# Patient Record
Sex: FEMALE | Race: WHITE | Employment: OTHER | URBAN - METROPOLITAN AREA
[De-identification: names, ages, dates, MRNs, and addresses within clinical notes are randomized per-mention and may not be internally consistent; named-entity substitution may affect disease eponyms.]

---

## 2017-03-17 PROBLEM — G89.29 CHRONIC PAIN: Chronic | Status: ACTIVE | Noted: 2017-03-17

## 2017-03-17 PROBLEM — M79.606 LEG PAIN: Chronic | Status: ACTIVE | Noted: 2017-03-17

## 2017-03-17 PROBLEM — M54.50 LOW BACK PAIN: Chronic | Status: ACTIVE | Noted: 2017-03-17

## 2017-06-01 PROBLEM — M48.061 NEURAL FORAMINAL STENOSIS OF LUMBAR SPINE: Chronic | Status: ACTIVE | Noted: 2017-06-01

## 2017-06-01 PROBLEM — M51.26 DISC DISPLACEMENT, LUMBAR: Chronic | Status: ACTIVE | Noted: 2017-06-01

## 2017-07-28 PROBLEM — E11.59 TYPE 2 DIABETES MELLITUS WITH CIRCULATORY DISORDER (HCC): Status: ACTIVE | Noted: 2017-07-28

## 2017-10-06 ENCOUNTER — CARE COORDINATION (OUTPATIENT)
Dept: CARE COORDINATION | Age: 47
End: 2017-10-06

## 2017-10-17 PROBLEM — S99.921A INJURY OF RIGHT GREAT TOE: Status: ACTIVE | Noted: 2017-10-17

## 2018-02-04 PROBLEM — Z00.00 HEALTH CARE MAINTENANCE: Status: ACTIVE | Noted: 2018-02-04

## 2018-04-04 ENCOUNTER — ANTI-COAG VISIT (OUTPATIENT)
Dept: FAMILY MEDICINE CLINIC | Age: 48
End: 2018-04-04

## 2018-04-10 DIAGNOSIS — G40.909 SEIZURE DISORDER (HCC): ICD-10-CM

## 2018-04-10 RX ORDER — LEVETIRACETAM 500 MG/1
500 TABLET ORAL 2 TIMES DAILY
Qty: 180 TABLET | Refills: 0 | Status: SHIPPED | OUTPATIENT
Start: 2018-04-10 | End: 2018-04-25 | Stop reason: SDUPTHER

## 2018-04-11 ENCOUNTER — TELEPHONE (OUTPATIENT)
Dept: PHYSICAL MEDICINE AND REHAB | Age: 48
End: 2018-04-11

## 2018-04-11 PROBLEM — Z00.00 HEALTH CARE MAINTENANCE: Status: RESOLVED | Noted: 2018-02-04 | Resolved: 2018-04-11

## 2018-04-11 RX ORDER — CHLORZOXAZONE 500 MG/1
500 TABLET ORAL 4 TIMES DAILY PRN
Qty: 120 TABLET | Refills: 2 | Status: SHIPPED | OUTPATIENT
Start: 2018-04-11 | End: 2018-05-11

## 2018-04-25 ENCOUNTER — OFFICE VISIT (OUTPATIENT)
Dept: NEUROLOGY | Age: 48
End: 2018-04-25
Payer: MEDICARE

## 2018-04-25 VITALS
HEIGHT: 67 IN | OXYGEN SATURATION: 93 % | TEMPERATURE: 98.2 F | RESPIRATION RATE: 20 BRPM | DIASTOLIC BLOOD PRESSURE: 95 MMHG | BODY MASS INDEX: 39.71 KG/M2 | HEART RATE: 107 BPM | SYSTOLIC BLOOD PRESSURE: 130 MMHG | WEIGHT: 253 LBS

## 2018-04-25 DIAGNOSIS — G40.909 SEIZURE DISORDER (HCC): Primary | ICD-10-CM

## 2018-04-25 PROCEDURE — 4004F PT TOBACCO SCREEN RCVD TLK: CPT | Performed by: NURSE PRACTITIONER

## 2018-04-25 PROCEDURE — G8427 DOCREV CUR MEDS BY ELIG CLIN: HCPCS | Performed by: NURSE PRACTITIONER

## 2018-04-25 PROCEDURE — G8417 CALC BMI ABV UP PARAM F/U: HCPCS | Performed by: NURSE PRACTITIONER

## 2018-04-25 PROCEDURE — 99213 OFFICE O/P EST LOW 20 MIN: CPT | Performed by: NURSE PRACTITIONER

## 2018-04-30 ENCOUNTER — HOSPITAL ENCOUNTER (OUTPATIENT)
Age: 48
Discharge: HOME | End: 2018-05-02
Payer: MEDICARE

## 2018-04-30 ENCOUNTER — NURSE ONLY (OUTPATIENT)
Dept: FAMILY MEDICINE CLINIC | Age: 48
End: 2018-04-30
Payer: MEDICARE

## 2018-04-30 DIAGNOSIS — I10 ESSENTIAL HYPERTENSION: ICD-10-CM

## 2018-04-30 DIAGNOSIS — E11.59 TYPE 2 DIABETES MELLITUS WITH OTHER CIRCULATORY COMPLICATION, UNSPECIFIED LONG TERM INSULIN USE STATUS: ICD-10-CM

## 2018-04-30 DIAGNOSIS — E55.9 VITAMIN D INSUFFICIENCY: ICD-10-CM

## 2018-04-30 LAB
ALBUMIN SERPL-MCNC: 3.8 G/DL (ref 3.5–5.2)
ALP BLD-CCNC: 87 U/L (ref 35–104)
ALT SERPL-CCNC: 12 U/L (ref 0–32)
ANION GAP SERPL CALCULATED.3IONS-SCNC: 21 MMOL/L (ref 7–16)
AST SERPL-CCNC: 15 U/L (ref 0–31)
BASOPHILS ABSOLUTE: 0.13 E9/L (ref 0–0.2)
BASOPHILS RELATIVE PERCENT: 0.7 % (ref 0–2)
BILIRUB SERPL-MCNC: 0.3 MG/DL (ref 0–1.2)
BUN BLDV-MCNC: 13 MG/DL (ref 6–20)
CALCIUM SERPL-MCNC: 9.7 MG/DL (ref 8.6–10.2)
CHLORIDE BLD-SCNC: 99 MMOL/L (ref 98–107)
CHOLESTEROL, TOTAL: 168 MG/DL (ref 0–199)
CO2: 24 MMOL/L (ref 22–29)
CREAT SERPL-MCNC: 0.9 MG/DL (ref 0.5–1)
EOSINOPHILS ABSOLUTE: 0.35 E9/L (ref 0.05–0.5)
EOSINOPHILS RELATIVE PERCENT: 2 % (ref 0–6)
GFR AFRICAN AMERICAN: >60
GFR NON-AFRICAN AMERICAN: >60 ML/MIN/1.73
GLUCOSE BLD-MCNC: 94 MG/DL (ref 74–109)
HBA1C MFR BLD: 7.1 % (ref 4.8–5.9)
HCT VFR BLD CALC: 57.9 % (ref 34–48)
HDLC SERPL-MCNC: 31 MG/DL
HEMOGLOBIN: 18.6 G/DL (ref 11.5–15.5)
IMMATURE GRANULOCYTES #: 0.1 E9/L
IMMATURE GRANULOCYTES %: 0.6 % (ref 0–5)
LDL CHOLESTEROL CALCULATED: 110 MG/DL (ref 0–99)
LYMPHOCYTES ABSOLUTE: 5.13 E9/L (ref 1.5–4)
LYMPHOCYTES RELATIVE PERCENT: 29 % (ref 20–42)
MCH RBC QN AUTO: 29.8 PG (ref 26–35)
MCHC RBC AUTO-ENTMCNC: 32.1 % (ref 32–34.5)
MCV RBC AUTO: 92.6 FL (ref 80–99.9)
MONOCYTES ABSOLUTE: 1.25 E9/L (ref 0.1–0.95)
MONOCYTES RELATIVE PERCENT: 7.1 % (ref 2–12)
NEUTROPHILS ABSOLUTE: 10.73 E9/L (ref 1.8–7.3)
NEUTROPHILS RELATIVE PERCENT: 60.6 % (ref 43–80)
PDW BLD-RTO: 17.9 FL (ref 11.5–15)
PLATELET # BLD: 294 E9/L (ref 130–450)
PMV BLD AUTO: 10.5 FL (ref 7–12)
POTASSIUM SERPL-SCNC: 5.1 MMOL/L (ref 3.5–5)
RBC # BLD: 6.25 E12/L (ref 3.5–5.5)
SODIUM BLD-SCNC: 144 MMOL/L (ref 132–146)
TOTAL PROTEIN: 7.8 G/DL (ref 6.4–8.3)
TRIGL SERPL-MCNC: 137 MG/DL (ref 0–149)
TSH SERPL DL<=0.05 MIU/L-ACNC: 2.96 UIU/ML (ref 0.27–4.2)
VITAMIN D 25-HYDROXY: 23 NG/ML (ref 30–100)
VLDLC SERPL CALC-MCNC: 27 MG/DL
WBC # BLD: 17.7 E9/L (ref 4.5–11.5)

## 2018-04-30 PROCEDURE — 85025 COMPLETE CBC W/AUTO DIFF WBC: CPT

## 2018-04-30 PROCEDURE — 83036 HEMOGLOBIN GLYCOSYLATED A1C: CPT

## 2018-04-30 PROCEDURE — 84443 ASSAY THYROID STIM HORMONE: CPT

## 2018-04-30 PROCEDURE — 80061 LIPID PANEL: CPT

## 2018-04-30 PROCEDURE — 82306 VITAMIN D 25 HYDROXY: CPT

## 2018-04-30 PROCEDURE — 80053 COMPREHEN METABOLIC PANEL: CPT

## 2018-04-30 PROCEDURE — 36415 COLL VENOUS BLD VENIPUNCTURE: CPT | Performed by: FAMILY MEDICINE

## 2018-05-07 ENCOUNTER — OFFICE VISIT (OUTPATIENT)
Dept: FAMILY MEDICINE CLINIC | Age: 48
End: 2018-05-07
Payer: MEDICARE

## 2018-05-07 ENCOUNTER — ANTI-COAG VISIT (OUTPATIENT)
Dept: FAMILY MEDICINE CLINIC | Age: 48
End: 2018-05-07

## 2018-05-07 VITALS
BODY MASS INDEX: 39.2 KG/M2 | WEIGHT: 249.75 LBS | TEMPERATURE: 98.8 F | OXYGEN SATURATION: 94 % | HEIGHT: 67 IN | SYSTOLIC BLOOD PRESSURE: 120 MMHG | HEART RATE: 100 BPM | DIASTOLIC BLOOD PRESSURE: 66 MMHG

## 2018-05-07 DIAGNOSIS — E03.8 SUBCLINICAL HYPOTHYROIDISM: ICD-10-CM

## 2018-05-07 DIAGNOSIS — E11.59 TYPE 2 DIABETES MELLITUS WITH OTHER CIRCULATORY COMPLICATION, UNSPECIFIED LONG TERM INSULIN USE STATUS: Primary | ICD-10-CM

## 2018-05-07 DIAGNOSIS — G40.909 SEIZURE DISORDER (HCC): ICD-10-CM

## 2018-05-07 DIAGNOSIS — G62.9 PERIPHERAL POLYNEUROPATHY: Chronic | ICD-10-CM

## 2018-05-07 DIAGNOSIS — Z79.01 ANTICOAGULANT LONG-TERM USE: ICD-10-CM

## 2018-05-07 DIAGNOSIS — N28.0 RENAL INFARCT (HCC): ICD-10-CM

## 2018-05-07 DIAGNOSIS — I10 ESSENTIAL HYPERTENSION: ICD-10-CM

## 2018-05-07 DIAGNOSIS — K21.9 GASTROESOPHAGEAL REFLUX DISEASE, ESOPHAGITIS PRESENCE NOT SPECIFIED: ICD-10-CM

## 2018-05-07 DIAGNOSIS — E78.2 MIXED HYPERLIPIDEMIA: ICD-10-CM

## 2018-05-07 LAB
INTERNATIONAL NORMALIZATION RATIO, POC: 2.1
PROTHROMBIN TIME, POC: NORMAL

## 2018-05-07 PROCEDURE — 99215 OFFICE O/P EST HI 40 MIN: CPT | Performed by: FAMILY MEDICINE

## 2018-05-07 PROCEDURE — G8427 DOCREV CUR MEDS BY ELIG CLIN: HCPCS | Performed by: FAMILY MEDICINE

## 2018-05-07 PROCEDURE — 85610 PROTHROMBIN TIME: CPT | Performed by: FAMILY MEDICINE

## 2018-05-07 PROCEDURE — G8417 CALC BMI ABV UP PARAM F/U: HCPCS | Performed by: FAMILY MEDICINE

## 2018-05-07 PROCEDURE — 3045F PR MOST RECENT HEMOGLOBIN A1C LEVEL 7.0-9.0%: CPT | Performed by: FAMILY MEDICINE

## 2018-05-07 PROCEDURE — 2022F DILAT RTA XM EVC RTNOPTHY: CPT | Performed by: FAMILY MEDICINE

## 2018-05-07 PROCEDURE — 4004F PT TOBACCO SCREEN RCVD TLK: CPT | Performed by: FAMILY MEDICINE

## 2018-05-07 RX ORDER — LEVOTHYROXINE SODIUM 0.07 MG/1
75 TABLET ORAL DAILY
Qty: 30 TABLET | Refills: 5 | Status: SHIPPED | OUTPATIENT
Start: 2018-05-07 | End: 2018-11-12 | Stop reason: SDUPTHER

## 2018-05-07 RX ORDER — DIVALPROEX SODIUM 500 MG/1
TABLET, EXTENDED RELEASE ORAL
Qty: 270 TABLET | Refills: 1
Start: 2018-05-07 | End: 2018-05-21 | Stop reason: SDUPTHER

## 2018-05-07 RX ORDER — OMEPRAZOLE 20 MG/1
20 CAPSULE, DELAYED RELEASE ORAL
Qty: 90 CAPSULE | Refills: 3
Start: 2018-05-07 | End: 2018-10-15 | Stop reason: SDUPTHER

## 2018-05-07 RX ORDER — ATORVASTATIN CALCIUM 80 MG/1
80 TABLET, FILM COATED ORAL DAILY
Qty: 30 TABLET | Refills: 5
Start: 2018-05-07 | End: 2018-06-26 | Stop reason: SDUPTHER

## 2018-05-07 RX ORDER — WARFARIN SODIUM 5 MG/1
5 TABLET ORAL
Qty: 100 TABLET | Refills: 5 | Status: SHIPPED | OUTPATIENT
Start: 2018-05-08 | End: 2019-06-12 | Stop reason: SDUPTHER

## 2018-05-07 RX ORDER — NORTRIPTYLINE HYDROCHLORIDE 25 MG/1
CAPSULE ORAL
Qty: 30 CAPSULE | Refills: 5 | Status: SHIPPED | OUTPATIENT
Start: 2018-05-07 | End: 2018-05-14 | Stop reason: SDUPTHER

## 2018-05-07 RX ORDER — DULOXETIN HYDROCHLORIDE 60 MG/1
CAPSULE, DELAYED RELEASE ORAL
Qty: 90 CAPSULE | Refills: 1
Start: 2018-05-07 | End: 2018-06-27 | Stop reason: SDUPTHER

## 2018-05-07 RX ORDER — LEVETIRACETAM 500 MG/1
TABLET ORAL
Qty: 180 TABLET | Refills: 1
Start: 2018-05-07 | End: 2018-10-25 | Stop reason: SDUPTHER

## 2018-05-07 RX ORDER — WARFARIN SODIUM 2.5 MG/1
2.5 TABLET ORAL
Qty: 100 TABLET | Refills: 5 | Status: SHIPPED | OUTPATIENT
Start: 2018-05-07 | End: 2019-06-12 | Stop reason: SDUPTHER

## 2018-05-07 ASSESSMENT — ENCOUNTER SYMPTOMS
SPUTUM PRODUCTION: 0
DOUBLE VISION: 0
COUGH: 0
SHORTNESS OF BREATH: 0
DIARRHEA: 0
SORE THROAT: 0
ABDOMINAL PAIN: 0
BACK PAIN: 1
VOMITING: 0
WHEEZING: 0
BLOOD IN STOOL: 0
CONSTIPATION: 0
ORTHOPNEA: 0
NAUSEA: 0
BLURRED VISION: 0
HEARTBURN: 0

## 2018-05-14 DIAGNOSIS — E11.59 TYPE 2 DIABETES MELLITUS WITH OTHER CIRCULATORY COMPLICATION, UNSPECIFIED LONG TERM INSULIN USE STATUS: ICD-10-CM

## 2018-05-14 DIAGNOSIS — G40.909 SEIZURE DISORDER (HCC): ICD-10-CM

## 2018-05-15 RX ORDER — NORTRIPTYLINE HYDROCHLORIDE 25 MG/1
CAPSULE ORAL
Qty: 90 CAPSULE | Refills: 5 | Status: SHIPPED | OUTPATIENT
Start: 2018-05-15 | End: 2019-06-12

## 2018-05-21 ENCOUNTER — OFFICE VISIT (OUTPATIENT)
Dept: PHYSICAL MEDICINE AND REHAB | Age: 48
End: 2018-05-21
Payer: MEDICARE

## 2018-05-21 ENCOUNTER — TELEPHONE (OUTPATIENT)
Dept: PHYSICAL MEDICINE AND REHAB | Age: 48
End: 2018-05-21

## 2018-05-21 VITALS
WEIGHT: 249 LBS | HEART RATE: 117 BPM | SYSTOLIC BLOOD PRESSURE: 117 MMHG | HEIGHT: 67 IN | BODY MASS INDEX: 39.08 KG/M2 | DIASTOLIC BLOOD PRESSURE: 80 MMHG

## 2018-05-21 DIAGNOSIS — M48.061 NEURAL FORAMINAL STENOSIS OF LUMBAR SPINE: Chronic | ICD-10-CM

## 2018-05-21 DIAGNOSIS — M54.41 CHRONIC BILATERAL LOW BACK PAIN WITH BILATERAL SCIATICA: ICD-10-CM

## 2018-05-21 DIAGNOSIS — G62.9 PERIPHERAL POLYNEUROPATHY: Chronic | ICD-10-CM

## 2018-05-21 DIAGNOSIS — G40.909 SEIZURE DISORDER (HCC): ICD-10-CM

## 2018-05-21 DIAGNOSIS — M54.42 CHRONIC BILATERAL LOW BACK PAIN WITH BILATERAL SCIATICA: ICD-10-CM

## 2018-05-21 DIAGNOSIS — E66.9 OBESITY (BMI 30-39.9): ICD-10-CM

## 2018-05-21 DIAGNOSIS — M51.26 DISC DISPLACEMENT, LUMBAR: Chronic | ICD-10-CM

## 2018-05-21 DIAGNOSIS — G89.29 CHRONIC BILATERAL LOW BACK PAIN WITH BILATERAL SCIATICA: ICD-10-CM

## 2018-05-21 DIAGNOSIS — M51.26 DISC DISPLACEMENT, LUMBAR: Primary | Chronic | ICD-10-CM

## 2018-05-21 PROCEDURE — 99214 OFFICE O/P EST MOD 30 MIN: CPT | Performed by: PHYSICAL MEDICINE & REHABILITATION

## 2018-05-21 PROCEDURE — G8427 DOCREV CUR MEDS BY ELIG CLIN: HCPCS | Performed by: PHYSICAL MEDICINE & REHABILITATION

## 2018-05-21 PROCEDURE — 4004F PT TOBACCO SCREEN RCVD TLK: CPT | Performed by: PHYSICAL MEDICINE & REHABILITATION

## 2018-05-21 PROCEDURE — G8417 CALC BMI ABV UP PARAM F/U: HCPCS | Performed by: PHYSICAL MEDICINE & REHABILITATION

## 2018-05-21 RX ORDER — TRAMADOL HYDROCHLORIDE 50 MG/1
50 TABLET ORAL DAILY PRN
Qty: 30 TABLET | Refills: 2 | Status: SHIPPED | OUTPATIENT
Start: 2018-05-21 | End: 2018-05-22 | Stop reason: SDUPTHER

## 2018-05-22 RX ORDER — DIVALPROEX SODIUM 500 MG/1
TABLET, EXTENDED RELEASE ORAL
Qty: 270 TABLET | Refills: 1 | Status: SHIPPED | OUTPATIENT
Start: 2018-05-22 | End: 2018-09-26 | Stop reason: SDUPTHER

## 2018-05-23 RX ORDER — TRAMADOL HYDROCHLORIDE 50 MG/1
50 TABLET ORAL DAILY PRN
Qty: 30 TABLET | Refills: 2 | Status: SHIPPED | OUTPATIENT
Start: 2018-05-23 | End: 2018-06-22

## 2018-05-25 ENCOUNTER — TELEPHONE (OUTPATIENT)
Dept: FAMILY MEDICINE CLINIC | Age: 48
End: 2018-05-25

## 2018-06-08 ENCOUNTER — ANTI-COAG VISIT (OUTPATIENT)
Dept: FAMILY MEDICINE CLINIC | Age: 48
End: 2018-06-08

## 2018-06-15 RX ORDER — LANCETS
EACH MISCELLANEOUS
Qty: 100 EACH | Refills: 3 | Status: SHIPPED | OUTPATIENT
Start: 2018-06-15 | End: 2019-03-19 | Stop reason: SDUPTHER

## 2018-06-19 ENCOUNTER — TELEPHONE (OUTPATIENT)
Dept: PHYSICAL MEDICINE AND REHAB | Age: 48
End: 2018-06-19

## 2018-06-27 DIAGNOSIS — G62.9 PERIPHERAL POLYNEUROPATHY: Chronic | ICD-10-CM

## 2018-06-27 RX ORDER — CHLORZOXAZONE 500 MG/1
TABLET ORAL
Qty: 120 TABLET | Refills: 2 | Status: SHIPPED | OUTPATIENT
Start: 2018-06-27 | End: 2018-09-12 | Stop reason: SDUPTHER

## 2018-06-28 RX ORDER — DULOXETIN HYDROCHLORIDE 60 MG/1
CAPSULE, DELAYED RELEASE ORAL
Qty: 90 CAPSULE | Refills: 0 | Status: SHIPPED | OUTPATIENT
Start: 2018-06-28 | End: 2018-08-29 | Stop reason: SDUPTHER

## 2018-07-09 ENCOUNTER — ANTI-COAG VISIT (OUTPATIENT)
Dept: FAMILY MEDICINE CLINIC | Age: 48
End: 2018-07-09

## 2018-07-11 ENCOUNTER — TELEPHONE (OUTPATIENT)
Dept: PHYSICAL MEDICINE AND REHAB | Age: 48
End: 2018-07-11

## 2018-07-11 NOTE — TELEPHONE ENCOUNTER
Spoke with patient, informed her physician statement for BMV will cost $150.00 up front for completed forms. Patient states she is on a fixed income and can not afford the fee. I contacted the [facility] and spoke with Eboni Santiago case # [id number] and informed him Dr. Anant Webster treats this patient for her back pain which does not affect her ability to drive. I also informed him our office charges $150 fee for form completion and patient is on a fixed income, can not afford to pay. Eboni Jack states as long as another physician and/or PCP completes the form that should be sufficient and form from here will not be needed. Spoke with patient, informed her to submit forms from Neurologist and PCP as BMV states that should be sufficient. Patient aware and voiced understanding.

## 2018-07-23 ASSESSMENT — ENCOUNTER SYMPTOMS
COUGH: 0
BACK PAIN: 0
ABDOMINAL PAIN: 0
SORE THROAT: 0
BLURRED VISION: 0
SHORTNESS OF BREATH: 0
BLOOD IN STOOL: 0
NAUSEA: 0
HEARTBURN: 0
ORTHOPNEA: 0
WHEEZING: 0
SPUTUM PRODUCTION: 0
VOMITING: 0
DOUBLE VISION: 0
DIARRHEA: 0
CONSTIPATION: 0

## 2018-07-23 NOTE — PROGRESS NOTES
Physical Exam:    VS:  /82   Pulse 95   Temp 99.3 °F (37.4 °C) (Oral)   Resp 16   Ht 5' 7\" (1.702 m)   Wt 248 lb 6.4 oz (112.7 kg)   SpO2 97%   BMI 38.90 kg/m²   LAST WEIGHT:  Wt Readings from Last 3 Encounters:   07/25/18 248 lb 6.4 oz (112.7 kg)   05/21/18 249 lb (112.9 kg)   05/07/18 249 lb 12 oz (113.3 kg)     Physical Exam   Constitutional: She is oriented to person, place, and time. She appears well-developed and well-nourished. No distress. HENT:   Head: Normocephalic and atraumatic. Right Ear: External ear normal.   Left Ear: External ear normal.   Mouth/Throat: Oropharynx is clear and moist. No oropharyngeal exudate. Eyes: Conjunctivae and EOM are normal. Pupils are equal, round, and reactive to light. Right eye exhibits no discharge. No scleral icterus. Neck: Normal range of motion. Neck supple. No thyromegaly present. Cardiovascular: Normal rate, regular rhythm, normal heart sounds and intact distal pulses. No murmur heard. Pulmonary/Chest: Effort normal. No stridor. No respiratory distress. She has no wheezes. She has no rales. She exhibits no tenderness. Abdominal: Soft. Bowel sounds are normal. She exhibits no distension and no mass. There is no tenderness. There is no guarding. Musculoskeletal: Normal range of motion. She exhibits no edema or tenderness. Lymphadenopathy:     She has no cervical adenopathy. Neurological: She is alert and oriented to person, place, and time. Skin: Skin is warm and dry. No rash noted. She is not diaphoretic. No erythema. No pallor. Psychiatric: She has a normal mood and affect. Her behavior is normal. Thought content normal.   Vitals reviewed.       Labs:  Lab Results   Component Value Date     04/30/2018    K 5.1 04/30/2018    CL 99 04/30/2018    CO2 24 04/30/2018    BUN 13 04/30/2018    CREATININE 0.9 04/30/2018    PROT 7.8 04/30/2018    LABALBU 3.8 04/30/2018    LABALBU 3.6 11/30/2010    CALCIUM 9.7 04/30/2018 approved    Neural foraminal stenosis of lumbar spine        Peripheral polyneuropathy ([facility] [address]): With decreased strength, stamina and balance, patient inquired about shower bench and tub rails  -     DME Order for Bath/Shower Seat at [address]    She is requesting a peer to peer encounter with medical director to get DME approved         Call or go to ED immediately if symptoms worsen or persist.    Follow Up:  Return for Keep scheduled appointment(s). or sooner if necessary. Educational materials and/or home exercises printed for patient's review and were included in patient instructions on his/her After Visit Summary and given to patient at the end of visit. Counseled regarding above diagnosis, including possible risks and complications,  especially if left uncontrolled. Counseled regarding the possible side effects, risks, benefits and alternatives to treatment; patient and/or guardian verbalizes understanding, agrees, feels comfortable with and wishes to proceed with above treatment plan. Advised patient to call with any new medication issues, and read all Rx info from pharmacy to assure aware of all possible risks and side effects of medication before taking. Reviewed age and gender appropriate health screening exams and vaccinations. Advised patient regarding importance of keeping up with recommended health maintenance and to schedule as soon as possible if overdue, as this is important in assessing for undiagnosed pathology, especially cancer, as well as protecting against potentially harmful/life threatening disease. Patient and/or guardian verbalizes understanding and agrees with above counseling, assessment and plan. All questions answered.     Jarrod Arias MD

## 2018-07-25 ENCOUNTER — OFFICE VISIT (OUTPATIENT)
Dept: FAMILY MEDICINE CLINIC | Age: 48
End: 2018-07-25
Payer: MEDICARE

## 2018-07-25 VITALS
TEMPERATURE: 99.3 F | BODY MASS INDEX: 38.99 KG/M2 | DIASTOLIC BLOOD PRESSURE: 82 MMHG | HEART RATE: 95 BPM | OXYGEN SATURATION: 97 % | WEIGHT: 248.4 LBS | SYSTOLIC BLOOD PRESSURE: 122 MMHG | RESPIRATION RATE: 16 BRPM | HEIGHT: 67 IN

## 2018-07-25 DIAGNOSIS — G40.909 SEIZURE DISORDER (HCC): Primary | ICD-10-CM

## 2018-07-25 DIAGNOSIS — G62.9 PERIPHERAL POLYNEUROPATHY: Chronic | ICD-10-CM

## 2018-07-25 DIAGNOSIS — M48.061 NEURAL FORAMINAL STENOSIS OF LUMBAR SPINE: Chronic | ICD-10-CM

## 2018-07-25 DIAGNOSIS — M51.26 DISC DISPLACEMENT, LUMBAR: Chronic | ICD-10-CM

## 2018-07-25 PROCEDURE — 99213 OFFICE O/P EST LOW 20 MIN: CPT | Performed by: FAMILY MEDICINE

## 2018-07-25 PROCEDURE — G8417 CALC BMI ABV UP PARAM F/U: HCPCS | Performed by: FAMILY MEDICINE

## 2018-07-25 PROCEDURE — 4004F PT TOBACCO SCREEN RCVD TLK: CPT | Performed by: FAMILY MEDICINE

## 2018-07-25 PROCEDURE — G8427 DOCREV CUR MEDS BY ELIG CLIN: HCPCS | Performed by: FAMILY MEDICINE

## 2018-08-09 ENCOUNTER — ANTI-COAG VISIT (OUTPATIENT)
Dept: FAMILY MEDICINE CLINIC | Age: 48
End: 2018-08-09

## 2018-08-09 ENCOUNTER — NURSE ONLY (OUTPATIENT)
Dept: FAMILY MEDICINE CLINIC | Age: 48
End: 2018-08-09
Payer: MEDICARE

## 2018-08-09 ENCOUNTER — HOSPITAL ENCOUNTER (OUTPATIENT)
Age: 48
Discharge: HOME | End: 2018-08-11
Payer: MEDICARE

## 2018-08-09 DIAGNOSIS — I10 ESSENTIAL HYPERTENSION: ICD-10-CM

## 2018-08-09 DIAGNOSIS — E03.8 SUBCLINICAL HYPOTHYROIDISM: ICD-10-CM

## 2018-08-09 DIAGNOSIS — E11.59 TYPE 2 DIABETES MELLITUS WITH OTHER CIRCULATORY COMPLICATION, UNSPECIFIED LONG TERM INSULIN USE STATUS: ICD-10-CM

## 2018-08-09 DIAGNOSIS — D68.9 COUMADIN RESISTANCE (HCC): Primary | ICD-10-CM

## 2018-08-09 DIAGNOSIS — Z79.01 COUMADIN RESISTANCE (HCC): Primary | ICD-10-CM

## 2018-08-09 LAB
ALBUMIN SERPL-MCNC: 3.6 G/DL (ref 3.5–5.2)
ALP BLD-CCNC: 95 U/L (ref 35–104)
ALT SERPL-CCNC: 17 U/L (ref 0–32)
ANION GAP SERPL CALCULATED.3IONS-SCNC: 20 MMOL/L (ref 7–16)
AST SERPL-CCNC: 19 U/L (ref 0–31)
BILIRUB SERPL-MCNC: 0.3 MG/DL (ref 0–1.2)
BUN BLDV-MCNC: 11 MG/DL (ref 6–20)
CALCIUM SERPL-MCNC: 9.5 MG/DL (ref 8.6–10.2)
CHLORIDE BLD-SCNC: 97 MMOL/L (ref 98–107)
CHOLESTEROL, TOTAL: 187 MG/DL (ref 0–199)
CO2: 25 MMOL/L (ref 22–29)
CREAT SERPL-MCNC: 0.9 MG/DL (ref 0.5–1)
GFR AFRICAN AMERICAN: >60
GFR NON-AFRICAN AMERICAN: >60 ML/MIN/1.73
GLUCOSE BLD-MCNC: 104 MG/DL (ref 74–109)
HDLC SERPL-MCNC: 31 MG/DL
INTERNATIONAL NORMALIZATION RATIO, POC: 1.8
LDL CHOLESTEROL CALCULATED: 125 MG/DL (ref 0–99)
POTASSIUM SERPL-SCNC: 4.7 MMOL/L (ref 3.5–5)
PROTHROMBIN TIME, POC: 21.9
SODIUM BLD-SCNC: 142 MMOL/L (ref 132–146)
TOTAL PROTEIN: 7.5 G/DL (ref 6.4–8.3)
TRIGL SERPL-MCNC: 154 MG/DL (ref 0–149)
TSH SERPL DL<=0.05 MIU/L-ACNC: 3.54 UIU/ML (ref 0.27–4.2)
VLDLC SERPL CALC-MCNC: 31 MG/DL

## 2018-08-09 PROCEDURE — 36415 COLL VENOUS BLD VENIPUNCTURE: CPT | Performed by: FAMILY MEDICINE

## 2018-08-09 PROCEDURE — 93793 ANTICOAG MGMT PT WARFARIN: CPT | Performed by: FAMILY MEDICINE

## 2018-08-09 PROCEDURE — 83036 HEMOGLOBIN GLYCOSYLATED A1C: CPT

## 2018-08-09 PROCEDURE — 80053 COMPREHEN METABOLIC PANEL: CPT

## 2018-08-09 PROCEDURE — 84443 ASSAY THYROID STIM HORMONE: CPT

## 2018-08-09 PROCEDURE — 85610 PROTHROMBIN TIME: CPT | Performed by: FAMILY MEDICINE

## 2018-08-09 PROCEDURE — 80061 LIPID PANEL: CPT

## 2018-08-11 LAB — HBA1C MFR BLD: 7.1 % (ref 4–5.6)

## 2018-08-21 ENCOUNTER — OFFICE VISIT (OUTPATIENT)
Dept: PHYSICAL MEDICINE AND REHAB | Age: 48
End: 2018-08-21
Payer: MEDICARE

## 2018-08-21 VITALS
DIASTOLIC BLOOD PRESSURE: 77 MMHG | HEIGHT: 67 IN | SYSTOLIC BLOOD PRESSURE: 124 MMHG | HEART RATE: 97 BPM | WEIGHT: 246 LBS | BODY MASS INDEX: 38.61 KG/M2

## 2018-08-21 DIAGNOSIS — M54.42 CHRONIC BILATERAL LOW BACK PAIN WITH BILATERAL SCIATICA: Primary | ICD-10-CM

## 2018-08-21 DIAGNOSIS — G89.29 CHRONIC BILATERAL LOW BACK PAIN WITH BILATERAL SCIATICA: Primary | ICD-10-CM

## 2018-08-21 DIAGNOSIS — E66.9 OBESITY (BMI 30-39.9): ICD-10-CM

## 2018-08-21 DIAGNOSIS — M54.41 CHRONIC BILATERAL LOW BACK PAIN WITH BILATERAL SCIATICA: Primary | ICD-10-CM

## 2018-08-21 DIAGNOSIS — M79.2 NEUROPATHIC PAIN: ICD-10-CM

## 2018-08-21 DIAGNOSIS — M47.816 LUMBAR SPONDYLOSIS: ICD-10-CM

## 2018-08-21 PROCEDURE — G8417 CALC BMI ABV UP PARAM F/U: HCPCS | Performed by: PHYSICAL MEDICINE & REHABILITATION

## 2018-08-21 PROCEDURE — 99214 OFFICE O/P EST MOD 30 MIN: CPT | Performed by: PHYSICAL MEDICINE & REHABILITATION

## 2018-08-21 PROCEDURE — 4004F PT TOBACCO SCREEN RCVD TLK: CPT | Performed by: PHYSICAL MEDICINE & REHABILITATION

## 2018-08-21 PROCEDURE — G8427 DOCREV CUR MEDS BY ELIG CLIN: HCPCS | Performed by: PHYSICAL MEDICINE & REHABILITATION

## 2018-08-21 RX ORDER — TRAMADOL HYDROCHLORIDE 50 MG/1
50 TABLET ORAL DAILY PRN
Qty: 90 TABLET | Refills: 0 | Status: SHIPPED | OUTPATIENT
Start: 2018-08-21 | End: 2018-11-19

## 2018-08-21 NOTE — PATIENT INSTRUCTIONS
We appreciate that you chose [facility] to provide your healthcare needs today. We took great pleasure in caring for you. You may receive a survey to help us learn how to make your next visit to a [facility] facility better than the last.   Your feedback is important to help us continually improve the service we can provide you. Please take the time to complete it thoughtfully if you receive one as we value the feedback in every survey. In this survey we would appreciate that you answer \"always\" or \"yes\" for as many questions as possible (this is the answer we get credit for doing a good job). If you feel there is a question you cannot answer \"always\" or \"yes\", please let us know before you leave today so we can remedy the situation right away. We look forward to continuing to provide you with excellent care. Considering the survey questions related to access to care, please keep in mind the urgency of your problem (i.e. was it an emergent or urgent visit or more routine)  and whether the urgency of that problem was handled appropriately by our office. We always do our best to prioritize the patients who need the care most urgently given our specialty is in short supply and there is a high demand for visits. Thank you for your time and consideration.

## 2018-08-21 NOTE — PROGRESS NOTES
SURGERY  8/7/2015    HYSTERECTOMY  2012    KIDNEY BIOPSY      KNEE ARTHROSCOPY  2003    right and left knee    KNEE SURGERY      left knee    NERVE BLOCK Bilateral 06/01/2017    TFNB  #1    NERVE BLOCK Bilateral 06/15/2017    bilatera lumbar transforaminal nerve block #2 L5-S1    NERVE BLOCK Bilateral 09/20/2017    Bilateral transforamninal nerve block lumbar #3    PULMONARY EMBOLISM SURGERY      RHINOPLASTY      1985    TONSILLECTOMY      TUBAL LIGATION         Social History     Social History    Marital status:      Occupational History     [employer]     Social History Main Topics    Smoking status: Current Every Day Smoker     Packs/day: 1.00     Years: 32.00     Types: Cigarettes    Smokeless tobacco: Never Used      Comment: (8/3/16):  not ready to quit; counseling given    Alcohol use 0.0 oz/week     0 Standard drinks or equivalent per week      Comment: (12/17/15):  <1 drink/week    Drug use: Yes     Special: Marijuana      Comment: last use 8/2016    Sexual activity: Not Currently     Other Topics Concern    None     Social History Narrative       Family History   Problem Relation Age of Onset    Depression Mother     Bipolar Disorder Mother     Hypertension Mother     Anxiety Disorder Sister     Asthma Son    [facility] Schizophrenia Son     Anxiety Disorder Daughter        Current Outpatient Prescriptions   Medication Sig Dispense Refill    traMADol (ULTRAM) 50 MG tablet Take 1 tablet by mouth daily as needed for Pain for up to 90 days. . 90 tablet 0    DULoxetine (CYMBALTA) 60 MG extended release capsule TAKE 1 CAPSULE EVERY DAY 90 capsule 0    chlorzoxazone (PARAFON FORTE) 500 MG tablet TAKE 1 TABLET FOUR TIMES DAILY AS NEEDED FOR MUSCLE SPASMS 120 tablet 2    atorvastatin (LIPITOR) 80 MG tablet TAKE 1 TABLET EVERY DAY 90 tablet 3    ACCU-CHEK MULTICLIX LANCETS MISC Testing twice daily 100 each 3    metFORMIN (GLUCOPHAGE) 1000 MG tablet TAKE 1 TABLET TWICE DAILY

## 2018-08-23 ENCOUNTER — ANTI-COAG VISIT (OUTPATIENT)
Dept: FAMILY MEDICINE CLINIC | Age: 48
End: 2018-08-23

## 2018-08-29 DIAGNOSIS — G62.9 PERIPHERAL POLYNEUROPATHY: Chronic | ICD-10-CM

## 2018-08-30 ENCOUNTER — OFFICE VISIT (OUTPATIENT)
Dept: FAMILY MEDICINE CLINIC | Age: 48
End: 2018-08-30
Payer: MEDICARE

## 2018-08-30 ENCOUNTER — HOSPITAL ENCOUNTER (OUTPATIENT)
Age: 48
Discharge: HOME | End: 2018-09-01
Payer: MEDICARE

## 2018-08-30 VITALS
RESPIRATION RATE: 18 BRPM | DIASTOLIC BLOOD PRESSURE: 60 MMHG | SYSTOLIC BLOOD PRESSURE: 100 MMHG | HEIGHT: 67 IN | HEART RATE: 97 BPM | TEMPERATURE: 98.1 F | WEIGHT: 246 LBS | BODY MASS INDEX: 38.61 KG/M2 | OXYGEN SATURATION: 91 %

## 2018-08-30 DIAGNOSIS — E11.59 TYPE 2 DIABETES MELLITUS WITH OTHER CIRCULATORY COMPLICATIONS (HCC): ICD-10-CM

## 2018-08-30 DIAGNOSIS — I10 ESSENTIAL HYPERTENSION: ICD-10-CM

## 2018-08-30 DIAGNOSIS — E11.59 TYPE 2 DIABETES MELLITUS WITH OTHER CIRCULATORY COMPLICATION, UNSPECIFIED WHETHER LONG TERM INSULIN USE (HCC): Primary | ICD-10-CM

## 2018-08-30 DIAGNOSIS — F17.200 TOBACCO DEPENDENCY: ICD-10-CM

## 2018-08-30 DIAGNOSIS — Z79.01 ANTICOAGULANT LONG-TERM USE: ICD-10-CM

## 2018-08-30 DIAGNOSIS — D73.5 SPLENIC INFARCT: ICD-10-CM

## 2018-08-30 DIAGNOSIS — N28.0 RENAL INFARCT (HCC): ICD-10-CM

## 2018-08-30 DIAGNOSIS — E78.2 MIXED HYPERLIPIDEMIA: ICD-10-CM

## 2018-08-30 PROBLEM — S99.921A INJURY OF RIGHT GREAT TOE: Status: RESOLVED | Noted: 2017-10-17 | Resolved: 2018-08-30

## 2018-08-30 LAB
CREATININE URINE: 54 MG/DL (ref 29–226)
INTERNATIONAL NORMALIZATION RATIO, POC: 2.9
MICROALBUMIN UR-MCNC: <12 MG/L
MICROALBUMIN/CREAT UR-RTO: ABNORMAL (ref 0–30)
PROTHROMBIN TIME, POC: NORMAL

## 2018-08-30 PROCEDURE — 85610 PROTHROMBIN TIME: CPT | Performed by: FAMILY MEDICINE

## 2018-08-30 PROCEDURE — G8417 CALC BMI ABV UP PARAM F/U: HCPCS | Performed by: FAMILY MEDICINE

## 2018-08-30 PROCEDURE — 3045F PR MOST RECENT HEMOGLOBIN A1C LEVEL 7.0-9.0%: CPT | Performed by: FAMILY MEDICINE

## 2018-08-30 PROCEDURE — 82044 UR ALBUMIN SEMIQUANTITATIVE: CPT

## 2018-08-30 PROCEDURE — 4004F PT TOBACCO SCREEN RCVD TLK: CPT | Performed by: FAMILY MEDICINE

## 2018-08-30 PROCEDURE — 99214 OFFICE O/P EST MOD 30 MIN: CPT | Performed by: FAMILY MEDICINE

## 2018-08-30 PROCEDURE — 2022F DILAT RTA XM EVC RTNOPTHY: CPT | Performed by: FAMILY MEDICINE

## 2018-08-30 PROCEDURE — 82570 ASSAY OF URINE CREATININE: CPT

## 2018-08-30 PROCEDURE — G8427 DOCREV CUR MEDS BY ELIG CLIN: HCPCS | Performed by: FAMILY MEDICINE

## 2018-08-30 RX ORDER — DULOXETIN HYDROCHLORIDE 60 MG/1
CAPSULE, DELAYED RELEASE ORAL
Qty: 90 CAPSULE | Refills: 0 | Status: SHIPPED | OUTPATIENT
Start: 2018-08-30 | End: 2018-10-31 | Stop reason: SDUPTHER

## 2018-08-30 ASSESSMENT — ENCOUNTER SYMPTOMS
BACK PAIN: 1
NAUSEA: 0
SORE THROAT: 0
ORTHOPNEA: 0
VOMITING: 0
WHEEZING: 0
CONSTIPATION: 0
BLURRED VISION: 0
DIARRHEA: 0
DOUBLE VISION: 0
BLOOD IN STOOL: 0
SPUTUM PRODUCTION: 0
SHORTNESS OF BREATH: 0
ABDOMINAL PAIN: 0
HEARTBURN: 0
COUGH: 0

## 2018-08-30 NOTE — PATIENT INSTRUCTIONS
washes. Where can you learn more? Go to [email or web address] and sign in to your [employer] account. Enter [phone] in the [facility] box to learn more about \"Saline Nasal Washes: Care Instructions. \"     If you do not have an account, please click on the \"Sign Up Now\" link. Current as of: May 12, 2017  Content Version: 11.7  © 7102-1910 [employer]. Care instructions adapted under license by [facility] ([facility]). If you have questions about a medical condition or this instruction, always ask your healthcare professional. [address] any warranty or liability for your use of this information. Patient Education        Sinusitis: Care Instructions  Your Care Instructions    Sinusitis is an infection of the lining of the sinus cavities in your head. Sinusitis often follows a cold. It causes pain and pressure in your head and face. In most cases, sinusitis gets better on its own in 1 to 2 weeks. But some mild symptoms may last for several weeks. Sometimes antibiotics are needed. Follow-up care is a key part of your treatment and safety. Be sure to make and go to all appointments, and call your doctor if you are having problems. It's also a good idea to know your test results and keep a list of the medicines you take. How can you care for yourself at home? · Take an over-the-counter pain medicine, such as acetaminophen (Tylenol), ibuprofen (Advil, Motrin), or naproxen (Aleve). Read and follow all instructions on the label. · If the doctor prescribed antibiotics, take them as directed. Do not stop taking them just because you feel better. You need to take the full course of antibiotics. · Be careful when taking over-the-counter cold or flu medicines and Tylenol at the same time. Many of these medicines have acetaminophen, which is Tylenol. Read the labels to make sure that you are not taking more than the recommended dose.  [employer] disclaims any warranty or liability for your use of this information.

## 2018-08-30 NOTE — TELEPHONE ENCOUNTER
Called and spoke with the patient to inform her that her script for Cymbalta was sent to her pharmacy. Patient aware and voiced understanding.

## 2018-08-30 NOTE — PROGRESS NOTES
Martha Richard is a 51 y.o. female who presents today for follow up of diabetes, bipolar disorder, tobacco use, COPD. DM2:   Patient is here to evaluate DM. Patient is now controlled. Metformin 1000 mg BID. Compliant with therapy and tolerating it well. Went to Diabetic Education, opted to cut out sugar from diet as main control mechanism. Has some sugary foods in diet and occasional lapses with increased sugar intake. Monitoring blood sugar fasting in AM and QHS. Fasting blood sugars: between 110 - 120 in the morning depending on what she had for dinner. HS blood sugars:135 - 200 in the evening. Average is 125. Labs reviewed; HgbA1C 7.1%    Patient is taking ASA but not Ace Inhibitor/ARB. Patient is taking 80 mg atorvastatin daily. LDL is not at goal (125). BP is controlled 100/60. Attests to feeling lightheaded after raising arms above head to reach cabinets. Happens about 1 episode/week, thinks it is related to her heart. Has a [facility] of heart disease, M GMA had CHF, MI, HTN, uncle had similar problems. Unaware of mother/father medical history. Denies chest pain, SOB, C/N/V/D. Denies dysuria, hematuria. Patient is  following with podiatrist.  Saw an Eye Dr 2/18. Patient is aware that it is necessary to see an Eye Dr yearly. Patient does smoke and is not ready to quit or cut back. She is making healthier choices and is more mindful of her portions. Down 7-10 more pounds since 2/18; down 40# in 1 year.        Lab Results   Component Value Date    LABA1C 7.1 (H) 08/09/2018    LABA1C 7.1 (H) 04/30/2018    LABA1C 6.9 (H) 01/11/2018     Lab Results   Component Value Date    LABMICR 92.8 (H) 10/11/2017    LDLCALC 125 (H) 08/09/2018    CREATININE 0.9 08/09/2018       Lab Results   Component Value Date    LDLCALC 125 (H) 08/09/2018       Lab Results   Component Value Date    CHOL 187 08/09/2018    CHOL 168 04/30/2018    CHOL 180 01/11/2018     Lab Results   Component Value Date    TRIG 154 (H)

## 2018-08-30 NOTE — TELEPHONE ENCOUNTER
Patient last visit 8-21-18 next visit 11-21-18. Pharmacy requesting refill on Cymbalta 60 mg 1 cap qd sent 6-28-18 #90 no refills. Please advise.

## 2018-09-13 RX ORDER — CHLORZOXAZONE 500 MG/1
TABLET ORAL
Qty: 120 TABLET | Refills: 2 | Status: SHIPPED | OUTPATIENT
Start: 2018-09-13 | End: 2018-12-03 | Stop reason: SDUPTHER

## 2018-09-13 NOTE — TELEPHONE ENCOUNTER
Called and spoke with the patient to inform her that her script for Adilene Adams was sent to her mail away pharmacy. Patient aware and voiced understanding.

## 2018-09-13 NOTE — TELEPHONE ENCOUNTER
Patient last visit sent 8-21-18 next visit 11-21-18. Pharmacy requesting refill on Parafon Forte 500 mg 1 tab qid sent 6-27-18 #120 2 refills. Please advise.

## 2018-09-16 ENCOUNTER — APPOINTMENT (OUTPATIENT)
Dept: GENERAL RADIOLOGY | Age: 48
DRG: 872 | End: 2018-09-16
Payer: MEDICARE

## 2018-09-16 ENCOUNTER — HOSPITAL ENCOUNTER (INPATIENT)
Age: 48
LOS: 2 days | Discharge: HOME | DRG: 872 | End: 2018-09-18
Attending: EMERGENCY MEDICINE | Admitting: INTERNAL MEDICINE
Payer: MEDICARE

## 2018-09-16 ENCOUNTER — APPOINTMENT (OUTPATIENT)
Dept: CT IMAGING | Age: 48
DRG: 872 | End: 2018-09-16
Payer: MEDICARE

## 2018-09-16 DIAGNOSIS — K57.32 DIVERTICULITIS OF COLON: Primary | ICD-10-CM

## 2018-09-16 PROBLEM — R10.9 ABDOMINAL PAIN: Status: ACTIVE | Noted: 2018-09-16

## 2018-09-16 LAB
ALBUMIN SERPL-MCNC: 3.6 G/DL (ref 3.5–5.2)
ALP BLD-CCNC: 88 U/L (ref 35–104)
ALT SERPL-CCNC: 12 U/L (ref 0–32)
ANION GAP SERPL CALCULATED.3IONS-SCNC: 14 MMOL/L (ref 7–16)
AST SERPL-CCNC: 14 U/L (ref 0–31)
BASOPHILS ABSOLUTE: 0 E9/L (ref 0–0.2)
BASOPHILS RELATIVE PERCENT: 0.6 % (ref 0–2)
BILIRUB SERPL-MCNC: 0.4 MG/DL (ref 0–1.2)
BILIRUBIN URINE: NEGATIVE
BLOOD, URINE: NEGATIVE
BUN BLDV-MCNC: 11 MG/DL (ref 6–20)
BURR CELLS: ABNORMAL
CALCIUM SERPL-MCNC: 9.2 MG/DL (ref 8.6–10.2)
CHLORIDE BLD-SCNC: 100 MMOL/L (ref 98–107)
CLARITY: CLEAR
CO2: 27 MMOL/L (ref 22–29)
COLOR: YELLOW
CREAT SERPL-MCNC: 0.8 MG/DL (ref 0.5–1)
EOSINOPHILS ABSOLUTE: 0 E9/L (ref 0.05–0.5)
EOSINOPHILS RELATIVE PERCENT: 2 % (ref 0–6)
GFR AFRICAN AMERICAN: >60
GFR NON-AFRICAN AMERICAN: >60 ML/MIN/1.73
GLUCOSE BLD-MCNC: 132 MG/DL (ref 74–109)
GLUCOSE URINE: NEGATIVE MG/DL
HCT VFR BLD CALC: 52.6 % (ref 34–48)
HEMOGLOBIN: 17.2 G/DL (ref 11.5–15.5)
INR BLD: 2.7
KETONES, URINE: NEGATIVE MG/DL
LACTIC ACID: 2.7 MMOL/L (ref 0.5–2.2)
LEUKOCYTE ESTERASE, URINE: NEGATIVE
LYMPHOCYTES ABSOLUTE: 2.96 E9/L (ref 1.5–4)
LYMPHOCYTES RELATIVE PERCENT: 15.7 % (ref 20–42)
MCH RBC QN AUTO: 29.9 PG (ref 26–35)
MCHC RBC AUTO-ENTMCNC: 32.7 % (ref 32–34.5)
MCV RBC AUTO: 91.3 FL (ref 80–99.9)
MONOCYTES ABSOLUTE: 1.66 E9/L (ref 0.1–0.95)
MONOCYTES RELATIVE PERCENT: 8.7 % (ref 2–12)
NEUTROPHILS ABSOLUTE: 14.06 E9/L (ref 1.8–7.3)
NEUTROPHILS RELATIVE PERCENT: 75.7 % (ref 43–80)
NITRITE, URINE: NEGATIVE
PDW BLD-RTO: 15.9 FL (ref 11.5–15)
PH UA: 6 (ref 5–9)
PLATELET # BLD: 283 E9/L (ref 130–450)
PMV BLD AUTO: 10 FL (ref 7–12)
POIKILOCYTES: ABNORMAL
POTASSIUM SERPL-SCNC: 4.2 MMOL/L (ref 3.5–5)
PROTEIN UA: NEGATIVE MG/DL
PROTHROMBIN TIME: 30.3 SEC (ref 9.3–12.4)
RBC # BLD: 5.76 E12/L (ref 3.5–5.5)
SODIUM BLD-SCNC: 141 MMOL/L (ref 132–146)
SPECIFIC GRAVITY UA: 1.01 (ref 1–1.03)
TOTAL PROTEIN: 7 G/DL (ref 6.4–8.3)
UROBILINOGEN, URINE: 0.2 E.U./DL
WBC # BLD: 18.5 E9/L (ref 4.5–11.5)

## 2018-09-16 PROCEDURE — 74022 RADEX COMPL AQT ABD SERIES: CPT

## 2018-09-16 PROCEDURE — 36415 COLL VENOUS BLD VENIPUNCTURE: CPT

## 2018-09-16 PROCEDURE — 2500000003 HC RX 250 WO HCPCS: Performed by: FAMILY MEDICINE

## 2018-09-16 PROCEDURE — 2580000003 HC RX 258: Performed by: PHYSICIAN ASSISTANT

## 2018-09-16 PROCEDURE — 87088 URINE BACTERIA CULTURE: CPT

## 2018-09-16 PROCEDURE — 81003 URINALYSIS AUTO W/O SCOPE: CPT

## 2018-09-16 PROCEDURE — 2580000003 HC RX 258: Performed by: FAMILY MEDICINE

## 2018-09-16 PROCEDURE — 6360000004 HC RX CONTRAST MEDICATION: Performed by: RADIOLOGY

## 2018-09-16 PROCEDURE — 6370000000 HC RX 637 (ALT 250 FOR IP): Performed by: FAMILY MEDICINE

## 2018-09-16 PROCEDURE — 6360000002 HC RX W HCPCS: Performed by: EMERGENCY MEDICINE

## 2018-09-16 PROCEDURE — 80053 COMPREHEN METABOLIC PANEL: CPT

## 2018-09-16 PROCEDURE — 6360000002 HC RX W HCPCS: Performed by: FAMILY MEDICINE

## 2018-09-16 PROCEDURE — 83605 ASSAY OF LACTIC ACID: CPT

## 2018-09-16 PROCEDURE — 85610 PROTHROMBIN TIME: CPT

## 2018-09-16 PROCEDURE — 85025 COMPLETE CBC W/AUTO DIFF WBC: CPT

## 2018-09-16 PROCEDURE — 74177 CT ABD & PELVIS W/CONTRAST: CPT

## 2018-09-16 PROCEDURE — 1200000000 HC SEMI PRIVATE

## 2018-09-16 PROCEDURE — 96365 THER/PROPH/DIAG IV INF INIT: CPT

## 2018-09-16 PROCEDURE — 99285 EMERGENCY DEPT VISIT HI MDM: CPT

## 2018-09-16 RX ORDER — DEXTROSE MONOHYDRATE 25 G/50ML
12.5 INJECTION, SOLUTION INTRAVENOUS PRN
Status: DISCONTINUED | OUTPATIENT
Start: 2018-09-16 | End: 2018-09-18 | Stop reason: HOSPADM

## 2018-09-16 RX ORDER — WARFARIN SODIUM 2.5 MG/1
2.5 TABLET ORAL
Status: DISCONTINUED | OUTPATIENT
Start: 2018-09-17 | End: 2018-09-17

## 2018-09-16 RX ORDER — ATORVASTATIN CALCIUM 40 MG/1
80 TABLET, FILM COATED ORAL DAILY
Status: DISCONTINUED | OUTPATIENT
Start: 2018-09-17 | End: 2018-09-18 | Stop reason: HOSPADM

## 2018-09-16 RX ORDER — MORPHINE SULFATE 2 MG/ML
2 INJECTION, SOLUTION INTRAMUSCULAR; INTRAVENOUS EVERY 4 HOURS PRN
Status: DISCONTINUED | OUTPATIENT
Start: 2018-09-16 | End: 2018-09-18 | Stop reason: HOSPADM

## 2018-09-16 RX ORDER — KETOROLAC TROMETHAMINE 15 MG/ML
15 INJECTION, SOLUTION INTRAMUSCULAR; INTRAVENOUS EVERY 6 HOURS PRN
Status: DISCONTINUED | OUTPATIENT
Start: 2018-09-16 | End: 2018-09-17

## 2018-09-16 RX ORDER — NICOTINE POLACRILEX 4 MG
15 LOZENGE BUCCAL PRN
Status: DISCONTINUED | OUTPATIENT
Start: 2018-09-16 | End: 2018-09-18 | Stop reason: HOSPADM

## 2018-09-16 RX ORDER — 0.9 % SODIUM CHLORIDE 0.9 %
1000 INTRAVENOUS SOLUTION INTRAVENOUS ONCE
Status: COMPLETED | OUTPATIENT
Start: 2018-09-16 | End: 2018-09-16

## 2018-09-16 RX ORDER — SODIUM CHLORIDE, SODIUM LACTATE, POTASSIUM CHLORIDE, CALCIUM CHLORIDE 600; 310; 30; 20 MG/100ML; MG/100ML; MG/100ML; MG/100ML
INJECTION, SOLUTION INTRAVENOUS CONTINUOUS
Status: DISCONTINUED | OUTPATIENT
Start: 2018-09-16 | End: 2018-09-18 | Stop reason: HOSPADM

## 2018-09-16 RX ORDER — NORTRIPTYLINE HYDROCHLORIDE 25 MG/1
25 CAPSULE ORAL 2 TIMES DAILY
Status: DISCONTINUED | OUTPATIENT
Start: 2018-09-16 | End: 2018-09-17

## 2018-09-16 RX ORDER — TRAMADOL HYDROCHLORIDE 50 MG/1
50 TABLET ORAL DAILY PRN
Status: DISCONTINUED | OUTPATIENT
Start: 2018-09-16 | End: 2018-09-18 | Stop reason: HOSPADM

## 2018-09-16 RX ORDER — DULOXETIN HYDROCHLORIDE 60 MG/1
60 CAPSULE, DELAYED RELEASE ORAL DAILY
Status: DISCONTINUED | OUTPATIENT
Start: 2018-09-17 | End: 2018-09-18 | Stop reason: HOSPADM

## 2018-09-16 RX ORDER — LEVETIRACETAM 500 MG/1
500 TABLET ORAL DAILY
Status: DISCONTINUED | OUTPATIENT
Start: 2018-09-17 | End: 2018-09-17

## 2018-09-16 RX ORDER — WARFARIN SODIUM 5 MG/1
5 TABLET ORAL
Status: DISCONTINUED | OUTPATIENT
Start: 2018-09-18 | End: 2018-09-17

## 2018-09-16 RX ORDER — LEVOTHYROXINE SODIUM 0.07 MG/1
75 TABLET ORAL DAILY
Status: DISCONTINUED | OUTPATIENT
Start: 2018-09-17 | End: 2018-09-18 | Stop reason: HOSPADM

## 2018-09-16 RX ORDER — DEXTROSE MONOHYDRATE 50 MG/ML
100 INJECTION, SOLUTION INTRAVENOUS PRN
Status: DISCONTINUED | OUTPATIENT
Start: 2018-09-16 | End: 2018-09-18 | Stop reason: HOSPADM

## 2018-09-16 RX ORDER — DIVALPROEX SODIUM 500 MG/1
500 TABLET, EXTENDED RELEASE ORAL DAILY
Status: DISCONTINUED | OUTPATIENT
Start: 2018-09-17 | End: 2018-09-18 | Stop reason: HOSPADM

## 2018-09-16 RX ADMIN — TAZOBACTAM SODIUM AND PIPERACILLIN SODIUM 3.38 G: 375; 3 INJECTION, SOLUTION INTRAVENOUS at 18:48

## 2018-09-16 RX ADMIN — METOPROLOL TARTRATE 25 MG: 25 TABLET ORAL at 21:55

## 2018-09-16 RX ADMIN — CEFTRIAXONE 2 G: 2 INJECTION, POWDER, FOR SOLUTION INTRAMUSCULAR; INTRAVENOUS at 21:55

## 2018-09-16 RX ADMIN — IOHEXOL 50 ML: 240 INJECTION, SOLUTION INTRATHECAL; INTRAVASCULAR; INTRAVENOUS; ORAL at 17:37

## 2018-09-16 RX ADMIN — IOPAMIDOL 80 ML: 755 INJECTION, SOLUTION INTRAVENOUS at 17:37

## 2018-09-16 RX ADMIN — SODIUM CHLORIDE 1000 ML: 900 INJECTION, SOLUTION INTRAVENOUS at 13:32

## 2018-09-16 RX ADMIN — METRONIDAZOLE 500 MG: 500 INJECTION, SOLUTION INTRAVENOUS at 21:56

## 2018-09-16 RX ADMIN — SODIUM CHLORIDE, POTASSIUM CHLORIDE, SODIUM LACTATE AND CALCIUM CHLORIDE: 600; 310; 30; 20 INJECTION, SOLUTION INTRAVENOUS at 21:55

## 2018-09-16 ASSESSMENT — PAIN SCALES - GENERAL
PAINLEVEL_OUTOF10: 0
PAINLEVEL_OUTOF10: 8
PAINLEVEL_OUTOF10: 0
PAINLEVEL_OUTOF10: 0

## 2018-09-16 ASSESSMENT — PAIN DESCRIPTION - PAIN TYPE: TYPE: ACUTE PAIN

## 2018-09-16 ASSESSMENT — PAIN DESCRIPTION - ORIENTATION: ORIENTATION: LOWER

## 2018-09-16 ASSESSMENT — PAIN DESCRIPTION - FREQUENCY: FREQUENCY: INTERMITTENT

## 2018-09-16 ASSESSMENT — PAIN DESCRIPTION - LOCATION: LOCATION: ABDOMEN

## 2018-09-16 ASSESSMENT — PAIN DESCRIPTION - DESCRIPTORS: DESCRIPTORS: CRAMPING

## 2018-09-16 NOTE — H&P
INTERNAL MEDICINE H&P  2018  Name:  Edith Vicente  :   1970 (51 y.o.)  MRN:   [id number]    Chief Complaint   Patient presents with    Abdominal Pain     pt states she has lower quad abdom. pain that started last night around 2200. denies n/v/d        HPI:  The patient is a 51 y.o. female presents with abd pain onset last night, it comes and goes, nothing makes it better, nothing makes it worse. She has had pain like this before. She was told by her PCP she had diverticulitis but she had never had a cscope. She has never seen GI. She admits to abd pain. She denies n/v, fever, chills, weight change, dysuria, diarrhea, constipation. Her last A1c was 7, she states she has lost about 40 pounds and is trying to do better with her diet and exercise. Hx lap cindi, c section, hysterectomy. She takes coumadin for a hx of blood clots with renal and splenic infarct? ECHO 2015   Left ventricular size is grossly normal.   Mild left ventricular concentric hypertrophy noted.   Ejection fraction is measured at 62%.   No evidence of left ventricular mass or thrombus noted.   No regional wall motion abnormalities seen.   Physiologic and/or trace mitral regurgitation is present.   No mitral valve stenosis present.   Physiologic and/or trace tricuspid regurgitation.  RVSP is 22.74 mmHg.   Regular rhythm.   \"Bubble study\" negative--consider FARIDEH.     Past Medical History:    Past Medical History:   Diagnosis Date    Anticoagulant long-term use     Carpal tunnel syndrome on both sides     both wrists    Cellulitis of right foot 2016    Cervical cancer ([facility] [address]     S/P LUCIUS, BSO    Chronic back pain     Depressed bipolar II disorder (HCC)     Diabetes mellitus ([address]     Diverticulitis     DVT (deep vein thrombosis) in pregnancy ([address]     Epilepsy ([address]     Hyperlipidemia     Hypertension     Marijuana use     Moderate persistent reactive airways dysfunction syndrome without complication ULTRA TEST) strip Pt. Tests BID -TID. 7/28/17  Yes Tona Perez MD   Londell Leash LANCETS FINE MISC Pt. Tests BID-TID. 7/28/17  Yes Tona Perez MD   B-D ULTRAFINE III SHORT PEN 31G X 8 MM MISC U UTD 6/23/17  Yes Historical Provider, MD       REVIEW OF SYSTEMS:  General ROS: negative  Hematological and Lymphatic ROS: negative  Respiratory ROS: negative  Cardiovascular ROS: negative  Gastrointestinal ROS: positive for - abdominal pain  Genito-Urinary ROS: negative  Musculoskeletal ROS: negative  ENT ROS: negative  Endocrine ROS: negative  Dermatological ROS: negative    PHYSICAL EXAM:  Vitals:    09/16/18 1859   BP: 136/71   Pulse: 91   Resp: 16   Temp:    SpO2: 93%       General Appearance:  awake, alert, oriented, in no acute distress  Skin:  Skin color, texture, turgor normal. No rashes or lesions. Head/face:  NCAT  Eyes:  No gross abnormalities.  and PERRL  Lungs:  Non labored, equal chest rise  Heart: RRR, no JVDm no edema  Abdomen:  S, ND, mild tenderness diffuse  Extremities: no edema, no erythema  Neurologic: CNs grossly intact, no slurred speech, A&Ox3  Musculoskeletal: good ROM, no acute deformities      CBC:   Lab Results   Component Value Date    WBC 18.5 09/16/2018    RBC 5.76 09/16/2018    HGB 17.2 09/16/2018    HCT 52.6 09/16/2018    MCV 91.3 09/16/2018    MCH 29.9 09/16/2018    MCHC 32.7 09/16/2018    RDW 15.9 09/16/2018     09/16/2018    MPV 10.0 09/16/2018     BMP:   Lab Results   Component Value Date     09/16/2018    K 4.2 09/16/2018     09/16/2018    CO2 27 09/16/2018    BUN 11 09/16/2018    LABALBU 3.6 09/16/2018    LABALBU 3.6 11/30/2010    CREATININE 0.8 09/16/2018    CALCIUM 9.2 09/16/2018    GFRAA >60 09/16/2018    LABGLOM >60 09/16/2018    GLUCOSE 132 09/16/2018    GLUCOSE 70 04/28/2011     Hepatic Function Panel:    Lab Results   Component Value Date    ALKPHOS 88 09/16/2018    AST 14 09/16/2018    ALT 12 09/16/2018    PROT 7.0 09/16/2018 LABALBU 3.6 09/16/2018    LABALBU 3.6 11/30/2010    BILITOT 0.4 09/16/2018     PT/INR:   Lab Results   Component Value Date    PROTIME 21.9 08/09/2018    INR 2.9 08/30/2018    INR 3.3 02/15/2018     U/A:   Lab Results   Component Value Date    NITRITE neg 10/28/2014    LEUKOCYTESUR Negative 09/16/2018    PHUR 6.0 09/16/2018    WBCUA 0-1 10/05/2017    WBCUA 5-10 04/28/2011    RBCUA NONE 10/05/2017    RBCUA NONE 09/28/2013    BACTERIA RARE 10/05/2017    SPECGRAV 1.010 09/16/2018    BLOODU Negative 09/16/2018    GLUCOSEU Negative 09/16/2018    GLUCOSEU 100 04/28/2011     TSH:    Lab Results   Component Value Date    TSH 3.540 08/09/2018     Xr Acute Abd Series Chest 1 Vw    Result Date: 9/16/2018  LOCATION: 200 EXAM: XR ACUTE ABD SERIES CHEST 1 VW COMPARISON: None HISTORY: Lower abdominal pain TECHNIQUE: AP chest, upright and supine views of the abdomen. FINDINGS: No focally dilated loops or free air is visualized. No air-fluid level seen on the upright view. No abdominal calcifications present. Moderate amount of stool in the colon. Lungs are clear. 1.  No acute abdominal abnormality. Ct Abdomen Pelvis W Iv Contrast Additional Contrast? Oral    Result Date: 9/16/2018  LOCATION:200 EXAM: CT ABDOMEN PELVIS W IV CONTRAST COMPARISON: None HISTORY: Pain with elevated lactic acid TECHNIQUE:Contrast-enhanced helical abdomen and pelvis CT was performed. Coronal and sagittal reconstructions also obtained. Automated dose control was used for this exam. CONTRAST: 80 mL Isovue-370 intravenous contrast was administered. No oral contrast administered. FINDINGS: SUPPORT DEVICES: None LOWER THORAX: Limited evaluation of the lower chest demonstrates clear lung bases bilaterally. SOLID ORGANS: Fatty liver noted. The liver, spleen, pancreas, and adrenal glands are normal. BILIARY SYSTEM: No evidence of biliary ductal dilatation.  GENITOURINARY: The kidneys are normal in appearance bilaterally with no evidence of hydronephrosis. No stones are appreciated. The bladder is unremarkable. GASTROINTESTINAL: There is prominent thickening involving a short segment of the rectosigmoid colon with adjacent fat stranding. No significant diverticuli are seen at this level. There is also some mild thickening of the distal rectosigmoid colon there are small foci of extraluminal gas along the posterior right margin compatible with microperforation. APPENDIX: Within normal limits without adjacent fat stranding. FLUID COLLECTIONS: None. LYMPH NODES: No adenopathy by size criteria. VASCULAR STRUCTURES: Visualized vascular structures appear normal. ABDOMINAL WALL: Normal with no herniations. OSSEOUS AND SOFT TISSUE STRUCTURES: Bone windows demonstrate no suspicious osteolytic or osteoblastic lesions. No fracture identified. 1. Although findings suggest diverticulitis with microperforation of the rectosigmoid colon, additional findings suggest a superimposed colitis. Recommend clinical correlation and continued follow-up if symptomatology atypical for diverticulitis. Assessment and Plan:  51yo female with abd pain with possible perf tic  DM2  HTN  HLD  Depression bipolar 2  Obese  Neuropathy  Chronic pain      Admit to med surg  IVF, NPO  Gen surgery  IV abx Rocephin, Flagyl  See orders  Full code  Home medications reviewed  DVT/GI prophylaxis  Will discuss with Attending Physician. Devyn Rios DO 7:34 PM, 9/16/2018       I  discussed the patient's management with the resident. I reviewed the resident's note and agree with the documented findings and plan of care.     Genoveva Johnson D.O., Sarah Ramírez  8:01 PM  9/16/2018

## 2018-09-16 NOTE — ED PROVIDER NOTES
ED Attending  CC: No          HPI:  9/16/18,   Time: 12:44 PM         Martha Richard is a 51 y.o. female presenting to the ED for lower abdominal pain, beginning last evening. The complaint has been persistent, moderate in severity, and worsened by nothing. Patient presents to the emergency room complaining of lower abdominal pain. She states that it began last evening out of the blue. The patient states the pain is constant and all the way across her lower abdominal region. She initially thought she was just having gas pains but overnight the pain got worse. The patient describes  These as crampy severe pains that vary in intensity. She had 3 small bowel movements today. The patient notes that her stools are chronically loose. She did not see any black or  Or bloody stools. Denies nausea or vomiting. Denies urinary burning or frequency. No fever or chills. Patient states she's never had a colonoscopy. She is status post total abdominal hysterectomy secondary to uterine cancer. She believes she still has her appendix. ROS:     Constitutional: Negative for fever and chills  HENT: Negative for ear pain, sore throat and sinus pressure  Eyes: Negative for pain, discharge and redness  Respiratory:  Negative for shortness of breath, cough and wheezing  Cardiovascular: Negative for CP, edema or palpitations  Gastrointestinal: See HPI  Genitourinary: See HPI  Musculoskeletal: Negative for back pain and arthralgia  Skin: Negative for rash and wound  Neurological: Negative for weakness and headaches  Hematological: Negative for adenopathy    All other systems reviewed and are negative      --------------------------------------------- PAST HISTORY ---------------------------------------------  Past Medical History:  has a past medical history of Anticoagulant long-term use; Carpal tunnel syndrome on both sides; Cellulitis of right foot; Cervical cancer ([facility]  Chronic back pain; Depressed bipolar II disorder ([facility] 9/17/18 0107)   traMADol (ULTRAM) tablet 50 mg (not administered)   lactated ringers infusion ( Intravenous New Bag 9/16/18 2155)   morphine (PF) injection 2 mg (not administered)   ketorolac (TORADOL) injection 15 mg (not administered)   insulin lispro (HUMALOG) injection vial 0-12 Units (not administered)   glucose (GLUTOSE) 40 % oral gel 15 g (not administered)   dextrose 50 % solution 12.5 g (not administered)   glucagon (rDNA) injection 1 mg (not administered)   dextrose 5 % solution (not administered)   cefTRIAXone (ROCEPHIN) 2 g in sterile water 20 mL IV syringe (2 g Intravenous Given 9/16/18 2155)   metronidazole (FLAGYL) 500 mg in NaCl 100 mL IVPB premix (0 mg Intravenous Stopped 9/17/18 0014)   warfarin (COUMADIN) tablet 2.5 mg (not administered)   warfarin (COUMADIN) tablet 5 mg (not administered)   0.9 % sodium chloride bolus (0 mLs Intravenous Stopped 9/16/18 1436)   iopamidol (ISOVUE-370) 76 % injection 80 mL (80 mLs Intravenous Given 9/16/18 1737)   iohexol (OMNIPAQUE 240) injection 50 mL (50 mLs Oral Given 9/16/18 1737)   piperacillin-tazobactam (ZOSYN) 3.375 g in dextrose 50 mL IVPB (premix) (0 g Intravenous Stopped 9/16/18 1947)         Medical Decision Making:    Pt has abnormal labs/pancytosis. Some of this is chronic. Lactic acid is up. CT abd/pelvis ordered and pending at end of my shift. Discussed with Dr. Nuvia Mcfarland. Counseling: The emergency provider has spoken with the patient and discussed todays results, in addition to providing specific details for the plan of care and counseling regarding the diagnosis and prognosis. Questions are answered at this time and they are agreeable with the plan.      --------------------------------- IMPRESSION AND DISPOSITION ---------------------------------    IMPRESSION  1.  Diverticulitis of colon        DISPOSITION  Disposition: Admit to telemetry  Patient condition is stable    ATTENDING PROVIDER ATTESTATION:     Samra Padilla presented to the emergency department for evaluation of Abdominal Pain (pt states she has lower quad abdom. pain that started last night around 2200. denies n/v/d)    I have reviewed and discussed the case, including pertinent history (medical, surgical, family and social) and exam findings with the Midlevel and the Nurse assigned to Vickey Sears. I have personally performed and/or participated in the history, exam, medical decision making, and procedures and agree with all pertinent clinical information. I have reviewed my findings and recommendations with Vickey Sears and members of family present at the time of disposition. My findings/plan: The encounter diagnosis was Diverticulitis of colon.   Current Discharge Medication List        Ciera Castaneda 56, DO  09/17/18 0117

## 2018-09-17 PROBLEM — K57.92 DIVERTICULITIS: Status: ACTIVE | Noted: 2018-09-17

## 2018-09-17 LAB
ALBUMIN SERPL-MCNC: 3 G/DL (ref 3.5–5.2)
ALP BLD-CCNC: 73 U/L (ref 35–104)
ALT SERPL-CCNC: 9 U/L (ref 0–32)
ANION GAP SERPL CALCULATED.3IONS-SCNC: 11 MMOL/L (ref 7–16)
AST SERPL-CCNC: 11 U/L (ref 0–31)
BASOPHILS ABSOLUTE: 0.07 E9/L (ref 0–0.2)
BASOPHILS RELATIVE PERCENT: 0.5 % (ref 0–2)
BILIRUB SERPL-MCNC: 0.3 MG/DL (ref 0–1.2)
BUN BLDV-MCNC: 10 MG/DL (ref 6–20)
CALCIUM SERPL-MCNC: 8.5 MG/DL (ref 8.6–10.2)
CHLORIDE BLD-SCNC: 104 MMOL/L (ref 98–107)
CO2: 27 MMOL/L (ref 22–29)
CREAT SERPL-MCNC: 0.8 MG/DL (ref 0.5–1)
EOSINOPHILS ABSOLUTE: 0.3 E9/L (ref 0.05–0.5)
EOSINOPHILS RELATIVE PERCENT: 2 % (ref 0–6)
GFR AFRICAN AMERICAN: >60
GFR NON-AFRICAN AMERICAN: >60 ML/MIN/1.73
GLUCOSE BLD-MCNC: 117 MG/DL (ref 74–109)
HBA1C MFR BLD: 7.2 % (ref 4–5.6)
HCT VFR BLD CALC: 48.6 % (ref 34–48)
HEMOGLOBIN: 15.9 G/DL (ref 11.5–15.5)
IMMATURE GRANULOCYTES #: 0.07 E9/L
IMMATURE GRANULOCYTES %: 0.5 % (ref 0–5)
INR BLD: 2.5
LACTIC ACID: 1.3 MMOL/L (ref 0.5–2.2)
LYMPHOCYTES ABSOLUTE: 3.7 E9/L (ref 1.5–4)
LYMPHOCYTES RELATIVE PERCENT: 24.9 % (ref 20–42)
MCH RBC QN AUTO: 29.8 PG (ref 26–35)
MCHC RBC AUTO-ENTMCNC: 32.7 % (ref 32–34.5)
MCV RBC AUTO: 91.2 FL (ref 80–99.9)
METER GLUCOSE: 104 MG/DL (ref 70–110)
METER GLUCOSE: 199 MG/DL (ref 70–110)
METER GLUCOSE: 94 MG/DL (ref 70–110)
MONOCYTES ABSOLUTE: 1.36 E9/L (ref 0.1–0.95)
MONOCYTES RELATIVE PERCENT: 9.1 % (ref 2–12)
NEUTROPHILS ABSOLUTE: 9.37 E9/L (ref 1.8–7.3)
NEUTROPHILS RELATIVE PERCENT: 63 % (ref 43–80)
PDW BLD-RTO: 15.4 FL (ref 11.5–15)
PLATELET # BLD: 259 E9/L (ref 130–450)
PMV BLD AUTO: 10.2 FL (ref 7–12)
POTASSIUM SERPL-SCNC: 4.1 MMOL/L (ref 3.5–5)
PROTHROMBIN TIME: 28.4 SEC (ref 9.3–12.4)
RBC # BLD: 5.33 E12/L (ref 3.5–5.5)
SODIUM BLD-SCNC: 142 MMOL/L (ref 132–146)
TOTAL PROTEIN: 6.1 G/DL (ref 6.4–8.3)
WBC # BLD: 14.9 E9/L (ref 4.5–11.5)

## 2018-09-17 PROCEDURE — 83605 ASSAY OF LACTIC ACID: CPT

## 2018-09-17 PROCEDURE — 1200000000 HC SEMI PRIVATE

## 2018-09-17 PROCEDURE — 83036 HEMOGLOBIN GLYCOSYLATED A1C: CPT

## 2018-09-17 PROCEDURE — 6370000000 HC RX 637 (ALT 250 FOR IP): Performed by: FAMILY MEDICINE

## 2018-09-17 PROCEDURE — 85025 COMPLETE CBC W/AUTO DIFF WBC: CPT

## 2018-09-17 PROCEDURE — 85610 PROTHROMBIN TIME: CPT

## 2018-09-17 PROCEDURE — 6370000000 HC RX 637 (ALT 250 FOR IP): Performed by: INTERNAL MEDICINE

## 2018-09-17 PROCEDURE — 2580000003 HC RX 258: Performed by: FAMILY MEDICINE

## 2018-09-17 PROCEDURE — 36415 COLL VENOUS BLD VENIPUNCTURE: CPT

## 2018-09-17 PROCEDURE — 6360000002 HC RX W HCPCS: Performed by: FAMILY MEDICINE

## 2018-09-17 PROCEDURE — 6360000002 HC RX W HCPCS: Performed by: SURGERY

## 2018-09-17 PROCEDURE — 2500000003 HC RX 250 WO HCPCS: Performed by: FAMILY MEDICINE

## 2018-09-17 PROCEDURE — 99222 1ST HOSP IP/OBS MODERATE 55: CPT | Performed by: SURGERY

## 2018-09-17 PROCEDURE — 82962 GLUCOSE BLOOD TEST: CPT

## 2018-09-17 PROCEDURE — 80053 COMPREHEN METABOLIC PANEL: CPT

## 2018-09-17 RX ORDER — WARFARIN SODIUM 5 MG/1
5 TABLET ORAL
Status: DISCONTINUED | OUTPATIENT
Start: 2018-09-18 | End: 2018-09-18 | Stop reason: HOSPADM

## 2018-09-17 RX ORDER — DIVALPROEX SODIUM 500 MG/1
1000 TABLET, DELAYED RELEASE ORAL NIGHTLY
COMMUNITY
End: 2018-10-08

## 2018-09-17 RX ORDER — ONDANSETRON 2 MG/ML
4 INJECTION INTRAMUSCULAR; INTRAVENOUS EVERY 6 HOURS PRN
Status: DISCONTINUED | OUTPATIENT
Start: 2018-09-17 | End: 2018-09-18 | Stop reason: HOSPADM

## 2018-09-17 RX ORDER — NORTRIPTYLINE HYDROCHLORIDE 25 MG/1
25 CAPSULE ORAL NIGHTLY
Status: DISCONTINUED | OUTPATIENT
Start: 2018-09-18 | End: 2018-09-18 | Stop reason: HOSPADM

## 2018-09-17 RX ORDER — DIVALPROEX SODIUM 500 MG/1
1000 TABLET, EXTENDED RELEASE ORAL NIGHTLY
Status: DISCONTINUED | OUTPATIENT
Start: 2018-09-17 | End: 2018-09-18 | Stop reason: HOSPADM

## 2018-09-17 RX ORDER — LEVETIRACETAM 500 MG/1
500 TABLET ORAL 2 TIMES DAILY
Status: DISCONTINUED | OUTPATIENT
Start: 2018-09-17 | End: 2018-09-18 | Stop reason: HOSPADM

## 2018-09-17 RX ORDER — WARFARIN SODIUM 2.5 MG/1
2.5 TABLET ORAL
Status: DISCONTINUED | OUTPATIENT
Start: 2018-09-17 | End: 2018-09-18 | Stop reason: HOSPADM

## 2018-09-17 RX ADMIN — ATORVASTATIN CALCIUM 80 MG: 40 TABLET, FILM COATED ORAL at 12:45

## 2018-09-17 RX ADMIN — SODIUM CHLORIDE, POTASSIUM CHLORIDE, SODIUM LACTATE AND CALCIUM CHLORIDE: 600; 310; 30; 20 INJECTION, SOLUTION INTRAVENOUS at 21:24

## 2018-09-17 RX ADMIN — MORPHINE SULFATE 2 MG: 2 INJECTION, SOLUTION INTRAMUSCULAR; INTRAVENOUS at 09:15

## 2018-09-17 RX ADMIN — LEVOTHYROXINE SODIUM 75 MCG: 75 TABLET ORAL at 11:36

## 2018-09-17 RX ADMIN — METRONIDAZOLE 500 MG: 500 INJECTION, SOLUTION INTRAVENOUS at 21:20

## 2018-09-17 RX ADMIN — MORPHINE SULFATE 2 MG: 2 INJECTION, SOLUTION INTRAMUSCULAR; INTRAVENOUS at 14:38

## 2018-09-17 RX ADMIN — CEFTRIAXONE 2 G: 2 INJECTION, POWDER, FOR SOLUTION INTRAMUSCULAR; INTRAVENOUS at 21:20

## 2018-09-17 RX ADMIN — LEVETIRACETAM 500 MG: 500 TABLET, FILM COATED ORAL at 11:37

## 2018-09-17 RX ADMIN — METOPROLOL TARTRATE 25 MG: 25 TABLET ORAL at 11:30

## 2018-09-17 RX ADMIN — METRONIDAZOLE 500 MG: 500 INJECTION, SOLUTION INTRAVENOUS at 04:42

## 2018-09-17 RX ADMIN — WARFARIN SODIUM 2.5 MG: 2.5 TABLET ORAL at 21:55

## 2018-09-17 RX ADMIN — DIVALPROEX SODIUM 1000 MG: 500 TABLET, EXTENDED RELEASE ORAL at 21:19

## 2018-09-17 RX ADMIN — DULOXETINE HYDROCHLORIDE 60 MG: 60 CAPSULE, DELAYED RELEASE ORAL at 21:20

## 2018-09-17 RX ADMIN — MORPHINE SULFATE 2 MG: 2 INJECTION, SOLUTION INTRAMUSCULAR; INTRAVENOUS at 18:43

## 2018-09-17 RX ADMIN — INSULIN LISPRO 2 UNITS: 100 INJECTION, SOLUTION INTRAVENOUS; SUBCUTANEOUS at 11:53

## 2018-09-17 RX ADMIN — LEVETIRACETAM 500 MG: 500 TABLET, FILM COATED ORAL at 21:20

## 2018-09-17 RX ADMIN — METRONIDAZOLE 500 MG: 500 INJECTION, SOLUTION INTRAVENOUS at 13:31

## 2018-09-17 RX ADMIN — DIVALPROEX SODIUM 500 MG: 500 TABLET, EXTENDED RELEASE ORAL at 11:37

## 2018-09-17 RX ADMIN — ONDANSETRON 4 MG: 2 INJECTION INTRAMUSCULAR; INTRAVENOUS at 10:57

## 2018-09-17 ASSESSMENT — PAIN SCALES - GENERAL
PAINLEVEL_OUTOF10: 8
PAINLEVEL_OUTOF10: 5
PAINLEVEL_OUTOF10: 7
PAINLEVEL_OUTOF10: 3
PAINLEVEL_OUTOF10: 7
PAINLEVEL_OUTOF10: 2

## 2018-09-17 ASSESSMENT — PAIN DESCRIPTION - LOCATION
LOCATION: ABDOMEN
LOCATION: ABDOMEN

## 2018-09-17 ASSESSMENT — PAIN DESCRIPTION - ORIENTATION
ORIENTATION: RIGHT;LEFT;LOWER
ORIENTATION: LOWER

## 2018-09-17 ASSESSMENT — PAIN DESCRIPTION - ONSET
ONSET: ON-GOING
ONSET: ON-GOING

## 2018-09-17 ASSESSMENT — PAIN DESCRIPTION - PAIN TYPE
TYPE: ACUTE PAIN
TYPE: ACUTE PAIN

## 2018-09-17 ASSESSMENT — PAIN DESCRIPTION - DESCRIPTORS
DESCRIPTORS: ACHING;DISCOMFORT;SORE
DESCRIPTORS: ACHING;DISCOMFORT;DULL

## 2018-09-17 ASSESSMENT — PAIN DESCRIPTION - FREQUENCY
FREQUENCY: INTERMITTENT
FREQUENCY: CONTINUOUS

## 2018-09-17 ASSESSMENT — PAIN DESCRIPTION - PROGRESSION: CLINICAL_PROGRESSION: NOT CHANGED

## 2018-09-17 NOTE — PROGRESS NOTES
Dr. Rylie Navas had consulted Dr. Elier Chavez. Dr. Elier Chavez was perfect served and stated the patient was a [facility] patient.

## 2018-09-17 NOTE — CONSULTS
GENERAL SURGERY  CONSULT NOTE  2018    Physician Consulted: Dr. Bianka Munoz  Reason for Consult: Diverticulitis  Referring Physician: Dr. Greer MCQUEEN  Cliff Evnas is a 51 y.o. female who presents with a complaint of two days of left lower quadrant pain. CT scan shows diverticulitis of the recto-sigmoid colon with microperforation. It also suggests superimposed colitis. She denies any associated nausea, vomiting or diarrhea. She admits to prior episodes of abdominal pain, which was managed as an outpatient. She denies being hospitalized for diverticulitis before. She denies having prior colonoscopy. She had a splenic infarct 3 years ago which resolved without treatment and does not show up on this CT scan. Feeling much better today, hungry but with mild nausea.     Past Medical History:   Diagnosis Date    Anticoagulant long-term use     Carpal tunnel syndrome on both sides     both wrists    Cellulitis of right foot 2016    Cervical cancer ([address] 2012    S/P LUCIUS, BSO    Chronic back pain     Depressed bipolar II disorder (HCC)     Diabetes mellitus ([address]     Diverticulitis     DVT (deep vein thrombosis) in pregnancy ([address]     Epilepsy ([address]     Hyperlipidemia     Hypertension     Marijuana use     Moderate persistent reactive airways dysfunction syndrome without complication (HCC)     Neuropathy     Obesity     Rotator cuff tear arthropathy     Type 2 diabetes mellitus without complication ([address]        Past Surgical History:   Procedure Laterality Date     SECTION      CHOLECYSTECTOMY      ECHO COMPLETE  2013         FOOT SURGERY  2015    HYSTERECTOMY  2012    KIDNEY BIOPSY      KNEE ARTHROSCOPY  2003    right and left knee    KNEE SURGERY      left knee    NERVE BLOCK Bilateral 2017    TFNB  #1    NERVE BLOCK Bilateral 06/15/2017    bilatera lumbar transforaminal nerve block #2 L5-S1    NERVE BLOCK Bilateral 2017    Bilateral transforamninal mouth Daily 5/7/18  Yes Ted Purvis MD   hydrOXYzine (ATARAX) 25 MG tablet TAKE 1 TABLET THREE TIMES DAILY AS NEEDED FOR ANXIETY 3/22/18  Yes Chetan Lazcano MD   aspirin 81 MG tablet Take 1 tablet by mouth daily 10/17/17  Yes Vianca Smith MD   glucose blood VI test strips (ONE TOUCH ULTRA TEST) strip Pt. Tests BID -TID. 7/28/17  Yes Ted Purvis MD   Thejimbo [place] LANCETS FINE MISC Pt. Tests BID-TID. 7/28/17  Yes Ted Purvis MD       Allergies   Allergen Reactions    Nsaids Other (See Comments)     Renal Failure       Family History   Problem Relation Age of Onset    Depression Mother     Bipolar Disorder Mother     Hypertension Mother    [facility] Anxiety Disorder Sister     Asthma Son     Schizophrenia Son     Anxiety Disorder Daughter        Social History   Substance Use Topics    Smoking status: Current Every Day Smoker     Packs/day: 1.00     Years: 32.00     Types: Cigarettes    Smokeless tobacco: Never Used      Comment: (9/16/18):  not ready to quit; counseling given    Alcohol use 0.0 oz/week      Comment: (9/16/18):  <1 drink/week       Review of Systems   General ROS: negative  Hematological and Lymphatic ROS: negative  Respiratory ROS: no cough, shortness of breath, or wheezing  Cardiovascular ROS: no chest pain or dyspnea on exertion  Gastrointestinal ROS: per HPI  Genito-Urinary ROS: negative  Musculoskeletal ROS: negative  Psychiatric ROS: negative    PHYSICAL EXAM:    Vitals:    09/17/18 0845   BP: 129/69   Pulse: 88   Resp:    Temp: 98.1 °F (36.7 °C)   SpO2: 93%       General Appearance:  awake, alert, oriented, in no acute distress  Skin:  Skin color, texture, turgor normal. No rashes or lesions. Head/face:  NCAT  Eyes:  No gross abnormalities.   Lungs:  Symmetric chest rise  Heart:  Regular rate  Abdomen:  Soft, tender in LLQ with no guarding or rigidity    LABS:  CBC  Recent Labs      09/17/18   0413   WBC  14.9*   HGB  15.9*   HCT  48.6* thickening of the distal rectosigmoid colon there are small foci of extraluminal gas along the posterior right margin compatible with microperforation. APPENDIX: Within normal limits without adjacent fat stranding. FLUID COLLECTIONS: None. LYMPH NODES: No adenopathy by size criteria. VASCULAR STRUCTURES: Visualized vascular structures appear normal. ABDOMINAL WALL: Normal with no herniations. OSSEOUS AND SOFT TISSUE STRUCTURES: Bone windows demonstrate no suspicious osteolytic or osteoblastic lesions. No fracture identified. 1. Although findings suggest diverticulitis with microperforation of the rectosigmoid colon, additional findings suggest a superimposed colitis. Recommend clinical correlation and continued follow-up if symptomatology atypical for diverticulitis. ASSESSMENT:  51 y.o. female w/ acute diverticulitis w/ microperforation        PLAN:   1. Continue rocephin/flagyl   2. Monitor abdominal exam  3.  Trial of clears     Electronically signed by Lian Leggett M.D, for Dr. Caleb Kevin MD on 9/17/2018 at 10:18 AM

## 2018-09-17 NOTE — PROGRESS NOTES
HPI:  The patient presented through [address] [place] [address] emergency Department yesterday was found to be suffering from intractable abdominal pain. She is feeling dramatically better this morning. We discussed her diagnosis of perforated diverticulitis at length. She voiced understanding and agreement. She denies any fevers or chills. She has no diarrhea or bloody stool. No family members were present during my examination. Patient voiced no new complaints since hospital admission. Questions, answers, and tests reviewed. ROS:  Cardiovascular:   Denies any chest pain, irregular heartbeats, or palpitations. Respiratory:   Denies shortness of breath, coughing, sputum production, hemoptysis, or wheezing. Gastrointestinal:   Admits to recurrent abdominal pain but with significant improvement. No nausea or vomiting. She is not currently having diarrhea or constipation. She has no melanotic stool or bright red rectal bleeding. Extremities:   Denies any lower extremity swelling or edema. Neurology:    Denies any headache or focal neurological deficits. No weakness or paresthesia. Derm:    Denies any rashes, ulcers, or excoriations. Denies bruising. Genitourinary:    Denies any urgency, frequency, hematuria. Voiding without difficulty. Physical Exam:    Vitals:    09/17/18 0845   BP: 129/69   Pulse: 88   Resp:    Temp: 98.1 °F (36.7 °C)   SpO2: 93%       HEENT:  PERRLA. EOMI. Sclera clear. Buccal mucosa moist.    Neck:  Supple. Trachea midline. No thyromegaly. No JVD. No bruits. Heart:  Rhythm regular, rate controlled. No murmurs. Lungs:  Symmetrical. Clear to auscultation bilaterally. No wheezes. No rhonchi. No rales. Abdomen: Soft. Diffusely tender to palpation with some localization to the left lower quadrant. Extremities:  Peripheral pulses present. No peripheral edema. No ulcers. Neurologic:  Alert x 3. No focal deficit. Cranial nerves grossly intact. Skin:  No petechia. No hemorrhage. diverticulitis  2. Non-insulin-dependent diabetes mellitus type II  3. Essential hypertension  4. Hyperlipidemia  5. Depression  6. Bipolar disorder    Plan:   Broad-spectrum IV antibiotic therapy will be employed and she will undergo aggressive IV fluid resuscitation. As her abdominal pain has improved, we will institute a clear liquid diet. The general surgery team will also provide consultation. Lab work and vital signs will be monitored. Chronic comorbidities will be monitored as well. Patient will require colonoscopic evaluation as an outpatient in 4-6 weeks once inflammation has improved. Continue current therapy. See orders for further plan of care.     Jolly Lu D.O.  9:26 AM  9/17/2018

## 2018-09-18 VITALS
OXYGEN SATURATION: 97 % | HEIGHT: 67 IN | TEMPERATURE: 98.2 F | DIASTOLIC BLOOD PRESSURE: 67 MMHG | BODY MASS INDEX: 40.79 KG/M2 | SYSTOLIC BLOOD PRESSURE: 98 MMHG | HEART RATE: 89 BPM | WEIGHT: 259.9 LBS | RESPIRATION RATE: 14 BRPM

## 2018-09-18 LAB
ANION GAP SERPL CALCULATED.3IONS-SCNC: 11 MMOL/L (ref 7–16)
BASOPHILS ABSOLUTE: 0.07 E9/L (ref 0–0.2)
BASOPHILS RELATIVE PERCENT: 0.6 % (ref 0–2)
BUN BLDV-MCNC: 7 MG/DL (ref 6–20)
CALCIUM SERPL-MCNC: 8.4 MG/DL (ref 8.6–10.2)
CHLORIDE BLD-SCNC: 103 MMOL/L (ref 98–107)
CO2: 28 MMOL/L (ref 22–29)
CREAT SERPL-MCNC: 0.8 MG/DL (ref 0.5–1)
EOSINOPHILS ABSOLUTE: 0.3 E9/L (ref 0.05–0.5)
EOSINOPHILS RELATIVE PERCENT: 2.7 % (ref 0–6)
GFR AFRICAN AMERICAN: >60
GFR NON-AFRICAN AMERICAN: >60 ML/MIN/1.73
GLUCOSE BLD-MCNC: 164 MG/DL (ref 74–109)
HCT VFR BLD CALC: 47.5 % (ref 34–48)
HEMOGLOBIN: 15.1 G/DL (ref 11.5–15.5)
IMMATURE GRANULOCYTES #: 0.06 E9/L
IMMATURE GRANULOCYTES %: 0.5 % (ref 0–5)
INR BLD: 1.7
LYMPHOCYTES ABSOLUTE: 2.77 E9/L (ref 1.5–4)
LYMPHOCYTES RELATIVE PERCENT: 24.6 % (ref 20–42)
MCH RBC QN AUTO: 29.5 PG (ref 26–35)
MCHC RBC AUTO-ENTMCNC: 31.8 % (ref 32–34.5)
MCV RBC AUTO: 92.8 FL (ref 80–99.9)
METER GLUCOSE: 108 MG/DL (ref 70–110)
METER GLUCOSE: 114 MG/DL (ref 70–110)
METER GLUCOSE: 92 MG/DL (ref 70–110)
MONOCYTES ABSOLUTE: 1.06 E9/L (ref 0.1–0.95)
MONOCYTES RELATIVE PERCENT: 9.4 % (ref 2–12)
NEUTROPHILS ABSOLUTE: 7.01 E9/L (ref 1.8–7.3)
NEUTROPHILS RELATIVE PERCENT: 62.2 % (ref 43–80)
PDW BLD-RTO: 15.2 FL (ref 11.5–15)
PLATELET # BLD: 235 E9/L (ref 130–450)
PMV BLD AUTO: 10 FL (ref 7–12)
POTASSIUM SERPL-SCNC: 3.9 MMOL/L (ref 3.5–5)
PROTHROMBIN TIME: 19 SEC (ref 9.3–12.4)
RBC # BLD: 5.12 E12/L (ref 3.5–5.5)
SODIUM BLD-SCNC: 142 MMOL/L (ref 132–146)
URINE CULTURE, ROUTINE: NORMAL
WBC # BLD: 11.3 E9/L (ref 4.5–11.5)

## 2018-09-18 PROCEDURE — 6370000000 HC RX 637 (ALT 250 FOR IP): Performed by: INTERNAL MEDICINE

## 2018-09-18 PROCEDURE — 36415 COLL VENOUS BLD VENIPUNCTURE: CPT

## 2018-09-18 PROCEDURE — 99232 SBSQ HOSP IP/OBS MODERATE 35: CPT | Performed by: SURGERY

## 2018-09-18 PROCEDURE — 6370000000 HC RX 637 (ALT 250 FOR IP): Performed by: FAMILY MEDICINE

## 2018-09-18 PROCEDURE — 85610 PROTHROMBIN TIME: CPT

## 2018-09-18 PROCEDURE — 2500000003 HC RX 250 WO HCPCS: Performed by: FAMILY MEDICINE

## 2018-09-18 PROCEDURE — 85025 COMPLETE CBC W/AUTO DIFF WBC: CPT

## 2018-09-18 PROCEDURE — 82962 GLUCOSE BLOOD TEST: CPT

## 2018-09-18 PROCEDURE — 6360000002 HC RX W HCPCS: Performed by: FAMILY MEDICINE

## 2018-09-18 PROCEDURE — 2580000003 HC RX 258: Performed by: INTERNAL MEDICINE

## 2018-09-18 PROCEDURE — 80048 BASIC METABOLIC PNL TOTAL CA: CPT

## 2018-09-18 RX ORDER — CIPROFLOXACIN 500 MG/1
500 TABLET, FILM COATED ORAL 2 TIMES DAILY
Qty: 28 TABLET | Refills: 1 | Status: SHIPPED | OUTPATIENT
Start: 2018-09-18 | End: 2018-09-18

## 2018-09-18 RX ORDER — CIPROFLOXACIN 500 MG/1
500 TABLET, FILM COATED ORAL 2 TIMES DAILY
Qty: 28 TABLET | Refills: 1 | Status: SHIPPED | OUTPATIENT
Start: 2018-09-18 | End: 2018-10-02

## 2018-09-18 RX ORDER — METRONIDAZOLE 500 MG/1
500 TABLET ORAL 3 TIMES DAILY
Qty: 42 TABLET | Refills: 1 | Status: SHIPPED | OUTPATIENT
Start: 2018-09-18 | End: 2018-09-18

## 2018-09-18 RX ORDER — METRONIDAZOLE 500 MG/1
500 TABLET ORAL 3 TIMES DAILY
Qty: 42 TABLET | Refills: 1 | Status: SHIPPED | OUTPATIENT
Start: 2018-09-18 | End: 2018-10-02

## 2018-09-18 RX ADMIN — METRONIDAZOLE 500 MG: 500 INJECTION, SOLUTION INTRAVENOUS at 05:15

## 2018-09-18 RX ADMIN — MORPHINE SULFATE 2 MG: 2 INJECTION, SOLUTION INTRAMUSCULAR; INTRAVENOUS at 00:06

## 2018-09-18 RX ADMIN — SODIUM CHLORIDE, POTASSIUM CHLORIDE, SODIUM LACTATE AND CALCIUM CHLORIDE: 600; 310; 30; 20 INJECTION, SOLUTION INTRAVENOUS at 10:34

## 2018-09-18 RX ADMIN — LEVETIRACETAM 500 MG: 500 TABLET, FILM COATED ORAL at 09:42

## 2018-09-18 RX ADMIN — DIVALPROEX SODIUM 500 MG: 500 TABLET, EXTENDED RELEASE ORAL at 09:42

## 2018-09-18 RX ADMIN — METRONIDAZOLE 500 MG: 500 INJECTION, SOLUTION INTRAVENOUS at 13:57

## 2018-09-18 RX ADMIN — LEVOTHYROXINE SODIUM 75 MCG: 75 TABLET ORAL at 06:11

## 2018-09-18 RX ADMIN — ATORVASTATIN CALCIUM 80 MG: 40 TABLET, FILM COATED ORAL at 09:40

## 2018-09-18 RX ADMIN — MORPHINE SULFATE 2 MG: 2 INJECTION, SOLUTION INTRAMUSCULAR; INTRAVENOUS at 04:51

## 2018-09-18 ASSESSMENT — PAIN SCALES - GENERAL
PAINLEVEL_OUTOF10: 2
PAINLEVEL_OUTOF10: 0
PAINLEVEL_OUTOF10: 0
PAINLEVEL_OUTOF10: 10
PAINLEVEL_OUTOF10: 8
PAINLEVEL_OUTOF10: 0
PAINLEVEL_OUTOF10: 0

## 2018-09-18 ASSESSMENT — PAIN DESCRIPTION - DESCRIPTORS
DESCRIPTORS: ACHING;DISCOMFORT;DULL
DESCRIPTORS: DISCOMFORT

## 2018-09-18 ASSESSMENT — PAIN DESCRIPTION - FREQUENCY: FREQUENCY: INTERMITTENT

## 2018-09-18 ASSESSMENT — PAIN DESCRIPTION - LOCATION
LOCATION: ABDOMEN
LOCATION: ABDOMEN

## 2018-09-18 ASSESSMENT — PAIN DESCRIPTION - ORIENTATION
ORIENTATION: LOWER
ORIENTATION: RIGHT;LEFT

## 2018-09-18 ASSESSMENT — PAIN DESCRIPTION - PAIN TYPE
TYPE: ACUTE PAIN
TYPE: ACUTE PAIN

## 2018-09-18 ASSESSMENT — PAIN DESCRIPTION - ONSET: ONSET: ON-GOING

## 2018-09-18 NOTE — DISCHARGE SUMMARY
without adjacent fat stranding. FLUID COLLECTIONS: None. LYMPH NODES: No adenopathy by size criteria. VASCULAR STRUCTURES: Visualized vascular structures appear normal. ABDOMINAL WALL: Normal with no herniations. OSSEOUS AND SOFT TISSUE STRUCTURES: Bone windows demonstrate no suspicious osteolytic or osteoblastic lesions. No fracture identified. 1. Although findings suggest diverticulitis with microperforation of the rectosigmoid colon, additional findings suggest a superimposed colitis. Recommend clinical correlation and continued follow-up if symptomatology atypical for diverticulitis. HOSPITAL COURSE:   The patient did very well throughout the hospital stay. She was started empirically on IV antibiotics and placed on a clear liquid diet to begin. Diet was advanced accordingly without complication. Antibiotics were transitioned to oral. Abdominal pain entirely resolved. Plans are for outpatient colonoscopy in approximately 4 weeks. She will continue to follow with the surgical team. Lab work and vital signs returned to baseline and she was deemed acceptable for discharge home. BRIEF PHYSICAL EXAMINATION AND LABORATORIES ON DAY OF DISCHARGE:  VITALS:  BP 98/67   Pulse 79   Temp 98.2 °F (36.8 °C) (Oral)   Resp 14   Ht 5' 7\" (1.702 m)   Wt 259 lb 14.4 oz (117.9 kg)   SpO2 92%   BMI 40.71 kg/m²     HEENT:  PERRLA. EOMI. Sclera clear. Buccal mucosa moist.    Neck:  Supple. Trachea midline. No thyromegaly. No JVD. No bruits. Heart:  Rhythm regular, rate controlled. No murmurs. Lungs:  Symmetrical. Clear to auscultation bilaterally. No wheezes. No rhonchi. No rales. Abdomen: Soft. Non-tender. Non-distended. Bowel sounds positive. No organomegaly or masses. No pain on palpation    Extremities:  Peripheral pulses present. No peripheral edema. No ulcers. Neurologic:  Alert x 3. No focal deficit. Cranial nerves grossly intact. Skin:  No petechia. No hemorrhage.  No wounds. DISPOSITION:  The patient's condition is good. At this time the patient is without objective evidence of an acute process requiring continuing hospitalization or inpatient management. They are stable for discharge with outpatient follow-up. I have spoken with the patient and discussed the results of the current hospitalization, in addition to providing specific details for the plan of care and counseling regarding the diagnosis and prognosis. The plan has been discussed in detail and they are aware of the specific conditions for emergent return, as well as the importance of follow-up. Their questions are answered at this time and they are agreeable with the plan for discharge to home    DISCHARGE MEDICATIONS:    Jonas Stacy   Home Medication Instructions [facility]:[id number]    Printed on:09/18/18 1273   Medication Information                      ACCU-CHEK MULTICLIX LANCETS MISC  Testing twice daily             aspirin 81 MG tablet  Take 1 tablet by mouth daily             atorvastatin (LIPITOR) 80 MG tablet  TAKE 1 TABLET EVERY DAY             chlorzoxazone (PARAFON FORTE) 500 MG tablet  TAKE 1 TABLET FOUR TIMES DAILY AS NEEDED FOR MUSCLE SPASMS             ciprofloxacin (CIPRO) 500 MG tablet  Take 1 tablet by mouth 2 times daily for 14 days             divalproex (DEPAKOTE ER) 500 MG extended release tablet  TAKE 1 TABLET BY MOUTH IN THE MORNING AND 2 TABLETS IN THE EVENING              divalproex (DEPAKOTE) 500 MG DR tablet  Take 1,000 mg by mouth nightly             DULoxetine (CYMBALTA) 60 MG extended release capsule  TAKE 1 CAPSULE EVERY DAY             glucose blood VI test strips (ONE TOUCH ULTRA TEST) strip  Pt. Tests BID -TID.              hydrOXYzine (ATARAX) 25 MG tablet  TAKE 1 TABLET THREE TIMES DAILY AS NEEDED FOR ANXIETY             levETIRAcetam (KEPPRA) 500 MG tablet  TAKE 1 TABLET TWICE DAILY             levothyroxine (SYNTHROID) 75 MCG tablet  Take 1 tablet by mouth Daily metFORMIN (GLUCOPHAGE) 1000 MG tablet  TAKE 1 TABLET TWICE DAILY WITH MEALS             metoprolol tartrate (LOPRESSOR) 25 MG tablet  TAKE 1/2 TABLET BY MOUTH TWICE DAILY             metroNIDAZOLE (FLAGYL) 500 MG tablet  Take 1 tablet by mouth 3 times daily for 14 days             nortriptyline (PAMELOR) 25 MG capsule  TAKE ONE CAPSULE BY MOUTH EVERY EVENING             omeprazole (PRILOSEC) 20 MG delayed release capsule  Take 1 capsule by mouth every morning (before breakfast)             ONETOUCH DELICA LANCETS FINE MISC  Pt. Tests BID-TID.             traMADol (ULTRAM) 50 MG tablet  Take 1 tablet by mouth daily as needed for Pain for up to 90 days. Rose Mary Singh warfarin (COUMADIN) 2.5 MG tablet  Take 1 tablet by mouth three times a week 1 tablet MW             warfarin (COUMADIN) 5 MG tablet  Take 1 tablet by mouth four times a week                 FOLLOW UP/INSTRUCTIONS:  · This patient is instructed to follow-up with her primary care physician. · Patient is instructed to follow-up with the consults listed above as directed by them. · she is instructed to resume home medications and take new medications as indicated in the list above. · If the patient has a recurrence of symptoms, she is instructed to go to the ED. Preparing for this patient's discharge, including paperwork, orders, instructions, and meeting with patient did require > 30 minutes.     Phillip Lau DO     9/18/2018  2:23 PM

## 2018-09-18 NOTE — PLAN OF CARE
Problem: Falls - Risk of:  Goal: Will remain free from falls  Will remain free from falls   Outcome: Ongoing      Problem: Pain:  Goal: Pain level will decrease  Pain level will decrease   Outcome: Ongoing    Goal: Control of acute pain  Control of acute pain   Outcome: Ongoing

## 2018-09-18 NOTE — PROGRESS NOTES
HPI:  Tanja Anthony continues to improve on a daily basis. She states her abdominal pain has improved dramatically. She tolerated a clear liquid diet yesterday without complication and is requesting advancement in her diet. Lab work and vital signs are otherwise stable. She has become more ambulatory. She is passing flatus. . Patient voiced no new complaints since hospital admission. Questions, answers, and tests reviewed. ROS:  Cardiovascular:   Denies any chest pain, irregular heartbeats, or palpitations. Respiratory:   Denies shortness of breath, coughing, sputum production, hemoptysis, or wheezing. Gastrointestinal:   Improving abdominal pain. She is passing flatus. Extremities:   Denies any lower extremity swelling or edema. Neurology:    Denies any headache or focal neurological deficits. No weakness or paresthesia. Derm:    Denies any rashes, ulcers, or excoriations. Denies bruising. Genitourinary:    Denies any urgency, frequency, hematuria. Voiding without difficulty. Physical Exam:    Vitals:    09/18/18 0400   BP: (!) 96/53   Pulse: 82   Resp: 16   Temp: 98 °F (36.7 °C)   SpO2: 93%       HEENT:  PERRLA. EOMI. Sclera clear. Buccal mucosa moist.    Neck:  Supple. Trachea midline. No thyromegaly. No JVD. No bruits. Heart:  Rhythm regular, rate controlled. No murmurs. Lungs:  Symmetrical. Clear to auscultation bilaterally. No wheezes. No rhonchi. No rales. Abdomen: Soft. Diffusely tender to palpation with some localization to the left lower quadrant. Extremities:  Peripheral pulses present. No peripheral edema. No ulcers. Neurologic:  Alert x 3. No focal deficit. Cranial nerves grossly intact. Skin:  No petechia. No hemorrhage. No wounds.     CBC with Differential:    Lab Results   Component Value Date    WBC 11.3 09/18/2018    RBC 5.12 09/18/2018    HGB 15.1 09/18/2018    HCT 47.5 09/18/2018     09/18/2018    MCV 92.8 09/18/2018    MCH 29.5 09/18/2018    MCHC 31.8 09/18/2018    RDW 15.2 09/18/2018    SEGSPCT 79 09/28/2013    BANDSPCT 1 07/08/2015    LYMPHOPCT 24.6 09/18/2018    MONOPCT 9.4 09/18/2018    BASOPCT 0.6 09/18/2018    MONOSABS 1.06 09/18/2018    LYMPHSABS 2.77 09/18/2018    EOSABS 0.30 09/18/2018    BASOSABS 0.07 09/18/2018     CMP:    Lab Results   Component Value Date     09/18/2018    K 3.9 09/18/2018     09/18/2018    CO2 28 09/18/2018    BUN 7 09/18/2018    CREATININE 0.8 09/18/2018    GFRAA >60 09/18/2018    LABGLOM >60 09/18/2018    GLUCOSE 164 09/18/2018    GLUCOSE 70 04/28/2011    PROT 6.1 09/17/2018    LABALBU 3.0 09/17/2018    LABALBU 3.6 11/30/2010    CALCIUM 8.4 09/18/2018    BILITOT 0.3 09/17/2018    ALKPHOS 73 09/17/2018    AST 11 09/17/2018    ALT 9 09/17/2018       Other Data:      Intake/Output Summary (Last 24 hours) at 09/18/18 0817  Last data filed at 09/18/18 0645   Gross per 24 hour   Intake             1280 ml   Output              675 ml   Net              605 ml         Scheduled Medications:   nortriptyline  25 mg Oral Nightly    levETIRAcetam  500 mg Oral BID    divalproex  1,000 mg Oral Nightly    metoprolol tartrate  12.5 mg Oral BID    warfarin  2.5 mg Oral Once per day on Mon Thu    warfarin  5 mg Oral Once per day on Sun Tue Wed Fri Sat    atorvastatin  80 mg Oral Daily    divalproex  500 mg Oral Daily    DULoxetine  60 mg Oral Daily    levothyroxine  75 mcg Oral Daily    insulin lispro  0-12 Units Subcutaneous TID     cefTRIAXone (ROCEPHIN) IV  2 g Intravenous Q24H    metroNIDAZOLE  500 mg Intravenous Q8H         Infusion Medications:   lactated ringers 100 mL/hr at 09/17/18 9058    dextrose         Assessment:   1. Sepsis secondary to perforated diverticulitis  2. Non-insulin-dependent diabetes mellitus type II  3. Essential hypertension  4. Hyperlipidemia  5. Depression  6. Bipolar disorder    Plan: We will continue forward with IV antibiotic therapy and IV fluid resuscitation.  Clear liquid diet will be advanced to full liquid diet with possible transition to a soft diet later this afternoon. General surgery team has provided consultation and recommendations have been reviewed. The patient will require colonoscopic evaluation as an outpatient in 4-6 weeks once inflammation has improved. Continue current therapy. See orders for further plan of care.     Delonte Arango D.O.  8:17 AM  9/18/2018

## 2018-09-18 NOTE — PROGRESS NOTES
GENERAL SURGERY  DAILY PROGRESS NOTE  9/18/2018    Subjective:  Laying in bed. In good spirits. States she is passing flatus and her pain is improving. Objective:  BP (!) 96/53   Pulse 82   Temp 98 °F (36.7 °C) (Oral)   Resp 16   Ht 5' 7\" (1.702 m)   Wt 259 lb 14.4 oz (117.9 kg)   SpO2 93%   BMI 40.71 kg/m²     GENERAL:  Laying in bed, awake, alert, cooperative, no apparent distress  LUNGS:  No increased work of breathing  CARDIOVASCULAR:  Regular rate   ABDOMEN:  Soft, mild LLQ tenderness, non-distended      Assessment:  51 y.o. female w/ acute diverticulitis w/ microperforation. Feeling better WBC normal today. Wants to go home. Plan:    Cipro 500 mg bid for 2 weeks and Flagyl 500 mg tid for 2 weeks. Follow up in the office in 2 weeks. \  I will do a colonoscopy once the infection has resolved, then she will need a colon resection in 2-3 months.     Electronically signed by Belem Heath MD on 9/18/2018 at 7:34 AM

## 2018-09-19 ENCOUNTER — CARE COORDINATION (OUTPATIENT)
Dept: CASE MANAGEMENT | Age: 48
End: 2018-09-19

## 2018-09-19 DIAGNOSIS — K57.32 DIVERTICULITIS OF COLON: Primary | ICD-10-CM

## 2018-09-19 NOTE — CARE COORDINATION
Levi [facility] Transitions Initial Follow Up Call    Call within 2 business days of discharge: Yes    Patient: Chandana Sousa Patient : 1970   MRN: [id number]  Reason for Admission: There are no discharge diagnoses documented for the most recent discharge. Discharge Date: 18 RARS: Readmission Risk Score: 14 CMRS: 2.0     Spoke with: Ludy Rivera (patient)    Facility: [facility]    Non-face-to-face services provided:  Scheduled appointment with PCP-CTC spoke with [facility] [facility] office Thais Chambers who scheduled TCmj/HFU visit on 18 at 9:15 am. [facility] updated patient with appt info which she agrees to. Scheduled appointment with Specialist-Confirmed with patient her intention in calling today to schedule f/u visit with Dr. Bianca Heard (Gen sx). Confiremd she has Dr. Bianca Heard contact information. Obtained and reviewed discharge summary and/or continuity of care documents    Care Transitions 24 Hour Call    Schedule Follow Up Appointment with PCP:  Completed  Do you have any ongoing symptoms?:  No  Do you have a copy of your discharge instructions?:  Yes  Do you have all of your prescriptions and are they filled?:  Yes  Have you scheduled your follow up appointment?:  No  Were you discharged with any Home Care or Post Acute Services:  No  Do you feel like you have everything you need to keep you well at home?:  Yes  Care Transitions Interventions       Spoke with patient today  or initial TCM/hospital discharge follow up for diverticulitis. Patient states she is having a good day. Denies any complaints of shortness of breath, chest pain, abdominal pain, nausea, vomiting, chills or fever. States she is tolerating eating and drinking. States she is following a low sugar/low carbohydrate diet as history includes DM plus low fiber diet. States she is aware to refrain from foods such as seeds, nuts, popcorn etc that is contraindicated with diverticulitis.  CT of abdomen/pelvis was obtained on admission noted to show the following below:     1. Although findings suggest diverticulitis with microperforation of   the rectosigmoid colon, additional findings suggest a superimposed   colitis. Recommend clinical correlation and continued follow-up if   symptomatology atypical for diverticulitis. States she is passing gas and moving bowels. States last BM was today. States she obtained Flagyl and Cipro that was ordered on discharge and is taking as directed. 1111F code entered. States she monitors BS twice daily and admits FBS today was 92. Noted last Hgb A1C on 9/17/18 was 7.2. Reviewed DM zones and knowing when to call doctor. Spoke with Angelic Hassan at [facility] (Dr. Miles Speaker office) who scheduledTCM/HFU visit with NP Davin Armstrong) for 9/21/18 at 9:15 am which patient agrees too. Denies any transportation issues as patient verbalizes she is independent in driving. Denies any complaints or concerns at this time. Patient is receptive for CTC to continue following for Care Transition.      Follow Up  Future Appointments  Date Time Provider David Tony   9/21/2018 9:15 AM GILDARDO Boogie - CNP [facility]   11/21/2018 1:00 PM DO Samantha [facility]   11/28/2018 8:00 AM SCHEDULE, DOUG [place] ALEX [facility]   12/5/2018 2:00 PM Nimisha Daigle MD [facility] GILDARDO Bryson

## 2018-09-21 ENCOUNTER — CARE COORDINATION (OUTPATIENT)
Dept: CASE MANAGEMENT | Age: 48
End: 2018-09-21

## 2018-09-21 ENCOUNTER — OFFICE VISIT (OUTPATIENT)
Dept: FAMILY MEDICINE CLINIC | Age: 48
End: 2018-09-21
Payer: MEDICARE

## 2018-09-21 VITALS
HEIGHT: 67 IN | SYSTOLIC BLOOD PRESSURE: 106 MMHG | WEIGHT: 247.4 LBS | HEART RATE: 104 BPM | OXYGEN SATURATION: 94 % | BODY MASS INDEX: 38.83 KG/M2 | DIASTOLIC BLOOD PRESSURE: 70 MMHG

## 2018-09-21 DIAGNOSIS — K57.92 DIVERTICULITIS: Primary | ICD-10-CM

## 2018-09-21 DIAGNOSIS — Z23 NEED FOR INFLUENZA VACCINATION: ICD-10-CM

## 2018-09-21 DIAGNOSIS — N28.0 RENAL INFARCT (HCC): ICD-10-CM

## 2018-09-21 LAB
INTERNATIONAL NORMALIZATION RATIO, POC: 2.2
PROTHROMBIN TIME, POC: 26.3

## 2018-09-21 PROCEDURE — 90686 IIV4 VACC NO PRSV 0.5 ML IM: CPT | Performed by: NURSE PRACTITIONER

## 2018-09-21 PROCEDURE — 1111F DSCHRG MED/CURRENT MED MERGE: CPT | Performed by: NURSE PRACTITIONER

## 2018-09-21 PROCEDURE — 99495 TRANSJ CARE MGMT MOD F2F 14D: CPT | Performed by: NURSE PRACTITIONER

## 2018-09-21 PROCEDURE — 85610 PROTHROMBIN TIME: CPT | Performed by: NURSE PRACTITIONER

## 2018-09-21 PROCEDURE — G0008 ADMIN INFLUENZA VIRUS VAC: HCPCS | Performed by: NURSE PRACTITIONER

## 2018-09-21 ASSESSMENT — ENCOUNTER SYMPTOMS
NAUSEA: 0
ABDOMINAL DISTENTION: 0
VOMITING: 0
CHEST TIGHTNESS: 0
COUGH: 1
BLOOD IN STOOL: 0
ABDOMINAL PAIN: 0
SHORTNESS OF BREATH: 0

## 2018-09-21 NOTE — PROGRESS NOTES
Post-Discharge Transitional Care Management Services or Hospital Follow Up      Alicia Simmons   YOB: 1970    Date of Office Visit:  9/21/2018  Date of Hospital Admission: 9/16/18  Date of Hospital Discharge: 9/18/18  Readmission Risk Score(high >=14%.  Medium >=10%):Readmission Risk Score: 14    Care management risk score Rising risk (score 2-5) and Complex Care (Scores >=6): 2     Non face to face  following discharge, date last encounter closed (first attempt may have been earlier): 9/19/2018 12:12 PM 9/19/2018 12:12 PM    Call initiated 2 business days of discharge: Yes     Patient Active Problem List   Diagnosis    Hyperlipidemia    Leukocytosis    Hypertension    Tobacco dependency    Peripheral neuropathy     Seizure disorder    Bilateral leg edema    Bone spur of right foot    Cervical cancer ([address]    Depressed bipolar II disorder ([address]    GERD (gastroesophageal reflux disease)    Splenic infarct    Renal infarct (HCC)    Carpal tunnel syndrome on both sides    Rotator cuff tear arthropathy    Moderate persistent reactive airways dysfunction syndrome without complication (HCC)    Anticoagulant long-term use    Mixed simple and mucopurulent chronic bronchitis (HCC)    Obstructive sleep apnea    Subclinical hypothyroidism    Low back pain    Chronic pain    Leg pain    Disc displacement, lumbar    Neural foraminal stenosis of lumbar spine    Type 2 diabetes mellitus with circulatory disorder ([address]    Abdominal pain    Diverticulitis with microperforation    Diverticulitis of colon       Allergies   Allergen Reactions    Nsaids Other (See Comments)     Renal Failure       Medications listed as ordered at the time of discharge from hospital   Ludy Rivera   Home Medication Instructions MATTHIEU:    Printed on:09/21/18 1026   Medication Information                      ACCU-CHEK MULTICLIX LANCETS MISC  Testing twice daily             aspirin 81 MG tablet  Take 1 tablet by mouth daily             atorvastatin (LIPITOR) 80 MG tablet  TAKE 1 TABLET EVERY DAY             chlorzoxazone (PARAFON FORTE) 500 MG tablet  TAKE 1 TABLET FOUR TIMES DAILY AS NEEDED FOR MUSCLE SPASMS             ciprofloxacin (CIPRO) 500 MG tablet  Take 1 tablet by mouth 2 times daily for 14 days             divalproex (DEPAKOTE ER) 500 MG extended release tablet  TAKE 1 TABLET BY MOUTH IN THE MORNING AND 2 TABLETS IN THE EVENING              divalproex (DEPAKOTE) 500 MG DR tablet  Take 1,000 mg by mouth nightly             DULoxetine (CYMBALTA) 60 MG extended release capsule  TAKE 1 CAPSULE EVERY DAY             glucose blood VI test strips (ONE TOUCH ULTRA TEST) strip  Pt. Tests BID -TID. hydrOXYzine (ATARAX) 25 MG tablet  TAKE 1 TABLET THREE TIMES DAILY AS NEEDED FOR ANXIETY             levETIRAcetam (KEPPRA) 500 MG tablet  TAKE 1 TABLET TWICE DAILY             levothyroxine (SYNTHROID) 75 MCG tablet  Take 1 tablet by mouth Daily             metFORMIN (GLUCOPHAGE) 1000 MG tablet  TAKE 1 TABLET TWICE DAILY WITH MEALS             metoprolol tartrate (LOPRESSOR) 25 MG tablet  TAKE 1/2 TABLET BY MOUTH TWICE DAILY             metroNIDAZOLE (FLAGYL) 500 MG tablet  Take 1 tablet by mouth 3 times daily for 14 days             nortriptyline (PAMELOR) 25 MG capsule  TAKE ONE CAPSULE BY MOUTH EVERY EVENING             omeprazole (PRILOSEC) 20 MG delayed release capsule  Take 1 capsule by mouth every morning (before breakfast)             ONETOUCH DELICA LANCETS FINE MISC  Pt. Tests BID-TID.             traMADol (ULTRAM) 50 MG tablet  Take 1 tablet by mouth daily as needed for Pain for up to 90 days. Dav Olsen              warfarin (COUMADIN) 2.5 MG tablet  Take 1 tablet by mouth three times a week 1 tablet MWF             warfarin (COUMADIN) 5 MG tablet  Take 1 tablet by mouth four times a week                   Medications marked \"taking\" at this time  Outpatient Prescriptions Marked as Taking for the 9/21/18 encounter (Office Visit) with GILDARDO Rubio - CNP   Medication Sig Dispense Refill    ciprofloxacin (CIPRO) 500 MG tablet Take 1 tablet by mouth 2 times daily for 14 days 28 tablet 1    metroNIDAZOLE (FLAGYL) 500 MG tablet Take 1 tablet by mouth 3 times daily for 14 days 42 tablet 1    chlorzoxazone (PARAFON FORTE) 500 MG tablet TAKE 1 TABLET FOUR TIMES DAILY AS NEEDED FOR MUSCLE SPASMS 120 tablet 2    DULoxetine (CYMBALTA) 60 MG extended release capsule TAKE 1 CAPSULE EVERY DAY 90 capsule 0    traMADol (ULTRAM) 50 MG tablet Take 1 tablet by mouth daily as needed for Pain for up to 90 days. . 90 tablet 0    atorvastatin (LIPITOR) 80 MG tablet TAKE 1 TABLET EVERY DAY 90 tablet 3    ACCU-CHEK MULTICLIX LANCETS MISC Testing twice daily 100 each 3    metFORMIN (GLUCOPHAGE) 1000 MG tablet TAKE 1 TABLET TWICE DAILY WITH MEALS 180 tablet 1    divalproex (DEPAKOTE ER) 500 MG extended release tablet TAKE 1 TABLET BY MOUTH IN THE MORNING AND 2 TABLETS IN THE EVENING  (Patient taking differently: TAKE 1 TABLET BY MOUTH IN THE MORNING) 270 tablet 1    nortriptyline (PAMELOR) 25 MG capsule TAKE ONE CAPSULE BY MOUTH EVERY EVENING 90 capsule 5    warfarin (COUMADIN) 2.5 MG tablet Take 1 tablet by mouth three times a week 1 tablet MWF (Patient taking differently: Take 2.5 mg by mouth Twice a Week 1 tablet mon thurs) 100 tablet 5    warfarin (COUMADIN) 5 MG tablet Take 1 tablet by mouth four times a week (Patient taking differently: Take 5 mg by mouth Five times weekly ) 100 tablet 5    levETIRAcetam (KEPPRA) 500 MG tablet TAKE 1 TABLET TWICE DAILY 180 tablet 1    omeprazole (PRILOSEC) 20 MG delayed release capsule Take 1 capsule by mouth every morning (before breakfast) 90 capsule 3    metoprolol tartrate (LOPRESSOR) 25 MG tablet TAKE 1/2 TABLET BY MOUTH TWICE DAILY 60 tablet 2    levothyroxine (SYNTHROID) 75 MCG tablet Take 1 tablet by mouth Daily 30 tablet 5    hydrOXYzine (ATARAX) 25 MG tablet TAKE 1 TABLET THREE TIMES DAILY AS NEEDED FOR ANXIETY 30 tablet 0    aspirin 81 MG tablet Take 1 tablet by mouth daily 30 tablet 5    glucose blood VI test strips (ONE TOUCH ULTRA TEST) strip Pt. Tests BID -TID. [address] Pt. Tests BID-TID. 200 each 2        Medications patient taking as of now reconciled against medications ordered at time of hospital discharge: Yes    Chief Complaint   Patient presents with    Follow-Up from Hospital     diverticulitis - colon issue    Coagulation Disorder       Patient here for TCM visit s/p hospitalization 9/19/18 for diverticulitis. She reports that she did not have to have surgery. She does have an outpatient colonoscopy consult scheduled for the beginning of October. She reports that she has been doing ok since she has been home. She does report to me that she knows about her dietary restrictions. She does report that she is going to continue to eat grapes, pears, peaches and other fruits that have skins on them. She has been slowly increasing her diet since she has been home. She is still on Cipro and Flagyl. Tolerating them well. She has been compliant with her coumadin - inr will be sent to PCP. Inpatient course: Discharge summary reviewed- see chart. Interval history/Current status: stable     Review of Systems   Constitutional: Positive for appetite change. Negative for activity change, fatigue and fever. HENT: Negative. Respiratory: Positive for cough (chronic). Negative for chest tightness and shortness of breath. Cardiovascular: Negative for chest pain, palpitations and leg swelling. Gastrointestinal: Negative for abdominal distention, abdominal pain, blood in stool, nausea and vomiting. Genitourinary: Negative for difficulty urinating, dysuria and frequency. Neurological: Negative for dizziness, weakness and headaches.        Vitals:    09/21/18 0953   BP: 106/70   Pulse: 104   SpO2: 94%   Weight:

## 2018-09-24 ENCOUNTER — TELEPHONE (OUTPATIENT)
Dept: SURGERY | Age: 48
End: 2018-09-24

## 2018-09-26 DIAGNOSIS — G40.909 SEIZURE DISORDER (HCC): ICD-10-CM

## 2018-09-26 DIAGNOSIS — G62.9 PERIPHERAL POLYNEUROPATHY: Chronic | ICD-10-CM

## 2018-09-26 DIAGNOSIS — I10 ESSENTIAL HYPERTENSION: ICD-10-CM

## 2018-09-27 RX ORDER — DIVALPROEX SODIUM 500 MG/1
TABLET, EXTENDED RELEASE ORAL
Qty: 270 TABLET | Refills: 1 | Status: SHIPPED | OUTPATIENT
Start: 2018-09-27 | End: 2019-03-16 | Stop reason: SDUPTHER

## 2018-09-28 ENCOUNTER — TELEPHONE (OUTPATIENT)
Dept: SURGERY | Age: 48
End: 2018-09-28

## 2018-09-28 ENCOUNTER — PREP FOR PROCEDURE (OUTPATIENT)
Dept: SURGERY | Age: 48
End: 2018-09-28

## 2018-09-28 ENCOUNTER — INITIAL CONSULT (OUTPATIENT)
Dept: SURGERY | Age: 48
End: 2018-09-28
Payer: MEDICARE

## 2018-09-28 VITALS
SYSTOLIC BLOOD PRESSURE: 118 MMHG | OXYGEN SATURATION: 93 % | DIASTOLIC BLOOD PRESSURE: 66 MMHG | HEART RATE: 96 BPM | BODY MASS INDEX: 38.45 KG/M2 | TEMPERATURE: 98.6 F | HEIGHT: 67 IN | WEIGHT: 245 LBS

## 2018-09-28 DIAGNOSIS — K57.92 DIVERTICULITIS: Primary | ICD-10-CM

## 2018-09-28 PROCEDURE — G8417 CALC BMI ABV UP PARAM F/U: HCPCS | Performed by: SURGERY

## 2018-09-28 PROCEDURE — 4004F PT TOBACCO SCREEN RCVD TLK: CPT | Performed by: SURGERY

## 2018-09-28 PROCEDURE — G8427 DOCREV CUR MEDS BY ELIG CLIN: HCPCS | Performed by: SURGERY

## 2018-09-28 PROCEDURE — 1111F DSCHRG MED/CURRENT MED MERGE: CPT | Performed by: SURGERY

## 2018-09-28 PROCEDURE — 99214 OFFICE O/P EST MOD 30 MIN: CPT | Performed by: SURGERY

## 2018-09-28 RX ORDER — SODIUM CHLORIDE 9 MG/ML
INJECTION, SOLUTION INTRAVENOUS CONTINUOUS
Status: CANCELLED | OUTPATIENT
Start: 2018-09-28 | End: 2019-09-28

## 2018-09-28 RX ORDER — POLYETHYLENE GLYCOL 3350 17 G/17G
17 POWDER, FOR SOLUTION ORAL DAILY
Qty: 510 G | Refills: 0 | Status: SHIPPED | OUTPATIENT
Start: 2018-09-28 | End: 2019-01-30 | Stop reason: SDUPTHER

## 2018-09-28 NOTE — TELEPHONE ENCOUNTER
Per Dr Shah patient is to be a high protein, low calorie diet from here until surgery, pt informed expressed understanding. Per Dr Shah patient is to be scheduled for a colonoscopy. Pt states 10/10/18 works best. Surgery Forms sent, confirmation received. Pt states she cannot afford to buy the prep, we explained we can attempt to put Rx in, if not Dr Shah states patient can use [employer] & insurance will cover that. Pt was given a folder with both bowel prep instruction incase she needs to use the 2nd option of bowel prep. Pt was given both written and verbal instructions along with brochures inside folder with a follow up card of 10/26/18. Pt expressed understanding with no further questions. Myself will follow up with insurances and the bowel preps.    Electronically signed by Brennon Hercules MA on 9/28/18 at 10:14 AM

## 2018-09-28 NOTE — PROGRESS NOTES
Bilateral 06/01/2017    TFNB  #1    NERVE BLOCK Bilateral 06/15/2017    bilatera lumbar transforaminal nerve block #2 L5-S1    NERVE BLOCK Bilateral 09/20/2017    Bilateral transforamninal nerve block lumbar #3    RHINOPLASTY      1985    TONSILLECTOMY      TUBAL LIGATION         Home Medications  Prior to Visit Medications    Medication Sig Taking? Authorizing Provider   metoprolol tartrate (LOPRESSOR) 25 MG tablet TAKE 1/2 TABLET TWICE DAILY Yes Vianca Smith MD   divalproex (DEPAKOTE ER) 500 MG extended release tablet TAKE 1 TABLET IN THE MORNING AND 2 TABLETS IN THE EVENING  Yes Zander Cruz MD   ciprofloxacin (CIPRO) 500 MG tablet Take 1 tablet by mouth 2 times daily for 14 days Yes Korin Brooks MD   metroNIDAZOLE (FLAGYL) 500 MG tablet Take 1 tablet by mouth 3 times daily for 14 days Yes Korin Brooks MD   divalproex (DEPAKOTE) 500 MG DR tablet Take 1,000 mg by mouth nightly Yes Historical Provider, MD   chlorzoxazone (PARAFON FORTE) 500 MG tablet TAKE 1 TABLET FOUR TIMES DAILY AS NEEDED FOR MUSCLE SPASMS Yes Alma Delia Sauer DO   DULoxetine (CYMBALTA) 60 MG extended release capsule TAKE 1 CAPSULE EVERY DAY Yes Alma Delia Sauer DO   traMADol (ULTRAM) 50 MG tablet Take 1 tablet by mouth daily as needed for Pain for up to 90 days. Cindy Sauer DO   atorvastatin (LIPITOR) 80 MG tablet TAKE 1 TABLET EVERY DAY Yes Vianca Smith MD   ACCU-CHEK MULTICLIX LANCETS MISC Testing twice daily Yes Zander Cruz MD   metFORMIN (GLUCOPHAGE) 1000 MG tablet TAKE 1 TABLET TWICE DAILY WITH MEALS Yes Zander Cruz MD   nortriptyline (PAMELOR) 25 MG capsule TAKE ONE CAPSULE BY MOUTH EVERY EVENING Yes Zander Cruz MD   warfarin (COUMADIN) 2.5 MG tablet Take 1 tablet by mouth three times a week 1 tablet MW  Patient taking differently: Take 2.5 mg by mouth Twice a Week 1 tablet mon thurs Yes Zander Cruz MD   warfarin (COUMADIN)

## 2018-10-08 RX ORDER — M-VIT,TX,IRON,MINS/CALC/FOLIC 27MG-0.4MG
1 TABLET ORAL DAILY
COMMUNITY
End: 2020-11-20 | Stop reason: SDUPTHER

## 2018-10-08 NOTE — PROGRESS NOTES
removed. 12. Shower the night before surgery with _x__Antibacterial soap ___Hibiclens. 15. Remember to bring Blood Bank bracelet to the hospital on the day of surgery. 14. If you have a Living Will and Durable Power of  for Healthcare, please bring in a copy. 15. If appropriate bring crutches, inspirex, etc... 12. Notify your Surgeon if you develop any illness between now and surgery time, cough, cold, fever, sore throat, nausea, vomiting, etc.  Please notify your surgeon if you experience dizziness, shortness of breath or blurred vision between now & the time of your surgery. 17. If you have ___dentures, they will be removed before going to the OR; we will provide you a container. If you wear ___contact lenses or _x__glasses, they will be removed; please bring a case for them. 18. To provide excellent care visitors will be limited to one in the room at any given time. 19. Please bring picture ID and insurance card. 20. Sleep apnea patients need to bring CPAP to hospital on day of surgery. 21. Visit our web site for additional information: [email or web address]    22. During flu season no children under the age of 15 are permitted in the hospital for the safety of all patients. 23. Other                  Please call pre admission testing if you have any further questions.    [facility]     [address]

## 2018-10-10 ENCOUNTER — ANESTHESIA (OUTPATIENT)
Dept: ENDOSCOPY | Age: 48
End: 2018-10-10
Payer: MEDICARE

## 2018-10-10 ENCOUNTER — HOSPITAL ENCOUNTER (OUTPATIENT)
Age: 48
Setting detail: OUTPATIENT SURGERY
Discharge: HOME | End: 2018-10-10
Attending: SURGERY | Admitting: SURGERY
Payer: MEDICARE

## 2018-10-10 ENCOUNTER — ANESTHESIA EVENT (OUTPATIENT)
Dept: ENDOSCOPY | Age: 48
End: 2018-10-10
Payer: MEDICARE

## 2018-10-10 VITALS
BODY MASS INDEX: 38.16 KG/M2 | SYSTOLIC BLOOD PRESSURE: 116 MMHG | HEART RATE: 91 BPM | HEIGHT: 67 IN | OXYGEN SATURATION: 95 % | TEMPERATURE: 97.3 F | RESPIRATION RATE: 18 BRPM | WEIGHT: 243.1 LBS | DIASTOLIC BLOOD PRESSURE: 80 MMHG

## 2018-10-10 VITALS
SYSTOLIC BLOOD PRESSURE: 114 MMHG | RESPIRATION RATE: 17 BRPM | OXYGEN SATURATION: 95 % | DIASTOLIC BLOOD PRESSURE: 87 MMHG

## 2018-10-10 LAB — METER GLUCOSE: 140 MG/DL (ref 70–110)

## 2018-10-10 PROCEDURE — 88305 TISSUE EXAM BY PATHOLOGIST: CPT

## 2018-10-10 PROCEDURE — 2709999900 HC NON-CHARGEABLE SUPPLY: Performed by: SURGERY

## 2018-10-10 PROCEDURE — 3609010300 HC COLONOSCOPY W/BIOPSY SINGLE/MULTIPLE: Performed by: SURGERY

## 2018-10-10 PROCEDURE — 3700000001 HC ADD 15 MINUTES (ANESTHESIA): Performed by: SURGERY

## 2018-10-10 PROCEDURE — 7100000010 HC PHASE II RECOVERY - FIRST 15 MIN: Performed by: SURGERY

## 2018-10-10 PROCEDURE — 7100000011 HC PHASE II RECOVERY - ADDTL 15 MIN: Performed by: SURGERY

## 2018-10-10 PROCEDURE — 82962 GLUCOSE BLOOD TEST: CPT

## 2018-10-10 PROCEDURE — G0121 COLON CA SCRN NOT HI RSK IND: HCPCS | Performed by: SURGERY

## 2018-10-10 PROCEDURE — 3700000000 HC ANESTHESIA ATTENDED CARE: Performed by: SURGERY

## 2018-10-10 PROCEDURE — 6360000002 HC RX W HCPCS: Performed by: NURSE ANESTHETIST, CERTIFIED REGISTERED

## 2018-10-10 PROCEDURE — 2580000003 HC RX 258: Performed by: SURGERY

## 2018-10-10 RX ORDER — PROPOFOL 10 MG/ML
INJECTION, EMULSION INTRAVENOUS PRN
Status: DISCONTINUED | OUTPATIENT
Start: 2018-10-10 | End: 2018-10-10 | Stop reason: SDUPTHER

## 2018-10-10 RX ORDER — SODIUM CHLORIDE 9 MG/ML
INJECTION, SOLUTION INTRAVENOUS CONTINUOUS
Status: DISCONTINUED | OUTPATIENT
Start: 2018-10-10 | End: 2018-10-10 | Stop reason: HOSPADM

## 2018-10-10 RX ADMIN — SODIUM CHLORIDE: 9 INJECTION, SOLUTION INTRAVENOUS at 12:17

## 2018-10-10 RX ADMIN — PROPOFOL 400 MG: 10 INJECTION, EMULSION INTRAVENOUS at 12:35

## 2018-10-10 ASSESSMENT — PAIN - FUNCTIONAL ASSESSMENT: PAIN_FUNCTIONAL_ASSESSMENT: 0-10

## 2018-10-10 ASSESSMENT — PAIN SCALES - GENERAL
PAINLEVEL_OUTOF10: 0
PAINLEVEL_OUTOF10: 0

## 2018-10-15 DIAGNOSIS — K21.9 GASTROESOPHAGEAL REFLUX DISEASE, ESOPHAGITIS PRESENCE NOT SPECIFIED: ICD-10-CM

## 2018-10-17 RX ORDER — OMEPRAZOLE 20 MG/1
CAPSULE, DELAYED RELEASE ORAL
Qty: 90 CAPSULE | Refills: 3 | Status: SHIPPED | OUTPATIENT
Start: 2018-10-17 | End: 2019-06-12 | Stop reason: SDUPTHER

## 2018-10-23 ENCOUNTER — NURSE ONLY (OUTPATIENT)
Dept: FAMILY MEDICINE CLINIC | Age: 48
End: 2018-10-23
Payer: MEDICARE

## 2018-10-23 ENCOUNTER — TELEPHONE (OUTPATIENT)
Dept: SURGERY | Age: 48
End: 2018-10-23

## 2018-10-23 DIAGNOSIS — N28.0 RENAL INFARCT (HCC): ICD-10-CM

## 2018-10-23 DIAGNOSIS — Z79.01 ANTICOAGULANT LONG-TERM USE: Primary | ICD-10-CM

## 2018-10-23 LAB
INTERNATIONAL NORMALIZATION RATIO, POC: 2
PROTHROMBIN TIME, POC: 24.3

## 2018-10-23 PROCEDURE — 85610 PROTHROMBIN TIME: CPT | Performed by: FAMILY MEDICINE

## 2018-10-25 DIAGNOSIS — G40.909 SEIZURE DISORDER (HCC): ICD-10-CM

## 2018-10-25 RX ORDER — LEVETIRACETAM 500 MG/1
TABLET ORAL
Qty: 180 TABLET | Refills: 1 | Status: SHIPPED | OUTPATIENT
Start: 2018-10-25 | End: 2019-03-18 | Stop reason: SDUPTHER

## 2018-10-26 ENCOUNTER — OFFICE VISIT (OUTPATIENT)
Dept: SURGERY | Age: 48
End: 2018-10-26
Payer: MEDICARE

## 2018-10-26 ENCOUNTER — PREP FOR PROCEDURE (OUTPATIENT)
Dept: SURGERY | Age: 48
End: 2018-10-26

## 2018-10-26 ENCOUNTER — TELEPHONE (OUTPATIENT)
Dept: SURGERY | Age: 48
End: 2018-10-26

## 2018-10-26 VITALS
DIASTOLIC BLOOD PRESSURE: 72 MMHG | HEIGHT: 67 IN | RESPIRATION RATE: 14 BRPM | SYSTOLIC BLOOD PRESSURE: 120 MMHG | HEART RATE: 78 BPM | BODY MASS INDEX: 38.14 KG/M2 | TEMPERATURE: 97.5 F | WEIGHT: 243 LBS

## 2018-10-26 DIAGNOSIS — K57.92 DIVERTICULITIS: ICD-10-CM

## 2018-10-26 DIAGNOSIS — K57.92 DIVERTICULITIS: Primary | ICD-10-CM

## 2018-10-26 DIAGNOSIS — K57.32 DIVERTICULITIS OF COLON: Primary | ICD-10-CM

## 2018-10-26 PROCEDURE — 99214 OFFICE O/P EST MOD 30 MIN: CPT | Performed by: SURGERY

## 2018-10-26 PROCEDURE — G8417 CALC BMI ABV UP PARAM F/U: HCPCS | Performed by: SURGERY

## 2018-10-26 PROCEDURE — 4004F PT TOBACCO SCREEN RCVD TLK: CPT | Performed by: SURGERY

## 2018-10-26 PROCEDURE — G8427 DOCREV CUR MEDS BY ELIG CLIN: HCPCS | Performed by: SURGERY

## 2018-10-26 PROCEDURE — G8482 FLU IMMUNIZE ORDER/ADMIN: HCPCS | Performed by: SURGERY

## 2018-10-26 RX ORDER — SODIUM CHLORIDE, SODIUM LACTATE, POTASSIUM CHLORIDE, CALCIUM CHLORIDE 600; 310; 30; 20 MG/100ML; MG/100ML; MG/100ML; MG/100ML
INJECTION, SOLUTION INTRAVENOUS CONTINUOUS
Status: CANCELLED | OUTPATIENT
Start: 2018-10-26

## 2018-10-26 RX ORDER — CIPROFLOXACIN 2 MG/ML
400 INJECTION, SOLUTION INTRAVENOUS
Status: CANCELLED | OUTPATIENT
Start: 2018-10-26 | End: 2018-10-26

## 2018-10-31 DIAGNOSIS — G62.9 PERIPHERAL POLYNEUROPATHY: Chronic | ICD-10-CM

## 2018-11-01 RX ORDER — DULOXETIN HYDROCHLORIDE 60 MG/1
CAPSULE, DELAYED RELEASE ORAL
Qty: 90 CAPSULE | Refills: 0 | Status: SHIPPED | OUTPATIENT
Start: 2018-11-01 | End: 2019-03-19 | Stop reason: SDUPTHER

## 2018-11-01 NOTE — TELEPHONE ENCOUNTER
Patient last visit 8-21-18 next visit 11-21-18. Pharmacy requesting refill on Cymbalta 60 mg 1 cap qd sent 8-30-18 #90 no refills. Please advise.

## 2018-11-12 DIAGNOSIS — E03.8 SUBCLINICAL HYPOTHYROIDISM: ICD-10-CM

## 2018-11-13 RX ORDER — LEVOTHYROXINE SODIUM 0.07 MG/1
TABLET ORAL
Qty: 90 TABLET | Refills: 5 | Status: SHIPPED | OUTPATIENT
Start: 2018-11-13 | End: 2019-03-19 | Stop reason: SDUPTHER

## 2018-11-21 ENCOUNTER — OFFICE VISIT (OUTPATIENT)
Dept: PHYSICAL MEDICINE AND REHAB | Age: 48
End: 2018-11-21
Payer: MEDICARE

## 2018-11-21 VITALS
SYSTOLIC BLOOD PRESSURE: 122 MMHG | DIASTOLIC BLOOD PRESSURE: 79 MMHG | HEIGHT: 67 IN | BODY MASS INDEX: 38.92 KG/M2 | HEART RATE: 92 BPM | WEIGHT: 248 LBS

## 2018-11-21 DIAGNOSIS — M51.26 DISC DISPLACEMENT, LUMBAR: Primary | Chronic | ICD-10-CM

## 2018-11-21 DIAGNOSIS — M48.061 NEURAL FORAMINAL STENOSIS OF LUMBAR SPINE: Chronic | ICD-10-CM

## 2018-11-21 DIAGNOSIS — G89.29 OTHER CHRONIC PAIN: Chronic | ICD-10-CM

## 2018-11-21 DIAGNOSIS — G56.03 CARPAL TUNNEL SYNDROME ON BOTH SIDES: ICD-10-CM

## 2018-11-21 PROCEDURE — 99214 OFFICE O/P EST MOD 30 MIN: CPT | Performed by: PHYSICAL MEDICINE & REHABILITATION

## 2018-11-21 PROCEDURE — G8417 CALC BMI ABV UP PARAM F/U: HCPCS | Performed by: PHYSICAL MEDICINE & REHABILITATION

## 2018-11-21 PROCEDURE — G8427 DOCREV CUR MEDS BY ELIG CLIN: HCPCS | Performed by: PHYSICAL MEDICINE & REHABILITATION

## 2018-11-21 PROCEDURE — 4004F PT TOBACCO SCREEN RCVD TLK: CPT | Performed by: PHYSICAL MEDICINE & REHABILITATION

## 2018-11-21 PROCEDURE — G8482 FLU IMMUNIZE ORDER/ADMIN: HCPCS | Performed by: PHYSICAL MEDICINE & REHABILITATION

## 2018-11-21 NOTE — PROGRESS NOTES
History:   Procedure Laterality Date     SECTION      CHOLECYSTECTOMY      COLONOSCOPY  10/10/2018    COLONOSCOPY WITH BIOPSY performed by Marly Parrish MD at [address] ECHO COMPLETE  2013         FOOT SURGERY  2015    HYSTERECTOMY  2012    KIDNEY BIOPSY      KNEE ARTHROSCOPY  2003    right and left knee    KNEE SURGERY      left knee    NERVE BLOCK Bilateral 2017    TFNB  #1    NERVE BLOCK Bilateral 06/15/2017    bilatera lumbar transforaminal nerve block #2 L5-S1    NERVE BLOCK Bilateral 2017    Bilateral transforamninal nerve block lumbar #3    RHINOPLASTY      1985    TONSILLECTOMY      TUBAL LIGATION         Social History     Social History    Marital status:      Occupational History     [employer]     Social History Main Topics    Smoking status: Current Every Day Smoker     Packs/day: 1.00     Years: 32.00     Types: Cigarettes    Smokeless tobacco: Never Used      Comment: (8/3/16):  not ready to quit; counseling given    Alcohol use 0.0 oz/week     0 Standard drinks or equivalent per week      Comment: (12/17/15):  <1 drink/week    Drug use: Yes     Special: Marijuana      Comment: last use 2016    Sexual activity: Not Currently     Other Topics Concern    None     Social History Narrative       Family History   Problem Relation Age of Onset    Depression Mother     Bipolar Disorder Mother     Hypertension Mother     Anxiety Disorder Sister     Asthma Son     Schizophrenia Son     Anxiety Disorder Daughter        Current Outpatient Prescriptions   Medication Sig Dispense Refill    levothyroxine (SYNTHROID) 75 MCG tablet TAKE 1 TABLET EVERY DAY 90 tablet 5    DULoxetine (CYMBALTA) 60 MG extended release capsule TAKE 1 CAPSULE EVERY DAY 90 capsule 0    levETIRAcetam (KEPPRA) 500 MG tablet TAKE 1 TABLET TWICE DAILY 180 tablet 1    omeprazole (PRILOSEC) 20 MG delayed release capsule TAKE 1 CAPSULE EVERY MORNING

## 2018-11-28 ENCOUNTER — HOSPITAL ENCOUNTER (OUTPATIENT)
Age: 48
Discharge: HOME | End: 2018-11-30
Payer: MEDICARE

## 2018-11-28 ENCOUNTER — ANTI-COAG VISIT (OUTPATIENT)
Dept: FAMILY MEDICINE CLINIC | Age: 48
End: 2018-11-28
Payer: MEDICARE

## 2018-11-28 ENCOUNTER — NURSE ONLY (OUTPATIENT)
Dept: FAMILY MEDICINE CLINIC | Age: 48
End: 2018-11-28
Payer: MEDICARE

## 2018-11-28 DIAGNOSIS — E78.2 MIXED HYPERLIPIDEMIA: ICD-10-CM

## 2018-11-28 DIAGNOSIS — Z79.01 ANTICOAGULANT LONG-TERM USE: ICD-10-CM

## 2018-11-28 DIAGNOSIS — I10 ESSENTIAL HYPERTENSION: ICD-10-CM

## 2018-11-28 DIAGNOSIS — N28.0 RENAL INFARCT (HCC): ICD-10-CM

## 2018-11-28 DIAGNOSIS — E11.59 TYPE 2 DIABETES MELLITUS WITH OTHER CIRCULATORY COMPLICATION, UNSPECIFIED WHETHER LONG TERM INSULIN USE (HCC): ICD-10-CM

## 2018-11-28 LAB
ALBUMIN SERPL-MCNC: 4.1 G/DL (ref 3.5–5.2)
ALP BLD-CCNC: 92 U/L (ref 35–104)
ALT SERPL-CCNC: 8 U/L (ref 0–32)
ANION GAP SERPL CALCULATED.3IONS-SCNC: 21 MMOL/L (ref 7–16)
AST SERPL-CCNC: 10 U/L (ref 0–31)
BASOPHILS ABSOLUTE: 0.11 E9/L (ref 0–0.2)
BASOPHILS RELATIVE PERCENT: 0.7 % (ref 0–2)
BILIRUB SERPL-MCNC: 0.2 MG/DL (ref 0–1.2)
BUN BLDV-MCNC: 18 MG/DL (ref 6–20)
CALCIUM SERPL-MCNC: 10.1 MG/DL (ref 8.6–10.2)
CHLORIDE BLD-SCNC: 97 MMOL/L (ref 98–107)
CHOLESTEROL, TOTAL: 188 MG/DL (ref 0–199)
CO2: 23 MMOL/L (ref 22–29)
CREAT SERPL-MCNC: 1 MG/DL (ref 0.5–1)
EOSINOPHILS ABSOLUTE: 0.35 E9/L (ref 0.05–0.5)
EOSINOPHILS RELATIVE PERCENT: 2.3 % (ref 0–6)
FOLATE: 11.4 NG/ML (ref 4.8–24.2)
GFR AFRICAN AMERICAN: >60
GFR NON-AFRICAN AMERICAN: 59 ML/MIN/1.73
GLUCOSE BLD-MCNC: 116 MG/DL (ref 74–99)
HBA1C MFR BLD: 6.9 % (ref 4–5.6)
HCT VFR BLD CALC: 61.1 % (ref 34–48)
HDLC SERPL-MCNC: 33 MG/DL
HEMOGLOBIN: 19.5 G/DL (ref 11.5–15.5)
IMMATURE GRANULOCYTES #: 0.08 E9/L
IMMATURE GRANULOCYTES %: 0.5 % (ref 0–5)
INTERNATIONAL NORMALIZATION RATIO, POC: 1.8
LDL CHOLESTEROL CALCULATED: 124 MG/DL (ref 0–99)
LYMPHOCYTES ABSOLUTE: 4.64 E9/L (ref 1.5–4)
LYMPHOCYTES RELATIVE PERCENT: 31 % (ref 20–42)
MAGNESIUM: 2 MG/DL (ref 1.6–2.6)
MCH RBC QN AUTO: 29.8 PG (ref 26–35)
MCHC RBC AUTO-ENTMCNC: 31.9 % (ref 32–34.5)
MCV RBC AUTO: 93.3 FL (ref 80–99.9)
MONOCYTES ABSOLUTE: 0.99 E9/L (ref 0.1–0.95)
MONOCYTES RELATIVE PERCENT: 6.6 % (ref 2–12)
NEUTROPHILS ABSOLUTE: 8.79 E9/L (ref 1.8–7.3)
NEUTROPHILS RELATIVE PERCENT: 58.9 % (ref 43–80)
PDW BLD-RTO: 16.4 FL (ref 11.5–15)
PLATELET # BLD: 312 E9/L (ref 130–450)
PMV BLD AUTO: 11 FL (ref 7–12)
POTASSIUM SERPL-SCNC: 5.1 MMOL/L (ref 3.5–5)
RBC # BLD: 6.55 E12/L (ref 3.5–5.5)
SODIUM BLD-SCNC: 141 MMOL/L (ref 132–146)
TOTAL PROTEIN: 7.7 G/DL (ref 6.4–8.3)
TRIGL SERPL-MCNC: 154 MG/DL (ref 0–149)
TSH SERPL DL<=0.05 MIU/L-ACNC: 2.11 UIU/ML (ref 0.27–4.2)
VITAMIN B-12: 454 PG/ML (ref 211–946)
VLDLC SERPL CALC-MCNC: 31 MG/DL
WBC # BLD: 15 E9/L (ref 4.5–11.5)

## 2018-11-28 PROCEDURE — 80053 COMPREHEN METABOLIC PANEL: CPT

## 2018-11-28 PROCEDURE — 36415 COLL VENOUS BLD VENIPUNCTURE: CPT | Performed by: FAMILY MEDICINE

## 2018-11-28 PROCEDURE — 82746 ASSAY OF FOLIC ACID SERUM: CPT

## 2018-11-28 PROCEDURE — 85025 COMPLETE CBC W/AUTO DIFF WBC: CPT

## 2018-11-28 PROCEDURE — 83036 HEMOGLOBIN GLYCOSYLATED A1C: CPT

## 2018-11-28 PROCEDURE — 85610 PROTHROMBIN TIME: CPT | Performed by: FAMILY MEDICINE

## 2018-11-28 PROCEDURE — 83735 ASSAY OF MAGNESIUM: CPT

## 2018-11-28 PROCEDURE — 82607 VITAMIN B-12: CPT

## 2018-11-28 PROCEDURE — 80061 LIPID PANEL: CPT

## 2018-11-28 PROCEDURE — 93793 ANTICOAG MGMT PT WARFARIN: CPT | Performed by: FAMILY MEDICINE

## 2018-11-28 PROCEDURE — 84443 ASSAY THYROID STIM HORMONE: CPT

## 2018-12-04 RX ORDER — CHLORZOXAZONE 500 MG/1
TABLET ORAL
Qty: 120 TABLET | Refills: 2 | Status: SHIPPED | OUTPATIENT
Start: 2018-12-04 | End: 2019-02-21 | Stop reason: SDUPTHER

## 2018-12-05 ENCOUNTER — OFFICE VISIT (OUTPATIENT)
Dept: FAMILY MEDICINE CLINIC | Age: 48
End: 2018-12-05
Payer: MEDICARE

## 2018-12-05 VITALS
SYSTOLIC BLOOD PRESSURE: 124 MMHG | BODY MASS INDEX: 37.98 KG/M2 | WEIGHT: 242 LBS | TEMPERATURE: 98.2 F | OXYGEN SATURATION: 93 % | RESPIRATION RATE: 16 BRPM | HEART RATE: 100 BPM | DIASTOLIC BLOOD PRESSURE: 68 MMHG | HEIGHT: 67 IN

## 2018-12-05 DIAGNOSIS — M48.061 NEURAL FORAMINAL STENOSIS OF LUMBAR SPINE: Chronic | ICD-10-CM

## 2018-12-05 DIAGNOSIS — F17.200 TOBACCO DEPENDENCY: ICD-10-CM

## 2018-12-05 DIAGNOSIS — G40.909 SEIZURE DISORDER (HCC): ICD-10-CM

## 2018-12-05 DIAGNOSIS — E11.59 TYPE 2 DIABETES MELLITUS WITH OTHER CIRCULATORY COMPLICATION, UNSPECIFIED WHETHER LONG TERM INSULIN USE (HCC): Primary | ICD-10-CM

## 2018-12-05 DIAGNOSIS — I10 ESSENTIAL HYPERTENSION: ICD-10-CM

## 2018-12-05 DIAGNOSIS — E78.2 MIXED HYPERLIPIDEMIA: ICD-10-CM

## 2018-12-05 DIAGNOSIS — J68.3 MODERATE PERSISTENT REACTIVE AIRWAYS DYSFUNCTION SYNDROME WITHOUT COMPLICATION (HCC): ICD-10-CM

## 2018-12-05 PROBLEM — R10.9 ABDOMINAL PAIN: Status: RESOLVED | Noted: 2018-09-16 | Resolved: 2018-12-05

## 2018-12-05 PROCEDURE — 3044F HG A1C LEVEL LT 7.0%: CPT | Performed by: FAMILY MEDICINE

## 2018-12-05 PROCEDURE — 4004F PT TOBACCO SCREEN RCVD TLK: CPT | Performed by: FAMILY MEDICINE

## 2018-12-05 PROCEDURE — G8427 DOCREV CUR MEDS BY ELIG CLIN: HCPCS | Performed by: FAMILY MEDICINE

## 2018-12-05 PROCEDURE — 99214 OFFICE O/P EST MOD 30 MIN: CPT | Performed by: FAMILY MEDICINE

## 2018-12-05 PROCEDURE — G8417 CALC BMI ABV UP PARAM F/U: HCPCS | Performed by: FAMILY MEDICINE

## 2018-12-05 PROCEDURE — 2022F DILAT RTA XM EVC RTNOPTHY: CPT | Performed by: FAMILY MEDICINE

## 2018-12-05 PROCEDURE — G8482 FLU IMMUNIZE ORDER/ADMIN: HCPCS | Performed by: FAMILY MEDICINE

## 2018-12-05 ASSESSMENT — ENCOUNTER SYMPTOMS
ABDOMINAL PAIN: 0
SORE THROAT: 0
COUGH: 0
BACK PAIN: 1
BLOOD IN STOOL: 0
ORTHOPNEA: 0
DIARRHEA: 0
SPUTUM PRODUCTION: 0
BLURRED VISION: 0
WHEEZING: 0
NAUSEA: 0
DOUBLE VISION: 0
CONSTIPATION: 0
VOMITING: 0
SHORTNESS OF BREATH: 0
HEARTBURN: 0

## 2018-12-05 ASSESSMENT — PATIENT HEALTH QUESTIONNAIRE - PHQ9
SUM OF ALL RESPONSES TO PHQ QUESTIONS 1-9: 0
SUM OF ALL RESPONSES TO PHQ9 QUESTIONS 1 & 2: 0
2. FEELING DOWN, DEPRESSED OR HOPELESS: 0
SUM OF ALL RESPONSES TO PHQ QUESTIONS 1-9: 0
1. LITTLE INTEREST OR PLEASURE IN DOING THINGS: 0

## 2018-12-05 NOTE — PROGRESS NOTES
chest pain, palpitations, orthopnea and leg swelling. Gastrointestinal: Negative for abdominal pain, blood in stool, constipation, diarrhea, heartburn, nausea and vomiting. Genitourinary: Negative for dysuria, frequency, hematuria and urgency. Musculoskeletal: Positive for back pain, joint pain, myalgias and neck pain. Skin: Negative for itching and rash. Neurological: Positive for headaches. Negative for dizziness, tingling, focal weakness and weakness. Endo/Heme/Allergies:        New onset diabetes   Psychiatric/Behavioral: Negative for depression, memory loss and substance abuse. The patient is not nervous/anxious and does not have insomnia. Physical Exam:    VS:  /68   Pulse 100   Temp 98.2 °F (36.8 °C) (Oral)   Resp 16   Ht 5' 7\" (1.702 m)   Wt 242 lb (109.8 kg)   SpO2 93%   Breastfeeding? No   BMI 37.90 kg/m²   LAST WEIGHT:  Wt Readings from Last 3 Encounters:   12/05/18 242 lb (109.8 kg)   11/21/18 248 lb (112.5 kg)   10/29/18 240 lb (108.9 kg)     Physical Exam   Constitutional: She is oriented to person, place, and time. Vital signs are normal. She appears well-developed and well-nourished. No distress. HENT:   Head: Normocephalic and atraumatic. Right Ear: External ear normal.   Left Ear: External ear normal.   Mouth/Throat: Oropharynx is clear and moist. No oropharyngeal exudate. Eyes: Pupils are equal, round, and reactive to light. Conjunctivae and EOM are normal. Right eye exhibits no discharge. No scleral icterus. Neck: Normal range of motion. Neck supple. No thyromegaly present. Cardiovascular: Normal rate, regular rhythm, normal heart sounds and intact distal pulses. No murmur heard. Pulmonary/Chest: Effort normal. No stridor. No respiratory distress. She has no wheezes. She has no rales. She exhibits no tenderness. Abdominal: Soft. Bowel sounds are normal. She exhibits no distension and no mass. There is no tenderness. There is no guarding. Musculoskeletal: Normal range of motion. She exhibits no edema or tenderness. Redness, swelling, and moderately severe tenderness of right great toe, particularly on medial aspect. Great toenail is mildly lifted from nailbed   Lymphadenopathy:     She has no cervical adenopathy. Neurological: She is alert and oriented to person, place, and time. Skin: Skin is warm and dry. No rash noted. She is not diaphoretic. No erythema. No pallor. Last diabetic foot exam done 2/5/18     Psychiatric: She has a normal mood and affect. Her behavior is normal. Thought content normal.   Vitals reviewed.       Labs:  Lab Results   Component Value Date     11/28/2018    K 5.1 11/28/2018    CL 97 11/28/2018    CO2 23 11/28/2018    BUN 18 11/28/2018    CREATININE 1.0 11/28/2018    PROT 7.7 11/28/2018    LABALBU 4.1 11/28/2018    LABALBU 3.6 11/30/2010    CALCIUM 10.1 11/28/2018    GFRAA >60 11/28/2018    LABGLOM 59 11/28/2018    GLUCOSE 116 11/28/2018    GLUCOSE 70 04/28/2011    AST 10 11/28/2018    ALT 8 11/28/2018    ALKPHOS 92 11/28/2018    BILITOT 0.2 11/28/2018    TSH 2.110 11/28/2018    CHOL 188 11/28/2018    TRIG 154 11/28/2018    HDL 33 11/28/2018    LDLCALC 124 11/28/2018    LABA1C 6.9 11/28/2018        Lab Results   Component Value Date    CHOL 188 11/28/2018    CHOL 187 08/09/2018    CHOL 168 04/30/2018     Lab Results   Component Value Date    TRIG 154 (H) 11/28/2018    TRIG 154 (H) 08/09/2018    TRIG 137 04/30/2018     Lab Results   Component Value Date    HDL 33 11/28/2018    HDL 31 08/09/2018    HDL 31 04/30/2018     Lab Results   Component Value Date    LDLCALC 124 (H) 11/28/2018    LDLCALC 125 (H) 08/09/2018    LDLCALC 110 (H) 04/30/2018       Lab Results   Component Value Date    LABA1C 6.9 (H) 11/28/2018    LABA1C 7.2 (H) 09/17/2018    LABA1C 7.1 (H) 08/09/2018     Lab Results   Component Value Date    LABMICR <12.0 08/30/2018    LDLCALC 124 (H) 11/28/2018    CREATININE 1.0 11/28/2018

## 2018-12-05 NOTE — TELEPHONE ENCOUNTER
Called and spoke with the patient to inform her that as script for Jose Degroot was sent to her mail away pharmacy. Patient aware and voiced understanding.

## 2018-12-28 ENCOUNTER — OFFICE VISIT (OUTPATIENT)
Dept: SURGERY | Age: 48
End: 2018-12-28
Payer: MEDICARE

## 2018-12-28 VITALS
DIASTOLIC BLOOD PRESSURE: 97 MMHG | TEMPERATURE: 98.4 F | HEART RATE: 88 BPM | WEIGHT: 241 LBS | HEIGHT: 67 IN | SYSTOLIC BLOOD PRESSURE: 145 MMHG | BODY MASS INDEX: 37.83 KG/M2

## 2018-12-28 DIAGNOSIS — K57.92 DIVERTICULITIS: Primary | ICD-10-CM

## 2018-12-28 PROCEDURE — 99213 OFFICE O/P EST LOW 20 MIN: CPT | Performed by: SURGERY

## 2018-12-28 PROCEDURE — 4004F PT TOBACCO SCREEN RCVD TLK: CPT | Performed by: SURGERY

## 2018-12-28 PROCEDURE — G8417 CALC BMI ABV UP PARAM F/U: HCPCS | Performed by: SURGERY

## 2018-12-28 PROCEDURE — G8427 DOCREV CUR MEDS BY ELIG CLIN: HCPCS | Performed by: SURGERY

## 2018-12-28 PROCEDURE — G8482 FLU IMMUNIZE ORDER/ADMIN: HCPCS | Performed by: SURGERY

## 2019-01-21 ENCOUNTER — PREP FOR PROCEDURE (OUTPATIENT)
Dept: SURGERY | Age: 49
End: 2019-01-21

## 2019-01-21 DIAGNOSIS — K57.92 DIVERTICULITIS: Primary | ICD-10-CM

## 2019-01-21 RX ORDER — CIPROFLOXACIN 2 MG/ML
400 INJECTION, SOLUTION INTRAVENOUS
Status: CANCELLED | OUTPATIENT
Start: 2019-01-21 | End: 2019-01-21

## 2019-01-21 RX ORDER — POLYETHYLENE GLYCOL 3350 17 G/17G
238 POWDER, FOR SOLUTION ORAL ONCE
Qty: 527 G | Refills: 1 | Status: SHIPPED | OUTPATIENT
Start: 2019-01-21 | End: 2019-01-21

## 2019-01-21 RX ORDER — SODIUM CHLORIDE, SODIUM LACTATE, POTASSIUM CHLORIDE, CALCIUM CHLORIDE 600; 310; 30; 20 MG/100ML; MG/100ML; MG/100ML; MG/100ML
INJECTION, SOLUTION INTRAVENOUS CONTINUOUS
Status: CANCELLED | OUTPATIENT
Start: 2019-01-21

## 2019-01-21 RX ORDER — ERYTHROMYCIN 500 MG/1
1000 TABLET, COATED ORAL 3 TIMES DAILY
Qty: 6 TABLET | Refills: 0 | Status: SHIPPED | OUTPATIENT
Start: 2019-01-21 | End: 2019-01-22

## 2019-01-21 RX ORDER — NEOMYCIN SULFATE 500 MG/1
1000 TABLET ORAL 3 TIMES DAILY
Qty: 6 TABLET | Refills: 0 | Status: SHIPPED | OUTPATIENT
Start: 2019-01-21 | End: 2019-01-22

## 2019-01-25 ENCOUNTER — TELEPHONE (OUTPATIENT)
Dept: SURGERY | Age: 49
End: 2019-01-25

## 2019-01-30 RX ORDER — POLYETHYLENE GLYCOL 3350 17 G/17G
17 POWDER, FOR SOLUTION ORAL DAILY
Qty: 510 G | Refills: 0 | Status: ON HOLD | OUTPATIENT
Start: 2019-01-30 | End: 2019-02-08 | Stop reason: HOSPADM

## 2019-02-04 ENCOUNTER — HOSPITAL ENCOUNTER (OUTPATIENT)
Dept: PREADMISSION TESTING | Age: 49
Discharge: HOME | DRG: 331 | End: 2019-02-04
Payer: MEDICARE

## 2019-02-04 VITALS
RESPIRATION RATE: 18 BRPM | WEIGHT: 243 LBS | DIASTOLIC BLOOD PRESSURE: 51 MMHG | TEMPERATURE: 98.3 F | BODY MASS INDEX: 38.14 KG/M2 | SYSTOLIC BLOOD PRESSURE: 112 MMHG | HEIGHT: 67 IN | HEART RATE: 108 BPM | OXYGEN SATURATION: 90 %

## 2019-02-04 DIAGNOSIS — M54.41 CHRONIC BILATERAL LOW BACK PAIN WITH BILATERAL SCIATICA: Primary | Chronic | ICD-10-CM

## 2019-02-04 DIAGNOSIS — K57.32 DIVERTICULITIS OF COLON: ICD-10-CM

## 2019-02-04 DIAGNOSIS — M54.42 CHRONIC BILATERAL LOW BACK PAIN WITH BILATERAL SCIATICA: Primary | Chronic | ICD-10-CM

## 2019-02-04 DIAGNOSIS — K57.92 DIVERTICULITIS: Primary | ICD-10-CM

## 2019-02-04 DIAGNOSIS — K57.92 DIVERTICULITIS: ICD-10-CM

## 2019-02-04 DIAGNOSIS — G89.29 CHRONIC BILATERAL LOW BACK PAIN WITH BILATERAL SCIATICA: Primary | Chronic | ICD-10-CM

## 2019-02-04 DIAGNOSIS — Z01.818 PRE-OP TESTING: ICD-10-CM

## 2019-02-04 LAB
ALBUMIN SERPL-MCNC: 3.7 G/DL (ref 3.5–5.2)
ALP BLD-CCNC: 102 U/L (ref 35–104)
ALT SERPL-CCNC: 12 U/L (ref 0–32)
ANION GAP SERPL CALCULATED.3IONS-SCNC: 15 MMOL/L (ref 7–16)
APTT: 41.8 SEC (ref 24.5–35.1)
AST SERPL-CCNC: 12 U/L (ref 0–31)
BASOPHILS ABSOLUTE: 0.09 E9/L (ref 0–0.2)
BASOPHILS RELATIVE PERCENT: 0.6 % (ref 0–2)
BILIRUB SERPL-MCNC: 0.3 MG/DL (ref 0–1.2)
BUN BLDV-MCNC: 13 MG/DL (ref 6–20)
CALCIUM SERPL-MCNC: 9.6 MG/DL (ref 8.6–10.2)
CHLORIDE BLD-SCNC: 99 MMOL/L (ref 98–107)
CO2: 26 MMOL/L (ref 22–29)
CREAT SERPL-MCNC: 0.8 MG/DL (ref 0.5–1)
EOSINOPHILS ABSOLUTE: 0.29 E9/L (ref 0.05–0.5)
EOSINOPHILS RELATIVE PERCENT: 1.9 % (ref 0–6)
GFR AFRICAN AMERICAN: >60
GFR NON-AFRICAN AMERICAN: >60 ML/MIN/1.73
GLUCOSE BLD-MCNC: 132 MG/DL (ref 74–99)
HCT VFR BLD CALC: 55.5 % (ref 34–48)
HEMOGLOBIN: 18.1 G/DL (ref 11.5–15.5)
IMMATURE GRANULOCYTES #: 0.11 E9/L
IMMATURE GRANULOCYTES %: 0.7 % (ref 0–5)
INR BLD: 0.9
LYMPHOCYTES ABSOLUTE: 3.66 E9/L (ref 1.5–4)
LYMPHOCYTES RELATIVE PERCENT: 23.9 % (ref 20–42)
MCH RBC QN AUTO: 29.5 PG (ref 26–35)
MCHC RBC AUTO-ENTMCNC: 32.6 % (ref 32–34.5)
MCV RBC AUTO: 90.4 FL (ref 80–99.9)
MONOCYTES ABSOLUTE: 1.29 E9/L (ref 0.1–0.95)
MONOCYTES RELATIVE PERCENT: 8.4 % (ref 2–12)
NEUTROPHILS ABSOLUTE: 9.87 E9/L (ref 1.8–7.3)
NEUTROPHILS RELATIVE PERCENT: 64.5 % (ref 43–80)
PDW BLD-RTO: 16 FL (ref 11.5–15)
PLATELET # BLD: 294 E9/L (ref 130–450)
PMV BLD AUTO: 10.1 FL (ref 7–12)
POTASSIUM REFLEX MAGNESIUM: 4.4 MMOL/L (ref 3.5–5)
PROTHROMBIN TIME: 10.2 SEC (ref 9.3–12.4)
RBC # BLD: 6.14 E12/L (ref 3.5–5.5)
SODIUM BLD-SCNC: 140 MMOL/L (ref 132–146)
TOTAL PROTEIN: 7.4 G/DL (ref 6.4–8.3)
WBC # BLD: 15.3 E9/L (ref 4.5–11.5)

## 2019-02-04 PROCEDURE — 80053 COMPREHEN METABOLIC PANEL: CPT

## 2019-02-04 PROCEDURE — 87081 CULTURE SCREEN ONLY: CPT

## 2019-02-04 PROCEDURE — 85730 THROMBOPLASTIN TIME PARTIAL: CPT

## 2019-02-04 PROCEDURE — 36415 COLL VENOUS BLD VENIPUNCTURE: CPT

## 2019-02-04 PROCEDURE — 85610 PROTHROMBIN TIME: CPT

## 2019-02-04 PROCEDURE — 85025 COMPLETE CBC W/AUTO DIFF WBC: CPT

## 2019-02-04 RX ORDER — TRAMADOL HYDROCHLORIDE 50 MG/1
50 TABLET ORAL DAILY PRN
COMMUNITY
Start: 2019-01-02 | End: 2019-02-04 | Stop reason: SDUPTHER

## 2019-02-04 RX ORDER — SODIUM CHLORIDE 0.9 % (FLUSH) 0.9 %
10 SYRINGE (ML) INJECTION EVERY 12 HOURS SCHEDULED
Status: CANCELLED | OUTPATIENT
Start: 2019-02-04

## 2019-02-04 ASSESSMENT — PAIN SCALES - GENERAL: PAINLEVEL_OUTOF10: 2

## 2019-02-04 ASSESSMENT — PAIN DESCRIPTION - ORIENTATION: ORIENTATION: LOWER

## 2019-02-04 ASSESSMENT — PAIN DESCRIPTION - ONSET: ONSET: ON-GOING

## 2019-02-04 ASSESSMENT — PAIN DESCRIPTION - LOCATION: LOCATION: BACK

## 2019-02-04 ASSESSMENT — PAIN DESCRIPTION - DESCRIPTORS: DESCRIPTORS: ACHING

## 2019-02-04 ASSESSMENT — PAIN DESCRIPTION - FREQUENCY: FREQUENCY: CONTINUOUS

## 2019-02-04 ASSESSMENT — PAIN DESCRIPTION - PROGRESSION: CLINICAL_PROGRESSION: NOT CHANGED

## 2019-02-04 ASSESSMENT — PAIN DESCRIPTION - PAIN TYPE: TYPE: CHRONIC PAIN

## 2019-02-04 ASSESSMENT — PAIN - FUNCTIONAL ASSESSMENT: PAIN_FUNCTIONAL_ASSESSMENT: PREVENTS OR INTERFERES SOME ACTIVE ACTIVITIES AND ADLS

## 2019-02-04 ASSESSMENT — PAIN DESCRIPTION - DIRECTION: RADIATING_TOWARDS: DOWN BOTH LEGS TO FEET

## 2019-02-05 RX ORDER — TRAMADOL HYDROCHLORIDE 50 MG/1
TABLET ORAL
Qty: 30 TABLET | Refills: 0 | Status: SHIPPED | OUTPATIENT
Start: 2019-02-05 | End: 2019-03-07

## 2019-02-06 ENCOUNTER — ANESTHESIA (OUTPATIENT)
Dept: OPERATING ROOM | Age: 49
DRG: 331 | End: 2019-02-06
Payer: MEDICARE

## 2019-02-06 ENCOUNTER — ANESTHESIA EVENT (OUTPATIENT)
Dept: OPERATING ROOM | Age: 49
DRG: 331 | End: 2019-02-06
Payer: MEDICARE

## 2019-02-06 ENCOUNTER — HOSPITAL ENCOUNTER (INPATIENT)
Age: 49
LOS: 2 days | Discharge: HOME | DRG: 331 | End: 2019-02-08
Attending: SURGERY | Admitting: SURGERY
Payer: MEDICARE

## 2019-02-06 VITALS
RESPIRATION RATE: 1 BRPM | DIASTOLIC BLOOD PRESSURE: 106 MMHG | TEMPERATURE: 97.7 F | OXYGEN SATURATION: 95 % | SYSTOLIC BLOOD PRESSURE: 121 MMHG

## 2019-02-06 DIAGNOSIS — K57.32 DIVERTICULITIS OF COLON: Primary | ICD-10-CM

## 2019-02-06 LAB
APTT: 36.6 SEC (ref 24.5–35.1)
BASOPHILS ABSOLUTE: 0.1 E9/L (ref 0–0.2)
BASOPHILS RELATIVE PERCENT: 0.6 % (ref 0–2)
EOSINOPHILS ABSOLUTE: 0.28 E9/L (ref 0.05–0.5)
EOSINOPHILS RELATIVE PERCENT: 1.7 % (ref 0–6)
HCT VFR BLD CALC: 54 % (ref 34–48)
HEMOGLOBIN: 18.1 G/DL (ref 11.5–15.5)
IMMATURE GRANULOCYTES #: 0.1 E9/L
IMMATURE GRANULOCYTES %: 0.6 % (ref 0–5)
INR BLD: 0.9
LYMPHOCYTES ABSOLUTE: 3.66 E9/L (ref 1.5–4)
LYMPHOCYTES RELATIVE PERCENT: 22.5 % (ref 20–42)
MCH RBC QN AUTO: 30.3 PG (ref 26–35)
MCHC RBC AUTO-ENTMCNC: 33.5 % (ref 32–34.5)
MCV RBC AUTO: 90.3 FL (ref 80–99.9)
METER GLUCOSE: 114 MG/DL (ref 74–99)
METER GLUCOSE: 142 MG/DL (ref 74–99)
MONOCYTES ABSOLUTE: 1.23 E9/L (ref 0.1–0.95)
MONOCYTES RELATIVE PERCENT: 7.5 % (ref 2–12)
MRSA CULTURE ONLY: NORMAL
NEUTROPHILS ABSOLUTE: 10.93 E9/L (ref 1.8–7.3)
NEUTROPHILS RELATIVE PERCENT: 67.1 % (ref 43–80)
PDW BLD-RTO: 16.6 FL (ref 11.5–15)
PLATELET # BLD: 294 E9/L (ref 130–450)
PMV BLD AUTO: 10.1 FL (ref 7–12)
PROTHROMBIN TIME: 10.5 SEC (ref 9.3–12.4)
RBC # BLD: 5.98 E12/L (ref 3.5–5.5)
WBC # BLD: 16.3 E9/L (ref 4.5–11.5)

## 2019-02-06 PROCEDURE — 2580000003 HC RX 258: Performed by: SURGERY

## 2019-02-06 PROCEDURE — 44213 LAP MOBIL SPLENIC FL ADD-ON: CPT | Performed by: SURGERY

## 2019-02-06 PROCEDURE — 85610 PROTHROMBIN TIME: CPT

## 2019-02-06 PROCEDURE — 2500000003 HC RX 250 WO HCPCS: Performed by: NURSE ANESTHETIST, CERTIFIED REGISTERED

## 2019-02-06 PROCEDURE — 6360000002 HC RX W HCPCS: Performed by: NURSE ANESTHETIST, CERTIFIED REGISTERED

## 2019-02-06 PROCEDURE — 0DBP4ZZ EXCISION OF RECTUM, PERCUTANEOUS ENDOSCOPIC APPROACH: ICD-10-PCS | Performed by: SURGERY

## 2019-02-06 PROCEDURE — 36415 COLL VENOUS BLD VENIPUNCTURE: CPT

## 2019-02-06 PROCEDURE — 6360000002 HC RX W HCPCS: Performed by: SURGERY

## 2019-02-06 PROCEDURE — 44207 L COLECTOMY/COLOPROCTOSTOMY: CPT | Performed by: SURGERY

## 2019-02-06 PROCEDURE — 0DBN4ZZ EXCISION OF SIGMOID COLON, PERCUTANEOUS ENDOSCOPIC APPROACH: ICD-10-PCS | Performed by: SURGERY

## 2019-02-06 PROCEDURE — 3700000001 HC ADD 15 MINUTES (ANESTHESIA): Performed by: SURGERY

## 2019-02-06 PROCEDURE — 7100000001 HC PACU RECOVERY - ADDTL 15 MIN: Performed by: SURGERY

## 2019-02-06 PROCEDURE — 3600000014 HC SURGERY LEVEL 4 ADDTL 15MIN: Performed by: SURGERY

## 2019-02-06 PROCEDURE — 6370000000 HC RX 637 (ALT 250 FOR IP): Performed by: SURGERY

## 2019-02-06 PROCEDURE — 7100000000 HC PACU RECOVERY - FIRST 15 MIN: Performed by: SURGERY

## 2019-02-06 PROCEDURE — 2500000003 HC RX 250 WO HCPCS: Performed by: SURGERY

## 2019-02-06 PROCEDURE — 2709999900 HC NON-CHARGEABLE SUPPLY: Performed by: SURGERY

## 2019-02-06 PROCEDURE — 2720000010 HC SURG SUPPLY STERILE: Performed by: SURGERY

## 2019-02-06 PROCEDURE — 1200000000 HC SEMI PRIVATE

## 2019-02-06 PROCEDURE — 85025 COMPLETE CBC W/AUTO DIFF WBC: CPT

## 2019-02-06 PROCEDURE — 88307 TISSUE EXAM BY PATHOLOGIST: CPT

## 2019-02-06 PROCEDURE — 6360000002 HC RX W HCPCS: Performed by: ANESTHESIOLOGY

## 2019-02-06 PROCEDURE — 2700000000 HC OXYGEN THERAPY PER DAY

## 2019-02-06 PROCEDURE — 3600000004 HC SURGERY LEVEL 4 BASE: Performed by: SURGERY

## 2019-02-06 PROCEDURE — 3700000000 HC ANESTHESIA ATTENDED CARE: Performed by: SURGERY

## 2019-02-06 PROCEDURE — 82962 GLUCOSE BLOOD TEST: CPT

## 2019-02-06 PROCEDURE — 85730 THROMBOPLASTIN TIME PARTIAL: CPT

## 2019-02-06 RX ORDER — SODIUM CHLORIDE, SODIUM LACTATE, POTASSIUM CHLORIDE, CALCIUM CHLORIDE 600; 310; 30; 20 MG/100ML; MG/100ML; MG/100ML; MG/100ML
INJECTION, SOLUTION INTRAVENOUS CONTINUOUS
Status: DISCONTINUED | OUTPATIENT
Start: 2019-02-06 | End: 2019-02-06 | Stop reason: SDUPTHER

## 2019-02-06 RX ORDER — MEPERIDINE HYDROCHLORIDE 25 MG/ML
INJECTION INTRAMUSCULAR; INTRAVENOUS; SUBCUTANEOUS
Status: DISPENSED
Start: 2019-02-06 | End: 2019-02-07

## 2019-02-06 RX ORDER — SODIUM CHLORIDE, SODIUM LACTATE, POTASSIUM CHLORIDE, CALCIUM CHLORIDE 600; 310; 30; 20 MG/100ML; MG/100ML; MG/100ML; MG/100ML
INJECTION, SOLUTION INTRAVENOUS CONTINUOUS
Status: DISCONTINUED | OUTPATIENT
Start: 2019-02-06 | End: 2019-02-06 | Stop reason: CLARIF

## 2019-02-06 RX ORDER — MORPHINE SULFATE 10 MG/ML
INJECTION, SOLUTION INTRAMUSCULAR; INTRAVENOUS PRN
Status: DISCONTINUED | OUTPATIENT
Start: 2019-02-06 | End: 2019-02-06 | Stop reason: SDUPTHER

## 2019-02-06 RX ORDER — ONDANSETRON 2 MG/ML
4 INJECTION INTRAMUSCULAR; INTRAVENOUS
Status: DISCONTINUED | OUTPATIENT
Start: 2019-02-06 | End: 2019-02-06 | Stop reason: HOSPADM

## 2019-02-06 RX ORDER — SODIUM CHLORIDE 0.9 % (FLUSH) 0.9 %
10 SYRINGE (ML) INJECTION EVERY 12 HOURS SCHEDULED
Status: DISCONTINUED | OUTPATIENT
Start: 2019-02-06 | End: 2019-02-06 | Stop reason: HOSPADM

## 2019-02-06 RX ORDER — MIDAZOLAM HYDROCHLORIDE 1 MG/ML
INJECTION INTRAMUSCULAR; INTRAVENOUS PRN
Status: DISCONTINUED | OUTPATIENT
Start: 2019-02-06 | End: 2019-02-06 | Stop reason: SDUPTHER

## 2019-02-06 RX ORDER — ROCURONIUM BROMIDE 10 MG/ML
INJECTION, SOLUTION INTRAVENOUS PRN
Status: DISCONTINUED | OUTPATIENT
Start: 2019-02-06 | End: 2019-02-06 | Stop reason: SDUPTHER

## 2019-02-06 RX ORDER — SODIUM CHLORIDE, SODIUM LACTATE, POTASSIUM CHLORIDE, CALCIUM CHLORIDE 600; 310; 30; 20 MG/100ML; MG/100ML; MG/100ML; MG/100ML
INJECTION, SOLUTION INTRAVENOUS CONTINUOUS
Status: DISCONTINUED | OUTPATIENT
Start: 2019-02-06 | End: 2019-02-06

## 2019-02-06 RX ORDER — CIPROFLOXACIN 2 MG/ML
INJECTION, SOLUTION INTRAVENOUS PRN
Status: DISCONTINUED | OUTPATIENT
Start: 2019-02-06 | End: 2019-02-06 | Stop reason: SDUPTHER

## 2019-02-06 RX ORDER — MEPERIDINE HYDROCHLORIDE 25 MG/ML
12.5 INJECTION INTRAMUSCULAR; INTRAVENOUS; SUBCUTANEOUS EVERY 5 MIN PRN
Status: DISCONTINUED | OUTPATIENT
Start: 2019-02-06 | End: 2019-02-06 | Stop reason: HOSPADM

## 2019-02-06 RX ORDER — PROPOFOL 10 MG/ML
INJECTION, EMULSION INTRAVENOUS PRN
Status: DISCONTINUED | OUTPATIENT
Start: 2019-02-06 | End: 2019-02-06 | Stop reason: SDUPTHER

## 2019-02-06 RX ORDER — MORPHINE SULFATE 2 MG/ML
2 INJECTION, SOLUTION INTRAMUSCULAR; INTRAVENOUS
Status: DISCONTINUED | OUTPATIENT
Start: 2019-02-06 | End: 2019-02-07

## 2019-02-06 RX ORDER — MORPHINE SULFATE 4 MG/ML
4 INJECTION, SOLUTION INTRAMUSCULAR; INTRAVENOUS
Status: DISCONTINUED | OUTPATIENT
Start: 2019-02-06 | End: 2019-02-07

## 2019-02-06 RX ORDER — PROMETHAZINE HYDROCHLORIDE 25 MG/ML
6.25 INJECTION, SOLUTION INTRAMUSCULAR; INTRAVENOUS
Status: DISCONTINUED | OUTPATIENT
Start: 2019-02-06 | End: 2019-02-06 | Stop reason: HOSPADM

## 2019-02-06 RX ORDER — KETOROLAC TROMETHAMINE 30 MG/ML
30 INJECTION, SOLUTION INTRAMUSCULAR; INTRAVENOUS EVERY 6 HOURS
Status: DISCONTINUED | OUTPATIENT
Start: 2019-02-06 | End: 2019-02-07

## 2019-02-06 RX ORDER — CIPROFLOXACIN 2 MG/ML
400 INJECTION, SOLUTION INTRAVENOUS
Status: DISCONTINUED | OUTPATIENT
Start: 2019-02-06 | End: 2019-02-06 | Stop reason: SDUPTHER

## 2019-02-06 RX ORDER — FENTANYL CITRATE 50 UG/ML
INJECTION, SOLUTION INTRAMUSCULAR; INTRAVENOUS PRN
Status: DISCONTINUED | OUTPATIENT
Start: 2019-02-06 | End: 2019-02-06 | Stop reason: SDUPTHER

## 2019-02-06 RX ORDER — CIPROFLOXACIN 2 MG/ML
400 INJECTION, SOLUTION INTRAVENOUS
Status: DISCONTINUED | OUTPATIENT
Start: 2019-02-06 | End: 2019-02-06 | Stop reason: HOSPADM

## 2019-02-06 RX ORDER — HYDROMORPHONE HYDROCHLORIDE 1 MG/ML
0.5 INJECTION, SOLUTION INTRAMUSCULAR; INTRAVENOUS; SUBCUTANEOUS EVERY 5 MIN PRN
Status: DISCONTINUED | OUTPATIENT
Start: 2019-02-06 | End: 2019-02-06 | Stop reason: HOSPADM

## 2019-02-06 RX ORDER — ONDANSETRON 2 MG/ML
4 INJECTION INTRAMUSCULAR; INTRAVENOUS EVERY 6 HOURS PRN
Status: DISCONTINUED | OUTPATIENT
Start: 2019-02-06 | End: 2019-02-07

## 2019-02-06 RX ORDER — DEXAMETHASONE SODIUM PHOSPHATE 10 MG/ML
INJECTION, SOLUTION INTRAMUSCULAR; INTRAVENOUS PRN
Status: DISCONTINUED | OUTPATIENT
Start: 2019-02-06 | End: 2019-02-06 | Stop reason: SDUPTHER

## 2019-02-06 RX ORDER — TIZANIDINE 4 MG/1
2 TABLET ORAL 3 TIMES DAILY
Status: DISCONTINUED | OUTPATIENT
Start: 2019-02-06 | End: 2019-02-08 | Stop reason: HOSPADM

## 2019-02-06 RX ORDER — BUPIVACAINE HYDROCHLORIDE AND EPINEPHRINE 2.5; 5 MG/ML; UG/ML
INJECTION, SOLUTION EPIDURAL; INFILTRATION; INTRACAUDAL; PERINEURAL PRN
Status: DISCONTINUED | OUTPATIENT
Start: 2019-02-06 | End: 2019-02-06 | Stop reason: ALTCHOICE

## 2019-02-06 RX ORDER — DIVALPROEX SODIUM 500 MG/1
500 TABLET, EXTENDED RELEASE ORAL DAILY
Status: DISCONTINUED | OUTPATIENT
Start: 2019-02-07 | End: 2019-02-08 | Stop reason: HOSPADM

## 2019-02-06 RX ORDER — GLYCOPYRROLATE 0.2 MG/ML
INJECTION INTRAMUSCULAR; INTRAVENOUS PRN
Status: DISCONTINUED | OUTPATIENT
Start: 2019-02-06 | End: 2019-02-06 | Stop reason: SDUPTHER

## 2019-02-06 RX ORDER — DULOXETIN HYDROCHLORIDE 60 MG/1
60 CAPSULE, DELAYED RELEASE ORAL DAILY
Status: DISCONTINUED | OUTPATIENT
Start: 2019-02-06 | End: 2019-02-08 | Stop reason: HOSPADM

## 2019-02-06 RX ORDER — NEOSTIGMINE METHYLSULFATE 1 MG/ML
INJECTION, SOLUTION INTRAVENOUS PRN
Status: DISCONTINUED | OUTPATIENT
Start: 2019-02-06 | End: 2019-02-06 | Stop reason: SDUPTHER

## 2019-02-06 RX ORDER — LIDOCAINE HYDROCHLORIDE 20 MG/ML
INJECTION, SOLUTION INFILTRATION; PERINEURAL PRN
Status: DISCONTINUED | OUTPATIENT
Start: 2019-02-06 | End: 2019-02-06 | Stop reason: SDUPTHER

## 2019-02-06 RX ORDER — CIPROFLOXACIN 2 MG/ML
400 INJECTION, SOLUTION INTRAVENOUS EVERY 12 HOURS
Status: COMPLETED | OUTPATIENT
Start: 2019-02-06 | End: 2019-02-06

## 2019-02-06 RX ORDER — MORPHINE SULFATE 2 MG/ML
2 INJECTION, SOLUTION INTRAMUSCULAR; INTRAVENOUS EVERY 5 MIN PRN
Status: DISCONTINUED | OUTPATIENT
Start: 2019-02-06 | End: 2019-02-06 | Stop reason: HOSPADM

## 2019-02-06 RX ORDER — ONDANSETRON 2 MG/ML
INJECTION INTRAMUSCULAR; INTRAVENOUS PRN
Status: DISCONTINUED | OUTPATIENT
Start: 2019-02-06 | End: 2019-02-06 | Stop reason: SDUPTHER

## 2019-02-06 RX ORDER — FLUCONAZOLE 2 MG/ML
400 INJECTION, SOLUTION INTRAVENOUS ONCE
Status: COMPLETED | OUTPATIENT
Start: 2019-02-06 | End: 2019-02-06

## 2019-02-06 RX ADMIN — FENTANYL CITRATE 50 MCG: 50 INJECTION, SOLUTION INTRAMUSCULAR; INTRAVENOUS at 10:36

## 2019-02-06 RX ADMIN — MIDAZOLAM 2 MG: 1 INJECTION INTRAMUSCULAR; INTRAVENOUS at 09:34

## 2019-02-06 RX ADMIN — DEXAMETHASONE SODIUM PHOSPHATE 10 MG: 10 INJECTION, SOLUTION INTRAMUSCULAR; INTRAVENOUS at 09:44

## 2019-02-06 RX ADMIN — METRONIDAZOLE 500 MG: 500 INJECTION, SOLUTION INTRAVENOUS at 09:54

## 2019-02-06 RX ADMIN — GLYCOPYRROLATE 0.8 MG: 0.2 INJECTION, SOLUTION INTRAMUSCULAR; INTRAVENOUS at 12:56

## 2019-02-06 RX ADMIN — CIPROFLOXACIN 400 MG: 2 INJECTION, SOLUTION INTRAVENOUS at 09:34

## 2019-02-06 RX ADMIN — MORPHINE SULFATE 4 MG: 4 INJECTION, SOLUTION INTRAMUSCULAR; INTRAVENOUS at 14:56

## 2019-02-06 RX ADMIN — ONDANSETRON HYDROCHLORIDE 4 MG: 2 INJECTION, SOLUTION INTRAMUSCULAR; INTRAVENOUS at 09:44

## 2019-02-06 RX ADMIN — ROCURONIUM BROMIDE 15 MG: 10 INJECTION, SOLUTION INTRAVENOUS at 11:00

## 2019-02-06 RX ADMIN — MEPERIDINE HYDROCHLORIDE 12.5 MG: 25 INJECTION INTRAMUSCULAR; INTRAVENOUS; SUBCUTANEOUS at 13:50

## 2019-02-06 RX ADMIN — LIDOCAINE HYDROCHLORIDE 80 MG: 20 INJECTION, SOLUTION INFILTRATION; PERINEURAL at 09:40

## 2019-02-06 RX ADMIN — SODIUM CHLORIDE, POTASSIUM CHLORIDE, SODIUM LACTATE AND CALCIUM CHLORIDE: 600; 310; 30; 20 INJECTION, SOLUTION INTRAVENOUS at 07:55

## 2019-02-06 RX ADMIN — PHENYLEPHRINE HYDROCHLORIDE 100 MCG: 10 INJECTION INTRAVENOUS at 11:53

## 2019-02-06 RX ADMIN — ROCURONIUM BROMIDE 50 MG: 10 INJECTION, SOLUTION INTRAVENOUS at 09:40

## 2019-02-06 RX ADMIN — FENTANYL CITRATE 150 MCG: 50 INJECTION, SOLUTION INTRAMUSCULAR; INTRAVENOUS at 09:40

## 2019-02-06 RX ADMIN — MORPHINE SULFATE 2 MG: 10 INJECTION INTRAVENOUS at 12:09

## 2019-02-06 RX ADMIN — FLUCONAZOLE 400 MG: 400 INJECTION, SOLUTION INTRAVENOUS at 10:03

## 2019-02-06 RX ADMIN — DULOXETINE HYDROCHLORIDE 60 MG: 60 CAPSULE, DELAYED RELEASE ORAL at 16:51

## 2019-02-06 RX ADMIN — METRONIDAZOLE 500 MG: 500 INJECTION, SOLUTION INTRAVENOUS at 21:02

## 2019-02-06 RX ADMIN — PHENYLEPHRINE HYDROCHLORIDE 100 MCG: 10 INJECTION INTRAVENOUS at 10:30

## 2019-02-06 RX ADMIN — PHENYLEPHRINE HYDROCHLORIDE 100 MCG: 10 INJECTION INTRAVENOUS at 09:40

## 2019-02-06 RX ADMIN — FENTANYL CITRATE 50 MCG: 50 INJECTION, SOLUTION INTRAMUSCULAR; INTRAVENOUS at 11:34

## 2019-02-06 RX ADMIN — MORPHINE SULFATE 4 MG: 4 INJECTION, SOLUTION INTRAMUSCULAR; INTRAVENOUS at 21:01

## 2019-02-06 RX ADMIN — FENTANYL CITRATE 100 MCG: 50 INJECTION, SOLUTION INTRAMUSCULAR; INTRAVENOUS at 09:58

## 2019-02-06 RX ADMIN — TIZANIDINE 2 MG: 4 TABLET ORAL at 16:51

## 2019-02-06 RX ADMIN — PROPOFOL 180 MG: 10 INJECTION, EMULSION INTRAVENOUS at 09:40

## 2019-02-06 RX ADMIN — SODIUM CHLORIDE, POTASSIUM CHLORIDE, SODIUM LACTATE AND CALCIUM CHLORIDE: 600; 310; 30; 20 INJECTION, SOLUTION INTRAVENOUS at 10:15

## 2019-02-06 RX ADMIN — CIPROFLOXACIN 400 MG: 2 INJECTION, SOLUTION INTRAVENOUS at 21:02

## 2019-02-06 RX ADMIN — TIZANIDINE 2 MG: 4 TABLET ORAL at 21:01

## 2019-02-06 RX ADMIN — SODIUM CHLORIDE, POTASSIUM CHLORIDE, SODIUM LACTATE AND CALCIUM CHLORIDE: 600; 310; 30; 20 INJECTION, SOLUTION INTRAVENOUS at 16:50

## 2019-02-06 RX ADMIN — SODIUM CHLORIDE, POTASSIUM CHLORIDE, SODIUM LACTATE AND CALCIUM CHLORIDE: 600; 310; 30; 20 INJECTION, SOLUTION INTRAVENOUS at 09:34

## 2019-02-06 RX ADMIN — MEPERIDINE HYDROCHLORIDE 12.5 MG: 25 INJECTION INTRAMUSCULAR; INTRAVENOUS; SUBCUTANEOUS at 13:55

## 2019-02-06 RX ADMIN — Medication 5 MG: at 12:56

## 2019-02-06 RX ADMIN — MORPHINE SULFATE 4 MG: 4 INJECTION, SOLUTION INTRAMUSCULAR; INTRAVENOUS at 17:50

## 2019-02-06 RX ADMIN — MORPHINE SULFATE 2 MG: 10 INJECTION INTRAVENOUS at 13:08

## 2019-02-06 RX ADMIN — ROCURONIUM BROMIDE 10 MG: 10 INJECTION, SOLUTION INTRAVENOUS at 11:42

## 2019-02-06 ASSESSMENT — PULMONARY FUNCTION TESTS
PIF_VALUE: 29
PIF_VALUE: 36
PIF_VALUE: 21
PIF_VALUE: 30
PIF_VALUE: 30
PIF_VALUE: 36
PIF_VALUE: 38
PIF_VALUE: 38
PIF_VALUE: 30
PIF_VALUE: 38
PIF_VALUE: 30
PIF_VALUE: 32
PIF_VALUE: 33
PIF_VALUE: 32
PIF_VALUE: 37
PIF_VALUE: 31
PIF_VALUE: 38
PIF_VALUE: 31
PIF_VALUE: 33
PIF_VALUE: 1
PIF_VALUE: 38
PIF_VALUE: 35
PIF_VALUE: 38
PIF_VALUE: 37
PIF_VALUE: 27
PIF_VALUE: 38
PIF_VALUE: 30
PIF_VALUE: 33
PIF_VALUE: 38
PIF_VALUE: 1
PIF_VALUE: 34
PIF_VALUE: 37
PIF_VALUE: 37
PIF_VALUE: 38
PIF_VALUE: 35
PIF_VALUE: 30
PIF_VALUE: 38
PIF_VALUE: 4
PIF_VALUE: 31
PIF_VALUE: 34
PIF_VALUE: 27
PIF_VALUE: 24
PIF_VALUE: 32
PIF_VALUE: 38
PIF_VALUE: 31
PIF_VALUE: 36
PIF_VALUE: 28
PIF_VALUE: 33
PIF_VALUE: 27
PIF_VALUE: 33
PIF_VALUE: 1
PIF_VALUE: 36
PIF_VALUE: 37
PIF_VALUE: 38
PIF_VALUE: 1
PIF_VALUE: 32
PIF_VALUE: 40
PIF_VALUE: 33
PIF_VALUE: 37
PIF_VALUE: 34
PIF_VALUE: 37
PIF_VALUE: 38
PIF_VALUE: 33
PIF_VALUE: 38
PIF_VALUE: 36
PIF_VALUE: 38
PIF_VALUE: 28
PIF_VALUE: 38
PIF_VALUE: 38
PIF_VALUE: 35
PIF_VALUE: 24
PIF_VALUE: 38
PIF_VALUE: 36
PIF_VALUE: 25
PIF_VALUE: 38
PIF_VALUE: 2
PIF_VALUE: 33
PIF_VALUE: 23
PIF_VALUE: 38
PIF_VALUE: 1
PIF_VALUE: 30
PIF_VALUE: 34
PIF_VALUE: 37
PIF_VALUE: 33
PIF_VALUE: 35
PIF_VALUE: 34
PIF_VALUE: 4
PIF_VALUE: 25
PIF_VALUE: 37
PIF_VALUE: 23
PIF_VALUE: 35
PIF_VALUE: 28
PIF_VALUE: 33
PIF_VALUE: 30
PIF_VALUE: 37
PIF_VALUE: 31
PIF_VALUE: 35
PIF_VALUE: 36
PIF_VALUE: 38
PIF_VALUE: 33
PIF_VALUE: 36
PIF_VALUE: 30
PIF_VALUE: 34
PIF_VALUE: 38
PIF_VALUE: 30
PIF_VALUE: 30
PIF_VALUE: 37
PIF_VALUE: 27
PIF_VALUE: 27
PIF_VALUE: 33
PIF_VALUE: 38
PIF_VALUE: 33
PIF_VALUE: 32
PIF_VALUE: 38
PIF_VALUE: 35
PIF_VALUE: 30
PIF_VALUE: 38
PIF_VALUE: 33
PIF_VALUE: 0
PIF_VALUE: 27
PIF_VALUE: 27
PIF_VALUE: 32
PIF_VALUE: 37
PIF_VALUE: 35
PIF_VALUE: 36
PIF_VALUE: 38
PIF_VALUE: 31
PIF_VALUE: 30
PIF_VALUE: 38
PIF_VALUE: 30
PIF_VALUE: 37
PIF_VALUE: 37
PIF_VALUE: 33
PIF_VALUE: 38
PIF_VALUE: 32
PIF_VALUE: 38
PIF_VALUE: 34
PIF_VALUE: 33
PIF_VALUE: 37
PIF_VALUE: 28
PIF_VALUE: 38
PIF_VALUE: 33
PIF_VALUE: 24
PIF_VALUE: 38
PIF_VALUE: 24
PIF_VALUE: 28
PIF_VALUE: 30
PIF_VALUE: 31
PIF_VALUE: 33
PIF_VALUE: 38
PIF_VALUE: 37
PIF_VALUE: 27
PIF_VALUE: 33
PIF_VALUE: 38
PIF_VALUE: 35
PIF_VALUE: 33
PIF_VALUE: 32
PIF_VALUE: 28
PIF_VALUE: 37
PIF_VALUE: 32
PIF_VALUE: 30
PIF_VALUE: 33
PIF_VALUE: 38
PIF_VALUE: 32
PIF_VALUE: 24
PIF_VALUE: 35
PIF_VALUE: 33
PIF_VALUE: 36
PIF_VALUE: 30
PIF_VALUE: 1
PIF_VALUE: 27
PIF_VALUE: 30
PIF_VALUE: 37
PIF_VALUE: 30
PIF_VALUE: 30
PIF_VALUE: 35
PIF_VALUE: 37
PIF_VALUE: 38
PIF_VALUE: 35
PIF_VALUE: 37
PIF_VALUE: 38
PIF_VALUE: 30
PIF_VALUE: 29
PIF_VALUE: 37
PIF_VALUE: 31
PIF_VALUE: 38
PIF_VALUE: 38
PIF_VALUE: 32
PIF_VALUE: 36
PIF_VALUE: 36
PIF_VALUE: 27
PIF_VALUE: 38
PIF_VALUE: 30
PIF_VALUE: 34
PIF_VALUE: 30
PIF_VALUE: 30
PIF_VALUE: 35
PIF_VALUE: 33
PIF_VALUE: 36
PIF_VALUE: 35
PIF_VALUE: 27
PIF_VALUE: 37
PIF_VALUE: 37
PIF_VALUE: 28
PIF_VALUE: 19
PIF_VALUE: 36
PIF_VALUE: 33
PIF_VALUE: 34
PIF_VALUE: 30
PIF_VALUE: 0
PIF_VALUE: 32
PIF_VALUE: 25
PIF_VALUE: 37

## 2019-02-06 ASSESSMENT — PAIN DESCRIPTION - PAIN TYPE
TYPE: SURGICAL PAIN
TYPE: ACUTE PAIN;SURGICAL PAIN

## 2019-02-06 ASSESSMENT — PAIN DESCRIPTION - ONSET
ONSET: ON-GOING
ONSET: GRADUAL
ONSET: GRADUAL

## 2019-02-06 ASSESSMENT — PAIN DESCRIPTION - ORIENTATION
ORIENTATION: MID

## 2019-02-06 ASSESSMENT — PAIN SCALES - GENERAL
PAINLEVEL_OUTOF10: 3
PAINLEVEL_OUTOF10: 9
PAINLEVEL_OUTOF10: 5
PAINLEVEL_OUTOF10: 0
PAINLEVEL_OUTOF10: 7
PAINLEVEL_OUTOF10: 0
PAINLEVEL_OUTOF10: 9
PAINLEVEL_OUTOF10: 5

## 2019-02-06 ASSESSMENT — PAIN DESCRIPTION - DESCRIPTORS
DESCRIPTORS: ACHING;SORE;DISCOMFORT
DESCRIPTORS: ACHING;SORE;DISCOMFORT
DESCRIPTORS: ACHING
DESCRIPTORS: DISCOMFORT;DULL
DESCRIPTORS: ACHING;TENDER;CRAMPING
DESCRIPTORS: ACHING;CRAMPING;TENDER

## 2019-02-06 ASSESSMENT — PAIN DESCRIPTION - LOCATION
LOCATION: ABDOMEN

## 2019-02-06 ASSESSMENT — PAIN - FUNCTIONAL ASSESSMENT
PAIN_FUNCTIONAL_ASSESSMENT: PREVENTS OR INTERFERES SOME ACTIVE ACTIVITIES AND ADLS
PAIN_FUNCTIONAL_ASSESSMENT: 0-10

## 2019-02-06 ASSESSMENT — PAIN DESCRIPTION - FREQUENCY
FREQUENCY: CONTINUOUS
FREQUENCY: CONTINUOUS
FREQUENCY: INTERMITTENT

## 2019-02-06 ASSESSMENT — PAIN DESCRIPTION - PROGRESSION
CLINICAL_PROGRESSION: NOT CHANGED
CLINICAL_PROGRESSION: GRADUALLY WORSENING
CLINICAL_PROGRESSION: NOT CHANGED
CLINICAL_PROGRESSION: GRADUALLY IMPROVING

## 2019-02-06 NOTE — H&P
GENERAL SURGERY  H&P  2019    [facility]  Chris Cast is a 51 y.o. female who presents for elective sigmoid colon resection for recurrent diverticulitis last episode with perforation fall . Colonoscopy 10/10/2018, one small hyperplastic polyp removed. History of hypercoagulable state with DVT and splenic infarct, on coumadin prophylaxis. She has been feeling well of late with no complaints or concerns and wishes to have the colon resection before she has another attack.     Past Medical History:   Diagnosis Date    Anticoagulant long-term use     Arthritis     back, both hips and both knees    Carpal tunnel syndrome on both sides     both wrists    Cellulitis of right foot 2016    Cervical cancer ([address] 2012    S/P LUCIUS, BSO    Chronic back pain     Depressed bipolar II disorder (HCC)     Diabetes mellitus ([address]     Diverticulitis     DVT (deep vein thrombosis) in pregnancy ([address]     Epilepsy ([address]     Hx of blood clots     Hyperlipidemia     Hypertension     Marijuana use     Moderate persistent reactive airways dysfunction syndrome without complication (HCC)     Neuropathy     Obesity     PONV (postoperative nausea and vomiting)     Rotator cuff tear arthropathy     Thyroid disease     Type 2 diabetes mellitus without complication ([address]        Past Surgical History:   Procedure Laterality Date     SECTION      CHOLECYSTECTOMY      COLONOSCOPY  10/10/2018    COLONOSCOPY WITH BIOPSY performed by Hayden Ramirez MD at [address] [facility] ECHO COMPLETE  2013         FOOT SURGERY  2015    HYSTERECTOMY  2012    KIDNEY BIOPSY      KNEE ARTHROSCOPY      right and left knee    KNEE SURGERY      left knee    NERVE BLOCK Bilateral 2017    TFNB  #1    NERVE BLOCK Bilateral 06/15/2017    bilatera lumbar transforaminal nerve block #2 L5-S1    NERVE BLOCK Bilateral 2017    Bilateral transforamninal nerve block lumbar #3   [address] TONSILLECTOMY      TUBAL LIGATION         Medications Prior to Admission:    Prior to Admission medications    Medication Sig Start Date End Date Taking?  Authorizing Provider   levETIRAcetam (KEPPRA) 500 MG tablet TAKE 1 TABLET TWICE DAILY 10/25/18  Yes GILDARDO Arango CNP   omeprazole (PRILOSEC) 20 MG delayed release capsule TAKE 1 CAPSULE EVERY MORNING  BEFORE  BREAKFAST 10/17/18  Yes Adamaris Quintanilla MD   metoprolol tartrate (LOPRESSOR) 25 MG tablet TAKE 1/2 TABLET TWICE DAILY 9/27/18  Yes Vianca Smith MD   divalproex (DEPAKOTE ER) 500 MG extended release tablet TAKE 1 TABLET IN THE MORNING AND 2 TABLETS IN THE EVENING  9/27/18  Yes Vianca Smith MD   traMADol (ULTRAM) 50 MG tablet TAKE 1 TABLET EVERY DAY AS NEEDED FOR PAIN 2/5/19 3/7/19  Pool Sauer DO   polyethylene glycol (MIRALAX) powder Take 17 g by mouth daily 1/30/19 3/1/19  Andrei Le MD   magnesium citrate solution Take 296 mLs by mouth once for 1 dose 1/30/19 1/30/19  Andrei Le MD   chlorzoxazone (PARAFON FORTE) 500 MG tablet TAKE 1 TABLET FOUR TIMES DAILY AS NEEDED FOR MUSCLE SPASMS 12/4/18   Alma Delia Sauer DO   levothyroxine (SYNTHROID) 75 MCG tablet TAKE 1 TABLET EVERY DAY 11/13/18   Adamaris Quintanilla MD   DULoxetine (CYMBALTA) 60 MG extended release capsule TAKE 1 CAPSULE EVERY DAY 11/1/18   Alma Delia Harrison DO   metFORMIN (GLUCOPHAGE) 1000 MG tablet TAKE 1 TABLET TWICE DAILY WITH MEALS 10/17/18   Adamaris Quintanilla MD   Multiple Vitamins-Minerals (THERAPEUTIC MULTIVITAMIN-MINERALS) tablet Take 1 tablet by mouth daily    Historical Provider, MD   atorvastatin (LIPITOR) 80 MG tablet TAKE 1 TABLET EVERY DAY 6/26/18   Adamaris Quintanilla MD   ACCU-CHEK MULTICLIX LANCETS MISC Testing twice daily 6/15/18   Adamaris Quintanilla MD   nortriptyline (PAMELOR) 25 MG capsule TAKE ONE CAPSULE BY MOUTH EVERY EVENING 5/15/18   Adamaris Quintanilla MD   warfarin (COUMADIN) 2.5 MG tablet Take 1 tablet by mouth three times a week 1 tablet MWF  Patient taking differently: Take 2.5 mg by mouth Twice a Week 1 tablet mon thurs 5/7/18   Melissa Schaeffer MD   warfarin (COUMADIN) 5 MG tablet Take 1 tablet by mouth four times a week  Patient taking differently: Take 5 mg by mouth Five times weekly  5/8/18   Melissa Schaeffer MD   hydrOXYzine (ATARAX) 25 MG tablet TAKE 1 TABLET THREE TIMES DAILY AS NEEDED FOR ANXIETY 3/22/18   Fernanda Sousa MD   aspirin 81 MG tablet Take 1 tablet by mouth daily 10/17/17   Melissa Schaeffer MD   glucose blood VI test strips (ONE TOUCH ULTRA TEST) strip Pt. Tests BID -TID. 7/28/17   Melissa Schaeffer MD   Yesi Noun LANCETS FINE MISC Pt. Tests BID-TID. 7/28/17   Melissa Schaeffer MD       Allergies   Allergen Reactions    Nsaids Shortness Of Breath and Other (See Comments)     Renal Failure       Family History   Problem Relation Age of Onset    Depression Mother     Bipolar Disorder Mother     Hypertension Mother    Charles Nas Anxiety Disorder Sister     Asthma Son     Schizophrenia Son     Anxiety Disorder Daughter        Social History   Substance Use Topics    Smoking status: Current Every Day Smoker     Packs/day: 1.00     Years: 32.00     Types: Cigarettes    Smokeless tobacco: Never Used      Comment: (9/16/18):  not ready to quit; counseling given    Alcohol use 0.0 oz/week      Comment: rarely         Review of Systems   General ROS: negative  Hematological and Lymphatic ROS: negative  Respiratory ROS: negative  Cardiovascular ROS: negative  Gastrointestinal ROS: negative  Genito-Urinary ROS: negative  Musculoskeletal ROS: negative      PHYSICAL EXAM: Blood pressure 122/81, pulse 99, temperature 98.1 °F (36.7 °C), temperature source Temporal, resp. rate 16, weight 240 lb (108.9 kg), SpO2 93 %, not currently breastfeeding.      General Appearance:  awake, alert, oriented, in no acute distress  Head/face:

## 2019-02-06 NOTE — OP NOTE
descending colon and a colonoscopy was done. Anastomosis looked very good. There was no bubbling indicating the staple lines were all airtight and watertight. The mucosa looked healthy, only mildly dusky. The scope was withdrawn. All of the CO2 was released. The trocars were removed. The hand port site peritoneum was closed with 0 Vicryl in a running fashion and the fascia was closed with 0 Vicryl. The skin was closed with 4-0 Monocryl subcuticular. Skin-Afix glue was placed on the incisions, no dressing. The needle and sponge count were correct. Patient tolerated the procedure well and went to recovery in stable condition.      Signed by: Dr. Aaliyah Garza M.D.   2/6/2019

## 2019-02-07 LAB
ALBUMIN SERPL-MCNC: 3.2 G/DL (ref 3.5–5.2)
ALP BLD-CCNC: 68 U/L (ref 35–104)
ALT SERPL-CCNC: 18 U/L (ref 0–32)
ANION GAP SERPL CALCULATED.3IONS-SCNC: 10 MMOL/L (ref 7–16)
AST SERPL-CCNC: 21 U/L (ref 0–31)
BILIRUB SERPL-MCNC: 0.3 MG/DL (ref 0–1.2)
BUN BLDV-MCNC: 8 MG/DL (ref 6–20)
CALCIUM SERPL-MCNC: 8.4 MG/DL (ref 8.6–10.2)
CHLORIDE BLD-SCNC: 104 MMOL/L (ref 98–107)
CO2: 26 MMOL/L (ref 22–29)
CREAT SERPL-MCNC: 0.7 MG/DL (ref 0.5–1)
GFR AFRICAN AMERICAN: >60
GFR NON-AFRICAN AMERICAN: >60 ML/MIN/1.73
GLUCOSE BLD-MCNC: 149 MG/DL (ref 74–99)
HCT VFR BLD CALC: 48.3 % (ref 34–48)
HEMOGLOBIN: 15.4 G/DL (ref 11.5–15.5)
MCH RBC QN AUTO: 29.4 PG (ref 26–35)
MCHC RBC AUTO-ENTMCNC: 31.9 % (ref 32–34.5)
MCV RBC AUTO: 92.4 FL (ref 80–99.9)
PDW BLD-RTO: 15.9 FL (ref 11.5–15)
PLATELET # BLD: 272 E9/L (ref 130–450)
PMV BLD AUTO: 9.9 FL (ref 7–12)
POTASSIUM SERPL-SCNC: 4.5 MMOL/L (ref 3.5–5)
RBC # BLD: 5.23 E12/L (ref 3.5–5.5)
SODIUM BLD-SCNC: 140 MMOL/L (ref 132–146)
TOTAL PROTEIN: 6.3 G/DL (ref 6.4–8.3)
WBC # BLD: 20.2 E9/L (ref 4.5–11.5)

## 2019-02-07 PROCEDURE — 6370000000 HC RX 637 (ALT 250 FOR IP): Performed by: STUDENT IN AN ORGANIZED HEALTH CARE EDUCATION/TRAINING PROGRAM

## 2019-02-07 PROCEDURE — 85027 COMPLETE CBC AUTOMATED: CPT

## 2019-02-07 PROCEDURE — 80053 COMPREHEN METABOLIC PANEL: CPT

## 2019-02-07 PROCEDURE — 6360000002 HC RX W HCPCS: Performed by: SURGERY

## 2019-02-07 PROCEDURE — 36415 COLL VENOUS BLD VENIPUNCTURE: CPT

## 2019-02-07 PROCEDURE — 1200000000 HC SEMI PRIVATE

## 2019-02-07 PROCEDURE — 2580000003 HC RX 258: Performed by: SURGERY

## 2019-02-07 PROCEDURE — 2700000000 HC OXYGEN THERAPY PER DAY

## 2019-02-07 PROCEDURE — 6370000000 HC RX 637 (ALT 250 FOR IP): Performed by: SURGERY

## 2019-02-07 RX ORDER — HYDROCODONE BITARTRATE AND ACETAMINOPHEN 5; 325 MG/1; MG/1
1 TABLET ORAL EVERY 4 HOURS PRN
Status: DISCONTINUED | OUTPATIENT
Start: 2019-02-07 | End: 2019-02-08 | Stop reason: HOSPADM

## 2019-02-07 RX ORDER — HYDROCODONE BITARTRATE AND ACETAMINOPHEN 5; 325 MG/1; MG/1
2 TABLET ORAL EVERY 4 HOURS PRN
Status: DISCONTINUED | OUTPATIENT
Start: 2019-02-07 | End: 2019-02-08 | Stop reason: HOSPADM

## 2019-02-07 RX ORDER — MORPHINE SULFATE 4 MG/ML
3 INJECTION, SOLUTION INTRAMUSCULAR; INTRAVENOUS
Status: DISCONTINUED | OUTPATIENT
Start: 2019-02-07 | End: 2019-02-08 | Stop reason: HOSPADM

## 2019-02-07 RX ORDER — WARFARIN SODIUM 5 MG/1
5 TABLET ORAL DAILY
Status: DISCONTINUED | OUTPATIENT
Start: 2019-02-07 | End: 2019-02-08 | Stop reason: HOSPADM

## 2019-02-07 RX ORDER — FLUCONAZOLE 100 MG/1
200 TABLET ORAL DAILY
Status: DISCONTINUED | OUTPATIENT
Start: 2019-02-07 | End: 2019-02-08 | Stop reason: HOSPADM

## 2019-02-07 RX ORDER — HYDROCODONE BITARTRATE AND ACETAMINOPHEN 5; 325 MG/1; MG/1
1 TABLET ORAL EVERY 4 HOURS PRN
Status: DISCONTINUED | OUTPATIENT
Start: 2019-02-07 | End: 2019-02-07 | Stop reason: SDUPTHER

## 2019-02-07 RX ADMIN — DIVALPROEX SODIUM 500 MG: 500 TABLET, FILM COATED, EXTENDED RELEASE ORAL at 08:51

## 2019-02-07 RX ADMIN — POTASSIUM CHLORIDE: 2 INJECTION, SOLUTION, CONCENTRATE INTRAVENOUS at 00:15

## 2019-02-07 RX ADMIN — DULOXETINE HYDROCHLORIDE 60 MG: 60 CAPSULE, DELAYED RELEASE ORAL at 08:52

## 2019-02-07 RX ADMIN — HYDROCODONE BITARTRATE AND ACETAMINOPHEN 2 TABLET: 5; 325 TABLET ORAL at 19:56

## 2019-02-07 RX ADMIN — TIZANIDINE 2 MG: 4 TABLET ORAL at 08:51

## 2019-02-07 RX ADMIN — TIZANIDINE 2 MG: 4 TABLET ORAL at 20:30

## 2019-02-07 RX ADMIN — HYDROCODONE BITARTRATE AND ACETAMINOPHEN 2 TABLET: 5; 325 TABLET ORAL at 14:17

## 2019-02-07 RX ADMIN — TIZANIDINE 2 MG: 4 TABLET ORAL at 15:05

## 2019-02-07 RX ADMIN — MORPHINE SULFATE 4 MG: 4 INJECTION, SOLUTION INTRAMUSCULAR; INTRAVENOUS at 02:36

## 2019-02-07 RX ADMIN — WARFARIN SODIUM 5 MG: 5 TABLET ORAL at 18:14

## 2019-02-07 RX ADMIN — HYDROCODONE BITARTRATE AND ACETAMINOPHEN 2 TABLET: 5; 325 TABLET ORAL at 09:04

## 2019-02-07 RX ADMIN — ENOXAPARIN SODIUM 40 MG: 100 INJECTION SUBCUTANEOUS at 08:53

## 2019-02-07 RX ADMIN — FLUCONAZOLE 200 MG: 100 TABLET ORAL at 08:52

## 2019-02-07 RX ADMIN — METOPROLOL TARTRATE 25 MG: 25 TABLET ORAL at 08:52

## 2019-02-07 ASSESSMENT — PAIN DESCRIPTION - PAIN TYPE
TYPE: SURGICAL PAIN
TYPE: SURGICAL PAIN

## 2019-02-07 ASSESSMENT — PAIN DESCRIPTION - ORIENTATION
ORIENTATION: MID
ORIENTATION: LEFT

## 2019-02-07 ASSESSMENT — PAIN DESCRIPTION - PROGRESSION: CLINICAL_PROGRESSION: NOT CHANGED

## 2019-02-07 ASSESSMENT — PAIN DESCRIPTION - DESCRIPTORS
DESCRIPTORS: DISCOMFORT;SORE
DESCRIPTORS: ACHING;DISCOMFORT

## 2019-02-07 ASSESSMENT — PAIN DESCRIPTION - FREQUENCY
FREQUENCY: CONTINUOUS
FREQUENCY: INTERMITTENT

## 2019-02-07 ASSESSMENT — PAIN DESCRIPTION - ONSET: ONSET: ON-GOING

## 2019-02-07 ASSESSMENT — PAIN - FUNCTIONAL ASSESSMENT
PAIN_FUNCTIONAL_ASSESSMENT: ACTIVITIES ARE NOT PREVENTED
PAIN_FUNCTIONAL_ASSESSMENT: PREVENTS OR INTERFERES SOME ACTIVE ACTIVITIES AND ADLS

## 2019-02-07 ASSESSMENT — PAIN SCALES - GENERAL
PAINLEVEL_OUTOF10: 8
PAINLEVEL_OUTOF10: 0
PAINLEVEL_OUTOF10: 8
PAINLEVEL_OUTOF10: 8
PAINLEVEL_OUTOF10: 3
PAINLEVEL_OUTOF10: 3
PAINLEVEL_OUTOF10: 8

## 2019-02-07 ASSESSMENT — PAIN DESCRIPTION - LOCATION
LOCATION: ABDOMEN
LOCATION: ABDOMEN

## 2019-02-07 NOTE — PROGRESS NOTES
[facility] Quality Flow/Interdisciplinary Rounds Progress Note        Quality Flow Rounds held on February 7, 2019    Disciplines Attending:  Bedside Nurse, ,  and Nursing Unit Leadership    Soraya Farooq was admitted on 2/6/2019  7:19 AM    Anticipated Discharge Date:  Expected Discharge Date: 02/07/19    Disposition:    Jeffrey Score:  Jeffrey Scale Score: 20    Readmission Risk              Risk of Unplanned Readmission:        13           Discussed patient goal for the day, patient clinical progression, and barriers to discharge. The following Goal(s) of the Day/Commitment(s) have been identified:   Casas removal; ambulate patient; monitor pain      [facility]  February 7, 2019

## 2019-02-07 NOTE — PROGRESS NOTES
[address] [facility] Day # 1    2/7/2019       Lindsey Neal is a 51 y.o. female post-op from a laparoscopic sigmoid colectomy 2/6/19 for recurrent diverticulitis with perforations. Awake and alert, minimal pain controlled with morphine. She refuses Toradol due to aspirin allergy. Had some hypotension after receiving morphine. Ambulating well. started passing flatus during the night. No rectal bleeding. Good urine output with bennett in place over night. Labs stable, WBC is always elevated so does not help. No tachycardia. Physical exam:   VITALS: Blood pressure (!) 93/45, pulse 67, temperature 98.9 °F (37.2 °C), temperature source Oral, resp. rate 18, weight 240 lb (108.9 kg), SpO2 95 %, not currently breastfeeding. General appearance: alert, appears stated age and cooperative, does ambulate easily  Head: Normocephalic, without obvious abnormality, atraumatic  Eyes: PERRL  Ears/mouth/throat:  Ears clear, mouth normal, throat no redness  Neck: no adenopathy, no JVD, supple, symmetrical, trachea midline and thyroid not enlarged  Chest: Breath sounds were clear and equal with no rales, wheezes, or rhonchi. Respiratory effort was normal with no retractions or use of accessory muscles. Cardiovascular: Heart sounds were normal with a regular rate and rhythm. There were no murmurs or gallops. Abdomen: Bowel sounds were normal.  The abdomen was soft and non distended. Incisions clean and dry, glue in place. Mild incisional tenderness. No bruising. Extremeties: No edema, no calf pain  Skin: no open wounds    Assessment:    Doing well. Passing flatus. Plan:   Remove bennett  Advance diet  Start Coumadin and wean off Lovenox over 2 days  Oral medications  Ambulate in halls frequently  Home tomorrow.       Physician Signature: Electronically signed by Dr. Thanh Yadav   2/7/2019

## 2019-02-07 NOTE — CARE COORDINATION
[address] [facility],[address]     2019    Patient: Syd Cabrera Patient : 1970   MRN: [id number]  Reason for Admission: There are no discharge diagnoses documented for the most recent discharge. RARS: Readmission Risk Score: 15       Spoke with: Syd Cabrera (Patient)      Readmission Risk  Patient Active Problem List   Diagnosis    Hyperlipidemia    Leukocytosis    Hypertension    Tobacco dependency    Seizure disorder    Bilateral leg edema    Bone spur of right foot    Cervical cancer (HCC)    Depressed bipolar II disorder (HCC)    GERD (gastroesophageal reflux disease)    Splenic infarct    Renal infarct (HCC)    Carpal tunnel syndrome on both sides    Rotator cuff tear arthropathy    Moderate persistent reactive airways dysfunction syndrome without complication (HCC)    Anticoagulant long-term use    Mixed simple and mucopurulent chronic bronchitis (HCC)    Obstructive sleep apnea    Subclinical hypothyroidism    Low back pain    Chronic pain    Leg pain    Disc displacement, lumbar    Neural foraminal stenosis of lumbar spine    Type 2 diabetes mellitus with circulatory disorder (HCC)    Diverticulitis with microperforation    Diverticulitis of colon       Inpatient Assessment  Care Transitions Summary    Care Transitions Inpatient Review  Medication Review  Do you have all of your prescriptions and are they filled?:  Yes   Are you able to afford your medications?:  With Assistance  What assistance programs is the patient using?:  Extra Help/QMB  How often do you have difficulty taking your medications?:  I always take them as prescribed.   Housing Review  Who do you live with?:  Alone  Are you an active caregiver in your home?:  No  Social Support  Do you have a ?:  No  Do you have a [address] [place] [address]?:  No  Durable Medical Equipment  Patient DME:  Emerald cane  Functional Review  Ability to seek help/take action for Emergent/Urgent situations i.e. fire, crime, inclement weather or health crisis. :  Independent  Ability handle personal hygiene needs (bathing/dressing/grooming): Independent  Ability to manage medications: Independent  Ability to prepare food:  Independent  Ability to maintain home (clean home, laundry): Independent  Ability to drive and/or has transportation:  Independent  Ability to do shopping:  Independent  Ability to manage finances: Independent  Is patient able to live independently?:  Yes  Hearing and Vision  Visual Impairment:  Visual impairment (Glasses/contacts)  Hearing Impairment:  None  Care Transitions Interventions       Met with patient today 2/7/19 at bedside with no family present. Explained role and reason for visit. Patient known to CTC from previous hospital admission in October 2018 for Diverticulitis. Patient receptive to be followed by [facility] CTC post hospital discharge for Care Transition. Patient is s/p sigmoid colectomy per Dr. Grecia Puentes (gen surg) yesterday on 2/6/19. Confirmed patient is followed by Dr. Lissy Vanessa for primary care, Teresa Song NP at [facility], and Dr. Tracey Marx for pain management. Patient reports she lives alone in mobile home with 5 outside steps to climb. [facility] bathroom is equipped with tub/shower combo. [facility] she uses a straight cane when ambulating as needed. States being independent in ADL's including driving PTA. [facility] she has family support including children and grandchildren. Denies any HHC, PAU or SNF in past. [facility] she uses [facility] mail order [facility]. [facility] plan is to return home upon discharge and denies any needs or concerns at this time. [facility] she monitors blood sugar twice daily and not on insulin therapy. [facility] she tries to follow diabetic diet and eats meals in moderation using portion control. Reviewed DM zone tools and knowing when to seek medical attention. Noted last Hgb A1C on 11/28/18 was 6.9.  Denies any frequent exercise d/t \"bad knees\" and chronic pain. Patient verbalizes she stays active and has been 'redoing her trailer causing me to lose 40 pounds\". States she has attended formal diabetes education classes at hospital in past and is aware of the importance to take medications as prescribed, follow low sugar/low carbohydrate diet and exercise to help manage DM. CTC scheduled TCM/HFU visit with NP at PCP office for 2/14/19 at 1:45 pm which patient agrees with. Denies any transportation issues getting to appointment. Denies any further needs at this time. CTC will remain available for any assistance if needed. Follow Up  Future Appointments  Date Time Provider David Tony   2/14/2019 1:45 PM GILDARDO Lagunas CNP [facility]   2/22/2019 1:30 PM Walter Rojas DO [facility]   2/28/2019 8:00 AM SCHEDULE, [facility]   3/1/2019 10:00 AM Madelyn Hughes MD [facility]   3/4/2019 1:00 PM GILDARDO Peng CNP [facility] NEURO [facility]   3/8/2019 8:00 AM Reji Valdovinos MD [facility]  There are no preventive care reminders to display for this patient.     GILDARDO Zhang

## 2019-02-08 ENCOUNTER — CARE COORDINATION (OUTPATIENT)
Dept: CASE MANAGEMENT | Age: 49
End: 2019-02-08

## 2019-02-08 VITALS
WEIGHT: 240 LBS | RESPIRATION RATE: 18 BRPM | DIASTOLIC BLOOD PRESSURE: 89 MMHG | HEIGHT: 67 IN | BODY MASS INDEX: 37.67 KG/M2 | HEART RATE: 90 BPM | OXYGEN SATURATION: 98 % | TEMPERATURE: 98.1 F | SYSTOLIC BLOOD PRESSURE: 140 MMHG

## 2019-02-08 LAB
HCT VFR BLD CALC: 48 % (ref 34–48)
HEMOGLOBIN: 15.3 G/DL (ref 11.5–15.5)
MCH RBC QN AUTO: 29.2 PG (ref 26–35)
MCHC RBC AUTO-ENTMCNC: 31.9 % (ref 32–34.5)
MCV RBC AUTO: 91.6 FL (ref 80–99.9)
PDW BLD-RTO: 15.9 FL (ref 11.5–15)
PLATELET # BLD: 259 E9/L (ref 130–450)
PMV BLD AUTO: 10.2 FL (ref 7–12)
RBC # BLD: 5.24 E12/L (ref 3.5–5.5)
WBC # BLD: 16.5 E9/L (ref 4.5–11.5)

## 2019-02-08 PROCEDURE — 36415 COLL VENOUS BLD VENIPUNCTURE: CPT

## 2019-02-08 PROCEDURE — 85027 COMPLETE CBC AUTOMATED: CPT

## 2019-02-08 PROCEDURE — 2700000000 HC OXYGEN THERAPY PER DAY

## 2019-02-08 PROCEDURE — 6370000000 HC RX 637 (ALT 250 FOR IP): Performed by: STUDENT IN AN ORGANIZED HEALTH CARE EDUCATION/TRAINING PROGRAM

## 2019-02-08 PROCEDURE — 6370000000 HC RX 637 (ALT 250 FOR IP): Performed by: SURGERY

## 2019-02-08 PROCEDURE — 94150 VITAL CAPACITY TEST: CPT

## 2019-02-08 PROCEDURE — 6360000002 HC RX W HCPCS: Performed by: SURGERY

## 2019-02-08 RX ORDER — HYDROCODONE BITARTRATE AND ACETAMINOPHEN 5; 325 MG/1; MG/1
1-2 TABLET ORAL EVERY 4 HOURS PRN
Qty: 15 TABLET | Refills: 0 | Status: SHIPPED | OUTPATIENT
Start: 2019-02-08 | End: 2019-02-11

## 2019-02-08 RX ADMIN — DULOXETINE HYDROCHLORIDE 60 MG: 60 CAPSULE, DELAYED RELEASE ORAL at 08:12

## 2019-02-08 RX ADMIN — HYDROCODONE BITARTRATE AND ACETAMINOPHEN 2 TABLET: 5; 325 TABLET ORAL at 08:14

## 2019-02-08 RX ADMIN — DIVALPROEX SODIUM 500 MG: 500 TABLET, FILM COATED, EXTENDED RELEASE ORAL at 08:16

## 2019-02-08 RX ADMIN — ENOXAPARIN SODIUM 40 MG: 100 INJECTION SUBCUTANEOUS at 08:16

## 2019-02-08 RX ADMIN — TIZANIDINE 2 MG: 4 TABLET ORAL at 08:14

## 2019-02-08 RX ADMIN — FLUCONAZOLE 200 MG: 100 TABLET ORAL at 08:15

## 2019-02-08 RX ADMIN — METOPROLOL TARTRATE 25 MG: 25 TABLET ORAL at 08:12

## 2019-02-08 ASSESSMENT — PAIN - FUNCTIONAL ASSESSMENT: PAIN_FUNCTIONAL_ASSESSMENT: PREVENTS OR INTERFERES SOME ACTIVE ACTIVITIES AND ADLS

## 2019-02-08 ASSESSMENT — PAIN DESCRIPTION - ONSET: ONSET: ON-GOING

## 2019-02-08 ASSESSMENT — PAIN DESCRIPTION - DESCRIPTORS: DESCRIPTORS: ACHING;DISCOMFORT;SORE

## 2019-02-08 ASSESSMENT — PAIN DESCRIPTION - PAIN TYPE: TYPE: ACUTE PAIN

## 2019-02-08 ASSESSMENT — PAIN DESCRIPTION - LOCATION: LOCATION: ABDOMEN

## 2019-02-08 ASSESSMENT — PAIN DESCRIPTION - FREQUENCY: FREQUENCY: CONTINUOUS

## 2019-02-08 ASSESSMENT — PAIN DESCRIPTION - ORIENTATION: ORIENTATION: LOWER

## 2019-02-08 ASSESSMENT — PAIN DESCRIPTION - PROGRESSION: CLINICAL_PROGRESSION: NOT CHANGED

## 2019-02-08 ASSESSMENT — PAIN SCALES - GENERAL: PAINLEVEL_OUTOF10: 8

## 2019-02-08 NOTE — DISCHARGE SUMMARY
Discharge summary  Daniel Magaña  [id number]    Admit date: 2/6/2019    Discharge date and time: 2/8/2019     Admitting Physician: Andrei Le MD     Admission Diagnoses:   Patient Active Problem List   Diagnosis Code    Hyperlipidemia E78.5    Leukocytosis D72.829    Hypertension I10    Tobacco dependency F17.200    Seizure disorder G40.909    Bilateral leg edema R60.0    Bone spur of right foot M77.51    Cervical cancer ([facility]) C53.9    Depressed bipolar II disorder ([facility]) F31.81    GERD (gastroesophageal reflux disease) K21.9    Splenic infarct D73.5    Renal infarct ([facility]) N28.0    Carpal tunnel syndrome on both sides G56.03    Rotator cuff tear arthropathy M75.100, M12.819    Moderate persistent reactive airways dysfunction syndrome without complication ([facility]) R27.6    Anticoagulant long-term use Z79.01    Mixed simple and mucopurulent chronic bronchitis ([facility]) J41.8    Obstructive sleep apnea G47.33    Subclinical hypothyroidism E03.9    Low back pain M54.5    Chronic pain G89.29    Leg pain M79.606    Disc displacement, lumbar M51.26    Neural foraminal stenosis of lumbar spine M99.83    Type 2 diabetes mellitus with circulatory disorder ([facility]) E11.59    Diverticulitis with microperforation K57.92    Diverticulitis of colon K57.32       Hospital Course: Daniel Magaña is a 51 y.o. female post-op day # 2 from a laparoscopic sigmoid colectomy for history of recurrent diverticulitis with perforation. She has mild incisional pain controlled with Norco, she refused the Toradol due to possible allergy. She is walking well. Labs stable, no tachycardia or hypotension. Casas was removed yesterday and she has voided without difficulty. She is tolerating the full liquid diet with no nausea. Incisions all clean and dry. Passing flatus yesterday and a BM last night. Says she is hungry and wants to go home.        Medication List      START taking these medications    HYDROcodone-acetaminophen 5-325 MG per tablet  Commonly known as:  NORCO  Take 1-2 tablets by mouth every 4 hours as needed for Pain for up to 3 days. Sara January CHANGE how you take these medications    * warfarin 2.5 MG tablet  Commonly known as:  COUMADIN  Take as directed. If you are unsure how to take this medication, talk to your nurse or doctor. Original instructions: Take 1 tablet by mouth three times a week 1 tablet MWF  What changed:  · when to take this  · additional instructions     * warfarin 5 MG tablet  Commonly known as:  COUMADIN  Take as directed. If you are unsure how to take this medication, talk to your nurse or doctor. Original instructions: Take 1 tablet by mouth four times a week  What changed:  when to take this        * This list has 2 medication(s) that are the same as other medications prescribed for you. Read the directions carefully, and ask your doctor or other care provider to review them with you. CONTINUE taking these medications    aspirin 81 MG tablet  Take 1 tablet by mouth daily     atorvastatin 80 MG tablet  Commonly known as:  LIPITOR  TAKE 1 TABLET EVERY DAY     blood glucose test strips strip  Commonly known as:  ONE TOUCH ULTRA TEST  Pt. Tests BID -TID.      chlorzoxazone 500 MG tablet  Commonly known as:  PARAFON FORTE  TAKE 1 TABLET FOUR TIMES DAILY AS NEEDED FOR MUSCLE SPASMS     divalproex 500 MG extended release tablet  Commonly known as:  DEPAKOTE ER  TAKE 1 TABLET IN THE MORNING AND 2 TABLETS IN THE EVENING     DULoxetine 60 MG extended release capsule  Commonly known as:  CYMBALTA  TAKE 1 CAPSULE EVERY DAY     hydrOXYzine 25 MG tablet  Commonly known as:  ATARAX  TAKE 1 TABLET THREE TIMES DAILY AS NEEDED FOR ANXIETY     levETIRAcetam 500 MG tablet  Commonly known as:  KEPPRA  TAKE 1 TABLET TWICE DAILY     levothyroxine 75 MCG tablet  Commonly known as:  SYNTHROID  TAKE 1 TABLET EVERY DAY     metFORMIN 1000 MG tablet  Commonly known as:  GLUCOPHAGE  TAKE 1 TABLET TWICE DAILY WITH MEALS     metoprolol tartrate 25 MG tablet  Commonly known as:  LOPRESSOR  TAKE 1/2 TABLET TWICE DAILY     nortriptyline 25 MG capsule  Commonly known as:  PAMELOR  TAKE ONE CAPSULE BY MOUTH EVERY EVENING     omeprazole 20 MG delayed release capsule  Commonly known as:  PRILOSEC  TAKE 1 CAPSULE EVERY MORNING  BEFORE  BREAKFAST     * ONETOUCH DELICA LANCETS FINE Misc  Pt. Tests BID-TID. * ACCU-CHEK MULTICLIX LANCETS Misc  Testing twice daily     therapeutic multivitamin-minerals tablet     traMADol 50 MG tablet  Commonly known as:  ULTRAM  TAKE 1 TABLET EVERY DAY AS NEEDED FOR PAIN        * This list has 2 medication(s) that are the same as other medications prescribed for you. Read the directions carefully, and ask your doctor or other care provider to review them with you.             STOP taking these medications    magnesium citrate solution     polyethylene glycol powder  Commonly known as:  MIRALAX           Where to Get Your Medications      These medications were sent to [address] [facility] [phone]  [address], [place] [address]    Phone:  [phone]   · HYDROcodone-acetaminophen 5-325 MG per tablet         Lab Results   Component Value Date    WBC 16.5 02/08/2019    HGB 15.3 02/08/2019     02/08/2019     02/07/2019     02/07/2019    K 4.5 02/07/2019    K 4.4 02/04/2019    BUN 8 02/07/2019    CREATININE 0.7 02/07/2019    GLUCOSE 149 02/07/2019    GLUCOSE 70 04/28/2011    LABGLOM >60 02/07/2019    PROTIME 10.5 02/06/2019    PROTIME 24.3 10/23/2018    INR 0.9 02/06/2019    LABALBU 3.2 02/07/2019    LABALBU 3.6 11/30/2010    PROT 6.3 02/07/2019    CALCIUM 8.4 02/07/2019    MG 2.0 11/28/2018    PHOS 3.8 07/02/2015    BILITOT 0.3 02/07/2019    BILIDIR 0.2 10/05/2017     03/19/2015    ALKPHOS 68 02/07/2019    AST 21 02/07/2019    ALT 18 02/07/2019       Discharge Exam:   VITALS: Blood pressure 120/68, pulse 60, temperature 98 °F (36.7 °C), temperature source Oral, resp. rate 18, height 5' 7\" (1.702 m), weight 240 lb (108.9 kg), SpO2 98 %, not currently breastfeeding. General appearance: alert, appears stated age and cooperative  Head: Normocephalic, without obvious abnormality, atraumatic  Neck: no adenopathy, no carotid bruit, no JVD, supple, symmetrical, trachea midline and thyroid not enlarged, symmetric, no tenderness/mass/nodules  Lungs: clear to auscultation bilaterally  Heart: regular rate and rhythm  Abdomen: incisions clean and dry, glue in place, no drainage. Extremities: extremities normal, atraumatic, no cyanosis or edema    Patient Instructions: Activity: ambulate frequently, no strenuous activity  Diet: regular diet  Wound Care: shower and leave the incisions open to air, do not rub off the surgical glue. Make sure the bowels move daily by using fiber, stool softeners or laxatives to prevent constipation pains. Condition at discharge:  Stable  Disposition: home    Follow-up with Dr. Cecile Jarvis in 10 days.     Signed:  Adrian Garza  2/8/2019  7:59 AM

## 2019-02-09 ENCOUNTER — CARE COORDINATION (OUTPATIENT)
Dept: CASE MANAGEMENT | Age: 49
End: 2019-02-09

## 2019-02-09 DIAGNOSIS — K57.92 DIVERTICULITIS: Primary | ICD-10-CM

## 2019-02-09 PROCEDURE — 1111F DSCHRG MED/CURRENT MED MERGE: CPT | Performed by: FAMILY MEDICINE

## 2019-02-13 ENCOUNTER — CARE COORDINATION (OUTPATIENT)
Dept: CASE MANAGEMENT | Age: 49
End: 2019-02-13

## 2019-02-14 ENCOUNTER — OFFICE VISIT (OUTPATIENT)
Dept: FAMILY MEDICINE CLINIC | Age: 49
End: 2019-02-14
Payer: MEDICARE

## 2019-02-14 ENCOUNTER — TELEPHONE (OUTPATIENT)
Dept: FAMILY MEDICINE CLINIC | Age: 49
End: 2019-02-14

## 2019-02-14 ENCOUNTER — ANTI-COAG VISIT (OUTPATIENT)
Dept: FAMILY MEDICINE CLINIC | Age: 49
End: 2019-02-14

## 2019-02-14 VITALS
DIASTOLIC BLOOD PRESSURE: 60 MMHG | HEART RATE: 84 BPM | SYSTOLIC BLOOD PRESSURE: 112 MMHG | HEIGHT: 67 IN | WEIGHT: 238.5 LBS | OXYGEN SATURATION: 98 % | TEMPERATURE: 98.4 F | BODY MASS INDEX: 37.43 KG/M2 | RESPIRATION RATE: 16 BRPM

## 2019-02-14 DIAGNOSIS — K57.92 DIVERTICULITIS: Primary | ICD-10-CM

## 2019-02-14 DIAGNOSIS — Z90.49 HISTORY OF PARTIAL COLECTOMY: ICD-10-CM

## 2019-02-14 DIAGNOSIS — Z79.01 ANTICOAGULANT LONG-TERM USE: ICD-10-CM

## 2019-02-14 LAB
INTERNATIONAL NORMALIZATION RATIO, POC: 2.2
PROTHROMBIN TIME, POC: NORMAL

## 2019-02-14 PROCEDURE — 99495 TRANSJ CARE MGMT MOD F2F 14D: CPT | Performed by: NURSE PRACTITIONER

## 2019-02-14 PROCEDURE — 1111F DSCHRG MED/CURRENT MED MERGE: CPT | Performed by: NURSE PRACTITIONER

## 2019-02-14 PROCEDURE — 85610 PROTHROMBIN TIME: CPT | Performed by: NURSE PRACTITIONER

## 2019-02-14 RX ORDER — HYDROCODONE BITARTRATE AND ACETAMINOPHEN 5; 325 MG/1; MG/1
1-2 TABLET ORAL EVERY 6 HOURS PRN
COMMUNITY
End: 2019-03-04

## 2019-02-14 ASSESSMENT — ENCOUNTER SYMPTOMS
DIARRHEA: 0
ROS SKIN COMMENTS: SEE HPI
WHEEZING: 0
CONSTIPATION: 0
NAUSEA: 0
VOMITING: 0
COUGH: 0
SHORTNESS OF BREATH: 0

## 2019-02-21 RX ORDER — CHLORZOXAZONE 500 MG/1
TABLET ORAL
Qty: 120 TABLET | Refills: 2 | Status: SHIPPED | OUTPATIENT
Start: 2019-02-21 | End: 2019-05-10 | Stop reason: SDUPTHER

## 2019-02-22 ENCOUNTER — CARE COORDINATION (OUTPATIENT)
Dept: CASE MANAGEMENT | Age: 49
End: 2019-02-22

## 2019-02-22 ENCOUNTER — OFFICE VISIT (OUTPATIENT)
Dept: PHYSICAL MEDICINE AND REHAB | Age: 49
End: 2019-02-22
Payer: MEDICARE

## 2019-02-22 VITALS
WEIGHT: 238 LBS | HEART RATE: 95 BPM | BODY MASS INDEX: 37.35 KG/M2 | SYSTOLIC BLOOD PRESSURE: 121 MMHG | DIASTOLIC BLOOD PRESSURE: 79 MMHG | HEIGHT: 67 IN

## 2019-02-22 DIAGNOSIS — M51.26 DISC DISPLACEMENT, LUMBAR: Chronic | ICD-10-CM

## 2019-02-22 DIAGNOSIS — M12.819 ROTATOR CUFF TEAR ARTHROPATHY, UNSPECIFIED LATERALITY: ICD-10-CM

## 2019-02-22 DIAGNOSIS — M54.41 CHRONIC BILATERAL LOW BACK PAIN WITH BILATERAL SCIATICA: Primary | ICD-10-CM

## 2019-02-22 DIAGNOSIS — M48.061 NEURAL FORAMINAL STENOSIS OF LUMBAR SPINE: Chronic | ICD-10-CM

## 2019-02-22 DIAGNOSIS — M75.100 ROTATOR CUFF TEAR ARTHROPATHY, UNSPECIFIED LATERALITY: ICD-10-CM

## 2019-02-22 DIAGNOSIS — M54.42 CHRONIC BILATERAL LOW BACK PAIN WITH BILATERAL SCIATICA: Primary | ICD-10-CM

## 2019-02-22 DIAGNOSIS — G89.29 CHRONIC BILATERAL LOW BACK PAIN WITH BILATERAL SCIATICA: Primary | ICD-10-CM

## 2019-02-22 PROCEDURE — 99214 OFFICE O/P EST MOD 30 MIN: CPT | Performed by: PHYSICAL MEDICINE & REHABILITATION

## 2019-02-22 PROCEDURE — 4004F PT TOBACCO SCREEN RCVD TLK: CPT | Performed by: PHYSICAL MEDICINE & REHABILITATION

## 2019-02-22 PROCEDURE — G8427 DOCREV CUR MEDS BY ELIG CLIN: HCPCS | Performed by: PHYSICAL MEDICINE & REHABILITATION

## 2019-02-22 PROCEDURE — G8482 FLU IMMUNIZE ORDER/ADMIN: HCPCS | Performed by: PHYSICAL MEDICINE & REHABILITATION

## 2019-02-22 PROCEDURE — 1111F DSCHRG MED/CURRENT MED MERGE: CPT | Performed by: PHYSICAL MEDICINE & REHABILITATION

## 2019-02-22 PROCEDURE — G8417 CALC BMI ABV UP PARAM F/U: HCPCS | Performed by: PHYSICAL MEDICINE & REHABILITATION

## 2019-02-28 ENCOUNTER — NURSE ONLY (OUTPATIENT)
Dept: FAMILY MEDICINE CLINIC | Age: 49
End: 2019-02-28
Payer: MEDICARE

## 2019-02-28 ENCOUNTER — HOSPITAL ENCOUNTER (OUTPATIENT)
Age: 49
Discharge: HOME | End: 2019-03-02
Payer: MEDICARE

## 2019-02-28 DIAGNOSIS — E11.59 TYPE 2 DIABETES MELLITUS WITH OTHER CIRCULATORY COMPLICATION, UNSPECIFIED WHETHER LONG TERM INSULIN USE (HCC): ICD-10-CM

## 2019-02-28 DIAGNOSIS — G40.909 SEIZURE DISORDER (HCC): ICD-10-CM

## 2019-02-28 DIAGNOSIS — E78.2 MIXED HYPERLIPIDEMIA: ICD-10-CM

## 2019-02-28 DIAGNOSIS — D72.829 LEUKOCYTOSIS, UNSPECIFIED TYPE: ICD-10-CM

## 2019-02-28 DIAGNOSIS — E03.8 SUBCLINICAL HYPOTHYROIDISM: ICD-10-CM

## 2019-02-28 DIAGNOSIS — F31.81 DEPRESSED BIPOLAR II DISORDER (HCC): ICD-10-CM

## 2019-02-28 DIAGNOSIS — K57.92 DIVERTICULITIS: ICD-10-CM

## 2019-02-28 DIAGNOSIS — I10 ESSENTIAL HYPERTENSION: ICD-10-CM

## 2019-02-28 LAB
ALBUMIN SERPL-MCNC: 4 G/DL (ref 3.5–5.2)
ALP BLD-CCNC: 97 U/L (ref 35–104)
ALT SERPL-CCNC: 12 U/L (ref 0–32)
ANION GAP SERPL CALCULATED.3IONS-SCNC: 13 MMOL/L (ref 7–16)
AST SERPL-CCNC: 15 U/L (ref 0–31)
BASOPHILS ABSOLUTE: 0.13 E9/L (ref 0–0.2)
BASOPHILS RELATIVE PERCENT: 0.9 % (ref 0–2)
BILIRUB SERPL-MCNC: 0.4 MG/DL (ref 0–1.2)
BUN BLDV-MCNC: 14 MG/DL (ref 6–20)
CALCIUM SERPL-MCNC: 9.8 MG/DL (ref 8.6–10.2)
CHLORIDE BLD-SCNC: 98 MMOL/L (ref 98–107)
CHOLESTEROL, TOTAL: 158 MG/DL (ref 0–199)
CO2: 28 MMOL/L (ref 22–29)
CREAT SERPL-MCNC: 0.8 MG/DL (ref 0.5–1)
EOSINOPHILS ABSOLUTE: 1.04 E9/L (ref 0.05–0.5)
EOSINOPHILS RELATIVE PERCENT: 7.2 % (ref 0–6)
GFR AFRICAN AMERICAN: >60
GFR NON-AFRICAN AMERICAN: >60 ML/MIN/1.73
GLUCOSE BLD-MCNC: 110 MG/DL (ref 74–99)
HBA1C MFR BLD: 6.8 % (ref 4–5.6)
HCT VFR BLD CALC: 58.7 % (ref 34–48)
HDLC SERPL-MCNC: 34 MG/DL
HEMOGLOBIN: 18.5 G/DL (ref 11.5–15.5)
IMMATURE GRANULOCYTES #: 0.05 E9/L
IMMATURE GRANULOCYTES %: 0.3 % (ref 0–5)
LDL CHOLESTEROL CALCULATED: 96 MG/DL (ref 0–99)
LYMPHOCYTES ABSOLUTE: 4.82 E9/L (ref 1.5–4)
LYMPHOCYTES RELATIVE PERCENT: 33.5 % (ref 20–42)
MCH RBC QN AUTO: 29.6 PG (ref 26–35)
MCHC RBC AUTO-ENTMCNC: 31.5 % (ref 32–34.5)
MCV RBC AUTO: 93.9 FL (ref 80–99.9)
MONOCYTES ABSOLUTE: 1.02 E9/L (ref 0.1–0.95)
MONOCYTES RELATIVE PERCENT: 7.1 % (ref 2–12)
NEUTROPHILS ABSOLUTE: 7.32 E9/L (ref 1.8–7.3)
NEUTROPHILS RELATIVE PERCENT: 51 % (ref 43–80)
PDW BLD-RTO: 17.4 FL (ref 11.5–15)
PLATELET # BLD: 359 E9/L (ref 130–450)
PMV BLD AUTO: 10.8 FL (ref 7–12)
POTASSIUM SERPL-SCNC: 4.8 MMOL/L (ref 3.5–5)
RBC # BLD: 6.25 E12/L (ref 3.5–5.5)
SODIUM BLD-SCNC: 139 MMOL/L (ref 132–146)
TOTAL PROTEIN: 7.4 G/DL (ref 6.4–8.3)
TRIGL SERPL-MCNC: 142 MG/DL (ref 0–149)
TSH SERPL DL<=0.05 MIU/L-ACNC: 1.44 UIU/ML (ref 0.27–4.2)
VALPROIC ACID LEVEL: 44 MCG/ML (ref 50–100)
VLDLC SERPL CALC-MCNC: 28 MG/DL
WBC # BLD: 14.4 E9/L (ref 4.5–11.5)

## 2019-02-28 PROCEDURE — 84443 ASSAY THYROID STIM HORMONE: CPT

## 2019-02-28 PROCEDURE — 80053 COMPREHEN METABOLIC PANEL: CPT

## 2019-02-28 PROCEDURE — 83036 HEMOGLOBIN GLYCOSYLATED A1C: CPT

## 2019-02-28 PROCEDURE — 36415 COLL VENOUS BLD VENIPUNCTURE: CPT | Performed by: FAMILY MEDICINE

## 2019-02-28 PROCEDURE — 85025 COMPLETE CBC W/AUTO DIFF WBC: CPT

## 2019-02-28 PROCEDURE — 80164 ASSAY DIPROPYLACETIC ACD TOT: CPT

## 2019-02-28 PROCEDURE — 80061 LIPID PANEL: CPT

## 2019-03-01 ENCOUNTER — OFFICE VISIT (OUTPATIENT)
Dept: SURGERY | Age: 49
End: 2019-03-01

## 2019-03-01 VITALS
SYSTOLIC BLOOD PRESSURE: 116 MMHG | DIASTOLIC BLOOD PRESSURE: 68 MMHG | BODY MASS INDEX: 37.35 KG/M2 | WEIGHT: 238 LBS | HEART RATE: 98 BPM | HEIGHT: 67 IN | TEMPERATURE: 98.8 F

## 2019-03-01 DIAGNOSIS — E11.59 TYPE 2 DIABETES MELLITUS WITH OTHER CIRCULATORY COMPLICATION, UNSPECIFIED WHETHER LONG TERM INSULIN USE (HCC): Primary | ICD-10-CM

## 2019-03-01 PROCEDURE — 99024 POSTOP FOLLOW-UP VISIT: CPT | Performed by: SURGERY

## 2019-03-04 ENCOUNTER — OFFICE VISIT (OUTPATIENT)
Dept: NEUROLOGY | Age: 49
End: 2019-03-04
Payer: MEDICARE

## 2019-03-04 VITALS
TEMPERATURE: 98.1 F | HEART RATE: 90 BPM | BODY MASS INDEX: 37.2 KG/M2 | HEIGHT: 67 IN | SYSTOLIC BLOOD PRESSURE: 120 MMHG | OXYGEN SATURATION: 94 % | DIASTOLIC BLOOD PRESSURE: 79 MMHG | WEIGHT: 237 LBS | RESPIRATION RATE: 12 BRPM

## 2019-03-04 DIAGNOSIS — G40.909 SEIZURE DISORDER (HCC): ICD-10-CM

## 2019-03-04 PROBLEM — M54.50 LOW BACK PAIN: Chronic | Status: RESOLVED | Noted: 2017-03-17 | Resolved: 2019-03-04

## 2019-03-04 PROCEDURE — 1111F DSCHRG MED/CURRENT MED MERGE: CPT | Performed by: NURSE PRACTITIONER

## 2019-03-04 PROCEDURE — 4004F PT TOBACCO SCREEN RCVD TLK: CPT | Performed by: NURSE PRACTITIONER

## 2019-03-04 PROCEDURE — G8427 DOCREV CUR MEDS BY ELIG CLIN: HCPCS | Performed by: NURSE PRACTITIONER

## 2019-03-04 PROCEDURE — G8417 CALC BMI ABV UP PARAM F/U: HCPCS | Performed by: NURSE PRACTITIONER

## 2019-03-04 PROCEDURE — 99214 OFFICE O/P EST MOD 30 MIN: CPT | Performed by: NURSE PRACTITIONER

## 2019-03-04 PROCEDURE — G8482 FLU IMMUNIZE ORDER/ADMIN: HCPCS | Performed by: NURSE PRACTITIONER

## 2019-03-08 ENCOUNTER — OFFICE VISIT (OUTPATIENT)
Dept: FAMILY MEDICINE CLINIC | Age: 49
End: 2019-03-08
Payer: MEDICARE

## 2019-03-08 VITALS
RESPIRATION RATE: 16 BRPM | TEMPERATURE: 98.4 F | WEIGHT: 241.12 LBS | SYSTOLIC BLOOD PRESSURE: 104 MMHG | HEART RATE: 91 BPM | BODY MASS INDEX: 37.84 KG/M2 | OXYGEN SATURATION: 94 % | HEIGHT: 67 IN | DIASTOLIC BLOOD PRESSURE: 62 MMHG

## 2019-03-08 DIAGNOSIS — F31.81 DEPRESSED BIPOLAR II DISORDER (HCC): ICD-10-CM

## 2019-03-08 DIAGNOSIS — J44.1 COPD WITH EXACERBATION (HCC): ICD-10-CM

## 2019-03-08 DIAGNOSIS — G40.909 SEIZURE DISORDER (HCC): ICD-10-CM

## 2019-03-08 DIAGNOSIS — F17.200 TOBACCO DEPENDENCY: ICD-10-CM

## 2019-03-08 DIAGNOSIS — K57.92 DIVERTICULITIS: ICD-10-CM

## 2019-03-08 DIAGNOSIS — Z79.01 ANTICOAGULANT LONG-TERM USE: ICD-10-CM

## 2019-03-08 DIAGNOSIS — E11.59 TYPE 2 DIABETES MELLITUS WITH OTHER CIRCULATORY COMPLICATION, UNSPECIFIED WHETHER LONG TERM INSULIN USE (HCC): Primary | ICD-10-CM

## 2019-03-08 PROCEDURE — 3023F SPIROM DOC REV: CPT | Performed by: FAMILY MEDICINE

## 2019-03-08 PROCEDURE — G8417 CALC BMI ABV UP PARAM F/U: HCPCS | Performed by: FAMILY MEDICINE

## 2019-03-08 PROCEDURE — G8482 FLU IMMUNIZE ORDER/ADMIN: HCPCS | Performed by: FAMILY MEDICINE

## 2019-03-08 PROCEDURE — 1111F DSCHRG MED/CURRENT MED MERGE: CPT | Performed by: FAMILY MEDICINE

## 2019-03-08 PROCEDURE — 2022F DILAT RTA XM EVC RTNOPTHY: CPT | Performed by: FAMILY MEDICINE

## 2019-03-08 PROCEDURE — 4004F PT TOBACCO SCREEN RCVD TLK: CPT | Performed by: FAMILY MEDICINE

## 2019-03-08 PROCEDURE — 3044F HG A1C LEVEL LT 7.0%: CPT | Performed by: FAMILY MEDICINE

## 2019-03-08 PROCEDURE — 99215 OFFICE O/P EST HI 40 MIN: CPT | Performed by: FAMILY MEDICINE

## 2019-03-08 PROCEDURE — G8926 SPIRO NO PERF OR DOC: HCPCS | Performed by: FAMILY MEDICINE

## 2019-03-08 PROCEDURE — G8427 DOCREV CUR MEDS BY ELIG CLIN: HCPCS | Performed by: FAMILY MEDICINE

## 2019-03-08 RX ORDER — ALBUTEROL SULFATE 90 UG/1
2 AEROSOL, METERED RESPIRATORY (INHALATION) EVERY 6 HOURS PRN
Qty: 1 INHALER | Refills: 3 | Status: SHIPPED | OUTPATIENT
Start: 2019-03-08 | End: 2019-06-12 | Stop reason: SDUPTHER

## 2019-03-08 RX ORDER — METHYLPREDNISOLONE 4 MG/1
TABLET ORAL
Qty: 21 TABLET | Refills: 0 | Status: SHIPPED | OUTPATIENT
Start: 2019-03-08 | End: 2019-03-14

## 2019-03-08 RX ORDER — CIPROFLOXACIN 500 MG/1
500 TABLET, FILM COATED ORAL 2 TIMES DAILY
Qty: 20 TABLET | Refills: 0
Start: 2019-03-08 | End: 2019-03-18

## 2019-03-08 ASSESSMENT — ENCOUNTER SYMPTOMS
ABDOMINAL PAIN: 0
WHEEZING: 0
BLURRED VISION: 0
BLOOD IN STOOL: 0
DIARRHEA: 0
SHORTNESS OF BREATH: 0
COUGH: 0
ORTHOPNEA: 0
VOMITING: 0
DOUBLE VISION: 0
NAUSEA: 0
HEARTBURN: 0
SPUTUM PRODUCTION: 0
SORE THROAT: 0
BACK PAIN: 1
CONSTIPATION: 0

## 2019-03-16 DIAGNOSIS — E78.2 MIXED HYPERLIPIDEMIA: ICD-10-CM

## 2019-03-16 DIAGNOSIS — G62.9 PERIPHERAL POLYNEUROPATHY: Chronic | ICD-10-CM

## 2019-03-16 DIAGNOSIS — E11.59 TYPE 2 DIABETES MELLITUS WITH OTHER CIRCULATORY COMPLICATION, UNSPECIFIED WHETHER LONG TERM INSULIN USE (HCC): Primary | ICD-10-CM

## 2019-03-16 DIAGNOSIS — G40.909 SEIZURE DISORDER (HCC): ICD-10-CM

## 2019-03-16 DIAGNOSIS — E03.8 SUBCLINICAL HYPOTHYROIDISM: ICD-10-CM

## 2019-03-18 DIAGNOSIS — G40.909 SEIZURE DISORDER (HCC): ICD-10-CM

## 2019-03-19 DIAGNOSIS — G62.9 PERIPHERAL POLYNEUROPATHY: Chronic | ICD-10-CM

## 2019-03-19 RX ORDER — DULOXETIN HYDROCHLORIDE 60 MG/1
60 CAPSULE, DELAYED RELEASE ORAL DAILY
Qty: 90 CAPSULE | Refills: 0 | Status: SHIPPED | OUTPATIENT
Start: 2019-03-19 | End: 2019-03-20 | Stop reason: SDUPTHER

## 2019-03-19 RX ORDER — DIVALPROEX SODIUM 500 MG/1
TABLET, EXTENDED RELEASE ORAL
Qty: 270 TABLET | Refills: 1 | Status: SHIPPED | OUTPATIENT
Start: 2019-03-19 | End: 2019-06-12 | Stop reason: SDUPTHER

## 2019-03-19 RX ORDER — ATORVASTATIN CALCIUM 80 MG/1
TABLET, FILM COATED ORAL
Qty: 90 TABLET | Refills: 3 | Status: SHIPPED | OUTPATIENT
Start: 2019-03-19 | End: 2019-03-20 | Stop reason: SDUPTHER

## 2019-03-19 RX ORDER — LEVOTHYROXINE SODIUM 0.07 MG/1
TABLET ORAL
Qty: 90 TABLET | Refills: 5 | Status: SHIPPED | OUTPATIENT
Start: 2019-03-19 | End: 2019-04-01 | Stop reason: SDUPTHER

## 2019-03-19 RX ORDER — LEVETIRACETAM 500 MG/1
TABLET ORAL
Qty: 180 TABLET | Refills: 1 | Status: SHIPPED | OUTPATIENT
Start: 2019-03-19 | End: 2019-06-12 | Stop reason: SDUPTHER

## 2019-03-19 RX ORDER — LANCETS
EACH MISCELLANEOUS
Qty: 100 EACH | Refills: 3 | Status: SHIPPED | OUTPATIENT
Start: 2019-03-19 | End: 2019-03-20 | Stop reason: SDUPTHER

## 2019-03-20 DIAGNOSIS — G62.9 PERIPHERAL POLYNEUROPATHY: Chronic | ICD-10-CM

## 2019-03-20 DIAGNOSIS — E78.2 MIXED HYPERLIPIDEMIA: ICD-10-CM

## 2019-03-20 DIAGNOSIS — E11.59 TYPE 2 DIABETES MELLITUS WITH OTHER CIRCULATORY COMPLICATION, UNSPECIFIED WHETHER LONG TERM INSULIN USE (HCC): ICD-10-CM

## 2019-03-20 RX ORDER — LANCETS
EACH MISCELLANEOUS
Qty: 100 EACH | Refills: 3 | Status: SHIPPED | OUTPATIENT
Start: 2019-03-20 | End: 2019-03-27 | Stop reason: SDUPTHER

## 2019-03-20 RX ORDER — ATORVASTATIN CALCIUM 80 MG/1
TABLET, FILM COATED ORAL
Qty: 90 TABLET | Refills: 3 | Status: SHIPPED | OUTPATIENT
Start: 2019-03-20 | End: 2019-06-12 | Stop reason: SDUPTHER

## 2019-03-20 RX ORDER — DULOXETIN HYDROCHLORIDE 60 MG/1
60 CAPSULE, DELAYED RELEASE ORAL DAILY
Qty: 90 CAPSULE | Refills: 0 | Status: SHIPPED | OUTPATIENT
Start: 2019-03-20 | End: 2019-05-29 | Stop reason: SDUPTHER

## 2019-03-27 DIAGNOSIS — Z79.4 TYPE 2 DIABETES MELLITUS WITHOUT COMPLICATION, WITH LONG-TERM CURRENT USE OF INSULIN (HCC): ICD-10-CM

## 2019-03-27 DIAGNOSIS — E11.59 TYPE 2 DIABETES MELLITUS WITH OTHER CIRCULATORY COMPLICATION, UNSPECIFIED WHETHER LONG TERM INSULIN USE (HCC): ICD-10-CM

## 2019-03-27 DIAGNOSIS — E11.9 TYPE 2 DIABETES MELLITUS WITHOUT COMPLICATION, WITH LONG-TERM CURRENT USE OF INSULIN (HCC): ICD-10-CM

## 2019-03-27 RX ORDER — LANCETS
EACH MISCELLANEOUS
Qty: 100 EACH | Refills: 3 | Status: SHIPPED | OUTPATIENT
Start: 2019-03-27 | End: 2019-04-15 | Stop reason: SDUPTHER

## 2019-04-05 DIAGNOSIS — M48.061 NEURAL FORAMINAL STENOSIS OF LUMBAR SPINE: Primary | ICD-10-CM

## 2019-04-05 RX ORDER — TRAMADOL HYDROCHLORIDE 50 MG/1
TABLET ORAL
Qty: 30 TABLET | Refills: 0 | Status: SHIPPED | OUTPATIENT
Start: 2019-04-05 | End: 2019-05-05

## 2019-04-15 DIAGNOSIS — E11.59 TYPE 2 DIABETES MELLITUS WITH OTHER CIRCULATORY COMPLICATION, UNSPECIFIED WHETHER LONG TERM INSULIN USE (HCC): ICD-10-CM

## 2019-04-16 RX ORDER — LANCETS
EACH MISCELLANEOUS
Qty: 204 EACH | Refills: 3 | Status: SHIPPED | OUTPATIENT
Start: 2019-04-16 | End: 2019-06-30 | Stop reason: SDUPTHER

## 2019-05-02 ENCOUNTER — TELEPHONE (OUTPATIENT)
Dept: FAMILY MEDICINE CLINIC | Age: 49
End: 2019-05-02

## 2019-05-02 DIAGNOSIS — M51.26 DISC DISPLACEMENT, LUMBAR: Chronic | ICD-10-CM

## 2019-05-02 DIAGNOSIS — J68.3 MODERATE PERSISTENT REACTIVE AIRWAYS DYSFUNCTION SYNDROME WITHOUT COMPLICATION (HCC): ICD-10-CM

## 2019-05-02 DIAGNOSIS — M48.061 NEURAL FORAMINAL STENOSIS OF LUMBAR SPINE: Chronic | ICD-10-CM

## 2019-05-02 DIAGNOSIS — M77.51 BONE SPUR OF RIGHT FOOT: Primary | ICD-10-CM

## 2019-05-13 RX ORDER — CHLORZOXAZONE 500 MG/1
TABLET ORAL
Qty: 120 TABLET | Refills: 2 | Status: SHIPPED | OUTPATIENT
Start: 2019-05-13 | End: 2019-08-23 | Stop reason: SDUPTHER

## 2019-05-13 NOTE — TELEPHONE ENCOUNTER
Pharmacy requesting refill of chlorzoxazone 500 mg tabs 4 times a day prn #120 w/ 2 refills. Last written 2-21-19. Last visit 2-22-19, next visit 5-22-19. Medication pended, Please advise.

## 2019-05-22 ENCOUNTER — OFFICE VISIT (OUTPATIENT)
Dept: PHYSICAL MEDICINE AND REHAB | Age: 49
End: 2019-05-22
Payer: MEDICARE

## 2019-05-22 VITALS
HEART RATE: 104 BPM | SYSTOLIC BLOOD PRESSURE: 130 MMHG | DIASTOLIC BLOOD PRESSURE: 82 MMHG | HEIGHT: 67 IN | WEIGHT: 243 LBS | BODY MASS INDEX: 38.14 KG/M2

## 2019-05-22 DIAGNOSIS — G89.29 OTHER CHRONIC PAIN: ICD-10-CM

## 2019-05-22 DIAGNOSIS — M47.816 LUMBAR SPONDYLOSIS: ICD-10-CM

## 2019-05-22 DIAGNOSIS — M51.26 DISC DISPLACEMENT, LUMBAR: ICD-10-CM

## 2019-05-22 DIAGNOSIS — M48.061 NEURAL FORAMINAL STENOSIS OF LUMBAR SPINE: Primary | ICD-10-CM

## 2019-05-22 PROCEDURE — 96372 THER/PROPH/DIAG INJ SC/IM: CPT | Performed by: PHYSICAL MEDICINE & REHABILITATION

## 2019-05-22 PROCEDURE — G8417 CALC BMI ABV UP PARAM F/U: HCPCS | Performed by: PHYSICAL MEDICINE & REHABILITATION

## 2019-05-22 PROCEDURE — 3017F COLORECTAL CA SCREEN DOC REV: CPT | Performed by: PHYSICAL MEDICINE & REHABILITATION

## 2019-05-22 PROCEDURE — G8427 DOCREV CUR MEDS BY ELIG CLIN: HCPCS | Performed by: PHYSICAL MEDICINE & REHABILITATION

## 2019-05-22 PROCEDURE — 99214 OFFICE O/P EST MOD 30 MIN: CPT | Performed by: PHYSICAL MEDICINE & REHABILITATION

## 2019-05-22 PROCEDURE — 4004F PT TOBACCO SCREEN RCVD TLK: CPT | Performed by: PHYSICAL MEDICINE & REHABILITATION

## 2019-05-22 RX ORDER — TRAMADOL HYDROCHLORIDE 50 MG/1
50 TABLET ORAL DAILY PRN
COMMUNITY
End: 2019-05-30

## 2019-05-22 RX ORDER — DEXAMETHASONE SODIUM PHOSPHATE 4 MG/ML
4 INJECTION, SOLUTION INTRA-ARTICULAR; INTRALESIONAL; INTRAMUSCULAR; INTRAVENOUS; SOFT TISSUE ONCE
Status: COMPLETED | OUTPATIENT
Start: 2019-05-22 | End: 2019-05-22

## 2019-05-22 RX ORDER — TRAMADOL HYDROCHLORIDE 50 MG/1
50 TABLET ORAL EVERY 8 HOURS PRN
Qty: 60 TABLET | Refills: 2 | Status: SHIPPED | OUTPATIENT
Start: 2019-05-22 | End: 2019-06-21

## 2019-05-22 RX ADMIN — DEXAMETHASONE SODIUM PHOSPHATE 4 MG: 4 INJECTION, SOLUTION INTRA-ARTICULAR; INTRALESIONAL; INTRAMUSCULAR; INTRAVENOUS; SOFT TISSUE at 09:44

## 2019-05-22 NOTE — PROGRESS NOTES
Ted Puckett D.O. [facility] Physical Medicine and Rehabilitation  [address] [facility], [address] [place] [address] Piyush  Phone: [phone]  Fax: [phone]      5/22/19    Chief Complaint   Patient presents with    Back Pain     FOllow up, dicsuss increase Tramadol and sending it to [facility]       HPI:  Carmel Pappas is a 47y.o. year old woman seen today in follow up regarding neuropathic pain. Interval history: Since the last visit the patient states the tramadol is working but she wants to know if she can take more than 30 a month because the pain is coming more often. Today, the pain is rated Pain Score:   7 where 0 is no pain and 10 is pain as bad as it can be. The pain is located in the low back,  bilateral feet and legs,  does not radiate, and is described as dull, aching, burning, twisting, cramping. This pain occurs intermittently. The symptoms have been worse since onset. Symptoms are exacerbated by damp and cold weather, walking, standing. Factors which relieve the pain include tramadol. Other associated symptoms include none. Otherwise, the pain assessment has not changed since the last visit. Prior treatment has included gabapentin, Flexeril.      Past Medical History:   Diagnosis Date    Anticoagulant long-term use     Arthritis     back, both hips and both knees    Carpal tunnel syndrome on both sides     both wrists    Cellulitis of right foot 03/2016    Cervical cancer ([address] 2012    S/P LUCIUS, BSO    Chronic back pain     Depressed bipolar II disorder (HCC)     Diabetes mellitus ([address]     Diverticulitis     DVT (deep vein thrombosis) in pregnancy ([address]     Epilepsy ([address]     Hx of blood clots     Hyperlipidemia     Hypertension     Marijuana use     Moderate persistent reactive airways dysfunction syndrome without complication (HCC)     Neuropathy     Obesity     PONV (postoperative nausea and vomiting)     Rotator cuff tear arthropathy     Thyroid disease     Type 2 diabetes mellitus without complication [facility])        Past Surgical History:   Procedure Laterality Date     SECTION      CHOLECYSTECTOMY      COLONOSCOPY  10/10/2018    COLONOSCOPY WITH BIOPSY performed by Wesley Xiao MD at [address] ECHO COMPLETE  2013         FOOT SURGERY  2015    HYSTERECTOMY  2012    KIDNEY BIOPSY      KNEE ARTHROSCOPY  2003    right and left knee    KNEE SURGERY      left knee    NERVE BLOCK Bilateral 2017    TFNB  #1    NERVE BLOCK Bilateral 06/15/2017    bilatera lumbar transforaminal nerve block #2 L5-S1    NERVE BLOCK Bilateral 2017    Bilateral transforamninal nerve block lumbar #3    RHINOPLASTY      1985    SMALL INTESTINE SURGERY N/A 2019    LAPAROSCOPIC SIGMOID COLECTOMY performed by Wesley Xiao MD at [address]         Social History     Social History    Marital status:      Occupational History     [employer]     Social History Main Topics    Smoking status: Current Every Day Smoker     Packs/day: 1.00     Years: 32.00     Types: Cigarettes    Smokeless tobacco: Never Used      Comment: (8/3/16):  not ready to quit; counseling given    Alcohol use 0.0 oz/week     0 Standard drinks or equivalent per week      Comment: (12/17/15):  <1 drink/week    Drug use: Yes     Special: Marijuana      Comment: last use 2016    Sexual activity: Not Currently     Other Topics Concern    None     Social History Narrative       Family History   Problem Relation Age of Onset    Depression Mother     Bipolar Disorder Mother     Hypertension Mother     Anxiety Disorder Sister     Asthma Son    Ayala Schizophrenia Son     Anxiety Disorder Daughter        Current Outpatient Medications   Medication Sig Dispense Refill    traMADol (ULTRAM) 50 MG tablet Take 50 mg by mouth daily as needed for Pain.       traMADol (ULTRAM) 50 MG tablet Take 1 tablet by mouth every 8 hours as needed for Pain for up to 30 days. 60 tablet 2    chlorzoxazone (PARAFON FORTE) 500 MG tablet TAKE 1 TABLET FOUR TIMES DAILY AS NEEDED FOR MUSCLE SPASMS 120 tablet 2    metoprolol tartrate (LOPRESSOR) 25 MG tablet TAKE 1/2 TABLET TWICE DAILY 90 tablet 3    Handicap Placard MISC by Does not apply route Patient cannot walk 200 ft without stopping to rest.    Expiration 2024 1 each 0    ACCU-CHEK FASTCLIX LANCETS MISC CHECK BLOOD SUGAR TWICE DAILY 204 each 3    levothyroxine (SYNTHROID) 75 MCG tablet TAKE 1 TABLET EVERY DAY 90 tablet 1    blood glucose test strips (ONE TOUCH ULTRA TEST) strip Pt. Tests BID -TID.  200 each 2    metFORMIN (GLUCOPHAGE) 1000 MG tablet TAKE 1 TABLET TWICE DAILY WITH MEALS 180 tablet 1    atorvastatin (LIPITOR) 80 MG tablet TAKE 1 TABLET EVERY DAY 90 tablet 3    divalproex (DEPAKOTE ER) 500 MG extended release tablet TAKE 1 TABLET IN THE MORNING AND 2 TABLETS IN THE EVENING  270 tablet 1    levETIRAcetam (KEPPRA) 500 MG tablet TAKE 1 TABLET TWICE DAILY 180 tablet 1    albuterol sulfate  (90 Base) MCG/ACT inhaler Inhale 2 puffs into the lungs every 6 hours as needed for Wheezing 1 Inhaler 3    omeprazole (PRILOSEC) 20 MG delayed release capsule TAKE 1 CAPSULE EVERY MORNING  BEFORE  BREAKFAST 90 capsule 3    Multiple Vitamins-Minerals (THERAPEUTIC MULTIVITAMIN-MINERALS) tablet Take 1 tablet by mouth daily      nortriptyline (PAMELOR) 25 MG capsule TAKE ONE CAPSULE BY MOUTH EVERY EVENING 90 capsule 5    warfarin (COUMADIN) 2.5 MG tablet Take 1 tablet by mouth three times a week 1 tablet MWF (Patient taking differently: Take 2.5 mg by mouth once a week 1 tablet mon thurs) 100 tablet 5    warfarin (COUMADIN) 5 MG tablet Take 1 tablet by mouth four times a week (Patient taking differently: Take 5 mg by mouth daily ) 100 tablet 5    hydrOXYzine (ATARAX) 25 MG tablet TAKE 1 TABLET THREE TIMES DAILY AS NEEDED FOR ANXIETY 30 tablet 0    aspirin 81 MG tablet Take 1 tablet by mouth daily 30 tablet 5    DULoxetine (CYMBALTA) 60 MG extended release capsule Take 1 capsule by mouth daily 90 capsule 0     No current facility-administered medications for this visit. Allergies   Allergen Reactions    Nsaids Shortness Of Breath and Other (See Comments)     Renal Failure       Review of Systems:  No new weakness, paresthesia, incontinence of bowel or bladder, saddle anesthesia, falls or gait dysfunction. Otherwise, per HPI. Physical Exam:   Blood pressure 130/82, pulse 104, height 5' 7\" (1.702 m), weight 243 lb (110.2 kg), not currently breastfeeding. GENERAL: The patient is in no apparent distress. Body habitus is obese. HEENT: No rhinorrhea, sneezing, yawning, or lacrimation. No scleral icterus or conjunctival injection. SKIN: No piloerection. No tract marks. No rash. PSYCH: Mood and affect are appropriate. Hygiene is appropriate. CARDIOVASCULAR  Heart is regular rate and rhythm. There is no edema. Dependent rubor is present bilateral feet. RESPIRATORY: Respirations are regular and unlabored. There is no cyanosis. GASTROINTESTINAL: Soft abdomen, non-tender. MSK: There is no joint effusion, deformity, instability, swelling, erythema or warmth. AROM is full in the spine and extremities. Spinal curvatures are normal.      NEURO: Gait is normal. Sensation is diminished to level of knee in stocking pattern bilateral lower extremities and left ulnar hand, otherwise, no focal sensorimotor deficit. Reflexes 2+ and symmetric in lower extremities except achilles absent bilaterally. Impression:   1. Neural foraminal stenosis of lumbar spine    2. Disc displacement, lumbar    3. Lumbar spondylosis    4. Other chronic pain        Plan:  Orders Placed This Encounter   Medications    traMADol (ULTRAM) 50 MG tablet     Sig: Take 1 tablet by mouth every 8 hours as needed for Pain for up to 30 days.      Dispense:  60 tablet     Refill:  2

## 2019-05-25 ENCOUNTER — HOSPITAL ENCOUNTER (EMERGENCY)
Age: 49
Discharge: HOME | End: 2019-05-25
Payer: MEDICARE

## 2019-05-25 VITALS
TEMPERATURE: 97.9 F | OXYGEN SATURATION: 94 % | HEART RATE: 99 BPM | RESPIRATION RATE: 20 BRPM | HEIGHT: 67 IN | BODY MASS INDEX: 37.13 KG/M2 | DIASTOLIC BLOOD PRESSURE: 71 MMHG | WEIGHT: 236.56 LBS | SYSTOLIC BLOOD PRESSURE: 147 MMHG

## 2019-05-25 DIAGNOSIS — R21 RASH AND OTHER NONSPECIFIC SKIN ERUPTION: Primary | ICD-10-CM

## 2019-05-25 DIAGNOSIS — F41.0 ANXIETY ATTACK: ICD-10-CM

## 2019-05-25 PROCEDURE — 99282 EMERGENCY DEPT VISIT SF MDM: CPT

## 2019-05-25 RX ORDER — HYDROXYZINE PAMOATE 50 MG/1
50 CAPSULE ORAL 3 TIMES DAILY PRN
Qty: 21 CAPSULE | Refills: 0 | Status: SHIPPED | OUTPATIENT
Start: 2019-05-25 | End: 2019-06-12

## 2019-05-25 RX ORDER — BUSPIRONE HYDROCHLORIDE 5 MG/1
5 TABLET ORAL DAILY
COMMUNITY
End: 2019-06-07 | Stop reason: SDUPTHER

## 2019-05-25 NOTE — ED PROVIDER NOTES
[facility]     Department of Emergency Medicine   ED  Provider Note  Admit Date/RoomTime: 5/25/2019  8:16 AM  ED Room: 16/16  Chief Complaint   Rash (rash on the face since late monday and tuesday morning, itchy, redness to face, forehead)    History of Present Illness   Source of history provided by:  patient. History/Exam Limitations: none. Coco Gann is a 47 y.o. old female with has a past medical history of:   Past Medical History:   Diagnosis Date    Anticoagulant long-term use     Arthritis     back, both hips and both knees    Carpal tunnel syndrome on both sides     both wrists    Cellulitis of right foot 03/2016    Cervical cancer ([address] 2012    S/P LUCIUS, BSO    Chronic back pain     Depressed bipolar II disorder ([address]     Diabetes mellitus ([address]     Diverticulitis     DVT (deep vein thrombosis) in pregnancy ([address]     Epilepsy ([address]     Hx of blood clots     Hyperlipidemia     Hypertension     Marijuana use     Moderate persistent reactive airways dysfunction syndrome without complication (HCC)     Neuropathy     Obesity     PONV (postoperative nausea and vomiting)     Rotator cuff tear arthropathy     Thyroid disease     Type 2 diabetes mellitus without complication ([address]     presents to the emergency department by private vehicle, for complaint of sudden onset red, raised and itchy area on the face which began 4 day(s) prior to arrival.  The symptoms were caused by unknown cause. Since onset the symptoms have been persistent. Prior history of similar episodes: No.  She denies any pain with the rash. Nothing seems to make it better or worse. She denies any difficulty w/breathing or swallowing. She states that she had a steroid injection three days ago from her pain management doctor. She notes that she didn't really see any relief with this. She states she has \"pustules\" on the face w/the rash, but reports that they only leak a clear fluid.  There has been no purulent drainage from the face. She denies any fevers or chills. No one else has the rash at home. It is no where else but the face. She denies any new soaps, lotions, detergents or medications. Patient believes that she has shingles. ROS    Pertinent positives and negatives are stated within HPI, all other systems reviewed and are negative. Past Surgical History:   Procedure Laterality Date     SECTION      CHOLECYSTECTOMY      COLONOSCOPY  10/10/2018    COLONOSCOPY WITH BIOPSY performed by Obi Craig MD at [address] [facility] ECHO COMPLETE  2013         FOOT SURGERY  2015    HYSTERECTOMY  2012    KIDNEY BIOPSY      KNEE ARTHROSCOPY  2003    right and left knee    KNEE SURGERY      left knee    NERVE BLOCK Bilateral 2017    TFNB  #1    NERVE BLOCK Bilateral 06/15/2017    bilatera lumbar transforaminal nerve block #2 L5-S1    NERVE BLOCK Bilateral 2017    Bilateral transforamninal nerve block lumbar #3    RHINOPLASTY      1985    SMALL INTESTINE SURGERY N/A 2019    LAPAROSCOPIC SIGMOID COLECTOMY performed by Obi Craig MD at [address]     Social History:  reports that she has been smoking cigarettes. She has a 32.00 pack-year smoking history. She has never used smokeless tobacco. She reports that she drank alcohol. She reports that she has current or past drug history. Family History: family history includes Anxiety Disorder in her daughter and sister; Asthma in her son; Bipolar Disorder in her mother; Depression in her mother; Hypertension in her mother; Schizophrenia in her son. Allergies: Nsaids    Physical Exam           ED Triage Vitals [19 0809]   BP Temp Temp Source Pulse Resp SpO2 Height Weight   (!) 147/71 97.9 °F (36.6 °C) Oral 99 20 94 % 5' 7\" (1.702 m) 236 lb 9 oz (107.3 kg)     Oxygen Saturation Interpretation: Normal.    Constitutional:  Alert, development consistent with age. HEENT:  NC/NT. Airway patent. Posterior pharynx is without erythema or edema. Patient swallowing without difficulty. Eyes:  PERRL, EOMI, no discharge. Extraocular eye movements are intact bilaterally. There is no eyelid swelling or erythema, no evidence for orbital or periorbital cellulitis. Ears:  TMs without perforation, injection, or bulging. External canals clear without exudate. Mouth:  Mucous membranes moist without lesions, tongue and gums normal.  Throat:  Pharynx without injection, exudate, or tonsillar hypertrophy. Airway patient. Neck:  Supple. No lymphadenopathy. Respiratory:  Clear to auscultation and breath sounds equal. No wheezes, rales or rhonchi bilaterally. No respiratory distress. CV:  Regular rate and rhythm. GI:  Abdomen Soft, nontender, +BS. Integument:  Skin turgor: Normal.              Red raised rash to the face, both cheeks. It is not around the eyes. The rash is blanching. No petechiae or purpura. There are no pustules to the face. There are a few small serous fluid filled papules. There is no warmth to the area, no evidence for any cellulitis. No red streaking. No fluctuance or abscess formation. Rash is most consistent with contact dermatitis. I do not see any vesicular lesions or rash consistent with herpes zoster. Neurological:  Orientation age-appropriate unless noted elseware. Motor functions intact. Lab / Imaging Results   (All laboratory and radiology results have been personally reviewed by myself)  Labs:  No results found for this visit on 05/25/19. Imaging: All Radiology results interpreted by Radiologist unless otherwise noted. No orders to display       ED Course / Medical Decision Making   Medications - No data to display     Consults:   None    Procedures:   none    MDM:   Patient's rash is most consistent with a contact dermatitis. I do not see any evidence for any herpes zoster.  It is not warm to the touch and is not painful, I do not think it is a cellulitis at this time. The rash also crosses the midline which is not consistent with herpes zoster. Patient seems agitated, frustrated and upset throughout her visit today. Expresses unhappiness with healthcare in general. I tried to encourage the patient that I think that it is likely dermatitis at this time, encouraged to keep his skin hydrated, select out of the sun. I encouraged her to take the Vistaril. We did discuss some steroid-induced however, she is on Coumadin and is also a diabetic and does not check her glucose regularly. I did advise the patient that she is to come to the ER if she has any worsening of her symptoms. Patient is ultimately agreeable to try the Vistaril. I again did encourage her to return to the ER for any worsening symptoms. Otherwise she is to follow-up with her PCP and I also encouraged her to seek treatment from a dermatologist.    Counseling: The emergency provider has spoken with the patient and discussed todays results, in addition to providing specific details for the plan of care and counseling regarding the diagnosis and prognosis. Questions are answered at this time and they are agreeable with the plan. Assessment      1. Rash    Plan   Discharge to home  Patient condition is good    New Medications     Discharge Medication List as of 5/25/2019  8:51 AM      START taking these medications    Details   hydrOXYzine (VISTARIL) 50 MG capsule Take 1 capsule by mouth 3 times daily as needed for Itching, Disp-21 capsule, R-0Print           Electronically signed by PARAM Newton   DD: 5/25/19  **This report was transcribed using voice recognition software. Every effort was made to ensure accuracy; however, inadvertent computerized transcription errors may be present.   END OF ED PROVIDER NOTE       Be Newton  05/25/19 1042

## 2019-05-25 NOTE — ED NOTES
Pt is alert face is red , discharge instructions given no voiced concerns or complaints.      Dereck Don RN  05/25/19 2132

## 2019-05-28 ENCOUNTER — OFFICE VISIT (OUTPATIENT)
Dept: FAMILY MEDICINE CLINIC | Age: 49
End: 2019-05-28
Payer: MEDICARE

## 2019-05-28 VITALS
OXYGEN SATURATION: 99 % | TEMPERATURE: 98.1 F | RESPIRATION RATE: 16 BRPM | DIASTOLIC BLOOD PRESSURE: 60 MMHG | SYSTOLIC BLOOD PRESSURE: 136 MMHG | BODY MASS INDEX: 37.83 KG/M2 | HEART RATE: 110 BPM | HEIGHT: 67 IN | WEIGHT: 241 LBS

## 2019-05-28 DIAGNOSIS — L25.9 CONTACT DERMATITIS, UNSPECIFIED CONTACT DERMATITIS TYPE, UNSPECIFIED TRIGGER: Primary | ICD-10-CM

## 2019-05-28 PROCEDURE — 99213 OFFICE O/P EST LOW 20 MIN: CPT | Performed by: PHYSICIAN ASSISTANT

## 2019-05-28 PROCEDURE — 3017F COLORECTAL CA SCREEN DOC REV: CPT | Performed by: PHYSICIAN ASSISTANT

## 2019-05-28 PROCEDURE — G8427 DOCREV CUR MEDS BY ELIG CLIN: HCPCS | Performed by: PHYSICIAN ASSISTANT

## 2019-05-28 PROCEDURE — 4004F PT TOBACCO SCREEN RCVD TLK: CPT | Performed by: PHYSICIAN ASSISTANT

## 2019-05-28 PROCEDURE — G8417 CALC BMI ABV UP PARAM F/U: HCPCS | Performed by: PHYSICIAN ASSISTANT

## 2019-05-28 RX ORDER — PREDNISONE 20 MG/1
20 TABLET ORAL 2 TIMES DAILY
Qty: 10 TABLET | Refills: 0 | Status: SHIPPED | OUTPATIENT
Start: 2019-05-28 | End: 2019-06-02

## 2019-05-28 NOTE — PROGRESS NOTES
Chief Complaint:   Rash (on her face, she was seen in ED on friday they gave her vistaril for the itching )    History of Present Illness   Source of history provided by:  patient. Na Persaud is a 47 y.o. old female who has a past medical history of:   Past Medical History:   Diagnosis Date    Anticoagulant long-term use     Arthritis     back, both hips and both knees    Carpal tunnel syndrome on both sides     both wrists    Cellulitis of right foot 03/2016    C[address] [address] 2012    S/P LUCIUS, BSO    Chronic back pain     Depressed bipolar II disorder ([address]     Diabetes mellitus ([address]     Diverticulitis     DVT (deep vein thrombosis) in pregnancy ([address]     Epilepsy ([address]     Hx of blood clots     Hyperlipidemia     Hypertension     Marijuana use     Moderate persistent reactive airways dysfunction syndrome without complication (HCC)     Neuropathy     Obesity     PONV (postoperative nausea and vomiting)     Rotator cuff tear arthropathy     Thyroid disease     Type 2 diabetes mellitus without complication ([facility]    who presents for rash on her face. Shew as seen in ER 4 days ago for this and she states that symptoms have not improved. She was started on Vistaril at that time. The symptoms were caused by unknown cause. Since onset the symptoms have been persistent. Prior history of similar episodes: No.  She denies any pain with the rash. Nothing seems to make it better or worse. She denies any difficulty w/ breathing or swallowing. She states she has \"pustules\" on the face w/the rash, but reports that they only leak a clear fluid. There has been no purulent drainage from the face. She denies any fevers or chills. No one else has the rash at home. It is no where else but the face. She denies any new soaps, lotions, detergents or medications. Pt states the area is itchy, red, and has not noted streaking. She has a history of DM. Fasting glucose this morning was 130's.  Denies the midline which is not consistent with herpes zoster. Patient seems agitated, frustrated and upset throughout her visit today. Expresses unhappiness with healthcare in general.     No improvement with vistaril. Blood sugar is well controlled. Will do small burst of prednisone along with bactroban ointment. Discussed steroidal side effects and she is to call if glucose >300. Pt advised to f/u with PCP in 2-3 days for recheck and further management. Discussed red flag symptoms. ER if changes or worse. Pt advised to take all medications as directed.

## 2019-05-30 ENCOUNTER — TELEPHONE (OUTPATIENT)
Dept: FAMILY MEDICINE CLINIC | Age: 49
End: 2019-05-30

## 2019-05-30 ENCOUNTER — OFFICE VISIT (OUTPATIENT)
Dept: FAMILY MEDICINE CLINIC | Age: 49
End: 2019-05-30
Payer: MEDICARE

## 2019-05-30 VITALS
HEART RATE: 115 BPM | TEMPERATURE: 98.7 F | WEIGHT: 241 LBS | BODY MASS INDEX: 37.83 KG/M2 | HEIGHT: 67 IN | OXYGEN SATURATION: 95 % | SYSTOLIC BLOOD PRESSURE: 122 MMHG | DIASTOLIC BLOOD PRESSURE: 60 MMHG

## 2019-05-30 DIAGNOSIS — M79.606 PAIN OF LOWER EXTREMITY, UNSPECIFIED LATERALITY: Chronic | ICD-10-CM

## 2019-05-30 DIAGNOSIS — L25.9 CONTACT DERMATITIS, UNSPECIFIED CONTACT DERMATITIS TYPE, UNSPECIFIED TRIGGER: Primary | ICD-10-CM

## 2019-05-30 DIAGNOSIS — M48.061 NEURAL FORAMINAL STENOSIS OF LUMBAR SPINE: Chronic | ICD-10-CM

## 2019-05-30 DIAGNOSIS — M77.51 BONE SPUR OF RIGHT FOOT: ICD-10-CM

## 2019-05-30 DIAGNOSIS — M51.26 DISC DISPLACEMENT, LUMBAR: Chronic | ICD-10-CM

## 2019-05-30 DIAGNOSIS — J68.3 MODERATE PERSISTENT REACTIVE AIRWAYS DYSFUNCTION SYNDROME WITHOUT COMPLICATION (HCC): ICD-10-CM

## 2019-05-30 DIAGNOSIS — G89.29 OTHER CHRONIC PAIN: Primary | Chronic | ICD-10-CM

## 2019-05-30 PROCEDURE — 99213 OFFICE O/P EST LOW 20 MIN: CPT | Performed by: NURSE PRACTITIONER

## 2019-05-30 NOTE — PROGRESS NOTES
Chief Complaint:   Follow-up (Walk in care on Tuesday)    History of Present Illness   Source of history provided by: patient. Dimitri Marks is a 47 y.o. old female who has a past medical history of:   Past Medical History:   Diagnosis Date    Anticoagulant long-term use     Arthritis     back, both hips and both knees    Carpal tunnel syndrome on both sides     both wrists    Cellulitis of right foot 03/2016    Cervical cancer ([address] 2012    S/P LUCIUS, BSO    Chronic back pain     Depressed bipolar II disorder ([address]     Diabetes mellitus ([address]     Diverticulitis     DVT (deep vein thrombosis) in pregnancy ([address]     Epilepsy ([address]     Hx of blood clots     Hyperlipidemia     Hypertension     Marijuana use     Moderate persistent reactive airways dysfunction syndrome without complication (HCC)     Neuropathy     Obesity     PONV (postoperative nausea and vomiting)     Rotator cuff tear arthropathy     Thyroid disease     Type 2 diabetes mellitus without complication ([address]    who presents for rash on her face for the past 2 weeks. She has been to the ER and walk-in ready care. She states that symptoms have improved, provided pictures as proof. She states that Vistaril has not helped but the steroid seems to be helping. She is requesting a referral to an allergist because she can not find the source of the cause, her only lead is that it may have been the new mayonaise that she had purchased. Prior history of similar episodes: No.  She denies any pain with the rash, difficulty w/ breathing, swallowing or changes to her vision. She states her \"pustules\" on the face w/the rash have not returned. There has been no purulent drainage from the face since her visit to the ER. She denies any fevers or chills. No one else has the rash at home. It is no where else but the face. She denies any new soaps, lotions, detergents or medications. Pt states the area is itchy, red, and has not noted streaking. She has a history of DM. Fasting glucose this morning was 160's, she is aware that the steroid will raise her BS so she is watching it more closely. . Denies any fever, chills, HA, recent illness, myalgias, vomiting, or lethargy. ROS    Unless otherwise stated in this report or unable to obtain because of the patient's clinical or mental status as evidenced by the medical record, this patients's positive and negative responses for Review of Systems, constitutional, psych, eyes, ENT, cardiovascular, respiratory, gastrointestinal, neurological, genitourinary, musculoskeletal, integument systems and systems related to the presenting problem are either stated in the preceding or were not pertinent or were negative for the symptoms and/or complaints related to the medical problem. Past Surgical History:  has a past surgical history that includes Knee arthroscopy (); knee surgery; Tubal ligation; rhinoplasty; Hysterectomy (); Cholecystectomy; Kidney biopsy; Echo Complete (2013); Foot surgery (2015); Tonsillectomy;  section (); Nerve Block (Bilateral, 2017); Nerve Block (Bilateral, 06/15/2017); Nerve Block (Bilateral, 2017); Colonoscopy (10/10/2018); and Small intestine surgery (N/A, 2019). Social History:  reports that she has been smoking cigarettes. She has a 32.00 pack-year smoking history. She has never used smokeless tobacco. She reports that she drank alcohol. She reports that she has current or past drug history. Family History: family history includes Anxiety Disorder in her daughter and sister; Asthma in her son; Bipolar Disorder in her mother; Depression in her mother; Hypertension in her mother; Schizophrenia in her son.    Allergies: Nsaids    Physical Exam         VS:  /60   Pulse 115   Temp 98.7 °F (37.1 °C) (Oral)   Ht 5' 7\" (1.702 m)   Wt 241 lb (109.3 kg)   SpO2 95%   BMI 37.75 kg/m²    Oxygen Saturation Interpretation: Normal.    Constitutional:  Alert, development consistent with age. HEENT:  NC/NT. Airway patent. Eyes:  PERRL, EOMI, no discharge. Extraocular eye movements are intact bilaterally. There is no eyelid swelling or erythema, no evidence for orbital or periorbital cellulitis. Ears:  TMs without perforation, injection, or bulging. External canals clear without exudate. Mouth:  Mucous membranes moist without lesions, tongue and gums normal.  Throat:  Pharynx without injection, exudate, or tonsillar hypertrophy. Airway patient. Neck:  Supple. No lymphadenopathy. Lungs:  Clear to auscultation and breath sounds equal.  Heart:  Regular rate and rhythm. Skin:  Normal turgor and appropriately dry to touch. Red raised rash to the face, both cheeks,nose and forehead. It is not around the eyes. The rash is blanching. No petechiae or purpura. There are no pustules to the face. There is no warmth to the area, no evidence for any cellulitis. No red streaking. No fluctuance or abscess formation. Rash is most consistent with contact dermatitis. Neurological:  Orientation age-appropriate unless noted elseware. Motor functions intact. Lab / Imaging Results   (All laboratory and radiology results have been personally reviewed by myself)  Labs:    Imaging: All Radiology results interpreted by Radiologist unless otherwise noted. Assessment / Plan     Impression(s):  1. Contact dermatitis, unspecified contact dermatitis type, unspecified trigger      Disposition:  Disposition: Discharge to home    Patient's rash is improving consistent with a contact dermatitis. Improvement with vistaril, bactroban and prednisone. Blood sugar is well controlled. Discussed steroidal side effects and she is to call if glucose >300. Pt advised to f/u with PCP in 2-3 days for recheck if symptoms progress for further management. Discussed red flag symptoms. ER if changes or worse. Pt advised to take all medications as directed. Pt will see Dr Adair Liz in June for regular appointment and will discuss referral to allergist at that appointment

## 2019-05-30 NOTE — TELEPHONE ENCOUNTER
Patient requesting a renewal handicap placard.     Electronically signed by Francheska Yeung on 5/30/2019 at 3:28 PM

## 2019-06-05 ENCOUNTER — HOSPITAL ENCOUNTER (OUTPATIENT)
Age: 49
Discharge: HOME | End: 2019-06-07
Payer: MEDICARE

## 2019-06-05 ENCOUNTER — NURSE ONLY (OUTPATIENT)
Dept: FAMILY MEDICINE CLINIC | Age: 49
End: 2019-06-05
Payer: MEDICARE

## 2019-06-05 DIAGNOSIS — E11.59 TYPE 2 DIABETES MELLITUS WITH OTHER CIRCULATORY COMPLICATION, UNSPECIFIED WHETHER LONG TERM INSULIN USE (HCC): ICD-10-CM

## 2019-06-05 DIAGNOSIS — G89.29 OTHER CHRONIC PAIN: Chronic | ICD-10-CM

## 2019-06-05 LAB
ALBUMIN SERPL-MCNC: 3.7 G/DL (ref 3.5–5.2)
ALP BLD-CCNC: 77 U/L (ref 35–104)
ALT SERPL-CCNC: 14 U/L (ref 0–32)
ANION GAP SERPL CALCULATED.3IONS-SCNC: 19 MMOL/L (ref 7–16)
AST SERPL-CCNC: 16 U/L (ref 0–31)
BASOPHILS ABSOLUTE: 0.1 E9/L (ref 0–0.2)
BASOPHILS RELATIVE PERCENT: 0.5 % (ref 0–2)
BILIRUB SERPL-MCNC: 0.3 MG/DL (ref 0–1.2)
BUN BLDV-MCNC: 16 MG/DL (ref 6–20)
CALCIUM SERPL-MCNC: 9.5 MG/DL (ref 8.6–10.2)
CHLORIDE BLD-SCNC: 99 MMOL/L (ref 98–107)
CHOLESTEROL, TOTAL: 140 MG/DL (ref 0–199)
CO2: 23 MMOL/L (ref 22–29)
CREAT SERPL-MCNC: 0.8 MG/DL (ref 0.5–1)
CREATININE URINE: 59 MG/DL (ref 29–226)
EOSINOPHILS ABSOLUTE: 0.52 E9/L (ref 0.05–0.5)
EOSINOPHILS RELATIVE PERCENT: 2.8 % (ref 0–6)
FOLATE: >20 NG/ML (ref 4.8–24.2)
GFR AFRICAN AMERICAN: >60
GFR NON-AFRICAN AMERICAN: >60 ML/MIN/1.73
GLUCOSE BLD-MCNC: 155 MG/DL (ref 74–99)
HBA1C MFR BLD: 7.2 % (ref 4–5.6)
HCT VFR BLD CALC: 54.8 % (ref 34–48)
HDLC SERPL-MCNC: 33 MG/DL
HEMOGLOBIN: 17.4 G/DL (ref 11.5–15.5)
IMMATURE GRANULOCYTES #: 0.1 E9/L
IMMATURE GRANULOCYTES %: 0.5 % (ref 0–5)
LDL CHOLESTEROL CALCULATED: 78 MG/DL (ref 0–99)
LYMPHOCYTES ABSOLUTE: 5.54 E9/L (ref 1.5–4)
LYMPHOCYTES RELATIVE PERCENT: 29.4 % (ref 20–42)
MCH RBC QN AUTO: 29 PG (ref 26–35)
MCHC RBC AUTO-ENTMCNC: 31.8 % (ref 32–34.5)
MCV RBC AUTO: 91.5 FL (ref 80–99.9)
MICROALBUMIN UR-MCNC: 14.4 MG/L
MICROALBUMIN/CREAT UR-RTO: 24.4 (ref 0–30)
MONOCYTES ABSOLUTE: 1.35 E9/L (ref 0.1–0.95)
MONOCYTES RELATIVE PERCENT: 7.2 % (ref 2–12)
NEUTROPHILS ABSOLUTE: 11.25 E9/L (ref 1.8–7.3)
NEUTROPHILS RELATIVE PERCENT: 59.6 % (ref 43–80)
PDW BLD-RTO: 17.7 FL (ref 11.5–15)
PLATELET # BLD: 279 E9/L (ref 130–450)
PMV BLD AUTO: 10.9 FL (ref 7–12)
POTASSIUM SERPL-SCNC: 4.7 MMOL/L (ref 3.5–5)
RBC # BLD: 5.99 E12/L (ref 3.5–5.5)
SODIUM BLD-SCNC: 141 MMOL/L (ref 132–146)
TOTAL PROTEIN: 6.7 G/DL (ref 6.4–8.3)
TRIGL SERPL-MCNC: 143 MG/DL (ref 0–149)
TSH SERPL DL<=0.05 MIU/L-ACNC: 1.59 UIU/ML (ref 0.27–4.2)
VITAMIN B-12: 457 PG/ML (ref 211–946)
VLDLC SERPL CALC-MCNC: 29 MG/DL
WBC # BLD: 18.9 E9/L (ref 4.5–11.5)

## 2019-06-05 PROCEDURE — 85025 COMPLETE CBC W/AUTO DIFF WBC: CPT

## 2019-06-05 PROCEDURE — 80061 LIPID PANEL: CPT

## 2019-06-05 PROCEDURE — 82746 ASSAY OF FOLIC ACID SERUM: CPT

## 2019-06-05 PROCEDURE — 82044 UR ALBUMIN SEMIQUANTITATIVE: CPT

## 2019-06-05 PROCEDURE — 83036 HEMOGLOBIN GLYCOSYLATED A1C: CPT

## 2019-06-05 PROCEDURE — 82607 VITAMIN B-12: CPT

## 2019-06-05 PROCEDURE — 80053 COMPREHEN METABOLIC PANEL: CPT

## 2019-06-05 PROCEDURE — 82570 ASSAY OF URINE CREATININE: CPT

## 2019-06-05 PROCEDURE — 36415 COLL VENOUS BLD VENIPUNCTURE: CPT | Performed by: FAMILY MEDICINE

## 2019-06-05 PROCEDURE — 84443 ASSAY THYROID STIM HORMONE: CPT

## 2019-06-07 ENCOUNTER — OFFICE VISIT (OUTPATIENT)
Dept: SURGERY | Age: 49
End: 2019-06-07
Payer: MEDICARE

## 2019-06-07 VITALS
BODY MASS INDEX: 37.67 KG/M2 | SYSTOLIC BLOOD PRESSURE: 130 MMHG | HEIGHT: 67 IN | WEIGHT: 240 LBS | OXYGEN SATURATION: 95 % | RESPIRATION RATE: 18 BRPM | HEART RATE: 113 BPM | TEMPERATURE: 98.6 F | DIASTOLIC BLOOD PRESSURE: 74 MMHG

## 2019-06-07 DIAGNOSIS — A09 DIARRHEA OF INFECTIOUS ORIGIN: Primary | ICD-10-CM

## 2019-06-07 PROCEDURE — 4004F PT TOBACCO SCREEN RCVD TLK: CPT | Performed by: SURGERY

## 2019-06-07 PROCEDURE — G8427 DOCREV CUR MEDS BY ELIG CLIN: HCPCS | Performed by: SURGERY

## 2019-06-07 PROCEDURE — G8417 CALC BMI ABV UP PARAM F/U: HCPCS | Performed by: SURGERY

## 2019-06-07 PROCEDURE — 3017F COLORECTAL CA SCREEN DOC REV: CPT | Performed by: SURGERY

## 2019-06-07 PROCEDURE — 99213 OFFICE O/P EST LOW 20 MIN: CPT | Performed by: SURGERY

## 2019-06-07 RX ORDER — BUSPIRONE HYDROCHLORIDE 10 MG/1
10 TABLET ORAL DAILY
COMMUNITY
Start: 2019-06-06 | End: 2020-11-20 | Stop reason: SDUPTHER

## 2019-06-07 RX ORDER — METRONIDAZOLE 500 MG/1
500 TABLET ORAL 3 TIMES DAILY
Qty: 15 TABLET | Refills: 0 | Status: SHIPPED | OUTPATIENT
Start: 2019-06-07 | End: 2019-06-17

## 2019-06-07 NOTE — PROGRESS NOTES
Lyndsey Rivera  2019  [facility] office visit    Cristy Cruz is a 47 y.o. female post lap sigmoid colectomy 19 with splenic flexure takedown for diverticulitis. Went home 2 days later. Incisions healed well, no cellulitis. Having explosive diarrhea then bad gas. Weight: 240 lb (108.9 kg).     Past Medical History:   Diagnosis Date    Anticoagulant long-term use     Arthritis     back, both hips and both knees    Carpal tunnel syndrome on both sides     both wrists    Cellulitis of right foot 2016    Cervical cancer ([address] 2012    S/P LUCIUS, BSO    Chronic back pain     Depressed bipolar II disorder (HCC)     Diabetes mellitus ([address]     Diverticulitis     DVT (deep vein thrombosis) in pregnancy ([address]     Epilepsy ([address]     Hx of blood clots     Hyperlipidemia     Hypertension     Marijuana use     Moderate persistent reactive airways dysfunction syndrome without complication (HCC)     Neuropathy     Obesity     PONV (postoperative nausea and vomiting)     Rotator cuff tear arthropathy     Thyroid disease     Type 2 diabetes mellitus without complication ([address]      Past Surgical History:   Procedure Laterality Date     SECTION      CHOLECYSTECTOMY      COLONOSCOPY  10/10/2018    COLONOSCOPY WITH BIOPSY performed by Philippe [facility]bone MD at [address] [facility] ECHO COMPLETE  2013         FOOT SURGERY  2015    HYSTERECTOMY  2012    KIDNEY BIOPSY      KNEE ARTHROSCOPY      right and left knee    KNEE SURGERY      left knee    NERVE BLOCK Bilateral 2017    TFNB  #1    NERVE BLOCK Bilateral 06/15/2017    bilatera lumbar transforaminal nerve block #2 L5-S1    NERVE BLOCK Bilateral 2017    Bilateral transforamninal nerve block lumbar #3    RHINOPLASTY      1985    SMALL INTESTINE SURGERY N/A 2019    LAPAROSCOPIC SIGMOID COLECTOMY performed by Philippe Carbone MD at [address]  [facility] Electronically signed by Dr. Davion Jones MD

## 2019-06-10 ASSESSMENT — ENCOUNTER SYMPTOMS
BACK PAIN: 1
SPUTUM PRODUCTION: 0
HEARTBURN: 0
DOUBLE VISION: 0
CONSTIPATION: 0
ABDOMINAL PAIN: 0
DIARRHEA: 0
WHEEZING: 0
ORTHOPNEA: 0
SHORTNESS OF BREATH: 0
BLOOD IN STOOL: 0
BLURRED VISION: 0
COUGH: 0
SORE THROAT: 0
NAUSEA: 0
VOMITING: 0

## 2019-06-10 NOTE — PROGRESS NOTES
Marcelo Lawson is a 47 y.o. female who presents today for    Chief Complaint   Patient presents with    Diabetes     3 month f/u. She is treated for diabetes, bipolar disorder, tobacco use, COPD. She was recently hospitalized for exacerbation of diverticulosis/diverticulitis with complications. She has an acute onset of URI symptoms. She is treated for Peripheral Vascular disease. Because of worsening of RLE doppler studies, she is scheduled for catheterization with angioplasty/possible stent placement on 7/5/19 (Dr. Anabel White endovascular)    Dermatits/Angioedema:  Seen in [address] [place] [address] In Care 5/28/19 and F/U 5/30/19. Patient was prescribed steroids for the condition with resolution of symptoms. Leukocytosis:  Elevated WBC, H/H noted on most recent lab work. She reports that she had been on steroids just days prior to labs drawn. Has seen Hematology in the past.  After chart review and further history/discussion, will repeat CBC in 3 weeks (7/3/19)      DM2:   F/U DM. Patient is now controlled, albeit a little less controlled than previous visit. Metformin 1000 mg BID. Compliant with therapy and tolerating it well. Went to [facility], opted to cut out sugar from diet as main control mechanism. Has some sugary foods in diet and occasional lapses with increased sugar intake. Monitoring blood sugar fasting in AM and QHS. Fasting blood sugars: between 110 - 120 in the morning depending on what she had for dinner. HS blood sugars:135 - 200 in the evening. Average is 125. Patient is taking ASA but not Ace Inhibitor/ARB. Patient is taking 80 mg atorvastatin daily. LDL is not at goal (125). BP is controlled 110/78. Has a [facility] of heart disease, M GMA had CHF, MI, HTN, uncle had similar problems. Unaware of mother/father medical history. Denies chest pain, SOB, C/N/V/D. Denies dysuria, hematuria. Patient is following with podiatrist.  Saw an Eye Dr 2/18.  Patient is aware that it is necessary to see an Eye Dr yearly. Patient does smoke and is not ready to quit or cut back. She is making healthier choices and is more mindful of her portions. Participating with insurance food access program       Lab Results   Component Value Date    LABA1C 7.2 (H) 06/05/2019    LABA1C 6.8 (H) 02/28/2019    LABA1C 6.9 (H) 11/28/2018     Lab Results   Component Value Date    LABMICR 14.4 06/05/2019    LDLCALC 78 06/05/2019    CREATININE 0.8 06/05/2019       Lab Results   Component Value Date    LDLCALC 78 06/05/2019       Lab Results   Component Value Date    CHOL 140 06/05/2019    CHOL 158 02/28/2019    CHOL 188 11/28/2018     Lab Results   Component Value Date    TRIG 143 06/05/2019    TRIG 142 02/28/2019    TRIG 154 (H) 11/28/2018     Lab Results   Component Value Date    HDL 33 06/05/2019    HDL 34 02/28/2019    HDL 33 11/28/2018     Lab Results   Component Value Date    LDLCALC 78 06/05/2019    LDLCALC 96 02/28/2019    LDLCALC 124 (H) 11/28/2018       Splenic Infarct/Pulmonary Embolism:  Lab Results   Component Value Date    INR 2.2 02/14/2019    INR 0.9 02/06/2019    INR 0.9 02/04/2019    PROTIME 10.5 02/06/2019    PROTIME 10.2 02/04/2019    PROTIME 24.3 10/23/2018       Tobacco Dependency:  She really is not ready to quit. Smokes 1 pack a day. Bipolar Disorder: At this time she is well controlled. Depakote is prescribed to patient by Deanne Portillo (Neurology) and Cymbalta prescribed by Dr. Germain Martell. Obesity:  BMI: 38.22; weight is 244#. Chronic Bronchitis:  Doing well. Mild \"smoker's cough\" but denies shortness of breath. Mild wheezing reported. Currently on no medication; doesn't feel that symptoms are impairing her quality of life/function. Seizure Disorder:  Taking Depakote and Keppra. No breakthrough symptoms.      Peripheral Neuropathy:  Between numbness, weakness, and pain, coupled with poor balance and inability to stand for long periods of time, patient finally acquired shower bench and will get tub rails on her own. [address]:  Patient's past medical, surgical, social and/or family history reviewed, updated in chart, and are non-contributory (unless otherwise stated). Medications and allergies also reviewed and updated in chart. Review of Systems  Review of Systems   Constitutional: Positive for malaise/fatigue. Negative for weight loss. HENT: Negative for congestion, ear pain and sore throat. Eyes: Negative for blurred vision and double vision. Respiratory: Negative for cough, sputum production, shortness of breath and wheezing. Cardiovascular: Negative for chest pain, palpitations, orthopnea and leg swelling. Gastrointestinal: Negative for abdominal pain, blood in stool, constipation, diarrhea, heartburn, nausea and vomiting. Genitourinary: Negative for dysuria, frequency, hematuria and urgency. Musculoskeletal: Positive for back pain, joint pain, myalgias and neck pain. Skin: Negative for itching and rash. Neurological: Positive for headaches. Negative for dizziness, tingling, focal weakness and weakness. Psychiatric/Behavioral: Negative for depression, memory loss and substance abuse. The patient is not nervous/anxious and does not have insomnia. Physical Exam:    VS:  /78   Pulse 115   Temp 98.8 °F (37.1 °C) (Oral)   Resp 16   Ht 5' 7\" (1.702 m)   Wt 244 lb (110.7 kg)   SpO2 95%   BMI 38.22 kg/m²   LAST WEIGHT:  Wt Readings from Last 3 Encounters:   06/12/19 244 lb (110.7 kg)   06/07/19 240 lb (108.9 kg)   05/30/19 241 lb (109.3 kg)     Physical Exam   Constitutional: She is oriented to person, place, and time. Vital signs are normal. She appears well-developed and well-nourished. No distress. HENT:   Head: Normocephalic and atraumatic. Right Ear: External ear normal.   Left Ear: External ear normal.   Nose: Right sinus exhibits maxillary sinus tenderness and frontal sinus tenderness.  Left sinus exhibits maxillary sinus tenderness and frontal sinus tenderness. Mouth/Throat: Posterior oropharyngeal erythema present. No oropharyngeal exudate. Eyes: Pupils are equal, round, and reactive to light. Conjunctivae and EOM are normal. Right eye exhibits no discharge. No scleral icterus. Neck: Normal range of motion. Neck supple. No thyromegaly present. Cardiovascular: Normal rate, regular rhythm, normal heart sounds and intact distal pulses. No murmur heard. Pulmonary/Chest: Effort normal. No stridor. No respiratory distress. She has wheezes (end expiratory wheezes). She has no rales. She exhibits no tenderness. Abdominal: Soft. Bowel sounds are normal. She exhibits no distension and no mass. There is no tenderness. There is no guarding. Musculoskeletal: Normal range of motion. She exhibits no edema or tenderness. Lymphadenopathy:     She has no cervical adenopathy. Neurological: She is alert and oriented to person, place, and time. Skin: Skin is warm and dry. No rash noted. She is not diaphoretic. No erythema. No pallor. Last diabetic foot exam done 2/5/18     Psychiatric: She has a normal mood and affect. Her behavior is normal. Thought content normal.   Vitals reviewed.       Labs:  Lab Results   Component Value Date     06/05/2019    K 4.7 06/05/2019    K 4.4 02/04/2019    CL 99 06/05/2019    CO2 23 06/05/2019    BUN 16 06/05/2019    CREATININE 0.8 06/05/2019    PROT 6.7 06/05/2019    LABALBU 3.7 06/05/2019    LABALBU 3.6 11/30/2010    CALCIUM 9.5 06/05/2019    GFRAA >60 06/05/2019    LABGLOM >60 06/05/2019    GLUCOSE 155 06/05/2019    GLUCOSE 70 04/28/2011    AST 16 06/05/2019    ALT 14 06/05/2019    ALKPHOS 77 06/05/2019    BILITOT 0.3 06/05/2019    TSH 1.590 06/05/2019    CHOL 140 06/05/2019    TRIG 143 06/05/2019    HDL 33 06/05/2019    LDLCALC 78 06/05/2019    LABA1C 7.2 06/05/2019        Lab Results   Component Value Date    CHOL 140 06/05/2019    CHOL 158 02/28/2019    CHOL 188 11/28/2018     Lab MISC; by Does not apply route Patient cannot walk 200 ft without stopping to rest.    Expiration 2024    Type 2 diabetes mellitus with other circulatory complication, unspecified whether long term insulin use (HCC)  -     metFORMIN (GLUCOPHAGE) 1000 MG tablet; TAKE 1 TABLET TWICE DAILY WITH MEALS    Seizure disorder  -     divalproex (DEPAKOTE ER) 500 MG extended release tablet; TAKE 1 TABLET IN THE MORNING AND 2 TABLETS IN THE EVENING  -     levETIRAcetam (KEPPRA) 500 MG tablet; Take 1 tablet by mouth 2 times daily    Peripheral polyneuropathy  -     divalproex (DEPAKOTE ER) 500 MG extended release tablet; TAKE 1 TABLET IN THE MORNING AND 2 TABLETS IN THE EVENING    Gastroesophageal reflux disease, esophagitis presence not specified  -     omeprazole (PRILOSEC) 20 MG delayed release capsule; TAKE 1 CAPSULE EVERY MORNING  BEFORE  BREAKFAST    Anticoagulant long-term use  -     warfarin (COUMADIN) 2.5 MG tablet; 1 tablet M  -     warfarin (COUMADIN) 5 MG tablet; Indications: Tuesday - Sunday 1 tablet all days except Mondays    Renal infarct (HCC)  -     warfarin (COUMADIN) 2.5 MG tablet; 1 tablet M  -     warfarin (COUMADIN) 5 MG tablet; Indications: Tuesday - Sunday 1 tablet all days except Mondays    Other elevated white blood cell (WBC) count: Will repeat CBC first, as patient was channing-steroid treatment when this lab was drawn  -     CBC Auto Differential; Future in 3 weeks (7/3/19)  -     If indices remain elevated, consider referral back to Hematology        Call or go to ED immediately if symptoms worsen or persist.    Follow Up:  Return 1) schedule lab only on Wednesday 7/3/19 between 8:00-8:45 AM, for 2) F/U 3 mos check up; fasting labs 1 week prior. , or sooner if necessary. Educational materials and/or home exercises printed for patient's review and were included in patient instructions on his/her After Visit Summary and given to patient at the end of visit.       Counseled regarding above diagnosis, including possible risks and complications,  especially if left uncontrolled. Counseled regarding the possible side effects, risks, benefits and alternatives to treatment; patient and/or guardian verbalizes understanding, agrees, feels comfortable with and wishes to proceed with above treatment plan. Advised patient to call with any new medication issues, and read all Rx info from pharmacy to assure aware of all possible risks and side effects of medication before taking. Reviewed age and gender appropriate health screening exams and vaccinations. Advised patient regarding importance of keeping up with recommended health maintenance and to schedule as soon as possible if overdue, as this is important in assessing for undiagnosed pathology, especially cancer, as well as protecting against potentially harmful/life threatening disease. Patient and/or guardian verbalizes understanding and agrees with above counseling, assessment and plan. All questions answered. Ricardo Ortiz MD      On the basis of positive PHQ-9 screening ( ), the following plan was implemented: patient is already under care psychiatry.   Patient will follow-up in as directed with psychiatrist.

## 2019-06-12 ENCOUNTER — OFFICE VISIT (OUTPATIENT)
Dept: FAMILY MEDICINE CLINIC | Age: 49
End: 2019-06-12
Payer: MEDICARE

## 2019-06-12 VITALS
WEIGHT: 244 LBS | HEART RATE: 115 BPM | OXYGEN SATURATION: 95 % | DIASTOLIC BLOOD PRESSURE: 78 MMHG | SYSTOLIC BLOOD PRESSURE: 110 MMHG | HEIGHT: 67 IN | BODY MASS INDEX: 38.3 KG/M2 | RESPIRATION RATE: 16 BRPM | TEMPERATURE: 98.8 F

## 2019-06-12 DIAGNOSIS — G40.909 SEIZURE DISORDER (HCC): ICD-10-CM

## 2019-06-12 DIAGNOSIS — D72.828 OTHER ELEVATED WHITE BLOOD CELL (WBC) COUNT: ICD-10-CM

## 2019-06-12 DIAGNOSIS — I10 ESSENTIAL HYPERTENSION: ICD-10-CM

## 2019-06-12 DIAGNOSIS — K21.9 GASTROESOPHAGEAL REFLUX DISEASE, ESOPHAGITIS PRESENCE NOT SPECIFIED: ICD-10-CM

## 2019-06-12 DIAGNOSIS — F17.200 TOBACCO DEPENDENCY: ICD-10-CM

## 2019-06-12 DIAGNOSIS — M51.26 DISC DISPLACEMENT, LUMBAR: Chronic | ICD-10-CM

## 2019-06-12 DIAGNOSIS — E11.59 TYPE 2 DIABETES MELLITUS WITH OTHER CIRCULATORY COMPLICATION, UNSPECIFIED WHETHER LONG TERM INSULIN USE (HCC): ICD-10-CM

## 2019-06-12 DIAGNOSIS — E78.2 MIXED HYPERLIPIDEMIA: ICD-10-CM

## 2019-06-12 DIAGNOSIS — Z79.01 ANTICOAGULANT LONG-TERM USE: ICD-10-CM

## 2019-06-12 DIAGNOSIS — M77.51 BONE SPUR OF RIGHT FOOT: ICD-10-CM

## 2019-06-12 DIAGNOSIS — M48.061 NEURAL FORAMINAL STENOSIS OF LUMBAR SPINE: Chronic | ICD-10-CM

## 2019-06-12 DIAGNOSIS — J68.3 MODERATE PERSISTENT REACTIVE AIRWAYS DYSFUNCTION SYNDROME WITHOUT COMPLICATION (HCC): ICD-10-CM

## 2019-06-12 DIAGNOSIS — G62.9 PERIPHERAL POLYNEUROPATHY: Chronic | ICD-10-CM

## 2019-06-12 DIAGNOSIS — E11.59 TYPE 2 DIABETES MELLITUS WITH OTHER CIRCULATORY COMPLICATION, WITHOUT LONG-TERM CURRENT USE OF INSULIN (HCC): Primary | ICD-10-CM

## 2019-06-12 DIAGNOSIS — N28.0 RENAL INFARCT (HCC): ICD-10-CM

## 2019-06-12 DIAGNOSIS — E03.8 SUBCLINICAL HYPOTHYROIDISM: ICD-10-CM

## 2019-06-12 PROCEDURE — G8427 DOCREV CUR MEDS BY ELIG CLIN: HCPCS | Performed by: FAMILY MEDICINE

## 2019-06-12 PROCEDURE — 2022F DILAT RTA XM EVC RTNOPTHY: CPT | Performed by: FAMILY MEDICINE

## 2019-06-12 PROCEDURE — G8417 CALC BMI ABV UP PARAM F/U: HCPCS | Performed by: FAMILY MEDICINE

## 2019-06-12 PROCEDURE — 99215 OFFICE O/P EST HI 40 MIN: CPT | Performed by: FAMILY MEDICINE

## 2019-06-12 PROCEDURE — 3045F PR MOST RECENT HEMOGLOBIN A1C LEVEL 7.0-9.0%: CPT | Performed by: FAMILY MEDICINE

## 2019-06-12 PROCEDURE — 3017F COLORECTAL CA SCREEN DOC REV: CPT | Performed by: FAMILY MEDICINE

## 2019-06-12 PROCEDURE — 4004F PT TOBACCO SCREEN RCVD TLK: CPT | Performed by: FAMILY MEDICINE

## 2019-06-12 RX ORDER — WARFARIN SODIUM 2.5 MG/1
TABLET ORAL
Qty: 25 TABLET | Refills: 3 | Status: SHIPPED
Start: 2019-06-12 | End: 2020-04-29 | Stop reason: SDUPTHER

## 2019-06-12 RX ORDER — ATORVASTATIN CALCIUM 80 MG/1
TABLET, FILM COATED ORAL
Qty: 90 TABLET | Refills: 3 | Status: SHIPPED
Start: 2019-06-12 | End: 2020-03-30

## 2019-06-12 RX ORDER — LEVETIRACETAM 500 MG/1
500 TABLET ORAL 2 TIMES DAILY
Qty: 180 TABLET | Refills: 3
Start: 2019-06-12 | End: 2019-08-07 | Stop reason: SDUPTHER

## 2019-06-12 RX ORDER — ALBUTEROL SULFATE 90 UG/1
2 AEROSOL, METERED RESPIRATORY (INHALATION) EVERY 6 HOURS PRN
Qty: 3 INHALER | Refills: 3 | Status: SHIPPED
Start: 2019-06-12 | End: 2020-04-17 | Stop reason: SDUPTHER

## 2019-06-12 RX ORDER — WARFARIN SODIUM 5 MG/1
TABLET ORAL
Qty: 75 TABLET | Refills: 3 | Status: SHIPPED
Start: 2019-06-12 | End: 2020-04-29 | Stop reason: SDUPTHER

## 2019-06-12 RX ORDER — OMEPRAZOLE 20 MG/1
CAPSULE, DELAYED RELEASE ORAL
Qty: 90 CAPSULE | Refills: 3 | Status: SHIPPED
Start: 2019-06-12 | End: 2020-04-29 | Stop reason: SDUPTHER

## 2019-06-12 RX ORDER — LEVOTHYROXINE SODIUM 0.07 MG/1
75 TABLET ORAL DAILY
Qty: 90 TABLET | Refills: 3 | Status: SHIPPED
Start: 2019-06-12 | End: 2020-03-30

## 2019-06-12 RX ORDER — DIVALPROEX SODIUM 500 MG/1
TABLET, EXTENDED RELEASE ORAL
Qty: 270 TABLET | Refills: 3 | Status: SHIPPED
Start: 2019-06-12 | End: 2020-04-29 | Stop reason: SDUPTHER

## 2019-06-27 ENCOUNTER — HOSPITAL ENCOUNTER (OUTPATIENT)
Dept: PULMONOLOGY | Age: 49
Discharge: HOME | End: 2019-06-27
Payer: MEDICARE

## 2019-06-27 DIAGNOSIS — F17.200 TOBACCO DEPENDENCY: ICD-10-CM

## 2019-06-27 DIAGNOSIS — J68.3 MODERATE PERSISTENT REACTIVE AIRWAYS DYSFUNCTION SYNDROME WITHOUT COMPLICATION (HCC): ICD-10-CM

## 2019-06-27 PROCEDURE — 94726 PLETHYSMOGRAPHY LUNG VOLUMES: CPT

## 2019-06-27 PROCEDURE — 94060 EVALUATION OF WHEEZING: CPT

## 2019-06-27 PROCEDURE — 94726 PLETHYSMOGRAPHY LUNG VOLUMES: CPT | Performed by: INTERNAL MEDICINE

## 2019-06-27 PROCEDURE — 94729 DIFFUSING CAPACITY: CPT

## 2019-06-27 PROCEDURE — 94060 EVALUATION OF WHEEZING: CPT | Performed by: INTERNAL MEDICINE

## 2019-06-27 PROCEDURE — 94729 DIFFUSING CAPACITY: CPT | Performed by: INTERNAL MEDICINE

## 2019-06-28 ENCOUNTER — OFFICE VISIT (OUTPATIENT)
Dept: SURGERY | Age: 49
End: 2019-06-28
Payer: MEDICARE

## 2019-06-28 VITALS
WEIGHT: 244 LBS | HEART RATE: 88 BPM | BODY MASS INDEX: 38.3 KG/M2 | TEMPERATURE: 98.4 F | HEIGHT: 67 IN | SYSTOLIC BLOOD PRESSURE: 114 MMHG | DIASTOLIC BLOOD PRESSURE: 70 MMHG

## 2019-06-28 DIAGNOSIS — K59.1 FUNCTIONAL DIARRHEA: Primary | ICD-10-CM

## 2019-06-28 DIAGNOSIS — E11.9 TYPE 2 DIABETES MELLITUS WITHOUT COMPLICATION, WITH LONG-TERM CURRENT USE OF INSULIN (HCC): ICD-10-CM

## 2019-06-28 DIAGNOSIS — Z79.4 TYPE 2 DIABETES MELLITUS WITHOUT COMPLICATION, WITH LONG-TERM CURRENT USE OF INSULIN (HCC): ICD-10-CM

## 2019-06-28 DIAGNOSIS — E11.59 TYPE 2 DIABETES MELLITUS WITH OTHER CIRCULATORY COMPLICATION, UNSPECIFIED WHETHER LONG TERM INSULIN USE (HCC): ICD-10-CM

## 2019-06-28 PROCEDURE — G8417 CALC BMI ABV UP PARAM F/U: HCPCS | Performed by: SURGERY

## 2019-06-28 PROCEDURE — 3017F COLORECTAL CA SCREEN DOC REV: CPT | Performed by: SURGERY

## 2019-06-28 PROCEDURE — 4004F PT TOBACCO SCREEN RCVD TLK: CPT | Performed by: SURGERY

## 2019-06-28 PROCEDURE — 99213 OFFICE O/P EST LOW 20 MIN: CPT | Performed by: SURGERY

## 2019-06-28 PROCEDURE — G8427 DOCREV CUR MEDS BY ELIG CLIN: HCPCS | Performed by: SURGERY

## 2019-06-28 NOTE — PROCEDURES
[address]                  [address] [facility],  [facility]                               PULMONARY FUNCTION    PATIENT NAME: Eliz Wagner                    :        1970  MED REC NO:   [id number]                            ROOM:  ACCOUNT NO:   [de-identified]                           ADMIT DATE: 2019  PROVIDER:     Lian Torre DO    DATE OF PROCEDURE:  2019    INDICATIONS:  A 67-year-old  female, 67 inches, 240 pounds. Reactive airway dysfunction syndrome and cigarette abuse of 38 pack  years. FINDINGS:  Pulmonary function tests revealed forced vital capacity post  bronchodilator of 3.19 liters, 83% of predicted with an FEV1 of 2.55  liters, 87% of predicted with an FEV1/FVC ratio of 80%. Partial  bronchodilator response of 50% is noted. Static lung volumes with slow vital capacity of 2.66 liters, 74% of  predicted. Inspiratory capacity of 3.46 liters, 92% of predicted. Expiratory reserve volume of 0.20 liters, 117% of predicted. Thoracic  gas volume of 4.09 L, 133% of predicted. Residual volume of 3.89 L,  200% of predicted with a total lung capacity of 6.55 liters, 192% of  predicted with an RV/TLC ratio of 169%. The diffusion capacity is  normal at 85% of predicated with reduced specific airway conductance of  58%. IMPRESSION:  There is no definitive airflow obstruction or restrictive  pathology. Obesity related early lung disease is noted as the ERV is  reduced only at 17% of its predicted value. There is no significant  bronchodilator response, but there is evidence of air trapping as noted  by the increased residual volume and RV/TLC ratio. The diffusion  capacity is normal and indicates no loss in gas transfer, but the  reduced specific airway conductance consistent with increased airflow  resistance.   Recommend methacholine challenge test.        Gloria Cortes DO    D: 2019 19:09:26 T: 06/28/2019 0:39:23     [email or web address]  Job#: [id number]     Doc#: [id number]    CC:

## 2019-06-28 NOTE — PROGRESS NOTES
Ej Jiménez  2019  [facility] office visit    Ej Jiménez is a 47 y.o. female post lap sigmoid colectomy 19 with splenic flexure takedown for diverticulitis. Went home 2 days later. Incisions healed well, no cellulitis. Developed explosive diarrhea then bad gas so she was instructed to use Flagyl 500 mg tid for 5 days, Probiotics and fiber to regular the bowels. She finished the Flagyl but did not try the Probiotics or fiber. Still having diarrhea if she over eats. Fell and scraped the left knee, has a 6 cm skin abrasion with 1 cm of surrounding redness, says it is getting better. Weight: 244 lb (110.7 kg).     Past Medical History:   Diagnosis Date    Anticoagulant long-term use     Arthritis     back, both hips and both knees    Carpal tunnel syndrome on both sides     both wrists    Cellulitis of right foot 2016    Cervical cancer ([address] 2012    S/P LUCIUS, BSO    Chronic back pain     Depressed bipolar II disorder (HCC)     Diabetes mellitus ([address]     Diverticulitis     DVT (deep vein thrombosis) in pregnancy ([address]     Epilepsy ([address]     Hx of blood clots     Hyperlipidemia     Hypertension     Marijuana use     Moderate persistent reactive airways dysfunction syndrome without complication (HCC)     Neuropathy     Obesity     PONV (postoperative nausea and vomiting)     Rotator cuff tear arthropathy     Thyroid disease     Type 2 diabetes mellitus without complication ([address]      Past Surgical History:   Procedure Laterality Date     SECTION      CHOLECYSTECTOMY      COLONOSCOPY  10/10/2018    COLONOSCOPY WITH BIOPSY performed by Santy Cho MD at [address] [facility] ECHO COMPLETE  2013         FOOT SURGERY  2015    HYSTERECTOMY      KIDNEY BIOPSY      KNEE ARTHROSCOPY      right and left knee    KNEE SURGERY      left knee    NERVE BLOCK Bilateral 2017    TFNB  #1    NERVE BLOCK Bilateral 06/15/2017    bilatera lumbar transforaminal nerve block #2 L5-S1    NERVE BLOCK Bilateral 09/20/2017    Bilateral transforamninal nerve block lumbar #3    RHINOPLASTY      1985    SMALL INTESTINE SURGERY N/A 2/6/2019    LAPAROSCOPIC SIGMOID COLECTOMY performed by Karina Gibson MD at [address] [place] [address] Medications  Prior to Visit Medications    Medication Sig Taking? Authorizing Provider   DULoxetine (CYMBALTA) 60 MG extended release capsule TAKE 1 CAPSULE EVERY DAY Yes Vianca Smith MD   levothyroxine (SYNTHROID) 75 MCG tablet Take 1 tablet by mouth Daily Yes Vianca Smith MD   metFORMIN (GLUCOPHAGE) 1000 MG tablet TAKE 1 TABLET TWICE DAILY WITH MEALS Yes Vianca Smith MD   atorvastatin (LIPITOR) 80 MG tablet TAKE 1 TABLET EVERY DAY Yes Vianca Smith MD   divalproex (DEPAKOTE ER) 500 MG extended release tablet TAKE 1 TABLET IN THE MORNING AND 2 TABLETS IN THE EVENING Yes Vianca Smith MD   levETIRAcetam (KEPPRA) 500 MG tablet Take 1 tablet by mouth 2 times daily Yes [place] MD Ailin   albuterol sulfate  (90 Base) MCG/ACT inhaler Inhale 2 puffs into the lungs every 6 hours as needed for Wheezing Yes Sugarloafmatti Parisi MD   omeprazole (PRILOSEC) 20 MG delayed release capsule TAKE 1 CAPSULE EVERY MORNING  BEFORE  BREAKFAST Yes Sugarloafmatti Parisi MD   warfarin (COUMADIN) 2.5 MG tablet 1 tablet M Yes Brandon Smith MD   warfarin (COUMADIN) 5 MG tablet Indications: Tuesday - Sunday 1 tablet all days except Mondays Yes Sugarloafmatti Parisi MD   busPIRone (BUSPAR) 10 MG tablet  Yes Historical Provider, MD   Blood Glucose Monitoring Suppl (ACCU-CHEK GUIDE) w/Device KIT Use as directed.   Provide appropriate supplies wit kit for patient Yes Luis Parisi MD   chlorzoxazone (PARAFON FORTE) 500 MG tablet TAKE 1 TABLET FOUR TIMES DAILY AS NEEDED FOR MUSCLE SPASMS Yes Alma Delia Sauer,    metoprolol tartrate (LOPRESSOR) 25 MG tablet TAKE 1/2 TABLET TWICE DAILY Yes Bassam Chamberlain MD   ACCU-CHEK FASTCLIX LANCETS MISC CHECK BLOOD SUGAR TWICE DAILY Yes Grace Smith MD   blood glucose test strips (ONE TOUCH ULTRA TEST) strip Pt. Tests BID -TID. Yes Bassam Chamberlain MD   Multiple Vitamins-Minerals (THERAPEUTIC MULTIVITAMIN-MINERALS) tablet Take 1 tablet by mouth daily Yes Historical Provider, MD   aspirin 81 MG tablet Take 1 tablet by mouth daily Yes MD Tony Swann MISC by Does not apply route Patient cannot walk 200 ft without stopping to rest.    Expiration 2024  MD Tony Swann by Does not apply route Patient cannot walk 200 ft without stopping to rest.    Expiration 2024  Bassam Chamberlain MD       Allergies: Nsaids     Physical Exam:   VITALS: Temperature 98.4 °F (36.9 °C), temperature source Oral, height 5' 7\" (1.702 m), weight 244 lb (110.7 kg), not currently breastfeeding. General appearance: alert, appears stated age and cooperative, does ambulate easily  Head: Normocephalic, without obvious abnormality, atraumatic  Eyes: PERRL  Ears/mouth/throat:  Ears clear, mouth normal, throat no redness  Neck: no adenopathy, no JVD, supple, symmetrical, trachea midline and thyroid not enlarged  Lungs: clear to auscultation bilaterally  Heart: regular rate and rhythm  Abdomen: soft, non-tender; bowel sounds normal; no masses,  no organomegaly  Extremities: extremities normal, atraumatic, no cyanosis or edema  Skin: large abrasion/bruise on the left knee from a fall last week. Assessment:    Irritable bowel, diarrhea post sigmoid colectomy improved but not resolved with Flagyl 500 mg tid for 5 days. Did not try the Probiotics or fiber every day to regular the bowels.     Plan:   Use fiber such as Metamucil, Fibercon pills or Fiber Gummy's daily to help regulate the bowels. Follow up in 9 months to plan a colonoscopy.       Physician Signature: Electronically signed by Dr. Jesica Matias MD

## 2019-06-30 RX ORDER — LANCETS
EACH MISCELLANEOUS
Qty: 204 EACH | Refills: 3 | Status: SHIPPED | OUTPATIENT
Start: 2019-06-30 | End: 2019-10-21 | Stop reason: SDUPTHER

## 2019-07-02 RX ORDER — ISOPROPYL ALCOHOL 0.75 G/1
SWAB TOPICAL
Qty: 200 EACH | Refills: 3 | Status: SHIPPED | OUTPATIENT
Start: 2019-07-02 | End: 2019-10-21 | Stop reason: SDUPTHER

## 2019-07-10 ENCOUNTER — OFFICE VISIT (OUTPATIENT)
Dept: FAMILY MEDICINE CLINIC | Age: 49
End: 2019-07-10
Payer: MEDICARE

## 2019-07-10 ENCOUNTER — HOSPITAL ENCOUNTER (OUTPATIENT)
Age: 49
Discharge: HOME | End: 2019-07-12
Payer: MEDICARE

## 2019-07-10 VITALS
TEMPERATURE: 97.4 F | DIASTOLIC BLOOD PRESSURE: 70 MMHG | SYSTOLIC BLOOD PRESSURE: 110 MMHG | HEART RATE: 97 BPM | HEIGHT: 67 IN | WEIGHT: 248 LBS | BODY MASS INDEX: 38.92 KG/M2 | OXYGEN SATURATION: 97 %

## 2019-07-10 DIAGNOSIS — M79.601 PAIN AND SWELLING OF RIGHT UPPER EXTREMITY: ICD-10-CM

## 2019-07-10 DIAGNOSIS — R78.81 BACTEREMIA ASSOCIATED WITH INTRAVASCULAR LINE, SUBSEQUENT ENCOUNTER: Primary | ICD-10-CM

## 2019-07-10 DIAGNOSIS — M79.89 PAIN AND SWELLING OF RIGHT UPPER EXTREMITY: ICD-10-CM

## 2019-07-10 DIAGNOSIS — T82.7XXD BACTEREMIA ASSOCIATED WITH INTRAVASCULAR LINE, SUBSEQUENT ENCOUNTER: Primary | ICD-10-CM

## 2019-07-10 DIAGNOSIS — R78.81 BACTEREMIA ASSOCIATED WITH INTRAVASCULAR LINE, SUBSEQUENT ENCOUNTER: ICD-10-CM

## 2019-07-10 DIAGNOSIS — Z51.81 ENCOUNTER FOR MONITORING BRIDGING ANTICOAGULATION THERAPY: ICD-10-CM

## 2019-07-10 DIAGNOSIS — T82.7XXD BACTEREMIA ASSOCIATED WITH INTRAVASCULAR LINE, SUBSEQUENT ENCOUNTER: ICD-10-CM

## 2019-07-10 DIAGNOSIS — Z79.01 ENCOUNTER FOR MONITORING BRIDGING ANTICOAGULATION THERAPY: ICD-10-CM

## 2019-07-10 LAB
ALBUMIN SERPL-MCNC: 4.1 G/DL (ref 3.5–5.2)
ALP BLD-CCNC: 122 U/L (ref 35–104)
ALT SERPL-CCNC: 20 U/L (ref 0–32)
ANION GAP SERPL CALCULATED.3IONS-SCNC: 23 MMOL/L (ref 7–16)
AST SERPL-CCNC: 18 U/L (ref 0–31)
BASOPHILS ABSOLUTE: 0.16 E9/L (ref 0–0.2)
BASOPHILS RELATIVE PERCENT: 1 % (ref 0–2)
BILIRUB SERPL-MCNC: 0.3 MG/DL (ref 0–1.2)
BUN BLDV-MCNC: 13 MG/DL (ref 6–20)
CALCIUM SERPL-MCNC: 10.3 MG/DL (ref 8.6–10.2)
CHLORIDE BLD-SCNC: 98 MMOL/L (ref 98–107)
CO2: 21 MMOL/L (ref 22–29)
CREAT SERPL-MCNC: 0.9 MG/DL (ref 0.5–1)
EOSINOPHILS ABSOLUTE: 0.99 E9/L (ref 0.05–0.5)
EOSINOPHILS RELATIVE PERCENT: 6 % (ref 0–6)
GFR AFRICAN AMERICAN: >60
GFR NON-AFRICAN AMERICAN: >60 ML/MIN/1.73
GLUCOSE BLD-MCNC: 195 MG/DL (ref 74–99)
HCT VFR BLD CALC: 53.5 % (ref 34–48)
HEMOGLOBIN: 16.7 G/DL (ref 11.5–15.5)
IMMATURE GRANULOCYTES #: 0.2 E9/L
IMMATURE GRANULOCYTES %: 1.2 % (ref 0–5)
INTERNATIONAL NORMALIZATION RATIO, POC: 1.8
LYMPHOCYTES ABSOLUTE: 4.07 E9/L (ref 1.5–4)
LYMPHOCYTES RELATIVE PERCENT: 24.5 % (ref 20–42)
MCH RBC QN AUTO: 28.5 PG (ref 26–35)
MCHC RBC AUTO-ENTMCNC: 31.2 % (ref 32–34.5)
MCV RBC AUTO: 91.3 FL (ref 80–99.9)
MONOCYTES ABSOLUTE: 1.35 E9/L (ref 0.1–0.95)
MONOCYTES RELATIVE PERCENT: 8.1 % (ref 2–12)
NEUTROPHILS ABSOLUTE: 9.83 E9/L (ref 1.8–7.3)
NEUTROPHILS RELATIVE PERCENT: 59.2 % (ref 43–80)
PDW BLD-RTO: 17.2 FL (ref 11.5–15)
PLATELET # BLD: 283 E9/L (ref 130–450)
PMV BLD AUTO: 10.9 FL (ref 7–12)
POTASSIUM SERPL-SCNC: 4.3 MMOL/L (ref 3.5–5)
PROTHROMBIN TIME, POC: 21.6
RBC # BLD: 5.86 E12/L (ref 3.5–5.5)
SODIUM BLD-SCNC: 142 MMOL/L (ref 132–146)
TOTAL PROTEIN: 7.4 G/DL (ref 6.4–8.3)
WBC # BLD: 16.6 E9/L (ref 4.5–11.5)

## 2019-07-10 PROCEDURE — 1111F DSCHRG MED/CURRENT MED MERGE: CPT | Performed by: FAMILY MEDICINE

## 2019-07-10 PROCEDURE — 4004F PT TOBACCO SCREEN RCVD TLK: CPT | Performed by: FAMILY MEDICINE

## 2019-07-10 PROCEDURE — 85610 PROTHROMBIN TIME: CPT | Performed by: FAMILY MEDICINE

## 2019-07-10 PROCEDURE — 80053 COMPREHEN METABOLIC PANEL: CPT

## 2019-07-10 PROCEDURE — G8417 CALC BMI ABV UP PARAM F/U: HCPCS | Performed by: FAMILY MEDICINE

## 2019-07-10 PROCEDURE — 99214 OFFICE O/P EST MOD 30 MIN: CPT | Performed by: FAMILY MEDICINE

## 2019-07-10 PROCEDURE — 36415 COLL VENOUS BLD VENIPUNCTURE: CPT | Performed by: FAMILY MEDICINE

## 2019-07-10 PROCEDURE — 85025 COMPLETE CBC W/AUTO DIFF WBC: CPT

## 2019-07-10 PROCEDURE — G8427 DOCREV CUR MEDS BY ELIG CLIN: HCPCS | Performed by: FAMILY MEDICINE

## 2019-07-10 PROCEDURE — 3017F COLORECTAL CA SCREEN DOC REV: CPT | Performed by: FAMILY MEDICINE

## 2019-07-10 NOTE — PROGRESS NOTES
Post-Discharge Transitional Care Management Services or Hospital Follow Up      Jemal Tavares   YOB: 1970    Date of Office Visit:  7/10/2019  Date of Hospital Admission: 7/5/2019  Date of Hospital Discharge: 7/8/2019    Care management risk score Rising risk (score 2-5) and Complex Care (Scores >=6): 7     Non face to face  following discharge, date last encounter closed (first attempt may have been earlier): *No documented post hospital discharge outreach found in the last 14 days     Call initiated 2 business days of discharge: *No response recorded in the last 14 days    Patient Active Problem List   Diagnosis    Hyperlipidemia    Hypertension    Tobacco dependency    Seizure disorder    Bone spur of right foot    Cervical cancer ([address]    Depressed bipolar II disorder ([address]    GERD (gastroesophageal reflux disease)    Splenic infarct    Renal infarct ([address]    Carpal tunnel syndrome on both sides    Rotator cuff tear arthropathy    Moderate persistent reactive airways dysfunction syndrome without complication ([address]    Anticoagulant long-term use    Obstructive sleep apnea    Subclinical hypothyroidism    Chronic pain    Leg pain    Disc displacement, lumbar    Neural foraminal stenosis of lumbar spine    Type 2 diabetes mellitus with circulatory disorder ([address]    Diverticulitis with microperforation    Contact dermatitis       Allergies   Allergen Reactions    Nsaids Shortness Of Breath and Other (See Comments)     Renal Failure       Medications listed as ordered at the time of discharge from hospital  Yes      Medications marked \"taking\" at this time  Outpatient Medications Marked as Taking for the 7/10/19 encounter (Office Visit) with Lamonte Roberts MD   Medication Sig Dispense Refill    Alcohol Swabs (B-D SINGLE USE SWABS REGULAR) PADS USE AS DIRECTED TWICE DAILY 200 each 3    blood glucose test strips (ONE TOUCH ULTRA TEST) strip Pt. Tests BID -TID.  [facility] reconciled against medications ordered at time of hospital discharge: Yes    Chief Complaint   Patient presents with    Follow-Up from Hospital     from 7/8/19       Kindra Sandoval is a 46 YO female with a complicated history who was admitted to [facility] ICU from ER on 7/5/19. She had a vascular procedure per Dr. Ja Melo on 7/5/19; when she returned home after procedure, she had fever, chills and post procedure nausea. Temp was >101 degrees. In ER she was noted to be hypotensive (patient reports that she had taken Metoprolol when she had gotten home from procedure, hence low BP) and an elevated WBC count. From ER she was admitted to ICU      Inpatient course: Discharge summary reviewed- see chart. History obtained on 7/10/19 per patient and ER records only; there were no hospital records available for review. She was admitted to ICU for suspected sepsis with hypotension and fever. Interval history/Current status:   Other than traumas related to ICU (which has patient VERY upset), her condition relative to postprocedure is improved. Initial WBC 14.6 with left shift. Per patient report: blood cultures grew no bacteria. Resumed baseline warfarin of 2.5 mg Monday, 5 mg other days. Will check POCT INR today    Vitals:    07/10/19 0723   BP: 110/70   Pulse: 97   Temp: 97.4 °F (36.3 °C)   SpO2: 97%   Weight: 248 lb (112.5 kg)   Height: 5' 7\" (1.702 m)     Body mass index is 38.84 kg/m².    Wt Readings from Last 3 Encounters:   07/10/19 248 lb (112.5 kg)   06/28/19 244 lb (110.7 kg)   06/12/19 244 lb (110.7 kg)     BP Readings from Last 3 Encounters:   07/10/19 110/70   06/28/19 114/70   06/12/19 110/78       Review of Systems    Physical Exam   Musculoskeletal:        Arms:      Results for POC orders placed in visit on 07/10/19   POCT INR   Result Value Ref Range    INR 1.8     Protime 21.6        Patient advised to take an extra warfarin 5 mg today and then resume normal dosing schedule; repeat POCT/INR on

## 2019-07-16 ENCOUNTER — NURSE ONLY (OUTPATIENT)
Dept: FAMILY MEDICINE CLINIC | Age: 49
End: 2019-07-16
Payer: MEDICARE

## 2019-07-16 DIAGNOSIS — N28.0 RENAL INFARCT (HCC): ICD-10-CM

## 2019-07-16 DIAGNOSIS — I50.9 CONGESTIVE HEART FAILURE, UNSPECIFIED HF CHRONICITY, UNSPECIFIED HEART FAILURE TYPE (HCC): Primary | ICD-10-CM

## 2019-07-16 DIAGNOSIS — Z79.01 ANTICOAGULANT LONG-TERM USE: ICD-10-CM

## 2019-07-16 LAB
INTERNATIONAL NORMALIZATION RATIO, POC: 4.8
PROTHROMBIN TIME, POC: 47.6

## 2019-07-16 PROCEDURE — 93793 ANTICOAG MGMT PT WARFARIN: CPT | Performed by: FAMILY MEDICINE

## 2019-07-16 PROCEDURE — 85610 PROTHROMBIN TIME: CPT | Performed by: FAMILY MEDICINE

## 2019-07-31 ENCOUNTER — NURSE ONLY (OUTPATIENT)
Dept: FAMILY MEDICINE CLINIC | Age: 49
End: 2019-07-31
Payer: MEDICARE

## 2019-07-31 DIAGNOSIS — N28.0 RENAL INFARCT (HCC): ICD-10-CM

## 2019-07-31 DIAGNOSIS — Z79.01 ANTICOAGULANT LONG-TERM USE: ICD-10-CM

## 2019-07-31 LAB — INTERNATIONAL NORMALIZATION RATIO, POC: 1.4

## 2019-07-31 PROCEDURE — 85610 PROTHROMBIN TIME: CPT | Performed by: INTERNAL MEDICINE

## 2019-07-31 PROCEDURE — 99211 OFF/OP EST MAY X REQ PHY/QHP: CPT | Performed by: FAMILY MEDICINE

## 2019-08-01 RX ORDER — CHLORZOXAZONE 500 MG/1
TABLET ORAL
Qty: 120 TABLET | Refills: 2 | OUTPATIENT
Start: 2019-08-01

## 2019-08-01 NOTE — TELEPHONE ENCOUNTER
Called and spoke with the patient to see if she had enough medication to last her till her appointment on 8-23-19. Patient stated that she will wait because her insurance company will not fill half a script. She was informed that I would call her in enough to last her till her visit since the physician is requiring that all script be filled at the appointment time. Patient stated that she will wait for her appointment to get her scripts.

## 2019-08-08 RX ORDER — BLOOD-GLUCOSE METER
EACH MISCELLANEOUS
Qty: 1 KIT | Refills: 0 | Status: SHIPPED
Start: 2019-08-08 | End: 2020-10-29

## 2019-08-23 ENCOUNTER — OFFICE VISIT (OUTPATIENT)
Dept: PHYSICAL MEDICINE AND REHAB | Age: 49
End: 2019-08-23
Payer: MEDICARE

## 2019-08-23 VITALS — HEIGHT: 67 IN | BODY MASS INDEX: 38.61 KG/M2 | WEIGHT: 246 LBS

## 2019-08-23 DIAGNOSIS — M47.816 LUMBAR SPONDYLOSIS: ICD-10-CM

## 2019-08-23 DIAGNOSIS — M51.26 DISC DISPLACEMENT, LUMBAR: ICD-10-CM

## 2019-08-23 DIAGNOSIS — M48.061 NEURAL FORAMINAL STENOSIS OF LUMBAR SPINE: Primary | ICD-10-CM

## 2019-08-23 DIAGNOSIS — G62.9 PERIPHERAL POLYNEUROPATHY: Chronic | ICD-10-CM

## 2019-08-23 PROCEDURE — 4004F PT TOBACCO SCREEN RCVD TLK: CPT | Performed by: PHYSICAL MEDICINE & REHABILITATION

## 2019-08-23 PROCEDURE — 99214 OFFICE O/P EST MOD 30 MIN: CPT | Performed by: PHYSICAL MEDICINE & REHABILITATION

## 2019-08-23 PROCEDURE — G8427 DOCREV CUR MEDS BY ELIG CLIN: HCPCS | Performed by: PHYSICAL MEDICINE & REHABILITATION

## 2019-08-23 PROCEDURE — 3017F COLORECTAL CA SCREEN DOC REV: CPT | Performed by: PHYSICAL MEDICINE & REHABILITATION

## 2019-08-23 PROCEDURE — G8417 CALC BMI ABV UP PARAM F/U: HCPCS | Performed by: PHYSICAL MEDICINE & REHABILITATION

## 2019-08-23 RX ORDER — TRAMADOL HYDROCHLORIDE 50 MG/1
50 TABLET ORAL EVERY 8 HOURS PRN
Qty: 75 TABLET | Refills: 2 | Status: SHIPPED | OUTPATIENT
Start: 2019-08-23 | End: 2019-11-20 | Stop reason: SDUPTHER

## 2019-08-23 RX ORDER — TRAMADOL HYDROCHLORIDE 50 MG/1
50 TABLET ORAL PRN
COMMUNITY
Start: 2019-08-09 | End: 2019-08-23 | Stop reason: SDUPTHER

## 2019-08-23 RX ORDER — DULOXETIN HYDROCHLORIDE 60 MG/1
CAPSULE, DELAYED RELEASE ORAL
Qty: 90 CAPSULE | Refills: 3 | Status: SHIPPED
Start: 2019-08-23 | End: 2020-08-14 | Stop reason: SDUPTHER

## 2019-08-23 RX ORDER — CHLORAL HYDRATE 500 MG
1 CAPSULE ORAL DAILY
COMMUNITY
Start: 2019-07-13 | End: 2020-11-20 | Stop reason: SDUPTHER

## 2019-08-23 RX ORDER — CHLORZOXAZONE 500 MG/1
TABLET ORAL
Qty: 360 TABLET | Refills: 3 | Status: SHIPPED
Start: 2019-08-23 | End: 2020-05-14 | Stop reason: ALTCHOICE

## 2019-08-23 NOTE — PROGRESS NOTES
Laura Almaraz D.O. [facility] Physical Medicine and Rehabilitation  [address] [facility] [place] [facility], [address] [place] [address] [place], [place]le [place]  Phone: [phone]  Fax: [phone]      8/23/19    Chief Complaint   Patient presents with    Back Pain     3 month follow up    Leg Pain       HPI:  Hans Sharma is a 47y.o. year old woman seen today in follow up regarding neuropathic pain. Interval history: Since the last visit the patient has no new complaints. Her pain medication has been controlling her symptoms well. She denies any side effects. Today, the pain is rated Pain Score:   4 where 0 is no pain and 10 is pain as bad as it can be. The pain is located in the low back,  bilateral feet and legs,  does not radiate, and is described as dull, aching, burning, twisting, cramping. This pain occurs intermittently. The symptoms have been worse since onset. Symptoms are exacerbated by damp and cold weather, walking, standing. Factors which relieve the pain include tramadol. Other associated symptoms include none. Otherwise, the pain assessment has not changed since the last visit. Prior treatment has included gabapentin, Flexeril.      Past Medical History:   Diagnosis Date    Anticoagulant long-term use     Arthritis     back, both hips and both knees    Carpal tunnel syndrome on both sides     both wrists    Cellulitis of right foot 03/2016    Cervical cancer ([address] 2012    S/P LUCIUS, BSO    Chronic back pain     Depressed bipolar II disorder (HCC)     Diabetes mellitus ([address]     Diverticulitis     DVT (deep vein thrombosis) in pregnancy ([address]     Epilepsy ([address]     Hx of blood clots     Hyperlipidemia     Hypertension     Marijuana use     Moderate persistent reactive airways dysfunction syndrome without complication (HCC)     Neuropathy     Obesity     PONV (postoperative nausea and vomiting)     Rotator cuff tear arthropathy     Thyroid disease     Type 2 diabetes mellitus without for Pain for up to 30 days. 75 tablet 2    chlorzoxazone (PARAFON FORTE) 500 MG tablet TAKE 1 TABLET FOUR TIMES DAILY AS NEEDED FOR MUSCLE SPASMS 360 tablet 3    levETIRAcetam (KEPPRA) 500 MG tablet TAKE 1 TABLET TWICE DAILY 180 tablet 3    Blood Glucose Monitoring Suppl (ACCU-CHEK GUIDE) w/Device KIT USE AS DIRECTED 1 kit 0    Alcohol Swabs (B-D SINGLE USE SWABS REGULAR) PADS USE AS DIRECTED TWICE DAILY 200 each 3    blood glucose test strips (ONE TOUCH ULTRA TEST) strip Pt. Tests BID -TID.  300 each 3    ACCU-CHEK FASTCLIX LANCETS MISC CHECK BLOOD SUGAR TWICE DAILY 204 each 3    Handicap Placard MISC by Does not apply route Patient cannot walk 200 ft without stopping to rest.    Expiration 2024 1 each 0    levothyroxine (SYNTHROID) 75 MCG tablet Take 1 tablet by mouth Daily 90 tablet 3    metFORMIN (GLUCOPHAGE) 1000 MG tablet TAKE 1 TABLET TWICE DAILY WITH MEALS 180 tablet 3    atorvastatin (LIPITOR) 80 MG tablet TAKE 1 TABLET EVERY DAY 90 tablet 3    divalproex (DEPAKOTE ER) 500 MG extended release tablet TAKE 1 TABLET IN THE MORNING AND 2 TABLETS IN THE EVENING 270 tablet 3    albuterol sulfate  (90 Base) MCG/ACT inhaler Inhale 2 puffs into the lungs every 6 hours as needed for Wheezing 3 Inhaler 3    omeprazole (PRILOSEC) 20 MG delayed release capsule TAKE 1 CAPSULE EVERY MORNING  BEFORE  BREAKFAST 90 capsule 3    warfarin (COUMADIN) 2.5 MG tablet 1 tablet M 25 tablet 3    warfarin (COUMADIN) 5 MG tablet Indications: Tuesday - Sunday 1 tablet all days except Mondays 75 tablet 3    busPIRone (BUSPAR) 10 MG tablet       Handicap Placard MISC by Does not apply route Patient cannot walk 200 ft without stopping to rest.    Expiration 2024 1 each 0    metoprolol tartrate (LOPRESSOR) 25 MG tablet TAKE 1/2 TABLET TWICE DAILY 90 tablet 3    Multiple Vitamins-Minerals (THERAPEUTIC MULTIVITAMIN-MINERALS) tablet Take 1 tablet by mouth daily      aspirin 81 MG tablet Take 1 tablet by mouth daily 30 Encounter   Medications    DULoxetine (CYMBALTA) 60 MG extended release capsule     Sig: TAKE 1 CAPSULE EVERY DAY     Dispense:  90 capsule     Refill:  3    traMADol (ULTRAM) 50 MG tablet     Sig: Take 1 tablet by mouth every 8 hours as needed for Pain for up to 30 days. Dispense:  75 tablet     Refill:  2     Reduce doses taken as pain becomes manageable    chlorzoxazone (PARAFON FORTE) 500 MG tablet     Sig: TAKE 1 TABLET FOUR TIMES DAILY AS NEEDED FOR MUSCLE SPASMS     Dispense:  360 tablet     Refill:  3       Continue home exercises  The patient was educated about the diagnosis, prognosis, indications, risks and benefits of treatment. An opportunity to ask questions was given to the patient and questions were answered. The patient agreed to proceed with the recommended treatment as described above. Follow up 3 months    Thank you for allowing me to participate in the care of your patient. Shelbie Mortimer, D.O., P.T.   Board Certified Physical Medicine and Rehabilitation  Board Certified Electrodiagnostic Medicine

## 2019-10-14 ENCOUNTER — HOSPITAL ENCOUNTER (OUTPATIENT)
Age: 49
Discharge: HOME | End: 2019-10-16
Payer: MEDICARE

## 2019-10-14 ENCOUNTER — NURSE ONLY (OUTPATIENT)
Dept: FAMILY MEDICINE CLINIC | Age: 49
End: 2019-10-14
Payer: MEDICARE

## 2019-10-14 DIAGNOSIS — I10 ESSENTIAL HYPERTENSION: ICD-10-CM

## 2019-10-14 DIAGNOSIS — E11.59 TYPE 2 DIABETES MELLITUS WITH OTHER CIRCULATORY COMPLICATION, WITHOUT LONG-TERM CURRENT USE OF INSULIN (HCC): ICD-10-CM

## 2019-10-14 DIAGNOSIS — E78.2 MIXED HYPERLIPIDEMIA: ICD-10-CM

## 2019-10-14 LAB
ALBUMIN SERPL-MCNC: 3.7 G/DL (ref 3.5–5.2)
ALP BLD-CCNC: 92 U/L (ref 35–104)
ALT SERPL-CCNC: 14 U/L (ref 0–32)
ANION GAP SERPL CALCULATED.3IONS-SCNC: 14 MMOL/L (ref 7–16)
AST SERPL-CCNC: 13 U/L (ref 0–31)
BASOPHILS ABSOLUTE: 0.12 E9/L (ref 0–0.2)
BASOPHILS RELATIVE PERCENT: 0.8 % (ref 0–2)
BILIRUB SERPL-MCNC: <0.2 MG/DL (ref 0–1.2)
BUN BLDV-MCNC: 13 MG/DL (ref 6–20)
CALCIUM SERPL-MCNC: 9.2 MG/DL (ref 8.6–10.2)
CHLORIDE BLD-SCNC: 105 MMOL/L (ref 98–107)
CHOLESTEROL, TOTAL: 153 MG/DL (ref 0–199)
CO2: 27 MMOL/L (ref 22–29)
CREAT SERPL-MCNC: 1 MG/DL (ref 0.5–1)
CREATININE URINE: 101 MG/DL (ref 29–226)
EOSINOPHILS ABSOLUTE: 0.48 E9/L (ref 0.05–0.5)
EOSINOPHILS RELATIVE PERCENT: 3.1 % (ref 0–6)
FOLATE: 12.1 NG/ML (ref 4.8–24.2)
GFR AFRICAN AMERICAN: >60
GFR NON-AFRICAN AMERICAN: 59 ML/MIN/1.73
GLUCOSE BLD-MCNC: 139 MG/DL (ref 74–99)
HBA1C MFR BLD: 7.3 % (ref 4–5.6)
HCT VFR BLD CALC: 57.1 % (ref 34–48)
HDLC SERPL-MCNC: 32 MG/DL
HEMOGLOBIN: 17 G/DL (ref 11.5–15.5)
IMMATURE GRANULOCYTES #: 0.06 E9/L
IMMATURE GRANULOCYTES %: 0.4 % (ref 0–5)
LDL CHOLESTEROL CALCULATED: 93 MG/DL (ref 0–99)
LYMPHOCYTES ABSOLUTE: 4.76 E9/L (ref 1.5–4)
LYMPHOCYTES RELATIVE PERCENT: 30.7 % (ref 20–42)
MCH RBC QN AUTO: 27.4 PG (ref 26–35)
MCHC RBC AUTO-ENTMCNC: 29.8 % (ref 32–34.5)
MCV RBC AUTO: 91.9 FL (ref 80–99.9)
MICROALBUMIN UR-MCNC: 22.3 MG/L
MICROALBUMIN/CREAT UR-RTO: 22.1 (ref 0–30)
MONOCYTES ABSOLUTE: 1.07 E9/L (ref 0.1–0.95)
MONOCYTES RELATIVE PERCENT: 6.9 % (ref 2–12)
NEUTROPHILS ABSOLUTE: 9.02 E9/L (ref 1.8–7.3)
NEUTROPHILS RELATIVE PERCENT: 58.1 % (ref 43–80)
PDW BLD-RTO: 15.9 FL (ref 11.5–15)
PLATELET # BLD: 337 E9/L (ref 130–450)
PMV BLD AUTO: 11 FL (ref 7–12)
POTASSIUM SERPL-SCNC: 4.7 MMOL/L (ref 3.5–5)
RBC # BLD: 6.21 E12/L (ref 3.5–5.5)
SODIUM BLD-SCNC: 146 MMOL/L (ref 132–146)
TOTAL PROTEIN: 6.9 G/DL (ref 6.4–8.3)
TRIGL SERPL-MCNC: 138 MG/DL (ref 0–149)
TSH SERPL DL<=0.05 MIU/L-ACNC: 1.8 UIU/ML (ref 0.27–4.2)
VITAMIN B-12: 441 PG/ML (ref 211–946)
VLDLC SERPL CALC-MCNC: 28 MG/DL
WBC # BLD: 15.5 E9/L (ref 4.5–11.5)

## 2019-10-14 PROCEDURE — 80061 LIPID PANEL: CPT

## 2019-10-14 PROCEDURE — 84443 ASSAY THYROID STIM HORMONE: CPT

## 2019-10-14 PROCEDURE — 83036 HEMOGLOBIN GLYCOSYLATED A1C: CPT

## 2019-10-14 PROCEDURE — 80053 COMPREHEN METABOLIC PANEL: CPT

## 2019-10-14 PROCEDURE — 82044 UR ALBUMIN SEMIQUANTITATIVE: CPT

## 2019-10-14 PROCEDURE — 82570 ASSAY OF URINE CREATININE: CPT

## 2019-10-14 PROCEDURE — 36415 COLL VENOUS BLD VENIPUNCTURE: CPT | Performed by: FAMILY MEDICINE

## 2019-10-14 PROCEDURE — 82607 VITAMIN B-12: CPT

## 2019-10-14 PROCEDURE — 85025 COMPLETE CBC W/AUTO DIFF WBC: CPT

## 2019-10-14 PROCEDURE — 82746 ASSAY OF FOLIC ACID SERUM: CPT

## 2019-10-18 ASSESSMENT — ENCOUNTER SYMPTOMS
ORTHOPNEA: 0
BACK PAIN: 1
HEARTBURN: 0
BLURRED VISION: 0
COUGH: 0
SPUTUM PRODUCTION: 0
DOUBLE VISION: 0
DIARRHEA: 0
CONSTIPATION: 0
ABDOMINAL PAIN: 0
SHORTNESS OF BREATH: 0
WHEEZING: 0
SORE THROAT: 0
BLOOD IN STOOL: 0
VOMITING: 0
NAUSEA: 0

## 2019-10-21 ENCOUNTER — OFFICE VISIT (OUTPATIENT)
Dept: FAMILY MEDICINE CLINIC | Age: 49
End: 2019-10-21
Payer: MEDICARE

## 2019-10-21 ENCOUNTER — TELEPHONE (OUTPATIENT)
Dept: FAMILY MEDICINE CLINIC | Age: 49
End: 2019-10-21

## 2019-10-21 VITALS
WEIGHT: 245 LBS | BODY MASS INDEX: 38.45 KG/M2 | OXYGEN SATURATION: 97 % | HEART RATE: 82 BPM | DIASTOLIC BLOOD PRESSURE: 68 MMHG | HEIGHT: 67 IN | SYSTOLIC BLOOD PRESSURE: 130 MMHG | TEMPERATURE: 98.2 F

## 2019-10-21 DIAGNOSIS — G62.9 PERIPHERAL POLYNEUROPATHY: ICD-10-CM

## 2019-10-21 DIAGNOSIS — E11.9 TYPE 2 DIABETES MELLITUS WITHOUT COMPLICATION, WITH LONG-TERM CURRENT USE OF INSULIN (HCC): ICD-10-CM

## 2019-10-21 DIAGNOSIS — N28.0 RENAL INFARCT (HCC): ICD-10-CM

## 2019-10-21 DIAGNOSIS — E78.2 MIXED HYPERLIPIDEMIA: ICD-10-CM

## 2019-10-21 DIAGNOSIS — J68.3 MODERATE PERSISTENT REACTIVE AIRWAYS DYSFUNCTION SYNDROME WITHOUT COMPLICATION (HCC): ICD-10-CM

## 2019-10-21 DIAGNOSIS — E11.59 TYPE 2 DIABETES MELLITUS WITH OTHER CIRCULATORY COMPLICATION, UNSPECIFIED WHETHER LONG TERM INSULIN USE (HCC): ICD-10-CM

## 2019-10-21 DIAGNOSIS — Z79.01 ANTICOAGULANT LONG-TERM USE: ICD-10-CM

## 2019-10-21 DIAGNOSIS — D73.5 SPLENIC INFARCT: ICD-10-CM

## 2019-10-21 DIAGNOSIS — E03.8 SUBCLINICAL HYPOTHYROIDISM: ICD-10-CM

## 2019-10-21 DIAGNOSIS — E11.59 TYPE 2 DIABETES MELLITUS WITH OTHER CIRCULATORY COMPLICATION, WITHOUT LONG-TERM CURRENT USE OF INSULIN (HCC): Primary | ICD-10-CM

## 2019-10-21 DIAGNOSIS — I10 ESSENTIAL HYPERTENSION: ICD-10-CM

## 2019-10-21 DIAGNOSIS — K21.9 GASTROESOPHAGEAL REFLUX DISEASE, ESOPHAGITIS PRESENCE NOT SPECIFIED: ICD-10-CM

## 2019-10-21 DIAGNOSIS — Z79.4 TYPE 2 DIABETES MELLITUS WITHOUT COMPLICATION, WITH LONG-TERM CURRENT USE OF INSULIN (HCC): ICD-10-CM

## 2019-10-21 LAB
INTERNATIONAL NORMALIZATION RATIO, POC: 1.5
PROTHROMBIN TIME, POC: 17.9

## 2019-10-21 PROCEDURE — G8417 CALC BMI ABV UP PARAM F/U: HCPCS | Performed by: FAMILY MEDICINE

## 2019-10-21 PROCEDURE — 85610 PROTHROMBIN TIME: CPT | Performed by: FAMILY MEDICINE

## 2019-10-21 PROCEDURE — 4004F PT TOBACCO SCREEN RCVD TLK: CPT | Performed by: FAMILY MEDICINE

## 2019-10-21 PROCEDURE — 2022F DILAT RTA XM EVC RTNOPTHY: CPT | Performed by: FAMILY MEDICINE

## 2019-10-21 PROCEDURE — 3017F COLORECTAL CA SCREEN DOC REV: CPT | Performed by: FAMILY MEDICINE

## 2019-10-21 PROCEDURE — 99214 OFFICE O/P EST MOD 30 MIN: CPT | Performed by: FAMILY MEDICINE

## 2019-10-21 PROCEDURE — G8427 DOCREV CUR MEDS BY ELIG CLIN: HCPCS | Performed by: FAMILY MEDICINE

## 2019-10-21 PROCEDURE — G8484 FLU IMMUNIZE NO ADMIN: HCPCS | Performed by: FAMILY MEDICINE

## 2019-10-21 RX ORDER — ISOPROPYL ALCOHOL 0.75 G/1
SWAB TOPICAL
Qty: 300 EACH | Refills: 3 | Status: SHIPPED
Start: 2019-10-21 | End: 2020-10-29

## 2019-10-21 RX ORDER — LANCETS
EACH MISCELLANEOUS
Qty: 300 EACH | Refills: 3 | Status: SHIPPED
Start: 2019-10-21 | End: 2020-05-29 | Stop reason: SDUPTHER

## 2019-11-11 ENCOUNTER — TELEPHONE (OUTPATIENT)
Dept: NEUROLOGY | Age: 49
End: 2019-11-11

## 2019-11-12 DIAGNOSIS — G40.909 SEIZURE DISORDER (HCC): ICD-10-CM

## 2019-11-12 RX ORDER — LEVETIRACETAM 500 MG/1
TABLET ORAL
Qty: 180 TABLET | Refills: 3 | Status: SHIPPED
Start: 2019-11-12 | End: 2020-10-06

## 2019-11-20 ENCOUNTER — OFFICE VISIT (OUTPATIENT)
Dept: PHYSICAL MEDICINE AND REHAB | Age: 49
End: 2019-11-20
Payer: MEDICARE

## 2019-11-20 VITALS
SYSTOLIC BLOOD PRESSURE: 136 MMHG | HEIGHT: 67 IN | WEIGHT: 247 LBS | DIASTOLIC BLOOD PRESSURE: 78 MMHG | BODY MASS INDEX: 38.77 KG/M2 | HEART RATE: 86 BPM

## 2019-11-20 DIAGNOSIS — M47.816 LUMBAR SPONDYLOSIS: ICD-10-CM

## 2019-11-20 DIAGNOSIS — G62.9 PERIPHERAL POLYNEUROPATHY: Chronic | ICD-10-CM

## 2019-11-20 DIAGNOSIS — M51.26 DISC DISPLACEMENT, LUMBAR: ICD-10-CM

## 2019-11-20 DIAGNOSIS — M48.061 NEURAL FORAMINAL STENOSIS OF LUMBAR SPINE: Primary | ICD-10-CM

## 2019-11-20 PROCEDURE — 99214 OFFICE O/P EST MOD 30 MIN: CPT | Performed by: PHYSICAL MEDICINE & REHABILITATION

## 2019-11-20 PROCEDURE — G8427 DOCREV CUR MEDS BY ELIG CLIN: HCPCS | Performed by: PHYSICAL MEDICINE & REHABILITATION

## 2019-11-20 PROCEDURE — 3017F COLORECTAL CA SCREEN DOC REV: CPT | Performed by: PHYSICAL MEDICINE & REHABILITATION

## 2019-11-20 PROCEDURE — G8417 CALC BMI ABV UP PARAM F/U: HCPCS | Performed by: PHYSICAL MEDICINE & REHABILITATION

## 2019-11-20 PROCEDURE — G8484 FLU IMMUNIZE NO ADMIN: HCPCS | Performed by: PHYSICAL MEDICINE & REHABILITATION

## 2019-11-20 PROCEDURE — 4004F PT TOBACCO SCREEN RCVD TLK: CPT | Performed by: PHYSICAL MEDICINE & REHABILITATION

## 2019-11-20 RX ORDER — METHOCARBAMOL 500 MG/1
500 TABLET, FILM COATED ORAL 3 TIMES DAILY
Qty: 270 TABLET | Refills: 3 | Status: SHIPPED
Start: 2019-11-20 | End: 2020-02-17 | Stop reason: SDUPTHER

## 2019-11-20 RX ORDER — TRAMADOL HYDROCHLORIDE 50 MG/1
50 TABLET ORAL EVERY 8 HOURS PRN
Qty: 75 TABLET | Refills: 2 | Status: SHIPPED
Start: 2019-11-20 | End: 2020-02-17 | Stop reason: SDUPTHER

## 2019-12-17 ENCOUNTER — TELEPHONE (OUTPATIENT)
Dept: PHYSICAL MEDICINE AND REHAB | Age: 49
End: 2019-12-17

## 2019-12-23 ENCOUNTER — TELEPHONE (OUTPATIENT)
Dept: PHYSICAL MEDICINE AND REHAB | Age: 49
End: 2019-12-23

## 2020-01-21 ENCOUNTER — NURSE ONLY (OUTPATIENT)
Dept: FAMILY MEDICINE CLINIC | Age: 50
End: 2020-01-21
Payer: MEDICARE

## 2020-01-21 ENCOUNTER — HOSPITAL ENCOUNTER (OUTPATIENT)
Age: 50
Discharge: HOME | End: 2020-01-23
Payer: MEDICARE

## 2020-01-21 LAB
ALBUMIN SERPL-MCNC: 4 G/DL (ref 3.5–5.2)
ALP BLD-CCNC: 92 U/L (ref 35–104)
ALT SERPL-CCNC: 11 U/L (ref 0–32)
ANION GAP SERPL CALCULATED.3IONS-SCNC: 16 MMOL/L (ref 7–16)
AST SERPL-CCNC: 16 U/L (ref 0–31)
BILIRUB SERPL-MCNC: 0.3 MG/DL (ref 0–1.2)
BUN BLDV-MCNC: 14 MG/DL (ref 6–20)
CALCIUM SERPL-MCNC: 9.8 MG/DL (ref 8.6–10.2)
CHLORIDE BLD-SCNC: 100 MMOL/L (ref 98–107)
CHOLESTEROL, TOTAL: 148 MG/DL (ref 0–199)
CO2: 23 MMOL/L (ref 22–29)
CREAT SERPL-MCNC: 0.9 MG/DL (ref 0.5–1)
GFR AFRICAN AMERICAN: >60
GFR NON-AFRICAN AMERICAN: >60 ML/MIN/1.73
GLUCOSE BLD-MCNC: 124 MG/DL (ref 74–99)
HBA1C MFR BLD: 7 % (ref 4–5.6)
HDLC SERPL-MCNC: 33 MG/DL
LDL CHOLESTEROL CALCULATED: 92 MG/DL (ref 0–99)
POTASSIUM SERPL-SCNC: 4.5 MMOL/L (ref 3.5–5)
SODIUM BLD-SCNC: 139 MMOL/L (ref 132–146)
T4 TOTAL: 10.9 MCG/DL (ref 4.5–11.7)
TOTAL PROTEIN: 7.7 G/DL (ref 6.4–8.3)
TRIGL SERPL-MCNC: 113 MG/DL (ref 0–149)
TSH SERPL DL<=0.05 MIU/L-ACNC: 1.78 UIU/ML (ref 0.27–4.2)
VLDLC SERPL CALC-MCNC: 23 MG/DL

## 2020-01-21 PROCEDURE — 84436 ASSAY OF TOTAL THYROXINE: CPT

## 2020-01-21 PROCEDURE — 83036 HEMOGLOBIN GLYCOSYLATED A1C: CPT

## 2020-01-21 PROCEDURE — 36415 COLL VENOUS BLD VENIPUNCTURE: CPT | Performed by: FAMILY MEDICINE

## 2020-01-21 PROCEDURE — 80061 LIPID PANEL: CPT

## 2020-01-21 PROCEDURE — 84443 ASSAY THYROID STIM HORMONE: CPT

## 2020-01-21 PROCEDURE — 80053 COMPREHEN METABOLIC PANEL: CPT

## 2020-01-27 PROBLEM — M79.606 LEG PAIN: Chronic | Status: RESOLVED | Noted: 2017-03-17 | Resolved: 2020-01-27

## 2020-01-27 PROBLEM — L25.9 CONTACT DERMATITIS: Status: RESOLVED | Noted: 2019-05-30 | Resolved: 2020-01-27

## 2020-01-27 PROBLEM — K57.92 DIVERTICULITIS: Status: RESOLVED | Noted: 2018-09-17 | Resolved: 2020-01-27

## 2020-01-27 ASSESSMENT — ENCOUNTER SYMPTOMS
WHEEZING: 0
HEARTBURN: 0
SHORTNESS OF BREATH: 0
BLURRED VISION: 0
BLOOD IN STOOL: 0
CONSTIPATION: 0
SORE THROAT: 0
ORTHOPNEA: 0
DOUBLE VISION: 0
ABDOMINAL PAIN: 0
DIARRHEA: 0
VOMITING: 0
NAUSEA: 0
COUGH: 0
BACK PAIN: 1
SPUTUM PRODUCTION: 0

## 2020-01-27 NOTE — PROGRESS NOTES
Gregory Lundy is a 47 y.o. female who presents today for    Chief Complaint   Patient presents with    Diabetes     3  month f/u        She is treated for diabetes, bipolar disorder, tobacco use, COPD, Peripheral Vascular disease. DM2:   F/U DM. Patient is controlled. ..even better than she thought she would be. Metformin 1000 mg BID. Compliant with therapy and tolerating it well. Went to Diabetic [facility], opted to cut out sugar from diet as main control mechanism. Has some sugary foods in diet and occasional lapses with increased sugar intake. Monitoring blood sugar fasting in AM and QHS. Fasting blood sugars: between 110 - 120 in the morning depending on what she had for dinner. HS blood sugars:135 - 200 in the evening. Average is 125. Patient is taking ASA but not Ace Inhibitor/ARB. Patient is taking 80 mg atorvastatin daily. LDL is not at goal (125). BP is controlled 126/74. Has a [facility] of heart disease, M GMA had CHF, MI, HTN, uncle had similar problems. Unaware of mother/father medical history. Denies chest pain, SOB, C/N/V/D. Denies dysuria, hematuria. Patient is following with podiatrist (12/23/2019). Scheduled for upcoming eye exam 3/2020. Patient is aware that it is necessary to see an Eye Dr yearly. Patient does smoke and is not ready to quit or cut back. She is making healthier choices and is more mindful of her portions.  Participating with insurance food access program       Lab Results   Component Value Date    LABA1C 7.0 (H) 01/21/2020    LABA1C 7.3 (H) 10/14/2019    LABA1C 7.2 (H) 06/05/2019     Lab Results   Component Value Date    LABMICR 22.3 (H) 10/14/2019    LDLCALC 92 01/21/2020    CREATININE 0.9 01/21/2020       Lab Results   Component Value Date    LDLCALC 92 01/21/2020       Lab Results   Component Value Date    CHOL 148 01/21/2020    CHOL 153 10/14/2019    CHOL 140 06/05/2019     Lab Results   Component Value Date    TRIG 113 01/21/2020    TRIG 138 10/14/2019    TRIG 143 06/05/2019     Lab Results   Component Value Date    HDL 33 01/21/2020    HDL 32 10/14/2019    HDL 33 06/05/2019     Lab Results   Component Value Date    LDLCALC 92 01/21/2020    LDLCALC 93 10/14/2019    LDLCALC 78 06/05/2019       Splenic Infarct/Pulmonary Embolism/Chronic Anticoagulation:  Lab Results   Component Value Date    INR 1.5 10/21/2019    INR 1.4 07/31/2019    INR 4.8 07/16/2019    PROTIME 17.9 10/21/2019    PROTIME 47.6 07/16/2019    PROTIME 21.6 07/10/2019     Current Coumadin dose (1/28/2020): @.5 mg MWF, 5 mg other days    No results found for this visit on 01/28/20. Tobacco Dependency:  She  is not ready to quit. Smokes 1 pack a day. Bipolar Disorder: At this time she is well controlled. Depakote and Duloxetine. Obesity:    Wt Readings from Last 3 Encounters:   01/28/20 243 lb 12 oz (110.6 kg)   11/20/19 247 lb (112 kg)   10/21/19 245 lb (111.1 kg)     BMI Readings from Last 3 Encounters:   01/28/20 38.18 kg/m²   11/20/19 38.69 kg/m²   10/21/19 38.37 kg/m²       Chronic Bronchitis:  Mild \"smoker's cough\" but denies shortness of breath. Mild wheezing reported. Currently on no medication; doesn't feel that symptoms are impairing her quality of life/function. Seizure Disorder:  Taking Depakote and Keppra. No breakthrough symptoms. Peripheral Neuropathy:  Between numbness, weakness, and pain, coupled with poor balance and inability to stand for long periods of time, patient finally acquired shower bench and will get tub rails on her own. Chronic Pain:  Lumbar stenosis, DDD/DJD, SI joint arthritis, hip arthritis. Under the care of Dr. Anika De La Garza (PM&R)    Leukocytosis:  Persistent and chronic; this has been persistent for some time. Chart reviewed extensively: she was referred to and seen by Dr. aSmi Harley in 2015. W/U to R/O CML and lung cancer completed and was negative at that time.  He felt that there was a reactive component that may be related to smoking. F/U was suggested @ PRN. m  Values are not worse than past values. [address]:  Patient's past medical, surgical, social and/or family history reviewed, updated in chart, and are non-contributory (unless otherwise stated). Medications and allergies also reviewed and updated in chart. Review of Systems  Review of Systems   Constitutional: Positive for malaise/fatigue. Negative for weight loss. HENT: Negative for congestion, ear pain and sore throat. Eyes: Negative for blurred vision and double vision. Respiratory: Negative for cough, sputum production, shortness of breath and wheezing. Cardiovascular: Negative for chest pain, palpitations, orthopnea and leg swelling. Gastrointestinal: Negative for abdominal pain, blood in stool, constipation, diarrhea, heartburn, nausea and vomiting. Genitourinary: Negative for dysuria, frequency, hematuria and urgency. Musculoskeletal: Positive for back pain, joint pain, myalgias and neck pain. Skin: Negative for itching and rash. Neurological: Positive for headaches. Negative for dizziness, tingling, focal weakness and weakness. Psychiatric/Behavioral: Negative for depression, memory loss and substance abuse. The patient is not nervous/anxious and does not have insomnia. Physical Exam:    VS:  /74 (Site: Right Upper Arm, Position: Sitting, Cuff Size: Large Adult)   Pulse 96   Temp 97.4 °F (36.3 °C) (Oral)   Resp 20   Ht 5' 7\" (1.702 m)   Wt 243 lb 12 oz (110.6 kg)   SpO2 97%   BMI 38.18 kg/m²      LAST WEIGHT:  Wt Readings from Last 3 Encounters:   01/28/20 243 lb 12 oz (110.6 kg)   11/20/19 247 lb (112 kg)   10/21/19 245 lb (111.1 kg)     BMI Readings from Last 3 Encounters:   01/28/20 38.18 kg/m²   11/20/19 38.69 kg/m²   10/21/19 38.37 kg/m²       Physical Exam   Constitutional: She is oriented to person, place, and time. Vital signs are normal. She appears well-developed and well-nourished. No distress.    HENT:   Head: before taking. Reviewed age and gender appropriate health screening exams and vaccinations. Advised patient regarding importance of keeping up with recommended health maintenance and to schedule as soon as possible if overdue, as this is important in assessing for undiagnosed pathology, especially cancer, as well as protecting against potentially harmful/life threatening disease. Patient and/or guardian verbalizes understanding and agrees with above counseling, assessment and plan. All questions answered.     Keenan Marin MD

## 2020-01-28 ENCOUNTER — OFFICE VISIT (OUTPATIENT)
Dept: FAMILY MEDICINE CLINIC | Age: 50
End: 2020-01-28
Payer: MEDICARE

## 2020-01-28 VITALS
OXYGEN SATURATION: 97 % | BODY MASS INDEX: 38.26 KG/M2 | DIASTOLIC BLOOD PRESSURE: 74 MMHG | RESPIRATION RATE: 20 BRPM | HEART RATE: 96 BPM | TEMPERATURE: 97.4 F | HEIGHT: 67 IN | WEIGHT: 243.75 LBS | SYSTOLIC BLOOD PRESSURE: 126 MMHG

## 2020-01-28 PROBLEM — E11.51 TYPE 2 DIABETES MELLITUS WITH DIABETIC PERIPHERAL ANGIOPATHY WITHOUT GANGRENE, WITHOUT LONG-TERM CURRENT USE OF INSULIN (HCC): Status: ACTIVE | Noted: 2017-07-28

## 2020-01-28 PROBLEM — D68.9 BLOOD CLOTTING DISORDER (HCC): Status: ACTIVE | Noted: 2020-01-28

## 2020-01-28 LAB
INTERNATIONAL NORMALIZATION RATIO, POC: 1.2
PROTHROMBIN TIME, POC: 14.2

## 2020-01-28 PROCEDURE — 99214 OFFICE O/P EST MOD 30 MIN: CPT | Performed by: FAMILY MEDICINE

## 2020-01-28 PROCEDURE — G8427 DOCREV CUR MEDS BY ELIG CLIN: HCPCS | Performed by: FAMILY MEDICINE

## 2020-01-28 PROCEDURE — 3017F COLORECTAL CA SCREEN DOC REV: CPT | Performed by: FAMILY MEDICINE

## 2020-01-28 PROCEDURE — G8417 CALC BMI ABV UP PARAM F/U: HCPCS | Performed by: FAMILY MEDICINE

## 2020-01-28 PROCEDURE — G8484 FLU IMMUNIZE NO ADMIN: HCPCS | Performed by: FAMILY MEDICINE

## 2020-01-28 PROCEDURE — 2022F DILAT RTA XM EVC RTNOPTHY: CPT | Performed by: FAMILY MEDICINE

## 2020-01-28 PROCEDURE — 85610 PROTHROMBIN TIME: CPT | Performed by: FAMILY MEDICINE

## 2020-01-28 PROCEDURE — 4004F PT TOBACCO SCREEN RCVD TLK: CPT | Performed by: FAMILY MEDICINE

## 2020-01-28 RX ORDER — TRAMADOL HYDROCHLORIDE 50 MG/1
TABLET ORAL
COMMUNITY
Start: 2020-01-08 | End: 2020-05-22 | Stop reason: SDUPTHER

## 2020-02-13 RX ORDER — ALBUTEROL SULFATE 90 UG/1
2 AEROSOL, METERED RESPIRATORY (INHALATION) EVERY 6 HOURS PRN
Qty: 3 INHALER | Refills: 3 | OUTPATIENT
Start: 2020-02-13

## 2020-02-13 NOTE — TELEPHONE ENCOUNTER
Last Appointment:  1/28/2020  Future Appointments   Date Time Provider David Tony   2/17/2020 11:45 AM DO Samantha Merino [facility]   3/30/2020  8:30 AM Onel Howe MD [facility]   [facility]22[facility]2020  [facility]   4/29/2020  2:00 PM Onel Howe  [facility]   5/22/2020 10:00 AM Henna Delaney MD [facility]    Patient called for [place] [facility]

## 2020-02-17 ENCOUNTER — OFFICE VISIT (OUTPATIENT)
Dept: PHYSICAL MEDICINE AND REHAB | Age: 50
End: 2020-02-17
Payer: MEDICARE

## 2020-02-17 VITALS
BODY MASS INDEX: 36.88 KG/M2 | DIASTOLIC BLOOD PRESSURE: 71 MMHG | HEART RATE: 106 BPM | HEIGHT: 67 IN | SYSTOLIC BLOOD PRESSURE: 118 MMHG | WEIGHT: 235 LBS

## 2020-02-17 PROCEDURE — G8484 FLU IMMUNIZE NO ADMIN: HCPCS | Performed by: PHYSICAL MEDICINE & REHABILITATION

## 2020-02-17 PROCEDURE — G8417 CALC BMI ABV UP PARAM F/U: HCPCS | Performed by: PHYSICAL MEDICINE & REHABILITATION

## 2020-02-17 PROCEDURE — 99214 OFFICE O/P EST MOD 30 MIN: CPT | Performed by: PHYSICAL MEDICINE & REHABILITATION

## 2020-02-17 PROCEDURE — 3017F COLORECTAL CA SCREEN DOC REV: CPT | Performed by: PHYSICAL MEDICINE & REHABILITATION

## 2020-02-17 PROCEDURE — 4004F PT TOBACCO SCREEN RCVD TLK: CPT | Performed by: PHYSICAL MEDICINE & REHABILITATION

## 2020-02-17 PROCEDURE — G8427 DOCREV CUR MEDS BY ELIG CLIN: HCPCS | Performed by: PHYSICAL MEDICINE & REHABILITATION

## 2020-02-17 RX ORDER — NALOXONE HYDROCHLORIDE 4 MG/.1ML
1 SPRAY NASAL PRN
Qty: 1 EACH | Refills: 5 | Status: SHIPPED
Start: 2020-02-17 | End: 2021-02-16 | Stop reason: ALTCHOICE

## 2020-02-17 RX ORDER — TRAMADOL HYDROCHLORIDE 50 MG/1
50 TABLET ORAL EVERY 8 HOURS PRN
Qty: 75 TABLET | Refills: 2 | Status: SHIPPED
Start: 2020-02-17 | End: 2020-05-14 | Stop reason: SDUPTHER

## 2020-02-17 RX ORDER — METHOCARBAMOL 500 MG/1
500 TABLET, FILM COATED ORAL 3 TIMES DAILY
Qty: 270 TABLET | Refills: 3 | Status: SHIPPED
Start: 2020-02-17 | End: 2020-11-16 | Stop reason: SDUPTHER

## 2020-02-17 RX ORDER — DULOXETIN HYDROCHLORIDE 60 MG/1
CAPSULE, DELAYED RELEASE ORAL
Qty: 90 CAPSULE | Refills: 3 | Status: SHIPPED
Start: 2020-02-17 | End: 2020-05-22 | Stop reason: SDUPTHER

## 2020-02-17 NOTE — PROGRESS NOTES
Inez Torres D.O. [facility] Physical Medicine and Rehabilitation  [address] [facility], [address] [place] [address] Piyush  Phone: [phone]  Fax: [phone]      20    Chief Complaint   Patient presents with    Hip Pain     3 MONTH FOLLOW UP    Back Pain       HPI:  Jana Yao is a 47y.o. year old woman seen today in follow up regarding neuropathic pain. Interval history: Since the last visit the patient feels like overall her symptoms have worsened. Today, the pain is rated Pain Score:   7 where 0 is no pain and 10 is pain as bad as it can be. The pain is located in the low back,  bilateral feet and legs,  does not radiate, and is described as dull, aching, burning, twisting, cramping. This pain occurs intermittently. The symptoms have been worse since onset. Symptoms are exacerbated by damp and cold weather, walking, standing. Factors which relieve the pain include tramadol. Other associated symptoms include none. Otherwise, the pain assessment has not changed since the last visit. Prior treatment has included gabapentin, Flexeril.      Past Medical History:   Diagnosis Date    Anticoagulant long-term use     Arthritis     back, both hips and both knees    Carpal tunnel syndrome on both sides     both wrists    Cellulitis of right foot 2016    Cervical cancer ([address] 2012    S/P LUCIUS, BSO    Chronic back pain     Depressed bipolar II disorder (HCC)     Diabetes mellitus ([address]     Diverticulitis     DVT (deep vein thrombosis) in pregnancy     Epilepsy ([address]     Hx of blood clots     Hyperlipidemia     Hypertension     Leukocytosis 2013    Marijuana use     Neuropathy     Obesity     PONV (postoperative nausea and vomiting)     Rotator cuff tear arthropathy     Thyroid disease     Type 2 diabetes mellitus without complication ([address]      Past Surgical History:   Procedure Laterality Date     SECTION      CHOLECYSTECTOMY      COLONOSCOPY  10/10/2018    COLONOSCOPY WITH BIOPSY performed by Ibis Bowers MD at [address] ECHO COMPLETE  9/5/2013         FOOT SURGERY  8/7/2015    HYSTERECTOMY  2012    KIDNEY BIOPSY      KNEE ARTHROSCOPY  2003    right and left knee    KNEE SURGERY      left knee    NERVE BLOCK Bilateral 06/01/2017    TFNB  #1    NERVE BLOCK Bilateral 06/15/2017    bilatera lumbar transforaminal nerve block #2 L5-S1    NERVE BLOCK Bilateral 09/20/2017    Bilateral transforamninal nerve block lumbar #3    RHINOPLASTY      1985    SMALL INTESTINE SURGERY N/A 2/6/2019    LAPAROSCOPIC SIGMOID COLECTOMY performed by Ibis Bowers MD at [place] [address] VASCULAR SURGERY  08/05/2019       Social History     Tobacco Use    Smoking status: Current Every Day Smoker     Packs/day: 1.00     Years: 32.00     Pack years: 32.00     Types: Cigarettes    Smokeless tobacco: Never Used    Tobacco comment: (9/16/18):  not ready to quit; counseling given   Substance Use Topics    Alcohol use: Not Currently     Comment: previously drank occasionally, quit 2016    Drug use: Not Currently     Comment: once a month or so; depending on pain, last use 12/2017     Family History   Problem Relation Age of Onset    Depression Mother     Bipolar Disorder Mother     Hypertension Mother    Larrafael Leavens Anxiety Disorder Sister     Asthma Son    Larayne Leavens Schizophrenia Son     Anxiety Disorder Daughter      Current Outpatient Medications   Medication Sig Dispense Refill    traMADol (ULTRAM) 50 MG tablet Take 1 tablet by mouth every 8 hours as needed for Pain for up to 30 days.  75 tablet 2    methocarbamol (ROBAXIN) 500 MG tablet Take 1 tablet by mouth 3 times daily 270 tablet 3    DULoxetine (CYMBALTA) 60 MG extended release capsule TAKE 1 CAPSULE EVERY DAY 90 capsule 3    naloxone 4 MG/0.1ML LIQD nasal spray 1 spray by Nasal route as needed for Opioid Reversal 1 each 5    traMADol (ULTRAM) 50 MG tablet

## 2020-04-17 RX ORDER — ALBUTEROL SULFATE 90 UG/1
2 AEROSOL, METERED RESPIRATORY (INHALATION) EVERY 6 HOURS PRN
Qty: 3 INHALER | Refills: 3 | Status: SHIPPED | OUTPATIENT
Start: 2020-04-17 | End: 2020-11-20 | Stop reason: SDUPTHER

## 2020-04-20 ASSESSMENT — ENCOUNTER SYMPTOMS
CONSTIPATION: 0
SHORTNESS OF BREATH: 0
NAUSEA: 0
WHEEZING: 0
BACK PAIN: 1
SORE THROAT: 0
ABDOMINAL PAIN: 0
BLOOD IN STOOL: 0
COUGH: 0
VOMITING: 0
DIARRHEA: 0

## 2020-04-20 NOTE — PROGRESS NOTES
Monitoring blood sugar fasting in AM and QHS. Fasting blood sugars: between 110 - 120 in the morning depending on what she had for dinner. HS blood sugars:135 - 200 in the evening. Average is 125. Due to COVID-19 pandemic, patient unable to get fasting lab work prior to visit. Labs are from previous visit. Patient is taking ASA but not Ace Inhibitor/ARB. Patient is taking 80 mg atorvastatin daily. LDL is not at goal (125). BP is controlled 153/87. Has a [facility] of heart disease, M GMA had CHF, MI, HTN, uncle had similar problems. Unaware of mother/father medical history. Denies chest pain, SOB, C/N/V/D. Denies dysuria, hematuria. Patient is following with podiatrist (12/23/2019). Scheduled for upcoming eye exam 3/2020. Patient is aware that it is necessary to see an Eye Dr yearly. Patient does smoke and is not ready to quit or cut back. She is making healthier choices and is more mindful of her portions.  Participating with insurance food access program    Lab Results   Component Value Date    LABA1C 7.0 (H) 01/21/2020    LABA1C 7.3 (H) 10/14/2019    LABA1C 7.2 (H) 06/05/2019     Lab Results   Component Value Date    LABMICR 22.3 (H) 10/14/2019    LDLCALC 92 01/21/2020    CREATININE 0.9 01/21/2020       Lab Results   Component Value Date    CHOL 148 01/21/2020    CHOL 153 10/14/2019    CHOL 140 06/05/2019     Lab Results   Component Value Date    TRIG 113 01/21/2020    TRIG 138 10/14/2019    TRIG 143 06/05/2019     Lab Results   Component Value Date    HDL 33 01/21/2020    HDL 32 10/14/2019    HDL 33 06/05/2019     Lab Results   Component Value Date    LDLCALC 92 01/21/2020    LDLCALC 93 10/14/2019    [address] 06/05/2019         Splenic Infarct/Pulmonary Embolism/Chronic Anticoagulation:  Lab Results   Component Value Date    INR 1.2 01/28/2020    INR 1.5 10/21/2019    INR 1.4 07/31/2019    PROTIME 14.2 01/28/2020    PROTIME 17.9 10/21/2019    PROTIME 47.6 07/16/2019     Current Coumadin dose Future  -     TSH without Reflex; Future    Essential hypertension:  Well controlled  -     metoprolol tartrate (LOPRESSOR) 25 MG tablet; TAKE 1/2 TABLET TWICE DAILY  -     Comprehensive Metabolic Panel; Future  -     Lipid Panel; Future  -     TSH without Reflex; Future    Mixed hyperlipidemia  -     Comprehensive Metabolic Panel; Future  -     Lipid Panel; Future  -     TSH without Reflex; Future    Subclinical hypothyroidism  -     T4; Future  -     TSH without Reflex; Future    Moderate persistent reactive airways dysfunction syndrome without complication (HCC)        -     albuterol sulfate  (90 Base) MCG/ACT inhaler; Inhale 2 puffs into the lungs every 6 hours as needed for Wheezing    Peripheral polyneuropathy        -     divalproex (DEPAKOTE ER) 500 MG extended release tablet; TAKE 1 TABLET IN THE MORNING AND 2 TABLETS IN THE EVENING    Gastroesophageal reflux disease, esophagitis presence not specified       -     omeprazole (PRILOSEC) 20 MG delayed release capsule; TAKE 1 CAPSULE EVERY MORNING  BEFORE  BREAKFAST     Type 2 diabetes mellitus with other circulatory complication, unspecified whether long term insulin use (HCC)  -     Alcohol Swabs (B-D SINGLE USE SWABS REGULAR) PADS; USE AS DIRECTED TWICE DAILY  -     ACCU-CHEK FASTCLIX LANCETS MISC; CHECK BLOOD SUGAR TWICE DAILY  -     blood glucose test strips (ONE TOUCH ULTRA TEST) strip; Accu-check Guide strips. Pt. Tests TID. Other elevated white blood cell (WBC) count: Has already been evaluated in the past by Hematology; condition not felt to be malignant and felt to be reactive due to tobacco use    Tobacco dependency    Anticoagulant long-term use  -     POCT INR    Blood clotting disorder ([facility]    Morbidly obese (HCC)    Health care maintenance    Vitamin D insufficiency  -     Vitamin D 25 Hydroxy;  Future        Call or go to ED immediately if symptoms worsen or persist.    Follow Up:  Return 1) Schedule Medicare AWV at early convenience, for 2) F/U 3 mos (4/2020) check up; fasting labs 1 week prior. , or sooner if necessary. [address]:  Patient's past medical, surgical, social and/or family history reviewed, updated in chart, and are non-contributory (unless otherwise stated). Medications and allergies also reviewed and updated in chart. Review of Systems  Review of Systems   Constitutional: Positive for fatigue. HENT: Negative for congestion, ear pain and sore throat. Respiratory: Negative for cough, shortness of breath and wheezing. Cardiovascular: Negative for chest pain, palpitations and leg swelling. Gastrointestinal: Negative for abdominal pain, blood in stool, constipation, diarrhea, nausea and vomiting. Genitourinary: Negative for dysuria, frequency, hematuria and urgency. Musculoskeletal: Positive for arthralgias, back pain, gait problem, joint swelling, myalgias and neck pain. 4/29/2020: Recent move to a new apartment made all of her baseline pain syndromes worse. Slowly recovering to baseline   Skin: Negative for rash. Neurological: Negative for dizziness, weakness and headaches. Psychiatric/Behavioral: The patient is nervous/anxious.         Allergies   Allergen Reactions    Nsaids Shortness Of Breath and Other (See Comments)     Renal Failure   ,   Past Medical History:   Diagnosis Date    Anticoagulant long-term use     Arthritis     back, both hips and both knees    Carpal tunnel syndrome on both sides     both wrists    Cellulitis of right foot 03/2016    Cervical cancer ([address] 2012    S/P LUCIUS, BSO    Chronic back pain     Depressed bipolar II disorder (HCC)     Diabetes mellitus ([address]     Diverticulitis     DVT (deep vein thrombosis) in pregnancy     Epilepsy ([address]     Hx of blood clots     Hyperlipidemia     Hypertension     Leukocytosis 9/6/2013    Marijuana use     Neuropathy     Obesity     PONV (postoperative nausea and vomiting)     Rotator cuff tear arthropathy

## 2020-04-29 ENCOUNTER — TELEMEDICINE (OUTPATIENT)
Dept: FAMILY MEDICINE CLINIC | Age: 50
End: 2020-04-29
Payer: MEDICARE

## 2020-04-29 ENCOUNTER — TELEPHONE (OUTPATIENT)
Dept: FAMILY MEDICINE CLINIC | Age: 50
End: 2020-04-29

## 2020-04-29 VITALS
DIASTOLIC BLOOD PRESSURE: 84 MMHG | BODY MASS INDEX: 37.67 KG/M2 | HEART RATE: 95 BPM | WEIGHT: 240 LBS | HEIGHT: 67 IN | SYSTOLIC BLOOD PRESSURE: 134 MMHG

## 2020-04-29 PROCEDURE — 3051F HG A1C>EQUAL 7.0%<8.0%: CPT | Performed by: FAMILY MEDICINE

## 2020-04-29 PROCEDURE — G8427 DOCREV CUR MEDS BY ELIG CLIN: HCPCS | Performed by: FAMILY MEDICINE

## 2020-04-29 PROCEDURE — 2022F DILAT RTA XM EVC RTNOPTHY: CPT | Performed by: FAMILY MEDICINE

## 2020-04-29 PROCEDURE — 3017F COLORECTAL CA SCREEN DOC REV: CPT | Performed by: FAMILY MEDICINE

## 2020-04-29 PROCEDURE — 99214 OFFICE O/P EST MOD 30 MIN: CPT | Performed by: FAMILY MEDICINE

## 2020-04-29 RX ORDER — WARFARIN SODIUM 2.5 MG/1
TABLET ORAL
Qty: 25 TABLET | Refills: 3 | Status: SHIPPED | OUTPATIENT
Start: 2020-04-29 | End: 2020-11-20 | Stop reason: SDUPTHER

## 2020-04-29 RX ORDER — DIVALPROEX SODIUM 500 MG/1
TABLET, EXTENDED RELEASE ORAL
Qty: 270 TABLET | Refills: 3 | Status: SHIPPED | OUTPATIENT
Start: 2020-04-29 | End: 2020-11-20 | Stop reason: SDUPTHER

## 2020-04-29 RX ORDER — WARFARIN SODIUM 5 MG/1
TABLET ORAL
Qty: 75 TABLET | Refills: 3 | Status: SHIPPED | OUTPATIENT
Start: 2020-04-29 | End: 2020-11-20 | Stop reason: SDUPTHER

## 2020-04-29 RX ORDER — OMEPRAZOLE 20 MG/1
CAPSULE, DELAYED RELEASE ORAL
Qty: 90 CAPSULE | Refills: 3 | Status: SHIPPED | OUTPATIENT
Start: 2020-04-29 | End: 2020-11-20 | Stop reason: SDUPTHER

## 2020-04-29 RX ORDER — ATORVASTATIN CALCIUM 80 MG/1
80 TABLET, FILM COATED ORAL NIGHTLY
Qty: 90 TABLET | Refills: 3 | Status: SHIPPED | OUTPATIENT
Start: 2020-04-29 | End: 2020-11-20 | Stop reason: SDUPTHER

## 2020-04-29 RX ORDER — LEVOTHYROXINE SODIUM 0.07 MG/1
TABLET ORAL
Qty: 90 TABLET | Refills: 3 | Status: SHIPPED | OUTPATIENT
Start: 2020-04-29 | End: 2020-11-20 | Stop reason: SDUPTHER

## 2020-05-14 ENCOUNTER — TELEMEDICINE (OUTPATIENT)
Dept: PHYSICAL MEDICINE AND REHAB | Age: 50
End: 2020-05-14
Payer: MEDICARE

## 2020-05-14 PROCEDURE — 4004F PT TOBACCO SCREEN RCVD TLK: CPT | Performed by: PHYSICAL MEDICINE & REHABILITATION

## 2020-05-14 PROCEDURE — G8427 DOCREV CUR MEDS BY ELIG CLIN: HCPCS | Performed by: PHYSICAL MEDICINE & REHABILITATION

## 2020-05-14 PROCEDURE — G8417 CALC BMI ABV UP PARAM F/U: HCPCS | Performed by: PHYSICAL MEDICINE & REHABILITATION

## 2020-05-14 PROCEDURE — 99214 OFFICE O/P EST MOD 30 MIN: CPT | Performed by: PHYSICAL MEDICINE & REHABILITATION

## 2020-05-14 PROCEDURE — 3017F COLORECTAL CA SCREEN DOC REV: CPT | Performed by: PHYSICAL MEDICINE & REHABILITATION

## 2020-05-14 RX ORDER — TRAMADOL HYDROCHLORIDE 50 MG/1
50 TABLET ORAL EVERY 8 HOURS PRN
Qty: 75 TABLET | Refills: 2 | Status: SHIPPED
Start: 2020-05-14 | End: 2020-08-14 | Stop reason: SDUPTHER

## 2020-05-14 RX ORDER — NALOXONE HYDROCHLORIDE 4 MG/.1ML
1 SPRAY NASAL PRN
Qty: 1 EACH | Refills: 5 | Status: SHIPPED
Start: 2020-05-14 | End: 2020-09-04

## 2020-05-14 NOTE — PROGRESS NOTES
Disorder Mother     Hypertension Mother    Tristan Miners Anxiety Disorder Sister     Asthma Son    Tristan Miners Schizophrenia Son     Anxiety Disorder Daughter        Current Outpatient Medications   Medication Sig Dispense Refill    traMADol (ULTRAM) 50 MG tablet Take 1 tablet by mouth every 8 hours as needed for Pain for up to 30 days.  75 tablet 2    naloxone 4 MG/0.1ML LIQD nasal spray 1 spray by Nasal route as needed for Opioid Reversal 1 each 5    warfarin (COUMADIN) 5 MG tablet Indications: Tuesday - Sunday 1 tablet all days except Mondays (Patient taking differently: Take 5 mg by mouth Indications: Tuesday - Sunday Tues, thur, sat, sun) 75 tablet 3    warfarin (COUMADIN) 2.5 MG tablet 1 tablet M (Patient taking differently: Take 2.5 mg by mouth Monday, Wed, Fri) 25 tablet 3    omeprazole (PRILOSEC) 20 MG delayed release capsule TAKE 1 CAPSULE EVERY MORNING  BEFORE  BREAKFAST 90 capsule 3    divalproex (DEPAKOTE ER) 500 MG extended release tablet TAKE 1 TABLET IN THE MORNING AND 2 TABLETS IN THE EVENING 270 tablet 3    metFORMIN (GLUCOPHAGE) 1000 MG tablet TAKE 1 TABLET TWICE DAILY WITH MEALS 180 tablet 3    metoprolol tartrate (LOPRESSOR) 25 MG tablet TAKE 1/2 TABLET TWICE DAILY 90 tablet 3    levothyroxine (SYNTHROID) 75 MCG tablet TAKE 1 TABLET EVERY DAY 90 tablet 3    atorvastatin (LIPITOR) 80 MG tablet Take 1 tablet by mouth nightly 90 tablet 3    albuterol sulfate  (90 Base) MCG/ACT inhaler Inhale 2 puffs into the lungs every 6 hours as needed for Wheezing 3 Inhaler 3    methocarbamol (ROBAXIN) 500 MG tablet Take 1 tablet by mouth 3 times daily 270 tablet 3    DULoxetine (CYMBALTA) 60 MG extended release capsule TAKE 1 CAPSULE EVERY DAY 90 capsule 3    naloxone 4 MG/0.1ML LIQD nasal spray 1 spray by Nasal route as needed for Opioid Reversal 1 each 5    traMADol (ULTRAM) 50 MG tablet       levETIRAcetam (KEPPRA) 500 MG tablet TAKE 1 TABLET TWICE DAILY 180 tablet 3    Alcohol Swabs (B-D SINGLE USE SWABS REGULAR) PADS USE AS DIRECTED TWICE DAILY 300 each 3    ACCU-CHEK FASTCLIX LANCETS MISC CHECK BLOOD SUGAR TWICE DAILY 300 each 3    blood glucose test strips (ONE TOUCH ULTRA TEST) strip Accu-check Guide strips. Pt. Tests TID. 300 each 3    Omega-3 Fatty Acids (OMEGA-3 FISH OIL) 1000 MG CAPS Take 1 tablet by mouth daily      Wheat Dextrin (EQ FIBER POWDER PO) Take by mouth      DULoxetine (CYMBALTA) 60 MG extended release capsule TAKE 1 CAPSULE EVERY DAY 90 capsule 3    Blood Glucose Monitoring Suppl (ACCU-CHEK GUIDE) w/Device KIT USE AS DIRECTED 1 kit 0    busPIRone (BUSPAR) 10 MG tablet Take 10 mg by mouth daily       Handicap Placard MISC by Does not apply route Patient cannot walk 200 ft without stopping to rest.    Expiration 2024 1 each 0    Multiple Vitamins-Minerals (THERAPEUTIC MULTIVITAMIN-MINERALS) tablet Take 1 tablet by mouth daily      aspirin 81 MG tablet Take 1 tablet by mouth daily 30 tablet 5    Handicap Placard MISC by Does not apply route Patient cannot walk 200 ft without stopping to rest.    Expiration 2024 1 each 0     No current facility-administered medications for this visit. Allergies   Allergen Reactions    Nsaids Shortness Of Breath and Other (See Comments)     Renal Failure       Review of Systems:  No new weakness, paresthesia, incontinence of bowel or bladder, saddle anesthesia, falls or gait dysfunction. Otherwise, per HPI. Physical Exam:   Respiratory rate is 20. GENERAL: The patient is in no apparent distress. Body habitus is obese. HEENT: No rhinorrhea, sneezing, yawning, or lacrimation. No scleral icterus or conjunctival injection. SKIN: No piloerection. No tract marks. No rash. PSYCH: Mood and affect are appropriate. Hygiene appears appropriate. CARDIOVASCULAR There is no edema. RESPIRATORY: Respirations are regular and unlabored. There is no cyanosis.    GASTROINTESTINAL: Soft abdomen, non-tender (patient elicited)  MSK: Spinal curvatures were normal in standing. Pelvis was level in standing. Active range of motion was limited in the lumbar spine. There was no obvious joint effusion, deformity. The patient was able to elicit tenderness at lumbar paraspinals. The patient reports the lumbar spine does not fell warm and there is no visible redness. Neurologic: Awake, alert and oriented in three planes. Speech is fluent. No facial weakness. Tongue is midline. Hearing is intact for conversation. Pupils are equal and round. Extraocular muscles appear intact during visual tracking. Shoulder shrug symmetric. Sensation: Intact for light touch (patient elicited) in all upper and lower extremity dermatomes. Strength: Functional upper extremity strength testing was observed to be at least antigravity and lower extremity strength testing including heel and toe walking as well as duck walking were intact, unable to manual muscle test given environment. There was no observable atrophy or side to size difference in muscle bulk. Muscle Tendon Reflexes: unable to test given environment. Gait is Normal.   Romberg is negative. No observed abnormal muscle tone. The patient was able to rise from a chair and squat without difficulty. There is no tremor. Due to this being a TeleHealth encounter, evaluation of the following organ systems is limited: Vitals/Constitutional/EENT/Resp/CV/GI//MS/Neuro/Skin/Heme-Lymph-Imm. Impression:   1. Neural foraminal stenosis of lumbar spine    2. Disc displacement, lumbar    3. Lumbar spondylosis    4. Chronic bilateral low back pain with bilateral sciatica        Plan:  No orders of the defined types were placed in this encounter. Orders Placed This Encounter   Medications    traMADol (ULTRAM) 50 MG tablet     Sig: Take 1 tablet by mouth every 8 hours as needed for Pain for up to 30 days.      Dispense:  75 tablet     Refill:  2     Reduce doses taken as pain becomes manageable    naloxone 4 MG/0.1ML LIQD nasal spray     Si spray by Nasal route as needed for Opioid Reversal     Dispense:  1 each     Refill:  5       Medications Discontinued During This Encounter   Medication Reason    chlorzoxazone (PARAFON FORTE) 500 MG tablet Alternate therapy    traMADol (ULTRAM) 50 MG tablet REORDER     Controlled Substance Monitoring:    Acute and Chronic Pain Monitoring:   RX Monitoring 2019   Attestation -   Periodic Controlled Substance Monitoring No signs of potential drug abuse or diversion identified. Chronic Pain > 80 MEDD -         The patient was educated about the diagnosis, prognosis, indications, risks and benefits of treatment. An opportunity to ask questions was given to the patient and questions were answered. The patient agreed to proceed with the recommended treatment as described above. Return in about 3 months (around 2020). Thank you for allowing me to participate in the care of your patient. Jacques Watts D.O., P.T.   Board Certified Physical Medicine and Rehabilitation  Board Certified Electrodiagnostic Medicine

## 2020-05-22 ENCOUNTER — TELEMEDICINE (OUTPATIENT)
Dept: SURGERY | Age: 50
End: 2020-05-22
Payer: MEDICARE

## 2020-05-22 PROCEDURE — 99442 PR PHYS/QHP TELEPHONE EVALUATION 11-20 MIN: CPT | Performed by: SURGERY

## 2020-05-22 RX ORDER — SODIUM CHLORIDE 9 MG/ML
INJECTION, SOLUTION INTRAVENOUS CONTINUOUS
Status: CANCELLED | OUTPATIENT
Start: 2020-05-22

## 2020-05-22 RX ORDER — POLYETHYLENE GLYCOL 3350, SODIUM SULFATE ANHYDROUS, SODIUM BICARBONATE, SODIUM CHLORIDE, POTASSIUM CHLORIDE 236; 22.74; 6.74; 5.86; 2.97 G/4L; G/4L; G/4L; G/4L; G/4L
4 POWDER, FOR SOLUTION ORAL ONCE
Qty: 4000 ML | Refills: 0 | Status: SHIPPED | OUTPATIENT
Start: 2020-05-22 | End: 2020-05-22

## 2020-05-22 NOTE — PROGRESS NOTES
Alicia Simmons  2020  [facility] Office Phone visit    Consent:  The patient is aware that she may receive a bill for this service, depending on insurance coverage, and has provided verbal consent to proceed: Yes    Pt at home, I'm in the office, no one helped. I affirm this is a Patient Initiated Episode with an Established Patient who has not had a related appointment within my department in the past 7 days or scheduled within the next 24 hours. Patient identification was verified at the start of the visit: Yes    Total Time: minutes: 16 minutes        Alicia Simmons is a 45 y.o. female post lap sigmoid colectomy 19 with splenic flexure takedown for diverticulitis. Went home 2 days later. Developed explosive diarrhea then bad gas after eating fatty food, used Flagyl 500 mg tid for 5 days, Probiotics and fiber to regular the bowels. Doing much better but still having diarrhea if she eats fatty foods.     Weight:  .    Past Medical History:   Diagnosis Date    Anticoagulant long-term use     Arthritis     back, both hips and both knees    Carpal tunnel syndrome on both sides     both wrists    Cellulitis of right foot 2016    Cervical cancer ([address] 2012    S/P LUCIUS, BSO    Chronic back pain     Depressed bipolar II disorder (HCC)     Diabetes mellitus ([address]     Diverticulitis     DVT (deep vein thrombosis) in pregnancy     Epilepsy ([address]     Hx of blood clots     Hyperlipidemia     Hypertension     Leukocytosis 2013    Marijuana use     Neuropathy     Obesity     PONV (postoperative nausea and vomiting)     Rotator cuff tear arthropathy     Thyroid disease     Type 2 diabetes mellitus without complication [facility])      Past Surgical History:   Procedure Laterality Date     SECTION      CHOLECYSTECTOMY      COLONOSCOPY  10/10/2018    COLONOSCOPY WITH BIOPSY performed by Laurel Woods MD at [address] [place] [address]

## 2020-05-26 ENCOUNTER — TELEPHONE (OUTPATIENT)
Dept: SURGERY | Age: 50
End: 2020-05-26

## 2020-05-29 ENCOUNTER — HOSPITAL ENCOUNTER (OUTPATIENT)
Age: 50
Discharge: HOME | End: 2020-05-31
Payer: MEDICARE

## 2020-05-29 PROCEDURE — U0003 INFECTIOUS AGENT DETECTION BY NUCLEIC ACID (DNA OR RNA); SEVERE ACUTE RESPIRATORY SYNDROME CORONAVIRUS 2 (SARS-COV-2) (CORONAVIRUS DISEASE [COVID-19]), AMPLIFIED PROBE TECHNIQUE, MAKING USE OF HIGH THROUGHPUT TECHNOLOGIES AS DESCRIBED BY CMS-2020-01-R: HCPCS

## 2020-06-01 LAB
SARS-COV-2: NOT DETECTED
SOURCE: NORMAL

## 2020-06-01 RX ORDER — LANCETS
EACH MISCELLANEOUS
Qty: 300 EACH | Refills: 3 | Status: SHIPPED
Start: 2020-06-01 | End: 2020-10-29

## 2020-06-01 RX ORDER — BLOOD SUGAR DIAGNOSTIC
STRIP MISCELLANEOUS
Qty: 300 STRIP | OUTPATIENT
Start: 2020-06-01

## 2020-06-02 ENCOUNTER — ANESTHESIA EVENT (OUTPATIENT)
Dept: ENDOSCOPY | Age: 50
End: 2020-06-02
Payer: MEDICARE

## 2020-06-02 ENCOUNTER — ANESTHESIA (OUTPATIENT)
Dept: ENDOSCOPY | Age: 50
End: 2020-06-02
Payer: MEDICARE

## 2020-06-02 ENCOUNTER — HOSPITAL ENCOUNTER (OUTPATIENT)
Age: 50
Setting detail: OUTPATIENT SURGERY
Discharge: HOME | End: 2020-06-02
Attending: SURGERY | Admitting: SURGERY
Payer: MEDICARE

## 2020-06-02 VITALS
HEIGHT: 67 IN | SYSTOLIC BLOOD PRESSURE: 110 MMHG | DIASTOLIC BLOOD PRESSURE: 60 MMHG | TEMPERATURE: 97.2 F | BODY MASS INDEX: 38.61 KG/M2 | RESPIRATION RATE: 16 BRPM | OXYGEN SATURATION: 97 % | WEIGHT: 246 LBS | HEART RATE: 80 BPM

## 2020-06-02 VITALS
DIASTOLIC BLOOD PRESSURE: 68 MMHG | RESPIRATION RATE: 15 BRPM | OXYGEN SATURATION: 96 % | SYSTOLIC BLOOD PRESSURE: 101 MMHG

## 2020-06-02 LAB
AMPHETAMINE SCREEN, URINE: NOT DETECTED
BARBITURATE SCREEN URINE: NOT DETECTED
BENZODIAZEPINE SCREEN, URINE: NOT DETECTED
CANNABINOID SCREEN URINE: NOT DETECTED
COCAINE METABOLITE SCREEN URINE: NOT DETECTED
FENTANYL SCREEN, URINE: NOT DETECTED
Lab: NORMAL
METER GLUCOSE: 126 MG/DL (ref 74–99)
METHADONE SCREEN, URINE: NOT DETECTED
OPIATE SCREEN URINE: NOT DETECTED
OXYCODONE URINE: NOT DETECTED
PHENCYCLIDINE SCREEN URINE: NOT DETECTED

## 2020-06-02 PROCEDURE — 6360000002 HC RX W HCPCS: Performed by: NURSE ANESTHETIST, CERTIFIED REGISTERED

## 2020-06-02 PROCEDURE — 45380 COLONOSCOPY AND BIOPSY: CPT | Performed by: SURGERY

## 2020-06-02 PROCEDURE — 3700000000 HC ANESTHESIA ATTENDED CARE: Performed by: SURGERY

## 2020-06-02 PROCEDURE — 2580000003 HC RX 258: Performed by: SURGERY

## 2020-06-02 PROCEDURE — 82962 GLUCOSE BLOOD TEST: CPT

## 2020-06-02 PROCEDURE — 2709999900 HC NON-CHARGEABLE SUPPLY: Performed by: SURGERY

## 2020-06-02 PROCEDURE — 7100000011 HC PHASE II RECOVERY - ADDTL 15 MIN: Performed by: SURGERY

## 2020-06-02 PROCEDURE — 88305 TISSUE EXAM BY PATHOLOGIST: CPT

## 2020-06-02 PROCEDURE — 3609010300 HC COLONOSCOPY W/BIOPSY SINGLE/MULTIPLE: Performed by: SURGERY

## 2020-06-02 PROCEDURE — 3700000001 HC ADD 15 MINUTES (ANESTHESIA): Performed by: SURGERY

## 2020-06-02 PROCEDURE — 80307 DRUG TEST PRSMV CHEM ANLYZR: CPT

## 2020-06-02 PROCEDURE — 7100000010 HC PHASE II RECOVERY - FIRST 15 MIN: Performed by: SURGERY

## 2020-06-02 RX ORDER — SODIUM CHLORIDE 9 MG/ML
INJECTION, SOLUTION INTRAVENOUS CONTINUOUS
Status: DISCONTINUED | OUTPATIENT
Start: 2020-06-02 | End: 2020-06-02 | Stop reason: HOSPADM

## 2020-06-02 RX ORDER — PROPOFOL 10 MG/ML
INJECTION, EMULSION INTRAVENOUS CONTINUOUS PRN
Status: DISCONTINUED | OUTPATIENT
Start: 2020-06-02 | End: 2020-06-02 | Stop reason: SDUPTHER

## 2020-06-02 RX ADMIN — SODIUM CHLORIDE: 9 INJECTION, SOLUTION INTRAVENOUS at 10:18

## 2020-06-02 RX ADMIN — PROPOFOL 100 MCG/KG/MIN: 10 INJECTION, EMULSION INTRAVENOUS at 11:33

## 2020-06-02 ASSESSMENT — PAIN SCALES - GENERAL
PAINLEVEL_OUTOF10: 0
PAINLEVEL_OUTOF10: 0
PAINLEVEL_OUTOF10: 5

## 2020-06-02 ASSESSMENT — PAIN DESCRIPTION - ORIENTATION: ORIENTATION: RIGHT;LEFT

## 2020-06-02 ASSESSMENT — PAIN DESCRIPTION - DESCRIPTORS: DESCRIPTORS: CONSTANT;DISCOMFORT

## 2020-06-02 ASSESSMENT — PAIN DESCRIPTION - LOCATION: LOCATION: BACK;HIP;KNEE

## 2020-06-02 ASSESSMENT — PAIN DESCRIPTION - PROGRESSION: CLINICAL_PROGRESSION: NOT CHANGED

## 2020-06-02 ASSESSMENT — PAIN DESCRIPTION - PAIN TYPE: TYPE: CHRONIC PAIN

## 2020-06-02 ASSESSMENT — PAIN DESCRIPTION - FREQUENCY: FREQUENCY: CONTINUOUS

## 2020-06-02 NOTE — ANESTHESIA PRE PROCEDURE
BP Readings from Last 3 Encounters:   04/29/20 134/84   02/17/20 118/71   01/28/20 126/74       NPO Status:                                                                                 BMI:   Wt Readings from Last 3 Encounters:   04/29/20 240 lb (108.9 kg)   02/17/20 235 lb (106.6 kg)   01/28/20 243 lb 12 oz (110.6 kg)     There is no height or weight on file to calculate BMI.    CBC:   Lab Results   Component Value Date    WBC 15.5 10/14/2019    RBC 6.21 10/14/2019    HGB 17.0 10/14/2019    HCT 57.1 10/14/2019    MCV 91.9 10/14/2019    RDW 15.9 10/14/2019     10/14/2019       CMP:   Lab Results   Component Value Date     01/21/2020    K 4.5 01/21/2020    K 4.4 02/04/2019     01/21/2020    CO2 23 01/21/2020    BUN 14 01/21/2020    CREATININE 0.9 01/21/2020    GFRAA >60 01/21/2020    LABGLOM >60 01/21/2020    GLUCOSE 124 01/21/2020    GLUCOSE 70 04/28/2011    PROT 7.7 01/21/2020    CALCIUM 9.8 01/21/2020    BILITOT 0.3 01/21/2020    ALKPHOS 92 01/21/2020    AST 16 01/21/2020    ALT 11 01/21/2020       POC Tests: No results for input(s): POCGLU, POCNA, POCK, POCCL, POCBUN, POCHEMO, POCHCT in the last 72 hours. Coags:   Lab Results   Component Value Date    PROTIME 14.2 01/28/2020    INR 1.2 01/28/2020    INR 0.9 02/06/2019    APTT 36.6 02/06/2019       HCG (If Applicable):   Lab Results   Component Value Date    PREGTESTUR NEGATIVE 10/05/2017        ABGs:   Lab Results   Component Value Date    P4CVCCBU 95.4 11/14/2010        Type & Screen (If Applicable):  No results found for: LABABO, [address]    Anesthesia Evaluation  Patient summary reviewed   history of anesthetic complications: PONV.   Airway: Mallampati: II  TM distance: >3 FB   Neck ROM: full  Mouth opening: > = 3 FB Dental: normal exam         Pulmonary:   (+) sleep apnea:  wheezes,                             Cardiovascular:    (+) hypertension:,       ECG reviewed  Rhythm: regular  Rate: normal  Echocardiogram reviewed         Beta Blocker:  Dose within 24 Hrs      ROS comment: EKG: Sinus Tachycardia 119, left axis deviation, poss left atrial enlargement, NS St & T changes. }. ECHO:  Moderate size, highly mobile echogenic mass noted in the descending aorta   represents thrombus versus atheroma. Neuro/Psych:   (+) seizures:, neuromuscular disease (peripheral neuropathy):, psychiatric history:depression/anxiety             GI/Hepatic/Renal:   (+) GERD:, bowel prep,      (-) no morbid obesity       Endo/Other:    (+) DiabetesType II DM, , hypothyroidism, blood dyscrasia: anticoagulation therapy:., malignancy/cancer (cervical cancer). Abdominal:   (+) obese,         Vascular: negative vascular ROS. Anesthesia Plan      MAC     ASA 3       Induction: intravenous. Anesthetic plan and risks discussed with patient. Plan discussed with CRNA. Attending anesthesiologist reviewed and agrees with Pre Eval content        DOS STAFF ADDENDUM:    Pt seen and examined, chart reviewed (including anesthesia, drug and allergy history). Anesthetic plan, risks, benefits, alternatives, and personnel involved discussed with patient. Patient verbalized an understanding and agrees to proceed. Plan discussed with care team members and agreed upon.     Teodoro Forbes MD  Staff Anesthesiologist  8:30 Jameson De La Vega MD   6/2/2020

## 2020-06-02 NOTE — H&P
Alicia Simmons  2020  Valentino Rein    H&P    Alicia Simmons is a 45 y.o. female post lap sigmoid colectomy 19 with splenic flexure takedown for diverticulitis. Went home 2 days later. Developed explosive diarrhea then bad gas after eating fatty food, used Flagyl 500 mg tid for 5 days, Probiotics and fiber to regular the bowels. Doing much better but still having diarrhea if she eats fatty foods. Weight: 245 lb (111.1 kg).     Past Medical History:   Diagnosis Date    Anticoagulant long-term use     Arthritis     back, both hips and both knees    Carpal tunnel syndrome on both sides     both wrists    Cellulitis of right foot 2016    Cervical cancer ([facility] 2012    S/P LUCIUS, BSO    Chronic back pain     Depressed bipolar II disorder (HCC)     Diabetes mellitus ([facility]     Diverticulitis     DVT (deep vein thrombosis) in pregnancy     Epilepsy ([facility]     Hx of blood clots     Hyperlipidemia     Hypertension     Leukocytosis 2013    Marijuana use     Neuropathy     Obesity     PONV (postoperative nausea and vomiting)     Rotator cuff tear arthropathy     Thyroid disease     Type 2 diabetes mellitus without complication [facility])      Past Surgical History:   Procedure Laterality Date     SECTION      CHOLECYSTECTOMY      COLONOSCOPY  10/10/2018    COLONOSCOPY WITH BIOPSY performed by Enoc Figueroa MD at [address] [facility] ECHO COMPLETE  2013         FOOT SURGERY  2015    HYSTERECTOMY  2012    KIDNEY BIOPSY      KNEE ARTHROSCOPY      right and left knee    KNEE SURGERY      left knee    NERVE BLOCK Bilateral 2017    TFNB  #1    NERVE BLOCK Bilateral 06/15/2017    bilatera lumbar transforaminal nerve block #2 L5-S1    NERVE BLOCK Bilateral 2017    Bilateral transforamninal nerve block lumbar #3    RHINOPLASTY      1985    SMALL INTESTINE SURGERY N/A 2019    LAPAROSCOPIC SIGMOID COLECTOMY performed by Darryl Kumar

## 2020-06-02 NOTE — OP NOTE
pieces were removed with the biopsy forceps. Another small polyp was found and biopsied from 70 cm. The scope was slowly withdrawn, each area was examined again on the way out. No other polyps were seen. The scope was removed. The patient tolerated the procedure well. Complications: none    Plan:  Keep the bowels soft with fiber. Will need a repeat colonoscopy in 5 years depending on pathology or sooner if there is any rectal bleeding or change in bowel habits.       Electronically signed by Dr. Priti Rock M.D.

## 2020-06-12 ENCOUNTER — VIRTUAL VISIT (OUTPATIENT)
Dept: SURGERY | Age: 50
End: 2020-06-12
Payer: MEDICARE

## 2020-06-12 PROBLEM — D12.5 ADENOMATOUS POLYP OF SIGMOID COLON: Status: ACTIVE | Noted: 2020-06-12

## 2020-06-12 PROCEDURE — 99442 PR PHYS/QHP TELEPHONE EVALUATION 11-20 MIN: CPT | Performed by: SURGERY

## 2020-08-14 ENCOUNTER — TELEMEDICINE (OUTPATIENT)
Dept: PHYSICAL MEDICINE AND REHAB | Age: 50
End: 2020-08-14
Payer: MEDICARE

## 2020-08-14 PROCEDURE — G8427 DOCREV CUR MEDS BY ELIG CLIN: HCPCS | Performed by: PHYSICAL MEDICINE & REHABILITATION

## 2020-08-14 PROCEDURE — 99214 OFFICE O/P EST MOD 30 MIN: CPT | Performed by: PHYSICAL MEDICINE & REHABILITATION

## 2020-08-14 PROCEDURE — 3017F COLORECTAL CA SCREEN DOC REV: CPT | Performed by: PHYSICAL MEDICINE & REHABILITATION

## 2020-08-14 RX ORDER — DULOXETIN HYDROCHLORIDE 60 MG/1
CAPSULE, DELAYED RELEASE ORAL
Qty: 90 CAPSULE | Refills: 3 | Status: ON HOLD
Start: 2020-08-14 | End: 2021-09-14 | Stop reason: SDUPTHER

## 2020-08-14 RX ORDER — TRAMADOL HYDROCHLORIDE 50 MG/1
50 TABLET ORAL EVERY 6 HOURS PRN
Qty: 120 TABLET | Refills: 2 | Status: SHIPPED | OUTPATIENT
Start: 2020-08-14 | End: 2020-09-13

## 2020-08-14 NOTE — PROGRESS NOTES
Nir Sauceda D.O. [facility]  [address] [facility] [place]  Phone: [phone]  Fax: [phone]        8/14/20    Chief Complaint   Patient presents with    Back Pain     follow up. Pain level 6/10    Hip Pain     Start time: 11:42  End time: 11:49  Services were provided through a video synchronous discussion virtually to substitute for in-person clinic visit through [address] The patient was advised of the risks, benefits and alternatives to telemedicine and agreed to proceed with this type of visit. Pursuant to the emergency declaration under the [address] [place] [address] waiver authority and the [employer] Act, this Virtual  Visit was conducted, with patient's consent, to reduce the patient's risk of exposure to COVID-19 and provide continuity of care for an established patient. The patient was also advised the privacy standards of a standard visit continue to apply to telemedicine visits. HPI:  Kasandra Mccray is a 45y.o. year old woman seen today in follow up regarding chronic low back pain. Interval history: Since the last visit the patient has increased low back pain. Today, the pain is rate  6/10 where 0 is no pain and 10 is pain as bad as it can be. The pain is located in the neck and low back,  radiates distally to the bilateral legs, and is described as sharp. This pain occurs intermittently. The symptoms have been worse since onset. Symptoms are exacerbated by nothing in particular. Factors which relieve the pain include tramadol. Other associated symptoms include stiffness. Otherwise, the pain assessment has not changed since the last visit.      Past Medical History:   Diagnosis Date    Anticoagulant long-term use     Arthritis     back, both hips and both knees    Carpal tunnel syndrome on both sides     both wrists    Cellulitis of right foot 2016    Cervical cancer ([facility] 2012    S/P LUCIUS, BSO    Chronic back pain     Depressed bipolar II disorder (HCC)     Diabetes mellitus ([facility]     Diverticulitis     DVT (deep vein thrombosis) in pregnancy     [facility]     Hx of blood clots     Hyperlipidemia     Hypertension     Leukocytosis 2013    Marijuana use     Neuropathy     Obesity     PONV (postoperative nausea and vomiting)     Rotator cuff tear arthropathy     Thyroid disease     Type 2 diabetes mellitus without complication [facility])        Past Surgical History:   Procedure Laterality Date     SECTION      CHOLECYSTECTOMY      COLONOSCOPY  10/10/2018    COLONOSCOPY WITH BIOPSY performed by Bruce Butler MD at [address] N/A 2020    COLONOSCOPY WITH BIOPSY performed by Bruce Butler MD at [address] [facility] ECHO COMPLETE  2013         FOOT SURGERY  2015    HYSTERECTOMY  2012    KIDNEY BIOPSY      KNEE ARTHROSCOPY  2003    right and left knee    KNEE SURGERY      left knee    NERVE BLOCK Bilateral 2017    TFNB  #1    NERVE BLOCK Bilateral 06/15/2017    bilatera lumbar transforaminal nerve block #2 L5-S1    NERVE BLOCK Bilateral 2017    Bilateral transforamninal nerve block lumbar #3    RHINOPLASTY      1985    SMALL INTESTINE SURGERY N/A 2019    LAPAROSCOPIC SIGMOID COLECTOMY performed by Bruce Butler MD at [place] [address] VASCULAR SURGERY  2019       Social History     Tobacco Use    Smoking status: Current Every Day Smoker     Packs/day: 1.00     Years: 32.00     Pack years: 32.00     Types: Cigarettes    Smokeless tobacco: Never Used    Tobacco comment: (18):  not ready to quit; counseling given   Substance Use Topics    Alcohol use: Yes     Comment: previously drank occasionally, quit 2016    Drug use: Not Currently     Types: Marijuana     Comment: once a month or so; depending on pain, last use 12/2017       Family History   Problem Relation Age of Onset    Depression Mother     Bipolar Disorder Mother     Hypertension Mother     Anxiety Disorder Sister     Asthma Son    Abbe Francois Schizophrenia Son     Anxiety Disorder Daughter        Current Outpatient Medications   Medication Sig Dispense Refill    traMADol (ULTRAM) 50 MG tablet Take 1 tablet by mouth every 6 hours as needed for Pain for up to 30 days. 120 tablet 2    DULoxetine (CYMBALTA) 60 MG extended release capsule TAKE 1 CAPSULE EVERY DAY 90 capsule 3    Accu-Chek FastClix Lancets MISC CHECK BLOOD SUGAR TWICE DAILY 300 each 3    blood glucose test strips (ONE TOUCH ULTRA TEST) strip Accu-check Guide strips.  Pt. Tests TID. 300 each 3    naloxone 4 MG/0.1ML LIQD nasal spray 1 spray by Nasal route as needed for Opioid Reversal 1 each 5    warfarin (COUMADIN) 5 MG tablet Indications: Tuesday - Sunday 1 tablet all days except Mondays (Patient taking differently: Take 5 mg by mouth Indications: Tuesday - Sunday Tues, thur, sat, sun) 75 tablet 3    warfarin (COUMADIN) 2.5 MG tablet 1 tablet M (Patient taking differently: Take 2.5 mg by mouth Monday, Wed, Fri) 25 tablet 3    omeprazole (PRILOSEC) 20 MG delayed release capsule TAKE 1 CAPSULE EVERY MORNING  BEFORE  BREAKFAST 90 capsule 3    divalproex (DEPAKOTE ER) 500 MG extended release tablet TAKE 1 TABLET IN THE MORNING AND 2 TABLETS IN THE EVENING 270 tablet 3    metFORMIN (GLUCOPHAGE) 1000 MG tablet TAKE 1 TABLET TWICE DAILY WITH MEALS 180 tablet 3    metoprolol tartrate (LOPRESSOR) 25 MG tablet TAKE 1/2 TABLET TWICE DAILY 90 tablet 3    levothyroxine (SYNTHROID) 75 MCG tablet TAKE 1 TABLET EVERY DAY 90 tablet 3    atorvastatin (LIPITOR) 80 MG tablet Take 1 tablet by mouth nightly 90 tablet 3    albuterol sulfate  (90 Base) MCG/ACT inhaler Inhale 2 puffs into the lungs every 6 hours as needed for Wheezing 3 Inhaler 3    methocarbamol (ROBAXIN) 500 MG tablet Take 1 tablet by mouth 3 times daily 270 tablet 3    naloxone 4 MG/0.1ML LIQD nasal spray 1 spray by Nasal route as needed for Opioid Reversal 1 each 5    levETIRAcetam (KEPPRA) 500 MG tablet TAKE 1 TABLET TWICE DAILY 180 tablet 3    Alcohol Swabs (B-D SINGLE USE SWABS REGULAR) PADS USE AS DIRECTED TWICE DAILY 300 each 3    Omega-3 Fatty Acids (OMEGA-3 FISH OIL) 1000 MG CAPS Take 1 tablet by mouth daily      Wheat Dextrin (EQ FIBER POWDER PO) Take by mouth      Blood Glucose Monitoring Suppl (ACCU-CHEK GUIDE) w/Device KIT USE AS DIRECTED 1 kit 0    Handicap Placard MISC by Does not apply route Patient cannot walk 200 ft without stopping to rest.    Expiration 2024 1 each 0    busPIRone (BUSPAR) 10 MG tablet Take 10 mg by mouth daily       Handicap Placard MISC by Does not apply route Patient cannot walk 200 ft without stopping to rest.    Expiration 2024 1 each 0    Multiple Vitamins-Minerals (THERAPEUTIC MULTIVITAMIN-MINERALS) tablet Take 1 tablet by mouth daily      aspirin 81 MG tablet Take 1 tablet by mouth daily 30 tablet 5     No current facility-administered medications for this visit. Allergies   Allergen Reactions    Nsaids Shortness Of Breath and Other (See Comments)     Renal Failure       Review of Systems:  No new weakness, paresthesia, incontinence of bowel or bladder, saddle anesthesia, falls or gait dysfunction. Otherwise, per HPI. Physical Exam:   Respiratory rate is 20. GENERAL: The patient is in no apparent distress. Body habitus is obese. HEENT: No rhinorrhea, sneezing, yawning, or lacrimation. No scleral icterus or conjunctival injection. SKIN: No piloerection. No tract marks. No rash. PSYCH: Mood and affect are appropriate. Hygiene appears appropriate. CARDIOVASCULAR There is no edema. RESPIRATORY: Respirations are regular and unlabored. There is no cyanosis. GASTROINTESTINAL: Soft abdomen, non-tender (patient elicited)  MSK: Spinal curvatures were normal in standing.  Pelvis capsule     Refill:  3       Medications Discontinued During This Encounter   Medication Reason    traMADol (ULTRAM) 50 MG tablet REORDER    DULoxetine (CYMBALTA) 60 MG extended release capsule REORDER       The patient was educated about the diagnosis, prognosis, indications, risks and benefits of treatment. An opportunity to ask questions was given to the patient and questions were answered. The patient agreed to proceed with the recommended treatment as described above. Follow up 3 months  Thank you for allowing me to participate in the care of your patient. Tanvi White D.O., P.T.   Board Certified Physical Medicine and Rehabilitation  Board Certified Electrodiagnostic Medicine

## 2020-08-17 ENCOUNTER — HOSPITAL ENCOUNTER (OUTPATIENT)
Age: 50
Discharge: HOME | End: 2020-08-19
Payer: MEDICARE

## 2020-08-17 ENCOUNTER — NURSE ONLY (OUTPATIENT)
Dept: FAMILY MEDICINE CLINIC | Age: 50
End: 2020-08-17

## 2020-08-17 LAB
ALBUMIN SERPL-MCNC: 4 G/DL (ref 3.5–5.2)
ALP BLD-CCNC: 82 U/L (ref 35–104)
ALT SERPL-CCNC: 10 U/L (ref 0–32)
ANION GAP SERPL CALCULATED.3IONS-SCNC: 19 MMOL/L (ref 7–16)
ANISOCYTOSIS: ABNORMAL
AST SERPL-CCNC: 18 U/L (ref 0–31)
BASOPHILS ABSOLUTE: 0 E9/L (ref 0–0.2)
BASOPHILS RELATIVE PERCENT: 0.6 % (ref 0–2)
BILIRUB SERPL-MCNC: 0.3 MG/DL (ref 0–1.2)
BUN BLDV-MCNC: 14 MG/DL (ref 6–20)
BURR CELLS: ABNORMAL
CALCIUM SERPL-MCNC: 10 MG/DL (ref 8.6–10.2)
CHLORIDE BLD-SCNC: 99 MMOL/L (ref 98–107)
CHOLESTEROL, TOTAL: 173 MG/DL (ref 0–199)
CO2: 23 MMOL/L (ref 22–29)
CREAT SERPL-MCNC: 0.8 MG/DL (ref 0.5–1)
EOSINOPHILS ABSOLUTE: 0.26 E9/L (ref 0.05–0.5)
EOSINOPHILS RELATIVE PERCENT: 1.7 % (ref 0–6)
FOLATE: >20 NG/ML (ref 4.8–24.2)
GFR AFRICAN AMERICAN: >60
GFR NON-AFRICAN AMERICAN: >60 ML/MIN/1.73
GLUCOSE BLD-MCNC: 116 MG/DL (ref 74–99)
HBA1C MFR BLD: 7.9 % (ref 4–5.6)
HCT VFR BLD CALC: 56.6 % (ref 34–48)
HDLC SERPL-MCNC: 35 MG/DL
HEMOGLOBIN: 18 G/DL (ref 11.5–15.5)
LDL CHOLESTEROL CALCULATED: 114 MG/DL (ref 0–99)
LYMPHOCYTES ABSOLUTE: 2.95 E9/L (ref 1.5–4)
LYMPHOCYTES RELATIVE PERCENT: 19.1 % (ref 20–42)
MCH RBC QN AUTO: 27.8 PG (ref 26–35)
MCHC RBC AUTO-ENTMCNC: 31.8 % (ref 32–34.5)
MCV RBC AUTO: 87.3 FL (ref 80–99.9)
MONOCYTES ABSOLUTE: 1.24 E9/L (ref 0.1–0.95)
MONOCYTES RELATIVE PERCENT: 7.8 % (ref 2–12)
NEUTROPHILS ABSOLUTE: 11.01 E9/L (ref 1.8–7.3)
NEUTROPHILS RELATIVE PERCENT: 71.3 % (ref 43–80)
OVALOCYTES: ABNORMAL
PDW BLD-RTO: 17.8 FL (ref 11.5–15)
PLATELET # BLD: 340 E9/L (ref 130–450)
PMV BLD AUTO: 11.2 FL (ref 7–12)
POIKILOCYTES: ABNORMAL
POLYCHROMASIA: ABNORMAL
POTASSIUM SERPL-SCNC: 4.9 MMOL/L (ref 3.5–5)
RBC # BLD: 6.48 E12/L (ref 3.5–5.5)
SODIUM BLD-SCNC: 141 MMOL/L (ref 132–146)
TOTAL PROTEIN: 7.5 G/DL (ref 6.4–8.3)
TRIGL SERPL-MCNC: 119 MG/DL (ref 0–149)
TSH SERPL DL<=0.05 MIU/L-ACNC: 1.18 UIU/ML (ref 0.27–4.2)
VITAMIN B-12: 528 PG/ML (ref 211–946)
VITAMIN D 25-HYDROXY: 29 NG/ML (ref 30–100)
VLDLC SERPL CALC-MCNC: 24 MG/DL
WBC # BLD: 15.5 E9/L (ref 4.5–11.5)

## 2020-08-17 PROCEDURE — 82746 ASSAY OF FOLIC ACID SERUM: CPT

## 2020-08-17 PROCEDURE — 82607 VITAMIN B-12: CPT

## 2020-08-17 PROCEDURE — 80061 LIPID PANEL: CPT

## 2020-08-17 PROCEDURE — 80053 COMPREHEN METABOLIC PANEL: CPT

## 2020-08-17 PROCEDURE — 83036 HEMOGLOBIN GLYCOSYLATED A1C: CPT

## 2020-08-17 PROCEDURE — 85025 COMPLETE CBC W/AUTO DIFF WBC: CPT

## 2020-08-17 PROCEDURE — 84443 ASSAY THYROID STIM HORMONE: CPT

## 2020-08-17 PROCEDURE — 82306 VITAMIN D 25 HYDROXY: CPT

## 2020-08-20 ASSESSMENT — ENCOUNTER SYMPTOMS
BLOOD IN STOOL: 0
SORE THROAT: 0
SHORTNESS OF BREATH: 0
NAUSEA: 0
BACK PAIN: 1
VOMITING: 0
WHEEZING: 0
COUGH: 0
DIARRHEA: 0
ABDOMINAL PAIN: 0
CONSTIPATION: 0

## 2020-08-21 ENCOUNTER — VIRTUAL VISIT (OUTPATIENT)
Dept: FAMILY MEDICINE CLINIC | Age: 50
End: 2020-08-21
Payer: MEDICARE

## 2020-08-21 PROCEDURE — 3051F HG A1C>EQUAL 7.0%<8.0%: CPT | Performed by: FAMILY MEDICINE

## 2020-08-21 PROCEDURE — 3017F COLORECTAL CA SCREEN DOC REV: CPT | Performed by: FAMILY MEDICINE

## 2020-08-21 PROCEDURE — 2022F DILAT RTA XM EVC RTNOPTHY: CPT | Performed by: FAMILY MEDICINE

## 2020-08-21 PROCEDURE — G8427 DOCREV CUR MEDS BY ELIG CLIN: HCPCS | Performed by: FAMILY MEDICINE

## 2020-08-21 PROCEDURE — 99214 OFFICE O/P EST MOD 30 MIN: CPT | Performed by: FAMILY MEDICINE

## 2020-08-21 ASSESSMENT — PATIENT HEALTH QUESTIONNAIRE - PHQ9
1. LITTLE INTEREST OR PLEASURE IN DOING THINGS: 0
SUM OF ALL RESPONSES TO PHQ QUESTIONS 1-9: 0
2. FEELING DOWN, DEPRESSED OR HOPELESS: 0
SUM OF ALL RESPONSES TO PHQ QUESTIONS 1-9: 0
SUM OF ALL RESPONSES TO PHQ9 QUESTIONS 1 & 2: 0

## 2020-08-21 NOTE — PROGRESS NOTES
20    TELEHEALTH EVALUATION -- Audio/Visual (During HEXIJ-47 public health emergency)    Julissa Christianson is a 45 y.o. female being evaluated by a Virtual Visit (video visit) encounter to address concerns as mentioned above. A caregiver was present when appropriate. Due to this being a TeleHealth encounter (During HNPEO-84 public health emergency), evaluation of the following organ systems was limited: Vitals/Constitutional/EENT/Resp/CV/GI//MS/Neuro/Skin/Heme-Lymph-Imm. Pursuant to the emergency declaration under the [address] [place] [address], [address] [place] [address] authority and the [employer] Act, this Virtual Visit was conducted with patient's (and/or legal guardian's) consent, to reduce the patient's risk of exposure to COVID-19 and provide necessary medical care. The patient (and/or legal guardian) has also been advised to contact this office for worsening conditions or problems, and seek emergency medical treatment and/or call 911 if deemed necessary. Services were provided through a video synchronous discussion virtually to substitute for in-person clinic visit. Patient location: [place] in [place]. Provider location: [place]    Patient identity verified prior to visit: Yes      HPI:    Julissa Christianson (:  1970) has requested an audio/video evaluation for the following concern(s):    Chief Complaint   Patient presents with    3 Month Follow-Up     pt reported vitals on my chart     Medication Refill     dont know what she needs         She is treated for diabetes, bipolar disorder, tobacco use, COPD, Peripheral Vascular disease. She reported several episodes of spasms of right side of face during the month of 2020; none during 2020. Duration <1 minute; no compromise of vision,speech or hearing. Denies numbness, weakness of UE or LE. Common denominator seems to be stress induced.   Patient advised to monitor for 10/21/2019    PROTIME 47.6 07/16/2019     Current Coumadin dose (4/29/2020): 2.5 mg MWF, 5 mg other days. Current Warfarin dosing as of 4/29/2020: 2.5 mg MWF, 5 mg other days. Tobacco Dependency:  She  is not ready to quit. Smokes 1 pack a day. Bipolar Disorder: At this time she is well controlled. Depakote and Duloxetine. Obesity:  Wt Readings from Last 3 Encounters:   06/02/20 246 lb (111.6 kg)   04/29/20 240 lb (108.9 kg)   02/17/20 235 lb (106.6 kg)     BMI Readings from Last 3 Encounters:   06/02/20 38.53 kg/m²   04/29/20 37.59 kg/m²   02/17/20 36.81 kg/m²       Chronic Bronchitis:  Mild \"smoker's cough\" but denies shortness of breath. Mild wheezing reported. Currently on no medication; doesn't feel that symptoms are impairing her quality of life/function. Seizure Disorder:  Taking Depakote and Keppra. No breakthrough symptoms. Peripheral Neuropathy:  Between numbness, weakness, and pain, coupled with poor balance and inability to stand for long periods of time, patient finally acquired shower bench and will get tub rails on her own. Chronic Pain:  Lumbar stenosis, DDD/DJD, SI joint arthritis, hip arthritis. Under the care of Dr. Willeen Schilder (PM[facility])    Leukocytosis:  Persistent and chronic; this has been persistent for some time. Chart reviewed extensively: she was referred to and seen by Dr. Olaf Bishop in 2015. W/U to R/O CML and lung cancer completed and was negative at that time. He felt that there was a reactive component that may be related to smoking. F/U was suggested @ [facility]. Values are stable. [address]:  Patient's past medical, surgical, social and/or family history reviewed, updated in chart, and are non-contributory (unless otherwise stated). Medications and allergies also reviewed and updated in chart. Review of Systems  Review of Systems   Constitutional: Positive for fatigue. HENT: Negative for congestion, ear pain and sore throat.     Respiratory: Negative for 06/01/2017    TFNB  #1    NERVE BLOCK Bilateral 06/15/2017    bilatera lumbar transforaminal nerve block #2 L5-S1    NERVE BLOCK Bilateral 09/20/2017    Bilateral transforamninal nerve block lumbar #3    RHINOPLASTY      1985    SMALL INTESTINE SURGERY N/A 2/6/2019    LAPAROSCOPIC SIGMOID COLECTOMY performed by Lian Meehan MD at [place] [address] VASCULAR SURGERY  08/05/2019   ,   Social History     Tobacco Use    Smoking status: Current Every Day Smoker     Packs/day: 1.00     Years: 32.00     Pack years: 32.00     Types: Cigarettes    Smokeless tobacco: Never Used    Tobacco comment: (9/16/18):  not ready to quit; counseling given   Substance Use Topics    Alcohol use: Yes     Comment: previously drank occasionally, quit 2016    Drug use: Not Currently     Types: Marijuana     Comment: once a month or so; depending on pain, last use 12/2017   ,   Family History   Problem Relation Age of Onset    Depression Mother     Bipolar Disorder Mother     Hypertension Mother    Alyssa Bray Anxiety Disorder Sister     Asthma Son    Alyssa Bray Schizophrenia Son     Anxiety Disorder Daughter    ,   Immunization History   Administered Date(s) Administered    Influenza, Intradermal, Preservative free 12/17/2015    Influenza, Intradermal, Quadrivalent, Preservative Free 11/10/2016    Influenza, Quadv, IM, PF (6 mo and older Fluzone, Flulaval, Fluarix, and 3 yrs and older Afluria) 10/17/2017, 09/21/2018    Pneumococcal Conjugate 13-valent ([id number]) 12/17/2015    Pneumococcal Polysaccharide ([id number]) 07/24/2014    Tdap (Boostrix, Adacel) 10/05/2017   ,   Health Maintenance   Topic Date Due    Diabetic retinal exam  02/13/2019    Diabetic foot exam  03/05/2019    Shingles Vaccine (1 of 2) 05/12/2019    Annual Wellness Visit (AWV)  05/29/2019    Breast cancer screen  01/01/2050 (Originally 5/12/2019)    Flu vaccine (1) 09/01/2020    Diabetic microalbuminuria test  10/14/2020    A1C test (Diabetic or Prediabetic)  08/17/2021    Lipid screen  08/17/2021    TSH testing  08/17/2021    DTaP/Tdap/Td vaccine (2 - Td) 10/05/2027    Colon cancer screen colonoscopy  06/02/2030    Pneumococcal 0-64 years Vaccine  Completed    HIV screen  Completed    Hepatitis A vaccine  Aged Out    Hib vaccine  Aged Out    Meningococcal (ACWY) vaccine  Aged Out    Hepatitis B vaccine  Discontinued       PHYSICAL EXAMINATION:    Patient-Reported Vitals 8/20/2020   Patient-Reported Weight 250   Patient-Reported Height 5'7\"   Patient-Reported Systolic 095   Patient-Reported Diastolic 76   Patient-Reported Pulse 78   Patient-Reported Temperature 97.9   Patient-Reported Peak Flow N/A      Physical Exam  Vitals signs reviewed. Constitutional:       General: She is not in acute distress. Appearance: She is well-developed. She is not diaphoretic. HENT:      Head: Normocephalic and atraumatic. Right Ear: External ear normal.      Left Ear: External ear normal.      Mouth/Throat:      Pharynx: No oropharyngeal exudate. Eyes:      General: No scleral icterus. Right eye: No discharge. Conjunctiva/sclera: Conjunctivae normal.      Pupils: Pupils are equal, round, and reactive to light. Neck:      Musculoskeletal: Normal range of motion and neck supple. Thyroid: No thyromegaly. Cardiovascular:      Rate and Rhythm: Normal rate and regular rhythm. Heart sounds: Normal heart sounds. No murmur. Pulmonary:      Effort: Pulmonary effort is normal. No respiratory distress. Breath sounds: No stridor. No wheezing or rales. Chest:      Chest wall: No tenderness. Abdominal:      General: Bowel sounds are normal. There is no distension. Palpations: Abdomen is soft. There is no mass. Tenderness: There is no abdominal tenderness. There is no guarding. Musculoskeletal: Normal range of motion. General: No tenderness.    Lymphadenopathy:      Cervical: No cervical adenopathy. Skin:     General: Skin is warm and dry. Coloration: Skin is not pale. Findings: No erythema or rash. Comments:      Neurological:      Mental Status: She is alert and oriented to person, place, and time. Psychiatric:         Behavior: Behavior normal.         Thought Content: Thought content normal.            Assessment / Plan:      Estrella Chand was seen today for 3 month follow-up. Diagnoses and all orders for this visit:    Type 2 diabetes mellitus with diabetic peripheral angiopathy without gangrene, without long-term current use of insulin ([facility])    Essential hypertension    Mixed hyperlipidemia    Blood clotting disorder ([facility]    Subclinical hypothyroidism    Seizure disorder    Depressed bipolar II disorder ([facility]    Tobacco dependency               Type 2 diabetes mellitus with diabetic peripheral angiopathy without gangrene, without long-term current use of insulin ([facility]):  More elevated HgbA1C  -     metFORMIN (GLUCOPHAGE) 1000 MG tablet; TAKE 1 TABLET TWICE DAILY WITH MEALS    Anticoagulant long-term use:  Needs INR check up within 4 weeks. -     warfarin (COUMADIN) 5 MG tablet; Indications: Tuesday - Sunday 1 tablet all days except Mondays  -     warfarin (COUMADIN) 2.5 MG tablet; 1 tablet M    Renal infarct [facility]):  Needs INR check up within 4 weeks. -     warfarin (COUMADIN) 5 MG tablet; Indications: Tuesday - Sunday 1 tablet all days except Mondays  -     warfarin (COUMADIN) 2.5 MG tablet; 1 tablet M    Gastroesophageal reflux disease, esophagitis presence not specified:  Stable and well controlled at home. -     omeprazole (PRILOSEC) 20 MG delayed release capsule; TAKE 1 CAPSULE EVERY MORNING  BEFORE  BREAKFAST    Seizure disorder:  Stable and well controlled at home.    -     divalproex (DEPAKOTE ER) 500 MG extended release tablet; TAKE 1 TABLET IN THE MORNING AND 2 TABLETS IN THE EVENING    Peripheral polyneuropathy:  Stable and well controlled at home.     - if overdue, as this is important in assessing forundiagnosed pathology, especially cancer, as well as protecting against potentially harmful/life threatening disease. Patient and/or guardian verbalizes understandingand agrees with above counseling, assessment and plan. All questions answered. Anoop Childress MD        --Anoop Childress MD on 8/21/2020    An electronic signature was used to authenticate this note.

## 2020-09-04 ENCOUNTER — TELEPHONE (OUTPATIENT)
Dept: SPIRITUAL SERVICES | Age: 50
End: 2020-09-04

## 2020-09-04 ENCOUNTER — VIRTUAL VISIT (OUTPATIENT)
Dept: FAMILY MEDICINE CLINIC | Age: 50
End: 2020-09-04
Payer: MEDICARE

## 2020-09-04 PROCEDURE — 3017F COLORECTAL CA SCREEN DOC REV: CPT | Performed by: FAMILY MEDICINE

## 2020-09-04 PROCEDURE — G0438 PPPS, INITIAL VISIT: HCPCS | Performed by: FAMILY MEDICINE

## 2020-09-04 ASSESSMENT — PATIENT HEALTH QUESTIONNAIRE - PHQ9
SUM OF ALL RESPONSES TO PHQ QUESTIONS 1-9: 0
SUM OF ALL RESPONSES TO PHQ QUESTIONS 1-9: 0

## 2020-09-04 ASSESSMENT — LIFESTYLE VARIABLES: HOW OFTEN DO YOU HAVE A DRINK CONTAINING ALCOHOL: 0

## 2020-09-04 NOTE — PROGRESS NOTES
Yes      Medicare Annual Wellness Visit  Name: Shaan Hamilton Date: 2020   MRN: [id number] Sex: Female   Age: 45 y.o. Ethnicity: Non-/Non    : 1970 Race: David Swapna Rivera is here for [employer] (Annual Wellness Visit) and Diabetes (glucose this morning 173)    Screenings for behavioral, psychosocial and functional/safety risks, and cognitive dysfunction are all negative except as indicated below. These results, as well as other patient data from the [address] form, are documented in Flowsheets linked to this Encounter. Allergies   Allergen Reactions    Nsaids Shortness Of Breath and Other (See Comments)     Renal Failure       Prior to Visit Medications    Medication Sig Taking? Authorizing Provider   traMADol (ULTRAM) 50 MG tablet Take 1 tablet by mouth every 6 hours as needed for Pain for up to 30 days. Yes Alma Delia Sauer DO   DULoxetine (CYMBALTA) 60 MG extended release capsule TAKE 1 CAPSULE EVERY DAY Yes Alma Delia Sauer DO   Accu-Chek FastClix Lancets MISC CHECK BLOOD SUGAR TWICE DAILY Yes Vianca Smith MD   blood glucose test strips (ONE TOUCH ULTRA TEST) strip Accu-check Guide strips. Pt. Tests TID.  Yes Donita Funes MD   warfarin (COUMADIN) 5 MG tablet Indications: Tuesday -  1 tablet all days except   Patient taking differently: Take 5 mg by mouth Indications: Tuesday -  Tues, thur, sat, sun Yes Donita Funes MD   warfarin (COUMADIN) 2.5 MG tablet 1 tablet M  Patient taking differently: Take 2.5 mg by mouth Monday, Wed, Fri Yes Donita Funes MD   omeprazole (PRILOSEC) 20 MG delayed release capsule TAKE 1 CAPSULE EVERY MORNING  BEFORE  BREAKFAST Yes Vianca Smith MD   divalproex (DEPAKOTE ER) 500 MG extended release tablet TAKE 1 TABLET IN THE MORNING AND 2 TABLETS IN THE EVENING Yes Vianca Smith MD   metFORMIN (GLUCOPHAGE) 1000 MG tablet TAKE 1 TABLET back, both hips and both knees    Carpal tunnel syndrome on both sides     both wrists    Cellulitis of right foot 03/2016    Cervical cancer ([address] 2012    S/P LUCIUS, BSO    Chronic back pain     Depressed bipolar II disorder (HCC)     Diabetes mellitus ([address]     Diverticulitis     DVT (deep vein thrombosis) in pregnancy     Epilepsy ([address]     Hx of blood clots     Hyperlipidemia     Hypertension     Leukocytosis 9/6/2013    Marijuana use     Neuropathy     Obesity     PONV (postoperative nausea and vomiting)     Rotator cuff tear arthropathy     Thyroid disease     Type 2 diabetes mellitus without complication [facility])        Past Surgical History:   Procedure Laterality Date   [address] COLONOSCOPY  10/10/2018    COLONOSCOPY WITH BIOPSY performed by Yosef Hay MD at [facility] N/A 6/2/2020    COLONOSCOPY WITH BIOPSY performed by Yosef Hay MD at [address] [facility] ECHO COMPLETE  9/5/2013         FOOT SURGERY  8/7/2015    HYSTERECTOMY  2012    KIDNEY BIOPSY      KNEE ARTHROSCOPY  2003    right and left knee    KNEE SURGERY      left knee    NERVE BLOCK Bilateral 06/01/2017    TFNB  #1    NERVE BLOCK Bilateral 06/15/2017    bilatera lumbar transforaminal nerve block #2 L5-S1    NERVE BLOCK Bilateral 09/20/2017    Bilateral transforamninal nerve block lumbar #3    RHINOPLASTY      1985    SMALL INTESTINE SURGERY N/A 2/6/2019    LAPAROSCOPIC SIGMOID COLECTOMY performed by Yosef Hay MD at [place] [address] VASCULAR SURGERY  08/05/2019       Family History   Problem Relation Age of Onset    Depression Mother     Bipolar Disorder Mother     Hypertension Mother    Lamin Gondola Anxiety Disorder Sister     Asthma Son    Lamin G[place]ola Schizophrenia Son     Anxiety Disorder Daughter        CareTeam (Including outside providers/suppliers regularly involved in providing care):   Patient Care Team:  Hiwot Salazar Intradermal, Quadrivalent, Preservative Free 11/10/2016    Influenza, Quadv, IM, PF (6 mo and older Fluzone, Flulaval, Fluarix, and 3 yrs and older Afluria) 10/17/2017, 09/21/2018    Pneumococcal Conjugate 13-valent ([id number]) 12/17/2015    Pneumococcal Polysaccharide ([id number]) 07/24/2014    Tdap (Boostrix, Adacel) 10/05/2017        Health Maintenance   Topic Date Due    Diabetic retinal exam  02/13/2019    Diabetic foot exam  03/05/2019    Shingles Vaccine (1 of 2) 05/12/2019    Annual Wellness Visit (AWV)  05/29/2019    Flu vaccine (1) 09/01/2020    Breast cancer screen  01/01/2050 (Originally 5/12/2019)    Diabetic microalbuminuria test  10/14/2020    A1C test (Diabetic or Prediabetic)  08/17/2021    Lipid screen  08/17/2021    TSH testing  08/17/2021    DTaP/Tdap/Td vaccine (2 - Td) 10/05/2027    Colon cancer screen colonoscopy  06/02/2030    Pneumococcal 0-64 years Vaccine  Completed    HIV screen  Completed    Hepatitis A vaccine  Aged Out    Hib vaccine  Aged Out    Meningococcal (ACWY) vaccine  Aged Out    Hepatitis B vaccine  Discontinued     Recommendations for [employer] Due: see orders and patient instructions/AVS.  . Recommended screening schedule for the next 5-10 years is provided to the patient in written form: see Patient Instructions/AVS.      Assessment / Plan:      Maureen Weaver was seen today for medicare awv and diabetes. Diagnoses and all orders for this visit:    Medicare Annual Wellness Exam, Initial  -     zoster recombinant adjuvanted vaccine (SHINGRIX) 50 MCG/0.5ML SUSR injection; Inject 0.5 mLs into the muscle once for 1 dose Get a second dose 2-6 months after first dose. CHECK YOUR INSURANCE TO VERIFY COVERAGE  -     Ambulatory Referral to ACP Clinical Specialist        -     Advanced Care Planning: Discussed the patients choices for care and treatment in case of a health event that adversely affects decision-making abilities.  Also discussed the patients long-term treatment options. Reviewed with the patient the [facility] Declaration forms  Reviewed the process of designating a competent adult as an Agent (or -in-fact) that could take make health care decisions for the patient if incompetent. Patient was asked to complete the declaration forms, either acknowledge the forms by a public notary or an eligible witness and provide a signed copy to the practice office. Time spent (minutes): 5 minutes    ACP (advance care planning)  -     [place] ADVANCED CARE PLAN FACE TO [facility], [address] [facility] [[id number]]        -     Advanced Care Planning: Discussed the patients choices for care and treatment in case of a health event that adversely affects decision-making abilities. Also discussed the patients long-term treatment options. Reviewed with the patient the [address] [place] [address] Declaration forms  Reviewed the process of designating a competent adult as an Agent (or -in-fact) that could take make health care decisions for the patient if incompetent. Patient was asked to complete the declaration forms, either acknowledge the forms by a public notary or an eligible witness and provide a signed copy to the practice office. Time spent (minutes): 5 minutes  -     Ambulatory Referral to ACP Clinical Specialist    Need for prophylactic vaccination and inoculation against varicella  -     zoster recombinant adjuvanted vaccine [facility]) 50 MCG/0.5ML SUSR injection; Inject 0.5 mLs into the muscle once for 1 dose Get a second dose 2-6 months after first dose. CHECK YOUR INSURANCE TO VERIFY COVERAGE           Follow Up:  Return for 1) Keep scheduled appts; 2) Medicare Annual Wellness Visit in 1 year. Arline Perez is a 45 y.o. female being evaluated by a Virtual Visit (video and audio) encounter to address concerns as mentioned above. A caregiver was present when appropriate.  Due to this being a TeleHealth encounter (During ZUOIS-31 public health emergency), evaluation of the following organ systems was limited: Vitals/Constitutional/EENT/Resp/CV/GI//MS/Neuro/Skin/Heme-Lymph-Imm. Pursuant to the emergency declaration under the [address] [place] [address], [address] [place] [address] and the [email or web address] and [employer] Act, this Virtual Visit was conducted with patient's (and/or legal guardian's) consent, to reduce the patient's risk of exposure to COVID-19 and provide necessary medical care. The patient (and/or legal guardian) has also been advised to contact this office for worsening conditions or problems, and seek emergency medical treatment and/or call 911 if deemed necessary. Patient identification was verified at the start of the visit: Yes    Services were provided through a video synchronous discussion virtually to substitute for in-person clinic visit. Patient and provider were located at their individual homes. --Louise Jimenez MD on 9/4/2020 at 9:14 AM    An electronic signature was used to authenticate this note.

## 2020-09-05 ENCOUNTER — TELEPHONE (OUTPATIENT)
Dept: SPIRITUAL SERVICES | Age: 50
End: 2020-09-05

## 2020-09-05 NOTE — TELEPHONE ENCOUNTER
.ACP invitation extended by PCP. An outreach call was made to patient by an ACP Clinical Specialist and patient is aware of ACP referral. A call was scheduled for next week and the AD documents were emailed to the patient.

## 2020-09-09 ENCOUNTER — TELEPHONE (OUTPATIENT)
Dept: SPIRITUAL SERVICES | Age: 50
End: 2020-09-09

## 2020-09-09 NOTE — TELEPHONE ENCOUNTER
An outreach call was made to patient by ACP Clinical Specialist and patient was scheduled for call to discuss AD documents. . The patient requested to reschedule a call and confirmed that AD documents were received in email.   A follow up call will be scheduled for the end of the week with an ACP Specialist.

## 2020-09-12 ENCOUNTER — TELEPHONE (OUTPATIENT)
Dept: PALLATIVE CARE | Age: 50
End: 2020-09-12

## 2020-09-12 NOTE — ACP (ADVANCE CARE PLANNING)
Advance Care Planning     Advance Care Planning Clinical Specialist  Conversation Note      Date of ACP Conversation: 9/12/2020    Conversation Conducted with: Patient with Decision Making Capacity    ACP Clinical Specialist: [address] Decision Maker:     Current Designated Health Care Decision Maker:     \"Who would you like to name as your primary health care decision-maker? \"               Name: Sandeep Reyes         Relationship: Daughter            Phone number: [phone]  Vipul [place] this person be reached easily? \" Yes  \"Who would you like to name as your back-up decision maker? \"   Name: Abelino Umana         Relationship: Son             Phone number: [phone]  Vipul [place] this person be reached easily? \" Yes    Care Preferences    Hospitalization: \"If your health worsens and it becomes clear that your chance of recovery is unlikely, what would your preference be regarding hospitalization? \"    Choice:  [x] The patient wants hospitalization  [] The patient prefers comfort-focused treatment without hospitalization. Ventilation: \"If you were in your present state of health and suddenly became very ill and were unable to breathe on your own, what would your preference be about the use of a ventilator (breathing machine) if it were available to you? \"      Would the patient desire the use of ventilator (breathing machine)?: yes, No long term use of mechanical devices    \"If your health worsens and it becomes clear that your chance of recovery is unlikely, what would your preference be about the use of a ventilator (breathing machine) if it were available to you? \"     Would the patient desire the use of ventilator (breathing machine)?: No      Resuscitation  \"CPR works best to restart the heart when there is a sudden event, like a heart attack, in someone who is otherwise healthy.  Unfortunately, CPR does not typically restart the heart for people who have serious health conditions or who are very sick. \"    \"In the event your heart stopped as a result of an underlying serious health condition, would you want attempts to be made to restart your heart (answer \"yes\" for attempt to resuscitate) or would you prefer a natural death (answer \"no\" for do not attempt to resuscitate)? \" yes, No long term use of mechanical devices     [x] Yes   [] No   Educated Patient / Decision Maker regarding differences between Advance Directives and portable DNR orders. Length of ACP Conversation in minutes:  63mins    Conversation Outcomes:  [x] ACP discussion completed  [] Existing advance directive reviewed with patient; no changes to patient's previously recorded wishes  [x] New Advance Directive completed  [] Portable Do Not Rescitate prepared for Provider review and signature  [] POLST/POST/MOLST/MOST prepared for Provider review and signature      Follow-up plan:    [] Schedule follow-up conversation to continue planning  [] Referred individual to Provider for additional questions/concerns   [] Advised patient/agent/surrogate to review completed ACP document and update if needed with changes in condition, patient preferences or care setting    [] This note routed to one or more involved healthcare providers     Completed the patient's AD via [facility]. A copy was recorded to her Medical Records, original emailed after notarized by this staff through return verification with [employer].   - Patient was instructed to download the document, copy for each agent and keep a copy for herself. Patient explained that she has the document saved on her phone.   - No further follow-up will be made at this time.

## 2020-10-06 RX ORDER — LEVETIRACETAM 500 MG/1
TABLET ORAL
Qty: 180 TABLET | Refills: 3 | Status: SHIPPED
Start: 2020-10-06 | End: 2022-06-03 | Stop reason: SDUPTHER

## 2020-10-14 ENCOUNTER — HOSPITAL ENCOUNTER (OUTPATIENT)
Age: 50
Discharge: HOME | End: 2020-10-14
Payer: MEDICARE

## 2020-10-14 ENCOUNTER — HOSPITAL ENCOUNTER (OUTPATIENT)
Dept: CT IMAGING | Age: 50
Discharge: HOME | End: 2020-10-14
Payer: MEDICARE

## 2020-10-14 LAB
BUN BLDV-MCNC: 17 MG/DL (ref 6–20)
CREAT SERPL-MCNC: 0.9 MG/DL (ref 0.5–1)
GFR AFRICAN AMERICAN: >60
GFR NON-AFRICAN AMERICAN: >60 ML/MIN/1.73

## 2020-10-14 PROCEDURE — 82565 ASSAY OF CREATININE: CPT

## 2020-10-14 PROCEDURE — 6360000004 HC RX CONTRAST MEDICATION: Performed by: RADIOLOGY

## 2020-10-14 PROCEDURE — 84520 ASSAY OF UREA NITROGEN: CPT

## 2020-10-14 PROCEDURE — 36415 COLL VENOUS BLD VENIPUNCTURE: CPT

## 2020-10-14 PROCEDURE — 75635 CT ANGIO ABDOMINAL ARTERIES: CPT

## 2020-10-14 RX ADMIN — IOPAMIDOL 110 ML: 755 INJECTION, SOLUTION INTRAVENOUS at 11:05

## 2020-10-29 ENCOUNTER — HOSPITAL ENCOUNTER (OUTPATIENT)
Age: 50
Discharge: HOME | End: 2020-10-29
Payer: MEDICARE

## 2020-10-29 ENCOUNTER — OFFICE VISIT (OUTPATIENT)
Dept: NEUROLOGY | Age: 50
End: 2020-10-29
Payer: MEDICARE

## 2020-10-29 VITALS
DIASTOLIC BLOOD PRESSURE: 72 MMHG | BODY MASS INDEX: 38.45 KG/M2 | WEIGHT: 245 LBS | TEMPERATURE: 97.7 F | SYSTOLIC BLOOD PRESSURE: 120 MMHG | RESPIRATION RATE: 12 BRPM | HEIGHT: 67 IN | OXYGEN SATURATION: 94 % | HEART RATE: 101 BPM

## 2020-10-29 PROBLEM — E34.8 PINEAL GLAND CYST: Status: ACTIVE | Noted: 2020-10-29

## 2020-10-29 LAB
ANION GAP SERPL CALCULATED.3IONS-SCNC: 13 MMOL/L (ref 7–16)
APTT: 32.7 SEC (ref 24.5–35.1)
BUN BLDV-MCNC: 11 MG/DL (ref 6–20)
CALCIUM SERPL-MCNC: 10 MG/DL (ref 8.6–10.2)
CHLORIDE BLD-SCNC: 103 MMOL/L (ref 98–107)
CO2: 24 MMOL/L (ref 22–29)
CREAT SERPL-MCNC: 0.9 MG/DL (ref 0.5–1)
GFR AFRICAN AMERICAN: >60
GFR NON-AFRICAN AMERICAN: >60 ML/MIN/1.73
GLUCOSE BLD-MCNC: 131 MG/DL (ref 74–99)
HCT VFR BLD CALC: 55.3 % (ref 34–48)
HEMOGLOBIN: 18.4 G/DL (ref 11.5–15.5)
INR BLD: 1.1
MCH RBC QN AUTO: 28.1 PG (ref 26–35)
MCHC RBC AUTO-ENTMCNC: 33.3 % (ref 32–34.5)
MCV RBC AUTO: 84.4 FL (ref 80–99.9)
PDW BLD-RTO: 18.3 FL (ref 11.5–15)
PLATELET # BLD: 337 E9/L (ref 130–450)
PMV BLD AUTO: 10.3 FL (ref 7–12)
POTASSIUM SERPL-SCNC: 4.2 MMOL/L (ref 3.5–5)
PROTHROMBIN TIME: 12.3 SEC (ref 9.3–12.4)
RBC # BLD: 6.55 E12/L (ref 3.5–5.5)
SODIUM BLD-SCNC: 140 MMOL/L (ref 132–146)
WBC # BLD: 17.6 E9/L (ref 4.5–11.5)

## 2020-10-29 PROCEDURE — G8417 CALC BMI ABV UP PARAM F/U: HCPCS | Performed by: NURSE PRACTITIONER

## 2020-10-29 PROCEDURE — 85610 PROTHROMBIN TIME: CPT

## 2020-10-29 PROCEDURE — 36415 COLL VENOUS BLD VENIPUNCTURE: CPT

## 2020-10-29 PROCEDURE — 4004F PT TOBACCO SCREEN RCVD TLK: CPT | Performed by: NURSE PRACTITIONER

## 2020-10-29 PROCEDURE — 80048 BASIC METABOLIC PNL TOTAL CA: CPT

## 2020-10-29 PROCEDURE — 3017F COLORECTAL CA SCREEN DOC REV: CPT | Performed by: NURSE PRACTITIONER

## 2020-10-29 PROCEDURE — 99214 OFFICE O/P EST MOD 30 MIN: CPT | Performed by: NURSE PRACTITIONER

## 2020-10-29 PROCEDURE — 85027 COMPLETE CBC AUTOMATED: CPT

## 2020-10-29 PROCEDURE — G8484 FLU IMMUNIZE NO ADMIN: HCPCS | Performed by: NURSE PRACTITIONER

## 2020-10-29 PROCEDURE — G8427 DOCREV CUR MEDS BY ELIG CLIN: HCPCS | Performed by: NURSE PRACTITIONER

## 2020-10-29 PROCEDURE — 85730 THROMBOPLASTIN TIME PARTIAL: CPT

## 2020-10-29 RX ORDER — TRAMADOL HYDROCHLORIDE 50 MG/1
1 TABLET ORAL 4 TIMES DAILY PRN
COMMUNITY
Start: 2020-10-20 | End: 2020-11-16 | Stop reason: SDUPTHER

## 2020-10-29 NOTE — PROGRESS NOTES
tablet Indications: Tuesday - Sunday 1 tablet all days except Mondays (Patient taking differently: Take 5 mg by mouth Indications: Tuesday - Sunday Tues, thur, sat, sun) 75 tablet 3    warfarin (COUMADIN) 2.5 MG tablet 1 tablet M (Patient taking differently: Take 2.5 mg by mouth Monday, Wed, Fri) 25 tablet 3    omeprazole (PRILOSEC) 20 MG delayed release capsule TAKE 1 CAPSULE EVERY MORNING  BEFORE  BREAKFAST 90 capsule 3    divalproex (DEPAKOTE ER) 500 MG extended release tablet TAKE 1 TABLET IN THE MORNING AND 2 TABLETS IN THE EVENING 270 tablet 3    metFORMIN (GLUCOPHAGE) 1000 MG tablet TAKE 1 TABLET TWICE DAILY WITH MEALS 180 tablet 3    metoprolol tartrate (LOPRESSOR) 25 MG tablet TAKE 1/2 TABLET TWICE DAILY 90 tablet 3    levothyroxine (SYNTHROID) 75 MCG tablet TAKE 1 TABLET EVERY DAY 90 tablet 3    atorvastatin (LIPITOR) 80 MG tablet Take 1 tablet by mouth nightly 90 tablet 3    albuterol sulfate  (90 Base) MCG/ACT inhaler Inhale 2 puffs into the lungs every 6 hours as needed for Wheezing 3 Inhaler 3    methocarbamol (ROBAXIN) 500 MG tablet Take 1 tablet by mouth 3 times daily 270 tablet 3    naloxone 4 MG/0.1ML LIQD nasal spray 1 spray by Nasal route as needed for Opioid Reversal 1 each 5    Omega-3 Fatty Acids (OMEGA-3 FISH OIL) 1000 MG CAPS Take 1 tablet by mouth daily      Wheat Dextrin (EQ FIBER POWDER PO) Take by mouth      busPIRone (BUSPAR) 10 MG tablet Take 10 mg by mouth daily       Multiple Vitamins-Minerals (THERAPEUTIC MULTIVITAMIN-MINERALS) tablet Take 1 tablet by mouth daily      aspirin 81 MG tablet Take 1 tablet by mouth daily 30 tablet 5    Handicap Placard MISC by Does not apply route Patient cannot walk 200 ft without stopping to rest.    Expiration 2024 1 each 0     No current facility-administered medications for this visit.       Objective:     /72 (Site: Left Upper Arm, Position: Sitting, Cuff Size: Large Adult)   Pulse 101   Temp 97.7 °F (36.5 °C) (Infrared)   Resp 12   Ht 5' 7\" (1.702 m)   Wt 245 lb (111.1 kg)   SpO2 94%   BMI 38.37 kg/m²     General Appearance: alert, cooperative, no distress, appears stated age   Head: normocephalic/atraumatic    Eyes: conjunctivae/corneas clear    Neck:  no carotid bruit or JVD; full ROM   Lungs:  Inspiratory and expiratory wheezes throughout; respirations nonlabored   Heart: regular rate and rhythm--no murmur   Extremities: atraumatic, no cyanosis or edema   Pulses: 1+ and symmetric all extremities   Skin: color, texture and turgor normal, no rashes or lesions     Mental Status: alert; oriented x4---very pleasant     Appropriate attention/concentration  Intact memories and fundus of knowledge    Speech: no dysarthria  Language: no aphasias--verbose    Cranial Nerves:  I: smell NA   II: visual acuity  NA   II: visual fields Full to confrontation   II: pupils KAJAL   III,VII: ptosis None   III,IV,VI: extraocular muscles  EOMI without nystagmus    V: mastication Normal   V: facial light touch sensation  Normal   V,VII: corneal reflex     VII: facial muscle function - upper  Normal   VII: facial muscle function - lower Normal   VIII: hearing Normal   IX: soft palate elevation  Normal   IX,X: gag reflex    XI: trapezius strength  5/5   XI: sternocleidomastoid strength 5/5   XI: neck extension strength  5/5   XII: tongue strength  Normal     Motor:  5/5 throughout  Normal tone  Overweight bulk  No abnormal movements    Sensory:  LT decreased both thighs R>L    Coordination:   FN, FFM, MICHAEL intact    Gait:  Cautious but normal    DTR:   Right Brachioradialis reflex 1+  Left Brachioradialis reflex 1+  Right Biceps reflex 1+  Left Biceps reflex 1+  Right Triceps reflex 0  Left Triceps reflex 0  Right Quadriceps reflex 0  Left Quadriceps reflex 0  Right Achilles reflex 0  Left Achilles reflex 0     No Alatorre's    Laboratory/Radiology:     Lab Results   Component Value Date     08/17/2020    K 4.9 08/17/2020    CL 99 08/17/2020    CO2 23 08/17/2020    BUN 17 10/14/2020    CREATININE 0.9 10/14/2020    GLUCOSE 116 (H) 08/17/2020    CALCIUM 10.0 08/17/2020    PROT 7.5 08/17/2020    LABALBU 4.0 08/17/2020    BILITOT 0.3 08/17/2020    ALKPHOS 82 08/17/2020    AST 18 08/17/2020    ALT 10 08/17/2020    LABGLOM >60 10/14/2020    GFRAA >60 10/14/2020       Lab Results   Component Value Date    WBC 15.5 (H) 08/17/2020    HGB 18.0 (H) 08/17/2020    HCT 56.6 (H) 08/17/2020    MCV 87.3 08/17/2020     08/17/2020    LYMPHOPCT 19.1 (L) 08/17/2020    RBC 6.48 (H) 08/17/2020    MCH 27.8 08/17/2020    MCHC 31.8 (L) 08/17/2020    RDW 17.8 (H) 08/17/2020      Ref. Range 8/17/2020 08:15   Cholesterol, Total Latest Ref Range: 0 - 199 mg/dL 173   HDL Cholesterol Latest Ref Range: >40 mg/dL 35   LDL Calculated Latest Ref Range: 0 - 99 mg/dL 114 (H)   Triglycerides Latest Ref Range: 0 - 149 mg/dL 119   VLDL Cholesterol Calculated Latest Units: mg/dL 24   Albumin Latest Ref Range: 3.5 - 5.2 g/dL 4.0   Alk Phos Latest Ref Range: 35 - 104 U/L 82   ALT Latest Ref Range: 0 - 32 U/L 10   AST Latest Ref Range: 0 - 31 U/L 18   Bilirubin Latest Ref Range: 0.0 - 1.2 mg/dL 0.3   Total Protein Latest Ref Range: 6.4 - 8.3 g/dL 7.5   Glucose Latest Ref Range: 74 - 99 mg/dL 116 (H)   Hemoglobin A1C Latest Ref Range: 4.0 - 5.6 % 7.9 (H)   TSH Latest Ref Range: 0.270 - 4.200 uIU/mL 1.180     Labs personally reviewed at the time of this office visit    Assessment:     Focal epilepsy with secondary generalization--with excellent control on dual AED therapy. Her neuro exam is normal. She requires intermittent surveillance of her incidental pineal cyst since it was measured at 1.3 cm 5 years ago. 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   Unfortunately she continues to smoke     Plan:

## 2020-11-03 ENCOUNTER — TELEPHONE (OUTPATIENT)
Dept: NEUROLOGY | Age: 50
End: 2020-11-03

## 2020-11-03 NOTE — TELEPHONE ENCOUNTER
MRI brain approved through [facility] from 11/3-12/3/20. MRI order and auth faxed to [facility] [employer] [facility]. Pt informed and aware she is to obtain MRI disk from facility.   Electronically signed by Karla Singh on 11/3/20 at 4:14 PM EST

## 2020-11-16 ENCOUNTER — TELEMEDICINE (OUTPATIENT)
Dept: PHYSICAL MEDICINE AND REHAB | Age: 50
End: 2020-11-16
Payer: MEDICARE

## 2020-11-16 PROCEDURE — G8484 FLU IMMUNIZE NO ADMIN: HCPCS | Performed by: PHYSICAL MEDICINE & REHABILITATION

## 2020-11-16 PROCEDURE — 99214 OFFICE O/P EST MOD 30 MIN: CPT | Performed by: PHYSICAL MEDICINE & REHABILITATION

## 2020-11-16 PROCEDURE — 3017F COLORECTAL CA SCREEN DOC REV: CPT | Performed by: PHYSICAL MEDICINE & REHABILITATION

## 2020-11-16 PROCEDURE — G8427 DOCREV CUR MEDS BY ELIG CLIN: HCPCS | Performed by: PHYSICAL MEDICINE & REHABILITATION

## 2020-11-16 PROCEDURE — 4004F PT TOBACCO SCREEN RCVD TLK: CPT | Performed by: PHYSICAL MEDICINE & REHABILITATION

## 2020-11-16 PROCEDURE — G8417 CALC BMI ABV UP PARAM F/U: HCPCS | Performed by: PHYSICAL MEDICINE & REHABILITATION

## 2020-11-16 RX ORDER — DULOXETIN HYDROCHLORIDE 60 MG/1
CAPSULE, DELAYED RELEASE ORAL
Qty: 90 CAPSULE | Refills: 3 | Status: SHIPPED
Start: 2020-11-16 | End: 2020-11-20 | Stop reason: SDUPTHER

## 2020-11-16 RX ORDER — METHOCARBAMOL 500 MG/1
500 TABLET, FILM COATED ORAL 3 TIMES DAILY
Qty: 270 TABLET | Refills: 3 | Status: SHIPPED
Start: 2020-11-16 | End: 2021-05-17 | Stop reason: SDUPTHER

## 2020-11-16 RX ORDER — TRAMADOL HYDROCHLORIDE 50 MG/1
50 TABLET ORAL EVERY 6 HOURS PRN
Qty: 120 TABLET | Refills: 2 | Status: SHIPPED
Start: 2020-11-16 | End: 2021-02-16 | Stop reason: SDUPTHER

## 2020-11-16 NOTE — PROGRESS NOTES
Yefri Garcia D.O. [facility]  [address] [facility] [address] [place]  Phone: [phone]  Fax: [phone]        11/16/20    Chief Complaint   Patient presents with    Back Pain     3 month follow up pain level 8     Start time: 11:14  End time: 11:23  Services were provided through a video synchronous discussion virtually to substitute for in-person clinic visit through [address] The patient was advised of the risks, benefits and alternatives to telemedicine and agreed to proceed with this type of visit. Pursuant to the emergency declaration under the [facility], [facility] waiver authority and the [employer] Act, this Virtual  Visit was conducted, with patient's consent, to reduce the patient's risk of exposure to COVID-19 and provide continuity of care for an established patient. The patient was also advised the privacy standards of a standard visit continue to apply to telemedicine visits. HPI:  Qi Tafoya is a 45y.o. year old woman seen today in follow up regarding chronic low back pain. Interval history: Since the last visit the patient had a vascular stent placed in her right leg due to a clot in her artery. She had a clot that dislodged and travelled to her foot and she was admitted to the hospital for anticoagulation. She stopped the tramadol for a few days because the vascular surgeon prescribed her the Percocet for 3 days. Today, the pain is rate  8/10 where 0 is no pain and 10 is pain as bad as it can be. The pain is located in the neck and low back,  radiates distally to the bilateral legs, and is described as sharp. This pain occurs intermittently. The symptoms have been worse since onset. Symptoms are exacerbated by nothing in particular. Factors which relieve the pain include tramadol. Other associated symptoms include stiffness.   Otherwise, the pain assessment has not changed since the last visit.      Past Medical History:   Diagnosis Date    Anticoagulant long-term use     Arthritis     back, both hips and both knees    Carpal tunnel syndrome on both sides     both wrists    Cellulitis of right foot 2016    Cervical cancer ([facility] 2012    S/P LUCIUS, BSO    Chronic back pain     Depressed bipolar II disorder (HCC)     Diabetes mellitus ([facility]     Diverticulitis     DVT (deep vein thrombosis) in pregnancy     Epilepsy ([facility]     Hx of blood clots     Hyperlipidemia     Hypertension     Leukocytosis 2013    Marijuana use     Neuropathy     Obesity     PONV (postoperative nausea and vomiting)     Rotator cuff tear arthropathy     Thyroid disease     Type 2 diabetes mellitus without complication [facility])      Past Surgical History:   Procedure Laterality Date     SECTION      CHOLECYSTECTOMY      COLONOSCOPY  10/10/2018    COLONOSCOPY WITH BIOPSY performed by Bridgette Kemp MD at [address] [facility] N/A 2020    COLONOSCOPY WITH BIOPSY performed by Bridgette Kemp MD at [address] [facility] ECHO COMPLETE  2013         FOOT SURGERY  2015    HYSTERECTOMY  2012    KIDNEY BIOPSY      KNEE ARTHROSCOPY  2003    right and left knee    KNEE SURGERY      left knee    NERVE BLOCK Bilateral 2017    TFNB  #1    NERVE BLOCK Bilateral 06/15/2017    bilatera lumbar transforaminal nerve block #2 L5-S1    NERVE BLOCK Bilateral 2017    Bilateral transforamninal nerve block lumbar #3    RHINOPLASTY      1985    SMALL INTESTINE SURGERY N/A 2019    LAPAROSCOPIC SIGMOID COLECTOMY performed by Bridgette Kemp MD at [place] [address] VASCULAR SURGERY  2019     Social History     Tobacco Use    Smoking status: Current Every Day Smoker     Packs/day: 1.00     Years: 32.00     Pack years: 32.00     Types: Cigarettes    Smokeless tobacco: Never Used    Tobacco comment: (9/4/2020):  not ready to quit; counseling given   Substance Use Topics    Alcohol use: Not Currently    Drug use: Not Currently     Types: Marijuana     Comment: once a month or so; depending on pain, last use 12/2017     Family History   Problem Relation Age of Onset    Depression Mother     Bipolar Disorder Mother     Hypertension Mother    Ardyth Moritz Anxiety Disorder Sister     Asthma Son    Ardyth Moritz Schizophrenia Son     Anxiety Disorder Daughter        Current Outpatient Medications   Medication Sig Dispense Refill    traMADol (ULTRAM) 50 MG tablet Take 1 tablet by mouth every 6 hours as needed for Pain for up to 30 days.  120 tablet 2    DULoxetine (CYMBALTA) 60 MG extended release capsule TAKE 1 CAPSULE EVERY DAY 90 capsule 3    methocarbamol (ROBAXIN) 500 MG tablet Take 1 tablet by mouth 3 times daily 270 tablet 3    levETIRAcetam (KEPPRA) 500 MG tablet TAKE 1 TABLET TWICE DAILY 180 tablet 3    DULoxetine (CYMBALTA) 60 MG extended release capsule TAKE 1 CAPSULE EVERY DAY 90 capsule 3    warfarin (COUMADIN) 5 MG tablet Indications: Tuesday - Sunday 1 tablet all days except Mondays (Patient taking differently: Take 5 mg by mouth Indications: Tuesday - Sunday Tues, thur, sat, sun) 75 tablet 3    warfarin (COUMADIN) 2.5 MG tablet 1 tablet M (Patient taking differently: Take 2.5 mg by mouth Monday, Wed, Fri) 25 tablet 3    omeprazole (PRILOSEC) 20 MG delayed release capsule TAKE 1 CAPSULE EVERY MORNING  BEFORE  BREAKFAST 90 capsule 3    divalproex (DEPAKOTE ER) 500 MG extended release tablet TAKE 1 TABLET IN THE MORNING AND 2 TABLETS IN THE EVENING 270 tablet 3    metFORMIN (GLUCOPHAGE) 1000 MG tablet TAKE 1 TABLET TWICE DAILY WITH MEALS 180 tablet 3    metoprolol tartrate (LOPRESSOR) 25 MG tablet TAKE 1/2 TABLET TWICE DAILY 90 tablet 3    levothyroxine (SYNTHROID) 75 MCG tablet TAKE 1 TABLET EVERY DAY 90 tablet 3    atorvastatin (LIPITOR) 80 MG tablet Take 1 tablet by mouth nightly 90 tablet 3    albuterol sulfate  (90 Base) MCG/ACT inhaler Inhale 2 puffs into the lungs every 6 hours as needed for Wheezing 3 Inhaler 3    naloxone 4 MG/0.1ML LIQD nasal spray 1 spray by Nasal route as needed for Opioid Reversal 1 each 5    Omega-3 Fatty Acids (OMEGA-3 FISH OIL) 1000 MG CAPS Take 1 tablet by mouth daily      Wheat Dextrin (EQ FIBER POWDER PO) Take by mouth      Handicap Placard MISC by Does not apply route Patient cannot walk 200 ft without stopping to rest.    Expiration 2024 1 each 0    busPIRone (BUSPAR) 10 MG tablet Take 10 mg by mouth daily       Multiple Vitamins-Minerals (THERAPEUTIC MULTIVITAMIN-MINERALS) tablet Take 1 tablet by mouth daily      aspirin 81 MG tablet Take 1 tablet by mouth daily 30 tablet 5     No current facility-administered medications for this visit. Allergies   Allergen Reactions    Nsaids Shortness Of Breath and Other (See Comments)     Renal Failure       Review of Systems:  No new weakness, paresthesia, incontinence of bowel or bladder, saddle anesthesia, falls or gait dysfunction. Otherwise, per HPI. Physical Exam:   Respiratory rate is 20. GENERAL: The patient is in no apparent distress. Body habitus is obese. HEENT: No rhinorrhea, sneezing, yawning, or lacrimation. No scleral icterus or conjunctival injection. SKIN: No piloerection. No tract marks. No rash. PSYCH: Mood and affect are appropriate. Hygiene appears appropriate. CARDIOVASCULAR There is no edema. RESPIRATORY: Respirations are regular and unlabored. There is no cyanosis. GASTROINTESTINAL: Soft abdomen, non-tender (patient elicited)  MSK: Spinal curvatures were normal in standing. Pelvis was level in standing. Active range of motion was full. There was no obvious joint effusion, deformity. The patient was able to elicit tenderness at lumbar and cervical paraspinals. The patient reports the spine does not fell warm and there is no visible redness.     Neurologic: Awake, alert and oriented in three planes. Speech is fluent. No facial weakness. Tongue is midline. Hearing is intact for conversation. Pupils are equal and round. Extraocular muscles appear intact during visual tracking. Shoulder shrug symmetric. Sensation: Intact for light touch (patient elicited) in all upper and lower extremity dermatomes. Strength: Functional upper extremity strength testing was observed to be at least antigravity and lower extremity strength testing including heel and toe walking as well as duck walking were intact, unable to manual muscle test given environment. There was no observable atrophy or side to size difference in muscle bulk. Muscle Tendon Reflexes: unable to test given environment. Gait is Normal.   Romberg is negative. No observed abnormal muscle tone. The patient was able to rise from a chair and squat without difficulty. There is no tremor. Due to this being a TeleHealth encounter, evaluation of the following organ systems is limited: Vitals/Constitutional/EENT/Resp/CV/GI//MS/Neuro/Skin/Heme-Lymph-Imm. Impression:   1. Neural foraminal stenosis of lumbar spine    2. Lumbar spondylosis    3. Disc displacement, lumbar    4. Chronic bilateral low back pain with bilateral sciatica        Plan:    Orders Placed This Encounter   Medications    traMADol (ULTRAM) 50 MG tablet     Sig: Take 1 tablet by mouth every 6 hours as needed for Pain for up to 30 days.      Dispense:  120 tablet     Refill:  2     Reduce doses taken as pain becomes manageable    DULoxetine (CYMBALTA) 60 MG extended release capsule     Sig: TAKE 1 CAPSULE EVERY DAY     Dispense:  90 capsule     Refill:  3    methocarbamol (ROBAXIN) 500 MG tablet     Sig: Take 1 tablet by mouth 3 times daily     Dispense:  270 tablet     Refill:  3     Controlled Substance Monitoring:    Acute and Chronic Pain Monitoring:   RX Monitoring 11/16/2020   Attestation -   Periodic Controlled Substance Monitoring No signs of

## 2020-11-18 NOTE — TELEPHONE ENCOUNTER
Facility changed to [facility] per pt request. MRI approved from 11/3-12/3/20, Nadja Certain #[id number]. Pt scheduled for 11/20.   Electronically signed by Angela Calderon on 11/18/20 at 1:23 PM EST

## 2020-11-19 ASSESSMENT — ENCOUNTER SYMPTOMS
NAUSEA: 0
WHEEZING: 0
SORE THROAT: 0
BACK PAIN: 1
COUGH: 0
DIARRHEA: 0
SHORTNESS OF BREATH: 0
ABDOMINAL PAIN: 0
VOMITING: 0
BLOOD IN STOOL: 0
CONSTIPATION: 0

## 2020-11-19 NOTE — PROGRESS NOTES
20    TELEHEALTH EVALUATION -- Audio/Visual (During YCSJD-65 public health emergency)    Lily Watkins is a 45 y.o. female being evaluated by a Virtual Visit (video visit) encounter to address concerns as mentioned above. A caregiver was present when appropriate. Due to this being a TeleHealth encounter (During ZHSIO-30 public health emergency), evaluation of the following organ systems was limited: Vitals/Constitutional/EENT/Resp/CV/GI//MS/Neuro/Skin/Heme-Lymph-Imm. Pursuant to the emergency declaration under the [address] [place] [address], [address] [place] [address] authority and the [employer] Act, this Virtual Visit was conducted with patient's (and/or legal guardian's) consent, to reduce the patient's risk of exposure to COVID-19 and provide necessary medical care. The patient (and/or legal guardian) has also been advised to contact this office for worsening conditions or problems, and seek emergency medical treatment and/or call 911 if deemed necessary. Services were provided through a video synchronous discussion virtually to substitute for in-person clinic visit. Patient location[place] in [place]. Provider location: [place]    Patient identity verified prior to visit: Yes      HPI:    Lily Watkins (:  1970) has requested an audio/video evaluation for the following concern(s):    Chief Complaint   Patient presents with    3 Month Follow-Up        She is treated for diabetes, bipolar disorder, tobacco use, COPD, seizure disorder, Peripheral Vascular disease. DM2:   F/U DM. Patient is due for fasting lab work. Metformin 1000 mg BID. Compliant with therapy and tolerating it well. Has some sugary foods in diet and occasional lapses with increased sugar intake. Monitoring blood sugar fasting in AM and QHS. Fasting blood sugars: between 110 - 120 in the morning depending on what she had for dinner.  HS blood sugars:135 - 200 in the evening. Average is 125. Patient is taking ASA but not Ace Inhibitor/ARB. Patient is taking 80 mg atorvastatin daily. Due for fasting lab work. Has a [facility] of heart disease, M GMA had CHF, MI, HTN, uncle had similar problems. Unaware of mother/father medical history. Denies chest pain, SOB, C/N/V/D. Denies dysuria, hematuria. Patient is following with podiatrist (12/23/2019). Eye exam 3/2020. Patient is aware that it is necessary to see an Eye Dr yearly. Patient does smoke and is not ready to quit or cut back. Lab Results   Component Value Date    LABA1C 7.9 (H) 08/17/2020    LABA1C 7.0 (H) 01/21/2020    LABA1C 7.3 (H) 10/14/2019     Lab Results   Component Value Date    LABMICR 22.3 (H) 10/14/2019    LDLCALC 114 (H) 08/17/2020    CREATININE 0.9 10/29/2020       Lab Results   Component Value Date    CHOL 173 08/17/2020    CHOL 148 01/21/2020    CHOL 153 10/14/2019     Lab Results   Component Value Date    TRIG 119 08/17/2020    TRIG 113 01/21/2020    TRIG 138 10/14/2019     Lab Results   Component Value Date    HDL 35 08/17/2020    HDL 33 01/21/2020    HDL 32 10/14/2019     Lab Results   Component Value Date    LDLCALC 114 (H) 08/17/2020    [address] 01/21/2020    [address] 10/14/2019     Down 14# since 9/2020 visit. FBS today: 108. Will get fasting lab work today      Peripheral Vascular Disease:    Complicated by thrombus earlier this month; to have definitve procedure 11/30/2020 for thrombectomy and stent placement. Splenic Infarct/Pulmonary Embolism/Chronic Anticoagulation:  Lab Results   Component Value Date    INR 1.1 10/29/2020    INR 1.2 01/28/2020    INR 1.5 10/21/2019    PROTIME 12.3 10/29/2020    PROTIME 14.2 01/28/2020    PROTIME 17.9 10/21/2019     Current Coumadin dose (11/20/2020): 2.5 mg MWF, 5 mg other days. Current Warfarin dosing as of 11/20/2020: 2.5 mg MWF, 5 mg other days. Tobacco Dependency:  She  is not ready to quit. Smokes 1 pack a day.        Bipolar Disorder: At this time she is well controlled. Depakote and Duloxetine. Obesity:  Wt Readings from Last 3 Encounters:   10/29/20 245 lb (111.1 kg)   06/02/20 246 lb (111.6 kg)   04/29/20 240 lb (108.9 kg)     BMI Readings from Last 3 Encounters:   10/29/20 38.37 kg/m²   06/02/20 38.53 kg/m²   04/29/20 37.59 kg/m²       Chronic Bronchitis:  Mild \"smoker's cough\" but denies shortness of breath. Mild wheezing reported. Currently on no medication; doesn't feel that symptoms are impairing her quality of life/function. Seizure Disorder:  Taking Depakote and Keppra. No breakthrough symptoms. Peripheral Neuropathy:  Between numbness, weakness, and pain, coupled with poor balance and inability to stand for long periods of time, patient finally acquired shower bench and will get tub rails on her own. Chronic Pain:  Lumbar stenosis, DDD/DJD, SI joint arthritis, hip arthritis. Under the care of Dr. Edgardo Strauss ([facility])    Leukocytosis:  Persistent and chronic; this has been persistent for some time. Chart reviewed extensively: she was referred to and seen by Dr. Sophie Mitchell in 2015. W/U to R/O CML and lung cancer completed and was negative at that time. He felt that there was a reactive component that may be related to smoking. F/U was suggested @ [facility]. Values are stable. [address]:  Patient's past medical, surgical, social and/or family history reviewed, updated in chart, and are non-contributory (unless otherwise stated). Medications and allergies also reviewed and updated in chart. Review of Systems  Review of Systems   Constitutional: Positive for fatigue. HENT: Negative for congestion, ear pain and sore throat. Respiratory: Negative for cough, shortness of breath and wheezing. Cardiovascular: Negative for chest pain, palpitations and leg swelling. Gastrointestinal: Negative for abdominal pain, blood in stool, constipation, diarrhea, nausea and vomiting.    Genitourinary: Negative for dysuria, frequency, hematuria and urgency. Musculoskeletal: Positive for arthralgias, back pain, gait problem, joint swelling, myalgias and neck pain. Skin: Negative for rash. Neurological: Negative for dizziness, weakness and headaches. Psychiatric/Behavioral: The patient is nervous/anxious.         Allergies   Allergen Reactions    Nsaids Shortness Of Breath and Other (See Comments)     Renal Failure   ,   Past Medical History:   Diagnosis Date    Anticoagulant long-term use     Arthritis     back, both hips and both knees    Carpal tunnel syndrome on both sides     both wrists    Cellulitis of right foot 03/2016    Cervical cancer ([address] 2012    S/P LUCIUS, BSO    Chronic back pain     Depressed bipolar II disorder (HCC)     Diabetes mellitus ([address]     Diverticulitis     DVT (deep vein thrombosis) in pregnancy     Epilepsy ([address]     Hx of blood clots     Hyperlipidemia     Hypertension     Leukocytosis 9/6/2013    Marijuana use     Neuropathy     Obesity     PONV (postoperative nausea and vomiting)     Rotator cuff tear arthropathy     Thyroid disease     Type 2 diabetes mellitus without complication ([address]    ,   Past Surgical History:   Procedure Laterality Date   [address] COLONOSCOPY  10/10/2018    COLONOSCOPY WITH BIOPSY performed by Campbell Melgar MD at [email or web address] N/A 6/2/2020    COLONOSCOPY WITH BIOPSY performed by Campbell Melgar MD at [address] [facility] ECHO COMPLETE  9/5/2013         FOOT SURGERY  8/7/2015    HYSTERECTOMY  2012    KIDNEY BIOPSY      KNEE ARTHROSCOPY  2003    right and left knee    KNEE SURGERY      left knee    NERVE BLOCK Bilateral 06/01/2017    TFNB  #1    NERVE BLOCK Bilateral 06/15/2017    bilatera lumbar transforaminal nerve block #2 L5-S1    NERVE BLOCK Bilateral 09/20/2017    Bilateral transforamninal nerve block lumbar #3    RHINOPLASTY      1985    SMALL INTESTINE SURGERY N/A 2/6/2019 LAPAROSCOPIC SIGMOID COLECTOMY performed by Kandy Francois MD at [place] [address] VASCULAR SURGERY  08/05/2019   ,   Social History     Tobacco Use    Smoking status: Current Every Day Smoker     Packs/day: 1.00     Years: 32.00     Pack years: 32.00     Types: Cigarettes    Smokeless tobacco: Never Used    Tobacco comment: (9/4/2020):  not ready to quit; counseling given   Substance Use Topics    Alcohol use: Not Currently    Drug use: Not Currently     Types: Marijuana     Comment: once a month or so; depending on pain, last use 12/2017   ,   Family History   Problem Relation Age of Onset    Depression Mother     Bipolar Disorder Mother     Hypertension Mother    Blase Liming Anxiety Disorder Sister     Asthma Son    Blase Liming Schizophrenia Son     Anxiety Disorder Daughter    ,   Immunization History   Administered Date(s) Administered    Influenza, Intradermal, Preservative free 12/17/2015    Influenza, Intradermal, Quadrivalent, Preservative Free 11/10/2016    Influenza, Quadv, IM, PF (6 mo and older Fluzone, Flulaval, Fluarix, and 3 yrs and older Afluria) 10/17/2017, 09/21/2018    Pneumococcal Conjugate 13-valent ([id number]) 12/17/2015    Pneumococcal Polysaccharide ([id number]) 07/24/2014    Tdap (Boostrix, Adacel) 10/05/2017   ,   Health Maintenance   Topic Date Due    Diabetic retinal exam  02/13/2019    Diabetic foot exam  03/05/2019    Shingles Vaccine (1 of 2) 05/12/2019    Flu vaccine (1) 09/01/2020    Diabetic microalbuminuria test  10/14/2020    Breast cancer screen  01/01/2050 (Originally 5/12/2019)    A1C test (Diabetic or Prediabetic)  08/17/2021    Lipid screen  08/17/2021    TSH testing  08/17/2021    Annual Wellness Visit (AWV)  09/05/2021    DTaP/Tdap/Td vaccine (2 - Td) 10/05/2027    Colon cancer screen colonoscopy  06/02/2030    Pneumococcal 0-64 years Vaccine  Completed    HIV screen  Completed    Hepatitis A vaccine  Aged Out    Hib vaccine  Aged Out    Meningococcal (ACWY) vaccine  Aged Out    Hepatitis B vaccine  Discontinued       PHYSICAL EXAMINATION:    Patient-Reported Vitals 11/20/2020   Patient-Reported Weight 235lb   Patient-Reported Height 5foot 7inches   Patient-Reported Systolic -   Patient-Reported Diastolic -   Patient-Reported Pulse -   Patient-Reported Temperature -   Patient-Reported Peak Flow -      Physical Exam  Vitals signs reviewed. Constitutional:       General: She is not in acute distress. Appearance: She is well-developed. She is not diaphoretic. HENT:      Head: Normocephalic and atraumatic. Right Ear: External ear normal.      Left Ear: External ear normal.      Mouth/Throat:      Pharynx: No oropharyngeal exudate. Eyes:      General: No scleral icterus. Right eye: No discharge. Conjunctiva/sclera: Conjunctivae normal.      Pupils: Pupils are equal, round, and reactive to light. Neck:      Musculoskeletal: Normal range of motion and neck supple. Thyroid: No thyromegaly. Cardiovascular:      Rate and Rhythm: Normal rate and regular rhythm. Heart sounds: Normal heart sounds. No murmur. Pulmonary:      Effort: Pulmonary effort is normal. No respiratory distress. Breath sounds: No stridor. No wheezing or rales. Chest:      Chest wall: No tenderness. Abdominal:      General: Bowel sounds are normal. There is no distension. Palpations: Abdomen is soft. There is no mass. Tenderness: There is no abdominal tenderness. There is no guarding. Musculoskeletal: Normal range of motion. General: No tenderness. Lymphadenopathy:      Cervical: No cervical adenopathy. Skin:     General: Skin is warm and dry. Coloration: Skin is not pale. Findings: No erythema or rash. Comments:      Neurological:      Mental Status: She is alert and oriented to person, place, and time.    Psychiatric:         Behavior: Behavior normal.         Thought Content: Thought content normal.            Assessment / Plan:      Lady Potter was seen today for 3 month follow-up. Diagnoses and all orders for this visit:    Type 2 diabetes mellitus with diabetic peripheral angiopathy without gangrene, without long-term current use of insulin (HCC)  -     CBC Auto Differential; Future  -     Comprehensive Metabolic Panel; Future  -     Hemoglobin A1C; Future  -     Lipid Panel; Future  -     Microalbumin / Creatinine Urine Ratio; Future  -     TSH without Reflex; Future  -     metFORMIN (GLUCOPHAGE) 1000 MG tablet; TAKE 1 TABLET TWICE DAILY WITH MEALS  -     Omega-3 Fatty Acids (OMEGA-3 FISH OIL) 1000 MG CAPS; Take 1 capsule by mouth daily  -     Multiple Vitamins-Minerals (THERAPEUTIC MULTIVITAMIN-MINERALS) tablet; Take 1 tablet by mouth daily  -     aspirin EC 81 MG EC tablet; Take 1 tablet by mouth daily    Type 2 diabetes mellitus with other circulatory complication, unspecified whether long term insulin use (HCC)  -     metFORMIN (GLUCOPHAGE) 1000 MG tablet; TAKE 1 TABLET TWICE DAILY WITH MEALS  -     Omega-3 Fatty Acids (OMEGA-3 FISH OIL) 1000 MG CAPS; Take 1 capsule by mouth daily  -     Multiple Vitamins-Minerals (THERAPEUTIC MULTIVITAMIN-MINERALS) tablet; Take 1 tablet by mouth daily  -     aspirin EC 81 MG EC tablet; Take 1 tablet by mouth daily    Essential hypertension  -     CBC Auto Differential; Future  -     Comprehensive Metabolic Panel; Future  -     Lipid Panel; Future  -     Microalbumin / Creatinine Urine Ratio; Future  -     TSH without Reflex; Future  -     metoprolol tartrate (LOPRESSOR) 25 MG tablet; TAKE 1/2 TABLET TWICE DAILY    Renal infarct (HCC)  -     warfarin (COUMADIN) 5 MG tablet; Indications: Tuesday - Sunday 1 tablet TuThSat Lopez  -     warfarin (COUMADIN) 2.5 MG tablet; 2.5 mg tablet QMonday, Wed, Fri    Subclinical hypothyroidism  -     TSH without Reflex;  Future  -     levothyroxine (SYNTHROID) 75 MCG tablet; TAKE 1 TABLET EVERY DAY    Mixed hyperlipidemia  -     Comprehensive Metabolic Panel; Future  -     Lipid Panel; Future  -     TSH without Reflex; Future  -     atorvastatin (LIPITOR) 80 MG tablet; Take 1 tablet by mouth nightly  -     Omega-3 Fatty Acids (OMEGA-3 FISH OIL) 1000 MG CAPS; Take 1 capsule by mouth daily    Vitamin D insufficiency  -     Vitamin D 25 Hydroxy; Future    Moderate persistent reactive airways dysfunction syndrome without complication (HCC)  -     albuterol sulfate  (90 Base) MCG/ACT inhaler; Inhale 2 puffs into the lungs every 6 hours as needed for Wheezing    Depressed bipolar II disorder (HCC)  -     busPIRone (BUSPAR) 10 MG tablet; Take 1 tablet by mouth daily    Peripheral polyneuropathy  -     divalproex (DEPAKOTE ER) 500 MG extended release tablet; TAKE 1 TABLET IN THE MORNING AND 2 TABLETS IN THE EVENING    Gastroesophageal reflux disease  -     omeprazole (PRILOSEC) 20 MG delayed release capsule; TAKE 1 CAPSULE EVERY MORNING  BEFORE  BREAKFAST    Seizure disorder  -     divalproex (DEPAKOTE ER) 500 MG extended release tablet; TAKE 1 TABLET IN THE MORNING AND 2 TABLETS IN THE EVENING    Anticoagulant long-term use  -     warfarin (COUMADIN) 5 MG tablet; Indications: Tuesday - Sunday 1 tablet TuThSat Lopez  -     warfarin (COUMADIN) 2.5 MG tablet; 2.5 mg tablet QMonday, Wed, Fri    Encounter for medication refill  -     warfarin (COUMADIN) 5 MG tablet;  Indications: Tuesday - Sunday 1 tablet TuThSat Lopez  -     warfarin (COUMADIN) 2.5 MG tablet; 2.5 mg tablet QMonday, Wed, Fri  -     omeprazole (PRILOSEC) 20 MG delayed release capsule; TAKE 1 CAPSULE EVERY MORNING  BEFORE  BREAKFAST  -     divalproex (DEPAKOTE ER) 500 MG extended release tablet; TAKE 1 TABLET IN THE MORNING AND 2 TABLETS IN THE EVENING  -     metFORMIN (GLUCOPHAGE) 1000 MG tablet; TAKE 1 TABLET TWICE DAILY WITH MEALS  -     metoprolol tartrate (LOPRESSOR) 25 MG tablet; TAKE 1/2 TABLET TWICE DAILY  -     levothyroxine (SYNTHROID) 75 MCG tablet; TAKE 1 TABLET EVERY DAY  -     atorvastatin (LIPITOR) 80 MG tablet; Take 1 tablet by mouth nightly  -     albuterol sulfate  (90 Base) MCG/ACT inhaler; Inhale 2 puffs into the lungs every 6 hours as needed for Wheezing  -     Omega-3 Fatty Acids (OMEGA-3 FISH OIL) 1000 MG CAPS; Take 1 capsule by mouth daily  -     Multiple Vitamins-Minerals (THERAPEUTIC MULTIVITAMIN-MINERALS) tablet; Take 1 tablet by mouth daily  -     busPIRone (BUSPAR) 10 MG tablet; Take 1 tablet by mouth daily  -     aspirin EC 81 MG EC tablet; Take 1 tablet by mouth daily      To get fasting lab work completed today. Follow Up:  Return in 3 months (on 2/16/2021), or @9:30 AM (Vv, 30 minutes), for Check up, Fasting lab work 1 week prior. or sooner if necessary. Call or go to ED immediately if symptoms worsen or persist.    Educational materials and/or home exercises printed for patient's review and were included in patient instructions on his/her AfterVisit Summary and given to patient at the end of visit. Counseled regarding above diagnosis,including possible risks and complications,  especially if left uncontrolled. Counseled regarding the possible side effects, risks, benefits and alternatives to treatment; patient and/or guardian verbalizes understanding, agrees, feels comfortable with and wishes to proceed with above treatment plan. Advised patient tocall with any new medication issues, and read all Rx info from pharmacy to assureaware of all possible risks and side effects of medication before taking. Reviewed age and gender appropriate health screening exams and vaccinations. Advisedpatient regarding importance of keeping up with recommended health maintenance andto schedule as soon as possible if overdue, as this is important in assessing forundiagnosed pathology, especially cancer, as well as protecting against potentially harmful/life threatening disease.       Patient and/or guardian verbalizes understandingand agrees with above counseling, assessment and plan. All questions answered. Chacorta Rod MD        --Chacorta Rod MD on 11/20/2020    An electronic signature was used to authenticate this note.

## 2020-11-20 ENCOUNTER — VIRTUAL VISIT (OUTPATIENT)
Dept: FAMILY MEDICINE CLINIC | Age: 50
End: 2020-11-20
Payer: MEDICARE

## 2020-11-20 DIAGNOSIS — E03.8 SUBCLINICAL HYPOTHYROIDISM: ICD-10-CM

## 2020-11-20 DIAGNOSIS — I10 ESSENTIAL HYPERTENSION: ICD-10-CM

## 2020-11-20 DIAGNOSIS — E78.2 MIXED HYPERLIPIDEMIA: ICD-10-CM

## 2020-11-20 DIAGNOSIS — E11.51 TYPE 2 DIABETES MELLITUS WITH DIABETIC PERIPHERAL ANGIOPATHY WITHOUT GANGRENE, WITHOUT LONG-TERM CURRENT USE OF INSULIN (HCC): ICD-10-CM

## 2020-11-20 DIAGNOSIS — E55.9 VITAMIN D INSUFFICIENCY: ICD-10-CM

## 2020-11-20 LAB
ALBUMIN SERPL-MCNC: 3.8 G/DL (ref 3.5–5.2)
ALP BLD-CCNC: 74 U/L (ref 35–104)
ALT SERPL-CCNC: 10 U/L (ref 0–32)
ANION GAP SERPL CALCULATED.3IONS-SCNC: 17 MMOL/L (ref 7–16)
ANISOCYTOSIS: ABNORMAL
AST SERPL-CCNC: 16 U/L (ref 0–31)
ATYPICAL LYMPHOCYTE RELATIVE PERCENT: 5.2 % (ref 0–4)
BASOPHILS ABSOLUTE: 0.14 E9/L (ref 0–0.2)
BASOPHILS RELATIVE PERCENT: 0.9 % (ref 0–2)
BILIRUB SERPL-MCNC: 0.3 MG/DL (ref 0–1.2)
BUN BLDV-MCNC: 11 MG/DL (ref 6–20)
BURR CELLS: ABNORMAL
CALCIUM SERPL-MCNC: 9.4 MG/DL (ref 8.6–10.2)
CHLORIDE BLD-SCNC: 100 MMOL/L (ref 98–107)
CHOLESTEROL, TOTAL: 134 MG/DL (ref 0–199)
CO2: 23 MMOL/L (ref 22–29)
CREAT SERPL-MCNC: 0.9 MG/DL (ref 0.5–1)
CREATININE URINE: 158 MG/DL (ref 29–226)
EOSINOPHILS ABSOLUTE: 0.27 E9/L (ref 0.05–0.5)
EOSINOPHILS RELATIVE PERCENT: 1.7 % (ref 0–6)
GFR AFRICAN AMERICAN: >60
GFR NON-AFRICAN AMERICAN: >60 ML/MIN/1.73
GLUCOSE BLD-MCNC: 99 MG/DL (ref 74–99)
HBA1C MFR BLD: 7.1 % (ref 4–5.6)
HCT VFR BLD CALC: 52.5 % (ref 34–48)
HDLC SERPL-MCNC: 29 MG/DL
HEMOGLOBIN: 16.8 G/DL (ref 11.5–15.5)
LDL CHOLESTEROL CALCULATED: 81 MG/DL (ref 0–99)
LYMPHOCYTES ABSOLUTE: 3.77 E9/L (ref 1.5–4)
LYMPHOCYTES RELATIVE PERCENT: 18.3 % (ref 20–42)
MCH RBC QN AUTO: 27.8 PG (ref 26–35)
MCHC RBC AUTO-ENTMCNC: 32 % (ref 32–34.5)
MCV RBC AUTO: 86.9 FL (ref 80–99.9)
MICROALBUMIN UR-MCNC: 50.7 MG/L
MICROALBUMIN/CREAT UR-RTO: 32.1 (ref 0–30)
MONOCYTES ABSOLUTE: 1.73 E9/L (ref 0.1–0.95)
MONOCYTES RELATIVE PERCENT: 11.3 % (ref 2–12)
NEUTROPHILS ABSOLUTE: 9.89 E9/L (ref 1.8–7.3)
NEUTROPHILS RELATIVE PERCENT: 62.6 % (ref 43–80)
PDW BLD-RTO: 18.3 FL (ref 11.5–15)
PLATELET # BLD: 474 E9/L (ref 130–450)
PMV BLD AUTO: 10.5 FL (ref 7–12)
POIKILOCYTES: ABNORMAL
POTASSIUM SERPL-SCNC: 4.6 MMOL/L (ref 3.5–5)
RBC # BLD: 6.04 E12/L (ref 3.5–5.5)
SODIUM BLD-SCNC: 140 MMOL/L (ref 132–146)
TOTAL PROTEIN: 7.5 G/DL (ref 6.4–8.3)
TRIGL SERPL-MCNC: 121 MG/DL (ref 0–149)
TSH SERPL DL<=0.05 MIU/L-ACNC: 1.16 UIU/ML (ref 0.27–4.2)
VITAMIN D 25-HYDROXY: 32 NG/ML (ref 30–100)
VLDLC SERPL CALC-MCNC: 24 MG/DL
WBC # BLD: 15.7 E9/L (ref 4.5–11.5)

## 2020-11-20 PROCEDURE — 99214 OFFICE O/P EST MOD 30 MIN: CPT | Performed by: FAMILY MEDICINE

## 2020-11-20 PROCEDURE — 2022F DILAT RTA XM EVC RTNOPTHY: CPT | Performed by: FAMILY MEDICINE

## 2020-11-20 PROCEDURE — 3051F HG A1C>EQUAL 7.0%<8.0%: CPT | Performed by: FAMILY MEDICINE

## 2020-11-20 PROCEDURE — G8427 DOCREV CUR MEDS BY ELIG CLIN: HCPCS | Performed by: FAMILY MEDICINE

## 2020-11-20 PROCEDURE — 3017F COLORECTAL CA SCREEN DOC REV: CPT | Performed by: FAMILY MEDICINE

## 2020-11-20 RX ORDER — M-VIT,TX,IRON,MINS/CALC/FOLIC 27MG-0.4MG
1 TABLET ORAL DAILY
Qty: 90 TABLET | Refills: 3 | COMMUNITY
Start: 2020-11-20 | End: 2021-09-12

## 2020-11-20 RX ORDER — BUSPIRONE HYDROCHLORIDE 10 MG/1
10 TABLET ORAL DAILY
Qty: 30 TABLET | Refills: 11
Start: 2020-11-20 | End: 2021-11-20

## 2020-11-20 RX ORDER — BLOOD SUGAR DIAGNOSTIC
STRIP MISCELLANEOUS
COMMUNITY
Start: 2020-11-18 | End: 2021-02-16 | Stop reason: ALTCHOICE

## 2020-11-20 RX ORDER — DIVALPROEX SODIUM 500 MG/1
TABLET, EXTENDED RELEASE ORAL
Qty: 270 TABLET | Refills: 3 | Status: SHIPPED
Start: 2020-11-20 | End: 2021-11-22 | Stop reason: SDUPTHER

## 2020-11-20 RX ORDER — WARFARIN SODIUM 2.5 MG/1
TABLET ORAL
Qty: 40 TABLET | Refills: 3 | Status: SHIPPED
Start: 2020-11-20 | End: 2021-02-26 | Stop reason: ALTCHOICE

## 2020-11-20 RX ORDER — LEVOTHYROXINE SODIUM 0.07 MG/1
TABLET ORAL
Qty: 90 TABLET | Refills: 3 | Status: SHIPPED
Start: 2020-11-20 | End: 2021-11-22 | Stop reason: SDUPTHER

## 2020-11-20 RX ORDER — ALBUTEROL SULFATE 90 UG/1
2 AEROSOL, METERED RESPIRATORY (INHALATION) EVERY 6 HOURS PRN
Qty: 3 INHALER | Refills: 3 | Status: SHIPPED
Start: 2020-11-20 | End: 2021-11-22 | Stop reason: SDUPTHER

## 2020-11-20 RX ORDER — OMEPRAZOLE 20 MG/1
CAPSULE, DELAYED RELEASE ORAL
Qty: 90 CAPSULE | Refills: 3 | Status: SHIPPED
Start: 2020-11-20 | End: 2021-11-22 | Stop reason: SDUPTHER

## 2020-11-20 RX ORDER — CHLORAL HYDRATE 500 MG
1 CAPSULE ORAL DAILY
Qty: 90 CAPSULE | Refills: 3 | COMMUNITY
Start: 2020-11-20 | End: 2021-09-12

## 2020-11-20 RX ORDER — ASPIRIN 81 MG/1
81 TABLET ORAL DAILY
Qty: 90 TABLET | Refills: 1 | COMMUNITY
Start: 2020-11-20 | End: 2021-02-26 | Stop reason: ALTCHOICE

## 2020-11-20 RX ORDER — ATORVASTATIN CALCIUM 80 MG/1
80 TABLET, FILM COATED ORAL NIGHTLY
Qty: 90 TABLET | Refills: 3 | Status: SHIPPED
Start: 2020-11-20 | End: 2021-11-22 | Stop reason: SDUPTHER

## 2020-11-20 RX ORDER — WARFARIN SODIUM 5 MG/1
TABLET ORAL
Qty: 50 TABLET | Refills: 3 | Status: SHIPPED
Start: 2020-11-20 | End: 2021-02-26 | Stop reason: ALTCHOICE

## 2020-11-24 ENCOUNTER — TELEPHONE (OUTPATIENT)
Dept: NEUROLOGY | Age: 50
End: 2020-11-24

## 2020-11-24 NOTE — TELEPHONE ENCOUNTER
----- Message from GILDARDO Scales CNP sent at 11/23/2020  4:59 PM EST -----  Regarding: MRI  Pineal cyst is stable.  Will follow every 2 years or sooner if symptoms occur  ----- Message -----  From: Kenny Heredia Incoming Radiant Results From [facility]  Sent: 11/20/2020   4:38 PM EST  To: GILDARDO Scales CNP

## 2020-11-24 NOTE — TELEPHONE ENCOUNTER
MA informed pt of MRI brain results and recommendations per Tarik Oliveira and pt understood.   Electronically signed by Fabian Goldberg on 11/24/20 at 8:33 AM EST

## 2020-12-22 ENCOUNTER — TELEPHONE (OUTPATIENT)
Dept: FAMILY MEDICINE CLINIC | Age: 50
End: 2020-12-22

## 2021-01-08 ENCOUNTER — TELEPHONE (OUTPATIENT)
Dept: FAMILY MEDICINE CLINIC | Age: 51
End: 2021-01-08

## 2021-01-08 NOTE — TELEPHONE ENCOUNTER
LMOM for patient to contact office regarding the eliquis prior authorization. If Dr Jessica Villafuerte is the one who ordered it that needed to be prior authorized by that office or is Dr Lucio Otoole the one whom is to be taking over the medication. Need clarification.

## 2021-01-08 NOTE — TELEPHONE ENCOUNTER
Patient called to inform that her surgeon at [facility], Dr. Zandra Coe, took her off of Coumadin and put her on Plavix and Eliquis. Patient stated Eliquis needs a Prior Auth.     Last seen 11/20/2020  Next appt 2/26/2021

## 2021-02-16 ENCOUNTER — TELEMEDICINE (OUTPATIENT)
Dept: PHYSICAL MEDICINE AND REHAB | Age: 51
End: 2021-02-16
Payer: MEDICARE

## 2021-02-16 DIAGNOSIS — M48.061 NEURAL FORAMINAL STENOSIS OF LUMBAR SPINE: ICD-10-CM

## 2021-02-16 DIAGNOSIS — M47.816 LUMBAR SPONDYLOSIS: ICD-10-CM

## 2021-02-16 DIAGNOSIS — G89.29 CHRONIC BILATERAL LOW BACK PAIN WITH BILATERAL SCIATICA: ICD-10-CM

## 2021-02-16 DIAGNOSIS — M54.41 CHRONIC BILATERAL LOW BACK PAIN WITH BILATERAL SCIATICA: ICD-10-CM

## 2021-02-16 DIAGNOSIS — M51.26 DISC DISPLACEMENT, LUMBAR: ICD-10-CM

## 2021-02-16 DIAGNOSIS — M54.42 CHRONIC BILATERAL LOW BACK PAIN WITH BILATERAL SCIATICA: ICD-10-CM

## 2021-02-16 PROCEDURE — 3017F COLORECTAL CA SCREEN DOC REV: CPT | Performed by: PHYSICAL MEDICINE & REHABILITATION

## 2021-02-16 PROCEDURE — 99214 OFFICE O/P EST MOD 30 MIN: CPT | Performed by: PHYSICAL MEDICINE & REHABILITATION

## 2021-02-16 PROCEDURE — G8427 DOCREV CUR MEDS BY ELIG CLIN: HCPCS | Performed by: PHYSICAL MEDICINE & REHABILITATION

## 2021-02-16 RX ORDER — CLOPIDOGREL BISULFATE 75 MG/1
75 TABLET ORAL DAILY
COMMUNITY
Start: 2020-12-30 | End: 2021-02-26 | Stop reason: SDUPTHER

## 2021-02-16 RX ORDER — TRAMADOL HYDROCHLORIDE 50 MG/1
50 TABLET ORAL EVERY 6 HOURS PRN
Qty: 120 TABLET | Refills: 2 | Status: SHIPPED
Start: 2021-02-16 | End: 2021-05-17 | Stop reason: SDUPTHER

## 2021-02-16 NOTE — PROGRESS NOTES
Young Mendieta D.O. [facility]  [address] [facility] [place]  Phone: [phone]  Fax: [phone]        2/16/21    Chief Complaint   Patient presents with    Back Pain     3 MONTH FOLLOW UP PAIN LEVEL 5     Start time: 11:07  End time: 11:16  Services were provided through a video synchronous discussion virtually to substitute for in-person clinic visit through [address] The patient was advised of the risks, benefits and alternatives to telemedicine and agreed to proceed with this type of visit. Pursuant to the emergency declaration under the [facility], [facility] waiver authority and the [employer] Act, this Virtual  Visit was conducted, with patient's consent, to reduce the patient's risk of exposure to COVID-19 and provide continuity of care for an established patient. The patient was also advised the privacy standards of a standard visit continue to apply to telemedicine visits. HPI:  Gabi Rivera is a 45y.o. year old woman seen today in follow up regarding chronic low back pain. Interval history: Since the last visit the patient has been doing about the same. Today, the pain is rate 5/10 where 0 is no pain and 10 is pain as bad as it can be. The pain is located in the neck and low back,  radiates distally to the bilateral legs, and is described as sharp. This pain occurs intermittently. The symptoms have been worse since onset. Symptoms are exacerbated by nothing in particular. Factors which relieve the pain include tramadol. Other associated symptoms include stiffness. Otherwise, the pain assessment has not changed since the last visit.      Past Medical History:   Diagnosis Date    Anticoagulant long-term use     Arthritis     back, both hips and both knees    Carpal tunnel syndrome on both sides     both wrists    Cellulitis of right foot 03/2016  Cervical cancer ([facility] 2012    S/P LUCIUS, BSO    Chronic back pain     Depressed bipolar II disorder (HCC)     Diabetes mellitus ([facility]     Diverticulitis     DVT (deep vein thrombosis) in pregnancy     Epilepsy ([facility]     Hx of blood clots     Hyperlipidemia     Hypertension     Leukocytosis 2013    Marijuana use     Neuropathy     Obesity     PONV (postoperative nausea and vomiting)     Rotator cuff tear arthropathy     Thyroid disease     Type 2 diabetes mellitus without complication [facility])      Past Surgical History:   Procedure Laterality Date     SECTION      CHOLECYSTECTOMY      COLONOSCOPY  10/10/2018    COLONOSCOPY WITH BIOPSY performed by Radha Chou MD at [address] [facility] N/A 2020    COLONOSCOPY WITH BIOPSY performed by Radha Chou MD at [address] [facility] ECHO COMPLETE  2013         FOOT SURGERY  2015    HYSTERECTOMY  2012    KIDNEY BIOPSY      KNEE ARTHROSCOPY  2003    right and left knee    KNEE SURGERY      left knee    LEG SURGERY Right     NERVE BLOCK Bilateral 2017    TFNB  #1    NERVE BLOCK Bilateral 06/15/2017    bilatera lumbar transforaminal nerve block #2 L5-S1    NERVE BLOCK Bilateral 2017    Bilateral transforamninal nerve block lumbar #3    RHINOPLASTY      1985    SMALL INTESTINE SURGERY N/A 2019    LAPAROSCOPIC SIGMOID COLECTOMY performed by Radha Chou MD at [place] [address] VASCULAR SURGERY  2019     Social History     Tobacco Use    Smoking status: Current Every Day Smoker     Packs/day: 1.00     Years: 32.00     Pack years: 32.00     Types: Cigarettes    Smokeless tobacco: Never Used    Tobacco comment: (2020):  not ready to quit; counseling given   Substance Use Topics    Alcohol use: Not Currently    Drug use: Not Currently     Types: Marijuana     Comment: once a month or so; depending on pain, last use 2017     Family History Problem Relation Age of Onset    Depression Mother     Bipolar Disorder Mother     Hypertension Mother    [facility] Anxiety Disorder Sister     Asthma Son    [facility] Schizophrenia Son     Anxiety Disorder Daughter        Current Outpatient Medications   Medication Sig Dispense Refill    apixaban (ELIQUIS) 5 MG TABS tablet Take 5 mg by mouth 2 times daily      clopidogrel (PLAVIX) 75 MG tablet Take 75 mg by mouth daily      traMADol (ULTRAM) 50 MG tablet Take 1 tablet by mouth every 6 hours as needed for Pain for up to 30 days.  120 tablet 2    Alcohol Swabs (B-D SINGLE USE SWABS REGULAR) PADS USE AS DIRECTED TWICE DAILY 200 each 3    ACCU-CHEK GUIDE strip       omeprazole (PRILOSEC) 20 MG delayed release capsule TAKE 1 CAPSULE EVERY MORNING  BEFORE  BREAKFAST 90 capsule 3    divalproex (DEPAKOTE ER) 500 MG extended release tablet TAKE 1 TABLET IN THE MORNING AND 2 TABLETS IN THE EVENING 270 tablet 3    metFORMIN (GLUCOPHAGE) 1000 MG tablet TAKE 1 TABLET TWICE DAILY WITH MEALS 180 tablet 3    metoprolol tartrate (LOPRESSOR) 25 MG tablet TAKE 1/2 TABLET TWICE DAILY 90 tablet 3    levothyroxine (SYNTHROID) 75 MCG tablet TAKE 1 TABLET EVERY DAY 90 tablet 3    atorvastatin (LIPITOR) 80 MG tablet Take 1 tablet by mouth nightly 90 tablet 3    albuterol sulfate  (90 Base) MCG/ACT inhaler Inhale 2 puffs into the lungs every 6 hours as needed for Wheezing 3 Inhaler 3    Omega-3 Fatty Acids (OMEGA-3 FISH OIL) 1000 MG CAPS Take 1 capsule by mouth daily 90 capsule 3    Multiple Vitamins-Minerals (THERAPEUTIC MULTIVITAMIN-MINERALS) tablet Take 1 tablet by mouth daily 90 tablet 3    busPIRone (BUSPAR) 10 MG tablet Take 1 tablet by mouth daily 30 tablet 11    methocarbamol (ROBAXIN) 500 MG tablet Take 1 tablet by mouth 3 times daily 270 tablet 3    levETIRAcetam (KEPPRA) 500 MG tablet TAKE 1 TABLET TWICE DAILY 180 tablet 3    DULoxetine (CYMBALTA) 60 MG extended release capsule TAKE 1 CAPSULE EVERY DAY 90 capsule 3  naloxone 4 MG/0.1ML LIQD nasal spray 1 spray by Nasal route as needed for Opioid Reversal 1 each 5    Wheat Dextrin (EQ FIBER POWDER PO) Take by mouth      Handicap Placard MISC by Does not apply route Patient cannot walk 200 ft without stopping to rest.    Expiration 2024 1 each 0    warfarin (COUMADIN) 5 MG tablet Indications: Tuesday - Sunday 1 tablet TuThSat Lopez 50 tablet 3    warfarin (COUMADIN) 2.5 MG tablet 2.5 mg tablet QMonday, Wed, Fri 40 tablet 3    aspirin EC 81 MG EC tablet Take 1 tablet by mouth daily 90 tablet 1    aspirin 81 MG tablet Take 1 tablet by mouth daily 30 tablet 5     No current facility-administered medications for this visit. Allergies   Allergen Reactions    Nsaids Shortness Of Breath and Other (See Comments)     Renal Failure       Review of Systems:  No new weakness, paresthesia, incontinence of bowel or bladder, saddle anesthesia, falls or gait dysfunction. Otherwise, per HPI. Physical Exam:   Respiratory rate is 20. GENERAL: The patient is in no apparent distress. Body habitus is obese. HEENT: No rhinorrhea, sneezing, yawning, or lacrimation. No scleral icterus or conjunctival injection. SKIN: No piloerection. No tract marks. No rash. PSYCH: Mood and affect are appropriate. Hygiene appears appropriate. CARDIOVASCULAR There is no edema. RESPIRATORY: Respirations are regular and unlabored. There is no cyanosis. GASTROINTESTINAL: Soft abdomen, non-tender (patient elicited)  MSK: Spinal curvatures were normal in standing. Pelvis was level in standing. Active range of motion was full. There was no obvious joint effusion, deformity. The patient was able to elicit tenderness at lumbar and cervical paraspinals. The patient reports the spine does not fell warm and there is no visible redness. Neurologic: Awake, alert and oriented in three planes. Speech is fluent. No facial weakness. Tongue is midline. Hearing is intact for conversation.   Pupils are equal and round. Extraocular muscles appear intact during visual tracking. Shoulder shrug symmetric. Sensation: Intact for light touch (patient elicited) in all upper and lower extremity dermatomes. Strength: Functional upper extremity strength testing was observed to be at least antigravity and lower extremity strength testing including heel and toe walking as well as duck walking were intact, unable to manual muscle test given environment. There was no observable atrophy or side to size difference in muscle bulk. Muscle Tendon Reflexes: unable to test given environment. Gait is Normal.   Romberg is negative. No observed abnormal muscle tone. The patient was able to rise from a chair and squat without difficulty. There is no tremor. Due to this being a TeleHealth encounter, evaluation of the following organ systems is limited: Vitals/Constitutional/EENT/Resp/CV/GI//MS/Neuro/Skin/Heme-Lymph-Imm. Impression:   1. Neural foraminal stenosis of lumbar spine    2. Lumbar spondylosis    3. Disc displacement, lumbar    4. Chronic bilateral low back pain with bilateral sciatica        Plan:    Orders Placed This Encounter   Medications    traMADol (ULTRAM) 50 MG tablet     Sig: Take 1 tablet by mouth every 6 hours as needed for Pain for up to 30 days. Dispense:  120 tablet     Refill:  2     Reduce doses taken as pain becomes manageable     Controlled Substance Monitoring:    Acute and Chronic Pain Monitoring:   RX Monitoring 2/16/2021   Attestation -   Periodic Controlled Substance Monitoring No signs of potential drug abuse or diversion identified. Chronic Pain > 80 MEDD -       Medications Discontinued During This Encounter   Medication Reason    traMADol (ULTRAM) 50 MG tablet REORDER       The patient was educated about the diagnosis, prognosis, indications, risks and benefits of treatment. An opportunity to ask questions was given to the patient and questions were answered.   The patient agreed to proceed with the recommended treatment as described above. Follow up 3 months  Thank you for allowing me to participate in the care of your patient. Estefany Fritz D.O., P.T.   Board Certified Physical Medicine and Rehabilitation  Board Certified Electrodiagnostic Medicine

## 2021-02-17 PROBLEM — J68.3 MODERATE PERSISTENT REACTIVE AIRWAYS DYSFUNCTION SYNDROME WITHOUT COMPLICATION (HCC): Status: ACTIVE | Noted: 2021-02-17

## 2021-02-17 ASSESSMENT — ENCOUNTER SYMPTOMS
VOMITING: 0
SHORTNESS OF BREATH: 0
BACK PAIN: 1
SORE THROAT: 0
CONSTIPATION: 0
ABDOMINAL PAIN: 0
COUGH: 0
WHEEZING: 0
BLOOD IN STOOL: 0
NAUSEA: 0
DIARRHEA: 0

## 2021-02-17 NOTE — PROGRESS NOTES
helpful for neuropathic pain. Methocarbamol and Tramadol help decrease pain. DM2:   F/U DM. Patient is due for fasting lab work. Metformin 1000 mg BID. Compliant with therapy and tolerating it well. Has some sugary foods in diet and occasional lapses with increased sugar intake. Monitoring blood sugar fasting in AM and QHS. Fasting blood sugars: between 110 - 120 in the morning depending on what she had for dinner. HS blood sugars:135 - 200 in the evening. Average is 125. Patient is taking ASA but not Ace Inhibitor/ARB. Patient is taking 80 mg atorvastatin daily. Due for fasting lab work. Has a [facility] of heart disease, M GMA had CHF, MI, HTN, uncle had similar problems. Unaware of mother/father medical history. Denies chest pain, SOB, C/N/V/D. Denies dysuria, hematuria. Patient is following with podiatrist (12/23/2019). Eye exam 3/2020. Patient is aware that it is necessary to see an Eye Dr yearly. Patient does smoke and is not ready to quit or cut back. Lab Results   Component Value Date    LABA1C 7.1 (H) 11/20/2020    LABA1C 7.9 (H) 08/17/2020    LABA1C 7.0 (H) 01/21/2020     Lab Results   Component Value Date    LABMICR 50.7 (H) 11/20/2020    LDLCALC 81 11/20/2020    CREATININE 0.9 11/20/2020       Lab Results   Component Value Date    CHOL 134 11/20/2020    CHOL 173 08/17/2020    CHOL 148 01/21/2020     Lab Results   Component Value Date    TRIG 121 11/20/2020    TRIG 119 08/17/2020    TRIG 113 01/21/2020     Lab Results   Component Value Date    HDL 29 11/20/2020    HDL 35 08/17/2020    HDL 33 01/21/2020     Lab Results   Component Value Date    LDLCALC 81 11/20/2020    LDLCALC 114 (H) 08/17/2020    [address] 01/21/2020     Down 14# since 9/2020 visit. FBS today: 108. Will get fasting lab work today      Peripheral Vascular Disease:    Complicated by thrombus earlier this month; to have definitve procedure 11/30/2020 for thrombectomy and stent placement.     Splenic Infarct/Pulmonary Embolism/Chronic Anticoagulation:  Lab Results   Component Value Date    INR 1.1 10/29/2020    INR 1.2 2020    INR 1.5 10/21/2019    PROTIME 12.3 10/29/2020    PROTIME 14.2 2020    PROTIME 17.9 10/21/2019     Current Coumadin dose (2020): 2.5 mg MWF, 5 mg other days. Current Warfarin dosing as of 2020: 2.5 mg MWF, 5 mg other days. Tobacco Dependency:  She  is not ready to quit. Smokes 1 pack a day. Bipolar Disorder: At this time she is well controlled. Depakote and Duloxetine. Obesity:  Wt Readings from Last 3 Encounters:   10/29/20 245 lb (111.1 kg)   20 246 lb (111.6 kg)   20 240 lb (108.9 kg)     BMI Readings from Last 3 Encounters:   10/29/20 38.37 kg/m²   20 38.53 kg/m²   20 37.59 kg/m²       Chronic Bronchitis/Moderate persistent airway dysfunction:  Mild \"smoker's cough\" but denies shortness of breath. Mild wheezing reported. Currently on no medication; doesn't feel that symptoms are impairing her quality of life/function. Otherwise reports no complications. Seizure Disorder:  Taking Depakote and Keppra. No breakthrough symptoms. Peripheral Neuropathy:  Between numbness, weakness, and pain, coupled with poor balance and inability to stand for long periods of time, patient finally acquired shower bench and will get tub rails on her own. Chronic Pain:  Lumbar stenosis, DDD/DJD, SI joint arthritis, hip arthritis. Under the care of Dr. Sneha Pleitez (PM&R)    Leukocytosis:  Persistent and chronic; this has been persistent for some time. Chart reviewed extensively: she was referred to and seen by Dr. Stone Heard in . W/U to R/O CML and lung cancer completed and was negative at that time. He felt that there was a reactive component that may be related to smoking. F/U was suggested @ [facility]. Values are stable.       [address]:  Patient's past medical, surgical, social and/or family history reviewed, updated in chart, and are non-contributory Nuno Gordon MD at [address] [place] [address] ECHO COMPLETE  9/5/2013         FOOT SURGERY  8/7/2015    HYSTERECTOMY  2012    KIDNEY BIOPSY      KNEE ARTHROSCOPY  2003    right and left knee    KNEE SURGERY      left knee    LEG SURGERY Right     NERVE BLOCK Bilateral 06/01/2017    TFNB  #1    NERVE BLOCK Bilateral 06/15/2017    bilatera lumbar transforaminal nerve block #2 L5-S1    NERVE BLOCK Bilateral 09/20/2017    Bilateral transforamninal nerve block lumbar #3    RHINOPLASTY      1985    SMALL INTESTINE SURGERY N/A 2/6/2019    LAPAROSCOPIC SIGMOID COLECTOMY performed by Nuno Gordon MD at [place] [address] VASCULAR SURGERY  08/05/2019   ,   Social History     Tobacco Use    Smoking status: Current Every Day Smoker     Packs/day: 1.00     Years: 32.00     Pack years: 32.00     Types: Cigarettes    Smokeless tobacco: Never Used    Tobacco comment: (9/4/2020):  not ready to quit; counseling given   Substance Use Topics    Alcohol use: Not Currently    Drug use: Not Currently     Types: Marijuana     Comment: once a month or so; depending on pain, last use 12/2017   ,   Family History   Problem Relation Age of Onset    Depression Mother     Bipolar Disorder Mother     Hypertension Mother    Ay[place] Anxiety Disorder Sister     Asthma Son    [place] Schizophrenia Son     Anxiety Disorder Daughter    ,   Immunization History   Administered Date(s) Administered    Influenza, Intradermal, Preservative free 12/17/2015    Influenza, Intradermal, Quadrivalent, Preservative Free 11/10/2016    Influenza, Quadv, IM, (6 mo and older Fluzone, Flulaval, Fluarix and 3 yrs and older Afluria) 11/06/2020    Influenza, Quadv, IM, PF (6 mo and older Fluzone, Flulaval, Fluarix, and 3 yrs and older Afluria) 10/17/2017, 09/21/2018    Pneumococcal Conjugate 13-valent ([id number]) 12/17/2015    Pneumococcal Polysaccharide ([id number]) 07/24/2014    Tdap (Boostrix, Adacel) 10/05/2017   , Health Maintenance   Topic Date Due    COVID-19 Vaccine (1 of 2) 05/12/1985    Diabetic retinal exam  02/13/2019    Diabetic foot exam  03/05/2019    Shingles Vaccine (1 of 2) 05/12/2019    Breast cancer screen  01/01/2050 (Originally 5/12/2019)    Annual Wellness Visit (AWV)  09/05/2021    A1C test (Diabetic or Prediabetic)  11/20/2021    Diabetic microalbuminuria test  11/20/2021    Lipid screen  11/20/2021    TSH testing  11/20/2021    DTaP/Tdap/Td vaccine (2 - Td) 10/05/2027    Colon cancer screen colonoscopy  06/02/2030    Flu vaccine  Completed    Pneumococcal 0-64 years Vaccine  Completed    Hepatitis C screen  Completed    HIV screen  Completed    Hepatitis A vaccine  Aged Out    Hib vaccine  Aged Out    Meningococcal (ACWY) vaccine  Aged Out    Hepatitis B vaccine  Discontinued       PHYSICAL EXAMINATION:    Patient-Reported Vitals 2/26/2021   Patient-Reported Weight 225lb   Patient-Reported Height 5foot 7inches   Patient-Reported Systolic -   Patient-Reported Diastolic -   Patient-Reported Pulse 85   Patient-Reported Temperature -   Patient-Reported SpO2 97   Patient-Reported Peak Flow -      Physical Exam  Vitals signs reviewed. Constitutional:       General: She is not in acute distress. Appearance: She is well-developed. She is not diaphoretic. HENT:      Head: Normocephalic and atraumatic. Right Ear: External ear normal.      Left Ear: External ear normal.      Mouth/Throat:      Pharynx: No oropharyngeal exudate. Eyes:      General: No scleral icterus. Right eye: No discharge. Conjunctiva/sclera: Conjunctivae normal.      Pupils: Pupils are equal, round, and reactive to light. Neck:      Musculoskeletal: Normal range of motion and neck supple. Thyroid: No thyromegaly. Cardiovascular:      Rate and Rhythm: Normal rate and regular rhythm. Heart sounds: Normal heart sounds. No murmur.    Pulmonary:      Effort: Pulmonary effort is normal. No Omega-3 Fatty Acids (OMEGA-3 FISH OIL) 1000 MG CAPS; Take 1 capsule by mouth daily    Vitamin D insufficiency  -     Vitamin D 25 Hydroxy; Future    Moderate persistent reactive airways dysfunction syndrome without complication (HCC)  -     albuterol sulfate  (90 Base) MCG/ACT inhaler; Inhale 2 puffs into the lungs every 6 hours as needed for Wheezing    Peripheral polyneuropathy  -     divalproex (DEPAKOTE ER) 500 MG extended release tablet; TAKE 1 TABLET IN THE MORNING AND 2 TABLETS IN THE EVENING    Gastroesophageal reflux disease  -     omeprazole (PRILOSEC) 20 MG delayed release capsule; TAKE 1 CAPSULE EVERY MORNING  BEFORE  BREAKFAST    Seizure disorder  -     divalproex (DEPAKOTE ER) 500 MG extended release tablet; TAKE 1 TABLET IN THE MORNING AND 2 TABLETS IN THE EVENING    Blood clotting disorder (HCC)  -     apixaban (ELIQUIS) 5 MG TABS tablet; Take 1 tablet by mouth 2 times daily      Follow Up:  Return 1) Schedule Medicare AWV in 9/21, for 2) , F/U 3 months for check up. or sooner if necessary. Call or go to ED immediately if symptoms worsen or persist.    Educational materials and/or home exercises printed for patient's review and were included in patient instructions on his/her AfterVisit Summary and given to patient at the end of visit. Counseled regarding above diagnosis,including possible risks and complications,  especially if left uncontrolled. Counseled regarding the possible side effects, risks, benefits and alternatives to treatment; patient and/or guardian verbalizes understanding, agrees, feels comfortable with and wishes to proceed with above treatment plan. Advised patient tocall with any new medication issues, and read all Rx info from pharmacy to assureaware of all possible risks and side effects of medication before taking. Reviewed age and gender appropriate health screening exams and vaccinations.   Advisedpatient regarding importance of keeping up with recommended health maintenance andto schedule as soon as possible if overdue, as this is important in assessing forundiagnosed pathology, especially cancer, as well as protecting against potentially harmful/life threatening disease. Patient and/or guardian verbalizes understandingand agrees with above counseling, assessment and plan. All questions answered. Meg Kumar MD        --Meg Kumar MD on 11/20/2020    An electronic signature was used to authenticate this note.

## 2021-02-26 ENCOUNTER — TELEMEDICINE (OUTPATIENT)
Dept: FAMILY MEDICINE CLINIC | Age: 51
End: 2021-02-26
Payer: MEDICARE

## 2021-02-26 DIAGNOSIS — J68.3 MODERATE PERSISTENT REACTIVE AIRWAYS DYSFUNCTION SYNDROME WITHOUT COMPLICATION (HCC): ICD-10-CM

## 2021-02-26 DIAGNOSIS — I73.9 PVD (PERIPHERAL VASCULAR DISEASE) (HCC): Primary | ICD-10-CM

## 2021-02-26 DIAGNOSIS — E66.01 MORBIDLY OBESE (HCC): ICD-10-CM

## 2021-02-26 DIAGNOSIS — D68.9 BLOOD CLOTTING DISORDER (HCC): ICD-10-CM

## 2021-02-26 DIAGNOSIS — E11.51 TYPE 2 DIABETES MELLITUS WITH DIABETIC PERIPHERAL ANGIOPATHY WITHOUT GANGRENE, WITHOUT LONG-TERM CURRENT USE OF INSULIN (HCC): ICD-10-CM

## 2021-02-26 DIAGNOSIS — G40.909 SEIZURE DISORDER (HCC): ICD-10-CM

## 2021-02-26 DIAGNOSIS — F31.81 DEPRESSED BIPOLAR II DISORDER (HCC): ICD-10-CM

## 2021-02-26 PROCEDURE — 99214 OFFICE O/P EST MOD 30 MIN: CPT | Performed by: FAMILY MEDICINE

## 2021-02-26 PROCEDURE — 3017F COLORECTAL CA SCREEN DOC REV: CPT | Performed by: FAMILY MEDICINE

## 2021-02-26 PROCEDURE — 2022F DILAT RTA XM EVC RTNOPTHY: CPT | Performed by: FAMILY MEDICINE

## 2021-02-26 PROCEDURE — G8427 DOCREV CUR MEDS BY ELIG CLIN: HCPCS | Performed by: FAMILY MEDICINE

## 2021-02-26 PROCEDURE — 3046F HEMOGLOBIN A1C LEVEL >9.0%: CPT | Performed by: FAMILY MEDICINE

## 2021-02-26 RX ORDER — CLOPIDOGREL BISULFATE 75 MG/1
75 TABLET ORAL DAILY
Qty: 90 TABLET | Refills: 2 | Status: SHIPPED
Start: 2021-02-26 | End: 2021-09-12

## 2021-03-29 RX ORDER — BLOOD SUGAR DIAGNOSTIC
STRIP MISCELLANEOUS
Qty: 300 STRIP | Refills: 3 | Status: ON HOLD
Start: 2021-03-29 | End: 2021-09-14

## 2021-04-06 DIAGNOSIS — E11.51 TYPE 2 DIABETES MELLITUS WITH DIABETIC PERIPHERAL ANGIOPATHY WITHOUT GANGRENE, WITHOUT LONG-TERM CURRENT USE OF INSULIN (HCC): Primary | ICD-10-CM

## 2021-04-07 RX ORDER — LANCETS
EACH MISCELLANEOUS
Qty: 306 EACH | Refills: 3 | Status: ON HOLD
Start: 2021-04-07 | End: 2021-09-14

## 2021-04-13 ENCOUNTER — TELEPHONE (OUTPATIENT)
Dept: FAMILY MEDICINE CLINIC | Age: 51
End: 2021-04-13

## 2021-04-13 NOTE — TELEPHONE ENCOUNTER
Monitor for signs/symptoms of bloods clots--swelling and pain in legs that is new or worsening (DVT), or sudden onset of moderately severe/severe shortness of breath (PE).     If she develops ant of these symptoms she needs to go to ER

## 2021-04-13 NOTE — TELEPHONE ENCOUNTER
Patient called stating she got the [employer] COVID Vaccine and just found out that it could cause issues with clotting. Please advise.      Last seen 2/26/2021  Next appt 5/25/2021

## 2021-05-17 ENCOUNTER — TELEMEDICINE (OUTPATIENT)
Dept: PHYSICAL MEDICINE AND REHAB | Age: 51
End: 2021-05-17
Payer: MEDICARE

## 2021-05-17 DIAGNOSIS — M51.26 DISC DISPLACEMENT, LUMBAR: ICD-10-CM

## 2021-05-17 DIAGNOSIS — M54.42 CHRONIC BILATERAL LOW BACK PAIN WITH BILATERAL SCIATICA: ICD-10-CM

## 2021-05-17 DIAGNOSIS — G89.29 CHRONIC BILATERAL LOW BACK PAIN WITH BILATERAL SCIATICA: ICD-10-CM

## 2021-05-17 DIAGNOSIS — M47.816 LUMBAR SPONDYLOSIS: ICD-10-CM

## 2021-05-17 DIAGNOSIS — M54.41 CHRONIC BILATERAL LOW BACK PAIN WITH BILATERAL SCIATICA: ICD-10-CM

## 2021-05-17 DIAGNOSIS — M48.061 NEURAL FORAMINAL STENOSIS OF LUMBAR SPINE: ICD-10-CM

## 2021-05-17 PROCEDURE — 4004F PT TOBACCO SCREEN RCVD TLK: CPT | Performed by: PHYSICAL MEDICINE & REHABILITATION

## 2021-05-17 PROCEDURE — 3017F COLORECTAL CA SCREEN DOC REV: CPT | Performed by: PHYSICAL MEDICINE & REHABILITATION

## 2021-05-17 PROCEDURE — G8427 DOCREV CUR MEDS BY ELIG CLIN: HCPCS | Performed by: PHYSICAL MEDICINE & REHABILITATION

## 2021-05-17 PROCEDURE — G8417 CALC BMI ABV UP PARAM F/U: HCPCS | Performed by: PHYSICAL MEDICINE & REHABILITATION

## 2021-05-17 PROCEDURE — 99214 OFFICE O/P EST MOD 30 MIN: CPT | Performed by: PHYSICAL MEDICINE & REHABILITATION

## 2021-05-17 RX ORDER — DULOXETIN HYDROCHLORIDE 60 MG/1
CAPSULE, DELAYED RELEASE ORAL
Qty: 90 CAPSULE | Refills: 3 | Status: SHIPPED
Start: 2021-05-17 | End: 2021-11-22 | Stop reason: SDUPTHER

## 2021-05-17 RX ORDER — METHOCARBAMOL 500 MG/1
500 TABLET, FILM COATED ORAL 3 TIMES DAILY
Qty: 270 TABLET | Refills: 3 | Status: SHIPPED
Start: 2021-05-17 | End: 2021-11-22 | Stop reason: SDUPTHER

## 2021-05-17 RX ORDER — TRAMADOL HYDROCHLORIDE 50 MG/1
50 TABLET ORAL EVERY 6 HOURS PRN
Qty: 120 TABLET | Refills: 2 | Status: SHIPPED | OUTPATIENT
Start: 2021-05-17 | End: 2021-06-16

## 2021-05-17 NOTE — PROGRESS NOTES
Elayne Cool D.O. [facility]  [address] [facility] [place]  Phone: [phone]  Fax: [phone]        5/17/21    Chief Complaint   Patient presents with    Neck Pain     3 MONTH FOLLOW UP PAIN LEVEL 8    Back Pain     Start time: 11:15  End time: 11:23  Services were provided through a video synchronous discussion virtually to substitute for in-person clinic visit through [address] The patient was advised of the risks, benefits and alternatives to telemedicine and agreed to proceed with this type of visit. Pursuant to the emergency declaration under the [facility] waiver authority and the [employer] Act, this Virtual  Visit was conducted, with patient's consent, to reduce the patient's risk of exposure to COVID-19 and provide continuity of care for an established patient. The patient was also advised the privacy standards of a standard visit continue to apply to telemedicine visits. HPI:  Dave Wolff is a 45y.o. year old woman seen today in follow up regarding chronic low back pain. Interval history: Since the last visit the patient has had vascular surgery on her iright leg including angioplasty and stents and states her pain has increased since that time. Today, the pain is rate 8/10 where 0 is no pain and 10 is pain as bad as it can be. The pain is located in the neck and low back,  radiates distally to the bilateral legs, and is described as sharp. This pain occurs intermittently. The symptoms have been worse since onset. Symptoms are exacerbated by nothing in particular. Factors which relieve the pain include tramadol. Other associated symptoms include stiffness. Otherwise, the pain assessment has not changed since the last visit.      Past Medical History:   Diagnosis Date    Anticoagulant long-term use     Arthritis     back, both hips and both knees    Carpal tunnel syndrome on both sides     both wrists    Cellulitis of right foot 03/2016    Cervical cancer ([facility] [address] 2012    S/P LUCIUS, BSO    Chronic back pain     Depressed bipolar II disorder (HCC)     Diabetes mellitus ([facility] [address]     Diverticulitis     DVT (deep vein thrombosis) in pregnancy     Epilepsy ([facility] [address]     Hx of blood clots     Hyperlipidemia     Hypertension     Leukocytosis 9/6/2013    Marijuana use     Neuropathy     Obesity     PONV (postoperative nausea and vomiting)     Rotator cuff tear arthropathy     Thyroid disease     Type 2 diabetes mellitus without complication [facility])      Past Surgical History:   Procedure Laterality Date   [address] COLONOSCOPY  10/10/2018    COLONOSCOPY WITH BIOPSY performed by Jeferson Conway MD at [address] N/A 6/2/2020    COLONOSCOPY WITH BIOPSY performed by Jeferson Conway MD at [address] [facility] ECHO COMPLETE  9/5/2013         FOOT SURGERY  8/7/2015    HYSTERECTOMY  2012    KIDNEY BIOPSY      KNEE ARTHROSCOPY  2003    right and left knee    KNEE SURGERY      left knee    LEG SURGERY Right     NERVE BLOCK Bilateral 06/01/2017    TFNB  #1    NERVE BLOCK Bilateral 06/15/2017    bilatera lumbar transforaminal nerve block #2 L5-S1    NERVE BLOCK Bilateral 09/20/2017    Bilateral transforamninal nerve block lumbar #3    RHINOPLASTY      1985    SMALL INTESTINE SURGERY N/A 2/6/2019    LAPAROSCOPIC SIGMOID COLECTOMY performed by Jeferson Conway MD at [place] [address] VASCULAR SURGERY  08/05/2019     Social History     Tobacco Use    Smoking status: Current Every Day Smoker     Packs/day: 1.00     Years: 32.00     Pack years: 32.00     Types: Cigarettes    Smokeless tobacco: Never Used    Tobacco comment: (9/4/2020):  not ready to quit; counseling given   Vaping Use    Vaping Use: Never used   Substance Use Topics    Alcohol use: Not Currently    Drug use: Not Currently     Types: Marijuana     Comment: once a month or so; depending on pain, last use 12/2017     Family History   Problem Relation Age of Onset    Depression Mother     Bipolar Disorder Mother     Hypertension Mother    Estelita Botello Anxiety Disorder Sister     Asthma Son    Estelita Botello Schizophrenia Son     Anxiety Disorder Daughter        Current Outpatient Medications   Medication Sig Dispense Refill    traMADol (ULTRAM) 50 MG tablet Take 1 tablet by mouth every 6 hours as needed for Pain for up to 30 days.  120 tablet 2    methocarbamol (ROBAXIN) 500 MG tablet Take 1 tablet by mouth 3 times daily 270 tablet 3    DULoxetine (CYMBALTA) 60 MG extended release capsule TAKE 1 CAPSULE EVERY DAY 90 capsule 3    Accu-Chek FastClix Lancets MISC TEST BLOOD SUGAR THREE TIMES DAILY 306 each 3    ACCU-CHEK GUIDE strip TEST BLOOD SUGAR THREE TIMES DAILY 300 strip 3    clopidogrel (PLAVIX) 75 MG tablet Take 1 tablet by mouth daily 90 tablet 2    apixaban (ELIQUIS) 5 MG TABS tablet Take 1 tablet by mouth 2 times daily 60 tablet 9    Alcohol Swabs (B-D SINGLE USE SWABS REGULAR) PADS USE AS DIRECTED TWICE DAILY 200 each 3    omeprazole (PRILOSEC) 20 MG delayed release capsule TAKE 1 CAPSULE EVERY MORNING  BEFORE  BREAKFAST 90 capsule 3    divalproex (DEPAKOTE ER) 500 MG extended release tablet TAKE 1 TABLET IN THE MORNING AND 2 TABLETS IN THE EVENING 270 tablet 3    metFORMIN (GLUCOPHAGE) 1000 MG tablet TAKE 1 TABLET TWICE DAILY WITH MEALS 180 tablet 3    metoprolol tartrate (LOPRESSOR) 25 MG tablet TAKE 1/2 TABLET TWICE DAILY 90 tablet 3    levothyroxine (SYNTHROID) 75 MCG tablet TAKE 1 TABLET EVERY DAY 90 tablet 3    atorvastatin (LIPITOR) 80 MG tablet Take 1 tablet by mouth nightly 90 tablet 3    albuterol sulfate  (90 Base) MCG/ACT inhaler Inhale 2 puffs into the lungs every 6 hours as needed for Wheezing 3 Inhaler 3    Omega-3 Fatty Acids (OMEGA-3 FISH OIL) 1000 MG CAPS Take 1 capsule by mouth daily 90 capsule 3    Multiple Vitamins-Minerals (THERAPEUTIC MULTIVITAMIN-MINERALS) tablet Take 1 tablet by mouth daily 90 tablet 3    busPIRone (BUSPAR) 10 MG tablet Take 1 tablet by mouth daily 30 tablet 11    levETIRAcetam (KEPPRA) 500 MG tablet TAKE 1 TABLET TWICE DAILY 180 tablet 3    DULoxetine (CYMBALTA) 60 MG extended release capsule TAKE 1 CAPSULE EVERY DAY 90 capsule 3    Handicap Placard MISC by Does not apply route Patient cannot walk 200 ft without stopping to rest.    Expiration 2024 1 each 0     No current facility-administered medications for this visit. Allergies   Allergen Reactions    Nsaids Shortness Of Breath and Other (See Comments)     Renal Failure       Review of Systems:  No new weakness, paresthesia, incontinence of bowel or bladder, saddle anesthesia, falls or gait dysfunction. Otherwise, per HPI. Physical Exam:   Respiratory rate is 20. GENERAL: The patient is in no apparent distress. Body habitus is obese. HEENT: No rhinorrhea, sneezing, yawning, or lacrimation. No scleral icterus or conjunctival injection. SKIN: No piloerection. No tract marks. No rash. PSYCH: Mood and affect are appropriate. Hygiene appears appropriate. CARDIOVASCULAR There is no edema. RESPIRATORY: Respirations are regular and unlabored. There is no cyanosis. GASTROINTESTINAL: Soft abdomen, non-tender (patient elicited)  MSK: Spinal curvatures were normal in standing. Pelvis was level in standing. Active range of motion was full. There was no obvious joint effusion, deformity. The patient was able to elicit tenderness at lumbar and cervical paraspinals. The patient reports the spine does not fell warm and there is no visible redness. Neurologic: Awake, alert and oriented in three planes. Speech is fluent. No facial weakness. Tongue is midline. Hearing is intact for conversation. Pupils are equal and round.   Extraocular muscles appear intact during visual tracking. Shoulder shrug symmetric. Sensation: Intact for light touch (patient elicited) in all upper and lower extremity dermatomes. Strength: Functional upper extremity strength testing was observed to be at least antigravity and lower extremity strength testing including heel and toe walking as well as duck walking were intact, unable to manual muscle test given environment. There was no observable atrophy or side to size difference in muscle bulk. Muscle Tendon Reflexes: unable to test given environment. Gait is Normal.   Romberg is negative. No observed abnormal muscle tone. The patient was able to rise from a chair and squat without difficulty. There is no tremor. Due to this being a TeleHealth encounter, evaluation of the following organ systems is limited: Vitals/Constitutional/EENT/Resp/CV/GI//MS/Neuro/Skin/Heme-Lymph-Imm. Impression:   1. Neural foraminal stenosis of lumbar spine    2. Lumbar spondylosis    3. Disc displacement, lumbar    4. Chronic bilateral low back pain with bilateral sciatica        Plan:    Orders Placed This Encounter   Medications    traMADol (ULTRAM) 50 MG tablet     Sig: Take 1 tablet by mouth every 6 hours as needed for Pain for up to 30 days. Dispense:  120 tablet     Refill:  2     Reduce doses taken as pain becomes manageable    methocarbamol (ROBAXIN) 500 MG tablet     Sig: Take 1 tablet by mouth 3 times daily     Dispense:  270 tablet     Refill:  3    DULoxetine (CYMBALTA) 60 MG extended release capsule     Sig: TAKE 1 CAPSULE EVERY DAY     Dispense:  90 capsule     Refill:  3     Controlled Substance Monitoring:    Acute and Chronic Pain Monitoring:   RX Monitoring 5/17/2021   Attestation -   Periodic Controlled Substance Monitoring No signs of potential drug abuse or diversion identified.    Chronic Pain > 80 MEDD -       Medications Discontinued During This Encounter   Medication Reason    methocarbamol (ROBAXIN) 500 MG tablet REORDER    traMADol

## 2021-05-24 ASSESSMENT — ENCOUNTER SYMPTOMS
WHEEZING: 0
SORE THROAT: 0
SHORTNESS OF BREATH: 0
COUGH: 0
NAUSEA: 0
BACK PAIN: 1
DIARRHEA: 0
BLOOD IN STOOL: 0
CONSTIPATION: 0
VOMITING: 0
ABDOMINAL PAIN: 0

## 2021-05-24 NOTE — PROGRESS NOTES
21      HPI:    Gio Newton (:  1970) presents today for the following concern(s):    Chief Complaint   Patient presents with   [employer] Check-Up     patient in lot of pain in legs from up to back, pain increasing overing time.  Breathing Problem        She is treated for diabetes, bipolar disorder, tobacco use, COPD, seizure disorder, Peripheral Vascular disease. Transportation is now an issue. .. Since last visit, she has had multiple vascular procedures on right leg/foot. She is experiencing persistent pain, swelling and neuropathic pain since then. Chart reviewed: medications for neuropathic pain include duloxetine. She reports no benefit from gabapentin, pregablin. Multiple other meds including Keppra, Depakote keep mood stable but are not helpful for neuropathic pain. Methocarbamol and Tramadol help decrease pain. DM2:   F/U DM. Patient is due for fasting lab work. Metformin 1000 mg BID. Compliant with therapy and tolerating it well. Has some sugary foods in diet and occasional lapses with increased sugar intake. Monitoring blood sugar fasting in AM and QHS. Fasting blood sugars: between 110 - 120 in the morning depending on what she had for dinner. HS blood sugars:135 - 200 in the evening. Average is 125. Patient is taking ASA but not Ace Inhibitor/ARB. Patient is taking 80 mg atorvastatin daily. Due for fasting lab work. Has a [address] of heart disease, M GMA had CHF, MI, HTN, uncle had similar problems. Unaware of mother/father medical history. Denies chest pain, SOB, C/N/V/D. Denies dysuria, hematuria. Patient is following with podiatrist (next ). Eye exam due to be scheduled. Patient is aware that it is necessary to see an Eye Dr yearly. Patient does smoke and is not ready to quit or cut back.      Results for POC orders placed in visit on 21   POCT glycosylated hemoglobin (Hb A1C)   Result Value Ref Range    Hemoglobin A1C 7.1 %       Lab Results Component Value Date    LABA1C 7.1 05/25/2021    LABA1C 7.1 (H) 11/20/2020    LABA1C 7.9 (H) 08/17/2020     Lab Results   Component Value Date    LABMICR 50.7 (H) 11/20/2020    LDLCALC 81 11/20/2020    CREATININE 0.9 11/20/2020       Lab Results   Component Value Date    CHOL 134 11/20/2020    CHOL 173 08/17/2020    CHOL 148 01/21/2020     Lab Results   Component Value Date    TRIG 121 11/20/2020    TRIG 119 08/17/2020    TRIG 113 01/21/2020     Lab Results   Component Value Date    HDL 29 11/20/2020    HDL 35 08/17/2020    HDL 33 01/21/2020     Lab Results   Component Value Date    LDLCALC 81 11/20/2020    LDLCALC 114 (H) 08/17/2020    LDLCALC 92 01/21/2020       Peripheral Vascular Disease:    S/P thrombectomy and stent placement 11/2020    Splenic Infarct/Pulmonary Embolism/Chronic Anticoagulation:  Lab Results   Component Value Date    INR 1.1 10/29/2020    INR 1.2 01/28/2020    INR 1.5 10/21/2019    PROTIME 12.3 10/29/2020    PROTIME 14.2 01/28/2020    PROTIME 17.9 10/21/2019     Now on Eliquis as her anticoagulation treatment     Tobacco Dependency:  She  is not ready to quit. Smokes 1 pack a day. She does report coughing and shortness of breath. Currently only treatment is daily rescue inhaler    Patient educated that she needs a daily maintenance inhaler and not frequent use of rescue inhaler. She is reluctantly agreeable to trial Spiriva Respimat once daily    Bipolar Disorder: At this time she is well controlled. Depakote and Duloxetine. Obesity:  Wt Readings from Last 3 Encounters:   05/25/21 224 lb (101.6 kg)   10/29/20 245 lb (111.1 kg)   06/02/20 246 lb (111.6 kg)     BMI Readings from Last 3 Encounters:   05/25/21 35.08 kg/m²   10/29/20 38.37 kg/m²   06/02/20 38.53 kg/m²       Chronic Bronchitis/Moderate persistent airway dysfunction:  Admits to mild to moderate cough and shortness of breath. Mild wheezing reported.   Currently only treatment is daily rescue inhaler      Patient educated that she needs a daily maintenance inhaler and not frequent use of rescue inhaler. She is reluctantly agreeable to trial Spiriva Respimat once daily    Seizure Disorder:  Taking Depakote and Keppra. No breakthrough symptoms. Peripheral Neuropathy:  Between numbness, weakness, and pain, coupled with poor balance and inability to stand for long periods of time, patient finally acquired shower bench and will get tub rails on her own. Chronic Pain:  Lumbar stenosis, DDD/DJD, SI joint arthritis, hip arthritis. Under the care of Dr. Nadeen Espinoza (PM&R)    Leukocytosis:  Persistent and chronic; this has been persistent for some time. Chart reviewed extensively: she was referred to and seen by Dr. Mita Grissom in 2015. W/U to R/O CML and lung cancer completed and was negative at that time. He felt that there was a reactive component that may be related to smoking. F/U was suggested @ [facility]. Values are stable. Health Maintenance:  [facility]; she is scheduling appointments. [address]:  Patient's past medical, surgical, social and/or family history reviewed, updated in chart, and are non-contributory (unless otherwise stated). Medications and allergies also reviewed and updated in chart. Review of Systems  Review of Systems   Constitutional: Positive for fatigue. HENT: Negative for congestion, ear pain and sore throat. Eyes:        Intermittent spontaneous ptosis that she initially fells is stress. She took herself off Buspirone and is monitoring   Respiratory: Negative for cough, shortness of breath and wheezing. Cardiovascular: Negative for chest pain, palpitations and leg swelling. Gastrointestinal: Negative for abdominal pain, blood in stool, constipation, diarrhea, nausea and vomiting. Genitourinary: Negative for dysuria, frequency, hematuria and urgency.    Musculoskeletal: Positive for arthralgias, back pain, gait problem, joint swelling, myalgias and neck pain.   Skin: Negative for rash. Neurological: Negative for dizziness, weakness and headaches. Psychiatric/Behavioral: The patient is nervous/anxious.         Allergies   Allergen Reactions    Nsaids Shortness Of Breath and Other (See Comments)     Renal Failure   ,   Past Medical History:   Diagnosis Date    Anticoagulant long-term use     Arthritis     back, both hips and both knees    Carpal tunnel syndrome on both sides     both wrists    Cellulitis of right foot 03/2016    Cervical cancer ([facility] 2012    S/P LUCIUS, BSO    Chronic back pain     Depressed bipolar II disorder (HCC)     Diabetes mellitus ([address]     Diverticulitis     DVT (deep vein thrombosis) in pregnancy     Epilepsy ([address]     Hx of blood clots     Hyperlipidemia     Hypertension     Leukocytosis 9/6/2013    Marijuana use     Neuropathy     Obesity     PONV (postoperative nausea and vomiting)     Rotator cuff tear arthropathy     Thyroid disease     Type 2 diabetes mellitus without complication ([address]    ,   Past Surgical History:   Procedure Laterality Date   [address] COLONOSCOPY  10/10/2018    COLONOSCOPY WITH BIOPSY performed by Lucas Hensley MD at [place] [address] N/A 6/2/2020    COLONOSCOPY WITH BIOPSY performed by Lucas Hensley MD at [address] [facility] ECHO COMPLETE  9/5/2013         FOOT SURGERY  8/7/2015    HYSTERECTOMY  2012    KIDNEY BIOPSY      KNEE ARTHROSCOPY  2003    right and left knee    KNEE SURGERY      left knee    LEG SURGERY Right     NERVE BLOCK Bilateral 06/01/2017    TFNB  #1    NERVE BLOCK Bilateral 06/15/2017    bilatera lumbar transforaminal nerve block #2 L5-S1    NERVE BLOCK Bilateral 09/20/2017    Bilateral transforamninal nerve block lumbar #3    RHINOPLASTY      1985    SMALL INTESTINE SURGERY N/A 2/6/2019    LAPAROSCOPIC SIGMOID COLECTOMY performed by Lucas Hensley MD at [address] [facility]  VASCULAR SURGERY  08/05/2019   ,   Social History     Tobacco Use    Smoking status: Current Every Day Smoker     Packs/day: 1.00     Years: 32.00     Pack years: 32.00     Types: Cigarettes    Smokeless tobacco: Never Used    Tobacco comment: (9/4/2020):  not ready to quit; counseling given   Vaping Use    Vaping Use: Never used   Substance Use Topics    Alcohol use: Not Currently    Drug use: Not Currently     Types: Marijuana     Comment: once a month or so; depending on pain, last use 12/2017   ,   Family History   Problem Relation Age of Onset    Depression Mother     Bipolar Disorder Mother     Hypertension Mother    [de-identified] Anxiety Disorder Sister     Asthma Son    Larance [place] Schizophrenia Son     Anxiety Disorder Daughter    ,   Immunization History   Administered Date(s) Administered    Influenza, Intradermal, Preservative free 12/17/2015    Influenza, Intradermal, Quadrivalent, Preservative Free 11/10/2016    Influenza, Quadv, IM, (6 mo and older Fluzone, Flulaval, Fluarix and 3 yrs and older Afluria) 11/06/2020    Influenza, Quadv, IM, PF (6 mo and older Fluzone, Flulaval, Fluarix, and 3 yrs and older Afluria) 10/17/2017, 09/21/2018    Pneumococcal Conjugate 13-valent ([id number]) 12/17/2015    Pneumococcal Polysaccharide ([id number]) 07/24/2014    Tdap (Boostrix, Adacel) 10/05/2017   ,   Health Maintenance   Topic Date Due    Diabetic retinal exam  02/13/2019    Diabetic foot exam  03/05/2019    Shingles Vaccine (1 of 2) Never done    Breast cancer screen  01/01/2050 (Originally 5/12/2019)    Annual Wellness Visit (AWV)  09/05/2021    A1C test (Diabetic or Prediabetic)  11/20/2021    Diabetic microalbuminuria test  11/20/2021    Lipid screen  11/20/2021    TSH testing  11/20/2021    DTaP/Tdap/Td vaccine (2 - Td) 10/05/2027    Colon cancer screen colonoscopy  06/02/2030    Pneumococcal 0-64 years Vaccine (2 of 2) 05/12/2034    Flu vaccine  Completed    COVID-19 Vaccine  Completed    Hepatitis C screen  Completed    HIV screen  Completed    Hepatitis A vaccine  Aged Out    Hib vaccine  Aged Out    Meningococcal (ACWY) vaccine  Aged Out    Hepatitis B vaccine  Discontinued       PHYSICAL EXAMINATION:    Vitals:    05/25/21 0949   BP: 80/62   Pulse: 124   Resp: 18   Temp: 96.5 °F (35.8 °C)   TempSrc: Infrared   SpO2: 94%   Weight: 224 lb (101.6 kg)   Height: 5' 7\" (1.702 m)     Wt Readings from Last 3 Encounters:   05/25/21 224 lb (101.6 kg)   10/29/20 245 lb (111.1 kg)   06/02/20 246 lb (111.6 kg)       BMI Readings from Last 3 Encounters:   05/25/21 35.08 kg/m²   10/29/20 38.37 kg/m²   06/02/20 38.53 kg/m²       Physical Exam  Vitals reviewed. Constitutional:       General: She is not in acute distress. Appearance: She is well-developed. She is not diaphoretic. HENT:      Head: Normocephalic and atraumatic. Right Ear: External ear normal.      Left Ear: External ear normal.      Mouth/Throat:      Pharynx: No oropharyngeal exudate. Eyes:      General: No scleral icterus. Right eye: No discharge. Conjunctiva/sclera: Conjunctivae normal.      Pupils: Pupils are equal, round, and reactive to light. Neck:      Thyroid: No thyromegaly. Cardiovascular:      Rate and Rhythm: Normal rate and regular rhythm. Heart sounds: Normal heart sounds. No murmur heard. Pulmonary:      Effort: Pulmonary effort is normal. No respiratory distress. Breath sounds: No stridor. No wheezing or rales. Chest:      Chest wall: No tenderness. Abdominal:      General: Bowel sounds are normal. There is no distension. Palpations: Abdomen is soft. There is no mass. Tenderness: There is no abdominal tenderness. There is no guarding. Musculoskeletal:         General: No tenderness. Normal range of motion. Cervical back: Normal range of motion and neck supple. Lymphadenopathy:      Cervical: No cervical adenopathy. Skin:     General: Skin is warm and dry. Coloration: Skin is not pale. Findings: No erythema or rash. Comments:      Neurological:      Mental Status: She is alert and oriented to person, place, and time. Psychiatric:         Behavior: Behavior normal.         Thought Content: Thought content normal.            Assessment / Plan:      Lacy Denise was seen today for check-up and breathing problem. Diagnoses and all orders for this visit:    PVD (peripheral vascular disease) ([facility]  -     clopidogrel (PLAVIX) 75 MG tablet; Take 1 tablet by mouth daily  -     apixaban (ELIQUIS) 5 MG TABS tablet; Take 1 tablet by mouth 2 times daily    Depressed bipolar II disorder ([facility]): Stable; under care of Psychiatry        -     Meds include Depakote,Cymbalta, Buspar    Type 2 diabetes mellitus with diabetic peripheral angiopathy without gangrene, without long-term current use of insulin ([facility])  -     metFORMIN (GLUCOPHAGE) 1000 MG tablet; TAKE 1 TABLET TWICE DAILY WITH MEALS  -     Omega-3 Fatty Acids (OMEGA-3 FISH OIL) 1000 MG CAPS; Take 1 capsule by mouth daily  -     Multiple Vitamins-Minerals (THERAPEUTIC MULTIVITAMIN-MINERALS) tablet; Take 1 tablet by mouth daily    Morbidly obese (HCC)    Essential hypertension  -     metoprolol tartrate (LOPRESSOR) 25 MG tablet; TAKE 1/2 TABLET TWICE DAILY    Subclinical hypothyroidism  -     levothyroxine (SYNTHROID) 75 MCG tablet; TAKE 1 TABLET EVERY DAY    Mixed hyperlipidemia  -     atorvastatin (LIPITOR) 80 MG tablet; Take 1 tablet by mouth nightly  -     Omega-3 Fatty Acids (OMEGA-3 FISH OIL) 1000 MG CAPS; Take 1 capsule by mouth daily    Vitamin D insufficiency  -     Vitamin D 25 Hydroxy;  Future    Peripheral polyneuropathy  -     divalproex (DEPAKOTE ER) 500 MG extended release tablet; TAKE 1 TABLET IN THE MORNING AND 2 TABLETS IN THE EVENING    Gastroesophageal reflux disease  -     omeprazole (PRILOSEC) 20 MG delayed release capsule; TAKE 1 CAPSULE EVERY MORNING  BEFORE  BREAKFAST    Seizure disorder  - divalproex (DEPAKOTE ER) 500 MG extended release tablet; TAKE 1 TABLET IN THE MORNING AND 2 TABLETS IN THE EVENING    Blood clotting disorder (HCC)  -     apixaban (ELIQUIS) 5 MG TABS tablet; Take 1 tablet by mouth 2 times daily    Chronic obstructive pulmonary disease, unspecified COPD type [facility]): Patient agreeable to trial LAMA for persistent symptoms  -     tiotropium (SPIRIVA RESPIMAT) 2.5 MCG/ACT AERS inhaler; Inhale 2 puffs into the lungs daily    Tobacco dependency: Patient agreeable to trial LAMA for persistent symptoms. No intention to quit smoking  -     tiotropium (SPIRIVA RESPIMAT) 2.5 MCG/ACT AERS inhaler; Inhale 2 puffs into the lungs daily        Follow Up:  No follow-ups on file. or sooner if necessary. Call or go to ED immediately if symptoms worsen or persist.    Educational materials and/or home exercises printed for patient's review and were included in patient instructions on his/her AfterVisit Summary and given to patient at the end of visit. Counseled regarding above diagnosis,including possible risks and complications,  especially if left uncontrolled. Counseled regarding the possible side effects, risks, benefits and alternatives to treatment; patient and/or guardian verbalizes understanding, agrees, feels comfortable with and wishes to proceed with above treatment plan. Advised patient tocall with any new medication issues, and read all Rx info from pharmacy to assureaware of all possible risks and side effects of medication before taking. Reviewed age and gender appropriate health screening exams and vaccinations. Advisedpatient regarding importance of keeping up with recommended health maintenance andto schedule as soon as possible if overdue, as this is important in assessing forundiagnosed pathology, especially cancer, as well as protecting against potentially harmful/life threatening disease.       Patient and/or guardian verbalizes understandingand agrees with above counseling, assessment and plan. All questions answered. Darryl Laurent MD        --Darryl Laurent MD on 11/20/2020    An electronic signature was used to authenticate this note.

## 2021-05-25 ENCOUNTER — OFFICE VISIT (OUTPATIENT)
Dept: FAMILY MEDICINE CLINIC | Age: 51
End: 2021-05-25
Payer: MEDICARE

## 2021-05-25 ENCOUNTER — HOSPITAL ENCOUNTER (OUTPATIENT)
Dept: MRI IMAGING | Age: 51
Discharge: HOME | End: 2021-05-27
Payer: MEDICARE

## 2021-05-25 ENCOUNTER — HOSPITAL ENCOUNTER (OUTPATIENT)
Age: 51
Discharge: HOME | End: 2021-05-25
Payer: MEDICARE

## 2021-05-25 VITALS
SYSTOLIC BLOOD PRESSURE: 80 MMHG | BODY MASS INDEX: 35.16 KG/M2 | OXYGEN SATURATION: 94 % | HEART RATE: 124 BPM | HEIGHT: 67 IN | TEMPERATURE: 96.5 F | WEIGHT: 224 LBS | RESPIRATION RATE: 18 BRPM | DIASTOLIC BLOOD PRESSURE: 62 MMHG

## 2021-05-25 DIAGNOSIS — E11.51 TYPE 2 DIABETES MELLITUS WITH DIABETIC PERIPHERAL ANGIOPATHY WITHOUT GANGRENE, WITHOUT LONG-TERM CURRENT USE OF INSULIN (HCC): Primary | ICD-10-CM

## 2021-05-25 DIAGNOSIS — M47.816 LUMBAR SPONDYLOSIS: ICD-10-CM

## 2021-05-25 DIAGNOSIS — M48.061 NEURAL FORAMINAL STENOSIS OF LUMBAR SPINE: ICD-10-CM

## 2021-05-25 DIAGNOSIS — I73.9 PVD (PERIPHERAL VASCULAR DISEASE) (HCC): ICD-10-CM

## 2021-05-25 DIAGNOSIS — I10 ESSENTIAL HYPERTENSION: ICD-10-CM

## 2021-05-25 DIAGNOSIS — M51.26 DISC DISPLACEMENT, LUMBAR: ICD-10-CM

## 2021-05-25 DIAGNOSIS — E78.2 MIXED HYPERLIPIDEMIA: ICD-10-CM

## 2021-05-25 DIAGNOSIS — E03.8 SUBCLINICAL HYPOTHYROIDISM: ICD-10-CM

## 2021-05-25 DIAGNOSIS — M54.42 CHRONIC BILATERAL LOW BACK PAIN WITH BILATERAL SCIATICA: ICD-10-CM

## 2021-05-25 DIAGNOSIS — G89.29 CHRONIC BILATERAL LOW BACK PAIN WITH BILATERAL SCIATICA: ICD-10-CM

## 2021-05-25 DIAGNOSIS — F17.200 TOBACCO DEPENDENCY: ICD-10-CM

## 2021-05-25 DIAGNOSIS — E11.51 TYPE 2 DIABETES MELLITUS WITH DIABETIC PERIPHERAL ANGIOPATHY WITHOUT GANGRENE, WITHOUT LONG-TERM CURRENT USE OF INSULIN (HCC): ICD-10-CM

## 2021-05-25 DIAGNOSIS — J44.9 CHRONIC OBSTRUCTIVE PULMONARY DISEASE, UNSPECIFIED COPD TYPE (HCC): ICD-10-CM

## 2021-05-25 DIAGNOSIS — E55.9 VITAMIN D INSUFFICIENCY: ICD-10-CM

## 2021-05-25 DIAGNOSIS — M54.41 CHRONIC BILATERAL LOW BACK PAIN WITH BILATERAL SCIATICA: ICD-10-CM

## 2021-05-25 LAB
ALBUMIN SERPL-MCNC: 4 G/DL (ref 3.5–5.2)
ALP BLD-CCNC: 87 U/L (ref 35–104)
ALT SERPL-CCNC: 14 U/L (ref 0–32)
ANION GAP SERPL CALCULATED.3IONS-SCNC: 12 MMOL/L (ref 7–16)
AST SERPL-CCNC: 16 U/L (ref 0–31)
ATYPICAL LYMPHOCYTE RELATIVE PERCENT: 7 % (ref 0–4)
BASOPHILS ABSOLUTE: 0 E9/L (ref 0–0.2)
BASOPHILS RELATIVE PERCENT: 0 % (ref 0–2)
BILIRUB SERPL-MCNC: 0.3 MG/DL (ref 0–1.2)
BUN BLDV-MCNC: 17 MG/DL (ref 6–20)
BURR CELLS: ABNORMAL
CALCIUM SERPL-MCNC: 10.2 MG/DL (ref 8.6–10.2)
CHLORIDE BLD-SCNC: 101 MMOL/L (ref 98–107)
CHOLESTEROL, TOTAL: 170 MG/DL (ref 0–199)
CO2: 25 MMOL/L (ref 22–29)
CREAT SERPL-MCNC: 0.9 MG/DL (ref 0.5–1)
CREATININE URINE: 120 MG/DL (ref 29–226)
EOSINOPHILS ABSOLUTE: 0.52 E9/L (ref 0.05–0.5)
EOSINOPHILS RELATIVE PERCENT: 3 % (ref 0–6)
GFR AFRICAN AMERICAN: >60
GFR NON-AFRICAN AMERICAN: >60 ML/MIN/1.73
GLUCOSE BLD-MCNC: 102 MG/DL (ref 74–99)
HBA1C MFR BLD: 7.1 %
HCT VFR BLD CALC: 55.4 % (ref 34–48)
HDLC SERPL-MCNC: 35 MG/DL
HEMOGLOBIN: 18 G/DL (ref 11.5–15.5)
LDL CHOLESTEROL CALCULATED: 106 MG/DL (ref 0–99)
LYMPHOCYTES ABSOLUTE: 6.61 E9/L (ref 1.5–4)
LYMPHOCYTES RELATIVE PERCENT: 31 % (ref 20–42)
MCH RBC QN AUTO: 27.2 PG (ref 26–35)
MCHC RBC AUTO-ENTMCNC: 32.5 % (ref 32–34.5)
MCV RBC AUTO: 83.7 FL (ref 80–99.9)
MICROALBUMIN UR-MCNC: 46.6 MG/L
MICROALBUMIN/CREAT UR-RTO: 38.8 (ref 0–30)
MONOCYTES ABSOLUTE: 0.52 E9/L (ref 0.1–0.95)
MONOCYTES RELATIVE PERCENT: 3 % (ref 2–12)
NEUTROPHILS ABSOLUTE: 9.74 E9/L (ref 1.8–7.3)
NEUTROPHILS RELATIVE PERCENT: 56 % (ref 43–80)
OVALOCYTES: ABNORMAL
PDW BLD-RTO: 18.3 FL (ref 11.5–15)
PLATELET # BLD: 355 E9/L (ref 130–450)
PMV BLD AUTO: 10.2 FL (ref 7–12)
POIKILOCYTES: ABNORMAL
POTASSIUM SERPL-SCNC: 4.6 MMOL/L (ref 3.5–5)
RBC # BLD: 6.62 E12/L (ref 3.5–5.5)
SODIUM BLD-SCNC: 138 MMOL/L (ref 132–146)
T4 TOTAL: 10.7 MCG/DL (ref 4.5–11.7)
TOTAL PROTEIN: 7.4 G/DL (ref 6.4–8.3)
TRIGL SERPL-MCNC: 143 MG/DL (ref 0–149)
TSH SERPL DL<=0.05 MIU/L-ACNC: 2.14 UIU/ML (ref 0.27–4.2)
VITAMIN D 25-HYDROXY: 39 NG/ML (ref 30–100)
VLDLC SERPL CALC-MCNC: 29 MG/DL
WBC # BLD: 17.4 E9/L (ref 4.5–11.5)

## 2021-05-25 PROCEDURE — 72148 MRI LUMBAR SPINE W/O DYE: CPT

## 2021-05-25 PROCEDURE — 85025 COMPLETE CBC W/AUTO DIFF WBC: CPT

## 2021-05-25 PROCEDURE — 84443 ASSAY THYROID STIM HORMONE: CPT

## 2021-05-25 PROCEDURE — G8417 CALC BMI ABV UP PARAM F/U: HCPCS | Performed by: FAMILY MEDICINE

## 2021-05-25 PROCEDURE — 3051F HG A1C>EQUAL 7.0%<8.0%: CPT | Performed by: FAMILY MEDICINE

## 2021-05-25 PROCEDURE — 82306 VITAMIN D 25 HYDROXY: CPT

## 2021-05-25 PROCEDURE — 99214 OFFICE O/P EST MOD 30 MIN: CPT | Performed by: FAMILY MEDICINE

## 2021-05-25 PROCEDURE — 80053 COMPREHEN METABOLIC PANEL: CPT

## 2021-05-25 PROCEDURE — 80061 LIPID PANEL: CPT

## 2021-05-25 PROCEDURE — 83036 HEMOGLOBIN GLYCOSYLATED A1C: CPT | Performed by: FAMILY MEDICINE

## 2021-05-25 PROCEDURE — 82570 ASSAY OF URINE CREATININE: CPT

## 2021-05-25 PROCEDURE — 36415 COLL VENOUS BLD VENIPUNCTURE: CPT

## 2021-05-25 PROCEDURE — 84436 ASSAY OF TOTAL THYROXINE: CPT

## 2021-05-25 PROCEDURE — G8926 SPIRO NO PERF OR DOC: HCPCS | Performed by: FAMILY MEDICINE

## 2021-05-25 PROCEDURE — 3017F COLORECTAL CA SCREEN DOC REV: CPT | Performed by: FAMILY MEDICINE

## 2021-05-25 PROCEDURE — 2022F DILAT RTA XM EVC RTNOPTHY: CPT | Performed by: FAMILY MEDICINE

## 2021-05-25 PROCEDURE — 3023F SPIROM DOC REV: CPT | Performed by: FAMILY MEDICINE

## 2021-05-25 PROCEDURE — G8427 DOCREV CUR MEDS BY ELIG CLIN: HCPCS | Performed by: FAMILY MEDICINE

## 2021-05-25 PROCEDURE — 4004F PT TOBACCO SCREEN RCVD TLK: CPT | Performed by: FAMILY MEDICINE

## 2021-05-25 PROCEDURE — 82044 UR ALBUMIN SEMIQUANTITATIVE: CPT

## 2021-05-25 SDOH — ECONOMIC STABILITY: FOOD INSECURITY: WITHIN THE PAST 12 MONTHS, THE FOOD YOU BOUGHT JUST DIDN'T LAST AND YOU DIDN'T HAVE MONEY TO GET MORE.: NEVER TRUE

## 2021-05-25 ASSESSMENT — SOCIAL DETERMINANTS OF HEALTH (SDOH): HOW HARD IS IT FOR YOU TO PAY FOR THE VERY BASICS LIKE FOOD, HOUSING, MEDICAL CARE, AND HEATING?: NOT HARD AT ALL

## 2021-05-26 ENCOUNTER — TELEPHONE (OUTPATIENT)
Dept: PHYSICAL MEDICINE AND REHAB | Age: 51
End: 2021-05-26

## 2021-06-03 ENCOUNTER — OFFICE VISIT (OUTPATIENT)
Dept: PHYSICAL MEDICINE AND REHAB | Age: 51
End: 2021-06-03
Payer: MEDICARE

## 2021-06-03 VITALS
BODY MASS INDEX: 35.16 KG/M2 | SYSTOLIC BLOOD PRESSURE: 118 MMHG | HEIGHT: 67 IN | HEART RATE: 123 BPM | DIASTOLIC BLOOD PRESSURE: 83 MMHG | WEIGHT: 224 LBS

## 2021-06-03 DIAGNOSIS — M54.50 CHRONIC BILATERAL LOW BACK PAIN WITHOUT SCIATICA: Primary | ICD-10-CM

## 2021-06-03 DIAGNOSIS — M48.061 NEURAL FORAMINAL STENOSIS OF LUMBAR SPINE: ICD-10-CM

## 2021-06-03 DIAGNOSIS — M47.816 LUMBAR SPONDYLOSIS: ICD-10-CM

## 2021-06-03 DIAGNOSIS — M51.26 DISC DISPLACEMENT, LUMBAR: ICD-10-CM

## 2021-06-03 DIAGNOSIS — G89.29 CHRONIC BILATERAL LOW BACK PAIN WITHOUT SCIATICA: Primary | ICD-10-CM

## 2021-06-03 PROCEDURE — G8417 CALC BMI ABV UP PARAM F/U: HCPCS | Performed by: PHYSICAL MEDICINE & REHABILITATION

## 2021-06-03 PROCEDURE — G8427 DOCREV CUR MEDS BY ELIG CLIN: HCPCS | Performed by: PHYSICAL MEDICINE & REHABILITATION

## 2021-06-03 PROCEDURE — 4004F PT TOBACCO SCREEN RCVD TLK: CPT | Performed by: PHYSICAL MEDICINE & REHABILITATION

## 2021-06-03 PROCEDURE — 3017F COLORECTAL CA SCREEN DOC REV: CPT | Performed by: PHYSICAL MEDICINE & REHABILITATION

## 2021-06-03 PROCEDURE — 99214 OFFICE O/P EST MOD 30 MIN: CPT | Performed by: PHYSICAL MEDICINE & REHABILITATION

## 2021-06-03 NOTE — PATIENT INSTRUCTIONS
Patient Education        Learning About a Facet Joint Injection  What is a facet joint injection? A facet joint injection is a shot of medicine to help with pain from arthritis or other causes. It goes into your neck or back, wherever your pain is. Facet joints connect your vertebrae to each other. You get a shot of numbing medicine, then a steroid medicine to reduce pain and swelling. How is a facet joint injection done? You may get medicine to help you relax. The doctor will use a tiny needle to numb the skin in the area where you are getting the facet joint injection. After the skin is numb, your doctor will use a larger needle for the actual facet joint injection. The doctor will use X-rays to help guide the needle into the facet joint. You may feel some pressure. But you should not feel pain. What can you expect after a facet joint injection? You will probably go home about an hour after your injection. You may have numbness for a few hours. The numbness could be in your neck or back, or your arm or leg, depending on where you got the shot. Your pain may be gone right away. But it may return after a few hours or days. This is because the steroid medicine has not started to work yet. Steroids don't always work. And when they do, it takes a few days. But when they work, the pain relief can last for several days to a few months or longer. You may want to do less than normal for a few days. But you may also be able to return to your daily routine. It's usually best to increase your activities slowly over time. Follow your doctor's instructions carefully. How long does a facet joint injection take? The procedure takes 10 to 30 minutes. Follow-up care is a key part of your treatment and safety. Be sure to make and go to all appointments, and call your doctor if you are having problems. It's also a good idea to know your test results and keep a list of the medicines you take. Where can you learn more? Go to https://[email or web address] org and sign in to your Yoono account. Enter B481 in the [facility] box to learn more about \"Learning About a Facet Joint Injection. \"     If you do not have an account, please click on the \"Sign Up Now\" link. Current as of: November 16, 2020               Content Version: 12.8  © 2006-2021 TERMINALFOUR. Care instructions adapted under license by [facility] ([facility]). If you have questions about a medical condition or this instruction, always ask your healthcare professional. [place] [address] any warranty or liability for your use of this information. Patient Education        Facet Joint Injection: Before Your Procedure  What is a facet joint injection? A facet joint injection is a shot of medicine to help with pain from arthritis. The injection goes into your neck or back. Where you get your shot depends on where your pain is. Facet joints connect your vertebrae to each other along the back of your spine. Problems in these joints can cause long-term (chronic) pain in the neck or back. Numbing medicine is injected into the facet joint to see if that is the cause of your pain. If it does help your pain, your doctor may add a steroid medicine to the injection. Steroids reduce swelling and pain, but they don't always work. How do you prepare for the procedure? Procedures can be stressful. This information will help you understand what you can expect. And it will help you safely prepare for your procedure. Preparing for the procedure    · Be sure you have someone to take you home. Anesthesia and pain medicine will make it unsafe for you to drive or get home on your own.     · Understand exactly what procedure is planned, along with the risks, benefits, and other options.     · If you take aspirin or some other blood thinner, ask your doctor if you should stop taking it before your procedure.  Make sure that you understand exactly what your doctor wants you to do. These medicines increase the risk of bleeding.     · Tell your doctor ALL the medicines, vitamins, supplements, and herbal remedies you take. Some may increase the risk of problems during your procedure. Your doctor will tell you if you should stop taking any of them before the procedure and how soon to do it.     · Make sure your doctor and the hospital have a copy of your advance directive. If you don't have one, you may want to prepare one. It lets others know your health care wishes. It's a good thing to have before any type of surgery or procedure. What happens on the day of the procedure? · Follow the instructions exactly about when to stop eating and drinking. If you don't, your procedure may be canceled. If your doctor told you to take your medicines on the day of the procedure, take them with only a sip of water.     · Take a bath or shower before you come in for your procedure. Do not apply lotions, perfumes, deodorants, or nail polish.     · Take off all jewelry and piercings. And take out contact lenses, if you wear them. At the hospital or surgery center   · Bring a picture ID.     · You may get medicine that relaxes you or puts you in a light sleep. The area being worked on will be numb.     · The procedure will take 10 to 30 minutes. When should you call your doctor? · You have questions or concerns.     · You don't understand how to prepare for your procedure.     · You become ill before the procedure (such as fever, flu, or a cold).     · You need to reschedule or have changed your mind about having the procedure. Where can you learn more? Go to https://Boommy [email or web address] Solution.FriendFit. org and sign in to your Fleecs account. Enter [phone] in the [facility] box to learn more about \"Facet Joint Injection: Before Your Procedure. \"     If you do not have an account, please click on the \"Sign Up Now\" link.   Current as of: November 16, 2020               Content Version: 12.8  © 2006-2021 D-Share. Care instructions adapted under license by [facility] ([facility]). If you have questions about a medical condition or this instruction, always ask your healthcare professional. [address] any warranty or liability for your use of this information. Patient Education        Facet Joint Injection: What to Expect at Home  Your Recovery  A facet joint injection is a shot of medicine to help with pain from arthritis. The injection goes into your neck or back. Where you get your shot depends on where your pain is. Facet joints connect your vertebrae to each other along the back of your spine. Problems in these joints can cause long-term (chronic) pain in the neck or back. You may have numbness for a few hours. The numbness could be in your neck, back, arm or leg. It depends on which facet joint was treated. You may feel pain relief right away. Sometimes the pain returns after the numbing medicine wears off. Your shot may also have included a steroid. Steroids take a few days to work. And they don't work for everyone. This care sheet gives you a general idea about how long it will take for you to recover. But each person recovers at a different pace. Follow the steps below to feel better as quickly as possible. How can you care for yourself at home? Activity    · You may want to do less than normal for a few days. But you may also be able to return to your daily routine.     · You may shower if your doctor okays it. Do not take a bath for the first 24 hours, or until your doctor tells you it is okay. Diet    · You can eat your normal diet. Medicines    · Be safe with medicines. Read and follow all instructions on the label. ? If the doctor gave you a prescription medicine for pain, take it as prescribed.   ? If you are not taking a prescription pain medicine, ask your doctor if you can take an over-the-counter medicine. Ice    · If the site of your shot feels sore or tender, put ice or a cold pack on it for 10 to 20 minutes at a time. Put a thin cloth between the ice and your skin. Follow-up care is a key part of your treatment and safety. Be sure to make and go to all appointments, and call your doctor if you are having problems. It's also a good idea to know your test results and keep a list of the medicines you take. When should you call for help? Call 911 anytime you think you may need emergency care. For example, call if:    · You are unable to move an arm or a leg at all.     · You have trouble breathing.     · You passed out (lost consciousness). Call your doctor now or seek immediate medical care if:    · You have new or worse symptoms in your arms, legs, chest, belly, or buttocks. Symptoms may include:  ? Numbness or tingling. ? Weakness. ? Pain.     · You lose bladder or bowel control.     · You have signs of infection, such as:  ? Increased pain, swelling, warmth, or redness. ? A fever. Watch closely for changes in your health, and be sure to contact your doctor if:    · You are not getting better as expected. Where can you learn more? Go to https://Wildfire [place].Bityota. org and sign in to your Fraktalia Studios account. Enter K823 in the [facility] box to learn more about \"Facet Joint Injection: What to Expect at Home. \"     If you do not have an account, please click on the \"Sign Up Now\" link. Current as of: November 16, 2020               Content Version: 12.8  © 1758-3020 Healthwise, Incorporated. Care instructions adapted under license by [facility] ([facility]). If you have questions about a medical condition or this instruction, always ask your healthcare professional. [place] [address] any warranty or liability for your use of this information.          Patient Education        Learning About Medial Branch Block and Neurotomy  What are medial branch block and neurotomy? Facet joints connect your vertebrae to each other. Problems in these joints can cause chronic (long-term) pain in the neck or back. Medial branch nerves are the nerves that carry many of the pain messages from your facet joints. Radiofrequency medial branch neurotomy is a type of medial branch neurotomy that is used to relieve arthritis pain. It uses radio waves to damage nerves in your neck or back so that they can no longer send pain messages to your brain. Before your doctor knows if a neurotomy will help you, you will get a medial branch block to find out if certain nerves are the ones that are a source of your pain. You will need two separate visits to the outpatient center or hospital to have both procedures. You will need someone to drive you home. How is a medial branch block done? The doctor will use a tiny needle to numb the skin where you will get the block. Then the doctor puts the block needle into the numbed area. You may feel some pressure, but you should not feel pain. Using fluoroscopy (live X-ray) to guide the needle, the doctor injects medicine onto one or more nerves to make them numb. If you get relief from your pain in the next 4 to 6 hours, it's a sign that those nerves may be contributing to your pain. The relief will last only a short time. You may then have a medial branch neurotomy at a later visit to try to get longer relief. How is a medial branch neurotomy done? The doctor will use a tiny needle to numb the skin where you will get the neurotomy. Then the doctor puts the neurotomy needle into the numbed area. You may feel some pressure. Using fluoroscopy (live X-ray) to guide the needle, the doctor sends radio waves through the needle to the nerve for 60 to 90 seconds. The radio waves heat the nerve, which damages it. The doctor may do this several times. And more than one nerve may be treated. How long do medial branch block and neurotomy take?   It takes 20 to 30 minutes to get the block. You can go home after the doctor watches you for about an hour. It takes 45 to 90 minutes to get a neurotomy, depending on how many nerves are heated. You will probably go home 30 to 60 minutes after the procedure. What can you expect after a neurotomy? You will get instructions on how to report how much pain you have when you are at home. You may feel a little sore or tender at the injection site at first. But after a successful neurotomy, most people have pain relief right away. It often lasts for several months, but your pain may come back. If your pain does come back, it may mean that the damaged nerve has healed and can send pain messages again. Or it can mean that a different nerve is causing pain. Your doctor will discuss your options with you. Follow-up care is a key part of your treatment and safety. Be sure to make and go to all appointments, and call your doctor if you are having problems. It's also a good idea to know your test results and keep a list of the medicines you take. Where can you learn more? Go to https://Funium.ThermoAura. org and sign in to your HOLLR account. Enter L083 in the [employer] box to learn more about \"Learning About Medial Branch Block and Neurotomy. \"     If you do not have an account, please click on the \"Sign Up Now\" link. Current as of: August 4, 2020               Content Version: 12.8  © 5170-4203 Genia Photonics. Care instructions adapted under license by [facility] ([facility]). If you have questions about a medical condition or this instruction, always ask your healthcare professional. [address] any warranty or liability for your use of this information. Patient Education        Medial Branch Neurotomy: Before Your Procedure  What is medial branch neurotomy? Medial branch neurotomy is a procedure to help relieve long-term pain.  It uses radio waves to damage nerves in your neck or back so they no longer send pain messages. Medial branch nerves carry many of the pain messages to your brain from the facet joints in your neck and back. These are the joints that connect your vertebrae to each other. Problems in these joints can cause long-term pain. The doctor will first use a tiny needle to numb the skin. Then he or she will put the neurotomy needle into the numbed area. You may feel some pressure, but you should not feel pain. Follow-up care is a key part of your treatment and safety. Be sure to make and go to all appointments, and call your doctor if you are having problems. It's also a good idea to know your test results and keep a list of the medicines you take. How do you prepare for the procedure? Procedures can be stressful. This information will help you understand what you can expect. And it will help you safely prepare for your procedure. Preparing for the procedure    · Be sure you have someone to take you home. Anesthesia and pain medicine will make it unsafe for you to drive or get home on your own.     · Understand exactly what procedure is planned, along with the risks, benefits, and other options.     · Tell your doctor ALL the medicines, vitamins, supplements, and herbal remedies you take. Some may increase the risk of problems during your procedure. Your doctor will tell you if you should stop taking any of them before the procedure and how soon to do it.     · If you take aspirin or some other blood thinner, ask your doctor if you should stop taking it before your procedure. Make sure that you understand exactly what your doctor wants you to do. These medicines increase the risk of bleeding.     · Make sure your doctor and the hospital have a copy of your advance directive. If you don't have one, you may want to prepare one. It lets others know your health care wishes. It's a good thing to have before any type of surgery or procedure.    What happens on the day of the procedure? · Follow the instructions exactly about when to stop eating and drinking. If you don't, your procedure may be canceled. If your doctor told you to take your medicines on the day of the procedure, take them with only a sip of water.     · Take a bath or shower before you come in for your procedure. Do not apply lotions, perfumes, deodorants, or nail polish.     · Take off all jewelry and piercings. And take out contact lenses, if you wear them. At the hospital or surgery center   · Bring a picture ID.     · You may get medicine that relaxes you or puts you in a light sleep. The area being worked on will be numb.     · The procedure will take 45 to 90 minutes. When should you call your doctor? · You have questions or concerns.     · You don't understand how to prepare for your procedure.     · You become ill before the procedure (such as fever, flu, or a cold).     · You need to reschedule or have changed your mind about having the procedure. Where can you learn more? Go to https://Rehab Management Services.APROOFED. org and sign in to your collegefeed account. Enter C158 in the [facility] box to learn more about \"Medial Branch Neurotomy: Before Your Procedure. \"     If you do not have an account, please click on the \"Sign Up Now\" link. Current as of: August 4, 2020               Content Version: 12.8  © 2006-2021 sfilatino. Care instructions adapted under license by [facility] ([facility]). If you have questions about a medical condition or this instruction, always ask your healthcare professional. [place] [address] any warranty or liability for your use of this information. Patient Education        Medial Branch Neurotomy: What to Expect at Home  Your Recovery     Facet joints connect your vertebrae to each other. Problems in these joints can cause chronic (long-term) pain in the neck or back.   The medial branch nerves are the nerves that carry many of the pain messages from your facet joints. During your medial branch neurotomy, your doctor used radio waves to damage a nerve in your neck or back to help your pain. You may feel a little sore or tender at the injection site at first. But after a successful neurotomy, most people have pain relief right away. And it usually lasts for several months, but your pain may return. How can you care for yourself at home? Activity    · You may want to do less than normal for a few days. But you may also be able to return to your daily routine.     · You may shower if your doctor okays it. Do not take a bath for the first 24 hours, or until your doctor tells you it is okay. Diet    · You can eat your normal diet. Medicines    · Your doctor will tell you if and when you can restart your medicines. He or she will also give you instructions about taking any new medicines.     · If you take aspirin or some other blood thinner, ask your doctor if and when to start taking it again. Make sure that you understand exactly what your doctor wants you to do.     · Be safe with medicines. Read and follow all instructions on the label. ? If the doctor gave you a prescription medicine for pain, take it as prescribed. ? If you are not taking a prescription pain medicine, as your doctor if you can take an over-the-counter medicine.     · You should be able to take your regular medicines again soon after the procedure. Your doctor may give you instructions on when to restart certain medicines, such as aspirin or a blood thinner like Coumadin. Ice    · If the area feels sore or tender, put ice or a cold pack on it for 10 to 20 minutes at a time. Put a thin cloth between the ice and your skin. Follow-up care is a key part of your treatment and safety. Be sure to make and go to all appointments, and call your doctor if you are having problems. It's also a good idea to know your test results and keep a list of the medicines you take. When should you call for help? Call 911 anytime you think you may need emergency care. For example, call if:    · You passed out (lost consciousness).     · You have severe trouble breathing.     · You are unable to move an arm or a leg at all. Call your doctor now or seek immediate medical care if:    · You have new or worse symptoms in your arms, legs, chest, belly, or buttocks. Symptoms may include:  ? Numbness or tingling. ? Weakness. ? Pain.     · You have signs of infection, such as:  ? Increased pain, swelling, warmth, or redness. ? A fever.     · You lose bladder or bowel control. Watch closely for changes in your health, and be sure to contact your doctor if:    · You are not getting better as expected. Where can you learn more? Go to https://ROSTRpeSADAR 3Deb.PrePay. org and sign in to your [employer] account. Enter U871 in the [employer] box to learn more about \"Medial Branch Neurotomy: What to Expect at Home. \"     If you do not have an account, please click on the \"Sign Up Now\" link. Current as of: August 4, 2020               Content Version: 12.8  © 1210-2755 Healthwise, Incorporated. Care instructions adapted under license by [facility] ([facility]). If you have questions about a medical condition or this instruction, always ask your healthcare professional. [address] any warranty or liability for your use of this information.

## 2021-06-03 NOTE — PROGRESS NOTES
Tana Loyd D.O. [facility] Physical Medicine and Rehabilitation  [address] [facility], [address] [place] [address], Chele Pickett  Phone: [phone]  Fax: [phone]      6/3/21    Chief Complaint   Patient presents with    Back Pain     follow up and results mri    Leg Pain       HPI:  Susan Whitt is a 45y.o. year old woman seen today in follow up regarding neuropathic pain. Interval history: Since the last visit the patient had lumbar MRI which was reviewed with patient and predominantly showed facet arthropathy with effusions as well as a dark disc at L5-S1. Today, the pain is rated Pain Score:   8 where 0 is no pain and 10 is pain as bad as it can be. The pain is located in the low back,  bilateral feet and legs,  does not radiate, and is described as dull, aching, burning, twisting, cramping. This pain occurs intermittently. The symptoms have been worse since onset. Symptoms are exacerbated by damp and cold weather, walking, standing. Factors which relieve the pain include tramadol. Other associated symptoms include none. Otherwise, the pain assessment has not changed since the last visit. Prior treatment has included gabapentin, Flexeril.      Past Medical History:   Diagnosis Date    Anticoagulant long-term use     Arthritis     back, both hips and both knees    Carpal tunnel syndrome on both sides     both wrists    Cellulitis of right foot 03/2016    Cervical cancer ([address] 2012    S/P LUCIUS, BSO    Chronic back pain     Depressed bipolar II disorder (HCC)     Diabetes mellitus ([address]     Diverticulitis     DVT (deep vein thrombosis) in pregnancy     Epilepsy ([address]     Hx of blood clots     Hyperlipidemia     Hypertension     Leukocytosis 9/6/2013    Marijuana use     Neuropathy     Obesity     PONV (postoperative nausea and vomiting)     Rotator cuff tear arthropathy     Thyroid disease     Type 2 diabetes mellitus without complication [facility])      Past Surgical History:   Procedure Laterality Date     SECTION      CHOLECYSTECTOMY      COLONOSCOPY  10/10/2018    COLONOSCOPY WITH BIOPSY performed by Margaux Stafford MD at [address] N/A 2020    COLONOSCOPY WITH BIOPSY performed by Margaux Stafford MD at [address] [facility] ECHO COMPLETE  2013         FOOT SURGERY  2015    HYSTERECTOMY  2012    KIDNEY BIOPSY      KNEE ARTHROSCOPY  2003    right and left knee    KNEE SURGERY      left knee    LEG SURGERY Right     NERVE BLOCK Bilateral 2017    TFNB  #1    NERVE BLOCK Bilateral 06/15/2017    bilatera lumbar transforaminal nerve block #2 L5-S1    NERVE BLOCK Bilateral 2017    Bilateral transforamninal nerve block lumbar #3    RHINOPLASTY      1985    SMALL INTESTINE SURGERY N/A 2019    LAPAROSCOPIC SIGMOID COLECTOMY performed by Margaux Stafford MD at [place] [address] VASCULAR SURGERY  2019       Social History     Tobacco Use    Smoking status: Current Every Day Smoker     Packs/day: 1.00     Years: 32.00     Pack years: 32.00     Types: Cigarettes    Smokeless tobacco: Never Used    Tobacco comment: (2020):  not ready to quit; counseling given   Vaping Use    Vaping Use: Never used   Substance Use Topics    Alcohol use: Not Currently    Drug use: Not Currently     Types: Marijuana     Comment: once a month or so; depending on pain, last use 2017     Family History   Problem Relation Age of Onset    Depression Mother     Bipolar Disorder Mother     Hypertension Mother    Gia Latin Anxiety Disorder Sister     Asthma Son    Gia Latin Schizophrenia Son     Anxiety Disorder Daughter      Current Outpatient Medications   Medication Sig Dispense Refill    tiotropium (SPIRIVA RESPIMAT) 2.5 MCG/ACT AERS inhaler Inhale 2 puffs into the lungs daily 3 Inhaler 3    traMADol (ULTRAM) 50 MG tablet Take 1 tablet by mouth every 6 hours as needed for Pain for up to 30 days.  80 current facility-administered medications for this visit. Allergies   Allergen Reactions    Nsaids Shortness Of Breath and Other (See Comments)     Renal Failure       Review of Systems:  No new weakness, paresthesia, incontinence of bowel or bladder, saddle anesthesia, falls or gait dysfunction. Otherwise, per HPI. Physical Exam:   Blood pressure 118/83, pulse 123, height 5' 7\" (1.702 m), weight 224 lb (101.6 kg), not currently breastfeeding. GENERAL: The patient is in no apparent distress. Body habitus is obese. HEENT: No rhinorrhea, sneezing, yawning, or lacrimation. No scleral icterus or conjunctival injection. SKIN: No piloerection. No tract marks. No rash. PSYCH: Mood and affect are appropriate. Hygiene is appropriate. CARDIOVASCULAR  Heart is regular rate and rhythm. There is no edema. Dependent rubor is present bilateral feet. RESPIRATORY: Respirations are regular and unlabored. There is no cyanosis. GASTROINTESTINAL: Soft abdomen, non-tender. MSK: There is no joint effusion, deformity, instability, swelling, erythema or warmth. AROM Lumbar flexion is 40*, extension 20*, lateral flexion and rotation are 20* bilaterally all with pain worst in extension. Spinal curvatures are normal.    Lumbar paraspinal tenderness. SLR is negative. NEURO: Gait is normal. Sensation is diminished to level of knee in stocking pattern bilateral lower extremities and left ulnar hand, otherwise, no focal sensorimotor deficit. Reflexes 2+ and symmetric in lower extremities except achilles absent bilaterally. Impression:   1. Chronic bilateral low back pain without sciatica    2. Lumbar spondylosis    3. Neural foraminal stenosis of lumbar spine    4.  Disc displacement, lumbar        Plan:  Orders Placed This Encounter   Procedures    FL INJ DX/THER AGNT PARAVERT FACET JOINT, LUMBAR/SAC, 1ST LEVEL     Bilateral medial branch block with fluoroscopic guidance to treat facet joints L3-4, L4-5, L5-S1 by Dr. Jimbo Gonzalez current medications  Continue home exercises  The patient was educated about the diagnosis, prognosis, indications, risks and benefits of treatment. An opportunity to ask questions was given to the patient and questions were answered. The patient agreed to proceed with the recommended treatment as described above. Follow up at regularly scheduled appointment. Thank you for allowing me to participate in the care of your patient. Francisco Blank D.O., P.T.   Board Certified Physical Medicine and Rehabilitation  Board Certified Electrodiagnostic Medicine

## 2021-06-30 ENCOUNTER — TELEPHONE (OUTPATIENT)
Dept: NEUROLOGY | Age: 51
End: 2021-06-30

## 2021-06-30 NOTE — TELEPHONE ENCOUNTER
Patient is due for 1 year follow up in November. I called her to schedule and she was on her way to the hospital, she said she would call me back.     Electronically signed by Aron Steven MA on 6/30/2021 at 3:05 PM

## 2021-09-10 ENCOUNTER — APPOINTMENT (OUTPATIENT)
Dept: CT IMAGING | Age: 51
DRG: 565 | End: 2021-09-10
Payer: MEDICARE

## 2021-09-10 ENCOUNTER — HOSPITAL ENCOUNTER (INPATIENT)
Age: 51
LOS: 6 days | Discharge: ANOTHER HOSPITAL | DRG: 565 | End: 2021-09-16
Attending: EMERGENCY MEDICINE | Admitting: INTERNAL MEDICINE
Payer: MEDICARE

## 2021-09-10 ENCOUNTER — APPOINTMENT (OUTPATIENT)
Dept: GENERAL RADIOLOGY | Age: 51
DRG: 565 | End: 2021-09-10
Payer: MEDICARE

## 2021-09-10 DIAGNOSIS — T81.42XA INFECTION OF DEEP INCISIONAL SURGICAL SITE AFTER PROCEDURE, INITIAL ENCOUNTER: Primary | ICD-10-CM

## 2021-09-10 PROBLEM — T81.9XXA POSTOPERATIVE OR SURGICAL COMPLICATION, INITIAL ENCOUNTER: Status: ACTIVE | Noted: 2021-09-10

## 2021-09-10 LAB
ALBUMIN SERPL-MCNC: 2.5 G/DL (ref 3.5–5.2)
ALP BLD-CCNC: 81 U/L (ref 35–104)
ALT SERPL-CCNC: <5 U/L (ref 0–32)
ANION GAP SERPL CALCULATED.3IONS-SCNC: 9 MMOL/L (ref 7–16)
AST SERPL-CCNC: 9 U/L (ref 0–31)
BACTERIA: ABNORMAL /HPF
BASOPHILS ABSOLUTE: 0.04 E9/L (ref 0–0.2)
BASOPHILS RELATIVE PERCENT: 0.4 % (ref 0–2)
BILIRUB SERPL-MCNC: <0.2 MG/DL (ref 0–1.2)
BILIRUBIN DIRECT: <0.2 MG/DL (ref 0–0.3)
BILIRUBIN URINE: NEGATIVE
BILIRUBIN, INDIRECT: ABNORMAL MG/DL (ref 0–1)
BLOOD, URINE: ABNORMAL
BUN BLDV-MCNC: 5 MG/DL (ref 6–20)
C-REACTIVE PROTEIN: 4.7 MG/DL (ref 0–0.4)
CALCIUM SERPL-MCNC: 8.5 MG/DL (ref 8.6–10.2)
CHLORIDE BLD-SCNC: 98 MMOL/L (ref 98–107)
CLARITY: CLEAR
CO2: 28 MMOL/L (ref 22–29)
COLOR: YELLOW
CREAT SERPL-MCNC: 0.5 MG/DL (ref 0.5–1)
EOSINOPHILS ABSOLUTE: 0.09 E9/L (ref 0.05–0.5)
EOSINOPHILS RELATIVE PERCENT: 0.8 % (ref 0–6)
EPITHELIAL CELLS, UA: ABNORMAL /HPF
GFR AFRICAN AMERICAN: >60
GFR NON-AFRICAN AMERICAN: >60 ML/MIN/1.73
GLUCOSE BLD-MCNC: 108 MG/DL (ref 74–99)
GLUCOSE URINE: NEGATIVE MG/DL
HCT VFR BLD CALC: 40.5 % (ref 34–48)
HEMOGLOBIN: 12.6 G/DL (ref 11.5–15.5)
IMMATURE GRANULOCYTES #: 0.1 E9/L
IMMATURE GRANULOCYTES %: 0.9 % (ref 0–5)
KETONES, URINE: NEGATIVE MG/DL
LACTIC ACID, SEPSIS: 1.9 MMOL/L (ref 0.5–1.9)
LEUKOCYTE ESTERASE, URINE: ABNORMAL
LIPASE: 11 U/L (ref 13–60)
LYMPHOCYTES ABSOLUTE: 2.46 E9/L (ref 1.5–4)
LYMPHOCYTES RELATIVE PERCENT: 22.1 % (ref 20–42)
MCH RBC QN AUTO: 28.5 PG (ref 26–35)
MCHC RBC AUTO-ENTMCNC: 31.1 % (ref 32–34.5)
MCV RBC AUTO: 91.6 FL (ref 80–99.9)
MONOCYTES ABSOLUTE: 1.14 E9/L (ref 0.1–0.95)
MONOCYTES RELATIVE PERCENT: 10.3 % (ref 2–12)
NEUTROPHILS ABSOLUTE: 7.28 E9/L (ref 1.8–7.3)
NEUTROPHILS RELATIVE PERCENT: 65.5 % (ref 43–80)
NITRITE, URINE: NEGATIVE
PDW BLD-RTO: 16.2 FL (ref 11.5–15)
PH UA: 6 (ref 5–9)
PLATELET # BLD: 362 E9/L (ref 130–450)
PMV BLD AUTO: 8.6 FL (ref 7–12)
POTASSIUM REFLEX MAGNESIUM: 3.9 MMOL/L (ref 3.5–5)
PROTEIN UA: NEGATIVE MG/DL
RBC # BLD: 4.42 E12/L (ref 3.5–5.5)
RBC UA: ABNORMAL /HPF (ref 0–2)
SARS-COV-2, NAAT: NOT DETECTED
SEDIMENTATION RATE, ERYTHROCYTE: 45 MM/HR (ref 0–20)
SODIUM BLD-SCNC: 135 MMOL/L (ref 132–146)
SPECIFIC GRAVITY UA: 1.01 (ref 1–1.03)
TOTAL PROTEIN: 6.7 G/DL (ref 6.4–8.3)
UROBILINOGEN, URINE: 1 E.U./DL
WBC # BLD: 11.1 E9/L (ref 4.5–11.5)
WBC UA: ABNORMAL /HPF (ref 0–5)

## 2021-09-10 PROCEDURE — 96366 THER/PROPH/DIAG IV INF ADDON: CPT

## 2021-09-10 PROCEDURE — 87077 CULTURE AEROBIC IDENTIFY: CPT

## 2021-09-10 PROCEDURE — 81001 URINALYSIS AUTO W/SCOPE: CPT

## 2021-09-10 PROCEDURE — 99285 EMERGENCY DEPT VISIT HI MDM: CPT

## 2021-09-10 PROCEDURE — 36415 COLL VENOUS BLD VENIPUNCTURE: CPT

## 2021-09-10 PROCEDURE — 83605 ASSAY OF LACTIC ACID: CPT

## 2021-09-10 PROCEDURE — 80076 HEPATIC FUNCTION PANEL: CPT

## 2021-09-10 PROCEDURE — 99221 1ST HOSP IP/OBS SF/LOW 40: CPT | Performed by: SURGERY

## 2021-09-10 PROCEDURE — 6360000004 HC RX CONTRAST MEDICATION: Performed by: RADIOLOGY

## 2021-09-10 PROCEDURE — 85025 COMPLETE CBC W/AUTO DIFF WBC: CPT

## 2021-09-10 PROCEDURE — 87088 URINE BACTERIA CULTURE: CPT

## 2021-09-10 PROCEDURE — 87070 CULTURE OTHR SPECIMN AEROBIC: CPT

## 2021-09-10 PROCEDURE — 85651 RBC SED RATE NONAUTOMATED: CPT

## 2021-09-10 PROCEDURE — 87205 SMEAR GRAM STAIN: CPT

## 2021-09-10 PROCEDURE — 96374 THER/PROPH/DIAG INJ IV PUSH: CPT

## 2021-09-10 PROCEDURE — 80048 BASIC METABOLIC PNL TOTAL CA: CPT

## 2021-09-10 PROCEDURE — 71045 X-RAY EXAM CHEST 1 VIEW: CPT

## 2021-09-10 PROCEDURE — 83690 ASSAY OF LIPASE: CPT

## 2021-09-10 PROCEDURE — 87186 SC STD MICRODIL/AGAR DIL: CPT

## 2021-09-10 PROCEDURE — 93005 ELECTROCARDIOGRAM TRACING: CPT | Performed by: EMERGENCY MEDICINE

## 2021-09-10 PROCEDURE — 87040 BLOOD CULTURE FOR BACTERIA: CPT

## 2021-09-10 PROCEDURE — 74177 CT ABD & PELVIS W/CONTRAST: CPT

## 2021-09-10 PROCEDURE — 2580000003 HC RX 258: Performed by: EMERGENCY MEDICINE

## 2021-09-10 PROCEDURE — 6360000002 HC RX W HCPCS: Performed by: EMERGENCY MEDICINE

## 2021-09-10 PROCEDURE — 86140 C-REACTIVE PROTEIN: CPT

## 2021-09-10 PROCEDURE — 87075 CULTR BACTERIA EXCEPT BLOOD: CPT

## 2021-09-10 PROCEDURE — 87635 SARS-COV-2 COVID-19 AMP PRB: CPT

## 2021-09-10 PROCEDURE — 2060000000 HC ICU INTERMEDIATE R&B

## 2021-09-10 PROCEDURE — 96365 THER/PROPH/DIAG IV INF INIT: CPT

## 2021-09-10 PROCEDURE — 96376 TX/PRO/DX INJ SAME DRUG ADON: CPT

## 2021-09-10 RX ORDER — ONDANSETRON 2 MG/ML
4 INJECTION INTRAMUSCULAR; INTRAVENOUS ONCE
Status: COMPLETED | OUTPATIENT
Start: 2021-09-10 | End: 2021-09-10

## 2021-09-10 RX ORDER — 0.9 % SODIUM CHLORIDE 0.9 %
30 INTRAVENOUS SOLUTION INTRAVENOUS ONCE
Status: COMPLETED | OUTPATIENT
Start: 2021-09-10 | End: 2021-09-10

## 2021-09-10 RX ADMIN — SODIUM CHLORIDE 2721 ML: 9 INJECTION, SOLUTION INTRAVENOUS at 12:20

## 2021-09-10 RX ADMIN — HYDROMORPHONE HYDROCHLORIDE 1 MG: 1 INJECTION, SOLUTION INTRAMUSCULAR; INTRAVENOUS; SUBCUTANEOUS at 13:38

## 2021-09-10 RX ADMIN — IOPAMIDOL 90 ML: 755 INJECTION, SOLUTION INTRAVENOUS at 14:45

## 2021-09-10 RX ADMIN — HYDROMORPHONE HYDROCHLORIDE 1 MG: 1 INJECTION, SOLUTION INTRAMUSCULAR; INTRAVENOUS; SUBCUTANEOUS at 15:40

## 2021-09-10 RX ADMIN — ONDANSETRON 4 MG: 2 INJECTION INTRAMUSCULAR; INTRAVENOUS at 11:56

## 2021-09-10 RX ADMIN — VANCOMYCIN HYDROCHLORIDE 1750 MG: 10 INJECTION, POWDER, LYOPHILIZED, FOR SOLUTION INTRAVENOUS at 19:10

## 2021-09-10 RX ADMIN — CEFEPIME 2000 MG: 2 INJECTION, POWDER, FOR SOLUTION INTRAVENOUS at 21:23

## 2021-09-10 ASSESSMENT — PAIN DESCRIPTION - PAIN TYPE: TYPE: ACUTE PAIN

## 2021-09-10 ASSESSMENT — PAIN DESCRIPTION - LOCATION
LOCATION: LEG
LOCATION: LEG

## 2021-09-10 ASSESSMENT — PAIN SCALES - GENERAL
PAINLEVEL_OUTOF10: 8
PAINLEVEL_OUTOF10: 2
PAINLEVEL_OUTOF10: 8
PAINLEVEL_OUTOF10: 7
PAINLEVEL_OUTOF10: 8
PAINLEVEL_OUTOF10: 8

## 2021-09-10 ASSESSMENT — PAIN DESCRIPTION - ORIENTATION
ORIENTATION: RIGHT
ORIENTATION: RIGHT;UPPER

## 2021-09-10 ASSESSMENT — PAIN DESCRIPTION - DESCRIPTORS: DESCRIPTORS: CONSTANT;PRESSURE;DISCOMFORT

## 2021-09-10 NOTE — Clinical Note
Patient Class: Inpatient [101]  REQUIRED: Diagnosis: Postoperative or surgical complication, initial encounter [[id number]]  Estimated Length of Stay: Estimated stay of more than 2 midnights  Admitting Provider: Kim Motta [de-identified]  Telemetry/Cardiac M onitoring Required?: Yes

## 2021-09-10 NOTE — ED NOTES
Bed: 06  Expected date: 9/10/21  Expected time:   Means of arrival: [facility]  Comments:     Mike Kevin RN  09/10/21 4249

## 2021-09-10 NOTE — ED PROVIDER NOTES
Department of Emergency Medicine   ED  Provider Note  Admit Date/RoomTime: 9/10/2021 11:27 AM  ED Room: 06/06          History of Present Illness:  9/10/21, Time: 11:29 AM EDT  Chief Complaint   Patient presents with    Wound Check     recent aka, wound vac to R leg, sent in for eval                Centuria Gabriel is a 45 y.o. female presenting to the ED for wound check of the stump of right lower extremity, beginning the past several days. 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 Patient has a wound VAC that has been changed regularly at her rehab facility. Over the past several days she states she has been feeling somewhat fatigued and nauseous, has been having episodes of vomiting. Today staff noted that there was some purulent drainage from the wound VAC and brought patient to ED for evaluation. She was hypotensive at the nursing home. She denies having any abdominal pain, no dysuria. She denies any discomfort to the wound VAC or stump. Review of Systems:   Pertinent positives and negatives are stated within HPI, all other systems reviewed and are negative.        --------------------------------------------- PAST HISTORY ---------------------------------------------  Past Medical History:  has a past medical history of Anticoagulant long-term use, Arthritis, Carpal tunnel syndrome on both sides, Cellulitis of right foot, Cervical cancer ([address] Chronic back pain, Depressed bipolar II disorder ([address] Diabetes mellitus ([address] Diverticulitis, DVT (deep vein thrombosis) in pregnancy, Epilepsy ([address] Hx of blood clots, Hyperlipidemia, Hypertension, Leukocytosis, Marijuana use, Neuropathy, Obesity, PONV (postoperative nausea and vomiting), Rotator cuff tear arthropathy, Thyroid disease, and Type 2 diabetes mellitus without complication ([address]     Past Surgical History:  has a past surgical history that includes Knee arthroscopy (2003); knee surgery; Tubal ligation; rhinoplasty; Hysterectomy (); Cholecystectomy; Kidney biopsy; Echo Complete (2013); Foot surgery (2015); Tonsillectomy;  section (); Nerve Block (Bilateral, 2017); Nerve Block (Bilateral, 06/15/2017); Nerve Block (Bilateral, 2017); Colonoscopy (10/10/2018); Small intestine surgery (N/A, 2019); vascular surgery (2019); Colonoscopy (N/A, 2020); and Leg Surgery (Right). Social History:  reports that she has been smoking cigarettes. She has a 32.00 pack-year smoking history. She has never used smokeless tobacco. She reports previous alcohol use. She reports previous drug use. Drug: Marijuana. Family History: family history includes Anxiety Disorder in her daughter and sister; Asthma in her son; Bipolar Disorder in her mother; Depression in her mother; Hypertension in her mother; Schizophrenia in her son. . Unless otherwise noted, family history is non contributory    The patients home medications have been reviewed. Allergies: Nsaids        ---------------------------------------------------PHYSICAL EXAM--------------------------------------    Constitutional/General: Alert and oriented x3  Head: Normocephalic and atraumatic  Eyes: PERRL, EOMI, sclera non icteric  Mouth: Oropharynx clear, handling secretions, no trismus, no asymmetry of the posterior oropharynx or uvular edema  Neck: Supple, full ROM, no stridor, no meningeal signs  Respiratory: Lungs clear to auscultation bilaterally, no wheezes, rales, or rhonchi. Not in respiratory distress  Cardiovascular:  Regular rate. Regular rhythm. No murmurs, no aortic murmurs, no gallops, or rubs. 2+ distal pulses. Equal extremity pulses. Chest: No chest wall tenderness  GI:  Abdomen Soft, Non tender, Non distended. No rebound, guarding, or rigidity. No pulsatile masses. Musculoskeletal: Moves all extremities x 4. Warm and well perfused, no clubbing, cyanosis, or edema.  Capillary refill <3 seconds  Integument: skin warm and dry. No rashes. Wound VAC applied to the right lower extremity stump, purulent drainage noted throughout the granulation tissue of the nonhealing wound. Neurologic: GCS 15, no focal deficits, symmetric strength 5/5 in the upper and lower extremities bilaterally  Psychiatric: Normal Affect          -------------------------------------------------- RESULTS -------------------------------------------------  I have personally reviewed all laboratory and imaging results for this patient. Results are listed below.      LABS: (Lab results interpreted by me)  Results for orders placed or performed during the hospital encounter of 09/10/21   COVID-19, Rapid    Specimen: Nasopharyngeal Swab   Result Value Ref Range    SARS-CoV-2, NAAT Not Detected Not Detected   CBC Auto Differential   Result Value Ref Range    WBC 11.1 4.5 - 11.5 E9/L    RBC 4.42 3.50 - 5.50 E12/L    Hemoglobin 12.6 11.5 - 15.5 g/dL    Hematocrit 40.5 34.0 - 48.0 %    MCV 91.6 80.0 - 99.9 fL    MCH 28.5 26.0 - 35.0 pg    MCHC 31.1 (L) 32.0 - 34.5 %    RDW 16.2 (H) 11.5 - 15.0 fL    Platelets 634 413 - 607 E9/L    MPV 8.6 7.0 - 12.0 fL    Neutrophils % 65.5 43.0 - 80.0 %    Immature Granulocytes % 0.9 0.0 - 5.0 %    Lymphocytes % 22.1 20.0 - 42.0 %    Monocytes % 10.3 2.0 - 12.0 %    Eosinophils % 0.8 0.0 - 6.0 %    Basophils % 0.4 0.0 - 2.0 %    Neutrophils Absolute 7.28 1.80 - 7.30 E9/L    Immature Granulocytes # 0.10 E9/L    Lymphocytes Absolute 2.46 1.50 - 4.00 E9/L    Monocytes Absolute 1.14 (H) 0.10 - 0.95 E9/L    Eosinophils Absolute 0.09 0.05 - 0.50 E9/L    Basophils Absolute 0.04 0.00 - 0.20 Y2/D   Basic Metabolic Panel w/ Reflex to MG   Result Value Ref Range    Sodium 135 132 - 146 mmol/L    Potassium reflex Magnesium 3.9 3.5 - 5.0 mmol/L    Chloride 98 98 - 107 mmol/L    CO2 28 22 - 29 mmol/L    Anion Gap 9 7 - 16 mmol/L    Glucose 108 (H) 74 - 99 mg/dL    BUN 5 (L) 6 - 20 mg/dL    CREATININE 0.5 0.5 - 1.0 mg/dL    GFR Non-African American >60 >=60 mL/min/1.73    GFR African American >60     Calcium 8.5 (L) 8.6 - 10.2 mg/dL   Hepatic Function Panel   Result Value Ref Range    Total Protein 6.7 6.4 - 8.3 g/dL    Albumin 2.5 (L) 3.5 - 5.2 g/dL    Alkaline Phosphatase 81 35 - 104 U/L    ALT <5 0 - 32 U/L    AST 9 0 - 31 U/L    Total Bilirubin <0.2 0.0 - 1.2 mg/dL    Bilirubin, Direct <0.2 0.0 - 0.3 mg/dL    Bilirubin, Indirect see below 0.0 - 1.0 mg/dL   Lipase   Result Value Ref Range    Lipase 11 (L) 13 - 60 U/L   Sedimentation Rate   Result Value Ref Range    Sed Rate 45 (H) 0 - 20 mm/Hr   C-Reactive Protein   Result Value Ref Range    CRP 4.7 (H) 0.0 - 0.4 mg/dL   Urinalysis, reflex to microscopic   Result Value Ref Range    Color, UA Yellow Straw/Yellow    Clarity, UA Clear Clear    Glucose, Ur Negative Negative mg/dL    Bilirubin Urine Negative Negative    Ketones, Urine Negative Negative mg/dL    Specific Gravity, UA 1.010 1.005 - 1.030    Blood, Urine TRACE (A) Negative    pH, UA 6.0 5.0 - 9.0    Protein, UA Negative Negative mg/dL    Urobilinogen, Urine 1.0 <2.0 E.U./dL    Nitrite, Urine Negative Negative    Leukocyte Esterase, Urine SMALL (A) Negative   Microscopic Urinalysis   Result Value Ref Range    WBC, UA 5-10 (A) 0 - 5 /HPF    RBC, UA 1-3 0 - 2 /HPF    Epithelial Cells, UA FEW /HPF    Bacteria, UA MANY (A) None Seen /HPF   ,       RADIOLOGY:  Interpreted by Radiologist unless otherwise specified  CT ABDOMEN PELVIS W IV CONTRAST Additional Contrast? None   Final Result   1. Peripherally enhancing gas and fluid collection in the right acetabulum   and lateral soft tissues concerning for abscess. 2. Moderate diffuse cellulitis of the right thigh stump. 3. Incidental adrenal adenomas, tiny renal cysts, and diverticulosis. XR CHEST PORTABLE   Final Result   No evidence of pneumonia or pleural effusion.                EKG Interpretation  Interpreted by emergency department physician,  Sandee Adam    Date of EK/10/21  Time: 1210    Rhythm: normal sinus   Rate: 75  Axis: normal  Conduction: normal  ST Segments: nonspecific changes  T Waves: non specific changes    Clinical Impression: No findings suggestive of acute ischemia or injury  Comparison to prior EKG: no previous EKG      ------------------------- NURSING NOTES AND VITALS REVIEWED ---------------------------   The nursing notes within the ED encounter and vital signs as below have been reviewed by myself  /71   Pulse 73   Temp 98.3 °F (36.8 °C) (Oral)   Resp 18   Wt 200 lb (90.7 kg)   SpO2 95%   BMI 31.32 kg/m²     Oxygen Saturation Interpretation: Normal    The patients available past medical records and past encounters were reviewed. ------------------------------ ED COURSE/MEDICAL DECISION MAKING----------------------  Medications   cefepime (MAXIPIME) 2000 mg IVPB minibag (has no administration in time range)   vancomycin (VANCOCIN) 1,750 mg in dextrose 5 % 500 mL IVPB (has no administration in time range)   ondansetron (ZOFRAN) injection 4 mg (4 mg IntraVENous Given 9/10/21 1156)   0.9 % sodium chloride IV bolus 2,721 mL (0 mLs IntraVENous Stopped 9/10/21 1324)   HYDROmorphone (DILAUDID) injection 1 mg (1 mg IntraVENous Given 9/10/21 1338)   iopamidol (ISOVUE-370) 76 % injection 90 mL (90 mLs IntraVENous Given 9/10/21 1445)   HYDROmorphone (DILAUDID) injection 1 mg (1 mg IntraVENous Given 9/10/21 1540)           The cardiac monitor revealed sinus rhythm with a heart rate in the 70s as interpreted by me. The cardiac monitor was ordered secondary to the patient's chest pain and to monitor for patient for dysrhythmia. CPT [id number]           Medical Decision Making:     I, Dr. Zena Kaplan, am the primary provider of record    62-year-old female presenting with purulence from the wound VAC of a revised right lower extremity amputation.   She was hypotensive at nursing home but arrives hemodynamically stable, afebrile, no increased work of breathing or hypoxia. There is purulence behind the foam padding of the wound VAC. Patient is refusing to have us remove the wound VAC. She has no leukocytosis but CRP and sed rate are elevated. Metabolic panel is within acceptable limits. CT abdomen/pelvis shows concern for abscess of the stump near the acetabulum of the pelvis. She was covered with cefepime and vancomycin. She remained hemodynamically stable. Attempting to contact patient's surgeon at [facility] for recommendations and possible transfer. I was then able to talk to patient's plastic surgeon Dr. Nabor Carrasco at [facility]. He felt that there was no need for his services, no need for transfer as this appears to be a stump infection that can be managed with general surgery and possible drainage. Patient is comfortable with admission here for further management. Anaerobic and aerobic wound cultures were obtained. Patient is resting comfortably remaining hemodynamically stable. General surgery was consulted, will possibly defer as needed to either plastics, interventional radiology, or orthopedics. Re-Evaluations: This patient's ED course included: a personal history and physicial examination, re-evaluation prior to disposition, IV medications, cardiac monitoring and continuous pulse oximetry    This patient has remained hemodynamically stable and been closely monitored during their ED course. Consultations:  General Surgery - will evaluate at bedside  Dr. Devon Siu at [facility] - does not need transfer for his services            Counseling: The emergency provider has spoken with the patient and discussed todays results, in addition to providing specific details for the plan of care and counseling regarding the diagnosis and prognosis.   Questions are answered at this time and they are agreeable with the plan.       --------------------------------- IMPRESSION AND DISPOSITION ---------------------------------    IMPRESSION  1. Infection of deep incisional surgical site after procedure, initial encounter        DISPOSITION  Disposition: Admit to telemetry  Patient condition is stable        NOTE: This report was transcribed using voice recognition software.  Every effort was made to ensure accuracy; however, inadvertent computerized transcription errors may be present        Rob Hwang DO  09/10/21 9281

## 2021-09-10 NOTE — ED NOTES
Raymond Section from access center called still awaiting call from [facility]  09/10/21 1918

## 2021-09-10 NOTE — LETTER
[place] Department Medicaid  CERTIFICATION OF NECESSITY  FOR NON-EMERGENCY TRANSPORTATION   BY GROUND AMBULANCE      Individual Information   1. Name: Ernesto Lugo 2. [place] Medicaid Billing Number:   3. Address: [facility] [address]      Transportation Provider Information   4. Provider Name:    5. [place] Medicaid Provider Number:  National Provider Identifier (NPI):      Certification  7. Criteria:  During transport, this individual requires:  [x] Medical treatment or continuous     supervision by an EMT. [] The administration or regulation of oxygen by another person. [] Supervised protective restraint. 8. Period Beginning Date: 9/13/2021   9. Length  [x] Not more than 1 day(s)  [x] One Year     Additional Information Relevant to Certification   10. Comments or Explanations, If Necessary or Appropriate        Certifying Practitioner Information   11. Name of Practitioner: Shantelle Toure MD   12. [place] Medicaid Provider Number, If Applicable:  [address] Provider Identifier (NPI):      Signature Information   14. Date of Signature: 9/13/2021 15. Name of Person Signing: Doreen Medina RN  BSN,    16. Signature and Professional Designation: Doreen Medina RN  BSN, [facility] [id number]  Rev. 7/2015    [facility] Encounter Date/Time: 9/10/2021 [facility] Account: [de-identified]    MRN: [id number]    Patient: Ernesto Lugo    Contact Serial #: [id number]      ENCOUNTER          Patient Class: I Private Enc? No Unit RM BD: [facility] [id number]   Hospital Service: Intermediate   Encounter DX: Postoperative or surgica*   ADM Provider: Odalys Goode MD   Procedure:     ATT Provider: Odalys Goode MD   REF Provider:        Admission DX: Postoperative or surgical complication, initial encounter, Infection of deep incisional surgical site after procedure, initial encounter and DX codes: T81. 9XXA, [id number]      PATIENT                 Name: Isela Alicia : 1970 (51 yrs)   Address:  [facility]* Sex: Female   [facility] [address]         Marital Status:    Employer: DISABLED         Latter-day: Mu-ism   Primary Care Provider: Bartolo Kelly*         Primary Phone: [phone] [address]   Contact Name Legal Guardian? Relationship to Patient Home Phone Work Phone   1. Gi Mosquedazuleyka  2. Damien Gerber No    Child  Child [phone]              GUARANTOR            Guarantor: Shaan Randall Francineleigh     : 1970   Address: [address] [place] [address] [address] Sex: Female     [place] [address]     Relation to Patient: Self       Home Phone: [phone]   Guarantor ID: [id number]       Work Phone:     Guarantor Employer: DISABLED         Status: DISABLED      COVERAGE        PRIMARY INSURANCE   Payor: [employer] MEDICARE Plan: [address] PLUS HMO   Payor Address: [facility] [id number]       Group Number: [id number] Insurance Type: [address] Name: Yovana Goldberg : 1970   Subscriber ID: [id number] Pat. Rel. to Sub: Self   SECONDARY INSURANCE   Payor: [place]ICAID [place] Plan: [facility] DEPT OF*   Payor Address:  [facility] [id number], [address]          Group Number:   Insurance Type: INDEMNITY   Subscriber Name: Yovana Goldberg : 1970   Subscriber ID: [id number] Pat.  Rel. to Sub: SELF

## 2021-09-11 LAB
ANION GAP SERPL CALCULATED.3IONS-SCNC: 4 MMOL/L (ref 7–16)
BASOPHILS ABSOLUTE: 0.03 E9/L (ref 0–0.2)
BASOPHILS RELATIVE PERCENT: 0.3 % (ref 0–2)
BUN BLDV-MCNC: 5 MG/DL (ref 6–20)
CALCIUM SERPL-MCNC: 8.4 MG/DL (ref 8.6–10.2)
CHLORIDE BLD-SCNC: 100 MMOL/L (ref 98–107)
CO2: 31 MMOL/L (ref 22–29)
CREAT SERPL-MCNC: 0.5 MG/DL (ref 0.5–1)
EKG ATRIAL RATE: 75 BPM
EKG P AXIS: 13 DEGREES
EKG P-R INTERVAL: 120 MS
EKG Q-T INTERVAL: 386 MS
EKG QRS DURATION: 80 MS
EKG QTC CALCULATION (BAZETT): 431 MS
EKG R AXIS: 38 DEGREES
EKG T AXIS: 64 DEGREES
EKG VENTRICULAR RATE: 75 BPM
EOSINOPHILS ABSOLUTE: 0.16 E9/L (ref 0.05–0.5)
EOSINOPHILS RELATIVE PERCENT: 1.8 % (ref 0–6)
GFR AFRICAN AMERICAN: >60
GFR NON-AFRICAN AMERICAN: >60 ML/MIN/1.73
GLUCOSE BLD-MCNC: 91 MG/DL (ref 74–99)
HCT VFR BLD CALC: 38.7 % (ref 34–48)
HEMOGLOBIN: 12.3 G/DL (ref 11.5–15.5)
IMMATURE GRANULOCYTES #: 0.06 E9/L
IMMATURE GRANULOCYTES %: 0.7 % (ref 0–5)
LYMPHOCYTES ABSOLUTE: 2.16 E9/L (ref 1.5–4)
LYMPHOCYTES RELATIVE PERCENT: 24.1 % (ref 20–42)
MCH RBC QN AUTO: 28.9 PG (ref 26–35)
MCHC RBC AUTO-ENTMCNC: 31.8 % (ref 32–34.5)
MCV RBC AUTO: 90.8 FL (ref 80–99.9)
MONOCYTES ABSOLUTE: 1.37 E9/L (ref 0.1–0.95)
MONOCYTES RELATIVE PERCENT: 15.3 % (ref 2–12)
NEUTROPHILS ABSOLUTE: 5.17 E9/L (ref 1.8–7.3)
NEUTROPHILS RELATIVE PERCENT: 57.8 % (ref 43–80)
PDW BLD-RTO: 16.3 FL (ref 11.5–15)
PLATELET # BLD: 316 E9/L (ref 130–450)
PMV BLD AUTO: 8.1 FL (ref 7–12)
POTASSIUM REFLEX MAGNESIUM: 3.7 MMOL/L (ref 3.5–5)
RBC # BLD: 4.26 E12/L (ref 3.5–5.5)
SODIUM BLD-SCNC: 135 MMOL/L (ref 132–146)
WBC # BLD: 9 E9/L (ref 4.5–11.5)

## 2021-09-11 PROCEDURE — 93010 ELECTROCARDIOGRAM REPORT: CPT | Performed by: INTERNAL MEDICINE

## 2021-09-11 PROCEDURE — 2060000000 HC ICU INTERMEDIATE R&B

## 2021-09-11 PROCEDURE — 2580000003 HC RX 258: Performed by: SPECIALIST

## 2021-09-11 PROCEDURE — 80048 BASIC METABOLIC PNL TOTAL CA: CPT

## 2021-09-11 PROCEDURE — 6360000002 HC RX W HCPCS: Performed by: SPECIALIST

## 2021-09-11 PROCEDURE — 6370000000 HC RX 637 (ALT 250 FOR IP): Performed by: INTERNAL MEDICINE

## 2021-09-11 PROCEDURE — 6370000000 HC RX 637 (ALT 250 FOR IP): Performed by: PHYSICIAN ASSISTANT

## 2021-09-11 PROCEDURE — 6360000002 HC RX W HCPCS: Performed by: PHYSICIAN ASSISTANT

## 2021-09-11 PROCEDURE — 85025 COMPLETE CBC W/AUTO DIFF WBC: CPT

## 2021-09-11 RX ORDER — POTASSIUM CHLORIDE 7.45 MG/ML
10 INJECTION INTRAVENOUS PRN
Status: DISCONTINUED | OUTPATIENT
Start: 2021-09-11 | End: 2021-09-16 | Stop reason: HOSPADM

## 2021-09-11 RX ORDER — DIVALPROEX SODIUM 500 MG/1
1000 TABLET, EXTENDED RELEASE ORAL NIGHTLY
Status: DISCONTINUED | OUTPATIENT
Start: 2021-09-11 | End: 2021-09-16 | Stop reason: HOSPADM

## 2021-09-11 RX ORDER — ATORVASTATIN CALCIUM 40 MG/1
80 TABLET, FILM COATED ORAL NIGHTLY
Status: DISCONTINUED | OUTPATIENT
Start: 2021-09-11 | End: 2021-09-16 | Stop reason: HOSPADM

## 2021-09-11 RX ORDER — ACETAMINOPHEN 650 MG/1
650 SUPPOSITORY RECTAL EVERY 6 HOURS PRN
Status: DISCONTINUED | OUTPATIENT
Start: 2021-09-11 | End: 2021-09-16 | Stop reason: HOSPADM

## 2021-09-11 RX ORDER — ACETAMINOPHEN 325 MG/1
650 TABLET ORAL EVERY 6 HOURS PRN
Status: DISCONTINUED | OUTPATIENT
Start: 2021-09-11 | End: 2021-09-16 | Stop reason: HOSPADM

## 2021-09-11 RX ORDER — PANTOPRAZOLE SODIUM 40 MG/1
40 TABLET, DELAYED RELEASE ORAL
Status: DISCONTINUED | OUTPATIENT
Start: 2021-09-11 | End: 2021-09-16 | Stop reason: HOSPADM

## 2021-09-11 RX ORDER — METHOCARBAMOL 500 MG/1
500 TABLET, FILM COATED ORAL 3 TIMES DAILY
Status: DISCONTINUED | OUTPATIENT
Start: 2021-09-11 | End: 2021-09-16 | Stop reason: HOSPADM

## 2021-09-11 RX ORDER — LEVETIRACETAM 500 MG/1
500 TABLET ORAL 2 TIMES DAILY
Status: DISCONTINUED | OUTPATIENT
Start: 2021-09-11 | End: 2021-09-16 | Stop reason: HOSPADM

## 2021-09-11 RX ORDER — DEXTROSE MONOHYDRATE 50 MG/ML
100 INJECTION, SOLUTION INTRAVENOUS PRN
Status: DISCONTINUED | OUTPATIENT
Start: 2021-09-11 | End: 2021-09-16 | Stop reason: HOSPADM

## 2021-09-11 RX ORDER — NICOTINE POLACRILEX 4 MG
15 LOZENGE BUCCAL PRN
Status: DISCONTINUED | OUTPATIENT
Start: 2021-09-11 | End: 2021-09-16 | Stop reason: HOSPADM

## 2021-09-11 RX ORDER — DULOXETIN HYDROCHLORIDE 60 MG/1
60 CAPSULE, DELAYED RELEASE ORAL DAILY
Status: DISCONTINUED | OUTPATIENT
Start: 2021-09-11 | End: 2021-09-16 | Stop reason: HOSPADM

## 2021-09-11 RX ORDER — SODIUM CHLORIDE 0.9 % (FLUSH) 0.9 %
10 SYRINGE (ML) INJECTION EVERY 12 HOURS SCHEDULED
Status: DISCONTINUED | OUTPATIENT
Start: 2021-09-11 | End: 2021-09-16 | Stop reason: HOSPADM

## 2021-09-11 RX ORDER — BUSPIRONE HYDROCHLORIDE 10 MG/1
10 TABLET ORAL DAILY
Status: DISCONTINUED | OUTPATIENT
Start: 2021-09-11 | End: 2021-09-16 | Stop reason: HOSPADM

## 2021-09-11 RX ORDER — LEVOTHYROXINE SODIUM 0.07 MG/1
75 TABLET ORAL DAILY
Status: DISCONTINUED | OUTPATIENT
Start: 2021-09-11 | End: 2021-09-16 | Stop reason: HOSPADM

## 2021-09-11 RX ORDER — SODIUM CHLORIDE 9 MG/ML
25 INJECTION, SOLUTION INTRAVENOUS PRN
Status: DISCONTINUED | OUTPATIENT
Start: 2021-09-11 | End: 2021-09-16 | Stop reason: HOSPADM

## 2021-09-11 RX ORDER — OMEPRAZOLE 20 MG/1
20 CAPSULE, DELAYED RELEASE ORAL EVERY MORNING
Status: DISCONTINUED | OUTPATIENT
Start: 2021-09-11 | End: 2021-09-11 | Stop reason: CLARIF

## 2021-09-11 RX ORDER — CLOPIDOGREL BISULFATE 75 MG/1
75 TABLET ORAL DAILY
Status: DISCONTINUED | OUTPATIENT
Start: 2021-09-11 | End: 2021-09-16 | Stop reason: HOSPADM

## 2021-09-11 RX ORDER — DEXTROSE MONOHYDRATE 25 G/50ML
12.5 INJECTION, SOLUTION INTRAVENOUS PRN
Status: DISCONTINUED | OUTPATIENT
Start: 2021-09-11 | End: 2021-09-16 | Stop reason: HOSPADM

## 2021-09-11 RX ORDER — DIVALPROEX SODIUM 500 MG/1
500 TABLET, EXTENDED RELEASE ORAL DAILY
Status: DISCONTINUED | OUTPATIENT
Start: 2021-09-11 | End: 2021-09-16 | Stop reason: HOSPADM

## 2021-09-11 RX ORDER — POTASSIUM CHLORIDE 20 MEQ/1
40 TABLET, EXTENDED RELEASE ORAL PRN
Status: DISCONTINUED | OUTPATIENT
Start: 2021-09-11 | End: 2021-09-16 | Stop reason: HOSPADM

## 2021-09-11 RX ORDER — SODIUM CHLORIDE 0.9 % (FLUSH) 0.9 %
10 SYRINGE (ML) INJECTION PRN
Status: DISCONTINUED | OUTPATIENT
Start: 2021-09-11 | End: 2021-09-16 | Stop reason: HOSPADM

## 2021-09-11 RX ORDER — ALBUTEROL SULFATE 2.5 MG/3ML
2.5 SOLUTION RESPIRATORY (INHALATION) EVERY 6 HOURS PRN
Status: DISCONTINUED | OUTPATIENT
Start: 2021-09-11 | End: 2021-09-16 | Stop reason: HOSPADM

## 2021-09-11 RX ORDER — ALBUTEROL SULFATE 90 UG/1
2 AEROSOL, METERED RESPIRATORY (INHALATION) EVERY 6 HOURS PRN
Status: DISCONTINUED | OUTPATIENT
Start: 2021-09-11 | End: 2021-09-11 | Stop reason: CLARIF

## 2021-09-11 RX ADMIN — LEVETIRACETAM 500 MG: 500 TABLET, FILM COATED ORAL at 22:45

## 2021-09-11 RX ADMIN — METHOCARBAMOL TABLETS 500 MG: 500 TABLET, COATED ORAL at 22:46

## 2021-09-11 RX ADMIN — ATORVASTATIN CALCIUM 80 MG: 40 TABLET, FILM COATED ORAL at 22:45

## 2021-09-11 RX ADMIN — HYDROMORPHONE HYDROCHLORIDE 0.5 MG: 1 INJECTION, SOLUTION INTRAMUSCULAR; INTRAVENOUS; SUBCUTANEOUS at 08:09

## 2021-09-11 RX ADMIN — HYDROMORPHONE HYDROCHLORIDE 0.5 MG: 1 INJECTION, SOLUTION INTRAMUSCULAR; INTRAVENOUS; SUBCUTANEOUS at 11:44

## 2021-09-11 RX ADMIN — METOPROLOL TARTRATE 12.5 MG: 25 TABLET, FILM COATED ORAL at 22:45

## 2021-09-11 RX ADMIN — PANTOPRAZOLE SODIUM 40 MG: 40 TABLET, DELAYED RELEASE ORAL at 22:45

## 2021-09-11 RX ADMIN — HYDROMORPHONE HYDROCHLORIDE 0.5 MG: 1 INJECTION, SOLUTION INTRAMUSCULAR; INTRAVENOUS; SUBCUTANEOUS at 17:51

## 2021-09-11 RX ADMIN — VANCOMYCIN HYDROCHLORIDE 1250 MG: 10 INJECTION, POWDER, LYOPHILIZED, FOR SOLUTION INTRAVENOUS at 22:49

## 2021-09-11 RX ADMIN — MEROPENEM 1000 MG: 1 INJECTION, POWDER, FOR SOLUTION INTRAVENOUS at 20:19

## 2021-09-11 RX ADMIN — HYDROMORPHONE HYDROCHLORIDE 0.5 MG: 1 INJECTION, SOLUTION INTRAMUSCULAR; INTRAVENOUS; SUBCUTANEOUS at 00:36

## 2021-09-11 RX ADMIN — HYDROMORPHONE HYDROCHLORIDE 0.5 MG: 1 INJECTION, SOLUTION INTRAMUSCULAR; INTRAVENOUS; SUBCUTANEOUS at 22:32

## 2021-09-11 ASSESSMENT — PAIN SCALES - GENERAL
PAINLEVEL_OUTOF10: 10
PAINLEVEL_OUTOF10: 9

## 2021-09-11 NOTE — ED NOTES
Received call from access center. Pt accepted by medicine team at [facility], by Dr Jimena Chowdary.   Awaiting bed assignment, will call back when bed available     Andrea Goldberg RN  09/10/21 7796

## 2021-09-11 NOTE — PROGRESS NOTES
Pharmacy Consultation Note  (Antibiotic Dosing and Monitoring)    Initial consult date: 9/11/21  Consulting physician: Dr. Mahesh Donohue  Drug: Vancomycin  Indication: surgical wound infection / septic arthritis    Age/  Gender Height Weight IBW Dosing weight  Allergy Information   51 y.o./female 5' 7\" 90.7  kg   Ideal body weight: 61.6 kg (135 lb 12.9 oz)  Adjusted ideal body weight: 73.2 kg (161 lb 7.7 oz)  73.2 kg  Nsaids         Date  WBC BUN SCr CrCl  (mL/min) Drug/Dose Time   Given Level(s)   (Time) Comments   9/10 11.1 5 0.5  >100   Vancomycin 1750 mg IV once 1910     9/11 9.0 5 0.5 >100 Vancomycin 1250 mg IV q12h <1900>                               No intake or output data in the 24 hours ending 09/11/21 1834    Historical Cultures:  Organism   Date Value Ref Range Status   09/10/2021 Gram negative fran (A)  Preliminary     No results for input(s): BC in the last 72 hours. Cultures:  available culture and sensitivity results were reviewed in EPIC    Assessment:  45 y.o. female has been initiated Vancomycin. Estimated CrCl = >100 mL/min  Goal trough level = 15-20 mcg/mL; AUC:BENJAMÍN = 400-600    Plan:   Will initiate vancomycin at a dose of 1250 mg IV q12h  Monitor renal function   Clinical pharmacy to follow      Heriberto Parrish [facility] 9/11/2021 6:34 PM

## 2021-09-11 NOTE — ED NOTES
Call from [facility] are not accepting pt ( Dr Hari Barger)     Joseph Spain  09/10/21 2028

## 2021-09-11 NOTE — ED NOTES
New call placed to access center to get pt transferred to [address] [place] [address] Sang .      Elena Nieves  09/10/21 0270

## 2021-09-11 NOTE — CONSULTS
COLONOSCOPY WITH BIOPSY performed by Kandy Francois MD at [address] ECHO COMPLETE  9/5/2013         FOOT SURGERY  8/7/2015    HYSTERECTOMY  2012    KIDNEY BIOPSY      KNEE ARTHROSCOPY  2003    right and left knee    KNEE SURGERY      left knee    LEG SURGERY Right     NERVE BLOCK Bilateral 06/01/2017    TFNB  #1    NERVE BLOCK Bilateral 06/15/2017    bilatera lumbar transforaminal nerve block #2 L5-S1    NERVE BLOCK Bilateral 09/20/2017    Bilateral transforamninal nerve block lumbar #3    RHINOPLASTY      1985    SMALL INTESTINE SURGERY N/A 2/6/2019    LAPAROSCOPIC SIGMOID COLECTOMY performed by Kandy Francois MD at [facility]  08/05/2019       Medications Prior to Admission:    Prior to Admission medications    Medication Sig Start Date End Date Taking?  Authorizing Provider   tiotropium (SPIRIVA RESPIMAT) 2.5 MCG/ACT AERS inhaler Inhale 2 puffs into the lungs daily 5/25/21 5/25/22  Chacorta Rod MD   methocarbamol (ROBAXIN) 500 MG tablet Take 1 tablet by mouth 3 times daily 5/17/21 5/17/22  Camilla Sauer DO   DULoxetine (CYMBALTA) 60 MG extended release capsule TAKE 1 CAPSULE EVERY DAY 5/17/21   Alma Delia Sauer DO   Accu-Chek FastClix Lancets MISC TEST BLOOD SUGAR THREE TIMES DAILY 4/7/21 4/7/22  Chacorta Rod MD   ACCU-CHEK GUIDE strip TEST BLOOD SUGAR THREE TIMES DAILY 3/29/21 3/29/22  Chacorta Rod MD   clopidogrel (PLAVIX) 75 MG tablet Take 1 tablet by mouth daily 2/26/21 11/15/21  Chacorta Rod MD   apixaban (ELIQUIS) 5 MG TABS tablet Take 1 tablet by mouth 2 times daily 3/1/21 11/15/21  Chacorta Rod MD   Alcohol Swabs (B-D SINGLE USE SWABS REGULAR) PADS USE AS DIRECTED TWICE DAILY 12/10/20 12/10/21  Chacorta Rod MD   omeprazole (PRILOSEC) 20 MG delayed release capsule TAKE 1 CAPSULE EVERY MORNING  BEFORE  BREAKFAST 11/20/20 11/20/21  Vianca MD Sarah   divalproex (DEPAKOTE ER) 500 MG extended release tablet TAKE 1 TABLET IN THE MORNING AND 2 TABLETS IN THE EVENING 11/20/20 11/20/21  Vianca Smith MD   metFORMIN (GLUCOPHAGE) 1000 MG tablet TAKE 1 TABLET TWICE DAILY WITH MEALS 11/20/20 11/20/21  Vianca Smith MD   metoprolol tartrate (LOPRESSOR) 25 MG tablet TAKE 1/2 TABLET TWICE DAILY 11/20/20 11/20/21  Mayra Longoria MD   levothyroxine (SYNTHROID) 75 MCG tablet TAKE 1 TABLET EVERY DAY 11/20/20 11/20/21  Mayra Longoria MD   atorvastatin (LIPITOR) 80 MG tablet Take 1 tablet by mouth nightly 11/20/20 11/20/21  Mayra Longoria MD   albuterol sulfate  (90 Base) MCG/ACT inhaler Inhale 2 puffs into the lungs every 6 hours as needed for Wheezing 11/20/20 11/20/21  Mayra Longoria MD   Omega-3 Fatty Acids (OMEGA-3 FISH OIL) 1000 MG CAPS Take 1 capsule by mouth daily 11/20/20 11/20/21  Mayra Longoria MD   Multiple Vitamins-Minerals (THERAPEUTIC MULTIVITAMIN-MINERALS) tablet Take 1 tablet by mouth daily 11/20/20 11/20/21  Mayra Longoria MD   busPIRone (BUSPAR) 10 MG tablet Take 1 tablet by mouth daily 11/20/20 11/20/21  Mayra Longoria MD   levETIRAcetam (KEPPRA) 500 MG tablet TAKE 1 TABLET TWICE DAILY 10/6/20   GILDARDO Chua - CNP   DULoxetine (CYMBALTA) 60 MG extended release capsule TAKE 1 CAPSULE EVERY DAY 8/14/20   DO Tony Martínez MISC by Does not apply route Patient cannot walk 200 ft without stopping to rest.    Expiration 2024 6/12/19   Mayra Longoria MD       Allergies   Allergen Reactions    Nsaids Shortness Of Breath and Other (See Comments)     Renal Failure       Family History   Problem Relation Age of Onset    Depression Mother     Bipolar Disorder Mother     Hypertension Mother    Diaz Salgraham Anxiety Disorder Sister     Asthma Son    Diaz Salgraham Schizophrenia Son     Anxiety Disorder Daughter        Social History Tobacco Use    Smoking status: Current Every Day Smoker     Packs/day: 1.00     Years: 32.00     Pack years: 32.00     Types: Cigarettes    Smokeless tobacco: Never Used    Tobacco comment: (9/4/2020):  not ready to quit; counseling given   Vaping Use    Vaping Use: Never used   Substance Use Topics    Alcohol use: Not Currently    Drug use: Not Currently     Types: Marijuana     Comment: once a month or so; depending on pain, last use 12/2017         Review of Systems   General ROS: Fatigue  Hematological and Lymphatic ROS: negative  Respiratory ROS: negative  Cardiovascular ROS: negative  Gastrointestinal ROS: no abdominal pain, change in bowel habits, or black or bloody stools  Genito-Urinary ROS: negative  Musculoskeletal ROS: AKA/AKA revision wound VAC      PHYSICAL EXAM:    Vitals:    09/10/21 2000   BP: 100/65   Pulse: 75   Resp: 13   Temp:    SpO2: 97%       General Appearance:  well developed, well nourished, very anxious and distraught on exam  Skin:  Skin color, texture, obese  Head/face:  NCAT  Eyes:  PERRL  Lungs:  No chest wall tenderness. Heart:  Heart regular rate and rhythm  Abdomen:  Soft, non-tender, normal bowel sounds. No bruits, organomegaly or masses. Extremities: Right AKA with revision, small stump with wound VAC padding in place without suction obtained. Significant purulent material around wound. Signs of cellulitis around wound      LABS:    CBC  Recent Labs     09/10/21  1144   WBC 11.1   HGB 12.6   HCT 40.5        BMP  Recent Labs     09/10/21  1144      K 3.9   CL 98   CO2 28   BUN 5*   CREATININE 0.5   CALCIUM 8.5*     Liver Function  Recent Labs     09/10/21  1144   LIPASE 11*   BILITOT <0.2   BILIDIR <0.2   AST 9   ALT <5   ALKPHOS 81   PROT 6.7   LABALBU 2.5*     No results for input(s): LACTATE in the last 72 hours. No results for input(s): INR, PTT in the last 72 hours.     Invalid input(s): PT    RADIOLOGY    CT ABDOMEN PELVIS W IV CONTRAST Additional Contrast? None    Result Date: 9/10/2021  EXAMINATION: CT OF THE ABDOMEN AND PELVIS WITH CONTRAST 9/10/2021 1:52 pm TECHNIQUE: CT of the abdomen and pelvis was performed with the administration of intravenous contrast. Multiplanar reformatted images are provided for review. Dose modulation, iterative reconstruction, and/or weight based adjustment of the mA/kV was utilized to reduce the radiation dose to as low as reasonably achievable. COMPARISON: 10/14/2020 HISTORY: ORDERING SYSTEM PROVIDED HISTORY: possible infection TECHNOLOGIST PROVIDED HISTORY: Please scan through right LE stump Additional Contrast?->None Reason for exam:->possible infection Decision Support Exception - unselect if not a suspected or confirmed emergency medical condition->Emergency Medical Condition (MA) What reading provider will be dictating this exam?->CRC FINDINGS: Lower Chest: Mild dependent atelectasis lies posteriorly. Organs: Small, indeterminate bilateral adrenal nodules appear unchanged. Splenic morphology and attenuation/echotexture is normal. The pancreas has normal morphology and parenchymal attenuation / enhancement. The kidneys have normal morphology, enhancement and are free of calculi and hydronephrosis. The kidneys have bilateral cortical scarring. There are multiple sub 1 cm cortical cyst. The liver has normal morphology and attenuation / echogenicity and is free of focal lesions. The gallbladder is fluid filled and free of intraluminal abnormalities. There has been a previous cholecystectomy. GI/Bowel: The GI tract has normal course, caliber, and morphology. A staple line lies in rectosigmoid junction A normal appendix is identified. There are scattered colonic diverticula, without evidence for diverticulitis. Pelvis: The bladder has normal morphology and wall thickness. The patient is post hysterectomy. Peritoneum/Retroperitoneum: There are no signs of free intraperitoneal air / gas.   No free intraperitoneal fluid is Loyda Pack    Electronically signed by Heriberto Siddiqui DO on 9/10/21 at 9:57 PM EDT

## 2021-09-11 NOTE — CONSULTS
[address] [place] [address] [facility]  [facility]    Consultation Note     Admit Date: 9/10/2021 11:27 AM    Reason for Consult:   Surgical wound infection right above-the-knee amputation    Attending Physician:  No att. providers found     Chief Complaint: Fatigue, nausea and episodes of vomiting    HISTORY OF PRESENT ILLNESS:   The patient is a 45 y.o.  female known to the Infectious Diseases service. The patient is in the emergency room [facility] and is waiting for bed at [facility].  The patient therefore was evaluated for postsurgical infection. Patient had original AKA performed at [facility].    She developed a local wound infection ultimately had to have a significant revision and plastics closure done at [facility] in [facility] [employer]. Patient has local wound care at [facility]. Patient presented with wound VAC in place however suction is not obtained due to significant purulent material around wound VAC area. Patient also complains of fatigue. Patient is severely anxious. CT imaging reviewed that shows fluid collection/abscess in the acetabulum. Emergency room department contacted the plastics department at [facility] who performed the original closure. Cultures at this time are growing gram-positive organisms. She was started on cefepime and vancomycin infectious disease was asked to evaluate. Her white count is 11 BUN and creatinine 5 and 0.5. CT scan was reviewed which shows gas and fluid collections in the right acetabulum and in the soft tissues as well.   Walking in the room there is a putrid anaerobic odor.         Past Medical History:        Diagnosis Date    Anticoagulant long-term use     Arthritis     back, both hips and both knees    Carpal tunnel syndrome on both sides     both wrists    Cellulitis of right foot 03/2016    Cervical cancer ([facility] [address] 2012    S/P LUCIUS, BSO    Chronic back pain     Depressed bipolar II insulin lispro  0-6 Units SubCUTAneous Nightly    pantoprazole  40 mg Oral QAM AC    ipratropium  0.5 mg Nebulization 4x daily     Continuous Infusions:   sodium chloride      dextrose       PRN Meds:sodium chloride flush, sodium chloride, potassium chloride **OR** potassium alternative oral replacement **OR** potassium chloride, magnesium hydroxide, acetaminophen **OR** acetaminophen, glucose, dextrose, glucagon (rDNA), dextrose, trimethobenzamide, HYDROmorphone **OR** HYDROmorphone, albuterol    Allergies:  Nsaids    Social History:   Social History     Socioeconomic History    Marital status:      Spouse name: None    Number of children: None    Years of education: None    Highest education level: None   Occupational History    Occupation:      Employer: [employer]   Tobacco Use    Smoking status: Current Every Day Smoker     Packs/day: 1.00     Years: 32.00     Pack years: 32.00     Types: Cigarettes    Smokeless tobacco: Never Used    Tobacco comment: (9/4/2020):  not ready to quit; counseling given   Vaping Use    Vaping Use: Never used   Substance and Sexual Activity    Alcohol use: Not Currently    Drug use: Not Currently     Types: Marijuana     Comment: once a month or so; depending on pain, last use 12/2017    Sexual activity: Never   Other Topics Concern    None   Social History Narrative    None     Social Determinants of Health     Financial Resource Strain: Low Risk     Difficulty of Paying Living Expenses: Not hard at all   Food Insecurity: No Food Insecurity    Worried About Running Out of Food in the Last Year: Never true    Mary of Food in the Last Year: Never true   Transportation Needs:     Lack of Transportation (Medical):      Lack of Transportation (Non-Medical):    Physical Activity:     Days of Exercise per Week:     Minutes of Exercise per Session:    Stress:     Feeling of Stress :    Social Connections:     Frequency of Communication with Friends and Family:     Frequency of Social Gatherings with Friends and Family:     Attends Worship Services:     Active Member of Clubs or Organizations:     Attends Club or Organization Meetings:     Marital Status:    Intimate Partner Violence:     Fear of Current or Ex-Partner:     Emotionally Abused:     Physically Abused:     Sexually Abused:    Family History:       Problem Relation Age of Onset    Depression Mother     Bipolar Disorder Mother     Hypertension Mother     Anxiety Disorder Sister     Asthma Son     Schizophrenia Son     Anxiety Disorder Daughter    . Otherwise non-pertinent to the chief complaint. REVIEW OF SYSTEMS:    CONSTITUTIONAL: Positive chills, fevers or night sweats. No loss of weight. EYES:  No double vision or drainage from eyes, ears or throat. HEENT:  No neck stiffness. No dysphagia. No drainage from eyes, ears or throat  RESPIRATORY:  No cough, productive sputum or hemoptysis. CARDIOVASCULAR:  No chest pain, palpitations, orthopnea or dyspnea on exertion. GASTROINTESTINAL:  No nausea, vomiting, diarrhea or constipation or hematochezia   GENITOURINARY:  No frequency burning dysuria or hematuria. INTEGUMENT/BREAST:  No rash or breast masses. HEMATOLOGIC/LYMPHATIC:  No lymphadenopathy or blood dyscrasics. ALLERGIC/IMMUNOLOGIC:  No anaphylaxis. ENDOCRINE:  No polyuria or polydipsia or temperature intolerance. MUSCULOSKELETAL: See history of present illness  NEUROLOGICAL:  No focal motor sensory deficit. BEHAVIOR/PSYCH:  No psychosis. PHYSICAL EXAM:    Vitals:    /66   Pulse 98   Temp 97.8 °F (36.6 °C)   Resp 16   Ht 5' 7\" (1.702 m)   Wt 200 lb (90.7 kg)   SpO2 97%   BMI 31.32 kg/m²   Constitutional: The patient is awake, alert, and oriented. Skin: Warm and dry. No rashes were noted. No jaundice. HEENT: Eyes show round, and reactive pupils. Moist mucous membranes, no ulcerations, no thrush. Neck: Supple to movements.  No lymphadenopathy. Chest: No use of accessory muscles to breathe. Symmetrical expansion. Auscultation reveals no wheezing, crackles, or rhonchi. Cardiovascular: S1 and S2 are rhythmic and regular. No murmurs appreciated. Abdomen: Positive bowel sounds to auscultation. Benign to palpation. No masses felt. No hepatosplenomegaly. Genitourinary: Female  Extremities: No clubbing, no cyanosis, no edema. Musculoskeletal: Right AKA with drainage from the stump; there is pain at the right hip on palpation  Neurological: No focal  Lines: peripheral      CBC+dif:  Recent Labs     09/10/21  1144 09/10/21  1144 09/11/21  0802   WBC 11.1  --  9.0   HGB 12.6   < > 12.3   HCT 40.5   < > 38.7   MCV 91.6   < > 90.8      < > 316   NEUTROABS 7.28   < > 5.17    < > = values in this interval not displayed.      Lab Results   Component Value Date    CRP 4.7 (H) 09/10/2021    CRP 1.9 (H) 03/19/2015     No results found for: CRPHS  Lab Results   Component Value Date    SEDRATE 45 (H) 09/10/2021    SEDRATE 7 07/02/2015    SEDRATE 3 03/19/2015     Lab Results   Component Value Date    ALT <5 09/10/2021    AST 9 09/10/2021    ALKPHOS 81 09/10/2021    BILITOT <0.2 09/10/2021     Lab Results   Component Value Date     09/11/2021    K 3.7 09/11/2021     09/11/2021    CO2 31 09/11/2021    BUN 5 09/11/2021    CREATININE 0.5 09/11/2021    GFRAA >60 09/11/2021    LABGLOM >60 09/11/2021    GLUCOSE 91 09/11/2021    GLUCOSE 70 04/28/2011    PROT 6.7 09/10/2021    LABALBU 2.5 09/10/2021    LABALBU 3.6 11/30/2010    CALCIUM 8.4 09/11/2021    BILITOT <0.2 09/10/2021    ALKPHOS 81 09/10/2021    AST 9 09/10/2021    ALT <5 09/10/2021       Lab Results   Component Value Date    PROTIME 12.3 10/29/2020    PROTIME 14.2 01/28/2020    INR 1.1 10/29/2020       Lab Results   Component Value Date    TSH 2.140 05/25/2021       Lab Results   Component Value Date    NITRITE neg 10/28/2014    COLORU Yellow 09/10/2021    PHUR 6.0 09/10/2021

## 2021-09-11 NOTE — PROGRESS NOTES
Per Dr Brenda Saenz, he spoke with the patient's surgeon at [facility] who advised that the patient's current condition does not require transfer to [facility]. Please refer to his ER note for further detail, but patient was admitted to [facility] since she was not accepted to [facility] to follow with her surgeons.

## 2021-09-11 NOTE — CONSULTS
use     Neuropathy     Obesity     PONV (postoperative nausea and vomiting)     Rotator cuff tear arthropathy     Thyroid disease     Type 2 diabetes mellitus without complication [facility])      Past Surgical History:        Procedure Laterality Date     SECTION      CHOLECYSTECTOMY      COLONOSCOPY  10/10/2018    COLONOSCOPY WITH BIOPSY performed by Carolina Cabezas MD at [address] N/A 2020    COLONOSCOPY WITH BIOPSY performed by Carolina Cabezas MD at [address] [facility] ECHO COMPLETE  2013         FOOT SURGERY  2015    HYSTERECTOMY  2012    KIDNEY BIOPSY      KNEE ARTHROSCOPY  2003    right and left knee    KNEE SURGERY      left knee    LEG SURGERY Right     NERVE BLOCK Bilateral 2017    TFNB  #1    NERVE BLOCK Bilateral 06/15/2017    bilatera lumbar transforaminal nerve block #2 L5-S1    NERVE BLOCK Bilateral 2017    Bilateral transforamninal nerve block lumbar #3    RHINOPLASTY      1985    SMALL INTESTINE SURGERY N/A 2019    LAPAROSCOPIC SIGMOID COLECTOMY performed by Carolina Cabezas MD at [facility]  2019     Current Medications:   No current facility-administered medications for this encounter. Allergies:  Nsaids    Social History:   TOBACCO:   reports that she has been smoking cigarettes. She has a 32.00 pack-year smoking history. She has never used smokeless tobacco.  ETOH:   reports previous alcohol use. DRUGS:   reports previous drug use. Drug: Marijuana.   ACTIVITIES OF DAILY LIVING:    OCCUPATION:    Family History:       Problem Relation Age of Onset    Depression Mother     Bipolar Disorder Mother     Hypertension Mother    Anoop Oden Anxiety Disorder Sister     Asthma Son    Anoop Oden Schizophrenia Son     Anxiety Disorder Daughter        REVIEW OF SYSTEMS:  CONSTITUTIONAL:  negative for  fevers, chills  EYES:  negative for acute vision changes  HEENT:  negative for acute hearing changes  RESPIRATORY:  negative for acute shortness of breath  CARDIOVASCULAR:  negative for acute chest pains  GASTROINTESTINAL:  negative for acute nausea  HEMATOLOGIC/LYMPHATIC: Positive for wound issues  MUSCULOSKELETAL:  positive for right stump pain, drainage, wound  NEUROLOGICAL:  negative for headaches, dizziness  BEHAVIOR/PSYCH:  negative for increased agitation and anxiety    PHYSICAL EXAM:    VITALS:  /65   Pulse 75   Temp 98.3 °F (36.8 °C) (Oral)   Resp 13   Wt 200 lb (90.7 kg)   SpO2 97%   BMI 31.32 kg/m²   CONSTITUTIONAL: Awake, alert, emotional about current situation  MUSCULOSKELETAL:  Right lower Extremity:  Wound VAC in place, not hooked up to vacuum. There is signs of pustular drainage throughout the wound VAC as well as skin changes consistent with infection in her skin folds. She will not allow me to remove wound VAC due to pain. She states she would need medicated heavily in order to do so. There is drainage on the dressing currently. Hard to discern whether wound probes directly to pelvis. Compartments soft and compressible  Stump appears perfused    Secondary Exam:   bilateralUE: No obvious signs of trauma. -TTP to fingers, hand, wrist, forearm, elbow, humerus, shoulder or clavicle. leftLE: No obvious signs of trauma.    -TTP to foot, ankle, leg, knee, thigh, hip    DATA:    CBC:   Lab Results   Component Value Date    WBC 11.1 09/10/2021    RBC 4.42 09/10/2021    HGB 12.6 09/10/2021    HCT 40.5 09/10/2021    MCV 91.6 09/10/2021    MCH 28.5 09/10/2021    MCHC 31.1 09/10/2021    RDW 16.2 09/10/2021     09/10/2021    MPV 8.6 09/10/2021     PT/INR:    Lab Results   Component Value Date    PROTIME 12.3 10/29/2020    PROTIME 14.2 01/28/2020    INR 1.1 10/29/2020     CRP/ESR:   Lab Results   Component Value Date    CRP 4.7 09/10/2021    SEDRATE 45 09/10/2021     Lactic Acid :   Lab Results   Component Value Date    LACTA 1.3 09/17/2018       Radiology Review:  09/10/21 -CT pelvis  Right-sided hip disarticulation is apparent with some bony changes consistent with possible inflammatory response. There is also a large fluid collection tracking from the acetabulum to the distal stump. There is small air bubbles within this fluid collection. No acute fractures dislocations are apparent. Some heterotopic ossification is apparent throughout the stump. IMPRESSION:   · Right stump infection and complication. PLAN:  NWB -right LE  Recommend transfer to [facility] at most recent treating facility for further evaluation of possible need for further plastic surgical intervention as well as orthopedic interventions.   Unfortunately this patient's problem is rather severe at this time and will need care from the appropriate specialist.  Wound care consultation  Pain Control per ED  IV antibiosis  · Discussed with Dr. Taty Richard

## 2021-09-11 NOTE — ED NOTES
This RN went in to check pts BGL, and pt refused BGL check, stating \" I know when my sugar is high and I know when it is low. I'm fine. I want regular ginger ale and crackers. \"       Swapnil President, RN  09/11/21 1812       Swapnil President, RN  09/11/21 1900

## 2021-09-11 NOTE — ED NOTES
Admission order cancelled per Dr Debi Pierre, pt being transferred to [address] [place] [address]  09/11/21 5551

## 2021-09-12 PROBLEM — T81.42XA INFECTION OF DEEP INCISIONAL SURGICAL SITE AFTER PROCEDURE: Status: ACTIVE | Noted: 2021-09-12

## 2021-09-12 LAB
INR BLD: 1.4
METER GLUCOSE: 101 MG/DL (ref 74–99)
METER GLUCOSE: 97 MG/DL (ref 74–99)
METER GLUCOSE: 99 MG/DL (ref 74–99)
ORGANISM: ABNORMAL
PROTHROMBIN TIME: 14.9 SEC (ref 9.3–12.4)
URINE CULTURE, ROUTINE: ABNORMAL

## 2021-09-12 PROCEDURE — 2580000003 HC RX 258: Performed by: SPECIALIST

## 2021-09-12 PROCEDURE — 6370000000 HC RX 637 (ALT 250 FOR IP): Performed by: PHYSICIAN ASSISTANT

## 2021-09-12 PROCEDURE — 6360000002 HC RX W HCPCS: Performed by: PHYSICIAN ASSISTANT

## 2021-09-12 PROCEDURE — 2060000000 HC ICU INTERMEDIATE R&B

## 2021-09-12 PROCEDURE — 6360000002 HC RX W HCPCS: Performed by: SPECIALIST

## 2021-09-12 PROCEDURE — 6360000002 HC RX W HCPCS: Performed by: INTERNAL MEDICINE

## 2021-09-12 PROCEDURE — 6360000002 HC RX W HCPCS: Performed by: EMERGENCY MEDICINE

## 2021-09-12 PROCEDURE — 82962 GLUCOSE BLOOD TEST: CPT

## 2021-09-12 PROCEDURE — 2580000003 HC RX 258: Performed by: PHYSICIAN ASSISTANT

## 2021-09-12 PROCEDURE — 94664 DEMO&/EVAL PT USE INHALER: CPT

## 2021-09-12 PROCEDURE — 85610 PROTHROMBIN TIME: CPT

## 2021-09-12 RX ORDER — CLONIDINE 0.1 MG/24H
1 PATCH, EXTENDED RELEASE TRANSDERMAL WEEKLY
COMMUNITY
End: 2021-11-22

## 2021-09-12 RX ORDER — WARFARIN SODIUM 1 MG/1
1 TABLET ORAL SEE ADMIN INSTRUCTIONS
COMMUNITY
End: 2021-11-22 | Stop reason: DRUGHIGH

## 2021-09-12 RX ORDER — WARFARIN SODIUM 2 MG/1
2 TABLET ORAL DAILY
COMMUNITY
End: 2021-11-22 | Stop reason: SDUPTHER

## 2021-09-12 RX ORDER — ASPIRIN 81 MG/1
81 TABLET ORAL DAILY
COMMUNITY
End: 2021-11-22 | Stop reason: SDUPTHER

## 2021-09-12 RX ORDER — GABAPENTIN 300 MG/1
300 CAPSULE ORAL 3 TIMES DAILY
COMMUNITY
End: 2021-11-22 | Stop reason: SDUPTHER

## 2021-09-12 RX ORDER — LORAZEPAM 1 MG/1
1 TABLET ORAL DAILY PRN
COMMUNITY

## 2021-09-12 RX ORDER — LIDOCAINE 50 MG/G
1 PATCH TOPICAL DAILY
COMMUNITY
End: 2021-11-22 | Stop reason: SDUPTHER

## 2021-09-12 RX ADMIN — Medication 10 ML: at 11:46

## 2021-09-12 RX ADMIN — DIVALPROEX SODIUM 1000 MG: 500 TABLET, EXTENDED RELEASE ORAL at 23:16

## 2021-09-12 RX ADMIN — HYDROMORPHONE HYDROCHLORIDE 0.5 MG: 1 INJECTION, SOLUTION INTRAMUSCULAR; INTRAVENOUS; SUBCUTANEOUS at 15:47

## 2021-09-12 RX ADMIN — VANCOMYCIN HYDROCHLORIDE 1250 MG: 10 INJECTION, POWDER, LYOPHILIZED, FOR SOLUTION INTRAVENOUS at 22:45

## 2021-09-12 RX ADMIN — LEVOTHYROXINE SODIUM 75 MCG: 0.07 TABLET ORAL at 12:36

## 2021-09-12 RX ADMIN — METHOCARBAMOL TABLETS 500 MG: 500 TABLET, COATED ORAL at 15:47

## 2021-09-12 RX ADMIN — DULOXETINE HYDROCHLORIDE 60 MG: 60 CAPSULE, DELAYED RELEASE ORAL at 22:34

## 2021-09-12 RX ADMIN — BUSPIRONE HYDROCHLORIDE 10 MG: 10 TABLET ORAL at 12:36

## 2021-09-12 RX ADMIN — MEROPENEM 1000 MG: 1 INJECTION, POWDER, FOR SOLUTION INTRAVENOUS at 22:34

## 2021-09-12 RX ADMIN — IPRATROPIUM BROMIDE 0.5 MG: 0.5 SOLUTION RESPIRATORY (INHALATION) at 10:16

## 2021-09-12 RX ADMIN — DULOXETINE HYDROCHLORIDE 60 MG: 60 CAPSULE, DELAYED RELEASE ORAL at 12:36

## 2021-09-12 RX ADMIN — METHOCARBAMOL TABLETS 500 MG: 500 TABLET, COATED ORAL at 22:35

## 2021-09-12 RX ADMIN — HYDROMORPHONE HYDROCHLORIDE 0.5 MG: 1 INJECTION, SOLUTION INTRAMUSCULAR; INTRAVENOUS; SUBCUTANEOUS at 06:06

## 2021-09-12 RX ADMIN — DIVALPROEX SODIUM 500 MG: 500 TABLET, EXTENDED RELEASE ORAL at 12:36

## 2021-09-12 RX ADMIN — ENOXAPARIN SODIUM 90 MG: 100 INJECTION SUBCUTANEOUS at 11:43

## 2021-09-12 RX ADMIN — HYDROMORPHONE HYDROCHLORIDE 0.5 MG: 1 INJECTION, SOLUTION INTRAMUSCULAR; INTRAVENOUS; SUBCUTANEOUS at 22:35

## 2021-09-12 RX ADMIN — LEVETIRACETAM 500 MG: 500 TABLET, FILM COATED ORAL at 22:34

## 2021-09-12 RX ADMIN — HYDROMORPHONE HYDROCHLORIDE 0.5 MG: 1 INJECTION, SOLUTION INTRAMUSCULAR; INTRAVENOUS; SUBCUTANEOUS at 19:29

## 2021-09-12 RX ADMIN — METHOCARBAMOL TABLETS 500 MG: 500 TABLET, COATED ORAL at 11:43

## 2021-09-12 RX ADMIN — MEROPENEM 1000 MG: 1 INJECTION, POWDER, FOR SOLUTION INTRAVENOUS at 06:06

## 2021-09-12 RX ADMIN — METOPROLOL TARTRATE 12.5 MG: 25 TABLET, FILM COATED ORAL at 22:51

## 2021-09-12 RX ADMIN — HYDROMORPHONE HYDROCHLORIDE 0.5 MG: 1 INJECTION, SOLUTION INTRAMUSCULAR; INTRAVENOUS; SUBCUTANEOUS at 12:26

## 2021-09-12 RX ADMIN — VANCOMYCIN HYDROCHLORIDE 1250 MG: 10 INJECTION, POWDER, LYOPHILIZED, FOR SOLUTION INTRAVENOUS at 12:27

## 2021-09-12 RX ADMIN — HYDROMORPHONE HYDROCHLORIDE 0.5 MG: 1 INJECTION, SOLUTION INTRAMUSCULAR; INTRAVENOUS; SUBCUTANEOUS at 09:14

## 2021-09-12 RX ADMIN — LEVETIRACETAM 500 MG: 500 TABLET, FILM COATED ORAL at 11:43

## 2021-09-12 RX ADMIN — MEROPENEM 1000 MG: 1 INJECTION, POWDER, FOR SOLUTION INTRAVENOUS at 11:44

## 2021-09-12 RX ADMIN — ATORVASTATIN CALCIUM 80 MG: 40 TABLET, FILM COATED ORAL at 22:34

## 2021-09-12 ASSESSMENT — PAIN DESCRIPTION - LOCATION: LOCATION: OTHER (COMMENT)

## 2021-09-12 ASSESSMENT — PAIN DESCRIPTION - FREQUENCY: FREQUENCY: INTERMITTENT

## 2021-09-12 ASSESSMENT — PAIN SCALES - GENERAL
PAINLEVEL_OUTOF10: 5
PAINLEVEL_OUTOF10: 8
PAINLEVEL_OUTOF10: 9
PAINLEVEL_OUTOF10: 10
PAINLEVEL_OUTOF10: 10
PAINLEVEL_OUTOF10: 3
PAINLEVEL_OUTOF10: 10

## 2021-09-12 ASSESSMENT — PAIN DESCRIPTION - PAIN TYPE: TYPE: ACUTE PAIN

## 2021-09-12 NOTE — ED NOTES
Unable to give pt insulin coverage, due to pt refusing BGL check.       Clovis Nielsen RN  09/11/21 9874

## 2021-09-12 NOTE — PROGRESS NOTES
[address] [place] [address] [facility]  [facility]  Progress Note    SUBJECTIVE:  Chief Complaint   Patient presents with    Wound Check     recent aka, wound vac to R leg, sent in for eval     Patient is tolerating medications. No reported adverse drug reactions. No nausea, vomiting, diarrhea. Afebrile  Odor of anaerobic polymicrobial infection associated with the right AKA      Review of systems:  As stated above in the chief complaint, otherwise negative. Medications:  Scheduled Meds:   atorvastatin  80 mg Oral Nightly    busPIRone  10 mg Oral Daily    clopidogrel  75 mg Oral Daily    divalproex  500 mg Oral Daily    divalproex  1,000 mg Oral Nightly    DULoxetine  60 mg Oral Daily    levETIRAcetam  500 mg Oral BID    levothyroxine  75 mcg Oral Daily    methocarbamol  500 mg Oral TID    metoprolol tartrate  12.5 mg Oral BID    sodium chloride flush  10 mL IntraVENous 2 times per day    insulin lispro  0-12 Units SubCUTAneous TID WC    insulin lispro  0-6 Units SubCUTAneous Nightly    pantoprazole  40 mg Oral QAM AC    ipratropium  0.5 mg Nebulization 4x daily    meropenem  1,000 mg IntraVENous Q8H    vancomycin  1,250 mg IntraVENous Q12H     Continuous Infusions:   sodium chloride      dextrose       PRN Meds:sodium chloride flush, sodium chloride, potassium chloride **OR** potassium alternative oral replacement **OR** potassium chloride, magnesium hydroxide, acetaminophen **OR** acetaminophen, glucose, dextrose, glucagon (rDNA), dextrose, trimethobenzamide, HYDROmorphone **OR** HYDROmorphone, albuterol    OBJECTIVE:  /77   Pulse 68   Temp 97.8 °F (36.6 °C)   Resp 18   Ht 5' 7\" (1.702 m)   Wt 200 lb (90.7 kg)   SpO2 98%   BMI 31.32 kg/m²   Temp  Av.8 °F (36.6 °C)  Min: 97.8 °F (36.6 °C)  Max: 97.8 °F (36.6 °C)  Constitutional: The patient is awake, alert, and oriented. Skin: Warm and dry. No rashes were noted. HEENT: Round and reactive pupils.   Moist mucous membranes. No ulcerations or thrush. Neck: Supple to movements. Chest: No use of accessory muscles to breathe. Symmetrical expansion. No wheezing, crackles or rhonchi. Cardiovascular: S1 and S2 are rhythmic and regular. No murmurs appreciated. Abdomen: Positive bowel sounds to auscultation. Benign to palpation. No masses felt. No hepatosplenomegaly.   Extremities: Right AKA surgical wound infection foul-smelling drainage  Lines: peripheral    Laboratory and Tests Review:  Lab Results   Component Value Date    WBC 9.0 09/11/2021    WBC 11.1 09/10/2021    WBC 17.4 (H) 05/25/2021    HGB 12.3 09/11/2021    HCT 38.7 09/11/2021    MCV 90.8 09/11/2021     09/11/2021     Lab Results   Component Value Date    NEUTROABS 5.17 09/11/2021    NEUTROABS 7.28 09/10/2021    NEUTROABS 9.74 (H) 05/25/2021     No results found for: CRP  Lab Results   Component Value Date    ALT <5 09/10/2021    AST 9 09/10/2021    ALKPHOS 81 09/10/2021    BILITOT <0.2 09/10/2021     Lab Results   Component Value Date     09/11/2021    K 3.7 09/11/2021     09/11/2021    CO2 31 09/11/2021    BUN 5 09/11/2021    CREATININE 0.5 09/11/2021    CREATININE 0.5 09/10/2021    CREATININE 0.9 05/25/2021    GFRAA >60 09/11/2021    LABGLOM >60 09/11/2021    GLUCOSE 91 09/11/2021    GLUCOSE 70 04/28/2011    PROT 6.7 09/10/2021    LABALBU 2.5 09/10/2021    LABALBU 3.6 11/30/2010    CALCIUM 8.4 09/11/2021    BILITOT <0.2 09/10/2021    ALKPHOS 81 09/10/2021    AST 9 09/10/2021    ALT <5 09/10/2021     Lab Results   Component Value Date    CRP 4.7 (H) 09/10/2021    CRP 1.9 (H) 03/19/2015     Lab Results   Component Value Date    SEDRATE 45 (H) 09/10/2021    SEDRATE 7 07/02/2015    SEDRATE 3 03/19/2015     Radiology:  Reviewed    Microbiology:   Lab Results   Component Value Date    BC 24 Hours no growth 09/10/2021    BC 5 Days- no growth 10/05/2017    BC 5 Days- no growth 07/01/2015    ORG Corynebacterium species 09/10/2021    ORG Escherichia

## 2021-09-12 NOTE — ED PROVIDER NOTES
09/12/21  12:26 PM    Spoke with Norma VIRGEN for Dr. Gracia Funez (Medicine). Discussed case. They will admit this patient pending bed availability at [facility]. .    Still boarded in the department pending bed availability [facility]. Transfer line does not have ETA on that availability.   Spoke with medicine, they will take the patient upstairs pending bed availability at [facility],   09/12/21 1227

## 2021-09-13 PROBLEM — Z79.899 POLYPHARMACY: Status: ACTIVE | Noted: 2021-09-13

## 2021-09-13 LAB
ALBUMIN SERPL-MCNC: 2.3 G/DL (ref 3.5–5.2)
ALP BLD-CCNC: 78 U/L (ref 35–104)
ALT SERPL-CCNC: 7 U/L (ref 0–32)
ANAEROBIC CULTURE: ABNORMAL
ANAEROBIC CULTURE: ABNORMAL
ANION GAP SERPL CALCULATED.3IONS-SCNC: 7 MMOL/L (ref 7–16)
AST SERPL-CCNC: 18 U/L (ref 0–31)
BASOPHILS ABSOLUTE: 0.03 E9/L (ref 0–0.2)
BASOPHILS RELATIVE PERCENT: 0.4 % (ref 0–2)
BILIRUB SERPL-MCNC: 0.2 MG/DL (ref 0–1.2)
BUN BLDV-MCNC: 5 MG/DL (ref 6–20)
CALCIUM SERPL-MCNC: 8.2 MG/DL (ref 8.6–10.2)
CHLORIDE BLD-SCNC: 98 MMOL/L (ref 98–107)
CO2: 30 MMOL/L (ref 22–29)
CREAT SERPL-MCNC: 0.4 MG/DL (ref 0.5–1)
EOSINOPHILS ABSOLUTE: 0.23 E9/L (ref 0.05–0.5)
EOSINOPHILS RELATIVE PERCENT: 3 % (ref 0–6)
GFR AFRICAN AMERICAN: >60
GFR NON-AFRICAN AMERICAN: >60 ML/MIN/1.73
GLUCOSE BLD-MCNC: 111 MG/DL (ref 74–99)
GRAM STAIN RESULT: ABNORMAL
HBA1C MFR BLD: 4.6 % (ref 4–5.6)
HCT VFR BLD CALC: 37.9 % (ref 34–48)
HEMOGLOBIN: 12.2 G/DL (ref 11.5–15.5)
IMMATURE GRANULOCYTES #: 0.03 E9/L
IMMATURE GRANULOCYTES %: 0.4 % (ref 0–5)
INR BLD: 1.2
LACTIC ACID: 1.9 MMOL/L (ref 0.5–2.2)
LYMPHOCYTES ABSOLUTE: 2.27 E9/L (ref 1.5–4)
LYMPHOCYTES RELATIVE PERCENT: 29.9 % (ref 20–42)
MCH RBC QN AUTO: 28.7 PG (ref 26–35)
MCHC RBC AUTO-ENTMCNC: 32.2 % (ref 32–34.5)
MCV RBC AUTO: 89.2 FL (ref 80–99.9)
METER GLUCOSE: 103 MG/DL (ref 74–99)
METER GLUCOSE: 112 MG/DL (ref 74–99)
METER GLUCOSE: 115 MG/DL (ref 74–99)
METER GLUCOSE: 98 MG/DL (ref 74–99)
MONOCYTES ABSOLUTE: 1.04 E9/L (ref 0.1–0.95)
MONOCYTES RELATIVE PERCENT: 13.7 % (ref 2–12)
NEUTROPHILS ABSOLUTE: 3.98 E9/L (ref 1.8–7.3)
NEUTROPHILS RELATIVE PERCENT: 52.6 % (ref 43–80)
ORGANISM: ABNORMAL
ORGANISM: ABNORMAL
PDW BLD-RTO: 16 FL (ref 11.5–15)
PLATELET # BLD: 361 E9/L (ref 130–450)
PMV BLD AUTO: 8.7 FL (ref 7–12)
POTASSIUM SERPL-SCNC: 3.3 MMOL/L (ref 3.5–5)
PROCALCITONIN: 0.06 NG/ML (ref 0–0.08)
PROTHROMBIN TIME: 12.6 SEC (ref 9.3–12.4)
RBC # BLD: 4.25 E12/L (ref 3.5–5.5)
SODIUM BLD-SCNC: 135 MMOL/L (ref 132–146)
TOTAL PROTEIN: 5.8 G/DL (ref 6.4–8.3)
VANCOMYCIN RANDOM: 20.2 MCG/ML (ref 5–40)
WBC # BLD: 7.6 E9/L (ref 4.5–11.5)
WOUND/ABSCESS: ABNORMAL

## 2021-09-13 PROCEDURE — 6370000000 HC RX 637 (ALT 250 FOR IP): Performed by: INTERNAL MEDICINE

## 2021-09-13 PROCEDURE — 36415 COLL VENOUS BLD VENIPUNCTURE: CPT

## 2021-09-13 PROCEDURE — 94640 AIRWAY INHALATION TREATMENT: CPT

## 2021-09-13 PROCEDURE — 82962 GLUCOSE BLOOD TEST: CPT

## 2021-09-13 PROCEDURE — 2580000003 HC RX 258: Performed by: SPECIALIST

## 2021-09-13 PROCEDURE — 2060000000 HC ICU INTERMEDIATE R&B

## 2021-09-13 PROCEDURE — 6360000002 HC RX W HCPCS: Performed by: INTERNAL MEDICINE

## 2021-09-13 PROCEDURE — 2500000003 HC RX 250 WO HCPCS: Performed by: SPECIALIST

## 2021-09-13 PROCEDURE — 84145 PROCALCITONIN (PCT): CPT

## 2021-09-13 PROCEDURE — 80202 ASSAY OF VANCOMYCIN: CPT

## 2021-09-13 PROCEDURE — 6360000002 HC RX W HCPCS: Performed by: PHYSICIAN ASSISTANT

## 2021-09-13 PROCEDURE — 83036 HEMOGLOBIN GLYCOSYLATED A1C: CPT

## 2021-09-13 PROCEDURE — 6370000000 HC RX 637 (ALT 250 FOR IP): Performed by: PHYSICIAN ASSISTANT

## 2021-09-13 PROCEDURE — 80053 COMPREHEN METABOLIC PANEL: CPT

## 2021-09-13 PROCEDURE — 83605 ASSAY OF LACTIC ACID: CPT

## 2021-09-13 PROCEDURE — 85025 COMPLETE CBC W/AUTO DIFF WBC: CPT

## 2021-09-13 PROCEDURE — 2580000003 HC RX 258: Performed by: PHYSICIAN ASSISTANT

## 2021-09-13 PROCEDURE — 85610 PROTHROMBIN TIME: CPT

## 2021-09-13 PROCEDURE — 6360000002 HC RX W HCPCS: Performed by: SPECIALIST

## 2021-09-13 RX ORDER — WARFARIN SODIUM 2 MG/1
2 TABLET ORAL
Status: DISCONTINUED | OUTPATIENT
Start: 2021-09-14 | End: 2021-09-15

## 2021-09-13 RX ORDER — WARFARIN SODIUM 1 MG/1
1 TABLET ORAL
Status: DISCONTINUED | OUTPATIENT
Start: 2021-09-13 | End: 2021-09-15

## 2021-09-13 RX ORDER — LIDOCAINE 4 G/G
1 PATCH TOPICAL DAILY
Status: DISCONTINUED | OUTPATIENT
Start: 2021-09-13 | End: 2021-09-16 | Stop reason: HOSPADM

## 2021-09-13 RX ORDER — WARFARIN SODIUM 1 MG/1
1 TABLET ORAL
Status: DISCONTINUED | OUTPATIENT
Start: 2021-09-13 | End: 2021-09-13

## 2021-09-13 RX ORDER — ASPIRIN 81 MG/1
81 TABLET ORAL DAILY
Status: DISCONTINUED | OUTPATIENT
Start: 2021-09-13 | End: 2021-09-16 | Stop reason: HOSPADM

## 2021-09-13 RX ORDER — GABAPENTIN 300 MG/1
300 CAPSULE ORAL 3 TIMES DAILY
Status: DISCONTINUED | OUTPATIENT
Start: 2021-09-13 | End: 2021-09-16 | Stop reason: HOSPADM

## 2021-09-13 RX ORDER — OXYCODONE HYDROCHLORIDE 5 MG/1
5 TABLET ORAL EVERY 4 HOURS PRN
Status: DISCONTINUED | OUTPATIENT
Start: 2021-09-13 | End: 2021-09-16 | Stop reason: HOSPADM

## 2021-09-13 RX ORDER — WARFARIN SODIUM 2 MG/1
2 TABLET ORAL
Status: DISCONTINUED | OUTPATIENT
Start: 2021-09-14 | End: 2021-09-13

## 2021-09-13 RX ORDER — CLONIDINE 0.1 MG/24H
1 PATCH, EXTENDED RELEASE TRANSDERMAL WEEKLY
Status: DISCONTINUED | OUTPATIENT
Start: 2021-09-13 | End: 2021-09-16 | Stop reason: HOSPADM

## 2021-09-13 RX ORDER — LORAZEPAM 1 MG/1
1 TABLET ORAL EVERY 6 HOURS PRN
Status: DISCONTINUED | OUTPATIENT
Start: 2021-09-13 | End: 2021-09-15

## 2021-09-13 RX ADMIN — Medication 10 ML: at 08:20

## 2021-09-13 RX ADMIN — GABAPENTIN 300 MG: 300 CAPSULE ORAL at 08:34

## 2021-09-13 RX ADMIN — LORAZEPAM 1 MG: 1 TABLET ORAL at 11:15

## 2021-09-13 RX ADMIN — IPRATROPIUM BROMIDE 0.5 MG: 0.5 SOLUTION RESPIRATORY (INHALATION) at 20:09

## 2021-09-13 RX ADMIN — MEROPENEM 1000 MG: 1 INJECTION, POWDER, FOR SOLUTION INTRAVENOUS at 03:05

## 2021-09-13 RX ADMIN — HYDROMORPHONE HYDROCHLORIDE 0.5 MG: 1 INJECTION, SOLUTION INTRAMUSCULAR; INTRAVENOUS; SUBCUTANEOUS at 08:20

## 2021-09-13 RX ADMIN — LEVETIRACETAM 500 MG: 500 TABLET, FILM COATED ORAL at 21:04

## 2021-09-13 RX ADMIN — OXYCODONE 5 MG: 5 TABLET ORAL at 18:18

## 2021-09-13 RX ADMIN — LEVETIRACETAM 500 MG: 500 TABLET, FILM COATED ORAL at 08:19

## 2021-09-13 RX ADMIN — GABAPENTIN 300 MG: 300 CAPSULE ORAL at 21:04

## 2021-09-13 RX ADMIN — WARFARIN SODIUM 1 MG: 1 TABLET ORAL at 23:08

## 2021-09-13 RX ADMIN — ATORVASTATIN CALCIUM 80 MG: 40 TABLET, FILM COATED ORAL at 21:04

## 2021-09-13 RX ADMIN — METHOCARBAMOL TABLETS 500 MG: 500 TABLET, COATED ORAL at 14:46

## 2021-09-13 RX ADMIN — HYDROMORPHONE HYDROCHLORIDE 0.5 MG: 1 INJECTION, SOLUTION INTRAMUSCULAR; INTRAVENOUS; SUBCUTANEOUS at 11:37

## 2021-09-13 RX ADMIN — ASPIRIN 81 MG: 81 TABLET, COATED ORAL at 08:47

## 2021-09-13 RX ADMIN — BUSPIRONE HYDROCHLORIDE 10 MG: 10 TABLET ORAL at 11:09

## 2021-09-13 RX ADMIN — POTASSIUM CHLORIDE 40 MEQ: 1500 TABLET, EXTENDED RELEASE ORAL at 08:50

## 2021-09-13 RX ADMIN — METOPROLOL TARTRATE 12.5 MG: 25 TABLET, FILM COATED ORAL at 21:04

## 2021-09-13 RX ADMIN — BUSPIRONE HYDROCHLORIDE 10 MG: 10 TABLET ORAL at 08:18

## 2021-09-13 RX ADMIN — METHOCARBAMOL TABLETS 500 MG: 500 TABLET, COATED ORAL at 21:04

## 2021-09-13 RX ADMIN — PANTOPRAZOLE SODIUM 40 MG: 40 TABLET, DELAYED RELEASE ORAL at 06:03

## 2021-09-13 RX ADMIN — METOPROLOL TARTRATE 12.5 MG: 25 TABLET, FILM COATED ORAL at 11:11

## 2021-09-13 RX ADMIN — METHOCARBAMOL TABLETS 500 MG: 500 TABLET, COATED ORAL at 08:18

## 2021-09-13 RX ADMIN — VANCOMYCIN HYDROCHLORIDE 1250 MG: 10 INJECTION, POWDER, LYOPHILIZED, FOR SOLUTION INTRAVENOUS at 11:16

## 2021-09-13 RX ADMIN — CLOPIDOGREL BISULFATE 75 MG: 75 TABLET ORAL at 08:19

## 2021-09-13 RX ADMIN — DULOXETINE HYDROCHLORIDE 60 MG: 60 CAPSULE, DELAYED RELEASE ORAL at 08:18

## 2021-09-13 RX ADMIN — IPRATROPIUM BROMIDE 0.5 MG: 0.5 SOLUTION RESPIRATORY (INHALATION) at 16:28

## 2021-09-13 RX ADMIN — MEROPENEM 1000 MG: 1 INJECTION, POWDER, FOR SOLUTION INTRAVENOUS at 18:14

## 2021-09-13 RX ADMIN — METHOCARBAMOL TABLETS 500 MG: 500 TABLET, COATED ORAL at 11:10

## 2021-09-13 RX ADMIN — LEVOTHYROXINE SODIUM 75 MCG: 0.07 TABLET ORAL at 06:24

## 2021-09-13 RX ADMIN — LEVETIRACETAM 500 MG: 500 TABLET, FILM COATED ORAL at 11:10

## 2021-09-13 RX ADMIN — HYDROMORPHONE HYDROCHLORIDE 0.5 MG: 1 INJECTION, SOLUTION INTRAMUSCULAR; INTRAVENOUS; SUBCUTANEOUS at 04:42

## 2021-09-13 RX ADMIN — MICONAZOLE NITRATE: 20 POWDER TOPICAL at 23:13

## 2021-09-13 RX ADMIN — HYDROMORPHONE HYDROCHLORIDE 0.5 MG: 1 INJECTION, SOLUTION INTRAMUSCULAR; INTRAVENOUS; SUBCUTANEOUS at 14:46

## 2021-09-13 RX ADMIN — Medication 10 ML: at 11:11

## 2021-09-13 RX ADMIN — HYDROMORPHONE HYDROCHLORIDE 0.5 MG: 1 INJECTION, SOLUTION INTRAMUSCULAR; INTRAVENOUS; SUBCUTANEOUS at 01:35

## 2021-09-13 RX ADMIN — Medication 10 ML: at 21:08

## 2021-09-13 RX ADMIN — DIVALPROEX SODIUM 500 MG: 500 TABLET, EXTENDED RELEASE ORAL at 08:18

## 2021-09-13 RX ADMIN — IPRATROPIUM BROMIDE 0.5 MG: 0.5 SOLUTION RESPIRATORY (INHALATION) at 08:36

## 2021-09-13 RX ADMIN — MEROPENEM 1000 MG: 1 INJECTION, POWDER, FOR SOLUTION INTRAVENOUS at 11:15

## 2021-09-13 RX ADMIN — DIVALPROEX SODIUM 500 MG: 500 TABLET, EXTENDED RELEASE ORAL at 11:09

## 2021-09-13 RX ADMIN — METOPROLOL TARTRATE 12.5 MG: 25 TABLET, FILM COATED ORAL at 08:19

## 2021-09-13 RX ADMIN — HYDROMORPHONE HYDROCHLORIDE 0.5 MG: 1 INJECTION, SOLUTION INTRAMUSCULAR; INTRAVENOUS; SUBCUTANEOUS at 23:08

## 2021-09-13 RX ADMIN — GABAPENTIN 300 MG: 300 CAPSULE ORAL at 14:46

## 2021-09-13 ASSESSMENT — PAIN DESCRIPTION - DESCRIPTORS: DESCRIPTORS: BURNING;SHARP;SHOOTING

## 2021-09-13 ASSESSMENT — PAIN SCALES - GENERAL
PAINLEVEL_OUTOF10: 7
PAINLEVEL_OUTOF10: 1
PAINLEVEL_OUTOF10: 0
PAINLEVEL_OUTOF10: 7
PAINLEVEL_OUTOF10: 7
PAINLEVEL_OUTOF10: 8
PAINLEVEL_OUTOF10: 9
PAINLEVEL_OUTOF10: 4
PAINLEVEL_OUTOF10: 7
PAINLEVEL_OUTOF10: 7
PAINLEVEL_OUTOF10: 6
PAINLEVEL_OUTOF10: 7

## 2021-09-13 ASSESSMENT — PAIN DESCRIPTION - PAIN TYPE
TYPE: ACUTE PAIN
TYPE: ACUTE PAIN

## 2021-09-13 ASSESSMENT — PAIN DESCRIPTION - LOCATION
LOCATION: LEG
LOCATION: LEG

## 2021-09-13 NOTE — PROGRESS NOTES
Pharmacy Consultation Note  (Antibiotic Dosing and Monitoring)    Initial consult date: 9/11/21  Consulting physician: Dr. Kenneth Ledesma  Drug: Vancomycin  Indication: surgical wound infection / septic arthritis    Age/  Gender Height Weight IBW  Allergy Information   51 y.o./female 5' 7\" (170.2 cm) 200 lb (90.7 kg)     Ideal body weight: 61.6 kg (135 lb 12.9 oz)  Adjusted ideal body weight: 73.2 kg (161 lb 7.7 oz)   Nsaids      Renal Function:  Recent Labs     09/10/21  1144 09/11/21  0802 09/13/21  0610   BUN 5* 5* 5*   CREATININE 0.5 0.5 0.4*       Intake/Output Summary (Last 24 hours) at 9/13/2021 0847  Last data filed at 9/13/2021 0616  Gross per 24 hour   Intake --   Output 800 ml   Net -800 ml       Vancomycin Monitoring:  Trough:  No results for input(s): VANCOTROUGH in the last 72 hours. Random:    Recent Labs     09/13/21  0610   VANCORANDOM 20.2       Vancomycin Administration Times:  Recent vancomycin administrations                   vancomycin (VANCOCIN) 1,250 mg in dextrose 5 % 250 mL IVPB (mg) 1,250 mg New Bag 09/12/21 2245     1,250 mg New Bag  1227     1,250 mg New Bag 09/11/21 2249    vancomycin (VANCOCIN) 1,750 mg in dextrose 5 % 500 mL IVPB (mg) 1,750 mg New Bag 09/10/21 1910                Assessment:  · Patient is a 45 y.o. female who has been initiated on vancomycin  · Estimated Creatinine Clearance: 190 mL/min (A) (based on SCr of 0.4 mg/dL (L)). · To dose vancomycin, pharmacy will be utilizing [id number] calculation software for goal AUC/BENJAMÍN 400-600 mg/L-hr   · Random vanco level 9/13 = 20.2 mcg/ml. Per [employer], this would correspond to AUC/BENJAMÍN 538 and trough 16.6.                  Plan:  · Will continue vancomycin 1250 mg IV every 12 hours  · Will check vancomycin levels when appropriate  · Will continue to monitor renal function   · Clinical pharmacy to follow      Jose Carlos Willis [facility] 9/13/2021 8:47 AM

## 2021-09-13 NOTE — PROGRESS NOTES
Chief Complaint:  Chief Complaint   Patient presents with    Wound Check     recent aka, wound vac to R leg, sent in for eval     Postoperative or surgical complication, initial encounter     Subjective:    C/o pain RLE, no other complaints. Pain briefly improves with dilaudid but doesn't last long enough, maybe an hour or two. No fevers    Objective:    BP (!) 120/58   Pulse 77   Temp 96.9 °F (36.1 °C) (Temporal)   Resp 18   Ht 5' 7\" (1.702 m)   Wt 200 lb (90.7 kg)   SpO2 93%   BMI 31.32 kg/m²     Current medications that patient is taking have been reviewed.     General appearance: NAD, conversant chronically ill appearing  HEENT: AT/NC, MMM  Neck: FROM, supple  Lungs: Clear to auscultation, WOB normal  CV: RRR, no MRGs  Abdomen: Soft, non-tender; no masses or HSM, +BS  Extremities: RLE AKA with wound vac, no visible cellulitis or purulence  Skin: no rash, lesions or ulcers  Psych: Calm and cooperative  Neuro: Alert and interactive, face symmetric, moving all extremities, speech fluent    Labs:  CBC with Differential:    Lab Results   Component Value Date    WBC 7.6 09/13/2021    RBC 4.25 09/13/2021    HGB 12.2 09/13/2021    HCT 37.9 09/13/2021     09/13/2021    MCV 89.2 09/13/2021    MCH 28.7 09/13/2021    MCHC 32.2 09/13/2021    RDW 16.0 09/13/2021    SEGSPCT 79 09/28/2013    BANDSPCT 1 07/08/2015    LYMPHOPCT 29.9 09/13/2021    MONOPCT 13.7 09/13/2021    BASOPCT 0.4 09/13/2021    MONOSABS 1.04 09/13/2021    LYMPHSABS 2.27 09/13/2021    EOSABS 0.23 09/13/2021    BASOSABS 0.03 09/13/2021     CMP:    Lab Results   Component Value Date     09/13/2021    K 3.3 09/13/2021    K 3.7 09/11/2021    CL 98 09/13/2021    CO2 30 09/13/2021    BUN 5 09/13/2021    CREATININE 0.4 09/13/2021    GFRAA >60 09/13/2021    LABGLOM >60 09/13/2021    GLUCOSE 111 09/13/2021    GLUCOSE 70 04/28/2011    PROT 5.8 09/13/2021    LABALBU 2.3 09/13/2021    LABALBU 3.6 11/30/2010    CALCIUM 8.2 09/13/2021    BILITOT 0.2 09/13/2021    ALKPHOS 78 09/13/2021    AST 18 09/13/2021    ALT 7 09/13/2021          Assessment/Plan:  Principal Problem:    Postoperative or surgical complication, initial encounter  Active Problems:    Depressed bipolar II disorder (HCC)    Anticoagulant long-term use    Type 2 diabetes mellitus with diabetic peripheral angiopathy without gangrene, without long-term current use of insulin (HCC)    Chronic obstructive pulmonary disease (HCC)    PVD (peripheral vascular disease) ([facility] [address]    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 *       Continue IV abx    Add low dose oxycodone with caution. She is on kind of ridiculous med list due to her numerous psychiatric comorbidities. Will monitor closely for oversedation. She should be on continuous pulse oximetry. Glucose well controlled    After reviewing quite a lot of her records, I am not entirely certain of the reason for Coumadin. Seems to be for PAD? Continue for now and I will keep taking.     Awaiting bed at [facility], MD    4:25 PM  9/13/2021

## 2021-09-13 NOTE — PROGRESS NOTES
[address] [place] [address] [facility]  [facility]  Progress Note    Face to face encounter   SUBJECTIVE:  Chief Complaint   Patient presents with    Wound Check     recent aka, wound vac to R leg, sent in for eval     Patient is tolerating medications. No reported adverse drug reactions. No nausea, vomiting, diarrhea. Afebrile  ++ Odor of anaerobic polymicrobial infection associated with the right AKA- wound vac- not attach- has been in place since Friday- + odor, + purulent drainage. Review of systems:  As stated above in the chief complaint, otherwise negative.     Medications:  Scheduled Meds:   aspirin  81 mg Oral Daily    cloNIDine  1 patch TransDERmal Weekly    gabapentin  300 mg Oral TID    lidocaine  1 patch TransDERmal Daily    warfarin  1 mg Oral Once per day on Mon Wed Fri    [START ON 9/14/2021] warfarin  2 mg Oral Once per day on Sun Tue Thu Sat    atorvastatin  80 mg Oral Nightly    busPIRone  10 mg Oral Daily    clopidogrel  75 mg Oral Daily    divalproex  500 mg Oral Daily    divalproex  1,000 mg Oral Nightly    DULoxetine  60 mg Oral Daily    levETIRAcetam  500 mg Oral BID    levothyroxine  75 mcg Oral Daily    methocarbamol  500 mg Oral TID    metoprolol tartrate  12.5 mg Oral BID    sodium chloride flush  10 mL IntraVENous 2 times per day    insulin lispro  0-12 Units SubCUTAneous TID WC    insulin lispro  0-6 Units SubCUTAneous Nightly    pantoprazole  40 mg Oral QAM AC    ipratropium  0.5 mg Nebulization 4x daily    meropenem  1,000 mg IntraVENous Q8H    vancomycin  1,250 mg IntraVENous Q12H     Continuous Infusions:   sodium chloride      dextrose       PRN Meds:LORazepam, sodium chloride flush, sodium chloride, potassium chloride **OR** potassium alternative oral replacement **OR** potassium chloride, magnesium hydroxide, acetaminophen **OR** acetaminophen, glucose, dextrose, glucagon (rDNA), dextrose, trimethobenzamide, HYDROmorphone **OR** HYDROmorphone, albuterol    OBJECTIVE:  BP (!) 120/58   Pulse 77   Temp 96.9 °F (36.1 °C) (Temporal)   Resp 18   Ht 5' 7\" (1.702 m)   Wt 200 lb (90.7 kg)   SpO2 93%   BMI 31.32 kg/m²   Temp  Av.4 °F (36.3 °C)  Min: 96.8 °F (36 °C)  Max: 98.2 °F (36.8 °C)  Constitutional: The patient is awake, alert, and oriented. Skin: Warm and dry. No rashes were noted. HEENT: Round and reactive pupils. Moist mucous membranes. No ulcerations or thrush. Neck: Supple to movements. Chest: No use of accessory muscles to breathe. Symmetrical expansion. No wheezing, crackles or rhonchi. Cardiovascular: S1 and S2 are rhythmic and regular. No murmurs appreciated. Abdomen: Positive bowel sounds to auscultation. Benign to palpation. No masses felt. No hepatosplenomegaly.   Extremities: Right AKA surgical wound infection foul-smelling drainage  Lines: peripheral    Laboratory and Tests Review:  Lab Results   Component Value Date    WBC 7.6 2021    WBC 9.0 2021    WBC 11.1 09/10/2021    HGB 12.2 2021    HCT 37.9 2021    MCV 89.2 2021     2021     Lab Results   Component Value Date    NEUTROABS 3.98 2021    NEUTROABS 5.17 2021    NEUTROABS 7.28 09/10/2021     No results found for: [facility]  Lab Results   Component Value Date    ALT 7 2021    AST 18 2021    ALKPHOS 78 2021    BILITOT 0.2 2021     Lab Results   Component Value Date     2021    K 3.3 2021    K 3.7 2021    CL 98 2021    CO2 30 2021    BUN 5 2021    CREATININE 0.4 2021    CREATININE 0.5 2021    CREATININE 0.5 09/10/2021    GFRAA >60 2021    LABGLOM >60 2021    GLUCOSE 111 2021    GLUCOSE 70 2011    PROT 5.8 2021    LABALBU 2.3 2021    LABALBU 3.6 2010    CALCIUM 8.2 2021    BILITOT 0.2 2021    ALKPHOS 78 2021    AST 18 2021    ALT 7 2021     Lab Results   Component Value Date    CRP 4.7 (H) 09/10/2021 CRP 1.9 (H) 03/19/2015     Lab Results   Component Value Date    SEDRATE 45 (H) 09/10/2021    SEDRATE 7 07/02/2015    SEDRATE 3 03/19/2015     Radiology:  Reviewed    Microbiology:   Lab Results   Component Value Date    BC 24 Hours no growth 09/10/2021    BC 5 Days- no growth 10/05/2017    BC 5 Days- no growth 07/01/2015    ORG Corynebacterium species 09/10/2021    ORG Escherichia coli 09/10/2021     Lab Results   Component Value Date    BLOODCULT2 24 Hours no growth 09/10/2021    BLOODCULT2 5 Days- no growth 10/05/2017    BLOODCULT2 5 Days- no growth 07/01/2015    ORG Corynebacterium species 09/10/2021    ORG Escherichia coli 09/10/2021     WOUND/ABSCESS   Date Value Ref Range Status   09/10/2021 Heavy growth  Final     No results found for: RESPSMEAR  No results found for: MPNEUMO, CLAMYDCU, LABLEGI, AFBCX, FUNGSM, LABFUNG  No results found for: CULTRESP  No results found for: CXCATHTIP  No results found for: BFCS  No results found for: CXSURG  Urine Culture, Routine   Date Value Ref Range Status   09/10/2021 <25,000 CFU/ml  Final   09/16/2018   Final    ,000 CFU/mL  Mixed casey isolated. Further workup and sensitivity testing  is not routinely indicated and will not be performed. Mixed casey isolated includes:  Mixed gram positive organisms     07/21/2015   Final    ,000 CFU/mL  Mixed casey isolated. Further workup and sensitivity testing  is not routinely indicated and will not be performed.   Mixed casey isolated includes:  Mixed gram positive organisms       MRSA Culture Only   Date Value Ref Range Status   02/04/2019 Methicillin resistant Staph aureus not isolated  Final     Assessment:  Surgical wound infection  Septic arthritis of the right hip  Polymicrobial infection     Plan:    Cont Vanco and [facility] dosing Vancomycin   Ortho following- recommends transfer to [address] [place] [address] wound care- VAC NEEDS TO BE REMOVED-   Check cultures  Labs reviewed   Patient is awaiting transfer to [address] [facility], APRN - CNS  11:39 AM  9/13/2021   Pt seen and examined. Above discussed agree with advanced practice nurse. Labs, cultures, and radiographs reviewed. Face to Face encounter occurred. Changes made as necessary.      Christian Suárez MD

## 2021-09-13 NOTE — PROGRESS NOTES
Physical Therapy    Facility/Department: [facility] [address]  Initial Assessment    NAME: Tania Rivera  : 1970  MRN: [id number]    Date of Service: 2021    Physical therapy orders received/treatment attempted and chart review completed. Patient reported transferring to [facility] and declining PT assessment at this time. Will discontinue PT orders. Thank you for the opportunity to assist in the care of this patient.     Dylan Mendoza, PT, DPT  License [id number]

## 2021-09-13 NOTE — H&P
Magi Zuluaga M.D. History and Physical      CHIEF COMPLAINT: right AKA infection     Reason for Admission: as above     History Obtained From: pt/EMR     HISTORY OF PRESENT ILLNESS:      The patient is a 45 y.o. female of [address] [place] [address] MD Sarah with significant past medical history of longstanding peripheral vascular disease who follows with vascular surgeon in [facility]-recent AKA at [facility] secondary to failed revascularization procedures of her right lower extremity. For this reason patient ultimately had to undergo right above-the-knee amputation. This was complicated by infection which ultimately required evaluation at [facility] in [place] and patient eventually had to undergo hip disarticulation requiring involvement of advanced plastic surgery and orthopedics at that facility. patient did have a wound VAC placed after these procedures. She was convalescing and following up with [facility] for management of the wounds. Over this time she noted that wound VAC was recently not sealing properly secondary to purulent drainage. Patient denies any rigors, high fevers. Secondary to foul-smelling wound and ongoing drainage from surgical site of her right stump, she proceeded to the ER for further evaluation. Attempt was made to transfer patient to [facility] directly from the emergency department however initially she was not excepted. Then we were notified that she was indeed of accepted however there is no bed available. Therefore patient is now being admitted to our service until bed available at [facility]. On my evaluation, she remains in the ER resting comfortably in no apparent acute distress. She is frustrated and angry at her current disposition. She denies any pain or discomfort and comments on the foul smell coming from her right stump.   No chest pain, shortness of breath 08/05/2019         Medications Prior to Admission:    Medications Prior to Admission: aspirin 81 MG EC tablet, Take 81 mg by mouth daily  LORazepam (ATIVAN) 1 MG tablet, Take 1 mg by mouth every 6 hours as needed for Anxiety. cloNIDine (CATAPRES) 0.1 MG/24HR PTWK, Place 1 patch onto the skin once a week  gabapentin (NEURONTIN) 300 MG capsule, Take 300 mg by mouth 3 times daily.   lidocaine (LIDODERM) 5 %, Place 1 patch onto the skin daily 12 hours on, 12 hours off.  warfarin (COUMADIN) 1 MG tablet, Take 1 mg by mouth every evening Mon, Wed, Fri  warfarin (COUMADIN) 2 MG tablet, Take 2 mg by mouth every evening Tue, Thu, Sat, Sun  tiotropium (SPIRIVA RESPIMAT) 2.5 MCG/ACT AERS inhaler, Inhale 2 puffs into the lungs daily  methocarbamol (ROBAXIN) 500 MG tablet, Take 1 tablet by mouth 3 times daily  ACCU-CHEK GUIDE strip, TEST BLOOD SUGAR THREE TIMES DAILY  omeprazole (PRILOSEC) 20 MG delayed release capsule, TAKE 1 CAPSULE EVERY MORNING  BEFORE  BREAKFAST  divalproex (DEPAKOTE ER) 500 MG extended release tablet, TAKE 1 TABLET IN THE MORNING AND 2 TABLETS IN THE EVENING  metFORMIN (GLUCOPHAGE) 1000 MG tablet, TAKE 1 TABLET TWICE DAILY WITH MEALS  metoprolol tartrate (LOPRESSOR) 25 MG tablet, TAKE 1/2 TABLET TWICE DAILY  levothyroxine (SYNTHROID) 75 MCG tablet, TAKE 1 TABLET EVERY DAY  atorvastatin (LIPITOR) 80 MG tablet, Take 1 tablet by mouth nightly  albuterol sulfate  (90 Base) MCG/ACT inhaler, Inhale 2 puffs into the lungs every 6 hours as needed for Wheezing  busPIRone (BUSPAR) 10 MG tablet, Take 1 tablet by mouth daily  levETIRAcetam (KEPPRA) 500 MG tablet, TAKE 1 TABLET TWICE DAILY  DULoxetine (CYMBALTA) 60 MG extended release capsule, TAKE 1 CAPSULE EVERY DAY  DULoxetine (CYMBALTA) 60 MG extended release capsule, TAKE 1 CAPSULE EVERY DAY  Accu-Chek FastClix Lancets MISC, TEST BLOOD SUGAR THREE TIMES DAILY  Alcohol Swabs (B-D SINGLE USE SWABS REGULAR) PADS, USE AS DIRECTED TWICE DAILY  Handicap Placard MISC, by Does not apply route Patient cannot walk 200 ft without stopping to rest.   Expiration 2024    Allergies:  Nsaids    Social History:   TOBACCO:   reports that she has been smoking cigarettes. She has a 32.00 pack-year smoking history. She has never used smokeless tobacco.  ETOH:   reports previous alcohol use. MARITAL STATUS:    OCCUPATION:      Family History:       Problem Relation Age of Onset    Depression Mother     Bipolar Disorder Mother     Hypertension Mother    [facility] Anxiety Disorder Sister     Asthma Son    [facility] Schizophrenia Son     Anxiety Disorder Daughter        REVIEW OF SYSTEMS:    General ROS: Right AKA stump with foul smell and purulent drainage  Hematological and Lymphatic ROS: negative  Endocrine ROS: negative  Respiratory ROS: no cough, shortness of breath, or wheezing  Cardiovascular ROS: no chest pain or dyspnea on exertion  Gastrointestinal ROS: no abdominal pain, change in bowel habits, or black or bloody stools  Genito-Urinary ROS: no dysuria, trouble voiding, or hematuria  Neurological ROS: no TIA or stroke symptoms  negative    Vitals:  /63   Pulse 74   Temp 96.8 °F (36 °C) (Temporal)   Resp 18   Ht 5' 7\" (1.702 m)   Wt 200 lb (90.7 kg)   SpO2 97%   BMI 31.32 kg/m²     PHYSICAL EXAM:  General:  Awake, alert, oriented X 3. Well developed, well nourished, well groomed. No apparent distress. HEENT:  Normocephalic, atraumatic. Pupils equal, round, reactive to light. No scleral icterus. No conjunctival injection. Normal lips, teeth, and gums. No nasal discharge. Neck:  Supple, FROM  Heart:  RRR, no murmurs, gallops, rubs, carotid upstroke normal, no carotid bruits  Lungs:  CTA bilaterally, bilat symmetrical expansion, no wheeze, rales, or rhonchi  Abdomen:   Bowel sounds present, soft, nontender, no masses, no organomegaly, no peritoneal signs  Extremities: Right stump with wound VAC in place, purulence drainage  Skin:  Warm and dry, no open lesions or rash  Neuro:  Cranial nerves 2-12 intact, no focal deficits  Vascular: Radial and pedal Pulses 2+  Breast: deferred  Rectal: deferred  Genitalia:  deferred      DATA:     Recent Labs     09/10/21  1144 09/11/21  0802   WBC 11.1 9.0   HGB 12.6 12.3    316     Recent Labs     09/10/21  1144 09/11/21  0802    135   K 3.9 3.7   BUN 5* 5*   CREATININE 0.5 0.5     Recent Labs     09/10/21  1144 09/12/21  0935   PROT 6.7  --    INR  --  1.4     Recent Labs     09/10/21  1144   AST 9   ALT <5   ALKPHOS 81   BILIDIR <0.2   BILITOT <0.2     No results for input(s): BNP in the last 72 hours. No results for input(s): CKTOTAL, CKMB, CKMBINDEX, TROPONINI in the last 72 hours. ASSESSMENT:      Principal Problem:    Postoperative or surgical complication, initial encounter  Active Problems:    Infection of deep incisional surgical site after procedure  Resolved Problems:    * No resolved hospital problems.  *        PLAN:    54-year-old vasculopath status post right AKA complicated by septic arthritis/anaerobic infection/persistent purulent drainage from right stump    pan cultures pending -   Cefepime and vanc per infectious disease  Ortho following   Plastics not available locally   Labs daily  IV fluids  Sliding scale insulin   Antiemetic, pain control if needed-currently denies pain  Watch for devastating sepsis        Await transfer to [facility] pending bed availability       DVT prophylaxis  PT OT  Discharge plan    Manuel Carrizales MD  9/12/2021  9:08 PM

## 2021-09-13 NOTE — CARE COORDINATION
Pt to transfer to [address] [place] [address]; no bed as of last note (p) [phone] per Johnathan Mijares.

## 2021-09-13 NOTE — PROGRESS NOTES
Department of Orthopedic Surgery  Resident Progress Note    Patient seen and examined. In the RLE. Still awaiting bed at [facility]. No new complaints. VITALS:  BP (!) 120/58   Pulse 77   Temp 96.9 °F (36.1 °C) (Temporal)   Resp 18   Ht 5' 7\" (1.702 m)   Wt 200 lb (90.7 kg)   SpO2 93%   BMI 31.32 kg/m²     General: alert and oriented to person, place and time, well-developed and well-nourished, in no acute distress    MUSCULOSKELETAL:   right lower extremity:  · Wound VAC in place, not hooked up to vacuum. There is signs of pustular drainage throughout the wound VAC as well as skin changes consistent with infection in her skin folds. She will not allow me to remove wound VAC due to pain. She states she would need medicated heavily in order to do so. There is drainage on the dressing currently. Hard to discern whether wound probes directly to pelvis. · Compartments soft and compressible  · Stump appears perfused    CBC:   Lab Results   Component Value Date    WBC 7.6 09/13/2021    HGB 12.2 09/13/2021    HCT 37.9 09/13/2021     09/13/2021     PT/INR:    Lab Results   Component Value Date    PROTIME 14.9 09/12/2021    PROTIME 14.2 01/28/2020    INR 1.4 09/12/2021       ASSESSMENT  · Right stump infection and complication. PLAN      · NWB -right LE  · Recommend transfer to [facility] at most recent treating facility for further evaluation of possible need for further plastic surgical intervention as well as orthopedic interventions.   Unfortunately this patient's problem is rather severe at this time and will need care from the appropriate specialist.  · Wound care for vac changes/wound dressings recommended  · Pain Control per primary team  · IV antibiosis  · Discussed with Dr. Asa West again this AM.

## 2021-09-14 LAB
ALBUMIN SERPL-MCNC: 2.6 G/DL (ref 3.5–5.2)
ALP BLD-CCNC: 96 U/L (ref 35–104)
ALT SERPL-CCNC: 8 U/L (ref 0–32)
ANION GAP SERPL CALCULATED.3IONS-SCNC: 7 MMOL/L (ref 7–16)
ANISOCYTOSIS: ABNORMAL
AST SERPL-CCNC: 16 U/L (ref 0–31)
BASOPHILS ABSOLUTE: 0 E9/L (ref 0–0.2)
BASOPHILS RELATIVE PERCENT: 0.3 % (ref 0–2)
BILIRUB SERPL-MCNC: 0.2 MG/DL (ref 0–1.2)
BUN BLDV-MCNC: 6 MG/DL (ref 6–20)
BURR CELLS: ABNORMAL
CALCIUM SERPL-MCNC: 8.8 MG/DL (ref 8.6–10.2)
CHLORIDE BLD-SCNC: 99 MMOL/L (ref 98–107)
CO2: 31 MMOL/L (ref 22–29)
CREAT SERPL-MCNC: 0.5 MG/DL (ref 0.5–1)
EOSINOPHILS ABSOLUTE: 0 E9/L (ref 0.05–0.5)
EOSINOPHILS RELATIVE PERCENT: 2 % (ref 0–6)
GFR AFRICAN AMERICAN: >60
GFR NON-AFRICAN AMERICAN: >60 ML/MIN/1.73
GLUCOSE BLD-MCNC: 111 MG/DL (ref 74–99)
HCT VFR BLD CALC: 42.4 % (ref 34–48)
HEMOGLOBIN: 13.2 G/DL (ref 11.5–15.5)
INR BLD: 1.2
LYMPHOCYTES ABSOLUTE: 2.22 E9/L (ref 1.5–4)
LYMPHOCYTES RELATIVE PERCENT: 19.1 % (ref 20–42)
MCH RBC QN AUTO: 28 PG (ref 26–35)
MCHC RBC AUTO-ENTMCNC: 31.1 % (ref 32–34.5)
MCV RBC AUTO: 89.8 FL (ref 80–99.9)
METER GLUCOSE: 108 MG/DL (ref 74–99)
METER GLUCOSE: 117 MG/DL (ref 74–99)
METER GLUCOSE: 127 MG/DL (ref 74–99)
METER GLUCOSE: 146 MG/DL (ref 74–99)
MONOCYTES ABSOLUTE: 1.17 E9/L (ref 0.1–0.95)
MONOCYTES RELATIVE PERCENT: 9.6 % (ref 2–12)
NEUTROPHILS ABSOLUTE: 8.31 E9/L (ref 1.8–7.3)
NEUTROPHILS RELATIVE PERCENT: 71.3 % (ref 43–80)
OVALOCYTES: ABNORMAL
PDW BLD-RTO: 16 FL (ref 11.5–15)
PLATELET # BLD: 402 E9/L (ref 130–450)
PMV BLD AUTO: 8.4 FL (ref 7–12)
POIKILOCYTES: ABNORMAL
POTASSIUM SERPL-SCNC: 4.2 MMOL/L (ref 3.5–5)
PROTHROMBIN TIME: 12.5 SEC (ref 9.3–12.4)
RBC # BLD: 4.72 E12/L (ref 3.5–5.5)
SMUDGE CELLS: ABNORMAL
SODIUM BLD-SCNC: 137 MMOL/L (ref 132–146)
TOTAL PROTEIN: 6.4 G/DL (ref 6.4–8.3)
WBC # BLD: 11.7 E9/L (ref 4.5–11.5)

## 2021-09-14 PROCEDURE — 6370000000 HC RX 637 (ALT 250 FOR IP): Performed by: PHYSICIAN ASSISTANT

## 2021-09-14 PROCEDURE — 2580000003 HC RX 258: Performed by: PHYSICIAN ASSISTANT

## 2021-09-14 PROCEDURE — 6370000000 HC RX 637 (ALT 250 FOR IP): Performed by: INTERNAL MEDICINE

## 2021-09-14 PROCEDURE — 1200000000 HC SEMI PRIVATE

## 2021-09-14 PROCEDURE — 6360000002 HC RX W HCPCS: Performed by: PHYSICIAN ASSISTANT

## 2021-09-14 PROCEDURE — 6360000002 HC RX W HCPCS: Performed by: SPECIALIST

## 2021-09-14 PROCEDURE — 85610 PROTHROMBIN TIME: CPT

## 2021-09-14 PROCEDURE — 80053 COMPREHEN METABOLIC PANEL: CPT

## 2021-09-14 PROCEDURE — 82962 GLUCOSE BLOOD TEST: CPT

## 2021-09-14 PROCEDURE — 6360000002 HC RX W HCPCS: Performed by: INTERNAL MEDICINE

## 2021-09-14 PROCEDURE — 2580000003 HC RX 258: Performed by: SPECIALIST

## 2021-09-14 PROCEDURE — 94640 AIRWAY INHALATION TREATMENT: CPT

## 2021-09-14 PROCEDURE — 85025 COMPLETE CBC W/AUTO DIFF WBC: CPT

## 2021-09-14 PROCEDURE — 36415 COLL VENOUS BLD VENIPUNCTURE: CPT

## 2021-09-14 RX ORDER — ACETAMINOPHEN 325 MG/1
650 TABLET ORAL EVERY 8 HOURS PRN
COMMUNITY

## 2021-09-14 RX ORDER — OXYCODONE HYDROCHLORIDE 5 MG/1
7.5 TABLET ORAL
COMMUNITY
End: 2021-11-22 | Stop reason: ALTCHOICE

## 2021-09-14 RX ORDER — OXYCODONE HYDROCHLORIDE 5 MG/1
12.5 TABLET ORAL
COMMUNITY
End: 2021-11-22 | Stop reason: ALTCHOICE

## 2021-09-14 RX ADMIN — VANCOMYCIN HYDROCHLORIDE 1250 MG: 10 INJECTION, POWDER, LYOPHILIZED, FOR SOLUTION INTRAVENOUS at 11:05

## 2021-09-14 RX ADMIN — GABAPENTIN 300 MG: 300 CAPSULE ORAL at 21:08

## 2021-09-14 RX ADMIN — HYDROMORPHONE HYDROCHLORIDE 0.5 MG: 1 INJECTION, SOLUTION INTRAMUSCULAR; INTRAVENOUS; SUBCUTANEOUS at 13:07

## 2021-09-14 RX ADMIN — LORAZEPAM 1 MG: 1 TABLET ORAL at 16:12

## 2021-09-14 RX ADMIN — Medication 10 ML: at 09:54

## 2021-09-14 RX ADMIN — HYDROMORPHONE HYDROCHLORIDE 0.5 MG: 1 INJECTION, SOLUTION INTRAMUSCULAR; INTRAVENOUS; SUBCUTANEOUS at 09:54

## 2021-09-14 RX ADMIN — ASPIRIN 81 MG: 81 TABLET, COATED ORAL at 09:54

## 2021-09-14 RX ADMIN — ATORVASTATIN CALCIUM 80 MG: 40 TABLET, FILM COATED ORAL at 21:08

## 2021-09-14 RX ADMIN — WARFARIN SODIUM 2 MG: 2 TABLET ORAL at 17:50

## 2021-09-14 RX ADMIN — LEVOTHYROXINE SODIUM 75 MCG: 0.07 TABLET ORAL at 07:36

## 2021-09-14 RX ADMIN — LEVETIRACETAM 500 MG: 500 TABLET, FILM COATED ORAL at 21:08

## 2021-09-14 RX ADMIN — LEVETIRACETAM 500 MG: 500 TABLET, FILM COATED ORAL at 09:55

## 2021-09-14 RX ADMIN — VANCOMYCIN HYDROCHLORIDE 1250 MG: 10 INJECTION, POWDER, LYOPHILIZED, FOR SOLUTION INTRAVENOUS at 01:14

## 2021-09-14 RX ADMIN — DIVALPROEX SODIUM 1000 MG: 500 TABLET, EXTENDED RELEASE ORAL at 21:07

## 2021-09-14 RX ADMIN — MEROPENEM 1000 MG: 1 INJECTION, POWDER, FOR SOLUTION INTRAVENOUS at 19:00

## 2021-09-14 RX ADMIN — METHOCARBAMOL TABLETS 500 MG: 500 TABLET, COATED ORAL at 21:08

## 2021-09-14 RX ADMIN — BUSPIRONE HYDROCHLORIDE 10 MG: 10 TABLET ORAL at 09:55

## 2021-09-14 RX ADMIN — IPRATROPIUM BROMIDE 0.5 MG: 0.5 SOLUTION RESPIRATORY (INHALATION) at 19:30

## 2021-09-14 RX ADMIN — METHOCARBAMOL TABLETS 500 MG: 500 TABLET, COATED ORAL at 09:55

## 2021-09-14 RX ADMIN — DIVALPROEX SODIUM 500 MG: 500 TABLET, EXTENDED RELEASE ORAL at 09:55

## 2021-09-14 RX ADMIN — Medication 10 ML: at 21:11

## 2021-09-14 RX ADMIN — METHOCARBAMOL TABLETS 500 MG: 500 TABLET, COATED ORAL at 13:08

## 2021-09-14 RX ADMIN — GABAPENTIN 300 MG: 300 CAPSULE ORAL at 13:08

## 2021-09-14 RX ADMIN — IPRATROPIUM BROMIDE 0.5 MG: 0.5 SOLUTION RESPIRATORY (INHALATION) at 08:22

## 2021-09-14 RX ADMIN — IPRATROPIUM BROMIDE 0.5 MG: 0.5 SOLUTION RESPIRATORY (INHALATION) at 11:37

## 2021-09-14 RX ADMIN — HYDROMORPHONE HYDROCHLORIDE 0.5 MG: 1 INJECTION, SOLUTION INTRAMUSCULAR; INTRAVENOUS; SUBCUTANEOUS at 16:12

## 2021-09-14 RX ADMIN — HYDROMORPHONE HYDROCHLORIDE 0.5 MG: 1 INJECTION, SOLUTION INTRAMUSCULAR; INTRAVENOUS; SUBCUTANEOUS at 05:42

## 2021-09-14 RX ADMIN — METOPROLOL TARTRATE 12.5 MG: 25 TABLET, FILM COATED ORAL at 21:07

## 2021-09-14 RX ADMIN — HYDROMORPHONE HYDROCHLORIDE 0.5 MG: 1 INJECTION, SOLUTION INTRAMUSCULAR; INTRAVENOUS; SUBCUTANEOUS at 21:20

## 2021-09-14 RX ADMIN — MEROPENEM 1000 MG: 1 INJECTION, POWDER, FOR SOLUTION INTRAVENOUS at 11:05

## 2021-09-14 RX ADMIN — VANCOMYCIN HYDROCHLORIDE 1250 MG: 10 INJECTION, POWDER, LYOPHILIZED, FOR SOLUTION INTRAVENOUS at 22:24

## 2021-09-14 RX ADMIN — INSULIN LISPRO 2 UNITS: 100 INJECTION, SOLUTION INTRAVENOUS; SUBCUTANEOUS at 11:05

## 2021-09-14 RX ADMIN — OXYCODONE 5 MG: 5 TABLET ORAL at 01:26

## 2021-09-14 RX ADMIN — MICONAZOLE NITRATE: 20 POWDER TOPICAL at 21:11

## 2021-09-14 RX ADMIN — PANTOPRAZOLE SODIUM 40 MG: 40 TABLET, DELAYED RELEASE ORAL at 07:36

## 2021-09-14 RX ADMIN — DIVALPROEX SODIUM 1000 MG: 500 TABLET, EXTENDED RELEASE ORAL at 01:14

## 2021-09-14 RX ADMIN — MEROPENEM 1000 MG: 1 INJECTION, POWDER, FOR SOLUTION INTRAVENOUS at 03:26

## 2021-09-14 RX ADMIN — GABAPENTIN 300 MG: 300 CAPSULE ORAL at 09:54

## 2021-09-14 RX ADMIN — DULOXETINE HYDROCHLORIDE 60 MG: 60 CAPSULE, DELAYED RELEASE ORAL at 09:54

## 2021-09-14 RX ADMIN — METOPROLOL TARTRATE 12.5 MG: 25 TABLET, FILM COATED ORAL at 09:54

## 2021-09-14 ASSESSMENT — PAIN DESCRIPTION - LOCATION
LOCATION: LEG

## 2021-09-14 ASSESSMENT — PAIN DESCRIPTION - PROGRESSION
CLINICAL_PROGRESSION: NOT CHANGED
CLINICAL_PROGRESSION: NOT CHANGED

## 2021-09-14 ASSESSMENT — PAIN DESCRIPTION - PAIN TYPE
TYPE: ACUTE PAIN;PHANTOM PAIN
TYPE: ACUTE PAIN
TYPE: ACUTE PAIN
TYPE: ACUTE PAIN;PHANTOM PAIN

## 2021-09-14 ASSESSMENT — PAIN SCALES - GENERAL
PAINLEVEL_OUTOF10: 8
PAINLEVEL_OUTOF10: 9
PAINLEVEL_OUTOF10: 3
PAINLEVEL_OUTOF10: 9
PAINLEVEL_OUTOF10: 0

## 2021-09-14 ASSESSMENT — PAIN - FUNCTIONAL ASSESSMENT
PAIN_FUNCTIONAL_ASSESSMENT: ACTIVITIES ARE NOT PREVENTED
PAIN_FUNCTIONAL_ASSESSMENT: ACTIVITIES ARE NOT PREVENTED

## 2021-09-14 ASSESSMENT — PAIN DESCRIPTION - FREQUENCY
FREQUENCY: INTERMITTENT
FREQUENCY: INTERMITTENT

## 2021-09-14 ASSESSMENT — PAIN DESCRIPTION - DESCRIPTORS
DESCRIPTORS: CRAMPING;DISCOMFORT;PRESSURE
DESCRIPTORS: DISCOMFORT;SORE;PRESSURE

## 2021-09-14 ASSESSMENT — PAIN DESCRIPTION - ONSET
ONSET: ON-GOING
ONSET: ON-GOING

## 2021-09-14 ASSESSMENT — PAIN DESCRIPTION - ORIENTATION
ORIENTATION: RIGHT
ORIENTATION: RIGHT

## 2021-09-14 NOTE — PROGRESS NOTES
Called to assess left chest triple lumen catheter. . the line was placed according to the pt in July of this year. . the dressing was noted to have been changed last on 8/24/21. The dressing was removed and cleaned with chlorprep scrub. The site appears asymptomatic. . pt denies discomfort. A new antimicrobial dressing was applied. Pt. Tolerated well. RN to check with Physician if line is to continue or be removed and picc placed for IV antibiotics.

## 2021-09-14 NOTE — PROGRESS NOTES
3053 right stump wound care done per order moderate amout of serosang purluent  drainage noted . Patient tolerated well after medication was given prior to dressing changes .

## 2021-09-14 NOTE — PROGRESS NOTES
[address] [place] [address] [facility]  [facility]  Progress Note    Face to face encounter   SUBJECTIVE:  Chief Complaint   Patient presents with    Wound Check     recent aka, wound vac to R leg, sent in for eval     Patient is tolerating medications. No reported adverse drug reactions. No nausea, vomiting, diarrhea. Afebrile  Feeling better. Less odor with new dressing  Review of systems:  As stated above in the chief complaint, otherwise negative.     Medications:  Scheduled Meds:   aspirin  81 mg Oral Daily    cloNIDine  1 patch TransDERmal Weekly    gabapentin  300 mg Oral TID    lidocaine  1 patch TransDERmal Daily    miconazole   Topical BID    warfarin  1 mg Oral Once per day on Mon Wed Fri    warfarin  2 mg Oral Once per day on Sun Tue Thu Sat    atorvastatin  80 mg Oral Nightly    busPIRone  10 mg Oral Daily    clopidogrel  75 mg Oral Daily    divalproex  500 mg Oral Daily    divalproex  1,000 mg Oral Nightly    DULoxetine  60 mg Oral Daily    levETIRAcetam  500 mg Oral BID    levothyroxine  75 mcg Oral Daily    methocarbamol  500 mg Oral TID    metoprolol tartrate  12.5 mg Oral BID    sodium chloride flush  10 mL IntraVENous 2 times per day    insulin lispro  0-12 Units SubCUTAneous TID WC    insulin lispro  0-6 Units SubCUTAneous Nightly    pantoprazole  40 mg Oral QAM AC    ipratropium  0.5 mg Nebulization 4x daily    meropenem  1,000 mg IntraVENous Q8H    vancomycin  1,250 mg IntraVENous Q12H     Continuous Infusions:   sodium chloride      dextrose       PRN Meds:LORazepam, oxyCODONE, sodium chloride flush, sodium chloride, potassium chloride **OR** potassium alternative oral replacement **OR** potassium chloride, magnesium hydroxide, acetaminophen **OR** acetaminophen, glucose, dextrose, glucagon (rDNA), dextrose, trimethobenzamide, HYDROmorphone **OR** HYDROmorphone, albuterol    OBJECTIVE:  /66   Pulse 83   Temp 96.7 °F (35.9 °C) (Temporal)   Resp 20   Ht 5' 7\" (1.702 m)   Wt 200 lb (90.7 kg)   SpO2 94%   BMI 31.32 kg/m²   Temp  Av.8 °F (36 °C)  Min: 96.7 °F (35.9 °C)  Max: 97.1 °F (36.2 °C)  Constitutional: The patient is awake, alert, and oriented. Skin: Warm and dry. No rashes were noted. HEENT: Round and reactive pupils. Moist mucous membranes. No ulcerations or thrush. Neck: Supple to movements. Chest: No use of accessory muscles to breathe. Symmetrical expansion. No wheezing, crackles or rhonchi. Cardiovascular: S1 and S2 are rhythmic and regular. No murmurs appreciated. Abdomen: Positive bowel sounds to auscultation. Benign to palpation. No masses felt. No hepatosplenomegaly.   Extremities: Right AKA surgical wound dressed, infection foul-smelling drainage improved  Lines: peripheral    Laboratory and Tests Review:  Lab Results   Component Value Date    WBC 11.7 (H) 2021    WBC 7.6 2021    WBC 9.0 2021    HGB 13.2 2021    HCT 42.4 2021    MCV 89.8 2021     2021     Lab Results   Component Value Date    NEUTROABS 8.31 (H) 2021    NEUTROABS 3.98 2021    NEUTROABS 5.17 2021     No results found for: [facility]  Lab Results   Component Value Date    ALT 8 2021    AST 16 2021    ALKPHOS 96 2021    BILITOT 0.2 2021     Lab Results   Component Value Date     2021    K 4.2 2021    K 3.7 2021    CL 99 2021    CO2 31 2021    BUN 6 2021    CREATININE 0.5 2021    CREATININE 0.4 2021    CREATININE 0.5 2021    GFRAA >60 2021    LABGLOM >60 2021    GLUCOSE 111 2021    GLUCOSE 70 2011    PROT 6.4 2021    LABALBU 2.6 2021    LABALBU 3.6 2010    CALCIUM 8.8 2021    BILITOT 0.2 2021    ALKPHOS 96 2021    AST 16 2021    ALT 8 2021     Lab Results   Component Value Date    CRP 4.7 (H) 09/10/2021    CRP 1.9 (H) 2015     Lab Results   Component Value Date    SEDRATE 45 (H) 09/10/2021    SEDRATE 7 07/02/2015    SEDRATE 3 03/19/2015     Radiology:  Reviewed    Microbiology:   Lab Results   Component Value Date    BC 24 Hours no growth 09/10/2021    BC 5 Days- no growth 10/05/2017    BC 5 Days- no growth 07/01/2015    ORG Anaerobic gram negative fran 09/10/2021    ORG Corynebacterium species 09/10/2021    ORG Escherichia coli 09/10/2021     Lab Results   Component Value Date    BLOODCULT2 24 Hours no growth 09/10/2021    BLOODCULT2 5 Days- no growth 10/05/2017    BLOODCULT2 5 Days- no growth 07/01/2015    ORG Anaerobic gram negative fran 09/10/2021    ORG Corynebacterium species 09/10/2021    ORG Escherichia coli 09/10/2021     WOUND/ABSCESS   Date Value Ref Range Status   09/10/2021 Heavy growth  Final     No results found for: RESPSMEAR  No results found for: MPNEUMO, CLAMYDCU, LABLEGI, AFBCX, FUNGSM, LABFUNG  No results found for: CULTRESP  No results found for: CXCATHTIP  No results found for: BFCS  No results found for: CXSURG  Urine Culture, Routine   Date Value Ref Range Status   09/10/2021 <25,000 CFU/ml  Final   09/16/2018   Final    ,000 CFU/mL  Mixed casey isolated. Further workup and sensitivity testing  is not routinely indicated and will not be performed. Mixed casey isolated includes:  Mixed gram positive organisms     07/21/2015   Final    ,000 CFU/mL  Mixed casey isolated. Further workup and sensitivity testing  is not routinely indicated and will not be performed.   Mixed casey isolated includes:  Mixed gram positive organisms       MRSA Culture Only   Date Value Ref Range Status   02/04/2019 Methicillin resistant Staph aureus not isolated  Final     Assessment:  Surgical wound infection  Septic arthritis of the right hip  Polymicrobial infection     Plan:    Cont Vanco and [facility] dosing Vancomycin   Ortho following- recommends transfer to [address] [place] [address] wound care- VAC removed  Check cultures  Labs reviewed   Patient is awaiting transfer to Regis Love, APRN - CNP  9:39 AM  9/14/2021   Pt seen and examined. Above discussed agree with advanced practice nurse. Labs, cultures, and radiographs reviewed. Face to Face encounter occurred. Changes made as necessary.      Yessenia Saez MD

## 2021-09-14 NOTE — PROGRESS NOTES
Please come and check TLC dressing says on the dressing 8-25-21 and she stated she has had this TLC in since July .  Messaged IV team

## 2021-09-14 NOTE — PLAN OF CARE
Problem: Falls - Risk of:  Goal: Will remain free from falls  Description: Will remain free from falls  9/14/2021 1347 by Chip Negron RN  Outcome: Met This Shift     Problem: Skin Integrity:  Goal: Will show no infection signs and symptoms  Description: Will show no infection signs and symptoms  9/14/2021 1347 by Chip Negron RN  Outcome: Met This Shift     Problem: Skin Integrity:  Goal: Absence of new skin breakdown  Description: Absence of new skin breakdown  9/14/2021 1347 by Chip Negron RN  Outcome: Met This Shift

## 2021-09-14 NOTE — PROGRESS NOTES
Pharmacy Consultation Note  (Warfarin Dosing and Monitoring)    Initial consult date: 9/13/21  Consulting physician: Dr. Ravi Miller    Allergies:  Nsaids    45 y.o. female    Ht Readings from Last 1 Encounters:   09/11/21 5' 7\" (1.702 m)     Wt Readings from Last 1 Encounters:   09/10/21 200 lb (90.7 kg)         Warfarin Indication Target   INR Range Home Dose  (if applicable) Diet/Feeding Tube   (Enteral feeds, nutritional drinks, increased Vitamin K in diet can decrease INR)   PAD? 2-3 1 mg MWF; 2 mg Tu, Thu, Sat Sun Regular       x Home Med? Meds Increasing INR x Home Med?  Meds Decreasing INR     Allopurinol    Azathioprine     Amiodarone/Propafenone/Dronedarone   Carbamazepine     Androgens   Cholestyramine     Chemotherapy (BBW: Capecitabine)   Estrogen     Ciprofloxacin/Levofloxacin   Nafcillin/Dicloxacillin     Clarithromycin/Erythromycin/Azithromycin   Barbiturates      Fluconazole/Itraconazole/Voriconazole/Ketoconazole   Phenytoin (Variable)     Metronidazole   Rifampin     Phenytoin (Variable)   Steroids (Variable/Dose Dependent)      Statins/Fenofibrate/Gemfibrozil   Sucralfate     Steroids (Variable/Dose Dependent)   Other:     Sulfamethoxazole/Trimethoprim        Tramadol         Other:        Comments regarding medication interactions:      x Diseases Affecting INR x Increased Bleeding Risk    CHF Exacerbation (Increases)  History GI Bleed/PUD    Liver Disease (Increases)  Chronic NSAID Use   x Thyroid: Hyper (Increases)  Hypo (Decreases) x Chronic ASA/Antiplatelet Use (Clopidogrel/ Dipyridamole/Prasugrel/Ticagrelor)      Malignancy (Increases)  Abnormal Renal Function (dialysis, renal transplant, SCr ? 2.3 mg/dL)     History of EtOH Abuse: Acute (Increases)   Chronic (Decreases)  Liver Function (cirrhosis, bilirubin >2x ULN with AST/ALT/AP >3x ULN)    Fever (Increases)  Age > 65 years    Acute infection (Increases) x Hypertension/Uncontrolled BP    Diarrhea/Dehydration (Increases)  History of stroke

## 2021-09-14 NOTE — PROGRESS NOTES
Chief Complaint:  Chief Complaint   Patient presents with    Wound Check     recent aka, wound vac to R leg, sent in for eval     Postoperative or surgical complication, initial encounter     Subjective:    Pain well controlled, actually a bit drowsy today as she had just recived a dose of Dilaudid prior to my visiting her    Objective:    /62   Pulse 104   Temp 98 °F (36.7 °C) (Temporal)   Resp 18   Ht 5' 7\" (1.702 m)   Wt 200 lb (90.7 kg)   SpO2 96%   BMI 31.32 kg/m²     Current medications that patient is taking have been reviewed.     General appearance: NAD, conversant chronically ill appearing  HEENT: AT/NC, MMM  Neck: FROM, supple  Lungs: Clear to auscultation, WOB normal  CV: RRR, no MRGs  Abdomen: Soft, non-tender; no masses or HSM, +BS  Extremities: RLE AKA with wound vac, no visible cellulitis or purulence  Skin: no rash, lesions or ulcers  Psych: Calm and cooperative  Neuro: Alert and interactive, face symmetric, moving all extremities, speech fluent    Labs:  CBC with Differential:    Lab Results   Component Value Date    WBC 11.7 09/14/2021    RBC 4.72 09/14/2021    HGB 13.2 09/14/2021    HCT 42.4 09/14/2021     09/14/2021    MCV 89.8 09/14/2021    MCH 28.0 09/14/2021    MCHC 31.1 09/14/2021    RDW 16.0 09/14/2021    SEGSPCT 79 09/28/2013    BANDSPCT 1 07/08/2015    LYMPHOPCT 19.1 09/14/2021    MONOPCT 9.6 09/14/2021    BASOPCT 0.3 09/14/2021    MONOSABS 1.17 09/14/2021    LYMPHSABS 2.22 09/14/2021    EOSABS 0.00 09/14/2021    BASOSABS 0.00 09/14/2021     CMP:    Lab Results   Component Value Date     09/14/2021    K 4.2 09/14/2021    K 3.7 09/11/2021    CL 99 09/14/2021    CO2 31 09/14/2021    BUN 6 09/14/2021    CREATININE 0.5 09/14/2021    GFRAA >60 09/14/2021    LABGLOM >60 09/14/2021    GLUCOSE 111 09/14/2021    GLUCOSE 70 04/28/2011    PROT 6.4 09/14/2021    LABALBU 2.6 09/14/2021    LABALBU 3.6 11/30/2010    CALCIUM 8.8 09/14/2021    BILITOT 0.2 09/14/2021    ALKPHOS 96

## 2021-09-14 NOTE — PROGRESS NOTES
Pharmacy Consultation Note  (Antibiotic Dosing and Monitoring)    Initial consult date: 9/11/21  Consulting physician: Dr. Castañeda Later  Drug: Vancomycin  Indication: surgical wound infection / septic arthritis    Age/  Gender Height Weight IBW  Allergy Information   51 y.o./female 5' 7\" (170.2 cm) 200 lb (90.7 kg)     Ideal body weight: 61.6 kg (135 lb 12.9 oz)  Adjusted ideal body weight: 73.2 kg (161 lb 7.7 oz)   Nsaids      Renal Function:  Recent Labs     09/13/21  0610 09/14/21  0535   BUN 5* 6   CREATININE 0.4* 0.5     No intake or output data in the 24 hours ending 09/14/21 1254    Vancomycin Monitoring:  Trough:  No results for input(s): VANCOTROUGH in the last 72 hours. Random:    Recent Labs     09/13/21  0610   VANCORANDOM 20.2       Vancomycin Administration Times:  Recent vancomycin administrations                   vancomycin (VANCOCIN) 1,250 mg in dextrose 5 % 250 mL IVPB (mg) 1,250 mg New Bag 09/14/21 1105     1,250 mg New Bag  0114     1,250 mg New Bag 09/13/21 1116     1,250 mg New Bag 09/12/21 2245     1,250 mg New Bag  1227     1,250 mg New Bag 09/11/21 2249                Assessment:  · Patient is a 45 y.o. female who has been initiated on vancomycin  Estimated Creatinine Clearance: 152 mL/min (based on SCr of 0.5 mg/dL). · To dose vancomycin, pharmacy will be utilizing [employer] calculation software for goal AUC/BENJAMÍN 400-600 mg/L-hr   · Random vanco level 9/13 = 20.2 mcg/ml. Per [employer], this would correspond to AUC/BENJAMÍN 538 and trough 16.6.                  Plan:  · Will continue vancomycin 1250 mg IV every 12 hours  · Will check vancomycin levels when appropriate  · Will continue to monitor renal function   · Clinical pharmacy to follow      Alana [facility] 9/14/2021 12:54 PM

## 2021-09-14 NOTE — PROGRESS NOTES
Pharmacy Consultation Note  (Warfarin Dosing and Monitoring)    Initial consult date: 9/13/21  Consulting physician: Dr. Ravi Miller    Allergies:  Nsaids    45 y.o. female    Ht Readings from Last 1 Encounters:   09/11/21 5' 7\" (1.702 m)     Wt Readings from Last 1 Encounters:   09/10/21 200 lb (90.7 kg)         Warfarin Indication Target   INR Range Home Dose  (if applicable) Diet/Feeding Tube   (Enteral feeds, nutritional drinks, increased Vitamin K in diet can decrease INR)   PAD? Per record from [facility]: ICD I82.501: Chronic embolism and thrombosis of unspecified deep veins of right lower extremity 2-3 1 mg MWF;   2 mg Tu, Thu, Sat Sun Regular       x Home Med? Meds Increasing INR x Home Med?  Meds Decreasing INR     Allopurinol    Azathioprine     Amiodarone/Propafenone/Dronedarone   Carbamazepine     Androgens   Cholestyramine     Chemotherapy (BBW: Capecitabine)   Estrogen     Ciprofloxacin/Levofloxacin   Nafcillin/Dicloxacillin     Clarithromycin/Erythromycin/Azithromycin   Barbiturates      Fluconazole/Itraconazole/Voriconazole/Ketoconazole   Phenytoin (Variable)     Metronidazole   Rifampin     Phenytoin (Variable)   Steroids (Variable/Dose Dependent)      Statins/Fenofibrate/Gemfibrozil   Sucralfate     Steroids (Variable/Dose Dependent)   Other:     Sulfamethoxazole/Trimethoprim        Tramadol         Other:        Comments regarding medication interactions:      x Diseases Affecting INR x Increased Bleeding Risk    CHF Exacerbation (Increases)  History GI Bleed/PUD    Liver Disease (Increases)  Chronic NSAID Use   x Thyroid: Hyper (Increases)  Hypo (Decreases) x Chronic ASA/Antiplatelet Use (Clopidogrel/ Dipyridamole/Prasugrel/Ticagrelor)      Malignancy (Increases)  Abnormal Renal Function (dialysis, renal transplant, SCr ? 2.3 mg/dL)     History of EtOH Abuse: Acute (Increases)   Chronic (Decreases)  Liver Function (cirrhosis, bilirubin >2x ULN with AST/ALT/AP >3x ULN)    Fever (Increases)  Age > 65 years    Acute infection (Increases) x Hypertension/Uncontrolled BP    Diarrhea/Dehydration (Increases)  History of stroke    Other: __________________  Other:___________________       Vitamin K or Blood product  Administration Date                    TSH:    Lab Results   Component Value Date    TSH 2.140 05/25/2021        Hepatic Function Panel:                            Lab Results   Component Value Date    ALKPHOS 96 09/14/2021    ALT 8 09/14/2021    AST 16 09/14/2021    PROT 6.4 09/14/2021    BILITOT 0.2 09/14/2021    BILIDIR <0.2 09/10/2021    IBILI see below 09/10/2021    LABALBU 2.6 09/14/2021    LABALBU 3.6 11/30/2010       Date Warfarin Dose INR Heparin or LMWH HGB/HCT PLT Comment   9/9 (per outside facility) 2.8   (per outside record)       9/10 No dose --       9/11 No dose --       9/12 No dose 1.4       9/13 1 mg 1.2 -- 12.2/37.9 361 Pharmacy Consulted   9/14 <2 mg> 1.2  13.2/42.4 402                                 Assessment:  · Patient is a 44 yo female   · Patient is on warfarin for PAD?    · Goal INR 2-3  · INR 1.2 today    Plan:   · Warfarin 2mg tonight  · Daily PT/INR until the INR is stable within the therapeutic range  · Pharmacist will follow and monitor/adjust dosing as necessary    Thank you for this consult,    Azalea Kendall [facility] PharmD 9/14/2021 12:41 PM

## 2021-09-15 LAB
BLOOD CULTURE, ROUTINE: NORMAL
CULTURE, BLOOD 2: NORMAL
INR BLD: 1.3
METER GLUCOSE: 106 MG/DL (ref 74–99)
METER GLUCOSE: 120 MG/DL (ref 74–99)
METER GLUCOSE: 127 MG/DL (ref 74–99)
METER GLUCOSE: 131 MG/DL (ref 74–99)
PROTHROMBIN TIME: 13.8 SEC (ref 9.3–12.4)

## 2021-09-15 PROCEDURE — 2580000003 HC RX 258: Performed by: SPECIALIST

## 2021-09-15 PROCEDURE — 82962 GLUCOSE BLOOD TEST: CPT

## 2021-09-15 PROCEDURE — 6370000000 HC RX 637 (ALT 250 FOR IP): Performed by: PHYSICIAN ASSISTANT

## 2021-09-15 PROCEDURE — 1200000000 HC SEMI PRIVATE

## 2021-09-15 PROCEDURE — 6370000000 HC RX 637 (ALT 250 FOR IP): Performed by: INTERNAL MEDICINE

## 2021-09-15 PROCEDURE — 85610 PROTHROMBIN TIME: CPT

## 2021-09-15 PROCEDURE — 94640 AIRWAY INHALATION TREATMENT: CPT

## 2021-09-15 PROCEDURE — 36415 COLL VENOUS BLD VENIPUNCTURE: CPT

## 2021-09-15 PROCEDURE — 6360000002 HC RX W HCPCS: Performed by: SPECIALIST

## 2021-09-15 PROCEDURE — 6360000002 HC RX W HCPCS: Performed by: INTERNAL MEDICINE

## 2021-09-15 PROCEDURE — 2580000003 HC RX 258: Performed by: PHYSICIAN ASSISTANT

## 2021-09-15 PROCEDURE — 6360000002 HC RX W HCPCS: Performed by: PHYSICIAN ASSISTANT

## 2021-09-15 PROCEDURE — 6370000000 HC RX 637 (ALT 250 FOR IP): Performed by: SPECIALIST

## 2021-09-15 RX ORDER — TETRAHYDROZOLINE HCL 0.05 %
1 DROPS OPHTHALMIC (EYE) 3 TIMES DAILY
Status: DISCONTINUED | OUTPATIENT
Start: 2021-09-15 | End: 2021-09-16 | Stop reason: HOSPADM

## 2021-09-15 RX ORDER — WARFARIN SODIUM 2 MG/1
2 TABLET ORAL
Status: COMPLETED | OUTPATIENT
Start: 2021-09-15 | End: 2021-09-15

## 2021-09-15 RX ORDER — LORAZEPAM 0.5 MG/1
0.5 TABLET ORAL EVERY 6 HOURS PRN
Status: DISCONTINUED | OUTPATIENT
Start: 2021-09-15 | End: 2021-09-16 | Stop reason: HOSPADM

## 2021-09-15 RX ADMIN — HYDROMORPHONE HYDROCHLORIDE 0.5 MG: 1 INJECTION, SOLUTION INTRAMUSCULAR; INTRAVENOUS; SUBCUTANEOUS at 11:54

## 2021-09-15 RX ADMIN — IPRATROPIUM BROMIDE 0.5 MG: 0.5 SOLUTION RESPIRATORY (INHALATION) at 18:28

## 2021-09-15 RX ADMIN — VANCOMYCIN HYDROCHLORIDE 1250 MG: 10 INJECTION, POWDER, LYOPHILIZED, FOR SOLUTION INTRAVENOUS at 13:10

## 2021-09-15 RX ADMIN — HYDROMORPHONE HYDROCHLORIDE 0.25 MG: 1 INJECTION, SOLUTION INTRAMUSCULAR; INTRAVENOUS; SUBCUTANEOUS at 16:02

## 2021-09-15 RX ADMIN — DIVALPROEX SODIUM 500 MG: 500 TABLET, EXTENDED RELEASE ORAL at 08:24

## 2021-09-15 RX ADMIN — OXYCODONE 5 MG: 5 TABLET ORAL at 09:22

## 2021-09-15 RX ADMIN — LORAZEPAM 1 MG: 1 TABLET ORAL at 04:08

## 2021-09-15 RX ADMIN — DULOXETINE HYDROCHLORIDE 60 MG: 60 CAPSULE, DELAYED RELEASE ORAL at 08:24

## 2021-09-15 RX ADMIN — CLOPIDOGREL BISULFATE 75 MG: 75 TABLET ORAL at 08:24

## 2021-09-15 RX ADMIN — METHOCARBAMOL TABLETS 500 MG: 500 TABLET, COATED ORAL at 08:24

## 2021-09-15 RX ADMIN — GABAPENTIN 300 MG: 300 CAPSULE ORAL at 08:24

## 2021-09-15 RX ADMIN — LEVOTHYROXINE SODIUM 75 MCG: 0.07 TABLET ORAL at 05:59

## 2021-09-15 RX ADMIN — MICONAZOLE NITRATE: 20 POWDER TOPICAL at 08:27

## 2021-09-15 RX ADMIN — IPRATROPIUM BROMIDE 0.5 MG: 0.5 SOLUTION RESPIRATORY (INHALATION) at 15:30

## 2021-09-15 RX ADMIN — MEROPENEM 1000 MG: 1 INJECTION, POWDER, FOR SOLUTION INTRAVENOUS at 03:30

## 2021-09-15 RX ADMIN — HYDROMORPHONE HYDROCHLORIDE 0.5 MG: 1 INJECTION, SOLUTION INTRAMUSCULAR; INTRAVENOUS; SUBCUTANEOUS at 05:59

## 2021-09-15 RX ADMIN — MEROPENEM 1000 MG: 1 INJECTION, POWDER, FOR SOLUTION INTRAVENOUS at 12:19

## 2021-09-15 RX ADMIN — WARFARIN SODIUM 2 MG: 2 TABLET ORAL at 18:14

## 2021-09-15 RX ADMIN — BUSPIRONE HYDROCHLORIDE 10 MG: 10 TABLET ORAL at 10:45

## 2021-09-15 RX ADMIN — OXYCODONE 5 MG: 5 TABLET ORAL at 14:42

## 2021-09-15 RX ADMIN — METHOCARBAMOL TABLETS 500 MG: 500 TABLET, COATED ORAL at 14:01

## 2021-09-15 RX ADMIN — Medication 10 ML: at 08:27

## 2021-09-15 RX ADMIN — ASPIRIN 81 MG: 81 TABLET, COATED ORAL at 08:24

## 2021-09-15 RX ADMIN — HYDROMORPHONE HYDROCHLORIDE 0.5 MG: 1 INJECTION, SOLUTION INTRAMUSCULAR; INTRAVENOUS; SUBCUTANEOUS at 01:00

## 2021-09-15 RX ADMIN — LEVETIRACETAM 500 MG: 500 TABLET, FILM COATED ORAL at 08:24

## 2021-09-15 RX ADMIN — PANTOPRAZOLE SODIUM 40 MG: 40 TABLET, DELAYED RELEASE ORAL at 05:59

## 2021-09-15 RX ADMIN — IPRATROPIUM BROMIDE 0.5 MG: 0.5 SOLUTION RESPIRATORY (INHALATION) at 11:23

## 2021-09-15 RX ADMIN — GABAPENTIN 300 MG: 300 CAPSULE ORAL at 14:01

## 2021-09-15 RX ADMIN — Medication 1 DROP: at 16:47

## 2021-09-15 ASSESSMENT — PAIN DESCRIPTION - FREQUENCY
FREQUENCY: CONTINUOUS
FREQUENCY: CONTINUOUS
FREQUENCY: INTERMITTENT

## 2021-09-15 ASSESSMENT — PAIN DESCRIPTION - ORIENTATION
ORIENTATION: RIGHT

## 2021-09-15 ASSESSMENT — PAIN DESCRIPTION - LOCATION
LOCATION: LEG

## 2021-09-15 ASSESSMENT — PAIN DESCRIPTION - ONSET
ONSET: ON-GOING

## 2021-09-15 ASSESSMENT — PAIN SCALES - GENERAL
PAINLEVEL_OUTOF10: 8
PAINLEVEL_OUTOF10: 8
PAINLEVEL_OUTOF10: 7
PAINLEVEL_OUTOF10: 8
PAINLEVEL_OUTOF10: 0
PAINLEVEL_OUTOF10: 8
PAINLEVEL_OUTOF10: 8

## 2021-09-15 ASSESSMENT — PAIN DESCRIPTION - PAIN TYPE
TYPE: ACUTE PAIN
TYPE: ACUTE PAIN;SURGICAL PAIN
TYPE: ACUTE PAIN
TYPE: ACUTE PAIN
TYPE: ACUTE PAIN;SURGICAL PAIN

## 2021-09-15 ASSESSMENT — PAIN DESCRIPTION - DESCRIPTORS
DESCRIPTORS: ACHING;DISCOMFORT;BURNING
DESCRIPTORS: ACHING;BURNING;PENETRATING
DESCRIPTORS: ACHING;CONSTANT;DISCOMFORT
DESCRIPTORS: ACHING;DISCOMFORT;SORE

## 2021-09-15 ASSESSMENT — PAIN - FUNCTIONAL ASSESSMENT: PAIN_FUNCTIONAL_ASSESSMENT: PREVENTS OR INTERFERES WITH ALL ACTIVE AND SOME PASSIVE ACTIVITIES

## 2021-09-15 NOTE — PLAN OF CARE
Problem: Falls - Risk of:  Goal: Will remain free from falls  Description: Will remain free from falls  9/14/2021 1347 by Andi Agustin RN  Outcome: Met This Shift     Problem: Skin Integrity:  Goal: Will show no infection signs and symptoms  Description: Will show no infection signs and symptoms  9/15/2021 0247 by Krissy Bolaños RN  Outcome: Met This Shift  9/14/2021 1347 by Andi Agustin RN  Outcome: Met This Shift  Goal: Absence of new skin breakdown  Description: Absence of new skin breakdown  9/14/2021 1347 by Andi Agustin RN  Outcome: Met This Shift     Problem: Infection - Surgical Site:  Goal: Will show no infection signs and symptoms  Description: Will show no infection signs and symptoms  9/15/2021 0247 by Krissy Bolaños RN  Outcome: Met This Shift  9/14/2021 1347 by Andi Agustin RN  Outcome: Met This Shift     Problem: Serum Glucose Level - Abnormal:  Goal: Ability to maintain appropriate glucose levels will improve  Description: Ability to maintain appropriate glucose levels will improve  Outcome: Met This Shift     Problem: Sensory Perception - Impaired:  Goal: Ability to maintain a stable neurologic state will improve  Description: Ability to maintain a stable neurologic state will improve  Outcome: Met This Shift

## 2021-09-15 NOTE — PROGRESS NOTES
Pharmacy Consultation Note  (Antibiotic Dosing and Monitoring)    Initial consult date: 9/11/21  Consulting physician: Dr. Shital Price  Drug: Vancomycin  Indication: surgical wound infection / septic arthritis    Age/  Gender Height Weight IBW  Allergy Information   51 y.o./female 5' 7\" (170.2 cm) 200 lb (90.7 kg)     Ideal body weight: 61.6 kg (135 lb 12.9 oz)  Adjusted ideal body weight: 73.2 kg (161 lb 7.7 oz)   Nsaids      Renal Function:  Recent Labs     09/13/21  0610 09/14/21  0535   BUN 5* 6   CREATININE 0.4* 0.5       Intake/Output Summary (Last 24 hours) at 9/15/2021 1251  Last data filed at 9/15/2021 0900  Gross per 24 hour   Intake 300 ml   Output --   Net 300 ml       Vancomycin Monitoring:  Trough:  No results for input(s): VANCOTROUGH in the last 72 hours. Random:    Recent Labs     09/13/21  0610   VANCORANDOM 20.2       Vancomycin Administration Times:    Recent vancomycin administrations                   vancomycin (VANCOCIN) 1,250 mg in dextrose 5 % 250 mL IVPB (mg) 1,250 mg New Bag 09/14/21 2224     1,250 mg New Bag  1105     1,250 mg New Bag  0114     1,250 mg New Bag 09/13/21 1116     1,250 mg New Bag 09/12/21 2245                Assessment:  · Patient is a 45 y.o. female who has been initiated on vancomycin  Estimated Creatinine Clearance: 152 mL/min (based on SCr of 0.5 mg/dL). · To dose vancomycin, pharmacy will be utilizing [employer] calculation software for goal AUC/BENJAMÍN 400-600 mg/L-hr   · Random vanco level 9/13 = 20.2 mcg/ml. Per [employer], this would correspond to AUC/BENJAMÍN 538 and trough 16.6.    · 9/15: last Scr 0.5              Plan:  · Will continue vancomycin 1250 mg IV every 12 hours  · Random vanco level with am labs   · Will continue to monitor renal function   · Clinical pharmacy to follow      Dina England, PharmD, BCPS 9/15/2021 12:52 PM  Phone: [phone]

## 2021-09-15 NOTE — CARE COORDINATION
Care Coordination patient is here with  surgical wound infection and is + for mrsa had a wound vac on but now its off I called [facility] to see if they have and bed and they done have one now but will try for one later phone # is p) [phone] I will follow

## 2021-09-15 NOTE — PROGRESS NOTES
When Dr. Srinivas Clinton was making his rounds .  He stated to me that patient told him she needed sedated to put a picc line in so Dr. Srinivas Clinton stated to me that she can keep the TLC in as long as she does not get a fever or becomes symptomatic  and they can put a picc line in when she is transferred to [facility] .

## 2021-09-15 NOTE — PROGRESS NOTES
[address] [place] [address] [facility]  EDYIDA  Progress Note    Face to face encounter   SUBJECTIVE:  Chief Complaint   Patient presents with    Wound Check     recent aka, wound vac to R leg, sent in for eval     Patient is tolerating medications. No reported adverse drug reactions. No nausea, vomiting, diarrhea. Afebrile  Dressing changed last evening, no strikethrought. Wound is fibrogranular  New bilateral eye irritation and redness. Heavy lacrimation  Review of systems:  As stated above in the chief complaint, otherwise negative.     Medications:  Scheduled Meds:   aspirin  81 mg Oral Daily    cloNIDine  1 patch TransDERmal Weekly    gabapentin  300 mg Oral TID    lidocaine  1 patch TransDERmal Daily    miconazole   Topical BID    warfarin  1 mg Oral Once per day on Mon Wed Fri    warfarin  2 mg Oral Once per day on Sun Tue Thu Sat    atorvastatin  80 mg Oral Nightly    busPIRone  10 mg Oral Daily    clopidogrel  75 mg Oral Daily    divalproex  500 mg Oral Daily    divalproex  1,000 mg Oral Nightly    DULoxetine  60 mg Oral Daily    levETIRAcetam  500 mg Oral BID    levothyroxine  75 mcg Oral Daily    methocarbamol  500 mg Oral TID    metoprolol tartrate  12.5 mg Oral BID    sodium chloride flush  10 mL IntraVENous 2 times per day    insulin lispro  0-12 Units SubCUTAneous TID WC    insulin lispro  0-6 Units SubCUTAneous Nightly    pantoprazole  40 mg Oral QAM AC    ipratropium  0.5 mg Nebulization 4x daily    meropenem  1,000 mg IntraVENous Q8H    vancomycin  1,250 mg IntraVENous Q12H     Continuous Infusions:   sodium chloride      dextrose       PRN Meds:LORazepam, oxyCODONE, sodium chloride flush, sodium chloride, potassium chloride **OR** potassium alternative oral replacement **OR** potassium chloride, magnesium hydroxide, acetaminophen **OR** acetaminophen, glucose, dextrose, glucagon (rDNA), dextrose, trimethobenzamide, HYDROmorphone **OR** HYDROmorphone, albuterol    OBJECTIVE:  /64   Pulse 102   Temp 97.1 °F (36.2 °C) (Temporal)   Resp 18   Ht 5' 7\" (1.702 m)   Wt 200 lb (90.7 kg)   SpO2 94%   BMI 31.32 kg/m²   Temp  Av.8 °F (36.6 °C)  Min: 97.1 °F (36.2 °C)  Max: 98.7 °F (37.1 °C)  Constitutional: The patient is awake, alert, and oriented. Skin: Warm and dry. No rashes were noted. HEENT: + conjunctivitis, + heavy lacrimation, negative blepharitis  Neck: Supple to movements. Chest: No use of accessory muscles to breathe. Symmetrical expansion. No wheezing, crackles or rhonchi. Cardiovascular: S1 and S2 are rhythmic and regular. No murmurs appreciated. Abdomen: Positive bowel sounds to auscultation. Benign to palpation. No masses felt. No hepatosplenomegaly.   Extremities: Right AKA surgical wound dressed, infection foul-smelling drainage improved  Lines: peripheral    Laboratory and Tests Review:  Lab Results   Component Value Date    WBC 11.7 (H) 2021    WBC 7.6 2021    WBC 9.0 2021    HGB 13.2 2021    HCT 42.4 2021    MCV 89.8 2021     2021     Lab Results   Component Value Date    NEUTROABS 8.31 (H) 2021    NEUTROABS 3.98 2021    NEUTROABS 5.17 2021     No results found for: [facility]  Lab Results   Component Value Date    ALT 8 2021    AST 16 2021    ALKPHOS 96 2021    BILITOT 0.2 2021     Lab Results   Component Value Date     2021    K 4.2 2021    K 3.7 2021    CL 99 2021    CO2 31 2021    BUN 6 2021    CREATININE 0.5 2021    CREATININE 0.4 2021    CREATININE 0.5 2021    GFRAA >60 2021    LABGLOM >60 2021    GLUCOSE 111 2021    GLUCOSE 70 2011    PROT 6.4 2021    LABALBU 2.6 2021    LABALBU 3.6 2010    CALCIUM 8.8 2021    BILITOT 0.2 2021    ALKPHOS 96 2021    AST 16 2021    ALT 8 2021     Lab Results   Component TID  · Pharmacy dosing Vancomycin    · Picc line to be placed at [facility]  · Ortho following- recommends transfer to [facility]  · Wound care following- Wet to dry in place  · Check cultures  · Labs reviewed   · Patient is awaiting transfer to [facility] MD  10:28 AM  9/15/2021

## 2021-09-15 NOTE — PROGRESS NOTES
Chief Complaint:  Chief Complaint   Patient presents with    Wound Check     recent aka, wound vac to R leg, sent in for eval     Postoperative or surgical complication, initial encounter     Subjective:    Drowsy today. Seems to be getting the dilaudid 0.5 mg IV around the clock. Objective:    /64   Pulse 102   Temp 97.1 °F (36.2 °C) (Temporal)   Resp 18   Ht 5' 7\" (1.702 m)   Wt 200 lb (90.7 kg)   SpO2 94%   BMI 31.32 kg/m²     Current medications that patient is taking have been reviewed.     General appearance: NAD, conversant chronically ill appearing  HEENT: AT/NC, MMM  Neck: FROM, supple  Lungs: Clear to auscultation, WOB normal  CV: RRR, no MRGs  Abdomen: Soft, non-tender; no masses or HSM, +BS  Extremities: RLE AKA with wound vac, no visible cellulitis or purulence  Skin: no rash, lesions or ulcers  Psych: Calm and cooperative  Neuro: Slightly lethargic, protecting airway, wakes up to voice    Labs:  CBC with Differential:    Lab Results   Component Value Date    WBC 11.7 09/14/2021    RBC 4.72 09/14/2021    HGB 13.2 09/14/2021    HCT 42.4 09/14/2021     09/14/2021    MCV 89.8 09/14/2021    MCH 28.0 09/14/2021    MCHC 31.1 09/14/2021    RDW 16.0 09/14/2021    SEGSPCT 79 09/28/2013    BANDSPCT 1 07/08/2015    LYMPHOPCT 19.1 09/14/2021    MONOPCT 9.6 09/14/2021    BASOPCT 0.3 09/14/2021    MONOSABS 1.17 09/14/2021    LYMPHSABS 2.22 09/14/2021    EOSABS 0.00 09/14/2021    BASOSABS 0.00 09/14/2021     CMP:    Lab Results   Component Value Date     09/14/2021    K 4.2 09/14/2021    K 3.7 09/11/2021    CL 99 09/14/2021    CO2 31 09/14/2021    BUN 6 09/14/2021    CREATININE 0.5 09/14/2021    GFRAA >60 09/14/2021    LABGLOM >60 09/14/2021    GLUCOSE 111 09/14/2021    GLUCOSE 70 04/28/2011    PROT 6.4 09/14/2021    LABALBU 2.6 09/14/2021    LABALBU 3.6 11/30/2010    CALCIUM 8.8 09/14/2021    BILITOT 0.2 09/14/2021    ALKPHOS 96 09/14/2021    AST 16 09/14/2021    ALT 8 09/14/2021 Assessment/Plan:  Principal Problem:    Postoperative or surgical complication, initial encounter  Active Problems:    Depressed bipolar II disorder (HCC)    Anticoagulant long-term use    Type 2 diabetes mellitus with diabetic peripheral angiopathy without gangrene, without long-term current use of insulin (HCC)    Chronic obstructive pulmonary disease (HCC)    PVD (peripheral vascular disease) ([facility] [address]    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  *       Continue IV abx    Keep on continuous pulse ox    Reduce dilaudid and lorazepam dose due to lethargic in setting of preexisting psychopolypharmacy    Glucose well controlled    Continue Coumadin    Awaiting bed at [address] [place] [address] [address], MD    1:27 PM  9/15/2021

## 2021-09-15 NOTE — PLAN OF CARE
Problem: Falls - Risk of:  Goal: Will remain free from falls  Description: Will remain free from falls  Outcome: Met This Shift  Goal: Absence of physical injury  Description: Absence of physical injury  Outcome: Met This Shift     Problem: Pain:  Goal: Pain level will decrease  Description: Pain level will decrease  Outcome: Met This Shift  Goal: Control of acute pain  Description: Control of acute pain  Outcome: Met This Shift  Goal: Control of chronic pain  Description: Control of chronic pain  Outcome: Met This Shift     Problem: Skin Integrity:  Goal: Will show no infection signs and symptoms  Description: Will show no infection signs and symptoms  9/15/2021 0247 by Norina Lesch, RN  Outcome: Met This Shift  Goal: Absence of new skin breakdown  Description: Absence of new skin breakdown  Outcome: Met This Shift     Problem: Infection - Surgical Site:  Goal: Will show no infection signs and symptoms  Description: Will show no infection signs and symptoms  9/15/2021 0247 by Norina Lesch, RN  Outcome: Met This Shift     Problem: Serum Glucose Level - Abnormal:  Goal: Ability to maintain appropriate glucose levels will improve  Description: Ability to maintain appropriate glucose levels will improve  9/15/2021 0247 by Norina Lesch, RN  Outcome: Met This Shift     Problem: Sensory Perception - Impaired:  Goal: Ability to maintain a stable neurologic state will improve  Description: Ability to maintain a stable neurologic state will improve  9/15/2021 0247 by Norina Lesch, RN  Outcome: Met This Shift

## 2021-09-15 NOTE — PROGRESS NOTES
OT SESSION ATTEMPT     Date:9/15/2021  Patient Name: Lynn Ernandez  MRN: [id number]  : 1970  Room: [address] [place] [address]-A     Occupational therapy orders received/chart review completed and OT session attempted this date. Pt declined Occupational Therapy  this date d/t awaiting transfer to [facility]. OT will discharge evaluation orders at this time.     Debra Arambula, [address] OTR/L #[id number]

## 2021-09-15 NOTE — PROGRESS NOTES
Pharmacy Consultation Note  (Warfarin Dosing and Monitoring)    Initial consult date: 9/13/21  Consulting physician: Dr. Jesus Sanchez    Allergies:  Nsaids    45 y.o. female    Ht Readings from Last 1 Encounters:   09/11/21 5' 7\" (1.702 m)     Wt Readings from Last 1 Encounters:   09/10/21 200 lb (90.7 kg)         Warfarin Indication Target   INR Range Home Dose  (if applicable) Diet/Feeding Tube   (Enteral feeds, nutritional drinks, increased Vitamin K in diet can decrease INR)   PAD? Per record from [facility]: ICD I82.501: Chronic embolism and thrombosis of unspecified deep veins of right lower extremity 2-3 1 mg MWF;   2 mg Tu, Thu, Sat Sun Regular       x Home Med? Meds Increasing INR x Home Med?  Meds Decreasing INR     Allopurinol    Azathioprine     Amiodarone/Propafenone/Dronedarone   Carbamazepine     Androgens   Cholestyramine     Chemotherapy (BBW: Capecitabine)   Estrogen     Ciprofloxacin/Levofloxacin   Nafcillin/Dicloxacillin     Clarithromycin/Erythromycin/Azithromycin   Barbiturates      Fluconazole/Itraconazole/Voriconazole/Ketoconazole   Phenytoin (Variable)     Metronidazole   Rifampin     Phenytoin (Variable)   Steroids (Variable/Dose Dependent)      Statins/Fenofibrate/Gemfibrozil   Sucralfate     Steroids (Variable/Dose Dependent)   Other:     Sulfamethoxazole/Trimethoprim        Tramadol         Other:        Comments regarding medication interactions:      x Diseases Affecting INR x Increased Bleeding Risk    CHF Exacerbation (Increases)  History GI Bleed/PUD    Liver Disease (Increases)  Chronic NSAID Use   x Thyroid: Hyper (Increases)  Hypo (Decreases) x Chronic ASA/Antiplatelet Use (Clopidogrel/ Dipyridamole/Prasugrel/Ticagrelor)      Malignancy (Increases)  Abnormal Renal Function (dialysis, renal transplant, SCr ? 2.3 mg/dL)     History of EtOH Abuse: Acute (Increases)   Chronic (Decreases)  Liver Function (cirrhosis, bilirubin >2x ULN with AST/ALT/AP >3x ULN)    Fever

## 2021-09-16 VITALS
HEART RATE: 104 BPM | RESPIRATION RATE: 16 BRPM | HEIGHT: 67 IN | TEMPERATURE: 97.9 F | WEIGHT: 176.9 LBS | BODY MASS INDEX: 27.76 KG/M2 | OXYGEN SATURATION: 93 % | DIASTOLIC BLOOD PRESSURE: 56 MMHG | SYSTOLIC BLOOD PRESSURE: 100 MMHG

## 2021-09-16 LAB
ANION GAP SERPL CALCULATED.3IONS-SCNC: 6 MMOL/L (ref 7–16)
ANISOCYTOSIS: ABNORMAL
BASOPHILS ABSOLUTE: 0.03 E9/L (ref 0–0.2)
BASOPHILS RELATIVE PERCENT: 0.2 % (ref 0–2)
BUN BLDV-MCNC: 8 MG/DL (ref 6–20)
BURR CELLS: ABNORMAL
CALCIUM SERPL-MCNC: 8.5 MG/DL (ref 8.6–10.2)
CHLORIDE BLD-SCNC: 100 MMOL/L (ref 98–107)
CO2: 30 MMOL/L (ref 22–29)
CREAT SERPL-MCNC: 0.5 MG/DL (ref 0.5–1)
EOSINOPHILS ABSOLUTE: 0.08 E9/L (ref 0.05–0.5)
EOSINOPHILS RELATIVE PERCENT: 0.6 % (ref 0–6)
GFR AFRICAN AMERICAN: >60
GFR NON-AFRICAN AMERICAN: >60 ML/MIN/1.73
GLUCOSE BLD-MCNC: 105 MG/DL (ref 74–99)
HCT VFR BLD CALC: 38.4 % (ref 34–48)
HEMOGLOBIN: 12.2 G/DL (ref 11.5–15.5)
IMMATURE GRANULOCYTES #: 0.09 E9/L
IMMATURE GRANULOCYTES %: 0.6 % (ref 0–5)
INR BLD: 1.4
LYMPHOCYTES ABSOLUTE: 3.12 E9/L (ref 1.5–4)
LYMPHOCYTES RELATIVE PERCENT: 21.5 % (ref 20–42)
MCH RBC QN AUTO: 28.4 PG (ref 26–35)
MCHC RBC AUTO-ENTMCNC: 31.8 % (ref 32–34.5)
MCV RBC AUTO: 89.5 FL (ref 80–99.9)
METER GLUCOSE: 106 MG/DL (ref 74–99)
METER GLUCOSE: 139 MG/DL (ref 74–99)
METER GLUCOSE: 179 MG/DL (ref 74–99)
MONOCYTES ABSOLUTE: 2.06 E9/L (ref 0.1–0.95)
MONOCYTES RELATIVE PERCENT: 14.2 % (ref 2–12)
NEUTROPHILS ABSOLUTE: 9.16 E9/L (ref 1.8–7.3)
NEUTROPHILS RELATIVE PERCENT: 62.9 % (ref 43–80)
PDW BLD-RTO: 16.4 FL (ref 11.5–15)
PLATELET # BLD: 351 E9/L (ref 130–450)
PMV BLD AUTO: 8.4 FL (ref 7–12)
POIKILOCYTES: ABNORMAL
POLYCHROMASIA: ABNORMAL
POTASSIUM SERPL-SCNC: 3.9 MMOL/L (ref 3.5–5)
PROTHROMBIN TIME: 15.6 SEC (ref 9.3–12.4)
RBC # BLD: 4.29 E12/L (ref 3.5–5.5)
SCHISTOCYTES: ABNORMAL
SODIUM BLD-SCNC: 136 MMOL/L (ref 132–146)
TEAR DROP CELLS: ABNORMAL
VANCOMYCIN RANDOM: 25.1 MCG/ML (ref 5–40)
WBC # BLD: 14.5 E9/L (ref 4.5–11.5)

## 2021-09-16 PROCEDURE — 80202 ASSAY OF VANCOMYCIN: CPT

## 2021-09-16 PROCEDURE — 6360000002 HC RX W HCPCS: Performed by: SPECIALIST

## 2021-09-16 PROCEDURE — 85025 COMPLETE CBC W/AUTO DIFF WBC: CPT

## 2021-09-16 PROCEDURE — 6360000002 HC RX W HCPCS: Performed by: INTERNAL MEDICINE

## 2021-09-16 PROCEDURE — 85610 PROTHROMBIN TIME: CPT

## 2021-09-16 PROCEDURE — 6370000000 HC RX 637 (ALT 250 FOR IP): Performed by: PHYSICIAN ASSISTANT

## 2021-09-16 PROCEDURE — 94640 AIRWAY INHALATION TREATMENT: CPT

## 2021-09-16 PROCEDURE — 2580000003 HC RX 258: Performed by: PHYSICIAN ASSISTANT

## 2021-09-16 PROCEDURE — 2580000003 HC RX 258: Performed by: SPECIALIST

## 2021-09-16 PROCEDURE — 36415 COLL VENOUS BLD VENIPUNCTURE: CPT

## 2021-09-16 PROCEDURE — 6370000000 HC RX 637 (ALT 250 FOR IP): Performed by: INTERNAL MEDICINE

## 2021-09-16 PROCEDURE — 82962 GLUCOSE BLOOD TEST: CPT

## 2021-09-16 PROCEDURE — 80048 BASIC METABOLIC PNL TOTAL CA: CPT

## 2021-09-16 RX ORDER — WARFARIN SODIUM 2 MG/1
2 TABLET ORAL
Status: COMPLETED | OUTPATIENT
Start: 2021-09-16 | End: 2021-09-16

## 2021-09-16 RX ADMIN — HYDROMORPHONE HYDROCHLORIDE 0.25 MG: 1 INJECTION, SOLUTION INTRAMUSCULAR; INTRAVENOUS; SUBCUTANEOUS at 14:04

## 2021-09-16 RX ADMIN — GABAPENTIN 300 MG: 300 CAPSULE ORAL at 00:16

## 2021-09-16 RX ADMIN — MEROPENEM 1000 MG: 1 INJECTION, POWDER, FOR SOLUTION INTRAVENOUS at 00:15

## 2021-09-16 RX ADMIN — OXYCODONE 5 MG: 5 TABLET ORAL at 18:50

## 2021-09-16 RX ADMIN — GABAPENTIN 300 MG: 300 CAPSULE ORAL at 13:53

## 2021-09-16 RX ADMIN — INSULIN LISPRO 2 UNITS: 100 INJECTION, SOLUTION INTRAVENOUS; SUBCUTANEOUS at 12:10

## 2021-09-16 RX ADMIN — ATORVASTATIN CALCIUM 80 MG: 40 TABLET, FILM COATED ORAL at 00:17

## 2021-09-16 RX ADMIN — METHOCARBAMOL TABLETS 500 MG: 500 TABLET, COATED ORAL at 13:53

## 2021-09-16 RX ADMIN — DIVALPROEX SODIUM 500 MG: 500 TABLET, EXTENDED RELEASE ORAL at 09:15

## 2021-09-16 RX ADMIN — VANCOMYCIN HYDROCHLORIDE 1250 MG: 10 INJECTION, POWDER, LYOPHILIZED, FOR SOLUTION INTRAVENOUS at 00:37

## 2021-09-16 RX ADMIN — DULOXETINE HYDROCHLORIDE 60 MG: 60 CAPSULE, DELAYED RELEASE ORAL at 09:16

## 2021-09-16 RX ADMIN — MICONAZOLE NITRATE: 20 POWDER TOPICAL at 00:21

## 2021-09-16 RX ADMIN — LORAZEPAM 0.5 MG: 0.5 TABLET ORAL at 00:17

## 2021-09-16 RX ADMIN — HYDROMORPHONE HYDROCHLORIDE 0.25 MG: 1 INJECTION, SOLUTION INTRAMUSCULAR; INTRAVENOUS; SUBCUTANEOUS at 03:06

## 2021-09-16 RX ADMIN — METOPROLOL TARTRATE 12.5 MG: 25 TABLET, FILM COATED ORAL at 00:28

## 2021-09-16 RX ADMIN — MEROPENEM 1000 MG: 1 INJECTION, POWDER, FOR SOLUTION INTRAVENOUS at 16:21

## 2021-09-16 RX ADMIN — OXYCODONE 5 MG: 5 TABLET ORAL at 00:18

## 2021-09-16 RX ADMIN — MICONAZOLE NITRATE: 20 POWDER TOPICAL at 09:15

## 2021-09-16 RX ADMIN — ASPIRIN 81 MG: 81 TABLET, COATED ORAL at 09:16

## 2021-09-16 RX ADMIN — VANCOMYCIN HYDROCHLORIDE 1250 MG: 10 INJECTION, POWDER, LYOPHILIZED, FOR SOLUTION INTRAVENOUS at 10:35

## 2021-09-16 RX ADMIN — Medication 1 DROP: at 09:15

## 2021-09-16 RX ADMIN — IPRATROPIUM BROMIDE 0.5 MG: 0.5 SOLUTION RESPIRATORY (INHALATION) at 17:31

## 2021-09-16 RX ADMIN — DIVALPROEX SODIUM 1000 MG: 500 TABLET, EXTENDED RELEASE ORAL at 00:16

## 2021-09-16 RX ADMIN — LEVETIRACETAM 500 MG: 500 TABLET, FILM COATED ORAL at 09:14

## 2021-09-16 RX ADMIN — PANTOPRAZOLE SODIUM 40 MG: 40 TABLET, DELAYED RELEASE ORAL at 07:38

## 2021-09-16 RX ADMIN — WARFARIN SODIUM 2 MG: 2 TABLET ORAL at 18:47

## 2021-09-16 RX ADMIN — HYDROMORPHONE HYDROCHLORIDE 0.25 MG: 1 INJECTION, SOLUTION INTRAMUSCULAR; INTRAVENOUS; SUBCUTANEOUS at 09:14

## 2021-09-16 RX ADMIN — LORAZEPAM 0.5 MG: 0.5 TABLET ORAL at 14:04

## 2021-09-16 RX ADMIN — Medication 1 DROP: at 13:53

## 2021-09-16 RX ADMIN — Medication 10 ML: at 09:14

## 2021-09-16 RX ADMIN — MEROPENEM 1000 MG: 1 INJECTION, POWDER, FOR SOLUTION INTRAVENOUS at 10:00

## 2021-09-16 RX ADMIN — LEVOTHYROXINE SODIUM 75 MCG: 0.07 TABLET ORAL at 07:38

## 2021-09-16 RX ADMIN — GABAPENTIN 300 MG: 300 CAPSULE ORAL at 09:14

## 2021-09-16 RX ADMIN — OXYCODONE 5 MG: 5 TABLET ORAL at 12:10

## 2021-09-16 RX ADMIN — IPRATROPIUM BROMIDE 0.5 MG: 0.5 SOLUTION RESPIRATORY (INHALATION) at 08:17

## 2021-09-16 RX ADMIN — Medication 10 ML: at 19:43

## 2021-09-16 RX ADMIN — LEVETIRACETAM 500 MG: 500 TABLET, FILM COATED ORAL at 00:36

## 2021-09-16 RX ADMIN — Medication 1 DROP: at 00:16

## 2021-09-16 RX ADMIN — Medication 10 ML: at 00:16

## 2021-09-16 RX ADMIN — HYDROMORPHONE HYDROCHLORIDE 0.25 MG: 1 INJECTION, SOLUTION INTRAMUSCULAR; INTRAVENOUS; SUBCUTANEOUS at 19:43

## 2021-09-16 RX ADMIN — BUSPIRONE HYDROCHLORIDE 10 MG: 10 TABLET ORAL at 09:15

## 2021-09-16 RX ADMIN — CLOPIDOGREL BISULFATE 75 MG: 75 TABLET ORAL at 09:14

## 2021-09-16 RX ADMIN — METHOCARBAMOL TABLETS 500 MG: 500 TABLET, COATED ORAL at 00:45

## 2021-09-16 RX ADMIN — METHOCARBAMOL TABLETS 500 MG: 500 TABLET, COATED ORAL at 09:16

## 2021-09-16 ASSESSMENT — PAIN SCALES - GENERAL
PAINLEVEL_OUTOF10: 9
PAINLEVEL_OUTOF10: 8
PAINLEVEL_OUTOF10: 10
PAINLEVEL_OUTOF10: 8
PAINLEVEL_OUTOF10: 10
PAINLEVEL_OUTOF10: 10

## 2021-09-16 ASSESSMENT — PAIN DESCRIPTION - ONSET: ONSET: ON-GOING

## 2021-09-16 ASSESSMENT — PAIN DESCRIPTION - PROGRESSION: CLINICAL_PROGRESSION: NOT CHANGED

## 2021-09-16 ASSESSMENT — PAIN DESCRIPTION - PAIN TYPE: TYPE: ACUTE PAIN

## 2021-09-16 ASSESSMENT — PAIN DESCRIPTION - DESCRIPTORS: DESCRIPTORS: ACHING;CONSTANT;DISCOMFORT

## 2021-09-16 ASSESSMENT — PAIN - FUNCTIONAL ASSESSMENT: PAIN_FUNCTIONAL_ASSESSMENT: PREVENTS OR INTERFERES WITH MANY ACTIVE NOT PASSIVE ACTIVITIES

## 2021-09-16 ASSESSMENT — PAIN DESCRIPTION - FREQUENCY: FREQUENCY: CONTINUOUS

## 2021-09-16 ASSESSMENT — PAIN DESCRIPTION - ORIENTATION: ORIENTATION: RIGHT

## 2021-09-16 NOTE — PROGRESS NOTES
[address] [place] [address] [facility]  EDYIDA  Progress Note    Face to face encounter   SUBJECTIVE:  Chief Complaint   Patient presents with    Wound Check     recent aka, wound vac to R leg, sent in for eval     Patient is tolerating medications. No reported adverse drug reactions. No nausea, vomiting, diarrhea. Afebrile  Patient refused dressing changes yesterday. Is frustrated this morning due to reduce frequency of pain medication. Improving eye redness with drops    Review of systems:  As stated above in the chief complaint, otherwise negative.     Medications:  Scheduled Meds:   warfarin (COUMADIN) daily dosing (placeholder)   Other RX Placeholder    tetrahydrozoline  1 drop Both Eyes TID    aspirin  81 mg Oral Daily    cloNIDine  1 patch TransDERmal Weekly    gabapentin  300 mg Oral TID    lidocaine  1 patch TransDERmal Daily    miconazole   Topical BID    atorvastatin  80 mg Oral Nightly    busPIRone  10 mg Oral Daily    clopidogrel  75 mg Oral Daily    divalproex  500 mg Oral Daily    divalproex  1,000 mg Oral Nightly    DULoxetine  60 mg Oral Daily    levETIRAcetam  500 mg Oral BID    levothyroxine  75 mcg Oral Daily    methocarbamol  500 mg Oral TID    metoprolol tartrate  12.5 mg Oral BID    sodium chloride flush  10 mL IntraVENous 2 times per day    insulin lispro  0-12 Units SubCUTAneous TID WC    insulin lispro  0-6 Units SubCUTAneous Nightly    pantoprazole  40 mg Oral QAM AC    ipratropium  0.5 mg Nebulization 4x daily    meropenem  1,000 mg IntraVENous Q8H    vancomycin  1,250 mg IntraVENous Q12H     Continuous Infusions:   sodium chloride      dextrose       PRN Meds:HYDROmorphone, LORazepam, oxyCODONE, sodium chloride flush, sodium chloride, potassium chloride **OR** potassium alternative oral replacement **OR** potassium chloride, magnesium hydroxide, acetaminophen **OR** acetaminophen, glucose, dextrose, glucagon (rDNA), dextrose, trimethobenzamide, albuterol    OBJECTIVE:  /62   Pulse 91   Temp 97.5 °F (36.4 °C) (Temporal)   Resp 16   Ht 5' 7\" (1.702 m)   Wt 176 lb 14.4 oz (80.2 kg)   SpO2 95%   BMI 27.71 kg/m²   Temp  Av.7 °F (36.5 °C)  Min: 97.5 °F (36.4 °C)  Max: 98.1 °F (36.7 °C)  Constitutional: The patient is awake, alert, and oriented. Skin: Warm and dry. No rashes were noted. HEENT: Improving conjunctivitis, negative blepharitis. Round reactive pupils, moist mucous membrances. No ulcerations no thrush  Neck: Supple to movements. Chest: No use of accessory muscles to breathe. Symmetrical expansion. No wheezing, crackles or rhonchi. Cardiovascular: S1 and S2 are rhythmic and regular. No murmurs appreciated. Abdomen: Positive bowel sounds to auscultation. Benign to palpation. No masses felt. No hepatosplenomegaly.   Extremities: Right AKA surgical wound dressed, infection foul-smelling drainage  Lines: peripheral    Laboratory and Tests Review:  Lab Results   Component Value Date    WBC 14.5 (H) 2021    WBC 11.7 (H) 2021    WBC 7.6 2021    HGB 12.2 2021    HCT 38.4 2021    MCV 89.5 2021     2021     Lab Results   Component Value Date    NEUTROABS 9.16 (H) 2021    NEUTROABS 8.31 (H) 2021    NEUTROABS 3.98 2021     No results found for: [facility]  Lab Results   Component Value Date    ALT 8 2021    AST 16 2021    ALKPHOS 96 2021    BILITOT 0.2 2021     Lab Results   Component Value Date     2021    K 3.9 2021    K 3.7 2021     2021    CO2 30 2021    BUN 8 2021    CREATININE 0.5 2021    CREATININE 0.5 2021    CREATININE 0.4 2021    GFRAA >60 2021    LABGLOM >60 2021    GLUCOSE 105 2021    GLUCOSE 70 2011    PROT 6.4 2021    LABALBU 2.6 2021    LABALBU 3.6 2010    CALCIUM 8.5 2021    BILITOT 0.2 2021    ALKPHOS 96 2021 AST 16 09/14/2021    ALT 8 09/14/2021     Lab Results   Component Value Date    CRP 4.7 (H) 09/10/2021    CRP 1.9 (H) 03/19/2015     Lab Results   Component Value Date    SEDRATE 45 (H) 09/10/2021    SEDRATE 7 07/02/2015    SEDRATE 3 03/19/2015     Radiology:  Reviewed    Microbiology:   Lab Results   Component Value Date    BC 5 Days no growth 09/10/2021    BC 5 Days- no growth 10/05/2017    BC 5 Days- no growth 07/01/2015    ORG Anaerobic gram negative fran 09/10/2021    ORG Corynebacterium species 09/10/2021    ORG Escherichia coli 09/10/2021     Lab Results   Component Value Date    BLOODCULT2 5 Days no growth 09/10/2021    BLOODCULT2 5 Days- no growth 10/05/2017    BLOODCULT2 5 Days- no growth 07/01/2015    ORG Anaerobic gram negative fran 09/10/2021    ORG Corynebacterium species 09/10/2021    ORG Escherichia coli 09/10/2021     WOUND/ABSCESS   Date Value Ref Range Status   09/10/2021 Heavy growth  Final     No results found for: RESPSMEAR  No results found for: MPNEUMO, CLAMYDCU, LABLEGI, AFBCX, FUNGSM, LABFUNG  No results found for: CULTRESP  No results found for: CXCATHTIP  No results found for: BFCS  No results found for: CXSURG  Urine Culture, Routine   Date Value Ref Range Status   09/10/2021 <25,000 CFU/ml  Final   09/16/2018   Final    ,000 CFU/mL  Mixed casey isolated. Further workup and sensitivity testing  is not routinely indicated and will not be performed. Mixed casey isolated includes:  Mixed gram positive organisms     07/21/2015   Final    ,000 CFU/mL  Mixed casey isolated. Further workup and sensitivity testing  is not routinely indicated and will not be performed.   Mixed casey isolated includes:  Mixed gram positive organisms       MRSA Culture Only   Date Value Ref Range Status   02/04/2019 Methicillin resistant Staph aureus not isolated  Final     Assessment:  · Surgical wound infection  · Rule out Septic arthritis of the right hip  · Polymicrobial infection     Plan:    · Cont Vanco and Merrem  · ContTetrahydrozoline eye drop TID  · Pharmacy dosing Vancomycin    · Picc line to be placed at [facility]  · Ortho following- recommends transfer to [facility]  · Wound care following- Wet to dry in place.  Dressing to be changed today  · Check cultures  · Labs reviewed   · Patient is awaiting transfer to [facility] MD  12:52 PM  9/16/2021

## 2021-09-16 NOTE — PROGRESS NOTES
Nurse to nurse called to [facility] in [place] and spoke to [address] . I did notify Hope that per patient she has had this TLC since July and refusing a picc line here because she needs to be given anesthesia .

## 2021-09-16 NOTE — PROGRESS NOTES
Pharmacy Consultation Note  (Antibiotic Dosing and Monitoring)    Initial consult date: 9/11/21  Consulting physician: Dr. Kenneth Ledesma  Drug: Vancomycin  Indication: surgical wound infection / septic arthritis    Age/  Gender Height Weight IBW  Allergy Information   51 y.o./female 5' 7\" (170.2 cm) 200 lb (90.7 kg)     Ideal body weight: 61.6 kg (135 lb 12.9 oz)  Adjusted ideal body weight: 69.1 kg (152 lb 3.9 oz)   Nsaids      Renal Function:  Recent Labs     09/14/21  0535 09/16/21  0745   BUN 6 8   CREATININE 0.5 0.5       Intake/Output Summary (Last 24 hours) at 9/16/2021 1416  Last data filed at 9/16/2021 1347  Gross per 24 hour   Intake 600 ml   Output 3000 ml   Net -2400 ml       Vancomycin Monitoring:  Trough:  No results for input(s): VANCOTROUGH in the last 72 hours. Random:    Recent Labs     09/16/21  0745   VANCORANDOM 25.1       Vancomycin Administration Times:    Recent vancomycin administrations                   vancomycin (VANCOCIN) 1,250 mg in dextrose 5 % 250 mL IVPB (mg) 1,250 mg New Bag 09/16/21 1035     1,250 mg New Bag  0037     1,250 mg New Bag 09/15/21 1310     1,250 mg New Bag 09/14/21 2224     1,250 mg New Bag  1105     1,250 mg New Bag  0114                Assessment:  · Patient is a 45 y.o. female who has been initiated on vancomycin  Estimated Creatinine Clearance: 143 mL/min (based on SCr of 0.5 mg/dL). · To dose vancomycin, pharmacy will be utilizing [employer] calculation software for goal AUC/BENJAMÍN 400-600 mg/L-hr   · Random vanco level 9/13 = 20.2 mcg/ml. Per [employer], this would correspond to AUC/BENJAMÍN 538 and trough 16.6.    · 9/16: Scr 0.5, random level is 25.1 mcg/mL (~7 hours post dose)      Plan:  · Decrease dose to vancomycin 1000 mg IV q 12 hr (est AUC/BENJAMÍN 477, trough 15.3 mmcg/mL)  · Random vanco level with am labs   · Will continue to monitor renal function   · Clinical pharmacy to follow      Adan Lopez PharmD, BCPS 9/16/2021 2:16 PM  Phone: [phone]

## 2021-09-16 NOTE — PROGRESS NOTES
Pharmacy Consultation Note  (Warfarin Dosing and Monitoring)    Initial consult date: 9/13/21  Consulting physician: Dr. David Larsen    Allergies:  Nsaids    45 y.o. female    Ht Readings from Last 1 Encounters:   09/11/21 5' 7\" (1.702 m)     Wt Readings from Last 1 Encounters:   09/16/21 176 lb 14.4 oz (80.2 kg)         Warfarin Indication Target   INR Range Home Dose  (if applicable) Diet/Feeding Tube   (Enteral feeds, nutritional drinks, increased Vitamin K in diet can decrease INR)   PAD? 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 Meds Increasing INR x Home Med?  Meds Decreasing INR     Allopurinol    Azathioprine     Amiodarone/Propafenone/Dronedarone   Carbamazepine     Androgens   Cholestyramine     Chemotherapy (BBW: Capecitabine)   Estrogen     Ciprofloxacin/Levofloxacin   Nafcillin/Dicloxacillin     Clarithromycin/Erythromycin/Azithromycin   Barbiturates      Fluconazole/Itraconazole/Voriconazole/Ketoconazole   Phenytoin (Variable)     Metronidazole   Rifampin     Phenytoin (Variable)   Steroids (Variable/Dose Dependent)      Statins/Fenofibrate/Gemfibrozil   Sucralfate     Steroids (Variable/Dose Dependent)   Other:     Sulfamethoxazole/Trimethoprim        Tramadol         Other:        Comments regarding medication interactions:      x Diseases Affecting INR x Increased Bleeding Risk    CHF Exacerbation (Increases)  History GI Bleed/PUD    Liver Disease (Increases)  Chronic NSAID Use   x Thyroid: Hyper (Increases)  Hypo (Decreases) x Chronic ASA/Antiplatelet Use (Clopidogrel/ Dipyridamole/Prasugrel/Ticagrelor)      Malignancy (Increases)  Abnormal Renal Function (dialysis, renal transplant, SCr ? 2.3 mg/dL)     History of EtOH Abuse: Acute (Increases)   Chronic (Decreases)  Liver Function (cirrhosis, bilirubin >2x ULN with AST/ALT/AP >3x ULN)

## 2021-09-16 NOTE — CARE COORDINATION
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 She is here due to a surgical wound infection and she is on iv vanco and iv Merrem . She has a wet to dry dressing in place. I called [facility] this am and they still have no beds today. I will follow

## 2021-09-17 NOTE — DISCHARGE SUMMARY
Physician Discharge Summary     Patient ID:  Minnie Gonzalez  [id number]  45 y.o.  1970    Admit date: 9/10/2021    Discharge date and time:  9/16/2021     Admission Diagnoses:   Chief Complaint   Patient presents with    Wound Check     recent aka, wound vac to R leg, sent in for eval      Postoperative or surgical complication, initial encounter     Discharge Diagnoses:   Principal Problem:    Postoperative or surgical complication, initial encounter  Active Problems:    Depressed bipolar II disorder ([facility]    Anticoagulant long-term use    Type 2 diabetes mellitus with diabetic peripheral angiopathy without gangrene, without long-term current use of insulin (HCC)    Chronic obstructive pulmonary disease (HCC)    PVD (peripheral vascular disease) ([facility] [address]    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 *       Consults: ID and orthopedic surgery    Procedures: None    Hospital Course: The patient is a 45 y.o. female of [address] [place] [address] MD Sarah with significant past medical history of longstanding peripheral vascular disease who follows with vascular surgeon in [facility]-recent AKA at [facility] secondary to failed revascularization procedures of her right lower extremity. For this reason patient ultimately had to undergo right above-the-knee amputation. This was complicated by infection which ultimately required evaluation at [facility] in [place] and patient eventually had to undergo hip disarticulation requiring involvement of advanced plastic surgery and orthopedics at that facility. patient did have a wound VAC placed after these procedures. She was convalescing and following up with [facility] wound care for management of the wounds. Over this time she noted that wound VAC was recently not sealing properly secondary to purulent drainage. Patient denies any rigors, high fevers. Secondary to foul-smelling wound and ongoing drainage from surgical site of her right stump, she proceeded to the ER for further evaluation.      Attempt was made to transfer patient to [facility] directly from the emergency department, however a bed was not immediately available. She was kept on antibiotics in the hospital with ID. Otherwise her stay was uneventful. She was transferred to [facility] for further management after bed eventually opened up. Discharge Exam:  Vitals:    09/15/21 1828 09/16/21 0003 09/16/21 0803 09/16/21 1517   BP:  104/68 108/62 (!) 100/56   Pulse:  108 91 104   Resp:  14 16 16   Temp:  97.6 °F (36.4 °C) 97.5 °F (36.4 °C) 97.9 °F (36.6 °C)   TempSrc:  Temporal Temporal Temporal   SpO2: 97%  95% 93%   Weight:  176 lb 14.4 oz (80.2 kg)     Height:            General appearance: NAD, conversant chronically ill appearing  HEENT: AT/NC, MMM  Neck: FROM, supple  Lungs: Clear to auscultation, WOB normal  CV: RRR, no MRGs  Abdomen: Soft, non-tender; no masses or HSM, +BS  Extremities: RLE AKA with wound vac, no visible cellulitis or purulence.   FROM without synovitis of B/L UE's  Skin: no rash, lesions or ulcers  Psych: Calm and cooperative  Neuro: Awake and interactive    Condition:  Stable to best ability of our hospital    Disposition:     Patient Instructions:     Current Facility-Administered Medications:     vancomycin 1000 mg IVPB in 250 mL D5W addavial, 1,000 mg, IntraVENous, Q12H, Yonatan Perkins MD    warfarin (COUMADIN) daily dosing (placeholder), , Other, RX Placeholder, Cole Feliz MD    HYDROmorphone (DILAUDID) injection 0.25 mg, 0.25 mg, IntraVENous, Q4H PRN, Cole Feliz MD, 0.25 mg at 09/16/21 1943    LORazepam (ATIVAN) tablet 0.5 mg, 0.5 mg, Oral, Q6H PRN, Cole Feliz MD, 0.5 mg at 09/16/21 1404    tetrahydrozoline 0.05 % ophthalmic solution 1 drop, 1 drop, Both Eyes, TID, Yonatan Perkins MD, 1 drop at 09/16/21 1353    aspirin EC tablet 81 mg, 81 mg, Oral, Daily, PARAM Cohen, 81 mg at 09/16/21 0916    cloNIDine (CATAPRES) 0.1 MG/24HR 1 patch, 1 patch, TransDERmal, Weekly, PARAM Cohen    gabapentin (NEURONTIN) capsule 300 mg, 300 mg, Oral, TID, PARAM Chapman, 300 mg at 09/16/21 1353    lidocaine 4 % external patch 1 patch, 1 patch, TransDERmal, Daily, PARAM Cohen, 1 patch at 09/16/21 0915    oxyCODONE (ROXICODONE) immediate release tablet 5 mg, 5 mg, Oral, Q4H PRN, Odalys Goode MD, 5 mg at 09/16/21 1850    miconazole (MICOTIN) 2 % powder, , Topical, BID, Emanuel Calles MD, Given at 09/16/21 0915    atorvastatin (LIPITOR) tablet 80 mg, 80 mg, Oral, Nightly, PARAM Stone, 80 mg at 09/16/21 0017    busPIRone (BUSPAR) tablet 10 mg, 10 mg, Oral, Daily, PARAM Chapman, 10 mg at 09/16/21 0915    clopidogrel (PLAVIX) tablet 75 mg, 75 mg, Oral, Daily, PARAM Cohen, 75 mg at 09/16/21 0914    divalproex (DEPAKOTE ER) extended release tablet 500 mg, 500 mg, Oral, Daily, PARAM Chapman, 500 mg at 09/16/21 0915    divalproex (DEPAKOTE ER) extended release tablet 1,000 mg, 1,000 mg, Oral, Nightly, PARAM Chapman, 1,000 mg at 09/16/21 0016    DULoxetine (CYMBALTA) extended release capsule 60 mg, 60 mg, Oral, Daily, PARAM Chapman, 60 mg at 09/16/21 0916    levETIRAcetam (KEPPRA) tablet 500 mg, 500 mg, Oral, BID, PARAM Chapman, 500 mg at 09/16/21 0914    levothyroxine (SYNTHROID) tablet 75 mcg, 75 mcg, Oral, Daily, PARAM Cohen, 75 mcg at 09/16/21 0738    methocarbamol (ROBAXIN) tablet 500 mg, 500 mg, Oral, TID, PARAM Chapman, 500 mg at 09/16/21 1353    metoprolol tartrate (LOPRESSOR) tablet 12.5 mg, 12.5 mg, Oral, BID, PARAM Chapman, 12.5 mg at 09/16/21 0028    sodium chloride flush 0.9 % injection 10 mL, 10 mL, IntraVENous, 2 times per day, PARAM Cohen, 10 mL at 09/16/21 1943    sodium chloride flush 0.9 % injection 10 mL, 10 mL, IntraVENous, PRN, Halie Herndon PARAM Patterson    0.9 % sodium chloride infusion, 25 mL, IntraVENous, PRN, PARAM Cohen    potassium chloride (KLOR-CON M) extended release tablet 40 mEq, 40 mEq, Oral, PRN, 40 mEq at 09/13/21 0850 **OR** potassium bicarb-citric acid (EFFER-K) effervescent tablet 40 mEq, 40 mEq, Oral, PRN **OR** potassium chloride 10 mEq/100 mL IVPB (Peripheral Line), 10 mEq, IntraVENous, PRN, PARAM Cohen    magnesium hydroxide (MILK OF MAGNESIA) 400 MG/5ML suspension 30 mL, 30 mL, Oral, Daily PRN, PARAM Cohen    acetaminophen (TYLENOL) tablet 650 mg, 650 mg, Oral, Q6H PRN **OR** acetaminophen (TYLENOL) suppository 650 mg, 650 mg, Rectal, Q6H PRN, PARAM Cohen    insulin lispro (HUMALOG) injection vial 0-12 Units, 0-12 Units, SubCUTAneous, TID WC, PARAM Cohen, 2 Units at 09/16/21 1210    insulin lispro (HUMALOG) injection vial 0-6 Units, 0-6 Units, SubCUTAneous, Nightly, PARAM Chapman    glucose (GLUTOSE) 40 % oral gel 15 g, 15 g, Oral, PRN, PARAM Chapman    dextrose 50 % IV solution, 12.5 g, IntraVENous, PRN, PARAM Chapman    glucagon (rDNA) injection 1 mg, 1 mg, IntraMUSCular, PRN, PARAM Chapman    dextrose 5 % solution, 100 mL/hr, IntraVENous, PRN, PARAM Cohen    trimethobenzamide (TIGAN) injection 200 mg, 200 mg, IntraMUSCular, Q6H PRN, PARAM Chapman    pantoprazole (PROTONIX) tablet 40 mg, 40 mg, Oral, QAM AC, Royal Antelmo MD, 40 mg at 09/16/21 0738    albuterol (PROVENTIL) nebulizer solution 2.5 mg, 2.5 mg, Nebulization, Q6H PRN, Royal Antelmo MD    ipratropium (ATROVENT) 0.02 % nebulizer solution 0.5 mg, 0.5 mg, Nebulization, 4x daily, Royal Antelmo MD, 0.5 mg at 09/16/21 1731    meropenem (MERREM) 1,000 mg in sodium chloride 0.9 % 100 mL IVPB (mini-bag), 1,000 mg, IntraVENous, Q8H, Emanuel Calles MD, Stopped at 09/16/21 1700    Current Outpatient Medications:     acetaminophen (TYLENOL) 325 MG tablet, Take 650 mg by mouth every 8 hours as needed for Pain or Fever, Disp: , Rfl:     oxyCODONE (ROXICODONE) 5 MG immediate release tablet, Take 7.5 mg by mouth every 3 hours as needed for Pain (MODERATE). , Disp: , Rfl:     oxyCODONE (ROXICODONE) 5 MG immediate release tablet, Take 12.5 mg by mouth every 3 hours as needed for Pain (SEVERE). , Disp: , Rfl:     aspirin 81 MG EC tablet, Take 81 mg by mouth daily, Disp: , Rfl:     LORazepam (ATIVAN) 1 MG tablet, Take 1 mg by mouth daily as needed for Anxiety (wound vac change). , Disp: , Rfl:     cloNIDine (CATAPRES) 0.1 MG/24HR PTWK, Place 1 patch onto the skin once a week Mondays, Disp: , Rfl:     gabapentin (NEURONTIN) 300 MG capsule, Take 300 mg by mouth 3 times daily. , Disp: , Rfl:     lidocaine (LIDODERM) 5 %, Place 1 patch onto the skin daily 12 hours on, 12 hours off., Disp: , Rfl:     methocarbamol (ROBAXIN) 500 MG tablet, Take 1 tablet by mouth 3 times daily, Disp: 270 tablet, Rfl: 3    DULoxetine (CYMBALTA) 60 MG extended release capsule, TAKE 1 CAPSULE EVERY DAY, Disp: 90 capsule, Rfl: 3    divalproex (DEPAKOTE ER) 500 MG extended release tablet, TAKE 1 TABLET IN THE MORNING AND 2 TABLETS IN THE EVENING, Disp: 270 tablet, Rfl: 3    metFORMIN (GLUCOPHAGE) 1000 MG tablet, TAKE 1 TABLET TWICE DAILY WITH MEALS, Disp: 180 tablet, Rfl: 3    metoprolol tartrate (LOPRESSOR) 25 MG tablet, TAKE 1/2 TABLET TWICE DAILY, Disp: 90 tablet, Rfl: 3    levothyroxine (SYNTHROID) 75 MCG tablet, TAKE 1 TABLET EVERY DAY, Disp: 90 tablet, Rfl: 3    atorvastatin (LIPITOR) 80 MG tablet, Take 1 tablet by mouth nightly, Disp: 90 tablet, Rfl: 3    albuterol sulfate  (90 Base) MCG/ACT inhaler, Inhale 2 puffs into the lungs every 6 hours as needed for Wheezing, Disp: 3 Inhaler, Rfl: 3    busPIRone (BUSPAR) 10 MG tablet, Take 1 tablet by mouth daily, Disp: 30 tablet, Rfl: 11    levETIRAcetam (KEPPRA) 500 MG tablet, TAKE 1 TABLET TWICE DAILY, Disp: 180 tablet, Rfl: 3    warfarin (COUMADIN) 1 MG tablet, Take 1 mg by mouth See Admin Instructions Every evening: Mon, Wed, Fri, Disp: , Rfl:     warfarin (COUMADIN) 2 MG tablet, Take 2 mg by mouth See Admin Instructions Every evening: Tue, Thu, Sat, Sun, Disp: , Rfl:     omeprazole (PRILOSEC) 20 MG delayed release capsule, TAKE 1 CAPSULE EVERY MORNING  BEFORE  BREAKFAST, Disp: 90 capsule, Rfl: 3     Activity: TBD  Diet: regular diet    Follow-up with PCP in 1 week.     Note that over 30 minutes was spent in preparing discharge papers, discussing discharge with patient, medication review, etc.    Signed:  Jaimie Green MD    9/16/2021  8:48 PM

## 2021-11-22 ENCOUNTER — VIRTUAL VISIT (OUTPATIENT)
Dept: FAMILY MEDICINE CLINIC | Age: 51
End: 2021-11-22
Payer: MEDICARE

## 2021-11-22 DIAGNOSIS — Z76.0 ENCOUNTER FOR MEDICATION REFILL: Primary | ICD-10-CM

## 2021-11-22 DIAGNOSIS — S78.111S: ICD-10-CM

## 2021-11-22 DIAGNOSIS — G40.909 SEIZURE DISORDER (HCC): ICD-10-CM

## 2021-11-22 DIAGNOSIS — J68.3 MODERATE PERSISTENT REACTIVE AIRWAYS DYSFUNCTION SYNDROME WITHOUT COMPLICATION (HCC): ICD-10-CM

## 2021-11-22 DIAGNOSIS — G62.9 PERIPHERAL POLYNEUROPATHY: Chronic | ICD-10-CM

## 2021-11-22 DIAGNOSIS — I10 ESSENTIAL HYPERTENSION: ICD-10-CM

## 2021-11-22 DIAGNOSIS — E11.59 TYPE 2 DIABETES MELLITUS WITH OTHER CIRCULATORY COMPLICATION, UNSPECIFIED WHETHER LONG TERM INSULIN USE (HCC): ICD-10-CM

## 2021-11-22 DIAGNOSIS — E03.8 SUBCLINICAL HYPOTHYROIDISM: ICD-10-CM

## 2021-11-22 DIAGNOSIS — K21.9 GASTROESOPHAGEAL REFLUX DISEASE WITHOUT ESOPHAGITIS: ICD-10-CM

## 2021-11-22 DIAGNOSIS — E78.2 MIXED HYPERLIPIDEMIA: ICD-10-CM

## 2021-11-22 DIAGNOSIS — E11.51 TYPE 2 DIABETES MELLITUS WITH DIABETIC PERIPHERAL ANGIOPATHY WITHOUT GANGRENE, WITHOUT LONG-TERM CURRENT USE OF INSULIN (HCC): ICD-10-CM

## 2021-11-22 PROBLEM — S78.111A: Status: ACTIVE | Noted: 2021-11-22

## 2021-11-22 PROCEDURE — G8427 DOCREV CUR MEDS BY ELIG CLIN: HCPCS | Performed by: FAMILY MEDICINE

## 2021-11-22 PROCEDURE — 2022F DILAT RTA XM EVC RTNOPTHY: CPT | Performed by: FAMILY MEDICINE

## 2021-11-22 PROCEDURE — 99215 OFFICE O/P EST HI 40 MIN: CPT | Performed by: FAMILY MEDICINE

## 2021-11-22 PROCEDURE — 3017F COLORECTAL CA SCREEN DOC REV: CPT | Performed by: FAMILY MEDICINE

## 2021-11-22 PROCEDURE — 3044F HG A1C LEVEL LT 7.0%: CPT | Performed by: FAMILY MEDICINE

## 2021-11-22 RX ORDER — TRAMADOL HYDROCHLORIDE 50 MG/1
50 TABLET ORAL EVERY 6 HOURS PRN
Qty: 120 TABLET | Refills: 5
Start: 2021-11-22 | End: 2021-12-14 | Stop reason: SDUPTHER

## 2021-11-22 RX ORDER — BUSPIRONE HYDROCHLORIDE 10 MG/1
TABLET ORAL
COMMUNITY
End: 2021-11-22 | Stop reason: SDUPTHER

## 2021-11-22 RX ORDER — BUSPIRONE HYDROCHLORIDE 10 MG/1
10 TABLET ORAL 3 TIMES DAILY
Qty: 270 TABLET | Refills: 3 | Status: SHIPPED
Start: 2021-11-22 | End: 2022-05-10 | Stop reason: SDUPTHER

## 2021-11-22 RX ORDER — WARFARIN SODIUM 2 MG/1
2 TABLET ORAL DAILY
Qty: 90 TABLET | Refills: 3 | Status: SHIPPED
Start: 2021-11-22 | End: 2022-05-10 | Stop reason: SDUPTHER

## 2021-11-22 RX ORDER — DULOXETIN HYDROCHLORIDE 60 MG/1
CAPSULE, DELAYED RELEASE ORAL
Qty: 90 CAPSULE | Refills: 3
Start: 2021-11-22 | End: 2022-05-10 | Stop reason: SDUPTHER

## 2021-11-22 RX ORDER — DIVALPROEX SODIUM 500 MG/1
TABLET, EXTENDED RELEASE ORAL
Qty: 270 TABLET | Refills: 3 | Status: SHIPPED
Start: 2021-11-22 | End: 2022-05-10 | Stop reason: SDUPTHER

## 2021-11-22 RX ORDER — GABAPENTIN 300 MG/1
CAPSULE ORAL
COMMUNITY
Start: 2021-08-06 | End: 2021-11-22 | Stop reason: SDUPTHER

## 2021-11-22 RX ORDER — ASPIRIN 81 MG/1
81 TABLET ORAL DAILY
Qty: 90 TABLET | Refills: 3 | COMMUNITY
Start: 2021-11-22 | End: 2022-11-22

## 2021-11-22 RX ORDER — ALBUTEROL SULFATE 90 UG/1
2 AEROSOL, METERED RESPIRATORY (INHALATION) EVERY 6 HOURS PRN
Qty: 3 EACH | Refills: 3 | Status: SHIPPED
Start: 2021-11-22 | End: 2022-05-10 | Stop reason: SDUPTHER

## 2021-11-22 RX ORDER — OMEPRAZOLE 20 MG/1
TABLET, DELAYED RELEASE ORAL
COMMUNITY

## 2021-11-22 RX ORDER — METHOCARBAMOL 500 MG/1
500 TABLET, FILM COATED ORAL 3 TIMES DAILY
Qty: 270 TABLET | Refills: 3
Start: 2021-11-22 | End: 2022-05-10 | Stop reason: SDUPTHER

## 2021-11-22 RX ORDER — ATORVASTATIN CALCIUM 80 MG/1
80 TABLET, FILM COATED ORAL NIGHTLY
Qty: 90 TABLET | Refills: 3 | Status: SHIPPED
Start: 2021-11-22 | End: 2022-05-10 | Stop reason: SDUPTHER

## 2021-11-22 RX ORDER — LIDOCAINE 50 MG/G
1 PATCH TOPICAL DAILY
Qty: 90 PATCH | Refills: 3 | Status: SHIPPED
Start: 2021-11-22 | End: 2022-05-10 | Stop reason: SDUPTHER

## 2021-11-22 RX ORDER — TRAMADOL HYDROCHLORIDE 50 MG/1
TABLET ORAL
COMMUNITY
End: 2021-11-22 | Stop reason: SDUPTHER

## 2021-11-22 RX ORDER — LEVOTHYROXINE SODIUM 0.07 MG/1
TABLET ORAL
Qty: 90 TABLET | Refills: 3 | Status: SHIPPED
Start: 2021-11-22 | End: 2022-05-10 | Stop reason: SDUPTHER

## 2021-11-22 RX ORDER — OMEPRAZOLE 20 MG/1
CAPSULE, DELAYED RELEASE ORAL
Qty: 90 CAPSULE | Refills: 3 | Status: SHIPPED
Start: 2021-11-22 | End: 2022-05-10 | Stop reason: SDUPTHER

## 2021-11-22 RX ORDER — GABAPENTIN 300 MG/1
300 CAPSULE ORAL 3 TIMES DAILY
Qty: 270 CAPSULE | Refills: 1 | Status: SHIPPED
Start: 2021-11-22 | End: 2022-05-10 | Stop reason: SDUPTHER

## 2021-11-22 ASSESSMENT — ENCOUNTER SYMPTOMS
COUGH: 0
CONSTIPATION: 0
WHEEZING: 0
SORE THROAT: 0
NAUSEA: 0
BACK PAIN: 0
SHORTNESS OF BREATH: 0
DIARRHEA: 0
BLOOD IN STOOL: 0
VOMITING: 0
ABDOMINAL PAIN: 0

## 2021-11-22 NOTE — PROGRESS NOTES
21    TELEHEALTH EVALUATION -- Audio/Visual (During AUZWW-17 public health emergency)    Hector Goode is a 45 y.o. female being evaluated by a Virtual Visit (video visit) encounter to address concerns as mentioned above. A caregiver was present when appropriate. Due to this being a TeleHealth encounter (During RLTZF-38 public health emergency), evaluation of the following organ systems was limited: Vitals/Constitutional/EENT/Resp/CV/GI//MS/Neuro/Skin/Heme-Lymph-Imm. Pursuant to the emergency declaration under the [address] [place] [address], [address] [place] [address] authority and the [employer] Act, this Virtual Visit was conducted with patient's (and/or legal guardian's) consent, to reduce the patient's risk of exposure to COVID-19 and provide necessary medical care. The patient (and/or legal guardian) has also been advised to contact this office for worsening conditions or problems, and seek emergency medical treatment and/or call 911 if deemed necessary. Services were provided through a video synchronous discussion virtually to substitute for in-person clinic visit. Patient's location: home address in [facility]. Physician location: other address in [place]. Patient identification was verified at the start of the visit: Yes    HPI:    Hector Goode (:  1970) has requested an audio/video evaluation for the following concern(s):    Chief Complaint   Patient presents with    Medication Refill     patient home from rehab       She is treated for diabetes, bipolar disorder, tobacco use, COPD, seizure disorder, Peripheral Vascular disease. Since last seen, she is S/P AKA of right LE due to severe PVD complicated by osteomyelitis. She is working with a home PT to accomplish goal of ambulating with crutches and LLE to navigate stairs. She is due for medication refills for all of her medications.     She will need routine labs drawn (including INR monitoring) via [address]      DM2:   F/U DM. Patient is due for fasting lab work. Metformin 1000 mg BID. Compliant with therapy and tolerating it well. Has some sugary foods in diet and occasional lapses with increased sugar intake. Monitoring blood sugar fasting in AM and QHS. Fasting blood sugars: between 110 - 120 in the morning depending on what she had for dinner. HS blood sugars:135 - 200 in the evening. Average is 125. Patient is taking ASA but not Ace Inhibitor/ARB. Patient is taking 80 mg atorvastatin daily. Due for fasting lab work. Has a [facility] of heart disease, M GMA had CHF, MI, HTN, uncle had similar problems. Unaware of mother/father medical history. Denies chest pain, SOB, C/N/V/D. Denies dysuria, hematuria. Patient is following with podiatrist (12/23/2019). Eye exam 3/2020. Patient is aware that it is necessary to see an Eye Dr yearly. Patient does smoke and is not ready to quit or cut back. Lab Results   Component Value Date    LABA1C 7.9 (H) 08/17/2020    LABA1C 7.0 (H) 01/21/2020    LABA1C 7.3 (H) 10/14/2019     Lab Results   Component Value Date    LABMICR 22.3 (H) 10/14/2019    LDLCALC 114 (H) 08/17/2020    CREATININE 0.9 10/29/2020       Lab Results   Component Value Date    CHOL 173 08/17/2020    CHOL 148 01/21/2020    CHOL 153 10/14/2019     Lab Results   Component Value Date    TRIG 119 08/17/2020    TRIG 113 01/21/2020    TRIG 138 10/14/2019     Lab Results   Component Value Date    HDL 35 08/17/2020    HDL 33 01/21/2020    HDL 32 10/14/2019     Lab Results   Component Value Date    LDLCALC 114 (H) 08/17/2020    [address] 01/21/2020    [address] 10/14/2019     Down 14# since 9/2020 visit. FBS today: 108. Will get fasting lab work today      Peripheral Vascular Disease:    Complicated by thrombus earlier this month; to have definitve procedure 11/30/2020 for thrombectomy and stent placement.     Splenic Infarct/Pulmonary Embolism/Chronic Anticoagulation:  Lab Results   Component Value Date    INR 1.1 10/29/2020    INR 1.2 01/28/2020    INR 1.5 10/21/2019    PROTIME 12.3 10/29/2020    PROTIME 14.2 01/28/2020    PROTIME 17.9 10/21/2019     Current Coumadin dose (11/20/2020): 2.5 mg MWF, 5 mg other days. Current Warfarin dosing as of 11/20/2020: 2.5 mg MWF, 5 mg other days. Tobacco Dependency:  She  is not ready to quit. Smokes 1 pack a day. Bipolar Disorder:   Depakote and Duloxetine. Psychiatry was prescribing buspirone; she wants to transition care to PCP. Missed a number of appointments with therapist and psychiatry as she has been in the hospital.    She reports that pregabalin had been discontinued due to suicide risk. Gabapentin started for phantom pain does seem to be effective. Optimal dosing as of now 300 mg TID. Obesity:  Wt Readings from Last 3 Encounters:   09/16/21 176 lb 14.4 oz (80.2 kg)   06/03/21 224 lb (101.6 kg)   05/25/21 224 lb (101.6 kg)     BMI Readings from Last 3 Encounters:   09/16/21 27.71 kg/m²   06/03/21 35.08 kg/m²   05/25/21 35.08 kg/m²       Chronic Bronchitis:  Mild \"smoker's cough\" but denies shortness of breath. Mild wheezing reported. Currently on no medication; doesn't feel that symptoms are impairing her quality of life/function. Seizure Disorder:  Taking Depakote and Keppra. No breakthrough symptoms. Peripheral Neuropathy:  Between numbness, weakness, and pain, coupled with poor balance and inability to stand for long periods of time, patient finally acquired shower bench and will get tub rails on her own. Chronic Pain:  Lumbar stenosis, DDD/DJD, SI joint arthritis, hip arthritis. Had been under the care of Dr. Geneva Warner ([facility])    Leukocytosis:  Persistent and chronic; this has been persistent for some time. Chart reviewed extensively: she was referred to and seen by Dr. Thais Huynh in 2015. W/U to R/O CML and lung cancer completed and was negative at that time.  He felt that there was a reactive component that may be related to smoking. F/U was suggested @ [facility]. Values are stable. [address]:  Patient's past medical, surgical, social and/or family history reviewed, updated in chart, and are non-contributory (unless otherwise stated). Medications and allergies also reviewed and updated in chart. Review of Systems  Review of Systems   Constitutional: Positive for fatigue. HENT: Negative for congestion, ear pain and sore throat. Respiratory: Negative for cough, shortness of breath and wheezing. Cardiovascular: Negative for chest pain, palpitations and leg swelling. Gastrointestinal: Negative for abdominal pain, blood in stool, constipation, diarrhea, nausea and vomiting. Genitourinary: Negative for dysuria, frequency, hematuria and urgency. Musculoskeletal: Positive for arthralgias, back pain, gait problem, joint swelling, myalgias and neck pain. Skin: Negative for rash. Neurological: Negative for dizziness, weakness and headaches. Psychiatric/Behavioral: The patient is nervous/anxious.         Allergies   Allergen Reactions    Nsaids Shortness Of Breath and Other (See Comments)     Renal Failure   ,   Past Medical History:   Diagnosis Date    Anticoagulant long-term use     Arthritis     back, both hips and both knees    Carpal tunnel syndrome on both sides     both wrists    Cellulitis of right foot 03/2016    Cervical cancer ([address] 2012    S/P LUCIUS, BSO    Chronic back pain     Depressed bipolar II disorder ([address]     Diabetes mellitus ([address]     Diverticulitis     DVT (deep vein thrombosis) in pregnancy     Epilepsy ([address]     Hx of blood clots     Hyperlipidemia     Hypertension     Leukocytosis 9/6/2013    Marijuana use     Neuropathy     Obesity     PONV (postoperative nausea and vomiting)     Rotator cuff tear arthropathy     Thyroid disease     Type 2 diabetes mellitus without complication ([address]    ,   Past Surgical History:   Procedure Laterality Date     SECTION      CHOLECYSTECTOMY      COLONOSCOPY  10/10/2018    COLONOSCOPY WITH BIOPSY performed by Wendi Easton MD at [address] N/A 2020    COLONOSCOPY WITH BIOPSY performed by Wendi Easton MD at [address] [facility] ECHO COMPLETE  2013         FOOT SURGERY  2015    HYSTERECTOMY  2012    KIDNEY BIOPSY      KNEE ARTHROSCOPY  2003    right and left knee    KNEE SURGERY      left knee    NERVE BLOCK Bilateral 2017    TFNB  #1    NERVE BLOCK Bilateral 06/15/2017    bilatera lumbar transforaminal nerve block #2 L5-S1    NERVE BLOCK Bilateral 2017    Bilateral transforamninal nerve block lumbar #3    RHINOPLASTY      1985    SMALL INTESTINE SURGERY N/A 2019    LAPAROSCOPIC SIGMOID COLECTOMY performed by Wendi Easton MD at [place] [address] VASCULAR SURGERY  2019   ,   Social History     Tobacco Use    Smoking status: Current Every Day Smoker     Packs/day: 1.00     Years: 32.00     Pack years: 32.00     Types: Cigarettes    Smokeless tobacco: Never Used    Tobacco comment: (2020):  not ready to quit; counseling given   Substance Use Topics    Alcohol use: Not Currently    Drug use: Not Currently     Types: Marijuana     Comment: once a month or so; depending on pain, last use 2017   ,   Family History   Problem Relation Age of Onset    Depression Mother     Bipolar Disorder Mother     Hypertension Mother    [facility] Anxiety Disorder Sister     Asthma Son    [facility] Schizophrenia Son     Anxiety Disorder Daughter    ,   Immunization History   Administered Date(s) Administered    Influenza, Intradermal, Preservative free 2015    Influenza, Intradermal, Quadrivalent, Preservative Free 11/10/2016    Influenza, Quadv, IM, PF (6 mo and older Fluzone, Flulaval, Fluarix, and 3 yrs and older Afluria) 10/17/2017, 2018    Pneumococcal Conjugate 13-valent (Edyth [place]) 2015    Pneumococcal Polysaccharide ([id number]) 07/24/2014    Tdap (Boostrix, Adacel) 10/05/2017   ,   Health Maintenance   Topic Date Due    Diabetic retinal exam  02/13/2019    Diabetic foot exam  03/05/2019    Shingles Vaccine (1 of 2) 05/12/2019    Flu vaccine (1) 09/01/2020    Diabetic microalbuminuria test  10/14/2020    Breast cancer screen  01/01/2050 (Originally 5/12/2019)    A1C test (Diabetic or Prediabetic)  08/17/2021    Lipid screen  08/17/2021    TSH testing  08/17/2021    Annual Wellness Visit (AWV)  09/05/2021    DTaP/Tdap/Td vaccine (2 - Td) 10/05/2027    Colon cancer screen colonoscopy  06/02/2030    Pneumococcal 0-64 years Vaccine  Completed    HIV screen  Completed    Hepatitis A vaccine  Aged Out    Hib vaccine  Aged Out    Meningococcal (ACWY) vaccine  Aged Out    Hepatitis B vaccine  Discontinued       PHYSICAL EXAMINATION:    Patient-Reported Vitals 11/20/2020   Patient-Reported Weight 235lb   Patient-Reported Height 5foot 7inches   Patient-Reported Systolic -   Patient-Reported Diastolic -   Patient-Reported Pulse -   Patient-Reported Temperature -   Patient-Reported Peak Flow -      Physical Exam  Vitals signs reviewed. Constitutional:       General: She is not in acute distress. Appearance: She is well-developed. She is not diaphoretic. HENT:      Head: Normocephalic and atraumatic. Right Ear: External ear normal.      Left Ear: External ear normal.      Mouth/Throat:      Pharynx: No oropharyngeal exudate. Eyes:      General: No scleral icterus. Right eye: No discharge. Conjunctiva/sclera: Conjunctivae normal.      Pupils: Pupils are equal, round, and reactive to light. Neck:      Musculoskeletal: Normal range of motion and neck supple. Thyroid: No thyromegaly. Cardiovascular:      Rate and Rhythm: Normal rate and regular rhythm. Heart sounds: Normal heart sounds. No murmur.    Pulmonary:      Effort: Pulmonary effort is normal. No respiratory distress. Breath sounds: No stridor. No wheezing or rales. Chest:      Chest wall: No tenderness. Abdominal:      General: Bowel sounds are normal. There is no distension. Palpations: Abdomen is soft. There is no mass. Tenderness: There is no abdominal tenderness. There is no guarding. Musculoskeletal: Normal range of motion. General: No tenderness. Lymphadenopathy:      Cervical: No cervical adenopathy. Skin:     General: Skin is warm and dry. Coloration: Skin is not pale. Findings: No erythema or rash. Comments:      Neurological:      Mental Status: She is alert and oriented to person, place, and time. Psychiatric:         Behavior: Behavior normal.         Thought Content: Thought content normal.          Assessment / Plan:      Alida Weems was seen today for medication refill. Diagnoses and all orders for this visit:    Encounter for medication refill    Gastroesophageal reflux disease without esophagitis    Seizure disorder    Peripheral polyneuropathy    Type 2 diabetes mellitus with other circulatory complication, unspecified whether long term insulin use ([facility]    Type 2 diabetes mellitus with diabetic peripheral angiopathy without gangrene, without long-term current use of insulin (HCC)    Essential hypertension    Subclinical hypothyroidism    Mixed hyperlipidemia    Moderate persistent reactive airways dysfunction syndrome without complication (HCC)    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 Medications and allergies also reviewed and updated in chart. Review of Systems  Review of Systems   HENT: Negative for congestion, ear pain and sore throat. Respiratory: Negative for cough, shortness of breath and wheezing.     Cardiovascular: Negative for chest pain, palpitations and leg swelling. Gastrointestinal: Negative for abdominal pain, blood in stool, constipation, diarrhea, nausea and vomiting. Genitourinary: Negative for dysuria, frequency, hematuria and urgency. Urinary incontinence that is associated with phantom pain of right LE   Musculoskeletal: Negative for back pain, myalgias and neck pain. Skin: Negative for rash. Neurological: Negative for dizziness, weakness and headaches. Phantom pain of right LE   Psychiatric/Behavioral: The patient is not nervous/anxious. Allergies   Allergen Reactions    Nsaids Shortness Of Breath and Other (See Comments)     Renal Failure    Other Other (See Comments)     Artifical sweeteners, patient goes into seizures.     ,   Past Medical History:   Diagnosis Date    Anticoagulant long-term use     Arthritis     back, both hips and both knees    Carpal tunnel syndrome on both sides     both wrists    Cellulitis of right foot 03/2016    Cervical cancer ([address] 2012    S/P LUCIUS, BSO    Chronic back pain     Depressed bipolar II disorder (HCC)     Diabetes mellitus ([address]     Diverticulitis     DVT (deep vein thrombosis) in pregnancy     Epilepsy ([address]     Hx of blood clots     Hyperlipidemia     Hypertension     Leukocytosis 9/6/2013    Marijuana use     Neuropathy     Obesity     PONV (postoperative nausea and vomiting)     Rotator cuff tear arthropathy     Thyroid disease     Type 2 diabetes mellitus without complication ([address]    ,   Past Surgical History:   Procedure Laterality Date    [facility] COLONOSCOPY  10/10/2018    COLONOSCOPY WITH BIOPSY performed by Sher Warner MD at [facility] N/A 6/2/2020    COLONOSCOPY WITH BIOPSY performed by Sher Warner MD at [address] [facility] ECHO COMPLETE  9/5/2013         FOOT SURGERY  8/7/2015    HYSTERECTOMY  2012    KIDNEY BIOPSY      KNEE ARTHROSCOPY  2003 Diabetic foot exam  03/05/2019    Shingles Vaccine (1 of 2) Never done    Low dose CT lung screening  05/12/2019    COVID-19 Vaccine (2 - Booster for Saberr series) 06/04/2021    Annual Wellness Visit (AWV)  09/05/2021    Breast cancer screen  01/01/2050 (Originally 5/12/2019)    Diabetic microalbuminuria test  05/25/2022    Lipid screen  05/25/2022    TSH testing  05/25/2022    A1C test (Diabetic or Prediabetic)  09/13/2022    DTaP/Tdap/Td vaccine (2 - Td or Tdap) 10/05/2027    Colon cancer screen colonoscopy  06/02/2030    Pneumococcal 0-64 years Vaccine (2 of 2 - PPSV23) 05/12/2034    Flu vaccine  Completed    Hepatitis C screen  Completed    HIV screen  Completed    Hepatitis A vaccine  Aged Out    Hib vaccine  Aged Out    Meningococcal (ACWY) vaccine  Aged Out    Hepatitis B vaccine  Discontinued       PHYSICAL EXAMINATION:    Patient-Reported Vitals 11/22/2021   Patient-Reported Weight 178lb   Patient-Reported Height 5foot 7inches   Patient-Reported Systolic -   Patient-Reported Diastolic -   Patient-Reported Pulse -   Patient-Reported Temperature -   Patient-Reported SpO2 -   Patient-Reported Peak Flow -        Physical Exam  Constitutional:       General: She is not in acute distress. Appearance: Normal appearance. She is well-developed and normal weight. HENT:      Head: Normocephalic and atraumatic. Right Ear: External ear normal.      Left Ear: External ear normal.      Nose: Nose normal.      Mouth/Throat:      Mouth: Mucous membranes are moist.   Eyes:      Extraocular Movements: Extraocular movements intact. Conjunctiva/sclera: Conjunctivae normal.   Pulmonary:      Effort: Pulmonary effort is normal. No respiratory distress. Musculoskeletal:      Cervical back: Normal range of motion. Neurological:      Mental Status: She is alert and oriented to person, place, and time.    Psychiatric:         Attention and Perception: Attention and perception normal. Mood and Affect: Mood and affect normal.         Speech: Speech normal.         Behavior: Behavior normal. Behavior is cooperative. Thought Content: Thought content normal.         Cognition and Memory: Cognition normal.            Assessment / Plan:      Dre Serrato was seen today for medication refill. Diagnoses and all orders for this visit:    Encounter for medication refill  -     busPIRone (BUSPAR) 10 MG tablet; Take 1 tablet by mouth 3 times daily  -     gabapentin (NEURONTIN) 300 MG capsule; Take 1 capsule by mouth 3 times daily for 181 days. -     aspirin 81 MG EC tablet; Take 1 tablet by mouth daily  -     lidocaine (LIDODERM) 5 %; Place 1 patch onto the skin daily 12 hours on, 12 hours off.  -     warfarin (COUMADIN) 2 MG tablet; Take 1 tablet by mouth daily Every evening: Tue, Thu, Sat, Sun  -     DULoxetine (CYMBALTA) 60 MG extended release capsule; TAKE 1 CAPSULE EVERY DAY  -     traMADol (ULTRAM) 50 MG tablet; Take 1 tablet by mouth every 6 hours as needed for Pain for up to 181 days. -     methocarbamol (ROBAXIN) 500 MG tablet; Take 1 tablet by mouth 3 times daily  -     omeprazole (PRILOSEC) 20 MG delayed release capsule; TAKE 1 CAPSULE EVERY MORNING  BEFORE  BREAKFAST  -     divalproex (DEPAKOTE ER) 500 MG extended release tablet; TAKE 1 TABLET IN THE MORNING AND 2 TABLETS IN THE EVENING  -     metFORMIN (GLUCOPHAGE) 1000 MG tablet; TAKE 1 TABLET TWICE DAILY WITH MEALS  -     metoprolol tartrate (LOPRESSOR) 25 MG tablet; TAKE 1/2 TABLET TWICE DAILY  -     levothyroxine (SYNTHROID) 75 MCG tablet; TAKE 1 TABLET EVERY DAY  -     atorvastatin (LIPITOR) 80 MG tablet; Take 1 tablet by mouth nightly  -     albuterol sulfate  (90 Base) MCG/ACT inhaler;  Inhale 2 puffs into the lungs every 6 hours as needed for Wheezing    Gastroesophageal reflux disease without esophagitis  -     omeprazole (PRILOSEC) 20 MG delayed release capsule; TAKE 1 CAPSULE EVERY MORNING  BEFORE  BREAKFAST    Seizure disorder  -     divalproex (DEPAKOTE ER) 500 MG extended release tablet; TAKE 1 TABLET IN THE MORNING AND 2 TABLETS IN THE EVENING  [facility]    Peripheral polyneuropathy  -     divalproex (DEPAKOTE ER) 500 MG extended release tablet; TAKE 1 TABLET IN THE MORNING AND 2 TABLETS IN THE EVENING  [facility]    Type 2 diabetes mellitus with other circulatory complication, unspecified whether long term insulin use (HCC)  -     metFORMIN (GLUCOPHAGE) 1000 MG tablet; TAKE 1 TABLET TWICE DAILY WITH MEALS  -     Cancel: [facility]    Type 2 diabetes mellitus with diabetic peripheral angiopathy without gangrene, without long-term current use of insulin (HCC)  -     metFORMIN (GLUCOPHAGE) 1000 MG tablet; TAKE 1 TABLET TWICE DAILY WITH MEALS  -     Cancel: [facility]    Essential hypertension  -     metoprolol tartrate (LOPRESSOR) 25 MG tablet; TAKE 1/2 TABLET TWICE DAILY  -     [address] [place] [address]    Subclinical hypothyroidism  -     levothyroxine (SYNTHROID) 75 MCG tablet; TAKE 1 TABLET EVERY DAY  -     [address] [place] [address]    Mixed hyperlipidemia  -     atorvastatin (LIPITOR) 80 MG tablet; Take 1 tablet by mouth nightly  -     [facility] [place] [address]    Moderate persistent reactive airways dysfunction syndrome without complication (HCC)  -     albuterol sulfate  (90 Base) MCG/ACT inhaler; Inhale 2 puffs into the lungs every 6 hours as needed for Wheezing  -     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   Order that was placed on 11/22/2021 was amended    Follow Up:  Return in about 3 months (around 2/22/2022), or (Vv), for Check up. or sooner if necessary. Call or go to ED immediately if symptoms worsen or persist.    Educational materials and/or home exercises printed for patient's review and were included in patient instructions on his/her AfterVisit Summary and given to patient at the end of visit. Counseled regarding above diagnosis,including possible risks and complications,  especially if left uncontrolled. Counseled regarding the possible side effects, risks, benefits and alternatives to treatment; patient and/or guardian verbalizes understanding, agrees, feels comfortable with and wishes to proceed with above treatment plan. Advised patient tocall with any new medication issues, and read all Rx info from pharmacy to assureaware of all possible risks and side effects of medication before taking. Reviewed age and gender appropriate health screening exams and vaccinations. Advised patient regarding importance of keeping up with recommended health maintenance andto schedule as soon as possible if overdue, as this is important in assessing forundiagnosed pathology, especially cancer, as well as protecting against potentially harmful/life threatening disease. Patient and/or guardian verbalizes understandingand agrees with above counseling, assessment and plan. All questions answered. Total time spent for this encounter: 48 minutes      --Bryan Roth MD on 11/22/2021 at 3:25 PM    An electronic signature was used to authenticate this note.

## 2021-12-02 ENCOUNTER — TELEPHONE (OUTPATIENT)
Dept: FAMILY MEDICINE CLINIC | Age: 51
End: 2021-12-02

## 2021-12-02 NOTE — TELEPHONE ENCOUNTER
[facility] called regarding referral for Home Care. Arias Sue stated original order had PT and OT which patient was getting from another company. Arias Sue stated she spoke w/patient who cancelled the other company and is wanting everything to go through [address]. Arias Sue requested new/addended order to include PT and OT.    Amy Ruiz [phone]    Last seen 11/22/2021  Next appt Visit date not found

## 2021-12-03 NOTE — TELEPHONE ENCOUNTER
I amended the order and I amended the progress note to reflect that PT and OT would also go through [facility] I already did that.

## 2021-12-08 NOTE — TELEPHONE ENCOUNTER
Please notify Orquidae Morrison that we will keep her Coumadin dosing the same this time and continue with every other week rechecks.

## 2021-12-10 ENCOUNTER — TELEPHONE (OUTPATIENT)
Dept: PHYSICAL MEDICINE AND REHAB | Age: 51
End: 2021-12-10

## 2021-12-10 ENCOUNTER — TELEPHONE (OUTPATIENT)
Dept: ADMINISTRATIVE | Age: 51
End: 2021-12-10

## 2021-12-10 DIAGNOSIS — M47.816 LUMBAR SPONDYLOSIS: Primary | ICD-10-CM

## 2021-12-10 LAB
ALBUMIN SERPL-MCNC: 3.6 G/DL (ref 3.5–5.2)
ALP BLD-CCNC: 80 U/L (ref 35–104)
ALT SERPL-CCNC: 5 U/L (ref 0–32)
ANION GAP SERPL CALCULATED.3IONS-SCNC: 14 MMOL/L (ref 7–16)
AST SERPL-CCNC: 10 U/L (ref 0–31)
BILIRUB SERPL-MCNC: <0.2 MG/DL (ref 0–1.2)
BUN BLDV-MCNC: 12 MG/DL (ref 6–20)
CALCIUM SERPL-MCNC: 9.4 MG/DL (ref 8.6–10.2)
CHLORIDE BLD-SCNC: 105 MMOL/L (ref 98–107)
CO2: 21 MMOL/L (ref 22–29)
CREAT SERPL-MCNC: 0.6 MG/DL (ref 0.5–1)
GFR AFRICAN AMERICAN: >60
GFR NON-AFRICAN AMERICAN: >60 ML/MIN/1.73
GLUCOSE BLD-MCNC: 109 MG/DL (ref 74–99)
HBA1C MFR BLD: 6 % (ref 4–5.6)
POTASSIUM SERPL-SCNC: 4.4 MMOL/L (ref 3.5–5)
SODIUM BLD-SCNC: 140 MMOL/L (ref 132–146)
TOTAL PROTEIN: 6.7 G/DL (ref 6.4–8.3)

## 2021-12-10 NOTE — TELEPHONE ENCOUNTER
Called and left message for patient to call office and make my chart office visit per patient request.

## 2021-12-10 NOTE — TELEPHONE ENCOUNTER
Patient called in requesting follow up appointment via my chart which I did give her appointment donna 3 Monday. Patient was last seen 06/03/2021. Patient is stating that tramadol is not helping and wants to know if something else can be prescribed. States she hasn't been seen in the office since had surgery leg amputee and has been in the hospital and then rehab. She states PCP is not prescribing pain meds since  was referred to handle the pain meds. Patient states has nothing then for pain.  Please advise thanks

## 2021-12-13 ENCOUNTER — NURSE TRIAGE (OUTPATIENT)
Dept: OTHER | Facility: CLINIC | Age: 51
End: 2021-12-13

## 2021-12-13 ENCOUNTER — TELEPHONE (OUTPATIENT)
Dept: ADMINISTRATIVE | Age: 51
End: 2021-12-13

## 2021-12-13 NOTE — TELEPHONE ENCOUNTER
Called and left message on patient voicemail to call the office back.   Will wait for return call from patient

## 2021-12-13 NOTE — TELEPHONE ENCOUNTER
Daughter Jb Randolph called and said that her mother is in terrible pain. She has an appt scheduled with Dr Reji Bhakta on 1/3, but she said that she needs seen ASAP. She said that she is out of pain medication and her pain level is really high.

## 2021-12-14 ENCOUNTER — TELEMEDICINE (OUTPATIENT)
Dept: PHYSICAL MEDICINE AND REHAB | Age: 51
End: 2021-12-14
Payer: MEDICARE

## 2021-12-14 DIAGNOSIS — M51.26 DISC DISPLACEMENT, LUMBAR: ICD-10-CM

## 2021-12-14 DIAGNOSIS — M47.816 LUMBAR SPONDYLOSIS: Primary | ICD-10-CM

## 2021-12-14 DIAGNOSIS — Z76.0 ENCOUNTER FOR MEDICATION REFILL: ICD-10-CM

## 2021-12-14 DIAGNOSIS — M48.061 NEURAL FORAMINAL STENOSIS OF LUMBAR SPINE: ICD-10-CM

## 2021-12-14 PROCEDURE — 99214 OFFICE O/P EST MOD 30 MIN: CPT | Performed by: PHYSICAL MEDICINE & REHABILITATION

## 2021-12-14 PROCEDURE — 3017F COLORECTAL CA SCREEN DOC REV: CPT | Performed by: PHYSICAL MEDICINE & REHABILITATION

## 2021-12-14 PROCEDURE — G8427 DOCREV CUR MEDS BY ELIG CLIN: HCPCS | Performed by: PHYSICAL MEDICINE & REHABILITATION

## 2021-12-14 RX ORDER — TRAMADOL HYDROCHLORIDE 50 MG/1
100 TABLET ORAL EVERY 6 HOURS PRN
Qty: 240 TABLET | Refills: 2 | Status: SHIPPED | OUTPATIENT
Start: 2021-12-14 | End: 2022-01-13

## 2021-12-14 RX ORDER — TRAMADOL HYDROCHLORIDE 50 MG/1
50 TABLET ORAL EVERY 6 HOURS PRN
Qty: 120 TABLET | Refills: 5 | Status: CANCELLED | OUTPATIENT
Start: 2021-12-14 | End: 2022-06-13

## 2021-12-14 NOTE — PROGRESS NOTES
Nehemias Shelby D.O. [facility]  [address]  Phone: [phone]  Fax: [phone]        12/14/21    Chief Complaint   Patient presents with    Back Pain     follow up pain level 10     Start time: 11:22  End time: 11:45  Services were provided through a video synchronous discussion virtually to substitute for in-person clinic visit through [address] The patient was advised of the risks, benefits and alternatives to telemedicine and agreed to proceed with this type of visit. Pursuant to the emergency declaration under the [facility], [facility] waiver authority and the [employer] Act, this Virtual  Visit was conducted, with patient's consent, to reduce the patient's risk of exposure to COVID-19 and provide continuity of care for an established patient. The patient was also advised the privacy standards of a standard visit continue to apply to telemedicine visits. HPI:  Spring Campos is a 45y.o. year old woman seen today in follow up regarding low back pain. Interval history: Since the last visit the patient had amputation of the entire right leg due to peripheral vascular disease and infection. She states she is not a candidate for a prosthetic. She went to [facility] for rehabilitation. She is in PT right now. She is using crutches and wheelchair. She lives in a second floor apartment with no elevator. She was prescribed Dilaudid and oxycodone in the hospital and for a short course after discharge. Today, the pain is rated 10 where 0 is no pain and 10 is pain as bad as it can be. The pain is located in the low back,  bilateral feet and legs,  does not radiate, and is described as dull, aching, burning, twisting, cramping. This pain occurs intermittently. The symptoms have been worse since onset.   Symptoms are exacerbated by damp and cold weather, walking, standing. Factors which relieve the pain include tramadol. Other associated symptoms include none. Otherwise, the pain assessment has not changed since the last visit. Prior treatment has included gabapentin, Flexeril. Today, the pain is rated   where 0 is no pain and 10 is pain as bad as it can be. The pain is located in the low back,  radiates distally to the right hip, and is described as sharp and shooting. This pain occurs all day. The symptoms have been worse since onset. Symptoms are exacerbated by walking. Factors which relieve the pain include rest. Other associated symptoms include none. Otherwise, the pain assessment has not changed since the last visit.      Past Medical History:   Diagnosis Date    Anticoagulant long-term use     Arthritis     back, both hips and both knees    Carpal tunnel syndrome on both sides     both wrists    Cellulitis of right foot 2016    Cervical cancer ([facility] [address] 2012    S/P LUCIUS, BSO    Chronic back pain     Depressed bipolar II disorder (HCC)     Diabetes mellitus ([facility] [address] [facility]     Diverticulitis     DVT (deep vein thrombosis) in pregnancy     Epilepsy ([facility] [address]     Hx of blood clots     Hyperlipidemia     Hypertension     Leukocytosis 2013    Marijuana use     Neuropathy     Obesity     PONV (postoperative nausea and vomiting)     Rotator cuff tear arthropathy     Thyroid disease     Type 2 diabetes mellitus without complication [facility])        Past Surgical History:   Procedure Laterality Date     SECTION      CHOLECYSTECTOMY      COLONOSCOPY  10/10/2018    COLONOSCOPY WITH BIOPSY performed by Nick Cesar MD at [address] [facility] N/A 2020    COLONOSCOPY WITH BIOPSY performed by Nick Cesar MD at [address] [facility] ECHO COMPLETE  2013         FOOT SURGERY  2015    HYSTERECTOMY  2012    KIDNEY BIOPSY      KNEE ARTHROSCOPY      right and left knee    KNEE SURGERY      left knee    LEG AMPUTATION THROUGH LOWER TIBIA AND FIBULA Right     LEG SURGERY Right     NERVE BLOCK Bilateral 06/01/2017    TFNB  #1    NERVE BLOCK Bilateral 06/15/2017    bilatera lumbar transforaminal nerve block #2 L5-S1    NERVE BLOCK Bilateral 09/20/2017    Bilateral transforamninal nerve block lumbar #3    RHINOPLASTY      1985    SMALL INTESTINE SURGERY N/A 2/6/2019    LAPAROSCOPIC SIGMOID COLECTOMY performed by Nick Cesar MD at [place] [address] VASCULAR SURGERY  08/05/2019       Social History     Tobacco Use    Smoking status: Current Every Day Smoker     Packs/day: 1.00     Years: 32.00     Pack years: 32.00     Types: Cigarettes    Smokeless tobacco: Never Used    Tobacco comment: (9/4/2020):  not ready to quit; counseling given   Vaping Use    Vaping Use: Never used   Substance Use Topics    Alcohol use: Not Currently    Drug use: Not Currently     Types: Marijuana Niru Amble)     Comment: once a month or so; depending on pain, last use 12/2017       Family History   Problem Relation Age of Onset    Depression Mother     Bipolar Disorder Mother     Hypertension Mother    Ruby Nascimento Anxiety Disorder Sister     Asthma Son    Ruby Nascimento Schizophrenia Son     Anxiety Disorder Daughter        Current Outpatient Medications   Medication Sig Dispense Refill    traMADol (ULTRAM) 50 MG tablet Take 2 tablets by mouth every 6 hours as needed for Pain for up to 30 days. 240 tablet 2    omeprazole (PRILOSEC OTC) 20 MG tablet Take by mouth      busPIRone (BUSPAR) 10 MG tablet Take 1 tablet by mouth 3 times daily 270 tablet 3    gabapentin (NEURONTIN) 300 MG capsule Take 1 capsule by mouth 3 times daily for 181 days. 270 capsule 1    aspirin 81 MG EC tablet Take 1 tablet by mouth daily 90 tablet 3    lidocaine (LIDODERM) 5 % Place 1 patch onto the skin daily 12 hours on, 12 hours off.  90 patch 3    warfarin (COUMADIN) 2 MG tablet Take 1 tablet by mouth daily Every evening: Tue, Thu, Sat, Sun 90 tablet 3    DULoxetine (CYMBALTA) 60 MG extended release capsule TAKE 1 CAPSULE EVERY DAY 90 capsule 3    methocarbamol (ROBAXIN) 500 MG tablet Take 1 tablet by mouth 3 times daily 270 tablet 3    omeprazole (PRILOSEC) 20 MG delayed release capsule TAKE 1 CAPSULE EVERY MORNING  BEFORE  BREAKFAST 90 capsule 3    divalproex (DEPAKOTE ER) 500 MG extended release tablet TAKE 1 TABLET IN THE MORNING AND 2 TABLETS IN THE EVENING 270 tablet 3    metFORMIN (GLUCOPHAGE) 1000 MG tablet TAKE 1 TABLET TWICE DAILY WITH MEALS 180 tablet 3    metoprolol tartrate (LOPRESSOR) 25 MG tablet TAKE 1/2 TABLET TWICE DAILY 90 tablet 3    levothyroxine (SYNTHROID) 75 MCG tablet TAKE 1 TABLET EVERY DAY 90 tablet 3    atorvastatin (LIPITOR) 80 MG tablet Take 1 tablet by mouth nightly 90 tablet 3    albuterol sulfate  (90 Base) MCG/ACT inhaler Inhale 2 puffs into the lungs every 6 hours as needed for Wheezing 3 each 3    acetaminophen (TYLENOL) 325 MG tablet Take 650 mg by mouth every 8 hours as needed for Pain or Fever      LORazepam (ATIVAN) 1 MG tablet Take 1 mg by mouth daily as needed for Anxiety (wound vac change).  levETIRAcetam (KEPPRA) 500 MG tablet TAKE 1 TABLET TWICE DAILY 180 tablet 3     No current facility-administered medications for this visit. Allergies   Allergen Reactions    Nsaids Shortness Of Breath and Other (See Comments)     Renal Failure    Other Other (See Comments)     Artifical sweeteners, patient goes into seizures. Review of Systems:  No new weakness, paresthesia, incontinence of bowel or bladder, saddle anesthesia, falls or gait dysfunction. Otherwise, per HPI. Physical Exam:   Respiratory rate is 20. GENERAL: The patient is in no apparent distress. Body habitus is obese. HEENT: No rhinorrhea, sneezing, yawning, or lacrimation. No scleral icterus or conjunctival injection. SKIN: No piloerection. No tract marks. No rash. PSYCH: Mood and affect are appropriate.  Hygiene appears appropriate. CARDIOVASCULAR There is no edema. RESPIRATORY: Respirations are regular and unlabored. There is no cyanosis. GASTROINTESTINAL: Soft abdomen, non-tender (patient elicited)  MSK: Spinal curvatures were normal in standing. Pelvis was level in standing. Active range of motion was full. There was no obvious joint effusion, deformity. The patient was able to elicit tenderness at lumbar paraspinals. The patient reports the lumbar spine does not fell warm and there is no visible redness. Neurologic: Awake, alert and oriented in three planes. Speech is fluent. No facial weakness. Tongue is midline. Hearing is intact for conversation. Pupils are equal and round. Extraocular muscles appear intact during visual tracking. Shoulder shrug symmetric. Sensation: Intact for light touch (patient elicited) in all upper and lower extremity dermatomes. Strength: Functional upper extremity strength testing was observed to be at least antigravity and lower extremity strength testing including heel and toe walking as well as duck walking were intact, unable to manual muscle test given environment. There was no observable atrophy or side to size difference in muscle bulk. Muscle Tendon Reflexes: unable to test given environment. Gait is not tested. No observed abnormal muscle tone. The patient was able to rise from a chair and squat without difficulty. There is no tremor. Due to this being a TeleHealth encounter, evaluation of the following organ systems is limited: Vitals/Constitutional/EENT/Resp/CV/GI//MS/Neuro/Skin/Heme-Lymph-Imm. Impression:   1. Lumbar spondylosis    2. Encounter for medication refill    3. Neural foraminal stenosis of lumbar spine    4.  Disc displacement, lumbar        Plan:    Controlled Substance Monitoring:    Acute and Chronic Pain Monitoring:   RX Monitoring 12/14/2021   Attestation -   Periodic Controlled Substance Monitoring No signs of potential drug abuse or diversion identified. Chronic Pain > 80 MEDD -       Orders Placed This Encounter   Medications    traMADol (ULTRAM) 50 MG tablet     Sig: Take 2 tablets by mouth every 6 hours as needed for Pain for up to 30 days. Dispense:  240 tablet     Refill:  2     Reduce doses taken as pain becomes manageable       Medications Discontinued During This Encounter   Medication Reason    traMADol (ULTRAM) 50 MG tablet REORDER       The patient was educated about the diagnosis, prognosis, indications, risks and benefits of treatment. An opportunity to ask questions was given to the patient and questions were answered. The patient agreed to proceed with the recommended treatment as described above. Return in about 3 months (around 3/14/2022). Thank you for allowing me to participate in the care of your patient. Cady Flynn D.O., P.T.   Board Certified Physical Medicine and Rehabilitation  Board Certified Electrodiagnostic Medicine

## 2021-12-15 ENCOUNTER — TELEPHONE (OUTPATIENT)
Dept: PHYSICAL MEDICINE AND REHAB | Age: 51
End: 2021-12-15

## 2021-12-15 NOTE — TELEPHONE ENCOUNTER
Sent back clearance request stating patient would need to be seen by PCP for medical clearance for injection to hold Coumadin. Patient is a recent amputee and is currently homebound. Patient will need to hold off on procedure until clearance can be obtained.

## 2021-12-22 ENCOUNTER — TELEPHONE (OUTPATIENT)
Dept: FAMILY MEDICINE CLINIC | Age: 51
End: 2021-12-22

## 2021-12-22 NOTE — TELEPHONE ENCOUNTER
Get from [facility] called he did Pt INR on pt this a.m     PT 19.6  INR 1.6    Pt has been taking 2 mg coumadin since Saturday 12.18.21 she was ordered 5 mg when she was D/C from [facility] on 12.18.21 the pharmacy was closed and she did not get the 5 mg , she is to get that Rx today.     Please advise  And when to recheck    Get [phone]

## 2022-01-28 ENCOUNTER — TELEPHONE (OUTPATIENT)
Dept: FAMILY MEDICINE CLINIC | Age: 52
End: 2022-01-28

## 2022-01-28 NOTE — TELEPHONE ENCOUNTER
For informational purposes  Patient's insurance will be changing to [place] on Feb 1, 2022 per Daquan Dubon at [employer].     Last seen 11/22/2021  Next appt Visit date not found

## 2022-02-08 ENCOUNTER — CARE COORDINATION (OUTPATIENT)
Dept: CARE COORDINATION | Age: 52
End: 2022-02-08

## 2022-02-08 NOTE — CARE COORDINATION
ACM received referral from Payer. ACM called pt, to find she is currently living at [facility] for Rehab, s/p toe amputations 1.5 weeks ago. Pt states there is no plan of discharge soon. Pt requested me to call her daughter, Ioana Centeno. ACM called Susana and explained the Care Coordination program.  States she will discuss with her mother when she gets released from the Rehab facility.

## 2022-03-14 ENCOUNTER — TELEMEDICINE (OUTPATIENT)
Dept: PHYSICAL MEDICINE AND REHAB | Age: 52
End: 2022-03-14
Payer: COMMERCIAL

## 2022-03-14 DIAGNOSIS — G89.29 CHRONIC BILATERAL LOW BACK PAIN WITHOUT SCIATICA: ICD-10-CM

## 2022-03-14 DIAGNOSIS — M54.50 CHRONIC BILATERAL LOW BACK PAIN WITHOUT SCIATICA: ICD-10-CM

## 2022-03-14 DIAGNOSIS — M47.816 LUMBAR SPONDYLOSIS: Primary | ICD-10-CM

## 2022-03-14 PROCEDURE — G8427 DOCREV CUR MEDS BY ELIG CLIN: HCPCS | Performed by: PHYSICAL MEDICINE & REHABILITATION

## 2022-03-14 PROCEDURE — 3017F COLORECTAL CA SCREEN DOC REV: CPT | Performed by: PHYSICAL MEDICINE & REHABILITATION

## 2022-03-14 PROCEDURE — 4004F PT TOBACCO SCREEN RCVD TLK: CPT | Performed by: PHYSICAL MEDICINE & REHABILITATION

## 2022-03-14 PROCEDURE — G8417 CALC BMI ABV UP PARAM F/U: HCPCS | Performed by: PHYSICAL MEDICINE & REHABILITATION

## 2022-03-14 PROCEDURE — 99214 OFFICE O/P EST MOD 30 MIN: CPT | Performed by: PHYSICAL MEDICINE & REHABILITATION

## 2022-03-14 PROCEDURE — G8484 FLU IMMUNIZE NO ADMIN: HCPCS | Performed by: PHYSICAL MEDICINE & REHABILITATION

## 2022-03-14 RX ORDER — OXYCODONE HYDROCHLORIDE 5 MG/1
5 TABLET ORAL EVERY 6 HOURS PRN
COMMUNITY
Start: 2022-03-01

## 2022-03-14 NOTE — PROGRESS NOTES
Mirza Dunne D.O. [facility]  [address] [place]  Phone: [phone]  Fax: [phone]        3/14/22    Chief Complaint   Patient presents with    Back Pain     3 month follow up pain level 5     Start time: 3:04  End time: 3:12  Services were provided through a video synchronous discussion virtually to substitute for in-person clinic visit through [address] The patient was advised of the risks, benefits and alternatives to telemedicine and agreed to proceed with this type of visit. Pursuant to the emergency declaration under the [facility], [facility] waiver authority and the [employer] Act, this Virtual  Visit was conducted, with patient's consent, to reduce the patient's risk of exposure to COVID-19 and provide continuity of care for an established patient. The patient was also advised the privacy standards of a standard visit continue to apply to telemedicine visits. HPI:  Bruna Mark is a 45y.o. year old woman seen today in follow up regarding low back pain. Interval history: Since the last visit the patient had amputation of her left fifth and first toes. She is currently in a rehabilitation facility. Her pain medication was changed at the facility to oxycodone. Today, the pain is rated 5/10 where 0 is no pain and 10 is pain as bad as it can be. The pain is located in the low back,  bilateral feet and legs,  does not radiate, and is described as dull, aching, burning, twisting, cramping. This pain occurs intermittently. The symptoms have been worse since onset. Symptoms are exacerbated by damp and cold weather, walking, standing. Factors which relieve the pain include tramadol, oxycodone. Other associated symptoms include none. Otherwise, the pain assessment has not changed since the last visit.  Prior treatment has included gabapentin, Flexeril. Today, the pain is rated 5/10 where 0 is no pain and 10 is pain as bad as it can be. The pain is located in the low back,  radiates distally to the right hip, and is described as sharp and shooting. This pain occurs all day. The symptoms have been worse since onset. Symptoms are exacerbated by walking. Factors which relieve the pain include rest. Other associated symptoms include none. Otherwise, the pain assessment has not changed since the last visit.      Past Medical History:   Diagnosis Date    Anticoagulant long-term use     Arthritis     back, both hips and both knees    Carpal tunnel syndrome on both sides     both wrists    Cellulitis of right foot 2016    Cervical cancer ([address] 2012    S/P LUCIUS, BSO    Chronic back pain     Depressed bipolar II disorder (HCC)     Diabetes mellitus ([address]     Diverticulitis     DVT (deep vein thrombosis) in pregnancy     Epilepsy ([address]     Hx of blood clots     Hyperlipidemia     Hypertension     Leukocytosis 2013    Marijuana use     Neuropathy     Obesity     PONV (postoperative nausea and vomiting)     Rotator cuff tear arthropathy     Thyroid disease     Type 2 diabetes mellitus without complication ([address]        Past Surgical History:   Procedure Laterality Date     SECTION      CHOLECYSTECTOMY      COLONOSCOPY  10/10/2018    COLONOSCOPY WITH BIOPSY performed by Robe Roque MD at [address] N/A 2020    COLONOSCOPY WITH BIOPSY performed by Robe Roque MD at [address] [facility] ECHO COMPLETE  2013         FOOT SURGERY  2015    HYSTERECTOMY  2012    KIDNEY BIOPSY      KNEE ARTHROSCOPY      right and left knee    KNEE SURGERY      left knee    LEG AMPUTATION THROUGH LOWER TIBIA AND FIBULA Right     LEG SURGERY Right     NERVE BLOCK Bilateral 2017    TFNB  #1    NERVE BLOCK Bilateral 06/15/2017    bilatera lumbar transforaminal nerve block #2 L5-S1    NERVE BLOCK Bilateral 09/20/2017    Bilateral transforamninal nerve block lumbar #3    RHINOPLASTY      1985    SMALL INTESTINE SURGERY N/A 2/6/2019    LAPAROSCOPIC SIGMOID COLECTOMY performed by Radha Oliveira MD at [place] [address] VASCULAR SURGERY  08/05/2019       Social History     Tobacco Use    Smoking status: Current Every Day Smoker     Packs/day: 1.00     Years: 32.00     Pack years: 32.00     Types: Cigarettes    Smokeless tobacco: Never Used    Tobacco comment: (9/4/2020):  not ready to quit; counseling given   Vaping Use    Vaping Use: Never used   Substance Use Topics    Alcohol use: Not Currently    Drug use: Not Currently     Types: Marijuana Avila Franki)     Comment: once a month or so; depending on pain, last use 12/2017       Family History   Problem Relation Age of Onset    Depression Mother     Bipolar Disorder Mother     Hypertension Mother    Cheryle Fickle Anxiety Disorder Sister     Asthma Son    Cheryle Fickle Schizophrenia Son     Anxiety Disorder Daughter        Current Outpatient Medications   Medication Sig Dispense Refill    oxyCODONE (ROXICODONE) 5 MG immediate release tablet Take 5 mg by mouth every 6 hours as needed.  omeprazole (PRILOSEC OTC) 20 MG tablet Take by mouth      busPIRone (BUSPAR) 10 MG tablet Take 1 tablet by mouth 3 times daily 270 tablet 3    gabapentin (NEURONTIN) 300 MG capsule Take 1 capsule by mouth 3 times daily for 181 days. 270 capsule 1    aspirin 81 MG EC tablet Take 1 tablet by mouth daily 90 tablet 3    lidocaine (LIDODERM) 5 % Place 1 patch onto the skin daily 12 hours on, 12 hours off.  90 patch 3    warfarin (COUMADIN) 2 MG tablet Take 1 tablet by mouth daily Every evening: Tue, Thu, Sat, Sun 90 tablet 3    DULoxetine (CYMBALTA) 60 MG extended release capsule TAKE 1 CAPSULE EVERY DAY 90 capsule 3    methocarbamol (ROBAXIN) 500 MG tablet Take 1 tablet by mouth 3 times daily 270 tablet 3    omeprazole (PRILOSEC) 20 MG delayed release capsule TAKE 1 CAPSULE EVERY MORNING  BEFORE  BREAKFAST 90 capsule 3    divalproex (DEPAKOTE ER) 500 MG extended release tablet TAKE 1 TABLET IN THE MORNING AND 2 TABLETS IN THE EVENING 270 tablet 3    metFORMIN (GLUCOPHAGE) 1000 MG tablet TAKE 1 TABLET TWICE DAILY WITH MEALS 180 tablet 3    metoprolol tartrate (LOPRESSOR) 25 MG tablet TAKE 1/2 TABLET TWICE DAILY 90 tablet 3    levothyroxine (SYNTHROID) 75 MCG tablet TAKE 1 TABLET EVERY DAY 90 tablet 3    atorvastatin (LIPITOR) 80 MG tablet Take 1 tablet by mouth nightly 90 tablet 3    albuterol sulfate  (90 Base) MCG/ACT inhaler Inhale 2 puffs into the lungs every 6 hours as needed for Wheezing 3 each 3    acetaminophen (TYLENOL) 325 MG tablet Take 650 mg by mouth every 8 hours as needed for Pain or Fever      LORazepam (ATIVAN) 1 MG tablet Take 1 mg by mouth daily as needed for Anxiety (wound vac change).  levETIRAcetam (KEPPRA) 500 MG tablet TAKE 1 TABLET TWICE DAILY 180 tablet 3     No current facility-administered medications for this visit. Allergies   Allergen Reactions    Nsaids Shortness Of Breath and Other (See Comments)     Renal Failure    Other Other (See Comments)     Artifical sweeteners, patient goes into seizures. Review of Systems:  No new weakness, paresthesia, incontinence of bowel or bladder, saddle anesthesia, falls or gait dysfunction. Otherwise, per HPI. Physical Exam:   Respiratory rate is 20. GENERAL: The patient is in no apparent distress. Body habitus is obese. HEENT: No rhinorrhea, sneezing, yawning, or lacrimation. No scleral icterus or conjunctival injection. SKIN: No piloerection. No tract marks. No rash. PSYCH: Mood and affect are appropriate. Hygiene appears appropriate. CARDIOVASCULAR There is no edema. RESPIRATORY: Respirations are regular and unlabored. There is no cyanosis.    GASTROINTESTINAL: Soft abdomen, non-tender (patient elicited)  MSK: Spinal curvatures were normal in standing. Pelvis was level in standing. Active range of motion was full. There was no obvious joint effusion, deformity. The patient was able to elicit tenderness at lumbar paraspinals. The patient reports the lumbar spine does not fell warm and there is no visible redness. Neurologic: Awake, alert and oriented in three planes. Speech is fluent. No facial weakness. Tongue is midline. Hearing is intact for conversation. Pupils are equal and round. Extraocular muscles appear intact during visual tracking. Shoulder shrug symmetric. Sensation: Intact for light touch (patient elicited) in all upper and lower extremity dermatomes. Strength: Functional upper extremity strength testing was observed to be at least antigravity and lower extremity strength testing including heel and toe walking as well as duck walking were intact, unable to manual muscle test given environment. There was no observable atrophy or side to size difference in muscle bulk. Muscle Tendon Reflexes: unable to test given environment. Gait is not tested. No observed abnormal muscle tone. The patient was able to rise from a chair and squat without difficulty. There is no tremor. Due to this being a TeleHealth encounter, evaluation of the following organ systems is limited: Vitals/Constitutional/EENT/Resp/CV/GI//MS/Neuro/Skin/Heme-Lymph-Imm. Impression:   1. Lumbar spondylosis    2. Chronic bilateral low back pain without sciatica        Plan:  Continue Robaxin, Neurontin, Cymbalta. Currently receiving Roxicodone in facility due to acute surgical pain. Plan to transition back to tramadol once discharged home. Continue rehabilitation. Controlled Substance Monitoring:    Acute and Chronic Pain Monitoring:   RX Monitoring 3/14/2022   Attestation -   Periodic Controlled Substance Monitoring No signs of potential drug abuse or diversion identified.    Chronic Pain > 80 MEDD -       The patient was educated about the diagnosis, prognosis, indications, risks and benefits of treatment. An opportunity to ask questions was given to the patient and questions were answered. The patient agreed to proceed with the recommended treatment as described above. Return patient to call when dicharged from facility to make appointment. .       Thank you for allowing me to participate in the care of your patient. Teressa Elmore D.O., P.T.   Board Certified Physical Medicine and Rehabilitation  Board Certified Electrodiagnostic Medicine

## 2022-03-17 ENCOUNTER — CARE COORDINATION (OUTPATIENT)
Dept: CARE COORDINATION | Age: 52
End: 2022-03-17

## 2022-03-17 NOTE — CARE COORDINATION
ACM spoke with pt. Pt states she is doing well. States she is still in [facility] for Rehab  Pt states the plan is to start doing steps soon in therapy. Pt states she will call PCP office as soon as she is discharged, and will also call ACM. Pt states at this time, there is no definite plan of a discharge, as it depends on how physical therapy progresses. This ACM to update Jacqueline Blackwell.

## 2022-05-05 ENCOUNTER — TELEPHONE (OUTPATIENT)
Dept: FAMILY MEDICINE CLINIC | Age: 52
End: 2022-05-05

## 2022-05-05 NOTE — TELEPHONE ENCOUNTER
Will you follow patient in home care?  Nursing and PT  Last seen 11/22/2021  Next appt Visit date not found  THE [facility]

## 2022-05-10 ENCOUNTER — TELEPHONE (OUTPATIENT)
Dept: FAMILY MEDICINE CLINIC | Age: 52
End: 2022-05-10

## 2022-05-10 ENCOUNTER — TELEMEDICINE (OUTPATIENT)
Dept: FAMILY MEDICINE CLINIC | Age: 52
End: 2022-05-10
Payer: COMMERCIAL

## 2022-05-10 DIAGNOSIS — G40.909 SEIZURE DISORDER (HCC): ICD-10-CM

## 2022-05-10 DIAGNOSIS — J68.3 MODERATE PERSISTENT REACTIVE AIRWAYS DYSFUNCTION SYNDROME WITHOUT COMPLICATION (HCC): ICD-10-CM

## 2022-05-10 DIAGNOSIS — E03.8 SUBCLINICAL HYPOTHYROIDISM: ICD-10-CM

## 2022-05-10 DIAGNOSIS — E11.59 TYPE 2 DIABETES MELLITUS WITH OTHER CIRCULATORY COMPLICATION, UNSPECIFIED WHETHER LONG TERM INSULIN USE (HCC): ICD-10-CM

## 2022-05-10 DIAGNOSIS — E11.51 TYPE 2 DIABETES MELLITUS WITH DIABETIC PERIPHERAL ANGIOPATHY WITHOUT GANGRENE, WITHOUT LONG-TERM CURRENT USE OF INSULIN (HCC): ICD-10-CM

## 2022-05-10 DIAGNOSIS — I10 ESSENTIAL HYPERTENSION: ICD-10-CM

## 2022-05-10 DIAGNOSIS — Z76.0 ENCOUNTER FOR MEDICATION REFILL: ICD-10-CM

## 2022-05-10 DIAGNOSIS — K21.9 GASTROESOPHAGEAL REFLUX DISEASE WITHOUT ESOPHAGITIS: ICD-10-CM

## 2022-05-10 DIAGNOSIS — E78.2 MIXED HYPERLIPIDEMIA: ICD-10-CM

## 2022-05-10 DIAGNOSIS — G62.9 PERIPHERAL POLYNEUROPATHY: Chronic | ICD-10-CM

## 2022-05-10 DIAGNOSIS — I73.9 PVD (PERIPHERAL VASCULAR DISEASE) (HCC): Primary | ICD-10-CM

## 2022-05-10 PROCEDURE — 3017F COLORECTAL CA SCREEN DOC REV: CPT | Performed by: FAMILY MEDICINE

## 2022-05-10 PROCEDURE — G8417 CALC BMI ABV UP PARAM F/U: HCPCS | Performed by: FAMILY MEDICINE

## 2022-05-10 PROCEDURE — 3046F HEMOGLOBIN A1C LEVEL >9.0%: CPT | Performed by: FAMILY MEDICINE

## 2022-05-10 PROCEDURE — 2022F DILAT RTA XM EVC RTNOPTHY: CPT | Performed by: FAMILY MEDICINE

## 2022-05-10 PROCEDURE — G8427 DOCREV CUR MEDS BY ELIG CLIN: HCPCS | Performed by: FAMILY MEDICINE

## 2022-05-10 PROCEDURE — 4004F PT TOBACCO SCREEN RCVD TLK: CPT | Performed by: FAMILY MEDICINE

## 2022-05-10 PROCEDURE — 99215 OFFICE O/P EST HI 40 MIN: CPT | Performed by: FAMILY MEDICINE

## 2022-05-10 RX ORDER — GABAPENTIN 400 MG/1
CAPSULE ORAL
COMMUNITY
End: 2022-05-10 | Stop reason: SDUPTHER

## 2022-05-10 RX ORDER — LOPERAMIDE HYDROCHLORIDE 2 MG/1
2 CAPSULE ORAL 4 TIMES DAILY PRN
Qty: 60 CAPSULE | Refills: 0 | COMMUNITY
Start: 2022-05-10 | End: 2023-05-10

## 2022-05-10 RX ORDER — GABAPENTIN 400 MG/1
400 CAPSULE ORAL 3 TIMES DAILY
Qty: 270 CAPSULE | Refills: 1 | Status: SHIPPED | OUTPATIENT
Start: 2022-05-10 | End: 2023-05-10

## 2022-05-10 RX ORDER — LIDOCAINE 50 MG/G
1 PATCH TOPICAL DAILY
Qty: 90 PATCH | Refills: 3 | Status: SHIPPED | OUTPATIENT
Start: 2022-05-10 | End: 2023-05-10

## 2022-05-10 RX ORDER — OMEPRAZOLE 20 MG/1
CAPSULE, DELAYED RELEASE ORAL
Qty: 90 CAPSULE | Refills: 3 | Status: SHIPPED | OUTPATIENT
Start: 2022-05-10 | End: 2023-05-10

## 2022-05-10 RX ORDER — METHOCARBAMOL 500 MG/1
500 TABLET, FILM COATED ORAL 3 TIMES DAILY
Qty: 270 TABLET | Refills: 3
Start: 2022-05-10 | End: 2023-05-10

## 2022-05-10 RX ORDER — DULOXETIN HYDROCHLORIDE 60 MG/1
CAPSULE, DELAYED RELEASE ORAL
Qty: 90 CAPSULE | Refills: 3
Start: 2022-05-10 | End: 2023-05-10

## 2022-05-10 RX ORDER — BUSPIRONE HYDROCHLORIDE 10 MG/1
10 TABLET ORAL 3 TIMES DAILY
Qty: 270 TABLET | Refills: 3 | Status: SHIPPED | OUTPATIENT
Start: 2022-05-10 | End: 2023-05-10

## 2022-05-10 RX ORDER — WARFARIN SODIUM 3 MG/1
TABLET ORAL
COMMUNITY
End: 2022-05-10 | Stop reason: SDUPTHER

## 2022-05-10 RX ORDER — LOPERAMIDE HYDROCHLORIDE 2 MG/1
CAPSULE ORAL
COMMUNITY
Start: 2022-04-19 | End: 2022-05-10 | Stop reason: SDUPTHER

## 2022-05-10 RX ORDER — LEVOTHYROXINE SODIUM 0.07 MG/1
TABLET ORAL
Qty: 90 TABLET | Refills: 3 | Status: SHIPPED | OUTPATIENT
Start: 2022-05-10 | End: 2023-05-10

## 2022-05-10 RX ORDER — ALBUTEROL SULFATE 90 UG/1
2 AEROSOL, METERED RESPIRATORY (INHALATION) EVERY 6 HOURS PRN
Qty: 3 EACH | Refills: 3 | Status: SHIPPED | OUTPATIENT
Start: 2022-05-10 | End: 2023-05-10

## 2022-05-10 RX ORDER — WARFARIN SODIUM 2 MG/1
2 TABLET ORAL DAILY
Qty: 90 TABLET | Refills: 3 | Status: SHIPPED | OUTPATIENT
Start: 2022-05-10 | End: 2023-05-10

## 2022-05-10 RX ORDER — ATORVASTATIN CALCIUM 80 MG/1
80 TABLET, FILM COATED ORAL NIGHTLY
Qty: 90 TABLET | Refills: 3 | Status: SHIPPED | OUTPATIENT
Start: 2022-05-10 | End: 2023-05-10

## 2022-05-10 RX ORDER — DIVALPROEX SODIUM 500 MG/1
TABLET, EXTENDED RELEASE ORAL
Qty: 270 TABLET | Refills: 3 | Status: SHIPPED
Start: 2022-05-10 | End: 2022-06-03 | Stop reason: SDUPTHER

## 2022-05-10 RX ORDER — WARFARIN SODIUM 3 MG/1
TABLET ORAL
Qty: 90 TABLET | Refills: 3 | Status: SHIPPED | OUTPATIENT
Start: 2022-05-10 | End: 2023-05-10

## 2022-05-10 ASSESSMENT — PATIENT HEALTH QUESTIONNAIRE - PHQ9
SUM OF ALL RESPONSES TO PHQ QUESTIONS 1-9: 3
4. FEELING TIRED OR HAVING LITTLE ENERGY: 0
6. FEELING BAD ABOUT YOURSELF - OR THAT YOU ARE A FAILURE OR HAVE LET YOURSELF OR YOUR FAMILY DOWN: 0
9. THOUGHTS THAT YOU WOULD BE BETTER OFF DEAD, OR OF HURTING YOURSELF: 0
8. MOVING OR SPEAKING SO SLOWLY THAT OTHER PEOPLE COULD HAVE NOTICED. OR THE OPPOSITE, BEING SO FIGETY OR RESTLESS THAT YOU HAVE BEEN MOVING AROUND A LOT MORE THAN USUAL: 0
SUM OF ALL RESPONSES TO PHQ QUESTIONS 1-9: 3
7. TROUBLE CONCENTRATING ON THINGS, SUCH AS READING THE NEWSPAPER OR WATCHING TELEVISION: 0
SUM OF ALL RESPONSES TO PHQ QUESTIONS 1-9: 3
5. POOR APPETITE OR OVEREATING: 0
2. FEELING DOWN, DEPRESSED OR HOPELESS: 0
SUM OF ALL RESPONSES TO PHQ9 QUESTIONS 1 & 2: 0
1. LITTLE INTEREST OR PLEASURE IN DOING THINGS: 0
10. IF YOU CHECKED OFF ANY PROBLEMS, HOW DIFFICULT HAVE THESE PROBLEMS MADE IT FOR YOU TO DO YOUR WORK, TAKE CARE OF THINGS AT HOME, OR GET ALONG WITH OTHER PEOPLE: 2
SUM OF ALL RESPONSES TO PHQ QUESTIONS 1-9: 3
3. TROUBLE FALLING OR STAYING ASLEEP: 3

## 2022-05-10 ASSESSMENT — ENCOUNTER SYMPTOMS
BACK PAIN: 0
VOMITING: 0
SORE THROAT: 0
DIARRHEA: 0
COUGH: 0
ABDOMINAL PAIN: 0
WHEEZING: 0
SHORTNESS OF BREATH: 0
CONSTIPATION: 0
NAUSEA: 0
BLOOD IN STOOL: 0

## 2022-05-10 NOTE — PROGRESS NOTES
05/10/22    TELEHEALTH EVALUATION -- Audio/Visual (During DVXWG-35 public health emergency)    Garrett Ross is a 45 y.o. female being evaluated by a Virtual Visit (video visit) encounter to address concerns as mentioned above. A caregiver was present when appropriate. Due to this being a TeleHealth encounter (During SRLPM-56 public health emergency), evaluation of the following organ systems was limited: Vitals/Constitutional/EENT/Resp/CV/GI//MS/Neuro/Skin/Heme-Lymph-Imm. Pursuant to the emergency declaration under the [address] [place] [address], [address] [place] [address] authority and the [employer] Act, this Virtual Visit was conducted with patient's (and/or legal guardian's) consent, to reduce the patient's risk of exposure to COVID-19 and provide necessary medical care. The patient (and/or legal guardian) has also been advised to contact this office for worsening conditions or problems, and seek emergency medical treatment and/or call 911 if deemed necessary. Services were provided through a video synchronous discussion virtually to substitute for in-person clinic visit. Patient's location: home address in [facility]. Physician location: other address in [place]. Patient identification was verified at the start of the visit: Yes    HPI:    Garrett Ross (:  1970) has requested an audio/video evaluation for the following concern(s):    Chief Complaint   Patient presents with    Follow-up     released from rehab,        She was hospitalized @ [facility]) dating back to  for ambulation of toes from existing foot. She was transferred to [facility] from  to . She went home 22. HHC to follow. She has a HHA 4x/week (paid through her insurance); needs HHA for MWF as well. Meds reviewed: using Warfarin 2 mg & 3 mg. She gets INR monitored periodically; Renato Cisse monitoring TBD.   All other medications are the same. Despite extensive severe PAD, she continues to smoke [place] few\" cigarettes/day. She is unable to cross over from precontemplative to contemplative phase of cessation. Her biggest fear is that she will gain so much weight if she quits smoking that she will become very disabled and wheelchair-bound. Weight management discussed. Now that she is home, she thinks she will have a lot more control over diet consumption and that should help to facilitate weight loss/weight maintenance    She is treated for diabetes, bipolar disorder, tobacco use, COPD, seizure disorder, Peripheral Vascular disease. Because of a pharmacy change, she is due for all new medication refills    She will need routine labs drawn (including INR monitoring) via [address]      DM2:   F/U DM. Patient is due for fasting lab work. Metformin 1000 mg BID. Compliant with therapy and tolerating it well. Has some sugary foods in diet and occasional lapses with increased sugar intake. Monitoring blood sugar fasting in AM and QHS. Fasting blood sugars: between 110 - 120 in the morning depending on what she had for dinner. HS blood sugars:135 - 200 in the evening. Average is 125. Patient is taking ASA but not Ace Inhibitor/ARB. Patient is taking 80 mg atorvastatin daily. Due for fasting lab work. Has a [address] of heart disease, M GMA had CHF, MI, HTN, uncle had similar problems. Unaware of mother/father medical history. Denies chest pain, SOB, C/N/V/D. Denies dysuria, hematuria. Patient is following with podiatrist (12/23/2019). Eye exam 3/2020. Patient is aware that it is necessary to see an Eye Dr yearly. Patient does smoke and is not ready to quit or cut back.        Lab Results   Component Value Date    LABA1C 6.0 (H) 12/10/2021    LABA1C 4.6 09/13/2021    LABA1C 7.1 05/25/2021     Lab Results   Component Value Date    LABMICR 46.6 (H) 05/25/2021    LDLCALC 106 (H) 05/25/2021    CREATININE 0.6 12/10/2021     Lab Results Component Value Date    CHOL 170 05/25/2021    CHOL 134 11/20/2020    CHOL 173 08/17/2020     Lab Results   Component Value Date    TRIG 143 05/25/2021    TRIG 121 11/20/2020    TRIG 119 08/17/2020     Lab Results   Component Value Date    HDL 35 05/25/2021    HDL 29 11/20/2020    HDL 35 08/17/2020     Lab Results   Component Value Date    LDLCALC 106 (H) 05/25/2021    LDLCALC 81 11/20/2020    LDLCALC 114 (H) 08/17/2020     The 10-year ASCVD risk score (Laura Calvert et al., 2013) is: 8.4%    Values used to calculate the score:      Age: 45 years      Sex: Female      Is Non- : No      Diabetic: Yes      Tobacco smoker: Yes      Systolic Blood Pressure: 211 mmHg      Is BP treated: Yes      HDL Cholesterol: 35 mg/dL      Total Cholesterol: 170 mg/dL    Peripheral Vascular Disease:  Severe and extensive. 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 Most recently she has had 3 toes amputated from her right foot. She understands that she has severe disease and that it is only likely to progress. However, she still smoking approximately half a pack a day. She is afraid to quit smoking because of concerns of weight gain. Tobacco Dependency:  She is not ready to quit. Smokes 1 pack a day. Bipolar Disorder:   Depakote and Duloxetine. Psychiatry was prescribing buspirone; she wants to transition care to PCP. She reports that pregabalin had been discontinued due to suicide risk. Gabapentin started for phantom pain does seem to be effective. Optimal dosing as of now 400 mg TID. Obesity:  Wt Readings from Last 3 Encounters:   09/16/21 176 lb 14.4 oz (80.2 kg)   06/03/21 224 lb (101.6 kg)   05/25/21 224 lb (101.6 kg)     BMI Readings from Last 3 Encounters:   09/16/21 27.71 kg/m²   06/03/21 35.08 kg/m²   05/25/21 35.08 kg/m²       Chronic Bronchitis:  Mild \"smoker's cough\" but denies shortness of breath. Mild wheezing reported. Currently on no medication; doesn't feel that symptoms are impairing her quality of life/function. Seizure Disorder:  Taking Depakote and Keppra. No breakthrough symptoms. Peripheral Neuropathy:  Issues with extremities and peripheral vascular disease as above. Symptoms of radiculopathy/neuropathy controlled adequately with gabapentin 400 mg 3 times daily. Chronic Pain:  Lumbar stenosis, DDD/DJD, SI joint arthritis, hip arthritis. Had been under the care of Dr. Dwayne Cordova ([facility])      [facility]:  Patient's past medical, surgical, social and/or family history reviewed, updated in chart, and are non-contributory (unless otherwise stated). Medications and allergies also reviewed and updated in chart. Review of Systems  Review of Systems   HENT: Negative for congestion, ear pain and sore throat. Respiratory: Negative for cough, shortness of breath and wheezing. Cardiovascular: Negative for chest pain, palpitations and leg swelling. Gastrointestinal: Negative for abdominal pain, blood in stool, constipation, diarrhea, nausea and vomiting. Genitourinary: Negative for dysuria, frequency, hematuria and urgency. Urinary incontinence that is associated with phantom pain of right LE   Musculoskeletal: Negative for back pain, myalgias and neck pain. Skin: Negative for rash. Neurological: Negative for dizziness, weakness and headaches. Phantom pain of right LE   Psychiatric/Behavioral: The patient is not nervous/anxious. Allergies   Allergen Reactions    Nsaids Shortness Of Breath and Other (See Comments)     Renal Failure    Other Other (See Comments)     Artifical sweeteners, patient goes into seizures.     ,   Past Medical History:   Diagnosis Date    Anticoagulant long-term use     Arthritis     back, both hips and both knees    Carpal tunnel syndrome on both sides     both wrists    Cellulitis of right foot 03/2016    [facility]ervical cancer ([facility] [address] 2012    S/P LUCIUS, WILO    Chronic back pain     Depressed bipolar II disorder (HCC)     Diabetes mellitus ([facility] [address]     Diverticulitis     DVT (deep vein thrombosis) in pregnancy     Epilepsy ([facility] [address]     Hx of blood clots     Hyperlipidemia     Hypertension     Leukocytosis 2013    Marijuana use     Neuropathy     Obesity     PONV (postoperative nausea and vomiting)     Rotator cuff tear arthropathy     Thyroid disease     Type 2 diabetes mellitus without complication ([facility] [address]    ,   Past Surgical History:   Procedure Laterality Date     SECTION      CHOLECYSTECTOMY      COLONOSCOPY  10/10/2018    COLONOSCOPY WITH BIOPSY performed by Carolina Cabezas MD at [address] N/A 2020    COLONOSCOPY WITH BIOPSY performed by Carolina Cabezas MD at [address] [facility] ECHO COMPLETE  2013         FOOT SURGERY  2015    HYSTERECTOMY  2012    KIDNEY BIOPSY      KNEE ARTHROSCOPY      right and left knee    KNEE SURGERY      left knee    LEG AMPUTATION THROUGH LOWER TIBIA AND FIBULA Right     LEG SURGERY Right     NERVE BLOCK Bilateral 2017    TFNB  #1    NERVE BLOCK Bilateral 06/15/2017    bilatera lumbar transforaminal nerve block #2 L5-S1    NERVE BLOCK Bilateral 2017    Bilateral transforamninal nerve block lumbar #3    RHINOPLASTY      1985    SMALL INTESTINE SURGERY N/A 2019    LAPAROSCOPIC SIGMOID COLECTOMY performed by Carolina Cabezas MD at [place] [address] VASCULAR SURGERY  2019   ,   Social History     Tobacco Use    Smoking status: Current Every Day Smoker     Packs/day: 1.00     Years: 32.00     Pack years: 32.00     Types: Cigarettes    Smokeless tobacco: Never Used    Tobacco comment: (2020):  not ready to quit; counseling given   Vaping Use    Vaping Use: Never used   Substance Use Topics    Alcohol use: Not Currently    Drug use: Not Currently     Types: Marijuana Lum Shashank)     Comment: once a month or so; depending on pain, last use 12/2017   ,   Family History   Problem Relation Age of Onset    Depression Mother     Bipolar Disorder Mother     Hypertension Mother    Irina Castaneda Anxiety Disorder Sister     Asthma Son    Irina Castaneda Schizophrenia Son     Anxiety Disorder Daughter    ,   Immunization History   Administered Date(s) Administered    COVID-19, J&J, PF, 0.5 mL 04/09/2021    Influenza, Intradermal, Preservative free 12/17/2015    Influenza, Intradermal, Quadrivalent, Preservative Free 11/10/2016    Influenza, Quadv, IM, (6 mo and older Fluzone, Flulaval, Fluarix and 3 yrs and older Afluria) 11/06/2020    Influenza, Quadv, IM, PF (6 mo and older Fluzone, Flulaval, Fluarix, and 3 yrs and older Afluria) 10/17/2017, 09/21/2018    Pneumococcal Conjugate 13-valent ([id number]) 12/17/2015    Pneumococcal Polysaccharide ([id number]) 07/24/2014    Tdap (Boostrix, Adacel) 10/05/2017   ,   Health Maintenance   Topic Date Due    Diabetic retinal exam  02/13/2019    Diabetic foot exam  03/05/2019    Shingles vaccine (1 of 2) Never done    Low dose CT lung screening  Never done    COVID-19 Vaccine (2 - Booster for [employer] series) 06/04/2021    Depression Monitoring  09/04/2021    Annual Wellness Visit (AWV)  09/05/2021    Diabetic microalbuminuria test  05/25/2022    Lipids  05/25/2022    TSH  05/25/2022    Breast cancer screen  01/01/2050 (Originally 5/12/2019)    A1C test (Diabetic or Prediabetic)  12/10/2022    Potassium  12/10/2022    Creatinine  12/10/2022    DTaP/Tdap/Td vaccine (2 - Td or Tdap) 10/05/2027    Colorectal Cancer Screen  06/02/2030    Pneumococcal 0-64 years Vaccine (3 - PPSV23 or PCV20) 05/12/2034    Flu vaccine  Completed    Hepatitis C screen  Completed    HIV screen  Completed    Hepatitis A vaccine  Aged Out    Hib vaccine  Aged Out    Meningococcal (ACWY) vaccine  Aged Out    Hepatitis B vaccine  Discontinued       PHYSICAL EXAMINATION:    Patient-Reported Vitals 5/10/2022 Patient-Reported Weight 200lb   Patient-Reported Height 5foot 7inches   Patient-Reported Systolic -   Patient-Reported Diastolic -   Patient-Reported Pulse -   Patient-Reported Temperature -   Patient-Reported SpO2 99   Patient-Reported Peak Flow -        Physical Exam  Constitutional:       General: She is not in acute distress. Appearance: Normal appearance. She is well-developed and normal weight. HENT:      Head: Normocephalic and atraumatic. Right Ear: External ear normal.      Left Ear: External ear normal.      Nose: Nose normal.      Mouth/Throat:      Mouth: Mucous membranes are moist.   Eyes:      Extraocular Movements: Extraocular movements intact. Conjunctiva/sclera: Conjunctivae normal.   Pulmonary:      Effort: Pulmonary effort is normal. No respiratory distress. Musculoskeletal:      Cervical back: Normal range of motion. Neurological:      Mental Status: She is alert and oriented to person, place, and time. Psychiatric:         Attention and Perception: Attention and perception normal.         Mood and Affect: Mood and affect normal.         Speech: Speech normal.         Behavior: Behavior normal. Behavior is cooperative. Thought Content: Thought content normal.         Cognition and Memory: Cognition normal.          Assessment / Plan:      Keisha Romano was seen today for follow-up. Diagnoses and all orders for this visit:    PVD (peripheral vascular disease) ([facility] [address]  -     warfarin (COUMADIN) 2 MG tablet; Take 1 tablet by mouth daily Every evening: Tue, Thu, Sat, Sun  -     warfarin (COUMADIN) 3 MG tablet; Take 1 tablet daily as directed by physician and INR  -     gabapentin (NEURONTIN) 400 MG capsule; Take 1 capsule by mouth 3 times daily. Encounter for medication refill  -     albuterol sulfate  (90 Base) MCG/ACT inhaler; Inhale 2 puffs into the lungs every 6 hours as needed for Wheezing  -     atorvastatin (LIPITOR) 80 MG tablet;  Take 1 tablet by mouth nightly  -     levothyroxine (SYNTHROID) 75 MCG tablet; TAKE 1 TABLET EVERY DAY  -     metoprolol tartrate (LOPRESSOR) 25 MG tablet; TAKE 1/2 TABLET TWICE DAILY  -     metFORMIN (GLUCOPHAGE) 1000 MG tablet; TAKE 1 TABLET TWICE DAILY WITH MEALS  -     divalproex (DEPAKOTE ER) 500 MG extended release tablet; TAKE 1 TABLET IN THE MORNING AND 2 TABLETS IN THE EVENING  -     omeprazole (PRILOSEC) 20 MG delayed release capsule; TAKE 1 CAPSULE EVERY MORNING  BEFORE  BREAKFAST  -     methocarbamol (ROBAXIN) 500 MG tablet; Take 1 tablet by mouth 3 times daily  -     DULoxetine (CYMBALTA) 60 MG extended release capsule; TAKE 1 CAPSULE EVERY DAY  -     warfarin (COUMADIN) 2 MG tablet; Take 1 tablet by mouth daily Every evening: Tue, Thu, Sat, Sun  -     warfarin (COUMADIN) 3 MG tablet; Take 1 tablet daily as directed by physician and INR  -     gabapentin (NEURONTIN) 400 MG capsule; Take 1 capsule by mouth 3 times daily. -     busPIRone (BUSPAR) 10 MG tablet; Take 1 tablet by mouth 3 times daily  -     loperamide (IMODIUM) 2 MG capsule; Take 1 capsule by mouth 4 times daily as needed for Diarrhea  -     lidocaine (LIDODERM) 5 %; Place 1 patch onto the skin daily 12 hours on, 12 hours off. Moderate persistent reactive airways dysfunction syndrome without complication (HCC)  -     albuterol sulfate  (90 Base) MCG/ACT inhaler; Inhale 2 puffs into the lungs every 6 hours as needed for Wheezing    Mixed hyperlipidemia  -     atorvastatin (LIPITOR) 80 MG tablet;  Take 1 tablet by mouth nightly    Subclinical hypothyroidism  -     levothyroxine (SYNTHROID) 75 MCG tablet; TAKE 1 TABLET EVERY DAY    Essential hypertension  -     metoprolol tartrate (LOPRESSOR) 25 MG tablet; TAKE 1/2 TABLET TWICE DAILY    Type 2 diabetes mellitus with other circulatory complication, unspecified whether long term insulin use (HCC)  -     metFORMIN (GLUCOPHAGE) 1000 MG tablet; TAKE 1 TABLET TWICE DAILY WITH MEALS    Type 2 diabetes mellitus with diabetic peripheral angiopathy without gangrene, without long-term current use of insulin (HCC)  -     metFORMIN (GLUCOPHAGE) 1000 MG tablet; TAKE 1 TABLET TWICE DAILY WITH MEALS    Seizure disorder  -     divalproex (DEPAKOTE ER) 500 MG extended release tablet; TAKE 1 TABLET IN THE MORNING AND 2 TABLETS IN THE EVENING    Peripheral polyneuropathy  -     divalproex (DEPAKOTE ER) 500 MG extended release tablet; TAKE 1 TABLET IN THE MORNING AND 2 TABLETS IN THE EVENING    Gastroesophageal reflux disease without esophagitis  -     omeprazole (PRILOSEC) 20 MG delayed release capsule; TAKE 1 CAPSULE EVERY MORNING  BEFORE  BREAKFAST     Time spent in pre-visit planning, interview, exam, /education and coordination of care: 60 minutes    Home health care ([facility]) to start providing care this week. Included in the care will be regular monitoring of INR        Follow Up:  Return 1) Schedule Medicare AWV (Vv) at earliest convenience, for 2) Routine check up (Vv) 3-4 months. or sooner if necessary. Call or go to ED immediately if symptoms worsen or persist.    Educational materials and/or home exercises printed for patient's review and were included in patient instructions on his/her AfterVisit Summary and given to patient at the end of visit. Counseled regarding above diagnosis,including possible risks and complications,  especially if left uncontrolled. Counseled regarding the possible side effects, risks, benefits and alternatives to treatment; patient and/or guardian verbalizes understanding, agrees, feels comfortable with and wishes to proceed with above treatment plan. Advised patient tocall with any new medication issues, and read all Rx info from pharmacy to assureaware of all possible risks and side effects of medication before taking. Reviewed age and gender appropriate health screening exams and vaccinations.   Advised patient regarding importance of keeping up with recommended health maintenance andto schedule as soon as possible if overdue, as this is important in assessing forundiagnosed pathology, especially cancer, as well as protecting against potentially harmful/life threatening disease. Patient and/or guardian verbalizes understandingand agrees with above counseling, assessment and plan. All questions answered. Total time spent for this encounter: 48 minutes      --Bradford Mistry MD on 5/10/2022 at 11:59 AM    An electronic signature was used to authenticate this note.

## 2022-05-10 NOTE — TELEPHONE ENCOUNTER
----- Message from Luis Enrique Hardy sent at 5/10/2022  2:42 PM EDT -----  Subject: Refill Request    QUESTIONS  Name of Medication? oxyCODONE (ROXICODONE) 5 MG immediate release tablet  Patient-reported dosage and instructions? as needed  How many days do you have left? 8  Preferred Pharmacy? [id number]  Pharmacy phone number (if available)? [phone]  Additional Information for Provider? Jeremías Ward, the home health nurse, called   to get a prescription of pain medication so that the patient has enough to   last her until she gets to her appointment to pain mgmt on 5/16. She is   recovering from surgery. Can the doctor call this in right away?  ---------------------------------------------------------------------------  --------------  [address]  What is the best way for the office to contact you? OK to leave message on   voicemail  Preferred Call Back Phone Number? [phone]  ---------------------------------------------------------------------------  --------------  SCRIPT ANSWERS  Relationship to Patient? Third Party  Third Party Type? Home Health Care? Representative Name?  Jeremías Ward

## 2022-05-11 NOTE — TELEPHONE ENCOUNTER
I do not typically provide this prescription for the patient; I am really not in a position to provided at this time.

## 2022-05-16 ENCOUNTER — TELEPHONE (OUTPATIENT)
Dept: PHYSICAL MEDICINE AND REHAB | Age: 52
End: 2022-05-16

## 2022-05-16 DIAGNOSIS — M48.061 NEURAL FORAMINAL STENOSIS OF LUMBAR SPINE: ICD-10-CM

## 2022-05-16 DIAGNOSIS — S78.111S: Primary | ICD-10-CM

## 2022-05-16 DIAGNOSIS — M51.26 DISC DISPLACEMENT, LUMBAR: ICD-10-CM

## 2022-05-16 DIAGNOSIS — G89.4 CHRONIC PAIN SYNDROME: ICD-10-CM

## 2022-05-16 NOTE — TELEPHONE ENCOUNTER
Called and left message on patient voicemail that the physician will not be able to see her tomorrow and to call office to be explained why.

## 2022-05-20 SDOH — HEALTH STABILITY: PHYSICAL HEALTH: ON AVERAGE, HOW MANY MINUTES DO YOU ENGAGE IN EXERCISE AT THIS LEVEL?: 0 MIN

## 2022-05-20 SDOH — HEALTH STABILITY: PHYSICAL HEALTH
ON AVERAGE, HOW MANY DAYS PER WEEK DO YOU ENGAGE IN MODERATE TO STRENUOUS EXERCISE (LIKE A BRISK WALK)?: PATIENT DECLINED

## 2022-05-20 ASSESSMENT — PATIENT HEALTH QUESTIONNAIRE - PHQ9
SUM OF ALL RESPONSES TO PHQ QUESTIONS 1-9: 0
SUM OF ALL RESPONSES TO PHQ QUESTIONS 1-9: 0
1. LITTLE INTEREST OR PLEASURE IN DOING THINGS: 0
SUM OF ALL RESPONSES TO PHQ QUESTIONS 1-9: 0
SUM OF ALL RESPONSES TO PHQ9 QUESTIONS 1 & 2: 0
2. FEELING DOWN, DEPRESSED OR HOPELESS: 0
SUM OF ALL RESPONSES TO PHQ QUESTIONS 1-9: 0

## 2022-05-20 ASSESSMENT — LIFESTYLE VARIABLES
HOW OFTEN DO YOU HAVE A DRINK CONTAINING ALCOHOL: MONTHLY OR LESS
HOW OFTEN DO YOU HAVE A DRINK CONTAINING ALCOHOL: 2
HOW MANY STANDARD DRINKS CONTAINING ALCOHOL DO YOU HAVE ON A TYPICAL DAY: PATIENT DECLINED
HOW OFTEN DO YOU HAVE SIX OR MORE DRINKS ON ONE OCCASION: 1
HOW MANY STANDARD DRINKS CONTAINING ALCOHOL DO YOU HAVE ON A TYPICAL DAY: 98

## 2022-05-24 ENCOUNTER — TELEPHONE (OUTPATIENT)
Dept: FAMILY MEDICINE CLINIC | Age: 52
End: 2022-05-24

## 2022-05-24 NOTE — TELEPHONE ENCOUNTER
----- Message from Fatuma Jha sent at 5/24/2022 11:59 AM EDT -----  Subject: Message to Provider    QUESTIONS  Information for Provider? Pt needs a lift chair and new Pain management   doctor would like to discuss. Needs referrals for both.   ---------------------------------------------------------------------------  --------------  CALL BACK INFO  What is the best way for the office to contact you? OK to leave message on   voicemail  Preferred Call Back Phone Number? [phone]  ---------------------------------------------------------------------------  --------------  SCRIPT ANSWERS  Relationship to Patient?  Self

## 2022-05-27 ENCOUNTER — TELEMEDICINE (OUTPATIENT)
Dept: FAMILY MEDICINE CLINIC | Age: 52
End: 2022-05-27
Payer: COMMERCIAL

## 2022-05-27 DIAGNOSIS — Z00.00 MEDICARE ANNUAL WELLNESS VISIT, SUBSEQUENT: Primary | ICD-10-CM

## 2022-05-27 DIAGNOSIS — I73.9 PERIPHERAL VASCULAR DISEASE (HCC): ICD-10-CM

## 2022-05-27 DIAGNOSIS — S78.111S: ICD-10-CM

## 2022-05-27 DIAGNOSIS — I10 ESSENTIAL HYPERTENSION: ICD-10-CM

## 2022-05-27 DIAGNOSIS — M48.061 NEURAL FORAMINAL STENOSIS OF LUMBAR SPINE: ICD-10-CM

## 2022-05-27 DIAGNOSIS — D68.9 BLOOD CLOTTING DISORDER (HCC): ICD-10-CM

## 2022-05-27 DIAGNOSIS — M51.26 DISC DISPLACEMENT, LUMBAR: ICD-10-CM

## 2022-05-27 PROCEDURE — 3017F COLORECTAL CA SCREEN DOC REV: CPT | Performed by: FAMILY MEDICINE

## 2022-05-27 PROCEDURE — G0439 PPPS, SUBSEQ VISIT: HCPCS | Performed by: FAMILY MEDICINE

## 2022-05-27 SDOH — ECONOMIC STABILITY: FOOD INSECURITY: WITHIN THE PAST 12 MONTHS, YOU WORRIED THAT YOUR FOOD WOULD RUN OUT BEFORE YOU GOT MONEY TO BUY MORE.: NEVER TRUE

## 2022-05-27 SDOH — ECONOMIC STABILITY: FOOD INSECURITY: WITHIN THE PAST 12 MONTHS, THE FOOD YOU BOUGHT JUST DIDN'T LAST AND YOU DIDN'T HAVE MONEY TO GET MORE.: NEVER TRUE

## 2022-05-27 ASSESSMENT — PATIENT HEALTH QUESTIONNAIRE - PHQ9
SUM OF ALL RESPONSES TO PHQ QUESTIONS 1-9: 4
5. POOR APPETITE OR OVEREATING: 0
9. THOUGHTS THAT YOU WOULD BE BETTER OFF DEAD, OR OF HURTING YOURSELF: 0
8. MOVING OR SPEAKING SO SLOWLY THAT OTHER PEOPLE COULD HAVE NOTICED. OR THE OPPOSITE, BEING SO FIGETY OR RESTLESS THAT YOU HAVE BEEN MOVING AROUND A LOT MORE THAN USUAL: 0
10. IF YOU CHECKED OFF ANY PROBLEMS, HOW DIFFICULT HAVE THESE PROBLEMS MADE IT FOR YOU TO DO YOUR WORK, TAKE CARE OF THINGS AT HOME, OR GET ALONG WITH OTHER PEOPLE: 1
SUM OF ALL RESPONSES TO PHQ9 QUESTIONS 1 & 2: 1
7. TROUBLE CONCENTRATING ON THINGS, SUCH AS READING THE NEWSPAPER OR WATCHING TELEVISION: 0
3. TROUBLE FALLING OR STAYING ASLEEP: 2
1. LITTLE INTEREST OR PLEASURE IN DOING THINGS: 0
2. FEELING DOWN, DEPRESSED OR HOPELESS: 1
SUM OF ALL RESPONSES TO PHQ QUESTIONS 1-9: 4
SUM OF ALL RESPONSES TO PHQ QUESTIONS 1-9: 4
6. FEELING BAD ABOUT YOURSELF - OR THAT YOU ARE A FAILURE OR HAVE LET YOURSELF OR YOUR FAMILY DOWN: 1
4. FEELING TIRED OR HAVING LITTLE ENERGY: 0
SUM OF ALL RESPONSES TO PHQ QUESTIONS 1-9: 4

## 2022-05-27 ASSESSMENT — SOCIAL DETERMINANTS OF HEALTH (SDOH): HOW HARD IS IT FOR YOU TO PAY FOR THE VERY BASICS LIKE FOOD, HOUSING, MEDICAL CARE, AND HEATING?: NOT HARD AT ALL

## 2022-05-27 NOTE — PROGRESS NOTES
Medicare Annual Wellness Visit    Per Cain is here for Medicare AWV (wants to talk about the chair lift order )    Assessment & Plan   Medicare annual wellness visit, subsequent  Neural foraminal stenosis of lumbar spine  -     External Referral To Pain Clinic  -     External Referral To Home Health  Disc displacement, lumbar  -     External Referral To Pain Clinic  -     External Referral To Home Health  Peripheral vascular disease [facility])  -     External Referral To Pain Clinic  -     External Referral To Home Health  Above-knee amputation of right lower extremity with complication, sequela ([address]  -     External Referral To [address]  Blood clotting disorder [facility])  -     External Referral To Home Health  Essential hypertension  -     External Referral To Home Health      Recommendations for Preventive Services Due: see orders and patient instructions/AVS.  Recommended screening schedule for the next 5-10 years is provided to the patient in written form: see Patient Instructions/AVS.          Subjective   The following acute and/or chronic problems were also addressed today:    In addition to Medicare AWV, she is in need of referral to new Pain Management. Dr. Lien Thortnon ([facility]) dismissed patient from practice on 5/16/22, citing use of both oxycodone and tramadol    Referral to Dr. Callie Tsang ([facility]) placed for continued opioid management. Lift Chair was ordered. CMN completed; [employer] requesting that PCP change documentation to reflect that patient is able to transfer and ambulate from standing. ..which she is not. Additional Letter CMN processed    Patient's complete Health Risk Assessment and screening values have been reviewed and are found in Flowsheets. The following problems were reviewed today and where indicated follow up appointments were made and/or referrals ordered.     Positive Risk Factor Screenings with Interventions:       Tobacco Use:     Tobacco Use: High Risk    Smoking Tobacco Use: Current Every Day Smoker    Smokeless Tobacco Use: Never Used     E-Cigarettes/Vaping Use     Questions Responses    E-Cigarette/Vaping Use Never User    Start Date     Passive Exposure     Quit Date     Counseling Given     Comments         Substance Use - Tobacco Interventions:  patient is not ready to work toward tobacco cessation at this time          [address] and ACP:  General  In general, how would you say your health is?: Good  In the past 7 days, have you experienced any of the following: New or Increased Pain, New or Increased Fatigue, Loneliness, Social Isolation, Stress or Anger?: (!) Yes  Select all that apply: (!) New or Increased Pain,Stress  Do you get the social and emotional support that you need?: Yes  Do you have a Living Will?: Yes    Advance Directives     Power of  Living Will ACP-Advance Directive ACP-Power of     Not on File Filed on 09/15/20 Filed Not on File      General Health Risk Interventions:  · Pain issues: Pain Management referral ordered for evaluation/treatment of chronic pain requiring chronic opioid therapy  · Stress: patient's comments regarding reasons for stress and/or anger: worsening of health, amputations, counseling/psychotherapy referral ordered for further evaluation/treatment    Health Habits/Nutrition:     Physical Activity: Unknown    Days of Exercise per Week: Patient refused    Minutes of Exercise per Session: 0 min     Have you lost any weight without trying in the past 3 months?: No     Have you seen the dentist within the past year?: (!) No    Health Habits/Nutrition Interventions:  · Dental exam overdue:  patient declines dental evaluation    Hearing/Vision:  Do you or your family notice any trouble with your hearing that hasn't been managed with hearing aids?: No  Do you have difficulty driving, watching TV, or doing any of your daily activities because of your eyesight?: (!) Yes  Have you had an eye exam within the past year?: Yes  No exam data present    Hearing/Vision Interventions:  · Vision concerns:  patient declines any further evaluation/treatment for this issue    Safety:  Do you have working smoke detectors?: Yes  Do you have any tripping hazards - loose or unsecured carpets or rugs?: No  Do you have any tripping hazards - clutter in doorways, halls, or stairs?: No  Do you have either shower bars, grab bars, non-slip mats or non-slip surfaces in your shower or bathtub?: Yes  Do all of your stairways have a railing or banister?: Yes  Do you always fasten your seatbelt when you are in a car?: (!) No    Safety Interventions:  · Home safety tips provided    ADLs:  In the past 7 days, did you need help from others to perform any of the following everyday activities: Eating, dressing, grooming, bathing, toileting, or walking/balance?: (!) Yes  Select all that apply: (!) Dressing  In the past 7 days, did you need help from others to take care of any of the following: Laundry, housekeeping, banking/finances, shopping, telephone use, food preparation, transportation, or taking medications?: (!) Yes  Select all that apply: (!) Laundry,Housekeeping,Food Preparation,Transportation    ADL Interventions:  · Referred for Home Health Services          Objective      Patient-Reported Vitals  Patient-Reported Systolic (Top): 664 mmHg  Patient-Reported Diastolic (Bottom): 86 mmHg  Patient-Reported Pulse: 88  Patient-Reported Temperature: 97.7  Patient-Reported Weight: 200lb  Patient-Reported Height: 5foot 7inches  Patient-Reported Pulse Oximetry: 97             Allergies   Allergen Reactions    Nsaids Shortness Of Breath and Other (See Comments)     Renal Failure    Other Other (See Comments)     Artifical sweeteners, patient goes into seizures. Prior to Visit Medications    Medication Sig Taking?  Authorizing Provider   Lift Chair MISC by Does not apply route Dx: S78.111s, G89.4, M51.26, M48.061 Yes Neeru Engle MD   albuterol sulfate  (90 Base) MCG/ACT inhaler Inhale 2 puffs into the lungs every 6 hours as needed for Wheezing Yes Flor Dior MD   atorvastatin (LIPITOR) 80 MG tablet Take 1 tablet by mouth nightly Yes Flor Dior MD   levothyroxine (SYNTHROID) 75 MCG tablet TAKE 1 TABLET EVERY DAY Yes Vianca Smith MD   metoprolol tartrate (LOPRESSOR) 25 MG tablet TAKE 1/2 TABLET TWICE DAILY Yes Vianca Smith MD   metFORMIN (GLUCOPHAGE) 1000 MG tablet TAKE 1 TABLET TWICE DAILY WITH MEALS Yes Vianca Smith MD   divalproex (DEPAKOTE ER) 500 MG extended release tablet TAKE 1 TABLET IN THE MORNING AND 2 TABLETS IN THE EVENING Yes Vianca Smith MD   omeprazole (PRILOSEC) 20 MG delayed release capsule TAKE 1 CAPSULE EVERY MORNING  BEFORE  BREAKFAST Yes Vianca Smith MD   methocarbamol (ROBAXIN) 500 MG tablet Take 1 tablet by mouth 3 times daily Yes Flor Dior MD   DULoxetine (CYMBALTA) 60 MG extended release capsule TAKE 1 CAPSULE EVERY DAY Yes Vianca Smith MD   warfarin (COUMADIN) 2 MG tablet Take 1 tablet by mouth daily Every evening: Tue, Thu, Sat, Sun Yes Flor Dior MD   warfarin (COUMADIN) 3 MG tablet Take 1 tablet daily as directed by physician and INR Yes Flor Dior MD   gabapentin (NEURONTIN) 400 MG capsule Take 1 capsule by mouth 3 times daily. Yes Flor Dior MD   busPIRone (BUSPAR) 10 MG tablet Take 1 tablet by mouth 3 times daily Yes Flor Dior MD   loperamide (IMODIUM) 2 MG capsule Take 1 capsule by mouth 4 times daily as needed for Diarrhea Yes Flor Dior MD   lidocaine (LIDODERM) 5 % Place 1 patch onto the skin daily 12 hours on, 12 hours off.  Yes Flor Dior MD   omeprazole (PRILOSEC OTC) 20 MG tablet Take by mouth Yes Murray Soto MD   aspirin 81 MG EC tablet Take 1 tablet by mouth daily Yes Flor Dior MD acetaminophen (TYLENOL) 325 MG tablet Take 650 mg by mouth every 8 hours as needed for Pain or Fever Yes Historical Provider, MD   levETIRAcetam (KEPPRA) 500 MG tablet TAKE 1 TABLET TWICE DAILY Yes GILDARDO Gee - MAHIN   oxyCODONE (ROXICODONE) 5 MG immediate release tablet Take 5 mg by mouth every 6 hours as needed. Patient not taking: Reported on 5/27/2022  Historical Provider, MD   LORazepam (ATIVAN) 1 MG tablet Take 1 mg by mouth daily as needed for Anxiety (wound vac change). Patient not taking: Reported on 5/27/2022  Historical Provider, MD [place] (Including outside providers/suppliers regularly involved in providing care):   Patient Care Team:  Dakota Kaminski MD as PCP - General (Family Medicine)  Dakota Kaminski MD as PCP - REHABILITATION [facility] Empaneled Provider     Reviewed and updated this visit:  Allergies  Meds           Assessment / Plan:      Fili Song was seen today for medicare awv.     Diagnoses and all orders for this visit:    Medicare annual wellness visit, subsequent    Neural foraminal stenosis of lumbar spine: Needs referral to new pain management and home health care for monitoring lab work  -     External Referral To Pain Clinic  -     External Referral To Home Health    Disc displacement, lumbar: Needs referral to new pain management and home health care for monitoring lab work    -     External Referral To Pain Clinic  -     External Referral To Home Health    Peripheral vascular disease [facility]): Needs referral to new pain management and home health care for monitoring lab work  -     External Referral To Pain Clinic  -     External Referral To Home Health    Above-knee amputation of right lower extremity with complication, sequela [facility]): Needs referral to new pain management and home health care for monitoring lab work  -     External Referral To Home Health    Blood clotting disorder [facility]): Needs referral to home health care for monitoring lab work  -     External Referral To Home Health    Essential hypertension: Needs referral to home health care for monitoring lab work  -     External Referral To Home Health           Follow Up:  1) Keep scheduled appointments; 2) Medicare Annual Wellness Visit in 1 year         Isela Vargas, was evaluated through a synchronous (real-time) audio-video encounter. The patient (or guardian if applicable) is aware that this is a billable service, which includes applicable co-pays. This Virtual Visit was conducted with patient's (and/or legal guardian's) consent. The visit was conducted pursuant to the emergency declaration under the [facility], [address] [place] [address] authority and the [employer] and [employer] Act. Patient identification was verified, and a caregiver was present when appropriate. The patient was located at Home: [address] [place] [address]. Provider was located at [facility] (Appt Dept): [address] [facility].          Bartolo Kelly MD

## 2022-05-27 NOTE — PATIENT INSTRUCTIONS
Personalized Preventive Plan for Qi Tafoya - 5/27/2022  Medicare offers a range of preventive health benefits. Some of the tests and screenings are paid in full while other may be subject to a deductible, co-insurance, and/or copay. Some of these benefits include a comprehensive review of your medical history including lifestyle, illnesses that may run in your family, and various assessments and screenings as appropriate. After reviewing your medical record and screening and assessments performed today your provider may have ordered immunizations, labs, imaging, and/or referrals for you. A list of these orders (if applicable) as well as your Preventive Care list are included within your After Visit Summary for your review. Other Preventive Recommendations:    · A preventive eye exam performed by an eye specialist is recommended every 1-2 years to screen for glaucoma; cataracts, macular degeneration, and other eye disorders. · A preventive dental visit is recommended every 6 months. · Try to get at least 150 minutes of exercise per week or 10,000 steps per day on a pedometer . · Order or download the FREE \"Exercise & Physical Activity: Your Everyday Guide\" from [employer]. Call 5-654.105.8092 or search [employer] online. · You need 3518-4096 mg of calcium and 6924-6942 IU of vitamin D per day. It is possible to meet your calcium requirement with diet alone, but a vitamin D supplement is usually necessary to meet this goal.  · When exposed to the sun, use a sunscreen that protects against both UVA and UVB radiation with an SPF of 30 or greater. Reapply every 2 to 3 hours or after sweating, drying off with a towel, or swimming. · Always wear a seat belt when traveling in a car. Always wear a helmet when riding a bicycle or motorcycle.   · Additional documentation for Medical Necessity for Lift Chair composed and processed  · Additional Home Health care Orders for lab work orders placed  · Referral to New Pain Management    Keep Your Memory Maycol Allania       Many factors can affect your ability to remembera hectic lifestyle, aging, stress, chronic disease, and certain medicines. But, there are steps you can take to sharpen your mind and help preserve your memory. Challenge Your Brain   Regularly challenging your mind may help keeps it in top shape. Good mental exercises include:   Crossword puzzlesUse a dictionary if you need it; you will learn more that way. Brainteasers Try some! Crafts, such as wood working and sewing   Hobbies, such as gardening and building model airplanes   SocializingVisit old friends or join groups to meet new ones. Reading   Learning a new language   Taking a class, whether it be art history or adriana chi   TravelingExperience the food, history, and culture of your destination   Learning to use a computer   Going to museums, the theater, or thought-provoking movies   Changing things in your daily life, such as reversing your pattern in the grocery store or brushing your teeth using your nondominant hand   Use Memory Aids   There is no need to remember every detail on your own. These memory aids can help:   Calendars and day planners   Electronic organizers to store all sorts of helpful informationThese devices can \"beep\" to remind you of appointments. A book of days to record birthdays, anniversaries, and other occasions that occur on the same date every year   Detailed \"to-do\" lists and strategically placed sticky notes   Quick \"study\" sessionsBefore a gathering, review who will be there so their names will be fresh in your mind. Establish routinesFor example, keep your keys, wallet, and umbrella in the same place all the time or take medicine with your 8:00 AM glass of juice   Live a Healthy Life   Many actions that will keep your body strong will do the same for your mind.  For example:   Talk to Your Doctor About Herbs and Supplements    Malnutrition and vitamin deficiencies can impair your mental function. For example, vitamin B12 deficiency can cause a range of symptoms, including confusion. But, what if your nutritional needs are being met? Can herbs and supplements still offer a benefit? Researchers have investigated a range of natural remedies, such as ginkgo , ginseng , and the supplement phosphatidylserine (PS). So far, though, the evidence is inconsistent as to whether these products can improve memory or thinking. If you are interested in taking herbs and supplements, talk to your doctor first because they may interact with other medicines that you are taking. Exercise Regularly    Among the many benefits of regular exercise are increased blood flow to the brain and decreased risk of certain diseases that can interfere with memory function. One study found that even moderate exercise has a beneficial effect. Examples of \"moderate\" exercise include:   Playing 18 holes of golf once a week, without a cart   Playing tennis twice a week   Walking one mile per day   Manage Stress    It can be tough to remember what is important when your mind is cluttered. Make time for relaxation. Choose activities that calm you down, and make it routine. Manage Chronic Conditions    Side effects of high blood pressure , diabetes, and heart disease can interfere with mental function. Many of the lifestyle steps discussed here can help manage these conditions. Strive to eat a healthy diet, exercise regularly, get stress under control, and follow your doctor's advice for your condition. Minimize Medications    Talk to your doctor about the medicines that you take. Some may be unnecessary. Also, healthy lifestyle habits may lower the need for certain drugs. Last Reviewed: April 2010 Celso Nelson MD   Updated: 4/13/2010   ·   Smoking Cessation  This document explains the best ways for you to quit smoking and new treatments to help.  It lists new medicines that can double or triple your chances of quitting and quitting for good. It also considers ways to avoid relapses and concerns you may have about quitting, including weight gain. NICOTINE: A POWERFUL ADDICTION  If you have tried to quit smoking, you know how hard it can be. It is hard because nicotine is a very addictive drug. For some people, it can be as addictive as heroin or cocaine. Usually, people make 2 or 3 tries, or more, before finally being able to quit. Each time you try to quit, you can learn about what helps and what hurts. Quitting takes hard work and a lot of effort, but you can quit smoking. QUITTING SMOKING IS ONE OF THE MOST IMPORTANT THINGS YOU WILL EVER DO. You will live longer, feel better, and live better. The impact on your body of quitting smoking is felt almost immediately:  Within 20 minutes, blood pressure decreases. Pulse returns to its normal level. After 8 hours, carbon monoxide levels in the blood return to normal. Oxygen level increases. After 24 hours, chance of heart attack starts to decrease. Breath, hair, and body stop smelling like smoke. After 48 hours, damaged nerve endings begin to recover. Sense of taste and smell improve. After 72 hours, the body is virtually free of nicotine. Bronchial tubes relax and breathing becomes easier. After 2 to 12 weeks, lungs can hold more air. Exercise becomes easier and circulation improves. Quitting will reduce your risk of having a heart attack, stroke, cancer, or lung disease:  After 1 year, the risk of coronary heart disease is cut in half. After 5 years, the risk of stroke falls to the same as a nonsmoker. After 10 years, the risk of lung cancer is cut in half and the risk of other cancers decreases significantly. After 15 years, the risk of coronary heart disease drops, usually to the level of a nonsmoker. If you are pregnant, quitting smoking will improve your chances of having a healthy baby.   The people you live with, especially your children, will be healthier. You will have extra money to spend on things other than cigarettes. FIVE KEYS TO QUITTING  Studies have shown that these 5 steps will help you quit smoking and quit for good. You have the best chances of quitting if you use them together:  Get ready. Get support and encouragement. Learn new skills and behaviors. Get medicine to reduce your nicotine addiction and use it correctly. Be prepared for relapse or difficult situations. Be determined to continue trying to quit, even if you do not succeed at first.  1. GET READY  Set a quit date. Change your environment. Get rid of ALL cigarettes, ashtrays, matches, and lighters in your home, car, and place of work. Do not let people smoke in your home. Review your past attempts to quit. Think about what worked and what did not. Once you quit, do not smoke. NOT EVEN A PUFF! 2. GET SUPPORT AND ENCOURAGEMENT  Studies have shown that you have a better chance of being successful if you have help. You can get support in many ways. Tell your family, friends, and coworkers that you are going to quit and need their support. Ask them not to smoke around you. Talk to your caregivers (doctor, dentist, nurse, pharmacist, psychologist, and/or smoking counselor). Get individual, group, or telephone counseling and support. The more counseling you have, the better your chances are of quitting. Programs are available at [facility]. Call your local health department for information about programs in your area. Spiritual beliefs and practices may help some smokers quit. Quit meters are small computer programs online or downloadable that keep track of quit statistics, such as amount of \"quit-time,\" cigarettes not smoked, and money saved.   Many smokers find one or more of the many self-help books available useful in helping them quit and stay off tobacco.  3. LEARN NEW SKILLS AND BEHAVIORS  Try to distract yourself from urges to smoke. Talk to someone, go for a walk, or occupy your time with a task. When you first try to quit, change your routine. Take a different route to work. Drink tea instead of coffee. Eat breakfast in a different place. Do something to reduce your stress. Take a hot bath, exercise, or read a book. Plan something enjoyable to do every day. Reward yourself for not smoking. Explore interactive web-based programs that specialize in helping you quit. 4. GET MEDICINE AND USE IT CORRECTLY  Medicines can help you stop smoking and decrease the urge to smoke. Combining medicine with the above behavioral methods and support can quadruple your chances of successfully quitting smoking. The [place] Food and Drug Administration (FDA) has approved 7 medicines to help you quit smoking. These medicines fall into 3 categories. Nicotine replacement therapy (delivers nicotine to your body without the negative effects and risks of smoking):  Nicotine gum: Available over-the-counter. Nicotine lozenges: Available over-the-counter. Nicotine inhaler: Available by prescription. Nicotine nasal spray: Available by prescription. Nicotine skin patches (transdermal): Available by prescription and over-the-counter. Antidepressant medicine (helps people abstain from smoking, but how this works is unknown): Bupropion sustained-release (SR) tablets: Available by prescription. Nicotinic receptor partial agonist (simulates the effect of nicotine in your brain):  Varenicline tartrate tablets: Available by prescription. Ask your caregiver for advice about which medicines to use and how to use them. Carefully read the information on the package. Everyone who is trying to quit may benefit from using a medicine.  If you are pregnant or trying to become pregnant, nursing an infant, you are under age 25, or you smoke fewer than 10 cigarettes per day, talk to your caregiver before taking any nicotine replacement medicines. You should stop using a nicotine replacement product and call your caregiver if you experience nausea, dizziness, weakness, vomiting, fast or irregular heartbeat, mouth problems with the lozenge or gum, or redness or swelling of the skin around the patch that does not go away. Do not use any other product containing nicotine while using a nicotine replacement product. Talk to your caregiver before using these products if you have diabetes, heart disease, asthma, stomach ulcers, you had a recent heart attack, you have high blood pressure that is not controlled with medicine, a history of irregular heartbeat, or you have been prescribed medicine to help you quit smoking. 5. BE PREPARED FOR RELAPSE OR DIFFICULT SITUATIONS  Most relapses occur within the first 3 months after quitting. Do not be discouraged if you start smoking again. Remember, most people try several times before they finally quit. You may have symptoms of withdrawal because your body is used to nicotine. You may crave cigarettes, be irritable, feel very hungry, cough often, get headaches, or have difficulty concentrating. The withdrawal symptoms are only temporary. They are strongest when you first quit, but they will go away within 10 to 14 days. Here are some difficult situations to watch for:  Alcohol. Avoid drinking alcohol. Drinking lowers your chances of successfully quitting. Caffeine. Try to reduce the amount of caffeine you consume. It also lowers your chances of successfully quitting. Other smokers. Being around smoking can make you want to smoke. Avoid smokers. Weight gain. Many smokers will gain weight when they quit, usually less than 10 pounds. Eat a healthy diet and stay active. Do not let weight gain distract you from your main goal, quitting smoking. Some medicines that help you quit smoking may also help delay weight gain. You can always lose the weight gained after you quit. Bad mood or depression. There are a lot of ways to improve your mood other than smoking. If you are having problems with any of these situations, talk to your caregiver. SPECIAL SITUATIONS AND CONDITIONS  Studies suggest that everyone can quit smoking. Your situation or condition can give you a special reason to quit. Pregnant women/new mothers: By quitting, you protect your baby's health and your own. Hospitalized patients: By quitting, you reduce health problems and help healing. Heart attack patients: By quitting, you reduce your risk of a second heart attack. Lung, head, and neck cancer patients: By quitting, you reduce your chance of a second cancer. Parents of children and adolescents: By quitting, you protect your children from illnesses caused by secondhand smoke. QUESTIONS TO THINK ABOUT  Think about the following questions before you try to stop smoking. You may want to talk about your answers with your caregiver. Why do you want to quit? If you tried to quit in the past, what helped and what did not? What will be the most difficult situations for you after you quit? How will you plan to handle them? Who can help you through the tough times? Your family? Friends? Caregiver? What pleasures do you get from smoking? What ways can you still get pleasure if you quit? Here are some questions to ask your caregiver: How can you help me to be successful at quitting? What medicine do you think would be best for me and how should I take it? What should I do if I need more help? What is smoking withdrawal like? How can I get information on withdrawal?  Quitting takes hard work and a lot of effort, but you can quit smoking. FOR MORE INFORMATION   Smokefree. gov ([employer]) provides free, accurate, evidence-based information and professional assistance to help support the immediate and long-term needs of people trying to quit smoking.   Document Released: 12/12/2002 Document Revised: 12/06/2012 Document Reviewed: 10/04/2010  [employer]® Patient Information ©2012 Jill. ·     Keeping Home a [facility]       As we get older, changes in balance, gait, strength, vision, hearing, and cognition make even the most youthful senior more prone to accidents. Falls are one of the leading health risks for older people. This increased risk of falling is related to:   Aging process (eg, decreased muscle strength, slowed reflexes)   Higher incidence of chronic health problems (eg, arthritis, diabetes) that may limit mobility, agility or sensory awareness   Side effects of medicine (eg, dizziness, blurred vision)especially medicines like prescription pain medicines and drugs used to treat mental health conditions   Depending on the brittleness of your bones, the consequences of a fall can be serious and long lasting. Home Life   Research by the Association of Aging [facility]) shows that some home accidents among older adults can be prevented by making simple lifestyle changes and basic modifications and repairs to the home environment. Here are some lifestyle changes that experts recommend:   Have your hearing and vision checked regularly. Be sure to wear prescription glasses that are right for you. Speak to your doctor or pharmacist about the possible side effects of your medicines. A number of medicines can cause dizziness. If you have problems with sleep, talk to your doctor. Limit your intake of alcohol. If necessary, use a cane or walker to help maintain your balance. Wear supportive, rubber-soled shoes, even at home. If you live in a region that gets wintry weather, you may want to put special cleats on your shoes to prevent you from slipping on the snow and ice. Exercise regularly to help maintain muscle tone, agility, and balance. Always hold the banister when going up or down stairs. Also, use  bars when getting in or out of the bath or shower, or using the toilet.    To avoid dizziness, get up stove clean and free of grease build-up. Kitchen ventilation systems and range exhausts should be working properly. Keep flammable objects such as towels and pot holders away from the cooking area except when in use. Make sure kitchen curtains are tied back. Move cords and appliances away from the sink and hot surfaces. If extension cords are needed, install wiring guides so they do not hang over the sink, range, or working areas. Look for coffee pots, kettles and toaster ovens with automatic shut-offs. Keep a mop handy in the kitchen so you can wipe up spills instantly. You should also have a small fire extinguisher. Arrange your kitchen with frequently used items on lower shelves to avoid the need to stand on a stepstool to reach them. Make sure countertops are well-lit to avoid injuries while cutting and preparing food. In the Bathroom    Use a non-slip mat or decals in the tub and shower, since wet, soapy tile or porcelain surfaces are extremely slippery. Make sure bathroom rugs are non-skid or tape them firmly to the floor. Bathtubs should have at least one, preferably two, grab bars, firmly attached to structural supports in the wall. (Do not use built-in soap holders or glass shower doors as grab bars.)    Tub seats fitted with non-slip material on the legs allow you to wash sitting down. For people with limited mobility, bathtub transfer benches allow you to slide safely into the tub. Raised toilet seats and toilet safety rails are helpful for those with knee or hip problems. In the [facility]    Make sure you use a nightlight and that the area around your bed is clear of potential obstacles. Be careful with electric blankets and never go to sleep with a heating pad, which can cause serious burns even if on a low setting. Use fire-resistant mattress covers and pillows, and NEVER smoke in bed.     Keep a phone next to the bed that is programmed to dial 911 at the push of a button. If you have a chronic condition, you may want to sign on with an automatic call-in service. Typically the system includes a small pendant that connects directly to an emergency medical voice-response system. You should also make arrangements to stay in contact with someonefriend, neighbor, family memberon a regular schedule. Fire Prevention   According to the [employer]. (Smoke Alarms for Every) [address] [place] [address], senior citizens are one of the two highest risk groups for death and serious injuries due to residential fires. When cooking, wear short-sleeved items, never a bulky long-sleeved robe. The [facility]'s Safety Checklist for Older Consumers emphasizes the importance of checking basements, garages, workshops and storage areas for fire hazards, such as volatile liquids, piles of old rags or clothing and overloaded circuits. Never smoke in bed or when lying down on a couch or recliner chair. Small portable electric or kerosene heaters are responsible for many home fires and should be used cautiously if at all. If you do use one, be sure to keep them away from flammable materials. In case of fire, make sure you have a pre-established emergency exit plan. Have a professional check your fireplace and other fuel-burning appliances yearly. [employer]   Baby boomers entering the conteh years will continue to see the development of new products to help older adults live safely and independently in spite of age-related changes. Making Life More Livable  , by Lily Mcdan[employer], lists over 1,000 products for \"living well in the mature years,\" such as bathing and mobility aids, household security devices, ergonomically designed knives and peelers, and faucet valves and knobs for temperature control. Medical supply stores and organizations are good sources of information about products that improve your quality of life and insure your safety.      Last Reviewed: November 2009 Tanner Mendoza MD   Updated: 3/7/2011     ·

## 2022-05-27 NOTE — LETTER
[address] [place] [address] Piyush  Phone: [phone]  Fax: [phone]    Britney Blas MD                 Att: Rocio Sharma         Fax: [phone]         Email: [email or web address]        May 27, 2022    Patient: Selena Bolaños   MR Number: [id number]   YOB: 1970   Date of Visit: 5/27/22       To Whom it May Concern:    Selena Bolaños has been under my medical care. Complicated medical history to include severe lumbar stenosis with Degenerative Joint and degenerative disc disease, and severe/advanced peripheral vascular disease with complications. She is S/P amputation great and fifth toe of left foot and amputation of entire RLE, including hip joint. She is unable to ambulate at all. With respect to transfers from bed/chair, she requires assistance from at least one other person in order to make safe successful transfers. All appropriate therapeutic modalities to enable Ms. Rivera to transfer from a chair into wheelchair/commode/shower/bed have been tried and exhausted, especially since she is absolutely unable to bear weight on her remaining lower extremity. This is all well documented within medical record.                   Sincerely,                           Britney Blas MD          Enclosures

## 2022-06-02 NOTE — PROGRESS NOTES
[address] [place] [address]. Rosalba Welsh M.D., F.A.C.P. Lyubov Cain, NATHANIEL, APRN, [facility]  Jing Herrmann. Maria Gonzales, MSN, APRN-FNP-C  Lynnette Nicolas MSN, APRN, FNP-C  Genevieve WILEY PA-C  [address] MSN, APRN, FNP-C  [address], [address] [place], [place] [address]ce Rd  Phone: [phone]  Fax: [phone]           TeleMedicine Patient Consent    This visit was performed as a virtual video visit using a synchronous, two-way, audio-video telehealth technology platform. Patient identification was verified at the start of the visit, including the patient's telephone number and physical location. I discussed with the patient the nature of our telehealth visits, that:     1. Due to the nature of an audio- video modality, the only components of a physical exam that could be done are the elements supported by direct observation. 2. I would evaluate the patient and recommend diagnostics and treatments based on my assessment. 3. If it was felt that the patient should be evaluated in clinic or an emergency room setting, then they would be directed there. 4. Our sessions are not being recorded and that personal health information is protected. 5. Our team would provide follow up care in person if/when the patient needs it. Patient does agree to proceed with telemedicine consultation. Patient's location: other address in [facility]  Physician  location home address in [facility] other people involved in call no one    Time spent: Greater than Not billed by time    This visit was completed virtually using My Chart/Haiku/Melissa    HPI     Garrett Ross is a 47 y.o. woman    We are speaking with her for her yearly epilepsy follow up    She remains a good historian    Sz description: \"twitches\" and \"hiccups\". She is completely conscious for these with no staring, focal limb movements, or automatisms----but they occasional progress to GTC sz.     Unfortunately, she is doing her video visit from [facility] where she is admitted with a bone infection. 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 Amazingly, despite all of this stress she has had no breakthrough seizures on her current doses of Keppra and Depakote and has no neurologic complaints today. Surveillance MRI of her benign pituitary cyst in 2020 was stable. She denies any headaches or vision changes    No chest pain or palpitations  No SOB  No vertigo, lightheadedness or loss of consciousness  No falls, tripping or stumbling  No incontinence of bowels or bladder  No itching or bruising   No numbness, tingling or focal arm/leg weakness    ROS is otherwise negative     Medications:     Prior to Admission medications    Medication Sig Start Date End Date Taking?  Authorizing Provider   Lift Chair MISC by Does not apply route Dx: S78.111s, G89.4, M51.26, M48.061 5/16/22  Yes Josh Lazo MD   albuterol sulfate  (90 Base) MCG/ACT inhaler Inhale 2 puffs into the lungs every 6 hours as needed for Wheezing 5/10/22 5/10/23 Yes Vianca Smith MD   atorvastatin (LIPITOR) 80 MG tablet Take 1 tablet by mouth nightly 5/10/22 5/10/23 Yes Vianca Smith MD   levothyroxine (SYNTHROID) 75 MCG tablet TAKE 1 TABLET EVERY DAY 5/10/22 5/10/23 Yes Vianca Smith MD   metoprolol tartrate (LOPRESSOR) 25 MG tablet TAKE 1/2 TABLET TWICE DAILY 5/10/22 5/10/23 Yes Vianca Smith MD   metFORMIN (GLUCOPHAGE) 1000 MG tablet TAKE 1 TABLET TWICE DAILY WITH MEALS 5/10/22 5/10/23 Yes Vianca Smith MD   divalproex (DEPAKOTE ER) 500 MG extended release tablet TAKE 1 TABLET IN THE MORNING AND 2 TABLETS IN THE EVENING 5/10/22 5/10/23 Yes Vianca Smith MD   omeprazole (PRILOSEC) 20 MG delayed release capsule TAKE 1 CAPSULE EVERY MORNING  BEFORE  BREAKFAST 5/10/22 5/10/23 Yes Josh Lazo MD   methocarbamol (ROBAXIN) 500 MG tablet Take 1 tablet by mouth 3 times daily 5/10/22 5/10/23 Yes Vianca Smith MD   DULoxetine (CYMBALTA) 60 MG extended release capsule TAKE 1 CAPSULE EVERY DAY 5/10/22 5/10/23 Yes Vianca Smith MD   warfarin (COUMADIN) 2 MG tablet Take 1 tablet by mouth daily Every evening: Tue, Thu, Sat, Sun 5/10/22 5/10/23 Yes Vianca Smith MD   warfarin (COUMADIN) 3 MG tablet Take 1 tablet daily as directed by physician and INR 5/10/22 5/10/23 Yes Vianca Smith MD   gabapentin (NEURONTIN) 400 MG capsule Take 1 capsule by mouth 3 times daily. 5/10/22 5/10/23 Yes Vianca Smith MD   busPIRone (BUSPAR) 10 MG tablet Take 1 tablet by mouth 3 times daily 5/10/22 5/10/23 Yes Vianca Smith MD   loperamide (IMODIUM) 2 MG capsule Take 1 capsule by mouth 4 times daily as needed for Diarrhea 5/10/22 5/10/23 Yes iVanca Smith MD   lidocaine (LIDODERM) 5 % Place 1 patch onto the skin daily 12 hours on, 12 hours off. 5/10/22 5/10/23 Yes Vianca Smith MD   oxyCODONE (ROXICODONE) 5 MG immediate release tablet Take 5 mg by mouth every 6 hours as needed. 3/1/22  Yes Historical Provider, MD   omeprazole (PRILOSEC OTC) 20 MG tablet Take by mouth   Yes Historical Provider, MD   aspirin 81 MG EC tablet Take 1 tablet by mouth daily 11/22/21 11/22/22 Yes Marry Rosenberg MD   acetaminophen (TYLENOL) 325 MG tablet Take 650 mg by mouth every 8 hours as needed for Pain or Fever   Yes Historical Provider, MD   LORazepam (ATIVAN) 1 MG tablet Take 1 mg by mouth daily as needed for Anxiety (wound vac change).     Yes Historical Provider, MD   levETIRAcetam (KEPPRA) 500 MG tablet TAKE 1 TABLET TWICE DAILY 10/6/20  Yes GILDARDO Mccoy - CNP     Objective:     General appearance: alert, appears stated age, cooperative and no distress--in hospital bed  Head: appears normal  Eyes: appears normal  Neck: full active ROM  Breathing effort appears normal and nonlabored  Extremities: two left toes missing; L foot appears discolored, R stump appears well healed  Skin: no obvious lesions on face or UEs    Mental Status: alert, oriented, thought content appropriate    Appropriate attention/concentration  Intact fundus of knowledge  Memories intact    Speech: no dysarthria  Language: no aphasias    Cranial Nerves:  I: smell    II: visual acuity     II: visual fields    II: pupils Appear symmetric   III,VII: ptosis None   III,IV,VI: extraocular muscles  EOMI without nystagmus    V: mastication    V: facial light touch sensation     V,VII: corneal reflex     VII: facial muscle function - upper  Normal   VII: facial muscle function - lower Normal   VIII: hearing Normal   IX: soft palate elevation     IX,X: gag reflex    XI: trapezius strength     XI: sternocleidomastoid strength    XI: neck extension strength     XII: tongue strength  Normal     Motor:  Moves both arms symmetrically  Able to lift L leg and wiggle remaining toes; wiggles R stump  No abnormal movements    Coordination:   FN, FFM  normal    Laboratory/Radiology:     CBC with Differential:    Lab Results   Component Value Date    WBC 14.5 09/16/2021    RBC 4.29 09/16/2021    HGB 12.2 09/16/2021    HCT 38.4 09/16/2021     09/16/2021    MCV 89.5 09/16/2021    MCH 28.4 09/16/2021    MCHC 31.8 09/16/2021    RDW 16.4 09/16/2021    SEGSPCT 79 09/28/2013    BANDSPCT 1 07/08/2015    LYMPHOPCT 21.5 09/16/2021    MONOPCT 14.2 09/16/2021    BASOPCT 0.2 09/16/2021    MONOSABS 2.06 09/16/2021    LYMPHSABS 3.12 09/16/2021    EOSABS 0.08 09/16/2021    BASOSABS 0.03 09/16/2021     CMP:    Lab Results   Component Value Date     12/10/2021    K 4.4 12/10/2021    K 3.7 09/11/2021     12/10/2021    CO2 21 12/10/2021    BUN 12 12/10/2021    CREATININE 0.6 12/10/2021    GFRAA >60 12/10/2021    LABGLOM >60 12/10/2021    GLUCOSE 109 12/10/2021    GLUCOSE 70 04/28/2011    PROT 6.7 12/10/2021    LABALBU 3.6 12/10/2021 LABALBU 3.6 11/30/2010    CALCIUM 9.4 12/10/2021    BILITOT <0.2 12/10/2021    ALKPHOS 80 12/10/2021    AST 10 12/10/2021    ALT 5 12/10/2021     MRI brain [facility] COGNITIVE DISORDERS Nov 2020  Likely benign 1.3 cm cyst within the pineal gland. If prior imaging is   available, an addendum report can be issued for comparison purposes. Partially empty sella. 1.5 cm benign appearing cyst at the anteromedial aspect of the left maxillary   sinus within subcutaneous tissues. I independently reviewed the labs and imaging studies today    Assessment:     Focal epilepsy with secondary generalization: excellent control on dual antz medication       Pineal cyst: benign and stable per 2020 MRI. Plan:     Continue Keppra 500 mg BID and Depakote 500 mg AM/1000 mg PM    Surveillance MRI pineal cyst 2023 or sooner if needed    Risks of SUDEP were reviewed as well as seizure precautions today.      RTO 1 year or sooner if problems    Charleen Whipple, GILDARDO - CNP  10:13 AM  6/3/2022

## 2022-06-03 ENCOUNTER — TELEMEDICINE (OUTPATIENT)
Dept: NEUROLOGY | Age: 52
End: 2022-06-03
Payer: COMMERCIAL

## 2022-06-03 DIAGNOSIS — G40.909 SEIZURE DISORDER (HCC): Primary | ICD-10-CM

## 2022-06-03 DIAGNOSIS — E34.8 PINEAL GLAND CYST: ICD-10-CM

## 2022-06-03 PROBLEM — Z76.0 ENCOUNTER FOR MEDICATION REFILL: Status: RESOLVED | Noted: 2022-05-10 | Resolved: 2022-06-03

## 2022-06-03 PROBLEM — T81.9XXA POSTOPERATIVE OR SURGICAL COMPLICATION, INITIAL ENCOUNTER: Status: RESOLVED | Noted: 2021-09-10 | Resolved: 2022-06-03

## 2022-06-03 PROBLEM — T81.42XA INFECTION OF DEEP INCISIONAL SURGICAL SITE AFTER PROCEDURE: Status: RESOLVED | Noted: 2021-09-12 | Resolved: 2022-06-03

## 2022-06-03 PROBLEM — E11.59 TYPE 2 DIABETES MELLITUS WITH CIRCULATORY DISORDER (HCC): Status: RESOLVED | Noted: 2021-11-22 | Resolved: 2022-06-03

## 2022-06-03 LAB — SARS-COV-2: POSITIVE

## 2022-06-03 PROCEDURE — 99214 OFFICE O/P EST MOD 30 MIN: CPT | Performed by: NURSE PRACTITIONER

## 2022-06-03 PROCEDURE — G8427 DOCREV CUR MEDS BY ELIG CLIN: HCPCS | Performed by: NURSE PRACTITIONER

## 2022-06-03 PROCEDURE — 3017F COLORECTAL CA SCREEN DOC REV: CPT | Performed by: NURSE PRACTITIONER

## 2022-06-03 RX ORDER — LEVETIRACETAM 500 MG/1
TABLET ORAL
Qty: 180 TABLET | Refills: 3 | Status: SHIPPED | OUTPATIENT
Start: 2022-06-03

## 2022-06-03 RX ORDER — DIVALPROEX SODIUM 500 MG/1
TABLET, EXTENDED RELEASE ORAL
Qty: 270 TABLET | Refills: 3 | Status: SHIPPED | OUTPATIENT
Start: 2022-06-03 | End: 2023-06-03

## 2022-06-03 NOTE — PATIENT INSTRUCTIONS
Patient Education        Epilepsy: Care Instructions  Overview     Epilepsy is a common condition that causes repeated seizures. The seizures are caused by bursts of electrical activity in the brain that aren't normal. Seizures may cause problems with muscle control, movement, speech, vision, orawareness. They can be scary. Epilepsy affects each person differently. Some people have only a few seizures. Others get them more often. If you know what triggers a seizure, you may beable to avoid having one. You can take medicines to control and reduce seizures. You and your doctor will need to find the right combination, schedule, and dose of medicine. This maytake time and careful changes. Follow-up care is a key part of your treatment and safety. Be sure to make and go to all appointments, and call your doctor if you are having problems. It's also a good idea to know your test results and keep alist of the medicines you take. How can you care for yourself at home? To help control your seizures, follow your treatment plan. If you take medicineto control seizures, take it exactly as prescribed. The medicine works best if you take the right amount on the schedule your doctor sets up. Following this schedule keeps the right level of medicine inyour body. Even missing just a few doses can allow seizures to happen. You might be on a special ketogenic diet. If so, follow the diet carefully. As you follow your treatment plan, also try to figure out and avoid things thatmay make you more likely to have a seizure. These may include:   Not getting enough sleep.  Using drugs or alcohol.  Being stressed.  Skipping meals. If you keep having seizures despite treatment, keep a record of them. Note the date, time of day, and any details about the seizure that you can remember. Your doctor can use this information to plan or adjust your medicine or other treatment.  The record can also help your doctor find out what kinds of seizuresyou are having. If you have epilepsy:   Be sure that any doctor who treats you knows that you have epilepsy. And let the doctor know what medicines you take, if any.  Wear a medical ID bracelet. If you have a seizure or accident that leaves you unconscious or unable to speak for yourself, the bracelet will let those who are treating you know that you have epilepsy. It will also list any medicines you take to control your seizures. That way, you won't be given any medicines that will react badly with those already in your body.  Ask your doctor if it's safe for you to do things like drive or swim.  Create a seizure first-aid plan with your friends and family. The plan will help them know how to help you. The kind of plan you need can depend on the kind of seizures you have. Your doctor can tell you more about this. When should you call for help? Call 911 anytime you think you may need emergency care. For example, call if:     A seizure does not stop as it normally does.      You have new symptoms such as:  ? Numbness, tingling, or weakness on one side of your body or face. ? Vision changes. ? Trouble speaking or thinking clearly. Call your doctor now or seek immediate medical care if:     You have a fever.      You have a severe headache. Watch closely for changes in your health, and be sure to contact your doctor if:     The normal pattern or features of your seizures change. Where can you learn more? Go to https://[email or web address] Likes.[email or web address].Livrada. org and sign in to your Sc[employer]-X account. Enter C582 in the [facility] box to learn more about \"Epilepsy: Care Instructions. \"     If you do not have an account, please click on the \"Sign Up Now\" link. Current as of: December 13, 2021               Content Version: 13.2  © 2006-2022 Healthwise, Incorporated. Care instructions adapted under license by [facility] [employer] [facility] ([facility]).  If you have questions about a medical condition or this instruction, always ask your healthcare professional. [place] [address] any warranty or liability for your use of this information.

## 2022-06-03 NOTE — PROGRESS NOTES
Lily Watkins was read the following message We want to confirm that, for purposes of billing, this is a virtual visit with your provider for which we will submit a claim for reimbursement with your insurance company. You will be responsible for any copays, coinsurance amounts or other amounts not covered by your insurance company. If you do not accept this, unfortunately we will not be able to schedule or proceed with a virtual visit with the provider. Do you accept? Hodan Sylvester responded Yes .

## 2022-06-09 ENCOUNTER — TELEPHONE (OUTPATIENT)
Dept: FAMILY MEDICINE CLINIC | Age: 52
End: 2022-06-09

## 2022-06-09 NOTE — TELEPHONE ENCOUNTER
Contacted patient regarding the positive covid 19 test to see how patient is doing. Patient states that she is at [facility] as had an infection in foot and then needed to go to [place] to see vascular doctor regarding clotting. States that might has to have a few more stents put in but is doing okay. Is asymptomatic regarding the positive covid test.  Is not sure how she was exposed to it because has not been anywhere other that doctor offices or hospital and then home, but does has aides that come to help out.

## 2022-06-23 ENCOUNTER — TELEPHONE (OUTPATIENT)
Dept: FAMILY MEDICINE CLINIC | Age: 52
End: 2022-06-23

## 2022-06-23 NOTE — TELEPHONE ENCOUNTER
Sven Guzman from [facility] returned call. Is going to fax over the order for a lift chair for patient to try to get insurance to approve it. Patient states that does take a couple of steps to use restroom and could the nonambulatory be changed to ambulatory?

## 2022-06-23 NOTE — TELEPHONE ENCOUNTER
Returned call from Zachary Sargent [employer] Left message to contact office at [phone], will be out of the office on 6/24/2022 and will be back on Monday 6/27/2022 can leave message and will return call on return on Monday.

## 2022-06-28 ENCOUNTER — TELEPHONE (OUTPATIENT)
Dept: FAMILY MEDICINE CLINIC | Age: 52
End: 2022-06-28

## 2022-06-28 NOTE — TELEPHONE ENCOUNTER
LMOM for patient that the home health nursing would have to be reordered once patient returns home from [address] [place] [address].

## 2022-06-28 NOTE — TELEPHONE ENCOUNTER
Patient left message stating that is having surgery done on Wednesday, 6/29/2022 and was told that will be released probably on Friday, 7/1/2022 and was told that would not have any nursing home care when gets home. Patient would like to know if there is anything that you can do to make sure that has home nursing care as you want patient to have labs drawn at home. Please advise.   Thanks

## 2022-07-07 ENCOUNTER — TELEPHONE (OUTPATIENT)
Dept: FAMILY MEDICINE CLINIC | Age: 52
End: 2022-07-07

## 2022-07-07 NOTE — TELEPHONE ENCOUNTER
Patient is in [facility] and Dr. Caroline Riddle would like to touch base with Dr. Abhilash Noe. He stated no hurry. please advise.     Dr. Atilio Cheek [phone]

## 2022-07-08 NOTE — TELEPHONE ENCOUNTER
LMOM for Dr Teagan Lyn, stating he can call the office on Monday 7.11.22  As Dr. Michelle Arellano tried to call him,  And only got a busy single X3

## 2022-07-08 NOTE — TELEPHONE ENCOUNTER
Message reviewed. I attempted to call Dr. Wilson Links at the provided number x3; got busy signal each time. If someone could try to call him before the close of business today to let him know that I tried and was not able to get through, that would be greatly appreciated.     If you try and do not get through, please document

## 2022-07-20 ENCOUNTER — TELEPHONE (OUTPATIENT)
Dept: FAMILY MEDICINE CLINIC | Age: 52
End: 2022-07-20

## 2022-07-20 NOTE — TELEPHONE ENCOUNTER
Returned patient call, patient now states that she is now going to go to the [facility] now instead of going home, states that was not told that cannot do that as the insurance will not cover the medication if does it from home. And also said that they were wanting her to sign ama paperwork if was going home. Patient caved and stated that is going to the rehab and will contact the office when returns home to schedule a video visit with you. Patient was very upset about this and was crying.

## 2022-08-16 NOTE — TELEPHONE ENCOUNTER
[facility] not accepting pt insurance. Pt requesting [facility] in [place] so [place] will change approved location w/insurance.   Electronically signed by Krista Taveras on 11/4/20 at 2:41 PM EST The patient is a 16y Female complaining of see chief complaint quote.

## 2023-02-16 ENCOUNTER — TELEPHONE (OUTPATIENT)
Dept: FAMILY MEDICINE CLINIC | Age: 53
End: 2023-02-16

## 2023-02-24 ENCOUNTER — TELEPHONE (OUTPATIENT)
Dept: FAMILY MEDICINE CLINIC | Age: 53
End: 2023-02-24

## 2023-02-24 NOTE — TELEPHONE ENCOUNTER
Per Dr. Batsheva Morillo  Once patient is seen by PCP, either face-to-face or virtual), home health care can be determined.

## 2023-02-24 NOTE — TELEPHONE ENCOUNTER
Patient d/c from nursing home.  Hakeem asking if you will follow for nursing and PT  Last seen 5/27/2022  Next appt 2/28/2023

## 2023-03-08 DIAGNOSIS — I73.9 PVD (PERIPHERAL VASCULAR DISEASE) (HCC): ICD-10-CM

## 2023-03-08 DIAGNOSIS — Z76.0 ENCOUNTER FOR MEDICATION REFILL: ICD-10-CM

## 2023-03-08 RX ORDER — GABAPENTIN 400 MG/1
CAPSULE ORAL
Qty: 270 CAPSULE | Refills: 3 | OUTPATIENT
Start: 2023-03-08

## 2023-03-10 ENCOUNTER — TELEMEDICINE (OUTPATIENT)
Dept: FAMILY MEDICINE CLINIC | Age: 53
End: 2023-03-10

## 2023-03-10 DIAGNOSIS — E11.51 TYPE 2 DIABETES MELLITUS WITH DIABETIC PERIPHERAL ANGIOPATHY WITHOUT GANGRENE, WITHOUT LONG-TERM CURRENT USE OF INSULIN (HCC): ICD-10-CM

## 2023-03-10 DIAGNOSIS — I10 ESSENTIAL HYPERTENSION: ICD-10-CM

## 2023-03-10 DIAGNOSIS — D68.9 BLOOD CLOTTING DISORDER (HCC): ICD-10-CM

## 2023-03-10 DIAGNOSIS — G40.909 SEIZURE DISORDER (HCC): ICD-10-CM

## 2023-03-10 DIAGNOSIS — E03.8 SUBCLINICAL HYPOTHYROIDISM: ICD-10-CM

## 2023-03-10 DIAGNOSIS — I73.9 PVD (PERIPHERAL VASCULAR DISEASE) (HCC): ICD-10-CM

## 2023-03-10 DIAGNOSIS — S91.302S OPEN WOUND OF LEFT FOOT, SEQUELA: ICD-10-CM

## 2023-03-10 DIAGNOSIS — J68.3 MODERATE PERSISTENT REACTIVE AIRWAYS DYSFUNCTION SYNDROME WITHOUT COMPLICATION (HCC): ICD-10-CM

## 2023-03-10 DIAGNOSIS — Z76.0 ENCOUNTER FOR MEDICATION REFILL: ICD-10-CM

## 2023-03-10 DIAGNOSIS — Z09 HOSPITAL DISCHARGE FOLLOW-UP: Primary | ICD-10-CM

## 2023-03-10 DIAGNOSIS — K21.9 GASTROESOPHAGEAL REFLUX DISEASE WITHOUT ESOPHAGITIS: ICD-10-CM

## 2023-03-10 DIAGNOSIS — E78.2 MIXED HYPERLIPIDEMIA: ICD-10-CM

## 2023-03-10 DIAGNOSIS — F39 MOOD DISORDER (HCC): ICD-10-CM

## 2023-03-10 RX ORDER — WARFARIN SODIUM 3 MG/1
TABLET ORAL
Qty: 90 TABLET | Refills: 3 | Status: SHIPPED | OUTPATIENT
Start: 2023-03-10 | End: 2024-03-10

## 2023-03-10 RX ORDER — LEVOTHYROXINE SODIUM 0.07 MG/1
TABLET ORAL
Qty: 90 TABLET | Refills: 3 | Status: SHIPPED | OUTPATIENT
Start: 2023-03-10 | End: 2024-03-10

## 2023-03-10 RX ORDER — ATORVASTATIN CALCIUM 80 MG/1
80 TABLET, FILM COATED ORAL NIGHTLY
Qty: 90 TABLET | Refills: 3 | Status: SHIPPED | OUTPATIENT
Start: 2023-03-10 | End: 2024-03-10

## 2023-03-10 RX ORDER — BUSPIRONE HYDROCHLORIDE 10 MG/1
TABLET ORAL DAILY
COMMUNITY
End: 2023-03-10 | Stop reason: SDUPTHER

## 2023-03-10 RX ORDER — GABAPENTIN 400 MG/1
400 CAPSULE ORAL 3 TIMES DAILY
Qty: 270 CAPSULE | Refills: 3 | Status: SHIPPED | OUTPATIENT
Start: 2023-03-10 | End: 2024-03-10

## 2023-03-10 RX ORDER — ASPIRIN 81 MG/1
81 TABLET ORAL DAILY
Qty: 90 TABLET | Refills: 3 | COMMUNITY
Start: 2023-03-10 | End: 2024-03-10

## 2023-03-10 RX ORDER — DULOXETIN HYDROCHLORIDE 60 MG/1
CAPSULE, DELAYED RELEASE ORAL
Qty: 90 CAPSULE | Refills: 3
Start: 2023-03-10 | End: 2024-03-10

## 2023-03-10 RX ORDER — CLOPIDOGREL BISULFATE 75 MG/1
TABLET ORAL
COMMUNITY
Start: 2023-02-11 | End: 2023-03-10 | Stop reason: SDUPTHER

## 2023-03-10 RX ORDER — OXYCODONE HCL 10 MG/1
10 TABLET, FILM COATED, EXTENDED RELEASE ORAL EVERY 6 HOURS
COMMUNITY

## 2023-03-10 RX ORDER — ONDANSETRON 4 MG/1
TABLET, FILM COATED ORAL
COMMUNITY
Start: 2023-02-16

## 2023-03-10 RX ORDER — OMEPRAZOLE 20 MG/1
20 TABLET, DELAYED RELEASE ORAL DAILY
Qty: 90 TABLET | Refills: 3 | Status: SHIPPED | OUTPATIENT
Start: 2023-03-10 | End: 2024-03-10

## 2023-03-10 RX ORDER — OMEPRAZOLE 20 MG/1
CAPSULE, DELAYED RELEASE ORAL
Qty: 90 CAPSULE | Refills: 3 | Status: SHIPPED | OUTPATIENT
Start: 2023-03-10 | End: 2024-03-10

## 2023-03-10 RX ORDER — WARFARIN SODIUM 4 MG/1
TABLET ORAL
Qty: 90 TABLET | Refills: 3 | Status: SHIPPED | OUTPATIENT
Start: 2023-03-10 | End: 2024-03-10

## 2023-03-10 RX ORDER — TIZANIDINE 4 MG/1
TABLET ORAL
COMMUNITY
Start: 2023-02-03 | End: 2023-03-10 | Stop reason: ALTCHOICE

## 2023-03-10 RX ORDER — CLOPIDOGREL BISULFATE 75 MG/1
75 TABLET ORAL DAILY
Qty: 90 TABLET | Refills: 3 | Status: SHIPPED | OUTPATIENT
Start: 2023-03-10 | End: 2024-03-10

## 2023-03-10 RX ORDER — TIZANIDINE 4 MG/1
TABLET ORAL
Status: CANCELLED | OUTPATIENT
Start: 2023-03-10

## 2023-03-10 RX ORDER — LOPERAMIDE HYDROCHLORIDE 2 MG/1
2 CAPSULE ORAL 4 TIMES DAILY PRN
Qty: 60 CAPSULE | Refills: 0 | COMMUNITY
Start: 2023-03-10 | End: 2024-03-10

## 2023-03-10 RX ORDER — DIVALPROEX SODIUM 500 MG/1
TABLET, EXTENDED RELEASE ORAL
Qty: 270 TABLET | Refills: 3 | Status: SHIPPED | OUTPATIENT
Start: 2023-03-10 | End: 2024-03-10

## 2023-03-10 RX ORDER — WARFARIN SODIUM 4 MG/1
4 TABLET ORAL
COMMUNITY
End: 2023-03-10 | Stop reason: SDUPTHER

## 2023-03-10 RX ORDER — BUSPIRONE HYDROCHLORIDE 10 MG/1
10 TABLET ORAL DAILY
Qty: 90 TABLET | Refills: 3 | Status: SHIPPED | OUTPATIENT
Start: 2023-03-10 | End: 2024-03-10

## 2023-03-10 RX ORDER — ALBUTEROL SULFATE 90 UG/1
2 AEROSOL, METERED RESPIRATORY (INHALATION) EVERY 6 HOURS PRN
Qty: 3 EACH | Refills: 3 | Status: SHIPPED | OUTPATIENT
Start: 2023-03-10 | End: 2024-03-10

## 2023-03-10 RX ORDER — BUSPIRONE HYDROCHLORIDE 10 MG/1
10 TABLET ORAL 3 TIMES DAILY
Qty: 270 TABLET | Refills: 3 | Status: SHIPPED | OUTPATIENT
Start: 2023-03-10 | End: 2024-03-10

## 2023-03-10 RX ORDER — LIDOCAINE 50 MG/G
1 PATCH TOPICAL DAILY
Qty: 90 PATCH | Refills: 3 | Status: SHIPPED | OUTPATIENT
Start: 2023-03-10 | End: 2024-03-10

## 2023-03-10 RX ORDER — DOXYCYCLINE HYCLATE 100 MG/1
1 TABLET, DELAYED RELEASE ORAL
COMMUNITY
Start: 2022-08-29

## 2023-03-10 RX ORDER — METHOCARBAMOL 500 MG/1
500 TABLET, FILM COATED ORAL 3 TIMES DAILY
Qty: 270 TABLET | Refills: 1 | Status: SHIPPED | OUTPATIENT
Start: 2023-03-10 | End: 2024-09-10

## 2023-03-10 SDOH — ECONOMIC STABILITY: FOOD INSECURITY: WITHIN THE PAST 12 MONTHS, THE FOOD YOU BOUGHT JUST DIDN'T LAST AND YOU DIDN'T HAVE MONEY TO GET MORE.: NEVER TRUE

## 2023-03-10 SDOH — ECONOMIC STABILITY: FOOD INSECURITY: WITHIN THE PAST 12 MONTHS, YOU WORRIED THAT YOUR FOOD WOULD RUN OUT BEFORE YOU GOT MONEY TO BUY MORE.: NEVER TRUE

## 2023-03-10 SDOH — ECONOMIC STABILITY: INCOME INSECURITY: HOW HARD IS IT FOR YOU TO PAY FOR THE VERY BASICS LIKE FOOD, HOUSING, MEDICAL CARE, AND HEATING?: NOT HARD AT ALL

## 2023-03-10 SDOH — ECONOMIC STABILITY: HOUSING INSECURITY
IN THE LAST 12 MONTHS, WAS THERE A TIME WHEN YOU DID NOT HAVE A STEADY PLACE TO SLEEP OR SLEPT IN A SHELTER (INCLUDING NOW)?: NO

## 2023-03-10 ASSESSMENT — PATIENT HEALTH QUESTIONNAIRE - PHQ9
3. TROUBLE FALLING OR STAYING ASLEEP: 0
6. FEELING BAD ABOUT YOURSELF - OR THAT YOU ARE A FAILURE OR HAVE LET YOURSELF OR YOUR FAMILY DOWN: 0
1. LITTLE INTEREST OR PLEASURE IN DOING THINGS: 0
SUM OF ALL RESPONSES TO PHQ QUESTIONS 1-9: 0
2. FEELING DOWN, DEPRESSED OR HOPELESS: 0
SUM OF ALL RESPONSES TO PHQ QUESTIONS 1-9: 0
7. TROUBLE CONCENTRATING ON THINGS, SUCH AS READING THE NEWSPAPER OR WATCHING TELEVISION: 0
SUM OF ALL RESPONSES TO PHQ QUESTIONS 1-9: 0
5. POOR APPETITE OR OVEREATING: 0
SUM OF ALL RESPONSES TO PHQ QUESTIONS 1-9: 0
9. THOUGHTS THAT YOU WOULD BE BETTER OFF DEAD, OR OF HURTING YOURSELF: 0
8. MOVING OR SPEAKING SO SLOWLY THAT OTHER PEOPLE COULD HAVE NOTICED. OR THE OPPOSITE, BEING SO FIGETY OR RESTLESS THAT YOU HAVE BEEN MOVING AROUND A LOT MORE THAN USUAL: 0
4. FEELING TIRED OR HAVING LITTLE ENERGY: 0
SUM OF ALL RESPONSES TO PHQ9 QUESTIONS 1 & 2: 0

## 2023-03-10 ASSESSMENT — ENCOUNTER SYMPTOMS
CONSTIPATION: 0
NAUSEA: 0
ABDOMINAL PAIN: 0
VOMITING: 0
BLOOD IN STOOL: 0
DIARRHEA: 0
WHEEZING: 0
COUGH: 0
BACK PAIN: 0
SHORTNESS OF BREATH: 0
SORE THROAT: 0

## 2023-03-10 NOTE — PROGRESS NOTES
Post-Discharge Transitional Care Follow Up      Heraclio Rivera   YOB: 1970    Date of Office Visit:  3/10/2023  Date of Hospital Admission: 9/10/21  Date of Hospital Discharge: 9/16/21  Readmission Risk Score(high >=14%. Medium >=10%):No data recorded    Care management risk score Rising risk (score 2-5) and Complex Care (Scores >=6): No Risk Score On File     Non face to face  following discharge, date last encounter closed (first attempt may have been earlier): *No documented post hospital discharge outreach found in the last 14 days     Call initiated 2 business days of discharge: *No response recorded in the last 14 days     Below is the assessment and plan developed based on review of pertinent history, physical exam, labs, studies, and medications. Hospital discharge follow-up  -     albuterol sulfate HFA (PROVENTIL;VENTOLIN;PROAIR) 108 (90 Base) MCG/ACT inhaler; Inhale 2 puffs into the lungs every 6 hours as needed for Wheezing, Disp-3 each, R-33/10/23: DISREGARD PREVIOUS PRESCRIPTIONS AND REFILLS; HONOR THIS PRESCRIPTION AND REFILLSNormal  -     aspirin 81 MG EC tablet; Take 1 tablet by mouth daily, Disp-90 tablet, R-33/1023: OTCOTC  -     atorvastatin (LIPITOR) 80 MG tablet; Take 1 tablet by mouth nightly, Disp-90 tablet, R-33/10/23: DISREGARD PREVIOUS PRESCRIPTIONS AND REFILLS; HONOR THIS PRESCRIPTION AND REFILLSNormal  -     busPIRone (BUSPAR) 10 MG tablet;  Take 1 tablet by mouth 3 times daily, Disp-270 tablet, R-33/10/23: DISREGARD PREVIOUS PRESCRIPTIONS AND REFILLS; HONOR THIS PRESCRIPTION AND REFILLSNormal  -     divalproex (DEPAKOTE ER) 500 MG extended release tablet; TAKE 1 TABLET IN THE MORNING AND 2 TABLETS IN THE EVENING, Disp-270 tablet, R-33/10/23: DISREGARD PREVIOUS PRESCRIPTIONS AND REFILLS; HONOR THIS PRESCRIPTION AND REFILLSNormal  -     DULoxetine (CYMBALTA) 60 MG extended release capsule; TAKE 1 CAPSULE EVERY DAY, Disp-90 capsule, R-33/10/23: Prescribed by  Zack STRANGE  -     gabapentin (NEURONTIN) 400 MG capsule; Take 1 capsule by mouth 3 times daily. , Disp-270 capsule, R-3Normal  -     levothyroxine (SYNTHROID) 75 MCG tablet; TAKE 1 TABLET EVERY DAY, Disp-90 tablet, R-33/10/23: DISREGARD PREVIOUS PRESCRIPTIONS AND REFILLS; HONOR THIS PRESCRIPTION AND REFILLSNormal  -     lidocaine (LIDODERM) 5 %; Place 1 patch onto the skin daily 12 hours on, 12 hours off., Disp-90 patch, R-33/10/23: DISREGARD PREVIOUS PRESCRIPTIONS AND REFILLS; HONOR THIS PRESCRIPTION AND REFILLSNormal  -     loperamide (IMODIUM) 2 MG capsule; Take 1 capsule by mouth 4 times daily as needed for Diarrhea, Disp-60 capsule, R-03/10/23: OTCOTC  -     metFORMIN (GLUCOPHAGE) 1000 MG tablet; TAKE 1 TABLET TWICE DAILY WITH MEALS, Disp-180 tablet, R-33/10/23: DISREGARD PREVIOUS PRESCRIPTIONS AND REFILLS; HONOR THIS PRESCRIPTION AND REFILLSNormal  -     methocarbamol (ROBAXIN) 500 MG tablet; Take 1 tablet by mouth 3 times daily, Disp-270 tablet, R-13/10/23: DISREGARD PREVIOUS PRESCRIPTIONS AND REFILLS; HONOR THIS PRESCRIPTION AND REFILLSNormal  -     metoprolol tartrate (LOPRESSOR) 25 MG tablet; TAKE 1/2 TABLET TWICE DAILY, Disp-90 tablet, R-35/10/22: DISREGARD PREVIOUS PRESCRIPTIONS AND REFILLS; HONOR THIS PRESCRIPTION AND REFILLSNormal  -     omeprazole (PRILOSEC) 20 MG delayed release capsule; TAKE 1 CAPSULE EVERY MORNING  BEFORE  BREAKFAST, Disp-90 capsule, R-35/10/22: DISREGARD PREVIOUS PRESCRIPTIONS AND REFILLS; HONOR THIS PRESCRIPTION AND REFILLSNormal  -     warfarin (COUMADIN) 3 MG tablet; 3 mg q. MWF, 4 mg other days, Disp-90 tablet, R-33/10/23: DISREGARD PREVIOUS PRESCRIPTIONS AND REFILLS; HONOR THIS PRESCRIPTION AND REFILLSNormal  -     clopidogrel (PLAVIX) 75 MG tablet; Take 1 tablet by mouth daily, Disp-90 tablet, R-33/10/23: DISREGARD PREVIOUS PRESCRIPTIONS AND REFILLS; HONOR THIS PRESCRIPTION AND REFILLSNormal  -     busPIRone (BUSPAR) 10 MG tablet;  Take 1 tablet by mouth daily, Disp-90 tablet, R-33/10/23: DISREGARD PREVIOUS PRESCRIPTIONS AND REFILLS; HONOR THIS PRESCRIPTION AND REFILLSNormal  -     omeprazole (PRILOSEC OTC) 20 MG tablet; Take 1 tablet by mouth daily, Disp-90 tablet, R-33/10/23: DISREGARD PREVIOUS PRESCRIPTIONS AND REFILLS; HONOR THIS PRESCRIPTION AND REFILLSNormal  -     warfarin (COUMADIN) 4 MG tablet; Take 4 mg q. Tuesday Thursday Saturday Sunday, 3 mg other days, Disp-90 tablet, R-33/10/23: DISREGARD PREVIOUS PRESCRIPTIONS AND REFILLS; HONOR THIS PRESCRIPTION AND REFILLSNormal  -     External Referral To [address] [place] [address] RECONCILED W/ CURRENT OUTPATIENT MED LIST  Open wound of left foot, sequela  -     External Referral To Home Health  -     [place] DISCHARGE MEDS RECONCILED W/ CURRENT OUTPATIENT MED LIST  [facility]  -     warfarin (COUMADIN) 3 MG tablet; 3 mg q. MWF, 4 mg other days, Disp-90 tablet, R-33/10/23: DISREGARD PREVIOUS PRESCRIPTIONS AND REFILLS; HONOR THIS PRESCRIPTION AND REFILLSNormal  -     clopidogrel (PLAVIX) 75 MG tablet; Take 1 tablet by mouth daily, Disp-90 tablet, R-33/10/23: DISREGARD PREVIOUS PRESCRIPTIONS AND REFILLS; HONOR THIS PRESCRIPTION AND REFILLSNormal  -     busPIRone (BUSPAR) 10 MG tablet; Take 1 tablet by mouth daily, Disp-90 tablet, R-33/10/23: DISREGARD PREVIOUS PRESCRIPTIONS AND REFILLS; HONOR THIS PRESCRIPTION AND REFILLSNormal  -     External Referral To Home Health  -     [place] DISCHARGE MEDS RECONCILED W/ CURRENT OUTPATIENT MED LIST  PVD (peripheral vascular disease) ([facility])  -     gabapentin (NEURONTIN) 400 MG capsule; Take 1 capsule by mouth 3 times daily. , Disp-270 capsule, R-3Normal  -     warfarin (COUMADIN) 3 MG tablet; 3 mg q. MWF, 4 mg other days, Disp-90 tablet, R-33/10/23: DISREGARD PREVIOUS PRESCRIPTIONS AND REFILLS; HONOR THIS PRESCRIPTION AND REFILLSNormal  -     clopidogrel (PLAVIX) 75 MG tablet;  Take 1 tablet by mouth daily, Disp-90 tablet, R-33/10/23: DISREGARD PREVIOUS PRESCRIPTIONS AND REFILLS; HONOR THIS PRESCRIPTION AND REFILLSNormal  -     warfarin (COUMADIN) 4 MG tablet; Take 4 mg q. Tuesday Thursday Saturday Sunday, 3 mg other days, Disp-90 tablet, R-33/10/23: DISREGARD PREVIOUS PRESCRIPTIONS AND REFILLS; HONOR THIS PRESCRIPTION AND REFILLSNormal  -     External Referral To [facility] CURRENT OUTPATIENT MED LIST  Encounter for medication refill  -     albuterol sulfate HFA (PROVENTIL;VENTOLIN;PROAIR) 108 (90 Base) MCG/ACT inhaler; Inhale 2 puffs into the lungs every 6 hours as needed for Wheezing, Disp-3 each, R-33/10/23: DISREGARD PREVIOUS PRESCRIPTIONS AND REFILLS; HONOR THIS PRESCRIPTION AND REFILLSNormal  -     aspirin 81 MG EC tablet; Take 1 tablet by mouth daily, Disp-90 tablet, R-33/1023: OTCOTC  -     atorvastatin (LIPITOR) 80 MG tablet; Take 1 tablet by mouth nightly, Disp-90 tablet, R-33/10/23: DISREGARD PREVIOUS PRESCRIPTIONS AND REFILLS; HONOR THIS PRESCRIPTION AND REFILLSNormal  -     busPIRone (BUSPAR) 10 MG tablet; Take 1 tablet by mouth 3 times daily, Disp-270 tablet, R-33/10/23: DISREGARD PREVIOUS PRESCRIPTIONS AND REFILLS; HONOR THIS PRESCRIPTION AND REFILLSNormal  -     divalproex (DEPAKOTE ER) 500 MG extended release tablet; TAKE 1 TABLET IN THE MORNING AND 2 TABLETS IN THE EVENING, Disp-270 tablet, R-33/10/23: DISREGARD PREVIOUS PRESCRIPTIONS AND REFILLS; HONOR THIS PRESCRIPTION AND REFILLSNormal  -     DULoxetine (CYMBALTA) 60 MG extended release capsule; TAKE 1 CAPSULE EVERY DAY, Disp-90 capsule, R-33/10/23: Prescribed by Dr. Samuel Dutta  -     gabapentin (NEURONTIN) 400 MG capsule; Take 1 capsule by mouth 3 times daily. , Disp-270 capsule, R-3Normal  -     levothyroxine (SYNTHROID) 75 MCG tablet; TAKE 1 TABLET EVERY DAY, Disp-90 tablet, R-33/10/23: DISREGARD PREVIOUS PRESCRIPTIONS AND REFILLS; HONOR THIS PRESCRIPTION AND REFILLSNormal  -     lidocaine (LIDODERM) 5 %; Place 1 patch onto the skin daily 12 hours on, 12 hours off., Disp-90 patch, R-33/10/23: DISREGARD PREVIOUS PRESCRIPTIONS AND REFILLS; HONOR THIS PRESCRIPTION AND REFILLSNormal  -     loperamide (IMODIUM) 2 MG capsule; Take 1 capsule by mouth 4 times daily as needed for Diarrhea, Disp-60 capsule, R-03/10/23: OTCOTC  -     metFORMIN (GLUCOPHAGE) 1000 MG tablet; TAKE 1 TABLET TWICE DAILY WITH MEALS, Disp-180 tablet, R-33/10/23: DISREGARD PREVIOUS PRESCRIPTIONS AND REFILLS; HONOR THIS PRESCRIPTION AND REFILLSNormal  -     methocarbamol (ROBAXIN) 500 MG tablet; Take 1 tablet by mouth 3 times daily, Disp-270 tablet, R-13/10/23: DISREGARD PREVIOUS PRESCRIPTIONS AND REFILLS; HONOR THIS PRESCRIPTION AND REFILLSNormal  -     metoprolol tartrate (LOPRESSOR) 25 MG tablet; TAKE 1/2 TABLET TWICE DAILY, Disp-90 tablet, R-35/10/22: DISREGARD PREVIOUS PRESCRIPTIONS AND REFILLS; HONOR THIS PRESCRIPTION AND REFILLSNormal  -     omeprazole (PRILOSEC) 20 MG delayed release capsule; TAKE 1 CAPSULE EVERY MORNING  BEFORE  BREAKFAST, Disp-90 capsule, R-35/10/22: DISREGARD PREVIOUS PRESCRIPTIONS AND REFILLS; HONOR THIS PRESCRIPTION AND REFILLSNormal  -     warfarin (COUMADIN) 3 MG tablet; 3 mg q. MWF, 4 mg other days, Disp-90 tablet, R-33/10/23: DISREGARD PREVIOUS PRESCRIPTIONS AND REFILLS; HONOR THIS PRESCRIPTION AND REFILLSNormal  -     clopidogrel (PLAVIX) 75 MG tablet; Take 1 tablet by mouth daily, Disp-90 tablet, R-33/10/23: DISREGARD PREVIOUS PRESCRIPTIONS AND REFILLS; HONOR THIS PRESCRIPTION AND REFILLSNormal  -     busPIRone (BUSPAR) 10 MG tablet; Take 1 tablet by mouth daily, Disp-90 tablet, R-33/10/23: DISREGARD PREVIOUS PRESCRIPTIONS AND REFILLS; HONOR THIS PRESCRIPTION AND REFILLSNormal  -     [place] DISCHARGE MEDS RECONCILED W/ CURRENT OUTPATIENT MED LIST  Moderate persistent reactive airways dysfunction syndrome without complication (HCC)  -     albuterol sulfate HFA (PROVENTIL;VENTOLIN;PROAIR) 108 (90 Base) MCG/ACT inhaler;  Inhale 2 puffs into the lungs every 6 hours as needed for Wheezing, Disp-3 each, R-33/10/23: DISREGARD PREVIOUS PRESCRIPTIONS AND REFILLS; HONOR THIS PRESCRIPTION AND REFILLSNormal  -     [place] DISCHARGE MEDS RECONCILED W/ CURRENT OUTPATIENT MED LIST  Mixed hyperlipidemia  -     atorvastatin (LIPITOR) 80 MG tablet;  Take 1 tablet by mouth nightly, Disp-90 tablet, R-33/10/23: DISREGARD PREVIOUS PRESCRIPTIONS AND REFILLS; HONOR THIS PRESCRIPTION AND REFILLSNormal  -     [place] DISCHARGE MEDS RECONCILED W/ CURRENT OUTPATIENT MED LIST  Seizure disorder  -     divalproex (DEPAKOTE ER) 500 MG extended release tablet; TAKE 1 TABLET IN THE MORNING AND 2 TABLETS IN THE EVENING, Disp-270 tablet, R-33/10/23: DISREGARD PREVIOUS PRESCRIPTIONS AND REFILLS; HONOR THIS PRESCRIPTION AND REFILLSNormal  -     [place] DISCHARGE MEDS RECONCILED W/ CURRENT OUTPATIENT MED LIST  Subclinical hypothyroidism  -     levothyroxine (SYNTHROID) 75 MCG tablet; TAKE 1 TABLET EVERY DAY, Disp-90 tablet, R-33/10/23: DISREGARD PREVIOUS PRESCRIPTIONS AND REFILLS; HONOR THIS PRESCRIPTION AND REFILLSNormal  -     [place] DISCHARGE MEDS RECONCILED W/ CURRENT OUTPATIENT MED LIST  Type 2 diabetes mellitus with diabetic peripheral angiopathy without gangrene, without long-term current use of insulin (HCC)  -     metFORMIN (GLUCOPHAGE) 1000 MG tablet; TAKE 1 TABLET TWICE DAILY WITH MEALS, Disp-180 tablet, R-33/10/23: DISREGARD PREVIOUS PRESCRIPTIONS AND REFILLS; HONOR THIS PRESCRIPTION AND REFILLSNormal  -     [place] DISCHARGE MEDS RECONCILED W/ CURRENT OUTPATIENT MED LIST  Essential hypertension  -     metoprolol tartrate (LOPRESSOR) 25 MG tablet; TAKE 1/2 TABLET TWICE DAILY, Disp-90 tablet, R-35/10/22: DISREGARD PREVIOUS PRESCRIPTIONS AND REFILLS; HONOR THIS PRESCRIPTION AND REFILLSNormal  -     [place] DISCHARGE MEDS RECONCILED W/ CURRENT OUTPATIENT MED LIST  Gastroesophageal reflux disease without esophagitis  -     omeprazole (PRILOSEC) 20 MG delayed release capsule; TAKE 1 CAPSULE EVERY MORNING  BEFORE  BREAKFAST, Disp-90 capsule, R-35/10/22: DISREGARD PREVIOUS PRESCRIPTIONS AND REFILLS; HONOR THIS PRESCRIPTION AND REFILLSNormal  -     omeprazole (PRILOSEC OTC) 20 MG tablet; Take 1 tablet by mouth daily, Disp-90 tablet, R-33/10/23: DISREGARD PREVIOUS PRESCRIPTIONS AND REFILLS; HONOR THIS PRESCRIPTION AND REFILLSNormal  -     [place] DISCHARGE MEDS RECONCILED W/ CURRENT OUTPATIENT MED LIST  Mood disorder (HCC)  -     busPIRone (BUSPAR) 10 MG tablet; Take 1 tablet by mouth 3 times daily, Disp-270 tablet, R-33/10/23: DISREGARD PREVIOUS PRESCRIPTIONS AND REFILLS; HONOR THIS PRESCRIPTION AND REFILLSNormal  -     divalproex (DEPAKOTE ER) 500 MG extended release tablet; TAKE 1 TABLET IN THE MORNING AND 2 TABLETS IN THE EVENING, Disp-270 tablet, R-33/10/23: DISREGARD PREVIOUS PRESCRIPTIONS AND REFILLS; HONOR THIS PRESCRIPTION AND REFILLSNormal  -     DULoxetine (CYMBALTA) 60 MG extended release capsule; TAKE 1 CAPSULE EVERY DAY, Disp-90 capsule, R-33/10/23: Prescribed by Dr. Aureliano Marques Making: high complexity  Return for Schedule Medicare AWV after 6/1/23. On this date 3/10/2023 I have spent 52 minutes reviewing previous notes, test results and face to face with the patient discussing the diagnosis and importance of compliance with the treatment plan as well as documenting on the day of the visit. Subjective:   Patient was recently d/c from skilled nursing and she is feeling ok. She is going to need home nursing to monitor wound on her foot. She is also going need her coumadin checked. Her last INR was 2.3 @ the nursing home. She is asking for a home inr machine because she cant get out to have her labs done      Inpatient course: Discharge summary reviewed- see chart. Interval history/Current status:   She is fair. She is status post right AKA amputation in 2022, with the amputation stopping just below her right hip joint.   Her most recent procedures were amputations of her great toe and her fifth toe on her left foot. She is still requiring wound care for those 2 lesions. She is in need of home health care; orders placed. She is in need of refills of all of her medications; her medications were reviewed and refilled. She is requesting a home INR meter, as she needs to check her Coumadin levels anywhere from 1 to 4 weeks, and she \"is running out of good veins\". Unable to order through [employer]; when home health care starts, request for  to obtain home INR monitor, or at least provide the resources to get 1. She is requesting a lift chair. She reports that she is actually able to ambulate (hop on 1 foot was the assistance of a walker for up to 10 to 12 feet). She was advised that when new paperwork came around, PCP would evaluate it.     Patient Active Problem List   Diagnosis    Mixed hyperlipidemia    Essential hypertension    Tobacco dependency    Peripheral polyneuropathy    Seizure disorder    Vitamin D insufficiency    Depressed bipolar II disorder (HCC)    GERD (gastroesophageal reflux disease)    Carpal tunnel syndrome on both sides    Anticoagulant long-term use    Obstructive sleep apnea    Morbidly obese (HCC)    Subclinical hypothyroidism    Chronic pain    Disc displacement, lumbar    Neural foraminal stenosis of lumbar spine    Type 2 diabetes mellitus with diabetic peripheral angiopathy without gangrene, without long-term current use of insulin (HCC)    Blood clotting disorder (HCC)    Adenomatous polyp of sigmoid colon    Pineal gland cyst    Moderate persistent reactive airways dysfunction syndrome without complication (HCC)    Chronic obstructive pulmonary disease (HCC)    Peripheral vascular disease (HCC)    Polypharmacy    Amputation of right lower extremity above knee with complication ([facility]       Medications listed as started at the time of discharge from hospital  Cannot display discharge medications since this is not an admission.          Medications marked \"taking\" at this time  Outpatient Medications Marked as Taking for the 3/10/23 encounter (Telemedicine) with Vianca Smith MD   Medication Sig Dispense Refill    oxyCODONE (OXYCONTIN) 10 MG extended release tablet Take 10 mg by mouth every 6 hours.      Doxycycline Hyclate 100 MG TBEC 1 capsule      albuterol sulfate HFA (PROVENTIL;VENTOLIN;PROAIR) 108 (90 Base) MCG/ACT inhaler Inhale 2 puffs into the lungs every 6 hours as needed for Wheezing 3 each 3    aspirin 81 MG EC tablet Take 1 tablet by mouth daily 90 tablet 3    atorvastatin (LIPITOR) 80 MG tablet Take 1 tablet by mouth nightly 90 tablet 3    busPIRone (BUSPAR) 10 MG tablet Take 1 tablet by mouth 3 times daily 270 tablet 3    divalproex (DEPAKOTE ER) 500 MG extended release tablet TAKE 1 TABLET IN THE MORNING AND 2 TABLETS IN THE EVENING 270 tablet 3    DULoxetine (CYMBALTA) 60 MG extended release capsule TAKE 1 CAPSULE EVERY DAY 90 capsule 3    gabapentin (NEURONTIN) 400 MG capsule Take 1 capsule by mouth 3 times daily. 270 capsule 3    levothyroxine (SYNTHROID) 75 MCG tablet TAKE 1 TABLET EVERY DAY 90 tablet 3    lidocaine (LIDODERM) 5 % Place 1 patch onto the skin daily 12 hours on, 12 hours off. 90 patch 3    loperamide (IMODIUM) 2 MG capsule Take 1 capsule by mouth 4 times daily as needed for Diarrhea 60 capsule 0    metFORMIN (GLUCOPHAGE) 1000 MG tablet TAKE 1 TABLET TWICE DAILY WITH MEALS 180 tablet 3    methocarbamol (ROBAXIN) 500 MG tablet Take 1 tablet by mouth 3 times daily 270 tablet 1    metoprolol tartrate (LOPRESSOR) 25 MG tablet TAKE 1/2 TABLET TWICE DAILY 90 tablet 3    omeprazole (PRILOSEC) 20 MG delayed release capsule TAKE 1 CAPSULE EVERY MORNING  BEFORE  BREAKFAST 90 capsule 3    warfarin (COUMADIN) 3 MG tablet 3 mg q. MWF, 4 mg other days 90 tablet 3    clopidogrel (PLAVIX) 75 MG tablet Take 1 tablet by mouth daily 90 tablet 3    busPIRone (BUSPAR) 10 MG tablet Take 1 tablet by  mouth daily 90 tablet 3    omeprazole (PRILOSEC OTC) 20 MG tablet Take 1 tablet by mouth daily 90 tablet 3    warfarin (COUMADIN) 4 MG tablet Take 4 mg q. Tuesday Thursday Saturday Sunday, 3 mg other days 90 tablet 3    levETIRAcetam (KEPPRA) 500 MG tablet  tablet 3    Lift Chair MISC by Does not apply route Dx: S78.111s, G89.4, M51.26, M48.061 1 each 0    acetaminophen (TYLENOL) 325 MG tablet Take 650 mg by mouth every 8 hours as needed for Pain or Fever          Medications patient taking as of now reconciled against medications ordered at time of hospital discharge: Yes    Review of Systems   HENT:  Negative for congestion, ear pain and sore throat. Respiratory:  Negative for cough, shortness of breath and wheezing. Cardiovascular:  Negative for chest pain, palpitations and leg swelling. Gastrointestinal:  Negative for abdominal pain, blood in stool, constipation, diarrhea, nausea and vomiting. Genitourinary:  Negative for dysuria, frequency, hematuria and urgency. Urinary incontinence that is associated with phantom pain of right LE   Musculoskeletal:  Negative for back pain, myalgias and neck pain. Skin:  Negative for rash. Neurological:  Negative for dizziness, weakness and headaches. Phantom pain of right LE   Psychiatric/Behavioral:  The patient is not nervous/anxious. Objective:    Patient-Reported Vitals  No data recorded    Physical Exam  Constitutional:       General: She is not in acute distress. Appearance: Normal appearance. She is well-developed and normal weight. HENT:      Head: Normocephalic and atraumatic. Right Ear: External ear normal.      Left Ear: External ear normal.      Nose: Nose normal.      Mouth/Throat:      Mouth: Mucous membranes are moist.   Eyes:      Extraocular Movements: Extraocular movements intact. Conjunctiva/sclera: Conjunctivae normal.   Pulmonary:      Effort: Pulmonary effort is normal. No respiratory distress. Musculoskeletal:      Cervical back: Normal range of motion. Neurological:      Mental Status: She is alert and oriented to person, place, and time. Psychiatric:         Attention and Perception: Attention and perception normal.         Mood and Affect: Mood and affect normal.         Speech: Speech normal.         Behavior: Behavior normal. Behavior is cooperative. Thought Content: Thought content normal.         Cognition and Memory: Cognition normal.       Assessment / Plan:      Javy Hillman was seen today for follow-up from hospital.    Diagnoses and all orders for this visit:    Hospital discharge follow-up  -     albuterol sulfate HFA (PROVENTIL;VENTOLIN;PROAIR) 108 (90 Base) MCG/ACT inhaler; Inhale 2 puffs into the lungs every 6 hours as needed for Wheezing  -     aspirin 81 MG EC tablet; Take 1 tablet by mouth daily  -     atorvastatin (LIPITOR) 80 MG tablet; Take 1 tablet by mouth nightly  -     busPIRone (BUSPAR) 10 MG tablet; Take 1 tablet by mouth 3 times daily  -     divalproex (DEPAKOTE ER) 500 MG extended release tablet; TAKE 1 TABLET IN THE MORNING AND 2 TABLETS IN THE EVENING  -     DULoxetine (CYMBALTA) 60 MG extended release capsule; TAKE 1 CAPSULE EVERY DAY  -     gabapentin (NEURONTIN) 400 MG capsule; Take 1 capsule by mouth 3 times daily. -     levothyroxine (SYNTHROID) 75 MCG tablet; TAKE 1 TABLET EVERY DAY  -     lidocaine (LIDODERM) 5 %; Place 1 patch onto the skin daily 12 hours on, 12 hours off.  -     loperamide (IMODIUM) 2 MG capsule; Take 1 capsule by mouth 4 times daily as needed for Diarrhea  -     metFORMIN (GLUCOPHAGE) 1000 MG tablet; TAKE 1 TABLET TWICE DAILY WITH MEALS  -     methocarbamol (ROBAXIN) 500 MG tablet;  Take 1 tablet by mouth 3 times daily  -     metoprolol tartrate (LOPRESSOR) 25 MG tablet; TAKE 1/2 TABLET TWICE DAILY  -     omeprazole (PRILOSEC) 20 MG delayed release capsule; TAKE 1 CAPSULE EVERY MORNING  BEFORE  BREAKFAST  -     warfarin (COUMADIN) 3 MG tablet; 3 mg q. MWF, 4 mg other days  -     clopidogrel (PLAVIX) 75 MG tablet; Take 1 tablet by mouth daily  -     busPIRone (BUSPAR) 10 MG tablet; Take 1 tablet by mouth daily  -     omeprazole (PRILOSEC OTC) 20 MG tablet; Take 1 tablet by mouth daily  -     warfarin (COUMADIN) 4 MG tablet; Take 4 mg q. Tuesday Thursday Saturday Sunday, 3 mg other days  -     External Referral To [address] [place] [address] MED LIST    Open wound of left foot, sequela  -     External Referral To [facility]  -     [place] DISCHARGE MEDS RECONCILED W/ CURRENT OUTPATIENT MED LIST    Blood clotting disorder (HCC)  -     warfarin (COUMADIN) 3 MG tablet; 3 mg q. MWF, 4 mg other days  -     clopidogrel (PLAVIX) 75 MG tablet; Take 1 tablet by mouth daily  -     busPIRone (BUSPAR) 10 MG tablet; Take 1 tablet by mouth daily  -     External Referral To [facility]  -     [place] DISCHARGE MEDS RECONCILED W/ CURRENT OUTPATIENT MED LIST    PVD (peripheral vascular disease) ([facility])  -     gabapentin (NEURONTIN) 400 MG capsule; Take 1 capsule by mouth 3 times daily. -     warfarin (COUMADIN) 3 MG tablet; 3 mg q. MWF, 4 mg other days  -     clopidogrel (PLAVIX) 75 MG tablet; Take 1 tablet by mouth daily  -     warfarin (COUMADIN) 4 MG tablet; Take 4 mg q. Tuesday Thursday Saturday Sunday, 3 mg other days  -     External Referral To [address] [place] [address] MED LIST    Encounter for medication refill  -     albuterol sulfate HFA (PROVENTIL;VENTOLIN;PROAIR) 108 (90 Base) MCG/ACT inhaler; Inhale 2 puffs into the lungs every 6 hours as needed for Wheezing  -     aspirin 81 MG EC tablet; Take 1 tablet by mouth daily  -     atorvastatin (LIPITOR) 80 MG tablet; Take 1 tablet by mouth nightly  -     busPIRone (BUSPAR) 10 MG tablet;  Take 1 tablet by mouth 3 times daily  -     divalproex (DEPAKOTE ER) 500 MG extended release tablet; TAKE 1 TABLET IN THE MORNING AND 2 TABLETS IN THE EVENING  -     DULoxetine (CYMBALTA) 60 MG extended release capsule; TAKE 1 CAPSULE EVERY DAY  -     gabapentin (NEURONTIN) 400 MG capsule; Take 1 capsule by mouth 3 times daily. -     levothyroxine (SYNTHROID) 75 MCG tablet; TAKE 1 TABLET EVERY DAY  -     lidocaine (LIDODERM) 5 %; Place 1 patch onto the skin daily 12 hours on, 12 hours off.  -     loperamide (IMODIUM) 2 MG capsule; Take 1 capsule by mouth 4 times daily as needed for Diarrhea  -     metFORMIN (GLUCOPHAGE) 1000 MG tablet; TAKE 1 TABLET TWICE DAILY WITH MEALS  -     methocarbamol (ROBAXIN) 500 MG tablet; Take 1 tablet by mouth 3 times daily  -     metoprolol tartrate (LOPRESSOR) 25 MG tablet; TAKE 1/2 TABLET TWICE DAILY  -     omeprazole (PRILOSEC) 20 MG delayed release capsule; TAKE 1 CAPSULE EVERY MORNING  BEFORE  BREAKFAST  -     warfarin (COUMADIN) 3 MG tablet; 3 mg q. MWF, 4 mg other days  -     clopidogrel (PLAVIX) 75 MG tablet; Take 1 tablet by mouth daily  -     busPIRone (BUSPAR) 10 MG tablet; Take 1 tablet by mouth daily  -     PA DISCHARGE MEDS RECONCILED W/ CURRENT OUTPATIENT MED LIST    Moderate persistent reactive airways dysfunction syndrome without complication (HCC)  -     albuterol sulfate HFA (PROVENTIL;VENTOLIN;PROAIR) 108 (90 Base) MCG/ACT inhaler; Inhale 2 puffs into the lungs every 6 hours as needed for Wheezing  -     PA DISCHARGE MEDS RECONCILED W/ CURRENT OUTPATIENT MED LIST    Mixed hyperlipidemia  -     atorvastatin (LIPITOR) 80 MG tablet;  Take 1 tablet by mouth nightly  -     PA DISCHARGE MEDS RECONCILED W/ CURRENT OUTPATIENT MED LIST    Seizure disorder  -     divalproex (DEPAKOTE ER) 500 MG extended release tablet; TAKE 1 TABLET IN THE MORNING AND 2 TABLETS IN THE EVENING  -     PA DISCHARGE MEDS RECONCILED W/ CURRENT OUTPATIENT MED LIST    Subclinical hypothyroidism  -     levothyroxine (SYNTHROID) 75 MCG tablet; TAKE 1 TABLET EVERY DAY  -     PA DISCHARGE MEDS RECONCILED W/ CURRENT OUTPATIENT MED LIST    Type 2 diabetes mellitus with diabetic peripheral angiopathy without gangrene, without long-term current use of insulin (HCC)  -     metFORMIN (GLUCOPHAGE) 1000 MG tablet; TAKE 1 TABLET TWICE DAILY WITH MEALS  -     OR DISCHARGE MEDS RECONCILED W/ CURRENT OUTPATIENT MED LIST    Essential hypertension  -     metoprolol tartrate (LOPRESSOR) 25 MG tablet; TAKE 1/2 TABLET TWICE DAILY  -     OR DISCHARGE MEDS RECONCILED W/ CURRENT OUTPATIENT MED LIST    Gastroesophageal reflux disease without esophagitis  -     omeprazole (PRILOSEC) 20 MG delayed release capsule; TAKE 1 CAPSULE EVERY MORNING  BEFORE  BREAKFAST  -     omeprazole (PRILOSEC OTC) 20 MG tablet; Take 1 tablet by mouth daily  -     OR DISCHARGE MEDS RECONCILED W/ CURRENT OUTPATIENT MED LIST    Mood disorder ([address]  -     busPIRone (BUSPAR) 10 MG tablet; Take 1 tablet by mouth 3 times daily  -     divalproex (DEPAKOTE ER) 500 MG extended release tablet; TAKE 1 TABLET IN THE MORNING AND 2 TABLETS IN THE EVENING  -     DULoxetine (CYMBALTA) 60 MG extended release capsule; TAKE 1 CAPSULE EVERY DAY  -     OR DISCHARGE MEDS RECONCILED W/ CURRENT OUTPATIENT MED LIST              Nikole Epperson, was evaluated through a synchronous (real-time) audio-video encounter. The patient (or guardian if applicable) is aware that this is a billable service, which includes applicable co-pays. This Virtual Visit was conducted with patient's (and/or legal guardian's) consent. The visit was conducted pursuant to the emergency declaration under the [facility], [address] [place] [address] and the [employer] Act. Patient identification was verified, and a caregiver was present when appropriate.    The patient was located at Home: [address] [place] [address]  Provider was located at [facility] (Appt Dept): [facility]       An electronic signature was used to authenticate this note.   --Robert Grant MD

## 2023-03-10 NOTE — PROGRESS NOTES
Ludy Rivera is a 52 y.o. female who presents today for     Chief Complaint   Patient presents with    Other     Patient was recently d/c from skilled nursing and she is feeling ok. She is going to need home nursing to monitor wound on her foot. She is also going need her coumadin checked. Her last INR was 2.3 @ the nursing home. She is asking for a home inr machine because she cant get out to have her labs done       ***    PMFSH:  Patient's past medical, surgical, social and/or family history reviewed, updated in chart, and are non-contributory (unless otherwise stated).  Medications and allergies also reviewed and updated in chart.    Review of Systems  Review of Systems    Physical Exam:    VS:  There were no vitals taken for this visit.    LAST WEIGHT:  Wt Readings from Last 3 Encounters:   09/16/21 176 lb 14.4 oz (80.2 kg)   06/03/21 224 lb (101.6 kg)   05/25/21 224 lb (101.6 kg)       BMI Readings from Last 3 Encounters:   09/16/21 27.71 kg/m²   06/03/21 35.08 kg/m²   05/25/21 35.08 kg/m²       Physical Exam    Labs:  Lab Results   Component Value Date/Time     12/10/2021 10:30 AM    K 4.4 12/10/2021 10:30 AM    K 3.7 09/11/2021 08:02 AM     12/10/2021 10:30 AM    CO2 21 12/10/2021 10:30 AM    BUN 12 12/10/2021 10:30 AM    CREATININE 0.6 12/10/2021 10:30 AM    PROT 6.7 12/10/2021 10:30 AM    LABALBU 3.6 12/10/2021 10:30 AM    LABALBU 3.6 11/30/2010 03:25 PM    CALCIUM 9.4 12/10/2021 10:30 AM    GFRAA >60 12/10/2021 10:30 AM    LABGLOM >60 12/10/2021 10:30 AM    GLUCOSE 109 12/10/2021 10:30 AM    GLUCOSE 70 04/28/2011 02:25 PM    AST 10 12/10/2021 10:30 AM    ALT 5 12/10/2021 10:30 AM    ALKPHOS 80 12/10/2021 10:30 AM    BILITOT <0.2 12/10/2021 10:30 AM    TSH 2.140 05/25/2021 02:46 PM    CHOL 170 05/25/2021 02:46 PM    TRIG 143 05/25/2021 02:46 PM    HDL 35 05/25/2021 02:46 PM    LDLCALC 106 05/25/2021 02:46 PM    LABA1C 6.0 12/10/2021 10:30 AM        Lab Results   Component Value Date  CHOL 170 05/25/2021    CHOL 134 11/20/2020    CHOL 173 08/17/2020     Lab Results   Component Value Date    TRIG 143 05/25/2021    TRIG 121 11/20/2020    TRIG 119 08/17/2020     Lab Results   Component Value Date    HDL 35 05/25/2021    HDL 29 11/20/2020    HDL 35 08/17/2020     Lab Results   Component Value Date    LDLCALC 106 (H) 05/25/2021    LDLCALC 81 11/20/2020    LDLCALC 114 (H) 08/17/2020       Lab Results   Component Value Date    LABA1C 6.0 (H) 12/10/2021    LABA1C 4.6 09/13/2021    LABA1C 7.1 05/25/2021     Lab Results   Component Value Date    LABMICR 46.6 (H) 05/25/2021    LDLCALC 106 (H) 05/25/2021    CREATININE 0.6 12/10/2021           Assessment / Plan:      Cole Garber was seen today for other. Diagnoses and all orders for this visit:    Blood clotting disorder (HCC)  -     warfarin (COUMADIN) 3 MG tablet; 3 mg q. MWF, 4 mg other days  -     clopidogrel (PLAVIX) 75 MG tablet; Take 1 tablet by mouth daily  -     busPIRone (BUSPAR) 10 MG tablet; Take 1 tablet by mouth daily  -     External Referral To Home Health    Encounter for medication refill  -     albuterol sulfate HFA (PROVENTIL;VENTOLIN;PROAIR) 108 (90 Base) MCG/ACT inhaler; Inhale 2 puffs into the lungs every 6 hours as needed for Wheezing  -     aspirin 81 MG EC tablet; Take 1 tablet by mouth daily  -     atorvastatin (LIPITOR) 80 MG tablet; Take 1 tablet by mouth nightly  -     busPIRone (BUSPAR) 10 MG tablet; Take 1 tablet by mouth 3 times daily  -     divalproex (DEPAKOTE ER) 500 MG extended release tablet; TAKE 1 TABLET IN THE MORNING AND 2 TABLETS IN THE EVENING  -     DULoxetine (CYMBALTA) 60 MG extended release capsule; TAKE 1 CAPSULE EVERY DAY  -     gabapentin (NEURONTIN) 400 MG capsule; Take 1 capsule by mouth 3 times daily.   -     levothyroxine (SYNTHROID) 75 MCG tablet; TAKE 1 TABLET EVERY DAY  -     lidocaine (LIDODERM) 5 %; Place 1 patch onto the skin daily 12 hours on, 12 hours off.  -     loperamide (IMODIUM) 2 MG capsule; Take 1 capsule by mouth 4 times daily as needed for Diarrhea  -     metFORMIN (GLUCOPHAGE) 1000 MG tablet; TAKE 1 TABLET TWICE DAILY WITH MEALS  -     methocarbamol (ROBAXIN) 500 MG tablet; Take 1 tablet by mouth 3 times daily  -     metoprolol tartrate (LOPRESSOR) 25 MG tablet; TAKE 1/2 TABLET TWICE DAILY  -     omeprazole (PRILOSEC) 20 MG delayed release capsule; TAKE 1 CAPSULE EVERY MORNING  BEFORE  BREAKFAST  -     warfarin (COUMADIN) 3 MG tablet; 3 mg q. MWF, 4 mg other days  -     clopidogrel (PLAVIX) 75 MG tablet; Take 1 tablet by mouth daily  -     busPIRone (BUSPAR) 10 MG tablet; Take 1 tablet by mouth daily    Moderate persistent reactive airways dysfunction syndrome without complication ([facility])  -     albuterol sulfate HFA (PROVENTIL;VENTOLIN;PROAIR) 108 (90 Base) MCG/ACT inhaler; Inhale 2 puffs into the lungs every 6 hours as needed for Wheezing    Mixed hyperlipidemia  -     atorvastatin (LIPITOR) 80 MG tablet; Take 1 tablet by mouth nightly    Seizure disorder  -     divalproex (DEPAKOTE ER) 500 MG extended release tablet; TAKE 1 TABLET IN THE MORNING AND 2 TABLETS IN THE EVENING    PVD (peripheral vascular disease) ([facility])  -     gabapentin (NEURONTIN) 400 MG capsule; Take 1 capsule by mouth 3 times daily. -     warfarin (COUMADIN) 3 MG tablet; 3 mg q. MWF, 4 mg other days  -     clopidogrel (PLAVIX) 75 MG tablet; Take 1 tablet by mouth daily  -     warfarin (COUMADIN) 4 MG tablet; Take 4 mg q.  Tuesday Thursday Saturday Sunday, 3 mg other days  -     External Referral To Home Health    Subclinical hypothyroidism  -     levothyroxine (SYNTHROID) 75 MCG tablet; TAKE 1 TABLET EVERY DAY    Type 2 diabetes mellitus with other circulatory complication, unspecified whether long term insulin use ([facility])  -     metFORMIN (GLUCOPHAGE) 1000 MG tablet; TAKE 1 TABLET TWICE DAILY WITH MEALS    Type 2 diabetes mellitus with diabetic peripheral angiopathy without gangrene, without long-term current use of insulin ([facility])  - metFORMIN (GLUCOPHAGE) 1000 MG tablet; TAKE 1 TABLET TWICE DAILY WITH MEALS    Essential hypertension  -     metoprolol tartrate (LOPRESSOR) 25 MG tablet; TAKE 1/2 TABLET TWICE DAILY    Gastroesophageal reflux disease without esophagitis  -     omeprazole (PRILOSEC) 20 MG delayed release capsule; TAKE 1 CAPSULE EVERY MORNING  BEFORE  BREAKFAST  -     omeprazole (PRILOSEC OTC) 20 MG tablet; Take 1 tablet by mouth daily    Mood disorder (HCC)  -     busPIRone (BUSPAR) 10 MG tablet; Take 1 tablet by mouth 3 times daily  -     divalproex (DEPAKOTE ER) 500 MG extended release tablet; TAKE 1 TABLET IN THE MORNING AND 2 TABLETS IN THE EVENING  -     DULoxetine (CYMBALTA) 60 MG extended release capsule; TAKE 1 CAPSULE EVERY DAY    Open wound of left foot, sequela  -     External Referral To Home Health         Time spent in pre-visit planning, interview, exam, /education and coordination of care: *** minutes    Follow Up:  No follow-ups on file. or sooner if necessary. Call or go to ED immediately if symptoms worsen or persist.    Educational materials and/or home exercises printed for patient's review and were included in patient instructions on his/her AfterVisit Summary and given to patient at the end of visit. Counseled regarding above diagnosis,including possible risks and complications,  especially if left uncontrolled. Counseled regarding the possible side effects, risks, benefits and alternatives to treatment; patient and/or guardian verbalizes understanding, agrees, feels comfortable with and wishes to proceed with above treatment plan. Advised patient tocall with any new medication issues, and read all Rx info from pharmacy to assureaware of all possible risks and side effects of medication before taking. Reviewed age and gender appropriate health screening exams and vaccinations.   Advisedpatient regarding importance of keeping up with recommended health maintenance andto schedule as soon as possible if overdue, as this is important in assessing forundiagnosed pathology, especially cancer, as well as protecting against potentially harmful/life threatening disease.      Patient and/or guardian verbalizes understandingand agrees with above counseling, assessment and plan.    All questions answered.    Vianca Smith MD on 3/10/23

## 2023-03-17 ENCOUNTER — TELEPHONE (OUTPATIENT)
Dept: FAMILY MEDICINE CLINIC | Age: 53
End: 2023-03-17

## 2023-03-17 DIAGNOSIS — E03.8 SUBCLINICAL HYPOTHYROIDISM: ICD-10-CM

## 2023-03-17 DIAGNOSIS — E78.2 MIXED HYPERLIPIDEMIA: ICD-10-CM

## 2023-03-17 DIAGNOSIS — G40.909 SEIZURE DISORDER (HCC): ICD-10-CM

## 2023-03-17 DIAGNOSIS — J68.3 MODERATE PERSISTENT REACTIVE AIRWAYS DYSFUNCTION SYNDROME WITHOUT COMPLICATION (HCC): ICD-10-CM

## 2023-03-17 DIAGNOSIS — I10 ESSENTIAL HYPERTENSION: ICD-10-CM

## 2023-03-17 DIAGNOSIS — F39 MOOD DISORDER (HCC): ICD-10-CM

## 2023-03-17 DIAGNOSIS — I73.9 PVD (PERIPHERAL VASCULAR DISEASE) (HCC): ICD-10-CM

## 2023-03-17 DIAGNOSIS — K21.9 GASTROESOPHAGEAL REFLUX DISEASE WITHOUT ESOPHAGITIS: ICD-10-CM

## 2023-03-17 DIAGNOSIS — E11.51 TYPE 2 DIABETES MELLITUS WITH DIABETIC PERIPHERAL ANGIOPATHY WITHOUT GANGRENE, WITHOUT LONG-TERM CURRENT USE OF INSULIN (HCC): ICD-10-CM

## 2023-03-17 DIAGNOSIS — D68.9 BLOOD CLOTTING DISORDER (HCC): ICD-10-CM

## 2023-03-17 DIAGNOSIS — Z09 HOSPITAL DISCHARGE FOLLOW-UP: ICD-10-CM

## 2023-03-17 DIAGNOSIS — Z76.0 ENCOUNTER FOR MEDICATION REFILL: ICD-10-CM

## 2023-03-17 RX ORDER — LIDOCAINE 50 MG/G
1 PATCH TOPICAL DAILY
Qty: 90 PATCH | Refills: 3 | Status: SHIPPED | OUTPATIENT
Start: 2023-03-17 | End: 2024-03-17

## 2023-03-17 RX ORDER — WARFARIN SODIUM 3 MG/1
TABLET ORAL
Qty: 90 TABLET | Refills: 3 | Status: SHIPPED | OUTPATIENT
Start: 2023-03-17 | End: 2024-03-17

## 2023-03-17 RX ORDER — BUSPIRONE HYDROCHLORIDE 10 MG/1
10 TABLET ORAL DAILY
Qty: 90 TABLET | Refills: 3 | Status: SHIPPED | OUTPATIENT
Start: 2023-03-17 | End: 2024-03-17

## 2023-03-17 RX ORDER — OMEPRAZOLE 20 MG/1
CAPSULE, DELAYED RELEASE ORAL
Qty: 90 CAPSULE | Refills: 3 | Status: SHIPPED | OUTPATIENT
Start: 2023-03-17 | End: 2024-03-17

## 2023-03-17 RX ORDER — OMEPRAZOLE 20 MG/1
20 TABLET, DELAYED RELEASE ORAL DAILY
Qty: 90 TABLET | Refills: 3 | Status: SHIPPED | OUTPATIENT
Start: 2023-03-17 | End: 2024-03-17

## 2023-03-17 RX ORDER — LEVOTHYROXINE SODIUM 0.07 MG/1
TABLET ORAL
Qty: 90 TABLET | Refills: 3 | Status: SHIPPED | OUTPATIENT
Start: 2023-03-17 | End: 2024-03-17

## 2023-03-17 RX ORDER — CLOPIDOGREL BISULFATE 75 MG/1
75 TABLET ORAL DAILY
Qty: 90 TABLET | Refills: 3 | Status: SHIPPED | OUTPATIENT
Start: 2023-03-17 | End: 2024-03-17

## 2023-03-17 RX ORDER — DULOXETIN HYDROCHLORIDE 60 MG/1
CAPSULE, DELAYED RELEASE ORAL
Qty: 90 CAPSULE | Refills: 3 | Status: SHIPPED | OUTPATIENT
Start: 2023-03-17 | End: 2024-03-17

## 2023-03-17 RX ORDER — ALBUTEROL SULFATE 90 UG/1
2 AEROSOL, METERED RESPIRATORY (INHALATION) EVERY 6 HOURS PRN
Qty: 3 EACH | Refills: 3 | Status: SHIPPED | OUTPATIENT
Start: 2023-03-17 | End: 2024-03-17

## 2023-03-17 RX ORDER — WARFARIN SODIUM 4 MG/1
TABLET ORAL
Qty: 90 TABLET | Refills: 3 | Status: SHIPPED | OUTPATIENT
Start: 2023-03-17 | End: 2024-03-17

## 2023-03-17 RX ORDER — ATORVASTATIN CALCIUM 80 MG/1
80 TABLET, FILM COATED ORAL NIGHTLY
Qty: 90 TABLET | Refills: 3 | Status: SHIPPED | OUTPATIENT
Start: 2023-03-17 | End: 2024-03-17

## 2023-03-17 RX ORDER — DIVALPROEX SODIUM 500 MG/1
TABLET, EXTENDED RELEASE ORAL
Qty: 270 TABLET | Refills: 3 | Status: SHIPPED | OUTPATIENT
Start: 2023-03-17 | End: 2024-03-17

## 2023-03-17 RX ORDER — GABAPENTIN 400 MG/1
400 CAPSULE ORAL 3 TIMES DAILY
Qty: 270 CAPSULE | Refills: 3 | Status: SHIPPED | OUTPATIENT
Start: 2023-03-17 | End: 2024-03-17

## 2023-03-17 NOTE — TELEPHONE ENCOUNTER
3/17/23: [facility] for clarification of buspirone ordered. This was clarified. Further history, [facility] refused to send in all of her other prescriptions until this was modified. All prescriptions are now appropriately filled to [facility]. Patient was contacted and informed of the above process. She verbalizes understanding.

## 2023-04-13 ENCOUNTER — TELEPHONE (OUTPATIENT)
Dept: FAMILY MEDICINE CLINIC | Age: 53
End: 2023-04-13

## 2023-04-21 PROBLEM — N39.46 URINARY INCONTINENCE, MIXED: Status: ACTIVE | Noted: 2023-04-21

## 2023-06-20 ENCOUNTER — TELEMEDICINE (OUTPATIENT)
Dept: FAMILY MEDICINE CLINIC | Age: 53
End: 2023-06-20

## 2023-06-20 DIAGNOSIS — Z87.891 PERSONAL HISTORY OF TOBACCO USE: ICD-10-CM

## 2023-06-20 DIAGNOSIS — D68.9 BLOOD CLOTTING DISORDER (HCC): ICD-10-CM

## 2023-06-20 DIAGNOSIS — Z76.0 ENCOUNTER FOR MEDICATION REFILL: ICD-10-CM

## 2023-06-20 DIAGNOSIS — Z87.891 PERSONAL HISTORY OF TOBACCO USE, PRESENTING HAZARDS TO HEALTH: ICD-10-CM

## 2023-06-20 DIAGNOSIS — R26.89 UNABLE TO MOBILIZE IN HOME: ICD-10-CM

## 2023-06-20 DIAGNOSIS — Z00.00 MEDICARE ANNUAL WELLNESS VISIT, SUBSEQUENT: Primary | ICD-10-CM

## 2023-06-20 DIAGNOSIS — I10 ESSENTIAL HYPERTENSION: ICD-10-CM

## 2023-06-20 DIAGNOSIS — J68.3 MODERATE PERSISTENT REACTIVE AIRWAYS DYSFUNCTION SYNDROME WITHOUT COMPLICATION (HCC): ICD-10-CM

## 2023-06-20 DIAGNOSIS — F39 MOOD DISORDER (HCC): ICD-10-CM

## 2023-06-20 DIAGNOSIS — I73.9 PVD (PERIPHERAL VASCULAR DISEASE) (HCC): ICD-10-CM

## 2023-06-20 DIAGNOSIS — G40.909 SEIZURE DISORDER (HCC): ICD-10-CM

## 2023-06-20 DIAGNOSIS — R79.9 ABNORMAL FINDING OF BLOOD CHEMISTRY, UNSPECIFIED: ICD-10-CM

## 2023-06-20 DIAGNOSIS — E03.8 SUBCLINICAL HYPOTHYROIDISM: ICD-10-CM

## 2023-06-20 DIAGNOSIS — K21.9 GASTROESOPHAGEAL REFLUX DISEASE WITHOUT ESOPHAGITIS: ICD-10-CM

## 2023-06-20 DIAGNOSIS — E78.2 MIXED HYPERLIPIDEMIA: ICD-10-CM

## 2023-06-20 DIAGNOSIS — E11.51 TYPE 2 DIABETES MELLITUS WITH DIABETIC PERIPHERAL ANGIOPATHY WITHOUT GANGRENE, WITHOUT LONG-TERM CURRENT USE OF INSULIN (HCC): ICD-10-CM

## 2023-06-20 RX ORDER — ATORVASTATIN CALCIUM 80 MG/1
80 TABLET, FILM COATED ORAL NIGHTLY
Qty: 90 TABLET | Refills: 3 | Status: SHIPPED | OUTPATIENT
Start: 2023-06-20 | End: 2024-06-20

## 2023-06-20 RX ORDER — CLOPIDOGREL BISULFATE 75 MG/1
75 TABLET ORAL DAILY
Qty: 90 TABLET | Refills: 3 | Status: SHIPPED | OUTPATIENT
Start: 2023-06-20 | End: 2024-06-20

## 2023-06-20 RX ORDER — GABAPENTIN 400 MG/1
400 CAPSULE ORAL 3 TIMES DAILY
Qty: 270 CAPSULE | Refills: 3 | Status: SHIPPED | OUTPATIENT
Start: 2023-06-20 | End: 2024-06-20

## 2023-06-20 RX ORDER — OMEPRAZOLE 20 MG/1
CAPSULE, DELAYED RELEASE ORAL
Qty: 90 CAPSULE | Refills: 3 | Status: SHIPPED | OUTPATIENT
Start: 2023-06-20 | End: 2024-06-20

## 2023-06-20 RX ORDER — DULOXETIN HYDROCHLORIDE 60 MG/1
CAPSULE, DELAYED RELEASE ORAL
Qty: 90 CAPSULE | Refills: 3 | Status: SHIPPED | OUTPATIENT
Start: 2023-06-20 | End: 2024-06-20

## 2023-06-20 RX ORDER — WARFARIN SODIUM 3 MG/1
TABLET ORAL
Qty: 90 TABLET | Refills: 3 | Status: SHIPPED | OUTPATIENT
Start: 2023-06-20 | End: 2024-06-20

## 2023-06-20 RX ORDER — METHOCARBAMOL 500 MG/1
500 TABLET, FILM COATED ORAL 3 TIMES DAILY
Qty: 270 TABLET | Refills: 1 | Status: SHIPPED | OUTPATIENT
Start: 2023-06-20 | End: 2024-06-20

## 2023-06-20 RX ORDER — LIDOCAINE 50 MG/G
1 PATCH TOPICAL DAILY
Qty: 90 PATCH | Refills: 3 | Status: SHIPPED | OUTPATIENT
Start: 2023-06-20 | End: 2024-06-20

## 2023-06-20 RX ORDER — LEVETIRACETAM 500 MG/1
TABLET ORAL
Qty: 180 TABLET | Refills: 3 | Status: SHIPPED | OUTPATIENT
Start: 2023-06-20 | End: 2024-06-20

## 2023-06-20 RX ORDER — WARFARIN SODIUM 4 MG/1
TABLET ORAL
Qty: 90 TABLET | Refills: 3 | Status: SHIPPED | OUTPATIENT
Start: 2023-06-20 | End: 2024-06-20

## 2023-06-20 RX ORDER — DIVALPROEX SODIUM 500 MG/1
TABLET, EXTENDED RELEASE ORAL
Qty: 270 TABLET | Refills: 3 | Status: SHIPPED | OUTPATIENT
Start: 2023-06-20 | End: 2024-06-20

## 2023-06-20 RX ORDER — ALBUTEROL SULFATE 90 UG/1
2 AEROSOL, METERED RESPIRATORY (INHALATION) EVERY 6 HOURS PRN
Qty: 3 EACH | Refills: 3 | Status: SHIPPED | OUTPATIENT
Start: 2023-06-20 | End: 2024-06-20

## 2023-06-20 RX ORDER — LEVOTHYROXINE SODIUM 0.07 MG/1
TABLET ORAL
Qty: 90 TABLET | Refills: 3 | Status: SHIPPED | OUTPATIENT
Start: 2023-06-20 | End: 2024-06-20

## 2023-06-20 RX ORDER — BUSPIRONE HYDROCHLORIDE 10 MG/1
10 TABLET ORAL DAILY
Qty: 90 TABLET | Refills: 3 | Status: SHIPPED | OUTPATIENT
Start: 2023-06-20 | End: 2024-06-20

## 2023-06-20 SDOH — HEALTH STABILITY: PHYSICAL HEALTH: ON AVERAGE, HOW MANY MINUTES DO YOU ENGAGE IN EXERCISE AT THIS LEVEL?: 0 MIN

## 2023-06-20 ASSESSMENT — PATIENT HEALTH QUESTIONNAIRE - PHQ9
1. LITTLE INTEREST OR PLEASURE IN DOING THINGS: 0
SUM OF ALL RESPONSES TO PHQ QUESTIONS 1-9: 0
SUM OF ALL RESPONSES TO PHQ QUESTIONS 1-9: 0
SUM OF ALL RESPONSES TO PHQ9 QUESTIONS 1 & 2: 0
SUM OF ALL RESPONSES TO PHQ QUESTIONS 1-9: 0
2. FEELING DOWN, DEPRESSED OR HOPELESS: 0
SUM OF ALL RESPONSES TO PHQ QUESTIONS 1-9: 0

## 2023-06-20 ASSESSMENT — LIFESTYLE VARIABLES
HOW OFTEN DURING THE LAST YEAR HAVE YOU HAD A FEELING OF GUILT OR REMORSE AFTER DRINKING: NEVER
HAVE YOU OR SOMEONE ELSE BEEN INJURED AS A RESULT OF YOUR DRINKING: NO
HOW OFTEN DURING THE LAST YEAR HAVE YOU NEEDED AN ALCOHOLIC DRINK FIRST THING IN THE MORNING TO GET YOURSELF GOING AFTER A NIGHT OF HEAVY DRINKING: NEVER
HAVE YOU OR SOMEONE ELSE BEEN INJURED AS A RESULT OF YOUR DRINKING: 0
HOW OFTEN DURING THE LAST YEAR HAVE YOU FAILED TO DO WHAT WAS NORMALLY EXPECTED FROM YOU BECAUSE OF DRINKING: 0
HOW OFTEN DURING THE LAST YEAR HAVE YOU FOUND THAT YOU WERE NOT ABLE TO STOP DRINKING ONCE YOU HAD STARTED: 0
HOW OFTEN DURING THE LAST YEAR HAVE YOU BEEN UNABLE TO REMEMBER WHAT HAPPENED THE NIGHT BEFORE BECAUSE YOU HAD BEEN DRINKING: 0
HOW OFTEN DO YOU HAVE A DRINK CONTAINING ALCOHOL: MONTHLY OR LESS
HOW MANY STANDARD DRINKS CONTAINING ALCOHOL DO YOU HAVE ON A TYPICAL DAY: PATIENT DOES NOT DRINK
HAS A RELATIVE, FRIEND, DOCTOR, OR ANOTHER HEALTH PROFESSIONAL EXPRESSED CONCERN ABOUT YOUR DRINKING OR SUGGESTED YOU CUT DOWN: 0
HOW MANY STANDARD DRINKS CONTAINING ALCOHOL DO YOU HAVE ON A TYPICAL DAY: 0
HOW OFTEN DO YOU HAVE A DRINK CONTAINING ALCOHOL: 2
HOW OFTEN DURING THE LAST YEAR HAVE YOU FAILED TO DO WHAT WAS NORMALLY EXPECTED FROM YOU BECAUSE OF DRINKING: NEVER
HOW OFTEN DURING THE LAST YEAR HAVE YOU FOUND THAT YOU WERE NOT ABLE TO STOP DRINKING ONCE YOU HAD STARTED: NEVER
HOW OFTEN DO YOU HAVE A DRINK CONTAINING ALCOHOL: MONTHLY OR LESS
HOW OFTEN DURING THE LAST YEAR HAVE YOU HAD A FEELING OF GUILT OR REMORSE AFTER DRINKING: 0
HOW MANY STANDARD DRINKS CONTAINING ALCOHOL DO YOU HAVE ON A TYPICAL DAY: PATIENT DOES NOT DRINK
HOW OFTEN DO YOU HAVE SIX OR MORE DRINKS ON ONE OCCASION: 2
HOW OFTEN DURING THE LAST YEAR HAVE YOU NEEDED AN ALCOHOLIC DRINK FIRST THING IN THE MORNING TO GET YOURSELF GOING AFTER A NIGHT OF HEAVY DRINKING: 0
HAS A RELATIVE, FRIEND, DOCTOR, OR ANOTHER HEALTH PROFESSIONAL EXPRESSED CONCERN ABOUT YOUR DRINKING OR SUGGESTED YOU CUT DOWN: NO
HOW OFTEN DURING THE LAST YEAR HAVE YOU BEEN UNABLE TO REMEMBER WHAT HAPPENED THE NIGHT BEFORE BECAUSE YOU HAD BEEN DRINKING: NEVER

## 2023-06-20 NOTE — PROGRESS NOTES
Medicare Annual Wellness Visit    Briana Felipe is here for    Chief Complaint   Patient presents with    Medicare AWV    Pre-op Exam     Scheduled for partial toe amputation 6/21/23--Dr. Lamont Abdul    Medication Refill     All prescriptions go to [facility]        Assessment & Plan   Medicare annual wellness visit, subsequent topics included referral to home health for periodic blood draws, LDCT for lung cancer screening, consultation for tobacco cessation  -     AL Smoking and Tobacco Use Cessation (Intermediate): 3-10 MINUTES [22643]  -     CT Lung Screening; Future    Personal history of tobacco use, presenting hazards to health  -     AL Smoking and Tobacco Use Cessation (Intermediate): 3-10 MINUTES [00301]    Personal history of tobacco use  -     CT Lung Screening; Future    Unable to mobilize in home: Due to patient's condition, it is very difficult for her to get out, even to get lab work accomplished. Patient inquired about getting home health to come in every 3 months for routine lab work  -     External Referral To Home Health    Encounter for medication refill: All medications required represcription to new pharmacy ([facility])  -     busPIRone (BUSPAR) 10 MG tablet; Take 1 tablet by mouth daily, Disp-90 tablet, R-36/20/23: [facility]  -     albuterol sulfate HFA (PROVENTIL;VENTOLIN;PROAIR) 108 (90 Base) MCG/ACT inhaler; Inhale 2 puffs into the lungs every 6 hours as needed for Wheezing, Disp-3 each, R-36/20/23: [facility]  -     atorvastatin (LIPITOR) 80 MG tablet;  Take 1 tablet by mouth nightly, Disp-90 tablet, R-36/20/23: [facility]  -     divalproex (DEPAKOTE ER) 500 MG extended release tablet; TAKE 1 TABLET IN THE MORNING AND 2 TABLETS IN THE EVENING, Disp-270 tablet, R-36/20/23: [facility]  -     DULoxetine (CYMBALTA) 60 MG extended release capsule; TAKE 1 CAPSULE EVERY DAY, Disp-90 capsule, R-36/20/23:

## 2023-06-20 NOTE — PATIENT INSTRUCTIONS
If you have other serious medical conditions (other cancers, congestive heart failure) that limit your life expectancy to less than 10 years, you should not undergo lung cancer screening with LDCT. The chance is 20%-60% that the LDCT result will show abnormalities. This would require additional testing which could include repeat imaging or even invasive procedures. Most (about 95%) of \"abnormal\" LDCT results are false in the sense that no lung cancer is ultimately found. Additionally, some (about 10%) of the cancers found would not affect your life expectancy, even if undetected and untreated. If you are still smoking, the single most important thing that you can do to reduce your risk of dying of lung cancer is to quit. For this screening to be covered by Medicare and most other insurers, strict criteria must be met. If you do not meet these criteria, but still wish to undergo LDCT testing, you will be required to sign a waiver indicating your willingness to pay for the scan. A Healthy Heart: Care Instructions  Your Care Instructions     Coronary artery disease, also called heart disease, occurs when a substance called plaque builds up in the vessels that supply oxygen-rich blood to your heart muscle. This can narrow the blood vessels and reduce blood flow. A heart attack happens when blood flow is completely blocked. A high-fat diet, smoking, and other factors increase the risk of heart disease. Your doctor has found that you have a chance of having heart disease. You can do lots of things to keep your heart healthy. It may not be easy, but you can change your diet, exercise more, and quit smoking. These steps really work to lower your chance of heart disease. Follow-up care is a key part of your treatment and safety. Be sure to make and go to all appointments, and call your doctor if you are having problems.  It's also a good idea to know your test results and keep a list of the medicines you

## 2023-06-21 ENCOUNTER — TELEPHONE (OUTPATIENT)
Dept: FAMILY MEDICINE CLINIC | Age: 53
End: 2023-06-21

## 2023-07-12 ENCOUNTER — TELEPHONE (OUTPATIENT)
Dept: FAMILY MEDICINE CLINIC | Age: 53
End: 2023-07-12

## 2023-07-12 NOTE — TELEPHONE ENCOUNTER
Rachell Torres from [facility] called and said she never received the [facility] for patient. States she also has not received the email that was sent with o. See media tab scan 07/05/23. I have re-faxed, will let you know if it goes through.    Last seen 6/20/2023  Next appt 9/26/2023

## 2023-09-06 ENCOUNTER — TELEPHONE (OUTPATIENT)
Dept: FAMILY MEDICINE CLINIC | Age: 53
End: 2023-09-06

## 2023-09-06 NOTE — TELEPHONE ENCOUNTER
Called pt back and pt asked if she can come do her labwork in our lab on the 12th. Advised pt orders are in system and she doesn't need to schedule.

## 2023-09-06 NOTE — TELEPHONE ENCOUNTER
----- Message from Armin Goltz sent at 9/6/2023 11:42 AM EDT -----  Subject: Message to Provider    QUESTIONS  Information for Provider? Patient called in asking for her lab orders. Please contact her to let her know she can stop and get labs completed  ---------------------------------------------------------------------------  --------------  Clive Getting INFO  [phone]; OK to leave message on voicemail  ---------------------------------------------------------------------------  --------------  SCRIPT ANSWERS  Relationship to Patient?  Self

## 2023-09-12 DIAGNOSIS — R79.9 ABNORMAL FINDING OF BLOOD CHEMISTRY, UNSPECIFIED: ICD-10-CM

## 2023-09-12 DIAGNOSIS — E11.51 TYPE 2 DIABETES MELLITUS WITH DIABETIC PERIPHERAL ANGIOPATHY WITHOUT GANGRENE, WITHOUT LONG-TERM CURRENT USE OF INSULIN (HCC): ICD-10-CM

## 2023-09-12 LAB
BASOPHILS ABSOLUTE: 0 K/UL (ref 0–0.2)
BASOPHILS RELATIVE PERCENT: 0 % (ref 0–2)
EOSINOPHILS ABSOLUTE: 0 K/UL (ref 0.05–0.5)
EOSINOPHILS RELATIVE PERCENT: 0 % (ref 0–6)
HBA1C MFR BLD: 8.5 % (ref 4–5.6)
HCT VFR BLD CALC: 49 % (ref 34–48)
HEMOGLOBIN: 14 G/DL (ref 11.5–15.5)
LYMPHOCYTES ABSOLUTE: 2.16 K/UL (ref 1.5–4)
LYMPHOCYTES RELATIVE PERCENT: 15 % (ref 20–42)
MCH RBC QN AUTO: 22.6 PG (ref 26–35)
MCHC RBC AUTO-ENTMCNC: 28.6 G/DL (ref 32–34.5)
MCV RBC AUTO: 79 FL (ref 80–99.9)
MONOCYTES ABSOLUTE: 1.01 K/UL (ref 0.1–0.95)
MONOCYTES RELATIVE PERCENT: 7 % (ref 2–12)
NEUTROPHILS ABSOLUTE: 11.23 K/UL (ref 1.8–7.3)
NEUTROPHILS RELATIVE PERCENT: 78 % (ref 43–80)
PDW BLD-RTO: 21.3 % (ref 11.5–15)
PLATELET # BLD: 366 K/UL (ref 130–450)
PMV BLD AUTO: 10.9 FL (ref 7–12)
RBC # BLD: 6.2 M/UL (ref 3.5–5.5)
RBC # BLD: ABNORMAL 10*6/UL
WBC # BLD: 14.4 K/UL (ref 4.5–11.5)

## 2023-09-13 LAB
ALBUMIN SERPL-MCNC: 3.6 G/DL (ref 3.5–5.2)
ALP BLD-CCNC: 132 U/L (ref 35–104)
ALT SERPL-CCNC: 6 U/L (ref 0–32)
ANION GAP SERPL CALCULATED.3IONS-SCNC: 17 MMOL/L (ref 7–16)
AST SERPL-CCNC: 13 U/L (ref 0–31)
BILIRUB SERPL-MCNC: 0.3 MG/DL (ref 0–1.2)
BUN BLDV-MCNC: 8 MG/DL (ref 6–20)
CALCIUM SERPL-MCNC: 8.9 MG/DL (ref 8.6–10.2)
CHLORIDE BLD-SCNC: 101 MMOL/L (ref 98–107)
CHOLESTEROL: 175 MG/DL
CO2: 22 MMOL/L (ref 22–29)
CREAT SERPL-MCNC: 0.8 MG/DL (ref 0.5–1)
GFR SERPL CREATININE-BSD FRML MDRD: >60 ML/MIN/1.73M2
GLUCOSE BLD-MCNC: 167 MG/DL (ref 74–99)
HDLC SERPL-MCNC: 32 MG/DL
LDL CHOLESTEROL: 116 MG/DL
POTASSIUM SERPL-SCNC: 4.3 MMOL/L (ref 3.5–5)
SODIUM BLD-SCNC: 140 MMOL/L (ref 132–146)
TOTAL PROTEIN: 7.1 G/DL (ref 6.4–8.3)
TRIGL SERPL-MCNC: 133 MG/DL
VLDLC SERPL CALC-MCNC: 27 MG/DL

## 2023-09-14 NOTE — RESULT ENCOUNTER NOTE
Patient informed and will be sure to do the [employer] appointment with either Dr Oxana Blake or Dr Jayla Vargas.

## 2023-09-26 ENCOUNTER — TELEMEDICINE (OUTPATIENT)
Dept: FAMILY MEDICINE CLINIC | Age: 53
End: 2023-09-26
Payer: MEDICARE

## 2023-09-26 DIAGNOSIS — E78.2 MIXED HYPERLIPIDEMIA: ICD-10-CM

## 2023-09-26 DIAGNOSIS — D68.9 BLOOD CLOTTING DISORDER (HCC): ICD-10-CM

## 2023-09-26 DIAGNOSIS — K52.9 CHRONIC DIARRHEA: ICD-10-CM

## 2023-09-26 DIAGNOSIS — E11.51 TYPE 2 DIABETES MELLITUS WITH DIABETIC PERIPHERAL ANGIOPATHY WITHOUT GANGRENE, WITHOUT LONG-TERM CURRENT USE OF INSULIN (HCC): Primary | ICD-10-CM

## 2023-09-26 DIAGNOSIS — S78.111S: ICD-10-CM

## 2023-09-26 PROCEDURE — 99214 OFFICE O/P EST MOD 30 MIN: CPT | Performed by: FAMILY MEDICINE

## 2023-09-26 PROCEDURE — 3052F HG A1C>EQUAL 8.0%<EQUAL 9.0%: CPT | Performed by: FAMILY MEDICINE

## 2023-09-26 RX ORDER — LOPERAMIDE HYDROCHLORIDE 2 MG/1
2 CAPSULE ORAL 4 TIMES DAILY PRN
Qty: 60 CAPSULE | Refills: 2 | Status: SHIPPED | OUTPATIENT
Start: 2023-09-26 | End: 2024-09-26

## 2023-09-26 RX ORDER — EZETIMIBE 10 MG/1
10 TABLET ORAL DAILY
Qty: 90 TABLET | Refills: 1 | Status: SHIPPED | OUTPATIENT
Start: 2023-09-26 | End: 2023-09-26

## 2023-09-26 NOTE — PROGRESS NOTES
Reji Franklin, was evaluated through a synchronous (real-time) audio-video encounter. The patient (or guardian if applicable) is aware that this is a billable service, which includes applicable co-pays. This Virtual Visit was conducted with patient's (and/or legal guardian's) consent. Patient identification was verified, and a caregiver was present when appropriate. The patient was located at Home: [address] [place] [address]  Provider was located at Facility (Appt Dept): [address] [place] [address]      Reji Franklin (:  1970) is a Established patient, presenting virtually for evaluation of the following:    Assessment & Plan   Below is the assessment and plan developed based on review of pertinent history, physical exam, labs, studies, and medications. 1. Type 2 diabetes mellitus with diabetic peripheral angiopathy without gangrene, without long-term current use of insulin (HCC)  Chronic and not well controlled  Hemoglobin A1C   Date Value Ref Range Status   2023 8.5 (H) 4.0 - 5.6 % Final   12/10/2021 6.0 (H) 4.0 - 5.6 % Final   2021 4.6 4.0 - 5.6 % Final   Adamantly declines metformin, has not been taking  Given GI upset recently, will avoid GLP 1 or DPP 4 inhibitors  Trial Jardiance low-dose  Advised endocrinology follow-up, patient adamantly declines  The patient is asked to make an attempt to improve diet and exercise patterns to aid in medical management of this problem. -     empagliflozin (JARDIANCE) 10 MG tablet; Take 1 tablet by mouth daily, Disp-90 tablet, R-1Normal    2. Blood clotting disorder (HCC)  Lost to follow-up, unclear if stable  Continue Plavix  Continue Coumadin current dose, 4 mg  and 3 mg all other days  Patient adamantly refuses Coumadin clinic monitoring  -     Protime-INR; Future    3.  Chronic diarrhea  Last colonoscopy in , diagnosed with functional diarrhea with tubular adenomas  -     loperamide

## 2023-11-21 ENCOUNTER — TELEPHONE (OUTPATIENT)
Dept: FAMILY MEDICINE CLINIC | Age: 53
End: 2023-11-21

## 2023-12-06 ENCOUNTER — ANESTHESIA EVENT (OUTPATIENT)
Dept: OPERATING ROOM | Facility: HOSPITAL | Age: 53
DRG: 271 | End: 2023-12-06
Payer: COMMERCIAL

## 2023-12-06 ENCOUNTER — ANESTHESIA (OUTPATIENT)
Dept: OPERATING ROOM | Facility: HOSPITAL | Age: 53
DRG: 271 | End: 2023-12-06
Payer: COMMERCIAL

## 2023-12-06 ENCOUNTER — HOSPITAL ENCOUNTER (INPATIENT)
Facility: HOSPITAL | Age: 53
LOS: 9 days | Discharge: NURSING, REHAB OR LONG-TERM CARE | DRG: 271 | End: 2023-12-15
Attending: INTERNAL MEDICINE | Admitting: STUDENT IN AN ORGANIZED HEALTH CARE EDUCATION/TRAINING PROGRAM
Payer: COMMERCIAL

## 2023-12-06 DIAGNOSIS — Z13.6 ENCOUNTER FOR SCREENING FOR CARDIOVASCULAR DISORDERS: ICD-10-CM

## 2023-12-06 DIAGNOSIS — R60.0 LOWER EXTREMITY EDEMA: ICD-10-CM

## 2023-12-06 DIAGNOSIS — Z01.810 ENCOUNTER FOR PREPROCEDURAL CARDIOVASCULAR EXAMINATION: ICD-10-CM

## 2023-12-06 DIAGNOSIS — I74.3: ICD-10-CM

## 2023-12-06 DIAGNOSIS — I73.9 PAD (PERIPHERAL ARTERY DISEASE) (CMS-HCC): ICD-10-CM

## 2023-12-06 DIAGNOSIS — I99.8 LIMB ISCHEMIA: Primary | ICD-10-CM

## 2023-12-06 PROBLEM — I10 HTN (HYPERTENSION): Status: ACTIVE | Noted: 2023-12-06

## 2023-12-06 PROBLEM — G62.9 PERIPHERAL NEUROPATHY: Status: ACTIVE | Noted: 2023-12-06

## 2023-12-06 PROBLEM — D64.9 ANEMIA: Status: ACTIVE | Noted: 2023-12-06

## 2023-12-06 PROBLEM — E11.9 DIABETES MELLITUS, TYPE 2 (MULTI): Status: ACTIVE | Noted: 2023-12-06

## 2023-12-06 PROBLEM — E03.9 HYPOTHYROIDISM: Status: ACTIVE | Noted: 2023-12-06

## 2023-12-06 PROBLEM — G47.33 OSA (OBSTRUCTIVE SLEEP APNEA): Status: ACTIVE | Noted: 2023-12-06

## 2023-12-06 LAB
ABO GROUP (TYPE) IN BLOOD: NORMAL
ALBUMIN SERPL BCP-MCNC: 3.5 G/DL (ref 3.4–5)
ALP SERPL-CCNC: 98 U/L (ref 33–110)
ALT SERPL W P-5'-P-CCNC: 8 U/L (ref 7–45)
ANION GAP BLDA CALCULATED.4IONS-SCNC: 7 MMO/L (ref 10–25)
ANION GAP BLDA CALCULATED.4IONS-SCNC: 8 MMO/L (ref 10–25)
ANION GAP BLDA CALCULATED.4IONS-SCNC: 8 MMO/L (ref 10–25)
ANION GAP SERPL CALC-SCNC: 14 MMOL/L (ref 10–20)
ANTIBODY SCREEN: NORMAL
APTT PPP: 109 SECONDS (ref 27–38)
APTT PPP: 62 SECONDS (ref 27–38)
AST SERPL W P-5'-P-CCNC: 7 U/L (ref 9–39)
BASE EXCESS BLDA CALC-SCNC: -0.1 MMOL/L (ref -2–3)
BASE EXCESS BLDA CALC-SCNC: -1.8 MMOL/L (ref -2–3)
BASE EXCESS BLDA CALC-SCNC: -2.3 MMOL/L (ref -2–3)
BASOPHILS # BLD AUTO: 0.06 X10*3/UL (ref 0–0.1)
BASOPHILS NFR BLD AUTO: 0.4 %
BILIRUB SERPL-MCNC: 0.3 MG/DL (ref 0–1.2)
BLOOD EXPIRATION DATE: NORMAL
BODY TEMPERATURE: 37 DEGREES CELSIUS
BUN SERPL-MCNC: 11 MG/DL (ref 6–23)
CA-I BLDA-SCNC: 1.1 MMOL/L (ref 1.1–1.33)
CA-I BLDA-SCNC: 1.12 MMOL/L (ref 1.1–1.33)
CA-I BLDA-SCNC: 1.16 MMOL/L (ref 1.1–1.33)
CALCIUM SERPL-MCNC: 8.5 MG/DL (ref 8.6–10.6)
CHLORIDE BLDA-SCNC: 104 MMOL/L (ref 98–107)
CHLORIDE BLDA-SCNC: 104 MMOL/L (ref 98–107)
CHLORIDE BLDA-SCNC: 105 MMOL/L (ref 98–107)
CHLORIDE SERPL-SCNC: 103 MMOL/L (ref 98–107)
CHOLEST SERPL-MCNC: 197 MG/DL (ref 0–199)
CHOLESTEROL/HDL RATIO: 7
CO2 SERPL-SCNC: 23 MMOL/L (ref 21–32)
CREAT SERPL-MCNC: 0.57 MG/DL (ref 0.5–1.05)
CRP SERPL-MCNC: 17.25 MG/DL
DISPENSE STATUS: NORMAL
EOSINOPHIL # BLD AUTO: 0.12 X10*3/UL (ref 0–0.7)
EOSINOPHIL NFR BLD AUTO: 0.8 %
ERYTHROCYTE [DISTWIDTH] IN BLOOD BY AUTOMATED COUNT: 21.6 % (ref 11.5–14.5)
GFR SERPL CREATININE-BSD FRML MDRD: >90 ML/MIN/1.73M*2
GLUCOSE BLDA-MCNC: 126 MG/DL (ref 74–99)
GLUCOSE BLDA-MCNC: 221 MG/DL (ref 74–99)
GLUCOSE BLDA-MCNC: 234 MG/DL (ref 74–99)
GLUCOSE SERPL-MCNC: 141 MG/DL (ref 74–99)
HCO3 BLDA-SCNC: 24.7 MMOL/L (ref 22–26)
HCO3 BLDA-SCNC: 25.4 MMOL/L (ref 22–26)
HCO3 BLDA-SCNC: 26.6 MMOL/L (ref 22–26)
HCT VFR BLD AUTO: 40.9 % (ref 36–46)
HCT VFR BLD EST: 34 % (ref 36–46)
HCT VFR BLD EST: 37 % (ref 36–46)
HCT VFR BLD EST: 38 % (ref 36–46)
HDLC SERPL-MCNC: 28.1 MG/DL
HGB BLD-MCNC: 12.6 G/DL (ref 12–16)
HGB BLDA-MCNC: 11.4 G/DL (ref 12–16)
HGB BLDA-MCNC: 12.4 G/DL (ref 12–16)
HGB BLDA-MCNC: 12.7 G/DL (ref 12–16)
IMM GRANULOCYTES # BLD AUTO: 0.12 X10*3/UL (ref 0–0.7)
IMM GRANULOCYTES NFR BLD AUTO: 0.8 % (ref 0–0.9)
INHALED O2 CONCENTRATION: 100 %
INHALED O2 CONCENTRATION: 100 %
INHALED O2 CONCENTRATION: 50 %
INR PPP: 3.7 (ref 0.9–1.1)
INR PPP: ABNORMAL
LACTATE BLDA-SCNC: 0.9 MMOL/L (ref 0.4–2)
LACTATE BLDA-SCNC: 1.5 MMOL/L (ref 0.4–2)
LACTATE BLDA-SCNC: 2 MMOL/L (ref 0.4–2)
LDLC SERPL CALC-MCNC: 122 MG/DL
LYMPHOCYTES # BLD AUTO: 3.08 X10*3/UL (ref 1.2–4.8)
LYMPHOCYTES NFR BLD AUTO: 20.8 %
MAGNESIUM SERPL-MCNC: 2.03 MG/DL (ref 1.6–2.4)
MCH RBC QN AUTO: 23.7 PG (ref 26–34)
MCHC RBC AUTO-ENTMCNC: 30.8 G/DL (ref 32–36)
MCV RBC AUTO: 77 FL (ref 80–100)
MONOCYTES # BLD AUTO: 1.38 X10*3/UL (ref 0.1–1)
MONOCYTES NFR BLD AUTO: 9.3 %
NEUTROPHILS # BLD AUTO: 10.03 X10*3/UL (ref 1.2–7.7)
NEUTROPHILS NFR BLD AUTO: 67.9 %
NON HDL CHOLESTEROL: 169 MG/DL (ref 0–149)
NRBC BLD-RTO: 0 /100 WBCS (ref 0–0)
OXYHGB MFR BLDA: 95.1 % (ref 94–98)
OXYHGB MFR BLDA: 95.1 % (ref 94–98)
OXYHGB MFR BLDA: 96.7 % (ref 94–98)
PCO2 BLDA: 44 MM HG (ref 38–42)
PCO2 BLDA: 48 MM HG (ref 38–42)
PCO2 BLDA: 65 MM HG (ref 38–42)
PH BLDA: 7.22 PH (ref 7.38–7.42)
PH BLDA: 7.32 PH (ref 7.38–7.42)
PH BLDA: 7.37 PH (ref 7.38–7.42)
PLATELET # BLD AUTO: 495 X10*3/UL (ref 150–450)
PO2 BLDA: 105 MM HG (ref 85–95)
PO2 BLDA: 88 MM HG (ref 85–95)
PO2 BLDA: 93 MM HG (ref 85–95)
POTASSIUM BLDA-SCNC: 3.5 MMOL/L (ref 3.5–5.3)
POTASSIUM BLDA-SCNC: 3.6 MMOL/L (ref 3.5–5.3)
POTASSIUM BLDA-SCNC: 3.8 MMOL/L (ref 3.5–5.3)
POTASSIUM SERPL-SCNC: 4 MMOL/L (ref 3.5–5.3)
PRODUCT BLOOD TYPE: 6200
PRODUCT BLOOD TYPE: 8400
PRODUCT CODE: NORMAL
PROT SERPL-MCNC: 6.9 G/DL (ref 6.4–8.2)
PROTHROMBIN TIME: 42 SECONDS (ref 9.8–12.8)
PROTHROMBIN TIME: >100 SECONDS (ref 9.8–12.8)
RBC # BLD AUTO: 5.32 X10*6/UL (ref 4–5.2)
RBC MORPH BLD: NORMAL
RH FACTOR (ANTIGEN D): NORMAL
SAO2 % BLDA: 97 % (ref 94–100)
SAO2 % BLDA: 98 % (ref 94–100)
SAO2 % BLDA: 99 % (ref 94–100)
SODIUM BLDA-SCNC: 134 MMOL/L (ref 136–145)
SODIUM SERPL-SCNC: 136 MMOL/L (ref 136–145)
TRIGL SERPL-MCNC: 233 MG/DL (ref 0–149)
UNIT ABO: NORMAL
UNIT NUMBER: NORMAL
UNIT RH: NORMAL
UNIT VOLUME: 195
UNIT VOLUME: 301
UNIT VOLUME: 319
UNIT VOLUME: 334
UNIT VOLUME: 350
UNIT VOLUME: 350
VLDL: 47 MG/DL (ref 0–40)
WBC # BLD AUTO: 14.8 X10*3/UL (ref 4.4–11.3)
XM INTEP: NORMAL
XM INTEP: NORMAL

## 2023-12-06 PROCEDURE — 86850 RBC ANTIBODY SCREEN: CPT | Performed by: STUDENT IN AN ORGANIZED HEALTH CARE EDUCATION/TRAINING PROGRAM

## 2023-12-06 PROCEDURE — 86920 COMPATIBILITY TEST SPIN: CPT

## 2023-12-06 PROCEDURE — 85025 COMPLETE CBC W/AUTO DIFF WBC: CPT | Performed by: STUDENT IN AN ORGANIZED HEALTH CARE EDUCATION/TRAINING PROGRAM

## 2023-12-06 PROCEDURE — 2500000004 HC RX 250 GENERAL PHARMACY W/ HCPCS (ALT 636 FOR OP/ED): Performed by: SURGERY

## 2023-12-06 PROCEDURE — 85610 PROTHROMBIN TIME: CPT | Performed by: STUDENT IN AN ORGANIZED HEALTH CARE EDUCATION/TRAINING PROGRAM

## 2023-12-06 PROCEDURE — 83735 ASSAY OF MAGNESIUM: CPT | Performed by: STUDENT IN AN ORGANIZED HEALTH CARE EDUCATION/TRAINING PROGRAM

## 2023-12-06 PROCEDURE — 3700000002 HC GENERAL ANESTHESIA TIME - EACH INCREMENTAL 1 MINUTE: Performed by: SURGERY

## 2023-12-06 PROCEDURE — 2500000004 HC RX 250 GENERAL PHARMACY W/ HCPCS (ALT 636 FOR OP/ED)

## 2023-12-06 PROCEDURE — C1725 CATH, TRANSLUMIN NON-LASER: HCPCS | Performed by: SURGERY

## 2023-12-06 PROCEDURE — 36620 INSERTION CATHETER ARTERY: CPT | Performed by: STUDENT IN AN ORGANIZED HEALTH CARE EDUCATION/TRAINING PROGRAM

## 2023-12-06 PROCEDURE — 99140 ANES COMP EMERGENCY COND: CPT | Performed by: PAIN MEDICINE

## 2023-12-06 PROCEDURE — 2720000007 HC OR 272 NO HCPCS: Performed by: SURGERY

## 2023-12-06 PROCEDURE — 99291 CRITICAL CARE FIRST HOUR: CPT

## 2023-12-06 PROCEDURE — 04CL0ZZ EXTIRPATION OF MATTER FROM LEFT FEMORAL ARTERY, OPEN APPROACH: ICD-10-PCS | Performed by: SURGERY

## 2023-12-06 PROCEDURE — 82435 ASSAY OF BLOOD CHLORIDE: CPT | Performed by: STUDENT IN AN ORGANIZED HEALTH CARE EDUCATION/TRAINING PROGRAM

## 2023-12-06 PROCEDURE — C1769 GUIDE WIRE: HCPCS | Performed by: SURGERY

## 2023-12-06 PROCEDURE — 99222 1ST HOSP IP/OBS MODERATE 55: CPT | Performed by: STUDENT IN AN ORGANIZED HEALTH CARE EDUCATION/TRAINING PROGRAM

## 2023-12-06 PROCEDURE — 36415 COLL VENOUS BLD VENIPUNCTURE: CPT | Performed by: STUDENT IN AN ORGANIZED HEALTH CARE EDUCATION/TRAINING PROGRAM

## 2023-12-06 PROCEDURE — 1200000002 HC GENERAL ROOM WITH TELEMETRY DAILY

## 2023-12-06 PROCEDURE — 2500000001 HC RX 250 WO HCPCS SELF ADMINISTERED DRUGS (ALT 637 FOR MEDICARE OP): Performed by: STUDENT IN AN ORGANIZED HEALTH CARE EDUCATION/TRAINING PROGRAM

## 2023-12-06 PROCEDURE — 04UJ0JZ SUPPLEMENT LEFT EXTERNAL ILIAC ARTERY WITH SYNTHETIC SUBSTITUTE, OPEN APPROACH: ICD-10-PCS | Performed by: SURGERY

## 2023-12-06 PROCEDURE — 88304 TISSUE EXAM BY PATHOLOGIST: CPT | Performed by: PATHOLOGY

## 2023-12-06 PROCEDURE — 87075 CULTR BACTERIA EXCEPT BLOOD: CPT | Performed by: STUDENT IN AN ORGANIZED HEALTH CARE EDUCATION/TRAINING PROGRAM

## 2023-12-06 PROCEDURE — 83605 ASSAY OF LACTIC ACID: CPT | Performed by: STUDENT IN AN ORGANIZED HEALTH CARE EDUCATION/TRAINING PROGRAM

## 2023-12-06 PROCEDURE — P9017 PLASMA 1 DONOR FRZ W/IN 8 HR: HCPCS

## 2023-12-06 PROCEDURE — C1757 CATH, THROMBECTOMY/EMBOLECT: HCPCS | Performed by: SURGERY

## 2023-12-06 PROCEDURE — 3600000003 HC OR TIME - INITIAL BASE CHARGE - PROCEDURE LEVEL THREE: Performed by: SURGERY

## 2023-12-06 PROCEDURE — 36430 TRANSFUSION BLD/BLD COMPNT: CPT | Mod: GC | Performed by: STUDENT IN AN ORGANIZED HEALTH CARE EDUCATION/TRAINING PROGRAM

## 2023-12-06 PROCEDURE — 3700000001 HC GENERAL ANESTHESIA TIME - INITIAL BASE CHARGE: Performed by: SURGERY

## 2023-12-06 PROCEDURE — 88304 TISSUE EXAM BY PATHOLOGIST: CPT | Mod: TC,SUR | Performed by: SURGERY

## 2023-12-06 PROCEDURE — 86140 C-REACTIVE PROTEIN: CPT | Performed by: STUDENT IN AN ORGANIZED HEALTH CARE EDUCATION/TRAINING PROGRAM

## 2023-12-06 PROCEDURE — 2500000005 HC RX 250 GENERAL PHARMACY W/O HCPCS: Performed by: STUDENT IN AN ORGANIZED HEALTH CARE EDUCATION/TRAINING PROGRAM

## 2023-12-06 PROCEDURE — P9016 RBC LEUKOCYTES REDUCED: HCPCS

## 2023-12-06 PROCEDURE — 2780000003 HC OR 278 NO HCPCS: Performed by: SURGERY

## 2023-12-06 PROCEDURE — A4217 STERILE WATER/SALINE, 500 ML: HCPCS | Performed by: SURGERY

## 2023-12-06 PROCEDURE — 34201 REMOVAL OF ARTERY CLOT: CPT | Performed by: SURGERY

## 2023-12-06 PROCEDURE — 85730 THROMBOPLASTIN TIME PARTIAL: CPT | Performed by: STUDENT IN AN ORGANIZED HEALTH CARE EDUCATION/TRAINING PROGRAM

## 2023-12-06 PROCEDURE — A34201 PR REMV ART CLOT ILIAC-POP,LEG INCIS: Performed by: PAIN MEDICINE

## 2023-12-06 PROCEDURE — 80061 LIPID PANEL: CPT | Performed by: STUDENT IN AN ORGANIZED HEALTH CARE EDUCATION/TRAINING PROGRAM

## 2023-12-06 PROCEDURE — 80053 COMPREHEN METABOLIC PANEL: CPT | Performed by: STUDENT IN AN ORGANIZED HEALTH CARE EDUCATION/TRAINING PROGRAM

## 2023-12-06 PROCEDURE — 3600000008 HC OR TIME - EACH INCREMENTAL 1 MINUTE - PROCEDURE LEVEL THREE: Performed by: SURGERY

## 2023-12-06 PROCEDURE — 85610 PROTHROMBIN TIME: CPT

## 2023-12-06 PROCEDURE — 2500000004 HC RX 250 GENERAL PHARMACY W/ HCPCS (ALT 636 FOR OP/ED): Performed by: STUDENT IN AN ORGANIZED HEALTH CARE EDUCATION/TRAINING PROGRAM

## 2023-12-06 PROCEDURE — 83036 HEMOGLOBIN GLYCOSYLATED A1C: CPT | Performed by: STUDENT IN AN ORGANIZED HEALTH CARE EDUCATION/TRAINING PROGRAM

## 2023-12-06 PROCEDURE — C1762 CONN TISS, HUMAN(INC FASCIA): HCPCS | Performed by: SURGERY

## 2023-12-06 PROCEDURE — 04CJ0ZZ EXTIRPATION OF MATTER FROM LEFT EXTERNAL ILIAC ARTERY, OPEN APPROACH: ICD-10-PCS | Performed by: SURGERY

## 2023-12-06 DEVICE — XENOSURE BIOLOGIC PATCH, 0.8CM X 8CM, EIFU
Type: IMPLANTABLE DEVICE | Site: LEG | Status: FUNCTIONAL
Brand: XENOSURE BIOLOGIC PATCH

## 2023-12-06 RX ORDER — ASPIRIN 81 MG/1
81 TABLET ORAL DAILY
Status: DISCONTINUED | OUTPATIENT
Start: 2023-12-06 | End: 2023-12-15 | Stop reason: HOSPADM

## 2023-12-06 RX ORDER — METOPROLOL TARTRATE 25 MG/1
0.5 TABLET, FILM COATED ORAL 2 TIMES DAILY
COMMUNITY
Start: 2023-06-20 | End: 2024-06-20

## 2023-12-06 RX ORDER — ACETAMINOPHEN 325 MG/1
650 TABLET ORAL EVERY 8 HOURS PRN
Status: ON HOLD | COMMUNITY
Start: 2022-07-08 | End: 2023-12-06 | Stop reason: WASHOUT

## 2023-12-06 RX ORDER — GABAPENTIN 400 MG/1
1 CAPSULE ORAL 3 TIMES DAILY
COMMUNITY
Start: 2010-08-20 | End: 2024-06-20

## 2023-12-06 RX ORDER — LEVETIRACETAM 500 MG/1
500 TABLET ORAL 2 TIMES DAILY
Status: DISCONTINUED | OUTPATIENT
Start: 2023-12-06 | End: 2023-12-07

## 2023-12-06 RX ORDER — ACETAMINOPHEN 325 MG/1
650 TABLET ORAL EVERY 4 HOURS PRN
Status: DISCONTINUED | OUTPATIENT
Start: 2023-12-06 | End: 2023-12-07

## 2023-12-06 RX ORDER — DEXTROSE 50 % IN WATER (D50W) INTRAVENOUS SYRINGE
25
Status: DISCONTINUED | OUTPATIENT
Start: 2023-12-06 | End: 2023-12-15 | Stop reason: HOSPADM

## 2023-12-06 RX ORDER — OMEPRAZOLE 20 MG/1
20 TABLET, DELAYED RELEASE ORAL
COMMUNITY

## 2023-12-06 RX ORDER — HEPARIN SODIUM 10000 [USP'U]/100ML
0-4500 INJECTION, SOLUTION INTRAVENOUS CONTINUOUS
Status: DISCONTINUED | OUTPATIENT
Start: 2023-12-06 | End: 2023-12-06

## 2023-12-06 RX ORDER — WARFARIN 4 MG/1
4 TABLET ORAL
Status: ON HOLD | COMMUNITY
Start: 2023-06-15 | End: 2023-12-06

## 2023-12-06 RX ORDER — METFORMIN HYDROCHLORIDE 1000 MG/1
1 TABLET ORAL
Status: ON HOLD | COMMUNITY
End: 2023-12-06 | Stop reason: WASHOUT

## 2023-12-06 RX ORDER — HEPARIN SODIUM 10000 [USP'U]/100ML
0-4500 INJECTION, SOLUTION INTRAVENOUS CONTINUOUS
Status: DISCONTINUED | OUTPATIENT
Start: 2023-12-06 | End: 2023-12-07

## 2023-12-06 RX ORDER — ACETAMINOPHEN, DIPHENHYDRAMINE HCL, PHENYLEPHRINE HCL 325; 25; 5 MG/1; MG/1; MG/1
1 TABLET ORAL DAILY
COMMUNITY
Start: 2022-07-08

## 2023-12-06 RX ORDER — SODIUM CHLORIDE 0.9 G/100ML
IRRIGANT IRRIGATION AS NEEDED
Status: DISCONTINUED | OUTPATIENT
Start: 2023-12-06 | End: 2023-12-07 | Stop reason: HOSPADM

## 2023-12-06 RX ORDER — CEFAZOLIN 1 G/1
INJECTION, POWDER, FOR SOLUTION INTRAVENOUS AS NEEDED
Status: DISCONTINUED | OUTPATIENT
Start: 2023-12-06 | End: 2023-12-07

## 2023-12-06 RX ORDER — ATORVASTATIN CALCIUM 80 MG/1
1 TABLET, FILM COATED ORAL NIGHTLY
COMMUNITY
Start: 2019-07-01 | End: 2024-06-20

## 2023-12-06 RX ORDER — SODIUM CHLORIDE, SODIUM LACTATE, POTASSIUM CHLORIDE, CALCIUM CHLORIDE 600; 310; 30; 20 MG/100ML; MG/100ML; MG/100ML; MG/100ML
INJECTION, SOLUTION INTRAVENOUS CONTINUOUS PRN
Status: DISCONTINUED | OUTPATIENT
Start: 2023-12-06 | End: 2023-12-07

## 2023-12-06 RX ORDER — ACETAMINOPHEN 500 MG
10 TABLET ORAL DAILY
Status: DISCONTINUED | OUTPATIENT
Start: 2023-12-06 | End: 2023-12-07

## 2023-12-06 RX ORDER — DULOXETIN HYDROCHLORIDE 60 MG/1
60 CAPSULE, DELAYED RELEASE ORAL DAILY
Status: DISCONTINUED | OUTPATIENT
Start: 2023-12-06 | End: 2023-12-15 | Stop reason: HOSPADM

## 2023-12-06 RX ORDER — PROPOFOL 10 MG/ML
INJECTION, EMULSION INTRAVENOUS AS NEEDED
Status: DISCONTINUED | OUTPATIENT
Start: 2023-12-06 | End: 2023-12-07

## 2023-12-06 RX ORDER — FENTANYL CITRATE 50 UG/ML
INJECTION, SOLUTION INTRAMUSCULAR; INTRAVENOUS AS NEEDED
Status: DISCONTINUED | OUTPATIENT
Start: 2023-12-06 | End: 2023-12-07

## 2023-12-06 RX ORDER — ROCURONIUM BROMIDE 10 MG/ML
INJECTION, SOLUTION INTRAVENOUS AS NEEDED
Status: DISCONTINUED | OUTPATIENT
Start: 2023-12-06 | End: 2023-12-07

## 2023-12-06 RX ORDER — LEVETIRACETAM 500 MG/1
500 TABLET ORAL 2 TIMES DAILY
COMMUNITY
Start: 2019-07-01 | End: 2024-06-20

## 2023-12-06 RX ORDER — DIVALPROEX SODIUM 500 MG/1
1000 TABLET, DELAYED RELEASE ORAL EVERY EVENING
Status: DISCONTINUED | OUTPATIENT
Start: 2023-12-06 | End: 2023-12-15 | Stop reason: HOSPADM

## 2023-12-06 RX ORDER — LOPERAMIDE HYDROCHLORIDE 2 MG/1
4 CAPSULE ORAL
COMMUNITY
Start: 2023-09-26 | End: 2024-09-26

## 2023-12-06 RX ORDER — SENNOSIDES 8.6 MG/1
2 TABLET ORAL 2 TIMES DAILY
Status: DISCONTINUED | OUTPATIENT
Start: 2023-12-06 | End: 2023-12-07

## 2023-12-06 RX ORDER — WARFARIN 3 MG/1
3 TABLET ORAL
Status: ON HOLD | COMMUNITY
Start: 2023-06-15 | End: 2023-12-06

## 2023-12-06 RX ORDER — PHENYLEPHRINE 10 MG/250 ML(40 MCG/ML)IN 0.9 % SOD.CHLORIDE INTRAVENOUS
CONTINUOUS PRN
Status: DISCONTINUED | OUTPATIENT
Start: 2023-12-06 | End: 2023-12-07

## 2023-12-06 RX ORDER — DIVALPROEX SODIUM 500 MG/1
500 TABLET, FILM COATED, EXTENDED RELEASE ORAL
Status: ON HOLD | COMMUNITY
Start: 2023-06-20 | End: 2023-12-06 | Stop reason: WASHOUT

## 2023-12-06 RX ORDER — PANTOPRAZOLE SODIUM 20 MG/1
20 TABLET, DELAYED RELEASE ORAL
Status: DISCONTINUED | OUTPATIENT
Start: 2023-12-07 | End: 2023-12-07

## 2023-12-06 RX ORDER — DEXTROSE MONOHYDRATE 100 MG/ML
0.3 INJECTION, SOLUTION INTRAVENOUS ONCE AS NEEDED
Status: DISCONTINUED | OUTPATIENT
Start: 2023-12-06 | End: 2023-12-15 | Stop reason: HOSPADM

## 2023-12-06 RX ORDER — DIVALPROEX SODIUM 500 MG/1
1 TABLET, DELAYED RELEASE ORAL
COMMUNITY
Start: 2010-08-20

## 2023-12-06 RX ORDER — LIDOCAINE 50 MG/G
1 PATCH TOPICAL DAILY
Status: DISCONTINUED | OUTPATIENT
Start: 2023-12-06 | End: 2023-12-06 | Stop reason: CLARIF

## 2023-12-06 RX ORDER — ALBUTEROL SULFATE 90 UG/1
2 AEROSOL, METERED RESPIRATORY (INHALATION) EVERY 6 HOURS PRN
COMMUNITY
Start: 2022-05-10 | End: 2024-06-20

## 2023-12-06 RX ORDER — VASOPRESSIN 20 U/ML
INJECTION PARENTERAL AS NEEDED
Status: DISCONTINUED | OUTPATIENT
Start: 2023-12-06 | End: 2023-12-07

## 2023-12-06 RX ORDER — CLOPIDOGREL BISULFATE 75 MG/1
75 TABLET ORAL DAILY
COMMUNITY
Start: 2023-06-20 | End: 2024-06-20

## 2023-12-06 RX ORDER — ATORVASTATIN CALCIUM 80 MG/1
80 TABLET, FILM COATED ORAL NIGHTLY
Status: DISCONTINUED | OUTPATIENT
Start: 2023-12-06 | End: 2023-12-15 | Stop reason: HOSPADM

## 2023-12-06 RX ORDER — METOPROLOL TARTRATE 25 MG/1
12.5 TABLET, FILM COATED ORAL 2 TIMES DAILY
Status: DISCONTINUED | OUTPATIENT
Start: 2023-12-06 | End: 2023-12-07

## 2023-12-06 RX ORDER — ALBUTEROL SULFATE 90 UG/1
2 AEROSOL, METERED RESPIRATORY (INHALATION) EVERY 6 HOURS PRN
Status: DISCONTINUED | OUTPATIENT
Start: 2023-12-06 | End: 2023-12-15 | Stop reason: HOSPADM

## 2023-12-06 RX ORDER — LEVOTHYROXINE SODIUM 75 UG/1
75 CAPSULE ORAL
COMMUNITY
Start: 2022-03-07

## 2023-12-06 RX ORDER — ACETAMINOPHEN 650 MG/1
650 SUPPOSITORY RECTAL EVERY 4 HOURS PRN
Status: DISCONTINUED | OUTPATIENT
Start: 2023-12-06 | End: 2023-12-07

## 2023-12-06 RX ORDER — DIVALPROEX SODIUM 500 MG/1
500 TABLET, DELAYED RELEASE ORAL EVERY MORNING
Status: DISCONTINUED | OUTPATIENT
Start: 2023-12-07 | End: 2023-12-15 | Stop reason: HOSPADM

## 2023-12-06 RX ORDER — LEVOTHYROXINE SODIUM 75 UG/1
75 TABLET ORAL DAILY
Status: DISCONTINUED | OUTPATIENT
Start: 2023-12-06 | End: 2023-12-15 | Stop reason: HOSPADM

## 2023-12-06 RX ORDER — DULOXETIN HYDROCHLORIDE 60 MG/1
1 CAPSULE, DELAYED RELEASE ORAL DAILY
COMMUNITY
Start: 2022-03-07 | End: 2024-06-20

## 2023-12-06 RX ORDER — VASOPRESSIN 20 U/ML
INJECTION PARENTERAL CONTINUOUS PRN
Status: DISCONTINUED | OUTPATIENT
Start: 2023-12-06 | End: 2023-12-06

## 2023-12-06 RX ORDER — BUSPIRONE HYDROCHLORIDE 10 MG/1
10 TABLET ORAL DAILY
Status: DISCONTINUED | OUTPATIENT
Start: 2023-12-06 | End: 2023-12-15 | Stop reason: HOSPADM

## 2023-12-06 RX ORDER — LIDOCAINE 560 MG/1
1 PATCH PERCUTANEOUS; TOPICAL; TRANSDERMAL DAILY
Status: DISCONTINUED | OUTPATIENT
Start: 2023-12-07 | End: 2023-12-07

## 2023-12-06 RX ORDER — LIDOCAINE HCL/PF 100 MG/5ML
SYRINGE (ML) INTRAVENOUS AS NEEDED
Status: DISCONTINUED | OUTPATIENT
Start: 2023-12-06 | End: 2023-12-07

## 2023-12-06 RX ORDER — HEPARIN SODIUM 5000 [USP'U]/ML
3000-6000 INJECTION, SOLUTION INTRAVENOUS; SUBCUTANEOUS EVERY 4 HOURS PRN
Status: DISCONTINUED | OUTPATIENT
Start: 2023-12-06 | End: 2023-12-07

## 2023-12-06 RX ORDER — ACETAMINOPHEN 160 MG/5ML
650 SOLUTION ORAL EVERY 4 HOURS PRN
Status: DISCONTINUED | OUTPATIENT
Start: 2023-12-06 | End: 2023-12-07

## 2023-12-06 RX ORDER — BUSPIRONE HYDROCHLORIDE 10 MG/1
10 TABLET ORAL
COMMUNITY

## 2023-12-06 RX ORDER — ASPIRIN 81 MG/1
1 TABLET ORAL DAILY
COMMUNITY
Start: 2022-03-07 | End: 2024-03-10

## 2023-12-06 RX ORDER — CLOPIDOGREL BISULFATE 75 MG/1
75 TABLET ORAL DAILY
Status: DISCONTINUED | OUTPATIENT
Start: 2023-12-06 | End: 2023-12-15 | Stop reason: HOSPADM

## 2023-12-06 RX ORDER — LIDOCAINE 50 MG/G
1 PATCH TOPICAL DAILY
COMMUNITY
Start: 2022-05-10 | End: 2024-06-20

## 2023-12-06 RX ORDER — OXYCODONE HYDROCHLORIDE 5 MG/1
10 TABLET ORAL ONCE
Status: COMPLETED | OUTPATIENT
Start: 2023-12-06 | End: 2023-12-06

## 2023-12-06 RX ORDER — OXYCODONE HYDROCHLORIDE 5 MG/1
5 TABLET ORAL EVERY 6 HOURS PRN
Status: DISCONTINUED | OUTPATIENT
Start: 2023-12-06 | End: 2023-12-07

## 2023-12-06 RX ORDER — INSULIN LISPRO 100 [IU]/ML
0-5 INJECTION, SOLUTION INTRAVENOUS; SUBCUTANEOUS
Status: DISCONTINUED | OUTPATIENT
Start: 2023-12-06 | End: 2023-12-07

## 2023-12-06 RX ORDER — PROPOFOL 10 MG/ML
INJECTION, EMULSION INTRAVENOUS CONTINUOUS PRN
Status: DISCONTINUED | OUTPATIENT
Start: 2023-12-06 | End: 2023-12-07

## 2023-12-06 RX ORDER — METHOCARBAMOL 500 MG/1
1 TABLET, FILM COATED ORAL 3 TIMES DAILY
COMMUNITY
Start: 2022-03-07 | End: 2024-06-20

## 2023-12-06 RX ADMIN — VASOPRESSIN 1 UNITS: 20 INJECTION INTRAVENOUS at 22:16

## 2023-12-06 RX ADMIN — SODIUM CHLORIDE, POTASSIUM CHLORIDE, SODIUM LACTATE AND CALCIUM CHLORIDE: 600; 310; 30; 20 INJECTION, SOLUTION INTRAVENOUS at 20:34

## 2023-12-06 RX ADMIN — VASOPRESSIN 1 UNITS: 20 INJECTION INTRAVENOUS at 22:45

## 2023-12-06 RX ADMIN — CEFAZOLIN 3 G: 1 INJECTION, POWDER, FOR SOLUTION INTRAMUSCULAR; INTRAVENOUS at 21:00

## 2023-12-06 RX ADMIN — PROPOFOL 160 MG: 10 INJECTION, EMULSION INTRAVENOUS at 20:18

## 2023-12-06 RX ADMIN — OXYCODONE HYDROCHLORIDE 10 MG: 5 TABLET ORAL at 17:03

## 2023-12-06 RX ADMIN — PROPOFOL 30 MCG/KG/MIN: 10 INJECTION, EMULSION INTRAVENOUS at 23:42

## 2023-12-06 RX ADMIN — LIDOCAINE HYDROCHLORIDE 80 MG: 20 INJECTION INTRAVENOUS at 20:18

## 2023-12-06 RX ADMIN — FENTANYL CITRATE 50 MCG: 50 INJECTION, SOLUTION INTRAMUSCULAR; INTRAVENOUS at 20:18

## 2023-12-06 RX ADMIN — Medication 1 MCG/KG/MIN: at 21:50

## 2023-12-06 RX ADMIN — FENTANYL CITRATE 50 MCG: 50 INJECTION, SOLUTION INTRAMUSCULAR; INTRAVENOUS at 21:39

## 2023-12-06 RX ADMIN — PHYTONADIONE 15 MG: 10 INJECTION, EMULSION INTRAMUSCULAR; INTRAVENOUS; SUBCUTANEOUS at 21:22

## 2023-12-06 RX ADMIN — ROCURONIUM BROMIDE 50 MG: 10 INJECTION INTRAVENOUS at 20:18

## 2023-12-06 RX ADMIN — ROCURONIUM BROMIDE 30 MG: 10 INJECTION INTRAVENOUS at 21:15

## 2023-12-06 RX ADMIN — VASOPRESSIN 1 UNITS: 20 INJECTION INTRAVENOUS at 23:27

## 2023-12-06 RX ADMIN — ROCURONIUM BROMIDE 20 MG: 10 INJECTION INTRAVENOUS at 22:51

## 2023-12-06 RX ADMIN — ROCURONIUM BROMIDE 20 MG: 10 INJECTION INTRAVENOUS at 21:30

## 2023-12-06 RX ADMIN — PROPOFOL 40 MG: 10 INJECTION, EMULSION INTRAVENOUS at 23:33

## 2023-12-06 SDOH — SOCIAL STABILITY: SOCIAL INSECURITY: DOES ANYONE TRY TO KEEP YOU FROM HAVING/CONTACTING OTHER FRIENDS OR DOING THINGS OUTSIDE YOUR HOME?: NO

## 2023-12-06 SDOH — SOCIAL STABILITY: SOCIAL INSECURITY: ABUSE: ADULT

## 2023-12-06 SDOH — SOCIAL STABILITY: SOCIAL INSECURITY: WERE YOU ABLE TO COMPLETE ALL THE BEHAVIORAL HEALTH SCREENINGS?: YES

## 2023-12-06 SDOH — SOCIAL STABILITY: SOCIAL INSECURITY: DO YOU FEEL ANYONE HAS EXPLOITED OR TAKEN ADVANTAGE OF YOU FINANCIALLY OR OF YOUR PERSONAL PROPERTY?: NO

## 2023-12-06 SDOH — SOCIAL STABILITY: SOCIAL INSECURITY: HAS ANYONE EVER THREATENED TO HURT YOUR FAMILY OR YOUR PETS?: NO

## 2023-12-06 SDOH — SOCIAL STABILITY: SOCIAL INSECURITY: HAVE YOU HAD THOUGHTS OF HARMING ANYONE ELSE?: NO

## 2023-12-06 SDOH — SOCIAL STABILITY: SOCIAL INSECURITY: ARE YOU OR HAVE YOU BEEN THREATENED OR ABUSED PHYSICALLY, EMOTIONALLY, OR SEXUALLY BY ANYONE?: YES

## 2023-12-06 SDOH — SOCIAL STABILITY: SOCIAL INSECURITY: DO YOU FEEL UNSAFE GOING BACK TO THE PLACE WHERE YOU ARE LIVING?: NO

## 2023-12-06 SDOH — SOCIAL STABILITY: SOCIAL INSECURITY: ARE THERE ANY APPARENT SIGNS OF INJURIES/BEHAVIORS THAT COULD BE RELATED TO ABUSE/NEGLECT?: NO

## 2023-12-06 ASSESSMENT — COGNITIVE AND FUNCTIONAL STATUS - GENERAL
STANDING UP FROM CHAIR USING ARMS: A LITTLE
WALKING IN HOSPITAL ROOM: TOTAL
DAILY ACTIVITIY SCORE: 24
MOBILITY SCORE: 16
CLIMB 3 TO 5 STEPS WITH RAILING: TOTAL
PATIENT BASELINE BEDBOUND: NO
MOVING TO AND FROM BED TO CHAIR: A LITTLE

## 2023-12-06 ASSESSMENT — LIFESTYLE VARIABLES
AUDIT-C TOTAL SCORE: 2
PRESCIPTION_ABUSE_PAST_12_MONTHS: NO
HOW OFTEN DO YOU HAVE A DRINK CONTAINING ALCOHOL: 2-4 TIMES A MONTH
AUDIT-C TOTAL SCORE: 2
HOW OFTEN DO YOU HAVE 6 OR MORE DRINKS ON ONE OCCASION: NEVER
HOW MANY STANDARD DRINKS CONTAINING ALCOHOL DO YOU HAVE ON A TYPICAL DAY: 1 OR 2
SUBSTANCE_ABUSE_PAST_12_MONTHS: NO
SKIP TO QUESTIONS 9-10: 1

## 2023-12-06 ASSESSMENT — ACTIVITIES OF DAILY LIVING (ADL)
TOILETING: INDEPENDENT
LACK_OF_TRANSPORTATION: NO
WALKS IN HOME: NEEDS ASSISTANCE
JUDGMENT_ADEQUATE_SAFELY_COMPLETE_DAILY_ACTIVITIES: YES
BATHING: INDEPENDENT
ADEQUATE_TO_COMPLETE_ADL: YES
DRESSING YOURSELF: INDEPENDENT
PATIENT'S MEMORY ADEQUATE TO SAFELY COMPLETE DAILY ACTIVITIES?: YES
FEEDING YOURSELF: INDEPENDENT
GROOMING: INDEPENDENT
HEARING - RIGHT EAR: FUNCTIONAL
HEARING - LEFT EAR: FUNCTIONAL
ASSISTIVE_DEVICE: WHEELCHAIR

## 2023-12-06 ASSESSMENT — COLUMBIA-SUICIDE SEVERITY RATING SCALE - C-SSRS
1. IN THE PAST MONTH, HAVE YOU WISHED YOU WERE DEAD OR WISHED YOU COULD GO TO SLEEP AND NOT WAKE UP?: NO
6. HAVE YOU EVER DONE ANYTHING, STARTED TO DO ANYTHING, OR PREPARED TO DO ANYTHING TO END YOUR LIFE?: NO
2. HAVE YOU ACTUALLY HAD ANY THOUGHTS OF KILLING YOURSELF?: NO

## 2023-12-06 ASSESSMENT — PATIENT HEALTH QUESTIONNAIRE - PHQ9
SUM OF ALL RESPONSES TO PHQ9 QUESTIONS 1 & 2: 0
2. FEELING DOWN, DEPRESSED OR HOPELESS: NOT AT ALL
1. LITTLE INTEREST OR PLEASURE IN DOING THINGS: NOT AT ALL

## 2023-12-06 NOTE — H&P
History Of Present Illness  Shirin Slater is a 53 y.o. female w/ PMHX of T2DM (A1c 8.5% Sep/2023) c/b diabetic neuropathy, HTN, HLD, PVD w/ R AKA, L 1/2/5 digit amputation, renal and splenic thrombosis (on Coumadin, aspirin and clopidogrel), epilepsy (in remission > 15 years), diverticulitis s/p colectomy and PTSD who is a transfer from OSH d/t critical limb ischemia for vascular surgery evaluation. Admitted under medicine with vascular surgery following.    Patient endorses that she started to have L leg pain around a week ago, that progressively got worse over the course of the week. Initially shoe noticed that she would have pain mostly when her leg was not elevated for a prolonged period of time and that it improved when she elevated the leg, but over the course of a few days the pain became more persistent despite leg elevation. She noticed that in addition to the pain, her leg would get pale and cyanotic in addition to cold to touch. Similar to the pain, those symptoms would improve initially with leg elevation, but it got to a point in which the elevation would not bring relief, which prompted her to present to [facility]. She was started on a heparin gtt and ultimately underwent a CTA w/ runoffs, which showed 50% stenosis of abdominal aorta, complete occlusion of R iliac stent, complete occlusion of L common femoral, superficial femoral and popliteal arteries w/ stents on the L iliac and popliteal arteries noted.     Given significant elevated INR, heparin was stopped and ultimately patient was transferred to [facility] on 12/6 with vascular and medicine on board. On arrival patient endorses significant pain on her LLE, minimally improved since admission at OSH. Endorses diminished sensation for a few days, although some still present. Denies fever, chills, SOB, N/V, constipation or diarrhea, discharge. Denies anesthesia but do endorses paresthesia on the L feet.    On further questioning, patient  admits that she was not very compliant with her medications recently, going several days without taking them due to bad memory and overall feeling of depression. She hinted that oftentimes she would “catch-up” and take a few of the medications that were late. During the end of our interview she cried endorsing that she knows what's coming and she did that to herlself.    REVIEW OF SYSTEMS:    Negative otherwise mentioned above    Upon arrival to the floor:   - Vitals:   T 36.5, HR 89, /84, RR 16, SpO2 98% on RA  - Labs (prior to transfer to ):   CBC: WBC 1.4, Hgb 13.7, plt 570   BMP: Na 135, K 4.4, Cl 101, HCO3 21, BUN 10, Cr 0.7, glu 272   LFT: Ca 9.1, tprot 7.4, alb 3.5, alkphos 111, AST 8, ALT 14, tbili 0.2   Heme: PT > 90, INR > 8, aPTT 98.9`     - Imaging:      CTA w/ runoffs (uploaded into PACS)     Past Medical History  Past Medical History:   Diagnosis Date    Anxiety     Arthritis     Cancer (CMS/HCC)     Cellulitis     Diabetes mellitus (CMS/HCC)     Disease of thyroid gland     Diverticulitis     Epilepsy, unspecified, not intractable, without status epilepticus (CMS/HCC)     Epilepsy    HLD (hyperlipidemia)     Hx of blood clots     Hypertension     PAD (peripheral artery disease) (CMS/HCC)     Peripheral vascular disease, unspecified (CMS/HCC) 2022    PAD (peripheral artery disease)    PTSD (post-traumatic stress disorder)     Uterine cancer (CMS/HCC)        Surgical History  Past Surgical History:   Procedure Laterality Date    AMPUTATION FOOT / TOE Left     AMPUTATION FOOT / TOE Left     ANTERIOR CRUCIATE LIGAMENT REPAIR Left      SECTION, CLASSIC      COLON SURGERY  2019    Ressection    CT AORTA AND BILATERAL ILIOFEMORAL RUNOFF ANGIOGRAM W AND/OR WO IV CONTRAST  2021    CT AORTA AND BILATERAL ILIOFEMORAL RUNOFF ANGIOGRAM W AND/OR WO IV CONTRAST 2021 [facility] INPATIENT LEGACY    CT AORTA AND BILATERAL ILIOFEMORAL RUNOFF ANGIOGRAM W AND/OR WO IV CONTRAST   06/30/2022    CT AORTA AND BILATERAL ILIOFEMORAL RUNOFF ANGIOGRAM W AND/OR WO IV CONTRAST 6/30/2022 [facility] CLINICAL LEGACY    HYSTERECTOMY  2012    IR ANGIOGRAM AORTA ABDOMEN  11/16/2021    IR ANGIOGRAM AORTA ABDOMEN 11/16/2021 [facility] INPATIENT LEGACY    LEG AMPUTATION Right 2021    OTHER SURGICAL HISTORY  03/17/2021    Arterial angioplasty of lower extremity    TONSILECTOMY, ADENOIDECTOMY, BILATERAL MYRINGOTOMY AND TUBES  1971    VASCULAR SURGERY Right 2019    Feet w/ ICU stay    VASCULAR SURGERY Right 2020    VASCULAR SURGERY Right 2020    Knee        Social History  She reports that she has been smoking cigarettes. She started smoking about 42 years ago. She has a 40.00 pack-year smoking history. She has never used smokeless tobacco. She reports current alcohol use. She reports current drug use. Drug: Marijuana.    Family History  Family History   Problem Relation Name Age of Onset    Diverticulitis Daughter      Heart failure Other          Several family members without direct blood connection        Allergies  Aspartame and Nsaids (non-steroidal anti-inflammatory drug)    Review of Systems   All other systems reviewed and are negative.       Physical Exam  Constitutional:       Appearance: Normal appearance.   HENT:      Head: Normocephalic and atraumatic.   Eyes:      Extraocular Movements: Extraocular movements intact.      Pupils: Pupils are equal, round, and reactive to light.   Cardiovascular:      Rate and Rhythm: Normal rate and regular rhythm.      Pulses: Normal pulses.      Heart sounds: Normal heart sounds.   Pulmonary:      Effort: Pulmonary effort is normal.      Breath sounds: Normal breath sounds.   Abdominal:      General: Abdomen is flat.      Palpations: Abdomen is soft.   Skin:     Capillary Refill: Capillary refill takes more than 3 seconds.      Coloration: Skin is pale.      Comments: Cold LLE w/o palpable pulses. Capillary refill ~ 4-5 secs. Sensation intact on examination. No discharges  "noted. Small black spot on L hill   Neurological:      Mental Status: She is alert.          Last Recorded Vitals  Blood pressure 123/84, pulse 89, temperature 36.5 °C (97.7 °F), resp. rate 16, height 1.7 m (5' 6.93\"), weight 99.8 kg (220 lb), SpO2 98 %.    Relevant Results  Scheduled medications  aspirin, 81 mg, oral, Daily  atorvastatin, 80 mg, oral, Nightly  busPIRone, 10 mg, oral, Daily  clopidogrel, 75 mg, oral, Daily  divalproex, 1,000 mg, oral, q PM  [START ON 12/7/2023] divalproex, 500 mg, oral, q AM  DULoxetine, 60 mg, oral, Daily  gabapentin, 400 mg, oral, TID  insulin lispro, 0-5 Units, subcutaneous, TID with meals  levETIRAcetam, 500 mg, oral, BID  levothyroxine, 75 mcg, oral, Daily  lidocaine, 1 patch, transdermal, Daily  melatonin, 10 mg, oral, Daily  metoprolol tartrate, 12.5 mg, oral, BID  [START ON 12/7/2023] pantoprazole, 20 mg, oral, Daily before breakfast  sennosides, 2 tablet, oral, BID      Continuous medications  heparin, 0-4,500 Units/hr      PRN medications  PRN medications: acetaminophen **OR** acetaminophen **OR** acetaminophen, albuterol, dextrose 10 % in water (D10W), dextrose, glucagon, heparin, oxyCODONE    LABS and IMAGES:    - Reviewed from OSH. Re-ordered from      ASSESSMENT AND PLAN:    This is a 54 y/o F w/ PMHX of T2DM (A1c 8.5% Sep/2023) c/b diabetic neuropathy, HTN, HLD, PVD w/ R AKA, L 1/2/5 digit amputation, renal and splenic thrombosis (on Coumadin, aspirin and clopidogrel), epilepsy (in remission > 15 years), diverticulitis s/p colectomy and PTSD who is a transfer from OSH d/t critical limb ischemia for vascular surgery evaluation. Admitted under medicine with vascular surgery following    #Critical limb ischemia  #Significant PAD Hx w/ prior endarterectomies and stents  :: c/b significant infection leading to R AKA and further hip disarticulation  :: started on heparin gtt at OSH, but discontinued given elevated INR  - c/w asa and clopidogrel  - will plan to start " heparin gtt once INR < 2.5 (ordered on the system with that condition entered)  - vascular surgery consulted. Appreciate the care   - will have patient NPO pending vascular surgery evaluation    #Significant leukocytosis  :: WBC noted at around 18K at OSH, without any other signs of infections  - can be explained by critical illness and thrombosis, since patient didn't endorse any other infectious symptoms during exam  - will order Bcx2 and repeat labs, but hold on Abx for now  - low threshold for Abx initiation if any other findings/symptoms arise    #Hx of splenic thrombosis  - AC per above    #Acute on chronic pain  :: follows w/ Dr. Elkins from [facility] care  - will give oxy 5mg q6 PRN and acetaminophen  - will plan for a courtesy call in the AM    #PTSD  #Anxiety  #Depression  - c/w home buspirone and duloxetine    #Epilepsy  :: prolonged remission > 15 years  :: endorses that aspartame can predispose her to seizures  :: on home divalproex 500mg AM and 1000mg PM and keppra  - c/w home meds  - added aspartame to allergy list     #HTN  #HLD  :: on home atorvastatin 80mg, metoprolol 12.5mg BID  - c/w home meds    #T2DM (A1c 8.5% Sep/2023)  :: c/b peripheral neuropathy  :: on home empagliflozin 10mg  - hold SGLT-2i given risk of euglycemic DKA during surgery  - SSI while inpatient. Will adjust as necessary  - c/w home gabapentin for neuropathy    F: PRN  E: PRN  N: Regular (can't tolerate diabetic diet d/t aspartame. NPO @ [facility]  A: PIV  DVT: Heparin gtt once INR < 2  GI: Home PPI  Surrogate Decision Maker: Latasha (aunt) [phone]  Code Status: DNAR (confirmed on admission). Ok with reverting code status for procedures if offered    Bryan Bob MD

## 2023-12-06 NOTE — ANESTHESIA PREPROCEDURE EVALUATION
Patient: Shirin Slater    Procedure Information       Date/Time: 12/06/23 1840    Procedure: Embolectomy Lower Extremity (Left) - Need fluoroscopy, will attempt percutaneously first, and open if unsuccessful    Location: [facility] OR 27 / [facility]    Surgeons: Ara Jesus MD        Shirin Slater is a 53 y.o. female w/ PMHX of T2DM (A1c 8.5% Sep/2023) c/b diabetic neuropathy, HTN, HLD, PVD w/ R AKA, L 1/2/5 digit amputation, renal and splenic thrombosis (on Coumadin, aspirin and clopidogrel), epilepsy (in remission > 15 years), diverticulitis s/p colectomy and PTSD who is a transfer from [facility] d/t critical limb ischemia for vascular surgery evaluation. Admitted under medicine with vascular surgery following.       Relevant Problems   Cardiovascular  Echo 2022 with normal EF (60-65)  PAD. Multiple surgeries.    (+) HTN (hypertension)   (+) Limb ischemia      Endocrine   (+) Diabetes mellitus, type 2 (CMS/HCC)   (+) Hypothyroidism      GI (within normal limits)      /Renal  Cr 0.57 12/6/2023      Neuro/Psych  Seizures in remission for 15 years per H and P   (+) Peripheral neuropathy      Pulmonary  40 pack year smoker   (+) ELMIRA (obstructive sleep apnea)      GI/Hepatic (within normal limits)       Clinical information reviewed:   Tobacco  Allergies  Meds   Med Hx  Surg Hx   Fam Hx  Soc Hx            Past Medical History:   Diagnosis Date    Anxiety     Arthritis     Cancer (CMS/HCC)     Cellulitis     Diabetes mellitus (CMS/HCC)     Disease of thyroid gland     Diverticulitis     Epilepsy, unspecified, not intractable, without status epilepticus (CMS/HCC)     Epilepsy    HLD (hyperlipidemia)     Hx of blood clots     Hypertension     PAD (peripheral artery disease) (CMS/HCC)     Peripheral vascular disease, unspecified (CMS/HCC) 09/14/2022    PAD (peripheral artery disease)    PTSD (post-traumatic stress disorder)     Uterine cancer (CMS/HCC)       Past Surgical History:   Procedure  Laterality Date    AMPUTATION FOOT / TOE Left     AMPUTATION FOOT / TOE Left     ANTERIOR CRUCIATE LIGAMENT REPAIR Left 2009     SECTION, CLASSIC      COLON SURGERY  2019    Ressection    CT AORTA AND BILATERAL ILIOFEMORAL RUNOFF ANGIOGRAM W AND/OR WO IV CONTRAST  2021    CT AORTA AND BILATERAL ILIOFEMORAL RUNOFF ANGIOGRAM W AND/OR WO IV CONTRAST 2021 [facility]PATIENT LEGACY    CT AORTA AND BILATERAL ILIOFEMORAL RUNOFF ANGIOGRAM W AND/OR WO IV CONTRAST  2022    CT AORTA AND BILATERAL ILIOFEMORAL RUNOFF ANGIOGRAM W AND/OR WO IV CONTRAST 2022 [facility] CLINICAL LEGACY    HYSTERECTOMY      IR ANGIOGRAM AORTA ABDOMEN  2021    IR ANGIOGRAM AORTA ABDOMEN 2021 [facility] INPATIENT LEGACY    LEG AMPUTATION Right     OTHER SURGICAL HISTORY  2021    Arterial angioplasty of lower extremity    TONSILECTOMY, ADENOIDECTOMY, BILATERAL MYRINGOTOMY AND TUBES  1971    VASCULAR SURGERY Right 2019    Feet w/ ICU stay    VASCULAR SURGERY Right 2020    VASCULAR SURGERY Right 2020    Knee     Social History     Tobacco Use    Smoking status: Every Day     Packs/day: 1.00     Years: 40.00     Additional pack years: 0.00     Total pack years: 40.00     Types: Cigarettes     Start date:     Smokeless tobacco: Never    Tobacco comments:     Currest smoker at around 0.5PPD. Peak at 1.5-2PPD    Substance Use Topics    Alcohol use: Yes     Comment: Rare    Drug use: Yes     Types: Marijuana      Current Outpatient Medications   Medication Instructions    albuterol 90 mcg/actuation inhaler 2 puffs, inhalation, Every 6 hours PRN    aspirin 81 mg EC tablet 1 tablet, oral, Daily    atorvastatin (Lipitor) 80 mg tablet 1 tablet, oral, Nightly    busPIRone (BUSPAR) 10 mg, oral, In the morning    clopidogrel (PLAVIX) 75 mg, oral, Daily    divalproex (Depakote) 500 mg EC tablet 1 tablet, oral, In the morning, and 2 tablets in the evening    DULoxetine (Cymbalta) 60 mg DR capsule 1 capsule, oral, Daily     empagliflozin (JARDIANCE) 10 mg, oral, Daily    gabapentin (Neurontin) 400 mg capsule 1 capsule, oral, 3 times daily    levETIRAcetam (KEPPRA) 500 mg, oral, 2 times daily    levothyroxine (TIROSINT) 75 mcg, oral, Daily before breakfast, On empty stomach    lidocaine (Lidoderm) 5 % patch 1 patch, transdermal, Daily, 12 hours on, 12 hours off.    loperamide (IMODIUM) 4 mg, oral, As needed, Don't exceed 4 tablets per day    melatonin 10 mg tablet 1 tablet, oral, Daily    methocarbamol (Robaxin) 500 mg tablet 1 tablet, oral, 3 times daily    metoprolol tartrate (Lopressor) 25 mg tablet 0.5 tablets, oral, 2 times daily    omeprazole OTC (PRILOSEC OTC) 20 mg, oral, Daily before breakfast      Allergies   Allergen Reactions    Aspartame Seizure    Nsaids (Non-Steroidal Anti-Inflammatory Drug) Other     Significant kidney injury (per patient report)        Chemistry    Lab Results   Component Value Date/Time     07/07/2022 0640    K 4.3 07/07/2022 0640     07/07/2022 0640    CO2 31 07/07/2022 0640    BUN 9 07/07/2022 0640    CREATININE 0.59 07/07/2022 0640    Lab Results   Component Value Date/Time    CALCIUM 9.3 07/07/2022 0640    ALKPHOS 76 06/25/2022 0829    AST 13 06/25/2022 0829    ALT 10 06/25/2022 0829    BILITOT 0.3 06/25/2022 0829          Lab Results   Component Value Date/Time    WBC 10.9 07/07/2022 0640    HGB 8.8 (L) 07/07/2022 0640    HCT 29.5 (L) 07/07/2022 0640     07/07/2022 0640     Lab Results   Component Value Date/Time    PROTIME 25.2 (H) 07/06/2022 0748    INR 2.2 (H) 07/06/2022 0748         NPO Detail:  NPO all day 12/6/2023     Physical Exam    Airway  Mallampati: III  TM distance: >3 FB  Neck ROM: full     Cardiovascular   Rhythm: regular  Rate: normal     Dental   Comments: Poor dentition   Pulmonary   Breath sounds clear to auscultation     Abdominal   (+) obese             Anesthesia Plan    ASA 3 - emergent     general     intravenous induction   Postoperative  administration of opioids is intended.  Trial extubation is planned.  Anesthetic plan and risks discussed with patient.  Use of blood products discussed with who consented to blood products.    Plan discussed with attending and resident.

## 2023-12-06 NOTE — PROGRESS NOTES
Pharmacy Medication History Review    Shirin Slater is a 53 y.o. female admitted for No Principal Problem: There is no principal problem currently on the Problem List. Please update the Problem List and refresh.. Pharmacy reviewed the patient's [id number] medications and allergies for accuracy.    The list below reflects the updated PTA list. Comments regarding how patient may be taking medications differently can be found in the Admit Orders Activity  Prior to Admission Medications   Prescriptions Last Dose Informant Patient Reported?   DULoxetine (Cymbalta) 60 mg DR capsule  Self Yes   Sig: Take 1 capsule (60 mg) by mouth once daily.   albuterol 90 mcg/actuation inhaler  Self Yes   Sig: Inhale 2 puffs every 6 hours if needed.   aspirin 81 mg EC tablet  Self Yes   Sig: Take 1 tablet (81 mg) by mouth once daily.   atorvastatin (Lipitor) 80 mg tablet  Self Yes   Sig: Take 1 tablet (80 mg) by mouth once daily at bedtime.   busPIRone (Buspar) 10 mg tablet  Self Yes   Sig: Take 1 tablet (10 mg) by mouth. In the morning   clopidogrel (Plavix) 75 mg tablet  Self Yes   Sig: Take 1 tablet (75 mg) by mouth once daily.   divalproex (Depakote) 500 mg EC tablet  Self Yes   Sig: Take 1 tablet (500 mg) by mouth. In the morning, and 2 tablets in the evening   empagliflozin (Jardiance) 10 mg  Self Yes   Sig: Take 1 tablet (10 mg) by mouth once daily.   gabapentin (Neurontin) 400 mg capsule  Self Yes   Sig: Take 1 capsule (400 mg) by mouth 3 times a day.   levETIRAcetam (Keppra) 500 mg tablet  Self Yes   Sig: Take 1 tablet (500 mg) by mouth 2 times a day.   levothyroxine (Tirosint) 75 mcg capsule  Self Yes   Sig: Take 1 capsule (75 mcg) by mouth once daily in the morning. Take before meals. On empty stomach   lidocaine (Lidoderm) 5 % patch  Self Yes   Sig: Place 1 patch on the skin once daily. 12 hours on, 12 hours off.   loperamide (Imodium) 2 mg capsule  Self Yes   Sig: Take 2 capsules (4 mg) by mouth. As needed, Don't  exceed 4 tablets per day   melatonin 10 mg tablet  Self Yes   Sig: Take 1 tablet (10 mg) by mouth once daily.   methocarbamol (Robaxin) 500 mg tablet  Self Yes   Sig: Take 1 tablet (500 mg) by mouth 3 times a day.   metoprolol tartrate (Lopressor) 25 mg tablet  Self Yes   Sig: Take 0.5 tablets (12.5 mg) by mouth 2 times a day.   omeprazole OTC (PriLOSEC OTC) 20 mg EC tablet  Self Yes   Sig: Take 1 tablet (20 mg) by mouth once daily in the morning. Take before meals.   warfarin (Coumadin) 3 mg tablet  Self Yes   Sig: Take 1 tablet (3 mg) by mouth. 3 mg on MWF, 4 mg other days   warfarin (Coumadin) 4 mg tablet  Self Yes   Sig: Take 1 tablet (4 mg) by mouth. On Tuesday Thursday Saturday Sunday, 3 mg other days.      Facility-Administered Medications: None        The list below reflects the updated allergy list. Please review each documented allergy for additional clarification and justification.  Allergies  Never Reviewed   Not on File         Patient accepts M2B at discharge. Pharmacy has been updated to [facility].    Sources used to complete the med history include out patient fill history, OARRS, and patient interview. Patient was a good historian, she was able to confirm her medication list along with directions.      Below are additional concerns with the patient's PTA list.  -ALEXA Cronin, PharmD  Transitions of Care Pharmacist  [facility]  Please reach out via Secure Chat for questions, or if no response call [employer] or [facility]

## 2023-12-07 ENCOUNTER — APPOINTMENT (OUTPATIENT)
Dept: RADIOLOGY | Facility: HOSPITAL | Age: 53
DRG: 271 | End: 2023-12-07
Payer: COMMERCIAL

## 2023-12-07 LAB
ALBUMIN SERPL BCP-MCNC: 3.2 G/DL (ref 3.4–5)
ALBUMIN SERPL BCP-MCNC: 3.3 G/DL (ref 3.4–5)
ALBUMIN SERPL BCP-MCNC: 3.4 G/DL (ref 3.4–5)
ALBUMIN SERPL BCP-MCNC: 3.5 G/DL (ref 3.4–5)
ALP SERPL-CCNC: 64 U/L (ref 33–110)
ALT SERPL W P-5'-P-CCNC: 9 U/L (ref 7–45)
ANION GAP BLDA CALCULATED.4IONS-SCNC: 10 MMO/L (ref 10–25)
ANION GAP BLDA CALCULATED.4IONS-SCNC: 11 MMO/L (ref 10–25)
ANION GAP BLDA CALCULATED.4IONS-SCNC: 12 MMO/L (ref 10–25)
ANION GAP BLDA CALCULATED.4IONS-SCNC: 8 MMO/L (ref 10–25)
ANION GAP BLDA CALCULATED.4IONS-SCNC: 9 MMO/L (ref 10–25)
ANION GAP SERPL CALC-SCNC: 11 MMOL/L (ref 10–20)
ANION GAP SERPL CALC-SCNC: 14 MMOL/L (ref 10–20)
ANION GAP SERPL CALC-SCNC: 14 MMOL/L (ref 10–20)
APTT PPP: 33 SECONDS (ref 27–38)
APTT PPP: 34 SECONDS (ref 27–38)
APTT PPP: 37 SECONDS (ref 27–38)
APTT PPP: 40 SECONDS (ref 27–38)
APTT PPP: 41 SECONDS (ref 27–38)
APTT PPP: 63 SECONDS (ref 27–38)
AST SERPL W P-5'-P-CCNC: 21 U/L (ref 9–39)
BASE EXCESS BLDA CALC-SCNC: -0.7 MMOL/L (ref -2–3)
BASE EXCESS BLDA CALC-SCNC: -1 MMOL/L (ref -2–3)
BASE EXCESS BLDA CALC-SCNC: -1.3 MMOL/L (ref -2–3)
BASE EXCESS BLDA CALC-SCNC: -1.5 MMOL/L (ref -2–3)
BASE EXCESS BLDA CALC-SCNC: -2.9 MMOL/L (ref -2–3)
BASOPHILS # BLD AUTO: 0.03 X10*3/UL (ref 0–0.1)
BASOPHILS NFR BLD AUTO: 0.2 %
BILIRUB DIRECT SERPL-MCNC: 0.1 MG/DL (ref 0–0.3)
BILIRUB SERPL-MCNC: 1.1 MG/DL (ref 0–1.2)
BODY TEMPERATURE: 37 DEGREES CELSIUS
BUN SERPL-MCNC: 10 MG/DL (ref 6–23)
BUN SERPL-MCNC: 8 MG/DL (ref 6–23)
BUN SERPL-MCNC: 9 MG/DL (ref 6–23)
CA-I BLD-SCNC: 1.01 MMOL/L (ref 1.1–1.33)
CA-I BLD-SCNC: 1.07 MMOL/L (ref 1.1–1.33)
CA-I BLDA-SCNC: 1.06 MMOL/L (ref 1.1–1.33)
CA-I BLDA-SCNC: 1.08 MMOL/L (ref 1.1–1.33)
CA-I BLDA-SCNC: 1.1 MMOL/L (ref 1.1–1.33)
CA-I BLDA-SCNC: 1.14 MMOL/L (ref 1.1–1.33)
CA-I BLDA-SCNC: 1.19 MMOL/L (ref 1.1–1.33)
CALCIUM SERPL-MCNC: 7.9 MG/DL (ref 8.6–10.6)
CALCIUM SERPL-MCNC: 8.1 MG/DL (ref 8.6–10.6)
CALCIUM SERPL-MCNC: 8.3 MG/DL (ref 8.6–10.6)
CHLORIDE BLDA-SCNC: 106 MMOL/L (ref 98–107)
CHLORIDE BLDA-SCNC: 107 MMOL/L (ref 98–107)
CHLORIDE SERPL-SCNC: 106 MMOL/L (ref 98–107)
CK SERPL-CCNC: 148 U/L (ref 0–215)
CK SERPL-CCNC: 161 U/L (ref 0–215)
CK SERPL-CCNC: 36 U/L (ref 0–215)
CO2 SERPL-SCNC: 23 MMOL/L (ref 21–32)
CO2 SERPL-SCNC: 23 MMOL/L (ref 21–32)
CO2 SERPL-SCNC: 25 MMOL/L (ref 21–32)
CREAT SERPL-MCNC: 0.5 MG/DL (ref 0.5–1.05)
CREAT SERPL-MCNC: 0.56 MG/DL (ref 0.5–1.05)
CREAT SERPL-MCNC: 0.57 MG/DL (ref 0.5–1.05)
EOSINOPHIL # BLD AUTO: 0.02 X10*3/UL (ref 0–0.7)
EOSINOPHIL NFR BLD AUTO: 0.1 %
ERYTHROCYTE [DISTWIDTH] IN BLOOD BY AUTOMATED COUNT: 21 % (ref 11.5–14.5)
ERYTHROCYTE [DISTWIDTH] IN BLOOD BY AUTOMATED COUNT: 22.2 % (ref 11.5–14.5)
GFR SERPL CREATININE-BSD FRML MDRD: >90 ML/MIN/1.73M*2
GLUCOSE BLD MANUAL STRIP-MCNC: 136 MG/DL (ref 74–99)
GLUCOSE BLD MANUAL STRIP-MCNC: 148 MG/DL (ref 74–99)
GLUCOSE BLD MANUAL STRIP-MCNC: 176 MG/DL (ref 74–99)
GLUCOSE BLD MANUAL STRIP-MCNC: 176 MG/DL (ref 74–99)
GLUCOSE BLDA-MCNC: 177 MG/DL (ref 74–99)
GLUCOSE BLDA-MCNC: 184 MG/DL (ref 74–99)
GLUCOSE BLDA-MCNC: 192 MG/DL (ref 74–99)
GLUCOSE BLDA-MCNC: 212 MG/DL (ref 74–99)
GLUCOSE BLDA-MCNC: 259 MG/DL (ref 74–99)
GLUCOSE SERPL-MCNC: 157 MG/DL (ref 74–99)
GLUCOSE SERPL-MCNC: 163 MG/DL (ref 74–99)
GLUCOSE SERPL-MCNC: 242 MG/DL (ref 74–99)
HCO3 BLDA-SCNC: 22.6 MMOL/L (ref 22–26)
HCO3 BLDA-SCNC: 23.7 MMOL/L (ref 22–26)
HCO3 BLDA-SCNC: 23.7 MMOL/L (ref 22–26)
HCO3 BLDA-SCNC: 24.2 MMOL/L (ref 22–26)
HCO3 BLDA-SCNC: 24.3 MMOL/L (ref 22–26)
HCT VFR BLD AUTO: 32.8 % (ref 36–46)
HCT VFR BLD AUTO: 38.1 % (ref 36–46)
HCT VFR BLD EST: 28 % (ref 36–46)
HCT VFR BLD EST: 32 % (ref 36–46)
HCT VFR BLD EST: 36 % (ref 36–46)
HCT VFR BLD EST: 38 % (ref 36–46)
HCT VFR BLD EST: 38 % (ref 36–46)
HGB BLD-MCNC: 10.4 G/DL (ref 12–16)
HGB BLD-MCNC: 12 G/DL (ref 12–16)
HGB BLDA-MCNC: 10.5 G/DL (ref 12–16)
HGB BLDA-MCNC: 12.1 G/DL (ref 12–16)
HGB BLDA-MCNC: 12.5 G/DL (ref 12–16)
HGB BLDA-MCNC: 12.6 G/DL (ref 12–16)
HGB BLDA-MCNC: 9.3 G/DL (ref 12–16)
IMM GRANULOCYTES # BLD AUTO: 0.13 X10*3/UL (ref 0–0.7)
IMM GRANULOCYTES NFR BLD AUTO: 0.8 % (ref 0–0.9)
INHALED O2 CONCENTRATION: 4 %
INHALED O2 CONCENTRATION: 40 %
INR PPP: 1.5 (ref 0.9–1.1)
INR PPP: 1.5 (ref 0.9–1.1)
INR PPP: 2 (ref 0.9–1.1)
INR PPP: 2.1 (ref 0.9–1.1)
INR PPP: 2.2 (ref 0.9–1.1)
INR PPP: 2.6 (ref 0.9–1.1)
LACTATE BLDA-SCNC: 1.1 MMOL/L (ref 0.4–2)
LACTATE BLDA-SCNC: 1.2 MMOL/L (ref 0.4–2)
LACTATE BLDA-SCNC: 1.3 MMOL/L (ref 0.4–2)
LACTATE BLDA-SCNC: 1.5 MMOL/L (ref 0.4–2)
LACTATE BLDA-SCNC: 1.8 MMOL/L (ref 0.4–2)
LYMPHOCYTES # BLD AUTO: 1.4 X10*3/UL (ref 1.2–4.8)
LYMPHOCYTES NFR BLD AUTO: 8.9 %
MAGNESIUM SERPL-MCNC: 1.62 MG/DL (ref 1.6–2.4)
MAGNESIUM SERPL-MCNC: 1.78 MG/DL (ref 1.6–2.4)
MCH RBC QN AUTO: 24.5 PG (ref 26–34)
MCH RBC QN AUTO: 25.1 PG (ref 26–34)
MCHC RBC AUTO-ENTMCNC: 31.5 G/DL (ref 32–36)
MCHC RBC AUTO-ENTMCNC: 31.7 G/DL (ref 32–36)
MCV RBC AUTO: 78 FL (ref 80–100)
MCV RBC AUTO: 79 FL (ref 80–100)
MONOCYTES # BLD AUTO: 1.27 X10*3/UL (ref 0.1–1)
MONOCYTES NFR BLD AUTO: 8.1 %
NEUTROPHILS # BLD AUTO: 12.88 X10*3/UL (ref 1.2–7.7)
NEUTROPHILS NFR BLD AUTO: 81.9 %
NRBC BLD-RTO: 0 /100 WBCS (ref 0–0)
NRBC BLD-RTO: 0 /100 WBCS (ref 0–0)
OXYHGB MFR BLDA: 94.7 % (ref 94–98)
OXYHGB MFR BLDA: 95.5 % (ref 94–98)
OXYHGB MFR BLDA: 96.1 % (ref 94–98)
OXYHGB MFR BLDA: 96.2 % (ref 94–98)
OXYHGB MFR BLDA: 96.4 % (ref 94–98)
PCO2 BLDA: 40 MM HG (ref 38–42)
PCO2 BLDA: 40 MM HG (ref 38–42)
PCO2 BLDA: 41 MM HG (ref 38–42)
PCO2 BLDA: 41 MM HG (ref 38–42)
PCO2 BLDA: 42 MM HG (ref 38–42)
PH BLDA: 7.35 PH (ref 7.38–7.42)
PH BLDA: 7.37 PH (ref 7.38–7.42)
PH BLDA: 7.37 PH (ref 7.38–7.42)
PH BLDA: 7.38 PH (ref 7.38–7.42)
PH BLDA: 7.39 PH (ref 7.38–7.42)
PHOSPHATE SERPL-MCNC: 2.1 MG/DL (ref 2.5–4.9)
PHOSPHATE SERPL-MCNC: 2.6 MG/DL (ref 2.5–4.9)
PHOSPHATE SERPL-MCNC: 3.1 MG/DL (ref 2.5–4.9)
PLATELET # BLD AUTO: 482 X10*3/UL (ref 150–450)
PLATELET # BLD AUTO: 565 X10*3/UL (ref 150–450)
PO2 BLDA: 123 MM HG (ref 85–95)
PO2 BLDA: 86 MM HG (ref 85–95)
PO2 BLDA: 88 MM HG (ref 85–95)
PO2 BLDA: 91 MM HG (ref 85–95)
PO2 BLDA: 93 MM HG (ref 85–95)
POTASSIUM BLDA-SCNC: 3.7 MMOL/L (ref 3.5–5.3)
POTASSIUM BLDA-SCNC: 3.8 MMOL/L (ref 3.5–5.3)
POTASSIUM BLDA-SCNC: 5.2 MMOL/L (ref 3.5–5.3)
POTASSIUM BLDA-SCNC: 5.4 MMOL/L (ref 3.5–5.3)
POTASSIUM BLDA-SCNC: 6.3 MMOL/L (ref 3.5–5.3)
POTASSIUM SERPL-SCNC: 3.7 MMOL/L (ref 3.5–5.3)
POTASSIUM SERPL-SCNC: 4.5 MMOL/L (ref 3.5–5.3)
POTASSIUM SERPL-SCNC: 4.7 MMOL/L (ref 3.5–5.3)
PROT SERPL-MCNC: 6.2 G/DL (ref 6.4–8.2)
PROTHROMBIN TIME: 16.4 SECONDS (ref 9.8–12.8)
PROTHROMBIN TIME: 16.8 SECONDS (ref 9.8–12.8)
PROTHROMBIN TIME: 22.8 SECONDS (ref 9.8–12.8)
PROTHROMBIN TIME: 24.1 SECONDS (ref 9.8–12.8)
PROTHROMBIN TIME: 25.4 SECONDS (ref 9.8–12.8)
PROTHROMBIN TIME: 29.4 SECONDS (ref 9.8–12.8)
RBC # BLD AUTO: 4.15 X10*6/UL (ref 4–5.2)
RBC # BLD AUTO: 4.89 X10*6/UL (ref 4–5.2)
SAO2 % BLDA: 98 % (ref 94–100)
SAO2 % BLDA: 98 % (ref 94–100)
SAO2 % BLDA: 99 % (ref 94–100)
SODIUM BLDA-SCNC: 134 MMOL/L (ref 136–145)
SODIUM BLDA-SCNC: 135 MMOL/L (ref 136–145)
SODIUM SERPL-SCNC: 138 MMOL/L (ref 136–145)
TSH SERPL-ACNC: 0.84 MIU/L (ref 0.44–3.98)
UFH PPP CHRO-ACNC: 0.1 IU/ML
UFH PPP CHRO-ACNC: 0.2 IU/ML
UFH PPP CHRO-ACNC: <0.1 IU/ML
WBC # BLD AUTO: 14.3 X10*3/UL (ref 4.4–11.3)
WBC # BLD AUTO: 15.7 X10*3/UL (ref 4.4–11.3)

## 2023-12-07 PROCEDURE — 94002 VENT MGMT INPAT INIT DAY: CPT

## 2023-12-07 PROCEDURE — 82435 ASSAY OF BLOOD CHLORIDE: CPT | Performed by: STUDENT IN AN ORGANIZED HEALTH CARE EDUCATION/TRAINING PROGRAM

## 2023-12-07 PROCEDURE — 82040 ASSAY OF SERUM ALBUMIN: CPT | Performed by: NURSE PRACTITIONER

## 2023-12-07 PROCEDURE — 82330 ASSAY OF CALCIUM: CPT | Performed by: NURSE PRACTITIONER

## 2023-12-07 PROCEDURE — P9047 ALBUMIN (HUMAN), 25%, 50ML: HCPCS | Mod: JZ | Performed by: NURSE PRACTITIONER

## 2023-12-07 PROCEDURE — 2500000004 HC RX 250 GENERAL PHARMACY W/ HCPCS (ALT 636 FOR OP/ED)

## 2023-12-07 PROCEDURE — 97161 PT EVAL LOW COMPLEX 20 MIN: CPT | Mod: GP

## 2023-12-07 PROCEDURE — 82550 ASSAY OF CK (CPK): CPT | Performed by: NURSE PRACTITIONER

## 2023-12-07 PROCEDURE — 2500000002 HC RX 250 W HCPCS SELF ADMINISTERED DRUGS (ALT 637 FOR MEDICARE OP, ALT 636 FOR OP/ED): Performed by: STUDENT IN AN ORGANIZED HEALTH CARE EDUCATION/TRAINING PROGRAM

## 2023-12-07 PROCEDURE — 85520 HEPARIN ASSAY: CPT | Performed by: NURSE PRACTITIONER

## 2023-12-07 PROCEDURE — 85027 COMPLETE CBC AUTOMATED: CPT | Performed by: STUDENT IN AN ORGANIZED HEALTH CARE EDUCATION/TRAINING PROGRAM

## 2023-12-07 PROCEDURE — 37799 UNLISTED PX VASCULAR SURGERY: CPT | Performed by: STUDENT IN AN ORGANIZED HEALTH CARE EDUCATION/TRAINING PROGRAM

## 2023-12-07 PROCEDURE — 85018 HEMOGLOBIN: CPT | Performed by: NURSE PRACTITIONER

## 2023-12-07 PROCEDURE — 85730 THROMBOPLASTIN TIME PARTIAL: CPT | Performed by: NURSE PRACTITIONER

## 2023-12-07 PROCEDURE — 2500000001 HC RX 250 WO HCPCS SELF ADMINISTERED DRUGS (ALT 637 FOR MEDICARE OP)

## 2023-12-07 PROCEDURE — 94640 AIRWAY INHALATION TREATMENT: CPT

## 2023-12-07 PROCEDURE — 83605 ASSAY OF LACTIC ACID: CPT

## 2023-12-07 PROCEDURE — 71045 X-RAY EXAM CHEST 1 VIEW: CPT

## 2023-12-07 PROCEDURE — 85018 HEMOGLOBIN: CPT | Performed by: STUDENT IN AN ORGANIZED HEALTH CARE EDUCATION/TRAINING PROGRAM

## 2023-12-07 PROCEDURE — 2500000005 HC RX 250 GENERAL PHARMACY W/O HCPCS: Performed by: STUDENT IN AN ORGANIZED HEALTH CARE EDUCATION/TRAINING PROGRAM

## 2023-12-07 PROCEDURE — 84443 ASSAY THYROID STIM HORMONE: CPT | Performed by: NURSE PRACTITIONER

## 2023-12-07 PROCEDURE — 85730 THROMBOPLASTIN TIME PARTIAL: CPT | Performed by: STUDENT IN AN ORGANIZED HEALTH CARE EDUCATION/TRAINING PROGRAM

## 2023-12-07 PROCEDURE — 82040 ASSAY OF SERUM ALBUMIN: CPT | Performed by: STUDENT IN AN ORGANIZED HEALTH CARE EDUCATION/TRAINING PROGRAM

## 2023-12-07 PROCEDURE — P9045 ALBUMIN (HUMAN), 5%, 250 ML: HCPCS | Mod: JZ

## 2023-12-07 PROCEDURE — 2500000001 HC RX 250 WO HCPCS SELF ADMINISTERED DRUGS (ALT 637 FOR MEDICARE OP): Performed by: NURSE PRACTITIONER

## 2023-12-07 PROCEDURE — 2500000004 HC RX 250 GENERAL PHARMACY W/ HCPCS (ALT 636 FOR OP/ED): Performed by: STUDENT IN AN ORGANIZED HEALTH CARE EDUCATION/TRAINING PROGRAM

## 2023-12-07 PROCEDURE — 71045 X-RAY EXAM CHEST 1 VIEW: CPT | Performed by: RADIOLOGY

## 2023-12-07 PROCEDURE — 85610 PROTHROMBIN TIME: CPT | Performed by: NURSE PRACTITIONER

## 2023-12-07 PROCEDURE — 82330 ASSAY OF CALCIUM: CPT | Performed by: STUDENT IN AN ORGANIZED HEALTH CARE EDUCATION/TRAINING PROGRAM

## 2023-12-07 PROCEDURE — 2500000004 HC RX 250 GENERAL PHARMACY W/ HCPCS (ALT 636 FOR OP/ED): Performed by: NURSE PRACTITIONER

## 2023-12-07 PROCEDURE — 82550 ASSAY OF CK (CPK): CPT | Performed by: STUDENT IN AN ORGANIZED HEALTH CARE EDUCATION/TRAINING PROGRAM

## 2023-12-07 PROCEDURE — 83735 ASSAY OF MAGNESIUM: CPT | Performed by: NURSE PRACTITIONER

## 2023-12-07 PROCEDURE — 82947 ASSAY GLUCOSE BLOOD QUANT: CPT

## 2023-12-07 PROCEDURE — 2020000001 HC ICU ROOM DAILY

## 2023-12-07 PROCEDURE — 99223 1ST HOSP IP/OBS HIGH 75: CPT | Performed by: INTERNAL MEDICINE

## 2023-12-07 PROCEDURE — 2500000002 HC RX 250 W HCPCS SELF ADMINISTERED DRUGS (ALT 637 FOR MEDICARE OP, ALT 636 FOR OP/ED)

## 2023-12-07 PROCEDURE — 84132 ASSAY OF SERUM POTASSIUM: CPT | Performed by: STUDENT IN AN ORGANIZED HEALTH CARE EDUCATION/TRAINING PROGRAM

## 2023-12-07 PROCEDURE — 85610 PROTHROMBIN TIME: CPT | Performed by: STUDENT IN AN ORGANIZED HEALTH CARE EDUCATION/TRAINING PROGRAM

## 2023-12-07 PROCEDURE — 94660 CPAP INITIATION&MGMT: CPT

## 2023-12-07 PROCEDURE — 85610 PROTHROMBIN TIME: CPT

## 2023-12-07 PROCEDURE — 85025 COMPLETE CBC W/AUTO DIFF WBC: CPT | Performed by: STUDENT IN AN ORGANIZED HEALTH CARE EDUCATION/TRAINING PROGRAM

## 2023-12-07 PROCEDURE — 2500000004 HC RX 250 GENERAL PHARMACY W/ HCPCS (ALT 636 FOR OP/ED): Mod: JZ

## 2023-12-07 PROCEDURE — 99291 CRITICAL CARE FIRST HOUR: CPT | Performed by: NURSE PRACTITIONER

## 2023-12-07 PROCEDURE — 84132 ASSAY OF SERUM POTASSIUM: CPT

## 2023-12-07 PROCEDURE — 83735 ASSAY OF MAGNESIUM: CPT | Performed by: STUDENT IN AN ORGANIZED HEALTH CARE EDUCATION/TRAINING PROGRAM

## 2023-12-07 RX ORDER — SENNOSIDES 8.6 MG/1
2 TABLET ORAL 2 TIMES DAILY
Status: DISCONTINUED | OUTPATIENT
Start: 2023-12-07 | End: 2023-12-15 | Stop reason: HOSPADM

## 2023-12-07 RX ORDER — HEPARIN SODIUM 10000 [USP'U]/100ML
0-4000 INJECTION, SOLUTION INTRAVENOUS CONTINUOUS
Status: DISCONTINUED | OUTPATIENT
Start: 2023-12-07 | End: 2023-12-14

## 2023-12-07 RX ORDER — MAGNESIUM SULFATE HEPTAHYDRATE 40 MG/ML
4 INJECTION, SOLUTION INTRAVENOUS EVERY 6 HOURS PRN
Status: DISCONTINUED | OUTPATIENT
Start: 2023-12-07 | End: 2023-12-08

## 2023-12-07 RX ORDER — LEVETIRACETAM 5 MG/ML
500 INJECTION INTRAVASCULAR EVERY 12 HOURS
Status: DISCONTINUED | OUTPATIENT
Start: 2023-12-07 | End: 2023-12-08

## 2023-12-07 RX ORDER — HYDROMORPHONE HYDROCHLORIDE 1 MG/ML
0.4 INJECTION, SOLUTION INTRAMUSCULAR; INTRAVENOUS; SUBCUTANEOUS
Status: DISCONTINUED | OUTPATIENT
Start: 2023-12-07 | End: 2023-12-07

## 2023-12-07 RX ORDER — ALBUMIN HUMAN 250 G/1000ML
25 SOLUTION INTRAVENOUS EVERY 6 HOURS
Status: COMPLETED | OUTPATIENT
Start: 2023-12-07 | End: 2023-12-08

## 2023-12-07 RX ORDER — INSULIN LISPRO 100 [IU]/ML
0-5 INJECTION, SOLUTION INTRAVENOUS; SUBCUTANEOUS EVERY 4 HOURS
Status: DISCONTINUED | OUTPATIENT
Start: 2023-12-07 | End: 2023-12-07

## 2023-12-07 RX ORDER — GABAPENTIN 300 MG/1
600 CAPSULE ORAL EVERY 8 HOURS SCHEDULED
Status: DISCONTINUED | OUTPATIENT
Start: 2023-12-07 | End: 2023-12-12

## 2023-12-07 RX ORDER — GABAPENTIN 300 MG/1
300 CAPSULE ORAL EVERY 8 HOURS SCHEDULED
Status: CANCELLED | OUTPATIENT
Start: 2023-12-07

## 2023-12-07 RX ORDER — OXYCODONE HYDROCHLORIDE 5 MG/1
5 TABLET ORAL EVERY 4 HOURS PRN
Status: DISCONTINUED | OUTPATIENT
Start: 2023-12-07 | End: 2023-12-12

## 2023-12-07 RX ORDER — MAGNESIUM SULFATE HEPTAHYDRATE 40 MG/ML
2 INJECTION, SOLUTION INTRAVENOUS EVERY 6 HOURS PRN
Status: DISCONTINUED | OUTPATIENT
Start: 2023-12-07 | End: 2023-12-08

## 2023-12-07 RX ORDER — PROPOFOL 10 MG/ML
35 INJECTION, EMULSION INTRAVENOUS CONTINUOUS
Status: DISCONTINUED | OUTPATIENT
Start: 2023-12-07 | End: 2023-12-07

## 2023-12-07 RX ORDER — PHENYLEPHRINE 10 MG/250 ML(40 MCG/ML)IN 0.9 % SOD.CHLORIDE INTRAVENOUS
.1-2 CONTINUOUS
Status: DISCONTINUED | OUTPATIENT
Start: 2023-12-07 | End: 2023-12-08

## 2023-12-07 RX ORDER — POTASSIUM CHLORIDE 1.5 G/1.58G
20 POWDER, FOR SOLUTION ORAL EVERY 6 HOURS PRN
Status: DISCONTINUED | OUTPATIENT
Start: 2023-12-07 | End: 2023-12-07

## 2023-12-07 RX ORDER — ACETAMINOPHEN 325 MG/1
650 TABLET ORAL EVERY 6 HOURS
Status: DISCONTINUED | OUTPATIENT
Start: 2023-12-07 | End: 2023-12-12

## 2023-12-07 RX ORDER — CALCIUM GLUCONATE 20 MG/ML
2 INJECTION, SOLUTION INTRAVENOUS EVERY 6 HOURS PRN
Status: DISCONTINUED | OUTPATIENT
Start: 2023-12-07 | End: 2023-12-08

## 2023-12-07 RX ORDER — INSULIN LISPRO 100 [IU]/ML
0-15 INJECTION, SOLUTION INTRAVENOUS; SUBCUTANEOUS EVERY 4 HOURS
Status: DISCONTINUED | OUTPATIENT
Start: 2023-12-07 | End: 2023-12-08

## 2023-12-07 RX ORDER — PROPOFOL 10 MG/ML
INJECTION, EMULSION INTRAVENOUS
Status: COMPLETED
Start: 2023-12-07 | End: 2023-12-07

## 2023-12-07 RX ORDER — POTASSIUM CHLORIDE 20 MEQ/1
40 TABLET, EXTENDED RELEASE ORAL EVERY 6 HOURS PRN
Status: DISCONTINUED | OUTPATIENT
Start: 2023-12-07 | End: 2023-12-08

## 2023-12-07 RX ORDER — ALBUMIN HUMAN 50 G/1000ML
SOLUTION INTRAVENOUS
Status: COMPLETED
Start: 2023-12-07 | End: 2023-12-07

## 2023-12-07 RX ORDER — IPRATROPIUM BROMIDE AND ALBUTEROL SULFATE 2.5; .5 MG/3ML; MG/3ML
3 SOLUTION RESPIRATORY (INHALATION) EVERY 6 HOURS PRN
Status: DISCONTINUED | OUTPATIENT
Start: 2023-12-07 | End: 2023-12-15 | Stop reason: HOSPADM

## 2023-12-07 RX ORDER — OXYCODONE HYDROCHLORIDE 5 MG/1
10 TABLET ORAL EVERY 4 HOURS PRN
Status: DISCONTINUED | OUTPATIENT
Start: 2023-12-07 | End: 2023-12-15 | Stop reason: HOSPADM

## 2023-12-07 RX ORDER — ALBUMIN HUMAN 50 G/1000ML
25 SOLUTION INTRAVENOUS ONCE
Status: COMPLETED | OUTPATIENT
Start: 2023-12-07 | End: 2023-12-07

## 2023-12-07 RX ORDER — POTASSIUM CHLORIDE 1.5 G/1.58G
40 POWDER, FOR SOLUTION ORAL EVERY 6 HOURS PRN
Status: DISCONTINUED | OUTPATIENT
Start: 2023-12-07 | End: 2023-12-07

## 2023-12-07 RX ORDER — HYDROMORPHONE HYDROCHLORIDE 1 MG/ML
0.4 INJECTION, SOLUTION INTRAMUSCULAR; INTRAVENOUS; SUBCUTANEOUS
Status: DISCONTINUED | OUTPATIENT
Start: 2023-12-07 | End: 2023-12-13

## 2023-12-07 RX ORDER — CEFAZOLIN SODIUM 2 G/100ML
2 INJECTION, SOLUTION INTRAVENOUS EVERY 8 HOURS
Status: COMPLETED | OUTPATIENT
Start: 2023-12-07 | End: 2023-12-07

## 2023-12-07 RX ORDER — DEXTROSE 50 % IN WATER (D50W) INTRAVENOUS SYRINGE
25
Status: DISCONTINUED | OUTPATIENT
Start: 2023-12-07 | End: 2023-12-08

## 2023-12-07 RX ORDER — POTASSIUM CHLORIDE 20 MEQ/1
20 TABLET, EXTENDED RELEASE ORAL EVERY 6 HOURS PRN
Status: DISCONTINUED | OUTPATIENT
Start: 2023-12-07 | End: 2023-12-08

## 2023-12-07 RX ORDER — HEPARIN SODIUM 5000 [USP'U]/ML
2000-4000 INJECTION, SOLUTION INTRAVENOUS; SUBCUTANEOUS EVERY 4 HOURS PRN
Status: DISCONTINUED | OUTPATIENT
Start: 2023-12-07 | End: 2023-12-13

## 2023-12-07 RX ORDER — CALCIUM GLUCONATE 20 MG/ML
1 INJECTION, SOLUTION INTRAVENOUS EVERY 6 HOURS PRN
Status: DISCONTINUED | OUTPATIENT
Start: 2023-12-07 | End: 2023-12-08

## 2023-12-07 RX ORDER — NEOSTIGMINE METHYLSULFATE 1 MG/ML
INJECTION, SOLUTION INTRAVENOUS
Status: DISPENSED
Start: 2023-12-07 | End: 2023-12-07

## 2023-12-07 RX ORDER — SODIUM CHLORIDE, SODIUM LACTATE, POTASSIUM CHLORIDE, CALCIUM CHLORIDE 600; 310; 30; 20 MG/100ML; MG/100ML; MG/100ML; MG/100ML
100 INJECTION, SOLUTION INTRAVENOUS CONTINUOUS
Status: DISCONTINUED | OUTPATIENT
Start: 2023-12-07 | End: 2023-12-07

## 2023-12-07 RX ORDER — CALCIUM GLUCONATE 20 MG/ML
INJECTION, SOLUTION INTRAVENOUS
Status: COMPLETED
Start: 2023-12-07 | End: 2023-12-07

## 2023-12-07 RX ORDER — GLYCOPYRROLATE 0.2 MG/ML
INJECTION INTRAMUSCULAR; INTRAVENOUS
Status: DISPENSED
Start: 2023-12-07 | End: 2023-12-07

## 2023-12-07 RX ADMIN — ATORVASTATIN CALCIUM 80 MG: 80 TABLET, FILM COATED ORAL at 21:25

## 2023-12-07 RX ADMIN — LEVETIRACETAM 500 MG: 5 INJECTION INTRAVENOUS at 13:56

## 2023-12-07 RX ADMIN — HYDROMORPHONE HYDROCHLORIDE 0.4 MG: 1 INJECTION, SOLUTION INTRAMUSCULAR; INTRAVENOUS; SUBCUTANEOUS at 03:58

## 2023-12-07 RX ADMIN — CEFAZOLIN SODIUM 2 G: 2 INJECTION, SOLUTION INTRAVENOUS at 11:20

## 2023-12-07 RX ADMIN — GABAPENTIN 600 MG: 300 CAPSULE ORAL at 13:56

## 2023-12-07 RX ADMIN — ALBUMIN HUMAN 25 G: 0.25 SOLUTION INTRAVENOUS at 16:33

## 2023-12-07 RX ADMIN — Medication 2 L/MIN: at 03:07

## 2023-12-07 RX ADMIN — DIVALPROEX SODIUM 1000 MG: 500 TABLET, DELAYED RELEASE ORAL at 21:25

## 2023-12-07 RX ADMIN — INSULIN LISPRO 5 UNITS: 100 INJECTION, SOLUTION INTRAVENOUS; SUBCUTANEOUS at 23:34

## 2023-12-07 RX ADMIN — HEPARIN SODIUM 1500 UNITS/HR: 10000 INJECTION, SOLUTION INTRAVENOUS at 21:30

## 2023-12-07 RX ADMIN — CEFAZOLIN SODIUM 2 G: 2 INJECTION, SOLUTION INTRAVENOUS at 20:40

## 2023-12-07 RX ADMIN — ALBUMIN HUMAN 25 G: 0.25 SOLUTION INTRAVENOUS at 10:48

## 2023-12-07 RX ADMIN — INSULIN LISPRO 10 UNITS: 100 INJECTION, SOLUTION INTRAVENOUS; SUBCUTANEOUS at 16:34

## 2023-12-07 RX ADMIN — IPRATROPIUM BROMIDE AND ALBUTEROL SULFATE 3 ML: .5; 3 SOLUTION RESPIRATORY (INHALATION) at 04:37

## 2023-12-07 RX ADMIN — PROPOFOL 35 MCG/KG/MIN: 10 INJECTION, EMULSION INTRAVENOUS at 01:52

## 2023-12-07 RX ADMIN — SODIUM CHLORIDE, POTASSIUM CHLORIDE, SODIUM LACTATE AND CALCIUM CHLORIDE 1000 ML: 600; 310; 30; 20 INJECTION, SOLUTION INTRAVENOUS at 05:13

## 2023-12-07 RX ADMIN — HYDROMORPHONE HYDROCHLORIDE 0.4 MG: 1 INJECTION, SOLUTION INTRAMUSCULAR; INTRAVENOUS; SUBCUTANEOUS at 13:56

## 2023-12-07 RX ADMIN — ACETAMINOPHEN 650 MG: 325 TABLET ORAL at 21:25

## 2023-12-07 RX ADMIN — INSULIN LISPRO 5 UNITS: 100 INJECTION, SOLUTION INTRAVENOUS; SUBCUTANEOUS at 20:50

## 2023-12-07 RX ADMIN — INSULIN LISPRO 15 UNITS: 100 INJECTION, SOLUTION INTRAVENOUS; SUBCUTANEOUS at 12:08

## 2023-12-07 RX ADMIN — CALCIUM GLUCONATE 1 G: 20 INJECTION, SOLUTION INTRAVENOUS at 04:26

## 2023-12-07 RX ADMIN — ALBUMIN HUMAN 25 G: 0.05 INJECTION, SOLUTION INTRAVENOUS at 03:44

## 2023-12-07 RX ADMIN — OXYCODONE HYDROCHLORIDE 10 MG: 5 TABLET ORAL at 20:40

## 2023-12-07 RX ADMIN — ACETAMINOPHEN 650 MG: 325 TABLET ORAL at 10:48

## 2023-12-07 RX ADMIN — INSULIN LISPRO 5 UNITS: 100 INJECTION, SOLUTION INTRAVENOUS; SUBCUTANEOUS at 04:27

## 2023-12-07 RX ADMIN — OXYCODONE HYDROCHLORIDE 10 MG: 5 TABLET ORAL at 11:19

## 2023-12-07 RX ADMIN — PHENYLEPHRINE 10 MG/250 ML(40 MCG/ML)IN 0.9 % SOD.CHLORIDE INTRAVENOUS 1 MCG/KG/MIN: at 04:50

## 2023-12-07 RX ADMIN — HYDROMORPHONE HYDROCHLORIDE 0.4 MG: 1 INJECTION, SOLUTION INTRAMUSCULAR; INTRAVENOUS; SUBCUTANEOUS at 08:34

## 2023-12-07 RX ADMIN — HYDROMORPHONE HYDROCHLORIDE 0.4 MG: 1 INJECTION, SOLUTION INTRAMUSCULAR; INTRAVENOUS; SUBCUTANEOUS at 08:25

## 2023-12-07 RX ADMIN — ACETAMINOPHEN 650 MG: 325 TABLET ORAL at 16:33

## 2023-12-07 RX ADMIN — GABAPENTIN 600 MG: 300 CAPSULE ORAL at 21:25

## 2023-12-07 RX ADMIN — CEFAZOLIN SODIUM 2 G: 2 INJECTION, SOLUTION INTRAVENOUS at 06:43

## 2023-12-07 RX ADMIN — ALBUMIN HUMAN 25 G: 50 SOLUTION INTRAVENOUS at 03:44

## 2023-12-07 RX ADMIN — DIVALPROEX SODIUM 500 MG: 500 TABLET, DELAYED RELEASE ORAL at 08:54

## 2023-12-07 RX ADMIN — OXYCODONE HYDROCHLORIDE 10 MG: 5 TABLET ORAL at 16:33

## 2023-12-07 RX ADMIN — ALBUMIN HUMAN 25 G: 0.25 SOLUTION INTRAVENOUS at 21:25

## 2023-12-07 RX ADMIN — PHENYLEPHRINE 10 MG/250 ML(40 MCG/ML)IN 0.9 % SOD.CHLORIDE INTRAVENOUS 1 MCG/KG/MIN: at 06:43

## 2023-12-07 RX ADMIN — BUSPIRONE HYDROCHLORIDE 10 MG: 10 TABLET ORAL at 08:54

## 2023-12-07 RX ADMIN — DULOXETINE HYDROCHLORIDE 60 MG: 60 CAPSULE, DELAYED RELEASE ORAL at 08:53

## 2023-12-07 RX ADMIN — SENNOSIDES 17.2 MG: 8.6 TABLET, FILM COATED ORAL at 21:24

## 2023-12-07 RX ADMIN — LEVETIRACETAM 500 MG: 5 INJECTION INTRAVENOUS at 05:07

## 2023-12-07 RX ADMIN — HEPARIN SODIUM 1000 UNITS/HR: 10000 INJECTION, SOLUTION INTRAVENOUS at 08:54

## 2023-12-07 SDOH — SOCIAL STABILITY: SOCIAL INSECURITY

## 2023-12-07 SDOH — ECONOMIC STABILITY: FOOD INSECURITY

## 2023-12-07 SDOH — SOCIAL STABILITY: SOCIAL NETWORK

## 2023-12-07 SDOH — ECONOMIC STABILITY: INCOME INSECURITY

## 2023-12-07 SDOH — HEALTH STABILITY: MENTAL HEALTH

## 2023-12-07 SDOH — HEALTH STABILITY: PHYSICAL HEALTH

## 2023-12-07 ASSESSMENT — PAIN - FUNCTIONAL ASSESSMENT
PAIN_FUNCTIONAL_ASSESSMENT: 0-10
PAIN_FUNCTIONAL_ASSESSMENT: CPOT (CRITICAL CARE PAIN OBSERVATION TOOL)
PAIN_FUNCTIONAL_ASSESSMENT: 0-10

## 2023-12-07 ASSESSMENT — COGNITIVE AND FUNCTIONAL STATUS - GENERAL
MOVING FROM LYING ON BACK TO SITTING ON SIDE OF FLAT BED WITH BEDRAILS: A LITTLE
TURNING FROM BACK TO SIDE WHILE IN FLAT BAD: A LITTLE
MOBILITY SCORE: 12
WALKING IN HOSPITAL ROOM: TOTAL
MOVING TO AND FROM BED TO CHAIR: A LOT
STANDING UP FROM CHAIR USING ARMS: A LOT
CLIMB 3 TO 5 STEPS WITH RAILING: TOTAL

## 2023-12-07 ASSESSMENT — PAIN SCALES - GENERAL
PAINLEVEL_OUTOF10: 10 - WORST POSSIBLE PAIN
PAINLEVEL_OUTOF10: 8
PAINLEVEL_OUTOF10: 8
PAINLEVEL_OUTOF10: 0 - NO PAIN
PAINLEVEL_OUTOF10: 10 - WORST POSSIBLE PAIN
PAINLEVEL_OUTOF10: 10 - WORST POSSIBLE PAIN
PAINLEVEL_OUTOF10: 3
PAINLEVEL_OUTOF10: 10 - WORST POSSIBLE PAIN
PAINLEVEL_OUTOF10: 0 - NO PAIN
PAINLEVEL_OUTOF10: 10 - WORST POSSIBLE PAIN
PAINLEVEL_OUTOF10: 0 - NO PAIN

## 2023-12-07 ASSESSMENT — PAIN DESCRIPTION - DESCRIPTORS
DESCRIPTORS: DISCOMFORT
DESCRIPTORS: BURNING

## 2023-12-07 ASSESSMENT — PAIN DESCRIPTION - LOCATION: LOCATION: LEG

## 2023-12-07 ASSESSMENT — ACTIVITIES OF DAILY LIVING (ADL): ADLS_ADDRESSED: NO

## 2023-12-07 ASSESSMENT — PAIN DESCRIPTION - ORIENTATION: ORIENTATION: LEFT

## 2023-12-07 NOTE — SIGNIFICANT EVENT
12/07/23 0224   Daily Screen   Total RSBI 18   Weaning Parameters   Weaning Start Time 0224   Weaning Vital Capacity 2 mL   Negative Inspiratory Force (NIF) 59   Spontaneous Minute Volume (MV) 7.6   Weaning Tidal Volume 634 mL     Placed on cpap

## 2023-12-07 NOTE — ANESTHESIA PROCEDURE NOTES
Airway  Date/Time: 12/6/2023 8:19 PM  Urgency: elective    Airway not difficult    Staffing  Performed: resident   Authorized by: Guilherme Mata MD    Performed by: Dewayne Dai MD  Patient location during procedure: OR    Indications and Patient Condition  Indications for airway management: anesthesia  Sedation level: deep  Preoxygenated: yes  Patient position: sniffing  Mask difficulty assessment: 1 - vent by mask  Planned trial extubation    Final Airway Details  Final airway type: endotracheal airway      Successful airway: ETT  Cuffed: yes   Successful intubation technique: direct laryngoscopy  Facilitating devices/methods: intubating stylet  Endotracheal tube insertion site: oral  Blade: Ryder  Blade size: #3  ETT size (mm): 7.0  Cormack-Lehane Classification: grade I - full view of glottis  Placement verified by: capnometry   Number of attempts at approach: 1

## 2023-12-07 NOTE — SIGNIFICANT EVENT
Vascular surgery post op plan of care:    53 F s/p Left femoral cutdown, open sharif thrombectomy of external iliac artery, common femoral artery, superficial femoral artery, and profunda femoris. Flow restored down into profunda, and into SFA. Patch angioplasty CFA. She does have distal occlusion in the SFA/ popliteal artery and so still remains without doppler signals in DP/ PT.   Groin closed with prevena in place. 600cc EBL. 2L UOP during case. Hypotensive during case, with 2u PRBC and 4u FFP given. INR out of reference range high before case. 10mg vitamin K given at start, INR down to 3.7 during case.     - Maintain -160 mmHg  - Continue IVF resuscitation  - Check CBC, RFP, coag panel  - q4h coags, when INR drops to 2 or lower, start heparin drip low intensity with boluses  - Continue heparin drip  - Monitor for changes in LLE exam  - Continue vasques  - Wean to extubate as tolerated  - Will consult vascular medicine in AM to help determine why patient extensively thrombosed so much of her arterial system despite DAPT and supratherapeutic anticoagulation; prior beta-2-glycoprotein and anticardiolipin Abs were normal    Sanju Rain MD  Vascular Surgery Fellow  Service Pager: [phone]

## 2023-12-07 NOTE — H&P
SICU History & Physical    Subjective   HPI:  Shirin is a 53 y.o. female with past medical history of T2DM (A1c 8.5) c/b diabetic neuropathy, renal and splenic thrombosis (on Coumadin, aspirin and clopidogrel), HTN, HLD, seizure disorder, PVD c/b R hip disarticulation and LLL disease s/p L 1/2/5 digit amputations and previous iliac artery stenting who presented to OSH with one week on LLE numbness and multiple days of burning and swelling of L foot with subjective diminished motor and sensory function. CTA at OSH showed complete occlusion of L common femoral, superficial femoral and popliteal arteries w previous stents of L iliac and popliteal arteries. Patient was started on heparin gtt, then stopped due to patient's  INR >8. Patient was transferred to [facility] for urgent revascularization of LLE.     Patient is s/p L common femoral and external iliac embolectomy, patch angioplasty common femoral artery on 12/6 with vascular surgery. Became hypotensive intro-op requiring pressure support. Vitamin K, 4u FFP administered intra-op to correct INR.     OR Course:   EBL: 600 cc  UOP: 800 cc  Crystalloid: 2 L  Colloid: 0  Cellsaver: 0  Products: 4 FFP, 2 PRBC  Antibiotic time: cefazolin 3g @2100  Intubation: easy intubation  Lines/Access: L ulnar arterial line placed by vascular surgery, 20G PIV L AC x2, 20G PIV L hand, 20G PIV R wrist    Past Medical History:   Diagnosis Date    Anxiety     Arthritis     Cancer (CMS/HCC)     Cellulitis     Diabetes mellitus (CMS/HCC)     Disease of thyroid gland     Diverticulitis     Epilepsy, unspecified, not intractable, without status epilepticus (CMS/HCC)     Epilepsy    HLD (hyperlipidemia)     Hx of blood clots     Hypertension     PAD (peripheral artery disease) (CMS/HCC)     Peripheral vascular disease, unspecified (CMS/HCC) 09/14/2022    PAD (peripheral artery disease)    PTSD (post-traumatic stress disorder)     Uterine cancer (CMS/HCC)      Past Surgical History:   Procedure  Laterality Date    AMPUTATION FOOT / TOE Left     AMPUTATION FOOT / TOE Left     ANTERIOR CRUCIATE LIGAMENT REPAIR Left 2009     SECTION, CLASSIC      COLON SURGERY  2019    Ressection    CT AORTA AND BILATERAL ILIOFEMORAL RUNOFF ANGIOGRAM W AND/OR WO IV CONTRAST  2021    CT AORTA AND BILATERAL ILIOFEMORAL RUNOFF ANGIOGRAM W AND/OR WO IV CONTRAST 2021 [facility] [place] INPATIENT LEGACY    CT AORTA AND BILATERAL ILIOFEMORAL RUNOFF ANGIOGRAM W AND/OR WO IV CONTRAST  2022    CT AORTA AND BILATERAL ILIOFEMORAL RUNOFF ANGIOGRAM W AND/OR WO IV CONTRAST 2022 [facility] CLINICAL LEGACY    HYSTERECTOMY  2012    IR ANGIOGRAM AORTA ABDOMEN  2021    IR ANGIOGRAM AORTA ABDOMEN 2021 [facility] [place] INPATIENT LEGACY    LEG AMPUTATION Right     OTHER SURGICAL HISTORY  2021    Arterial angioplasty of lower extremity    TONSILECTOMY, ADENOIDECTOMY, BILATERAL MYRINGOTOMY AND TUBES  1971    VASCULAR SURGERY Right     Feet w/ ICU stay    VASCULAR SURGERY Right     VASCULAR SURGERY Right     Knee     Medications Prior to Admission   Medication Sig Dispense Refill Last Dose    albuterol 90 mcg/actuation inhaler Inhale 2 puffs every 6 hours if needed.       aspirin 81 mg EC tablet Take 1 tablet (81 mg) by mouth once daily.       atorvastatin (Lipitor) 80 mg tablet Take 1 tablet (80 mg) by mouth once daily at bedtime.       clopidogrel (Plavix) 75 mg tablet Take 1 tablet (75 mg) by mouth once daily.       divalproex (Depakote) 500 mg EC tablet Take 1 tablet (500 mg) by mouth. In the morning, and 2 tablets in the evening       DULoxetine (Cymbalta) 60 mg DR capsule Take 1 capsule (60 mg) by mouth once daily.       empagliflozin (Jardiance) 10 mg Take 1 tablet (10 mg) by mouth once daily.       gabapentin (Neurontin) 400 mg capsule Take 1 capsule (400 mg) by mouth 3 times a day.       levETIRAcetam (Keppra) 500 mg tablet Take 1 tablet (500 mg) by mouth 2 times a day.       levothyroxine (Tirosint) 75  mcg capsule Take 1 capsule (75 mcg) by mouth once daily in the morning. Take before meals. On empty stomach       lidocaine (Lidoderm) 5 % patch Place 1 patch on the skin once daily. 12 hours on, 12 hours off.       loperamide (Imodium) 2 mg capsule Take 2 capsules (4 mg) by mouth. As needed, Don't exceed 4 tablets per day       melatonin 10 mg tablet Take 1 tablet (10 mg) by mouth once daily.       methocarbamol (Robaxin) 500 mg tablet Take 1 tablet (500 mg) by mouth 3 times a day.       metoprolol tartrate (Lopressor) 25 mg tablet Take 0.5 tablets (12.5 mg) by mouth 2 times a day.       busPIRone (Buspar) 10 mg tablet Take 1 tablet (10 mg) by mouth. In the morning       omeprazole OTC (PriLOSEC OTC) 20 mg EC tablet Take 1 tablet (20 mg) by mouth once daily in the morning. Take before meals.        Aspartame and Nsaids (non-steroidal anti-inflammatory drug)  Social History     Tobacco Use    Smoking status: Every Day     Packs/day: 1.00     Years: 40.00     Additional pack years: 0.00     Total pack years: 40.00     Types: Cigarettes     Start date: 1981    Smokeless tobacco: Never    Tobacco comments:     Currest smoker at around 0.5PPD. Peak at 1.5-2PPD    Substance Use Topics    Alcohol use: Yes     Comment: Rare    Drug use: Yes     Types: Marijuana     Family History   Problem Relation Name Age of Onset    Diverticulitis Daughter      Heart failure Other          Several family members without direct blood connection       Review of Systems:  Review of Systems    Scheduled Medications:   [Held by provider] aspirin, 81 mg, oral, Daily  [Held by provider] atorvastatin, 80 mg, oral, Nightly  [Held by provider] busPIRone, 10 mg, oral, Daily  [Held by provider] clopidogrel, 75 mg, oral, Daily  [Held by provider] divalproex, 1,000 mg, oral, q PM  [Held by provider] divalproex, 500 mg, oral, q AM  [Held by provider] DULoxetine, 60 mg, oral, Daily  gabapentin, 400 mg, oral, TID  insulin lispro, 0-5 Units,  subcutaneous, TID with meals  levETIRAcetam, 500 mg, oral, BID  levothyroxine, 75 mcg, oral, Daily  [START ON 12/7/2023] lidocaine, 1 patch, transdermal, Daily  [Held by provider] melatonin, 10 mg, oral, Daily  metoprolol tartrate, 12.5 mg, oral, BID  [START ON 12/7/2023] pantoprazole, 20 mg, oral, Daily before breakfast  [Held by provider] sennosides, 2 tablet, oral, BID         Continuous Medications:   heparin, 0-4,500 Units/hr         PRN Medications:   PRN medications: acetaminophen **OR** acetaminophen **OR** acetaminophen, albuterol, dextrose 10 % in water (D10W), dextrose, glucagon, heparin (porcine) 5,000 Units in sodium chloride 0.9% 500 mL irrigation, heparin, oxyCODONE, sodium chloride    Objective   Vitals:  Most Recent:  Vitals:    12/06/23 1529   BP: 123/84   Pulse: 89   Resp: 16   Temp: 36.5 °C (97.7 °F)   SpO2: 98%       24hr Min/Max:  Temp  Min: 36.5 °C (97.7 °F)  Max: 36.5 °C (97.7 °F)  Pulse  Min: 89  Max: 89  BP  Min: 123/84  Max: 123/84  Resp  Min: 16  Max: 16  SpO2  Min: 98 %  Max: 98 %    I/O:  I/O last 2 completed shifts:  In: - (0 mL/kg)   Out: 50 (0.5 mL/kg) [Blood:50]  Weight: 99.8 kg     Hemodynamic parameters for last 24 hours:         Vent settings:       LDA:  Arterial Line 12/06/23 Left (Active)   Placement Date/Time: 12/06/23 (c) 2030   Size: 20 G  Orientation: Left  Securement Method: Transparent dressing  Patient Tolerance: Tolerated well   Number of days: 0       ETT  7 mm (Active)   Placement Date/Time: 12/06/23 (c) 2019   Mask Ventilation: Vent by mask  Technique: Direct laryngoscopy  ETT Type: ETT - single  Single Lumen Tube Size: 7 mm  Cuffed: Yes  Laryngoscope: Ryder  Blade Size: 3  Location: Oral  Grade View: Full view o...   Number of days: 0       Urethral Catheter Non-latex 16 Fr. (Active)   Placement Date/Time: 12/06/23 2100   Placed by: RADHA  Hand Hygiene Completed: Yes  Catheter Type: Non-latex  Tube Size (Fr.): 16 Fr.  Catheter Balloon Size: 10 mL  Urine  Returned: Yes   Number of days: 0         Physical Exam:   Physical Exam  Constitutional:       General: She is not in acute distress.     Comments: Intubated, sedated   HENT:      Mouth/Throat:      Mouth: Mucous membranes are moist.   Cardiovascular:      Rate and Rhythm: Normal rate and regular rhythm.      Comments: LLE: PT, DP, popliteal pulses not palpable or detected by doppler at baseline  RLE: hip disarticulation  Pulmonary:      Comments: Intubated, on ventilator  Abdominal:      General: Abdomen is flat.      Palpations: Abdomen is soft.   Musculoskeletal:      Comments: RLE amputation at hip  LLE digits 1/2/5 amputations   Skin:     General: Skin is warm and dry.         Lab/Radiology/Diagnostic Review:  Results for orders placed or performed during the hospital encounter of 12/06/23 (from the past 24 hour(s))   Comprehensive metabolic panel   Result Value Ref Range    Glucose 141 (H) 74 - 99 mg/dL    Sodium 136 136 - 145 mmol/L    Potassium 4.0 3.5 - 5.3 mmol/L    Chloride 103 98 - 107 mmol/L    Bicarbonate 23 21 - 32 mmol/L    Anion Gap 14 10 - 20 mmol/L    Urea Nitrogen 11 6 - 23 mg/dL    Creatinine 0.57 0.50 - 1.05 mg/dL    eGFR >90 >60 mL/min/1.73m*2    Calcium 8.5 (L) 8.6 - 10.6 mg/dL    Albumin 3.5 3.4 - 5.0 g/dL    Alkaline Phosphatase 98 33 - 110 U/L    Total Protein 6.9 6.4 - 8.2 g/dL    AST 7 (L) 9 - 39 U/L    Bilirubin, Total 0.3 0.0 - 1.2 mg/dL    ALT 8 7 - 45 U/L   CBC and Auto Differential   Result Value Ref Range    WBC 14.8 (H) 4.4 - 11.3 x10*3/uL    nRBC 0.0 0.0 - 0.0 /100 WBCs    RBC 5.32 (H) 4.00 - 5.20 x10*6/uL    Hemoglobin 12.6 12.0 - 16.0 g/dL    Hematocrit 40.9 36.0 - 46.0 %    MCV 77 (L) 80 - 100 fL    MCH 23.7 (L) 26.0 - 34.0 pg    MCHC 30.8 (L) 32.0 - 36.0 g/dL    RDW 21.6 (H) 11.5 - 14.5 %    Platelets 495 (H) 150 - 450 x10*3/uL    Neutrophils % 67.9 40.0 - 80.0 %    Immature Granulocytes %, Automated 0.8 0.0 - 0.9 %    Lymphocytes % 20.8 13.0 - 44.0 %    Monocytes % 9.3 2.0  - 10.0 %    Eosinophils % 0.8 0.0 - 6.0 %    Basophils % 0.4 0.0 - 2.0 %    Neutrophils Absolute 10.03 (H) 1.20 - 7.70 x10*3/uL    Immature Granulocytes Absolute, Automated 0.12 0.00 - 0.70 x10*3/uL    Lymphocytes Absolute 3.08 1.20 - 4.80 x10*3/uL    Monocytes Absolute 1.38 (H) 0.10 - 1.00 x10*3/uL    Eosinophils Absolute 0.12 0.00 - 0.70 x10*3/uL    Basophils Absolute 0.06 0.00 - 0.10 x10*3/uL   Magnesium   Result Value Ref Range    Magnesium 2.03 1.60 - 2.40 mg/dL   C-Reactive Protein   Result Value Ref Range    C-Reactive Protein 17.25 (H) <1.00 mg/dL   Type And Screen   Result Value Ref Range    ABO TYPE A     Rh TYPE POS     ANTIBODY SCREEN NEG    Blood Culture    Specimen: Peripheral Venipuncture; Blood culture   Result Value Ref Range    Blood Culture Loaded on Instrument - Culture in progress    Blood Culture    Specimen: Peripheral Venipuncture; Blood culture   Result Value Ref Range    Blood Culture Loaded on Instrument - Culture in progress    Lipid panel   Result Value Ref Range    Cholesterol 197 0 - 199 mg/dL    HDL-Cholesterol 28.1 mg/dL    Cholesterol/HDL Ratio 7.0     LDL Calculated 122 (H) <=99 mg/dL    VLDL 47 (H) 0 - 40 mg/dL    Triglycerides 233 (H) 0 - 149 mg/dL    Non HDL Cholesterol 169 (H) 0 - 149 mg/dL   Coagulation Screen   Result Value Ref Range    Protime >100.0 (HH) 9.8 - 12.8 seconds    INR      aPTT 109 (HH) 27 - 38 seconds   Morphology   Result Value Ref Range    RBC Morphology No significant RBC morphology present    Prepare Plasma: 4 Units   Result Value Ref Range    PRODUCT CODE F5789D10     Unit Number [id number]     Unit ABO A     Unit RH POS     Dispense Status TR     Blood Expiration Date December 09, 2023 22:22 EST     PRODUCT BLOOD TYPE 6200     UNIT VOLUME 319     PRODUCT CODE R9313P32     Unit Number [id number]     Unit ABO A     Unit RH POS     Dispense Status TR     Blood Expiration Date December 10, 2023 18:00 EST     PRODUCT BLOOD TYPE 6200     UNIT VOLUME  301     PRODUCT CODE U9902T67     Unit Number [id number]     Unit ABO A     Unit RH POS     Dispense Status TR     Blood Expiration Date December 10, 2023 18:00 EST     PRODUCT BLOOD TYPE 6200     UNIT VOLUME 334     PRODUCT CODE L1119W43     Unit Number [id number]     Unit ABO AB     Unit RH POS     Dispense Status TR     Blood Expiration Date December 07, 2023 07:57 EST     PRODUCT BLOOD TYPE 8400     UNIT VOLUME 195    Prepare RBC: 2 Units   Result Value Ref Range    PRODUCT CODE I6614P07     Unit Number [id number]     Unit ABO A     Unit RH POS     XM INTEP COMP     Dispense Status TR     Blood Expiration Date December 30, 2023 23:59 EST     PRODUCT BLOOD TYPE 6200     UNIT VOLUME 350     PRODUCT CODE W1137P84     Unit Number [id number]     Unit ABO A     Unit RH POS     XM INTEP COMP     Dispense Status TR     Blood Expiration Date January 01, 2024 23:59 EST     PRODUCT BLOOD TYPE 6200     UNIT VOLUME 350    BLOOD GAS ARTERIAL FULL PANEL   Result Value Ref Range    POCT pH, Arterial 7.37 (L) 7.38 - 7.42 pH    POCT pCO2, Arterial 44 (H) 38 - 42 mm Hg    POCT pO2, Arterial 105 (H) 85 - 95 mm Hg    POCT SO2, Arterial 99 94 - 100 %    POCT Oxy Hemoglobin, Arterial 96.7 94.0 - 98.0 %    POCT Hematocrit Calculated, Arterial 34.0 (L) 36.0 - 46.0 %    POCT Sodium, Arterial 134 (L) 136 - 145 mmol/L    POCT Potassium, Arterial 3.5 3.5 - 5.3 mmol/L    POCT Chloride, Arterial 104 98 - 107 mmol/L    POCT Ionized Calcium, Arterial 1.16 1.10 - 1.33 mmol/L    POCT Glucose, Arterial 126 (H) 74 - 99 mg/dL    POCT Lactate, Arterial 0.9 0.4 - 2.0 mmol/L    POCT Base Excess, Arterial -0.1 -2.0 - 3.0 mmol/L    POCT HCO3 Calculated, Arterial 25.4 22.0 - 26.0 mmol/L    POCT Hemoglobin, Arterial 11.4 (L) 12.0 - 16.0 g/dL    POCT Anion Gap, Arterial 8 (L) 10 - 25 mmo/L    Patient Temperature 37.0 degrees Celsius    FiO2 50 %   Coagulation Screen   Result Value Ref Range    Protime 42.0 (H) 9.8 - 12.8 seconds    INR  3.7 (H) 0.9 - 1.1    aPTT 62 (H) 27 - 38 seconds   Prepare RBC: 4 Units   Result Value Ref Range    PRODUCT CODE X4434Y51     Unit Number [id number]     Unit ABO A     Unit RH POS     XM INTEP COMP     Dispense Status XM     Blood Expiration Date December 16, 2023 23:59 EST     PRODUCT BLOOD TYPE 6200     UNIT VOLUME 350     PRODUCT CODE Z3401T99     Unit Number [id number]     Unit ABO A     Unit RH POS     XM INTEP COMP     Dispense Status XM     Blood Expiration Date December 16, 2023 23:59 EST     PRODUCT BLOOD TYPE 6200     UNIT VOLUME 350     PRODUCT CODE F2866V12     Unit Number [id number]     Unit ABO A     Unit RH POS     XM INTEP COMP     Dispense Status XM     Blood Expiration Date December 13, 2023 23:59 EST     PRODUCT BLOOD TYPE 6200     UNIT VOLUME 350     PRODUCT CODE C2249E57     Unit Number [id number]     Unit ABO A     Unit RH POS     XM INTEP COMP     Dispense Status XM     Blood Expiration Date December 10, 2023 23:59 EST     PRODUCT BLOOD TYPE 6200     UNIT VOLUME 350    BLOOD GAS ARTERIAL FULL PANEL   Result Value Ref Range    POCT pH, Arterial 7.22 (LL) 7.38 - 7.42 pH    POCT pCO2, Arterial 65 (H) 38 - 42 mm Hg    POCT pO2, Arterial 93 85 - 95 mm Hg    POCT SO2, Arterial 97 94 - 100 %    POCT Oxy Hemoglobin, Arterial 95.1 94.0 - 98.0 %    POCT Hematocrit Calculated, Arterial 38.0 36.0 - 46.0 %    POCT Sodium, Arterial 134 (L) 136 - 145 mmol/L    POCT Potassium, Arterial 3.8 3.5 - 5.3 mmol/L    POCT Chloride, Arterial 104 98 - 107 mmol/L    POCT Ionized Calcium, Arterial 1.12 1.10 - 1.33 mmol/L    POCT Glucose, Arterial 234 (H) 74 - 99 mg/dL    POCT Lactate, Arterial 2.0 0.4 - 2.0 mmol/L    POCT Base Excess, Arterial -2.3 (L) -2.0 - 3.0 mmol/L    POCT HCO3 Calculated, Arterial 26.6 (H) 22.0 - 26.0 mmol/L    POCT Hemoglobin, Arterial 12.7 12.0 - 16.0 g/dL    POCT Anion Gap, Arterial 7 (L) 10 - 25 mmo/L    Patient Temperature 37.0 degrees Celsius    FiO2 100 %   BLOOD GAS  ARTERIAL FULL PANEL   Result Value Ref Range    POCT pH, Arterial 7.32 (L) 7.38 - 7.42 pH    POCT pCO2, Arterial 48 (H) 38 - 42 mm Hg    POCT pO2, Arterial 88 85 - 95 mm Hg    POCT SO2, Arterial 98 94 - 100 %    POCT Oxy Hemoglobin, Arterial 95.1 94.0 - 98.0 %    POCT Hematocrit Calculated, Arterial 37.0 36.0 - 46.0 %    POCT Sodium, Arterial 134 (L) 136 - 145 mmol/L    POCT Potassium, Arterial 3.6 3.5 - 5.3 mmol/L    POCT Chloride, Arterial 105 98 - 107 mmol/L    POCT Ionized Calcium, Arterial 1.10 1.10 - 1.33 mmol/L    POCT Glucose, Arterial 221 (H) 74 - 99 mg/dL    POCT Lactate, Arterial 1.5 0.4 - 2.0 mmol/L    POCT Base Excess, Arterial -1.8 -2.0 - 3.0 mmol/L    POCT HCO3 Calculated, Arterial 24.7 22.0 - 26.0 mmol/L    POCT Hemoglobin, Arterial 12.4 12.0 - 16.0 g/dL    POCT Anion Gap, Arterial 8 (L) 10 - 25 mmo/L    Patient Temperature 37.0 degrees Celsius    FiO2 100 %     No results found.    Additional Labs:      This patient is intubated   Reason for patient to remain intubated today? they are planned for extubation tomorrow             Assessment/Plan    Shirin Slater is a 53 y.o.  yo with PMH T2DM (A1c 8.5) c/b diabetic neuropathy, renal and splenic thrombosis (on Coumadin, aspirin and clopidogrel), HTN, HLD, PVD c/b R hip disarticulation and LLL disease s/p L 1/2/5 digit amputations and previous iliac artery stenting who presents to SICU s/p L common femoral and external iliac embolectomy, patch angioplasty common femoral artery on 12/6.     Plan:  NEURO: Neuro intact, MAEx4 to commands, no focal deficits/Intubated, sedated with propofol.   - continue propofol infusion overnight. Wean in am for cpap trial  - hydromorphone as needed pain  - ongoing neuro/neurovasc/pain assessments  - PT/OT consulted  - continue home depakote, duloxetine, gabapentin, busparone, keppra    CV: History of HTN, HLD, PVD. Baseline cardiac function EF 60-65% (2022). Hemodynamically intact without vasoactive support  - goal sbp  120-160 range per vascular surgery  - continuous ekg/abp monitoring  - volume as indicated  - trend lactate  - hold home metoprolol 12.5 BID  - hold home plavix, aspirin, atorvastatin    PULM: Arrived to SICU Intubated/on 10L SFM  - Wean vent as tolerated.   - goal SpO2 >94%  - Q1h IS after extubated  - additional bronchial hygiene as indicated  - post op and daily cxr    GI: okay for enteral meds per vascular surgery  - PPI for GI prophylaxis.    : Baseline cr 0.57  - Check renal function panel post op. Further frequency to be determined  - Maintenance IVF 100cc/hr   - Volume resuscitation as needed.    - Goal U/O >1-2ml/kg/hr.    - Replete electrolytes to goal K>4, Mg>2, Phos>2.5, ionized Ca>1.10     HEME: Acute surgical blood loss anemia. OR , received 2u PRBC  - Check CBC, coags, fibrinogen post op, q2h. Further frequency to be determined  - scds for dvt prophylaxis.  - no subcutaneous heparin per vascular surgery at this time. Low intensity heparin with boluses when INR <2    ENDO: History of NIDDM, hypothyroidism. Last A1c 8.1  - Q4h BG and SSI Lispro per ICU protocol.  - continue home levothyroxine  - hold home empagliflozin    ID: afebrile, no leukocytosis  - Cefazolin for 24 hrs  - monitor for s/s infection    Lines: L ulnar arterial line placed by vascular surgery, 20G PIV L AC x2, 20G PIV L hand, 20G PIV R wrist, vasques, ETT    Dispo: continue ICU care. Reassess dispo in am. Discussed with Dr. Platt.    Dayna Terrazas MD

## 2023-12-07 NOTE — CONSULTS
Inpatient consult to Vascular Medicine  Consult performed by: Shonda Leslie MD  Consult ordered by: DIAN Sanford-CNP        History Of Present Illness:    Shirin Slater is a 53 y.o. 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    As per chart review, the patient experienced L leg pain around 1-2 weeks ago, that progressively got worse over the course of the week. She noticed that in addition to the pain, her leg would get pale and cyanotic in addition to cold to touch which prompted her to present to [facility]. She was started on a heparin gtt and ultimately underwent a CTA w/ runoffs, which showed 50% stenosis of abdominal aorta, complete occlusion of R iliac stent, complete occlusion of L common femoral, superficial femoral and popliteal arteries w/ stents on the L iliac and popliteal arteries noted. However, given significant elevated INR, heparin was stopped and ultimately patient was transferred to [facility] on 12/6. Upon admission, the patient reported that she was not very compliant with her medications recently, going several days without taking them due to bad memory and overall feeling of depression.    During the hospital stay, the patient underwent left femoral cutdown, open sharif thrombectomy of external iliac artery, common femoral artery, superficial femoral artery, and profunda femoris. Flow restored down into profunda, and into SFA. Patch angioplasty CFA. During the procedure, the patient had 600 cc of estimated blood loss and was reportedly hypotensive during the procedure, the patient received 2 units of packed RBCs along with 4 units of  FFP.  INR was out of reference range before the case so 10 mg of vitamin K was given at the start and INR went down to 3.7 during the case.    As for her prior events, the patient could not remember the details but as per chart review, there was reported events of splenic and renal artery thrombosis in 2015 and she was on warfarin at the time, the patient was admitted again in 12/2020 where she had CLI of right lower extremity after presenting with numbness and resting pain, on 12/28/2020 underwent successful PTA/DCB/stenting of right SFA, popliteal, peroneal, and TPT via right groin antegrade access (6F) with Dr. Weaver and was discharged on eliquis 5 bid and DAPT (plavix/asa). The patient was admitted again on 8/2021 with RLE critical limb ischemia that failed revascularization attempts  and ultimately underwent R hip disarticulation (there was c/f eliquis non compliance) and the patient was transitioned to warfarin.    Last Recorded Vitals:  Vitals:    12/07/23 1130 12/07/23 1145 12/07/23 1200 12/07/23 1215   BP:       Patient Position:       Pulse: 109 (!) 114 102 104   Resp: 15 15 16 13   Temp:       TempSrc:       SpO2: 94% 94% 94% 93%   Weight:       Height:           Last Labs:  CBC - 12/7/2023: 11:24 AM  14.3 10.4 482    32.8      CMP - 12/7/2023: 11:24 AM  8.1 6.2 21 --- 1.1   2.6 3.2 9 64      PTT - 12/7/2023: 11:24 AM  2.0   22.8 40     Troponin I   Date/Time Value Ref Range Status   06/30/2022 01:06 AM 3 0 - 34 ng/L Final     Comment:     .  Less than 99th percentile of normal range cutoff-  Female and children under 18 years old <35 ng/L; Male <54 ng/L: Negative  Repeat testing should be performed if clinically indicated.   .  Female and children under 18 years old  ng/L; Male  ng/L:  Consistent with possible cardiac damage and possible increased clinical   risk. Serial measurements may help to assess extent of myocardial damage.   .  >120 ng/L: Consistent with cardiac damage, increased  clinical risk and  myocardial infarction. Serial measurements may help assess extent of   myocardial damage.   .   NOTE: Children less than 1 year old may have higher baseline troponin   levels and results should be interpreted in conjunction with the overall   clinical context.  .  NOTE: Troponin I testing is performed using a different   testing methodology at [facility] than at other   [facility]. Direct result comparisons should only   be made within the same method.     06/29/2022 11:07 PM 3 0 - 34 ng/L Final     Comment:     .  Less than 99th percentile of normal range cutoff-  Female and children under 18 years old <35 ng/L; Male <54 ng/L: Negative  Repeat testing should be performed if clinically indicated.   .  Female and children under 18 years old  ng/L; Male  ng/L:  Consistent with possible cardiac damage and possible increased clinical   risk. Serial measurements may help to assess extent of myocardial damage.   .  >120 ng/L: Consistent with cardiac damage, increased clinical risk and  myocardial infarction. Serial measurements may help assess extent of   myocardial damage.   .   NOTE: Children less than 1 year old may have higher baseline troponin   levels and results should be interpreted in conjunction with the overall   clinical context.  .  NOTE: Troponin I testing is performed using a different   testing methodology at [facility] than at other   [facility]. Direct result comparisons should only   be made within the same method.     06/29/2022 06:06 AM <3 0 - 34 ng/L Final     Comment:     .  Less than 99th percentile of normal range cutoff-  Female and children under 18 years old <35 ng/L; Male <54 ng/L: Negative  Repeat testing should be performed if clinically indicated.   .  Female and children under 18 years old  ng/L; Male  ng/L:  Consistent with possible cardiac damage and possible increased clinical   risk. Serial measurements  "may help to assess extent of myocardial damage.   .  >120 ng/L: Consistent with cardiac damage, increased clinical risk and  myocardial infarction. Serial measurements may help assess extent of   myocardial damage.   .   NOTE: Children less than 1 year old may have higher baseline troponin   levels and results should be interpreted in conjunction with the overall   clinical context.  .  NOTE: Troponin I testing is performed using a different   testing methodology at [facility] than at other   [facility] hospitals. Direct result comparisons should only   be made within the same method.       Hemoglobin A1C   Date/Time Value Ref Range Status   09/12/2023 10:57 AM 8.5 (H) 4.0 - 5.6 % Final   12/10/2021 10:30 AM 6.0 (H) 4.0 - 5.6 % Final     LDL Calculated   Date/Time Value Ref Range Status   12/06/2023 06:02  (H) <=99 mg/dL Final     Comment:                                 Near   Borderline      AGE      Desirable  Optimal    High     High     Very High     0-19 Y     0 - 109     ---    110-129   >/= 130     ----    20-24 Y     0 - 119     ---    120-159   >/= 160     ----      >24 Y     0 -  99   100-129  130-159   160-189     >/=190       VLDL   Date/Time Value Ref Range Status   12/06/2023 06:02 PM 47 (H) 0 - 40 mg/dL Final   08/01/2021 05:05 AM 21 0 - 40 mg/dL Final      Last I/O:  I/O last 3 completed shifts:  In: 7257.8 (72.6 mL/kg) [I.V.:2339.6 (23.4 mL/kg); Blood:2400; IV Piggyback:2518.2]  Out: 1625 (16.3 mL/kg) [Urine:1125 (0.3 mL/kg/hr); Blood:500]  Weight: 100 kg     Past Cardiology Tests (Last 3 Years):  EKG:  No results found for this or any previous visit from the past 1095 days.    Echo:  No results found for this or any previous visit from the past 1095 days.    Ejection Fractions:  No results found for: \"EF\"  Cath:  No results found for this or any previous visit from the past 1095 days.    Stress Test:  Nuclear Stress Test 01/20/2022    Cardiac Imaging:  No results found for this or " any previous visit from the past 1095 days.      Past Medical History:  She has a past medical history of Anxiety, Arthritis, Cancer (CMS/[facility]), Cellulitis, Diabetes mellitus (CMS/[facility]), Disease of thyroid gland, Diverticulitis, Epilepsy, unspecified, not intractable, without status epilepticus (CMS/[facility]), HLD (hyperlipidemia), blood clots, Hypertension, PAD (peripheral artery disease) (CMS/[facility]), Peripheral vascular disease, unspecified (CMS/[facility]) (2022), PTSD (post-traumatic stress disorder), and Uterine cancer (CMS/[facility]).    Past Surgical History:  She has a past surgical history that includes Other surgical history (2021); CT angio aorta and bilateral iliofemoral runoff w and or wo IV contrast (2021); CT angio aorta and bilateral iliofemoral runoff w and or wo IV contrast (2022); IR angiogram aorta abdomen (2021); Tonsilectomy, adenoidectomy, bilateral myringotomy and tubes ();  section, classic (); Anterior cruciate ligament repair (Left, ); Hysterectomy (); Colon surgery (); Vascular surgery (Right, ); Vascular surgery (Right, ); Vascular surgery (Right, ); Leg amputation (Right, ); Amputation foot / toe (Left, ); and Amputation foot / toe (Left).      Social History:  She reports that she has been smoking cigarettes. She started smoking about 42 years ago. She has a 40.00 pack-year smoking history. She has never used smokeless tobacco. She reports current alcohol use. She reports current drug use. Drug: Marijuana.    Family History:  Family History   Problem Relation Name Age of Onset    Diverticulitis Daughter      Heart failure Other          Several family members without direct blood connection        Allergies:  Aspartame and Nsaids (non-steroidal anti-inflammatory drug)    Inpatient Medications:  Scheduled medications   Medication Dose Route Frequency    acetaminophen  650 mg oral q6h    albumin human  25 g intravenous q6h    [Held  by provider] aspirin  81 mg oral Daily    [Held by provider] atorvastatin  80 mg oral Nightly    busPIRone  10 mg oral Daily    ceFAZolin  2 g intravenous q8h    [Held by provider] clopidogrel  75 mg oral Daily    divalproex  1,000 mg oral q PM    divalproex  500 mg oral q AM    DULoxetine  60 mg oral Daily    gabapentin  600 mg oral q8h NINO    glycopyrrolate        insulin lispro  0-15 Units subcutaneous q4h    levETIRAcetam  500 mg intravenous q12h    levothyroxine  75 mcg oral Daily    neostigmine        sennosides  2 tablet oral BID     PRN medications   Medication    albuterol    calcium gluconate    calcium gluconate    dextrose 10 % in water (D10W)    dextrose    dextrose    glucagon    glycopyrrolate    heparin    HYDROmorphone    ipratropium-albuteroL    magnesium sulfate    magnesium sulfate    neostigmine    oxyCODONE    oxyCODONE    oxygen    [Held by provider] potassium chloride CR    [Held by provider] potassium chloride CR     Continuous Medications   Medication Dose Last Rate    heparin  0-4,000 Units/hr 1,000 Units/hr (12/07/23 0854)    lactated Ringer's  100 mL/hr      phenylephrine  0.1-2 mcg/kg/min (Dosing Weight) 0.5 mcg/kg/min (12/07/23 0833)     Outpatient Medications:  Current Outpatient Medications   Medication Instructions    albuterol 90 mcg/actuation inhaler 2 puffs, inhalation, Every 6 hours PRN    aspirin 81 mg EC tablet 1 tablet, oral, Daily    atorvastatin (Lipitor) 80 mg tablet 1 tablet, oral, Nightly    busPIRone (BUSPAR) 10 mg, oral, In the morning    clopidogrel (PLAVIX) 75 mg, oral, Daily    divalproex (Depakote) 500 mg EC tablet 1 tablet, oral, In the morning, and 2 tablets in the evening    DULoxetine (Cymbalta) 60 mg DR capsule 1 capsule, oral, Daily    empagliflozin (JARDIANCE) 10 mg, oral, Daily    gabapentin (Neurontin) 400 mg capsule 1 capsule, oral, 3 times daily    levETIRAcetam (KEPPRA) 500 mg, oral, 2 times daily    levothyroxine (TIROSINT) 75 mcg, oral, Daily before  breakfast, On empty stomach    lidocaine (Lidoderm) 5 % patch 1 patch, transdermal, Daily, 12 hours on, 12 hours off.    loperamide (IMODIUM) 4 mg, oral, As needed, Don't exceed 4 tablets per day    melatonin 10 mg tablet 1 tablet, oral, Daily    methocarbamol (Robaxin) 500 mg tablet 1 tablet, oral, 3 times daily    metoprolol tartrate (Lopressor) 25 mg tablet 0.5 tablets, oral, 2 times daily    omeprazole OTC (PRILOSEC OTC) 20 mg, oral, Daily before breakfast       Physical Exam:  General Appearance:    Alert,   Lungs:   Respirations unlabored  Heart:    RRR  Abdomen:  Soft  Extremities:  s/p R hip disarticulation , LLE with livedoreticularis           Assessment/Plan   Shirin Slater is a 53 y.o. 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 The patient underwent left femoral cutdown, open sharif thrombectomy of external iliac artery, common femoral artery, superficial femoral artery, and profunda femoris. Flow restored down into profunda, and into SFA. Patch angioplasty CFA. The patient reports med non-compliance over the past few months and it's unclear if her INR were therapeutic or not over the past few weeks. On presentation her INR was high (unclear reasons).    Recommendations:  -For now, continue heparin gtt.   -It is less likely that this is considered warfarin failure given reported compliance issues. Given her significant arterial and venous thrombi, warfarin remains the drug of choice with her AC.  -We will continue to follow.    Peripheral IV 12/07/23 Left Antecubital (Active)   Site Assessment Clean;Dry;Intact 12/07/23 0800   Dressing Type Transparent  12/07/23 0800   Line Status Flushed 12/07/23 0800   Dressing Status Clean;Dry;Occlusive 12/07/23 0800   Number of days: 0       Peripheral IV 12/07/23 Distal;Left;Anterior Forearm (Active)   Site Assessment Clean;Dry;Intact 12/07/23 0800   Dressing Type Transparent 12/07/23 0800   Line Status Flushed 12/07/23 0800   Dressing Status Clean;Dry;Occlusive 12/07/23 0800   Number of days: 0       Peripheral IV 12/07/23 Left;Anterior Forearm (Active)   Site Assessment Clean;Dry;Intact 12/07/23 0800   Dressing Type Transparent 12/07/23 0800   Line Status Flushed 12/07/23 0800   Dressing Status Clean;Dry;Occlusive 12/07/23 0800   Number of days: 0       Arterial Line 12/06/23 Left (Active)   Site Assessment Clean;Dry;Intact 12/07/23 0800   Line Status Pulsatile blood flow 12/07/23 0800   Art Line Waveform Appropriate 12/07/23 0800   Art Line Interventions Zeroed and calibrated;Leveled;Flushed per protocol 12/07/23 0800   Color/Movement/Sensation Capillary refill less than 3 sec;Distal pulses palpable 12/07/23 0800   Dressing Type Antimicrobial patch;Transparent 12/07/23 0800   Dressing Status Clean;Dry;Occlusive 12/07/23 0800   Number of days: 1       Urethral Catheter Non-latex 16 Fr. (Active)   Output (mL) 400 mL 12/07/23 1200   Number of days: 1       Code Status:  DNR    This case was discussed with Dr. Velasquez and > 30 minutes was spent in the professional and overall care of this patient.    Shonda Leslie MD  Cardiology Fellow PGY4

## 2023-12-07 NOTE — CONSULTS
Inpatient Diabetes Education Consult    Reason for Visit:  Shirin Slater is a 53 y.o. female who presents for limb ischemia; hx T2DM, HTN and s/p left femoral thrombectomy    Consulting Service/Provider: Dr. Elkins    Visit Type: Initial visit    Visit Modality: In-person    Discharge Equipment/Supply Needs: may require refills at discharge       Patient has supplies at home: Blood glucose meter:  , Testing strips:  , and Lancets    Patient History and Assessment:  New diagnosis:  n/a  Previous diagnosis: Type 2  Patient known to Diabetes Education department: No  Treatment prior to hospital admission: Oral medications Jardiance  Blood glucose testing: Twice Daily  Complications: peripheral neuropathy, peripheral vascular disease, and obesity  PTA Medications:    Current Outpatient Medications   Medication Instructions    albuterol 90 mcg/actuation inhaler 2 puffs, inhalation, Every 6 hours PRN    aspirin 81 mg EC tablet 1 tablet, oral, Daily    atorvastatin (Lipitor) 80 mg tablet 1 tablet, oral, Nightly    busPIRone (BUSPAR) 10 mg, oral, In the morning    clopidogrel (PLAVIX) 75 mg, oral, Daily    divalproex (Depakote) 500 mg EC tablet 1 tablet, oral, In the morning, and 2 tablets in the evening    DULoxetine (Cymbalta) 60 mg DR capsule 1 capsule, oral, Daily    empagliflozin (JARDIANCE) 10 mg, oral, Daily    gabapentin (Neurontin) 400 mg capsule 1 capsule, oral, 3 times daily    levETIRAcetam (KEPPRA) 500 mg, oral, 2 times daily    levothyroxine (TIROSINT) 75 mcg, oral, Daily before breakfast, On empty stomach    lidocaine (Lidoderm) 5 % patch 1 patch, transdermal, Daily, 12 hours on, 12 hours off.    loperamide (IMODIUM) 4 mg, oral, As needed, Don't exceed 4 tablets per day    melatonin 10 mg tablet 1 tablet, oral, Daily    methocarbamol (Robaxin) 500 mg tablet 1 tablet, oral, 3 times daily    metoprolol tartrate (Lopressor) 25 mg tablet 0.5 tablets, oral, 2 times daily    omeprazole OTC (PRILOSEC OTC) 20  "mg, oral, Daily before breakfast       Glucose   Date/Time Value Ref Range Status   12/07/2023 11:24  (H) 74 - 99 mg/dL Final   12/07/2023 09:10  (H) 74 - 99 mg/dL Final   12/07/2023 04:17  (H) 74 - 99 mg/dL Final   12/06/2023 06:02  (H) 74 - 99 mg/dL Final   07/07/2022 06:40  (H) 74 - 99 mg/dL Final   07/06/2022 07:48  (H) 74 - 99 mg/dL Final   07/05/2022 05:35  (H) 74 - 99 mg/dL Final   07/04/2022 12:09  (H) 74 - 99 mg/dL Final   07/02/2022 12:41  (H) 74 - 99 mg/dL Final   06/30/2022 11:37  (H) 74 - 99 mg/dL Final   06/30/2022 01:06  (H) 74 - 99 mg/dL Final   06/29/2022 07:05  (H) 74 - 99 mg/dL Final   06/29/2022 06:06  (H) 74 - 99 mg/dL Final   06/25/2022 08:29  (H) 74 - 99 mg/dL Final     No results found for: \"CPEPTIDE\"  Hemoglobin A1C   Date Value Ref Range Status   09/12/2023 8.5 (H) 4.0 - 5.6 % Final   12/10/2021 6.0 (H) 4.0 - 5.6 % Final   09/13/2021 4.6 4.0 - 5.6 % Final   11/20/2020 7.1 (H) 4.0 - 5.6 % Final   08/17/2020 7.9 (H) 4.0 - 5.6 % Final       Patient Learning/Readiness Assessment:  Ms. Slater states this visit \"don't mean anything to me... no offense but I don't need you to see me\"      Interventions/Topics Covered:  See After Visit Summary for handouts/information sheets provided to patient.  Education Documentation  No documentation found.        Additional topics covered: She is able and willing to share some of her past history:  DM > 10 years; FSBG testing twice a day and states results are 90's - 110's.  Re nutrition she states she is aware of the foods she should limit.    Additional materials provided: n/a    CGM:  n/a    Education Outcome/Recommendations:        Recommendations for bedside nursing:  keep her informed of BG results and insulin doses.    Recommendations for Providers: Follow-up w/ PCP and/or Endocrinology    Additional Comments: Ms. Slater states she is planning to return home after this " hospitalization.  She has HHA 7 days a week and needs are met.  She is comfortable with her current DM regimen of monitoring and oral med.  She states she does not need intervention from this educator at this time.  Will sign off.  Time spent:  15 mins.

## 2023-12-07 NOTE — ANESTHESIA POSTPROCEDURE EVALUATION
Patient: Shirin Slater    Procedure Summary       Date: 12/06/23 Room / Location: [facility] OR 27 / [facility] OR    Anesthesia Start: 2000 Anesthesia Stop: 12/07/23 0048    Procedure: Thrombectomy Lower Extremity, End Patch Angioplasty of Common Femoral Artery (Left) Diagnosis:       Limb ischemia      (Limb ischemia [I99.8])    Surgeons: Ara Jesus MD Responsible Provider: Guilherme Mata MD    Anesthesia Type: general ASA Status: 3 - Emergent            Anesthesia Type: general    Vitals Value Taken Time   /62 12/07/23 0102   Temp 36 12/07/23 0102   Pulse 76 12/07/23 0101   Resp 19 12/07/23 0101   SpO2 97 % 12/07/23 0101   Vitals shown include unvalidated device data.    Anesthesia Post Evaluation    Patient location during evaluation: ICU  Patient participation: complete - patient cannot participate  Level of consciousness: sedated  Pain management: adequate  Airway patency: patent  Cardiovascular status: acceptable  Respiratory status: ETT and acceptable  Hydration status: acceptable  Postoperative Nausea and Vomiting: none    Patient stable, sedated on ventilator in ICU during my visit. JONATHON Mata MD 2:06 12/7/2023    No notable events documented.

## 2023-12-07 NOTE — ANESTHESIA PROCEDURE NOTES
Arterial Line:    Date/Time: 12/6/2023 8:30 PM    Staffing  Performed: fellow   Authorized by: Ara Jesus MD    Performed by: Sanju Rain MD    An arterial line was placed. Procedure performed using ultrasound guidance.in the OR for the following indication(s): continuous blood pressure monitoring and blood sampling needed.    A 20 gauge (size), 1 and 3/4 inch (length), Angiocath (type) catheter was placed into the Left ulnar artery, secured by Tegaderm,   Seldinger technique used.  Events:  patient tolerated procedure well with no complications.

## 2023-12-07 NOTE — PROGRESS NOTES
1/1 CTICU     VASCULAR SURGERY  12/06  L common femoral and external iliac embolectomy, patch angioplasty common femoral artery      DC PLAN  PLAN TBD - Care Transitions is following to develop a safe & supportive discharge plan in collaboration with multidisciplinary team (&) patient/family/significant others.      COMPLETED  (X) Daily ongoing review of patient via chart and (M-F) IDT rounds  (X) 12/06 - Met with patient and partially completed Care Transitions (DC/SDOH) info before patient asked for author to come back another time d/t pain.     Deyanira Jeong (LSW, MSW)

## 2023-12-07 NOTE — PROGRESS NOTES
"Shirin Slater is a 53 y.o. female on day 1 of admission presenting with Limb ischemia.    Subjective   Remains on phenylephrine infusion       Objective     Physical Exam  Vitals and nursing note reviewed.   Constitutional:       Appearance: Normal appearance.   HENT:      Head: Normocephalic.      Nose: Nose normal.      Mouth/Throat:      Mouth: Mucous membranes are moist.   Eyes:      Pupils: Pupils are equal, round, and reactive to light.   Cardiovascular:      Rate and Rhythm: Normal rate and regular rhythm.      Pulses:           Radial pulses are 1+ on the right side and 1+ on the left side.         Right popliteal pulse not accessible.        Dorsalis pedis pulses are detected w/ Doppler on the left side. Right dorsalis pedis pulse not accessible.        Posterior tibial pulses are detected w/ Doppler on the left side. Right posterior tibial pulse not accessible.      Heart sounds: Normal heart sounds, S1 normal and S2 normal.      Comments: L ulnar art line present. History of R hip disarticulation  Pulmonary:      Effort: Pulmonary effort is normal.   Abdominal:      General: Bowel sounds are normal.      Palpations: Abdomen is soft.   Musculoskeletal:      Cervical back: Normal range of motion.      Left lower le+ Edema present.   Skin:     General: Skin is warm and dry.      Capillary Refill: Capillary refill takes less than 2 seconds.      Comments: LLE shiny, red and edematous. Previous toe amputation wounds semi-healed   Neurological:      General: No focal deficit present.      Mental Status: She is alert and oriented to person, place, and time.   Psychiatric:         Mood and Affect: Mood normal.         Last Recorded Vitals  Blood pressure 123/84, pulse 105, temperature 36.3 °C (97.3 °F), temperature source Temporal, resp. rate 15, height 1.7 m (5' 6.93\"), weight 100 kg (220 lb 7.4 oz), SpO2 93 %.    Heart Rate:  []   Temp:  [36 °C (96.8 °F)-36.5 °C (97.7 °F)]   Resp:  [10-20]   BP: " "(123)/(84)   Height:  [170 cm (5' 6.93\")]   Weight:  [97.8 kg (215 lb 9.8 oz)-100 kg (220 lb 7.4 oz)]   SpO2:  [92 %-100 %]      Intake/Output last 3 Shifts:  I/O last 3 completed shifts:  In: 7257.8 (72.6 mL/kg) [I.V.:2339.6 (23.4 mL/kg); Blood:2400; IV Piggyback:2518.2]  Out: 1625 (16.3 mL/kg) [Urine:1125 (0.3 mL/kg/hr); Blood:500]  Weight: 100 kg       Intake/Output Summary (Last 24 hours) at 12/7/2023 0836  Last data filed at 12/7/2023 0700  Gross per 24 hour   Intake 7540.82 ml   Output 1625 ml   Net 5915.82 ml      Relevant Results    Scheduled medications  [Held by provider] aspirin, 81 mg, oral, Daily  [Held by provider] atorvastatin, 80 mg, oral, Nightly  busPIRone, 10 mg, oral, Daily  ceFAZolin, 2 g, intravenous, q8h  [Held by provider] clopidogrel, 75 mg, oral, Daily  divalproex, 1,000 mg, oral, q PM  divalproex, 500 mg, oral, q AM  DULoxetine, 60 mg, oral, Daily  glycopyrrolate, , ,   insulin lispro, 0-15 Units, subcutaneous, q4h  levETIRAcetam, 500 mg, intravenous, q12h  levothyroxine, 75 mcg, oral, Daily  neostigmine, , ,   sennosides, 2 tablet, oral, BID      Continuous medications  heparin, 0-4,000 Units/hr  lactated Ringer's, 100 mL/hr  phenylephrine, 0.1-2 mcg/kg/min (Dosing Weight), Last Rate: 0.5 mcg/kg/min (12/07/23 0833)      PRN medications  PRN medications: albuterol, calcium gluconate, calcium gluconate, dextrose 10 % in water (D10W), dextrose, dextrose **OR** [DISCONTINUED] glucagon, glucagon, glycopyrrolate, heparin, HYDROmorphone, ipratropium-albuteroL, magnesium sulfate, magnesium sulfate, neostigmine, oxyCODONE, oxyCODONE, oxygen, [Held by provider] potassium chloride CR **OR** [DISCONTINUED] potassium chloride, [Held by provider] potassium chloride CR **OR** [DISCONTINUED] potassium chloride         Assessment/Plan     Shirin Slater is a 53 y.o.  yo with PMH T2DM (A1c 8.5) c/b diabetic neuropathy, renal and splenic thrombosis (on Coumadin, aspirin and clopidogrel), HTN, HLD, PVD c/b " "R hip disarticulation and LLL disease s/p L 1/2/5 digit amputations and previous iliac artery stenting who presents to SICU s/p L common femoral and external iliac embolectomy, patch angioplasty common femoral artery on 12/6.      Plan:  NEURO: History of epilepsy, on AEDs. Chronic pain, diabetic neuropathy. Currently she only takes tylenol and gabapentin, has weaned herself off oxycodone. At baseline, lives alone. Can \"hop\" around short distances holding onto to stable objects (couch, counter) and uses hand propelled wheelchair for longer distances. Has home health aid. Neuro intact, MAEx3 to commands. Complaints of neuropathic pain to L foot. Groin area sore where prevena is located.  - order scheduled acetaminophen and gabapentin. PRN oxycodone with breakthrough iv hydromorphone  - ongoing neuro/neurovasc/pain assessments  - PT/OT consulted  - continue home depakote, duloxetine, busparone, keppra  - SW contacted for ongoing home resource management, discharge planning, financial concerns     CV: History of HTN, HLD, PVD s/p L and R iliac and L popliteal stents, R hip disarticulation, L 1/2/5 digit amputations, renal and splenic thrombosis (on Coumadin, aspirin and clopidogrel). Questionable compliance with meds. Baseline cardiac function EF 60-65% (2022). Acute LLE ischemia s/p L common femoral and external iliac embolectomy, patch angioplasty common femoral artery on 12/6. Still requiring phenylephrine infusion to maintain map >65, sbp 120-160. CK WNL this am.  - goal sbp 120-160 range per vascular surgery  - titrate phenylephrine accordingly  - continuous ekg/abp monitoring  - volume as indicated  - trend lactate and CK  - hold home metoprolol 12.5 BID  - hold home plavix, aspirin, atorvastatin  - KVO IVF with diet advancement  - start low intensity heparin infusion      PULM: 40 pack year smoker. Arrived to SICU Intubated. Extubated, currently on NC  - goal SpO2 >94%  - Q1h IS while awake  - additional " bronchial hygiene as indicated  - cxr prn     GI: On PPI at home  - continue PPI  - bowel regimen     : Baseline cr 0.57  - Check renal function panel bid and prn  - Maintenance IVF 100cc/hr -> KVO  - Volume resuscitation as needed.    - Goal U/O >1-2ml/kg/hr.    - Replete electrolytes to goal K>4, Mg>2, Phos>2.5, ionized Ca>1.10      HEME: Acute surgical blood loss anemia, supratherapeutic INR. OR , received 2u PRBC, 4ffp and 10mg iv vitamin K  - Check CBC, coags, fibrinogen post op, q2h. Further frequency to be determined  - scds for dvt prophylaxis.  - no subcutaneous heparin per vascular surgery at this time. Low intensity heparin with boluses when INR <2     ENDO: History of NIDDM, hypothyroidism. Last A1c 8.5% 9/2023.   - Q4h BG and SSI Lispro per ICU protocol.  - continue home levothyroxine  - hold home empagliflozin  - add on TFTs, patient endorsed recent heat intolerance     ID: afebrile, reactive leukocytosis  - Cefazolin for 24 hrs  - monitor for s/s infection     Lines: L ulnar arterial line placed by vascular surgery, 20G PIV L AC x2, 20G PIV L hand, 20G PIV R wrist, vasques, ETT     Dispo: continue ICU care. Reassess dispo in am. Discussed with Dr. Lilian TENORIO spent 45 minutes f critical care time on this patient.      Lauren Durand, APRN-CNP

## 2023-12-07 NOTE — PROGRESS NOTES
Physical Therapy    Physical Therapy Evaluation    Patient Name: Shirin Slater  MRN: [id number]  Today's Date: 12/7/2023   Time Calculation  Start Time: 0943  Stop Time: 1001  Time Calculation (min): 18 min    Assessment/Plan   PT Assessment  PT Assessment Results: Decreased strength, Decreased endurance, Impaired balance, Decreased mobility, Pain  Rehab Prognosis: Good  Medical Staff Made Aware: Yes  End of Session Communication: Bedside nurse  Assessment Comment: Pt demonstrated ability to complete bed mobility and EOB sitting.  Limited time sitting EOB d/t increased pain in L foot.  Elevated L foot at end of session to assist with pain and swelling.  End of Session Patient Position: Bed, 3 rail up, Alarm off, not on at start of session  IP OR SWING BED PT PLAN  Inpatient or Swing Bed: Inpatient  PT Plan  Treatment/Interventions: Bed mobility, Transfer training, Balance training, Strengthening, Endurance training, Therapeutic exercise, Therapeutic activity  PT Plan: Skilled PT  PT Frequency: 3 times per week  PT Discharge Recommendations: Moderate intensity level of continued care  PT Recommended Transfer Status:  (Assist x1-2 for OOB activity)  PT - OK to Discharge: Yes      Subjective   General Visit Information:  Reason for Referral: L common femoral and external iliac embolectomy, patch angioplasty common femoral artery  Past Medical History Relevant to Rehab: T2DM c/b diabetic neuropathy, renal and splenic thrombosis (on Coumadin, aspirin and clopidogrel), HTN, HLD, seizure disorder, PVD c/b R hip disarticulation and LLL disease s/p L 1/2/5 digit amputations and previous iliac artery stenting  Prior to Session Communication: Bedside nurse  Patient Position Received: Bed, 3 rail up, Alarm off, not on at start of session  Family/Caregiver Present: No  General Comment: Awake, alert and willing to participate in PT session   Home Living:  Home Living  Type of Home: Apartment  Lives With: Alone (Aid comes over  7x/wk (5.5 hrs mon-sat; 7 hrs sun))  Home Adaptive Equipment:  (WC, bedside commode, shower bench, chair lift, hospital bed)  Home Layout:  (0 Stairs, bed/bath - 1st floor, tub/shower)  Prior Level of Function:  Prior Function Per Pt/Caregiver Report  Receives Help From: Home health  ADL Assistance:  (Pt states that her aid sets her clothes out but she is able to don/doff;  Pt reports being able to bath independently and transfer to toilet without assistance)  Homemaking Assistance:  (Assistance from [facility])  Ambulatory Assistance:  (Uses a WC at baseline (past 3 yrs in home and community);  Pt states she is able to stand-pivot transfer to different surfaces within her home.  Pt states she is able to independently get in/out of bed (hospital bed).)  Vocational:  (On waver program)  Prior Function Comments: [facility] provided pt with transportation  Precautions:  Precautions  Hearing/Visual Limitations: Appears WFL  Medical Precautions: Fall precautions  Precautions Comment: -160, SpO2 >94%  Vital Signs:  Vital Signs  Heart Rate: (!) 113 (End of session: 108)  Heart Rate Source: Monitor  Resp: 16 (End of session: 19)  SpO2: 95 % (End of session: 95)  BP: 110/65 (End of session: 98/57)  MAP (mmHg): 83 (End of session: 72)  BP Method: Arterial line  Patient Position: Lying    Objective   Lines/Tubes/Drains:  Arterial Line 12/06/23 Left (Active)   Number of days: 0       Urethral Catheter Non-latex 16 Fr. (Active)   Number of days: 0     Continuous Medications/Drips:  heparin, 0-4,000 Units/hr, Last Rate: 1,000 Units/hr (12/07/23 0854)  lactated Ringer's, 100 mL/hr  phenylephrine, 0.1-2 mcg/kg/min (Dosing Weight), Last Rate: 0.5 mcg/kg/min (12/07/23 0833)      Oxygen: 4L    Pain:  Pain Assessment  Pain Assessment: 0-10  Pain Score: 8 (Start/end of session - RN notified and reported pt got pain meds prior to session and not due for any until noon.)  Pain Location:  (L foot)  Cognition:  Cognition  Overall Cognitive Status:  "Within Functional Limits  Orientation Level: Oriented X4 (When asking orientation questions, pt became frustrated stating \"Why does everyone keep asking me that\")    Extremity/Trunk Assessments:  Strength:  Strength Comments: Not formally assessed 2/2 pain, however at least 3/5 shown through ROM against gravity  RUE   RUE : Within Functional Limits  LUE   LUE: Within Functional Limits     LLE   LLE : Within Functional Limits    General Assessments:  Strength  Strength Comments: Not formally assessed 2/2 pain, however at least 3/5 shown through ROM against gravity  General Observation  General Observation: Teley, 4L, karley, IV, prevena      Sensation  Light Touch: No apparent deficits     Static Sitting Balance  Static Sitting-Balance Support: Bilateral upper extremity supported  Static Sitting-Level of Assistance: Distant supervision  Static Sitting-Comment/Number of Minutes: Limited time sitting EOB d/t increased pain with L foot     Functional Assessments:  Bed Mobility  Bed Mobility: Yes  Bed Mobility 1  Bed Mobility 1: Supine to sitting, Sitting to supine  Level of Assistance 1: Close supervision  Bed Mobility Comments 1: HOB elevated, pt utilized railings to bring trunk upright and pivot hips towards EOB  Transfers  Transfer:  (Pt deferred this date d/t high levels of pain with sitting on the EOB)     Outcome Measures:  [facility] Basic Mobility  Turning from your back to your side while in a flat bed without using bedrails: A little  Moving from lying on your back to sitting on the side of a flat bed without using bedrails: A little  Moving to and from bed to chair (including a wheelchair): A lot  Standing up from a chair using your arms (e.g. wheelchair or bedside chair): A lot  To walk in hospital room: Total  Climbing 3-5 steps with railing: Total  Basic Mobility - Total Score: 12     FSS-ICU  Ambulation: Unable to attempt due to weakness  Rolling: Supervision or set-up only  Sitting: Supervision or set-up " only  Transfer Sit-to-Stand: Total assistance (performs 25% or requires another person)  Transfer Supine-to-Sit: Supervision or set-up only  Total Score: 16     Encounter Problems       Encounter Problems (Active)       Balance       Pt will demonstrate ability to complete sitting static/dynamic balance activities with unilateral UE support and no LOB for increase in safety up D/C.  (Progressing)       Start:  12/07/23    Expected End:  12/21/23               Mobility       Pt will demonstrate ability to complete ther ex activities to improve functional strength for increase in independence upon DC.  (Progressing)       Start:  12/07/23    Expected End:  12/21/23               Transfers       Pt will demonstrated ability to complete sit<>stand transfers with modAx1-2 assistance and use of assistive device to safely DC. (Progressing)       Start:  12/07/23    Expected End:  12/21/23                   Education Documentation  Body Mechanics, taught by Lucero Starks PT at 12/7/2023 12:48 PM.  Learner: Patient  Readiness: Acceptance  Method: Explanation  Response: Verbalizes Understanding, Demonstrated Understanding    Mobility Training, taught by Lucero Starks PT at 12/7/2023 12:48 PM.  Learner: Patient  Readiness: Acceptance  Method: Explanation  Response: Verbalizes Understanding, Demonstrated Understanding    Education Comments  No comments found.        12/07/23 at 12:48 PM   Lucero Starks PT   Rehab Office: [phone]

## 2023-12-07 NOTE — OP NOTE
Embolectomy Lower Extremity (L) Operative Note     Date: 2023  OR Location: [facility] OR    Name: Shirin Slater, : 1970, Age: 53 y.o., MRN: [id number], Sex: female    Diagnosis  Pre-op Diagnosis     * Limb ischemia [I99.8] Post-op Diagnosis     * Limb ischemia [I99.8]     Procedures  Embolectomy Lower Extremity  33658 - TN EMBLC/THRMBC FEMORAL POPLITEAL AORTO-ILIAC ART  Patch angioplasty common femoral artery    Surgeons      * Ara Jesus - Primary    Resident/Fellow/Other Assistant:  Surgeon(s) and Role:     * Sanju Rain MD - Resident - Assisting    Procedure Summary  Anesthesia: General  ASA: III  Anesthesia Staff: Anesthesiologist: Guilherme Mata MD  Anesthesia Resident: Dewayne Dai MD  Estimated Blood Loss: 600mL  Intra-op Medications:   Medication Name Total Dose   atorvastatin (Lipitor) tablet 80 mg Cannot be calculated   divalproex (Depakote) EC tablet 1,000 mg Cannot be calculated   sennosides (Senokot) tablet 17.2 mg Cannot be calculated   phytonadione (Vitamin K) 10 mg in dextrose 5 % in water (D5W) 50 mL IV 15 mg              Anesthesia Record               Intraprocedure I/O Totals          Intake    PLASMA 900.00 mL    PRBC 350.00 mL    Transfuse Plasma 300.00 mL    phytonadione (Vitamin K) 10 mg in dextrose 5 % in water (D5W) 50 mL IV 76.50 mL    Total Intake 1626.5 mL       Output    Urine 600 mL    Est. Blood Loss 50 mL    Total Output 650 mL       Net    Net Volume 976.5 mL          Specimen:   ID Type Source Tests Collected by Time   1 : LEFT FEMORAL CLOT Tissue CLOT/THROMBUS SURGICAL PATHOLOGY EXAM Ara Jesus MD [id number]        Staff:   Circulator: Li Servin RN; Nate Rosenthal RN  Scrub Person: Araceli May; Zandra Farmer         Drains and/or Catheters:   Urethral Catheter Non-latex 16 Fr. (Active)       Tourniquet Times:         Implants:  Implants       Type Name Action Serial No.      Implant GRAFT, XENOSURE, BIOLOGIC PATCH, 0.8CM X 8CM -  [id number] Implanted               Findings: thrombosis of common femoral artery and external iliac artery    Indications: Shirin Slater is an 53 y.o. female who is having surgery for Limb ischemia [I99.8].     The patient was seen in the preoperative area. The risks, benefits, complications, treatment options, non-operative alternatives, expected recovery and outcomes were discussed with the patient. The possibilities of reaction to medication, pulmonary aspiration, injury to surrounding structures, bleeding, recurrent infection, the need for additional procedures, failure to diagnose a condition, and creating a complication requiring transfusion or operation were discussed with the patient. The patient concurred with the proposed plan, giving informed consent.  The site of surgery was properly noted/marked if necessary per policy. The patient has been actively warmed in preoperative area. Preoperative antibiotics have been ordered and given within 1 hours of incision. Venous thrombosis prophylaxis are not indicated.    Procedure Details:   Supine position, abdomen groins and left lower extremity prepped and draped.  Oblique incision created from above inguinal crease to distal to inguinal crease generally along the lines of the sartorius muscle.  The sartorius muscle was mobilized and retracted laterally revealing the superficial femoral artery in the proximal thigh.  This was traced back to heavy scar tissue.  The scar tissue was then incised and the common femoral artery was carved out of previous scar tissue up to the inguinal ligament.  Care was to avoid injury to the femoral vein which was adherent to the structure.  The profundofemoral artery was then found it in archaeological fashion by uncovering layer by layer of scar tissue posteriorly.    2 profunda branches were controlled 1 going lateral 1 going posterior into the thigh.  While these were not visualized on the CT angiogram, they were likely not  showing any flow of contrast as they were patent.  Proximal control was planned for using a balloon.  The femoral artery was occluded here and an 11 blade was used to create a longitudinal arteriotomy which was extended distally onto the superficial femoral artery across the narrowed spot and the origin.  Approximately he was taking up to the stent which was the landmark I chose to stop the arteriotomy.  A 7 x 20 mm angioplasty balloon was then sent into the stent and I ballooned it to minimize some of the compression that occurred.  This appears to be a nitinol stent.  A wire was then passed up into the aorta from this position.  The balloon was secured at the distal endpoint of the stent.  A #4 Schuyler balloon was then passed proximally and pulling downwards released clot and a pulsatile inflow.  During these maneuvers the patient's blood pressure dropped and it seemed that the patient was dehydrated as she responded to volume.    The angioplasty balloon was inflated for proximal control.  This also kept the stent fully deployed.  The 2 profundofemoral arteries were patent flow distally as approved by using dilators.  The superficial femoral artery on the CT angiogram was occluded in the distal thigh but was branched and afforded some distal perfusion as well.  I chose not to pursue any infrageniculate bypass or revascularization because of the patient's unstable hemodynamics and her problem was rest pain.    A #12 [place] shunt was placed into the superficial femoral artery and clamped.  This allowed me to still in the patch without narrowing the origin of the superficial femoral artery.  The patch was a bovine pericardial patch and it was sewn with 5-0 Prolene suture incorporating all layers to avoid the disastrous X arterectomy type closure.  Prior to completion of the suture line the shunt was removed once we cleared the origin of the superficial femoral artery and then the occlusive angioplasty balloon was  left in until the final stitches were placed and then removed resulting in pulsatile inflow into the common femoral artery.  The flow into the profunda and superficial femoral arteries were multiphasic on Doppler.  Well the infrageniculate signals were not readily apparent there was a faint monophasic signal detected in the distal leg and the posterior tibial artery and the foot had significantly improved capillary refill.  As the patient was on pressors I felt that this was sufficient.  We had discussed performing fasciotomies for this patient but due to her chronic ischemia I felt that fasciotomies would be unnecessary and would be provided expectantly depending on the situation.  The patient's hemodynamic situation compel me to push this operation to a endpoint.  She was also coagulopathic and fasciotomies with resultant excessive bleeding.    The wound was irrigated and hemostasis was obtained at the patch.  It was closed in multiple layers using Vicryl and PDS to the deeper layers and running Monocryl to the skin.  A Prevena dressing was then applied.  Complications:  None; patient tolerated the procedure well.    Disposition: PACU - hemodynamically stable.  Condition: stable         Additional Details:       Attending Attestation: I was present and scrubbed for the key portions of the procedure.    Ara Jesus  Phone Number: [phone]

## 2023-12-07 NOTE — PROGRESS NOTES
Subjective  Pain tolerable    Objective  Vitals:    12/07/23 0730   BP:    Pulse: 105   Resp: 15   Temp:    SpO2: 93%        Physical Exam     Constitutional- mild distress d/t pain  Cards- regular rate, phenylephrine still on with MAP in 90s  Resp- nonlabored breathing on room air   Abdomen- soft, not tender, not distended   Extremities- s/p R hip disarticulation; L calf tenderness but soft compartments; L groin provena holding suction; no hematoma appreciated; L foot with improved color but toes still mildly cyanotic; sensation is improved vs preop; motor stable since preop   Neuro- alert and oriented x3   Psych- appropriate mood   Tubes/lines- vasques          Assessment/Plan   53 F s/p Left femoral cutdown, open sharif thrombectomy of external iliac artery, common femoral artery, superficial femoral artery, and profunda femoris. Flow restored down into profunda, and into SFA. Patch angioplasty CFA. She does have distal occlusion in the SFA/ popliteal artery and so still remains without doppler signals in DP/ PT.   Groin closed with prevena in place. 600cc EBL. 2L UOP during case. Hypotensive during case, with 2u PRBC and 4u FFP given. INR out of reference range high before case. 10mg vitamin K given at start, INR down to 3.7 during case.    - start hep gtt when INR is 2; low intensity without initial bolus, okay for later boluses  - vascular medicine consult for to help determine why patient extensively thrombosed so much of her arterial system despite plavix and warfarin and supratherapeutic anticoagulation; prior beta-2-glycoprotein and anticardiolipin Abs were normal   - systolic BP goal 120-160   - repeat CK   - actively warm foot  - restart plavix   - continue IVF resuscitation    Discussed with Attending, Dr. Ann Marie Velasco MD  Pager [phone]

## 2023-12-07 NOTE — SIGNIFICANT EVENT
Post-Op Check    S:    52 yo F pod 0 Left femoral cutdown, open sharif thrombectomy of external iliac artery, common femoral artery, superficial femoral artery, and profunda femoris. Flow restored down into profunda, and into SFA. Patch angioplasty CFA. She does have distal occlusion in the SFA/ popliteal artery and so still remains without doppler signals in DP/ PT.   Groin closed with prevena in place. 600cc EBL. 2L UOP during case. Hypotensive during case, with 2u PRBC and 4u FFP given. INR out of reference range high before case. 10mg vitamin K given at start, INR down to 3.7 during case.  -Patient endorses pain being well controlled, except for L hip pain from positioning  -Patient not having flatus or bm at this time  -RN at bedside drawing coags, about to give 500L fluids, (per report had received 500 of albumin post-op), reports extubation within a couple hours from arriving from OR    O:     Afebrile, mild tachycardia, normotensive, extubated, on Levo 1    Temp:  [36 °C (96.8 °F)-36.5 °C (97.7 °F)] 36.3 °C (97.3 °F)  Heart Rate:  [] 101  Resp:  [10-20] 14  BP: (123)/(84) 123/84  Arterial Line BP 1: (104-131)/(63-78) 122/69 Vent Mode: Pressure support  S RR:  [14] 14  S VT:  [5 mL-480 mL] 5 mL  PEEP/CPAP (cm H2O):  [8 cm H20] 8 cm H20  AL SUP:  [5 cm H20] 5 cm H20  MAP (cm H2O):  [12] 12   I/O last 2 completed shifts:  In: - (0 mL/kg)   Out: 50 (0.5 mL/kg) [Blood:50]  Weight: 99.8 kg             12.0    136  103  11   15.7>-----<565 ----------------<141             38.1    4.0  23  0.57          Ca 8.5 Phos No results found for requested labs within last 365 days. Mg 2.03 ALT 8 AST 7 AlkPhos 98 tBili 0.3           Physical Exam:  Constitutional: no acute distress, mildly agitated with not being repositioned from L hip discomfort  Skin: warm and dry overall   Neuro: A/O x4, no gross deficits   HEENT: Atraumatic, no scleral icterus  Cardiac: tachycardic  Pulmonary: Unlabored respirations on 4L O2  NC  Abdomen: Non distended, non tender  : vasques in place  Extremities:  -RLE: Previous R AKA  -LLE: no DP/PT palpable, edematous, not dusky, warm, motor limited by edema, but present, sensory intact    A/P:  Overall, patient is doing well postoperatively with no acute concerns.  Will continue to optimize pain control as needed.  Will continue to monitor clinical exam, vitals, I&O's, and labs when available.  Will follow up on the patient in the a.m. or sooner as needed.  - Maintain -160 mmHg  - Continue IVF resuscitation  - Check CBC, RFP, coag panel  - q4h coags, when INR drops to 2 or lower, start heparin drip low intensity with boluses  - Monitor for changes in LLE exam  - Continue vasques  - Will consult vascular medicine in AM to help determine why patient extensively thrombosed so much of her arterial system despite DAPT and supratherapeutic anticoagulation; prior beta-2-glycoprotein and anticardiolipin Abs were normal    Rios Wilson MD  PGY1  General Surgery  Vascular Surgery [phone]

## 2023-12-08 ENCOUNTER — APPOINTMENT (OUTPATIENT)
Dept: VASCULAR MEDICINE | Facility: HOSPITAL | Age: 53
DRG: 271 | End: 2023-12-08
Payer: COMMERCIAL

## 2023-12-08 ENCOUNTER — APPOINTMENT (OUTPATIENT)
Dept: RADIOLOGY | Facility: HOSPITAL | Age: 53
DRG: 271 | End: 2023-12-08
Payer: COMMERCIAL

## 2023-12-08 ENCOUNTER — APPOINTMENT (OUTPATIENT)
Dept: CARDIOLOGY | Facility: HOSPITAL | Age: 53
DRG: 271 | End: 2023-12-08
Payer: COMMERCIAL

## 2023-12-08 LAB
ALBUMIN SERPL BCP-MCNC: 3.5 G/DL (ref 3.4–5)
ALBUMIN SERPL BCP-MCNC: 3.7 G/DL (ref 3.4–5)
ALBUMIN SERPL BCP-MCNC: 3.8 G/DL (ref 3.4–5)
ALP SERPL-CCNC: 56 U/L (ref 33–110)
ALT SERPL W P-5'-P-CCNC: 4 U/L (ref 7–45)
ANION GAP SERPL CALC-SCNC: 10 MMOL/L (ref 10–20)
ANION GAP SERPL CALC-SCNC: 12 MMOL/L (ref 10–20)
APTT PPP: 48 SECONDS (ref 27–38)
APTT PPP: 74 SECONDS (ref 27–38)
APTT PPP: 74 SECONDS (ref 27–38)
AST SERPL W P-5'-P-CCNC: 7 U/L (ref 9–39)
BILIRUB DIRECT SERPL-MCNC: 0.1 MG/DL (ref 0–0.3)
BILIRUB SERPL-MCNC: 0.3 MG/DL (ref 0–1.2)
BUN SERPL-MCNC: 11 MG/DL (ref 6–23)
BUN SERPL-MCNC: 13 MG/DL (ref 6–23)
CA-I BLD-SCNC: 1.17 MMOL/L (ref 1.1–1.33)
CA-I BLD-SCNC: 1.2 MMOL/L (ref 1.1–1.33)
CALCIUM SERPL-MCNC: 8.6 MG/DL (ref 8.6–10.6)
CALCIUM SERPL-MCNC: 8.7 MG/DL (ref 8.6–10.6)
CHLORIDE SERPL-SCNC: 102 MMOL/L (ref 98–107)
CHLORIDE SERPL-SCNC: 102 MMOL/L (ref 98–107)
CK MB CFR SERPL CALC: NORMAL %
CK MB SERPL-CCNC: <1 NG/ML
CK SERPL-CCNC: 124 U/L (ref 0–215)
CO2 SERPL-SCNC: 26 MMOL/L (ref 21–32)
CO2 SERPL-SCNC: 27 MMOL/L (ref 21–32)
CREAT SERPL-MCNC: 0.59 MG/DL (ref 0.5–1.05)
CREAT SERPL-MCNC: 0.66 MG/DL (ref 0.5–1.05)
EJECTION FRACTION APICAL 4 CHAMBER: 73.2
EJECTION FRACTION: 67
ERYTHROCYTE [DISTWIDTH] IN BLOOD BY AUTOMATED COUNT: 20.9 % (ref 11.5–14.5)
ERYTHROCYTE [DISTWIDTH] IN BLOOD BY AUTOMATED COUNT: 21 % (ref 11.5–14.5)
GFR SERPL CREATININE-BSD FRML MDRD: >90 ML/MIN/1.73M*2
GFR SERPL CREATININE-BSD FRML MDRD: >90 ML/MIN/1.73M*2
GLUCOSE BLD MANUAL STRIP-MCNC: 135 MG/DL (ref 74–99)
GLUCOSE BLD MANUAL STRIP-MCNC: 157 MG/DL (ref 74–99)
GLUCOSE BLD MANUAL STRIP-MCNC: 193 MG/DL (ref 74–99)
GLUCOSE BLD MANUAL STRIP-MCNC: 194 MG/DL (ref 74–99)
GLUCOSE BLD MANUAL STRIP-MCNC: 199 MG/DL (ref 74–99)
GLUCOSE SERPL-MCNC: 158 MG/DL (ref 74–99)
GLUCOSE SERPL-MCNC: 174 MG/DL (ref 74–99)
HCT VFR BLD AUTO: 27.9 % (ref 36–46)
HCT VFR BLD AUTO: 28.6 % (ref 36–46)
HGB BLD-MCNC: 8.5 G/DL (ref 12–16)
HGB BLD-MCNC: 8.9 G/DL (ref 12–16)
INR PPP: 1.6 (ref 0.9–1.1)
LEFT ATRIUM VOLUME AREA LENGTH INDEX BSA: 17.2
LEFT VENTRICLE INTERNAL DIMENSION DIASTOLE: 4.5 (ref 3.5–6)
MAGNESIUM SERPL-MCNC: 1.83 MG/DL (ref 1.6–2.4)
MCH RBC QN AUTO: 24.4 PG (ref 26–34)
MCH RBC QN AUTO: 25.2 PG (ref 26–34)
MCHC RBC AUTO-ENTMCNC: 30.5 G/DL (ref 32–36)
MCHC RBC AUTO-ENTMCNC: 31.1 G/DL (ref 32–36)
MCV RBC AUTO: 80 FL (ref 80–100)
MCV RBC AUTO: 81 FL (ref 80–100)
NRBC BLD-RTO: 0 /100 WBCS (ref 0–0)
NRBC BLD-RTO: 0 /100 WBCS (ref 0–0)
PHOSPHATE SERPL-MCNC: 1.9 MG/DL (ref 2.5–4.9)
PHOSPHATE SERPL-MCNC: 2.1 MG/DL (ref 2.5–4.9)
PLATELET # BLD AUTO: 297 X10*3/UL (ref 150–450)
PLATELET # BLD AUTO: 304 X10*3/UL (ref 150–450)
POTASSIUM SERPL-SCNC: 3.4 MMOL/L (ref 3.5–5.3)
POTASSIUM SERPL-SCNC: 4 MMOL/L (ref 3.5–5.3)
PROT SERPL-MCNC: 6.3 G/DL (ref 6.4–8.2)
PROTHROMBIN TIME: 17.6 SECONDS (ref 9.8–12.8)
PROTHROMBIN TIME: 18.3 SECONDS (ref 9.8–12.8)
PROTHROMBIN TIME: 18.6 SECONDS (ref 9.8–12.8)
RBC # BLD AUTO: 3.49 X10*6/UL (ref 4–5.2)
RBC # BLD AUTO: 3.53 X10*6/UL (ref 4–5.2)
SODIUM SERPL-SCNC: 136 MMOL/L (ref 136–145)
SODIUM SERPL-SCNC: 136 MMOL/L (ref 136–145)
TRICUSPID ANNULAR PLANE SYSTOLIC EXCURSION: 1.9
UFH PPP CHRO-ACNC: 0.1 IU/ML
UFH PPP CHRO-ACNC: 0.2 IU/ML
UFH PPP CHRO-ACNC: 0.3 IU/ML
UFH PPP CHRO-ACNC: 0.7 IU/ML
WBC # BLD AUTO: 11.7 X10*3/UL (ref 4.4–11.3)
WBC # BLD AUTO: 9.3 X10*3/UL (ref 4.4–11.3)

## 2023-12-08 PROCEDURE — 83735 ASSAY OF MAGNESIUM: CPT | Performed by: NURSE PRACTITIONER

## 2023-12-08 PROCEDURE — 82553 CREATINE MB FRACTION: CPT | Performed by: STUDENT IN AN ORGANIZED HEALTH CARE EDUCATION/TRAINING PROGRAM

## 2023-12-08 PROCEDURE — 93922 UPR/L XTREMITY ART 2 LEVELS: CPT | Performed by: INTERNAL MEDICINE

## 2023-12-08 PROCEDURE — 85610 PROTHROMBIN TIME: CPT | Performed by: NURSE PRACTITIONER

## 2023-12-08 PROCEDURE — 71045 X-RAY EXAM CHEST 1 VIEW: CPT | Performed by: RADIOLOGY

## 2023-12-08 PROCEDURE — 82550 ASSAY OF CK (CPK): CPT | Performed by: STUDENT IN AN ORGANIZED HEALTH CARE EDUCATION/TRAINING PROGRAM

## 2023-12-08 PROCEDURE — 2500000001 HC RX 250 WO HCPCS SELF ADMINISTERED DRUGS (ALT 637 FOR MEDICARE OP): Performed by: STUDENT IN AN ORGANIZED HEALTH CARE EDUCATION/TRAINING PROGRAM

## 2023-12-08 PROCEDURE — 97166 OT EVAL MOD COMPLEX 45 MIN: CPT | Mod: GO

## 2023-12-08 PROCEDURE — 37799 UNLISTED PX VASCULAR SURGERY: CPT | Performed by: STUDENT IN AN ORGANIZED HEALTH CARE EDUCATION/TRAINING PROGRAM

## 2023-12-08 PROCEDURE — 99291 CRITICAL CARE FIRST HOUR: CPT | Performed by: NURSE PRACTITIONER

## 2023-12-08 PROCEDURE — 93308 TTE F-UP OR LMTD: CPT | Performed by: INTERNAL MEDICINE

## 2023-12-08 PROCEDURE — 93308 TTE F-UP OR LMTD: CPT

## 2023-12-08 PROCEDURE — 2500000002 HC RX 250 W HCPCS SELF ADMINISTERED DRUGS (ALT 637 FOR MEDICARE OP, ALT 636 FOR OP/ED): Performed by: STUDENT IN AN ORGANIZED HEALTH CARE EDUCATION/TRAINING PROGRAM

## 2023-12-08 PROCEDURE — 2500000001 HC RX 250 WO HCPCS SELF ADMINISTERED DRUGS (ALT 637 FOR MEDICARE OP): Performed by: NURSE PRACTITIONER

## 2023-12-08 PROCEDURE — 85027 COMPLETE CBC AUTOMATED: CPT | Performed by: STUDENT IN AN ORGANIZED HEALTH CARE EDUCATION/TRAINING PROGRAM

## 2023-12-08 PROCEDURE — 82330 ASSAY OF CALCIUM: CPT | Performed by: STUDENT IN AN ORGANIZED HEALTH CARE EDUCATION/TRAINING PROGRAM

## 2023-12-08 PROCEDURE — 2500000004 HC RX 250 GENERAL PHARMACY W/ HCPCS (ALT 636 FOR OP/ED): Performed by: NURSE PRACTITIONER

## 2023-12-08 PROCEDURE — 84075 ASSAY ALKALINE PHOSPHATASE: CPT | Performed by: NURSE PRACTITIONER

## 2023-12-08 PROCEDURE — 1200000002 HC GENERAL ROOM WITH TELEMETRY DAILY

## 2023-12-08 PROCEDURE — 97530 THERAPEUTIC ACTIVITIES: CPT | Mod: GO

## 2023-12-08 PROCEDURE — 93922 UPR/L XTREMITY ART 2 LEVELS: CPT

## 2023-12-08 PROCEDURE — 84100 ASSAY OF PHOSPHORUS: CPT | Performed by: STUDENT IN AN ORGANIZED HEALTH CARE EDUCATION/TRAINING PROGRAM

## 2023-12-08 PROCEDURE — 37799 UNLISTED PX VASCULAR SURGERY: CPT | Performed by: NURSE PRACTITIONER

## 2023-12-08 PROCEDURE — 82947 ASSAY GLUCOSE BLOOD QUANT: CPT

## 2023-12-08 PROCEDURE — 85520 HEPARIN ASSAY: CPT | Performed by: NURSE PRACTITIONER

## 2023-12-08 PROCEDURE — 2500000002 HC RX 250 W HCPCS SELF ADMINISTERED DRUGS (ALT 637 FOR MEDICARE OP, ALT 636 FOR OP/ED)

## 2023-12-08 PROCEDURE — 82040 ASSAY OF SERUM ALBUMIN: CPT | Performed by: STUDENT IN AN ORGANIZED HEALTH CARE EDUCATION/TRAINING PROGRAM

## 2023-12-08 PROCEDURE — 85027 COMPLETE CBC AUTOMATED: CPT | Performed by: NURSE PRACTITIONER

## 2023-12-08 PROCEDURE — 71045 X-RAY EXAM CHEST 1 VIEW: CPT | Mod: FY

## 2023-12-08 PROCEDURE — 2500000004 HC RX 250 GENERAL PHARMACY W/ HCPCS (ALT 636 FOR OP/ED): Performed by: STUDENT IN AN ORGANIZED HEALTH CARE EDUCATION/TRAINING PROGRAM

## 2023-12-08 PROCEDURE — 2500000001 HC RX 250 WO HCPCS SELF ADMINISTERED DRUGS (ALT 637 FOR MEDICARE OP)

## 2023-12-08 PROCEDURE — 85730 THROMBOPLASTIN TIME PARTIAL: CPT | Performed by: NURSE PRACTITIONER

## 2023-12-08 PROCEDURE — P9047 ALBUMIN (HUMAN), 25%, 50ML: HCPCS | Mod: JZ | Performed by: NURSE PRACTITIONER

## 2023-12-08 PROCEDURE — 99291 CRITICAL CARE FIRST HOUR: CPT | Performed by: ANESTHESIOLOGY

## 2023-12-08 PROCEDURE — 2500000004 HC RX 250 GENERAL PHARMACY W/ HCPCS (ALT 636 FOR OP/ED)

## 2023-12-08 RX ORDER — LEVETIRACETAM 500 MG/1
500 TABLET ORAL 2 TIMES DAILY
Status: DISCONTINUED | OUTPATIENT
Start: 2023-12-08 | End: 2023-12-15 | Stop reason: HOSPADM

## 2023-12-08 RX ORDER — INSULIN LISPRO 100 [IU]/ML
0-15 INJECTION, SOLUTION INTRAVENOUS; SUBCUTANEOUS
Status: DISCONTINUED | OUTPATIENT
Start: 2023-12-08 | End: 2023-12-08

## 2023-12-08 RX ORDER — INSULIN LISPRO 100 [IU]/ML
0-5 INJECTION, SOLUTION INTRAVENOUS; SUBCUTANEOUS
Status: DISCONTINUED | OUTPATIENT
Start: 2023-12-08 | End: 2023-12-15 | Stop reason: HOSPADM

## 2023-12-08 RX ADMIN — LEVOTHYROXINE SODIUM 75 MCG: 75 TABLET ORAL at 05:49

## 2023-12-08 RX ADMIN — POTASSIUM CHLORIDE 40 MEQ: 1500 TABLET, EXTENDED RELEASE ORAL at 04:34

## 2023-12-08 RX ADMIN — ACETAMINOPHEN 650 MG: 325 TABLET ORAL at 21:39

## 2023-12-08 RX ADMIN — INSULIN LISPRO 1 UNITS: 100 INJECTION, SOLUTION INTRAVENOUS; SUBCUTANEOUS at 17:41

## 2023-12-08 RX ADMIN — ALBUMIN HUMAN 25 G: 0.25 SOLUTION INTRAVENOUS at 03:57

## 2023-12-08 RX ADMIN — HYDROMORPHONE HYDROCHLORIDE 0.4 MG: 1 INJECTION, SOLUTION INTRAMUSCULAR; INTRAVENOUS; SUBCUTANEOUS at 10:36

## 2023-12-08 RX ADMIN — BUSPIRONE HYDROCHLORIDE 10 MG: 10 TABLET ORAL at 08:05

## 2023-12-08 RX ADMIN — GABAPENTIN 600 MG: 300 CAPSULE ORAL at 21:39

## 2023-12-08 RX ADMIN — ATORVASTATIN CALCIUM 80 MG: 80 TABLET, FILM COATED ORAL at 20:34

## 2023-12-08 RX ADMIN — SODIUM CHLORIDE, POTASSIUM CHLORIDE, SODIUM LACTATE AND CALCIUM CHLORIDE 500 ML: 600; 310; 30; 20 INJECTION, SOLUTION INTRAVENOUS at 11:05

## 2023-12-08 RX ADMIN — INSULIN LISPRO 5 UNITS: 100 INJECTION, SOLUTION INTRAVENOUS; SUBCUTANEOUS at 03:58

## 2023-12-08 RX ADMIN — LEVETIRACETAM 500 MG: 5 INJECTION INTRAVENOUS at 01:12

## 2023-12-08 RX ADMIN — GABAPENTIN 600 MG: 300 CAPSULE ORAL at 05:50

## 2023-12-08 RX ADMIN — POTASSIUM PHOSPHATE, MONOBASIC 500 MG: 500 TABLET, SOLUBLE ORAL at 12:26

## 2023-12-08 RX ADMIN — SENNOSIDES 17.2 MG: 8.6 TABLET, FILM COATED ORAL at 08:05

## 2023-12-08 RX ADMIN — POTASSIUM PHOSPHATE, MONOBASIC 500 MG: 500 TABLET, SOLUBLE ORAL at 09:21

## 2023-12-08 RX ADMIN — OXYCODONE HYDROCHLORIDE 10 MG: 5 TABLET ORAL at 02:29

## 2023-12-08 RX ADMIN — LEVETIRACETAM 500 MG: 5 INJECTION INTRAVENOUS at 12:36

## 2023-12-08 RX ADMIN — OXYCODONE HYDROCHLORIDE 10 MG: 5 TABLET ORAL at 06:55

## 2023-12-08 RX ADMIN — ACETAMINOPHEN 650 MG: 325 TABLET ORAL at 09:02

## 2023-12-08 RX ADMIN — GABAPENTIN 600 MG: 300 CAPSULE ORAL at 14:40

## 2023-12-08 RX ADMIN — CLOPIDOGREL BISULFATE 75 MG: 75 TABLET, FILM COATED ORAL at 08:05

## 2023-12-08 RX ADMIN — ACETAMINOPHEN 650 MG: 325 TABLET ORAL at 03:57

## 2023-12-08 RX ADMIN — PERFLUTREN 1 ML OF DILUTION: 6.52 INJECTION, SUSPENSION INTRAVENOUS at 15:04

## 2023-12-08 RX ADMIN — OXYCODONE HYDROCHLORIDE 10 MG: 5 TABLET ORAL at 16:33

## 2023-12-08 RX ADMIN — HEPARIN SODIUM 1900 UNITS/HR: 10000 INJECTION, SOLUTION INTRAVENOUS at 12:26

## 2023-12-08 RX ADMIN — LEVETIRACETAM 500 MG: 500 TABLET, FILM COATED ORAL at 20:34

## 2023-12-08 RX ADMIN — DULOXETINE HYDROCHLORIDE 60 MG: 60 CAPSULE, DELAYED RELEASE ORAL at 08:05

## 2023-12-08 RX ADMIN — OXYCODONE HYDROCHLORIDE 10 MG: 5 TABLET ORAL at 20:34

## 2023-12-08 RX ADMIN — DIVALPROEX SODIUM 1000 MG: 500 TABLET, DELAYED RELEASE ORAL at 20:33

## 2023-12-08 RX ADMIN — DIVALPROEX SODIUM 500 MG: 500 TABLET, DELAYED RELEASE ORAL at 08:05

## 2023-12-08 RX ADMIN — ASPIRIN 81 MG: 81 TABLET, COATED ORAL at 08:05

## 2023-12-08 ASSESSMENT — PAIN SCALES - GENERAL
PAINLEVEL_OUTOF10: 7
PAINLEVEL_OUTOF10: 1
PAINLEVEL_OUTOF10: 6
PAINLEVEL_OUTOF10: 10 - WORST POSSIBLE PAIN
PAINLEVEL_OUTOF10: 8
PAINLEVEL_OUTOF10: 3
PAINLEVEL_OUTOF10: 0 - NO PAIN
PAINLEVEL_OUTOF10: 8
PAINLEVEL_OUTOF10: 4

## 2023-12-08 ASSESSMENT — PAIN - FUNCTIONAL ASSESSMENT
PAIN_FUNCTIONAL_ASSESSMENT: 0-10

## 2023-12-08 ASSESSMENT — COGNITIVE AND FUNCTIONAL STATUS - GENERAL
DAILY ACTIVITIY SCORE: 15
DAILY ACTIVITIY SCORE: 14
HELP NEEDED FOR BATHING: A LOT
WALKING IN HOSPITAL ROOM: TOTAL
TURNING FROM BACK TO SIDE WHILE IN FLAT BAD: A LITTLE
DRESSING REGULAR LOWER BODY CLOTHING: TOTAL
HELP NEEDED FOR BATHING: A LOT
MOVING FROM LYING ON BACK TO SITTING ON SIDE OF FLAT BED WITH BEDRAILS: A LITTLE
TOILETING: A LOT
CLIMB 3 TO 5 STEPS WITH RAILING: TOTAL
DRESSING REGULAR UPPER BODY CLOTHING: A LOT
EATING MEALS: A LITTLE
STANDING UP FROM CHAIR USING ARMS: A LOT
MOBILITY SCORE: 12
MOVING TO AND FROM BED TO CHAIR: A LOT
TOILETING: TOTAL
DRESSING REGULAR LOWER BODY CLOTHING: A LOT
DRESSING REGULAR UPPER BODY CLOTHING: A LITTLE
PERSONAL GROOMING: A LITTLE

## 2023-12-08 ASSESSMENT — ACTIVITIES OF DAILY LIVING (ADL): BATHING_ASSISTANCE: MODERATE

## 2023-12-08 ASSESSMENT — PAIN DESCRIPTION - ORIENTATION
ORIENTATION: LEFT
ORIENTATION: LEFT

## 2023-12-08 ASSESSMENT — PAIN DESCRIPTION - LOCATION
LOCATION: LEG
LOCATION: LEG

## 2023-12-08 NOTE — PROGRESS NOTES
"Shirin Slater is a 53 y.o. female on day 2 of admission presenting with Limb ischemia.    Subjective      No acute issues overnight    Objective     Physical Exam  Vitals and nursing note reviewed.   Constitutional:       Appearance: Normal appearance.   HENT:      Head: Normocephalic.      Nose: Nose normal.      Mouth/Throat:      Mouth: Mucous membranes are moist.   Eyes:      Pupils: Pupils are equal, round, and reactive to light.   Cardiovascular:      Rate and Rhythm: Normal rate and regular rhythm.      Pulses:           Radial pulses are 1+ on the right side and 1+ on the left side.         Right popliteal pulse not accessible.        Dorsalis pedis pulses are detected w/ Doppler on the left side. Right dorsalis pedis pulse not accessible.        Posterior tibial pulses are detected w/ Doppler on the left side. Right posterior tibial pulse not accessible.      Heart sounds: Normal heart sounds, S1 normal and S2 normal.      Comments: L ulnar art line present. History of R hip disarticulation  Pulmonary:      Effort: Pulmonary effort is normal.   Abdominal:      General: Bowel sounds are normal.      Palpations: Abdomen is soft.   Musculoskeletal:      Cervical back: Normal range of motion.      Left lower le+ Edema present.   Skin:     General: Skin is warm and dry.      Capillary Refill: Capillary refill takes less than 2 seconds.      Comments: LLE shiny, red and edematous. Previous toe amputation wounds semi-healed   Neurological:      General: No focal deficit present.      Mental Status: She is alert and oriented to person, place, and time.   Psychiatric:         Mood and Affect: Mood normal.         Last Recorded Vitals  Blood pressure 110/65, pulse 101, temperature 36.8 °C (98.2 °F), temperature source Temporal, resp. rate (!) 6, height 1.7 m (5' 6.93\"), weight 102 kg (224 lb 13.9 oz), SpO2 95 %.    Heart Rate:  []   Temp:  [36 °C (96.8 °F)-36.8 °C (98.2 °F)]   Resp:  [6-28]   BP: " (110)/(65)   Weight:  [102 kg (224 lb 13.9 oz)]   SpO2:  [91 %-98 %]      Intake/Output last 3 Shifts:  I/O last 3 completed shifts:  In: 8881.2 (87.1 mL/kg) [I.V.:3421.7 (33.5 mL/kg); Blood:2400; IV Piggyback:3059.5]  Out: 4270 (41.9 mL/kg) [Urine:3820 (1 mL/kg/hr); Blood:450]  Weight: 102 kg       Intake/Output Summary (Last 24 hours) at 12/8/2023 0852  Last data filed at 12/8/2023 0800  Gross per 24 hour   Intake 1110.77 ml   Output 2545 ml   Net -1434.23 ml        Relevant Results    Scheduled medications  acetaminophen, 650 mg, oral, q6h  aspirin, 81 mg, oral, Daily  atorvastatin, 80 mg, oral, Nightly  busPIRone, 10 mg, oral, Daily  clopidogrel, 75 mg, oral, Daily  divalproex, 1,000 mg, oral, q PM  divalproex, 500 mg, oral, q AM  DULoxetine, 60 mg, oral, Daily  gabapentin, 600 mg, oral, q8h NINO  insulin lispro, 0-15 Units, subcutaneous, q4h  levETIRAcetam, 500 mg, intravenous, q12h  levothyroxine, 75 mcg, oral, Daily  potassium phosphate (monobasic), 500 mg, oral, 4x daily  sennosides, 2 tablet, oral, BID      Continuous medications  heparin, 0-4,000 Units/hr, Last Rate: 1,700 Units/hr (12/08/23 0656)      PRN medications  PRN medications: albuterol, calcium gluconate, calcium gluconate, dextrose 10 % in water (D10W), dextrose, dextrose **OR** [DISCONTINUED] glucagon, glucagon, heparin, HYDROmorphone, ipratropium-albuteroL, magnesium sulfate, magnesium sulfate, oxyCODONE, oxyCODONE, oxygen, potassium chloride CR **OR** [DISCONTINUED] potassium chloride, potassium chloride CR **OR** [DISCONTINUED] potassium chloride         Assessment/Plan     Shirin DOT Belcherteresa is a 53 y.o.  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      Plan:  NEURO: History of epilepsy, on AEDs. Chronic pain, diabetic neuropathy. Currently she only takes tylenol and gabapentin, has weaned herself off oxycodone. At baseline, lives alone. Can \"hop\" around short distances holding onto to stable objects (couch, counter) and uses hand propelled wheelchair for longer distances. Has home health aid. Neuro intact, MAEx3 to commands. Complaints of neuropathic pain to L foot improved on current pain regimen. Groin area sore where prevena is located.  - continue scheduled acetaminophen and gabapentin. PRN oxycodone with breakthrough iv hydromorphone  - ongoing neuro/neurovasc/pain assessments  - PT/OT consulted  - continue home depakote, duloxetine, busparone, keppra  - SW consulted for ongoing home resource management, discharge planning, financial concerns     CV: History of HTN, HLD, PVD s/p L and R iliac and L popliteal stents, R hip disarticulation, L 1/2/5 digit amputations, renal and splenic thrombosis (on Coumadin, aspirin and clopidogrel). Questionable compliance with meds. Baseline cardiac function EF 60-65% (2022). Acute LLE ischemia s/p L common femoral and external iliac embolectomy, patch angioplasty common femoral artery on 12/6. Weaned off phenylephrine 12/7, CK and lactate WNL  - goal sbp 120-160 range per vascular surgery  - continuous ekg/hourly and prn NIBP monitoring  - remove art line  - volume as indicated -> decreased uop, will give 500ml LR  - no need to continue to trend lactate and CK  - hold home metoprolol 12.5 BID  - continue home plavix, aspirin, atorvastatin  - continue low intensity heparin infusion      PULM: 40 pack year smoker. Arrived to SICU Intubated. Extubated, currently on 2L NC  - goal SpO2 >94%  - Q1h IS while awake  - additional bronchial hygiene as indicated  - cxr prn     GI: On PPI at home  - continue PPI  - bowel regimen  - regular diet     : Baseline cr 0.57  - Check renal function panel daily " and prn  - Volume resuscitation as needed as per above   - Goal U/O >1-2ml/kg/hr.    - Replete electrolytes to goal K>4, Mg>2, Phos>2.5, ionized Ca>1.10      HEME: Acute surgical blood loss anemia, supratherapeutic INR. OR , received 2u PRBC, 4ffp and 10mg iv vitamin K. INR 1.6 this am  - Check CBC, coags daily and prn  - heparin infusion, titrate per assays  - continue asa and plavix  - vasc med consulted, appreciate recs -> ordered TTE with bubble study, LLE DVT US and added on LFTs     ENDO: History of NIDDM, hypothyroidism. Last A1c 8.5% 9/2023. TSH WNL 12/7  - AC/HS BG checks  - continue home levothyroxine  - hold home empagliflozin     ID: afebrile, reactive leukocytosis resolved. Cefazolin for 24 hrs complete  - monitor for s/s infection     Lines:   L ulnar arterial line placed by vascular surgery -> remove  PIV x3  Hale -> keep for now per patient request     Dispo: Transfer to [facility] with telemetry. Discussed with Dr. Lilian TENORIO spent 45 minutes iof critical care time on this patient.      Lauren Durand, APRN-CNP

## 2023-12-08 NOTE — PROGRESS NOTES
"Shirin Slater is a 53 y.o. female on day 2 of admission presenting with Limb ischemia.    Subjective      No acute issues overnight    Objective     Physical Exam  Vitals and nursing note reviewed.   Constitutional:       Appearance: Normal appearance.   HENT:      Head: Normocephalic.      Nose: Nose normal.      Mouth/Throat:      Mouth: Mucous membranes are moist.   Eyes:      Pupils: Pupils are equal, round, and reactive to light.   Cardiovascular:      Rate and Rhythm: Normal rate and regular rhythm.      Pulses:           Radial pulses are 1+ on the right side and 1+ on the left side.         Right popliteal pulse not accessible.        Dorsalis pedis pulses are detected w/ Doppler on the left side. Right dorsalis pedis pulse not accessible.        Posterior tibial pulses are detected w/ Doppler on the left side. Right posterior tibial pulse not accessible.      Heart sounds: Normal heart sounds, S1 normal and S2 normal.      Comments: L ulnar art line present. History of R hip disarticulation  Pulmonary:      Effort: Pulmonary effort is normal.   Abdominal:      General: Bowel sounds are normal.      Palpations: Abdomen is soft.   Musculoskeletal:      Cervical back: Normal range of motion.      Left lower le+ Edema present.   Skin:     General: Skin is warm and dry.      Capillary Refill: Capillary refill takes less than 2 seconds.      Comments: LLE shiny, red and edematous. Previous toe amputation wounds semi-healed   Neurological:      General: No focal deficit present.      Mental Status: She is alert and oriented to person, place, and time.   Psychiatric:         Mood and Affect: Mood normal.         Last Recorded Vitals  Blood pressure 106/67, pulse 100, temperature 36.8 °C (98.2 °F), temperature source Temporal, resp. rate 10, height 1.7 m (5' 6.93\"), weight 102 kg (224 lb 13.9 oz), SpO2 94 %.    Heart Rate:  []   Temp:  [36 °C (96.8 °F)-36.8 °C (98.2 °F)]   Resp:  [9-28]   BP: (106)/(67) "   Weight:  [102 kg (224 lb 13.9 oz)]   SpO2:  [91 %-99 %]      Intake/Output last 3 Shifts:  I/O last 3 completed shifts:  In: 8881.2 (87.1 mL/kg) [I.V.:3421.7 (33.5 mL/kg); Blood:2400; IV Piggyback:3059.5]  Out: 4270 (41.9 mL/kg) [Urine:3820 (1 mL/kg/hr); Blood:450]  Weight: 102 kg       Intake/Output Summary (Last 24 hours) at 12/8/2023 1402  Last data filed at 12/8/2023 1300  Gross per 24 hour   Intake 979.96 ml   Output 1280 ml   Net -300.04 ml        Relevant Results    Scheduled medications  acetaminophen, 650 mg, oral, q6h  aspirin, 81 mg, oral, Daily  atorvastatin, 80 mg, oral, Nightly  busPIRone, 10 mg, oral, Daily  clopidogrel, 75 mg, oral, Daily  divalproex, 1,000 mg, oral, q PM  divalproex, 500 mg, oral, q AM  DULoxetine, 60 mg, oral, Daily  gabapentin, 600 mg, oral, q8h NINO  insulin lispro, 0-15 Units, subcutaneous, Before meals & nightly  levETIRAcetam, 500 mg, oral, BID  levothyroxine, 75 mcg, oral, Daily  potassium phosphate (monobasic), 500 mg, oral, 4x daily  sennosides, 2 tablet, oral, BID      Continuous medications  heparin, 0-4,000 Units/hr, Last Rate: 2,100 Units/hr (12/08/23 1342)      PRN medications  PRN medications: albuterol, calcium gluconate, calcium gluconate, dextrose 10 % in water (D10W), dextrose, dextrose **OR** [DISCONTINUED] glucagon, glucagon, heparin, HYDROmorphone, ipratropium-albuteroL, magnesium sulfate, magnesium sulfate, oxyCODONE, oxyCODONE, oxygen, potassium chloride CR **OR** [DISCONTINUED] potassium chloride, potassium chloride CR **OR** [DISCONTINUED] potassium chloride         Assessment/Plan     Shirin Slater is a 53 y.o.  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      Plan:  NEURO: History of epilepsy, on AEDs. Chronic pain, diabetic neuropathy. Currently she only takes tylenol and gabapentin, has weaned herself off oxycodone. At baseline, lives alone. Can \"hop\" around short distances holding onto to stable objects (couch, counter) and uses hand propelled wheelchair for longer distances. Has home health aid. Neuro intact, MAEx3 to commands. Complaints of neuropathic pain to L foot improved on current pain regimen. Groin area sore where prevena is located.  - continue scheduled acetaminophen and gabapentin. PRN oxycodone with breakthrough iv hydromorphone  - ongoing neuro/neurovasc/pain assessments  - PT/OT consulted  - continue home depakote, duloxetine, busparone, keppra  - SW consulted for ongoing home resource management, discharge planning, financial concerns     CV: History of HTN, HLD, PVD s/p L and R iliac and L popliteal stents, R hip disarticulation, L 1/2/5 digit amputations, renal and splenic thrombosis (on Coumadin, aspirin and clopidogrel). Questionable compliance with meds. Baseline cardiac function EF 60-65% (2022). Acute LLE ischemia s/p L common femoral and external iliac embolectomy, patch angioplasty common femoral artery on 12/6. Weaned off phenylephrine 12/7, CK and lactate WNL  - goal sbp 120-160 range per vascular surgery  - continuous ekg/hourly and prn NIBP monitoring  - remove art line  - volume as indicated -> decreased uop, will give 500ml LR  - no need to continue to trend lactate and CK  - hold home metoprolol 12.5 BID  - continue home plavix, aspirin, atorvastatin  - continue low intensity heparin infusion      PULM: 40 pack year smoker. Arrived to SICU Intubated. Extubated, currently on 2L NC  - goal SpO2 >94%  - Q1h IS while awake  - additional bronchial hygiene as indicated  - cxr prn     GI: On PPI at home  - continue PPI  - bowel regimen  - regular diet     : Baseline cr 0.57  - Check renal function panel daily " and prn  - Volume resuscitation as needed as per above   - Goal U/O >1-2ml/kg/hr.    - Replete electrolytes to goal K>4, Mg>2, Phos>2.5, ionized Ca>1.10      HEME: Acute surgical blood loss anemia, supratherapeutic INR. OR , received 2u PRBC, 4ffp and 10mg iv vitamin K. INR 1.6 this am  - Check CBC, coags daily and prn  - heparin infusion, titrate per assays  - continue asa and plavix  - vasc med consulted, appreciate recs -> ordered TTE with bubble study, LLE DVT US and added on LFTs     ENDO: History of NIDDM, hypothyroidism. Last A1c 8.5% 9/2023. TSH WNL 12/7  - AC/HS BG checks  - continue home levothyroxine  - hold home empagliflozin     ID: afebrile, reactive leukocytosis resolved. Cefazolin for 24 hrs complete  - monitor for s/s infection     Lines:   L ulnar arterial line placed by vascular surgery -> remove  PIV x3  Hale -> keep for now per patient request     Dispo: Transfer to [facility] with telemetry. Discussed with Dr. Quintana      I have discussed the case with the resident/advanced practice provider. I have personally performed a history, physical exam, and my own medical decision making. I have reviewed the note and agree with the findings and plan with the following exceptions as identified below. I have personally provided 34 minutes of care time exclusive of time spent on separately billable procedures. Time includes review of laboratory data, radiology results, discussion with consultants, family and monitoring for potential decompensation. Interventions were performed as documented above.      Family/Surrogate updated with plan of care.  Code status addressed/up to date.       Roseanne Quintana MD

## 2023-12-08 NOTE — PROGRESS NOTES
Occupational Therapy    Evaluation and Treatment    Patient Name: Shirin Slater  MRN: [id number]  Today's Date: 12/8/2023  Time Calculation  Start Time: 1104  Stop Time: 1138  Time Calculation (min): 34 min    Assessment  IP OT Assessment  OT Assessment: Pt is a 53 year old female who demonstrates decreased strength, balance, and activity tolerance, which impedes occupational performance.  Prognosis: Good  Evaluation/Treatment Tolerance: Patient tolerated treatment well  Medical Staff Made Aware: Yes  End of Session Communication: Bedside nurse  End of Session Patient Position: Bed, 3 rail up, Alarm off, not on at start of session  Plan:  Treatment Interventions: ADL retraining, Functional transfer training, UE strengthening/ROM, Endurance training, Neuromuscular reeducation, Compensatory technique education, Patient/family training  OT Frequency: 3 times per week  OT Discharge Recommendations: Moderate intensity level of continued care  OT - OK to Discharge: Yes    Subjective   Current Problem:  1. Limb ischemia  Case Request Operating Room: Embolectomy Lower Extremity    Case Request Operating Room: Embolectomy Lower Extremity    Surgical Pathology Exam    Surgical Pathology Exam    Vascular US Ankle Brachial Index (HEMA) Without Exercise    Vascular US Ankle Brachial Index (HEMA) Without Exercise    Transthoracic Echo (TTE) Limited    Lower extremity venous duplex left    Transthoracic Echo (TTE) Limited    Lower extremity venous duplex left      2. PAD (peripheral artery disease) (CMS/HCC)  Vascular US Ankle Brachial Index (HEMA) Without Exercise    Vascular US Ankle Brachial Index (HEMA) Without Exercise      3. Thrombosis of artery in lower extremity (CMS/HCC)  Transthoracic Echo (TTE) Limited    Lower extremity venous duplex left    Transthoracic Echo (TTE) Limited    Lower extremity venous duplex left      4. 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 On 3 L NC and heparin.     Precautions:  Medical Precautions: Fall precautions  Precautions Comment: -160, SpO2 >94%    Vital Signs:  Heart Rate: 110 (Post: 105)  Resp: 14 (Post: 13)  SpO2: 99 % (Post: 97)  BP: 106/67 (Lowest SBP 90s; Post: 96/63)    Pain:  Pain Assessment  Pain Assessment: 0-10  Pain Score: 0 - No pain (Mild heel pain to left foot at EOB reported)  Lines/Tubes/Drains:  Arterial Line 12/06/23 Left (Active)   Number of days: 1       Urethral Catheter Non-latex 16 Fr. (Active)   Number of days: 1     Prevena wound vac       Objective   Cognition:  Overall Cognitive Status: Within Functional Limits  Orientation Level: Oriented X4  Following Commands: Follows all commands and directions without difficulty     Confusion Assessment Method (CAM)  Acute Onset and Fluctuating Course (1A): No     Home Living:  Type of Home: Apartment  Lives With: Alone (Aid comes over 7x/wk (5.5 hrs mon-sat; 7 hrs sun))  Home Adaptive Equipment: Wheelchair-manual, Hospital bed (lift chair)  Home Layout: One level  Home Access: No concerns  Bathroom Shower/Tub: Tub/shower unit  Bathroom Equipment: Grab bars in shower, Tub transfer bench, Bedside commode     Prior Function:  Level of Gem: Independent with ADLs and functional  transfers  Receives Help From: Home health  ADL Assistance:  (Set up for dressing and bathing only)  Homemaking Assistance: Needs assistance  Ambulatory Assistance:  (Non ambulatory, able to self propel wheelchair. Able to get in and out of bed and perform stand pivot transfers independently at baseline)  Prior Function Comments: [facility] provided pt with transportation, pt with strong desire to learn how to drive again     ADL:  Eating Assistance: Independent  Grooming Assistance: Independent  Grooming Deficit:  (IND to wash face, brush teeth, and comb hair this date while long sitting in bed)  Bathing Assistance: Moderate  Bathing Deficit:  (anticipated)  UE Dressing Assistance: Minimal  UE Dressing Deficit:  (To don gown long sitting in bed)  LE Dressing Assistance: Total  LE Dressing Deficit: Don/doff L sock  Toileting Assistance with Device: Total  Toileting Deficit:  (Anticipated)    Activity Tolerance:  Endurance: Tolerates 30 min exercise with multiple rests  Early Mobility/Exercise Safety Screen: Proceed with mobilization - No exclusion criteria met     Bed Mobility/Transfers: Bed Mobility  Bed Mobility: Yes  Bed Mobility 1  Bed Mobility 1: Supine to sitting, Sitting to supine  Level of Assistance 1: Close supervision  Bed Mobility Comments 1: HOB elevated, use of bedrails and increased time  Bed Mobility 2  Bed Mobility  2: Long sit  Level of Assistance 2: Distant supervision  Bed Mobility Comments 2: Bedrails, HOB elevated   and Transfers  Transfer: No     Vision: Vision - Basic Assessment  Current Vision: Wears glasses all the time   and    Sensation:  Light Touch: Not tested  Strength:  Strength Comments: BUE strength WFL     Coordination:  Movements are Fluid and Coordinated: Yes     Therapy/Activity: Therapeutic Exercise  Therapeutic Exercise Performed: Yes  Therapeutic Exercise Activity 1: Pt provided with red theraband for UE exercises. Pt educated on shoulder flexion, elbow flexion, and elbow extension  exercises with theraband.   Therapeutic Activity  Therapeutic Activity Performed: Yes  Therapeutic Activity 1: Performed additional activities exceeding evaluation in order to optimize therapeutic gains. Pt performed 3 sets of ~5 minutes of long sitting in bed while performing UB dressing, oral care, hair care, and face washing. 1-2 minutes of rest required between each set 2/2 fatigue. Additionally, pt sat EOB ~5 minutes with CS for safety and tolerated LLE AROM exercises at EOB.    Outcome Measures: [facility] Daily Activity  Putting on and taking off regular lower body clothing: Total  Bathing (including washing, rinsing, drying): A lot  Putting on and taking off regular upper body clothing: A little  Toileting, which includes using toilet, bedpan or urinal: Total  Taking care of personal grooming such as brushing teeth: None  Eating Meals: None  Daily Activity - Total Score: 15        ICU Mobility Screen  Early Mobility/Exercise Safety Screen: Proceed with mobilization - No exclusion criteria met,          Education Documentation  Body Mechanics, taught by Soraya Mauricio OT at 12/8/2023  1:21 PM.  Learner: Patient  Readiness: Acceptance  Method: Explanation, Demonstration  Response: Verbalizes Understanding, Demonstrated Understanding    Precautions, taught by Soraya Mauricio OT at 12/8/2023  1:21 PM.  Learner: Patient  Readiness: Acceptance  Method: Explanation, Demonstration  Response: Verbalizes Understanding, Demonstrated Understanding    Home Exercise Program, taught by Soraya Mauricio OT at 12/8/2023  1:21 PM.  Learner: Patient  Readiness: Acceptance  Method: Explanation, Demonstration  Response: Verbalizes Understanding, Demonstrated Understanding    ADL Training, taught by Soraya Mauricio OT at 12/8/2023  1:21 PM.  Learner: Patient  Readiness: Acceptance  Method: Explanation, Demonstration  Response: Verbalizes Understanding, Demonstrated Understanding    Education  Comments  No comments found.        Goals:   Encounter Problems       Encounter Problems (Active)       ADLs       Patient with complete upper body dressing with modified independent level of assistance donning and doffing all UE clothes       Start:  12/08/23    Expected End:  12/22/23            Patient with complete lower body dressing with minimal assist  level of assistance donning and doffing all LE clothes  with PRN adaptive equipment       Start:  12/08/23    Expected End:  12/22/23            Pt will complete UB/LB bathing with set up assist and PRN adaptive equipment in supported sitting        Start:  12/08/23    Expected End:  12/22/23               COGNITION/SAFETY       Patient will participate in cognitive activities to demonstrate WFL score on further cognitive assessments, including Medi-Cog, MoCA and remain A&O x3, CAM (-).        Start:  12/08/23    Expected End:  12/22/23               TRANSFERS       Patient will perform bed mobility modified independent level of assistance and bedrails in order to improve safety and independence with mobility       Start:  12/08/23    Expected End:  12/22/23            Patient will complete functional transfer to chair with least restrictive device with moderate assist level of assistance.       Start:  12/08/23    Expected End:  12/22/23 12/08/23 at 1:23 PM   Soraya Mauricio, OT   Rehab Office: [phone]

## 2023-12-09 LAB
ALBUMIN SERPL BCP-MCNC: 3.3 G/DL (ref 3.4–5)
ANION GAP SERPL CALC-SCNC: 13 MMOL/L (ref 10–20)
BASOPHILS # BLD AUTO: 0.03 X10*3/UL (ref 0–0.1)
BASOPHILS NFR BLD AUTO: 0.3 %
BLOOD EXPIRATION DATE: NORMAL
BUN SERPL-MCNC: 12 MG/DL (ref 6–23)
CALCIUM SERPL-MCNC: 8.9 MG/DL (ref 8.6–10.6)
CHLORIDE SERPL-SCNC: 105 MMOL/L (ref 98–107)
CO2 SERPL-SCNC: 26 MMOL/L (ref 21–32)
CREAT SERPL-MCNC: 0.52 MG/DL (ref 0.5–1.05)
DISPENSE STATUS: NORMAL
EOSINOPHIL # BLD AUTO: 0.25 X10*3/UL (ref 0–0.7)
EOSINOPHIL NFR BLD AUTO: 2.2 %
ERYTHROCYTE [DISTWIDTH] IN BLOOD BY AUTOMATED COUNT: 21.5 % (ref 11.5–14.5)
EST. AVERAGE GLUCOSE BLD GHB EST-MCNC: 217 MG/DL
GFR SERPL CREATININE-BSD FRML MDRD: >90 ML/MIN/1.73M*2
GLUCOSE BLD MANUAL STRIP-MCNC: 161 MG/DL (ref 74–99)
GLUCOSE BLD MANUAL STRIP-MCNC: 198 MG/DL (ref 74–99)
GLUCOSE BLD MANUAL STRIP-MCNC: 224 MG/DL (ref 74–99)
GLUCOSE BLD MANUAL STRIP-MCNC: 298 MG/DL (ref 74–99)
GLUCOSE SERPL-MCNC: 176 MG/DL (ref 74–99)
HBA1C MFR BLD: 9.2 %
HCT VFR BLD AUTO: 29.2 % (ref 36–46)
HGB BLD-MCNC: 8.8 G/DL (ref 12–16)
HYPOCHROMIA BLD QL SMEAR: NORMAL
IMM GRANULOCYTES # BLD AUTO: 0.14 X10*3/UL (ref 0–0.7)
IMM GRANULOCYTES NFR BLD AUTO: 1.2 % (ref 0–0.9)
INR PPP: 2 (ref 0.9–1.1)
LYMPHOCYTES # BLD AUTO: 1.29 X10*3/UL (ref 1.2–4.8)
LYMPHOCYTES NFR BLD AUTO: 11.4 %
MAGNESIUM SERPL-MCNC: 1.99 MG/DL (ref 1.6–2.4)
MCH RBC QN AUTO: 24.7 PG (ref 26–34)
MCHC RBC AUTO-ENTMCNC: 30.1 G/DL (ref 32–36)
MCV RBC AUTO: 82 FL (ref 80–100)
MONOCYTES # BLD AUTO: 0.95 X10*3/UL (ref 0.1–1)
MONOCYTES NFR BLD AUTO: 8.4 %
NEUTROPHILS # BLD AUTO: 8.62 X10*3/UL (ref 1.2–7.7)
NEUTROPHILS NFR BLD AUTO: 76.5 %
NRBC BLD-RTO: 0 /100 WBCS (ref 0–0)
PHOSPHATE SERPL-MCNC: 2 MG/DL (ref 2.5–4.9)
PLATELET # BLD AUTO: 320 X10*3/UL (ref 150–450)
POTASSIUM SERPL-SCNC: 3.9 MMOL/L (ref 3.5–5.3)
PRODUCT BLOOD TYPE: 6200
PRODUCT CODE: NORMAL
PROTHROMBIN TIME: 22.9 SECONDS (ref 9.8–12.8)
RBC # BLD AUTO: 3.56 X10*6/UL (ref 4–5.2)
RBC MORPH BLD: NORMAL
SODIUM SERPL-SCNC: 140 MMOL/L (ref 136–145)
UFH PPP CHRO-ACNC: 0.2 IU/ML
UFH PPP CHRO-ACNC: 0.2 IU/ML
UFH PPP CHRO-ACNC: 0.4 IU/ML
UFH PPP CHRO-ACNC: 0.4 IU/ML
UNIT ABO: NORMAL
UNIT NUMBER: NORMAL
UNIT RH: NORMAL
UNIT VOLUME: 350
WBC # BLD AUTO: 11.3 X10*3/UL (ref 4.4–11.3)
XM INTEP: NORMAL

## 2023-12-09 PROCEDURE — 80069 RENAL FUNCTION PANEL: CPT | Performed by: STUDENT IN AN ORGANIZED HEALTH CARE EDUCATION/TRAINING PROGRAM

## 2023-12-09 PROCEDURE — 99232 SBSQ HOSP IP/OBS MODERATE 35: CPT | Performed by: STUDENT IN AN ORGANIZED HEALTH CARE EDUCATION/TRAINING PROGRAM

## 2023-12-09 PROCEDURE — 2500000002 HC RX 250 W HCPCS SELF ADMINISTERED DRUGS (ALT 637 FOR MEDICARE OP, ALT 636 FOR OP/ED): Performed by: STUDENT IN AN ORGANIZED HEALTH CARE EDUCATION/TRAINING PROGRAM

## 2023-12-09 PROCEDURE — 85610 PROTHROMBIN TIME: CPT | Performed by: STUDENT IN AN ORGANIZED HEALTH CARE EDUCATION/TRAINING PROGRAM

## 2023-12-09 PROCEDURE — 85025 COMPLETE CBC W/AUTO DIFF WBC: CPT | Performed by: STUDENT IN AN ORGANIZED HEALTH CARE EDUCATION/TRAINING PROGRAM

## 2023-12-09 PROCEDURE — 2500000001 HC RX 250 WO HCPCS SELF ADMINISTERED DRUGS (ALT 637 FOR MEDICARE OP): Performed by: STUDENT IN AN ORGANIZED HEALTH CARE EDUCATION/TRAINING PROGRAM

## 2023-12-09 PROCEDURE — 85520 HEPARIN ASSAY: CPT | Performed by: NURSE PRACTITIONER

## 2023-12-09 PROCEDURE — 1200000002 HC GENERAL ROOM WITH TELEMETRY DAILY

## 2023-12-09 PROCEDURE — 37799 UNLISTED PX VASCULAR SURGERY: CPT | Performed by: NURSE PRACTITIONER

## 2023-12-09 PROCEDURE — 82947 ASSAY GLUCOSE BLOOD QUANT: CPT

## 2023-12-09 PROCEDURE — 2500000001 HC RX 250 WO HCPCS SELF ADMINISTERED DRUGS (ALT 637 FOR MEDICARE OP)

## 2023-12-09 PROCEDURE — 2500000004 HC RX 250 GENERAL PHARMACY W/ HCPCS (ALT 636 FOR OP/ED): Performed by: STUDENT IN AN ORGANIZED HEALTH CARE EDUCATION/TRAINING PROGRAM

## 2023-12-09 PROCEDURE — 83735 ASSAY OF MAGNESIUM: CPT | Performed by: STUDENT IN AN ORGANIZED HEALTH CARE EDUCATION/TRAINING PROGRAM

## 2023-12-09 RX ORDER — METOPROLOL TARTRATE 25 MG/1
12.5 TABLET, FILM COATED ORAL 2 TIMES DAILY
Status: DISCONTINUED | OUTPATIENT
Start: 2023-12-09 | End: 2023-12-15 | Stop reason: HOSPADM

## 2023-12-09 RX ADMIN — OXYCODONE HYDROCHLORIDE 10 MG: 5 TABLET ORAL at 00:34

## 2023-12-09 RX ADMIN — OXYCODONE HYDROCHLORIDE 10 MG: 5 TABLET ORAL at 05:21

## 2023-12-09 RX ADMIN — ACETAMINOPHEN 650 MG: 325 TABLET ORAL at 05:20

## 2023-12-09 RX ADMIN — INSULIN LISPRO 3 UNITS: 100 INJECTION, SOLUTION INTRAVENOUS; SUBCUTANEOUS at 09:18

## 2023-12-09 RX ADMIN — ACETAMINOPHEN 650 MG: 325 TABLET ORAL at 16:09

## 2023-12-09 RX ADMIN — LEVETIRACETAM 500 MG: 500 TABLET, FILM COATED ORAL at 09:17

## 2023-12-09 RX ADMIN — DULOXETINE HYDROCHLORIDE 60 MG: 60 CAPSULE, DELAYED RELEASE ORAL at 21:25

## 2023-12-09 RX ADMIN — ASPIRIN 81 MG: 81 TABLET, COATED ORAL at 09:18

## 2023-12-09 RX ADMIN — ACETAMINOPHEN 650 MG: 325 TABLET ORAL at 09:17

## 2023-12-09 RX ADMIN — CLOPIDOGREL BISULFATE 75 MG: 75 TABLET, FILM COATED ORAL at 09:18

## 2023-12-09 RX ADMIN — INSULIN LISPRO 2 UNITS: 100 INJECTION, SOLUTION INTRAVENOUS; SUBCUTANEOUS at 17:05

## 2023-12-09 RX ADMIN — DIVALPROEX SODIUM 1000 MG: 500 TABLET, DELAYED RELEASE ORAL at 21:24

## 2023-12-09 RX ADMIN — LEVOTHYROXINE SODIUM 75 MCG: 75 TABLET ORAL at 05:21

## 2023-12-09 RX ADMIN — METOPROLOL TARTRATE 12.5 MG: 25 TABLET, FILM COATED ORAL at 13:21

## 2023-12-09 RX ADMIN — OXYCODONE HYDROCHLORIDE 10 MG: 5 TABLET ORAL at 16:09

## 2023-12-09 RX ADMIN — LEVETIRACETAM 500 MG: 500 TABLET, FILM COATED ORAL at 21:25

## 2023-12-09 RX ADMIN — GABAPENTIN 600 MG: 300 CAPSULE ORAL at 13:21

## 2023-12-09 RX ADMIN — DIVALPROEX SODIUM 500 MG: 500 TABLET, DELAYED RELEASE ORAL at 09:18

## 2023-12-09 RX ADMIN — INSULIN LISPRO 1 UNITS: 100 INJECTION, SOLUTION INTRAVENOUS; SUBCUTANEOUS at 13:21

## 2023-12-09 RX ADMIN — GABAPENTIN 600 MG: 300 CAPSULE ORAL at 05:20

## 2023-12-09 RX ADMIN — HEPARIN SODIUM 23 UNITS/HR: 10000 INJECTION, SOLUTION INTRAVENOUS at 13:24

## 2023-12-09 RX ADMIN — BUSPIRONE HYDROCHLORIDE 10 MG: 10 TABLET ORAL at 09:17

## 2023-12-09 RX ADMIN — OXYCODONE HYDROCHLORIDE 10 MG: 5 TABLET ORAL at 21:24

## 2023-12-09 RX ADMIN — HEPARIN SODIUM 1900 UNITS/HR: 10000 INJECTION, SOLUTION INTRAVENOUS at 00:36

## 2023-12-09 RX ADMIN — GABAPENTIN 600 MG: 300 CAPSULE ORAL at 21:25

## 2023-12-09 RX ADMIN — METOPROLOL TARTRATE 12.5 MG: 25 TABLET, FILM COATED ORAL at 21:25

## 2023-12-09 RX ADMIN — ATORVASTATIN CALCIUM 80 MG: 80 TABLET, FILM COATED ORAL at 21:25

## 2023-12-09 ASSESSMENT — PAIN SCALES - GENERAL
PAINLEVEL_OUTOF10: 6
PAINLEVEL_OUTOF10: 9
PAINLEVEL_OUTOF10: 8
PAINLEVEL_OUTOF10: 9
PAINLEVEL_OUTOF10: 2
PAINLEVEL_OUTOF10: 9

## 2023-12-09 ASSESSMENT — PAIN - FUNCTIONAL ASSESSMENT
PAIN_FUNCTIONAL_ASSESSMENT: 0-10

## 2023-12-09 ASSESSMENT — COGNITIVE AND FUNCTIONAL STATUS - GENERAL
TOILETING: A LOT
PERSONAL GROOMING: A LITTLE
DAILY ACTIVITIY SCORE: 14
STANDING UP FROM CHAIR USING ARMS: A LOT
TURNING FROM BACK TO SIDE WHILE IN FLAT BAD: A LITTLE
EATING MEALS: A LITTLE
MOVING FROM LYING ON BACK TO SITTING ON SIDE OF FLAT BED WITH BEDRAILS: A LITTLE
WALKING IN HOSPITAL ROOM: TOTAL
HELP NEEDED FOR BATHING: A LOT
DRESSING REGULAR LOWER BODY CLOTHING: A LOT
CLIMB 3 TO 5 STEPS WITH RAILING: TOTAL
MOBILITY SCORE: 12
DRESSING REGULAR UPPER BODY CLOTHING: A LOT
MOVING TO AND FROM BED TO CHAIR: A LOT

## 2023-12-09 ASSESSMENT — PAIN DESCRIPTION - ORIENTATION: ORIENTATION: LEFT

## 2023-12-09 ASSESSMENT — PAIN DESCRIPTION - LOCATION: LOCATION: FOOT

## 2023-12-09 NOTE — PROGRESS NOTES
VASCULAR SURGERY PROGRESS NOTE  Subjective   No acute overnight events. Left foot and leg pain markedly improved. No numbness. Improved motor function.     Objective   Vitals:  Heart Rate:  []   Temp:  [36 °C (96.8 °F)-37.1 °C (98.8 °F)]   Resp:  [9-28]   BP: ()/(59-85)   Weight:  [102 kg (224 lb 13.9 oz)]   SpO2:  [91 %-99 %]     Exam:  Constitutional: No acute distress, resting comfortably  Neuro:  AOx3, grossly intact  ENMT: moist mucous membranes  CV: no tachycardia  Pulm: non-labored on nasal canula  GI: soft, non-tender, non-distended  Skin: Left groin P[place]vena in place and functional  Musculoskeletal: right hip disarticulation  Extremities: left PT and DP doppler monophasic signals    Labs:  Results from last 7 days   Lab Units 12/08/23  1033 12/08/23  0312 12/07/23  1124   WBC AUTO x10*3/uL 11.7* 9.3 14.3*   HEMOGLOBIN g/dL 8.5* 8.9* 10.4*   PLATELETS AUTO x10*3/uL 297 304 482*      Results from last 7 days   Lab Units 12/08/23  1221 12/08/23  0312 12/07/23  1124   SODIUM mmol/L 136 136 138   POTASSIUM mmol/L 4.0 3.4* 4.5   CHLORIDE mmol/L 102 102 106   CO2 mmol/L 26 27 23   BUN mg/dL 13 11 8   CREATININE mg/dL 0.66 0.59 0.56   GLUCOSE mg/dL 174* 158* 242*   MAGNESIUM mg/dL  --  1.83  --    PHOSPHORUS mg/dL 2.1* 1.9* 2.6      Results from last 7 days   Lab Units 12/08/23  0815 12/08/23  0312 12/07/23  2319   INR  1.6* 1.6*  1.6* 1.5*   PROTIME seconds 18.6* 17.6*  18.3* 16.4*   APTT seconds 48* 74*  74* 63*     Assessment/Plan   Shirin Slater is 53 y.o. female with history of DM2 c/b neuropathy, renal and splenic thrombosis (on coumadin, aspirin and plavix), HTN, HLD, PAD s/p right hip disarticulation and multiple LLE internvetions who presented with LLE 2B ALI s/p open thrombectomy of left iliac, femoral and profunda.    Some reperfusion pain. Otherwise LLE symptoms markedly improved.    Plan:  -pain control with Tylenol and PRN oxycodone  -continue home Depakote, duloxetine, busparone,  keppra  -continue aspirin, plavix and atorvastatin  -continue heparin gtt  -appreciate vascular medicine recs (TTE, LLE DVT duplex, LFTs)  -restart home metoprolol as tolerated  -IS and bronchopulmonary hygiene  -diet as tolerated  -bowel regimen  -PPI  -continue home levothyroxine  -PT/OT  -transfer to regular nursing floor with telemetry    D/w attending, Dr. Ann Marie Webb MD  Vascular Surgery PGY-4  Team pager: [phone]

## 2023-12-09 NOTE — PROGRESS NOTES
INTERNAL MEDICINE PROGRESS NOTE     BRIEF NARRATIVE      Shirin Slater is a 53 y.o. female on day 3 of admission presenting with Limb ischemia.  Patient has PMH T2DM (A1c 8.5) c/b diabetic neuropathy, renal and splenic thrombosis (on Coumadin, aspirin and clopidogrel), HTN, HLD, PVD c/b R hip disarticulation and LLL disease s/p L 1/2/5 digit amputations and previous iliac artery stenting who presents to SICU s/p L common femoral and external iliac embolectomy, patch angioplasty common femoral artery on 12/6.      SUBJECTIVE     Patient seen and examined today, she was laying in bed in nonapparent distress.  On physical exam there is significant left lower extremity edema.  There is also decreased PT and DP pulse, could be secondary to significant edema    OBJECTIVE      Visit Vitals  BP (!) 96/47   Pulse 103   Temp 36.6 °C (97.9 °F)   Resp 18        Intake/Output Summary (Last 24 hours) at 12/9/2023 1105  Last data filed at 12/9/2023 0200  Gross per 24 hour   Intake 768.6 ml   Output 1900 ml   Net -1131.4 ml       Physical Exam   Constitutional: No acute distress, resting comfortably  Neuro:  AOx3, grossly intact  ENMT: moist mucous membranes  CV: no tachycardia  Pulm: non-labored on nasal canula  GI: soft, non-tender, non-distended  Skin: Left groin Preevena in place and functional  Musculoskeletal: right hip disarticulation  Extremities: left PT and DP doppler monophasic signals    Current Meds   acetaminophen, 650 mg, oral, q6h  aspirin, 81 mg, oral, Daily  atorvastatin, 80 mg, oral, Nightly  busPIRone, 10 mg, oral, Daily  clopidogrel, 75 mg, oral, Daily  divalproex, 1,000 mg, oral, q PM  divalproex, 500 mg, oral, q AM  DULoxetine,  60 mg, oral, Daily  gabapentin, 600 mg, oral, q8h NINO  insulin lispro, 0-5 Units, subcutaneous, TID with meals  levETIRAcetam, 500 mg, oral, BID  levothyroxine, 75 mcg, oral, Daily  sennosides, 2 tablet, oral, BID       PRN medications: albuterol, dextrose 10 % in water (D10W), dextrose, glucagon, heparin, HYDROmorphone, ipratropium-albuteroL, oxyCODONE, oxyCODONE, oxygen     LABS and IMAGING     WBC   Date Value Ref Range Status   12/09/2023 11.3 4.4 - 11.3 x10*3/uL Preliminary   12/08/2023 11.7 (H) 4.4 - 11.3 x10*3/uL Final   12/08/2023 9.3 4.4 - 11.3 x10*3/uL Final     Hemoglobin   Date Value Ref Range Status   12/09/2023 8.8 (L) 12.0 - 16.0 g/dL Preliminary   12/08/2023 8.5 (L) 12.0 - 16.0 g/dL Final   12/08/2023 8.9 (L) 12.0 - 16.0 g/dL Final     Hematocrit   Date Value Ref Range Status   12/09/2023 29.2 (L) 36.0 - 46.0 % Preliminary   12/08/2023 27.9 (L) 36.0 - 46.0 % Final   12/08/2023 28.6 (L) 36.0 - 46.0 % Final     Bicarbonate   Date Value Ref Range Status   12/08/2023 26 21 - 32 mmol/L Final   12/08/2023 27 21 - 32 mmol/L Final   12/07/2023 23 21 - 32 mmol/L Final     Creatinine   Date Value Ref Range Status   12/08/2023 0.66 0.50 - 1.05 mg/dL Final   12/08/2023 0.59 0.50 - 1.05 mg/dL Final   12/07/2023 0.56 0.50 - 1.05 mg/dL Final     Calcium   Date Value Ref Range Status   12/08/2023 8.7 8.6 - 10.6 mg/dL Final   12/08/2023 8.6 8.6 - 10.6 mg/dL Final   12/07/2023 8.1 (L) 8.6 - 10.6 mg/dL Final     INR   Date Value Ref Range Status   12/09/2023 2.0 (H) 0.9 - 1.1 Final   12/08/2023 1.6 (H) 0.9 - 1.1 Final   12/08/2023 1.6 (H) 0.9 - 1.1 Final   12/08/2023 1.6 (H) 0.9 - 1.1 Final       Transthoracic Echo (TTE) [facility], [address] [place] [address]                 Tel [phone] and Fax [phone]    TRANSTHORACIC ECHOCARDIOGRAM REPORT       Patient Name:      NALDO Carias Physician:    [id number] Jarrod                                                                 Gama RAMESH  Study Date:        12/8/2023            Ordering Provider:    [id number] SNEHA AVENDAÑO  MRN/PID:           [id number]             Fellow:               Levi Oseguera MD  Accession#:        [id number]         Nurse:  Date of Birth/Age: 1970 / 53 years Sonographer:          Jen Hanley RDCS  Gender:            F                    Additional Staff:  Height:            170.18 cm            Admit Date:           12/7/2023  Weight:            101.61 kg            Admission Status:     Inpatient -                                                                Routine  BSA:               2.12 m2              Encounter#:           [id number]                                          Department Location:  [facility]  Blood Pressure: 104 /63 mmHg    Study Type:    TRANSTHORACIC ECHO (TTE) LIMITED  Diagnosis/ICD: Encounter for screening for cardiovascular disorders-Z13.6  Indication:    Limb ischemia  CPT Code:      Echo Limited-33744    Patient History:  Diabetes:          Yes  Pertinent History: Renal and splenic thrombosis, HTN, HLD.    Study Detail: The following Echo studies were performed: 2D and M-Mode.                Technically challenging study due to patient lying in supine                position, prominent lung artifact and body habitus. Definity used                as a contrast agent for endocardial border definition and agitated                saline used as a contrast agent for intraseptal flow evaluation.                Total contrast used for this procedure was 2 mL via IV push.                Unable to obtain suprasternal notch view.       PHYSICIAN INTERPRETATION:  Left Ventricle: The left ventricular systolic function is normal, with an estimated ejection fraction of 65-70%. There are no regional wall motion abnormalities. The left ventricular cavity size is normal. Left ventricular diastolic filling was  not assessed. There is no definite left ventricular thrombus visualized.  Left Atrium: The left atrium is normal in size. There is no evidence of a patent foramen ovale. A bubble study using agitated saline was performed. Bubble study is negative.  Right Ventricle: The right ventricle is normal in size. There is normal right ventricular global systolic function.  Right Atrium: The right atrium is normal in size.  Aortic Valve: The aortic valve appears structurally normal. Aortic valve regurgitation was not assessed.  Mitral Valve: The mitral valve is normal in structure. There is mild mitral annular calcification. Mitral valve regurgitation was not assessed.  Tricuspid Valve: The tricuspid valve is structurally normal. Tricuspid regurgitation was not assessed. Right ventricular systolic pressure could not be accurately assessed in this patient. The right ventricular systolic pressure is unable to be estimated.  Pulmonic Valve: The pulmonic valve is structurally normal. Pulmonic valve regurgitation was not assessed.  Pericardium: There is a small pericardial effusion.  Aorta: The aortic root is normal.  Systemic Veins: The inferior vena cava appears dilated. There is IVC inspiratory collapse greater than 50%.  In comparison to the previous echocardiogram(s): Compared with study from 6/30/2022, no significant change.       CONCLUSIONS:   1. Poorly visualized anatomical structures due to suboptimal image quality.   2. Left ventricular systolic function is normal with a 65-70% estimated ejection fraction.   3. No left ventricular thrombus visualized.   4. There is no evidence of a patent foramen ovale.    QUANTITATIVE DATA SUMMARY:  2D MEASUREMENTS:                           Normal Ranges:  LAs:           3.40 cm   (2.7-4.0cm)  IVSd:          0.80 cm   (0.6-1.1cm)  LVPWd:         0.80 cm   (0.6-1.1cm)  LVIDd:         4.50 cm   (3.9-5.9cm)  LVIDs:         3.10 cm  LV Mass Index: 53.6 g/m2  LV % FS        31.1 %    LA  VOLUME:                                Normal Ranges:  LA Vol A4C:        34.4 ml    (22+/-6mL/m2)  LA Vol A2C:        35.5 ml  LA Vol BP:         36.5 ml  LA Vol Index A4C:  16.2ml/m2  LA Vol Index A2C:  16.7 ml/m2  LA Vol Index BP:   17.2 ml/m2  LA Area A4C:       14.9 cm2  LA Area A2C:       14.5 cm2  LA Major Axis A4C: 5.5 cm  LA Major Axis A2C: 5.0 cm    LV SYSTOLIC FUNCTION BY 2D PLANIMETRY (MOD):                      Normal Ranges:  EF-A4C View: 73.2 % (>=55%)  EF-A2C View: 61.0 %  EF-Biplane:  67.0 %       RIGHT VENTRICLE:  TAPSE: 18.9 mm    TRICUSPID VALVE/RVSP:                    Normal Ranges:  IVC Diam: 2.62 cm       [id number] Jarrod Malloy MD  Electronically signed on 12/8/2023 at 4:45:12 PM       ** Final **  Vascular US Ankle Brachial Index (HEMA) Without Exercise  Preliminary Cardiology Report              [place] [address]    Tel [phone] and Fax [phone]                 Preliminary Vascular Lab Report     [facility] US ANKLE BRACHIAL INDEX (HEMA) WITHOUT EXERCISE       Patient Name:      NALDO Carias Physician: [id number] Ana Silver MD  Study Date:        12/8/2023       [facility]           [id number]                                     Physician:         JASSI  MRN/PID:           [id number]        Technologist:      Brooks BORRERO  Accession#:        [id number]    Technologist 2:    Rama Jimenez RVT  Date of Birth/Age: 1970       Encounter#:        [id number]  Gender:            F  Admission Status:  Inpatient       Location           [facility]                                     Performed:       Diagnosis/ICD: Encounter for preprocedural cardiovascular examination-Z01.810  Procedure/CPT: 40161 Peripheral artery HEMA Only       Patient History: Right leg amputation from proximal thigh.                   Left first digit amputated.                   S/P left leg thrombectomy.       PRELIMINARY  CONCLUSIONS:  Left Lower PVR: Evidence of moderate arterial occlusive disease in the left lower extremity at rest. Monophasic flow is noted in the left common femoral artery, left posterior tibial artery and left dorsalis pedis artery. Unable to obtain left toe pressure due to amputation.     Imaging & Doppler Findings:     LEFT Lower PVR                Pressures Ratios  Left Posterior Tibial (Ankle) 51 mmHg   0.48  Left Dorsalis Pedis (Ankle)   55 mmHg   0.52                              Left  Brachial Pressure 106 mmHg            VASCULAR PRELIMINARY REPORT  completed by Brooks BORRERO on 12/8/2023 at 4:18:46 PM       ** Final **  XR chest 1 view  Narrative: Interpreted By:  Kris Griffith,   STUDY:  XR CHEST 1 VIEW;  12/8/2023 3:50 am      INDICATION:  Signs/Symptoms:hypoxia.      COMPARISON:  Chest radiograph dated 12/7/2023      ACCESSION NUMBER(S):  [id number]      ORDERING CLINICIAN:  YOANDY STEVE      FINDINGS:  AP radiograph of the chest      Limitations: Decreased lung volumes. Apical lordotic projection of  the chest and patient rotation to the left      Right lower lobe opacity and consolidation is similar to previous  study 12/07/2023. There is slightly improved pulmonary aeration  within the right lower lobe.      Left upper lobe ground-glass opacity is more prominent than on prior  study. Linear atelectasis within the retrocardiac left lower lobe is  probable.      The heart is maximum normal in size magnified by the apical lordotic  projection and degree of inspiration.      Impression: 1. Right lower lobe opacity and consolidation persists. Right lower  lobe pneumonia not excluded.  2. Subtle ground-glass opacity left upper lobe more conspicuous than  on prior study.              Signed by: Kris Griffith 12/8/2023 1:19 PM  Dictation workstation:   [id number]       ASSESSMENT / PLANS        # Acute LLE ischemia s/p L common femoral and external iliac embolectomy, patch angioplasty  common femoral artery on 12/6.  -Vascular surgery following,  -Weaned off phenylephrine 12/7, CK and lactate WNL  -Left lower PVR performed on 12/8 reviewed as above  -Continue heparin drip for now  -Continue aspirin Plavix and atorvastatin  -BP more stable, will restart home metoprolol as tolerated  -Continue diet as tolerated  -Bowel regimen, PPI  -PT/OT  -Continue telemetry    # HTN, HLD, PVD s/p L and R iliac and L popliteal stents, R hip disarticulation, L 1/2/5 digit amputations, renal and splenic thrombosis (on Coumadin, aspirin and clopidogrel).   #Questionable compliance with meds. Baseline cardiac function EF 60-65% (2022). - goal sbp 120-160 range per vascular surgery  - continuous ekg/hourly and prn NIBP monitoring  - remove art line  - volume as indicated -> decreased uop, will give 500ml LR  - no need to continue to trend lactate and CK  - Home metoprolol 12.5 BID  - continue home plavix, aspirin, atorvastatin  - continue low intensity heparin infusion     #Acute surgical blood loss anemia,   #supratherapeutic INR.   OR , received 2u PRBC, 4ffp and 10mg iv vitamin K. INR 1.6 this am  - Check CBC, coags daily and prn  - heparin infusion, titrate per assays  - continue asa and plavix  - vasc med consulted, appreciate recs -> Obtained TTE with bubble study, LLE DVT US and added on LFTs    #History of epilepsy, on AEDs. Chronic pain, diabetic neuropathy. - continue scheduled acetaminophen and gabapentin. PRN oxycodone with breakthrough iv hydromorphone  - ongoing neuro/neurovasc/pain assessments  - PT/OT consulted  - continue home depakote, duloxetine, busparone, keppra  - SW consulted for ongoing home resource management, discharge planning, financial concerns      40 pack year smoker. Arrived to SICU Intubated. Extubated, currently on 2L NC  - goal SpO2 >94%  - Q1h IS while awake  - additional bronchial hygiene as indicated  - cxr prn    #History of NIDDM, hypothyroidism. Last A1c 8.5% 9/2023. TSH  WNL 12/7  - AC/HS BG checks  - continue home levothyroxine  - hold home empagliflozin     Lines:   L ulnar arterial line placed by vascular surgery -> remove  PIV x3  Hale -> keep for now per patient request    Saida Elkins MD

## 2023-12-10 LAB
ALBUMIN SERPL BCP-MCNC: 3.4 G/DL (ref 3.4–5)
ALP SERPL-CCNC: 79 U/L (ref 33–110)
ALT SERPL W P-5'-P-CCNC: 4 U/L (ref 7–45)
ANION GAP SERPL CALC-SCNC: 14 MMOL/L (ref 10–20)
AST SERPL W P-5'-P-CCNC: 7 U/L (ref 9–39)
BACTERIA BLD CULT: NORMAL
BACTERIA BLD CULT: NORMAL
BILIRUB SERPL-MCNC: 0.6 MG/DL (ref 0–1.2)
BUN SERPL-MCNC: 13 MG/DL (ref 6–23)
CALCIUM SERPL-MCNC: 8.9 MG/DL (ref 8.6–10.6)
CHLORIDE SERPL-SCNC: 103 MMOL/L (ref 98–107)
CO2 SERPL-SCNC: 29 MMOL/L (ref 21–32)
CREAT SERPL-MCNC: 0.55 MG/DL (ref 0.5–1.05)
ERYTHROCYTE [DISTWIDTH] IN BLOOD BY AUTOMATED COUNT: 21.5 % (ref 11.5–14.5)
GFR SERPL CREATININE-BSD FRML MDRD: >90 ML/MIN/1.73M*2
GLUCOSE BLD MANUAL STRIP-MCNC: 137 MG/DL (ref 74–99)
GLUCOSE BLD MANUAL STRIP-MCNC: 147 MG/DL (ref 74–99)
GLUCOSE BLD MANUAL STRIP-MCNC: 155 MG/DL (ref 74–99)
GLUCOSE BLD MANUAL STRIP-MCNC: 165 MG/DL (ref 74–99)
GLUCOSE SERPL-MCNC: 142 MG/DL (ref 74–99)
HCT VFR BLD AUTO: 30.9 % (ref 36–46)
HGB BLD-MCNC: 9.6 G/DL (ref 12–16)
MCH RBC QN AUTO: 24.5 PG (ref 26–34)
MCHC RBC AUTO-ENTMCNC: 31.1 G/DL (ref 32–36)
MCV RBC AUTO: 79 FL (ref 80–100)
NRBC BLD-RTO: 0 /100 WBCS (ref 0–0)
PLATELET # BLD AUTO: 412 X10*3/UL (ref 150–450)
POTASSIUM SERPL-SCNC: 4 MMOL/L (ref 3.5–5.3)
PROT SERPL-MCNC: 6.2 G/DL (ref 6.4–8.2)
RBC # BLD AUTO: 3.92 X10*6/UL (ref 4–5.2)
SODIUM SERPL-SCNC: 142 MMOL/L (ref 136–145)
WBC # BLD AUTO: 12.5 X10*3/UL (ref 4.4–11.3)

## 2023-12-10 PROCEDURE — 2500000004 HC RX 250 GENERAL PHARMACY W/ HCPCS (ALT 636 FOR OP/ED): Performed by: STUDENT IN AN ORGANIZED HEALTH CARE EDUCATION/TRAINING PROGRAM

## 2023-12-10 PROCEDURE — 99232 SBSQ HOSP IP/OBS MODERATE 35: CPT | Performed by: STUDENT IN AN ORGANIZED HEALTH CARE EDUCATION/TRAINING PROGRAM

## 2023-12-10 PROCEDURE — 1200000002 HC GENERAL ROOM WITH TELEMETRY DAILY

## 2023-12-10 PROCEDURE — 2500000001 HC RX 250 WO HCPCS SELF ADMINISTERED DRUGS (ALT 637 FOR MEDICARE OP): Performed by: STUDENT IN AN ORGANIZED HEALTH CARE EDUCATION/TRAINING PROGRAM

## 2023-12-10 PROCEDURE — 2500000001 HC RX 250 WO HCPCS SELF ADMINISTERED DRUGS (ALT 637 FOR MEDICARE OP)

## 2023-12-10 PROCEDURE — 82947 ASSAY GLUCOSE BLOOD QUANT: CPT

## 2023-12-10 PROCEDURE — 37799 UNLISTED PX VASCULAR SURGERY: CPT | Performed by: STUDENT IN AN ORGANIZED HEALTH CARE EDUCATION/TRAINING PROGRAM

## 2023-12-10 PROCEDURE — 85027 COMPLETE CBC AUTOMATED: CPT | Performed by: STUDENT IN AN ORGANIZED HEALTH CARE EDUCATION/TRAINING PROGRAM

## 2023-12-10 PROCEDURE — 2500000002 HC RX 250 W HCPCS SELF ADMINISTERED DRUGS (ALT 637 FOR MEDICARE OP, ALT 636 FOR OP/ED): Performed by: STUDENT IN AN ORGANIZED HEALTH CARE EDUCATION/TRAINING PROGRAM

## 2023-12-10 PROCEDURE — 80053 COMPREHEN METABOLIC PANEL: CPT | Performed by: STUDENT IN AN ORGANIZED HEALTH CARE EDUCATION/TRAINING PROGRAM

## 2023-12-10 RX ADMIN — SENNOSIDES 17.2 MG: 8.6 TABLET, FILM COATED ORAL at 09:12

## 2023-12-10 RX ADMIN — HEPARIN SODIUM 2300 UNITS/HR: 10000 INJECTION, SOLUTION INTRAVENOUS at 01:27

## 2023-12-10 RX ADMIN — CLOPIDOGREL BISULFATE 75 MG: 75 TABLET, FILM COATED ORAL at 09:14

## 2023-12-10 RX ADMIN — ACETAMINOPHEN 650 MG: 325 TABLET ORAL at 21:36

## 2023-12-10 RX ADMIN — ACETAMINOPHEN 650 MG: 325 TABLET ORAL at 05:35

## 2023-12-10 RX ADMIN — LEVETIRACETAM 500 MG: 500 TABLET, FILM COATED ORAL at 21:36

## 2023-12-10 RX ADMIN — ASPIRIN 81 MG: 81 TABLET, COATED ORAL at 09:14

## 2023-12-10 RX ADMIN — GABAPENTIN 600 MG: 300 CAPSULE ORAL at 21:35

## 2023-12-10 RX ADMIN — LEVOTHYROXINE SODIUM 75 MCG: 75 TABLET ORAL at 05:35

## 2023-12-10 RX ADMIN — ATORVASTATIN CALCIUM 80 MG: 80 TABLET, FILM COATED ORAL at 21:36

## 2023-12-10 RX ADMIN — LEVETIRACETAM 500 MG: 500 TABLET, FILM COATED ORAL at 09:13

## 2023-12-10 RX ADMIN — OXYCODONE HYDROCHLORIDE 5 MG: 5 TABLET ORAL at 21:35

## 2023-12-10 RX ADMIN — METOPROLOL TARTRATE 12.5 MG: 25 TABLET, FILM COATED ORAL at 09:12

## 2023-12-10 RX ADMIN — METOPROLOL TARTRATE 12.5 MG: 25 TABLET, FILM COATED ORAL at 21:36

## 2023-12-10 RX ADMIN — DIVALPROEX SODIUM 500 MG: 500 TABLET, DELAYED RELEASE ORAL at 09:13

## 2023-12-10 RX ADMIN — DULOXETINE HYDROCHLORIDE 60 MG: 60 CAPSULE, DELAYED RELEASE ORAL at 09:12

## 2023-12-10 RX ADMIN — DIVALPROEX SODIUM 1000 MG: 500 TABLET, DELAYED RELEASE ORAL at 21:36

## 2023-12-10 RX ADMIN — GABAPENTIN 600 MG: 300 CAPSULE ORAL at 05:35

## 2023-12-10 RX ADMIN — GABAPENTIN 600 MG: 300 CAPSULE ORAL at 14:47

## 2023-12-10 RX ADMIN — OXYCODONE HYDROCHLORIDE 10 MG: 5 TABLET ORAL at 09:17

## 2023-12-10 ASSESSMENT — COGNITIVE AND FUNCTIONAL STATUS - GENERAL
MOBILITY SCORE: 12
STANDING UP FROM CHAIR USING ARMS: A LOT
TOILETING: A LOT
MOBILITY SCORE: 12
CLIMB 3 TO 5 STEPS WITH RAILING: TOTAL
PERSONAL GROOMING: A LITTLE
TURNING FROM BACK TO SIDE WHILE IN FLAT BAD: A LITTLE
DAILY ACTIVITIY SCORE: 14
DRESSING REGULAR LOWER BODY CLOTHING: A LOT
MOVING TO AND FROM BED TO CHAIR: A LOT
TOILETING: A LOT
WALKING IN HOSPITAL ROOM: TOTAL
EATING MEALS: A LITTLE
MOVING FROM LYING ON BACK TO SITTING ON SIDE OF FLAT BED WITH BEDRAILS: A LITTLE
HELP NEEDED FOR BATHING: A LOT
DRESSING REGULAR UPPER BODY CLOTHING: A LOT
WALKING IN HOSPITAL ROOM: TOTAL
TURNING FROM BACK TO SIDE WHILE IN FLAT BAD: A LITTLE
DRESSING REGULAR UPPER BODY CLOTHING: A LOT
HELP NEEDED FOR BATHING: A LOT
CLIMB 3 TO 5 STEPS WITH RAILING: TOTAL
MOVING FROM LYING ON BACK TO SITTING ON SIDE OF FLAT BED WITH BEDRAILS: A LITTLE
DAILY ACTIVITIY SCORE: 13
PERSONAL GROOMING: A LOT
MOVING TO AND FROM BED TO CHAIR: A LOT
DRESSING REGULAR LOWER BODY CLOTHING: A LOT
EATING MEALS: A LITTLE
STANDING UP FROM CHAIR USING ARMS: A LOT

## 2023-12-10 ASSESSMENT — PAIN - FUNCTIONAL ASSESSMENT
PAIN_FUNCTIONAL_ASSESSMENT: 0-10

## 2023-12-10 ASSESSMENT — PAIN SCALES - GENERAL
PAINLEVEL_OUTOF10: 6
PAINLEVEL_OUTOF10: 10 - WORST POSSIBLE PAIN
PAINLEVEL_OUTOF10: 0 - NO PAIN
PAINLEVEL_OUTOF10: 1

## 2023-12-10 ASSESSMENT — PAIN DESCRIPTION - ORIENTATION: ORIENTATION: LEFT

## 2023-12-10 ASSESSMENT — PAIN DESCRIPTION - LOCATION: LOCATION: LEG

## 2023-12-10 NOTE — PROGRESS NOTES
.                                                                                                                                                                                                                                                                                             INTERNAL MEDICINE PROGRESS NOTE     BRIEF NARRATIVE      Shirin Slater is a 53 y.o. female on day 4 of admission presenting with Limb ischemia.  Patient has PMH T2DM (A1c 8.5) c/b diabetic neuropathy, renal and splenic thrombosis (on Coumadin, aspirin and clopidogrel), HTN, HLD, PVD c/b R hip disarticulation and LLL disease s/p L 1/2/5 digit amputations and previous iliac artery stenting who presents to SICU s/p L common femoral and external iliac embolectomy, patch angioplasty common femoral artery on 12/6.      SUBJECTIVE     Patient seen and examined today, she was laying in bed in nonapparent distress.  On physical exam there is significant left lower extremity edema.    OBJECTIVE      Visit Vitals  BP (!) 100/49   Pulse 83   Temp 36.5 °C (97.7 °F)   Resp 20        Intake/Output Summary (Last 24 hours) at 12/10/2023 1213  Last data filed at 12/10/2023 1059  Gross per 24 hour   Intake --   Output 4200 ml   Net -4200 ml         Physical Exam   Constitutional: No acute distress, resting comfortably  Neuro:  AOx3, grossly intact  ENMT: moist mucous membranes  CV: no tachycardia  Pulm: non-labored on nasal canula  GI: soft, non-tender, non-distended  Skin: Left groin Preevena in place and functional  Extremities: - s/p R hip disarticulation; L groin provena holding suction; no hematoma appreciated; L foot with improved color; sensation is intact and motor improved; biphasic L DP/PT  Neuro- alert and oriented x3   Psych- appropriate mood   Tubes/lines- vasques     Current Meds   acetaminophen, 650 mg, oral, q6h  aspirin, 81 mg, oral, Daily  atorvastatin, 80 mg, oral, Nightly  busPIRone, 10 mg, oral, Daily  clopidogrel, 75 mg, oral,  Daily  divalproex, 1,000 mg, oral, q PM  divalproex, 500 mg, oral, q AM  DULoxetine, 60 mg, oral, Daily  gabapentin, 600 mg, oral, q8h NINO  insulin lispro, 0-5 Units, subcutaneous, TID with meals  levETIRAcetam, 500 mg, oral, BID  levothyroxine, 75 mcg, oral, Daily  metoprolol tartrate, 12.5 mg, oral, BID  sennosides, 2 tablet, oral, BID       PRN medications: albuterol, dextrose 10 % in water (D10W), dextrose, glucagon, heparin, HYDROmorphone, ipratropium-albuteroL, oxyCODONE, oxyCODONE, oxygen     LABS and IMAGING     WBC   Date Value Ref Range Status   12/09/2023 11.3 4.4 - 11.3 x10*3/uL Final   12/08/2023 11.7 (H) 4.4 - 11.3 x10*3/uL Final   12/08/2023 9.3 4.4 - 11.3 x10*3/uL Final     Hemoglobin   Date Value Ref Range Status   12/09/2023 8.8 (L) 12.0 - 16.0 g/dL Final   12/08/2023 8.5 (L) 12.0 - 16.0 g/dL Final   12/08/2023 8.9 (L) 12.0 - 16.0 g/dL Final     Hematocrit   Date Value Ref Range Status   12/09/2023 29.2 (L) 36.0 - 46.0 % Final   12/08/2023 27.9 (L) 36.0 - 46.0 % Final   12/08/2023 28.6 (L) 36.0 - 46.0 % Final     Bicarbonate   Date Value Ref Range Status   12/09/2023 26 21 - 32 mmol/L Final   12/08/2023 26 21 - 32 mmol/L Final   12/08/2023 27 21 - 32 mmol/L Final     Creatinine   Date Value Ref Range Status   12/09/2023 0.52 0.50 - 1.05 mg/dL Final   12/08/2023 0.66 0.50 - 1.05 mg/dL Final   12/08/2023 0.59 0.50 - 1.05 mg/dL Final     Calcium   Date Value Ref Range Status   12/09/2023 8.9 8.6 - 10.6 mg/dL Final   12/08/2023 8.7 8.6 - 10.6 mg/dL Final   12/08/2023 8.6 8.6 - 10.6 mg/dL Final     INR   Date Value Ref Range Status   12/09/2023 2.0 (H) 0.9 - 1.1 Final   12/08/2023 1.6 (H) 0.9 - 1.1 Final   12/08/2023 1.6 (H) 0.9 - 1.1 Final   12/08/2023 1.6 (H) 0.9 - 1.1 Final       Vascular US Ankle Brachial Index (HEMA) Without Exercise              [place] [address]    Tel [phone] and Fax [phone]       Vascular Lab Report  [facility] US ANKLE  BRACHIAL INDEX (HEMA) WITHOUT EXERCISE       Patient Name:      NALDO CHRIS JUDITH      Reading Physician:  [id number] Ana Silver MD  Study Date:        12/8/2023            Ordering Physician: [id number] LUZ KEENE  MRN/PID:           [id number]             Technologist:       Brooks Portillo RVS  Accession#:        [id number]         Technologist 2:     Rama Jimenez RVT  Date of Birth/Age: 1970 / 53 years Encounter#:         [id number]  Gender:            F  Admission Status:  Inpatient            Location Performed: [facility]       Diagnosis/ICD: Encounter for preprocedural cardiovascular examination-Z01.810  CPT Codes:     47167 Peripheral artery HEMA Only       Patient History Right leg amputation from proximal thigh.                  Left first digit amputated.                  S/P left leg thrombectomy.       CONCLUSIONS:  Right Lower PVR: Above knee amputation.  Left Lower PVR: Evidence of moderate arterial occlusive disease in the left lower extremity at rest. Monophasic flow is noted in the left common femoral artery, left posterior tibial artery and left dorsalis pedis artery. Unable to obtain left toe pressure due to amputation.     Imaging & Doppler Findings:     LEFT Lower PVR                Pressures Ratios  Left Posterior Tibial (Ankle) 51 mmHg   0.48  Left Dorsalis Pedis (Ankle)   55 mmHg   0.52                           Left  Brachial Pressure 106 mmHg          [id number] Ana Silver MD  Electronically signed by [id number] Ana Silver MD on 12/9/2023 at 3:29:52 PM       ** Final **       ASSESSMENT / PLANS        # Acute LLE ischemia s/p L common femoral and external iliac embolectomy, patch angioplasty common femoral artery on 12/6.  #Right hip disarticulation  -Vascular surgery following,  -Weaned off phenylephrine 12/7, CK and lactate WNL  -Left lower PVR performed  on 12/8 reviewed as above  -Continue heparin drip for now  -Continue aspirin Plavix and atorvastatin  -BP more stable, cont home metoprolol with parameters   -Continue diet as tolerated  -Bowel regimen, PPI  -PT/OT  -Continue telemetry    # HTN, HLD, PVD s/p L and R iliac and L popliteal stents, R hip disarticulation, L 1/2/5 digit amputations, renal and splenic thrombosis (on Coumadin, aspirin and clopidogrel).   #Questionable compliance with meds.   Baseline cardiac function EF 60-65% (2022). - goal sbp 120-160 range per vascular surgery  - continuous ekg/hourly and prn NIBP monitoring  - remove art line  - volume as indicated -> decreased uop, will give 500ml LR  - no need to continue to trend lactate and CK  - Home metoprolol 12.5 BID  - continue home plavix, aspirin, atorvastatin  - continue low intensity heparin infusion     #Acute surgical blood loss anemia,   #supratherapeutic INR.   OR , received 2u PRBC, 4ffp and 10mg iv vitamin K. INR 1.6 this am  - Check CBC, coags daily and prn  - heparin infusion, titrate per assays  - continue asa and plavix  - vasc med consulted, appreciate recs -> Obtained TTE with bubble study, LLE DVT US and added on LFTs    #History of epilepsy, on AEDs. Chronic pain, diabetic neuropathy. - continue scheduled acetaminophen and gabapentin. PRN oxycodone with breakthrough iv hydromorphone  - ongoing neuro/neurovasc/pain assessments  - PT/OT consulted  - continue home depakote, duloxetine, busparone, keppra  -  consulted for ongoing home resource management, discharge planning, financial concerns      40 pack year smoker. Arrived to SICU Intubated. Extubated, currently on 2L NC  - goal SpO2 >94%  - Q1h IS while awake  - additional bronchial hygiene as indicated  - cxr prn    #History of NIDDM, hypothyroidism. Last A1c 8.5% 9/2023. TSH WNL 12/7  - AC/HS BG checks  - continue home levothyroxine  - hold home empagliflozin     Lines:   L ulnar arterial line placed by vascular  surgery -> remove  PIV x3  Hale -> keep for now per patient request    Saida Elkins MD

## 2023-12-10 NOTE — PROGRESS NOTES
Subjective  Persistent pain L leg, signals unchanged.    Objective  Vitals:    12/10/23 1058   BP: (!) 100/49   Pulse: 83   Resp: 20   Temp: 36.5 °C (97.7 °F)   SpO2: 94%        Physical Exam     Constitutional- no acute distress  Cards- regular rate  Resp- nonlabored breathing on room air   Abdomen- soft, not tender, not distended   Extremities- s/p R hip disarticulation; L groin provena holding suction; no hematoma appreciated; L foot with improved color; sensation is intact and motor improved; biphasic L DP/PT  Neuro- alert and oriented x3   Psych- appropriate mood   Tubes/lines- vasques        Assessment/Plan   Shirin Slater is 53 y.o. female with history of DM2 c/b neuropathy, renal and splenic thrombosis (on coumadin, aspirin and plavix), HTN, HLD, PAD s/p right hip disarticulation and multiple LLE internvetions who presented with LLE 2B ALI s/p open thrombectomy of left iliac, femoral and profunda.     Reperfusion pain persisting.     Plan:  -pain control with Tylenol and PRN oxycodone  -continue home Depakote, duloxetine, busparone, keppra  -continue aspirin, plavix and atorvastatin  -continue heparin gtt  -appreciate vascular medicine recs (TTE, LLE DVT duplex, LFTs)  -home metoprolol with hold parameters   -IS and bronchopulmonary hygiene  -diet as tolerated  -bowel regimen  -PPI  -continue home levothyroxine  -PT/OT     Benigno Luna MD  General Surgery, pgy3  Vascular [id number]

## 2023-12-10 NOTE — PROGRESS NOTES
Subjective  Pain significantly improved  Denies fever, chills, chest pain, SOB, n/v    Objective  Vitals:    12/09/23 1605   BP: 102/57   Pulse: 89   Resp: 19   Temp: 36.6 °C (97.9 °F)   SpO2: 95%        Physical Exam     Constitutional- no acute distress  Cards- regular rate  Resp- nonlabored breathing on room air   Abdomen- soft, not tender, not distended   Extremities- s/p R hip disarticulation; L groin provena holding suction; no hematoma appreciated; L foot with improved color; sensation is intact and motor improved; biphasic L DP/PT  Neuro- alert and oriented x3   Psych- appropriate mood   Tubes/lines- vasques        Assessment/Plan   Shirin Slater is 53 y.o. female with history of DM2 c/b neuropathy, renal and splenic thrombosis (on coumadin, aspirin and plavix), HTN, HLD, PAD s/p right hip disarticulation and multiple LLE internvetions who presented with LLE 2B ALI s/p open thrombectomy of left iliac, femoral and profunda.     Reperfusion pain largely subsided. LLE symptoms markedly improved.     Plan:  -pain control with Tylenol and PRN oxycodone  -continue home Depakote, duloxetine, busparone, keppra  -continue aspirin, plavix and atorvastatin  -continue heparin gtt  -appreciate vascular medicine recs (TTE, LLE DVT duplex, LFTs)  -restart home metoprolol with hold parameters   -IS and bronchopulmonary hygiene  -diet as tolerated  -bowel regimen  -PPI  -continue home levothyroxine  -PT/OT  - discontinue vasques     Discussed with Attending, Dr. Cesar Velasco MD  Pager [phone]

## 2023-12-11 LAB
ALBUMIN SERPL BCP-MCNC: 3.1 G/DL (ref 3.4–5)
ALP SERPL-CCNC: 75 U/L (ref 33–110)
ALT SERPL W P-5'-P-CCNC: 6 U/L (ref 7–45)
ANION GAP SERPL CALC-SCNC: 14 MMOL/L (ref 10–20)
AST SERPL W P-5'-P-CCNC: 8 U/L (ref 9–39)
BILIRUB SERPL-MCNC: 0.7 MG/DL (ref 0–1.2)
BUN SERPL-MCNC: 15 MG/DL (ref 6–23)
CALCIUM SERPL-MCNC: 9 MG/DL (ref 8.6–10.6)
CHLORIDE SERPL-SCNC: 102 MMOL/L (ref 98–107)
CO2 SERPL-SCNC: 30 MMOL/L (ref 21–32)
CREAT SERPL-MCNC: 0.58 MG/DL (ref 0.5–1.05)
ERYTHROCYTE [DISTWIDTH] IN BLOOD BY AUTOMATED COUNT: 21.5 % (ref 11.5–14.5)
GFR SERPL CREATININE-BSD FRML MDRD: >90 ML/MIN/1.73M*2
GLUCOSE BLD MANUAL STRIP-MCNC: 135 MG/DL (ref 74–99)
GLUCOSE BLD MANUAL STRIP-MCNC: 142 MG/DL (ref 74–99)
GLUCOSE BLD MANUAL STRIP-MCNC: 174 MG/DL (ref 74–99)
GLUCOSE BLD MANUAL STRIP-MCNC: 190 MG/DL (ref 74–99)
GLUCOSE SERPL-MCNC: 144 MG/DL (ref 74–99)
HCT VFR BLD AUTO: 31.1 % (ref 36–46)
HGB BLD-MCNC: 9.8 G/DL (ref 12–16)
MAGNESIUM SERPL-MCNC: 1.89 MG/DL (ref 1.6–2.4)
MCH RBC QN AUTO: 24.6 PG (ref 26–34)
MCHC RBC AUTO-ENTMCNC: 31.5 G/DL (ref 32–36)
MCV RBC AUTO: 78 FL (ref 80–100)
NRBC BLD-RTO: 0 /100 WBCS (ref 0–0)
PHOSPHATE SERPL-MCNC: 3.3 MG/DL (ref 2.5–4.9)
PLATELET # BLD AUTO: 428 X10*3/UL (ref 150–450)
POTASSIUM SERPL-SCNC: 4 MMOL/L (ref 3.5–5.3)
PROT SERPL-MCNC: 6 G/DL (ref 6.4–8.2)
RBC # BLD AUTO: 3.99 X10*6/UL (ref 4–5.2)
SODIUM SERPL-SCNC: 142 MMOL/L (ref 136–145)
UFH PPP CHRO-ACNC: 0.3 IU/ML
WBC # BLD AUTO: 11.2 X10*3/UL (ref 4.4–11.3)

## 2023-12-11 PROCEDURE — 2500000004 HC RX 250 GENERAL PHARMACY W/ HCPCS (ALT 636 FOR OP/ED): Performed by: STUDENT IN AN ORGANIZED HEALTH CARE EDUCATION/TRAINING PROGRAM

## 2023-12-11 PROCEDURE — 82947 ASSAY GLUCOSE BLOOD QUANT: CPT

## 2023-12-11 PROCEDURE — 85027 COMPLETE CBC AUTOMATED: CPT | Performed by: STUDENT IN AN ORGANIZED HEALTH CARE EDUCATION/TRAINING PROGRAM

## 2023-12-11 PROCEDURE — 85520 HEPARIN ASSAY: CPT | Performed by: NURSE PRACTITIONER

## 2023-12-11 PROCEDURE — 80053 COMPREHEN METABOLIC PANEL: CPT | Performed by: STUDENT IN AN ORGANIZED HEALTH CARE EDUCATION/TRAINING PROGRAM

## 2023-12-11 PROCEDURE — 36415 COLL VENOUS BLD VENIPUNCTURE: CPT | Performed by: STUDENT IN AN ORGANIZED HEALTH CARE EDUCATION/TRAINING PROGRAM

## 2023-12-11 PROCEDURE — 99232 SBSQ HOSP IP/OBS MODERATE 35: CPT | Performed by: NURSE PRACTITIONER

## 2023-12-11 PROCEDURE — 97530 THERAPEUTIC ACTIVITIES: CPT | Mod: GP

## 2023-12-11 PROCEDURE — 2500000002 HC RX 250 W HCPCS SELF ADMINISTERED DRUGS (ALT 637 FOR MEDICARE OP, ALT 636 FOR OP/ED): Performed by: STUDENT IN AN ORGANIZED HEALTH CARE EDUCATION/TRAINING PROGRAM

## 2023-12-11 PROCEDURE — 1200000002 HC GENERAL ROOM WITH TELEMETRY DAILY

## 2023-12-11 PROCEDURE — 2500000004 HC RX 250 GENERAL PHARMACY W/ HCPCS (ALT 636 FOR OP/ED): Performed by: PODIATRIST

## 2023-12-11 PROCEDURE — 2500000001 HC RX 250 WO HCPCS SELF ADMINISTERED DRUGS (ALT 637 FOR MEDICARE OP)

## 2023-12-11 PROCEDURE — 2500000001 HC RX 250 WO HCPCS SELF ADMINISTERED DRUGS (ALT 637 FOR MEDICARE OP): Performed by: STUDENT IN AN ORGANIZED HEALTH CARE EDUCATION/TRAINING PROGRAM

## 2023-12-11 PROCEDURE — 83735 ASSAY OF MAGNESIUM: CPT | Performed by: STUDENT IN AN ORGANIZED HEALTH CARE EDUCATION/TRAINING PROGRAM

## 2023-12-11 PROCEDURE — 99233 SBSQ HOSP IP/OBS HIGH 50: CPT | Performed by: PODIATRIST

## 2023-12-11 PROCEDURE — 84100 ASSAY OF PHOSPHORUS: CPT | Performed by: STUDENT IN AN ORGANIZED HEALTH CARE EDUCATION/TRAINING PROGRAM

## 2023-12-11 RX ORDER — WARFARIN SODIUM 5 MG/1
5 TABLET ORAL DAILY
Status: DISCONTINUED | OUTPATIENT
Start: 2023-12-11 | End: 2023-12-13

## 2023-12-11 RX ADMIN — OXYCODONE HYDROCHLORIDE 10 MG: 5 TABLET ORAL at 19:36

## 2023-12-11 RX ADMIN — ATORVASTATIN CALCIUM 80 MG: 80 TABLET, FILM COATED ORAL at 21:01

## 2023-12-11 RX ADMIN — METOPROLOL TARTRATE 12.5 MG: 25 TABLET, FILM COATED ORAL at 09:42

## 2023-12-11 RX ADMIN — ASPIRIN 81 MG: 81 TABLET, COATED ORAL at 09:42

## 2023-12-11 RX ADMIN — GABAPENTIN 600 MG: 300 CAPSULE ORAL at 06:00

## 2023-12-11 RX ADMIN — ACETAMINOPHEN 650 MG: 325 TABLET ORAL at 06:00

## 2023-12-11 RX ADMIN — GABAPENTIN 600 MG: 300 CAPSULE ORAL at 21:05

## 2023-12-11 RX ADMIN — LEVETIRACETAM 500 MG: 500 TABLET, FILM COATED ORAL at 21:01

## 2023-12-11 RX ADMIN — DIVALPROEX SODIUM 500 MG: 500 TABLET, DELAYED RELEASE ORAL at 09:43

## 2023-12-11 RX ADMIN — HEPARIN SODIUM 2300 UNITS/HR: 10000 INJECTION, SOLUTION INTRAVENOUS at 23:18

## 2023-12-11 RX ADMIN — LEVOTHYROXINE SODIUM 75 MCG: 75 TABLET ORAL at 06:01

## 2023-12-11 RX ADMIN — WARFARIN SODIUM 5 MG: 5 TABLET ORAL at 18:00

## 2023-12-11 RX ADMIN — DULOXETINE HYDROCHLORIDE 60 MG: 60 CAPSULE, DELAYED RELEASE ORAL at 21:01

## 2023-12-11 RX ADMIN — ACETAMINOPHEN 650 MG: 325 TABLET ORAL at 09:42

## 2023-12-11 RX ADMIN — BUSPIRONE HYDROCHLORIDE 10 MG: 10 TABLET ORAL at 09:42

## 2023-12-11 RX ADMIN — HYDROMORPHONE HYDROCHLORIDE 0.4 MG: 1 INJECTION, SOLUTION INTRAMUSCULAR; INTRAVENOUS; SUBCUTANEOUS at 23:13

## 2023-12-11 RX ADMIN — OXYCODONE HYDROCHLORIDE 10 MG: 5 TABLET ORAL at 10:18

## 2023-12-11 RX ADMIN — GABAPENTIN 600 MG: 300 CAPSULE ORAL at 15:17

## 2023-12-11 RX ADMIN — HEPARIN SODIUM 2300 UNITS/HR: 10000 INJECTION, SOLUTION INTRAVENOUS at 00:29

## 2023-12-11 RX ADMIN — HEPARIN SODIUM 2300 UNITS/HR: 10000 INJECTION, SOLUTION INTRAVENOUS at 13:28

## 2023-12-11 RX ADMIN — INSULIN LISPRO 1 UNITS: 100 INJECTION, SOLUTION INTRAVENOUS; SUBCUTANEOUS at 17:24

## 2023-12-11 RX ADMIN — DIVALPROEX SODIUM 1000 MG: 500 TABLET, DELAYED RELEASE ORAL at 21:01

## 2023-12-11 RX ADMIN — LEVETIRACETAM 500 MG: 500 TABLET, FILM COATED ORAL at 09:42

## 2023-12-11 RX ADMIN — CLOPIDOGREL BISULFATE 75 MG: 75 TABLET, FILM COATED ORAL at 09:42

## 2023-12-11 RX ADMIN — METOPROLOL TARTRATE 12.5 MG: 25 TABLET, FILM COATED ORAL at 21:01

## 2023-12-11 RX ADMIN — ACETAMINOPHEN 650 MG: 325 TABLET ORAL at 17:24

## 2023-12-11 ASSESSMENT — COGNITIVE AND FUNCTIONAL STATUS - GENERAL
MOVING TO AND FROM BED TO CHAIR: TOTAL
DRESSING REGULAR UPPER BODY CLOTHING: A LOT
STANDING UP FROM CHAIR USING ARMS: TOTAL
STANDING UP FROM CHAIR USING ARMS: A LOT
TURNING FROM BACK TO SIDE WHILE IN FLAT BAD: A LOT
WALKING IN HOSPITAL ROOM: TOTAL
PERSONAL GROOMING: A LOT
TOILETING: A LOT
HELP NEEDED FOR BATHING: A LOT
DRESSING REGULAR LOWER BODY CLOTHING: A LOT
MOVING TO AND FROM BED TO CHAIR: A LOT
CLIMB 3 TO 5 STEPS WITH RAILING: TOTAL
MOVING FROM LYING ON BACK TO SITTING ON SIDE OF FLAT BED WITH BEDRAILS: A LITTLE
MOVING FROM LYING ON BACK TO SITTING ON SIDE OF FLAT BED WITH BEDRAILS: A LITTLE
EATING MEALS: A LITTLE
CLIMB 3 TO 5 STEPS WITH RAILING: TOTAL
MOVING TO AND FROM BED TO CHAIR: TOTAL
DAILY ACTIVITIY SCORE: 13
TURNING FROM BACK TO SIDE WHILE IN FLAT BAD: A LITTLE
WALKING IN HOSPITAL ROOM: TOTAL
HELP NEEDED FOR BATHING: A LOT
STANDING UP FROM CHAIR USING ARMS: TOTAL
WALKING IN HOSPITAL ROOM: TOTAL
DAILY ACTIVITIY SCORE: 13
DRESSING REGULAR LOWER BODY CLOTHING: A LOT
TURNING FROM BACK TO SIDE WHILE IN FLAT BAD: A LOT
PERSONAL GROOMING: A LOT
MOBILITY SCORE: 9
TOILETING: A LOT
EATING MEALS: A LITTLE
MOBILITY SCORE: 12
MOBILITY SCORE: 9
DRESSING REGULAR UPPER BODY CLOTHING: A LOT
CLIMB 3 TO 5 STEPS WITH RAILING: TOTAL
MOVING FROM LYING ON BACK TO SITTING ON SIDE OF FLAT BED WITH BEDRAILS: A LITTLE

## 2023-12-11 ASSESSMENT — PAIN - FUNCTIONAL ASSESSMENT
PAIN_FUNCTIONAL_ASSESSMENT: 0-10

## 2023-12-11 ASSESSMENT — PAIN DESCRIPTION - LOCATION
LOCATION: FOOT
LOCATION: FOOT

## 2023-12-11 ASSESSMENT — PAIN DESCRIPTION - ORIENTATION: ORIENTATION: RIGHT

## 2023-12-11 ASSESSMENT — PAIN SCALES - GENERAL
PAINLEVEL_OUTOF10: 5 - MODERATE PAIN
PAINLEVEL_OUTOF10: 9
PAINLEVEL_OUTOF10: 7

## 2023-12-11 NOTE — PROGRESS NOTES
"Shirin Slater is a 53 y.o. female on day 5 of admission presenting with Limb ischemia.    Subjective   Patient is denies any concerns. She states her left foot pain is 8/10, but well controlled. She is eating ok. Denies fever, chills, nausea, vomiting, diarrhea and constipation.        Objective     Physical Exam  Constitutional:       Appearance: Normal appearance.   Cardiovascular:      Rate and Rhythm: Normal rate and regular rhythm.      Heart sounds: Normal heart sounds.   Pulmonary:      Effort: Pulmonary effort is normal. No respiratory distress.      Breath sounds: Normal breath sounds. No wheezing.   Abdominal:      General: Bowel sounds are normal. There is no distension.      Palpations: Abdomen is soft.      Tenderness: There is no abdominal tenderness.   Musculoskeletal:         General: Swelling present.      Comments: R hip disarticulation. Edema noted to Left lower extremity.    Neurological:      General: No focal deficit present.      Mental Status: She is alert.   Psychiatric:         Mood and Affect: Mood normal.         Behavior: Behavior normal.         Last Recorded Vitals  Blood pressure 104/61, pulse 83, temperature 36.3 °C (97.3 °F), resp. rate 17, height 1.7 m (5' 6.93\"), weight 97.3 kg (214 lb 9.6 oz), SpO2 94 %.  Intake/Output last 3 Shifts:  I/O last 3 completed shifts:  In: - (0 mL/kg)   Out: 3100 (31.1 mL/kg) [Urine:3100 (0.9 mL/kg/hr)]  Dosing Weight: 99.8 kg     Relevant Results    Scheduled medications  acetaminophen, 650 mg, oral, q6h  aspirin, 81 mg, oral, Daily  atorvastatin, 80 mg, oral, Nightly  busPIRone, 10 mg, oral, Daily  clopidogrel, 75 mg, oral, Daily  divalproex, 1,000 mg, oral, q PM  divalproex, 500 mg, oral, q AM  DULoxetine, 60 mg, oral, Daily  gabapentin, 600 mg, oral, q8h NINO  insulin lispro, 0-5 Units, subcutaneous, TID with meals  levETIRAcetam, 500 mg, oral, BID  levothyroxine, 75 mcg, oral, Daily  metoprolol tartrate, 12.5 mg, oral, BID  sennosides, 2 tablet, " oral, BID      Continuous medications  heparin, 0-4,000 Units/hr, Last Rate: 2,300 Units/hr (12/11/23 0029)      PRN medications  PRN medications: albuterol, dextrose 10 % in water (D10W), dextrose, glucagon, heparin, HYDROmorphone, ipratropium-albuteroL, oxyCODONE, oxyCODONE, oxygen     Results for orders placed or performed during the hospital encounter of 12/06/23 (from the past 24 hour(s))   POCT GLUCOSE   Result Value Ref Range    POCT Glucose 147 (H) 74 - 99 mg/dL   POCT GLUCOSE   Result Value Ref Range    POCT Glucose 165 (H) 74 - 99 mg/dL   POCT GLUCOSE   Result Value Ref Range    POCT Glucose 135 (H) 74 - 99 mg/dL   CBC   Result Value Ref Range    WBC 11.2 4.4 - 11.3 x10*3/uL    nRBC 0.0 0.0 - 0.0 /100 WBCs    RBC 3.99 (L) 4.00 - 5.20 x10*6/uL    Hemoglobin 9.8 (L) 12.0 - 16.0 g/dL    Hematocrit 31.1 (L) 36.0 - 46.0 %    MCV 78 (L) 80 - 100 fL    MCH 24.6 (L) 26.0 - 34.0 pg    MCHC 31.5 (L) 32.0 - 36.0 g/dL    RDW 21.5 (H) 11.5 - 14.5 %    Platelets 428 150 - 450 x10*3/uL   Comprehensive metabolic panel   Result Value Ref Range    Glucose 144 (H) 74 - 99 mg/dL    Sodium 142 136 - 145 mmol/L    Potassium 4.0 3.5 - 5.3 mmol/L    Chloride 102 98 - 107 mmol/L    Bicarbonate 30 21 - 32 mmol/L    Anion Gap 14 10 - 20 mmol/L    Urea Nitrogen 15 6 - 23 mg/dL    Creatinine 0.58 0.50 - 1.05 mg/dL    eGFR >90 >60 mL/min/1.73m*2    Calcium 9.0 8.6 - 10.6 mg/dL    Albumin 3.1 (L) 3.4 - 5.0 g/dL    Alkaline Phosphatase 75 33 - 110 U/L    Total Protein 6.0 (L) 6.4 - 8.2 g/dL    AST 8 (L) 9 - 39 U/L    Bilirubin, Total 0.7 0.0 - 1.2 mg/dL    ALT 6 (L) 7 - 45 U/L   Magnesium   Result Value Ref Range    Magnesium 1.89 1.60 - 2.40 mg/dL   Phosphorus   Result Value Ref Range    Phosphorus 3.3 2.5 - 4.9 mg/dL   Heparin Assay, UFH   Result Value Ref Range    Heparin Unfractionated 0.3 See Comment Below for Therapeutic Ranges IU/mL   POCT GLUCOSE   Result Value Ref Range    POCT Glucose 142 (H) 74 - 99 mg/dL                      Assessment/Plan   Principal Problem:    Limb ischemia  Active Problems:    Diabetes mellitus, type 2 (CMS/HCC)    HTN (hypertension)    Hypothyroidism    Peripheral neuropathy    ELMIRA (obstructive sleep apnea)    Shirin Slater is a 53 y.o. female on day 4 of admission presenting with Limb ischemia.  Patient has a PMH T2DM (A1c 8.5) c/b diabetic neuropathy, renal and splenic thrombosis (on Coumadin, aspirin and clopidogrel), HTN, HLD, PVD c/b R hip disarticulation and LLL disease s/p L 1/2/5 digit amputations and previous iliac artery stenting who presents to SICU s/p L common femoral and external iliac embolectomy, patch angioplasty common femoral artery on 12/6.       Acute LLE ischemia s/p L common femoral and external iliac embolectomy, patch angioplasty common femoral artery on 12/6.  Right hip disarticulation  -Vascular surgery following  -Weaned off phenylephrine 12/7, CK and lactate WNL  -Vasc med on board, appreciate recs    :Continue heparin infusion for now; follow nomogram to achieve therapeutic Heparin assay 0.3-0.7     :Plan to start Warfarin 5mg 12/11 pm to initiate bridge to Warfarin    :Monitor for bleeding    :Daily labs: CBC, Heparin assay, INR if bridging to Warfarin    :Will need a minimum of 5 days of overlap therapy with Heparin and Warfarin; goal INR is 2-3; when INR is >2 for 2 days in a row, Heparin can be stopped      :LLE DVT US   -Continue aspirin Plavix and atorvastatin  -BP more stable, cont home metoprolol with parameters   -Continue diet as tolerated  -Bowel regimen, PPI  -PT/OT rec Mod intensity level   -Continue telemetry     HTN, HLD, PVD s/p L and R iliac and L popliteal stents, R hip disarticulation, L 1/2/5 digit amputations, renal and splenic thrombosis (on Coumadin, aspirin and clopidogrel).   Questionable compliance with meds.   Baseline cardiac function EF 60-65% (2022). - goal sbp 120-160 range per vascular surgery  - Continuous ekg/hourly and prn NIBP monitoring  -  no need to continue to trend lactate and CK  - Home metoprolol 12.5 BID  - Continue home plavix, aspirin, atorvastatin     Acute surgical blood loss anemia,   supratherapeutic INR.  - Check CBC, coags daily and prn  - Heparin infusion, titrate per assays  - Continue asa and plavix     History of epilepsy, on AEDs. Chronic pain, diabetic neuropathy. - continue scheduled acetaminophen and gabapentin. PRN oxycodone with breakthrough iv hydromorphone  - Ongoing neuro/neurovasc/pain assessments  - Continue home depakote, duloxetine, busparone, keppra  - SW consulted for ongoing home resource management, discharge planning, financial concerns     40 pack year smoker. Arrived to SICU Intubated. Extubated, currently on 2L NC  - Goal SpO2 >94%  - Q1h IS while awake  - Additional bronchial hygiene as indicated     History of NIDDM, hypothyroidism. Last A1c 8.5% 9/2023. TSH WNL 12/7  - AC/HS BG checks  - Continue home levothyroxine  - Hold home empagliflozin    Dispo:  -PT/OT rec SNF ([facility] chose [facility])    Patient discussed with Dr. Wheatley.     Octavia Fam MD   PGY1, Family Medicine   [facility]  Available by PF Management Services

## 2023-12-11 NOTE — CARE PLAN
Problem: Fall/Injury  Goal: Not fall by end of shift  Outcome: Progressing  Goal: Be free from injury by end of the shift  Outcome: Progressing  Goal: Verbalize understanding of personal risk factors for fall in the hospital  Outcome: Progressing  Goal: Verbalize understanding of risk factor reduction measures to prevent injury from fall in the home  Outcome: Progressing  Goal: Use assistive devices by end of the shift  Outcome: Progressing  Goal: Pace activities to prevent fatigue by end of the shift  Outcome: Progressing     Problem: Pain  Goal: Takes deep breaths with improved pain control throughout the shift  Outcome: Progressing  Goal: Turns in bed with improved pain control throughout the shift  Outcome: Progressing  Goal: Walks with improved pain control throughout the shift  Outcome: Progressing  Goal: Performs ADL's with improved pain control throughout shift  Outcome: Progressing  Goal: Participates in PT with improved pain control throughout the shift  Outcome: Progressing  Goal: Free from opioid side effects throughout the shift  Outcome: Progressing  Goal: Free from acute confusion related to pain meds throughout the shift  Outcome: Progressing   The patient's goals for the shift include      The clinical goals for the shift include pain management    Over the shift, the patient did make progress toward the following goals.

## 2023-12-11 NOTE — PROGRESS NOTES
PT/OT recommending SNF for patient upon discharge. [facility] printed choice list and provided to patient who states if she must go to rehab, she would like to go to [facility], but states that she really does not want to go to rehab if she doesn't have to. She states that she has caregivers 7 days a week for 10 hours a day through her insurance. We will continue to follow and assist with discharge planning needs. Referral sent to [facility] at this time.    DIANE LeonW

## 2023-12-11 NOTE — PROGRESS NOTES
"Shirin Slater is a 53 y.o. female on day 5 of admission presenting as transfer from [facility] on 12/6/23 with left leg ischemia. She presented to OSH with progressively worsening left leg pain over 2 weeks time, with accompanied paleness and cyanosis of the left leg.  Underwent CTA with runoffs that showed 50% stenosis of the abdominal aorta, complete occlusion of the right iliac stent, complete occlusion of L common femoral, superficial femoral and popliteal arteries w/ stents on the L iliac and popliteal arteries.   Patient now s/p left femoral cutdown, open sharif thrombectomy of external iliac artery, common femoral artery, superficial femoral artery, and profunda femoris. Flow restored down into profunda, and into SFA. Patch angioplasty CFA on 12/6/23.  Patient required Vitamin K 10mg prior to surgery due to elevated INR that decreased to 3.7 during the case.        Subjective   Patient reports feeling better today.  Reports she is able to ambulate to the restroom without difficulty.  Denies CP, SOB or bleeding.      Objective   Vascular Medicine Service following for anticoagulation recommendations   Continues with Heparin with therapeutic assay today.   No overt evidence of bleeding.     Physical Exam  Resting in bed in NAD  Respirations shallow but regular with breaths sounds CTA all anterior lobes  Normal heart sounds with regular rate/rhythm; telemetry monitor shows SB without witnessed ectopy; vital signs are stable  Left leg warm to touch with palpable DP pulse; right leg s/p hip disarticulation; no evidence of swelling LLE or BUE; palpable bilateral radial pulses; PIV  Patient is drowsy and falls asleep several times during my visit, but wakens easily with name called; has some difficulty recalling name of PCP and where her INR was monitored as outpatient.     Last Recorded Vitals  Blood pressure 108/56, pulse 68, temperature 36.5 °C (97.7 °F), resp. rate 20, height 1.7 m (5' 6.93\"), weight " 97.3 kg (214 lb 9.6 oz), SpO2 99 %.  Intake/Output last 3 Shifts:  I/O last 3 completed shifts:  In: - (0 mL/kg)   Out: 3100 (31.1 mL/kg) [Urine:3100 (0.9 mL/kg/hr)]  Dosing Weight: 99.8 kg     Relevant Results  Scheduled medications  acetaminophen, 650 mg, oral, q6h  aspirin, 81 mg, oral, Daily  atorvastatin, 80 mg, oral, Nightly  busPIRone, 10 mg, oral, Daily  clopidogrel, 75 mg, oral, Daily  divalproex, 1,000 mg, oral, q PM  divalproex, 500 mg, oral, q AM  DULoxetine, 60 mg, oral, Daily  gabapentin, 600 mg, oral, q8h NINO  insulin lispro, 0-5 Units, subcutaneous, TID with meals  levETIRAcetam, 500 mg, oral, BID  levothyroxine, 75 mcg, oral, Daily  metoprolol tartrate, 12.5 mg, oral, BID  sennosides, 2 tablet, oral, BID    Continuous medications  heparin, 0-4,000 Units/hr, Last Rate: 2,300 Units/hr (12/11/23 0029)      Results for orders placed or performed during the hospital encounter of 12/06/23 (from the past 24 hour(s))   POCT GLUCOSE   Result Value Ref Range    POCT Glucose 147 (H) 74 - 99 mg/dL   POCT GLUCOSE   Result Value Ref Range    POCT Glucose 165 (H) 74 - 99 mg/dL   POCT GLUCOSE   Result Value Ref Range    POCT Glucose 135 (H) 74 - 99 mg/dL   CBC   Result Value Ref Range    WBC 11.2 4.4 - 11.3 x10*3/uL    nRBC 0.0 0.0 - 0.0 /100 WBCs    RBC 3.99 (L) 4.00 - 5.20 x10*6/uL    Hemoglobin 9.8 (L) 12.0 - 16.0 g/dL    Hematocrit 31.1 (L) 36.0 - 46.0 %    MCV 78 (L) 80 - 100 fL    MCH 24.6 (L) 26.0 - 34.0 pg    MCHC 31.5 (L) 32.0 - 36.0 g/dL    RDW 21.5 (H) 11.5 - 14.5 %    Platelets 428 150 - 450 x10*3/uL   Comprehensive metabolic panel   Result Value Ref Range    Glucose 144 (H) 74 - 99 mg/dL    Sodium 142 136 - 145 mmol/L    Potassium 4.0 3.5 - 5.3 mmol/L    Chloride 102 98 - 107 mmol/L    Bicarbonate 30 21 - 32 mmol/L    Anion Gap 14 10 - 20 mmol/L    Urea Nitrogen 15 6 - 23 mg/dL    Creatinine 0.58 0.50 - 1.05 mg/dL    eGFR >90 >60 mL/min/1.73m*2    Calcium 9.0 8.6 - 10.6 mg/dL    Albumin 3.1 (L) 3.4 -  5.0 g/dL    Alkaline Phosphatase 75 33 - 110 U/L    Total Protein 6.0 (L) 6.4 - 8.2 g/dL    AST 8 (L) 9 - 39 U/L    Bilirubin, Total 0.7 0.0 - 1.2 mg/dL    ALT 6 (L) 7 - 45 U/L   Magnesium   Result Value Ref Range    Magnesium 1.89 1.60 - 2.40 mg/dL   Phosphorus   Result Value Ref Range    Phosphorus 3.3 2.5 - 4.9 mg/dL   Heparin Assay, UFH   Result Value Ref Range    Heparin Unfractionated 0.3 See Comment Below for Therapeutic Ranges IU/mL   POCT GLUCOSE   Result Value Ref Range    POCT Glucose 142 (H) 74 - 99 mg/dL        Assessment/Plan   Principal Problem:    Limb ischemia  Active Problems:    Diabetes mellitus, type 2 (CMS/HCC)    HTN (hypertension)    Hypothyroidism    Peripheral neuropathy    ELMIRA (obstructive sleep apnea)    53 year old female with extensive vascular history and s/p multiple surgical interventions due to PAD.  Vascular Medicine history as follows:     ~2015 Splenic/renal infarcts (reported), Rx Coumadin + DAPT      ~12/2020 PAD s/p  PTA + stenting of right SFA, pop, peroneal and TPT,  Rx Eliquis + DAPT      ~7/2021-8/2021, Proximal RLE DVT and CLI s/p RLE AKA and LLE 1/2/5 digits amputations, Rx Coumadin + DAPT      ~12/2023, LLE CLI s/p open sharif thrombectomy of EIA, CFA, SFA, and PF. Patch angioplasty CFA. She has residual distal occlusion in the SFA/ popliteal artery      ~_APLA prior, Ng (LAC not checkered)      ~ Non adherence to medications   Underwent echocardiogram with bubble study that was negative for PFO.   Vascular Medicine Service following for anticoagulation recommendations.   Continues with Heparin infusion with therapeutic assay, and no evidence of bleeding.   Discussed anticoagulation options with Ms. Slater and she prefers to use Warfarin at this time.  Reports that her PCP Dr. Juany [facility] (PCP - General Family Medicine) phone [phone], previously managed INR and Warfarin dosing as outpatient.    Review of labs for today shows stable hemoglobin 9.8  grams, stable platelets 428K, and stable serum creatinine 0.58.    Date    INR     Warfarin Dose     Comments   12/6     3.7                                  Vitamin K 10mg IV for elevated INR prior to surgery  12/7     2.6, 2.1, 2.2, 2.0, 1.5  12/8    1.6  12/9     2.0  12/11    ?           5mg      Recommendations:  ~CONTINUE Heparin infusion; follow nomogram to achieve therapeutic Heparin assay 0.3-0.7  ~START Warfarin 5mg when Primary Team is ready to initiate bridge to Warfarin  ~Monitor for bleeding  ~Daily labs: CBC, Heparin assay, INR if bridging to Warfarin  ~Will need a minimum of 5 days of overlap therapy with Heparin and Warfarin; goal INR is 2-3; when INR is >2 for 2 days in a row, Heparin can be stopped   ~Outpatient Coumadin monitoring will need to be arranged prior to hospital discharge with initial appointment scheduled for 1-2 days after discharge; Please contact Dr. Juany Sanchez (PCP - General Family Medicine) phone [phone], who previously managed INR and Warfarin dosing as outpatient.    ~I will coordinate outpatient follow up with the Vascular Medicine Service     Plan of care discussed with Dr. Ana Silver  Plan of care discussed with Dr. Wheatley from the Medical Hospitalist Service    Hallie BIGGS-CNP  Vascular Medicine Service   Pager [phone]

## 2023-12-11 NOTE — HOSPITAL COURSE
Shirin Slater is a 53 y.o. female w/ PMHX of T2DM (A1c 8.5% Sep/2023) c/b diabetic neuropathy, HTN, HLD, PVD w/ R AKA, L 1/2/5 digit amputation, renal and splenic thrombosis (on Coumadin, aspirin and clopidogrel), epilepsy (in remission > 15 years), diverticulitis s/p colectomy and PTSD who is a transfer from OSH d/t critical limb ischemia for vascular surgery evaluation. Admitted under medicine with vascular surgery following.  WBC noted at around 18K at OSH, without any other signs of infections, can be explained by critical illness and thrombosis. Patient went to the OR for emergent revascularization on 12/6 for L common femoral and external iliac embolectomy, patch angioplasty common femoral artery. Started bridging heparin to warfarin 5mg on 12/11. INR next day was 1.3. The following day the INR was 1.4 and warfarin was increased to 7.5 mg daily. Heparin was discontinued on 12/13 and lovenox 100mg BID was started on 12/13 (day 1). Per [facility] medicine, continue Lovenox as bridge to Warfarin until INR as at goal for 2 days in a row (INR 2-3) then may stop Lovenox therapy; check INR daily and dose adjust Warfarin as necessary; once INR is at goal range, please check INR twice a week to ensure patient remains at therapeutic INR level of 2-3. Patient's INR was 1.9 on 12/14 and then the Warfarin dose was decreased to 5mg daily. On 12/15 INR came back as 3.4 and patient was placed on 2.5 mg. On 12/16, patient to resume 5mg daily. Patient's pain to LLE was well controlled with oxycodone 10 mg q4h PRN and Tylenol 975 mg scheduled q6h. PT/OT saw patient and recommended moderate intensity therapy. Patient accepted to [place] for rehab on 12/15.       Instructions for SNF:   [ ] Continue weight based Lovenox 100mg q12 hours as bridge to Warfarin. Continue Lovenox as bridge to Warfarin until INR as at goal for 2 days in a row (INR 2-3) then may stop Lovenox therapy. INR 12/15 was 3.4    [ ] DECREASE Warfarin dose to  2.5mg today ONLY 12/15, due to large interval increase in INR (1.9 to 3.4 - likely response to Warfarin 7.5mg dose 2 days ago.   [ ] Tomorrow 12/16/23 INCREASE Warfarin dose to 5mg and continue at this dose as long as INR is between 2-3.   ~Monitor for bleeding  ~Daily INR until INR is therapeutic  [ ] Outpatient Warfarin monitoring will need to be arranged prior to SNF discharge with initial appointment scheduled for 1-2 days after discharge; Please contact Dr. Juany Sanchez (PCP - General Family Medicine) phone [phone], who previously managed INR and Warfarin dosing as outpatient.     [ ] Patient does not need outpatient follow up with the Vascular Medicine Service  - she should follow with her PCP as patient has had long term treatment with Warfarin.     Time spent caring for patient and coordinating discharge total 50 minutes.

## 2023-12-11 NOTE — PROGRESS NOTES
"Shirin Slater is a 53 y.o. female on day 5 of admission presenting with Limb ischemia.    Subjective   No acute events overnight. Denies SOB, CP, nausea. Continues to have discomfort in left leg/foot.        Objective     Constitutional: nontoxic, resting in bed, able to converse, mild discomfort  Neuro: A/O x4, no gross deficits   Psych: normal affect  HEENT: No deformities, no scleral icterus   Cardiac: regular rate  Pulmonary: unlabored respirations, no SOB  Abdomen: soft, non distended, non tender  Skin: warm and dry overall    Extremities: no swelling noted, s/p R hip disarticulation, mono DP/bi PT exam on Left. Groin provena intact holding suction. No hematoma noted.       Last Recorded Vitals  Blood pressure 104/61, pulse 83, temperature 36.3 °C (97.3 °F), resp. rate 17, height 1.7 m (5' 6.93\"), weight 97.3 kg (214 lb 9.6 oz), SpO2 94 %.  Intake/Output last 3 Shifts:  I/O last 3 completed shifts:  In: - (0 mL/kg)   Out: 3100 (31.1 mL/kg) [Urine:3100 (0.9 mL/kg/hr)]  Dosing Weight: 99.8 kg     Relevant Results  Scheduled medications  acetaminophen, 650 mg, oral, q6h  aspirin, 81 mg, oral, Daily  atorvastatin, 80 mg, oral, Nightly  busPIRone, 10 mg, oral, Daily  clopidogrel, 75 mg, oral, Daily  divalproex, 1,000 mg, oral, q PM  divalproex, 500 mg, oral, q AM  DULoxetine, 60 mg, oral, Daily  gabapentin, 600 mg, oral, q8h NINO  insulin lispro, 0-5 Units, subcutaneous, TID with meals  levETIRAcetam, 500 mg, oral, BID  levothyroxine, 75 mcg, oral, Daily  metoprolol tartrate, 12.5 mg, oral, BID  sennosides, 2 tablet, oral, BID      Continuous medications  heparin, 0-4,000 Units/hr, Last Rate: 2,300 Units/hr (12/11/23 0029)      PRN medications  PRN medications: albuterol, dextrose 10 % in water (D10W), dextrose, glucagon, heparin, HYDROmorphone, ipratropium-albuteroL, oxyCODONE, oxyCODONE, oxygen  No intake/output data recorded.         Assessment/Plan   Shirin Slater is 53 y.o. female with history of DM2 c/b " neuropathy, renal and splenic thrombosis (on coumadin, aspirin and plavix), HTN, HLD, PAD s/p right hip disarticulation and multiple LLE internvetions who presented with LLE 2B ALI s/p open thrombectomy of left iliac, femoral and profunda. Continues to endorse pain in left leg. Stable vascular exam.      Recommendations:  -pain control with Tylenol and PRN oxycodone  -continue home Depakote, duloxetine, busparone, keppra  -continue aspirin, plavix and atorvastatin  -continue heparin gtt  -appreciate vascular medicine recs (TTE, LLE DVT duplex, LFTs), pending final recs for homegoing anticoagulation  -home metoprolol with hold parameters   -IS and bronchopulmonary hygiene  -diet as tolerated  -bowel regimen  -PPI  -continue home levothyroxine  -PT/OT    D/w vascular fellow, Dr. Rain, and Dr. Jesus.    Yaz Disla MD  PGY-1 vascular surgery resident  [phone]

## 2023-12-11 NOTE — PROGRESS NOTES
Physical Therapy    Physical Therapy Treatment    Patient Name: Shirin Slater  MRN: [id number]  Today's Date: 12/11/2023  Time Calculation  Start Time: 1119  Stop Time: 1151  Time Calculation (min): 32 min       Assessment/Plan   PT Assessment  Medical Staff Made Aware: Yes  End of Session Communication: Bedside nurse  Assessment Comment: Pt tolerated increased time sitting EOB this session.  Attempted sit to stand transfer 3x without success.  Pt limited by decreased L LE strength and endurance.  Pt continues to benefit from skilled PT and remains appropriate for moderate intensity PT upon discharge.  End of Session Patient Position: Bed, 3 rail up, Alarm off, not on at start of session  PT Plan  Inpatient/Swing Bed or Outpatient: Inpatient  PT Plan  Treatment/Interventions: Bed mobility, Transfer training, Balance training, Strengthening, Endurance training, Therapeutic exercise, Therapeutic activity  PT Plan: Skilled PT  PT Frequency: 3 times per week  PT Discharge Recommendations: Moderate intensity level of continued care  PT Recommended Transfer Status: Total assist  PT - OK to Discharge: Yes    General Visit Information:   PT  Visit  PT Received On: 12/11/23  General  Prior to Session Communication: Bedside nurse  Patient Position Received: Alarm off, not on at start of session, Bed, 3 rail up  General Comment: Pt cleared for PT by RN.  Pt alert and agreeable to PT.    Subjective   Precautions:  Precautions  Medical Precautions: Fall precautions, Cardiac precautions  Vital Signs:  Vital Signs  Heart Rate:  (pre:74, post:82)  Heart Rate Source: Monitor    Objective   Pain:  Pain Assessment  Pain Assessment: 0-10  Pain Score: 9  Pain Location: Foot  Pain Orientation: Left  Cognition:  Cognition  Overall Cognitive Status: Within Functional Limits  Activity Tolerance:  Activity Tolerance  Endurance: Tolerates less than 10 min exercise, no significant change in vital signs  Treatments:  Therapeutic  Exercise  Therapeutic Exercise Performed: Yes  Therapeutic Exercise Activity 1: ankle pumps, LAQ 2x10 each exercise.  [facility] provided for heel slides but pt refusing to perform this session.    Balance/Neuromuscular Re-Education  Balance/Neuromuscular Re-Education Activity Performed: Yes  Balance/Neuromuscular Re-Education Activity 1: sitting balance sitting EOB with 1-2 UE support x15 minutes due to dizziness upon sitting then due to pt refusing transfer requiring extensive gentle encouragement due to pt fear.    Bed Mobility  Bed Mobility: Yes  Bed Mobility 1  Bed Mobility 1: Rolling right, Rolling left  Level of Assistance 1: Modified independent (use of bed rail)  Bed Mobility 2  Bed Mobility  2: Supine to sitting, Sitting to supine  Level of Assistance 2: Modified independent  Bed Mobility Comments 2: HOB elevated, use of bed rail    Transfers  Transfer: Yes  Transfer 1  Transfer From 1: Bed to  Transfer to 1: Stand  Technique 1: Sit to stand, Stand to sit  Transfer Device 1: Walker  Transfer Level of Assistance 1: Moderate assistance  Trials/Comments 1: Attempted sit to stand transfer from elevated bed height x3 trials without success.  Unable to clear hips from bed x3 trials.  Pt with increasing amounts of frustration and pain in L foot so returned to bed.    Outcome Measures:  [facility] Basic Mobility  Turning from your back to your side while in a flat bed without using bedrails: A little  Moving from lying on your back to sitting on the side of a flat bed without using bedrails: A lot  Moving to and from bed to chair (including a wheelchair): Total  Standing up from a chair using your arms (e.g. wheelchair or bedside chair): Total  To walk in hospital room: Total  Climbing 3-5 steps with railing: Total  Basic Mobility - Total Score: 9    Education Documentation  Mobility Training, taught by Praveena Wilson, PT at 12/11/2023 12:02 PM.  Learner: Patient  Readiness: Acceptance  Method: Explanation  Response:  Verbalizes Understanding    Education Comments  No comments found.          Encounter Problems       Encounter Problems (Active)       Balance       Pt will demonstrate ability to complete sitting static/dynamic balance activities with unilateral UE support and no LOB for increase in safety up D/C.  (Progressing)       Start:  12/07/23    Expected End:  12/21/23               Mobility       Pt will demonstrate ability to complete ther ex activities to improve functional strength for increase in independence upon DC.  (Progressing)       Start:  12/07/23    Expected End:  12/21/23               Transfers       Pt will demonstrated ability to complete sit<>stand transfers with modAx1-2 assistance and use of assistive device to safely DC. (Progressing)       Start:  12/07/23    Expected End:  12/21/23

## 2023-12-12 LAB
ALBUMIN SERPL BCP-MCNC: 3 G/DL (ref 3.4–5)
ALP SERPL-CCNC: 79 U/L (ref 33–110)
ALT SERPL W P-5'-P-CCNC: 3 U/L (ref 7–45)
ANION GAP SERPL CALC-SCNC: 14 MMOL/L (ref 10–20)
AST SERPL W P-5'-P-CCNC: 9 U/L (ref 9–39)
BILIRUB SERPL-MCNC: 0.5 MG/DL (ref 0–1.2)
BUN SERPL-MCNC: 14 MG/DL (ref 6–23)
CALCIUM SERPL-MCNC: 8.7 MG/DL (ref 8.6–10.6)
CHLORIDE SERPL-SCNC: 100 MMOL/L (ref 98–107)
CO2 SERPL-SCNC: 32 MMOL/L (ref 21–32)
CREAT SERPL-MCNC: 0.57 MG/DL (ref 0.5–1.05)
ERYTHROCYTE [DISTWIDTH] IN BLOOD BY AUTOMATED COUNT: 21.8 % (ref 11.5–14.5)
GFR SERPL CREATININE-BSD FRML MDRD: >90 ML/MIN/1.73M*2
GLUCOSE BLD MANUAL STRIP-MCNC: 125 MG/DL (ref 74–99)
GLUCOSE BLD MANUAL STRIP-MCNC: 141 MG/DL (ref 74–99)
GLUCOSE SERPL-MCNC: 123 MG/DL (ref 74–99)
HCT VFR BLD AUTO: 32.4 % (ref 36–46)
HGB BLD-MCNC: 10 G/DL (ref 12–16)
INR PPP: 1.3 (ref 0.9–1.1)
MCH RBC QN AUTO: 24.9 PG (ref 26–34)
MCHC RBC AUTO-ENTMCNC: 30.9 G/DL (ref 32–36)
MCV RBC AUTO: 81 FL (ref 80–100)
NRBC BLD-RTO: 0 /100 WBCS (ref 0–0)
PLATELET # BLD AUTO: 430 X10*3/UL (ref 150–450)
POTASSIUM SERPL-SCNC: 4.1 MMOL/L (ref 3.5–5.3)
PROT SERPL-MCNC: 6 G/DL (ref 6.4–8.2)
PROTHROMBIN TIME: 14.4 SECONDS (ref 9.8–12.8)
RBC # BLD AUTO: 4.02 X10*6/UL (ref 4–5.2)
SODIUM SERPL-SCNC: 142 MMOL/L (ref 136–145)
UFH PPP CHRO-ACNC: 0.4 IU/ML
WBC # BLD AUTO: 11.4 X10*3/UL (ref 4.4–11.3)

## 2023-12-12 PROCEDURE — 36415 COLL VENOUS BLD VENIPUNCTURE: CPT | Performed by: PODIATRIST

## 2023-12-12 PROCEDURE — 2500000001 HC RX 250 WO HCPCS SELF ADMINISTERED DRUGS (ALT 637 FOR MEDICARE OP): Performed by: STUDENT IN AN ORGANIZED HEALTH CARE EDUCATION/TRAINING PROGRAM

## 2023-12-12 PROCEDURE — 85610 PROTHROMBIN TIME: CPT | Performed by: PODIATRIST

## 2023-12-12 PROCEDURE — 2500000001 HC RX 250 WO HCPCS SELF ADMINISTERED DRUGS (ALT 637 FOR MEDICARE OP)

## 2023-12-12 PROCEDURE — 85520 HEPARIN ASSAY: CPT | Performed by: NURSE PRACTITIONER

## 2023-12-12 PROCEDURE — 99233 SBSQ HOSP IP/OBS HIGH 50: CPT | Performed by: PODIATRIST

## 2023-12-12 PROCEDURE — 85027 COMPLETE CBC AUTOMATED: CPT | Performed by: STUDENT IN AN ORGANIZED HEALTH CARE EDUCATION/TRAINING PROGRAM

## 2023-12-12 PROCEDURE — 2500000004 HC RX 250 GENERAL PHARMACY W/ HCPCS (ALT 636 FOR OP/ED): Performed by: PODIATRIST

## 2023-12-12 PROCEDURE — 2500000004 HC RX 250 GENERAL PHARMACY W/ HCPCS (ALT 636 FOR OP/ED): Performed by: STUDENT IN AN ORGANIZED HEALTH CARE EDUCATION/TRAINING PROGRAM

## 2023-12-12 PROCEDURE — 36415 COLL VENOUS BLD VENIPUNCTURE: CPT | Performed by: NURSE PRACTITIONER

## 2023-12-12 PROCEDURE — 82947 ASSAY GLUCOSE BLOOD QUANT: CPT

## 2023-12-12 PROCEDURE — 99231 SBSQ HOSP IP/OBS SF/LOW 25: CPT | Performed by: NURSE PRACTITIONER

## 2023-12-12 PROCEDURE — 2500000002 HC RX 250 W HCPCS SELF ADMINISTERED DRUGS (ALT 637 FOR MEDICARE OP, ALT 636 FOR OP/ED): Performed by: STUDENT IN AN ORGANIZED HEALTH CARE EDUCATION/TRAINING PROGRAM

## 2023-12-12 PROCEDURE — 80053 COMPREHEN METABOLIC PANEL: CPT | Performed by: STUDENT IN AN ORGANIZED HEALTH CARE EDUCATION/TRAINING PROGRAM

## 2023-12-12 PROCEDURE — 1200000002 HC GENERAL ROOM WITH TELEMETRY DAILY

## 2023-12-12 PROCEDURE — 2500000001 HC RX 250 WO HCPCS SELF ADMINISTERED DRUGS (ALT 637 FOR MEDICARE OP): Performed by: PODIATRIST

## 2023-12-12 RX ORDER — ACETAMINOPHEN 325 MG/1
975 TABLET ORAL EVERY 6 HOURS
Status: DISCONTINUED | OUTPATIENT
Start: 2023-12-12 | End: 2023-12-15 | Stop reason: HOSPADM

## 2023-12-12 RX ORDER — GABAPENTIN 400 MG/1
800 CAPSULE ORAL EVERY 8 HOURS SCHEDULED
Status: DISCONTINUED | OUTPATIENT
Start: 2023-12-12 | End: 2023-12-15 | Stop reason: HOSPADM

## 2023-12-12 RX ADMIN — WARFARIN SODIUM 5 MG: 5 TABLET ORAL at 17:19

## 2023-12-12 RX ADMIN — DIVALPROEX SODIUM 1000 MG: 500 TABLET, DELAYED RELEASE ORAL at 21:07

## 2023-12-12 RX ADMIN — LEVOTHYROXINE SODIUM 75 MCG: 75 TABLET ORAL at 05:45

## 2023-12-12 RX ADMIN — BUSPIRONE HYDROCHLORIDE 10 MG: 10 TABLET ORAL at 09:15

## 2023-12-12 RX ADMIN — OXYCODONE HYDROCHLORIDE 10 MG: 5 TABLET ORAL at 03:23

## 2023-12-12 RX ADMIN — METOPROLOL TARTRATE 12.5 MG: 25 TABLET, FILM COATED ORAL at 09:14

## 2023-12-12 RX ADMIN — GABAPENTIN 800 MG: 400 CAPSULE ORAL at 14:43

## 2023-12-12 RX ADMIN — HEPARIN SODIUM 2300 UNITS/HR: 10000 INJECTION, SOLUTION INTRAVENOUS at 11:53

## 2023-12-12 RX ADMIN — ACETAMINOPHEN 650 MG: 325 TABLET ORAL at 05:45

## 2023-12-12 RX ADMIN — OXYCODONE HYDROCHLORIDE 10 MG: 5 TABLET ORAL at 21:07

## 2023-12-12 RX ADMIN — OXYCODONE HYDROCHLORIDE 10 MG: 5 TABLET ORAL at 09:14

## 2023-12-12 RX ADMIN — GABAPENTIN 600 MG: 300 CAPSULE ORAL at 05:45

## 2023-12-12 RX ADMIN — OXYCODONE HYDROCHLORIDE 10 MG: 5 TABLET ORAL at 14:43

## 2023-12-12 RX ADMIN — ACETAMINOPHEN 975 MG: 325 TABLET ORAL at 23:52

## 2023-12-12 RX ADMIN — ACETAMINOPHEN 975 MG: 325 TABLET ORAL at 17:19

## 2023-12-12 RX ADMIN — DIVALPROEX SODIUM 500 MG: 500 TABLET, DELAYED RELEASE ORAL at 09:15

## 2023-12-12 RX ADMIN — ASPIRIN 81 MG: 81 TABLET, COATED ORAL at 09:14

## 2023-12-12 RX ADMIN — LEVETIRACETAM 500 MG: 500 TABLET, FILM COATED ORAL at 21:07

## 2023-12-12 RX ADMIN — GABAPENTIN 800 MG: 400 CAPSULE ORAL at 21:07

## 2023-12-12 RX ADMIN — CLOPIDOGREL BISULFATE 75 MG: 75 TABLET, FILM COATED ORAL at 09:14

## 2023-12-12 RX ADMIN — LEVETIRACETAM 500 MG: 500 TABLET, FILM COATED ORAL at 09:14

## 2023-12-12 RX ADMIN — ACETAMINOPHEN 650 MG: 325 TABLET ORAL at 11:21

## 2023-12-12 RX ADMIN — ATORVASTATIN CALCIUM 80 MG: 80 TABLET, FILM COATED ORAL at 21:06

## 2023-12-12 RX ADMIN — HYDROMORPHONE HYDROCHLORIDE 0.4 MG: 1 INJECTION, SOLUTION INTRAMUSCULAR; INTRAVENOUS; SUBCUTANEOUS at 11:21

## 2023-12-12 RX ADMIN — DULOXETINE HYDROCHLORIDE 60 MG: 60 CAPSULE, DELAYED RELEASE ORAL at 09:14

## 2023-12-12 RX ADMIN — HEPARIN SODIUM 2300 UNITS/HR: 10000 INJECTION, SOLUTION INTRAVENOUS at 22:39

## 2023-12-12 ASSESSMENT — PAIN SCALES - GENERAL
PAINLEVEL_OUTOF10: 9
PAINLEVEL_OUTOF10: 9
PAINLEVEL_OUTOF10: 10 - WORST POSSIBLE PAIN

## 2023-12-12 ASSESSMENT — COGNITIVE AND FUNCTIONAL STATUS - GENERAL
MOVING TO AND FROM BED TO CHAIR: TOTAL
TURNING FROM BACK TO SIDE WHILE IN FLAT BAD: A LOT
MOBILITY SCORE: 9
DRESSING REGULAR LOWER BODY CLOTHING: A LOT
WALKING IN HOSPITAL ROOM: TOTAL
TOILETING: A LOT
PERSONAL GROOMING: A LOT
HELP NEEDED FOR BATHING: A LOT
STANDING UP FROM CHAIR USING ARMS: TOTAL
DRESSING REGULAR UPPER BODY CLOTHING: A LOT
MOVING FROM LYING ON BACK TO SITTING ON SIDE OF FLAT BED WITH BEDRAILS: A LITTLE
CLIMB 3 TO 5 STEPS WITH RAILING: TOTAL
DAILY ACTIVITIY SCORE: 14

## 2023-12-12 NOTE — PROGRESS NOTES
"Shirin Slater is a 53 y.o. female on day 6 of admission presenting with Limb ischemia.    Subjective   No events overnight. Continues to have 10/10 pain in left leg per patient. Denies CP, SOB, abdominal pain.        Objective   Constitutional: nontoxic, resting in bed, able to converse, obese, uncomfortable  Neuro: A/O x4, no gross deficits   Psych: normal affect  HEENT: No deformities, no scleral icterus   Cardiac: regular rate  Pulmonary: unlabored respirations, no SOB  Abdomen: soft, non distended, non tender  Skin: warm and dry overall    Extremities: left leg edema 1+. s/p R hip disarticulation with well healed incision. L bi DP/mono PT.       Last Recorded Vitals  Blood pressure 109/67, pulse 74, temperature 36.3 °C (97.3 °F), temperature source Temporal, resp. rate 20, height 1.7 m (5' 6.93\"), weight 95.7 kg (210 lb 14.4 oz), SpO2 97 %.  Intake/Output last 3 Shifts:  I/O last 3 completed shifts:  In: 576 (5.8 mL/kg) [P.O.:300; I.V.:276 (2.8 mL/kg)]  Out: 1000 (10 mL/kg) [Urine:1000 (0.3 mL/kg/hr)]  Dosing Weight: 99.8 kg     Relevant Results           9.8  11.2>-----<428              31.1   142  102  15                  ----------------<144     4.0  30  0.58    Mg 1.89 Ca 9.0          Assessment/Plan   Shirin Slater is 53 y.o. female with history of DM2 c/b neuropathy, renal and splenic thrombosis (on coumadin, aspirin and plavix), HTN, HLD, PAD s/p right hip disarticulation and multiple LLE internvetions who presented with LLE 2B ALI s/p open thrombectomy of left iliac, femoral and profunda. Continues to endorse pain in left leg. Stable vascular exam.      Recommendations:  -pain control with Tylenol and PRN oxycodone, gabapentin 600mg  -continue home Depakote, duloxetine, busparone, keppra  -continue aspirin, plavix and atorvastatin  -continue heparin gtt  -Vascular medicine rec bridge to warfarin, started yesterday  -home metoprolol with hold parameters   -IS and bronchopulmonary hygiene  -bowel " regimen  -PPI  -continue home levothyroxine  -PT/OT - SNF rec     D/w vascular fellow, Dr. Rian, and Dr. Jesus.     Yaz Disla MD  PGY-1 vascular surgery resident  [id number]

## 2023-12-12 NOTE — PROGRESS NOTES
"Shirin Slater is a 53 y.o. female on day 6 of admission presenting as transfer from [facility] on 12/6/23 with left leg ischemia. She presented to OSH with progressively worsening left leg pain over 2 weeks time, with accompanied paleness and cyanosis of the left leg.  Underwent CTA with runoffs that showed 50% stenosis of the abdominal aorta, complete occlusion of the right iliac stent, complete occlusion of L common femoral, superficial femoral and popliteal arteries w/ stents on the L iliac and popliteal arteries.   Patient now s/p left femoral cutdown, open sharif thrombectomy of external iliac artery, common femoral artery, superficial femoral artery, and profunda femoris. Flow restored down into profunda, and into SFA. Patch angioplasty CFA on 12/6/23.  Patient required Vitamin K 10mg prior to surgery due to elevated INR that decreased to 3.7 during the case.        Subjective   Patient reports feeling OK today.  Denies CP, SOB or bleeding.     Objective   Vascular Medicine Service following for anticoagulation recommendations   Continues with Heparin as bridge to warfarin.    No overt evidence of bleeding.     Physical Exam  Resting in bed in NAD  Respirations are full and easy, with breaths sounds CTA all anterior lobes; on RA  Normal heart sounds with regular rate/rhythm; telemetry monitor shows SB without witnessed ectopy; vital signs are stable  Left leg warm to touch with palpable DP pulse; right leg s/p hip disarticulation; no evidence of swelling LLE or BUE; palpable bilateral radial pulses; PIV; Prevena wound vac in place left groin.   Patient is awake and alert, conversant and oriented x3; participates in conversationr    Last Recorded Vitals  Blood pressure 109/56, pulse 85, temperature 36.3 °C (97.3 °F), resp. rate 20, height 1.7 m (5' 6.93\"), weight 95.7 kg (210 lb 14.4 oz), SpO2 95 %.  Intake/Output last 3 Shifts:  I/O last 3 completed shifts:  In: 576 (5.8 mL/kg) [P.O.:300; I.V.:276 " (2.8 mL/kg)]  Out: 1000 (10 mL/kg) [Urine:1000 (0.3 mL/kg/hr)]  Dosing Weight: 99.8 kg     Relevant Results  Scheduled medications  acetaminophen, 650 mg, oral, q6h  aspirin, 81 mg, oral, Daily  atorvastatin, 80 mg, oral, Nightly  busPIRone, 10 mg, oral, Daily  clopidogrel, 75 mg, oral, Daily  divalproex, 1,000 mg, oral, q PM  divalproex, 500 mg, oral, q AM  DULoxetine, 60 mg, oral, Daily  gabapentin, 600 mg, oral, q8h NINO  insulin lispro, 0-5 Units, subcutaneous, TID with meals  levETIRAcetam, 500 mg, oral, BID  levothyroxine, 75 mcg, oral, Daily  metoprolol tartrate, 12.5 mg, oral, BID  sennosides, 2 tablet, oral, BID  warfarin, 5 mg, oral, Daily    Continuous medications  heparin, 0-4,000 Units/hr, Last Rate: 2,300 Units/hr (12/12/23 1153)      Results for orders placed or performed during the hospital encounter of 12/06/23 (from the past 24 hour(s))   POCT GLUCOSE   Result Value Ref Range    POCT Glucose 174 (H) 74 - 99 mg/dL   POCT GLUCOSE   Result Value Ref Range    POCT Glucose 190 (H) 74 - 99 mg/dL   CBC   Result Value Ref Range    WBC 11.4 (H) 4.4 - 11.3 x10*3/uL    nRBC 0.0 0.0 - 0.0 /100 WBCs    RBC 4.02 4.00 - 5.20 x10*6/uL    Hemoglobin 10.0 (L) 12.0 - 16.0 g/dL    Hematocrit 32.4 (L) 36.0 - 46.0 %    MCV 81 80 - 100 fL    MCH 24.9 (L) 26.0 - 34.0 pg    MCHC 30.9 (L) 32.0 - 36.0 g/dL    RDW 21.8 (H) 11.5 - 14.5 %    Platelets 430 150 - 450 x10*3/uL   Comprehensive metabolic panel   Result Value Ref Range    Glucose 123 (H) 74 - 99 mg/dL    Sodium 142 136 - 145 mmol/L    Potassium 4.1 3.5 - 5.3 mmol/L    Chloride 100 98 - 107 mmol/L    Bicarbonate 32 21 - 32 mmol/L    Anion Gap 14 10 - 20 mmol/L    Urea Nitrogen 14 6 - 23 mg/dL    Creatinine 0.57 0.50 - 1.05 mg/dL    eGFR >90 >60 mL/min/1.73m*2    Calcium 8.7 8.6 - 10.6 mg/dL    Albumin 3.0 (L) 3.4 - 5.0 g/dL    Alkaline Phosphatase 79 33 - 110 U/L    Total Protein 6.0 (L) 6.4 - 8.2 g/dL    AST 9 9 - 39 U/L    Bilirubin, Total 0.5 0.0 - 1.2 mg/dL    ALT  3 (L) 7 - 45 U/L   Protime-INR   Result Value Ref Range    Protime 14.4 (H) 9.8 - 12.8 seconds    INR 1.3 (H) 0.9 - 1.1   Heparin Assay, UFH   Result Value Ref Range    Heparin Unfractionated 0.4 See Comment Below for Therapeutic Ranges IU/mL   POCT GLUCOSE   Result Value Ref Range    POCT Glucose 125 (H) 74 - 99 mg/dL   POCT GLUCOSE   Result Value Ref Range    POCT Glucose 141 (H) 74 - 99 mg/dL      Assessment/Plan    Principal Problem:    Limb ischemia  Active Problems:    Diabetes mellitus, type 2 (CMS/HCC)    HTN (hypertension)    Hypothyroidism    Peripheral neuropathy    ELMIRA (obstructive sleep apnea)     53 year old female with extensive vascular history and s/p multiple surgical interventions due to PAD.  Vascular Medicine Service following for anticoagulation recommendations.   Continues with Heparin infusion as bridge to Warfarin therapy.    Review of labs for today shows stable hemoglobin 10.0 grams, stable platelets 430K, and stable serum creatinine 0.57.     Date    INR     Warfarin Dose     Comments   12/6     3.7                                  Vitamin K 10mg IV for elevated INR prior to surgery  12/7     2.6, 2.1, 2.2, 2.0, 1.5  12/8    1.6  12/9     2.0  12/11    --           5mg  12/12    1.3         5mg        Recommendations:  ~CONTINUE Heparin infusion; follow nomogram to achieve therapeutic Heparin assay 0.3-0.7  ~CONTINUE Warfarin 5mg   ~Monitor for bleeding  ~Daily labs: CBC, Heparin assay, INR  ~Will need a minimum of 5 days of overlap therapy with Heparin and Warfarin; goal INR is 2-3; when INR is >2 for 2 days in a row, Heparin can be stopped   ~Outpatient Coumadin monitoring will need to be arranged prior to hospital discharge with initial appointment scheduled for 1-2 days after discharge; Please contact Dr. Juany Sanchez (PCP - General Family Medicine) phone [phone], who previously managed INR and Warfarin dosing as outpatient.    ~I will coordinate outpatient follow up  with the Vascular Medicine Service      Plan of care discussed with Dr. Ana Silver  Plan of care discussed with Dr. Wheatley from the Medical Hospitalist Service     [facility]  Vascular Medicine Service   Pager [phone]

## 2023-12-12 NOTE — PROGRESS NOTES
Patient accepted at [facility]. Patient states she prefers to return home, but admits she probably needs a few weeks of therapy before she is back to her baseline functioning. Ultimately she is in agreement with [facility] as her discharge plan. Patient medically ready after warfarin bridge completed. Precert submitted by facility today.     Pratibha Crabtree, DIANEW

## 2023-12-12 NOTE — CARE PLAN
Problem: Pain  Goal: Takes deep breaths with improved pain control throughout the shift  Outcome: Progressing     Problem: Pain  Goal: Turns in bed with improved pain control throughout the shift  Outcome: Progressing     Problem: Pain  Goal: Free from acute confusion related to pain meds throughout the shift  Outcome: Progressing   The patient's goals for the shift include      The clinical goals for the shift include pain management

## 2023-12-12 NOTE — PROGRESS NOTES
"Shirin Slater is a 53 y.o. female on day 6 of admission presenting with Limb ischemia.    Subjective     Patient states she is in 9/10 pain and has been having to take Dilaudid. Describes the pain as burning. Denies fever, chills, nausea and vomiting. Patient does not recall last time had a BM, but is refusing any bowel regimen at this time. She said she will let us know if she needs one.     Objective     Physical Exam  Constitutional:       Appearance: Normal appearance.   HENT:      Head: Normocephalic.   Cardiovascular:      Rate and Rhythm: Normal rate and regular rhythm.   Pulmonary:      Effort: Pulmonary effort is normal.      Breath sounds: Normal breath sounds.   Abdominal:      General: Bowel sounds are normal. There is no distension.      Palpations: Abdomen is soft.      Tenderness: There is no abdominal tenderness.   Musculoskeletal:         General: Swelling present.      Comments: Edema to LLE similar as to yesterday's exam.    Skin:     General: Skin is warm.      Comments: No cyanosis, and no eschar noted to L LE.    Neurological:      General: No focal deficit present.      Mental Status: She is alert.         Last Recorded Vitals  Blood pressure 109/67, pulse 74, temperature 36.3 °C (97.3 °F), temperature source Temporal, resp. rate 20, height 1.7 m (5' 6.93\"), weight 95.7 kg (210 lb 14.4 oz), SpO2 97 %.  Intake/Output last 3 Shifts:  I/O last 3 completed shifts:  In: - (0 mL/kg)   Out: 1000 (10 mL/kg) [Urine:1000 (0.3 mL/kg/hr)]  Dosing Weight: 99.8 kg     Relevant Results  Scheduled medications  acetaminophen, 975 mg, oral, q6h  aspirin, 81 mg, oral, Daily  atorvastatin, 80 mg, oral, Nightly  busPIRone, 10 mg, oral, Daily  clopidogrel, 75 mg, oral, Daily  divalproex, 1,000 mg, oral, q PM  divalproex, 500 mg, oral, q AM  DULoxetine, 60 mg, oral, Daily  gabapentin, 800 mg, oral, q8h NINO  insulin lispro, 0-5 Units, subcutaneous, TID with meals  levETIRAcetam, 500 mg, oral, BID  levothyroxine, 75 " mcg, oral, Daily  metoprolol tartrate, 12.5 mg, oral, BID  sennosides, 2 tablet, oral, BID  warfarin, 5 mg, oral, Daily      Continuous medications  heparin, 0-4,000 Units/hr, Last Rate: 2,300 Units/hr (12/12/23 1153)      PRN medications  PRN medications: albuterol, dextrose 10 % in water (D10W), dextrose, glucagon, heparin, HYDROmorphone, ipratropium-albuteroL, oxyCODONE, oxygen     Results for orders placed or performed during the hospital encounter of 12/06/23 (from the past 24 hour(s))   POCT GLUCOSE   Result Value Ref Range    POCT Glucose 174 (H) 74 - 99 mg/dL   POCT GLUCOSE   Result Value Ref Range    POCT Glucose 190 (H) 74 - 99 mg/dL   CBC   Result Value Ref Range    WBC 11.4 (H) 4.4 - 11.3 x10*3/uL    nRBC 0.0 0.0 - 0.0 /100 WBCs    RBC 4.02 4.00 - 5.20 x10*6/uL    Hemoglobin 10.0 (L) 12.0 - 16.0 g/dL    Hematocrit 32.4 (L) 36.0 - 46.0 %    MCV 81 80 - 100 fL    MCH 24.9 (L) 26.0 - 34.0 pg    MCHC 30.9 (L) 32.0 - 36.0 g/dL    RDW 21.8 (H) 11.5 - 14.5 %    Platelets 430 150 - 450 x10*3/uL   Comprehensive metabolic panel   Result Value Ref Range    Glucose 123 (H) 74 - 99 mg/dL    Sodium 142 136 - 145 mmol/L    Potassium 4.1 3.5 - 5.3 mmol/L    Chloride 100 98 - 107 mmol/L    Bicarbonate 32 21 - 32 mmol/L    Anion Gap 14 10 - 20 mmol/L    Urea Nitrogen 14 6 - 23 mg/dL    Creatinine 0.57 0.50 - 1.05 mg/dL    eGFR >90 >60 mL/min/1.73m*2    Calcium 8.7 8.6 - 10.6 mg/dL    Albumin 3.0 (L) 3.4 - 5.0 g/dL    Alkaline Phosphatase 79 33 - 110 U/L    Total Protein 6.0 (L) 6.4 - 8.2 g/dL    AST 9 9 - 39 U/L    Bilirubin, Total 0.5 0.0 - 1.2 mg/dL    ALT 3 (L) 7 - 45 U/L   Protime-INR   Result Value Ref Range    Protime 14.4 (H) 9.8 - 12.8 seconds    INR 1.3 (H) 0.9 - 1.1   Heparin Assay, UFH   Result Value Ref Range    Heparin Unfractionated 0.4 See Comment Below for Therapeutic Ranges IU/mL   POCT GLUCOSE   Result Value Ref Range    POCT Glucose 125 (H) 74 - 99 mg/dL   POCT GLUCOSE   Result Value Ref Range    POCT  Glucose 141 (H) 74 - 99 mg/dL             Assessment/Plan   Principal Problem:    Limb ischemia  Active Problems:    Diabetes mellitus, type 2 (CMS/[facility])    HTN (hypertension)    Hypothyroidism    Peripheral neuropathy    ELMIRA (obstructive sleep apnea)    Shirin Slater is a 53 y.o. female on day 4 of admission presenting with Limb ischemia.  Patient has a PMH T2DM (A1c 8.5) c/b diabetic neuropathy, renal and splenic thrombosis (on Coumadin, aspirin and clopidogrel), HTN, HLD, PVD c/b R hip disarticulation and LLL disease s/p L 1/2/5 digit amputations and previous iliac artery stenting who presents to SICU s/p L common femoral and external iliac embolectomy, patch angioplasty common femoral artery on 12/6.       Acute LLE ischemia s/p L common femoral and external iliac embolectomy, patch angioplasty common femoral artery on 12/6.  Right hip disarticulation  Pain regimen in setting of acute on chronic LLE ischemia   -Vascular surgery following  -Vasc med on board, appreciate recs    :Continue heparin infusion for now; follow nomogram to achieve therapeutic Heparin assay 0.3-0.7     :Started bridge to Warfarin 5mg on 12/11; INR 1.3 today     :Monitor for bleeding    :Daily labs: CBC, Heparin assay, INR     :Will need a minimum of 5 days of overlap therapy with Heparin and Warfarin; goal INR is 2-3; when INR is >2 for 2 days in a row, Heparin can be stopped      :LLE DVT US   -Continue aspirin Plavix and atorvastatin  -BP stable, cont home metoprolol with parameters   -Continue diet as tolerated  -Bowel regimen (Senokot 2 tablet BID), PPI  -PT/OT rec Mod intensity level   -Continue telemetry  -Pain regimen: Switched Tylenol 650 mg to 975 mg q6 hr (scheduled); discontinued oxycodone 5 mg PRN for moderate pain; continue dilaudid 0.4 mg q3h for break-through pain and continue oxycodone 10 mg q4h  for severe pain     HTN, HLD, PVD s/p L and R iliac and L popliteal stents, R hip disarticulation, L 1/2/5 digit amputations,  renal and splenic thrombosis (on Coumadin, aspirin and clopidogrel).   Questionable compliance with meds.   Baseline cardiac function EF 60-65% (2022). - goal sbp 120-160 range per vascular surgery  - Continue home metoprolol 12.5 BID  - Continue home plavix, aspirin, atorvastatin     Acute surgical blood loss anemia,   supratherapeutic INR.  - Check CBC, coags daily and prn  - Heparin infusion, titrate per assays  - Continue asa and plavix     History of epilepsy, on AEDs. Chronic pain, diabetic neuropathy.   - Continue scheduled acetaminophen and gabapentin. PRN oxycodone with breakthrough iv hydromorphone  - Continue home depakote, duloxetine, busparone, keppra  - SW consulted for ongoing home resource management, discharge planning, financial concerns     40 pack year smoker.  - Goal SpO2 >94%  - Additional bronchial hygiene as indicated     History of NIDDM, hypothyroidism. Last A1c 8.5% 9/2023. TSH WNL 12/7  - AC/HS BG checks  - Continue home levothyroxine  - Hold home empagliflozin     Dispo:  -PT/OT rec SNF (patient chose [facility]). Pending pre-cert      Patient seen and discussed with Dr. Wheatley.      Octavia Fam MD   PGY1, Family Medicine   [facility]  Available by disco volante

## 2023-12-13 LAB
ALBUMIN SERPL BCP-MCNC: 3.1 G/DL (ref 3.4–5)
ALP SERPL-CCNC: 73 U/L (ref 33–110)
ALT SERPL W P-5'-P-CCNC: <3 U/L (ref 7–45)
ANION GAP SERPL CALC-SCNC: 14 MMOL/L (ref 10–20)
AST SERPL W P-5'-P-CCNC: 14 U/L (ref 9–39)
BILIRUB SERPL-MCNC: 0.5 MG/DL (ref 0–1.2)
BUN SERPL-MCNC: 13 MG/DL (ref 6–23)
CALCIUM SERPL-MCNC: 9 MG/DL (ref 8.6–10.6)
CHLORIDE SERPL-SCNC: 101 MMOL/L (ref 98–107)
CO2 SERPL-SCNC: 29 MMOL/L (ref 21–32)
CREAT SERPL-MCNC: 0.6 MG/DL (ref 0.5–1.05)
ERYTHROCYTE [DISTWIDTH] IN BLOOD BY AUTOMATED COUNT: 21.9 % (ref 11.5–14.5)
GFR SERPL CREATININE-BSD FRML MDRD: >90 ML/MIN/1.73M*2
GLUCOSE BLD MANUAL STRIP-MCNC: 129 MG/DL (ref 74–99)
GLUCOSE BLD MANUAL STRIP-MCNC: 153 MG/DL (ref 74–99)
GLUCOSE BLD MANUAL STRIP-MCNC: 154 MG/DL (ref 74–99)
GLUCOSE BLD MANUAL STRIP-MCNC: 156 MG/DL (ref 74–99)
GLUCOSE BLD MANUAL STRIP-MCNC: 169 MG/DL (ref 74–99)
GLUCOSE BLD MANUAL STRIP-MCNC: 190 MG/DL (ref 74–99)
GLUCOSE SERPL-MCNC: 126 MG/DL (ref 74–99)
HCT VFR BLD AUTO: 35 % (ref 36–46)
HGB BLD-MCNC: 10.9 G/DL (ref 12–16)
HOLD SPECIMEN: NORMAL
INR PPP: 1.4 (ref 0.9–1.1)
MCH RBC QN AUTO: 25.3 PG (ref 26–34)
MCHC RBC AUTO-ENTMCNC: 31.1 G/DL (ref 32–36)
MCV RBC AUTO: 81 FL (ref 80–100)
NRBC BLD-RTO: 0.2 /100 WBCS (ref 0–0)
PLATELET # BLD AUTO: 463 X10*3/UL (ref 150–450)
POTASSIUM SERPL-SCNC: 4 MMOL/L (ref 3.5–5.3)
PROT SERPL-MCNC: 6.9 G/DL (ref 6.4–8.2)
PROTHROMBIN TIME: 16.2 SECONDS (ref 9.8–12.8)
RBC # BLD AUTO: 4.31 X10*6/UL (ref 4–5.2)
SODIUM SERPL-SCNC: 140 MMOL/L (ref 136–145)
UFH PPP CHRO-ACNC: 0.6 IU/ML
WBC # BLD AUTO: 10.7 X10*3/UL (ref 4.4–11.3)

## 2023-12-13 PROCEDURE — 97110 THERAPEUTIC EXERCISES: CPT | Mod: GP

## 2023-12-13 PROCEDURE — 36415 COLL VENOUS BLD VENIPUNCTURE: CPT | Performed by: STUDENT IN AN ORGANIZED HEALTH CARE EDUCATION/TRAINING PROGRAM

## 2023-12-13 PROCEDURE — 2500000001 HC RX 250 WO HCPCS SELF ADMINISTERED DRUGS (ALT 637 FOR MEDICARE OP): Performed by: STUDENT IN AN ORGANIZED HEALTH CARE EDUCATION/TRAINING PROGRAM

## 2023-12-13 PROCEDURE — 80053 COMPREHEN METABOLIC PANEL: CPT | Performed by: STUDENT IN AN ORGANIZED HEALTH CARE EDUCATION/TRAINING PROGRAM

## 2023-12-13 PROCEDURE — 2500000004 HC RX 250 GENERAL PHARMACY W/ HCPCS (ALT 636 FOR OP/ED): Performed by: PODIATRIST

## 2023-12-13 PROCEDURE — 85520 HEPARIN ASSAY: CPT | Performed by: NURSE PRACTITIONER

## 2023-12-13 PROCEDURE — 85027 COMPLETE CBC AUTOMATED: CPT | Performed by: STUDENT IN AN ORGANIZED HEALTH CARE EDUCATION/TRAINING PROGRAM

## 2023-12-13 PROCEDURE — 82947 ASSAY GLUCOSE BLOOD QUANT: CPT

## 2023-12-13 PROCEDURE — 99232 SBSQ HOSP IP/OBS MODERATE 35: CPT | Performed by: NURSE PRACTITIONER

## 2023-12-13 PROCEDURE — 2500000004 HC RX 250 GENERAL PHARMACY W/ HCPCS (ALT 636 FOR OP/ED): Performed by: STUDENT IN AN ORGANIZED HEALTH CARE EDUCATION/TRAINING PROGRAM

## 2023-12-13 PROCEDURE — 2500000002 HC RX 250 W HCPCS SELF ADMINISTERED DRUGS (ALT 637 FOR MEDICARE OP, ALT 636 FOR OP/ED): Performed by: STUDENT IN AN ORGANIZED HEALTH CARE EDUCATION/TRAINING PROGRAM

## 2023-12-13 PROCEDURE — 96372 THER/PROPH/DIAG INJ SC/IM: CPT | Performed by: PODIATRIST

## 2023-12-13 PROCEDURE — 36415 COLL VENOUS BLD VENIPUNCTURE: CPT | Performed by: NURSE PRACTITIONER

## 2023-12-13 PROCEDURE — 99232 SBSQ HOSP IP/OBS MODERATE 35: CPT | Performed by: PODIATRIST

## 2023-12-13 PROCEDURE — 85610 PROTHROMBIN TIME: CPT | Performed by: PODIATRIST

## 2023-12-13 PROCEDURE — 97530 THERAPEUTIC ACTIVITIES: CPT | Mod: GP

## 2023-12-13 PROCEDURE — 1200000002 HC GENERAL ROOM WITH TELEMETRY DAILY

## 2023-12-13 PROCEDURE — 2500000001 HC RX 250 WO HCPCS SELF ADMINISTERED DRUGS (ALT 637 FOR MEDICARE OP)

## 2023-12-13 PROCEDURE — 2500000001 HC RX 250 WO HCPCS SELF ADMINISTERED DRUGS (ALT 637 FOR MEDICARE OP): Performed by: PODIATRIST

## 2023-12-13 RX ORDER — WARFARIN SODIUM 5 MG/1
7.5 TABLET ORAL DAILY
Status: COMPLETED | OUTPATIENT
Start: 2023-12-13 | End: 2023-12-13

## 2023-12-13 RX ORDER — ENOXAPARIN SODIUM 100 MG/ML
100 INJECTION SUBCUTANEOUS 2 TIMES DAILY
Status: DISCONTINUED | OUTPATIENT
Start: 2023-12-13 | End: 2023-12-15 | Stop reason: HOSPADM

## 2023-12-13 RX ADMIN — HEPARIN SODIUM 2300 UNITS/HR: 10000 INJECTION, SOLUTION INTRAVENOUS at 09:53

## 2023-12-13 RX ADMIN — ASPIRIN 81 MG: 81 TABLET, COATED ORAL at 09:48

## 2023-12-13 RX ADMIN — METOPROLOL TARTRATE 12.5 MG: 25 TABLET, FILM COATED ORAL at 21:03

## 2023-12-13 RX ADMIN — ENOXAPARIN SODIUM 100 MG: 100 INJECTION SUBCUTANEOUS at 21:05

## 2023-12-13 RX ADMIN — LEVETIRACETAM 500 MG: 500 TABLET, FILM COATED ORAL at 09:49

## 2023-12-13 RX ADMIN — CLOPIDOGREL BISULFATE 75 MG: 75 TABLET, FILM COATED ORAL at 09:48

## 2023-12-13 RX ADMIN — DULOXETINE HYDROCHLORIDE 60 MG: 60 CAPSULE, DELAYED RELEASE ORAL at 21:04

## 2023-12-13 RX ADMIN — DIVALPROEX SODIUM 1000 MG: 500 TABLET, DELAYED RELEASE ORAL at 21:04

## 2023-12-13 RX ADMIN — DIVALPROEX SODIUM 500 MG: 500 TABLET, DELAYED RELEASE ORAL at 09:49

## 2023-12-13 RX ADMIN — ATORVASTATIN CALCIUM 80 MG: 80 TABLET, FILM COATED ORAL at 21:04

## 2023-12-13 RX ADMIN — LEVETIRACETAM 500 MG: 500 TABLET, FILM COATED ORAL at 21:03

## 2023-12-13 RX ADMIN — ENOXAPARIN SODIUM 100 MG: 100 INJECTION SUBCUTANEOUS at 14:27

## 2023-12-13 RX ADMIN — GABAPENTIN 800 MG: 400 CAPSULE ORAL at 05:47

## 2023-12-13 RX ADMIN — OXYCODONE HYDROCHLORIDE 10 MG: 5 TABLET ORAL at 16:07

## 2023-12-13 RX ADMIN — ACETAMINOPHEN 975 MG: 325 TABLET ORAL at 11:37

## 2023-12-13 RX ADMIN — BUSPIRONE HYDROCHLORIDE 10 MG: 10 TABLET ORAL at 09:48

## 2023-12-13 RX ADMIN — ACETAMINOPHEN 975 MG: 325 TABLET ORAL at 05:47

## 2023-12-13 RX ADMIN — METOPROLOL TARTRATE 12.5 MG: 25 TABLET, FILM COATED ORAL at 09:48

## 2023-12-13 RX ADMIN — GABAPENTIN 800 MG: 400 CAPSULE ORAL at 14:27

## 2023-12-13 RX ADMIN — GABAPENTIN 800 MG: 400 CAPSULE ORAL at 22:40

## 2023-12-13 RX ADMIN — LEVOTHYROXINE SODIUM 75 MCG: 75 TABLET ORAL at 05:47

## 2023-12-13 RX ADMIN — OXYCODONE HYDROCHLORIDE 10 MG: 5 TABLET ORAL at 09:53

## 2023-12-13 RX ADMIN — WARFARIN SODIUM 7.5 MG: 5 TABLET ORAL at 18:24

## 2023-12-13 RX ADMIN — INSULIN LISPRO 1 UNITS: 100 INJECTION, SOLUTION INTRAVENOUS; SUBCUTANEOUS at 18:24

## 2023-12-13 RX ADMIN — ACETAMINOPHEN 975 MG: 325 TABLET ORAL at 18:24

## 2023-12-13 ASSESSMENT — PAIN SCALES - PAIN ASSESSMENT IN ADVANCED DEMENTIA (PAINAD)
TOTALSCORE: 0
FACIALEXPRESSION: SMILING OR INEXPRESSIVE
BREATHING: NORMAL
BODYLANGUAGE: RELAXED
CONSOLABILITY: NO NEED TO CONSOLE

## 2023-12-13 ASSESSMENT — COGNITIVE AND FUNCTIONAL STATUS - GENERAL
DAILY ACTIVITIY SCORE: 14
PERSONAL GROOMING: A LOT
MOVING FROM LYING ON BACK TO SITTING ON SIDE OF FLAT BED WITH BEDRAILS: A LITTLE
MOVING TO AND FROM BED TO CHAIR: A LOT
STANDING UP FROM CHAIR USING ARMS: A LOT
WALKING IN HOSPITAL ROOM: TOTAL
CLIMB 3 TO 5 STEPS WITH RAILING: TOTAL
MOBILITY SCORE: 12
DRESSING REGULAR UPPER BODY CLOTHING: A LOT
DAILY ACTIVITIY SCORE: 19
CLIMB 3 TO 5 STEPS WITH RAILING: TOTAL
MOBILITY SCORE: 10
MOBILITY SCORE: 11
WALKING IN HOSPITAL ROOM: TOTAL
TURNING FROM BACK TO SIDE WHILE IN FLAT BAD: A LITTLE
TURNING FROM BACK TO SIDE WHILE IN FLAT BAD: A LITTLE
DRESSING REGULAR LOWER BODY CLOTHING: A LITTLE
HELP NEEDED FOR BATHING: A LITTLE
DRESSING REGULAR UPPER BODY CLOTHING: A LITTLE
MOVING TO AND FROM BED TO CHAIR: TOTAL
TOILETING: A LOT
MOVING TO AND FROM BED TO CHAIR: TOTAL
STANDING UP FROM CHAIR USING ARMS: TOTAL
CLIMB 3 TO 5 STEPS WITH RAILING: TOTAL
TURNING FROM BACK TO SIDE WHILE IN FLAT BAD: A LITTLE
PERSONAL GROOMING: A LITTLE
MOVING FROM LYING ON BACK TO SITTING ON SIDE OF FLAT BED WITH BEDRAILS: A LITTLE
TOILETING: A LITTLE
STANDING UP FROM CHAIR USING ARMS: TOTAL
HELP NEEDED FOR BATHING: A LOT
WALKING IN HOSPITAL ROOM: TOTAL
DRESSING REGULAR LOWER BODY CLOTHING: A LOT

## 2023-12-13 ASSESSMENT — PAIN SCALES - GENERAL
PAINLEVEL_OUTOF10: 8
PAINLEVEL_OUTOF10: 8
PAINLEVEL_OUTOF10: 6

## 2023-12-13 ASSESSMENT — PAIN - FUNCTIONAL ASSESSMENT: PAIN_FUNCTIONAL_ASSESSMENT: 0-10

## 2023-12-13 ASSESSMENT — PAIN SCALES - WONG BAKER: WONGBAKER_NUMERICALRESPONSE: HURTS LITTLE MORE

## 2023-12-13 NOTE — PROGRESS NOTES
"Shirin Slater is a 53 y.o. female on day 7 of admission presenting with Limb ischemia.    Subjective   Patient is doing well. Says her pain to LLE is 8/10 and better than the day before. Did not receive any Dilaudid since yesterday am. Patient states she is agreeable to removing the Dilaudid from her pain regimen.        Objective     Physical Exam  Constitutional:       General: She is not in acute distress.     Appearance: Normal appearance.   Cardiovascular:      Rate and Rhythm: Normal rate and regular rhythm.      Heart sounds: Normal heart sounds.   Pulmonary:      Effort: Pulmonary effort is normal.      Breath sounds: Normal breath sounds.   Abdominal:      General: Bowel sounds are normal. There is no distension.      Palpations: Abdomen is soft.      Tenderness: There is no abdominal tenderness.   Musculoskeletal:      Left lower leg: Edema present.      Comments: LLE edema similar to yesterday's exam; Non-pitting edema.    Skin:     General: Skin is warm.      Comments: No open wounds noted to LLE. No cyanosis.    Neurological:      General: No focal deficit present.      Mental Status: She is alert.   Psychiatric:         Mood and Affect: Mood normal.         Behavior: Behavior normal.         Last Recorded Vitals  Blood pressure 94/61, pulse 68, temperature 36.1 °C (97 °F), resp. rate 18, height 1.7 m (5' 6.93\"), weight 96.2 kg (212 lb 1.3 oz), SpO2 95 %.  Intake/Output last 3 Shifts:  I/O last 3 completed shifts:  In: 576 (5.8 mL/kg) [P.O.:300; I.V.:276 (2.8 mL/kg)]  Out: 1150 (11.5 mL/kg) [Urine:1150 (0.3 mL/kg/hr)]  Dosing Weight: 99.8 kg     Relevant Results  Scheduled medications  acetaminophen, 975 mg, oral, q6h  aspirin, 81 mg, oral, Daily  atorvastatin, 80 mg, oral, Nightly  busPIRone, 10 mg, oral, Daily  clopidogrel, 75 mg, oral, Daily  divalproex, 1,000 mg, oral, q PM  divalproex, 500 mg, oral, q AM  DULoxetine, 60 mg, oral, Daily  gabapentin, 800 mg, oral, q8h NINO  insulin lispro, 0-5 " Units, subcutaneous, TID with meals  levETIRAcetam, 500 mg, oral, BID  levothyroxine, 75 mcg, oral, Daily  metoprolol tartrate, 12.5 mg, oral, BID  sennosides, 2 tablet, oral, BID  warfarin, 5 mg, oral, Daily      Continuous medications  heparin, 0-4,000 Units/hr, Last Rate: 2,300 Units/hr (12/13/23 0953)      PRN medications  PRN medications: albuterol, dextrose 10 % in water (D10W), dextrose, glucagon, heparin, ipratropium-albuteroL, oxyCODONE, oxygen       Assessment/Plan   Principal Problem:    Limb ischemia  Active Problems:    Diabetes mellitus, type 2 (CMS/HCC)    HTN (hypertension)    Hypothyroidism    Peripheral neuropathy    ELMIRA (obstructive sleep apnea)    Shirin Slater is a 53 y.o. female on day 4 of admission presenting with Limb ischemia.  Patient has a PMH T2DM (A1c 8.5) c/b diabetic neuropathy, renal and splenic thrombosis (on Coumadin, aspirin and clopidogrel), HTN, HLD, PVD c/b R hip disarticulation and LLL disease s/p L 1/2/5 digit amputations and previous iliac artery stenting who presents to SICU s/p L common femoral and external iliac embolectomy, patch angioplasty common femoral artery on 12/6.       Acute LLE ischemia s/p L common femoral and external iliac embolectomy, patch angioplasty common femoral artery on 12/6.  Right hip disarticulation  Pain regimen in setting of acute on chronic LLE ischemia   -Vascular surgery following  -Vasc med on board, appreciate recs    :Started bridge to Warfarin. Increased Warfarin dose to 7.5 mg on 12/13; INR 1.4 today. Started Warfarin on 12/11    :Daily labs: CBC, Heparin assay, INR     :Will need a minimum of 5 days of overlap therapy with Heparin and Warfarin; goal INR is 2-3; when INR is >2 for 2 days in a row, Heparin can be stopped      :Discontinued Heparin and started Lovenox 100mg q12 hours (based on weight of 96kg) today 12/13  and continue bridge from Lovenox to warfarin for a minimum of 5 days AND with INR 2-3 for 2 days in a row, then can  stop Lovenox.   -Continue aspirin Plavix and atorvastatin  -BP stable, cont home metoprolol with parameters   -Continue diet as tolerated  -Bowel regimen (Senokot 2 tablet BID), PPI  -PT/OT rec Mod intensity level   -Continue telemetry  -Pain regimen: Tylenol 975 mg q6 hr (scheduled); discontinued dilaudid 0.4 mg q3h for break-through pain. Continue oxycodone 10 mg for severe pain      HTN, HLD, PVD s/p L and R iliac and L popliteal stents, R hip disarticulation, L 1/2/5 digit amputations, renal and splenic thrombosis (on Coumadin, aspirin and clopidogrel).   Questionable compliance with meds.   Baseline cardiac function EF 60-65% (2022). - goal sbp 120-160 range per vascular surgery  - Continue home metoprolol 12.5 BID  - Continue home plavix, aspirin, atorvastatin     Acute surgical blood loss anemia,   supratherapeutic INR.  - Check CBC, coags daily  - Heparin infusion, titrate per assays  - Continue asa and plavix     History of epilepsy, on AEDs. Chronic pain, diabetic neuropathy.   - Continue scheduled acetaminophen and gabapentin. PRN oxycodone with breakthrough iv hydromorphone  - Continue home depakote, duloxetine, busparone, keppra  -  consulted for ongoing home resource management, discharge planning, financial concerns     40 pack year smoker.  - Goal SpO2 >94%  - Additional bronchial hygiene as indicated     History of NIDDM, hypothyroidism. Last A1c 8.5% 9/2023. TSH WNL 12/7  - AC/HS BG checks  - Continue home levothyroxine  - Hold home empagliflozin     Dispo:  -PT/OT rec SNF (patient chose [facility]). Pending pre-cert      Patient discussed with Dr. Wheatley.      Octavia Fam MD   PGY1, Family Medicine   [facility]  Available by Zoomph

## 2023-12-13 NOTE — PROGRESS NOTES
Physical Therapy    Physical Therapy Treatment    Patient Name: Shirin Slater  MRN: [id number]  Today's Date: 12/13/2023  Time Calculation  Start Time: 1608  Stop Time: 1634  Time Calculation (min): 26 min       Assessment/Plan   PT Assessment  Medical Staff Made Aware: Yes  End of Session Communication: Bedside nurse  Assessment Comment: Pt demonstrated improved strength this session.  Completed sit to stand transfer 2x with Max Ax1.  Unable to maintain standing >10 seconds due to L foot pain.  Pt continues to benefit from skilled PT at this time and remains appropriate for moderate intensity PT upon discharge.  End of Session Patient Position: Bed, 3 rail up, Alarm off, not on at start of session  PT Plan  Inpatient/Swing Bed or Outpatient: Inpatient  PT Plan  Treatment/Interventions: Bed mobility, Transfer training, Balance training, Strengthening, Endurance training, Therapeutic exercise, Therapeutic activity  PT Plan: Skilled PT  PT Frequency: 3 times per week  PT Discharge Recommendations: Moderate intensity level of continued care  PT Recommended Transfer Status: Total assist  PT - OK to Discharge: Yes      General Visit Information:   PT  Visit  PT Received On: 12/13/23  General  Prior to Session Communication: Bedside nurse  Patient Position Received: Alarm off, not on at start of session, Bed, 3 rail up  General Comment: Pt cleared for PT by RN.  Pt alert and agreeable to PT.    Subjective   Precautions:  Precautions  Medical Precautions: Fall precautions, Cardiac precautions  Vital Signs:  Vital Signs  Heart Rate:  (pre:68, post:79)  Heart Rate Source: Monitor    Objective   Pain:  Pain Assessment  Pain Assessment: 0-10  Pain Score:  (not rated)  Cognition:  Cognition  Overall Cognitive Status: Within Functional Limits  Activity Tolerance:  Activity Tolerance  Endurance: Tolerates 10 - 20 min exercise with multiple rests  Treatments:  Therapeutic Exercise  Therapeutic Exercise Performed: Yes  Therapeutic  Exercise Activity 1: ankle pumps, heel slides, hip abd/add, LAQ 2x10 each    Bed Mobility  Bed Mobility: Yes  Bed Mobility 1  Bed Mobility 1: Rolling right, Rolling left  Level of Assistance 1: Modified independent  Bed Mobility Comments 1: use of bed rail  Bed Mobility 2  Bed Mobility  2: Scooting (to HOB in supine)  Level of Assistance 2: Modified independent  Bed Mobility Comments 2: unse of bed rail    Transfers  Transfer: Yes  Transfer 1  Transfer From 1: Bed to  Transfer to 1: Stand  Technique 1: Sit to stand, Stand to sit  Transfer Device 1: Walker  Transfer Level of Assistance 1: Maximum assistance  Trials/Comments 1: unsuccessful 1st trial, able to achieve full upright stand x2 trials, unable to maintain standing >10 seconds due to L foot pain.    Outcome Measures:  [facility] Basic Mobility  Turning from your back to your side while in a flat bed without using bedrails: A little  Moving from lying on your back to sitting on the side of a flat bed without using bedrails: A little  Moving to and from bed to chair (including a wheelchair): A lot  Standing up from a chair using your arms (e.g. wheelchair or bedside chair): A lot  To walk in hospital room: Total  Climbing 3-5 steps with railing: Total  Basic Mobility - Total Score: 12    Education Documentation  Body Mechanics, taught by Praveena Wilson PT at 12/13/2023  5:47 PM.  Learner: Patient  Readiness: Acceptance  Method: Explanation  Response: Verbalizes Understanding    Mobility Training, taught by Praveena Wilson PT at 12/13/2023  5:47 PM.  Learner: Patient  Readiness: Acceptance  Method: Explanation  Response: Verbalizes Understanding    Education Comments  No comments found.        Encounter Problems       Encounter Problems (Active)       Balance       Pt will demonstrate ability to complete sitting static/dynamic balance activities with unilateral UE support and no LOB for increase in safety up D/C.  (Progressing)       Start:  12/07/23    Expected  End:  12/21/23               Mobility       Pt will demonstrate ability to complete ther ex activities to improve functional strength for increase in independence upon DC.  (Progressing)       Start:  12/07/23    Expected End:  12/21/23               Transfers       Pt will demonstrated ability to complete sit<>stand transfers with modAx1-2 assistance and use of assistive device to safely DC. (Progressing)       Start:  12/07/23    Expected End:  12/21/23

## 2023-12-13 NOTE — PROGRESS NOTES
"Shirin Slater is a 53 y.o. female on day 7 of admission presenting as transfer from [facility] on 12/6/23 with left leg ischemia. She presented to OSH with progressively worsening left leg pain over 2 weeks time, with accompanied paleness and cyanosis of the left leg.  Underwent CTA with runoffs that showed 50% stenosis of the abdominal aorta, complete occlusion of the right iliac stent, complete occlusion of L common femoral, superficial femoral and popliteal arteries w/ stents on the L iliac and popliteal arteries.   Patient now s/p left femoral cutdown, open sharif thrombectomy of external iliac artery, common femoral artery, superficial femoral artery, and profunda femoris. Flow restored down into profunda, and into SFA. Patch angioplasty CFA on 12/6/23.  Patient required Vitamin K 10mg prior to surgery due to elevated INR that decreased to 3.7 during the case.        Subjective   Patient reports that she was treated \"rudely\" by a doctor this morning when being asked about pain medications.  Tells me that she feels she has an \"infection brewing, and wants blood cultures and ID consult prior to discharge to SNF\".  Reports feeling the same as yesterday and is concerned about continued left thigh pain that does not seem to be improving.   Denies CP, SOB or bleeding.      Objective   Vascular Medicine Service following for anticoagulation recommendations   Continues with Heparin as bridge to warfarin.    No overt evidence of bleeding.     Physical Exam  Resting in bed in moderate emotional distress when recounting experience with a doctor earlier today; mild distress due to report of left thigh pain.   Respirations are full and easy, with breaths sounds CTA all anterior lobes; on RA  Normal heart sounds with regular rate/rhythm; telemetry monitor shows SB without witnessed ectopy; vital signs are stable  Left leg warm to touch with palpable DP pulse; right leg s/p hip disarticulation; no evidence of " "swelling LLE or BUE; palpable bilateral radial pulses; PIV; Prevena wound vac in place left groin.   Patient is awake and alert, conversant and oriented x3; loudly participates in conversation regarding feeling that she is \"being treated like a dog\".     Last Recorded Vitals  Blood pressure 98/58, pulse 72, temperature 36.6 °C (97.9 °F), resp. rate 19, height 1.7 m (5' 6.93\"), weight 96.2 kg (212 lb 1.3 oz), SpO2 95 %.  Intake/Output last 3 Shifts:  I/O last 3 completed shifts:  In: 852 (8.5 mL/kg) [P.O.:300; I.V.:552 (5.5 mL/kg)]  Out: 1450 (14.5 mL/kg) [Urine:1450 (0.4 mL/kg/hr)]  Dosing Weight: 99.8 kg     Relevant Results  Scheduled medications  acetaminophen, 975 mg, oral, q6h  aspirin, 81 mg, oral, Daily  atorvastatin, 80 mg, oral, Nightly  busPIRone, 10 mg, oral, Daily  clopidogrel, 75 mg, oral, Daily  divalproex, 1,000 mg, oral, q PM  divalproex, 500 mg, oral, q AM  DULoxetine, 60 mg, oral, Daily  gabapentin, 800 mg, oral, q8h NINO  insulin lispro, 0-5 Units, subcutaneous, TID with meals  levETIRAcetam, 500 mg, oral, BID  levothyroxine, 75 mcg, oral, Daily  metoprolol tartrate, 12.5 mg, oral, BID  sennosides, 2 tablet, oral, BID  warfarin, 5 mg, oral, Daily    Continuous medications  heparin, 0-4,000 Units/hr, Last Rate: 2,300 Units/hr (12/13/23 0953)      Results for orders placed or performed during the hospital encounter of 12/06/23 (from the past 24 hour(s))   SST TOP   Result Value Ref Range    Extra Tube Hold for add-ons.    CBC   Result Value Ref Range    WBC 10.7 4.4 - 11.3 x10*3/uL    nRBC 0.2 (H) 0.0 - 0.0 /100 WBCs    RBC 4.31 4.00 - 5.20 x10*6/uL    Hemoglobin 10.9 (L) 12.0 - 16.0 g/dL    Hematocrit 35.0 (L) 36.0 - 46.0 %    MCV 81 80 - 100 fL    MCH 25.3 (L) 26.0 - 34.0 pg    MCHC 31.1 (L) 32.0 - 36.0 g/dL    RDW 21.9 (H) 11.5 - 14.5 %    Platelets 463 (H) 150 - 450 x10*3/uL   Comprehensive metabolic panel   Result Value Ref Range    Glucose 126 (H) 74 - 99 mg/dL    Sodium 140 136 - 145 mmol/L    " "Potassium 4.0 3.5 - 5.3 mmol/L    Chloride 101 98 - 107 mmol/L    Bicarbonate 29 21 - 32 mmol/L    Anion Gap 14 10 - 20 mmol/L    Urea Nitrogen 13 6 - 23 mg/dL    Creatinine 0.60 0.50 - 1.05 mg/dL    eGFR >90 >60 mL/min/1.73m*2    Calcium 9.0 8.6 - 10.6 mg/dL    Albumin 3.1 (L) 3.4 - 5.0 g/dL    Alkaline Phosphatase 73 33 - 110 U/L    Total Protein 6.9 6.4 - 8.2 g/dL    AST 14 9 - 39 U/L    Bilirubin, Total 0.5 0.0 - 1.2 mg/dL    ALT <3 (L) 7 - 45 U/L   Protime-INR   Result Value Ref Range    Protime 16.2 (H) 9.8 - 12.8 seconds    INR 1.4 (H) 0.9 - 1.1   Heparin Assay, UFH   Result Value Ref Range    Heparin Unfractionated 0.6 See Comment Below for Therapeutic Ranges IU/mL        Assessment/Plan   Principal Problem:    Limb ischemia  Active Problems:    Diabetes mellitus, type 2 (CMS/HCC)    HTN (hypertension)    Hypothyroidism    Peripheral neuropathy    ELMIRA (obstructive sleep apnea)    53 year old female with extensive vascular history and s/p multiple surgical interventions due to PAD.  Vascular Medicine Service following for anticoagulation recommendations.   Continues with Heparin infusion as bridge to Warfarin therapy.    Review of labs for today shows stable hemoglobin 10.7 grams, stable platelets 463K, and stable serum creatinine 0.60.  Potential discharge to SNF/rehab today.   Patient tells me she wants ID consult and blood cultures as she feels she is \"brewing an infection\".       Date    INR     Warfarin Dose     Comments   12/6     3.7          none                Vitamin K 10mg IV for elevated INR prior to surgery  12/7     2.6, 2.1, 2.2, 2.0, 1.5  12/8    1.6           none  12/9     2.0          none  12/11    --           5mg  12/12    1.3         5mg  12/13    1.4         7.5mg        Recommendations:  ~CONTINUE Heparin infusion for now; follow nomogram to achieve therapeutic Heparin assay 0.3-0.7  ~Transition to weight based Lovenox 100mg q 12 hours based on current weight of 96kg; start as soon as " medication is available and stop Heparin infusion 1 hour after initial dose of Lovenox.   ~INCREASE Warfarin dose to 7.5mg   ~Monitor for bleeding  ~Daily labs: INR at SNF with Warfarin dose adjustment done by medical team at SNF until INR is therapeutic, then twice weekly INR checks to ensure INR therapeutic range 2-3.   ~Please instruct the SNF that patient will need a minimum of 5 days of overlap therapy with Lovenox and Warfarin; goal INR is 2-3; when INR is >2 for 2 days in a row, Lovenox can be stopped   ~Please instruct SNF that outpatient Warfarin monitoring will need to be arranged prior to SNF discharge with initial appointment scheduled for 1-2 days after discharge; Please contact Dr. Juany Sanchez (PCP - General Family Medicine) phone [phone], who previously managed INR and Warfarin dosing as outpatient.    ~Patient does not need outpatient follow up with the Vascular Medicine Service  - she should follow with her PCP as patient has had long term treatment with Warfarin.        Plan of care discussed with Dr. Ana Silver  Plan of care discussed with Dr. Fam from the Medical Hospitalist Service     Hallie BIGGS-CNP  Vascular Medicine Service   Pager [phone]

## 2023-12-13 NOTE — CARE PLAN
The patient's goals for the shift include  remaining henodynamically stable throughout shift    The clinical goals for the shift include pt will have improved pain control by end of shift

## 2023-12-14 ENCOUNTER — APPOINTMENT (OUTPATIENT)
Dept: VASCULAR MEDICINE | Facility: HOSPITAL | Age: 53
DRG: 271 | End: 2023-12-14
Payer: COMMERCIAL

## 2023-12-14 LAB
ALBUMIN SERPL BCP-MCNC: 3 G/DL (ref 3.4–5)
ALP SERPL-CCNC: 77 U/L (ref 33–110)
ALT SERPL W P-5'-P-CCNC: 6 U/L (ref 7–45)
ANION GAP SERPL CALC-SCNC: 12 MMOL/L (ref 10–20)
AST SERPL W P-5'-P-CCNC: 13 U/L (ref 9–39)
BILIRUB SERPL-MCNC: 0.4 MG/DL (ref 0–1.2)
BUN SERPL-MCNC: 15 MG/DL (ref 6–23)
CALCIUM SERPL-MCNC: 8.8 MG/DL (ref 8.6–10.6)
CHLORIDE SERPL-SCNC: 102 MMOL/L (ref 98–107)
CO2 SERPL-SCNC: 31 MMOL/L (ref 21–32)
CREAT SERPL-MCNC: 0.63 MG/DL (ref 0.5–1.05)
ERYTHROCYTE [DISTWIDTH] IN BLOOD BY AUTOMATED COUNT: 21.9 % (ref 11.5–14.5)
GFR SERPL CREATININE-BSD FRML MDRD: >90 ML/MIN/1.73M*2
GLUCOSE BLD MANUAL STRIP-MCNC: 169 MG/DL (ref 74–99)
GLUCOSE SERPL-MCNC: 141 MG/DL (ref 74–99)
HCT VFR BLD AUTO: 32.7 % (ref 36–46)
HGB BLD-MCNC: 9.9 G/DL (ref 12–16)
INR PPP: 1.9 (ref 0.9–1.1)
MCH RBC QN AUTO: 24.6 PG (ref 26–34)
MCHC RBC AUTO-ENTMCNC: 30.3 G/DL (ref 32–36)
MCV RBC AUTO: 81 FL (ref 80–100)
NRBC BLD-RTO: 0.2 /100 WBCS (ref 0–0)
PLATELET # BLD AUTO: 431 X10*3/UL (ref 150–450)
POTASSIUM SERPL-SCNC: 4.6 MMOL/L (ref 3.5–5.3)
PROT SERPL-MCNC: 6.4 G/DL (ref 6.4–8.2)
PROTHROMBIN TIME: 21.4 SECONDS (ref 9.8–12.8)
RBC # BLD AUTO: 4.02 X10*6/UL (ref 4–5.2)
SODIUM SERPL-SCNC: 140 MMOL/L (ref 136–145)
WBC # BLD AUTO: 12.8 X10*3/UL (ref 4.4–11.3)

## 2023-12-14 PROCEDURE — 2500000002 HC RX 250 W HCPCS SELF ADMINISTERED DRUGS (ALT 637 FOR MEDICARE OP, ALT 636 FOR OP/ED): Performed by: STUDENT IN AN ORGANIZED HEALTH CARE EDUCATION/TRAINING PROGRAM

## 2023-12-14 PROCEDURE — 2500000001 HC RX 250 WO HCPCS SELF ADMINISTERED DRUGS (ALT 637 FOR MEDICARE OP): Performed by: STUDENT IN AN ORGANIZED HEALTH CARE EDUCATION/TRAINING PROGRAM

## 2023-12-14 PROCEDURE — 85610 PROTHROMBIN TIME: CPT | Performed by: PODIATRIST

## 2023-12-14 PROCEDURE — 85027 COMPLETE CBC AUTOMATED: CPT | Performed by: STUDENT IN AN ORGANIZED HEALTH CARE EDUCATION/TRAINING PROGRAM

## 2023-12-14 PROCEDURE — 93971 EXTREMITY STUDY: CPT | Performed by: SURGERY

## 2023-12-14 PROCEDURE — 96372 THER/PROPH/DIAG INJ SC/IM: CPT | Performed by: PODIATRIST

## 2023-12-14 PROCEDURE — 97535 SELF CARE MNGMENT TRAINING: CPT | Mod: GO

## 2023-12-14 PROCEDURE — 2500000001 HC RX 250 WO HCPCS SELF ADMINISTERED DRUGS (ALT 637 FOR MEDICARE OP): Performed by: PODIATRIST

## 2023-12-14 PROCEDURE — 99232 SBSQ HOSP IP/OBS MODERATE 35: CPT | Performed by: PODIATRIST

## 2023-12-14 PROCEDURE — 93971 EXTREMITY STUDY: CPT

## 2023-12-14 PROCEDURE — 1200000002 HC GENERAL ROOM WITH TELEMETRY DAILY

## 2023-12-14 PROCEDURE — 80053 COMPREHEN METABOLIC PANEL: CPT | Performed by: STUDENT IN AN ORGANIZED HEALTH CARE EDUCATION/TRAINING PROGRAM

## 2023-12-14 PROCEDURE — 97530 THERAPEUTIC ACTIVITIES: CPT | Mod: GP | Performed by: PHYSICAL THERAPIST

## 2023-12-14 PROCEDURE — 36415 COLL VENOUS BLD VENIPUNCTURE: CPT | Performed by: PODIATRIST

## 2023-12-14 PROCEDURE — 36415 COLL VENOUS BLD VENIPUNCTURE: CPT | Performed by: STUDENT IN AN ORGANIZED HEALTH CARE EDUCATION/TRAINING PROGRAM

## 2023-12-14 PROCEDURE — 2500000004 HC RX 250 GENERAL PHARMACY W/ HCPCS (ALT 636 FOR OP/ED): Performed by: PODIATRIST

## 2023-12-14 PROCEDURE — 2500000001 HC RX 250 WO HCPCS SELF ADMINISTERED DRUGS (ALT 637 FOR MEDICARE OP)

## 2023-12-14 PROCEDURE — 82947 ASSAY GLUCOSE BLOOD QUANT: CPT

## 2023-12-14 PROCEDURE — 97530 THERAPEUTIC ACTIVITIES: CPT | Mod: GO

## 2023-12-14 RX ORDER — OXYCODONE HYDROCHLORIDE 10 MG/1
10 TABLET ORAL EVERY 4 HOURS PRN
Qty: 15 TABLET | Refills: 0 | Status: SHIPPED | OUTPATIENT
Start: 2023-12-14 | End: 2023-12-17

## 2023-12-14 RX ORDER — SENNOSIDES 8.6 MG/1
2 TABLET ORAL 2 TIMES DAILY
Start: 2023-12-14

## 2023-12-14 RX ORDER — WARFARIN SODIUM 5 MG/1
5 TABLET ORAL DAILY
Status: DISCONTINUED | OUTPATIENT
Start: 2023-12-14 | End: 2023-12-15 | Stop reason: HOSPADM

## 2023-12-14 RX ORDER — WARFARIN SODIUM 5 MG/1
TABLET ORAL
Start: 2023-12-14 | End: 2023-12-15 | Stop reason: SDUPTHER

## 2023-12-14 RX ORDER — ACETAMINOPHEN 325 MG/1
975 TABLET ORAL EVERY 6 HOURS
Start: 2023-12-14

## 2023-12-14 RX ORDER — ENOXAPARIN SODIUM 100 MG/ML
100 INJECTION SUBCUTANEOUS 2 TIMES DAILY
Start: 2023-12-14 | End: 2023-12-15 | Stop reason: SDUPTHER

## 2023-12-14 RX ADMIN — ACETAMINOPHEN 975 MG: 325 TABLET ORAL at 11:53

## 2023-12-14 RX ADMIN — ATORVASTATIN CALCIUM 80 MG: 80 TABLET, FILM COATED ORAL at 20:31

## 2023-12-14 RX ADMIN — SENNOSIDES 17.2 MG: 8.6 TABLET, FILM COATED ORAL at 20:32

## 2023-12-14 RX ADMIN — ENOXAPARIN SODIUM 100 MG: 100 INJECTION SUBCUTANEOUS at 08:39

## 2023-12-14 RX ADMIN — CLOPIDOGREL BISULFATE 75 MG: 75 TABLET, FILM COATED ORAL at 08:37

## 2023-12-14 RX ADMIN — LEVETIRACETAM 500 MG: 500 TABLET, FILM COATED ORAL at 20:34

## 2023-12-14 RX ADMIN — ACETAMINOPHEN 975 MG: 325 TABLET ORAL at 17:39

## 2023-12-14 RX ADMIN — WARFARIN SODIUM 5 MG: 5 TABLET ORAL at 17:39

## 2023-12-14 RX ADMIN — GABAPENTIN 800 MG: 400 CAPSULE ORAL at 06:29

## 2023-12-14 RX ADMIN — BUSPIRONE HYDROCHLORIDE 10 MG: 10 TABLET ORAL at 08:37

## 2023-12-14 RX ADMIN — ENOXAPARIN SODIUM 100 MG: 100 INJECTION SUBCUTANEOUS at 20:34

## 2023-12-14 RX ADMIN — GABAPENTIN 800 MG: 400 CAPSULE ORAL at 13:42

## 2023-12-14 RX ADMIN — ASPIRIN 81 MG: 81 TABLET, COATED ORAL at 08:37

## 2023-12-14 RX ADMIN — LEVOTHYROXINE SODIUM 75 MCG: 75 TABLET ORAL at 06:28

## 2023-12-14 RX ADMIN — LEVETIRACETAM 500 MG: 500 TABLET, FILM COATED ORAL at 08:37

## 2023-12-14 RX ADMIN — DULOXETINE HYDROCHLORIDE 60 MG: 60 CAPSULE, DELAYED RELEASE ORAL at 20:35

## 2023-12-14 RX ADMIN — DIVALPROEX SODIUM 1000 MG: 500 TABLET, DELAYED RELEASE ORAL at 20:33

## 2023-12-14 RX ADMIN — GABAPENTIN 800 MG: 400 CAPSULE ORAL at 22:28

## 2023-12-14 RX ADMIN — METOPROLOL TARTRATE 12.5 MG: 25 TABLET, FILM COATED ORAL at 20:32

## 2023-12-14 RX ADMIN — METOPROLOL TARTRATE 12.5 MG: 25 TABLET, FILM COATED ORAL at 08:37

## 2023-12-14 RX ADMIN — ACETAMINOPHEN 975 MG: 325 TABLET ORAL at 03:40

## 2023-12-14 RX ADMIN — DIVALPROEX SODIUM 500 MG: 500 TABLET, DELAYED RELEASE ORAL at 08:37

## 2023-12-14 ASSESSMENT — PAIN SCALES - PAIN ASSESSMENT IN ADVANCED DEMENTIA (PAINAD)
TOTALSCORE: 0
CONSOLABILITY: NO NEED TO CONSOLE
FACIALEXPRESSION: SMILING OR INEXPRESSIVE
BREATHING: NORMAL
BODYLANGUAGE: RELAXED

## 2023-12-14 ASSESSMENT — COGNITIVE AND FUNCTIONAL STATUS - GENERAL
WALKING IN HOSPITAL ROOM: TOTAL
DRESSING REGULAR LOWER BODY CLOTHING: A LITTLE
CLIMB 3 TO 5 STEPS WITH RAILING: TOTAL
MOVING FROM LYING ON BACK TO SITTING ON SIDE OF FLAT BED WITH BEDRAILS: A LITTLE
TURNING FROM BACK TO SIDE WHILE IN FLAT BAD: A LITTLE
MOVING TO AND FROM BED TO CHAIR: TOTAL
TURNING FROM BACK TO SIDE WHILE IN FLAT BAD: A LITTLE
TOILETING: A LITTLE
MOVING FROM LYING ON BACK TO SITTING ON SIDE OF FLAT BED WITH BEDRAILS: A LITTLE
MOBILITY SCORE: 10
TOILETING: A LITTLE
HELP NEEDED FOR BATHING: A LITTLE
DAILY ACTIVITIY SCORE: 19
WALKING IN HOSPITAL ROOM: TOTAL
PERSONAL GROOMING: A LITTLE
MOVING TO AND FROM BED TO CHAIR: TOTAL
PERSONAL GROOMING: A LITTLE
DAILY ACTIVITIY SCORE: 22
TURNING FROM BACK TO SIDE WHILE IN FLAT BAD: A LITTLE
DRESSING REGULAR UPPER BODY CLOTHING: A LITTLE
STANDING UP FROM CHAIR USING ARMS: TOTAL
HELP NEEDED FOR BATHING: A LITTLE
DRESSING REGULAR LOWER BODY CLOTHING: TOTAL
MOVING FROM LYING ON BACK TO SITTING ON SIDE OF FLAT BED WITH BEDRAILS: A LITTLE
CLIMB 3 TO 5 STEPS WITH RAILING: TOTAL
STANDING UP FROM CHAIR USING ARMS: TOTAL
DAILY ACTIVITIY SCORE: 14
MOBILITY SCORE: 10
CLIMB 3 TO 5 STEPS WITH RAILING: TOTAL
HELP NEEDED FOR BATHING: A LOT
DRESSING REGULAR UPPER BODY CLOTHING: A LITTLE
STANDING UP FROM CHAIR USING ARMS: TOTAL
MOBILITY SCORE: 10
WALKING IN HOSPITAL ROOM: TOTAL
TOILETING: TOTAL
MOVING TO AND FROM BED TO CHAIR: TOTAL

## 2023-12-14 ASSESSMENT — PAIN - FUNCTIONAL ASSESSMENT
PAIN_FUNCTIONAL_ASSESSMENT: 0-10
PAIN_FUNCTIONAL_ASSESSMENT: 0-10

## 2023-12-14 ASSESSMENT — PAIN SCALES - GENERAL
PAINLEVEL_OUTOF10: 5 - MODERATE PAIN
PAINLEVEL_OUTOF10: 5 - MODERATE PAIN
PAINLEVEL_OUTOF10: 8
PAINLEVEL_OUTOF10: 8

## 2023-12-14 ASSESSMENT — PAIN SCALES - WONG BAKER: WONGBAKER_NUMERICALRESPONSE: HURTS LITTLE BIT

## 2023-12-14 ASSESSMENT — ACTIVITIES OF DAILY LIVING (ADL): HOME_MANAGEMENT_TIME_ENTRY: 10

## 2023-12-14 NOTE — CARE PLAN
The patient's goals for the shift include  remaining hemodynamically stable.    The clinical goals for the shift include pt will have improved pain control throughout shift

## 2023-12-14 NOTE — PROGRESS NOTES
"Shirin Slater is a 53 y.o. female on day 8 of admission presenting with Limb ischemia.    Subjective   Patient is doing ok. Her pain is similar to previous days. She denies any concerns She denies fever, chills, nausea and vomiting.        Objective     Physical Exam  Constitutional:       Appearance: Normal appearance.   HENT:      Head: Normocephalic.   Cardiovascular:      Heart sounds: Normal heart sounds. No murmur heard.  Pulmonary:      Effort: Pulmonary effort is normal. No respiratory distress.      Breath sounds: Normal breath sounds. No wheezing.   Abdominal:      General: Bowel sounds are normal. There is no distension.      Palpations: Abdomen is soft.      Tenderness: There is no abdominal tenderness.   Skin:     General: Skin is warm.      Comments: Left leg/foot are warm with intact skin. No cyanosis nor necrosis. Left foot and leg are non-tender to palpation.    Neurological:      General: No focal deficit present.      Mental Status: She is alert.   Psychiatric:         Mood and Affect: Mood normal.         Behavior: Behavior normal.         Last Recorded Vitals  Blood pressure 110/75, pulse 74, temperature 36.5 °C (97.7 °F), resp. rate 20, height 1.7 m (5' 6.93\"), weight 95.7 kg (210 lb 15.7 oz), SpO2 93 %.  Intake/Output last 3 Shifts:  I/O last 3 completed shifts:  In: 276 (2.8 mL/kg) [I.V.:276 (2.8 mL/kg)]  Out: 1300 (13 mL/kg) [Urine:1300 (0.4 mL/kg/hr)]  Dosing Weight: 99.8 kg     Relevant Results  Scheduled medications  acetaminophen, 975 mg, oral, q6h  aspirin, 81 mg, oral, Daily  atorvastatin, 80 mg, oral, Nightly  busPIRone, 10 mg, oral, Daily  clopidogrel, 75 mg, oral, Daily  divalproex, 1,000 mg, oral, q PM  divalproex, 500 mg, oral, q AM  DULoxetine, 60 mg, oral, Daily  enoxaparin, 100 mg, subcutaneous, BID  gabapentin, 800 mg, oral, q8h NINO  insulin lispro, 0-5 Units, subcutaneous, TID with meals  levETIRAcetam, 500 mg, oral, BID  levothyroxine, 75 mcg, oral, Daily  metoprolol " tartrate, 12.5 mg, oral, BID  sennosides, 2 tablet, oral, BID  warfarin, 5 mg, oral, Daily      Continuous medications     PRN medications  PRN medications: albuterol, dextrose 10 % in water (D10W), dextrose, glucagon, ipratropium-albuteroL, oxyCODONE, oxygen   Results for orders placed or performed during the hospital encounter of 12/06/23 (from the past 24 hour(s))   POCT GLUCOSE   Result Value Ref Range    POCT Glucose 169 (H) 74 - 99 mg/dL   POCT GLUCOSE   Result Value Ref Range    POCT Glucose 156 (H) 74 - 99 mg/dL   POCT GLUCOSE   Result Value Ref Range    POCT Glucose 169 (H) 74 - 99 mg/dL   CBC   Result Value Ref Range    WBC 12.8 (H) 4.4 - 11.3 x10*3/uL    nRBC 0.2 (H) 0.0 - 0.0 /100 WBCs    RBC 4.02 4.00 - 5.20 x10*6/uL    Hemoglobin 9.9 (L) 12.0 - 16.0 g/dL    Hematocrit 32.7 (L) 36.0 - 46.0 %    MCV 81 80 - 100 fL    MCH 24.6 (L) 26.0 - 34.0 pg    MCHC 30.3 (L) 32.0 - 36.0 g/dL    RDW 21.9 (H) 11.5 - 14.5 %    Platelets 431 150 - 450 x10*3/uL   Comprehensive metabolic panel   Result Value Ref Range    Glucose 141 (H) 74 - 99 mg/dL    Sodium 140 136 - 145 mmol/L    Potassium 4.6 3.5 - 5.3 mmol/L    Chloride 102 98 - 107 mmol/L    Bicarbonate 31 21 - 32 mmol/L    Anion Gap 12 10 - 20 mmol/L    Urea Nitrogen 15 6 - 23 mg/dL    Creatinine 0.63 0.50 - 1.05 mg/dL    eGFR >90 >60 mL/min/1.73m*2    Calcium 8.8 8.6 - 10.6 mg/dL    Albumin 3.0 (L) 3.4 - 5.0 g/dL    Alkaline Phosphatase 77 33 - 110 U/L    Total Protein 6.4 6.4 - 8.2 g/dL    AST 13 9 - 39 U/L    Bilirubin, Total 0.4 0.0 - 1.2 mg/dL    ALT 6 (L) 7 - 45 U/L   Protime-INR   Result Value Ref Range    Protime 21.4 (H) 9.8 - 12.8 seconds    INR 1.9 (H) 0.9 - 1.1            Assessment/Plan   Principal Problem:    Limb ischemia  Active Problems:    Diabetes mellitus, type 2 (CMS/HCC)    HTN (hypertension)    Hypothyroidism    Peripheral neuropathy    ELMIRA (obstructive sleep apnea)    Shirin Slater is a 53 y.o. female on day 4 of admission presenting with  Limb ischemia.  Patient has a PMH T2DM (A1c 8.5) c/b diabetic neuropathy, renal and splenic thrombosis (on Coumadin, aspirin and clopidogrel), HTN, HLD, PVD c/b R hip disarticulation and LLL disease s/p L 1/2/5 digit amputations and previous iliac artery stenting who presents to SICU s/p L common femoral and external iliac embolectomy, patch angioplasty common femoral artery on 12/6.  Awaiting bed availability at [place].      Acute LLE ischemia s/p L common femoral and external iliac embolectomy, patch angioplasty common femoral artery on 12/6.  Right hip disarticulation  Pain regimen in setting of acute on chronic LLE ischemia   -[facility] med on board, appreciate recs    :Continue bridge to Warfarin. Decreased Warfarin dose 7.5 to 5 mg on 12/14 in setting of INR 1.9 today (was 1.4 yesterday). Started Warfarin on 12/11. Continue with Lovenox as bridge to Warfarin until INR as at goal for 2 days in a row (INR 2-3) then may stop Lovenox therapy; check INR daily and dose adjust Warfarin as necessary; once INR is at goal range, please check INR twice a week to ensure patient remains at therapeutic INR level of 2-3.     :Daily labs: CBC, INR   -BP stable, cont home metoprolol with parameters   -Continue diet as tolerated  -Bowel regimen (Senokot 2 tablet BID), PPI  -PT/OT rec Mod intensity level   -Continue telemetry  -Pain regimen: Tylenol 975 mg q6 hr (scheduled); Continue oxycodone 10 mg for severe pain      HTN, HLD, PVD s/p L and R iliac and L popliteal stents, R hip disarticulation, L 1/2/5 digit amputations, renal and splenic thrombosis (on Coumadin, aspirin and clopidogrel).   Questionable compliance with meds.   Baseline cardiac function EF 60-65% (2022). - goal sbp 120-160 range per vascular surgery  - Continue home metoprolol 12.5 BID  - Continue home plavix, aspirin, atorvastatin     Acute surgical blood loss anemia,   supratherapeutic INR.  - Check CBC, coags daily  - Continue asa and plavix     History of  epilepsy, on AEDs. Chronic pain, diabetic neuropathy.   - Continue scheduled acetaminophen and gabapentin. PRN oxycodone  - Continue home depakote, duloxetine, busparone, keppra    40 pack year smoker.  - Goal SpO2 >94%  - Additional bronchial hygiene as indicated     History of NIDDM, hypothyroidism. Last A1c 8.5% 9/2023. TSH WNL 12/7  - AC/HS BG checks  - Continue home levothyroxine  - Hold home empagliflozin     Dispo:  -PT/OT rec SNF (patient chose [facility]). Pre-cert done. Awaiting bed availability.      Patient discussed with Dr. Wheatley.      Octavia Fam MD   PGY1, Family Medicine   [facility]  Available by [employer]

## 2023-12-14 NOTE — PROGRESS NOTES
Occupational Therapy    Occupational Therapy Treatment    Name: Shirin Slater  MRN: [id number]  : 1970  Date: 23  Time Calculation  Start Time: 1112  Stop Time: 1153  Time Calculation (min): 41 min    Assessment:  Evaluation/Treatment Tolerance: Patient limited by pain, Patient limited by fatigue  Medical Staff Made Aware: Yes  End of Session Communication: Bedside nurse  End of Session Patient Position: Bed, 3 rail up, Alarm off, not on at start of session  Plan:  Treatment Interventions: ADL retraining, Functional transfer training, UE strengthening/ROM, Endurance training, Neuromuscular reeducation, Compensatory technique education, Patient/family training  OT Frequency: 3 times per week  OT Discharge Recommendations: Moderate intensity level of continued care  OT - OK to Discharge: Yes    Subjective   Previous Visit Info:  OT Last Visit  OT Received On: 23  General:  General  Family/Caregiver Present: No  Co-Treatment: PT  Co-Treatment Reason: maximize pt's safety and benefit from provided therapy. Pt required 2 skilled therapists for sit to stands at EOB  Prior to Session Communication: Bedside nurse  Patient Position Received: Bed, 3 rail up, Alarm off, not on at start of session  Vitals:  Vital Signs  Heart Rate: 67  Heart Rate Source: Monitor  Patient Position: Lying  Pain Assessment:  Pain Assessment  Pain Assessment: 0-10  Pain Score: 8  Pain Type: Surgical pain  Pain Location: Foot  Pain Orientation: Left     Objective   Activities of Daily Living:      Grooming  Grooming Level of Assistance: Contact guard  Grooming Where Assessed: Edge of bed  Grooming Comments: Face washing while sitting EOB with CGA for safety    UE Dressing  UE Dressing Level of Assistance: Minimum assistance  UE Dressing Where Assessed: Edge of bed  UE Dressing Comments: Sitting EOB to ff and don [email or web address], Min A for management around back side    LE Dressing  LE Dressing: Yes  Sock Level of Assistance: Dependent  LE  Dressing Where Assessed: Edge of bed  LE Dressing Comments: Total assist required for donning sock to L foot.    Toileting  Toileting Level of Assistance: Dependent  Where Assessed: Bed level  Toileting Comments: Pt with incontinent BM during therapy session, she engaged in rolling in bed with Mod A while second person provided total assistance for clothing management and hygiene. Extended time required and education provided for improved engagement in task. Pt becomes emotional due to smell of BM and required cueing to cope.    Bed Mobility/Transfers: Bed Mobility  Bed Mobility: Yes  Bed Mobility 1  Bed Mobility 1: Supine to sitting  Level of Assistance 1: Minimum assistance, Minimal verbal cues  Bed Mobility Comments 1: Pt required extended time and motivation/cueing to complete bed mobility. She denies assistance despite several offers from therapist. Pt required Min A to trunk for upright sitting control.  Bed Mobility 2  Bed Mobility  2: Sitting to supine  Level of Assistance 2: Contact guard, Minimal verbal cues  Bed Mobility Comments 2: Cueing for improved positioning, CGA for safety, extended time to complete.  Bed Mobility 3  Bed Mobility 3: Rolling left, Rolling right  Level of Assistance 3: Minimum assistance, Minimal verbal cues  Bed Mobility Comments 3: Cueing to reach across body to reach for bed rails, Min A at trunk to move to full side lying. Pt completed several times throughout session.    Transfers  Transfer: Yes  Transfer 1  Transfer From 1: Sit to  Transfer to 1: Stand  Technique 1: Sit to stand  Transfer Level of Assistance 1: Maximum assistance (x2)  Trials/Comments 1: Unsuccessful upon first and second trial, increased bed height, able to complete on second trial  Transfers 2  Transfer From 2: Stand to  Transfer to 2: Sit  Technique 2: Stand to sit  Transfer Level of Assistance 2: Maximum assistance (x2)  Trials/Comments 2: Cueing to reach buttocks back into bed    Sitting Balance:  Static  Sitting Balance  Static Sitting-Balance Support: Right upper extremity supported  Static Sitting-Level of Assistance: Close supervision  Static Sitting-Comment/Number of Minutes: SBA for safety  Standing Balance:  Static Standing Balance  Static Standing-Balance Support: Bilateral upper extremity supported  Static Standing-Level of Assistance: Maximum assistance (x2)  Static Standing-Comment/Number of Minutes: Arm in Arm, 2-person    Therapy/Activity: Therapeutic Exercise  Therapeutic Exercise Performed: Yes  Therapeutic Exercise Activity 1: AROM shoulder and elbow flexion while supine in bed. 10 reps of each movement - encouragement to continue using UE strength to prevent atrophy.    Therapeutic Activity  Therapeutic Activity Performed: Yes  Therapeutic Activity 1: Pt performed sit to stand at EOB to increase functional strength, standing balance, and activity tolerance. She requries 3 trials to achieve upright standing. Pt unsuccessful with initial two trials using a FWW. She is successful without walker and use of gait belt to support around trunk. She is able to stand for approx 5-10 seconds before moving to sitting.    Outcome Measures:  [facility] Daily Activity  Putting on and taking off regular lower body clothing: Total  Bathing (including washing, rinsing, drying): A lot  Putting on and taking off regular upper body clothing: A little  Toileting, which includes using toilet, bedpan or urinal: Total  Taking care of personal grooming such as brushing teeth: A little  Eating Meals: None  Daily Activity - Total Score: 14    Education Documentation  Body Mechanics, taught by Yon Vanegas OT at 12/14/2023 12:32 PM.  Learner: Patient  Readiness: Acceptance  Method: Explanation, Demonstration  Response: Verbalizes Understanding, Needs Reinforcement    Precautions, taught by Yon Vanegas OT at 12/14/2023 12:32 PM.  Learner: Patient  Readiness: Acceptance  Method: Explanation, Demonstration  Response: Verbalizes  Understanding, Needs Reinforcement    ADL Training, taught by Yon Vanegas OT at 12/14/2023 12:32 PM.  Learner: Patient  Readiness: Acceptance  Method: Explanation, Demonstration  Response: Verbalizes Understanding, Needs Reinforcement    Education Comments  No comments found.      Goals:  Encounter Problems       Encounter Problems (Active)       ADLs       Patient with complete upper body dressing with modified independent level of assistance donning and doffing all UE clothes (Progressing)       Start:  12/08/23    Expected End:  12/22/23            Patient with complete lower body dressing with minimal assist  level of assistance donning and doffing all LE clothes  with PRN adaptive equipment (Progressing)       Start:  12/08/23    Expected End:  12/22/23            Pt will complete UB/LB bathing with set up assist and PRN adaptive equipment in supported sitting  (Progressing)       Start:  12/08/23    Expected End:  12/22/23               COGNITION/SAFETY       Patient will participate in cognitive activities to demonstrate WFL score on further cognitive assessments, including Medi-Cog, MoCA and remain A&O x3, CAM (-).  (Progressing)       Start:  12/08/23    Expected End:  12/22/23               TRANSFERS       Patient will perform bed mobility modified independent level of assistance and bedrails in order to improve safety and independence with mobility (Progressing)       Start:  12/08/23    Expected End:  12/22/23            Patient will complete functional transfer to chair with least restrictive device with moderate assist level of assistance. (Progressing)       Start:  12/08/23    Expected End:  12/22/23                   Yon FAN/MARVA

## 2023-12-14 NOTE — INDIVIDUALIZED OVERALL PLAN OF CARE NOTE
Vascular Medicine Service following Ms. Slater for anticoagulation recommendations.  Patient working with the PT service when I came to visit, and I was unable to physically examine her.  Patient denies CP, SOB or bleeding.  Continues with weight based Lovenox as bridge to Warfarin.  Review of labs shows decreased hemoglobin from 10.9 grams to 9.9 grams. Stable platelets 431K and serum creatinine 0.63.     Date    INR     Warfarin Dose     Comments   12/6     3.7          none                Vitamin K 10mg IV for elevated INR prior to surgery  12/7     2.6, 2.1, 2.2, 2.0, 1.5  12/8    1.6           none  12/9     2.0          none  12/11    --           5mg  12/12    1.3         5mg  12/13    1.4         7.5mg  12/14    1.9         5mg    Recommendations:  ~Continue weight based Lovenox 100mg q12 hours as bridge to Warfarin  ~DECREASE Warfarin dose to 5mg today due to large interval increase in INR (1.4 to 1.9)  ~Monitor for bleeding  ~Daily INR until INR is therapeutic  ~When transferred to SNF: continue Lovenox as bridge to Warfarin until INR as at goal for 2 days in a row (INR 2-3) then may stop Lovenox therapy; check INR daily and dose adjust Warfarin as necessary; once INR is at goal range, please check INR twice a week to ensure patient remains at therapeutic INR level of 2-3.  ~Patient does NOT NEED outpatient follow up with the Vascular Medicine Service as she has been on long-term Warfarin therapy; patient should follow up with her PCP Dr. Juany Sanchez (PCP - General Family Medicine) phone [phone].     Plan of care discussed with Dr. Ana Silver  Plan of care discussed with Dr. Fam from the Medical Hospitalist Service     Hallie BIGGS-CNP  Vascular Medicine Service   Pager [phone]

## 2023-12-14 NOTE — PROGRESS NOTES
Physical Therapy    Physical Therapy Treatment    Patient Name: Shirin Slater  MRN: [id number]  Today's Date: 12/14/2023  Time Calculation  Start Time: 1112  Stop Time: 1153  Time Calculation (min): 41 min       Assessment/Plan   PT Assessment  End of Session Communication: Bedside nurse  End of Session Patient Position: Bed, 3 rail up, Alarm off, not on at start of session  PT Plan  Inpatient/Swing Bed or Outpatient: Inpatient  PT Plan  Treatment/Interventions: Bed mobility, Transfer training, Balance training, Strengthening, Therapeutic exercise, Therapeutic activity  PT Plan: Skilled PT  PT Frequency: 3 times per week  PT Discharge Recommendations: Moderate intensity level of continued care  PT Recommended Transfer Status: Assist x2  PT - OK to Discharge: Yes      General Visit Information:   PT  Visit  PT Received On: 12/14/23  General  Co-Treatment: OT  Co-Treatment Reason: Cotx with OT for safety with mobility and pt requring 2 skiiled therapists assist  Prior to Session Communication: Bedside nurse  Patient Position Received: Bed, 3 rail up, Alarm off, not on at start of session  Preferred Learning Style: verbal  General Comment: Pt supine in bed upon arrival and agreeable to participate in Therapy session. Limited by L foor pain with mobility.    Subjective   Precautions:     Vital Signs:  Vital Signs  Heart Rate: 67  Heart Rate Source: Monitor    Objective   Pain:  Pain Assessment  Pain Assessment: 0-10  Pain Score: 8  Pain Location: Foot  Pain Orientation: Left  Cognition:  Cognition  Orientation Level: Disoriented to time    Treatments:  Bed Mobility  Bed Mobility: Yes  Bed Mobility 1  Bed Mobility 1: Sitting to supine  Level of Assistance 1: Minimum assistance, Minimal verbal cues  Bed Mobility Comments 1: HOB elevated, bedrail, Pt required increased time to complete with rest breaks in between 2/2 pain. Cued for breathing exs  Bed Mobility 2  Bed Mobility  2: Sitting to supine  Level of Assistance 2:  Contact guard  Bed Mobility Comments 2: Increased time to complete  Bed Mobility 3  Bed Mobility 3: Rolling right, Rolling left  Level of Assistance 3: Minimum assistance  Bed Mobility Comments 3: bedrail, cues for sequencing, multiple trials during session for hygeine    Transfers  Transfer: Yes  Transfer 1  Transfer From 1: Sit to  Transfer to 1: Stand  Technique 1: Sit to stand  Transfer Level of Assistance 1: Minimal verbal cues (MaxAx2)  Trials/Comments 1: from elevated EOB, x2 trials with walker, unsuccessful, 3rd trial without walker with use of gat belt, tolerated static standing for 5-10 secs  Transfers 2  Transfer From 2: Stand to  Transfer to 2: Sit  Technique 2: Stand to sit  Transfer Level of Assistance 2: Minimal verbal cues (MaxAx2)    Outcome Measures:  [facility] Basic Mobility  Turning from your back to your side while in a flat bed without using bedrails: A little  Moving from lying on your back to sitting on the side of a flat bed without using bedrails: A little  Moving to and from bed to chair (including a wheelchair): Total  Standing up from a chair using your arms (e.g. wheelchair or bedside chair): Total  To walk in hospital room: Total  Climbing 3-5 steps with railing: Total  Basic Mobility - Total Score: 10    Education Documentation  Precautions, taught by Mery Begum PT at 12/14/2023  1:03 PM.  Learner: Patient  Readiness: Acceptance  Method: Explanation  Response: Verbalizes Understanding, Needs Reinforcement    Body Mechanics, taught by Mery Begum PT at 12/14/2023  1:03 PM.  Learner: Patient  Readiness: Acceptance  Method: Explanation  Response: Verbalizes Understanding, Needs Reinforcement    Mobility Training, taught by Mery Begum PT at 12/14/2023  1:03 PM.  Learner: Patient  Readiness: Acceptance  Method: Explanation  Response: Verbalizes Understanding, Needs Reinforcement    Education Comments  No comments found.        OP EDUCATION:       Encounter Problems       Encounter Problems  (Active)       Balance       Pt will demonstrate ability to complete sitting static/dynamic balance activities with unilateral UE support and no LOB for increase in safety up D/C.  (Progressing)       Start:  12/07/23    Expected End:  12/21/23               Mobility       Pt will demonstrate ability to complete ther ex activities to improve functional strength for increase in independence upon DC.  (Progressing)       Start:  12/07/23    Expected End:  12/21/23               Safety       LTG - Patient will adhere to hip precautions during ADL's and transfers       Start:  12/11/23            LTG - Patient will demonstrate safety requirements appropriate to situation/environment       Start:  12/11/23            LTG - Patient will utilize safety techniques       Start:  12/11/23            STG - Patient locks brakes on wheelchair       Start:  12/11/23            STG - Patient uses call light consistently to request assistance with transfers       Start:  12/11/23            STG - Patient uses gait belt during all transfers       Start:  12/11/23            Goal 1       Start:  12/11/23            Goal 2       Start:  12/11/23            Goal 3       Start:  12/11/23               Transfers       Pt will demonstrated ability to complete sit<>stand transfers with modAx1-2 assistance and use of assistive device to safely DC. (Progressing)       Start:  12/07/23    Expected End:  12/21/23

## 2023-12-15 VITALS
BODY MASS INDEX: 32.6 KG/M2 | RESPIRATION RATE: 20 BRPM | HEIGHT: 67 IN | WEIGHT: 207.67 LBS | HEART RATE: 78 BPM | SYSTOLIC BLOOD PRESSURE: 126 MMHG | OXYGEN SATURATION: 96 % | TEMPERATURE: 97.9 F | DIASTOLIC BLOOD PRESSURE: 69 MMHG

## 2023-12-15 PROBLEM — I99.8 LIMB ISCHEMIA: Status: RESOLVED | Noted: 2023-12-06 | Resolved: 2023-12-15

## 2023-12-15 LAB
ALBUMIN SERPL BCP-MCNC: 3.2 G/DL (ref 3.4–5)
ALP SERPL-CCNC: 75 U/L (ref 33–110)
ALT SERPL W P-5'-P-CCNC: 5 U/L (ref 7–45)
ANION GAP SERPL CALC-SCNC: 13 MMOL/L (ref 10–20)
AST SERPL W P-5'-P-CCNC: 11 U/L (ref 9–39)
BILIRUB SERPL-MCNC: 0.5 MG/DL (ref 0–1.2)
BUN SERPL-MCNC: 15 MG/DL (ref 6–23)
CALCIUM SERPL-MCNC: 8.9 MG/DL (ref 8.6–10.6)
CHLORIDE SERPL-SCNC: 104 MMOL/L (ref 98–107)
CO2 SERPL-SCNC: 28 MMOL/L (ref 21–32)
CREAT SERPL-MCNC: 0.53 MG/DL (ref 0.5–1.05)
ERYTHROCYTE [DISTWIDTH] IN BLOOD BY AUTOMATED COUNT: 22.6 % (ref 11.5–14.5)
GFR SERPL CREATININE-BSD FRML MDRD: >90 ML/MIN/1.73M*2
GLUCOSE SERPL-MCNC: 116 MG/DL (ref 74–99)
HCT VFR BLD AUTO: 34.7 % (ref 36–46)
HGB BLD-MCNC: 10.6 G/DL (ref 12–16)
INR PPP: 3.4 (ref 0.9–1.1)
MCH RBC QN AUTO: 24.9 PG (ref 26–34)
MCHC RBC AUTO-ENTMCNC: 30.5 G/DL (ref 32–36)
MCV RBC AUTO: 82 FL (ref 80–100)
NRBC BLD-RTO: 0 /100 WBCS (ref 0–0)
PLATELET # BLD AUTO: 442 X10*3/UL (ref 150–450)
POTASSIUM SERPL-SCNC: 3.8 MMOL/L (ref 3.5–5.3)
PROT SERPL-MCNC: 7 G/DL (ref 6.4–8.2)
PROTHROMBIN TIME: 38.6 SECONDS (ref 9.8–12.8)
RBC # BLD AUTO: 4.26 X10*6/UL (ref 4–5.2)
SODIUM SERPL-SCNC: 141 MMOL/L (ref 136–145)
WBC # BLD AUTO: 14 X10*3/UL (ref 4.4–11.3)

## 2023-12-15 PROCEDURE — 2500000001 HC RX 250 WO HCPCS SELF ADMINISTERED DRUGS (ALT 637 FOR MEDICARE OP): Performed by: STUDENT IN AN ORGANIZED HEALTH CARE EDUCATION/TRAINING PROGRAM

## 2023-12-15 PROCEDURE — 96372 THER/PROPH/DIAG INJ SC/IM: CPT | Performed by: PODIATRIST

## 2023-12-15 PROCEDURE — 2500000004 HC RX 250 GENERAL PHARMACY W/ HCPCS (ALT 636 FOR OP/ED): Performed by: PODIATRIST

## 2023-12-15 PROCEDURE — 85610 PROTHROMBIN TIME: CPT | Performed by: PODIATRIST

## 2023-12-15 PROCEDURE — 99239 HOSP IP/OBS DSCHRG MGMT >30: CPT | Performed by: PODIATRIST

## 2023-12-15 PROCEDURE — 82947 ASSAY GLUCOSE BLOOD QUANT: CPT

## 2023-12-15 PROCEDURE — 99232 SBSQ HOSP IP/OBS MODERATE 35: CPT | Performed by: NURSE PRACTITIONER

## 2023-12-15 PROCEDURE — 84075 ASSAY ALKALINE PHOSPHATASE: CPT | Performed by: STUDENT IN AN ORGANIZED HEALTH CARE EDUCATION/TRAINING PROGRAM

## 2023-12-15 PROCEDURE — 36415 COLL VENOUS BLD VENIPUNCTURE: CPT | Performed by: STUDENT IN AN ORGANIZED HEALTH CARE EDUCATION/TRAINING PROGRAM

## 2023-12-15 PROCEDURE — 2500000002 HC RX 250 W HCPCS SELF ADMINISTERED DRUGS (ALT 637 FOR MEDICARE OP, ALT 636 FOR OP/ED): Performed by: STUDENT IN AN ORGANIZED HEALTH CARE EDUCATION/TRAINING PROGRAM

## 2023-12-15 PROCEDURE — 2500000001 HC RX 250 WO HCPCS SELF ADMINISTERED DRUGS (ALT 637 FOR MEDICARE OP): Performed by: PODIATRIST

## 2023-12-15 PROCEDURE — 2500000001 HC RX 250 WO HCPCS SELF ADMINISTERED DRUGS (ALT 637 FOR MEDICARE OP)

## 2023-12-15 PROCEDURE — 36415 COLL VENOUS BLD VENIPUNCTURE: CPT | Performed by: PODIATRIST

## 2023-12-15 PROCEDURE — 85027 COMPLETE CBC AUTOMATED: CPT | Performed by: STUDENT IN AN ORGANIZED HEALTH CARE EDUCATION/TRAINING PROGRAM

## 2023-12-15 RX ORDER — ENOXAPARIN SODIUM 100 MG/ML
100 INJECTION SUBCUTANEOUS 2 TIMES DAILY
Start: 2023-12-15

## 2023-12-15 RX ORDER — WARFARIN SODIUM 5 MG/1
TABLET ORAL
Start: 2023-12-15 | End: 2024-12-14

## 2023-12-15 RX ORDER — WARFARIN 2.5 MG/1
2.5 TABLET ORAL ONCE
Start: 2023-12-15 | End: 2023-12-15

## 2023-12-15 RX ADMIN — METOPROLOL TARTRATE 12.5 MG: 25 TABLET, FILM COATED ORAL at 10:23

## 2023-12-15 RX ADMIN — OXYCODONE HYDROCHLORIDE 10 MG: 5 TABLET ORAL at 10:34

## 2023-12-15 RX ADMIN — LEVETIRACETAM 500 MG: 500 TABLET, FILM COATED ORAL at 10:23

## 2023-12-15 RX ADMIN — DIVALPROEX SODIUM 500 MG: 500 TABLET, DELAYED RELEASE ORAL at 10:24

## 2023-12-15 RX ADMIN — GABAPENTIN 800 MG: 400 CAPSULE ORAL at 13:36

## 2023-12-15 RX ADMIN — ACETAMINOPHEN 975 MG: 325 TABLET ORAL at 13:36

## 2023-12-15 RX ADMIN — BUSPIRONE HYDROCHLORIDE 10 MG: 10 TABLET ORAL at 10:23

## 2023-12-15 RX ADMIN — ASPIRIN 81 MG: 81 TABLET, COATED ORAL at 10:23

## 2023-12-15 RX ADMIN — CLOPIDOGREL BISULFATE 75 MG: 75 TABLET, FILM COATED ORAL at 10:23

## 2023-12-15 RX ADMIN — ACETAMINOPHEN 975 MG: 325 TABLET ORAL at 00:28

## 2023-12-15 RX ADMIN — ACETAMINOPHEN 975 MG: 325 TABLET ORAL at 06:24

## 2023-12-15 RX ADMIN — GABAPENTIN 800 MG: 400 CAPSULE ORAL at 06:24

## 2023-12-15 RX ADMIN — ENOXAPARIN SODIUM 100 MG: 100 INJECTION SUBCUTANEOUS at 10:24

## 2023-12-15 RX ADMIN — LEVOTHYROXINE SODIUM 75 MCG: 75 TABLET ORAL at 06:24

## 2023-12-15 ASSESSMENT — COGNITIVE AND FUNCTIONAL STATUS - GENERAL
DRESSING REGULAR UPPER BODY CLOTHING: A LITTLE
MOVING TO AND FROM BED TO CHAIR: A LOT
MOBILITY SCORE: 13
CLIMB 3 TO 5 STEPS WITH RAILING: TOTAL
DRESSING REGULAR LOWER BODY CLOTHING: A LITTLE
MOVING FROM LYING ON BACK TO SITTING ON SIDE OF FLAT BED WITH BEDRAILS: A LITTLE
TOILETING: A LITTLE
WALKING IN HOSPITAL ROOM: A LOT
TURNING FROM BACK TO SIDE WHILE IN FLAT BAD: A LITTLE
STANDING UP FROM CHAIR USING ARMS: A LOT
HELP NEEDED FOR BATHING: A LITTLE
PERSONAL GROOMING: A LITTLE
DAILY ACTIVITIY SCORE: 19

## 2023-12-15 ASSESSMENT — PAIN - FUNCTIONAL ASSESSMENT: PAIN_FUNCTIONAL_ASSESSMENT: 0-10

## 2023-12-15 ASSESSMENT — PAIN SCALES - GENERAL: PAINLEVEL_OUTOF10: 8

## 2023-12-15 NOTE — DISCHARGE SUMMARY
Discharge Diagnosis  Limb ischemia    Issues Requiring Follow-Up:  [ ] Vascular Surgery -post op visit (pt is s/p L common femoral and external iliac embolectomy, patch angioplasty common femoral artery on 12/6/23)  [ ] Primary Care- Dr. Juany Sanchez (PCP - General Family Medicine) phone [phone], who previously managed INR and Warfarin dosing as outpatient.       Test Results Pending At Discharge  Pending Labs       Order Current Status    Coagulation Screen Collected (12/06/23 1805)    Coagulation Screen Collected (12/07/23 0112)    Surgical Pathology Exam In process            Hospital Course  Shirin Slater is a 53 y.o. female w/ PMHX of T2DM (A1c 8.5% Sep/2023) c/b diabetic neuropathy, HTN, HLD, PVD w/ R AKA, L 1/2/5 digit amputation, renal and splenic thrombosis (on Coumadin, aspirin and clopidogrel), epilepsy (in remission > 15 years), diverticulitis s/p colectomy and PTSD who is a transfer from OSH d/t critical limb ischemia for vascular surgery evaluation. Admitted under medicine with vascular surgery following.  WBC noted at around 18K at OSH, without any other signs of infections, can be explained by critical illness and thrombosis. Patient went to the OR for emergent revascularization on 12/6 for L common femoral and external iliac embolectomy, patch angioplasty common femoral artery. Started bridging heparin to warfarin 5mg on 12/11. INR next day was 1.3. The following day the INR was 1.4 and warfarin was increased to 7.5 mg daily. Heparin was discontinued on 12/13 and lovenox 100mg BID was started on 12/13 (day 1). Per [facility] medicine, continue Lovenox as bridge to Warfarin until INR as at goal for 2 days in a row (INR 2-3) then may stop Lovenox therapy; check INR daily and dose adjust Warfarin as necessary; once INR is at goal range, please check INR twice a week to ensure patient remains at therapeutic INR level of 2-3. Patient's INR was 1.9 on 12/14 and then the Warfarin dose was  decreased to 5mg daily. On 12/15 INR came back as 3.4 and patient was placed on 2.5 mg. On 12/16, patient to resume 5mg daily. Patient's pain to LLE was well controlled with oxycodone 10 mg q4h PRN and Tylenol 975 mg scheduled q6h. PT/OT saw patient and recommended moderate intensity therapy. Patient accepted to [facility] for rehab on 12/15.       Instructions for SNF:   [ ] Continue weight based Lovenox 100mg q12 hours as bridge to Warfarin. Continue Lovenox as bridge to Warfarin until INR as at goal for 2 days in a row (INR 2-3) then may stop Lovenox therapy. INR 12/15 was 3.4    [ ] DECREASE Warfarin dose to 2.5mg today ONLY 12/15, due to large interval increase in INR (1.9 to 3.4 - likely response to Warfarin 7.5mg dose 2 days ago.   [ ] Tomorrow 12/16/23 INCREASE Warfarin dose to 5mg and continue at this dose as long as INR is between 2-3.   ~Monitor for bleeding  ~Daily INR until INR is therapeutic  [ ] Outpatient Warfarin monitoring will need to be arranged prior to SNF discharge with initial appointment scheduled for 1-2 days after discharge; Please contact Dr. Juany Sanchez (PCP - General Family Medicine) phone [phone], who previously managed INR and Warfarin dosing as outpatient.     [ ] Patient does not need outpatient follow up with the Vascular Medicine Service  - she should follow with her PCP as patient has had long term treatment with Warfarin.     Time spent caring for patient and coordinating discharge total 50 minutes.     INR checking:   Check INR daily and dose adjust Warfarin as necessary; once INR is at goal range, please check INR twice a week to ensure patient remains at therapeutic INR level of 2-3.       Pertinent Physical Exam At Time of Discharge  Physical Exam  Constitutional:       Appearance: Normal appearance. She is not ill-appearing.   HENT:      Head: Normocephalic.   Cardiovascular:      Rate and Rhythm: Normal rate and regular rhythm.      Heart sounds: Normal  heart sounds.   Pulmonary:      Effort: Pulmonary effort is normal. No respiratory distress.      Breath sounds: Normal breath sounds.   Abdominal:      General: Bowel sounds are normal. There is no distension.      Palpations: Abdomen is soft.      Tenderness: There is no abdominal tenderness. There is no guarding.   Musculoskeletal:         General: Swelling present.      Comments: Non-pitting edema noted to LLE similar to yesterday's exam.  Has a Right hip disarticulation.    Skin:     General: Skin is warm and dry.      Comments: L LE is warm. No cyanosis, no open wounds.    Neurological:      General: No focal deficit present.      Mental Status: She is alert. Mental status is at baseline.   Psychiatric:         Mood and Affect: Mood normal.         Behavior: Behavior normal.         Home Medications     Medication List      START taking these medications     acetaminophen 325 mg tablet; Commonly known as: Tylenol; Take 3 tablets   (975 mg) by mouth every 6 hours.   enoxaparin 100 mg/mL syringe; Commonly known as: Lovenox; Inject 1 mL   (100 mg) under the skin 2 times a day. Continue Lovenox as bridge to   Warfarin until INR as at goal for 2 days in a row (INR 2-3) then may stop   Lovenox therapy; INR 3.4 on 12/15   oxyCODONE 10 mg immediate release tablet; Commonly known as: Roxicodone;   Take 1 tablet (10 mg) by mouth every 4 hours if needed for severe pain (7   - 10) for up to 3 days.   sennosides 8.6 mg tablet; Commonly known as: Senokot; Take 2 tablets   (17.2 mg) by mouth 2 times a day.   * warfarin 5 mg tablet; Commonly known as: Coumadin; Take as directed   per After Visit Summary.   * warfarin 2.5 mg tablet; Commonly known as: Coumadin; Take 1 tablet   (2.5 mg) by mouth 1 time for 1 dose. Take as directed per After Visit   Summary.  * This list has 2 medication(s) that are the same as other medications   prescribed for you. Read the directions carefully, and ask your doctor or   other care provider to  review them with you.     CONTINUE taking these medications     albuterol 90 mcg/actuation inhaler   aspirin 81 mg EC tablet   atorvastatin 80 mg tablet; Commonly known as: Lipitor   busPIRone 10 mg tablet; Commonly known as: Buspar   clopidogrel 75 mg tablet; Commonly known as: Plavix   divalproex 500 mg EC tablet; Commonly known as: Depakote   DULoxetine 60 mg DR capsule; Commonly known as: Cymbalta   empagliflozin 10 mg; Commonly known as: Jardiance   gabapentin 400 mg capsule; Commonly known as: Neurontin   levETIRAcetam 500 mg tablet; Commonly known as: Keppra   levothyroxine 75 mcg capsule; Commonly known as: Tirosint   lidocaine 5 % patch; Commonly known as: Lidoderm   loperamide 2 mg capsule; Commonly known as: Imodium   melatonin 10 mg tablet   methocarbamol 500 mg tablet; Commonly known as: Robaxin   metoprolol tartrate 25 mg tablet; Commonly known as: Lopressor   omeprazole OTC 20 mg EC tablet; Commonly known as: PriLOSEC OTC       Outpatient Follow-Up  No future appointments.    Octavia Fam MD

## 2023-12-15 NOTE — PROGRESS NOTES
"Shirin Slater is a 53 y.o. female on day 9 of admission presenting as transfer from [facility] on 12/6/23 with left leg ischemia. She presented to OSH with progressively worsening left leg pain over 2 weeks time, with accompanied paleness and cyanosis of the left leg.  Underwent CTA with runoffs that showed 50% stenosis of the abdominal aorta, complete occlusion of the right iliac stent, complete occlusion of L common femoral, superficial femoral and popliteal arteries w/ stents on the L iliac and popliteal arteries.   Patient now s/p left femoral cutdown, open sharif thrombectomy of external iliac artery, common femoral artery, superficial femoral artery, and profunda femoris. Flow restored down into profunda, and into SFA. Patch angioplasty CFA on 12/6/23.  Patient required Vitamin K 10mg prior to surgery due to elevated INR that decreased to 3.7 during the case.        Subjective   Patient reports plan to return to SNF today.  Tells me that the left groin wound vac was partially removed overnight and is worried that this may delay her discharge.  Denies CP, SOB or bleeding.     Objective   Vascular Medicine Service following for anticoagulation recommendations   Continues with Lovenox 100mg q12 hours as bridge to Warfarin.  No overt evidence of bleeding.      Physical Exam  Resting in bed in NAD.   Respirations are full and easy, with breaths sounds CTA all anterior lobes; on RA  Normal heart sounds with regular rate/rhythm; vital signs are stable  Left leg warm to touch with palpable DP pulse; Prevena wound vac almost completely removed from the left groin wound; right leg s/p hip disarticulation; no evidence of swelling LLE or BUE; palpable bilateral radial pulses; PIV   Patient is awake and alert, conversant and oriented x3    Last Recorded Vitals  Blood pressure 126/69, pulse 78, temperature 36.6 °C (97.9 °F), resp. rate 20, height 1.7 m (5' 6.93\"), weight 94.2 kg (207 lb 10.8 oz), SpO2 96 " %.  Intake/Output last 3 Shifts:  I/O last 3 completed shifts:  In: - (0 mL/kg)   Out: 2650 (26.6 mL/kg) [Urine:2650 (0.7 mL/kg/hr)]  Dosing Weight: 99.8 kg     Relevant Results  Scheduled medications  acetaminophen, 975 mg, oral, q6h  aspirin, 81 mg, oral, Daily  atorvastatin, 80 mg, oral, Nightly  busPIRone, 10 mg, oral, Daily  clopidogrel, 75 mg, oral, Daily  divalproex, 1,000 mg, oral, q PM  divalproex, 500 mg, oral, q AM  DULoxetine, 60 mg, oral, Daily  enoxaparin, 100 mg, subcutaneous, BID  gabapentin, 800 mg, oral, q8h NINO  insulin lispro, 0-5 Units, subcutaneous, TID with meals  levETIRAcetam, 500 mg, oral, BID  levothyroxine, 75 mcg, oral, Daily  metoprolol tartrate, 12.5 mg, oral, BID  sennosides, 2 tablet, oral, BID  [Held by provider] warfarin, 5 mg, oral, Daily      Results for orders placed or performed during the hospital encounter of 12/06/23 (from the past 24 hour(s))   CBC   Result Value Ref Range    WBC 14.0 (H) 4.4 - 11.3 x10*3/uL    nRBC 0.0 0.0 - 0.0 /100 WBCs    RBC 4.26 4.00 - 5.20 x10*6/uL    Hemoglobin 10.6 (L) 12.0 - 16.0 g/dL    Hematocrit 34.7 (L) 36.0 - 46.0 %    MCV 82 80 - 100 fL    MCH 24.9 (L) 26.0 - 34.0 pg    MCHC 30.5 (L) 32.0 - 36.0 g/dL    RDW 22.6 (H) 11.5 - 14.5 %    Platelets 442 150 - 450 x10*3/uL   Comprehensive metabolic panel   Result Value Ref Range    Glucose 116 (H) 74 - 99 mg/dL    Sodium 141 136 - 145 mmol/L    Potassium 3.8 3.5 - 5.3 mmol/L    Chloride 104 98 - 107 mmol/L    Bicarbonate 28 21 - 32 mmol/L    Anion Gap 13 10 - 20 mmol/L    Urea Nitrogen 15 6 - 23 mg/dL    Creatinine 0.53 0.50 - 1.05 mg/dL    eGFR >90 >60 mL/min/1.73m*2    Calcium 8.9 8.6 - 10.6 mg/dL    Albumin 3.2 (L) 3.4 - 5.0 g/dL    Alkaline Phosphatase 75 33 - 110 U/L    Total Protein 7.0 6.4 - 8.2 g/dL    AST 11 9 - 39 U/L    Bilirubin, Total 0.5 0.0 - 1.2 mg/dL    ALT 5 (L) 7 - 45 U/L   Protime-INR   Result Value Ref Range    Protime 38.6 (H) 9.8 - 12.8 seconds    INR 3.4 (H) 0.9 - 1.1           Assessment/Plan   Principal Problem:    Limb ischemia  Active Problems:    Diabetes mellitus, type 2 (CMS/HCC)    HTN (hypertension)    Hypothyroidism    Peripheral neuropathy    ELMIRA (obstructive sleep apnea)      53 year old female with extensive vascular history and s/p multiple surgical interventions due to PAD.  Vascular Medicine Service following for anticoagulation recommendations.   Continues with weight based Lovenox as bridge to Warfarin therapy.    Potential discharge to SNF/rehab today.     Date    INR     Warfarin Dose     Comments   12/6     3.7          none                Vitamin K 10mg IV for elevated INR prior to surgery  12/7     2.6, 2.1, 2.2, 2.0, 1.5  12/8    1.6           none  12/9     2.0          none  12/11    --           5mg  12/12    1.3         5mg  12/13    1.4         7.5mg  12/14    1.9         5mg  12/15    3.4         2.5mg            First day with therapeutic INR     Recommendations:  ~Continue weight based Lovenox 100mg q12 hours as bridge to Warfarin  ~DECREASE Warfarin dose to 2.5mg today ONLY, due to large interval increase in INR (19 to 3.4 - likely response to Warfarin 7.5mg dose 2 days ago.   ~Tomorrow 12/16/23 INCREASE Warfarin dose to 5mg and continue at this dose as long as INR is between 2-3.   ~Monitor for bleeding  ~Daily INR until INR is therapeutic  ~When transferred to SNF: continue Lovenox as bridge to Warfarin until INR as at goal for 2 days in a row (INR 2-3) then may stop Lovenox therapy; check INR daily and dose adjust Warfarin as necessary; once INR is at goal range, please check INR twice a week to ensure patient remains at therapeutic INR level of 2-3.  ~Patient does NOT NEED outpatient follow up with the Vascular Medicine Service as she has been on long-term Warfarin therapy; patient should follow up with her PCP Dr. Juany Sanchez (PCP - General Family Medicine) phone [phone].      Plan of care discussed with Dr. Ana Silver  Plan  of care discussed via EPIC chat with Dr. Fam from the Medical Hospitalist Service     Hallie BIGGS-CNP  Vascular Medicine Service   Pager [phone]

## 2023-12-15 NOTE — PROGRESS NOTES
12/15/2023 Care coordination  [facility] has a bed for today.  Transport arranged for 2p with [facility] ambulance.  [facility]   Partial Purse String (Intermediate) Text: Given the location of the defect and the characteristics of the surrounding skin an intermediate purse string closure was deemed most appropriate.  Undermining was performed circumferentially around the surgical defect.  A purse string suture was then placed and tightened. Wound tension of the circular defect prevented complete closure of the wound.

## 2023-12-15 NOTE — DISCHARGE INSTRUCTIONS
Instructions for SNF:   [ ] Continue weight based Lovenox 100mg q12 hours as bridge to Warfarin. Continue Lovenox as bridge to Warfarin until INR as at goal for 2 days in a row (INR 2-3) then may stop Lovenox therapy. INR 12/15 was 3.4    [ ] DECREASE Warfarin dose to 2.5mg today ONLY 12/15, due to large interval increase in INR (1.9 to 3.4 - likely response to Warfarin 7.5mg dose 2 days ago.   [ ] Tomorrow 12/16/23 INCREASE Warfarin dose to 5mg and continue at this dose as long as INR is between 2-3.   ~Monitor for bleeding  [ ] Check INR daily and dose adjust Warfarin as necessary; once INR is at goal range, please check INR twice a week to ensure patient remains at therapeutic INR level of 2-3.  [ ] Outpatient Warfarin monitoring will need to be arranged prior to SNF discharge with initial appointment scheduled for 1-2 days after discharge; Please contact Dr. Juany Sanchez (PCP - General Family Medicine) phone [phone], who previously managed INR and Warfarin dosing as outpatient.     [ ] Patient does not need outpatient follow up with the Vascular Medicine Service  - she should follow with her PCP as patient has had long term treatment with Warfarin.

## 2023-12-15 NOTE — NURSING NOTE
Nursing Discharge Note:  Patient discharged to [facility] with all personal belongings.  Peripheral IV removed, telemetry returned.  No additional needs at this time.    Radha Gamez RN

## 2023-12-19 LAB
GLUCOSE BLD MANUAL STRIP-MCNC: 104 MG/DL (ref 74–99)
GLUCOSE BLD MANUAL STRIP-MCNC: 129 MG/DL (ref 74–99)
GLUCOSE BLD MANUAL STRIP-MCNC: 132 MG/DL (ref 74–99)
GLUCOSE BLD MANUAL STRIP-MCNC: 142 MG/DL (ref 74–99)
GLUCOSE BLD MANUAL STRIP-MCNC: 143 MG/DL (ref 74–99)
LABORATORY COMMENT REPORT: NORMAL
PATH REPORT.FINAL DX SPEC: NORMAL
PATH REPORT.GROSS SPEC: NORMAL
PATH REPORT.RELEVANT HX SPEC: NORMAL
PATH REPORT.TOTAL CANCER: NORMAL

## 2024-01-03 ENCOUNTER — OFFICE VISIT (OUTPATIENT)
Dept: FAMILY MEDICINE CLINIC | Age: 54
End: 2024-01-03

## 2024-01-03 VITALS
WEIGHT: 202.1 LBS | TEMPERATURE: 97.2 F | BODY MASS INDEX: 31.72 KG/M2 | DIASTOLIC BLOOD PRESSURE: 62 MMHG | OXYGEN SATURATION: 98 % | HEIGHT: 67 IN | RESPIRATION RATE: 16 BRPM | HEART RATE: 102 BPM | SYSTOLIC BLOOD PRESSURE: 102 MMHG

## 2024-01-03 DIAGNOSIS — R23.9 ALTERATION IN SKIN INTEGRITY RELATED TO SURGICAL INCISION: ICD-10-CM

## 2024-01-03 DIAGNOSIS — E78.2 MIXED HYPERLIPIDEMIA: ICD-10-CM

## 2024-01-03 DIAGNOSIS — S31.109A WOUND OF LEFT GROIN, INITIAL ENCOUNTER: ICD-10-CM

## 2024-01-03 DIAGNOSIS — D68.9 BLOOD CLOTTING DISORDER (HCC): ICD-10-CM

## 2024-01-03 DIAGNOSIS — K21.9 GASTROESOPHAGEAL REFLUX DISEASE WITHOUT ESOPHAGITIS: ICD-10-CM

## 2024-01-03 DIAGNOSIS — Z09 HOSPITAL DISCHARGE FOLLOW-UP: Primary | ICD-10-CM

## 2024-01-03 DIAGNOSIS — I10 ESSENTIAL HYPERTENSION: ICD-10-CM

## 2024-01-03 DIAGNOSIS — E11.51 TYPE 2 DIABETES MELLITUS WITH DIABETIC PERIPHERAL ANGIOPATHY WITHOUT GANGRENE, WITHOUT LONG-TERM CURRENT USE OF INSULIN (HCC): ICD-10-CM

## 2024-01-03 DIAGNOSIS — Z76.0 ENCOUNTER FOR MEDICATION REFILL: ICD-10-CM

## 2024-01-03 DIAGNOSIS — F39 MOOD DISORDER (HCC): ICD-10-CM

## 2024-01-03 DIAGNOSIS — I74.3: ICD-10-CM

## 2024-01-03 DIAGNOSIS — J68.3 MODERATE PERSISTENT REACTIVE AIRWAYS DYSFUNCTION SYNDROME WITHOUT COMPLICATION (HCC): ICD-10-CM

## 2024-01-03 DIAGNOSIS — G40.909 SEIZURE DISORDER (HCC): ICD-10-CM

## 2024-01-03 DIAGNOSIS — I73.9 PVD (PERIPHERAL VASCULAR DISEASE) (HCC): ICD-10-CM

## 2024-01-03 DIAGNOSIS — E03.8 SUBCLINICAL HYPOTHYROIDISM: ICD-10-CM

## 2024-01-03 RX ORDER — CLOPIDOGREL BISULFATE 75 MG/1
75 TABLET ORAL DAILY
Qty: 90 TABLET | Refills: 3 | Status: CANCELLED | OUTPATIENT
Start: 2024-01-03 | End: 2025-01-03

## 2024-01-03 RX ORDER — ATORVASTATIN CALCIUM 80 MG/1
80 TABLET, FILM COATED ORAL NIGHTLY
Qty: 90 TABLET | Refills: 3 | Status: SHIPPED | OUTPATIENT
Start: 2024-01-03 | End: 2025-01-03

## 2024-01-03 RX ORDER — GABAPENTIN 400 MG/1
400 CAPSULE ORAL 3 TIMES DAILY
Qty: 270 CAPSULE | Refills: 3 | Status: SHIPPED | OUTPATIENT
Start: 2024-01-03 | End: 2025-01-03

## 2024-01-03 RX ORDER — LIDOCAINE 50 MG/G
1 PATCH TOPICAL DAILY
Qty: 90 PATCH | Refills: 3 | Status: SHIPPED | OUTPATIENT
Start: 2024-01-03 | End: 2025-01-03

## 2024-01-03 RX ORDER — WARFARIN SODIUM 5 MG/1
5 TABLET ORAL DAILY
COMMUNITY
End: 2024-01-03 | Stop reason: SDUPTHER

## 2024-01-03 RX ORDER — METHOCARBAMOL 500 MG/1
500 TABLET, FILM COATED ORAL 3 TIMES DAILY
Qty: 270 TABLET | Refills: 1 | Status: SHIPPED | OUTPATIENT
Start: 2024-01-03 | End: 2025-01-03

## 2024-01-03 RX ORDER — ASPIRIN 81 MG/1
81 TABLET ORAL DAILY
Qty: 90 TABLET | Refills: 3 | COMMUNITY
Start: 2024-01-03 | End: 2025-01-03

## 2024-01-03 RX ORDER — SENNOSIDES 8.6 MG
CAPSULE ORAL
Qty: 540 TABLET | Refills: 3 | Status: SHIPPED | OUTPATIENT
Start: 2024-01-03 | End: 2025-01-03

## 2024-01-03 RX ORDER — DIVALPROEX SODIUM 500 MG/1
TABLET, EXTENDED RELEASE ORAL
Qty: 270 TABLET | Refills: 3 | Status: SHIPPED | OUTPATIENT
Start: 2024-01-03 | End: 2025-01-03

## 2024-01-03 RX ORDER — BUSPIRONE HYDROCHLORIDE 10 MG/1
10 TABLET ORAL DAILY
Qty: 90 TABLET | Refills: 3 | Status: SHIPPED | OUTPATIENT
Start: 2024-01-03 | End: 2025-01-03

## 2024-01-03 RX ORDER — WARFARIN SODIUM 5 MG/1
TABLET ORAL
Qty: 90 TABLET | Refills: 3 | Status: SHIPPED | OUTPATIENT
Start: 2024-01-03 | End: 2025-01-03

## 2024-01-03 RX ORDER — WARFARIN SODIUM 5 MG/1
5 TABLET ORAL DAILY
Qty: 30 TABLET | Status: CANCELLED | OUTPATIENT
Start: 2024-01-03

## 2024-01-03 RX ORDER — ALBUTEROL SULFATE 90 UG/1
2 AEROSOL, METERED RESPIRATORY (INHALATION) EVERY 6 HOURS PRN
Qty: 3 EACH | Refills: 3 | Status: SHIPPED | OUTPATIENT
Start: 2024-01-03 | End: 2025-01-03

## 2024-01-03 RX ORDER — LEVOTHYROXINE SODIUM 0.07 MG/1
TABLET ORAL
Qty: 90 TABLET | Refills: 3 | Status: SHIPPED | OUTPATIENT
Start: 2024-01-03 | End: 2025-01-03

## 2024-01-03 RX ORDER — LEVETIRACETAM 500 MG/1
TABLET ORAL
Qty: 180 TABLET | Refills: 3 | Status: SHIPPED | OUTPATIENT
Start: 2024-01-03 | End: 2025-01-03

## 2024-01-03 RX ORDER — CLOPIDOGREL BISULFATE 75 MG/1
75 TABLET ORAL DAILY
Qty: 90 TABLET | Refills: 3 | Status: SHIPPED | OUTPATIENT
Start: 2024-01-03 | End: 2025-01-03

## 2024-01-03 RX ORDER — DULOXETIN HYDROCHLORIDE 60 MG/1
CAPSULE, DELAYED RELEASE ORAL
Qty: 90 CAPSULE | Refills: 3 | Status: SHIPPED | OUTPATIENT
Start: 2024-01-03 | End: 2025-01-03

## 2024-01-03 RX ORDER — OMEPRAZOLE 20 MG/1
CAPSULE, DELAYED RELEASE ORAL
Qty: 90 CAPSULE | Refills: 3 | Status: SHIPPED | OUTPATIENT
Start: 2024-01-03 | End: 2025-01-03

## 2024-01-03 ASSESSMENT — PATIENT HEALTH QUESTIONNAIRE - PHQ9
SUM OF ALL RESPONSES TO PHQ QUESTIONS 1-9: 0
SUM OF ALL RESPONSES TO PHQ QUESTIONS 1-9: 0
2. FEELING DOWN, DEPRESSED OR HOPELESS: 0
SUM OF ALL RESPONSES TO PHQ QUESTIONS 1-9: 0
SUM OF ALL RESPONSES TO PHQ QUESTIONS 1-9: 0
9. THOUGHTS THAT YOU WOULD BE BETTER OFF DEAD, OR OF HURTING YOURSELF: 0
SUM OF ALL RESPONSES TO PHQ9 QUESTIONS 1 & 2: 0
7. TROUBLE CONCENTRATING ON THINGS, SUCH AS READING THE NEWSPAPER OR WATCHING TELEVISION: 0
4. FEELING TIRED OR HAVING LITTLE ENERGY: 0
8. MOVING OR SPEAKING SO SLOWLY THAT OTHER PEOPLE COULD HAVE NOTICED. OR THE OPPOSITE, BEING SO FIGETY OR RESTLESS THAT YOU HAVE BEEN MOVING AROUND A LOT MORE THAN USUAL: 0
3. TROUBLE FALLING OR STAYING ASLEEP: 0
10. IF YOU CHECKED OFF ANY PROBLEMS, HOW DIFFICULT HAVE THESE PROBLEMS MADE IT FOR YOU TO DO YOUR WORK, TAKE CARE OF THINGS AT HOME, OR GET ALONG WITH OTHER PEOPLE: 0
5. POOR APPETITE OR OVEREATING: 0
6. FEELING BAD ABOUT YOURSELF - OR THAT YOU ARE A FAILURE OR HAVE LET YOURSELF OR YOUR FAMILY DOWN: 0
1. LITTLE INTEREST OR PLEASURE IN DOING THINGS: 0

## 2024-01-03 NOTE — PROGRESS NOTES
ER  TAKE 1 TABLET IN THE MORNING AND 2 TABLETS IN THE EVENING     DULoxetine 60 MG extended release capsule  Commonly known as: CYMBALTA  TAKE 1 CAPSULE EVERY DAY     empagliflozin 10 MG tablet  Commonly known as: Jardiance  Take 1 tablet by mouth daily     gabapentin 400 MG capsule  Commonly known as: NEURONTIN  Take 1 capsule by mouth 3 times daily.     levETIRAcetam 500 MG tablet  Commonly known as: KEPPRA  BID     levothyroxine 75 MCG tablet  Commonly known as: SYNTHROID  TAKE 1 TABLET EVERY DAY     lidocaine 5 %  Commonly known as: LIDODERM  Place 1 patch onto the skin daily 12 hours on, 12 hours off.     Lift Chair Misc  by Does not apply route Dx: S78.111s, G89.4, M51.26, M48.061     loperamide 2 MG capsule  Commonly known as: IMODIUM  Take 1 capsule by mouth 4 times daily as needed for Diarrhea     methocarbamol 500 MG tablet  Commonly known as: Robaxin  Take 1 tablet by mouth 3 times daily     metoprolol tartrate 25 MG tablet  Commonly known as: LOPRESSOR  TAKE 1/2 TABLET TWICE DAILY     omeprazole 20 MG delayed release capsule  Commonly known as: PRILOSEC  TAKE 1 CAPSULE EVERY MORNING  BEFORE  BREAKFAST     ondansetron 4 MG tablet  Commonly known as: ZOFRAN     oxyCODONE 10 MG extended release tablet  Commonly known as: OXYCONTIN     * warfarin 5 MG tablet  Commonly known as: COUMADIN     * warfarin 3 MG tablet  Commonly known as: Coumadin  3 mg q. MWF, 4 mg other days     * warfarin 4 MG tablet  Commonly known as: COUMADIN  Take 4 mg q. Tuesday Thursday Saturday Sunday, 3 mg other days           * This list has 3 medication(s) that are the same as other medications prescribed for you. Read the directions carefully, and ask your doctor or other care provider to review them with you.                   Medications marked \"taking\" at this time  Outpatient Medications Marked as Taking for the 1/3/24 encounter (Office Visit) with Vianca Smith MD   Medication Sig Dispense Refill    warfarin

## 2024-03-20 ENCOUNTER — TELEPHONE (OUTPATIENT)
Dept: FAMILY MEDICINE CLINIC | Age: 54
End: 2024-03-20

## 2024-03-20 NOTE — TELEPHONE ENCOUNTER
New medication request sent by pharmacy, methocarbamol needs to be changed to one of the covered alternatives, cyclobenzaprine hcl tabs , tizanidine hcl cap or tabs

## 2024-03-21 DIAGNOSIS — M51.26 DISC DISPLACEMENT, LUMBAR: ICD-10-CM

## 2024-03-21 DIAGNOSIS — M48.061 NEURAL FORAMINAL STENOSIS OF LUMBAR SPINE: ICD-10-CM

## 2024-03-21 DIAGNOSIS — Z76.0 ENCOUNTER FOR MEDICATION REFILL: Primary | ICD-10-CM

## 2024-03-21 DIAGNOSIS — I73.9 PERIPHERAL VASCULAR DISEASE (HCC): ICD-10-CM

## 2024-03-21 RX ORDER — TIZANIDINE 4 MG/1
4 TABLET ORAL 3 TIMES DAILY PRN
Qty: 90 TABLET | Refills: 5 | Status: SHIPPED
Start: 2024-03-21 | End: 2024-03-22 | Stop reason: SDUPTHER

## 2024-03-22 DIAGNOSIS — I73.9 PERIPHERAL VASCULAR DISEASE (HCC): ICD-10-CM

## 2024-03-22 DIAGNOSIS — Z76.0 ENCOUNTER FOR MEDICATION REFILL: ICD-10-CM

## 2024-03-22 DIAGNOSIS — M48.061 NEURAL FORAMINAL STENOSIS OF LUMBAR SPINE: ICD-10-CM

## 2024-03-22 DIAGNOSIS — M51.26 DISC DISPLACEMENT, LUMBAR: ICD-10-CM

## 2024-03-22 RX ORDER — TIZANIDINE 4 MG/1
4 TABLET ORAL 3 TIMES DAILY PRN
Qty: 90 TABLET | Refills: 5 | Status: SHIPPED | OUTPATIENT
Start: 2024-03-22 | End: 2024-09-22

## 2024-03-22 NOTE — TELEPHONE ENCOUNTER
Patient states that the new medication, Tizanidine needs to go [facility] and not [facility]. Can you please res[facility] it to [facility]?  Thank you.

## 2024-03-22 NOTE — TELEPHONE ENCOUNTER
Last Appointment   1/3/2024  Next Appointment  6/27/2024    [facility] to [facility]. Thank you

## 2024-04-11 ENCOUNTER — TELEPHONE (OUTPATIENT)
Dept: FAMILY MEDICINE CLINIC | Age: 54
End: 2024-04-11

## 2024-04-11 DIAGNOSIS — I73.9 PVD (PERIPHERAL VASCULAR DISEASE) (HCC): Primary | ICD-10-CM

## 2024-04-11 NOTE — TELEPHONE ENCOUNTER
Patient called the office stating that she has been seeing Dr. Barrientos (podiatry) for her Diabetic foot exams and for her amputation.  Patient states Dr. Barrientos is out of network with her new insurance and they are requesting a referral for patient to continue seeing him.    Please call patient with status.

## 2024-04-14 NOTE — TELEPHONE ENCOUNTER
Please inform patient that she has to see a podiatrist on her insurance plan. Out of network referrals will not work with [facility].

## 2024-04-15 NOTE — TELEPHONE ENCOUNTER
Patient returned call.  All patient states that is needed is a referral for Dr Iliana Heard, podiatrist, to be ordered. Sent to the podiatrist office and that office is to take care of the rest.

## 2024-04-15 NOTE — TELEPHONE ENCOUNTER
Returned patient call. LMOM for patient to return call at earliest convenience regarding the podiatry referral.

## 2024-05-06 DIAGNOSIS — Z76.0 ENCOUNTER FOR MEDICATION REFILL: ICD-10-CM

## 2024-05-06 DIAGNOSIS — E11.51 TYPE 2 DIABETES MELLITUS WITH DIABETIC PERIPHERAL ANGIOPATHY WITHOUT GANGRENE, WITHOUT LONG-TERM CURRENT USE OF INSULIN (HCC): ICD-10-CM

## 2024-05-06 DIAGNOSIS — K52.9 CHRONIC DIARRHEA: ICD-10-CM

## 2024-05-07 RX ORDER — LOPERAMIDE HYDROCHLORIDE 2 MG/1
2 CAPSULE ORAL 4 TIMES DAILY PRN
Qty: 360 CAPSULE | Refills: 1 | Status: SHIPPED | OUTPATIENT
Start: 2024-05-07 | End: 2025-05-07

## 2024-08-14 ENCOUNTER — TELEMEDICINE (OUTPATIENT)
Dept: FAMILY MEDICINE CLINIC | Age: 54
End: 2024-08-14
Payer: MEDICARE

## 2024-08-14 DIAGNOSIS — E55.9 VITAMIN D INSUFFICIENCY: ICD-10-CM

## 2024-08-14 DIAGNOSIS — Z00.00 MEDICARE ANNUAL WELLNESS VISIT, SUBSEQUENT: Primary | ICD-10-CM

## 2024-08-14 DIAGNOSIS — E78.2 MIXED HYPERLIPIDEMIA: ICD-10-CM

## 2024-08-14 DIAGNOSIS — E11.51 TYPE 2 DIABETES MELLITUS WITH DIABETIC PERIPHERAL ANGIOPATHY WITHOUT GANGRENE, WITHOUT LONG-TERM CURRENT USE OF INSULIN (HCC): Primary | ICD-10-CM

## 2024-08-14 DIAGNOSIS — Z87.891 PERSONAL HISTORY OF TOBACCO USE: ICD-10-CM

## 2024-08-14 DIAGNOSIS — E03.8 SUBCLINICAL HYPOTHYROIDISM: ICD-10-CM

## 2024-08-14 DIAGNOSIS — I10 ESSENTIAL HYPERTENSION: ICD-10-CM

## 2024-08-14 PROCEDURE — G0296 VISIT TO DETERM LDCT ELIG: HCPCS | Performed by: FAMILY MEDICINE

## 2024-08-14 PROCEDURE — 3017F COLORECTAL CA SCREEN DOC REV: CPT | Performed by: FAMILY MEDICINE

## 2024-08-14 PROCEDURE — G0439 PPPS, SUBSEQ VISIT: HCPCS | Performed by: FAMILY MEDICINE

## 2024-08-14 SDOH — ECONOMIC STABILITY: FOOD INSECURITY: WITHIN THE PAST 12 MONTHS, YOU WORRIED THAT YOUR FOOD WOULD RUN OUT BEFORE YOU GOT MONEY TO BUY MORE.: NEVER TRUE

## 2024-08-14 SDOH — ECONOMIC STABILITY: FOOD INSECURITY: WITHIN THE PAST 12 MONTHS, THE FOOD YOU BOUGHT JUST DIDN'T LAST AND YOU DIDN'T HAVE MONEY TO GET MORE.: NEVER TRUE

## 2024-08-14 SDOH — ECONOMIC STABILITY: TRANSPORTATION INSECURITY
IN THE PAST 12 MONTHS, HAS LACK OF TRANSPORTATION KEPT YOU FROM MEETINGS, WORK, OR FROM GETTING THINGS NEEDED FOR DAILY LIVING?: PATIENT DECLINED

## 2024-08-14 SDOH — ECONOMIC STABILITY: INCOME INSECURITY: HOW HARD IS IT FOR YOU TO PAY FOR THE VERY BASICS LIKE FOOD, HOUSING, MEDICAL CARE, AND HEATING?: PATIENT DECLINED

## 2024-08-14 ASSESSMENT — PATIENT HEALTH QUESTIONNAIRE - PHQ9
SUM OF ALL RESPONSES TO PHQ QUESTIONS 1-9: 0
SUM OF ALL RESPONSES TO PHQ9 QUESTIONS 1 & 2: 0
1. LITTLE INTEREST OR PLEASURE IN DOING THINGS: NOT AT ALL
SUM OF ALL RESPONSES TO PHQ QUESTIONS 1-9: 0
2. FEELING DOWN, DEPRESSED OR HOPELESS: NOT AT ALL

## 2024-08-14 ASSESSMENT — LIFESTYLE VARIABLES
HOW MANY STANDARD DRINKS CONTAINING ALCOHOL DO YOU HAVE ON A TYPICAL DAY: 0
HOW OFTEN DO YOU HAVE A DRINK CONTAINING ALCOHOL: MONTHLY OR LESS
HOW OFTEN DO YOU HAVE A DRINK CONTAINING ALCOHOL: 2
HOW MANY STANDARD DRINKS CONTAINING ALCOHOL DO YOU HAVE ON A TYPICAL DAY: PATIENT DOES NOT DRINK
HOW OFTEN DO YOU HAVE SIX OR MORE DRINKS ON ONE OCCASION: 1

## 2024-08-14 NOTE — PATIENT INSTRUCTIONS
a vitamin D supplement is usually necessary to meet this goal.  When exposed to the sun, use a sunscreen that protects against both UVA and UVB radiation with an SPF of 30 or greater. Reapply every 2 to 3 hours or after sweating, drying off with a towel, or swimming.  Always wear a seat belt when traveling in a car. Always wear a helmet when riding a bicycle or motorcycle.

## 2024-08-14 NOTE — PROGRESS NOTES
(Family Medicine)  Vianca Smith MD as PCP - Empaneled Provider      Reviewed and updated this visit:          Ludy Rivera, was evaluated through a synchronous (real-time) audio-video encounter. The patient (or guardian if applicable) is aware that this is a billable service, which includes applicable co-pays. This Virtual Visit was conducted with patient's (and/or legal guardian's) consent. Patient identification was verified, and a caregiver was present when appropriate.   The patient was located at Home: [address] [place] [address]  [id number]  [facility] [address]  Provider was located at Home (Appt Dept State): [place]  Confirm you are appropriately licensed, registered, or certified to deliver care in the state where the patient is located as indicated above. If you are not or unsure, please re-schedule the visit: Yes, I confirm.       Vianca Smith MD on 08/14/24

## 2024-08-15 DIAGNOSIS — M51.26 DISC DISPLACEMENT, LUMBAR: ICD-10-CM

## 2024-08-15 DIAGNOSIS — I10 ESSENTIAL HYPERTENSION: ICD-10-CM

## 2024-08-15 DIAGNOSIS — M48.061 NEURAL FORAMINAL STENOSIS OF LUMBAR SPINE: ICD-10-CM

## 2024-08-15 DIAGNOSIS — D68.9 BLOOD CLOTTING DISORDER (HCC): ICD-10-CM

## 2024-08-15 DIAGNOSIS — G40.909 SEIZURE DISORDER (HCC): ICD-10-CM

## 2024-08-15 DIAGNOSIS — I73.9 PVD (PERIPHERAL VASCULAR DISEASE) (HCC): ICD-10-CM

## 2024-08-15 DIAGNOSIS — J68.3 MODERATE PERSISTENT REACTIVE AIRWAYS DYSFUNCTION SYNDROME WITHOUT COMPLICATION (HCC): ICD-10-CM

## 2024-08-15 DIAGNOSIS — K21.9 GASTROESOPHAGEAL REFLUX DISEASE WITHOUT ESOPHAGITIS: ICD-10-CM

## 2024-08-15 DIAGNOSIS — E11.51 TYPE 2 DIABETES MELLITUS WITH DIABETIC PERIPHERAL ANGIOPATHY WITHOUT GANGRENE, WITHOUT LONG-TERM CURRENT USE OF INSULIN (HCC): ICD-10-CM

## 2024-08-15 DIAGNOSIS — Z76.0 ENCOUNTER FOR MEDICATION REFILL: ICD-10-CM

## 2024-08-15 DIAGNOSIS — E03.8 SUBCLINICAL HYPOTHYROIDISM: ICD-10-CM

## 2024-08-15 DIAGNOSIS — E78.2 MIXED HYPERLIPIDEMIA: ICD-10-CM

## 2024-08-15 DIAGNOSIS — F39 MOOD DISORDER (HCC): ICD-10-CM

## 2024-08-15 DIAGNOSIS — I73.9 PERIPHERAL VASCULAR DISEASE (HCC): ICD-10-CM

## 2024-08-15 RX ORDER — OMEPRAZOLE 20 MG/1
CAPSULE, DELAYED RELEASE ORAL
Qty: 90 CAPSULE | Refills: 3 | OUTPATIENT
Start: 2024-08-15 | End: 2025-08-16

## 2024-08-15 RX ORDER — CLOPIDOGREL BISULFATE 75 MG/1
75 TABLET ORAL DAILY
Qty: 90 TABLET | Refills: 3 | OUTPATIENT
Start: 2024-08-15 | End: 2025-08-16

## 2024-08-15 RX ORDER — ALBUTEROL SULFATE 90 UG/1
2 AEROSOL, METERED RESPIRATORY (INHALATION) EVERY 6 HOURS PRN
Qty: 3 EACH | Refills: 3 | OUTPATIENT
Start: 2024-08-15 | End: 2025-08-16

## 2024-08-15 RX ORDER — TIZANIDINE 4 MG/1
4 TABLET ORAL 3 TIMES DAILY PRN
Qty: 90 TABLET | Refills: 5 | OUTPATIENT
Start: 2024-08-15 | End: 2025-02-15

## 2024-08-15 RX ORDER — DIVALPROEX SODIUM 500 MG/1
TABLET, EXTENDED RELEASE ORAL
Qty: 270 TABLET | Refills: 3 | OUTPATIENT
Start: 2024-08-15 | End: 2025-08-16

## 2024-08-15 RX ORDER — BUSPIRONE HYDROCHLORIDE 10 MG/1
10 TABLET ORAL DAILY
Qty: 90 TABLET | Refills: 3 | OUTPATIENT
Start: 2024-08-15 | End: 2025-08-16

## 2024-08-15 RX ORDER — ATORVASTATIN CALCIUM 80 MG/1
80 TABLET, FILM COATED ORAL NIGHTLY
Qty: 90 TABLET | Refills: 3 | OUTPATIENT
Start: 2024-08-15 | End: 2025-08-16

## 2024-08-15 RX ORDER — LEVOTHYROXINE SODIUM 0.07 MG/1
TABLET ORAL
Qty: 90 TABLET | Refills: 3 | OUTPATIENT
Start: 2024-08-15 | End: 2025-08-16

## 2024-08-15 RX ORDER — LEVETIRACETAM 500 MG/1
TABLET ORAL
Qty: 180 TABLET | Refills: 3 | OUTPATIENT
Start: 2024-08-15 | End: 2025-08-16

## 2024-08-15 RX ORDER — DULOXETIN HYDROCHLORIDE 60 MG/1
CAPSULE, DELAYED RELEASE ORAL
Qty: 90 CAPSULE | Refills: 3 | OUTPATIENT
Start: 2024-08-15 | End: 2025-08-16

## 2024-08-15 RX ORDER — WARFARIN SODIUM 5 MG/1
TABLET ORAL
Qty: 90 TABLET | Refills: 3 | OUTPATIENT
Start: 2024-08-15 | End: 2025-08-16

## 2024-08-15 RX ORDER — GABAPENTIN 400 MG/1
400 CAPSULE ORAL 3 TIMES DAILY
Qty: 270 CAPSULE | Refills: 3 | OUTPATIENT
Start: 2024-08-15 | End: 2025-08-16

## 2024-08-15 NOTE — TELEPHONE ENCOUNTER
Pt stated she spoke PCP yesterday about these refills. She stated the pharmacy does not have any of theses refills.       Last seen 8/14/2024  Next appt 11/20/2024    Requested Prescriptions     Pending Prescriptions Disp Refills    albuterol sulfate HFA (PROVENTIL;VENTOLIN;PROAIR) 108 (90 Base) MCG/ACT inhaler 3 each 3     Sig: Inhale 2 puffs into the lungs every 6 hours as needed for Wheezing    atorvastatin (LIPITOR) 80 MG tablet 90 tablet 3     Sig: Take 1 tablet by mouth nightly    divalproex (DEPAKOTE ER) 500 MG extended release tablet 270 tablet 3     Sig: TAKE 1 TABLET IN THE MORNING AND 2 TABLETS IN THE EVENING    levothyroxine (SYNTHROID) 75 MCG tablet 90 tablet 3     Sig: TAKE 1 TABLET EVERY DAY    empagliflozin (JARDIANCE) 10 MG tablet 90 tablet 3     Sig: Take 1 tablet by mouth daily    metoprolol tartrate (LOPRESSOR) 25 MG tablet 90 tablet 3     Sig: TAKE 1/2 TABLET TWICE DAILY    omeprazole (PRILOSEC) 20 MG delayed release capsule 90 capsule 3     Sig: TAKE 1 CAPSULE EVERY MORNING  BEFORE  BREAKFAST    warfarin (COUMADIN) 5 MG tablet 90 tablet 3     Sig: Take 1 tablet every evening    busPIRone (BUSPAR) 10 MG tablet 90 tablet 3     Sig: Take 1 tablet by mouth daily    tiZANidine (ZANAFLEX) 4 MG tablet 90 tablet 5     Sig: Take 1 tablet by mouth 3 times daily as needed (muscle spasms)    DULoxetine (CYMBALTA) 60 MG extended release capsule 90 capsule 3     Sig: TAKE 1 CAPSULE BY MOUTH NIGHTLY    gabapentin (NEURONTIN) 400 MG capsule 270 capsule 3     Sig: Take 1 capsule by mouth 3 times daily.    clopidogrel (PLAVIX) 75 MG tablet 90 tablet 3     Sig: Take 1 tablet by mouth daily    levETIRAcetam (KEPPRA) 500 MG tablet 180 tablet 3     Sig: BID      [facility]

## 2024-08-22 DIAGNOSIS — E03.8 SUBCLINICAL HYPOTHYROIDISM: ICD-10-CM

## 2024-08-22 DIAGNOSIS — E78.2 MIXED HYPERLIPIDEMIA: ICD-10-CM

## 2024-08-22 DIAGNOSIS — I73.9 PVD (PERIPHERAL VASCULAR DISEASE) (HCC): ICD-10-CM

## 2024-08-22 DIAGNOSIS — I10 ESSENTIAL HYPERTENSION: ICD-10-CM

## 2024-08-22 DIAGNOSIS — F39 MOOD DISORDER (HCC): ICD-10-CM

## 2024-08-22 DIAGNOSIS — Z76.0 ENCOUNTER FOR MEDICATION REFILL: ICD-10-CM

## 2024-08-22 DIAGNOSIS — D68.9 BLOOD CLOTTING DISORDER (HCC): ICD-10-CM

## 2024-08-22 DIAGNOSIS — G40.909 SEIZURE DISORDER (HCC): ICD-10-CM

## 2024-08-22 DIAGNOSIS — K21.9 GASTROESOPHAGEAL REFLUX DISEASE WITHOUT ESOPHAGITIS: ICD-10-CM

## 2024-08-22 DIAGNOSIS — J68.3 MODERATE PERSISTENT REACTIVE AIRWAYS DYSFUNCTION SYNDROME WITHOUT COMPLICATION (HCC): ICD-10-CM

## 2024-08-23 RX ORDER — TIZANIDINE HYDROCHLORIDE 4 MG/1
CAPSULE, GELATIN COATED ORAL
Refills: 0 | OUTPATIENT
Start: 2024-08-23

## 2024-08-23 RX ORDER — LEVOTHYROXINE SODIUM 75 UG/1
TABLET ORAL
Refills: 0 | OUTPATIENT
Start: 2024-08-23

## 2024-08-23 RX ORDER — DULOXETIN HYDROCHLORIDE 60 MG/1
CAPSULE, DELAYED RELEASE ORAL
Refills: 0 | OUTPATIENT
Start: 2024-08-23

## 2024-08-23 RX ORDER — ALBUTEROL SULFATE 90 UG/1
INHALANT RESPIRATORY (INHALATION)
Refills: 0 | OUTPATIENT
Start: 2024-08-23

## 2024-08-23 RX ORDER — DIVALPROEX SODIUM 500 MG/1
TABLET, FILM COATED, EXTENDED RELEASE ORAL
Refills: 0 | OUTPATIENT
Start: 2024-08-23

## 2024-08-23 RX ORDER — METOPROLOL TARTRATE 25 MG/1
TABLET, FILM COATED ORAL
Refills: 0 | OUTPATIENT
Start: 2024-08-23

## 2024-08-23 RX ORDER — GABAPENTIN 400 MG/1
CAPSULE ORAL
Refills: 0 | OUTPATIENT
Start: 2024-08-23

## 2024-08-23 RX ORDER — CLOPIDOGREL BISULFATE 75 MG/1
TABLET ORAL
Refills: 0 | OUTPATIENT
Start: 2024-08-23

## 2024-08-23 RX ORDER — ATORVASTATIN CALCIUM 80 MG/1
TABLET, FILM COATED ORAL
Refills: 0 | OUTPATIENT
Start: 2024-08-23

## 2024-09-18 ENCOUNTER — HOSPITAL ENCOUNTER (INPATIENT)
Facility: HOSPITAL | Age: 54
End: 2024-09-18
Attending: STUDENT IN AN ORGANIZED HEALTH CARE EDUCATION/TRAINING PROGRAM | Admitting: STUDENT IN AN ORGANIZED HEALTH CARE EDUCATION/TRAINING PROGRAM
Payer: COMMERCIAL

## 2024-09-18 ENCOUNTER — APPOINTMENT (OUTPATIENT)
Dept: VASCULAR MEDICINE | Facility: HOSPITAL | Age: 54
End: 2024-09-18
Payer: COMMERCIAL

## 2024-09-18 ENCOUNTER — APPOINTMENT (OUTPATIENT)
Dept: RADIOLOGY | Facility: HOSPITAL | Age: 54
End: 2024-09-18
Payer: COMMERCIAL

## 2024-09-18 DIAGNOSIS — I70.245 ATHEROSCLEROSIS OF NATIVE ARTERIES OF LEFT LEG WITH ULCERATION OF OTHER PART OF FOOT (MULTI): Primary | ICD-10-CM

## 2024-09-18 DIAGNOSIS — I74.3: ICD-10-CM

## 2024-09-18 DIAGNOSIS — K58.0 IRRITABLE BOWEL SYNDROME WITH DIARRHEA: ICD-10-CM

## 2024-09-18 DIAGNOSIS — Z01.810 PREOPERATIVE CARDIOVASCULAR EXAMINATION: ICD-10-CM

## 2024-09-18 DIAGNOSIS — Z79.01 ANTICOAGULANT LONG-TERM USE: ICD-10-CM

## 2024-09-18 DIAGNOSIS — I70.229 CRITICAL LIMB ISCHEMIA WITH HISTORY OF REVASCULARIZATION OF SAME EXTREMITY (MULTI): ICD-10-CM

## 2024-09-18 DIAGNOSIS — Z98.890 CRITICAL LIMB ISCHEMIA WITH HISTORY OF REVASCULARIZATION OF SAME EXTREMITY (MULTI): ICD-10-CM

## 2024-09-18 DIAGNOSIS — I70.222 CRITICAL LIMB ISCHEMIA OF LEFT LOWER EXTREMITY (MULTI): ICD-10-CM

## 2024-09-18 DIAGNOSIS — I96 GANGRENE OF TOE OF LEFT FOOT (MULTI): ICD-10-CM

## 2024-09-18 DIAGNOSIS — Z01.818 ENCOUNTER FOR OTHER PREPROCEDURAL EXAMINATION: ICD-10-CM

## 2024-09-18 DIAGNOSIS — I70.413 ATHEROSCLEROSIS OF AUTOLOGOUS VEIN BYPASS GRAFT(S) OF THE EXTREMITIES WITH INTERMITTENT CLAUDICATION, BILATERAL LEGS: ICD-10-CM

## 2024-09-18 DIAGNOSIS — I73.89 OTHER SPECIFIED PERIPHERAL VASCULAR DISEASES: ICD-10-CM

## 2024-09-18 DIAGNOSIS — I70.663: ICD-10-CM

## 2024-09-18 DIAGNOSIS — M86.272 SUBACUTE OSTEOMYELITIS OF LEFT FOOT (MULTI): ICD-10-CM

## 2024-09-18 DIAGNOSIS — I70.213 ATHEROSCLEROSIS OF NATIVE ARTERIES OF EXTREMITIES WITH INTERMITTENT CLAUDICATION, BILATERAL LEGS (CMS-HCC): ICD-10-CM

## 2024-09-18 DIAGNOSIS — E11.49 TYPE 2 DIABETES MELLITUS WITH OTHER NEUROLOGIC COMPLICATION, UNSPECIFIED WHETHER LONG TERM INSULIN USE: ICD-10-CM

## 2024-09-18 DIAGNOSIS — I73.9 PAD (PERIPHERAL ARTERY DISEASE) (CMS-HCC): ICD-10-CM

## 2024-09-18 LAB
ABO GROUP (TYPE) IN BLOOD: NORMAL
ALBUMIN SERPL BCP-MCNC: 3.3 G/DL (ref 3.4–5)
ALP SERPL-CCNC: 88 U/L (ref 33–110)
ALT SERPL W P-5'-P-CCNC: 9 U/L (ref 7–45)
ANION GAP SERPL CALC-SCNC: 15 MMOL/L (ref 10–20)
ANTIBODY SCREEN: NORMAL
APTT PPP: 72 SECONDS (ref 27–38)
AST SERPL W P-5'-P-CCNC: 12 U/L (ref 9–39)
BILIRUB SERPL-MCNC: 0.4 MG/DL (ref 0–1.2)
BUN SERPL-MCNC: 9 MG/DL (ref 6–23)
CALCIUM SERPL-MCNC: 8.9 MG/DL (ref 8.6–10.6)
CHLORIDE SERPL-SCNC: 103 MMOL/L (ref 98–107)
CO2 SERPL-SCNC: 23 MMOL/L (ref 21–32)
CREAT SERPL-MCNC: 0.52 MG/DL (ref 0.5–1.05)
EGFRCR SERPLBLD CKD-EPI 2021: >90 ML/MIN/1.73M*2
ERYTHROCYTE [DISTWIDTH] IN BLOOD BY AUTOMATED COUNT: 22.6 % (ref 11.5–14.5)
EST. AVERAGE GLUCOSE BLD GHB EST-MCNC: 280 MG/DL
GLUCOSE BLD MANUAL STRIP-MCNC: 163 MG/DL (ref 74–99)
GLUCOSE BLD MANUAL STRIP-MCNC: 191 MG/DL (ref 74–99)
GLUCOSE SERPL-MCNC: 196 MG/DL (ref 74–99)
HBA1C MFR BLD: 11.4 %
HCT VFR BLD AUTO: 44 % (ref 36–46)
HGB BLD-MCNC: 12.7 G/DL (ref 12–16)
INR PPP: 5 (ref 0.9–1.1)
MAGNESIUM SERPL-MCNC: 1.71 MG/DL (ref 1.6–2.4)
MCH RBC QN AUTO: 20.9 PG (ref 26–34)
MCHC RBC AUTO-ENTMCNC: 28.9 G/DL (ref 32–36)
MCV RBC AUTO: 73 FL (ref 80–100)
NRBC BLD-RTO: 0 /100 WBCS (ref 0–0)
PHOSPHATE SERPL-MCNC: 2.7 MG/DL (ref 2.5–4.9)
PLATELET # BLD AUTO: 387 X10*3/UL (ref 150–450)
POTASSIUM SERPL-SCNC: 3.8 MMOL/L (ref 3.5–5.3)
PROT SERPL-MCNC: 6.6 G/DL (ref 6.4–8.2)
PROTHROMBIN TIME: 57.2 SECONDS (ref 9.8–12.8)
RBC # BLD AUTO: 6.07 X10*6/UL (ref 4–5.2)
RH FACTOR (ANTIGEN D): NORMAL
SODIUM SERPL-SCNC: 137 MMOL/L (ref 136–145)
WBC # BLD AUTO: 13.3 X10*3/UL (ref 4.4–11.3)

## 2024-09-18 PROCEDURE — 93926 LOWER EXTREMITY STUDY: CPT | Performed by: SURGERY

## 2024-09-18 PROCEDURE — 93922 UPR/L XTREMITY ART 2 LEVELS: CPT

## 2024-09-18 PROCEDURE — 83036 HEMOGLOBIN GLYCOSYLATED A1C: CPT

## 2024-09-18 PROCEDURE — 93926 LOWER EXTREMITY STUDY: CPT

## 2024-09-18 PROCEDURE — 80053 COMPREHEN METABOLIC PANEL: CPT

## 2024-09-18 PROCEDURE — 36415 COLL VENOUS BLD VENIPUNCTURE: CPT

## 2024-09-18 PROCEDURE — 93971 EXTREMITY STUDY: CPT

## 2024-09-18 PROCEDURE — 2500000004 HC RX 250 GENERAL PHARMACY W/ HCPCS (ALT 636 FOR OP/ED): Performed by: STUDENT IN AN ORGANIZED HEALTH CARE EDUCATION/TRAINING PROGRAM

## 2024-09-18 PROCEDURE — 85027 COMPLETE CBC AUTOMATED: CPT

## 2024-09-18 PROCEDURE — 73630 X-RAY EXAM OF FOOT: CPT | Mod: LEFT SIDE | Performed by: RADIOLOGY

## 2024-09-18 PROCEDURE — 85610 PROTHROMBIN TIME: CPT

## 2024-09-18 PROCEDURE — 75635 CT ANGIO ABDOMINAL ARTERIES: CPT

## 2024-09-18 PROCEDURE — 2500000001 HC RX 250 WO HCPCS SELF ADMINISTERED DRUGS (ALT 637 FOR MEDICARE OP)

## 2024-09-18 PROCEDURE — 86900 BLOOD TYPING SEROLOGIC ABO: CPT

## 2024-09-18 PROCEDURE — 93971 EXTREMITY STUDY: CPT | Performed by: INTERNAL MEDICINE

## 2024-09-18 PROCEDURE — 87075 CULTR BACTERIA EXCEPT BLOOD: CPT

## 2024-09-18 PROCEDURE — 73630 X-RAY EXAM OF FOOT: CPT | Mod: LT

## 2024-09-18 PROCEDURE — 2500000002 HC RX 250 W HCPCS SELF ADMINISTERED DRUGS (ALT 637 FOR MEDICARE OP, ALT 636 FOR OP/ED)

## 2024-09-18 PROCEDURE — 2500000004 HC RX 250 GENERAL PHARMACY W/ HCPCS (ALT 636 FOR OP/ED)

## 2024-09-18 PROCEDURE — 1200000002 HC GENERAL ROOM WITH TELEMETRY DAILY

## 2024-09-18 PROCEDURE — 87075 CULTR BACTERIA EXCEPT BLOOD: CPT | Performed by: PODIATRIST

## 2024-09-18 PROCEDURE — 2500000002 HC RX 250 W HCPCS SELF ADMINISTERED DRUGS (ALT 637 FOR MEDICARE OP, ALT 636 FOR OP/ED): Performed by: NURSE PRACTITIONER

## 2024-09-18 PROCEDURE — 2500000001 HC RX 250 WO HCPCS SELF ADMINISTERED DRUGS (ALT 637 FOR MEDICARE OP): Performed by: NURSE PRACTITIONER

## 2024-09-18 PROCEDURE — 84100 ASSAY OF PHOSPHORUS: CPT

## 2024-09-18 PROCEDURE — 2500000004 HC RX 250 GENERAL PHARMACY W/ HCPCS (ALT 636 FOR OP/ED): Performed by: NURSE PRACTITIONER

## 2024-09-18 PROCEDURE — 82947 ASSAY GLUCOSE BLOOD QUANT: CPT

## 2024-09-18 PROCEDURE — 2550000001 HC RX 255 CONTRASTS: Performed by: STUDENT IN AN ORGANIZED HEALTH CARE EDUCATION/TRAINING PROGRAM

## 2024-09-18 PROCEDURE — 93922 UPR/L XTREMITY ART 2 LEVELS: CPT | Performed by: SURGERY

## 2024-09-18 PROCEDURE — 87040 BLOOD CULTURE FOR BACTERIA: CPT

## 2024-09-18 PROCEDURE — 83735 ASSAY OF MAGNESIUM: CPT

## 2024-09-18 PROCEDURE — 99222 1ST HOSP IP/OBS MODERATE 55: CPT | Performed by: INTERNAL MEDICINE

## 2024-09-18 PROCEDURE — 99221 1ST HOSP IP/OBS SF/LOW 40: CPT | Performed by: STUDENT IN AN ORGANIZED HEALTH CARE EDUCATION/TRAINING PROGRAM

## 2024-09-18 RX ORDER — ATORVASTATIN CALCIUM 80 MG/1
80 TABLET, FILM COATED ORAL NIGHTLY
Status: DISCONTINUED | OUTPATIENT
Start: 2024-09-18 | End: 2024-10-04 | Stop reason: HOSPADM

## 2024-09-18 RX ORDER — LOPERAMIDE HYDROCHLORIDE 2 MG/1
4 CAPSULE ORAL 2 TIMES DAILY PRN
Status: DISCONTINUED | OUTPATIENT
Start: 2024-09-18 | End: 2024-09-23

## 2024-09-18 RX ORDER — VANCOMYCIN HYDROCHLORIDE 1 G/200ML
1000 INJECTION, SOLUTION INTRAVENOUS ONCE
Status: COMPLETED | OUTPATIENT
Start: 2024-09-18 | End: 2024-09-18

## 2024-09-18 RX ORDER — BISMUTH SUBSALICYLATE 262 MG
1 TABLET,CHEWABLE ORAL DAILY
COMMUNITY

## 2024-09-18 RX ORDER — DOXYCYCLINE 100 MG/1
100 TABLET ORAL 2 TIMES DAILY
COMMUNITY
Start: 2024-09-05 | End: 2024-10-04 | Stop reason: HOSPADM

## 2024-09-18 RX ORDER — LEVETIRACETAM 500 MG/1
500 TABLET ORAL 2 TIMES DAILY
Status: DISCONTINUED | OUTPATIENT
Start: 2024-09-18 | End: 2024-10-04 | Stop reason: HOSPADM

## 2024-09-18 RX ORDER — OXYCODONE HYDROCHLORIDE 5 MG/1
5 TABLET ORAL EVERY 4 HOURS PRN
Status: DISCONTINUED | OUTPATIENT
Start: 2024-09-18 | End: 2024-10-04 | Stop reason: HOSPADM

## 2024-09-18 RX ORDER — VANCOMYCIN HYDROCHLORIDE 1 G/20ML
INJECTION, POWDER, LYOPHILIZED, FOR SOLUTION INTRAVENOUS DAILY PRN
Status: DISCONTINUED | OUTPATIENT
Start: 2024-09-18 | End: 2024-09-20

## 2024-09-18 RX ORDER — LEVOTHYROXINE SODIUM 75 UG/1
75 TABLET ORAL EVERY MORNING
Status: DISCONTINUED | OUTPATIENT
Start: 2024-09-18 | End: 2024-10-04 | Stop reason: HOSPADM

## 2024-09-18 RX ORDER — CLOPIDOGREL BISULFATE 75 MG/1
75 TABLET ORAL DAILY
COMMUNITY
End: 2024-10-04 | Stop reason: HOSPADM

## 2024-09-18 RX ORDER — ACETAMINOPHEN 500 MG
10 TABLET ORAL NIGHTLY PRN
Status: DISCONTINUED | OUTPATIENT
Start: 2024-09-18 | End: 2024-10-04 | Stop reason: HOSPADM

## 2024-09-18 RX ORDER — MAGNESIUM SULFATE HEPTAHYDRATE 40 MG/ML
2 INJECTION, SOLUTION INTRAVENOUS ONCE
Status: COMPLETED | OUTPATIENT
Start: 2024-09-18 | End: 2024-09-18

## 2024-09-18 RX ORDER — ASPIRIN 81 MG/1
81 TABLET ORAL DAILY
Status: DISCONTINUED | OUTPATIENT
Start: 2024-09-19 | End: 2024-10-04 | Stop reason: HOSPADM

## 2024-09-18 RX ORDER — BUSPIRONE HYDROCHLORIDE 10 MG/1
10 TABLET ORAL EVERY MORNING
Status: DISCONTINUED | OUTPATIENT
Start: 2024-09-18 | End: 2024-10-04 | Stop reason: HOSPADM

## 2024-09-18 RX ORDER — TIZANIDINE 4 MG/1
4 TABLET ORAL EVERY 6 HOURS PRN
Status: ON HOLD | COMMUNITY
End: 2024-09-18 | Stop reason: WASHOUT

## 2024-09-18 RX ORDER — VANCOMYCIN HYDROCHLORIDE 1 G/200ML
1000 INJECTION, SOLUTION INTRAVENOUS ONCE
Status: DISCONTINUED | OUTPATIENT
Start: 2024-09-18 | End: 2024-09-19

## 2024-09-18 RX ORDER — DULOXETIN HYDROCHLORIDE 60 MG/1
60 CAPSULE, DELAYED RELEASE ORAL EVERY EVENING
Status: DISCONTINUED | OUTPATIENT
Start: 2024-09-18 | End: 2024-10-04 | Stop reason: HOSPADM

## 2024-09-18 RX ORDER — OXYCODONE HYDROCHLORIDE 5 MG/1
10 TABLET ORAL EVERY 4 HOURS PRN
Status: DISCONTINUED | OUTPATIENT
Start: 2024-09-18 | End: 2024-10-04 | Stop reason: HOSPADM

## 2024-09-18 RX ORDER — METOPROLOL TARTRATE 25 MG/1
12.5 TABLET, FILM COATED ORAL 2 TIMES DAILY
Status: DISCONTINUED | OUTPATIENT
Start: 2024-09-18 | End: 2024-10-04 | Stop reason: HOSPADM

## 2024-09-18 RX ORDER — CLOPIDOGREL BISULFATE 75 MG/1
75 TABLET ORAL DAILY
Status: DISCONTINUED | OUTPATIENT
Start: 2024-09-19 | End: 2024-10-03

## 2024-09-18 RX ORDER — ACETAMINOPHEN 325 MG/1
650 TABLET ORAL EVERY 6 HOURS
Status: DISCONTINUED | OUTPATIENT
Start: 2024-09-18 | End: 2024-10-04 | Stop reason: HOSPADM

## 2024-09-18 RX ORDER — ASPIRIN 81 MG/1
81 TABLET ORAL DAILY
COMMUNITY

## 2024-09-18 RX ORDER — POTASSIUM CHLORIDE 750 MG/1
20 TABLET, FILM COATED, EXTENDED RELEASE ORAL ONCE
Status: COMPLETED | OUTPATIENT
Start: 2024-09-18 | End: 2024-09-18

## 2024-09-18 RX ORDER — DIVALPROEX SODIUM 500 MG/1
500 TABLET, DELAYED RELEASE ORAL EVERY 8 HOURS SCHEDULED
Status: DISCONTINUED | OUTPATIENT
Start: 2024-09-18 | End: 2024-10-04 | Stop reason: HOSPADM

## 2024-09-18 RX ORDER — LIDOCAINE 560 MG/1
1 PATCH PERCUTANEOUS; TOPICAL; TRANSDERMAL DAILY PRN
COMMUNITY
End: 2024-10-04 | Stop reason: HOSPADM

## 2024-09-18 RX ADMIN — ACETAMINOPHEN 650 MG: 325 TABLET, FILM COATED ORAL at 22:45

## 2024-09-18 RX ADMIN — DIVALPROEX SODIUM 500 MG: 500 TABLET, DELAYED RELEASE ORAL at 14:04

## 2024-09-18 RX ADMIN — MAGNESIUM SULFATE HEPTAHYDRATE 2 G: 40 INJECTION, SOLUTION INTRAVENOUS at 08:49

## 2024-09-18 RX ADMIN — BUSPIRONE HYDROCHLORIDE 10 MG: 10 TABLET ORAL at 08:49

## 2024-09-18 RX ADMIN — IOHEXOL 95 ML: 350 INJECTION, SOLUTION INTRAVENOUS at 13:34

## 2024-09-18 RX ADMIN — OXYCODONE HYDROCHLORIDE 10 MG: 5 TABLET ORAL at 19:51

## 2024-09-18 RX ADMIN — OXYCODONE HYDROCHLORIDE 10 MG: 5 TABLET ORAL at 15:03

## 2024-09-18 RX ADMIN — DIVALPROEX SODIUM 500 MG: 500 TABLET, DELAYED RELEASE ORAL at 06:06

## 2024-09-18 RX ADMIN — LEVETIRACETAM 500 MG: 500 TABLET, FILM COATED ORAL at 08:49

## 2024-09-18 RX ADMIN — VANCOMYCIN HYDROCHLORIDE 1000 MG: 1 INJECTION, SOLUTION INTRAVENOUS at 12:25

## 2024-09-18 RX ADMIN — ATORVASTATIN CALCIUM 80 MG: 80 TABLET, FILM COATED ORAL at 20:10

## 2024-09-18 RX ADMIN — PIPERACILLIN SODIUM AND TAZOBACTAM SODIUM 3.38 G: 3; .375 INJECTION, SOLUTION INTRAVENOUS at 11:44

## 2024-09-18 RX ADMIN — LOPERAMIDE HYDROCHLORIDE 4 MG: 2 CAPSULE ORAL at 22:48

## 2024-09-18 RX ADMIN — DULOXETINE HYDROCHLORIDE 60 MG: 60 CAPSULE, DELAYED RELEASE ORAL at 03:53

## 2024-09-18 RX ADMIN — POTASSIUM CHLORIDE 20 MEQ: 750 TABLET, FILM COATED, EXTENDED RELEASE ORAL at 08:49

## 2024-09-18 RX ADMIN — LEVOTHYROXINE SODIUM 75 MCG: 0.07 TABLET ORAL at 06:07

## 2024-09-18 RX ADMIN — PIPERACILLIN SODIUM AND TAZOBACTAM SODIUM 3.38 G: 3; .375 INJECTION, SOLUTION INTRAVENOUS at 17:12

## 2024-09-18 RX ADMIN — LOPERAMIDE HYDROCHLORIDE 4 MG: 2 CAPSULE ORAL at 14:04

## 2024-09-18 RX ADMIN — DULOXETINE HYDROCHLORIDE 60 MG: 60 CAPSULE, DELAYED RELEASE ORAL at 20:11

## 2024-09-18 RX ADMIN — ACETAMINOPHEN 650 MG: 325 TABLET, FILM COATED ORAL at 09:45

## 2024-09-18 RX ADMIN — VANCOMYCIN HYDROCHLORIDE 1500 MG: 1.5 INJECTION, POWDER, LYOPHILIZED, FOR SOLUTION INTRAVENOUS at 20:29

## 2024-09-18 RX ADMIN — LEVETIRACETAM 500 MG: 500 TABLET, FILM COATED ORAL at 20:10

## 2024-09-18 RX ADMIN — DIVALPROEX SODIUM 500 MG: 500 TABLET, DELAYED RELEASE ORAL at 22:45

## 2024-09-18 RX ADMIN — OXYCODONE HYDROCHLORIDE 10 MG: 5 TABLET ORAL at 09:44

## 2024-09-18 RX ADMIN — ACETAMINOPHEN 650 MG: 325 TABLET, FILM COATED ORAL at 04:47

## 2024-09-18 RX ADMIN — METOPROLOL TARTRATE 12.5 MG: 25 TABLET, FILM COATED ORAL at 08:49

## 2024-09-18 RX ADMIN — PIPERACILLIN SODIUM AND TAZOBACTAM SODIUM 3.38 G: 3; .375 INJECTION, SOLUTION INTRAVENOUS at 04:40

## 2024-09-18 RX ADMIN — ACETAMINOPHEN 650 MG: 325 TABLET, FILM COATED ORAL at 17:12

## 2024-09-18 RX ADMIN — VANCOMYCIN HYDROCHLORIDE 1000 MG: 1 INJECTION, SOLUTION INTRAVENOUS at 05:16

## 2024-09-18 RX ADMIN — METOPROLOL TARTRATE 12.5 MG: 25 TABLET, FILM COATED ORAL at 20:11

## 2024-09-18 RX ADMIN — OXYCODONE HYDROCHLORIDE 10 MG: 5 TABLET ORAL at 03:54

## 2024-09-18 SDOH — ECONOMIC STABILITY: FOOD INSECURITY: HOW HARD IS IT FOR YOU TO PAY FOR THE VERY BASICS LIKE FOOD, HOUSING, MEDICAL CARE, AND HEATING?: NOT VERY HARD

## 2024-09-18 SDOH — HEALTH STABILITY: MENTAL HEALTH: HOW OFTEN DO YOU HAVE A DRINK CONTAINING ALCOHOL?: MONTHLY OR LESS

## 2024-09-18 SDOH — ECONOMIC STABILITY: TRANSPORTATION INSECURITY: IN THE PAST 12 MONTHS, HAS LACK OF TRANSPORTATION KEPT YOU FROM MEDICAL APPOINTMENTS OR FROM GETTING MEDICATIONS?: YES

## 2024-09-18 SDOH — HEALTH STABILITY: MENTAL HEALTH: HOW MANY DRINKS CONTAINING ALCOHOL DO YOU HAVE ON A TYPICAL DAY WHEN YOU ARE DRINKING?: 1 OR 2

## 2024-09-18 SDOH — ECONOMIC STABILITY: INCOME INSECURITY: HOW HARD IS IT FOR YOU TO PAY FOR THE VERY BASICS LIKE FOOD, HOUSING, MEDICAL CARE, AND HEATING?: NOT VERY HARD

## 2024-09-18 SDOH — ECONOMIC STABILITY: TRANSPORTATION INSECURITY
IN THE PAST 12 MONTHS, HAS LACK OF TRANSPORTATION KEPT YOU FROM MEETINGS, WORK, OR FROM GETTING THINGS NEEDED FOR DAILY LIVING?: YES

## 2024-09-18 SDOH — SOCIAL STABILITY: SOCIAL INSECURITY: ARE THERE ANY APPARENT SIGNS OF INJURIES/BEHAVIORS THAT COULD BE RELATED TO ABUSE/NEGLECT?: NO

## 2024-09-18 SDOH — ECONOMIC STABILITY: HOUSING INSECURITY: AT ANY TIME IN THE PAST 12 MONTHS, WERE YOU HOMELESS OR LIVING IN A SHELTER (INCLUDING NOW)?: NO

## 2024-09-18 SDOH — SOCIAL STABILITY: SOCIAL INSECURITY: DOES ANYONE TRY TO KEEP YOU FROM HAVING/CONTACTING OTHER FRIENDS OR DOING THINGS OUTSIDE YOUR HOME?: NO

## 2024-09-18 SDOH — ECONOMIC STABILITY: INCOME INSECURITY: IN THE LAST 12 MONTHS, WAS THERE A TIME WHEN YOU WERE NOT ABLE TO PAY THE MORTGAGE OR RENT ON TIME?: NO

## 2024-09-18 SDOH — HEALTH STABILITY: MENTAL HEALTH: HOW OFTEN DO YOU HAVE SIX OR MORE DRINKS ON ONE OCCASION?: LESS THAN MONTHLY

## 2024-09-18 SDOH — HEALTH STABILITY: MENTAL HEALTH: HOW OFTEN DO YOU HAVE 6 OR MORE DRINKS ON ONE OCCASION?: LESS THAN MONTHLY

## 2024-09-18 SDOH — SOCIAL STABILITY: SOCIAL INSECURITY: DO YOU FEEL UNSAFE GOING BACK TO THE PLACE WHERE YOU ARE LIVING?: NO

## 2024-09-18 SDOH — HEALTH STABILITY: MENTAL HEALTH: HOW MANY STANDARD DRINKS CONTAINING ALCOHOL DO YOU HAVE ON A TYPICAL DAY?: 1 OR 2

## 2024-09-18 SDOH — ECONOMIC STABILITY: HOUSING INSECURITY: IN THE PAST 12 MONTHS, HOW MANY TIMES HAVE YOU MOVED WHERE YOU WERE LIVING?: 1

## 2024-09-18 SDOH — SOCIAL STABILITY: SOCIAL INSECURITY: HAS ANYONE EVER THREATENED TO HURT YOUR FAMILY OR YOUR PETS?: NO

## 2024-09-18 SDOH — SOCIAL STABILITY: SOCIAL INSECURITY: ARE YOU OR HAVE YOU BEEN THREATENED OR ABUSED PHYSICALLY, EMOTIONALLY, OR SEXUALLY BY ANYONE?: NO

## 2024-09-18 SDOH — ECONOMIC STABILITY: TRANSPORTATION INSECURITY
IN THE PAST 12 MONTHS, HAS THE LACK OF TRANSPORTATION KEPT YOU FROM MEDICAL APPOINTMENTS OR FROM GETTING MEDICATIONS?: YES

## 2024-09-18 SDOH — SOCIAL STABILITY: SOCIAL INSECURITY: WERE YOU ABLE TO COMPLETE ALL THE BEHAVIORAL HEALTH SCREENINGS?: YES

## 2024-09-18 SDOH — SOCIAL STABILITY: SOCIAL INSECURITY: HAVE YOU HAD THOUGHTS OF HARMING ANYONE ELSE?: NO

## 2024-09-18 SDOH — SOCIAL STABILITY: SOCIAL INSECURITY: ABUSE: ADULT

## 2024-09-18 SDOH — ECONOMIC STABILITY: HOUSING INSECURITY: IN THE LAST 12 MONTHS, WAS THERE A TIME WHEN YOU WERE NOT ABLE TO PAY THE MORTGAGE OR RENT ON TIME?: NO

## 2024-09-18 SDOH — SOCIAL STABILITY: SOCIAL INSECURITY: DO YOU FEEL ANYONE HAS EXPLOITED OR TAKEN ADVANTAGE OF YOU FINANCIALLY OR OF YOUR PERSONAL PROPERTY?: NO

## 2024-09-18 ASSESSMENT — PAIN SCALES - GENERAL
PAINLEVEL_OUTOF10: 5 - MODERATE PAIN
PAINLEVEL_OUTOF10: 9
PAINLEVEL_OUTOF10: 7
PAINLEVEL_OUTOF10: 9
PAINLEVEL_OUTOF10: 2
PAINLEVEL_OUTOF10: 8
PAINLEVEL_OUTOF10: 9
PAINLEVEL_OUTOF10: 9
PAINLEVEL_OUTOF10: 4

## 2024-09-18 ASSESSMENT — COLUMBIA-SUICIDE SEVERITY RATING SCALE - C-SSRS
2. HAVE YOU ACTUALLY HAD ANY THOUGHTS OF KILLING YOURSELF?: NO
1. IN THE PAST MONTH, HAVE YOU WISHED YOU WERE DEAD OR WISHED YOU COULD GO TO SLEEP AND NOT WAKE UP?: NO
6. HAVE YOU EVER DONE ANYTHING, STARTED TO DO ANYTHING, OR PREPARED TO DO ANYTHING TO END YOUR LIFE?: NO

## 2024-09-18 ASSESSMENT — COGNITIVE AND FUNCTIONAL STATUS - GENERAL
DAILY ACTIVITIY SCORE: 24
MOBILITY SCORE: 16
MOBILITY SCORE: 16
STANDING UP FROM CHAIR USING ARMS: A LOT
CLIMB 3 TO 5 STEPS WITH RAILING: TOTAL
CLIMB 3 TO 5 STEPS WITH RAILING: TOTAL
PATIENT BASELINE BEDBOUND: NO
DAILY ACTIVITIY SCORE: 24
WALKING IN HOSPITAL ROOM: TOTAL
WALKING IN HOSPITAL ROOM: TOTAL
STANDING UP FROM CHAIR USING ARMS: A LOT

## 2024-09-18 ASSESSMENT — ACTIVITIES OF DAILY LIVING (ADL)
BATHING: INDEPENDENT
LACK_OF_TRANSPORTATION: YES
JUDGMENT_ADEQUATE_SAFELY_COMPLETE_DAILY_ACTIVITIES: YES
HEARING - LEFT EAR: FUNCTIONAL
FEEDING YOURSELF: INDEPENDENT
ASSISTIVE_DEVICE: EYEGLASSES
WALKS IN HOME: NEEDS ASSISTANCE
GROOMING: INDEPENDENT
HEARING - RIGHT EAR: FUNCTIONAL
TOILETING: NEEDS ASSISTANCE
ADEQUATE_TO_COMPLETE_ADL: YES
PATIENT'S MEMORY ADEQUATE TO SAFELY COMPLETE DAILY ACTIVITIES?: YES
DRESSING YOURSELF: INDEPENDENT

## 2024-09-18 ASSESSMENT — ENCOUNTER SYMPTOMS
ABDOMINAL PAIN: 0
DIARRHEA: 0
PALPITATIONS: 0
ABDOMINAL DISTENTION: 0
FEVER: 0
CHILLS: 0
COUGH: 0
CONSTIPATION: 0

## 2024-09-18 ASSESSMENT — LIFESTYLE VARIABLES
SKIP TO QUESTIONS 9-10: 0
HOW OFTEN DO YOU HAVE A DRINK CONTAINING ALCOHOL: MONTHLY OR LESS
AUDIT-C TOTAL SCORE: 2
SKIP TO QUESTIONS 9-10: 0
HOW OFTEN DO YOU HAVE 6 OR MORE DRINKS ON ONE OCCASION: LESS THAN MONTHLY
AUDIT-C TOTAL SCORE: 2
HOW MANY STANDARD DRINKS CONTAINING ALCOHOL DO YOU HAVE ON A TYPICAL DAY: 1 OR 2
AUDIT-C TOTAL SCORE: 2

## 2024-09-18 ASSESSMENT — PATIENT HEALTH QUESTIONNAIRE - PHQ9
2. FEELING DOWN, DEPRESSED OR HOPELESS: NOT AT ALL
SUM OF ALL RESPONSES TO PHQ9 QUESTIONS 1 & 2: 0
1. LITTLE INTEREST OR PLEASURE IN DOING THINGS: NOT AT ALL

## 2024-09-18 ASSESSMENT — PAIN SCALES - WONG BAKER: WONGBAKER_NUMERICALRESPONSE: HURTS LITTLE BIT

## 2024-09-18 ASSESSMENT — PAIN DESCRIPTION - LOCATION: LOCATION: FOOT

## 2024-09-18 ASSESSMENT — PAIN DESCRIPTION - ORIENTATION: ORIENTATION: LEFT

## 2024-09-18 NOTE — PROGRESS NOTES
Pharmacy Admission Order Reconciliation Review    Shirin Slater is a 54 y.o. female admitted for Gangrene of toe of left foot (Multi). Pharmacy reviewed the patient's unreconciled admission medications.    Prior to admission medications that were reviewed and acted on by the pharmacist include:  Doxycycline  Lidocaine patch  MV  These medications have been reconciled.     Any other unreconcilied medications have been addressed and will be ordered or held by the patient's medical team. Medications addressed by the pharmacist may be added or changed by the patient's medical team at any time.    Lauren Anthony, PharmD  Transitions of Care Pharmacist  [facility]  Please reach out via Secure Chat for questions

## 2024-09-18 NOTE — CONSULTS
Vancomycin Dosing by Pharmacy- INITIAL    Shirin Slater is a 54 y.o. year old female who Pharmacy has been consulted for vancomycin dosing for osteomyelitis/septic arthritis. Based on the patient's indication and renal status this patient will be dosed based on a goal AUC goal of 500-600     Renal function is currently stable.    Visit Vitals  /59   Pulse 76   Temp 36.4 °C (97.5 °F)   Resp 18        Lab Results   Component Value Date    CREATININE 0.52 2024    CREATININE 0.53 12/15/2023    CREATININE 0.63 2023    CREATININE 0.60 2023        Patient weight is as follows:   Vitals:    24 0259   Weight: 90.6 kg (199 lb 12.8 oz)       Cultures:  No results found for the encounter in last 14 days.        No intake/output data recorded.  I/O during current shift:  I/O this shift:  In: -   Out: 1 [Stool:1]    Temp (24hrs), Av.5 °C (97.7 °F), Min:36.4 °C (97.5 °F), Max:36.5 °C (97.7 °F)         Assessment/Plan     Patient received a loading dose of 2 grams  Will initiate vancomycin maintenance,  1500 mg every 8 hours. Starting at     This dosing regimen is predicted by [employer] to result in the following pharmacokinetic parameters:    Regimen: 1500 mg IV every 8 hours.  Start time: 20:25 on 2024  Exposure target: AUC24 (range)400-600 mg/L.hr   [id number]: 514 mg/L.hr  AUC24,ss: 553 mg/L.hr  Probability of AUC24 > 400: 80 %  Ctrough,ss: 14.7 mg/L  Probability of Ctrough,ss > 20: 28 %        Follow-up level will be ordered on  at 0500 unless clinically indicated sooner.  Will continue to monitor renal function daily while on vancomycin and order serum creatinine at least every 48 hours if not already ordered.  Follow for continued vancomycin needs, clinical response, and signs/symptoms of toxicity.       Geni Spears, PharmD

## 2024-09-18 NOTE — Clinical Note
Patient Clipped and Prepped: right radial and right brachial. Prepped with ChloraPrep, a minimum of 3 minute dry time, longer if needed, no pooling noted, patient draped in sterile fashion.

## 2024-09-18 NOTE — CONSULTS
"Inpatient consult to Infectious Diseases  Consult performed by: Dwaine Barron MD  Consult ordered by: Kaitlyn M Dunphy, MD        Primary MD: Juany Sanchez MD    Reason For Consult  Toe infection    History Of Present Illness  Shirin Slater is a 54F with past medical history of severe PAD s/p R hip disarticulation s/p L CFA and EIA embolectomy 12/23 s/p multiple L toe amputations (first, second, and fifth), T2DM complicated by diabetic neuropathy, HTN, HLD, renal and splenic thromboses on aspirin/plavix/coumadin, and current tobacco use who presents as a transfer from [facility] for management of L 3rd toe gangrene with concern of osteomyelitis. ID consulted to assist in management of toe infection.    3 weeks ago, pt stubbed her L 3rd toe exiting the bath tub, and attempted to keep the wound sterile and dressed. Last week, she noticed increased toe pain as well as a color change to dark red, and saw her PCP. She was started on PO doxycycline on 9/12, and took it for 5 days without experiencing any resolution in symptoms. During this time period, she also observed blood an purulent drainage in area. At this point, she presented to [place] ED, and then was transferred to [facility] for vascular surgery care. At [facility], pt started on IV vanc/zosyn and blood cultures were obtained. X-ray taken this AM, read showed \"Erosive cortical changes and irregular internal margins of the 3rd digit distal phalanx concerning for osteomyelitis\". Further imaging of LLE (PVR and CT angio aorta + bilateral iliofemoral) ordered as well.       Past Medical History  She has a past medical history of Anxiety, Arthritis, Cancer (Multi), Cellulitis, Diabetes mellitus (Multi), Disease of thyroid gland, Diverticulitis, Epilepsy, unspecified, not intractable, without status epilepticus (Multi), HLD (hyperlipidemia), blood clots, Hypertension, PAD (peripheral artery disease) (CMS-[facility]), Peripheral vascular disease, " unspecified (CMS-HCC) (2022), PTSD (post-traumatic stress disorder), and Uterine cancer (Multi).    Surgical History  She has a past surgical history that includes Other surgical history (2021); CT angio aorta and bilateral iliofemoral runoff w and or wo IV contrast (2021); CT angio aorta and bilateral iliofemoral runoff w and or wo IV contrast (2022); IR angiogram aorta abdomen (2021); Tonsilectomy, adenoidectomy, bilateral myringotomy and tubes ();  section, classic (); Anterior cruciate ligament repair (Left, ); Hysterectomy (); Colon surgery (); Vascular surgery (Right, ); Vascular surgery (Right, ); Vascular surgery (Right, ); Leg amputation (Right, ); Amputation foot / toe (Left, ); and Amputation foot / toe (Left).     Social History     Occupational History    Not on file   Tobacco Use    Smoking status: Every Day     Current packs/day: 1.00     Average packs/day: 1 pack/day for 43.7 years (43.7 ttl pk-yrs)     Types: Cigarettes     Start date:     Smokeless tobacco: Never    Tobacco comments:     Currest smoker at around 0.5PPD. Peak at 1.5-2PPD    Substance and Sexual Activity    Alcohol use: Yes     Comment: Rare    Drug use: Yes     Types: Marijuana    Sexual activity: Not on file     Travel History   Travel since 23    No documented travel since 24           Family History  Family History   Problem Relation Name Age of Onset    Diverticulitis Daughter      Heart failure Other          Several family members without direct blood connection     Allergies  Aspartame and Nsaids (non-steroidal anti-inflammatory drug)     Immunization History   Administered Date(s) Administered    [employer] SARS-CoV-2 Vaccination 2021    Pfizer Purple Cap SARS-CoV-2 2022     Medications  Home medications:  Medications Prior to Admission   Medication Sig Dispense Refill Last Dose    acetaminophen (Tylenol) 325 mg tablet Take  3 tablets (975 mg) by mouth every 6 hours.   Past Week    busPIRone (Buspar) 10 mg tablet Take 1 tablet (10 mg) by mouth. In the morning   Past Week    divalproex (Depakote) 500 mg EC tablet Take 1 tablet (500 mg) by mouth. In the morning, and 2 tablets in the evening   9/17/2024    empagliflozin (Jardiance) 10 mg Take 1 tablet (10 mg) by mouth once daily.   9/17/2024    enoxaparin (Lovenox) 100 mg/mL syringe Inject 1 mL (100 mg) under the skin 2 times a day. Continue Lovenox as bridge to Warfarin until INR as at goal for 2 days in a row (INR 2-3) then may stop Lovenox therapy; INR 3.4 on 12/15   9/17/2024    levothyroxine (Tirosint) 75 mcg capsule Take 1 capsule (75 mcg) by mouth once daily in the morning. Take before meals. On empty stomach   9/17/2024    loperamide (Imodium) 2 mg capsule Take 2 capsules (4 mg) by mouth. As needed, Don't exceed 4 tablets per day   9/17/2024    melatonin 10 mg tablet Take 1 tablet (10 mg) by mouth once daily.   9/17/2024    omeprazole OTC (PriLOSEC OTC) 20 mg EC tablet Take 1 tablet (20 mg) by mouth once daily in the morning. Take before meals.   9/17/2024    sennosides (Senokot) 8.6 mg tablet Take 2 tablets (17.2 mg) by mouth 2 times a day.   Past Month    warfarin (Coumadin) 5 mg tablet Take as directed per After Visit Summary.   9/17/2024    albuterol 90 mcg/actuation inhaler Inhale 2 puffs every 6 hours if needed.       atorvastatin (Lipitor) 80 mg tablet Take 1 tablet (80 mg) by mouth once daily at bedtime.       DULoxetine (Cymbalta) 60 mg DR capsule Take 1 capsule (60 mg) by mouth once daily.       gabapentin (Neurontin) 400 mg capsule Take 1 capsule (400 mg) by mouth 3 times a day.       levETIRAcetam (Keppra) 500 mg tablet Take 1 tablet (500 mg) by mouth 2 times a day.       methocarbamol (Robaxin) 500 mg tablet Take 1 tablet (500 mg) by mouth 3 times a day.       metoprolol tartrate (Lopressor) 25 mg tablet Take 0.5 tablets (12.5 mg) by mouth 2 times a day.       warfarin  "(Coumadin) 2.5 mg tablet Take 1 tablet (2.5 mg) by mouth 1 time for 1 dose. Take as directed per After Visit Summary.        Current medications:  Scheduled medications  acetaminophen, 650 mg, oral, q6h  atorvastatin, 80 mg, oral, Nightly  busPIRone, 10 mg, oral, q AM  divalproex, 500 mg, oral, q8h NINO  DULoxetine, 60 mg, oral, q PM  levETIRAcetam, 500 mg, oral, BID  levothyroxine, 75 mcg, oral, q AM  magnesium sulfate, 2 g, intravenous, Once  metoprolol tartrate, 12.5 mg, oral, BID  piperacillin-tazobactam, 3.375 g, intravenous, q6h  vancomycin, 1,000 mg, intravenous, Once  vancomycin, 1,000 mg, intravenous, Once      Continuous medications     PRN medications  PRN medications: melatonin, oxyCODONE, oxyCODONE, vancomycin    Review of Systems   Constitutional:  Negative for chills and fever.   Respiratory:  Negative for cough.    Cardiovascular:  Negative for chest pain and palpitations.   Gastrointestinal:  Negative for abdominal distention, abdominal pain, constipation and diarrhea.        Objective  Range of Vitals (last 24 hours)  Heart Rate:  [83-91]   Temp:  [36.5 °C (97.7 °F)]   Resp:  [18]   BP: (103-109)/(57-72)   Height:  [170.2 cm (5' 7\")]   Weight:  [90.6 kg (199 lb 12.8 oz)]   SpO2:  [93 %-94 %]   Daily Weight  09/18/24 : 90.6 kg (199 lb 12.8 oz)    Body mass index is 31.29 kg/m².     Physical Exam  HENT:      Head: Atraumatic.      Nose: Nose normal.   Eyes:      Extraocular Movements: Extraocular movements intact.      Conjunctiva/sclera: Conjunctivae normal.   Cardiovascular:      Rate and Rhythm: Normal rate and regular rhythm.      Heart sounds: Normal heart sounds.   Pulmonary:      Effort: Pulmonary effort is normal.      Breath sounds: Normal breath sounds.   Abdominal:      General: Abdomen is flat. There is no distension.      Palpations: Abdomen is soft.      Comments: Ostomy sites were pink, intact, non erythematous, without any fluctuance    Musculoskeletal:      Comments: Left lower " "extremity:  Ulcerations noted on stump from amputation of patients 1st digit. Stump of 2nd digit appeared to have purulent drainage at distal tip. Patient's 3rd digit appeared necrotic, with bleeding on lateral side between 3rd and 4th digit. Patient reported extreme tenderness to palpation over entire left foot. Pain did not extend to ankle or calf.    Right lower extremity:  Stump appears healthy and non-erythematous at site of hip disarticulation and amputation.   Neurological:      Mental Status: She is alert.          Relevant Results  Labs  Results from last 72 hours   Lab Units 09/18/24  0555   WBC AUTO x10*3/uL 13.3*   HEMOGLOBIN g/dL 12.7   HEMATOCRIT % 44.0   PLATELETS AUTO x10*3/uL 387     Results from last 72 hours   Lab Units 09/18/24  0555   SODIUM mmol/L 137   POTASSIUM mmol/L 3.8   CHLORIDE mmol/L 103   CO2 mmol/L 23   BUN mg/dL 9   CREATININE mg/dL 0.52   GLUCOSE mg/dL 196*   CALCIUM mg/dL 8.9   ANION GAP mmol/L 15   EGFR mL/min/1.73m*2 >90   PHOSPHORUS mg/dL 2.7     Results from last 72 hours   Lab Units 09/18/24  0555   ALK PHOS U/L 88   BILIRUBIN TOTAL mg/dL 0.4   PROTEIN TOTAL g/dL 6.6   ALT U/L 9   AST U/L 12   ALBUMIN g/dL 3.3*     Estimated Creatinine Clearance: 125 mL/min (by C-G formula based on SCr of 0.52 mg/dL).  C-Reactive Protein   Date Value Ref Range Status   12/06/2023 17.25 (H) <1.00 mg/dL Final     CRP   Date Value Ref Range Status   06/07/2022 0.96 mg/dL Final     Comment:     REF VALUE  < 1.00     09/17/2021 14.05 (A) mg/dL Final     Comment:     REF VALUE  < 1.00       Sedimentation Rate   Date Value Ref Range Status   09/17/2021 37 (H) 0 - 30 mm/h Final   07/25/2021 51 (H) 0 - 30 mm/h Final     No results found for: \"HIV1X2\", \"HIVCONF\", \"JDBHBH4TM\"  No results found for: \"HEPCABINIT\", \"HEPCAB\", \"HCVPCRQUANT\"  Microbiology    - Bcx collected this AM (9/18), NGTD    Assessment/Plan     Shirinmadelyn Slater is a 54F with past medical history of severe PAD s/p R hip disarticulation s/p " L CFA and EIA embolectomy 12/23 s/p multiple L toe amputations, T2DM complicated by diabetic neuropathy, HTN, HLD, renal and splenic thromboses on aspirin/plavix/coumadin, and current tobacco use who presents as a transfer from [facility] for management of L 3rd toe gangrene with concern of osteomyelitis. ID consulted to assist in management of toe infection.    #L 2nd toe gangrene  #Suspected osteomyelitis of 3rd phalanx  ::Pt with severe PAD + DM experienced trauma to toe 2 weeks ago, has been experiencing worsening of pain, redness, and swelling, as well as purulence, indicating infection of this wound    ::Visual inspection of foot today consistent with cellulitis of foot, radiographic evidence points to osteomyelitis as well    ::Pt on empiric coverage with vanc/zosyn, will await Bcx results to further guide abx regimen.  Also would send culture of discharge if present         Recommendations:  - Follow cultures  - Continue vanc/zosyn regimen  - Discussed probable need for direct definitive surgical treatment that might be TMA or BKA.    This patient was seen and discussed with attending physician, Dr. Dwaine Barron MD.  MOON MARTINS

## 2024-09-18 NOTE — CARE PLAN
The patient's goals for the shift include      The clinical goals for the shift include pt will remain HDS and report a tolerable pain level throughtout the shift

## 2024-09-18 NOTE — CONSULTS
"Nutrition Initial Assessment:   Nutrition Assessment    Reason for Assessment: Admission nursing screening    Patient is a 54 y.o. female presenting  for management of L 2nd toe gangrene with concern of osteomyelitis.     PMHx  severe PAD - s/p R hip disarticulation, s/p L CFA and EIA embolectomy, s/p multiple L toe amputations, T2DM complicated by diabetic neuropathy, HTN, HLD, renal and splenic thromboses     Nutrition History:  Energy Intake: Good > 75 %  Food and Nutrient History: Pt reports very good appetite and intake. Lives alone with [facility]. Receives [employer] benefits. States that her glucose is well controlled. She tries to eat healthy but occasionally snacks on fudgesicles or cookies.  She drinks a lot of water and states that she also drinks a Adrian cup (40oz) daily of her sweeter drinks such as juice, iced tea or lemonade. Eats a good protein source daily, but doesn't eat much red meat. Doesn't want to drink a supplement.   Food Allergies/Intolerances:  None  GI Symptoms: None  Oral Problems: None       Anthropometrics:  Height: 170.2 cm (5' 7\")   Weight: 90.6 kg (199 lb 12.8 oz)   BMI adjusted for hip disarticulation: 35.3   IBW/kg (Dietitian Calculated): 51.5 kg  Percent of IBW: 171 %       Weight History:   Wt Readings from Last 2 Encounters:   09/18/24 90.6 kg (199 lb 12.8 oz)   12/15/23 94.2 kg (207 lb 10.8 oz)     3.8% wt loss in 9 months - not significant        Nutrition Focused Physical Exam Findings:    Subcutaneous Fat Loss:   Orbital Fat Pads: Well nourished (slightly bulging fat pads)  Buccal Fat Pads: Well nourished (full, rounded cheeks)  Triceps: Well nourished (ample fat tissue)  Muscle Wasting:  Temporalis: Well nourished (well-defined muscle)  Pectoralis (Clavicular Region): Well nourished (clavicle not visible)  Deltoid/Trapezius: Well nourished (rounded appearance at arm, shoulder, neck)  Physical Findings:  Skin: Positive (foot wound)    Nutrition Significant Labs:  CBC Trend:   Results " from last 7 days   Lab Units 09/18/24  0555   WBC AUTO x10*3/uL 13.3*   RBC AUTO x10*6/uL 6.07*   HEMOGLOBIN g/dL 12.7   HEMATOCRIT % 44.0   MCV fL 73*   PLATELETS AUTO x10*3/uL 387    , BMP Trend:   Results from last 7 days   Lab Units 09/18/24  0555   GLUCOSE mg/dL 196*   CALCIUM mg/dL 8.9   SODIUM mmol/L 137   POTASSIUM mmol/L 3.8   CO2 mmol/L 23   CHLORIDE mmol/L 103   BUN mg/dL 9   CREATININE mg/dL 0.52    BG POCT trend:   Results from last 7 days   Lab Units 09/18/24  0726   POCT GLUCOSE mg/dL 191*        Nutrition Specific Medications:  Synthroid,       Dietary Orders (From admission, onward)       Start     Ordered    09/18/24 0255  Adult diet Regular  Diet effective now        Question:  Diet type  Answer:  Regular    09/18/24 0257                     Estimated Needs:   Total Energy Estimated Needs (kCal): 2000 kCal  Method for Estimating Needs: REE= 1540 x1.2-1.3  Total Protein Estimated Needs (g): 90 g  Method for Estimating Needs: 1.7gm/kg IBW  Total Fluid Estimated Needs (mL):  (per team)        Nutrition Diagnosis   Malnutrition Diagnosis  Patient has Malnutrition Diagnosis: No    Nutrition Diagnosis  Patient has Nutrition Diagnosis: Yes  Diagnosis Status (1): New  Nutrition Diagnosis 1: Increased nutrient needs  Related to (1): wound healing  As Evidenced by (1): foot osteomyelitis       Nutrition Interventions/Recommendations         Nutrition Prescription:  Individualized Nutrition Prescription Provided for : Consider changing diet to 75gm Carb. Encourage intake of high protein foods.        Nutrition Interventions:   Interventions: Meals and snacks  Goal: Consume >75% of meals      Nutrition Education:   Encouraged continued good intake with good intake of high protein foods.        Nutrition Monitoring and Evaluation   Food/Nutrient Related History Monitoring  Monitoring and Evaluation Plan: Energy intake, Amount of food  Criteria: PO intake will be adequate to meet estimated needs    Body  Composition/Growth/Weight History  Monitoring and Evaluation Plan: Weight  Criteria: Maintain stable weight    Biochemical Data, Medical Tests and Procedures  Monitoring and Evaluation Plan: Electrolyte/renal panel, Glucose/endocrine profile  Criteria: Labs WNL    Nutrition Focused Physical Findings  Monitoring and Evaluation Plan: Skin  Other: Wound healing         Time Spent (min): 60 minutes

## 2024-09-18 NOTE — H&P
Vascular Surgery History & Physical  Assessment/Plan   Shirin Slater is 54 y.o. female with history of severe PAD s/p R hip disarticulation s/p L CFA and EIA embolectomy 12/23 s/p multiple L toe amputations, T2DM complicated by diabetic neuropathy, HTN, HLD, renal and splenic thromboses on aspirin/plavix/coumadin, and current tobacco use who presents as a transfer from [facility] for management of L 2nd toe gangrene with concern of osteomyelitis.    Plan:  Admitted to vascular surgery  CBC, CMP, coagulation assay, type and screen  Blood cultures x2  IV vancomycin and zosyn  L foot XR  Podiatry consult  PVR of LLE  Further plan to be discussed this morning    D/w on call fellow Dr. Harry and attending Dr. Dunphy      Subjective   HPI:  Shirin Slater is 54 y.o. female with history of severe PAD s/p R hip disarticulation s/p L CFA and EIA embolectomy 12/23 s/p multiple L toe amputations, T2DM complicated by diabetic neuropathy, HTN, HLD, renal and splenic thromboses on aspirin/plavix/coumadin, epilepsy, and current tobacco use who presents as a transfer from [facility] for management of L 2nd toe gangrene with concern of osteomyelitis.    Patient states that she stubbed her L 2nd toe when exiting the bathtub about 3 weeks ago. She understood the implications of an injury with diabetes and vascular disease, and as such attempted to keep the wound sterile and dressed. About a week ago she noticed that the toe started to hurt more and turn dark red, prompting visit to PCP. She was started on PO doxycycline around 9/12 and took it for 5 days but with no improvement in symptoms. She reports that there was also some blood and purulent drainage from the area. She then presented to [place] ED for further care, and was subsequently transferred to [facility] for further vascular surgery care.    At this time, patient reports continued L foot pain and redness, especially with pain near the 2nd MP joint. Reports  reduced sensation of L 2nd toe, but that remaining 3rd toe has not been painful. Denies any fever/chills, malaise, pain of the calf or thigh, chest pain, shortness of breath, lightheadedness, recent seizures, nausea/vomiting. Patient notes that she sees a vascular surgeon out in [place], [place] and has followed with podiatry there as well.     PMH:   PAD  T2DM complicated by diabetic neuropathy  HTN  HLD  Renal and splenic thromboses  Epilepsy    PSH:   R hip disarticulation in setting of severe PAD  L CFA and EIA embolectomy   Multiple L toe amputations    Relevant Home Meds:   Current Outpatient Medications   Medication Instructions    acetaminophen (TYLENOL) 975 mg, oral, Every 6 hours    albuterol 90 mcg/actuation inhaler 2 puffs, inhalation, Every 6 hours PRN    atorvastatin (Lipitor) 80 mg tablet 1 tablet, oral, Nightly    busPIRone (BUSPAR) 10 mg, oral, In the morning    divalproex (Depakote) 500 mg EC tablet 1 tablet, oral, In the morning, and 2 tablets in the evening    DULoxetine (Cymbalta) 60 mg DR capsule 1 capsule, oral, Daily    empagliflozin (JARDIANCE) 10 mg, oral, Daily    enoxaparin (LOVENOX) 100 mg, subcutaneous, 2 times daily, Continue Lovenox as bridge to Warfarin until INR as at goal for 2 days in a row (INR 2-3) then may stop Lovenox therapy; INR 3.4 on 12/15    gabapentin (Neurontin) 400 mg capsule 1 capsule, oral, 3 times daily    levETIRAcetam (KEPPRA) 500 mg, oral, 2 times daily    levothyroxine (TIROSINT) 75 mcg, oral, Daily before breakfast, On empty stomach    loperamide (IMODIUM) 4 mg, oral, As needed, Don't exceed 4 tablets per day    melatonin 10 mg tablet 1 tablet, oral, Daily    methocarbamol (Robaxin) 500 mg tablet 1 tablet, oral, 3 times daily    metoprolol tartrate (Lopressor) 25 mg tablet 0.5 tablets, oral, 2 times daily    omeprazole OTC (PRILOSEC OTC) 20 mg, oral, Daily before breakfast    sennosides (SENOKOT) 17.2 mg, oral, 2 times daily    warfarin (Coumadin) 5 mg tablet Take as  directed per After Visit Summary.    warfarin (COUMADIN) 2.5 mg, oral, Once, Take as directed per After Visit Summary.      Allergies:  Aspartame, NSAIDs    SH/FH:   Lives at home alone, has aides who assist with ADLs 40hrs/week. [place], [place] area. Ambulates with wheelchair. Currently smoking, ~21 pack year history, 1/2ppd. Occasional alcohol use. Smokes marijuana.     ROS: 12 system negative except HPI    Objective   Vitals:  Heart Rate:  [91]   Temp:  [36.5 °C (97.7 °F)]   BP: (109)/(72)   SpO2:  [94 %]     Exam:  Constitutional: No acute distress  Neuro: AOx3, grossly motor intact. Limited sensation of distal L foot.  ENMT: moist mucous membranes  Head/neck: atraumatic  CV: no tachycardia  Pulm: non-labored on room air  GI: soft, non-tender, non-distended  Skin: warm and dry. Blanching erythema of L forefoot with tenderness to palpation.  Musculoskeletal: moving all extremities. S/p R hip disarticulation  Vascular: L foot - monophasic DP/PT, biphasic AT.     Labs and Imaging Pending    Gregg Barron MD  General Surgery PGY1  Vascular Surgery [id number]

## 2024-09-18 NOTE — PROGRESS NOTES
Pharmacy Medication History Review    Shirin Slater is a 54 y.o. female admitted for Gangrene of toe of left foot (Multi). Pharmacy reviewed the patient's [id number] medications and allergies for accuracy.    Medications ADDED:  Lidocaine patch  Plavix  Aspirin   Multivitamin   Tizanidine   Doxycycline   Medications CHANGED:  Depakote DR to ER  Melatonin nightly to PRN  Medications REMOVED:   none     The list below reflects the updated PTA list.   Prior to Admission Medications   Prescriptions Last Dose Informant   DULoxetine (Cymbalta) 60 mg DR capsule  Self   Sig: Take 1 capsule (60 mg) by mouth once daily.   acetaminophen (Tylenol) 325 mg tablet Past Week Self   Sig: Take 3 tablets (975 mg) by mouth every 6 hours.   albuterol 90 mcg/actuation inhaler  Self   Sig: Inhale 2 puffs every 6 hours if needed.   aspirin 81 mg EC tablet  Self   Sig: Take 1 tablet (81 mg) by mouth once daily.   atorvastatin (Lipitor) 80 mg tablet  Self   Sig: Take 1 tablet (80 mg) by mouth once daily at bedtime.   busPIRone (Buspar) 10 mg tablet Past Week Self   Sig: Take 1 tablet (10 mg) by mouth. In the morning   clopidogrel (Plavix) 75 mg tablet  Self   Sig: Take 1 tablet (75 mg) by mouth once daily.   divalproex (Depakote ER) 500 mg 24 hr tablet 9/17/2024 Self   Sig: Take 1 tablet (500 mg) by mouth. In the morning, and 2 tablets in the evening   doxycycline (Adoxa) 100 mg tablet     Sig: Take 1 tablet (100 mg) by mouth 2 times a day. Take with a full glass of water and do not lie down for at least 30 minutes after   empagliflozin (Jardiance) 10 mg 9/17/2024 Self   Sig: Take 1 tablet (10 mg) by mouth once daily.   enoxaparin (Lovenox) 100 mg/mL syringe Not Taking Self   Sig: Inject 1 mL (100 mg) under the skin 2 times a day. Continue Lovenox as bridge to Warfarin until INR as at goal for 2 days in a row (INR 2-3) then may stop Lovenox therapy; INR 3.4 on 12/15   Patient not taking: Reported on 9/18/2024   gabapentin  (Neurontin) 400 mg capsule  Self   Sig: Take 1 capsule (400 mg) by mouth 3 times a day.   levETIRAcetam (Keppra) 500 mg tablet  Self   Sig: Take 1 tablet (500 mg) by mouth 2 times a day.   levothyroxine (Tirosint) 75 mcg capsule 9/17/2024 Self   Sig: Take 1 capsule (75 mcg) by mouth once daily in the morning. Take before meals. On empty stomach   lidocaine 4 % patch  Self   Sig: Place 1 patch on the skin once daily as needed for mild pain (1 - 3). Remove & discard patch within 12 hours or as directed by MD.   loperamide (Imodium) 2 mg capsule 9/17/2024 Self   Sig: Take 2 capsules (4 mg) by mouth. As needed, Don't exceed 4 tablets per day   melatonin 10 mg tablet 9/17/2024 Self   Sig: Take 1 tablet (10 mg) by mouth as needed at bedtime (insomnia).   methocarbamol (Robaxin) 500 mg tablet  Self   Sig: Take 1 tablet (500 mg) by mouth 3 times a day.   metoprolol tartrate (Lopressor) 25 mg tablet  Self   Sig: Take 0.5 tablets (12.5 mg) by mouth 2 times a day.   multivitamin tablet  Self   Sig: Take 1 tablet by mouth once daily.   omeprazole OTC (PriLOSEC OTC) 20 mg EC tablet 9/17/2024 Self   Sig: Take 1 tablet (20 mg) by mouth once daily in the morning. Take before meals.   sennosides (Senokot) 8.6 mg tablet Not Taking Self   Sig: Take 2 tablets (17.2 mg) by mouth 2 times a day.   Patient not taking: Reported on 9/18/2024   tiZANidine (Zanaflex) 4 mg tablet  Self   Sig: Take 1 tablet (4 mg) by mouth every 6 hours if needed for muscle spasms.   warfarin (Coumadin) 2.5 mg tablet Not Taking    Sig: Take 1 tablet (2.5 mg) by mouth 1 time for 1 dose. Take as directed per After Visit Summary.   Patient not taking: Reported on 9/18/2024   warfarin (Coumadin) 5 mg tablet 9/17/2024 Self   Sig: Take as directed per After Visit Summary.      Facility-Administered Medications: None        The list below reflects the updated allergy list. Please review each documented allergy for additional clarification and justification.  Allergies   "Reviewed by Frederick AyalaD on 9/18/2024        Severity Reactions Comments    Aspartame Not Specified Seizure     Nsaids (non-steroidal Anti-inflammatory Drug) Not Specified Other Significant kidney injury (per patient report)            Patient accepts M2B at discharge.     Sources:   [facility] primary care note from 8/14  Pharmacy dispense history  OARRs  Patient interview (ok historian)- provided a medication list on her phone. States it is up to date    Additional Comments:  Most medications were last filled 5/6 (for 30 day supply). Per patient, the only pharmacy she uses for longer term medications is [facility]. Fill history is based off of that pharmacy's dispense report  Per pt, warfarin is managed by her PCP (Dr. Davis)      Lauren Anthony, PharmD  Transitions of Care Pharmacist  09/18/24     Secure Chat preferred   If no response call [phone] or [employer] \"Med Rec\"    "

## 2024-09-18 NOTE — CONSULTS
PODIATRY CONSULT NOTE    SERVICE DATE: 9/18/2024   SERVICE TIME:  0910    REASON FOR CONSULT: L 2nd toe necrosis/gangrene in setting of PAD.   REQUESTING PHYSICIAN: Gregg Barron MD   PRIMARY CARE PHYSICIAN: Juany Sanchez MD    Subjective   HPI:  Ms. Slater is a 54 y.o. female with history of severe PAD s/p R hip disarticulation s/p L CFA and EIA embolectomy 12/23 s/p multiple L toe amputations, T2DM complicated by diabetic neuropathy, HTN, HLD, renal and splenic thromboses on aspirin/plavix/coumadin, epilepsy, and current tobacco use who presents as a transfer from [facility] for management of L 2nd toe gangrene with concern of osteomyelitis.     Patient states that she stubbed her L 2nd toe when exiting the bathtub about 3 weeks ago. She understood the implications of an injury with diabetes and vascular disease, and as such attempted to keep the wound sterile and dressed. About a week ago she noticed that the toe started to hurt more and turn dark red, prompting visit to PCP. She was started on PO doxycycline around 9/12 and took it for 5 days but with no improvement in symptoms. She reports that there was also some blood and purulent drainage from the area. She then presented to [facility] ED for further care, and was subsequently transferred to [facility] for further vascular surgery care.     At this time, patient reports continued L foot pain and redness, especially with pain near the 2nd MP joint. Reports reduced sensation of L 2nd toe, but that remaining 3rd toe has not been painful. Denies any fever/chills, malaise, pain of the calf or thigh, chest pain, shortness of breath, lightheadedness, recent seizures, nausea/vomiting. Patient notes that she sees a vascular surgeon out in [place], [place] and has followed with podiatry there as well.     Podiatry was consulted for L 2nd toe necrosis/gangrene in setting of PAD. Pt notes some increased blood sugars this past week. Notes mild drainage  The urinalysis did not show signs of any infection    Vaginitis Care:  - Good hygiene: wiping front to back  - Cotton undergarments   - Avoid bubble baths  - Baking soda soaks -- twice daily for the next two days-- soak her bottom in the bathtub filled a small amount with warm water and add baking soda. This will help remove buildup of irritants. You can use a small amount of 1% over the counter hydrocortisone cream after these soaks, for the next two days only. If symptoms persist, please follow up. from the medial aspect. Denies current constitutional symptoms. States she is aware that she is going to need amputation.     ROS: 10-point review of systems was performed and is otherwise negative except as noted in HPI.  PMH: Reviewed/documented below.  PSH:  Noncontributory except per HPI   FH: Reviewed and noncontributory   SOCIAL:  Reviewed/documented below.  ALLERGIES: Reviewed/documented below.  MEDS: Reviewed/documented below.  VS: Reviewed/documented below.    Past Medical History:   Diagnosis Date    Anxiety     Arthritis     Cancer (Multi)     Cellulitis     Diabetes mellitus (Multi)     Disease of thyroid gland     Diverticulitis     Epilepsy, unspecified, not intractable, without status epilepticus (Multi)     Epilepsy    HLD (hyperlipidemia)     Hx of blood clots     Hypertension     PAD (peripheral artery disease) (CMS-HCC)     Peripheral vascular disease, unspecified (CMS-[facility]) 2022    PAD (peripheral artery disease)    PTSD (post-traumatic stress disorder)     Uterine cancer (Multi)      Past Surgical History:   Procedure Laterality Date    AMPUTATION FOOT / TOE Left     AMPUTATION FOOT / TOE Left     ANTERIOR CRUCIATE LIGAMENT REPAIR Left      SECTION, CLASSIC  1989    COLON SURGERY  2019    Ressection    CT AORTA AND BILATERAL ILIOFEMORAL RUNOFF ANGIOGRAM W AND/OR WO IV CONTRAST  2021    CT AORTA AND BILATERAL ILIOFEMORAL RUNOFF ANGIOGRAM W AND/OR WO IV CONTRAST 2021 [facility]    CT AORTA AND BILATERAL ILIOFEMORAL RUNOFF ANGIOGRAM W AND/OR WO IV CONTRAST  2022    CT AORTA AND BILATERAL ILIOFEMORAL RUNOFF ANGIOGRAM W AND/OR WO IV CONTRAST 2022 [facility] CLINICAL LEGACY    HYSTERECTOMY      IR ANGIOGRAM AORTA ABDOMEN  2021    IR ANGIOGRAM AORTA ABDOMEN 2021 [facility]    LEG AMPUTATION Right     OTHER SURGICAL HISTORY  2021    Arterial angioplasty of lower extremity    TONSILECTOMY, ADENOIDECTOMY, BILATERAL  MYRINGOTOMY AND TUBES  1971    VASCULAR SURGERY Right 2019    Feet w/ ICU stay    VASCULAR SURGERY Right 2020    VASCULAR SURGERY Right 2020    Knee     Family History   Problem Relation Name Age of Onset    Diverticulitis Daughter      Heart failure Other          Several family members without direct blood connection     Social History     Tobacco Use    Smoking status: Every Day     Current packs/day: 1.00     Average packs/day: 1 pack/day for 43.7 years (43.7 ttl pk-yrs)     Types: Cigarettes     Start date: 1981    Smokeless tobacco: Never    Tobacco comments:     Currest smoker at around 0.5PPD. Peak at 1.5-2PPD    Substance Use Topics    Alcohol use: Yes     Comment: Rare    Drug use: Yes     Types: Marijuana      Medications Prior to Admission   Medication Sig Dispense Refill Last Dose    acetaminophen (Tylenol) 325 mg tablet Take 3 tablets (975 mg) by mouth every 6 hours.   Past Week    busPIRone (Buspar) 10 mg tablet Take 1 tablet (10 mg) by mouth. In the morning   Past Week    divalproex (Depakote) 500 mg EC tablet Take 1 tablet (500 mg) by mouth. In the morning, and 2 tablets in the evening   9/17/2024    empagliflozin (Jardiance) 10 mg Take 1 tablet (10 mg) by mouth once daily.   9/17/2024    enoxaparin (Lovenox) 100 mg/mL syringe Inject 1 mL (100 mg) under the skin 2 times a day. Continue Lovenox as bridge to Warfarin until INR as at goal for 2 days in a row (INR 2-3) then may stop Lovenox therapy; INR 3.4 on 12/15   9/17/2024    levothyroxine (Tirosint) 75 mcg capsule Take 1 capsule (75 mcg) by mouth once daily in the morning. Take before meals. On empty stomach   9/17/2024    loperamide (Imodium) 2 mg capsule Take 2 capsules (4 mg) by mouth. As needed, Don't exceed 4 tablets per day   9/17/2024    melatonin 10 mg tablet Take 1 tablet (10 mg) by mouth once daily.   9/17/2024    omeprazole OTC (PriLOSEC OTC) 20 mg EC tablet Take 1 tablet (20 mg) by mouth once daily in the morning. Take before meals.    9/17/2024    sennosides (Senokot) 8.6 mg tablet Take 2 tablets (17.2 mg) by mouth 2 times a day.   Past Month    warfarin (Coumadin) 5 mg tablet Take as directed per After Visit Summary.   9/17/2024    albuterol 90 mcg/actuation inhaler Inhale 2 puffs every 6 hours if needed.       atorvastatin (Lipitor) 80 mg tablet Take 1 tablet (80 mg) by mouth once daily at bedtime.       DULoxetine (Cymbalta) 60 mg DR capsule Take 1 capsule (60 mg) by mouth once daily.       gabapentin (Neurontin) 400 mg capsule Take 1 capsule (400 mg) by mouth 3 times a day.       levETIRAcetam (Keppra) 500 mg tablet Take 1 tablet (500 mg) by mouth 2 times a day.       methocarbamol (Robaxin) 500 mg tablet Take 1 tablet (500 mg) by mouth 3 times a day.       metoprolol tartrate (Lopressor) 25 mg tablet Take 0.5 tablets (12.5 mg) by mouth 2 times a day.       warfarin (Coumadin) 2.5 mg tablet Take 1 tablet (2.5 mg) by mouth 1 time for 1 dose. Take as directed per After Visit Summary.           Medications:  Scheduled Meds: acetaminophen, 650 mg, oral, q6h  atorvastatin, 80 mg, oral, Nightly  busPIRone, 10 mg, oral, q AM  divalproex, 500 mg, oral, q8h NINO  DULoxetine, 60 mg, oral, q PM  levETIRAcetam, 500 mg, oral, BID  levothyroxine, 75 mcg, oral, q AM  magnesium sulfate, 2 g, intravenous, Once  metoprolol tartrate, 12.5 mg, oral, BID  piperacillin-tazobactam, 3.375 g, intravenous, q6h  vancomycin, 1,000 mg, intravenous, Once  vancomycin, 1,000 mg, intravenous, Once      Continuous Infusions:    PRN Meds: PRN medications: melatonin, oxyCODONE, oxyCODONE, vancomycin    Allergies as of 09/17/2024 - Reviewed 12/09/2023   Allergen Reaction Noted    Aspartame Seizure 12/06/2023    Nsaids (non-steroidal anti-inflammatory drug) Other 12/06/2023            Objective   PHYSICAL EXAM:  Physical Exam Performed:  Vitals:    09/18/24 0726   BP: 103/57   Pulse: 83   Resp: 18   Temp: 36.5 °C (97.7 °F)   SpO2: 93%     Body mass index is 31.29 kg/m².    Patient  is AOx3 and in no acute distress. Patient is alert and cooperative. Sitting comfortably in bed with dressing clean, dry and intact.     Left focused exam.   Vascular: Non-palpable DP/PT pulses. Mild non-pitting edema noted. Hair growth absent B/L. Temperature is cool to cool from tibial tuberosity to foot with focal decrease in temperature to digits 2 and 3. Necrosis noted to entire 2nd digit and some discoloration noted to 3rd digit. No lymphatic streaking noted     Musculoskeletal: Gross active and passive ROM intact to age and activity level. Moves all extremities spontaneously. 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     Neurological: Intact light touch sensation. Pain stimuli intact. Denies any numbness, burning or tingling.    Dermatologic: Skin appears diffusely xerotic. No rashes or nodules noted B/L. No hyperkeratotic tissue noted B/L.     Wound: left foot 1st ray amputation site  Measurements: 0.5cm x 0.8cm x 1.0cm  Mixed wound base of granular and necrotic tissue tissue.   Mild serosanguinous drainage with some seropurulence as wound base.   Mild vinny-wound maceration.   Minimal vinny-wound erythema.   No evidence of ascending cellulitis or lymphangitis.  No palpable fluctuance. No malodor. No increased warmth.   Moderate probe to bone or deep structures within the wound base.   No tunneling or tracking.   Mild undermining. Skin edges irregular but intact.    LABS:   Results for orders placed or performed during the hospital encounter of 09/18/24 (from the past 24 hour(s))   Blood Culture    Specimen: Peripheral Venipuncture; Blood culture   Result Value Ref Range    Blood Culture Loaded on Instrument - Culture in progress    Type And Screen   Result Value Ref Range    ABO TYPE A     Rh TYPE POS     ANTIBODY SCREEN NEG    CBC   Result Value Ref Range    WBC 13.3 (H) 4.4 - 11.3 x10*3/uL    nRBC 0.0 0.0 - 0.0 /100 WBCs    RBC 6.07 (H) 4.00 - 5.20 x10*6/uL    Hemoglobin 12.7 12.0 - 16.0  g/dL    Hematocrit 44.0 36.0 - 46.0 %    MCV 73 (L) 80 - 100 fL    MCH 20.9 (L) 26.0 - 34.0 pg    MCHC 28.9 (L) 32.0 - 36.0 g/dL    RDW 22.6 (H) 11.5 - 14.5 %    Platelets 387 150 - 450 x10*3/uL   Comprehensive metabolic panel   Result Value Ref Range    Glucose 196 (H) 74 - 99 mg/dL    Sodium 137 136 - 145 mmol/L    Potassium 3.8 3.5 - 5.3 mmol/L    Chloride 103 98 - 107 mmol/L    Bicarbonate 23 21 - 32 mmol/L    Anion Gap 15 10 - 20 mmol/L    Urea Nitrogen 9 6 - 23 mg/dL    Creatinine 0.52 0.50 - 1.05 mg/dL    eGFR >90 >60 mL/min/1.73m*2    Calcium 8.9 8.6 - 10.6 mg/dL    Albumin 3.3 (L) 3.4 - 5.0 g/dL    Alkaline Phosphatase 88 33 - 110 U/L    Total Protein 6.6 6.4 - 8.2 g/dL    AST 12 9 - 39 U/L    Bilirubin, Total 0.4 0.0 - 1.2 mg/dL    ALT 9 7 - 45 U/L   Magnesium   Result Value Ref Range    Magnesium 1.71 1.60 - 2.40 mg/dL   Phosphorus   Result Value Ref Range    Phosphorus 2.7 2.5 - 4.9 mg/dL   Coagulation Screen   Result Value Ref Range    Protime 57.2 (HH) 9.8 - 12.8 seconds    INR 5.0 (HH) 0.9 - 1.1    aPTT 72 (H) 27 - 38 seconds   POCT GLUCOSE   Result Value Ref Range    POCT Glucose 191 (H) 74 - 99 mg/dL      Lab Results   Component Value Date    HGBA1C 9.2 (H) 12/06/2023      Lab Results   Component Value Date    CRP 17.25 (H) 12/06/2023      Lab Results   Component Value Date    SEDRATE 37 (H) 09/17/2021        Results from last 7 days   Lab Units 09/18/24  0555   WBC AUTO x10*3/uL 13.3*   RBC AUTO x10*6/uL 6.07*   HEMOGLOBIN g/dL 12.7   HEMATOCRIT % 44.0     Results from last 7 days   Lab Units 09/18/24  0555   SODIUM mmol/L 137   POTASSIUM mmol/L 3.8   CHLORIDE mmol/L 103   CO2 mmol/L 23   BUN mg/dL 9   CREATININE mg/dL 0.52   CALCIUM mg/dL 8.9   PHOSPHORUS mg/dL 2.7   MAGNESIUM mg/dL 1.71   BILIRUBIN TOTAL mg/dL 0.4   ALT U/L 9   AST U/L 12           IMAGING REVIEW:  XR foot left 3+ views    Result Date: 9/18/2024  Interpreted By:  Mamadou Burger and Beyersdorf Conner STUDY: XR FOOT LEFT 3+  VIEWS; ;  9/18/2024 4:59 am   INDICATION: Signs/Symptoms:L toe gangrene, concern for osteomyelitis.     COMPARISON: None.   ACCESSION NUMBER(S): [id number]   ORDERING CLINICIAN: KAITLYN DUNPHY   FINDINGS: Three views of the left foot are provided.   There has been amputation of the 1st and 5th digits at the mid metatarsal shaft. Amputation of the 2nd digit at the tarsometatarsal joint.   Within the distal phalanx of the 3rd digit, there is a extensive cortical erosion with internal irregular margins, concerning for osteomyelitis. Soft tissue edema overlying the distal 3rd digit. There is also lucency over the medial and plantar aspect of the 2nd metatarsal head.       Erosive cortical changes and irregular internal margins of the 3rd digit distal phalanx consistent with osteomyelitis. MRI can be performed to evaluate extent of disease Soft tissue edema about the 3rd digit   I personally reviewed the image(s)/study and resident interpretation. I agree with the findings as stated by resident Casey Javed. Data analyzed and images interpreted at [facility], [place], [place].   MACRO: None   Signed by: Mamadou Burger 9/18/2024 9:51 AM Dictation workstation:   [id number]            Assessment/Plan   ASSESSMENT & PLAN:    #non pressure ulceration down to and including bone, left foot  #OM, left foot  #Gangrene, left toes 2&3  #Acquired absence of RLE  #Acquired absence of left digit 1,4,5,   #PAD  #Diabetes mellitus  - Patient was seen and evaluated; all findings were discussed and all questions were answered to patient's satisfaction.  - Charts, labs, vitals and imaging all reviewed.   - Imaging: foot XR--Erosive cortical changes and irregular internal margins of the 3rd  digit distal phalanx consistent with osteomyelitis. Soft tissue edema about the 3rd digit  - Labs: WBC 13.3 (downtrend from 14.0), INR 5.0, protime 57.2, hgb 6.07, ESR 37  - PVRs: CTA pending per vascular  team  - Wound culture: collected bedside today  - Blood culture: in progress    Plan:  - Abx: per primary, recommend broad spectrum until cx available  - Pain Regimen: per primary  - Likely to require surgical intervention with debridement of soft tissue and possibly bone this hospital visit. Discussed this in detail with patient as well as risks and benefits. Will defer any podiatric surgical plans until vascular workup and intervention is completed. Discussed with patient she would need to undergo TMA at minimum pending vascular intervention and distal lower extremity flow status.   - Dressings: betadine soaked gauze, dry gauze, kerlix, tape.   - Nursing staff is able to change/reinforce dressing if & as necessary until next day’s dressing change. Thank you.  - Podiatry will continue to follow while in house.    DVT ppx: per primary, INR 5.0, protime 57.2  Weightbearing: PT NWB at baseline    Case to be discussed with attending, A&P above reflects a tentative plan. Please await for the final signature from the attending physician on service.    Mylene Angela DPM PGY-3  Podiatric Medicine & Surgery  Please Kenia message me with any questions or concerns.            SIGNATURE: Mylene Angela DPM PATIENT NAME: Shirin Slater   DATE: September 18, 2024 MRN: [id number]   TIME: 9:54 AM CONTACT: Haiku message

## 2024-09-18 NOTE — Clinical Note
The left DP pulse is detected w/ doppler. The left PT pulse is detected w/ doppler. The left radial pulse is 1+.

## 2024-09-19 ENCOUNTER — ANESTHESIA EVENT (OUTPATIENT)
Dept: OPERATING ROOM | Facility: HOSPITAL | Age: 54
End: 2024-09-19
Payer: COMMERCIAL

## 2024-09-19 ENCOUNTER — APPOINTMENT (OUTPATIENT)
Dept: CARDIOLOGY | Facility: HOSPITAL | Age: 54
End: 2024-09-19
Payer: COMMERCIAL

## 2024-09-19 PROBLEM — I70.245 ATHEROSCLEROSIS OF NATIVE ARTERIES OF LEFT LEG WITH ULCERATION OF OTHER PART OF FOOT (MULTI): Status: ACTIVE | Noted: 2024-09-17

## 2024-09-19 PROBLEM — Z79.01 ANTICOAGULANT LONG-TERM USE: Status: ACTIVE | Noted: 2024-09-19

## 2024-09-19 PROBLEM — I73.9 PAD (PERIPHERAL ARTERY DISEASE) (CMS-HCC): Status: ACTIVE | Noted: 2024-09-17

## 2024-09-19 PROBLEM — Z01.810 PREOPERATIVE CARDIOVASCULAR EXAMINATION: Status: ACTIVE | Noted: 2024-09-17

## 2024-09-19 LAB
ANION GAP SERPL CALC-SCNC: 14 MMOL/L (ref 10–20)
APTT PPP: 67 SECONDS (ref 27–38)
BUN SERPL-MCNC: 7 MG/DL (ref 6–23)
CALCIUM SERPL-MCNC: 8.8 MG/DL (ref 8.6–10.6)
CHLORIDE SERPL-SCNC: 102 MMOL/L (ref 98–107)
CO2 SERPL-SCNC: 26 MMOL/L (ref 21–32)
CREAT SERPL-MCNC: 0.74 MG/DL (ref 0.5–1.05)
EGFRCR SERPLBLD CKD-EPI 2021: >90 ML/MIN/1.73M*2
EJECTION FRACTION APICAL 4 CHAMBER: 73.4
EJECTION FRACTION: 68 %
ERYTHROCYTE [DISTWIDTH] IN BLOOD BY AUTOMATED COUNT: 22.9 % (ref 11.5–14.5)
GLUCOSE BLD MANUAL STRIP-MCNC: 177 MG/DL (ref 74–99)
GLUCOSE BLD MANUAL STRIP-MCNC: 219 MG/DL (ref 74–99)
GLUCOSE BLD MANUAL STRIP-MCNC: 247 MG/DL (ref 74–99)
GLUCOSE SERPL-MCNC: 165 MG/DL (ref 74–99)
HCT VFR BLD AUTO: 45.1 % (ref 36–46)
HGB BLD-MCNC: 12.7 G/DL (ref 12–16)
INR PPP: 4.3 (ref 0.9–1.1)
LEFT ATRIUM VOLUME AREA LENGTH INDEX BSA: 24.3 ML/M2
LEFT VENTRICLE INTERNAL DIMENSION DIASTOLE: 4.8 CM (ref 3.5–6)
LEFT VENTRICULAR OUTFLOW TRACT DIAMETER: 2.3 CM
MAGNESIUM SERPL-MCNC: 1.75 MG/DL (ref 1.6–2.4)
MCH RBC QN AUTO: 20.7 PG (ref 26–34)
MCHC RBC AUTO-ENTMCNC: 28.2 G/DL (ref 32–36)
MCV RBC AUTO: 73 FL (ref 80–100)
MITRAL VALVE E/A RATIO: 1.35
NRBC BLD-RTO: 0 /100 WBCS (ref 0–0)
PHOSPHATE SERPL-MCNC: 3.8 MG/DL (ref 2.5–4.9)
PLATELET # BLD AUTO: 349 X10*3/UL (ref 150–450)
POTASSIUM SERPL-SCNC: 3.9 MMOL/L (ref 3.5–5.3)
PROTHROMBIN TIME: 48.8 SECONDS (ref 9.8–12.8)
RBC # BLD AUTO: 6.15 X10*6/UL (ref 4–5.2)
RIGHT VENTRICLE FREE WALL PEAK S': 15.9 CM/S
SODIUM SERPL-SCNC: 138 MMOL/L (ref 136–145)
TRICUSPID ANNULAR PLANE SYSTOLIC EXCURSION: 1.9 CM
UFH PPP CHRO-ACNC: 0.1 IU/ML
VANCOMYCIN SERPL-MCNC: 41 UG/ML (ref 5–20)
WBC # BLD AUTO: 10.8 X10*3/UL (ref 4.4–11.3)

## 2024-09-19 PROCEDURE — 2500000002 HC RX 250 W HCPCS SELF ADMINISTERED DRUGS (ALT 637 FOR MEDICARE OP, ALT 636 FOR OP/ED)

## 2024-09-19 PROCEDURE — 93308 TTE F-UP OR LMTD: CPT

## 2024-09-19 PROCEDURE — 84100 ASSAY OF PHOSPHORUS: CPT

## 2024-09-19 PROCEDURE — XXE2X19 MEASUREMENT OF CARDIAC OUTPUT, COMPUTER-AIDED ASSESSMENT, NEW TECHNOLOGY GROUP 9: ICD-10-PCS

## 2024-09-19 PROCEDURE — 85520 HEPARIN ASSAY: CPT

## 2024-09-19 PROCEDURE — 93325 DOPPLER ECHO COLOR FLOW MAPG: CPT

## 2024-09-19 PROCEDURE — 2500000001 HC RX 250 WO HCPCS SELF ADMINISTERED DRUGS (ALT 637 FOR MEDICARE OP)

## 2024-09-19 PROCEDURE — 80202 ASSAY OF VANCOMYCIN: CPT | Performed by: STUDENT IN AN ORGANIZED HEALTH CARE EDUCATION/TRAINING PROGRAM

## 2024-09-19 PROCEDURE — 2500000001 HC RX 250 WO HCPCS SELF ADMINISTERED DRUGS (ALT 637 FOR MEDICARE OP): Performed by: NURSE PRACTITIONER

## 2024-09-19 PROCEDURE — 80048 BASIC METABOLIC PNL TOTAL CA: CPT

## 2024-09-19 PROCEDURE — 82947 ASSAY GLUCOSE BLOOD QUANT: CPT

## 2024-09-19 PROCEDURE — 85027 COMPLETE CBC AUTOMATED: CPT

## 2024-09-19 PROCEDURE — 2500000004 HC RX 250 GENERAL PHARMACY W/ HCPCS (ALT 636 FOR OP/ED)

## 2024-09-19 PROCEDURE — 1200000002 HC GENERAL ROOM WITH TELEMETRY DAILY

## 2024-09-19 PROCEDURE — 36415 COLL VENOUS BLD VENIPUNCTURE: CPT | Performed by: STUDENT IN AN ORGANIZED HEALTH CARE EDUCATION/TRAINING PROGRAM

## 2024-09-19 PROCEDURE — 93321 DOPPLER ECHO F-UP/LMTD STD: CPT

## 2024-09-19 PROCEDURE — 83735 ASSAY OF MAGNESIUM: CPT

## 2024-09-19 PROCEDURE — 85730 THROMBOPLASTIN TIME PARTIAL: CPT

## 2024-09-19 PROCEDURE — 36415 COLL VENOUS BLD VENIPUNCTURE: CPT

## 2024-09-19 PROCEDURE — 2500000004 HC RX 250 GENERAL PHARMACY W/ HCPCS (ALT 636 FOR OP/ED): Performed by: STUDENT IN AN ORGANIZED HEALTH CARE EDUCATION/TRAINING PROGRAM

## 2024-09-19 PROCEDURE — 99221 1ST HOSP IP/OBS SF/LOW 40: CPT | Performed by: STUDENT IN AN ORGANIZED HEALTH CARE EDUCATION/TRAINING PROGRAM

## 2024-09-19 PROCEDURE — 85610 PROTHROMBIN TIME: CPT

## 2024-09-19 RX ORDER — HEPARIN SODIUM 10000 [USP'U]/100ML
0-4000 INJECTION, SOLUTION INTRAVENOUS CONTINUOUS
Status: DISCONTINUED | OUTPATIENT
Start: 2024-09-19 | End: 2024-09-26

## 2024-09-19 RX ORDER — DEXTROSE 50 % IN WATER (D50W) INTRAVENOUS SYRINGE
12.5
Status: DISCONTINUED | OUTPATIENT
Start: 2024-09-19 | End: 2024-10-04 | Stop reason: HOSPADM

## 2024-09-19 RX ORDER — PANTOPRAZOLE SODIUM 40 MG/1
40 TABLET, DELAYED RELEASE ORAL
Status: DISCONTINUED | OUTPATIENT
Start: 2024-09-20 | End: 2024-09-19

## 2024-09-19 RX ORDER — INSULIN LISPRO 100 [IU]/ML
0-10 INJECTION, SOLUTION INTRAVENOUS; SUBCUTANEOUS
Status: DISCONTINUED | OUTPATIENT
Start: 2024-09-19 | End: 2024-10-04 | Stop reason: HOSPADM

## 2024-09-19 RX ORDER — DEXTROSE 50 % IN WATER (D50W) INTRAVENOUS SYRINGE
25
Status: DISCONTINUED | OUTPATIENT
Start: 2024-09-19 | End: 2024-10-04 | Stop reason: HOSPADM

## 2024-09-19 RX ORDER — ESOMEPRAZOLE MAGNESIUM 40 MG/1
40 GRANULE, DELAYED RELEASE ORAL
Status: DISCONTINUED | OUTPATIENT
Start: 2024-09-20 | End: 2024-09-19

## 2024-09-19 RX ORDER — HEPARIN SODIUM 10000 [USP'U]/100ML
0-4500 INJECTION, SOLUTION INTRAVENOUS CONTINUOUS
Status: DISCONTINUED | OUTPATIENT
Start: 2024-09-19 | End: 2024-09-19

## 2024-09-19 RX ORDER — PANTOPRAZOLE SODIUM 40 MG/10ML
40 INJECTION, POWDER, LYOPHILIZED, FOR SOLUTION INTRAVENOUS
Status: DISCONTINUED | OUTPATIENT
Start: 2024-09-20 | End: 2024-09-19

## 2024-09-19 RX ORDER — PANTOPRAZOLE SODIUM 40 MG/1
40 TABLET, DELAYED RELEASE ORAL
Status: DISCONTINUED | OUTPATIENT
Start: 2024-09-19 | End: 2024-10-04 | Stop reason: HOSPADM

## 2024-09-19 RX ADMIN — OXYCODONE HYDROCHLORIDE 10 MG: 5 TABLET ORAL at 04:24

## 2024-09-19 RX ADMIN — PHYTONADIONE 5 MG: 10 INJECTION, EMULSION INTRAMUSCULAR; INTRAVENOUS; SUBCUTANEOUS at 22:20

## 2024-09-19 RX ADMIN — DIVALPROEX SODIUM 500 MG: 500 TABLET, DELAYED RELEASE ORAL at 13:37

## 2024-09-19 RX ADMIN — PIPERACILLIN SODIUM AND TAZOBACTAM SODIUM 3.38 G: 3; .375 INJECTION, SOLUTION INTRAVENOUS at 16:03

## 2024-09-19 RX ADMIN — BUSPIRONE HYDROCHLORIDE 10 MG: 10 TABLET ORAL at 08:10

## 2024-09-19 RX ADMIN — ACETAMINOPHEN 650 MG: 325 TABLET, FILM COATED ORAL at 04:24

## 2024-09-19 RX ADMIN — PIPERACILLIN SODIUM AND TAZOBACTAM SODIUM 3.38 G: 3; .375 INJECTION, SOLUTION INTRAVENOUS at 10:20

## 2024-09-19 RX ADMIN — ACETAMINOPHEN 650 MG: 325 TABLET, FILM COATED ORAL at 21:43

## 2024-09-19 RX ADMIN — OXYCODONE HYDROCHLORIDE 10 MG: 5 TABLET ORAL at 16:24

## 2024-09-19 RX ADMIN — PIPERACILLIN SODIUM AND TAZOBACTAM SODIUM 3.38 G: 3; .375 INJECTION, SOLUTION INTRAVENOUS at 08:10

## 2024-09-19 RX ADMIN — CLOPIDOGREL BISULFATE 75 MG: 75 TABLET ORAL at 08:10

## 2024-09-19 RX ADMIN — HEPARIN SODIUM 1000 UNITS/HR: 10000 INJECTION, SOLUTION INTRAVENOUS at 17:40

## 2024-09-19 RX ADMIN — PIPERACILLIN SODIUM AND TAZOBACTAM SODIUM 3.38 G: 3; .375 INJECTION, SOLUTION INTRAVENOUS at 22:56

## 2024-09-19 RX ADMIN — ATORVASTATIN CALCIUM 80 MG: 80 TABLET, FILM COATED ORAL at 21:43

## 2024-09-19 RX ADMIN — OXYCODONE HYDROCHLORIDE 10 MG: 5 TABLET ORAL at 08:30

## 2024-09-19 RX ADMIN — LEVOTHYROXINE SODIUM 75 MCG: 0.07 TABLET ORAL at 05:49

## 2024-09-19 RX ADMIN — ASPIRIN 81 MG: 81 TABLET, COATED ORAL at 08:10

## 2024-09-19 RX ADMIN — DIVALPROEX SODIUM 500 MG: 500 TABLET, DELAYED RELEASE ORAL at 21:44

## 2024-09-19 RX ADMIN — LOPERAMIDE HYDROCHLORIDE 4 MG: 2 CAPSULE ORAL at 08:10

## 2024-09-19 RX ADMIN — DULOXETINE HYDROCHLORIDE 60 MG: 60 CAPSULE, DELAYED RELEASE ORAL at 21:43

## 2024-09-19 RX ADMIN — OXYCODONE HYDROCHLORIDE 10 MG: 5 TABLET ORAL at 12:30

## 2024-09-19 RX ADMIN — LEVETIRACETAM 500 MG: 500 TABLET, FILM COATED ORAL at 21:44

## 2024-09-19 RX ADMIN — DIVALPROEX SODIUM 500 MG: 500 TABLET, DELAYED RELEASE ORAL at 05:49

## 2024-09-19 RX ADMIN — VANCOMYCIN HYDROCHLORIDE 1500 MG: 1.5 INJECTION, POWDER, LYOPHILIZED, FOR SOLUTION INTRAVENOUS at 13:53

## 2024-09-19 RX ADMIN — METOPROLOL TARTRATE 12.5 MG: 25 TABLET, FILM COATED ORAL at 21:44

## 2024-09-19 RX ADMIN — METOPROLOL TARTRATE 12.5 MG: 25 TABLET, FILM COATED ORAL at 08:10

## 2024-09-19 RX ADMIN — OXYCODONE HYDROCHLORIDE 10 MG: 5 TABLET ORAL at 00:19

## 2024-09-19 RX ADMIN — VANCOMYCIN HYDROCHLORIDE 1500 MG: 1.5 INJECTION, POWDER, LYOPHILIZED, FOR SOLUTION INTRAVENOUS at 05:49

## 2024-09-19 RX ADMIN — ACETAMINOPHEN 650 MG: 325 TABLET, FILM COATED ORAL at 10:20

## 2024-09-19 RX ADMIN — LEVETIRACETAM 500 MG: 500 TABLET, FILM COATED ORAL at 08:10

## 2024-09-19 RX ADMIN — PIPERACILLIN SODIUM AND TAZOBACTAM SODIUM 3.38 G: 3; .375 INJECTION, SOLUTION INTRAVENOUS at 01:27

## 2024-09-19 RX ADMIN — PANTOPRAZOLE SODIUM 40 MG: 40 TABLET, DELAYED RELEASE ORAL at 13:52

## 2024-09-19 RX ADMIN — INSULIN LISPRO 2 UNITS: 100 INJECTION, SOLUTION INTRAVENOUS; SUBCUTANEOUS at 17:40

## 2024-09-19 RX ADMIN — ACETAMINOPHEN 650 MG: 325 TABLET, FILM COATED ORAL at 16:03

## 2024-09-19 RX ADMIN — OXYCODONE HYDROCHLORIDE 10 MG: 5 TABLET ORAL at 21:22

## 2024-09-19 ASSESSMENT — COGNITIVE AND FUNCTIONAL STATUS - GENERAL
DAILY ACTIVITIY SCORE: 22
DRESSING REGULAR LOWER BODY CLOTHING: A LITTLE
CLIMB 3 TO 5 STEPS WITH RAILING: TOTAL
WALKING IN HOSPITAL ROOM: TOTAL
HELP NEEDED FOR BATHING: A LITTLE
WALKING IN HOSPITAL ROOM: TOTAL
CLIMB 3 TO 5 STEPS WITH RAILING: TOTAL
MOBILITY SCORE: 16
STANDING UP FROM CHAIR USING ARMS: A LOT
MOBILITY SCORE: 16
STANDING UP FROM CHAIR USING ARMS: A LOT
DAILY ACTIVITIY SCORE: 24

## 2024-09-19 ASSESSMENT — PAIN SCALES - GENERAL
PAINLEVEL_OUTOF10: 5 - MODERATE PAIN
PAINLEVEL_OUTOF10: 2
PAINLEVEL_OUTOF10: 10 - WORST POSSIBLE PAIN
PAINLEVEL_OUTOF10: 9
PAINLEVEL_OUTOF10: 8
PAINLEVEL_OUTOF10: 9
PAINLEVEL_OUTOF10: 2

## 2024-09-19 NOTE — CARE PLAN
The patient's goals for the shift include      The clinical goals for the shift include patients pain will be managed throughout the shift    Over the shift, the patient did not make progress toward the following goals. Barriers to progression include decreased mobility, wounds. Recommendations to address these barriers include dressing changes per podiatry, pain mgmt.

## 2024-09-19 NOTE — CONSULTS
Cardiology Consult Note    Reason for consult: Preoperative risk stratification for open peripheral bypass surgery    History Of Present Illness:    Shirin Slater is a 54 y.o. 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     At bedside evaluation, patient endorses ongoing left foot pain otherwise no other symptoms. Denies any chest pain, SOB, palpitations, lightheadedness or dizziness.  Denies any PND, orthopnea or leg swelling.  Denies presyncope or syncopal episodes.    Patient lives alone. She is wheelchair dependent due to right hip disarticulation. She has a nurse aide who visits her 7x/week to assist with activities of daily living and house chores.  She smokes half a pack of cigarettes a day.  Endorses marijuana use.  Occasional alcohol use.  No illicit drug use.    Past Cardiology Workup:    EKG: Sinus rhythm with PACs    Echo:   TTE 12/8/2023  CONCLUSIONS:   1. Poorly visualized anatomical structures due to suboptimal image quality.   2. Left ventricular systolic function is normal with a 65-70% estimated ejection fraction.   3. No left ventricular thrombus visualized.   4. There is no evidence of a patent foramen ovale.    TTE 6/30/2022  CONCLUSIONS:   1. The left ventricular systolic function is normal with a 60-65% estimated ejection fraction.   2. Spectral Doppler shows an impaired relaxation pattern of left ventricular diastolic filling.   3.  No significant valvular abnormalities    Stress Test:   Lexiscan Nuclear stress test 1/20/2022  IMPRESSION:  1. Normal stress myocardial perfusion imaging in  response to  pharmacologic stress. No evidence of infarct or ischemia.  2. Well-maintained left ventricular function.  Left ventricular  ejection fraction of 65%.    Cath: No prior study    Cardiac Imaging: No prior study    Past Medical History:  She has a past medical history of Anxiety, Arthritis, Cancer (Multi), Cellulitis, Diabetes mellitus (Multi), Disease of thyroid gland, Diverticulitis, Epilepsy, unspecified, not intractable, without status epilepticus (Multi), HLD (hyperlipidemia), blood clots, Hypertension, PAD (peripheral artery disease) (CMS-HCC), Peripheral vascular disease, unspecified (CMS-HCC) (2022), PTSD (post-traumatic stress disorder), and Uterine cancer (Multi).    Past Surgical History:  She has a past surgical history that includes Other surgical history (2021); CT angio aorta and bilateral iliofemoral runoff w and or wo IV contrast (2021); CT angio aorta and bilateral iliofemoral runoff w and or wo IV contrast (2022); IR angiogram aorta abdomen (2021); Tonsilectomy, adenoidectomy, bilateral myringotomy and tubes ();  section, classic (); Anterior cruciate ligament repair (Left, ); Hysterectomy (); Colon surgery (); Vascular surgery (Right, ); Vascular surgery (Right, ); Vascular surgery (Right, ); Leg amputation (Right, ); Amputation foot / toe (Left, ); and Amputation foot / toe (Left).      Social History:  She reports that she has been smoking cigarettes. She started smoking about 43 years ago. She has a 43.7 pack-year smoking history. She has never used smokeless tobacco. She reports current alcohol use. She reports current drug use. Drug: Marijuana.    Family History:  Family History   Problem Relation Name Age of Onset    Diverticulitis Daughter      Heart failure Other          Several family members without direct blood connection        Allergies:  Aspartame and Nsaids (non-steroidal anti-inflammatory  "drug)    ROS:  10 point review of systems including (Constitutional, Eyes, ENMT, Respiratory, Cardiac, Gastrointestinal, Neurological, Psychiatric, and Hematologic) was performed and is otherwise negative.    Objective Data:  Last Recorded Vitals:  Vitals:    24 0044 24 0431 24 0723 24 1124   BP:  105/50 143/77 122/63   Pulse:  70 82 69   Resp:     Temp:  36 °C (96.8 °F) 36.1 °C (97 °F) 36.1 °C (97 °F)   SpO2:  95% 96% 100%   Weight:       Height: 1.829 m (6')           Weight  Av.6 kg (199 lb 12.8 oz)  Min: 90.6 kg (199 lb 12.8 oz)  Max: 90.6 kg (199 lb 12.8 oz)      LABS:  CMP:  Results from last 7 days   Lab Units 24  0555   SODIUM mmol/L 137   POTASSIUM mmol/L 3.8   CHLORIDE mmol/L 103   CO2 mmol/L 23   ANION GAP mmol/L 15   BUN mg/dL 9   CREATININE mg/dL 0.52   EGFR mL/min/1.73m*2 >90   MAGNESIUM mg/dL 1.71   ALBUMIN g/dL 3.3*   ALT U/L 9   AST U/L 12   BILIRUBIN TOTAL mg/dL 0.4     CBC:  Results from last 7 days   Lab Units 24  0555   WBC AUTO x10*3/uL 13.3*   HEMOGLOBIN g/dL 12.7   HEMATOCRIT % 44.0   PLATELETS AUTO x10*3/uL 387   MCV fL 73*     COAG:   Results from last 7 days   Lab Units 24  0648   INR  5.0*     ABO:   ABO TYPE   Date Value Ref Range Status   2024 A  Final     HEME/ENDO:  Results from last 7 days   Lab Units 24  0555   HEMOGLOBIN A1C % 11.4*      CARDIAC:       No lab exists for component: \"CK\", \"CKMBP\"   Results from last 7 days   Lab Units 24  0555   HEMOGLOBIN A1C % 11.4*        Last I/O:    Intake/Output Summary (Last 24 hours) at 2024 1149  Last data filed at 2024 0900  Gross per 24 hour   Intake 480 ml   Output --   Net 480 ml     Net IO Since Admission: 479 mL [24 1149]      Imaging Results:  Vascular US Lower Extremity Vein Mapping Left    Result Date: 2024            [place] [address]   Tel [phone] and Fax [phone]  Vascular Lab " Report [facility] US LOWER EXTREMITY VEIN MAPPING LEFT  Patient Name:     NALDO CHRIS JUDITH Carias Physician: [id number] Haleigh Velasquez MD Study Date:       9/18/2024           Ordering           [id number] KAITLYN M DUNPHY                                       Physician: MRN/PID:          [id number]            Technologist:      Rama Jimenez RVT Accession#:       [id number]        Technologist 2: Date of           1970 / 54      Encounter#:        [id number] Birth/Age:        years Gender:           F Admission Status: Inpatient           Location           [facility]                                       Performed:  Diagnosis/ICD: Peripheral vascular disease, unspecified-I73.9 Indication:    Conduit for bypass CPT Codes:     12384 Vein mapping Limited  **Report Amended** Date and Time: 9/18/2024 at 10:43:10 PM  CONCLUSIONS: Left Lower Venous: Additional Findings; Lymph nodes. Left Lower Vein Mapping: The left great saphenous vein and small saphenous vein appear widely patent with no evidence of thrombosis or fibrosis except for distal SSV. The left saphenofemoral junction was not visualized.  Imaging & Doppler Findings:  Left           Compress Thrombus  Diam Prox Thigh GSV   Yes      None   2.5 mm Mid Thigh GSV    Yes      None   2.5 mm Knee GSV         Yes      None   2.8 mm Prox Calf GSV    Yes      None   4.8 mm Mid Calf GSV     Yes      None   2.5 mm Dist Calf GSV    Yes      None   3.6 mm SSV Prox         Yes      None   4.3 mm SSV Mid          Yes      None   3.9 mm SSV Distal       Yes    Fibrotic 3.7 mm  [id number] Haleigh Velasquez MD Electronically signed by [id number] Haleigh Velasquez MD on 9/18/2024 at 10:40:55 PM  ** Final (Updated) **     Vascular US lower extremity arterial duplex left with HEMA    Result Date: 9/18/2024            [place] [address]   Tel [phone] and Fax [phone]  Vascular  Lab Report [facility] LOWER EXTREMITY ARTERIAL DUPLEX LEFT WITH HEMA  Patient Name:      NALDO WONG     Reading Physician: [id number] Travis Villegas MD Study Date:        9/18/2024           Ordering           [id number] CHRISTIANE ECHEVARRIA                                        Physician:         RICHIE MRN/PID:           [id number]            Technologist:      Brooks BORRERO Accession#:        [id number]        Technologist 2: Date of Birth/Age: 1970 / 54      Encounter#:        [id number]                    years Gender:            F Admission Status:  Inpatient           Location           [facility]                                        Performed:  Diagnosis/ICD: Peripheral vascular disease, unspecified-I73.9 Indication:    Atherosclerosis of native arteries-extremities w/rest pain CPT Codes:     75275 Peripheral artery Lower arterial Duplex limited  Patient History Right leg is amputated from groin down.                 Left foot only has two digit and they have gangrene                 Stents in left EIA and left popliteal artery                 History of multiple surgery on legt leg.  **CRITICAL RESULT** Critical Result: > 50% stenosis in distal left femoral artery. Notification called to Christiane Hooker MD on 9/18/2024 at 11:50:26 AM by Brooks BORRERO.  CONCLUSIONS: Left Lower Arterial: There is >50% stenosis documented at the superficial femoral artery distal. Decreased velocities noted in the arteries throughout the left lower extremity with monophasic flow. Left peroneal artery not visualized. The left external iliac artery stent appears patent. The left poplitael artery appear patent with decreased velocity noted. Stent: Left EIA Stent Proximal - 68.2 cm/s Mid - 144.4 cm/s Distal 124.7 cm/s. Left popliteal Stent Prox - 10.8 cm/s Mid - 10.1 cm/s Distal - 18.7 cm/s.  Comparison: Compared with study from 6/28/2022, Today's exam > 50% stenosis in left distal femoral artery.  Imaging & Doppler Findings:   Right                     Left  PSV                      PSV              EIA        144 cm/s              CFA        150 cm/s       Profunda Proximal 135 cm/s         SFA Proximal    11 cm/s            SFA Mid      12 cm/s          SFA Distal     261 cm/s           Popliteal     19 cm/s         SERAFIN Proximal    16 cm/s            SERAFIN Mid       5 cm/s          SERAFIN Distal     18 cm/s         PTA Proximal    15 cm/s            PTA Mid      27 cm/s          PTA Distal     26 cm/s  [id number] Travis Villegas MD Electronically signed by [id number] Travis Villegas MD on 9/18/2024 at 3:42:14 PM  ** Final **     Vascular US Ankle Brachial Index (HEMA) Without Exercise    Result Date: 9/18/2024            [place] [address]   Tel [phone] and Fax [phone]  [facility] (HEMA) WITHOUT EXERCISE  Patient Name:      NALDO Carias Physician: [id number] Travis Villegas MD Study Date:        9/18/2024           Ordering           [id number] ALEXYS SONI                                        Physician:         DUNPHY MRN/PID:           [id number]            Technologist:      Brooks Portillo S Accession#:        [id number]        Technologist 2: Date of Birth/Age: 1970 / 54      Encounter#:        [id number]                    years Gender:            F Admission Status:  Inpatient           Location           [facility]                                        Performed:  Diagnosis/ICD: Other specified peripheral vascular diseases-I73.89 Indication:    Gangrene CPT Codes:     14375 Peripheral artery HEMA Only  Patient History Right leg is amputated from groin down.                 Left foot only has two digit and they have gangrene                 Stents in left EIA and left popliteal artery                 History of multiple surgery on legt leg.  CONCLUSIONS: Right Lower PVR: Evidence of moderate arterial occlusive disease in the right lower  extremity at rest. Left Lower PVR: Monophasic flow is noted in the left posterior tibial artery and left dorsalis pedis artery. Multiphasic flow is noted in the left common femoral artery. Unable to obtain pressure or doppler signal of left grat toe due to amputation.  Comparison: Compared with study from 12/8/2023, no significant change.  Imaging & Doppler Findings:  LEFT Lower PVR                Pressures Ratios Left Posterior Tibial (Ankle) 59 mmHg   0.60 Left Dorsalis Pedis (Ankle)   66 mmHg   0.67                     Right Brachial Pressure 98 mmHg   [id number] Travis Villegas MD Electronically signed by [id number] Travis Villegas MD on 9/18/2024 at 3:39:13 PM  ** Final **     XR foot left 3+ views    Result Date: 9/18/2024  Interpreted By:  Mamadou Burger and Beyersdorf Conner STUDY: XR FOOT LEFT 3+ VIEWS; ;  9/18/2024 4:59 am   INDICATION: Signs/Symptoms:L toe gangrene, concern for osteomyelitis.     COMPARISON: None.   ACCESSION NUMBER(S): [id number]   ORDERING CLINICIAN: KAITLYN DUNPHY   FINDINGS: Three views of the left foot are provided.   There has been amputation of the 1st and 5th digits at the mid metatarsal shaft. Amputation of the 2nd digit at the tarsometatarsal joint.   Within the distal phalanx of the 3rd digit, there is a extensive cortical erosion with internal irregular margins, concerning for osteomyelitis. Soft tissue edema overlying the distal 3rd digit. There is also lucency over the medial and plantar aspect of the 2nd metatarsal head.       Erosive cortical changes and irregular internal margins of the 3rd digit distal phalanx consistent with osteomyelitis. MRI can be performed to evaluate extent of disease Soft tissue edema about the 3rd digit   I personally reviewed the image(s)/study and resident interpretation. I agree with the findings as stated by resident Casey Javed. Data analyzed and images interpreted at [facility], [place], [place].   MACRO: None    Signed by: Mamadou Burger 9/18/2024 9:51 AM Dictation workstation:   [id number]      Inpatient Medications:  Scheduled medications   Medication Dose Route Frequency    acetaminophen  650 mg oral q6h    aspirin  81 mg oral Daily    atorvastatin  80 mg oral Nightly    busPIRone  10 mg oral q AM    clopidogrel  75 mg oral Daily    divalproex  500 mg oral q8h NINO    DULoxetine  60 mg oral q PM    insulin lispro  0-10 Units subcutaneous TID    levETIRAcetam  500 mg oral BID    levothyroxine  75 mcg oral q AM    metoprolol tartrate  12.5 mg oral BID    perflutren lipid microspheres  0.5-10 mL of dilution intravenous Once in imaging    perflutren protein A microsphere  0.5 mL intravenous Once in imaging    piperacillin-tazobactam  3.375 g intravenous q6h    sulfur hexafluoride microsphr  2 mL intravenous Once in imaging    vancomycin  1,000 mg intravenous Once    vancomycin  1,500 mg intravenous q8h NINO     PRN medications   Medication    dextrose    dextrose    glucagon    glucagon    loperamide    melatonin    oxyCODONE    oxyCODONE    vancomycin     Continuous Medications   Medication Dose Last Rate       Outpatient Medications:  Current Outpatient Medications   Medication Instructions    acetaminophen (TYLENOL) 975 mg, oral, Every 6 hours    albuterol 90 mcg/actuation inhaler 2 puffs, inhalation, Every 6 hours PRN    aspirin 81 mg, oral, Daily    atorvastatin (Lipitor) 80 mg tablet 1 tablet, oral, Nightly    busPIRone (BUSPAR) 10 mg, oral, In the morning    clopidogrel (PLAVIX) 75 mg, oral, Daily    divalproex (Depakote ER) 500 mg 24 hr tablet 1 tablet, oral, In the morning, and 2 tablets in the evening    doxycycline (ADOXA) 100 mg, oral, 2 times daily, Take with a full glass of water and do not lie down for at least 30 minutes after    DULoxetine (Cymbalta) 60 mg DR capsule 1 capsule, oral, Daily    empagliflozin (JARDIANCE) 10 mg, oral, Daily    enoxaparin (LOVENOX) 100 mg, subcutaneous, 2 times daily, Continue  Lovenox as bridge to Warfarin until INR as at goal for 2 days in a row (INR 2-3) then may stop Lovenox therapy; INR 3.4 on 12/15    gabapentin (Neurontin) 400 mg capsule 1 capsule, oral, 3 times daily    levETIRAcetam (KEPPRA) 500 mg, oral, 2 times daily    levothyroxine (TIROSINT) 75 mcg, oral, Daily before breakfast, On empty stomach    lidocaine 4 % patch 1 patch, transdermal, Daily PRN, Remove & discard patch within 12 hours or as directed by MD.    loperamide (IMODIUM) 4 mg, oral, As needed, Don't exceed 4 tablets per day    melatonin 10 mg tablet 1 tablet, oral, Nightly PRN    methocarbamol (Robaxin) 500 mg tablet 1 tablet, oral, 3 times daily    metoprolol tartrate (Lopressor) 25 mg tablet 0.5 tablets, oral, 2 times daily    multivitamin tablet 1 tablet, oral, Daily    omeprazole OTC (PRILOSEC OTC) 20 mg, oral, Daily before breakfast    sennosides (SENOKOT) 17.2 mg, oral, 2 times daily    warfarin (Coumadin) 5 mg tablet Take as directed per After Visit Summary.    warfarin (COUMADIN) 2.5 mg, oral, Once, Take as directed per After Visit Summary.       Physical Exam:  General:  Patient is awake, alert, and oriented.  Patient is in no acute distress.  HEENT:  Pupils equal and reactive.  Normocephalic.  Moist mucosa.    Neck:  No thyromegaly.  Normal Jugular Venous Pressure.  Cardiovascular:  Regular rate and rhythm.  Normal S1 and S2. No M/R/G.  Pulmonary:  Clear to auscultation bilaterally.  Abdomen:  Soft. Non-tender.   Non-distended.  Positive bowel sounds.  Lower Extremities:  R high disarticulaiton, Left LE wounds with wound dressing  Neurologic:  Cranial nerves intact.  No focal deficit.   Skin: Skin warm and dry, normal skin turgor.   Psychiatric: Normal affect.     Assessment/Plan   Shirin Slater is a 54 y.o. 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 Cardiology consulted for preoperative risk stratification for open peripheral bypass surgery.    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  He has no known hx of CAD, malignant arrhythmias or severe valvular abnormalities. No chest pain or angina at rest.  No clinical signs of decompensated heart failure.      Functional capacity: unknown due to lower extremity limitations     RCRI: Class II risk with 6 % 30-day risk of death, MI or cardiac arrest     ACS NSQIP: Risk of cardiac complication is 1.8%     Plan:  -Pt is moderate to high risk for a perioperative cardiac complication due to CV risk factors, dependent functional status and high risk surgery  -Recommend left heart catheterization prior to procedure when INR is acceptable.      Code Status:  DNR    Patient seen and discussed with attending, Dr Belcher.    Chino Cardozo MD  General Cardiology Fellow, PGY 5

## 2024-09-19 NOTE — PROGRESS NOTES
09/18/24 0256   Discharge Planning   Living Arrangements Alone   Support Systems Friends/neighbors   Assistance Needed None   Type of Residence Private residence   Home or Post Acute Services In home services   Type of Home Care Services Home health aide   Expected Discharge Disposition Home     Transitional Care Coordination Progress Note:  Patient discussed during interdisciplinary rounds.   Team members present:   Plan per Medical/Surgical team: plan for angios  Payor: caresource  Discharge disposition: TBD, pt lives alone, has home health daily. Uses a wheelchair at baseline  Potential Barriers:   ADOD: TBD     Previous Home Care:  none  DME: wheelchair, chair lift  Pharmacy:   Falls: denies  PCP:  Juany Sanchez MD

## 2024-09-19 NOTE — ANESTHESIA PREPROCEDURE EVALUATION
Patient: Shirin Slater    Procedure Information       Date/Time: 09/20/24 0700    Procedure: Angiogram Lower Extremity (Left)    Location: [facility] / Virtual [facility]    Surgeons: Kaitlyn M Dunphy, MD            Relevant Problems   Cardiac   (+) HTN (hypertension)   (+) PAD (peripheral artery disease) (CMS-HCC)      Pulmonary   (+) ELMIRA (obstructive sleep apnea)      Neuro   (+) Peripheral neuropathy      Endocrine   (+) Diabetes mellitus, type 2 (Multi)   (+) Hypothyroidism      Hematology   (+) Anticoagulant long-term use      ID  Pending blood cultures, currently treatment for osteomyelitis       Clinical information reviewed:    Allergies  Meds               NPO Detail:  No data recorded     PHYSICAL EXAM    Anesthesia Plan

## 2024-09-19 NOTE — PROGRESS NOTES
VASCULAR SURGERY PROGRESS NOTE  Assessment/Plan   Shirin Slater is 54 y.o. female with history of severe PAD s/p R hip disarticulation s/p L CFA and EIA embolectomy 12/23 s/p multiple L toe amputations, T2DM complicated by diabetic neuropathy, HTN, HLD, renal and splenic thromboses on aspirin/plavix/coumadin, and current tobacco use who presents as a transfer from [facility] for management of L 2nd toe gangrene with concern of osteomyelitis.      Plan:  Vein mapping, Echo  Cardiology risk stratification  INR follow up   Sliding scale insulin started   Omeprazole PO daily   ASA/Plavix  Continue Zosyn  Continue Q4 NVC        Subjective   Patient consented for an angiogram with Dr. Dunphy     Objective   Vitals:  Heart Rate:  [69-87]   Temp:  [36 °C (96.8 °F)-36.4 °C (97.5 °F)]   Resp:  [18]   BP: ()/(49-78)   Height:  [182.9 cm (6')]   SpO2:  [91 %-100 %]     Exam:  Constitutional: No acute distress  Neuro: AOx3, grossly motor intact. Limited sensation of distal L foot.  ENMT: moist mucous membranes  Head/neck: atraumatic  CV: no tachycardia  Pulm: non-labored on room air  GI: soft, non-tender, non-distended  Skin: warm and dry. Blanching erythema of L forefoot with tenderness to palpation.  Musculoskeletal: moving all extremities. S/p R hip disarticulation  Vascular: L foot - monophasic DP/PT, biphasic AT.     Labs:  Results from last 7 days   Lab Units 09/18/24  0555   WBC AUTO x10*3/uL 13.3*   HEMOGLOBIN g/dL 12.7   PLATELETS AUTO x10*3/uL 387      Results from last 7 days   Lab Units 09/18/24  0555   SODIUM mmol/L 137   POTASSIUM mmol/L 3.8   CHLORIDE mmol/L 103   CO2 mmol/L 23   BUN mg/dL 9   CREATININE mg/dL 0.52   GLUCOSE mg/dL 196*   MAGNESIUM mg/dL 1.71   PHOSPHORUS mg/dL 2.7      Results from last 7 days   Lab Units 09/18/24  0648   INR  5.0*   PROTIME seconds 57.2*   APTT seconds 72*              Discussed with the attending, Dr. Dunphy Mahmood Kabeil, M.D.  U n i v e r s i t y  H o s  p i t a l s  Vascular Surgery Resident  PGY-1  3:30 PM 09/19/24  Available via Epic Secure Chat  Service Pager: [phone]

## 2024-09-20 ENCOUNTER — ANESTHESIA (OUTPATIENT)
Dept: OPERATING ROOM | Facility: HOSPITAL | Age: 54
End: 2024-09-20
Payer: COMMERCIAL

## 2024-09-20 PROBLEM — I70.222 CRITICAL LIMB ISCHEMIA OF LEFT LOWER EXTREMITY (MULTI): Status: ACTIVE | Noted: 2024-09-17

## 2024-09-20 LAB
ANION GAP SERPL CALC-SCNC: 16 MMOL/L (ref 10–20)
APTT PPP: 33 SECONDS (ref 27–38)
APTT PPP: 60 SECONDS (ref 27–38)
BACTERIA SPEC CULT: NORMAL
BUN SERPL-MCNC: 8 MG/DL (ref 6–23)
CALCIUM SERPL-MCNC: 8.7 MG/DL (ref 8.6–10.6)
CHLORIDE SERPL-SCNC: 103 MMOL/L (ref 98–107)
CO2 SERPL-SCNC: 23 MMOL/L (ref 21–32)
CREAT SERPL-MCNC: 0.51 MG/DL (ref 0.5–1.05)
EGFRCR SERPLBLD CKD-EPI 2021: >90 ML/MIN/1.73M*2
ERYTHROCYTE [DISTWIDTH] IN BLOOD BY AUTOMATED COUNT: 22.7 % (ref 11.5–14.5)
GLUCOSE BLD MANUAL STRIP-MCNC: 142 MG/DL (ref 74–99)
GLUCOSE BLD MANUAL STRIP-MCNC: 226 MG/DL (ref 74–99)
GLUCOSE SERPL-MCNC: 133 MG/DL (ref 74–99)
GRAM STN SPEC: NORMAL
GRAM STN SPEC: NORMAL
HCT VFR BLD AUTO: 44.8 % (ref 36–46)
HGB BLD-MCNC: 12.5 G/DL (ref 12–16)
INR PPP: 1.2 (ref 0.9–1.1)
INR PPP: 2.3 (ref 0.9–1.1)
MAGNESIUM SERPL-MCNC: 1.98 MG/DL (ref 1.6–2.4)
MCH RBC QN AUTO: 21.1 PG (ref 26–34)
MCHC RBC AUTO-ENTMCNC: 27.9 G/DL (ref 32–36)
MCV RBC AUTO: 76 FL (ref 80–100)
NRBC BLD-RTO: 0 /100 WBCS (ref 0–0)
PHOSPHATE SERPL-MCNC: 3.3 MG/DL (ref 2.5–4.9)
PLATELET # BLD AUTO: 359 X10*3/UL (ref 150–450)
POTASSIUM SERPL-SCNC: 3.9 MMOL/L (ref 3.5–5.3)
PROTHROMBIN TIME: 13.5 SECONDS (ref 9.8–12.8)
PROTHROMBIN TIME: 26.3 SECONDS (ref 9.8–12.8)
RBC # BLD AUTO: 5.92 X10*6/UL (ref 4–5.2)
SODIUM SERPL-SCNC: 138 MMOL/L (ref 136–145)
UFH PPP CHRO-ACNC: 0.1 IU/ML
UFH PPP CHRO-ACNC: 0.2 IU/ML
VANCOMYCIN SERPL-MCNC: 6.6 UG/ML (ref 5–20)
WBC # BLD AUTO: 8 X10*3/UL (ref 4.4–11.3)

## 2024-09-20 PROCEDURE — 36415 COLL VENOUS BLD VENIPUNCTURE: CPT

## 2024-09-20 PROCEDURE — 80048 BASIC METABOLIC PNL TOTAL CA: CPT

## 2024-09-20 PROCEDURE — 93458 L HRT ARTERY/VENTRICLE ANGIO: CPT | Performed by: INTERNAL MEDICINE

## 2024-09-20 PROCEDURE — C1887 CATHETER, GUIDING: HCPCS | Performed by: INTERNAL MEDICINE

## 2024-09-20 PROCEDURE — 85610 PROTHROMBIN TIME: CPT

## 2024-09-20 PROCEDURE — 84100 ASSAY OF PHOSPHORUS: CPT

## 2024-09-20 PROCEDURE — 80202 ASSAY OF VANCOMYCIN: CPT | Performed by: STUDENT IN AN ORGANIZED HEALTH CARE EDUCATION/TRAINING PROGRAM

## 2024-09-20 PROCEDURE — 75710 ARTERY X-RAYS ARM/LEG: CPT | Performed by: INTERNAL MEDICINE

## 2024-09-20 PROCEDURE — 2500000004 HC RX 250 GENERAL PHARMACY W/ HCPCS (ALT 636 FOR OP/ED): Mod: JZ | Performed by: INTERNAL MEDICINE

## 2024-09-20 PROCEDURE — 83735 ASSAY OF MAGNESIUM: CPT

## 2024-09-20 PROCEDURE — 99233 SBSQ HOSP IP/OBS HIGH 50: CPT | Performed by: NURSE PRACTITIONER

## 2024-09-20 PROCEDURE — C1760 CLOSURE DEV, VASC: HCPCS | Performed by: INTERNAL MEDICINE

## 2024-09-20 PROCEDURE — 85027 COMPLETE CBC AUTOMATED: CPT

## 2024-09-20 PROCEDURE — P9017 PLASMA 1 DONOR FRZ W/IN 8 HR: HCPCS

## 2024-09-20 PROCEDURE — 2500000005 HC RX 250 GENERAL PHARMACY W/O HCPCS: Performed by: INTERNAL MEDICINE

## 2024-09-20 PROCEDURE — 85520 HEPARIN ASSAY: CPT

## 2024-09-20 PROCEDURE — 2720000007 HC OR 272 NO HCPCS: Performed by: INTERNAL MEDICINE

## 2024-09-20 PROCEDURE — 99152 MOD SED SAME PHYS/QHP 5/>YRS: CPT | Performed by: INTERNAL MEDICINE

## 2024-09-20 PROCEDURE — 36430 TRANSFUSION BLD/BLD COMPNT: CPT

## 2024-09-20 PROCEDURE — 2550000001 HC RX 255 CONTRASTS: Performed by: INTERNAL MEDICINE

## 2024-09-20 PROCEDURE — 2500000001 HC RX 250 WO HCPCS SELF ADMINISTERED DRUGS (ALT 637 FOR MEDICARE OP)

## 2024-09-20 PROCEDURE — 2500000004 HC RX 250 GENERAL PHARMACY W/ HCPCS (ALT 636 FOR OP/ED)

## 2024-09-20 PROCEDURE — 99153 MOD SED SAME PHYS/QHP EA: CPT | Performed by: INTERNAL MEDICINE

## 2024-09-20 PROCEDURE — B2111ZZ FLUOROSCOPY OF MULTIPLE CORONARY ARTERIES USING LOW OSMOLAR CONTRAST: ICD-10-PCS | Performed by: INTERNAL MEDICINE

## 2024-09-20 PROCEDURE — 76937 US GUIDE VASCULAR ACCESS: CPT | Performed by: INTERNAL MEDICINE

## 2024-09-20 PROCEDURE — 2500000002 HC RX 250 W HCPCS SELF ADMINISTERED DRUGS (ALT 637 FOR MEDICARE OP, ALT 636 FOR OP/ED)

## 2024-09-20 PROCEDURE — 85730 THROMBOPLASTIN TIME PARTIAL: CPT

## 2024-09-20 PROCEDURE — 4A023N7 MEASUREMENT OF CARDIAC SAMPLING AND PRESSURE, LEFT HEART, PERCUTANEOUS APPROACH: ICD-10-PCS | Performed by: INTERNAL MEDICINE

## 2024-09-20 PROCEDURE — C1894 INTRO/SHEATH, NON-LASER: HCPCS | Performed by: INTERNAL MEDICINE

## 2024-09-20 PROCEDURE — B41DZZZ FLUOROSCOPY OF AORTA AND BILATERAL LOWER EXTREMITY ARTERIES: ICD-10-PCS | Performed by: INTERNAL MEDICINE

## 2024-09-20 PROCEDURE — 82947 ASSAY GLUCOSE BLOOD QUANT: CPT

## 2024-09-20 PROCEDURE — 1200000002 HC GENERAL ROOM WITH TELEMETRY DAILY

## 2024-09-20 PROCEDURE — 30233K1 TRANSFUSION OF NONAUTOLOGOUS FROZEN PLASMA INTO PERIPHERAL VEIN, PERCUTANEOUS APPROACH: ICD-10-PCS | Performed by: STUDENT IN AN ORGANIZED HEALTH CARE EDUCATION/TRAINING PROGRAM

## 2024-09-20 PROCEDURE — 85347 COAGULATION TIME ACTIVATED: CPT | Performed by: INTERNAL MEDICINE

## 2024-09-20 RX ORDER — ONDANSETRON HYDROCHLORIDE 2 MG/ML
INJECTION, SOLUTION INTRAVENOUS AS NEEDED
Status: DISCONTINUED | OUTPATIENT
Start: 2024-09-20 | End: 2024-09-20 | Stop reason: HOSPADM

## 2024-09-20 RX ORDER — LIDOCAINE HYDROCHLORIDE 10 MG/ML
INJECTION, SOLUTION EPIDURAL; INFILTRATION; INTRACAUDAL; PERINEURAL AS NEEDED
Status: DISCONTINUED | OUTPATIENT
Start: 2024-09-20 | End: 2024-09-20 | Stop reason: HOSPADM

## 2024-09-20 RX ORDER — FENTANYL CITRATE 50 UG/ML
INJECTION, SOLUTION INTRAMUSCULAR; INTRAVENOUS AS NEEDED
Status: DISCONTINUED | OUTPATIENT
Start: 2024-09-20 | End: 2024-09-20 | Stop reason: HOSPADM

## 2024-09-20 RX ORDER — VERAPAMIL HYDROCHLORIDE 2.5 MG/ML
INJECTION, SOLUTION INTRAVENOUS AS NEEDED
Status: DISCONTINUED | OUTPATIENT
Start: 2024-09-20 | End: 2024-09-20 | Stop reason: HOSPADM

## 2024-09-20 RX ORDER — MIDAZOLAM HYDROCHLORIDE 1 MG/ML
INJECTION, SOLUTION INTRAMUSCULAR; INTRAVENOUS AS NEEDED
Status: DISCONTINUED | OUTPATIENT
Start: 2024-09-20 | End: 2024-09-20 | Stop reason: HOSPADM

## 2024-09-20 RX ORDER — HEPARIN SODIUM 1000 [USP'U]/ML
INJECTION, SOLUTION INTRAVENOUS; SUBCUTANEOUS AS NEEDED
Status: DISCONTINUED | OUTPATIENT
Start: 2024-09-20 | End: 2024-09-20 | Stop reason: HOSPADM

## 2024-09-20 RX ORDER — NITROGLYCERIN 40 MG/100ML
INJECTION INTRAVENOUS AS NEEDED
Status: DISCONTINUED | OUTPATIENT
Start: 2024-09-20 | End: 2024-09-20 | Stop reason: HOSPADM

## 2024-09-20 RX ADMIN — DIVALPROEX SODIUM 500 MG: 500 TABLET, DELAYED RELEASE ORAL at 06:15

## 2024-09-20 RX ADMIN — DULOXETINE HYDROCHLORIDE 60 MG: 60 CAPSULE, DELAYED RELEASE ORAL at 20:59

## 2024-09-20 RX ADMIN — CLOPIDOGREL BISULFATE 75 MG: 75 TABLET ORAL at 14:18

## 2024-09-20 RX ADMIN — PIPERACILLIN SODIUM AND TAZOBACTAM SODIUM 3.38 G: 3; .375 INJECTION, SOLUTION INTRAVENOUS at 14:19

## 2024-09-20 RX ADMIN — BUSPIRONE HYDROCHLORIDE 10 MG: 10 TABLET ORAL at 14:18

## 2024-09-20 RX ADMIN — OXYCODONE HYDROCHLORIDE 10 MG: 5 TABLET ORAL at 18:24

## 2024-09-20 RX ADMIN — DIVALPROEX SODIUM 500 MG: 500 TABLET, DELAYED RELEASE ORAL at 14:18

## 2024-09-20 RX ADMIN — ACETAMINOPHEN 650 MG: 325 TABLET, FILM COATED ORAL at 04:45

## 2024-09-20 RX ADMIN — METOPROLOL TARTRATE 12.5 MG: 25 TABLET, FILM COATED ORAL at 20:55

## 2024-09-20 RX ADMIN — OXYCODONE HYDROCHLORIDE 10 MG: 5 TABLET ORAL at 01:58

## 2024-09-20 RX ADMIN — DIVALPROEX SODIUM 500 MG: 500 TABLET, DELAYED RELEASE ORAL at 21:00

## 2024-09-20 RX ADMIN — ASPIRIN 81 MG: 81 TABLET, COATED ORAL at 14:20

## 2024-09-20 RX ADMIN — ATORVASTATIN CALCIUM 80 MG: 80 TABLET, FILM COATED ORAL at 20:59

## 2024-09-20 RX ADMIN — LEVETIRACETAM 500 MG: 500 TABLET, FILM COATED ORAL at 14:18

## 2024-09-20 RX ADMIN — OXYCODONE HYDROCHLORIDE 10 MG: 5 TABLET ORAL at 14:19

## 2024-09-20 RX ADMIN — PIPERACILLIN SODIUM AND TAZOBACTAM SODIUM 3.38 G: 3; .375 INJECTION, SOLUTION INTRAVENOUS at 21:33

## 2024-09-20 RX ADMIN — LEVOTHYROXINE SODIUM 75 MCG: 0.07 TABLET ORAL at 06:15

## 2024-09-20 RX ADMIN — ACETAMINOPHEN 650 MG: 325 TABLET, FILM COATED ORAL at 20:59

## 2024-09-20 RX ADMIN — ACETAMINOPHEN 650 MG: 325 TABLET, FILM COATED ORAL at 14:19

## 2024-09-20 RX ADMIN — METOPROLOL TARTRATE 12.5 MG: 25 TABLET, FILM COATED ORAL at 14:18

## 2024-09-20 RX ADMIN — OXYCODONE HYDROCHLORIDE 10 MG: 5 TABLET ORAL at 22:19

## 2024-09-20 RX ADMIN — LEVETIRACETAM 500 MG: 500 TABLET, FILM COATED ORAL at 20:59

## 2024-09-20 ASSESSMENT — PAIN SCALES - GENERAL
PAINLEVEL_OUTOF10: 5 - MODERATE PAIN
PAINLEVEL_OUTOF10: 8
PAINLEVEL_OUTOF10: 3
PAINLEVEL_OUTOF10: 10 - WORST POSSIBLE PAIN
PAINLEVEL_OUTOF10: 9

## 2024-09-20 ASSESSMENT — COGNITIVE AND FUNCTIONAL STATUS - GENERAL
TOILETING: A LITTLE
STANDING UP FROM CHAIR USING ARMS: A LOT
WALKING IN HOSPITAL ROOM: TOTAL
MOBILITY SCORE: 16
CLIMB 3 TO 5 STEPS WITH RAILING: TOTAL
DAILY ACTIVITIY SCORE: 21
HELP NEEDED FOR BATHING: A LITTLE
DRESSING REGULAR LOWER BODY CLOTHING: A LITTLE

## 2024-09-20 ASSESSMENT — PAIN - FUNCTIONAL ASSESSMENT
PAIN_FUNCTIONAL_ASSESSMENT: 0-10

## 2024-09-20 ASSESSMENT — PAIN DESCRIPTION - LOCATION: LOCATION: TOE (COMMENT WHICH ONE)

## 2024-09-20 NOTE — CONSULTS
Inpatient Diabetes Education Consult    Reason for Visit:  Shirin Slater is a 54 y.o. female who presents for gangrene of toe left foot; PAD,; hx T2DM    Consulting Service/Provider: Dr. Dunphy team    Visit Type: Initial visit    Visit Modality: In-person    Discharge Equipment/Supply Needs:       Patient has supplies at home: Blood glucose meter:  , Testing strips:  , Lancets, and Pen needles    Patient History and Assessment:  New diagnosis:  n/a  Previous diagnosis: Type 2  Patient known to Diabetes Education department: No  Treatment prior to hospital admission: Oral meds: Jardiance; had taken insulin 7 years ago at time of diagnosis of DM  Complications: peripheral neuropathy and PVD and peripheral artery disease  PTA Medications:    Current Outpatient Medications   Medication Instructions    acetaminophen (TYLENOL) 975 mg, oral, Every 6 hours    albuterol 90 mcg/actuation inhaler 2 puffs, inhalation, Every 6 hours PRN    aspirin 81 mg, oral, Daily    atorvastatin (Lipitor) 80 mg tablet 1 tablet, oral, Nightly    busPIRone (BUSPAR) 10 mg, oral, In the morning    clopidogrel (PLAVIX) 75 mg, oral, Daily    divalproex (Depakote ER) 500 mg 24 hr tablet 1 tablet, oral, In the morning, and 2 tablets in the evening    doxycycline (ADOXA) 100 mg, oral, 2 times daily, Take with a full glass of water and do not lie down for at least 30 minutes after    DULoxetine (Cymbalta) 60 mg DR capsule 1 capsule, oral, Daily    empagliflozin (JARDIANCE) 10 mg, oral, Daily    enoxaparin (LOVENOX) 100 mg, subcutaneous, 2 times daily, Continue Lovenox as bridge to Warfarin until INR as at goal for 2 days in a row (INR 2-3) then may stop Lovenox therapy; INR 3.4 on 12/15    gabapentin (Neurontin) 400 mg capsule 1 capsule, oral, 3 times daily    levETIRAcetam (KEPPRA) 500 mg, oral, 2 times daily    levothyroxine (TIROSINT) 75 mcg, oral, Daily before breakfast, On empty stomach    lidocaine 4 % patch 1 patch, transdermal, Daily PRN,  "Remove & discard patch within 12 hours or as directed by MD.    loperamide (IMODIUM) 4 mg, oral, As needed, Don't exceed 4 tablets per day    melatonin 10 mg tablet 1 tablet, oral, Nightly PRN    methocarbamol (Robaxin) 500 mg tablet 1 tablet, oral, 3 times daily    metoprolol tartrate (Lopressor) 25 mg tablet 0.5 tablets, oral, 2 times daily    multivitamin tablet 1 tablet, oral, Daily    omeprazole OTC (PRILOSEC OTC) 20 mg, oral, Daily before breakfast    sennosides (SENOKOT) 17.2 mg, oral, 2 times daily    warfarin (Coumadin) 5 mg tablet Take as directed per After Visit Summary.    warfarin (COUMADIN) 2.5 mg, oral, Once, Take as directed per After Visit Summary.       Glucose   Date/Time Value Ref Range Status   09/19/2024 04:19  (H) 74 - 99 mg/dL Final   09/18/2024 05:55  (H) 74 - 99 mg/dL Final   12/15/2023 09:32  (H) 74 - 99 mg/dL Final   12/14/2023 08:51  (H) 74 - 99 mg/dL Final   12/13/2023 09:14  (H) 74 - 99 mg/dL Final   12/12/2023 07:32  (H) 74 - 99 mg/dL Final   12/11/2023 11:16  (H) 74 - 99 mg/dL Final   12/10/2023 11:04  (H) 74 - 99 mg/dL Final   12/09/2023 10:20  (H) 74 - 99 mg/dL Final   12/08/2023 12:21  (H) 74 - 99 mg/dL Final     No results found for: \"CPEPTIDE\"  Hemoglobin A1C   Date Value Ref Range Status   09/18/2024 11.4 (H) See comment % Final   12/06/2023 9.2 (H) see below % Final   09/12/2023 8.5 (H) 4.0 - 5.6 % Final   12/10/2021 6.0 (H) 4.0 - 5.6 % Final   09/13/2021 4.6 4.0 - 5.6 % Final       Patient Learning/Readiness Assessment:  Ms. Slater is agreeable to diabetes ed visit    Interventions/Topics Covered:  See After Visit Summary for handouts/information sheets provided to patient.  Education Documentation  Self-Care Behaviors, taught by Ashley Rothman RN at 9/20/2024  3:59 PM.  Learner: Patient  Readiness: Acceptance  Method: Explanation, Teach-back  Response: Verbalizes Understanding  Comment: focus on these -states she \"has " "a good handle on my diabetes\" with meds, monitoring, eating healthy, provider follow up.  She is limited with activity but gets around well despite limitation.          Additional topics covered: Review of home regimen -- has taken only Jardiance.  States in the past year she has lost 20 lbs and been able to keep it off.  She has BG ranges 80's - 110's.  She verbalizes hyperglycemia approximately around the time she sustained toe injury (3 weeks ago).  Her A1 c at diagnosis in 2017 was 9 and she got to 7% and understands that is elevated 2' injury, infection, pain an now hospitalization.    Additional materials provided: n/a    CGM:  n/a    Education Outcome/Recommendations:        Recommendations for bedside nursing:  n/a    Recommendations for Providers: Follow-up w/ PCP and/or Endocrinology    Additional Comments: Ms. Slater does not feel she needs diabetes education visits routinely but is agreeable to follow up as needed while inpatient.  She is independent with her diabetes care at home.  Hyperglycemia began after foot injury and she knows after treatment (amputation) this will resolve.  Time spent:  30 mins.         "

## 2024-09-20 NOTE — INTERVAL H&P NOTE
H&P reviewed. The patient was examined and there are no changes to the H&P. Patient denies any chest pain/ SOB/ TRIPATHI/ LH dizziness now at rest or on exertion in the last 6 months. Denies orthopnea.

## 2024-09-20 NOTE — PROGRESS NOTES
Vancomycin Dosing by Pharmacy- Cessation of Therapy    Consult to pharmacy for vancomycin dosing has been discontinued by the prescriber, pharmacy will sign off at this time.    Please call pharmacy if there are further questions or re-enter a consult if vancomycin is resumed.     Magalis Buckner, FrederickD

## 2024-09-20 NOTE — PROGRESS NOTES
VASCULAR SURGERY PROGRESS NOTE  Assessment/Plan   Shirin Slater is 54 y.o. female with history of severe PAD s/p R hip disarticulation s/p L CFA and EIA embolectomy 12/23 s/p multiple L toe amputations, T2DM complicated by diabetic neuropathy, HTN, HLD, renal and splenic thromboses on aspirin/plavix/coumadin, and current tobacco use who presents as a transfer from [facility] for management of L 2nd toe gangrene with concern of osteomyelitis.      Plan:  Patient is going for [facility] today and angiogram with Cardiac   Sliding scale insulin started   Omeprazole PO daily   ASA/Plavix  Continue Zosyn  Continue Q4 NVC    #Planning for surgery:  INR is 2.3 from 4.3: after 10 mg IV vitamin K and FFP   Vein mapping 09/18  Echo 09/19: Left ventricular ejection fraction at 65-70%.   [facility] 09/20      Subjective   No acute events overnight     Objective   Vitals:  Heart Rate:  [63-93]   Temp:  [36.1 °C (97 °F)-36.4 °C (97.5 °F)]   Resp:  [18]   BP: ()/(52-82)   SpO2:  [92 %-100 %]     Exam:  Constitutional: No acute distress  Neuro: AOx3, grossly motor intact. Limited sensation of distal L foot.  ENMT: moist mucous membranes  Head/neck: atraumatic  CV: no tachycardia  Pulm: non-labored on room air  GI: soft, non-tender, non-distended  Skin: warm and dry. Blanching erythema of L forefoot with tenderness to palpation.  Musculoskeletal: moving all extremities. S/p R hip disarticulation  Vascular: L foot - monophasic DP/PT, biphasic AT.     Labs:  Results from last 7 days   Lab Units 09/19/24  1619 09/18/24  0555   WBC AUTO x10*3/uL 10.8 13.3*   HEMOGLOBIN g/dL 12.7 12.7   PLATELETS AUTO x10*3/uL 349 387      Results from last 7 days   Lab Units 09/19/24  1619 09/18/24  0555   SODIUM mmol/L 138 137   POTASSIUM mmol/L 3.9 3.8   CHLORIDE mmol/L 102 103   CO2 mmol/L 26 23   BUN mg/dL 7 9   CREATININE mg/dL 0.74 0.52   GLUCOSE mg/dL 165* 196*   MAGNESIUM mg/dL 1.75 1.71   PHOSPHORUS mg/dL 3.8 2.7      Results from last 7 days    Lab Units 09/20/24  0235 09/19/24  1619 09/18/24  0648   INR  2.3* 4.3* 5.0*   PROTIME seconds 26.3* 48.8* 57.2*   APTT seconds 60* 67* 72*      Results from last 7 days   Lab Units 09/20/24  0235 09/19/24  2217   ANTI XA UNFRACTIONATED IU/mL 0.1 0.1         Discussed with the attending, Dr. Dunphy Mahmood Kabeil, M.D.  U n i v e r s i t y  H o s p i t a l s  Vascular Surgery Resident  PGY-1  11:23 AM 09/20/24  Available via Epic Secure Chat  Service Pager: [phone]

## 2024-09-20 NOTE — POST-PROCEDURE NOTE
Physician Transition of Care Summary  Invasive Cardiovascular Lab    Procedure Date: 9/20/2024  Attending:    * Cheyenne Eckert - Primary  Resident/Fellow/Other Assistant: Surgeons and Role:     * Cheyenne Andrew MD - Fellow    Indications:   Pre-op Diagnosis      * Atherosclerosis of native arteries of left leg with ulceration of other part of foot (Multi) [I70.245]     * Preoperative cardiovascular examination [Z01.810]    Post-procedure diagnosis:   Post-op Diagnosis     * Atherosclerosis of native arteries of left leg with ulceration of other part of foot (Multi) [I70.245]     * Preoperative cardiovascular examination [Z01.810]    Procedure(s):   Lower Extremity Angiogram    Left Heart Cath  50060 - MN CATH PLMT L HRT & ARTS W/NJX & ANGIO IMG S&I        Procedure Findings:   Normal coronary arteries in right dominant circulation   See cath report for lower ext angiography details     Description of the Procedure:   S/p uncomplicated LHC and left lower ext angiography through left ulnar artery with 6f sheath. Sheath was pulled and TR band placed.     Complications:   none    Stents/Implants:   Implants       No implant documentation for this case.            Anticoagulation/Antiplatelet Plan:   N/a    Estimated Blood Loss:   10 mL    Anesthesia: Moderate Sedation Anesthesia Staff: No anesthesia staff entered.    Any Specimen(s) Removed:   Order Name Source Comment Collection Info Order Time   PREPARE PLASMA Blood, Venous   9/20/2024 10:05 AM     Transfusion indications   Periprocedural bleeding prophylaxis (non-bleeding patient) with an INR>=8.0          Has consent been obtained?   Yes          Is the patient pregnant?   No            Disposition:   Back to in bed       Electronically signed by: Cheyenne Andrew MD, 9/20/2024 1:04 PM

## 2024-09-20 NOTE — PROGRESS NOTES
Shirin Slater is a 54 y.o. female on day 2 of admission presenting with Gangrene of toe of left foot (Multi).    Subjective   Interval History: Was unable to see patient today, in, down for L heart cath today.     Objective   Range of Vitals (last 24 hours)  Heart Rate:  [63-93]   Temp:  [36.2 °C (97.2 °F)-36.4 °C (97.5 °F)]   Resp:  [16-18]   BP: ()/(52-82)   SpO2:  [92 %-96 %]   Daily Weight  09/18/24 : 90.6 kg (199 lb 12.8 oz)    Body mass index is 27.1 kg/m².    Physical exam deferred since patient was down for L heart cath today    Antibiotics  doxycycline - 100 mg  piperacillin-tazobactam - 3.375 gram/50 mL  vancomycin - 1500 mg/500 mL    Relevant Results  Labs  Results from last 72 hours   Lab Units 09/19/24  1619 09/18/24  0555   WBC AUTO x10*3/uL 10.8 13.3*   HEMOGLOBIN g/dL 12.7 12.7   HEMATOCRIT % 45.1 44.0   PLATELETS AUTO x10*3/uL 349 387     Results from last 72 hours   Lab Units 09/19/24  1619 09/18/24  0555   SODIUM mmol/L 138 137   POTASSIUM mmol/L 3.9 3.8   CHLORIDE mmol/L 102 103   CO2 mmol/L 26 23   BUN mg/dL 7 9   CREATININE mg/dL 0.74 0.52   GLUCOSE mg/dL 165* 196*   CALCIUM mg/dL 8.8 8.9   ANION GAP mmol/L 14 15   EGFR mL/min/1.73m*2 >90 >90   PHOSPHORUS mg/dL 3.8 2.7     Results from last 72 hours   Lab Units 09/18/24  0555   ALK PHOS U/L 88   BILIRUBIN TOTAL mg/dL 0.4   PROTEIN TOTAL g/dL 6.6   ALT U/L 9   AST U/L 12   ALBUMIN g/dL 3.3*     Estimated Creatinine Clearance: 108.6 mL/min (by C-G formula based on SCr of 0.74 mg/dL).  C-Reactive Protein   Date Value Ref Range Status   12/06/2023 17.25 (H) <1.00 mg/dL Final     CRP   Date Value Ref Range Status   06/07/2022 0.96 mg/dL Final     Comment:     REF VALUE  < 1.00     09/17/2021 14.05 (A) mg/dL Final     Comment:     REF VALUE  < 1.00       Microbiology  Susceptibility data from last 14 days.  Collected Specimen Info Organism   09/18/24 Tissue/Biopsy from Diabetic Foot Ulcer Mixed Skin Microorganisms      -Bcx from 09/18  pending, NGTD    Assessment/Plan   Shirin Salter is a 54F with past medical history of severe PAD s/p R hip disarticulation s/p L CFA and EIA embolectomy 12/23 s/p multiple L toe amputations, T2DM complicated by diabetic neuropathy, HTN, HLD, renal and splenic thromboses on aspirin/plavix/coumadin, and current tobacco use who presents as a transfer from [facility] for management of L 3rd toe gangrene with concern of osteomyelitis. ID consulted to assist in management of toe infection.    Updates  - [facility] surgery will be performing peripheral bypass on 09/23 to revascularize LLE  - pt getting L heart cath and LLE dx angio done today for pre-op evaluation    #L 2nd toe gangrene  #Suspected osteomyelitis of 3rd phalanx  ::Pt with severe PAD + DM experienced trauma to toe 2 weeks ago, has been experiencing worsening of pain, redness, and swelling, as well as purulence, indicating infection of this wound     ::Visual inspection of foot today consistent with cellulitis of foot, radiographic evidence points to osteomyelitis as well     ::Pt on empiric coverage with vanc/zosyn, will await Bcx results to further guide abx regimen.  Also would send culture of discharge if present  ::Due to low suspicion of MRSA, vanc can be discontinued     Recommendations:  - Follow cultures  - Discontinue vanc, continue treatment with zosyn  - Discussed probable need for direct definitive surgical treatment that might be TMA or BKA.       This patient was discussed with attending physician, MD MOON Breaux, MS3

## 2024-09-20 NOTE — PROGRESS NOTES
Cardiology Consult Note    Subjective:  SR 60-70s  Denies CP/ SOB/dizziness, palpitations     S/p LHC: normal coronary arteries    Past Cardiology Workup:  EKG: Sinus rhythm with PACs    Echo:   TTE 2023  CONCLUSIONS:   1. Poorly visualized anatomical structures due to suboptimal image quality.   2. Left ventricular systolic function is normal with a 65-70% estimated ejection fraction.   3. No left ventricular thrombus visualized.   4. There is no evidence of a patent foramen ovale.    TTE 2022  CONCLUSIONS:   1. The left ventricular systolic function is normal with a 60-65% estimated ejection fraction.   2. Spectral Doppler shows an impaired relaxation pattern of left ventricular diastolic filling.   3.  No significant valvular abnormalities    Stress Test:   Lexiscan Nuclear stress test 2022  IMPRESSION:  1. Normal stress myocardial perfusion imaging in response to  pharmacologic stress. No evidence of infarct or ischemia.  2. Well-maintained left ventricular function.  Left ventricular  ejection fraction of 65%.    Cath: No prior study    Cardiac Imaging: No prior study         Allergies:  Aspartame and Nsaids (non-steroidal anti-inflammatory drug)      Objective Data:  Last Recorded Vitals:  Vitals:    24 0432 24 0539 24 1127 24 1256   BP: 91/53 95/52 127/60 119/86   Pulse: 69 63 64 86   Resp: 18 18 16 16   Temp: 36.3 °C (97.3 °F) 36.2 °C (97.2 °F)     TempSrc: Temporal Temporal     SpO2: 95% 92% 96% 100%   Weight:       Height:         Medical Gas Therapy: None (Room air)  Weight  Av.6 kg (199 lb 12.8 oz)  Min: 90.6 kg (199 lb 12.8 oz)  Max: 90.6 kg (199 lb 12.8 oz)      LABS:  CMP:  Results from last 7 days   Lab Units 24  1619 24  0555   SODIUM mmol/L 138 137   POTASSIUM mmol/L 3.9 3.8   CHLORIDE mmol/L 102 103   CO2 mmol/L 26 23   ANION GAP mmol/L 14 15   BUN mg/dL 7 9   CREATININE mg/dL 0.74 0.52   EGFR mL/min/1.73m*2 >90 >90   MAGNESIUM mg/dL 1.75  "1.71   ALBUMIN g/dL  --  3.3*   ALT U/L  --  9   AST U/L  --  12   BILIRUBIN TOTAL mg/dL  --  0.4     CBC:  Results from last 7 days   Lab Units 09/19/24  1619 09/18/24  0555   WBC AUTO x10*3/uL 10.8 13.3*   HEMOGLOBIN g/dL 12.7 12.7   HEMATOCRIT % 45.1 44.0   PLATELETS AUTO x10*3/uL 349 387   MCV fL 73* 73*     COAG:   Results from last 7 days   Lab Units 09/20/24  0235 09/19/24  1619 09/18/24  0648   INR  2.3* 4.3* 5.0*     ABO:   ABO TYPE   Date Value Ref Range Status   09/18/2024 A  Final     HEME/ENDO:  Results from last 7 days   Lab Units 09/18/24  0555   HEMOGLOBIN A1C % 11.4*      CARDIAC:       No lab exists for component: \"CK\", \"CKMBP\"   Results from last 7 days   Lab Units 09/18/24  0555   HEMOGLOBIN A1C % 11.4*        Last I/O:    Intake/Output Summary (Last 24 hours) at 9/20/2024 1548  Last data filed at 9/20/2024 1256  Gross per 24 hour   Intake --   Output 10 ml   Net -10 ml     Net IO Since Admission: 469 mL [09/20/24 1548]      Imaging Results:    9/20 [facility]  Normal coronary arteries in right dominant circulation       Inpatient Medications:  Scheduled medications   Medication Dose Route Frequency    acetaminophen  650 mg oral q6h    aspirin  81 mg oral Daily    atorvastatin  80 mg oral Nightly    busPIRone  10 mg oral q AM    clopidogrel  75 mg oral Daily    divalproex  500 mg oral q8h NINO    DULoxetine  60 mg oral q PM    insulin lispro  0-10 Units subcutaneous TID    levETIRAcetam  500 mg oral BID    levothyroxine  75 mcg oral q AM    metoprolol tartrate  12.5 mg oral BID    pantoprazole  40 mg oral Daily before breakfast    perflutren lipid microspheres  0.5-10 mL of dilution intravenous Once in imaging    perflutren protein A microsphere  0.5 mL intravenous Once in imaging    phytonadione  5 mg intravenous Once    piperacillin-tazobactam  3.375 g intravenous q6h    sulfur hexafluoride microsphr  2 mL intravenous Once in imaging    [Held by provider] vancomycin  1,500 mg intravenous q8h NINO "     PRN medications   Medication    dextrose    dextrose    glucagon    glucagon    heparin    loperamide    melatonin    oxyCODONE    oxyCODONE    vancomycin     Continuous Medications   Medication Dose Last Rate    heparin  0-4,000 Units/hr Stopped (09/20/24 0820)       Outpatient Medications:  Current Outpatient Medications   Medication Instructions    acetaminophen (TYLENOL) 975 mg, oral, Every 6 hours    albuterol 90 mcg/actuation inhaler 2 puffs, inhalation, Every 6 hours PRN    aspirin 81 mg, oral, Daily    atorvastatin (Lipitor) 80 mg tablet 1 tablet, oral, Nightly    busPIRone (BUSPAR) 10 mg, oral, In the morning    clopidogrel (PLAVIX) 75 mg, oral, Daily    divalproex (Depakote ER) 500 mg 24 hr tablet 1 tablet, oral, In the morning, and 2 tablets in the evening    doxycycline (ADOXA) 100 mg, oral, 2 times daily, Take with a full glass of water and do not lie down for at least 30 minutes after    DULoxetine (Cymbalta) 60 mg DR capsule 1 capsule, oral, Daily    empagliflozin (JARDIANCE) 10 mg, oral, Daily    enoxaparin (LOVENOX) 100 mg, subcutaneous, 2 times daily, Continue Lovenox as bridge to Warfarin until INR as at goal for 2 days in a row (INR 2-3) then may stop Lovenox therapy; INR 3.4 on 12/15    gabapentin (Neurontin) 400 mg capsule 1 capsule, oral, 3 times daily    levETIRAcetam (KEPPRA) 500 mg, oral, 2 times daily    levothyroxine (TIROSINT) 75 mcg, oral, Daily before breakfast, On empty stomach    lidocaine 4 % patch 1 patch, transdermal, Daily PRN, Remove & discard patch within 12 hours or as directed by MD.    loperamide (IMODIUM) 4 mg, oral, As needed, Don't exceed 4 tablets per day    melatonin 10 mg tablet 1 tablet, oral, Nightly PRN    methocarbamol (Robaxin) 500 mg tablet 1 tablet, oral, 3 times daily    metoprolol tartrate (Lopressor) 25 mg tablet 0.5 tablets, oral, 2 times daily    multivitamin tablet 1 tablet, oral, Daily    omeprazole OTC (PRILOSEC OTC) 20 mg, oral, Daily before  breakfast    sennosides (SENOKOT) 17.2 mg, oral, 2 times daily    warfarin (Coumadin) 5 mg tablet Take as directed per After Visit Summary.    warfarin (COUMADIN) 2.5 mg, oral, Once, Take as directed per After Visit Summary.       Physical Exam:  General: Sitting up in bed, room air, NAD  Skin:  warm and dry.  HEENT: EOMI. MMM  Cardiovascular: RRR. No JVD at 45 degrees   Respiratory: Reduced A/E  Gastrointestinal: soft, non-distended  Extremities: right high hip disarticulation.  LLE wounds  Neurological: no gross focal deficits  Psychiatric: appropriate mood and affect      Assessment/Plan   Shirin Slater is a 54 y.o. female with a PMH of T2DM (A1c 8.5) c/b diabetic neuropathy, HTN, HLD, renal and splenic thrombosis 2015 (on Coumadin, aspirin and clopidogrel),  hx of RLE arterial thromboembolism and proximal DVT c/b R hip disarticulation and LLL disease s/p L 1/2/5 digit amputations and previous iliac artery stenting, current tobacco use who presented as a transfer from [facility] for management of left 2nd toe gangrene with concerns for osteomyelitis. Cardiology consulted for preoperative risk stratification for open peripheral bypass surgery.    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  He has no known hx of CAD, malignant arrhythmias or severe valvular abnormalities. No chest pain or angina at rest.  No clinical signs of decompensated heart failure.      Functional capacity: unknown due to lower extremity limitations     RCRI: Class II risk with 6 % 30-day risk of death, MI or cardiac arrest     ACS NSQIP: Risk of cardiac complication is 1.8%     Plan:  -Pt is moderate to high risk for a perioperative cardiac  complication due to CV risk factors, dependent functional status and high risk surgery  - [facility] with normal coronaries  - For vascular surgery Monday 9/23    Cardiology will sign off   Case discussed with DIAN Bailey-CNP  Cardiology Consults    Please call with any questions  Pager [phone] M-F 7a-6p; Saturday 7a-2p  Pager [phone] all other times        Code Status:  Full Code

## 2024-09-20 NOTE — DISCHARGE INSTRUCTIONS
CARDIAC CATHETERIZATION DISCHARGE INSTRUCTIONS (procedure done on 9/20/24)     FOR SUDDEN AND SEVERE CHEST PAIN, SHORTNESS OF BREATH, EXCESSIVE BLEEDING, SIGNS OF STROKE, OR CHANGES IN MENTAL STATUS YOU SHOULD CALL 911 IMMEDIATELY.     If your provider has prescribed aspirin and/or clopidogrel (Plavix), or prasugrel (Effient), or ticagrelor (Brilinta), DO NOT STOP THESE MEDICATIONS for any reason without talking to your cardiologist first. If any of these were prescribed, you must take them every day without missing a single dose. If you are getting low on these medications, contact your provider immediately for a refill.     FOR NEXT 24 HOURS  - Upon discharge, you should return home and rest for the remainder of the day and evening. You do not have to stay on bed rest but should not be very active.  It is recommended a responsible adult be with you for the first 24 hours after the procedure.    - No driving for 24 hours after procedure. Please arrange for someone to drive you home from the hospital today.     - Do not drive, operate machinery, or use power tools for 24 hours after your procedure.     - Do not make any legal decisions for 24 hours after your procedure.     - Do not drink alcoholic beverages for 24 hours after your procedure.    WOUND CARE   *FOR FEMORAL (LEG) ACCESS*  ·      Avoid heavy lifting (over 10 pounds) for 7 days, squatting or excessive bending for 2 days, and strenuous exercise for 7 days.  ·      No submerged bathing, swimming, or hot tubs for the next 7 days, or until fully healed.  ·      Avoid sexual activity for 3-4 days until any groin discomfort has ceased.     *FOR RADIAL (WRIST) ACCESS*  ·      No lifting more than 5 pounds or excessive use of the wrist for 24 hours - for example, treat your wrist as if it is sprained.  ·      Do not engage in vigorous activities (tennis, golf, bowling, weights) for at least 48 hours after the procedure.  ·      Do not submerge the wrist  for 7 days after the procedure.  ·      You should expect mild tingling in your hand and tenderness at the puncture site for up to 3 days.    - The transparent dressing should be removed from the site 24 hours after the procedure.  Wash the site gently with soap and water. Rinse well and pat dry. Keep the area clean and dry. You may apply a Band-Aid to the site. Avoid lotions, ointments, or powders until fully healed.     - You may shower the day after your procedure.      - It is normal to notice a small bruise around the puncture site and/or a small grape sized or smaller lump. Any large bruising or large lump warrants a call to the office.     - If bleeding should occur, lay down and apply pressure to the affected area for 10 minutes.  If the bleeding stops notify your physician.  If there is a large amount of bleeding or spurting of blood CALL 911 immediately.  DO NOT drive yourself to the hospital.    - You may experience some tenderness, bruising or minimal inflammation.  If you have any concerns, you may contact the Cath Lab or if any of these symptoms become excessive, contact your cardiologist or go to the emergency room.     *FOR TRANSMETATARSAL (FOOT) INCISION*  - Dressing: Betadine-soaked Adaptic, 4x4s, webril, and light ACE.   - Dressing is to remain clean, dry, and intact until changed by home Podiatrist at outpatient follow-up.  - If strikethrough occurs, please reinforce with ABD and kerlix.  - Patient is to be WBAT in surgical shoe left foot.       OTHER INSTRUCTIONS  - You may take acetaminophen (Tylenol) as directed for discomfort.  If pain is not relieved with acetaminophen (Tylenol), contact your doctor.    - If you notice or experience any of the following, you should notify your doctor or seek medical attention  Chest pain or discomfort  Change in mental status or weakness in extremities.  Dizziness, light headedness, or feeling faint.  Change in the site where the procedure was performed,  such as bleeding or an increased area of bruising or swelling.  Tingling, numbness, pain, or coolness in the leg/arm beyond the site where the procedure was performed.  Signs of infection (i.e. shaking chills, temperature > 100 degrees Fahrenheit, warmth, redness) in the leg/arm area where the procedure was performed.  Changes in urination   Bloody or black stools  Vomiting blood  Severe nose bleeds  Any excessive bleeding    - If you DO NOT have an appointment with your cardiologist within 2-4 weeks following your procedure, please contact their office.

## 2024-09-21 LAB
ANION GAP SERPL CALC-SCNC: 15 MMOL/L (ref 10–20)
BLOOD EXPIRATION DATE: NORMAL
BUN SERPL-MCNC: 10 MG/DL (ref 6–23)
CALCIUM SERPL-MCNC: 8.1 MG/DL (ref 8.6–10.6)
CHLORIDE SERPL-SCNC: 104 MMOL/L (ref 98–107)
CO2 SERPL-SCNC: 26 MMOL/L (ref 21–32)
CREAT SERPL-MCNC: 0.64 MG/DL (ref 0.5–1.05)
DISPENSE STATUS: NORMAL
EGFRCR SERPLBLD CKD-EPI 2021: >90 ML/MIN/1.73M*2
ERYTHROCYTE [DISTWIDTH] IN BLOOD BY AUTOMATED COUNT: 22.4 % (ref 11.5–14.5)
GLUCOSE BLD MANUAL STRIP-MCNC: 183 MG/DL (ref 74–99)
GLUCOSE BLD MANUAL STRIP-MCNC: 184 MG/DL (ref 74–99)
GLUCOSE BLD MANUAL STRIP-MCNC: 205 MG/DL (ref 74–99)
GLUCOSE BLD MANUAL STRIP-MCNC: 261 MG/DL (ref 74–99)
GLUCOSE SERPL-MCNC: 157 MG/DL (ref 74–99)
HCT VFR BLD AUTO: 40.6 % (ref 36–46)
HGB BLD-MCNC: 11.6 G/DL (ref 12–16)
MAGNESIUM SERPL-MCNC: 1.76 MG/DL (ref 1.6–2.4)
MCH RBC QN AUTO: 21.1 PG (ref 26–34)
MCHC RBC AUTO-ENTMCNC: 28.6 G/DL (ref 32–36)
MCV RBC AUTO: 74 FL (ref 80–100)
NRBC BLD-RTO: 0 /100 WBCS (ref 0–0)
PHOSPHATE SERPL-MCNC: 3.5 MG/DL (ref 2.5–4.9)
PLATELET # BLD AUTO: 353 X10*3/UL (ref 150–450)
POTASSIUM SERPL-SCNC: 3.5 MMOL/L (ref 3.5–5.3)
PRODUCT BLOOD TYPE: 2800
PRODUCT BLOOD TYPE: 6200
PRODUCT BLOOD TYPE: 8400
PRODUCT CODE: NORMAL
RBC # BLD AUTO: 5.51 X10*6/UL (ref 4–5.2)
SODIUM SERPL-SCNC: 141 MMOL/L (ref 136–145)
UFH PPP CHRO-ACNC: 0.4 IU/ML
UFH PPP CHRO-ACNC: 0.4 IU/ML
UNIT ABO: NORMAL
UNIT NUMBER: NORMAL
UNIT RH: NORMAL
UNIT VOLUME: 187
UNIT VOLUME: 209
UNIT VOLUME: 365
WBC # BLD AUTO: 9.4 X10*3/UL (ref 4.4–11.3)

## 2024-09-21 PROCEDURE — 2500000002 HC RX 250 W HCPCS SELF ADMINISTERED DRUGS (ALT 637 FOR MEDICARE OP, ALT 636 FOR OP/ED)

## 2024-09-21 PROCEDURE — 80048 BASIC METABOLIC PNL TOTAL CA: CPT

## 2024-09-21 PROCEDURE — 85027 COMPLETE CBC AUTOMATED: CPT

## 2024-09-21 PROCEDURE — 82947 ASSAY GLUCOSE BLOOD QUANT: CPT

## 2024-09-21 PROCEDURE — 36415 COLL VENOUS BLD VENIPUNCTURE: CPT

## 2024-09-21 PROCEDURE — 1200000002 HC GENERAL ROOM WITH TELEMETRY DAILY

## 2024-09-21 PROCEDURE — 2500000004 HC RX 250 GENERAL PHARMACY W/ HCPCS (ALT 636 FOR OP/ED): Performed by: STUDENT IN AN ORGANIZED HEALTH CARE EDUCATION/TRAINING PROGRAM

## 2024-09-21 PROCEDURE — 2500000001 HC RX 250 WO HCPCS SELF ADMINISTERED DRUGS (ALT 637 FOR MEDICARE OP)

## 2024-09-21 PROCEDURE — 2500000001 HC RX 250 WO HCPCS SELF ADMINISTERED DRUGS (ALT 637 FOR MEDICARE OP): Performed by: NURSE PRACTITIONER

## 2024-09-21 PROCEDURE — 85520 HEPARIN ASSAY: CPT

## 2024-09-21 PROCEDURE — 83735 ASSAY OF MAGNESIUM: CPT

## 2024-09-21 PROCEDURE — 84100 ASSAY OF PHOSPHORUS: CPT

## 2024-09-21 PROCEDURE — 99358 PROLONG SERVICE W/O CONTACT: CPT | Performed by: STUDENT IN AN ORGANIZED HEALTH CARE EDUCATION/TRAINING PROGRAM

## 2024-09-21 PROCEDURE — 2500000004 HC RX 250 GENERAL PHARMACY W/ HCPCS (ALT 636 FOR OP/ED)

## 2024-09-21 RX ADMIN — PIPERACILLIN SODIUM AND TAZOBACTAM SODIUM 3.38 G: 3; .375 INJECTION, SOLUTION INTRAVENOUS at 23:22

## 2024-09-21 RX ADMIN — METOPROLOL TARTRATE 12.5 MG: 25 TABLET, FILM COATED ORAL at 09:17

## 2024-09-21 RX ADMIN — PIPERACILLIN SODIUM AND TAZOBACTAM SODIUM 3.38 G: 3; .375 INJECTION, SOLUTION INTRAVENOUS at 06:46

## 2024-09-21 RX ADMIN — HEPARIN SODIUM 1600 UNITS/HR: 10000 INJECTION, SOLUTION INTRAVENOUS at 01:15

## 2024-09-21 RX ADMIN — HEPARIN SODIUM 1600 UNITS/HR: 10000 INJECTION, SOLUTION INTRAVENOUS at 23:19

## 2024-09-21 RX ADMIN — DIVALPROEX SODIUM 500 MG: 500 TABLET, DELAYED RELEASE ORAL at 05:32

## 2024-09-21 RX ADMIN — ACETAMINOPHEN 650 MG: 325 TABLET, FILM COATED ORAL at 11:29

## 2024-09-21 RX ADMIN — OXYCODONE HYDROCHLORIDE 10 MG: 5 TABLET ORAL at 10:19

## 2024-09-21 RX ADMIN — ACETAMINOPHEN 650 MG: 325 TABLET, FILM COATED ORAL at 05:12

## 2024-09-21 RX ADMIN — LOPERAMIDE HYDROCHLORIDE 4 MG: 2 CAPSULE ORAL at 18:04

## 2024-09-21 RX ADMIN — METOPROLOL TARTRATE 12.5 MG: 25 TABLET, FILM COATED ORAL at 21:46

## 2024-09-21 RX ADMIN — OXYCODONE HYDROCHLORIDE 10 MG: 5 TABLET ORAL at 05:11

## 2024-09-21 RX ADMIN — OXYCODONE HYDROCHLORIDE 10 MG: 5 TABLET ORAL at 20:44

## 2024-09-21 RX ADMIN — HEPARIN SODIUM 1600 UNITS/HR: 10000 INJECTION, SOLUTION INTRAVENOUS at 08:11

## 2024-09-21 RX ADMIN — INSULIN LISPRO 2 UNITS: 100 INJECTION, SOLUTION INTRAVENOUS; SUBCUTANEOUS at 14:47

## 2024-09-21 RX ADMIN — CLOPIDOGREL BISULFATE 75 MG: 75 TABLET ORAL at 09:17

## 2024-09-21 RX ADMIN — INSULIN LISPRO 6 UNITS: 100 INJECTION, SOLUTION INTRAVENOUS; SUBCUTANEOUS at 17:58

## 2024-09-21 RX ADMIN — ACETAMINOPHEN 650 MG: 325 TABLET, FILM COATED ORAL at 17:58

## 2024-09-21 RX ADMIN — PIPERACILLIN SODIUM AND TAZOBACTAM SODIUM 3.38 G: 3; .375 INJECTION, SOLUTION INTRAVENOUS at 17:58

## 2024-09-21 RX ADMIN — PIPERACILLIN SODIUM AND TAZOBACTAM SODIUM 3.38 G: 3; .375 INJECTION, SOLUTION INTRAVENOUS at 11:23

## 2024-09-21 RX ADMIN — ASPIRIN 81 MG: 81 TABLET, COATED ORAL at 09:16

## 2024-09-21 RX ADMIN — OXYCODONE HYDROCHLORIDE 10 MG: 5 TABLET ORAL at 14:46

## 2024-09-21 RX ADMIN — DULOXETINE HYDROCHLORIDE 60 MG: 60 CAPSULE, DELAYED RELEASE ORAL at 21:46

## 2024-09-21 RX ADMIN — ATORVASTATIN CALCIUM 80 MG: 80 TABLET, FILM COATED ORAL at 21:46

## 2024-09-21 RX ADMIN — INSULIN LISPRO 2 UNITS: 100 INJECTION, SOLUTION INTRAVENOUS; SUBCUTANEOUS at 10:20

## 2024-09-21 RX ADMIN — LEVETIRACETAM 500 MG: 500 TABLET, FILM COATED ORAL at 09:17

## 2024-09-21 RX ADMIN — DIVALPROEX SODIUM 500 MG: 500 TABLET, DELAYED RELEASE ORAL at 14:47

## 2024-09-21 RX ADMIN — DIVALPROEX SODIUM 500 MG: 500 TABLET, DELAYED RELEASE ORAL at 21:46

## 2024-09-21 RX ADMIN — LEVETIRACETAM 500 MG: 500 TABLET, FILM COATED ORAL at 21:46

## 2024-09-21 RX ADMIN — PANTOPRAZOLE SODIUM 40 MG: 40 TABLET, DELAYED RELEASE ORAL at 06:52

## 2024-09-21 RX ADMIN — BUSPIRONE HYDROCHLORIDE 10 MG: 10 TABLET ORAL at 09:16

## 2024-09-21 RX ADMIN — LEVOTHYROXINE SODIUM 75 MCG: 0.07 TABLET ORAL at 05:32

## 2024-09-21 ASSESSMENT — COGNITIVE AND FUNCTIONAL STATUS - GENERAL
STANDING UP FROM CHAIR USING ARMS: A LOT
STANDING UP FROM CHAIR USING ARMS: A LOT
MOBILITY SCORE: 15
MOBILITY SCORE: 15
DAILY ACTIVITIY SCORE: 24
CLIMB 3 TO 5 STEPS WITH RAILING: TOTAL
DAILY ACTIVITIY SCORE: 24
WALKING IN HOSPITAL ROOM: TOTAL
MOVING TO AND FROM BED TO CHAIR: A LITTLE
WALKING IN HOSPITAL ROOM: TOTAL
MOVING TO AND FROM BED TO CHAIR: A LITTLE
CLIMB 3 TO 5 STEPS WITH RAILING: TOTAL

## 2024-09-21 ASSESSMENT — PAIN - FUNCTIONAL ASSESSMENT
PAIN_FUNCTIONAL_ASSESSMENT: 0-10

## 2024-09-21 ASSESSMENT — PAIN SCALES - GENERAL
PAINLEVEL_OUTOF10: 9
PAINLEVEL_OUTOF10: 0 - NO PAIN
PAINLEVEL_OUTOF10: 9
PAINLEVEL_OUTOF10: 3
PAINLEVEL_OUTOF10: 3
PAINLEVEL_OUTOF10: 10 - WORST POSSIBLE PAIN
PAINLEVEL_OUTOF10: 9

## 2024-09-21 ASSESSMENT — PAIN DESCRIPTION - LOCATION
LOCATION: TOE (COMMENT WHICH ONE)
LOCATION: FOOT

## 2024-09-21 ASSESSMENT — PAIN DESCRIPTION - ORIENTATION: ORIENTATION: LEFT

## 2024-09-21 NOTE — CARE PLAN
Problem: Skin  Goal: Decreased wound size/increased tissue granulation at next dressing change  Outcome: Progressing  Goal: Participates in plan/prevention/treatment measures  Outcome: Progressing  Goal: Prevent/manage excess moisture  Outcome: Progressing  Goal: Prevent/minimize sheer/friction injuries  Outcome: Progressing  Goal: Promote/optimize nutrition  Outcome: Progressing  Goal: Promote skin healing  Outcome: Progressing   The patient's goals for the shift include      The clinical goals for the shift include patient will remain hemodynamically stable throughout the shift     [Normal] : affect appropriate

## 2024-09-21 NOTE — PROGRESS NOTES
VASCULAR SURGERY PROGRESS NOTE  Assessment/Plan   Shirin Slater is 54 y.o. female with history of severe PAD s/p R hip disarticulation s/p L CFA and EIA embolectomy 12/23 s/p multiple L toe amputations, T2DM complicated by diabetic neuropathy, HTN, HLD, renal and splenic thromboses on aspirin/plavix/coumadin, and current tobacco use who presents as a transfer from [facility] for management of L 2nd toe gangrene with concern of osteomyelitis.      Plan:  Sliding scale insulin  Omeprazole PO daily   ASA/Plavix  Continue Zosyn  Continue Q4 NVC    #Planning for surgery:  INR is 2.3 from 4.3: after 10 mg IV vitamin K and FFP   Vein mapping 09/18  Echo 09/19: Left ventricular ejection fraction at 65-70%.   C 09/20    Subjective   No acute events overnight.  WBC 9.4. Afebrile.  INR 1.2. Still on heparin gtt.  Possible OR Monday.    Objective   Vitals:  Heart Rate:  [67-94]   Temp:  [36.2 °C (97.2 °F)-36.5 °C (97.7 °F)]   Resp:  [15-20]   BP: ()/(61-75)   SpO2:  [92 %-96 %]     Exam:  Constitutional: No acute distress  Neuro: AOx3, grossly motor intact. Limited sensation of distal L foot.  ENMT: moist mucous membranes  Head/neck: atraumatic  CV: no tachycardia  Pulm: non-labored on room air  GI: soft, non-tender, non-distended  Skin: warm and dry. Blanching erythema of L forefoot with tenderness to palpation.  Musculoskeletal: moving all extremities. S/p R hip disarticulation  Vascular: L foot - monophasic DP/PT, biphasic AT.     Labs:  Results from last 7 days   Lab Units 09/21/24  0538 09/20/24  1551 09/19/24  1619   WBC AUTO x10*3/uL 9.4 8.0 10.8   HEMOGLOBIN g/dL 11.6* 12.5 12.7   PLATELETS AUTO x10*3/uL 353 359 349      Results from last 7 days   Lab Units 09/21/24  0538 09/20/24  1551 09/19/24  1619   SODIUM mmol/L 141 138 138   POTASSIUM mmol/L 3.5 3.9 3.9   CHLORIDE mmol/L 104 103 102   CO2 mmol/L 26 23 26   BUN mg/dL 10 8 7   CREATININE mg/dL 0.64 0.51 0.74   GLUCOSE mg/dL 157* 133* 165*    MAGNESIUM mg/dL 1.76 1.98 1.75   PHOSPHORUS mg/dL 3.5 3.3 3.8      Results from last 7 days   Lab Units 09/20/24  1551 09/20/24  0235 09/19/24  1619   INR  1.2* 2.3* 4.3*   PROTIME seconds 13.5* 26.3* 48.8*   APTT seconds 33 60* 67*      Results from last 7 days   Lab Units 09/21/24  0532 09/20/24  2128 09/20/24  0235   ANTI XA UNFRACTIONATED IU/mL 0.4 0.2 0.1     Ryan Jimenez MD  Vascular Surgery, PGY4  Team Pager: [phone]

## 2024-09-21 NOTE — CARE PLAN
The patient's goals for the shift include      The clinical goals for the shift include patient will remain hemodynamically stable through end of shift

## 2024-09-22 ENCOUNTER — ANESTHESIA EVENT (OUTPATIENT)
Dept: OPERATING ROOM | Facility: HOSPITAL | Age: 54
End: 2024-09-22
Payer: COMMERCIAL

## 2024-09-22 VITALS
WEIGHT: 199.8 LBS | RESPIRATION RATE: 17 BRPM | HEIGHT: 72 IN | SYSTOLIC BLOOD PRESSURE: 113 MMHG | BODY MASS INDEX: 27.06 KG/M2 | TEMPERATURE: 97.5 F | DIASTOLIC BLOOD PRESSURE: 55 MMHG | OXYGEN SATURATION: 93 % | HEART RATE: 71 BPM

## 2024-09-22 LAB
ANION GAP SERPL CALC-SCNC: 16 MMOL/L (ref 10–20)
BACTERIA BLD CULT: NORMAL
BACTERIA BLD CULT: NORMAL
BUN SERPL-MCNC: 9 MG/DL (ref 6–23)
CALCIUM SERPL-MCNC: 8.7 MG/DL (ref 8.6–10.6)
CHLORIDE SERPL-SCNC: 103 MMOL/L (ref 98–107)
CO2 SERPL-SCNC: 25 MMOL/L (ref 21–32)
CREAT SERPL-MCNC: 0.52 MG/DL (ref 0.5–1.05)
EGFRCR SERPLBLD CKD-EPI 2021: >90 ML/MIN/1.73M*2
ERYTHROCYTE [DISTWIDTH] IN BLOOD BY AUTOMATED COUNT: 22.8 % (ref 11.5–14.5)
GLUCOSE BLD MANUAL STRIP-MCNC: 154 MG/DL (ref 74–99)
GLUCOSE BLD MANUAL STRIP-MCNC: 158 MG/DL (ref 74–99)
GLUCOSE BLD MANUAL STRIP-MCNC: 166 MG/DL (ref 74–99)
GLUCOSE BLD MANUAL STRIP-MCNC: 181 MG/DL (ref 74–99)
GLUCOSE SERPL-MCNC: 149 MG/DL (ref 74–99)
HCT VFR BLD AUTO: 41.5 % (ref 36–46)
HGB BLD-MCNC: 11.9 G/DL (ref 12–16)
MAGNESIUM SERPL-MCNC: 1.93 MG/DL (ref 1.6–2.4)
MCH RBC QN AUTO: 21.7 PG (ref 26–34)
MCHC RBC AUTO-ENTMCNC: 28.7 G/DL (ref 32–36)
MCV RBC AUTO: 76 FL (ref 80–100)
NRBC BLD-RTO: 0 /100 WBCS (ref 0–0)
PHOSPHATE SERPL-MCNC: 2 MG/DL (ref 2.5–4.9)
PLATELET # BLD AUTO: 354 X10*3/UL (ref 150–450)
POTASSIUM SERPL-SCNC: 4 MMOL/L (ref 3.5–5.3)
RBC # BLD AUTO: 5.48 X10*6/UL (ref 4–5.2)
SODIUM SERPL-SCNC: 140 MMOL/L (ref 136–145)
UFH PPP CHRO-ACNC: 0.3 IU/ML
WBC # BLD AUTO: 9.6 X10*3/UL (ref 4.4–11.3)

## 2024-09-22 PROCEDURE — 36415 COLL VENOUS BLD VENIPUNCTURE: CPT

## 2024-09-22 PROCEDURE — 84100 ASSAY OF PHOSPHORUS: CPT

## 2024-09-22 PROCEDURE — 80048 BASIC METABOLIC PNL TOTAL CA: CPT

## 2024-09-22 PROCEDURE — 85027 COMPLETE CBC AUTOMATED: CPT

## 2024-09-22 PROCEDURE — 2500000004 HC RX 250 GENERAL PHARMACY W/ HCPCS (ALT 636 FOR OP/ED): Performed by: STUDENT IN AN ORGANIZED HEALTH CARE EDUCATION/TRAINING PROGRAM

## 2024-09-22 PROCEDURE — 2500000002 HC RX 250 W HCPCS SELF ADMINISTERED DRUGS (ALT 637 FOR MEDICARE OP, ALT 636 FOR OP/ED)

## 2024-09-22 PROCEDURE — 2500000005 HC RX 250 GENERAL PHARMACY W/O HCPCS

## 2024-09-22 PROCEDURE — 2500000004 HC RX 250 GENERAL PHARMACY W/ HCPCS (ALT 636 FOR OP/ED)

## 2024-09-22 PROCEDURE — 99232 SBSQ HOSP IP/OBS MODERATE 35: CPT | Performed by: STUDENT IN AN ORGANIZED HEALTH CARE EDUCATION/TRAINING PROGRAM

## 2024-09-22 PROCEDURE — 2500000001 HC RX 250 WO HCPCS SELF ADMINISTERED DRUGS (ALT 637 FOR MEDICARE OP)

## 2024-09-22 PROCEDURE — 83735 ASSAY OF MAGNESIUM: CPT

## 2024-09-22 PROCEDURE — 1200000002 HC GENERAL ROOM WITH TELEMETRY DAILY

## 2024-09-22 PROCEDURE — 85520 HEPARIN ASSAY: CPT

## 2024-09-22 PROCEDURE — 82947 ASSAY GLUCOSE BLOOD QUANT: CPT

## 2024-09-22 RX ORDER — ONDANSETRON 4 MG/1
4 TABLET, FILM COATED ORAL ONCE
Status: COMPLETED | OUTPATIENT
Start: 2024-09-22 | End: 2024-09-22

## 2024-09-22 RX ADMIN — DULOXETINE HYDROCHLORIDE 60 MG: 60 CAPSULE, DELAYED RELEASE ORAL at 21:25

## 2024-09-22 RX ADMIN — BUSPIRONE HYDROCHLORIDE 10 MG: 10 TABLET ORAL at 08:32

## 2024-09-22 RX ADMIN — ACETAMINOPHEN 650 MG: 325 TABLET, FILM COATED ORAL at 06:39

## 2024-09-22 RX ADMIN — ASPIRIN 81 MG: 81 TABLET, COATED ORAL at 08:32

## 2024-09-22 RX ADMIN — INSULIN LISPRO 2 UNITS: 100 INJECTION, SOLUTION INTRAVENOUS; SUBCUTANEOUS at 11:53

## 2024-09-22 RX ADMIN — LEVETIRACETAM 500 MG: 500 TABLET, FILM COATED ORAL at 21:25

## 2024-09-22 RX ADMIN — INSULIN LISPRO 2 UNITS: 100 INJECTION, SOLUTION INTRAVENOUS; SUBCUTANEOUS at 08:32

## 2024-09-22 RX ADMIN — CLOPIDOGREL BISULFATE 75 MG: 75 TABLET ORAL at 08:32

## 2024-09-22 RX ADMIN — OXYCODONE HYDROCHLORIDE 10 MG: 5 TABLET ORAL at 21:25

## 2024-09-22 RX ADMIN — OXYCODONE HYDROCHLORIDE 10 MG: 5 TABLET ORAL at 00:51

## 2024-09-22 RX ADMIN — LEVOTHYROXINE SODIUM 75 MCG: 0.07 TABLET ORAL at 06:39

## 2024-09-22 RX ADMIN — LEVETIRACETAM 500 MG: 500 TABLET, FILM COATED ORAL at 08:32

## 2024-09-22 RX ADMIN — ATORVASTATIN CALCIUM 80 MG: 80 TABLET, FILM COATED ORAL at 21:25

## 2024-09-22 RX ADMIN — ACETAMINOPHEN 650 MG: 325 TABLET, FILM COATED ORAL at 11:52

## 2024-09-22 RX ADMIN — HEPARIN SODIUM 1600 UNITS/HR: 10000 INJECTION, SOLUTION INTRAVENOUS at 16:51

## 2024-09-22 RX ADMIN — ACETAMINOPHEN 650 MG: 325 TABLET, FILM COATED ORAL at 16:51

## 2024-09-22 RX ADMIN — ONDANSETRON HYDROCHLORIDE 4 MG: 4 TABLET, FILM COATED ORAL at 20:04

## 2024-09-22 RX ADMIN — METOPROLOL TARTRATE 12.5 MG: 25 TABLET, FILM COATED ORAL at 21:24

## 2024-09-22 RX ADMIN — PIPERACILLIN SODIUM AND TAZOBACTAM SODIUM 3.38 G: 3; .375 INJECTION, SOLUTION INTRAVENOUS at 11:52

## 2024-09-22 RX ADMIN — DIVALPROEX SODIUM 500 MG: 500 TABLET, DELAYED RELEASE ORAL at 06:40

## 2024-09-22 RX ADMIN — DIVALPROEX SODIUM 500 MG: 500 TABLET, DELAYED RELEASE ORAL at 21:25

## 2024-09-22 RX ADMIN — ACETAMINOPHEN 650 MG: 325 TABLET, FILM COATED ORAL at 21:24

## 2024-09-22 RX ADMIN — PIPERACILLIN SODIUM AND TAZOBACTAM SODIUM 3.38 G: 3; .375 INJECTION, SOLUTION INTRAVENOUS at 06:40

## 2024-09-22 RX ADMIN — PIPERACILLIN SODIUM AND TAZOBACTAM SODIUM 3.38 G: 3; .375 INJECTION, SOLUTION INTRAVENOUS at 17:25

## 2024-09-22 RX ADMIN — METOPROLOL TARTRATE 12.5 MG: 25 TABLET, FILM COATED ORAL at 08:32

## 2024-09-22 RX ADMIN — ACETAMINOPHEN 650 MG: 325 TABLET, FILM COATED ORAL at 00:51

## 2024-09-22 RX ADMIN — OXYCODONE HYDROCHLORIDE 10 MG: 5 TABLET ORAL at 06:40

## 2024-09-22 RX ADMIN — PANTOPRAZOLE SODIUM 40 MG: 40 TABLET, DELAYED RELEASE ORAL at 06:39

## 2024-09-22 ASSESSMENT — COGNITIVE AND FUNCTIONAL STATUS - GENERAL
MOVING TO AND FROM BED TO CHAIR: A LITTLE
DAILY ACTIVITIY SCORE: 24
MOVING TO AND FROM BED TO CHAIR: A LITTLE
STANDING UP FROM CHAIR USING ARMS: A LOT
WALKING IN HOSPITAL ROOM: TOTAL
CLIMB 3 TO 5 STEPS WITH RAILING: TOTAL
STANDING UP FROM CHAIR USING ARMS: A LOT
WALKING IN HOSPITAL ROOM: TOTAL
DAILY ACTIVITIY SCORE: 24
MOBILITY SCORE: 15
CLIMB 3 TO 5 STEPS WITH RAILING: TOTAL
MOBILITY SCORE: 15

## 2024-09-22 ASSESSMENT — PAIN SCALES - GENERAL
PAINLEVEL_OUTOF10: 8
PAINLEVEL_OUTOF10: 10 - WORST POSSIBLE PAIN
PAINLEVEL_OUTOF10: 8
PAINLEVEL_OUTOF10: 7
PAINLEVEL_OUTOF10: 0 - NO PAIN
PAINLEVEL_OUTOF10: 10 - WORST POSSIBLE PAIN

## 2024-09-22 ASSESSMENT — PAIN DESCRIPTION - LOCATION
LOCATION: FOOT

## 2024-09-22 ASSESSMENT — PAIN - FUNCTIONAL ASSESSMENT
PAIN_FUNCTIONAL_ASSESSMENT: 0-10

## 2024-09-22 ASSESSMENT — PAIN DESCRIPTION - ORIENTATION
ORIENTATION: LEFT

## 2024-09-22 NOTE — PROGRESS NOTES
Shirin Slater is a 54 y.o. female on day 4 of admission presenting with Gangrene of toe of left foot (Multi).    Subjective   Interval History: Patient with diarrhea today. She notes that she has been going multiple times today. Denies any rash. She also has pain in the leg area         Review of Systems    Review of systems otherwise negative    Objective   Range of Vitals (last 24 hours)  Heart Rate:  [73-86]   Temp:  [36.2 °C (97.2 °F)-36.6 °C (97.9 °F)]   Resp:  [15-18]   BP: ()/(46-64)   SpO2:  [91 %-97 %]   Daily Weight  09/18/24 : 90.6 kg (199 lb 12.8 oz)    Body mass index is 27.1 kg/m².    Physical Exam  General: in no acute distress, able to answer questions  HEENT: no conjunctival injection. anicteric.  CVS: RRR. Normal S1 and S2. No m/r/g.   RESP: ctab no w/r/r, no increased wob  Abd: Soft and lax. ND.   Ext: No swelling of the LE b/l.   Neuro: Answers questions appropriately.  Integumentary: no obvious lesions     Antibiotics  doxycycline - 100 mg  piperacillin-tazobactam - 3.375 gram/50 mL    Relevant Results  Labs  Results from last 72 hours   Lab Units 09/22/24  1005 09/21/24  0538 09/20/24  1551   WBC AUTO x10*3/uL 9.6 9.4 8.0   HEMOGLOBIN g/dL 11.9* 11.6* 12.5   HEMATOCRIT % 41.5 40.6 44.8   PLATELETS AUTO x10*3/uL 354 353 359     Results from last 72 hours   Lab Units 09/22/24  1005 09/21/24  0538 09/20/24  1551   SODIUM mmol/L 140 141 138   POTASSIUM mmol/L 4.0 3.5 3.9   CHLORIDE mmol/L 103 104 103   CO2 mmol/L 25 26 23   BUN mg/dL 9 10 8   CREATININE mg/dL 0.52 0.64 0.51   GLUCOSE mg/dL 149* 157* 133*   CALCIUM mg/dL 8.7 8.1* 8.7   ANION GAP mmol/L 16 15 16   EGFR mL/min/1.73m*2 >90 >90 >90   PHOSPHORUS mg/dL 2.0* 3.5 3.3         Estimated Creatinine Clearance: 125 mL/min (by C-G formula based on SCr of 0.52 mg/dL).  C-Reactive Protein   Date Value Ref Range Status   12/06/2023 17.25 (H) <1.00 mg/dL Final     CRP   Date Value Ref Range Status   06/07/2022 0.96 mg/dL Final     Comment:      REF VALUE  < 1.00     09/17/2021 14.05 (A) mg/dL Final     Comment:     REF VALUE  < 1.00       Microbiology  Susceptibility data from last 14 days.  Collected Specimen Info Organism   09/18/24 Tissue/Biopsy from Diabetic Foot Ulcer Mixed Skin Microorganisms      Imaging    Foot xray  IMPRESSION:  Erosive cortical changes and irregular internal margins of the 3rd  digit distal phalanx consistent with osteomyelitis. MRI can be  performed to evaluate extent of disease Soft tissue edema about the  3rd digit          Assessment/Plan   Shirin Slater is a 54F with past medical history of severe PAD s/p R hip disarticulation s/p L CFA and EIA embolectomy 12/23 s/p multiple L toe amputations, T2DM complicated by diabetic neuropathy, HTN, HLD, renal and splenic thromboses on aspirin/plavix/coumadin, and current tobacco use who presents as a transfer from [facility] for management of L 3rd toe gangrene with concern of osteomyelitis. ID consulted to assist in management of toe infection.     #L 2nd toe gangrene  #Suspected osteomyelitis of 3rd phalanx  :Pt with severe PAD + DM experienced trauma to toe 2 weeks ago, has been experiencing worsening of pain, redness, and swelling, as well as purulence, indicating infection of this wound     -Stop zosyn  -Start unasyn 3 q 6 hours as no MRSA or pseudomonas seen on tissue cx collected on 9/18  -Please test for c. Diff pcr stool test in the setting of diarrhea and abx     ID will continue to follow. If any questions regarding this patient please chito Acuna  Infectious Diseases  Please haiku chat questions        Tavo Acuna MD

## 2024-09-22 NOTE — PROGRESS NOTES
VASCULAR SURGERY PROGRESS NOTE  Assessment/Plan   Shirin Slater is 54 y.o. female with history of severe PAD s/p R hip disarticulation s/p L CFA and EIA embolectomy 12/23 s/p multiple L toe amputations, T2DM complicated by diabetic neuropathy, HTN, HLD, renal and splenic thromboses on aspirin/plavix/coumadin, and current tobacco use who presents as a transfer from [facility] for management of L 2nd toe gangrene with concern of osteomyelitis.      Plan:  Sliding scale insulin  Omeprazole PO daily   ASA/Plavix  Continue Zosyn  Continue Q4 NVC    #Planning for surgery:  INR is 2.3 from 4.3: after 10 mg IV vitamin K and FFP   Vein mapping 09/18  Echo 09/19: Left ventricular ejection fraction at 65-70%.   C 09/20    Subjective   No acute events overnight.  WBC 9.4. Afebrile.  INR 1.2. Still on heparin gtt.  Possible OR Monday for angiogram.     Objective   Vitals:  Heart Rate:  [76-86]   Temp:  [36.2 °C (97.2 °F)-36.6 °C (97.9 °F)]   Resp:  [15-18]   BP: ()/(46-64)   SpO2:  [91 %-97 %]     Exam:  Constitutional: No acute distress  Neuro: AOx3, grossly motor intact. Limited sensation of distal L foot.  ENMT: moist mucous membranes  Head/neck: atraumatic  CV: no tachycardia  Pulm: non-labored on room air  GI: soft, non-tender, non-distended  Skin: warm and dry. Blanching erythema of L forefoot with tenderness to palpation.  Musculoskeletal: moving all extremities. S/p R hip disarticulation  Vascular: L foot - monophasic DP/PT, biphasic AT.     Labs:  Results from last 7 days   Lab Units 09/21/24  0538 09/20/24  1551 09/19/24  1619   WBC AUTO x10*3/uL 9.4 8.0 10.8   HEMOGLOBIN g/dL 11.6* 12.5 12.7   PLATELETS AUTO x10*3/uL 353 359 349      Results from last 7 days   Lab Units 09/21/24  0538 09/20/24  1551 09/19/24  1619   SODIUM mmol/L 141 138 138   POTASSIUM mmol/L 3.5 3.9 3.9   CHLORIDE mmol/L 104 103 102   CO2 mmol/L 26 23 26   BUN mg/dL 10 8 7   CREATININE mg/dL 0.64 0.51 0.74   GLUCOSE mg/dL 157*  133* 165*   MAGNESIUM mg/dL 1.76 1.98 1.75   PHOSPHORUS mg/dL 3.5 3.3 3.8      Results from last 7 days   Lab Units 09/20/24  1551 09/20/24  0235 09/19/24  1619   INR  1.2* 2.3* 4.3*   PROTIME seconds 13.5* 26.3* 48.8*   APTT seconds 33 60* 67*      Results from last 7 days   Lab Units 09/21/24  1859 09/21/24  0532 09/20/24  2128   ANTI XA UNFRACTIONATED IU/mL 0.4 0.4 0.2     Bhavik Granger MD  Vascular Surgery, PGY3  Team Pager: [phone]

## 2024-09-22 NOTE — CARE PLAN
Problem: Skin  Goal: Decreased wound size/increased tissue granulation at next dressing change  Outcome: Progressing  Goal: Participates in plan/prevention/treatment measures  Outcome: Progressing  Goal: Prevent/manage excess moisture  Outcome: Progressing  Goal: Prevent/minimize sheer/friction injuries  Outcome: Progressing  Goal: Promote/optimize nutrition  Outcome: Progressing  Goal: Promote skin healing  Outcome: Progressing   The patient's goals for the shift include      The clinical goals for the shift include pt will remain HDS and sleep at least four hours by end of shift

## 2024-09-23 ENCOUNTER — ANESTHESIA (OUTPATIENT)
Dept: OPERATING ROOM | Facility: HOSPITAL | Age: 54
End: 2024-09-23
Payer: COMMERCIAL

## 2024-09-23 LAB
ANION GAP SERPL CALC-SCNC: 13 MMOL/L (ref 10–20)
BUN SERPL-MCNC: 7 MG/DL (ref 6–23)
C DIF TOX TCDA+TCDB STL QL NAA+PROBE: NOT DETECTED
CALCIUM SERPL-MCNC: 8.5 MG/DL (ref 8.6–10.6)
CHLORIDE SERPL-SCNC: 103 MMOL/L (ref 98–107)
CO2 SERPL-SCNC: 28 MMOL/L (ref 21–32)
CREAT SERPL-MCNC: 0.59 MG/DL (ref 0.5–1.05)
EGFRCR SERPLBLD CKD-EPI 2021: >90 ML/MIN/1.73M*2
ERYTHROCYTE [DISTWIDTH] IN BLOOD BY AUTOMATED COUNT: 23.2 % (ref 11.5–14.5)
GLUCOSE BLD MANUAL STRIP-MCNC: 126 MG/DL (ref 74–99)
GLUCOSE BLD MANUAL STRIP-MCNC: 145 MG/DL (ref 74–99)
GLUCOSE BLD MANUAL STRIP-MCNC: 158 MG/DL (ref 74–99)
GLUCOSE BLD MANUAL STRIP-MCNC: 168 MG/DL (ref 74–99)
GLUCOSE SERPL-MCNC: 141 MG/DL (ref 74–99)
HCT VFR BLD AUTO: 43.5 % (ref 36–46)
HGB BLD-MCNC: 12.4 G/DL (ref 12–16)
MAGNESIUM SERPL-MCNC: 1.71 MG/DL (ref 1.6–2.4)
MCH RBC QN AUTO: 21.7 PG (ref 26–34)
MCHC RBC AUTO-ENTMCNC: 28.5 G/DL (ref 32–36)
MCV RBC AUTO: 76 FL (ref 80–100)
NRBC BLD-RTO: 0 /100 WBCS (ref 0–0)
PHOSPHATE SERPL-MCNC: 2.4 MG/DL (ref 2.5–4.9)
PLATELET # BLD AUTO: 329 X10*3/UL (ref 150–450)
POTASSIUM SERPL-SCNC: 4.2 MMOL/L (ref 3.5–5.3)
RBC # BLD AUTO: 5.71 X10*6/UL (ref 4–5.2)
SODIUM SERPL-SCNC: 140 MMOL/L (ref 136–145)
UFH PPP CHRO-ACNC: 0.3 IU/ML
UFH PPP CHRO-ACNC: 0.3 IU/ML
UFH PPP CHRO-ACNC: <0.1 IU/ML
WBC # BLD AUTO: 8.8 X10*3/UL (ref 4.4–11.3)

## 2024-09-23 PROCEDURE — 84100 ASSAY OF PHOSPHORUS: CPT

## 2024-09-23 PROCEDURE — 87493 C DIFF AMPLIFIED PROBE: CPT

## 2024-09-23 PROCEDURE — 99232 SBSQ HOSP IP/OBS MODERATE 35: CPT | Performed by: STUDENT IN AN ORGANIZED HEALTH CARE EDUCATION/TRAINING PROGRAM

## 2024-09-23 PROCEDURE — 36415 COLL VENOUS BLD VENIPUNCTURE: CPT

## 2024-09-23 PROCEDURE — 85027 COMPLETE CBC AUTOMATED: CPT

## 2024-09-23 PROCEDURE — 82947 ASSAY GLUCOSE BLOOD QUANT: CPT

## 2024-09-23 PROCEDURE — 2500000004 HC RX 250 GENERAL PHARMACY W/ HCPCS (ALT 636 FOR OP/ED): Performed by: STUDENT IN AN ORGANIZED HEALTH CARE EDUCATION/TRAINING PROGRAM

## 2024-09-23 PROCEDURE — 2500000002 HC RX 250 W HCPCS SELF ADMINISTERED DRUGS (ALT 637 FOR MEDICARE OP, ALT 636 FOR OP/ED)

## 2024-09-23 PROCEDURE — 1200000002 HC GENERAL ROOM WITH TELEMETRY DAILY

## 2024-09-23 PROCEDURE — 83735 ASSAY OF MAGNESIUM: CPT

## 2024-09-23 PROCEDURE — 2500000004 HC RX 250 GENERAL PHARMACY W/ HCPCS (ALT 636 FOR OP/ED)

## 2024-09-23 PROCEDURE — 80048 BASIC METABOLIC PNL TOTAL CA: CPT

## 2024-09-23 PROCEDURE — 2500000005 HC RX 250 GENERAL PHARMACY W/O HCPCS

## 2024-09-23 PROCEDURE — 2500000001 HC RX 250 WO HCPCS SELF ADMINISTERED DRUGS (ALT 637 FOR MEDICARE OP)

## 2024-09-23 PROCEDURE — 85520 HEPARIN ASSAY: CPT

## 2024-09-23 RX ORDER — LOPERAMIDE HYDROCHLORIDE 2 MG/1
2 CAPSULE ORAL 4 TIMES DAILY PRN
Status: DISCONTINUED | OUTPATIENT
Start: 2024-09-23 | End: 2024-10-04 | Stop reason: HOSPADM

## 2024-09-23 RX ORDER — MAGNESIUM SULFATE HEPTAHYDRATE 40 MG/ML
2 INJECTION, SOLUTION INTRAVENOUS ONCE
Status: COMPLETED | OUTPATIENT
Start: 2024-09-23 | End: 2024-09-23

## 2024-09-23 RX ORDER — MIDAZOLAM HYDROCHLORIDE 1 MG/ML
INJECTION INTRAMUSCULAR; INTRAVENOUS CONTINUOUS PRN
Status: DISCONTINUED | OUTPATIENT
Start: 2024-09-23 | End: 2024-09-25

## 2024-09-23 RX ORDER — FENTANYL CITRATE 50 UG/ML
INJECTION, SOLUTION INTRAMUSCULAR; INTRAVENOUS CONTINUOUS PRN
Status: DISCONTINUED | OUTPATIENT
Start: 2024-09-23 | End: 2024-09-25

## 2024-09-23 RX ORDER — PROPOFOL 10 MG/ML
INJECTION, EMULSION INTRAVENOUS CONTINUOUS PRN
Status: DISCONTINUED | OUTPATIENT
Start: 2024-09-23 | End: 2024-09-25

## 2024-09-23 RX ADMIN — LEVETIRACETAM 500 MG: 500 TABLET, FILM COATED ORAL at 21:06

## 2024-09-23 RX ADMIN — INSULIN LISPRO 2 UNITS: 100 INJECTION, SOLUTION INTRAVENOUS; SUBCUTANEOUS at 18:03

## 2024-09-23 RX ADMIN — AMPICILLIN SODIUM AND SULBACTAM SODIUM 3 G: 2; 1 INJECTION, POWDER, FOR SOLUTION INTRAMUSCULAR; INTRAVENOUS at 21:06

## 2024-09-23 RX ADMIN — DULOXETINE HYDROCHLORIDE 60 MG: 60 CAPSULE, DELAYED RELEASE ORAL at 21:06

## 2024-09-23 RX ADMIN — ATORVASTATIN CALCIUM 80 MG: 80 TABLET, FILM COATED ORAL at 21:06

## 2024-09-23 RX ADMIN — PIPERACILLIN SODIUM AND TAZOBACTAM SODIUM 3.38 G: 3; .375 INJECTION, SOLUTION INTRAVENOUS at 00:47

## 2024-09-23 RX ADMIN — LEVOTHYROXINE SODIUM 75 MCG: 0.07 TABLET ORAL at 06:44

## 2024-09-23 RX ADMIN — OXYCODONE HYDROCHLORIDE 5 MG: 5 TABLET ORAL at 09:36

## 2024-09-23 RX ADMIN — OXYCODONE HYDROCHLORIDE 10 MG: 5 TABLET ORAL at 23:46

## 2024-09-23 RX ADMIN — ACETAMINOPHEN 650 MG: 325 TABLET, FILM COATED ORAL at 21:06

## 2024-09-23 RX ADMIN — METOPROLOL TARTRATE 12.5 MG: 25 TABLET, FILM COATED ORAL at 09:23

## 2024-09-23 RX ADMIN — DIVALPROEX SODIUM 500 MG: 500 TABLET, DELAYED RELEASE ORAL at 14:28

## 2024-09-23 RX ADMIN — AMPICILLIN SODIUM AND SULBACTAM SODIUM 3 G: 2; 1 INJECTION, POWDER, FOR SOLUTION INTRAMUSCULAR; INTRAVENOUS at 14:28

## 2024-09-23 RX ADMIN — CLOPIDOGREL BISULFATE 75 MG: 75 TABLET ORAL at 09:23

## 2024-09-23 RX ADMIN — DIVALPROEX SODIUM 500 MG: 500 TABLET, DELAYED RELEASE ORAL at 21:06

## 2024-09-23 RX ADMIN — ACETAMINOPHEN 650 MG: 325 TABLET, FILM COATED ORAL at 14:28

## 2024-09-23 RX ADMIN — BUSPIRONE HYDROCHLORIDE 10 MG: 10 TABLET ORAL at 09:26

## 2024-09-23 RX ADMIN — ASPIRIN 81 MG: 81 TABLET, COATED ORAL at 09:23

## 2024-09-23 RX ADMIN — MAGNESIUM SULFATE HEPTAHYDRATE 2 G: 40 INJECTION, SOLUTION INTRAVENOUS at 15:28

## 2024-09-23 RX ADMIN — ACETAMINOPHEN 650 MG: 325 TABLET, FILM COATED ORAL at 06:47

## 2024-09-23 RX ADMIN — LEVETIRACETAM 500 MG: 500 TABLET, FILM COATED ORAL at 09:23

## 2024-09-23 RX ADMIN — OXYCODONE HYDROCHLORIDE 10 MG: 5 TABLET ORAL at 06:44

## 2024-09-23 RX ADMIN — METOPROLOL TARTRATE 12.5 MG: 25 TABLET, FILM COATED ORAL at 21:06

## 2024-09-23 RX ADMIN — PIPERACILLIN SODIUM AND TAZOBACTAM SODIUM 3.38 G: 3; .375 INJECTION, SOLUTION INTRAVENOUS at 06:44

## 2024-09-23 RX ADMIN — PANTOPRAZOLE SODIUM 40 MG: 40 TABLET, DELAYED RELEASE ORAL at 06:44

## 2024-09-23 RX ADMIN — OXYCODONE HYDROCHLORIDE 10 MG: 5 TABLET ORAL at 18:49

## 2024-09-23 RX ADMIN — DIVALPROEX SODIUM 500 MG: 500 TABLET, DELAYED RELEASE ORAL at 06:45

## 2024-09-23 RX ADMIN — DEXTROSE MONOHYDRATE 21 MMOL: 50 INJECTION, SOLUTION INTRAVENOUS at 09:31

## 2024-09-23 ASSESSMENT — COGNITIVE AND FUNCTIONAL STATUS - GENERAL
DAILY ACTIVITIY SCORE: 20
DRESSING REGULAR UPPER BODY CLOTHING: A LITTLE
MOVING TO AND FROM BED TO CHAIR: A LOT
MOVING FROM LYING ON BACK TO SITTING ON SIDE OF FLAT BED WITH BEDRAILS: A LITTLE
TOILETING: A LITTLE
HELP NEEDED FOR BATHING: A LITTLE
CLIMB 3 TO 5 STEPS WITH RAILING: TOTAL
CLIMB 3 TO 5 STEPS WITH RAILING: TOTAL
DRESSING REGULAR UPPER BODY CLOTHING: A LITTLE
STANDING UP FROM CHAIR USING ARMS: A LOT
MOBILITY SCORE: 13
EATING MEALS: A LITTLE
DAILY ACTIVITIY SCORE: 18
DRESSING REGULAR LOWER BODY CLOTHING: A LITTLE
WALKING IN HOSPITAL ROOM: TOTAL
STANDING UP FROM CHAIR USING ARMS: A LOT
TOILETING: A LITTLE
MOVING TO AND FROM BED TO CHAIR: A LOT
DRESSING REGULAR LOWER BODY CLOTHING: A LITTLE
TURNING FROM BACK TO SIDE WHILE IN FLAT BAD: A LITTLE
TURNING FROM BACK TO SIDE WHILE IN FLAT BAD: A LITTLE
MOBILITY SCORE: 12
WALKING IN HOSPITAL ROOM: TOTAL
PERSONAL GROOMING: A LITTLE
HELP NEEDED FOR BATHING: A LITTLE

## 2024-09-23 ASSESSMENT — PAIN - FUNCTIONAL ASSESSMENT
PAIN_FUNCTIONAL_ASSESSMENT: 0-10
PAIN_FUNCTIONAL_ASSESSMENT: 0-10

## 2024-09-23 ASSESSMENT — PAIN SCALES - GENERAL
PAINLEVEL_OUTOF10: 9
PAINLEVEL_OUTOF10: 8
PAINLEVEL_OUTOF10: 10 - WORST POSSIBLE PAIN
PAINLEVEL_OUTOF10: 10 - WORST POSSIBLE PAIN
PAINLEVEL_OUTOF10: 8
PAINLEVEL_OUTOF10: 8

## 2024-09-23 ASSESSMENT — PAIN DESCRIPTION - LOCATION
LOCATION: FOOT
LOCATION: FOOT

## 2024-09-23 ASSESSMENT — PAIN DESCRIPTION - ORIENTATION
ORIENTATION: LEFT
ORIENTATION: LEFT

## 2024-09-23 NOTE — PROGRESS NOTES
PODIATRY SERVICE CONSULT PROGRESS NOTE    SERVICE DATE: 9/23/2024   SERVICE TIME:  0954    Subjective   INTERVAL HPI:   Patient was seen at bedside.  Under contact plus precautions given C. diff rule-out.  Pain controlled.  Patient reports loose stools, no other constitutional symptoms.   No other pedal complaints.       Medications:  Scheduled Meds: acetaminophen, 650 mg, oral, q6h  ampicillin-sulbactam, 3 g, intravenous, q6h  aspirin, 81 mg, oral, Daily  atorvastatin, 80 mg, oral, Nightly  busPIRone, 10 mg, oral, q AM  [Held by provider] clopidogrel, 75 mg, oral, Daily  divalproex, 500 mg, oral, q8h NINO  DULoxetine, 60 mg, oral, q PM  insulin lispro, 0-10 Units, subcutaneous, TID  levETIRAcetam, 500 mg, oral, BID  levothyroxine, 75 mcg, oral, q AM  metoprolol tartrate, 12.5 mg, oral, BID  pantoprazole, 40 mg, oral, Daily before breakfast  perflutren lipid microspheres, 0.5-10 mL of dilution, intravenous, Once in imaging  perflutren protein A microsphere, 0.5 mL, intravenous, Once in imaging  phytonadione, 5 mg, intravenous, Once  potassium phosphate, 21 mmol, intravenous, Once  sulfur hexafluoride microsphr, 2 mL, intravenous, Once in imaging      Continuous Infusions: heparin, 0-4,000 Units/hr, Last Rate: 1,600 Units/hr (09/23/24 0800)      PRN Meds: PRN medications: dextrose, dextrose, glucagon, glucagon, heparin, melatonin, oxyCODONE, oxyCODONE         Objective   PHYSICAL EXAM:  Physical Exam Performed:  Vitals:    09/23/24 0755   BP: 104/60   Pulse: 84   Resp: 18   Temp: 36.4 °C (97.5 °F)   SpO2: 94%     Body mass index is 27.1 kg/m².    Patient is AOx3 and in no acute distress. Patient is alert and cooperative. Sitting comfortably in bed with dressing clean, dry and intact. Notes she is passing BM currently.     Left focused exam.   Vascular: Non-palpable DP/PT pulses. Mild non-pitting edema noted. Hair growth absent B/L. Temperature is cool to cool from tibial tuberosity to foot with focal decrease in  temperature to digits 2 and 3. Necrosis noted to entire 2nd digit and some discoloration noted to 3rd digit. No lymphatic streaking noted      Musculoskeletal: Gross active and passive ROM intact to age and activity level. Moves all extremities spontaneously. 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      Neurological: Intact light touch sensation. Pain stimuli intact. Denies any numbness, burning or tingling.     Dermatologic: Further necrosis of distal lateral 3rd digit.  Skin appears diffusely xerotic. No rashes or nodules noted B/L. No hyperkeratotic tissue noted B/L.      Wound: left foot 1st ray amputation site  See updated images in media tab  Measurements: 0.5cm x 1.4cm x 1.0cm  Mixed wound base of granular and necrotic tissue tissue.   No active drainage.  No vinny-wound maceration.   Minimal vinny-wound erythema.   No evidence of ascending cellulitis or lymphangitis.  No palpable fluctuance. No malodor. No increased warmth.   Positive probe to bone within the wound base.   No tunneling or tracking.   Mild undermining. Skin edges irregular but intact.      LABS:   Results for orders placed or performed during the hospital encounter of 09/18/24 (from the past 24 hour(s))   POCT GLUCOSE   Result Value Ref Range    POCT Glucose 166 (H) 74 - 99 mg/dL   POCT GLUCOSE   Result Value Ref Range    POCT Glucose 158 (H) 74 - 99 mg/dL   POCT GLUCOSE   Result Value Ref Range    POCT Glucose 154 (H) 74 - 99 mg/dL   POCT GLUCOSE   Result Value Ref Range    POCT Glucose 158 (H) 74 - 99 mg/dL      Lab Results   Component Value Date    HGBA1C 11.4 (H) 09/18/2024      Lab Results   Component Value Date    CRP 17.25 (H) 12/06/2023      Lab Results   Component Value Date    SEDRATE 37 (H) 09/17/2021        Results from last 7 days   Lab Units 09/22/24  1005   WBC AUTO x10*3/uL 9.6   RBC AUTO x10*6/uL 5.48*   HEMOGLOBIN g/dL 11.9*   HEMATOCRIT % 41.5     Results from last 7 days   Lab Units 09/22/24  1005  09/19/24  1619 09/18/24  0555   SODIUM mmol/L 140   < > 137   POTASSIUM mmol/L 4.0   < > 3.8   CHLORIDE mmol/L 103   < > 103   CO2 mmol/L 25   < > 23   BUN mg/dL 9   < > 9   CREATININE mg/dL 0.52   < > 0.52   CALCIUM mg/dL 8.7   < > 8.9   PHOSPHORUS mg/dL 2.0*   < > 2.7   MAGNESIUM mg/dL 1.93   < > 1.71   BILIRUBIN TOTAL mg/dL  --   --  0.4   ALT U/L  --   --  9   AST U/L  --   --  12    < > = values in this interval not displayed.           IMAGING REVIEW:  Transthoracic Echo (TTE) Limited    Result Date: 9/19/2024   [facility], [address] [place] [address]                Tel [phone] and Fax [phone] TRANSTHORACIC ECHOCARDIOGRAM REPORT  Patient Name:      NALDO WONG      Reading Physician:    [id number] Nader Rincon MD Study Date:        9/19/2024            Ordering Provider:    [id number] QUIROZ MRN/PID:           [id number]             Fellow: Accession#:        [id number]         Nurse: Date of Birth/Age: 1970 / 54 years Sonographer:          GREYSON Clay RDCS Gender:            F                    Additional Staff: Height:            182.88 cm            Admit Date:           9/17/2024 Weight:            90.27 kg             Admission Status:     Inpatient -                                                               Routine BSA / BMI:         2.13 m2 / 26.99      Encounter#:           [id number]                    kg/m2 Blood Pressure:    105/50 mmHg          Department Location:  [facility] Non                                                               Invasive Study Type:    TRANSTHORACIC ECHO (TTE) LIMITED Diagnosis/ICD: Encounter for other preprocedural examination-Z01.818 Indication:    Pre-Op CPT Code:      Echo Limited-61330; Doppler Limited-26445;  Color Doppler-82220 Patient History: Diabetes:          Yes Pertinent History: HTN. ELMIRA, PAD. Study Detail: The following Echo studies were performed: 2D, M-Mode, Doppler and               color flow. Image quality for this study is suboptimal.  PHYSICIAN INTERPRETATION: Left Ventricle: Left ventricular ejection fraction is normal, by visual estimate at 65-70%. There are no regional left ventricular wall motion abnormalities. The left ventricular cavity size is normal. The left ventricular septal wall thickness is normal. There is normal left ventricular posterior wall thickness. Spectral Doppler shows a normal pattern of left ventricular diastolic filling. Left Atrium: The left atrium is normal in size. Right Ventricle: The right ventricle is normal in size. There is normal right ventricular global systolic function. Right Atrium: The right atrium is normal in size. Aortic Valve: The aortic valve is trileaflet. There is minimal aortic valve cusp calcification. There is no evidence of aortic valve regurgitation. Gradients not assessed. Mitral Valve: The mitral valve is normal in structure. There is no evidence of mitral valve regurgitation. Tricuspid Valve: The tricuspid valve is structurally normal. Tricuspid regurgitation was not assessed. Pulmonic Valve: The pulmonic valve is structurally normal. There is physiologic pulmonic valve regurgitation. Pericardium: There is no pericardial effusion noted. Aorta: The aortic root is normal. Systemic Veins: The inferior vena cava was not well visualized. In comparison to the previous echocardiogram(s): Compared with study dated 12/8/2023, no significant change.  CONCLUSIONS:  1. Poorly visualized anatomical structures due to suboptimal image quality.  2. Left ventricular ejection fraction is normal, by visual estimate at 65-70%.  3. There is normal right ventricular global systolic function. QUANTITATIVE DATA SUMMARY:  2D MEASUREMENTS:          Normal Ranges: Ao Root d:        3.50 cm  (2.0-3.7cm) LAs:             3.20 cm  (2.7-4.0cm) IVSd:            0.90 cm  (0.6-1.1cm) LVPWd:           0.90 cm  (0.6-1.1cm) LVIDd:           4.80 cm  (3.9-5.9cm) LVIDs:           3.20 cm LV Mass Index:   70 g/m2 LVEDV Index:     39 ml/m2 LV % FS          33.3 %  LA VOLUME:                    Normal Ranges: LA Vol A4C:        53.3 ml    (22+/-6mL/m2) LA Vol A2C:        49.5 ml LA Vol BP:         51.6 ml LA Vol Index A4C:  25.1ml/m2 LA Vol Index A2C:  23.3 ml/m2 LA Vol Index BP:   24.3 ml/m2 LA Area A4C:       18.7 cm2 LA Area A2C:       18.1 cm2 LA Major Axis A4C: 5.6 cm LA Major Axis A2C: 5.6 cm LA Volume Index:   24.3 ml/m2  RA VOLUME BY A/L METHOD:          Normal Ranges: RA Area A4C:             17.0 cm2  LV SYSTOLIC FUNCTION BY 2D PLANIMETRY (MOD):                      Normal Ranges: EF-A4C View:    73 % (>=55%) EF-A2C View:    63 % EF-Biplane:     69 % EF-Visual:      68 % LV EF Reported: 68 %  LV DIASTOLIC FUNCTION:             Normal Ranges: MV Peak E:             0.98 m/s    (0.7-1.2 m/s) MV Peak A:             0.72 m/s    (0.42-0.7 m/s) E/A Ratio:             1.35        (1.0-2.2) MV e'                  0.077 m/s   (>8.0) MV lateral e'          0.08 m/s MV medial e'           0.07 m/s MV A Dur:              108.00 msec E/e' Ratio:            12.69       (<8.0) MV DT:                 205 msec    (150-240 msec) PulmV Sys Keyur:         49.30 cm/s PulmV Palacios Keyur:        42.40 cm/s PulmV S/D Keyur:         1.20 PulmV A Revs Keyur:      27.00 cm/s PulmV A Revs Dur:      119.00 msec  MITRAL VALVE:          Normal Ranges: MV DT:        205 msec (150-240msec)  AORTIC VALVE:          Normal Ranges: LVOT Diameter: 2.30 cm (1.8-2.4cm)  RIGHT VENTRICLE: RV Basal 3.60 cm RV Mid   3.00 cm RV Major 7.3 cm TAPSE:   19.1 mm RV s'    0.16 m/s  TRICUSPID VALVE/RVSP:         Normal Ranges: IVC Diam:             1.39 cm  Pulmonary Veins: PulmV A Revs Dur: 119.00 msec PulmV A Revs Keyur: 27.00 cm/s PulmV Palacios Keyur:   42.40  cm/s PulmV S/D Keyur:    1.20 PulmV Sys Keyur:    49.30 cm/s  [id number] Nader Rincon MD Electronically signed on 9/19/2024 at 3:43:53 PM  ** Final **     Vascular US Lower Extremity Vein Mapping Left    Result Date: 9/18/2024            [place] [address]   Tel [phone] and Fax [phone]  Vascular Lab Report [facility] US LOWER EXTREMITY VEIN MAPPING LEFT  Patient Name:     NALDO Carias Physician: [id number] Haleigh Velasquez MD Study Date:       9/18/2024           Ordering           [id number] KAITLYN M DUNPHY                                       Physician: MRN/PID:          [id number]            Technologist:      Rama Jimenez RVT Accession#:       [id number]        Technologist 2: Date of           1970 / 54      Encounter#:        [id number] Birth/Age:        years Gender:           F Admission Status: Inpatient           Location           [facility]                                       Performed:  Diagnosis/ICD: Peripheral vascular disease, unspecified-I73.9 Indication:    Conduit for bypass CPT Codes:     61451 Vein mapping Limited  **Report Amended** Date and Time: 9/18/2024 at 10:43:10 PM  CONCLUSIONS: Left Lower Venous: Additional Findings; Lymph nodes. Left Lower Vein Mapping: The left great saphenous vein and small saphenous vein appear widely patent with no evidence of thrombosis or fibrosis except for distal SSV. The left saphenofemoral junction was not visualized.  Imaging & Doppler Findings:  Left           Compress Thrombus  Diam Prox Thigh GSV   Yes      None   2.5 mm Mid Thigh GSV    Yes      None   2.5 mm Knee GSV         Yes      None   2.8 mm Prox Calf GSV    Yes      None   4.8 mm Mid Calf GSV     Yes      None   2.5 mm Dist Calf GSV    Yes      None   3.6 mm SSV Prox         Yes      None   4.3 mm SSV Mid          Yes      None   3.9 mm SSV Distal       Yes     Fibrotic 3.7 mm  [id number] Haleigh Velasquez MD Electronically signed by [id number] Haleigh Velasquez MD on 9/18/2024 at 10:40:55 PM  ** Final (Updated) **     Vascular US lower extremity arterial duplex left with HEMA    Result Date: 9/18/2024            [place] [address]   Tel [phone] and Fax [phone]  Vascular Lab Report [facility] US LOWER EXTREMITY ARTERIAL DUPLEX LEFT WITH HEMA  Patient Name:      NALDO Carias Physician: [id number] Travis Villegas MD Study Date:        9/18/2024           Ordering           [id number] CHRISTIANE ECHEVARRIA                                        Physician:         RICHIE MRN/PID:           [id number]            Technologist:      Brooks BORRERO Accession#:        [id number]        Technologist 2: Date of Birth/Age: 1970 / 54      Encounter#:        [id number]                    years Gender:            F Admission Status:  Inpatient           Location           [facility]                                        Performed:  Diagnosis/ICD: Peripheral vascular disease, unspecified-I73.9 Indication:    Atherosclerosis of native arteries-extremities w/rest pain CPT Codes:     93410 Peripheral artery Lower arterial Duplex limited  Patient History Right leg is amputated from groin down.                 Left foot only has two digit and they have gangrene                 Stents in left EIA and left popliteal artery                 History of multiple surgery on legt leg.  **CRITICAL RESULT** Critical Result: > 50% stenosis in distal left femoral artery. Notification called to Christiane Hooker MD on 9/18/2024 at 11:50:26 AM by Brooks BORRERO.  CONCLUSIONS: Left Lower Arterial: There is >50% stenosis documented at the superficial femoral artery distal. Decreased velocities noted in the arteries throughout the left lower extremity with monophasic flow. Left peroneal artery not visualized. The left external iliac artery stent appears patent.  The left poplitael artery appear patent with decreased velocity noted. Stent: Left EIA Stent Proximal - 68.2 cm/s Mid - 144.4 cm/s Distal 124.7 cm/s. Left popliteal Stent Prox - 10.8 cm/s Mid - 10.1 cm/s Distal - 18.7 cm/s.  Comparison: Compared with study from 6/28/2022, Today's exam > 50% stenosis in left distal femoral artery.  Imaging & Doppler Findings:  Right                     Left  PSV                      PSV              EIA        144 cm/s              CFA        150 cm/s       Profunda Proximal 135 cm/s         SFA Proximal    11 cm/s            SFA Mid      12 cm/s          SFA Distal     261 cm/s           Popliteal     19 cm/s         SERAFIN Proximal    16 cm/s            SERAFIN Mid       5 cm/s          SERAFIN Distal     18 cm/s         PTA Proximal    15 cm/s            PTA Mid      27 cm/s          PTA Distal     26 cm/s  [id number] Travis Villegas MD Electronically signed by [id number] Travis Villegas MD on 9/18/2024 at 3:42:14 PM  ** Final **     Vascular US Ankle Brachial Index (HEMA) Without Exercise    Result Date: 9/18/2024            [place] [address]   Tel [phone] and Fax [phone]  Vascular Lab Report [facility] US ANKLE BRACHIAL INDEX (HEMA) WITHOUT EXERCISE  Patient Name:      NALDO Carias Physician: [id number] Travis Villegas MD Study Date:        9/18/2024           Ordering           [id number] ALEXYS SONI                                        Physician:         DUNPHY MRN/PID:           [id number]            Technologist:      Brooks Portillo S Accession#:        [id number]        Technologist 2: Date of Birth/Age: 1970 / 54      Encounter#:        [id number]                    years Gender:            F Admission Status:  Inpatient           Location           [facility]                                        Performed:  Diagnosis/ICD: Other specified peripheral vascular diseases-I73.89 Indication:    Gangrene CPT Codes:     75612  Peripheral artery HEMA Only  Patient History Right leg is amputated from groin down.                 Left foot only has two digit and they have gangrene                 Stents in left EIA and left popliteal artery                 History of multiple surgery on legt leg.  CONCLUSIONS: Right Lower PVR: Evidence of moderate arterial occlusive disease in the right lower extremity at rest. Left Lower PVR: Monophasic flow is noted in the left posterior tibial artery and left dorsalis pedis artery. Multiphasic flow is noted in the left common femoral artery. Unable to obtain pressure or doppler signal of left grat toe due to amputation.  Comparison: Compared with study from 12/8/2023, no significant change.  Imaging & Doppler Findings:  LEFT Lower PVR                Pressures Ratios Left Posterior Tibial (Ankle) 59 mmHg   0.60 Left Dorsalis Pedis (Ankle)   66 mmHg   0.67                     Right Brachial Pressure 98 mmHg   [id number] Travis Villegas MD Electronically signed by [id number] Travis Villegas MD on 9/18/2024 at 3:39:13 PM  ** Final **     XR foot left 3+ views    Result Date: 9/18/2024  Interpreted By:  Mamadou Burger and [place]dor[place] Conner STUDY: XR FOOT LEFT 3+ VIEWS; ;  9/18/2024 4:59 am   INDICATION: Signs/Symptoms:L toe gangrene, concern for osteomyelitis.     COMPARISON: None.   ACCESSION NUMBER(S): [id number]   ORDERING CLINICIAN: KAITLYN DUNPHY   FINDINGS: Three views of the left foot are provided.   There has been amputation of the 1st and 5th digits at the mid metatarsal shaft. Amputation of the 2nd digit at the tarsometatarsal joint.   Within the distal phalanx of the 3rd digit, there is a extensive cortical erosion with internal irregular margins, concerning for osteomyelitis. Soft tissue edema overlying the distal 3rd digit. There is also lucency over the medial and plantar aspect of the 2nd metatarsal head.       Erosive cortical changes and irregular internal margins of the 3rd digit distal phalanx  consistent with osteomyelitis. MRI can be performed to evaluate extent of disease Soft tissue edema about the 3rd digit   I personally reviewed the image(s)/study and resident interpretation. I agree with the findings as stated by resident Casey Javed. Data analyzed and images interpreted at [facility], [place], [place].   MACRO: None   Signed by: Mamadou Burger 9/18/2024 9:51 AM Dictation workstation:   [id number]            Assessment/Plan   ASSESSMENT & PLAN:    #Non pressure ulceration down to and including bone, left foot  #OM, left foot  #Gangrene, left toes 3&4  #Acquired absence of RLE  #Acquired absence of left digits 1, 2, 5   #PAD  #Diabetes mellitus    - Patient was seen and evaluated; all findings were discussed and all questions were answered to patient's satisfaction.  - Charts, labs, vitals and imaging all reviewed.   - Imaging: foot XR--Erosive cortical changes and irregular internal margins of the 3rd  digit distal phalanx consistent with osteomyelitis. Soft tissue edema about the 3rd digit  - Labs: WBC 9.6 (downtrend from 14.0), HGB 11.9, HbA1c 11.4%  - CTA w/ BLE runoff pending read, per vascular team  - Wound culture: 9/18 grew 1+ skin microbes, had 3+ PMLs on gram stain  - Blood cultures: NG at 4 days and final     Plan:  - Wound assessed and redressed. Vascular OR delayed due to concerns for C. Diff.  - Likely to require podiatric surgical intervention with debridement of soft tissue and possibly bone this hospital visit. Discussed this in detail with patient as well as risks and benefits. Will defer any podiatric surgical plans until vascular workup and intervention is completed. Discussed with patient she would need to undergo TMA at minimum pending vascular intervention and distal lower extremity flow status.   - Abx: per primary, recommend broad spectrum until cx available  - Pain Regimen: per primary  - Dressings: betadine soaked gauze, dry gauze,  kerlix, tape.   - Nursing staff is able to change/reinforce dressing if & as necessary until next dressing change. Thank you.  - Podiatry will continue to follow while in house.  - Seen and discussed with Dr. Israel    DVT ppx: per primary  Weightbearing: NWB at baseline  Discharge: Pt to follow up with her established Podiatrist in [place], [place] after discharge.    This patient will be followed by the Podiatry service. Please Epic Chat the corresponding residents below with questions or concerns.      Meaghan Lombardo DPM PGY-1  Podiatric Medicine and Surgery   Epic Secure Chat     Albert Taylor DPM PGY-3         SIGNATURE: Albert Taylor DPM PATIENT NAME: Shirin Slater   DATE: September 23, 2024 MRN: [id number]   TIME: 11:07 AM CONTACT: Haiku Message

## 2024-09-23 NOTE — ANESTHESIA PREPROCEDURE EVALUATION
Patient: Shirin Slater    Procedure Information       Date/Time: 24 1105    Procedure: Creation Bypass Graft Tibial Artery (Left) - will likely need c arm for completion angiogram    Location: [facility] OR 16 / Virtual [facility] OR    Surgeons: Kaitlyn M Dunphy, MD        ALLERGIES:  Allergies   Allergen Reactions    Aspartame Seizure    Nsaids (Non-Steroidal Anti-Inflammatory Drug) Other     Significant kidney injury (per patient report)        MEDICAL HISTORY:  Past Medical History:   Diagnosis Date    Anxiety     Arthritis     Cancer (Multi)     Cellulitis     Diabetes mellitus (Multi)     Disease of thyroid gland     Diverticulitis     Epilepsy, unspecified, not intractable, without status epilepticus (Multi)     Epilepsy    HLD (hyperlipidemia)     Hx of blood clots     Hypertension     PAD (peripheral artery disease) (CMS-HCC)     Peripheral vascular disease, unspecified (CMS-HCC) 2022    PAD (peripheral artery disease)    PTSD (post-traumatic stress disorder)     Uterine cancer (Multi)         Relevant Problems   Cardiac   (+) Atherosclerosis of native arteries of left leg with ulceration of other part of foot (Multi)   (+) Critical limb ischemia of left lower extremity (Multi)   (+) HTN (hypertension)   (+) PAD (peripheral artery disease) (CMS-HCC)      Pulmonary   (+) Chronic obstructive pulmonary disease (Multi)   (+) ELMIRA (obstructive sleep apnea)      Neuro   (+) PTSD (post-traumatic stress disorder)   (+) Peripheral neuropathy   (+) Seizures (Multi)      Endocrine   (+) Diabetes mellitus, type 2 (Multi)   (+) Hypothyroidism      Hematology   (+) Anticoagulant long-term use        SURGICAL HISTORY:  Past Surgical History:   Procedure Laterality Date    AMPUTATION FOOT / TOE Left     AMPUTATION FOOT / TOE Left     ANTERIOR CRUCIATE LIGAMENT REPAIR Left      SECTION, CLASSIC  1989    COLON SURGERY  2019    Ressection    CT AORTA AND BILATERAL ILIOFEMORAL RUNOFF ANGIOGRAM W  AND/OR WO IV CONTRAST  11/14/2021    CT AORTA AND BILATERAL ILIOFEMORAL RUNOFF ANGIOGRAM W AND/OR WO IV CONTRAST 11/14/2021 [facility] INPATIENT LEGACY    CT AORTA AND BILATERAL ILIOFEMORAL RUNOFF ANGIOGRAM W AND/OR WO IV CONTRAST  06/30/2022    CT AORTA AND BILATERAL ILIOFEMORAL RUNOFF ANGIOGRAM W AND/OR WO IV CONTRAST 6/30/2022 [facility] CLINICAL LEGACY    HYSTERECTOMY  2012    IR ANGIOGRAM AORTA ABDOMEN  11/16/2021    IR ANGIOGRAM AORTA ABDOMEN 11/16/2021 [facility] INPATIENT LEGACY    LEG AMPUTATION Right 2021    hip    OTHER SURGICAL HISTORY  03/17/2021    Arterial angioplasty of lower extremity    TONSILECTOMY, ADENOIDECTOMY, BILATERAL MYRINGOTOMY AND TUBES  1971    VASCULAR SURGERY Right 2019    Feet w/ ICU stay    VASCULAR SURGERY Left     CFA Embolectomy    VASCULAR SURGERY Right 2020    Knee        MEDICATIONS:  Current Outpatient Medications   Medication Instructions    acetaminophen (TYLENOL) 975 mg, oral, Every 6 hours    albuterol 90 mcg/actuation inhaler 2 puffs, inhalation, Every 6 hours PRN    aspirin 81 mg, oral, Daily    atorvastatin (Lipitor) 80 mg tablet 1 tablet, oral, Nightly    busPIRone (BUSPAR) 10 mg, oral, In the morning    clopidogrel (PLAVIX) 75 mg, oral, Daily    divalproex (Depakote ER) 500 mg 24 hr tablet 1 tablet, oral, In the morning, and 2 tablets in the evening    doxycycline (ADOXA) 100 mg, oral, 2 times daily, Take with a full glass of water and do not lie down for at least 30 minutes after    DULoxetine (Cymbalta) 60 mg DR capsule 1 capsule, oral, Daily    empagliflozin (JARDIANCE) 10 mg, oral, Daily    enoxaparin (LOVENOX) 100 mg, subcutaneous, 2 times daily, Continue Lovenox as bridge to Warfarin until INR as at goal for 2 days in a row (INR 2-3) then may stop Lovenox therapy; INR 3.4 on 12/15    gabapentin (Neurontin) 400 mg capsule 1 capsule, oral, 3 times daily    levETIRAcetam (KEPPRA) 500 mg, oral, 2 times daily    levothyroxine (TIROSINT) 75 mcg, oral, Daily before breakfast, On empty  stomach    lidocaine 4 % patch 1 patch, transdermal, Daily PRN, Remove & discard patch within 12 hours or as directed by MD.    loperamide (IMODIUM) 4 mg, oral, As needed, Don't exceed 4 tablets per day    melatonin 10 mg tablet 1 tablet, oral, Nightly PRN    methocarbamol (Robaxin) 500 mg tablet 1 tablet, oral, 3 times daily    metoprolol tartrate (Lopressor) 25 mg tablet 0.5 tablets, oral, 2 times daily    multivitamin tablet 1 tablet, oral, Daily    omeprazole OTC (PRILOSEC OTC) 20 mg, oral, Daily before breakfast    sennosides (SENOKOT) 17.2 mg, oral, 2 times daily    warfarin (Coumadin) 5 mg tablet Take as directed per After Visit Summary.    warfarin (COUMADIN) 2.5 mg, oral, Once, Take as directed per After Visit Summary.        VITALS:      9/23/2024     4:04 AM 9/22/2024    11:09 PM 9/22/2024     7:52 PM   Vitals   Systolic 99 113 100   Diastolic 54 55 59   Heart Rate 78 71 73   Temp 36.4 °C (97.5 °F) 36.4 °C (97.5 °F) 36.2 °C (97.2 °F)   Resp 17 17 17       LABS:   BMP   Lab Results   Component Value Date    GLUCOSE 149 (H) 09/22/2024    CALCIUM 8.7 09/22/2024     09/22/2024    K 4.0 09/22/2024    CO2 25 09/22/2024     09/22/2024    BUN 9 09/22/2024    CREATININE 0.52 09/22/2024   , LFT   Lab Results   Component Value Date    ALT 9 09/18/2024    AST 12 09/18/2024    ALKPHOS 88 09/18/2024    BILITOT 0.4 09/18/2024   , CBC  Lab Results   Component Value Date    WBC 9.6 09/22/2024    HGB 11.9 (L) 09/22/2024    HCT 41.5 09/22/2024    MCV 76 (L) 09/22/2024     09/22/2024          , Coags   Lab Results   Component Value Date/Time    PROTIME 13.5 (H) 09/20/2024 1551    INR 1.2 (H) 09/20/2024 1551    APTT 33 09/20/2024 1551      , A1C   Lab Results   Component Value Date    HGBA1C 11.4 (H) 09/18/2024       IMAGES:    , ECHO         Transthoracic Echo (TTE) Limited With Doppler And Color 09/19/2024    [facility], [address] [place] [address]  Tel [phone]  and Fax [phone]    TRANSTHORACIC ECHOCARDIOGRAM REPORT      Patient Name:      NALDO WONG      Reading Physician:    [id number] Nader Rincon MD  Study Date:        9/19/2024            Ordering Provider:    [id number] TABBY QUIROZ  MRN/PID:           [id number]             Fellow:  Accession#:        [id number]         Nurse:  Date of Birth/Age: 1970 / 54 years Sonographer:          GREYSON Clay RDCS  Gender:            F                    Additional Staff:  Height:            182.88 cm            Admit Date:           9/17/2024  Weight:            90.27 kg             Admission Status:     Inpatient -  Routine  BSA / BMI:         2.13 m2 / 26.99      Encounter#:           [id number]  kg/m2  Blood Pressure:    105/50 mmHg          Department Location:  [facility] Non  Invasive    Study Type:    TRANSTHORACIC ECHO (TTE) LIMITED  Diagnosis/ICD: Encounter for other preprocedural examination-Z01.818  Indication:    Pre-Op  CPT Code:      Echo Limited-18844; Doppler Limited-42707; Color Doppler-45935    Patient History:  Diabetes:          Yes  Pertinent History: HTN. ELMIRA, PAD.    Study Detail: The following Echo studies were performed: 2D, M-Mode, Doppler and  color flow. Image quality for this study is suboptimal.      PHYSICIAN INTERPRETATION:  Left Ventricle: Left ventricular ejection fraction is normal, by visual estimate at 65-70%. There are no regional left ventricular wall motion abnormalities. The left ventricular cavity size is normal. The left ventricular septal wall thickness is normal. There is normal left ventricular posterior wall thickness. Spectral Doppler shows a normal pattern of left ventricular diastolic filling.  Left Atrium: The left atrium is normal in size.  Right Ventricle: The right ventricle is normal in size. There is normal right ventricular global systolic function.  Right Atrium: The right atrium is normal in size.  Aortic Valve: The aortic valve is  trileaflet. There is minimal aortic valve cusp calcification. There is no evidence of aortic valve regurgitation. Gradients not assessed.  Mitral Valve: The mitral valve is normal in structure. There is no evidence of mitral valve regurgitation.  Tricuspid Valve: The tricuspid valve is structurally normal. Tricuspid regurgitation was not assessed.  Pulmonic Valve: The pulmonic valve is structurally normal. There is physiologic pulmonic valve regurgitation.  Pericardium: There is no pericardial effusion noted.  Aorta: The aortic root is normal.  Systemic Veins: The inferior vena cava was not well visualized.  In comparison to the previous echocardiogram(s): Compared with study dated 12/8/2023, no significant change.      CONCLUSIONS:  1. Poorly visualized anatomical structures due to suboptimal image quality.  2. Left ventricular ejection fraction is normal, by visual estimate at 65-70%.  3. There is normal right ventricular global systolic function.    QUANTITATIVE DATA SUMMARY:    2D MEASUREMENTS:          Normal Ranges:  Ao Root d:       3.50 cm  (2.0-3.7cm)  LAs:             3.20 cm  (2.7-4.0cm)  IVSd:            0.90 cm  (0.6-1.1cm)  LVPWd:           0.90 cm  (0.6-1.1cm)  LVIDd:           4.80 cm  (3.9-5.9cm)  LVIDs:           3.20 cm  LV Mass Index:   70 g/m2  LVEDV Index:     39 ml/m2  LV % FS          33.3 %      LA VOLUME:                    Normal Ranges:  LA Vol A4C:        53.3 ml    (22+/-6mL/m2)  LA Vol A2C:        49.5 ml  LA Vol BP:         51.6 ml  LA Vol Index A4C:  25.1ml/m2  LA Vol Index A2C:  23.3 ml/m2  LA Vol Index BP:   24.3 ml/m2  LA Area A4C:       18.7 cm2  LA Area A2C:       18.1 cm2  LA Major Axis A4C: 5.6 cm  LA Major Axis A2C: 5.6 cm  LA Volume Index:   24.3 ml/m2      RA VOLUME BY A/L METHOD:          Normal Ranges:  RA Area A4C:             17.0 cm2      LV SYSTOLIC FUNCTION BY 2D PLANIMETRY (MOD):  Normal Ranges:  EF-A4C View:    73 % (>=55%)  EF-A2C View:    63 %  EF-Biplane:     69  %  EF-Visual:      68 %  LV EF Reported: 68 %      LV DIASTOLIC FUNCTION:             Normal Ranges:  MV Peak E:             0.98 m/s    (0.7-1.2 m/s)  MV Peak A:             0.72 m/s    (0.42-0.7 m/s)  E/A Ratio:             1.35        (1.0-2.2)  MV e'                  0.077 m/s   (>8.0)  MV lateral e'          0.08 m/s  MV medial e'           0.07 m/s  MV A Dur:              108.00 msec  E/e' Ratio:            12.69       (<8.0)  MV DT:                 205 msec    (150-240 msec)  PulmV Sys Keyur:         49.30 cm/s  PulmV Palacios Keyur:        42.40 cm/s  PulmV S/D Keyur:         1.20  PulmV A Revs Keyur:      27.00 cm/s  PulmV A Revs Dur:      119.00 msec      MITRAL VALVE:          Normal Ranges:  MV DT:        205 msec (150-240msec)      AORTIC VALVE:          Normal Ranges:  LVOT Diameter: 2.30 cm (1.8-2.4cm)      RIGHT VENTRICLE:  RV Basal 3.60 cm  RV Mid   3.00 cm  RV Major 7.3 cm  TAPSE:   19.1 mm  RV s'    0.16 m/s      TRICUSPID VALVE/RVSP:         Normal Ranges:  IVC Diam:             1.39 cm      Pulmonary Veins:  PulmV A Revs Dur: 119.00 msec  PulmV A Revs Keyur: 27.00 cm/s  PulmV Palacios Keyur:   42.40 cm/s  PulmV S/D Keyur:    1.20  PulmV Sys Keyur:    49.30 cm/s     , CXR       XR chest 1 view 12/08/2023    Narrative  Interpreted By:  Kris Griffith,  STUDY:  XR CHEST 1 VIEW;  12/8/2023 3:50 am    INDICATION:  Signs/Symptoms:hypoxia.    COMPARISON:  Chest radiograph dated 12/7/2023    ACCESSION NUMBER(S):  [id number]    ORDERING CLINICIAN:  YOANDY STEVE    FINDINGS:  AP radiograph of the chest    Limitations: Decreased lung volumes. Apical lordotic projection of  the chest and patient rotation to the left    Right lower lobe opacity and consolidation is similar to previous  study 12/07/2023. There is slightly improved pulmonary aeration  within the right lower lobe.    Left upper lobe ground-glass opacity is more prominent than on prior  study. Linear atelectasis within the retrocardiac left lower lobe  is  probable.    The heart is maximum normal in size magnified by the apical lordotic  projection and degree of inspiration.    Impression  1. Right lower lobe opacity and consolidation persists. Right lower  lobe pneumonia not excluded.  2. Subtle ground-glass opacity left upper lobe more conspicuous than  on prior study.    SOCIAL:  Social History     Tobacco Use   Smoking Status Every Day    Current packs/day: 1.00    Average packs/day: 1 pack/day for 43.7 years (43.7 ttl pk-yrs)    Types: Cigarettes    Start date: 1981   Smokeless Tobacco Never   Tobacco Comments    Currest smoker at around 0.5PPD. Peak at 1.5-2PPD       Social History     Substance and Sexual Activity   Alcohol Use Yes    Comment: Rare      Social History     Substance and Sexual Activity   Drug Use Yes    Types: Marijuana        NPO STATUS:  No data recorded    Clinical Areas Reviewed:    Allergies  Meds               Anesthesia Assessment:    Physical Exam    Airway  Mallampati: II  TM distance: >3 FB  Neck ROM: full     Cardiovascular - normal exam     Dental        Pulmonary - normal exam     Abdominal - normal exam             Anesthesia Plan    History of general anesthesia?: yes  History of complications of general anesthesia?: no    ASA 3     general     The patient is a current smoker.  Patient did not smoke on day of procedure.    intravenous induction   Postoperative administration of opioids is intended.  Anesthetic plan and risks discussed with patient.  Use of blood products discussed with patient who.    Plan discussed with CAA and attending.

## 2024-09-23 NOTE — CARE PLAN
The patient's goals for the shift include  decrease in stool today    The clinical goals for the shift include pt will remain HDS and sleep at least four hours by end of shift    Over the shift, the patient was experiencing loose watery stools. Cdiff negative.  Heparin protocol followed and adjusted. Dressing done per podiatry.  Medicated for c/o foot pain with relief. Using call light for assistance

## 2024-09-23 NOTE — PROGRESS NOTES
VASCULAR SURGERY   PROGRESS NOTE      Assessment/Plan   Shirin Slater is 54 y.o. female with history of severe PAD s/p R hip disarticulation s/p L CFA and EIA embolectomy 12/23 s/p multiple L toe amputations, T2DM complicated by diabetic neuropathy, HTN, HLD, renal and splenic thromboses on aspirin/plavix/coumadin, and current tobacco use who presents as a transfer from [facility] for management of L 2nd toe gangrene with concern of osteomyelitis.    09/23/24   Originally planned for OR 9/23 AM. Patient case cancelled for C diff rule out given she is having diarrhea. C diff ordered but waiting to start vanc treatment until confirmed. Will also check with ID whether treatment should be started empirically.    Otherwise patient exam is unchanged and dressed changed on AM rounds. Will plan for moving OR date to wed 9/25.      Plan:  Sliding scale insulin  Omeprazole PO daily   AC with ASA/Plavix  Continue Zosyn, pending C diff results for c diff r/o given diarrhea  Continue Q4 NVC    ## plan for possible L fem- pop bypass  INR is 2.3 from 4.3: after 10 mg IV vitamin K and FFP   Vein mapping 09/18  Echo 09/19: Left ventricular ejection fraction at 65-70%.   [facility] 09/20  Will place 2 units pRBC on hold for OR when scheduled    -----------------------------------------------------------------------------------------  Subjective   No acute events overnight.  AM labs are pending  Still on heparin gtt.  Possible OR for angiogram and bypass of LLE   Patient having diarrhea this AM and over the weekend    Objective   Vitals:  Heart Rate:  [71-84]   Temp:  [36.2 °C (97.2 °F)-36.4 °C (97.5 °F)]   Resp:  [17-18]   BP: ()/(52-60)   SpO2:  [93 %-95 %]     Exam:  Constitutional: No acute distress  Neuro: AOx3, grossly motor intact. Limited sensation of distal L foot.  ENMT: moist mucous membranes  Head/neck: atraumatic  CV: no tachycardia  Pulm: non-labored on room air  GI: soft, non-tender, non-distended  Skin: warm  and dry. Blanching erythema of L forefoot with tenderness to palpation. wet gangrene of left 2nd digit, extreme pain in LLE and tenderness to light touch. Dressing with wet to dry with betatine guaze over wound  Musculoskeletal: moving all extremities. S/p R hip disarticulation  Vascular: L foot - monophasic DP/PT, biphasic AT.     Labs:  Results from last 7 days   Lab Units 09/22/24  1005 09/21/24  0538 09/20/24  1551   WBC AUTO x10*3/uL 9.6 9.4 8.0   HEMOGLOBIN g/dL 11.9* 11.6* 12.5   PLATELETS AUTO x10*3/uL 354 353 359      Results from last 7 days   Lab Units 09/22/24  1005 09/21/24  0538 09/20/24  1551   SODIUM mmol/L 140 141 138   POTASSIUM mmol/L 4.0 3.5 3.9   CHLORIDE mmol/L 103 104 103   CO2 mmol/L 25 26 23   BUN mg/dL 9 10 8   CREATININE mg/dL 0.52 0.64 0.51   GLUCOSE mg/dL 149* 157* 133*   MAGNESIUM mg/dL 1.93 1.76 1.98   PHOSPHORUS mg/dL 2.0* 3.5 3.3      Results from last 7 days   Lab Units 09/20/24  1551 09/20/24  0235 09/19/24  1619   INR  1.2* 2.3* 4.3*   PROTIME seconds 13.5* 26.3* 48.8*   APTT seconds 33 60* 67*      Results from last 7 days   Lab Units 09/22/24  1005 09/21/24  1859 09/21/24  0532   ANTI XA UNFRACTIONATED IU/mL 0.3 0.4 0.4

## 2024-09-23 NOTE — PROGRESS NOTES
Shirin Slater is a 54 y.o. female on day 5 of admission presenting with Gangrene of toe of left foot (Multi).    Subjective   Interval History: patient continues to have diarrhea. C. Diff is negative. Does have pain in the leg         Review of Systems    Review of systems otherwise negative    Objective   Range of Vitals (last 24 hours)  Heart Rate:  [71-84]   Temp:  [36 °C (96.8 °F)-36.4 °C (97.5 °F)]   Resp:  [17-18]   BP: ()/(54-60)   SpO2:  [93 %-96 %]   Daily Weight  09/18/24 : 90.6 kg (199 lb 12.8 oz)    Body mass index is 27.1 kg/m².    Physical Exam  General: in no acute distress, able to answer questions  HEENT: no conjunctival injection. anicteric.  Ext: left leg in wrap  Neuro: Answers questions appropriately.  Integumentary: no obvious lesions     Antibiotics  ampicillin-sulbactam - 3 gram/100 mL  doxycycline - 100 mg    Relevant Results  Labs  Results from last 72 hours   Lab Units 09/23/24  1023 09/22/24  1005 09/21/24  0538   WBC AUTO x10*3/uL 8.8 9.6 9.4   HEMOGLOBIN g/dL 12.4 11.9* 11.6*   HEMATOCRIT % 43.5 41.5 40.6   PLATELETS AUTO x10*3/uL 329 354 353     Results from last 72 hours   Lab Units 09/23/24  1023 09/22/24  1005 09/21/24  0538   SODIUM mmol/L 140 140 141   POTASSIUM mmol/L 4.2 4.0 3.5   CHLORIDE mmol/L 103 103 104   CO2 mmol/L 28 25 26   BUN mg/dL 7 9 10   CREATININE mg/dL 0.59 0.52 0.64   GLUCOSE mg/dL 141* 149* 157*   CALCIUM mg/dL 8.5* 8.7 8.1*   ANION GAP mmol/L 13 16 15   EGFR mL/min/1.73m*2 >90 >90 >90   PHOSPHORUS mg/dL 2.4* 2.0* 3.5         Estimated Creatinine Clearance: 125 mL/min (by C-G formula based on SCr of 0.59 mg/dL).  C-Reactive Protein   Date Value Ref Range Status   12/06/2023 17.25 (H) <1.00 mg/dL Final     CRP   Date Value Ref Range Status   06/07/2022 0.96 mg/dL Final     Comment:     REF VALUE  < 1.00     09/17/2021 14.05 (A) mg/dL Final     Comment:     REF VALUE  < 1.00       Microbiology  Susceptibility data from last 14 days.  Collected Specimen  Info Organism   09/18/24 Tissue/Biopsy from Diabetic Foot Ulcer Mixed Skin Microorganisms      Imaging    Foot xray  IMPRESSION:  Erosive cortical changes and irregular internal margins of the 3rd  digit distal phalanx consistent with osteomyelitis. MRI can be  performed to evaluate extent of disease Soft tissue edema about the  3rd digit          Assessment/Plan   Shirin Slater is a 54F with past medical history of severe PAD s/p R hip disarticulation s/p L CFA and EIA embolectomy 12/23 s/p multiple L toe amputations, T2DM complicated by diabetic neuropathy, HTN, HLD, renal and splenic thromboses on aspirin/plavix/coumadin, and current tobacco use who presents as a transfer from [facility] for management of L 3rd toe gangrene with concern of osteomyelitis. ID consulted to assist in management of toe infection.     #L 2nd toe gangrene  #Suspected osteomyelitis of 3rd phalanx  :Pt with severe PAD + DM experienced trauma to toe 2 weeks ago, has been experiencing worsening of pain, redness, and swelling, as well as purulence, indicating infection of this wound. Plan for revascularization and ultimately debridement with podiatry    -Continue unasyn 3 q 6 hours as no MRSA or pseudomonas seen on tissue cx collected on 9/18  -C. Diff negative     ID will continue to follow. If any questions regarding this patient please chito Acuna  Infectious Diseases  Please haiku chat questions        Tavo Acuna MD

## 2024-09-23 NOTE — CARE PLAN
The patient's goals for the shift include      The clinical goals for the shift include pt will remain HDS and sleep at least four hours by end of shift      Problem: Skin  Goal: Decreased wound size/increased tissue granulation at next dressing change  Outcome: Progressing  Goal: Participates in plan/prevention/treatment measures  Outcome: Progressing  Goal: Prevent/manage excess moisture  Outcome: Progressing  Goal: Prevent/minimize sheer/friction injuries  Outcome: Progressing  Goal: Promote/optimize nutrition  Outcome: Progressing  Goal: Promote skin healing  Outcome: Progressing

## 2024-09-24 PROBLEM — F43.10 PTSD (POST-TRAUMATIC STRESS DISORDER): Status: ACTIVE | Noted: 2024-09-24

## 2024-09-24 PROBLEM — J44.9 CHRONIC OBSTRUCTIVE PULMONARY DISEASE (MULTI): Status: ACTIVE | Noted: 2024-09-24

## 2024-09-24 PROBLEM — R56.9 SEIZURES (MULTI): Status: ACTIVE | Noted: 2024-09-24

## 2024-09-24 LAB
ABO GROUP (TYPE) IN BLOOD: NORMAL
ANION GAP SERPL CALC-SCNC: 12 MMOL/L (ref 10–20)
ANTIBODY SCREEN: NORMAL
BUN SERPL-MCNC: 5 MG/DL (ref 6–23)
CALCIUM SERPL-MCNC: 8.5 MG/DL (ref 8.6–10.6)
CHLORIDE SERPL-SCNC: 104 MMOL/L (ref 98–107)
CO2 SERPL-SCNC: 29 MMOL/L (ref 21–32)
CREAT SERPL-MCNC: 0.57 MG/DL (ref 0.5–1.05)
EGFRCR SERPLBLD CKD-EPI 2021: >90 ML/MIN/1.73M*2
ERYTHROCYTE [DISTWIDTH] IN BLOOD BY AUTOMATED COUNT: 23.3 % (ref 11.5–14.5)
GLUCOSE BLD MANUAL STRIP-MCNC: 114 MG/DL (ref 74–99)
GLUCOSE BLD MANUAL STRIP-MCNC: 138 MG/DL (ref 74–99)
GLUCOSE BLD MANUAL STRIP-MCNC: 141 MG/DL (ref 74–99)
GLUCOSE BLD MANUAL STRIP-MCNC: 182 MG/DL (ref 74–99)
GLUCOSE SERPL-MCNC: 141 MG/DL (ref 74–99)
HCT VFR BLD AUTO: 43.7 % (ref 36–46)
HGB BLD-MCNC: 12.2 G/DL (ref 12–16)
MAGNESIUM SERPL-MCNC: 2.18 MG/DL (ref 1.6–2.4)
MCH RBC QN AUTO: 21.4 PG (ref 26–34)
MCHC RBC AUTO-ENTMCNC: 27.9 G/DL (ref 32–36)
MCV RBC AUTO: 77 FL (ref 80–100)
NRBC BLD-RTO: 0 /100 WBCS (ref 0–0)
PHOSPHATE SERPL-MCNC: 2.2 MG/DL (ref 2.5–4.9)
PLATELET # BLD AUTO: 333 X10*3/UL (ref 150–450)
POTASSIUM SERPL-SCNC: 3.8 MMOL/L (ref 3.5–5.3)
RBC # BLD AUTO: 5.69 X10*6/UL (ref 4–5.2)
RH FACTOR (ANTIGEN D): NORMAL
SODIUM SERPL-SCNC: 141 MMOL/L (ref 136–145)
UFH PPP CHRO-ACNC: 0.3 IU/ML
WBC # BLD AUTO: 8.2 X10*3/UL (ref 4.4–11.3)

## 2024-09-24 PROCEDURE — 83735 ASSAY OF MAGNESIUM: CPT

## 2024-09-24 PROCEDURE — 1200000002 HC GENERAL ROOM WITH TELEMETRY DAILY

## 2024-09-24 PROCEDURE — 84100 ASSAY OF PHOSPHORUS: CPT

## 2024-09-24 PROCEDURE — 36415 COLL VENOUS BLD VENIPUNCTURE: CPT

## 2024-09-24 PROCEDURE — 2500000002 HC RX 250 W HCPCS SELF ADMINISTERED DRUGS (ALT 637 FOR MEDICARE OP, ALT 636 FOR OP/ED)

## 2024-09-24 PROCEDURE — 86901 BLOOD TYPING SEROLOGIC RH(D): CPT

## 2024-09-24 PROCEDURE — 80048 BASIC METABOLIC PNL TOTAL CA: CPT

## 2024-09-24 PROCEDURE — 85027 COMPLETE CBC AUTOMATED: CPT

## 2024-09-24 PROCEDURE — 2500000001 HC RX 250 WO HCPCS SELF ADMINISTERED DRUGS (ALT 637 FOR MEDICARE OP)

## 2024-09-24 PROCEDURE — 82947 ASSAY GLUCOSE BLOOD QUANT: CPT

## 2024-09-24 PROCEDURE — 2500000004 HC RX 250 GENERAL PHARMACY W/ HCPCS (ALT 636 FOR OP/ED): Performed by: STUDENT IN AN ORGANIZED HEALTH CARE EDUCATION/TRAINING PROGRAM

## 2024-09-24 PROCEDURE — 86900 BLOOD TYPING SEROLOGIC ABO: CPT

## 2024-09-24 PROCEDURE — 86923 COMPATIBILITY TEST ELECTRIC: CPT

## 2024-09-24 PROCEDURE — 85520 HEPARIN ASSAY: CPT

## 2024-09-24 RX ADMIN — DIVALPROEX SODIUM 500 MG: 500 TABLET, DELAYED RELEASE ORAL at 06:26

## 2024-09-24 RX ADMIN — BUSPIRONE HYDROCHLORIDE 10 MG: 10 TABLET ORAL at 10:59

## 2024-09-24 RX ADMIN — DULOXETINE HYDROCHLORIDE 60 MG: 60 CAPSULE, DELAYED RELEASE ORAL at 20:36

## 2024-09-24 RX ADMIN — AMPICILLIN SODIUM AND SULBACTAM SODIUM 3 G: 2; 1 INJECTION, POWDER, FOR SOLUTION INTRAMUSCULAR; INTRAVENOUS at 21:01

## 2024-09-24 RX ADMIN — OXYCODONE HYDROCHLORIDE 10 MG: 5 TABLET ORAL at 20:35

## 2024-09-24 RX ADMIN — HEPARIN SODIUM 1900 UNITS/HR: 10000 INJECTION, SOLUTION INTRAVENOUS at 05:26

## 2024-09-24 RX ADMIN — HEPARIN SODIUM 1900 UNITS/HR: 10000 INJECTION, SOLUTION INTRAVENOUS at 22:59

## 2024-09-24 RX ADMIN — DIVALPROEX SODIUM 500 MG: 500 TABLET, DELAYED RELEASE ORAL at 13:51

## 2024-09-24 RX ADMIN — METOPROLOL TARTRATE 12.5 MG: 25 TABLET, FILM COATED ORAL at 20:36

## 2024-09-24 RX ADMIN — LEVETIRACETAM 500 MG: 500 TABLET, FILM COATED ORAL at 09:09

## 2024-09-24 RX ADMIN — ACETAMINOPHEN 650 MG: 325 TABLET, FILM COATED ORAL at 02:54

## 2024-09-24 RX ADMIN — LEVETIRACETAM 500 MG: 500 TABLET, FILM COATED ORAL at 20:36

## 2024-09-24 RX ADMIN — DIVALPROEX SODIUM 500 MG: 500 TABLET, DELAYED RELEASE ORAL at 21:51

## 2024-09-24 RX ADMIN — METOPROLOL TARTRATE 12.5 MG: 25 TABLET, FILM COATED ORAL at 09:09

## 2024-09-24 RX ADMIN — PANTOPRAZOLE SODIUM 40 MG: 40 TABLET, DELAYED RELEASE ORAL at 06:26

## 2024-09-24 RX ADMIN — INSULIN LISPRO 2 UNITS: 100 INJECTION, SOLUTION INTRAVENOUS; SUBCUTANEOUS at 12:21

## 2024-09-24 RX ADMIN — ACETAMINOPHEN 650 MG: 325 TABLET, FILM COATED ORAL at 09:09

## 2024-09-24 RX ADMIN — ACETAMINOPHEN 650 MG: 325 TABLET, FILM COATED ORAL at 20:36

## 2024-09-24 RX ADMIN — AMPICILLIN SODIUM AND SULBACTAM SODIUM 3 G: 2; 1 INJECTION, POWDER, FOR SOLUTION INTRAMUSCULAR; INTRAVENOUS at 09:10

## 2024-09-24 RX ADMIN — ASPIRIN 81 MG: 81 TABLET, COATED ORAL at 09:09

## 2024-09-24 RX ADMIN — LEVOTHYROXINE SODIUM 75 MCG: 0.07 TABLET ORAL at 06:26

## 2024-09-24 RX ADMIN — AMPICILLIN SODIUM AND SULBACTAM SODIUM 3 G: 2; 1 INJECTION, POWDER, FOR SOLUTION INTRAMUSCULAR; INTRAVENOUS at 14:35

## 2024-09-24 RX ADMIN — LOPERAMIDE HYDROCHLORIDE 2 MG: 2 CAPSULE ORAL at 09:09

## 2024-09-24 RX ADMIN — ACETAMINOPHEN 650 MG: 325 TABLET, FILM COATED ORAL at 14:35

## 2024-09-24 RX ADMIN — AMPICILLIN SODIUM AND SULBACTAM SODIUM 3 G: 2; 1 INJECTION, POWDER, FOR SOLUTION INTRAMUSCULAR; INTRAVENOUS at 02:54

## 2024-09-24 RX ADMIN — OXYCODONE HYDROCHLORIDE 10 MG: 5 TABLET ORAL at 06:29

## 2024-09-24 RX ADMIN — ATORVASTATIN CALCIUM 80 MG: 80 TABLET, FILM COATED ORAL at 20:36

## 2024-09-24 SDOH — HEALTH STABILITY: MENTAL HEALTH: CURRENT SMOKER: 1

## 2024-09-24 ASSESSMENT — COGNITIVE AND FUNCTIONAL STATUS - GENERAL
EATING MEALS: A LITTLE
STANDING UP FROM CHAIR USING ARMS: A LOT
WALKING IN HOSPITAL ROOM: TOTAL
PERSONAL GROOMING: A LITTLE
DRESSING REGULAR UPPER BODY CLOTHING: A LITTLE
DRESSING REGULAR LOWER BODY CLOTHING: A LITTLE
TOILETING: A LITTLE
DAILY ACTIVITIY SCORE: 18
MOBILITY SCORE: 14
TURNING FROM BACK TO SIDE WHILE IN FLAT BAD: A LITTLE
HELP NEEDED FOR BATHING: A LITTLE
MOVING TO AND FROM BED TO CHAIR: A LITTLE
CLIMB 3 TO 5 STEPS WITH RAILING: TOTAL

## 2024-09-24 ASSESSMENT — PAIN SCALES - GENERAL
PAINLEVEL_OUTOF10: 6
PAINLEVEL_OUTOF10: 9
PAINLEVEL_OUTOF10: 9
PAINLEVEL_OUTOF10: 8
PAINLEVEL_OUTOF10: 0 - NO PAIN

## 2024-09-24 ASSESSMENT — PAIN - FUNCTIONAL ASSESSMENT
PAIN_FUNCTIONAL_ASSESSMENT: 0-10
PAIN_FUNCTIONAL_ASSESSMENT: 0-10

## 2024-09-24 NOTE — PROGRESS NOTES
VASCULAR SURGERY   PROGRESS NOTE      Assessment/Plan   Shirin Slater is 54 y.o. female with history of severe PAD s/p R hip disarticulation s/p L CFA and EIA embolectomy 12/23 s/p multiple L toe amputations, T2DM complicated by diabetic neuropathy, HTN, HLD, renal and splenic thromboses on aspirin/plavix/coumadin, and current tobacco use who presents as a transfer from [facility] for management of L 2nd toe gangrene with concern of osteomyelitis.    09/24/24   Will plan to proceed to OR on 9/25. C diff is negative, added home imodium.   Otherwise patient exam is unchanged.     Plan:  Sliding scale insulin  Omeprazole PO daily    ASA/Plavix  Continue Zosyn  Continue Q4 NVC  Home immodium PRN  NPO @ MN 0001 9/25    ## plan for possible L fem- pop bypass  OR 9/25  INR is 2.3 from 4.3: after 10 mg IV vitamin K and FFP   Vein mapping 09/18  Echo 09/19: Left ventricular ejection fraction at 65-70%.   [facility] 09/20  Will place 2 units pRBC on hold for OR when scheduled    -----------------------------------------------------------------------------------------  Subjective   No acute events overnight.  Still on heparin gtt.  Possible OR for angiogram and bypass of LLE 9/255  No chest pain or SOB. No other complaints      Objective   Vitals:  Heart Rate:  [64-84]   Temp:  [36.1 °C (97 °F)-36.5 °C (97.7 °F)]   Resp:  [18]   BP: ()/(54-65)   SpO2:  [94 %-98 %]     Exam:  Constitutional: No acute distress  Neuro: AOx3, grossly motor intact. Limited sensation of distal L foot.  ENMT: moist mucous membranes  Head/neck: atraumatic  CV: no tachycardia  Pulm: non-labored on room air  GI: soft, non-tender, non-distended  Skin: warm and dry. Blanching erythema of L forefoot with tenderness to palpation. wet gangrene of left 2nd digit, extreme pain in LLE and tenderness to light touch. Dressing with wet to dry with betatine guaze over wound  Musculoskeletal: moving all extremities. S/p R hip disarticulation  Vascular: L  foot - monophasic DP/PT, biphasic AT.     Labs:  Results from last 7 days   Lab Units 09/24/24  0939 09/23/24  1023 09/22/24  1005   WBC AUTO x10*3/uL 8.2 8.8 9.6   HEMOGLOBIN g/dL 12.2 12.4 11.9*   PLATELETS AUTO x10*3/uL 333 329 354      Results from last 7 days   Lab Units 09/24/24  0939 09/23/24  1023 09/22/24  1005   SODIUM mmol/L 141 140 140   POTASSIUM mmol/L 3.8 4.2 4.0   CHLORIDE mmol/L 104 103 103   CO2 mmol/L 29 28 25   BUN mg/dL 5* 7 9   CREATININE mg/dL 0.57 0.59 0.52   GLUCOSE mg/dL 141* 141* 149*   MAGNESIUM mg/dL 2.18 1.71 1.93   PHOSPHORUS mg/dL 2.2* 2.4* 2.0*      Results from last 7 days   Lab Units 09/20/24  1551 09/20/24  0235 09/19/24  1619   INR  1.2* 2.3* 4.3*   PROTIME seconds 13.5* 26.3* 48.8*   APTT seconds 33 60* 67*      Results from last 7 days   Lab Units 09/24/24  0939 09/23/24  2231 09/23/24  1817   ANTI XA UNFRACTIONATED IU/mL 0.3 0.3 0.3

## 2024-09-24 NOTE — CARE PLAN
The patient's goals for the shift include  decrease in stool today    The clinical goals for the shift include patient will remain free from falls throughout shift    Over the shift, the patient only had 2 loose bms.  Tolerating diet. Dressing intact to left foot.  Heparin protocol followed for monitoring infusion. Pt for OR tomorrow

## 2024-09-24 NOTE — PROGRESS NOTES
PODIATRY SERVICE CONSULT PROGRESS NOTE    SERVICE DATE: 9/24/2024   SERVICE TIME:  1015    Subjective   INTERVAL HPI:   Patient was seen at bedside.  C. diff neg. Feeling better today.  Pain controlled, except during dressing change.  No constitutional symptoms.   No other pedal complaints.       Medications:  Scheduled Meds: acetaminophen, 650 mg, oral, q6h  ampicillin-sulbactam, 3 g, intravenous, q6h  aspirin, 81 mg, oral, Daily  atorvastatin, 80 mg, oral, Nightly  busPIRone, 10 mg, oral, q AM  [Held by provider] clopidogrel, 75 mg, oral, Daily  divalproex, 500 mg, oral, q8h NINO  DULoxetine, 60 mg, oral, q PM  insulin lispro, 0-10 Units, subcutaneous, TID  levETIRAcetam, 500 mg, oral, BID  levothyroxine, 75 mcg, oral, q AM  metoprolol tartrate, 12.5 mg, oral, BID  pantoprazole, 40 mg, oral, Daily before breakfast  perflutren lipid microspheres, 0.5-10 mL of dilution, intravenous, Once in imaging  perflutren protein A microsphere, 0.5 mL, intravenous, Once in imaging  phytonadione, 5 mg, intravenous, Once  psyllium, 1 packet, oral, TID  sulfur hexafluoride microsphr, 2 mL, intravenous, Once in imaging      Continuous Infusions: heparin, 0-4,000 Units/hr, Last Rate: 1,900 Units/hr (09/24/24 1100)      PRN Meds: PRN medications: dextrose, dextrose, glucagon, glucagon, heparin, loperamide, melatonin, oxyCODONE, oxyCODONE         Objective   PHYSICAL EXAM:  Physical Exam Performed:  Vitals:    09/24/24 0755   BP: 107/60   Pulse: 72   Resp: 18   Temp: 36.5 °C (97.7 °F)   SpO2: 98%     Body mass index is 27.1 kg/m².    Patient is AOx3 and in no acute distress. Patient is alert and cooperative. Sitting comfortably in bed with dressing clean, dry and intact. Notes she is passing BM currently.     Left focused exam.   Vascular: Non-palpable DP/PT pulses. Mild non-pitting edema noted. Hair growth absent B/L. Temperature is cool to cool from tibial tuberosity to foot with focal decrease in temperature to digits 2 and 3.  Necrosis noted to entire 3rd digit and some discoloration noted to 4rd digit. No lymphatic streaking noted      Musculoskeletal: Gross active and passive ROM intact to age and activity level. Moves all extremities spontaneously. 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      Neurological: Intact light touch sensation. Pain stimuli intact. Denies any numbness, burning or tingling.     Dermatologic: Further necrosis of distal lateral 3rd digit, nail fell off today without pain. Skin appears diffusely xerotic. No rashes or nodules noted B/L. No hyperkeratotic tissue noted B/L.      Wound: left foot 1st ray amputation site  See images in media tab  Measurements: 0.5cm x 1.4cm x 1.0cm  Mixed wound base of granular and necrotic tissue tissue.   Minimal serosanguinous drainage.  No vinny-wound maceration.   Minimal vinny-wound erythema.   No evidence of ascending cellulitis or lymphangitis.  No palpable fluctuance. No malodor. No increased warmth.   Positive probe to bone within the wound base.   No tunneling or tracking.   Mild undermining. Skin edges irregular but intact.      LABS:   Results for orders placed or performed during the hospital encounter of 09/18/24 (from the past 24 hour(s))   Heparin Assay, UFH   Result Value Ref Range    Heparin Unfractionated <0.1 See Comment Below for Therapeutic Ranges IU/mL   POCT GLUCOSE   Result Value Ref Range    POCT Glucose 145 (H) 74 - 99 mg/dL   POCT GLUCOSE   Result Value Ref Range    POCT Glucose 168 (H) 74 - 99 mg/dL   Heparin Assay, UFH   Result Value Ref Range    Heparin Unfractionated 0.3 See Comment Below for Therapeutic Ranges IU/mL   Heparin Assay, UFH   Result Value Ref Range    Heparin Unfractionated 0.3 See Comment Below for Therapeutic Ranges IU/mL   POCT GLUCOSE   Result Value Ref Range    POCT Glucose 126 (H) 74 - 99 mg/dL   POCT GLUCOSE   Result Value Ref Range    POCT Glucose 114 (H) 74 - 99 mg/dL   Phosphorus   Result Value Ref Range     Phosphorus 2.2 (L) 2.5 - 4.9 mg/dL   Magnesium   Result Value Ref Range    Magnesium 2.18 1.60 - 2.40 mg/dL   Basic Metabolic Panel   Result Value Ref Range    Glucose 141 (H) 74 - 99 mg/dL    Sodium 141 136 - 145 mmol/L    Potassium 3.8 3.5 - 5.3 mmol/L    Chloride 104 98 - 107 mmol/L    Bicarbonate 29 21 - 32 mmol/L    Anion Gap 12 10 - 20 mmol/L    Urea Nitrogen 5 (L) 6 - 23 mg/dL    Creatinine 0.57 0.50 - 1.05 mg/dL    eGFR >90 >60 mL/min/1.73m*2    Calcium 8.5 (L) 8.6 - 10.6 mg/dL   CBC   Result Value Ref Range    WBC 8.2 4.4 - 11.3 x10*3/uL    nRBC 0.0 0.0 - 0.0 /100 WBCs    RBC 5.69 (H) 4.00 - 5.20 x10*6/uL    Hemoglobin 12.2 12.0 - 16.0 g/dL    Hematocrit 43.7 36.0 - 46.0 %    MCV 77 (L) 80 - 100 fL    MCH 21.4 (L) 26.0 - 34.0 pg    MCHC 27.9 (L) 32.0 - 36.0 g/dL    RDW 23.3 (H) 11.5 - 14.5 %    Platelets 333 150 - 450 x10*3/uL   Heparin Assay, UFH   Result Value Ref Range    Heparin Unfractionated 0.3 See Comment Below for Therapeutic Ranges IU/mL      Lab Results   Component Value Date    HGBA1C 11.4 (H) 09/18/2024      Lab Results   Component Value Date    CRP 17.25 (H) 12/06/2023      Lab Results   Component Value Date    SEDRATE 37 (H) 09/17/2021        Results from last 7 days   Lab Units 09/24/24  0939   WBC AUTO x10*3/uL 8.2   RBC AUTO x10*6/uL 5.69*   HEMOGLOBIN g/dL 12.2   HEMATOCRIT % 43.7     Results from last 7 days   Lab Units 09/24/24  0939 09/19/24  1619 09/18/24  0555   SODIUM mmol/L 141   < > 137   POTASSIUM mmol/L 3.8   < > 3.8   CHLORIDE mmol/L 104   < > 103   CO2 mmol/L 29   < > 23   BUN mg/dL 5*   < > 9   CREATININE mg/dL 0.57   < > 0.52   CALCIUM mg/dL 8.5*   < > 8.9   PHOSPHORUS mg/dL 2.2*   < > 2.7   MAGNESIUM mg/dL 2.18   < > 1.71   BILIRUBIN TOTAL mg/dL  --   --  0.4   ALT U/L  --   --  9   AST U/L  --   --  12    < > = values in this interval not displayed.           IMAGING REVIEW:  Transthoracic Echo (TTE) Limited    Result Date: 9/19/2024   [facility], [id number]  [place] [address]                Tel [phone] and Fax [phone] TRANSTHORACIC ECHOCARDIOGRAM REPORT  Patient Name:      NALDO DOT WONG      Reading Physician:    [id number] Nader Rincon MD Study Date:        9/19/2024            Ordering Provider:    [id number] TABBY QUIROZ MRN/PID:           [id number]             Fellow: Accession#:        [id number]         Nurse: Date of Birth/Age: 1970 / 54 years Sonographer:          GREYSON Clay RDCS Gender:            F                    Additional Staff: Height:            182.88 cm            Admit Date:           9/17/2024 Weight:            90.27 kg             Admission Status:     Inpatient -                                                               Routine BSA / BMI:         2.13 m2 / 26.99      Encounter#:           [id number]                    kg/m2 Blood Pressure:    105/50 mmHg          Department Location:  [facility] Non                                                               Invasive Study Type:    TRANSTHORACIC ECHO (TTE) LIMITED Diagnosis/ICD: Encounter for other preprocedural examination-Z01.818 Indication:    Pre-Op CPT Code:      Echo Limited-43699; Doppler Limited-66479; Color Doppler-17659 Patient History: Diabetes:          Yes Pertinent History: HTN. EL[place]IR[place], PAD. Study Detail: The following Echo studies were performed: 2D, M-Mode, Doppler and               color flow. Image quality for this study is suboptimal.  PHYSICIAN INTERPRETATION: Left Ventricle: Left ventricular ejection fraction is normal, by visual estimate at 65-70%. There are no regional left ventricular wall motion abnormalities. The left ventricular cavity size is normal. The left ventricular septal wall thickness is normal. There is normal left  ventricular posterior wall thickness. Spectral Doppler shows a normal pattern of left ventricular diastolic filling. Left Atrium: The left atrium is normal in size. Right Ventricle: The right ventricle is normal in size. There is normal right ventricular global systolic function. Right Atrium: The right atrium is normal in size. Aortic Valve: The aortic valve is trileaflet. There is minimal aortic valve cusp calcification. There is no evidence of aortic valve regurgitation. Gradients not assessed. Mitral Valve: The mitral valve is normal in structure. There is no evidence of mitral valve regurgitation. Tricuspid Valve: The tricuspid valve is structurally normal. Tricuspid regurgitation was not assessed. Pulmonic Valve: The pulmonic valve is structurally normal. There is physiologic pulmonic valve regurgitation. Pericardium: There is no pericardial effusion noted. Aorta: The aortic root is normal. Systemic Veins: The inferior vena cava was not well visualized. In comparison to the previous echocardiogram(s): Compared with study dated 12/8/2023, no significant change.  CONCLUSIONS:  1. Poorly visualized anatomical structures due to suboptimal image quality.  2. Left ventricular ejection fraction is normal, by visual estimate at 65-70%.  3. There is normal right ventricular global systolic function. QUANTITATIVE DATA SUMMARY:  2D MEASUREMENTS:          Normal Ranges: Ao Root d:       3.50 cm  (2.0-3.7cm) LAs:             3.20 cm  (2.7-4.0cm) IVSd:            0.90 cm  (0.6-1.1cm) LVPWd:           0.90 cm  (0.6-1.1cm) LVIDd:           4.80 cm  (3.9-5.9cm) LVIDs:           3.20 cm LV Mass Index:   70 g/m2 LVEDV Index:     39 ml/m2 LV % FS          33.3 %  LA VOLUME:                    Normal Ranges: LA Vol A4C:        53.3 ml    (22+/-6mL/m2) LA Vol A2C:        49.5 ml LA Vol BP:         51.6 ml LA Vol Index A4C:  25.1ml/m2 LA Vol Index A2C:  23.3 ml/m2 LA Vol Index BP:   24.3 ml/m2 LA Area A4C:       18.7 cm2 LA Area  A2C:       18.1 cm2 LA Major Axis A4C: 5.6 cm LA Major Axis A2C: 5.6 cm LA Volume Index:   24.3 ml/m2  RA VOLUME BY A/L METHOD:          Normal Ranges: RA Area A4C:             17.0 cm2  LV SYSTOLIC FUNCTION BY 2D PLANIMETRY (MOD):                      Normal Ranges: EF-A4C View:    73 % (>=55%) EF-A2C View:    63 % EF-Biplane:     69 % EF-Visual:      68 % LV EF Reported: 68 %  LV DIASTOLIC FUNCTION:             Normal Ranges: MV Peak E:             0.98 m/s    (0.7-1.2 m/s) MV Peak A:             0.72 m/s    (0.42-0.7 m/s) E/A Ratio:             1.35        (1.0-2.2) MV e'                  0.077 m/s   (>8.0) MV lateral e'          0.08 m/s MV medial e'           0.07 m/s MV A Dur:              108.00 msec E/e' Ratio:            12.69       (<8.0) MV DT:                 205 msec    (150-240 msec) PulmV Sys Keyur:         49.30 cm/s PulmV Palacios Keyur:        42.40 cm/s PulmV S/D Keyur:         1.20 PulmV A Revs Keyur:      27.00 cm/s PulmV A Revs Dur:      119.00 msec  MITRAL VALVE:          Normal Ranges: MV DT:        205 msec (150-240msec)  AORTIC VALVE:          Normal Ranges: LVOT Diameter: 2.30 cm (1.8-2.4cm)  RIGHT VENTRICLE: RV Basal 3.60 cm RV Mid   3.00 cm RV Major 7.3 cm TAPSE:   19.1 mm RV s'    0.16 m/s  TRICUSPID VALVE/RVSP:         Normal Ranges: IVC Diam:             1.39 cm  Pulmonary Veins: PulmV A Revs Dur: 119.00 msec PulmV A Revs Keyur: 27.00 cm/s PulmV Palacios Keyur:   42.40 cm/s PulmV S/D Keyur:    1.20 PulmV Sys Keyur:    49.30 cm/s  [id number] Nader Rincon MD Electronically signed on 9/19/2024 at 3:43:53 PM  ** Final **     Vascular US Lower Extremity Vein Mapping Left    Result Date: 9/18/2024            [place] [address]   Tel [phone] and Fax [phone]  Vascular Lab Report [facility] US LOWER EXTREMITY VEIN MAPPING LEFT  Patient Name:     NALDO WONG     [place] Physician: [id number] Haleigh Velasquez MD  Study Date:       9/18/2024           Ordering           [id number] ALEXYS SONI DUNPHY                                       Physician: MRN/PID:          [id number]            Technologist:      Rama Jimenez RVMARQUEZ Accession#:       [id number]        Technologist 2: Date of           1970 / 54      Encounter#:        [id number] Birth/Age:        years Gender:           F Admission Status: Inpatient           Location           [facility]                                       Performed:  Diagnosis/ICD: Peripheral vascular disease, unspecified-I73.9 Indication:    Conduit for bypass CPT Codes:     93000 Vein mapping Limited  **Report Amended** Date and Time: 9/18/2024 at 10:43:10 PM  CONCLUSIONS: Left Lower Venous: Additional Findings; Lymph nodes. Left Lower Vein Mapping: The left great saphenous vein and small saphenous vein appear widely patent with no evidence of thrombosis or fibrosis except for distal SSV. The left saphenofemoral junction was not visualized.  Imaging & Doppler Findings:  Left           Compress Thrombus  Diam Prox Thigh GSV   Yes      None   2.5 mm Mid Thigh GSV    Yes      None   2.5 mm Knee GSV         Yes      None   2.8 mm Prox Calf GSV    Yes      None   4.8 mm Mid Calf GSV     Yes      None   2.5 mm Dist Calf GSV    Yes      None   3.6 mm SSV Prox         Yes      None   4.3 mm SSV Mid          Yes      None   3.9 mm SSV Distal       Yes    Fibrotic 3.7 mm  [id number] Haleigh Velasquez MD Electronically signed by [id number] Haleigh Velasquez MD on 9/18/2024 at 10:40:55 PM  ** Final (Updated) **     Vascular US lower extremity arterial duplex left with HEMA    Result Date: 9/18/2024            [place] [address]   Tel [phone] and Fax [phone]  Vascular Lab Report [facility]AS US LOWER EXTREMITY ARTERIAL DUPLEX LEFT WITH HEMA  Patient Name:      NALDO Carias Physician: [id number] Travis Villegas MD Study Date:        9/18/2024            Ordering           [id number] TABBY ECHEVARRIA                                        Physician:         RICHIE MRN/PID:           [id number]            Technologist:      Brooks BORRERO Accession#:        [id number]        Technologist 2: Date of Birth/Age: 1970 / 54      Encounter#:        [id number]                    years Gender:            F Admission Status:  Inpatient           Location           [facility]                                        Performed:  Diagnosis/ICD: Peripheral vascular disease, unspecified-I73.9 Indication:    Atherosclerosis of native arteries-extremities w/rest pain CPT Codes:     17913 Peripheral artery Lower arterial Duplex limited  Patient History Right leg is amputated from groin down.                 Left foot only has two digit and they have gangrene                 Stents in left EIA and left popliteal artery                 History of multiple surgery on legt leg.  **CRITICAL RESULT** Critical Result: > 50% stenosis in distal left femoral artery. Notification called to Tabby Hooker MD on 9/18/2024 at 11:50:26 AM by Brooks BORRERO.  CONCLUSIONS: Left Lower Arterial: There is >50% stenosis documented at the superficial femoral artery distal. Decreased velocities noted in the arteries throughout the left lower extremity with monophasic flow. Left peroneal artery not visualized. The left external iliac artery stent appears patent. The left poplitael artery appear patent with decreased velocity noted. Stent: Left EIA Stent Proximal - 68.2 cm/s Mid - 144.4 cm/s Distal 124.7 cm/s. Left popliteal Stent Prox - 10.8 cm/s Mid - 10.1 cm/s Distal - 18.7 cm/s.  Comparison: Compared with study from 6/28/2022, Today's exam > 50% stenosis in left distal femoral artery.  Imaging & Doppler Findings:  Right                     Left  PSV                      PSV              EIA        144 cm/s              CFA        150 cm/s       Profunda Proximal 135 cm/s         SFA Proximal     11 cm/s            SFA Mid      12 cm/s          SFA Distal     261 cm/s           Popliteal     19 cm/s         SERAFIN Proximal    16 cm/s            SERAFIN Mid       5 cm/s          SERAFIN Distal     18 cm/s         PTA Proximal    15 cm/s            PTA Mid      27 cm/s          PTA Distal     26 cm/s  [id number] Travis Villegas MD Electronically signed by [id number] Travis Villegas MD on 9/18/2024 at 3:42:14 PM  ** Final **     Vascular US Ankle Brachial Index (HEMA) Without Exercise    Result Date: 9/18/2024            [place] [address]   Tel [phone] and Fax [phone]  Vascular Lab Report VAS US ANKLE BRACHIAL INDEX (HEMA) WITHOUT EXERCISE  Patient Name:      NALDO Carias Physician: [id number] Travis Villegas MD Study Date:        9/18/2024           Ordering           [id number] ALEXYS SONI                                        Physician:         DUNPHY MRN/PID:           [id number]            Technologist:      Brooks Portillo S Accession#:        [id number]        Technologist 2: Date of Birth/Age: 1970 / 54      Encounter#:        [id number]                    years Gender:            F Admission Status:  Inpatient           Location           [facility]                                        Performed:  Diagnosis/ICD: Other specified peripheral vascular diseases-I73.89 Indication:    Gangrene CPT Codes:     54343 Peripheral artery HEMA Only  Patient History Right leg is amputated from groin down.                 Left foot only has two digit and they have gangrene                 Stents in left EIA and left popliteal artery                 History of multiple surgery on legt leg.  CONCLUSIONS: Right Lower PVR: Evidence of moderate arterial occlusive disease in the right lower extremity at rest. Left Lower PVR: Monophasic flow is noted in the left posterior tibial artery and left dorsalis pedis artery. Multiphasic flow is noted in the left common femoral  artery. Unable to obtain pressure or doppler signal of left grat toe due to amputation.  Comparison: Compared with study from 12/8/2023, no significant change.  Imaging & Doppler Findings:  LEFT Lower PVR                Pressures Ratios Left Posterior Tibial (Ankle) 59 mmHg   0.60 Left Dorsalis Pedis (Ankle)   66 mmHg   0.67                     Right Brachial Pressure 98 mmHg   [id number] Travis Villegas MD Electronically signed by [id number] Travis Villegas MD on 9/18/2024 at 3:39:13 PM  ** Final **     XR foot left 3+ views    Result Date: 9/18/2024  Interpreted By:  Mamadou Burger,  and Tello Peña STUDY: XR FOOT LEFT 3+ VIEWS; ;  9/18/2024 4:59 am   INDICATION: Signs/Symptoms:L toe gangrene, concern for osteomyelitis.     COMPARISON: None.   ACCESSION NUMBER(S): [id number]   ORDERING CLINICIAN: KAITLYN DUNPHY   FINDINGS: Three views of the left foot are provided.   There has been amputation of the 1st and 5th digits at the mid metatarsal shaft. Amputation of the 2nd digit at the tarsometatarsal joint.   Within the distal phalanx of the 3rd digit, there is a extensive cortical erosion with internal irregular margins, concerning for osteomyelitis. Soft tissue edema overlying the distal 3rd digit. There is also lucency over the medial and plantar aspect of the 2nd metatarsal head.       Erosive cortical changes and irregular internal margins of the 3rd digit distal phalanx consistent with osteomyelitis. MRI can be performed to evaluate extent of disease Soft tissue edema about the 3rd digit   I personally reviewed the image(s)/study and resident interpretation. I agree with the findings as stated by resident Casey Javed. Data analyzed and images interpreted at [facility], [place], [place].   MACRO: None   Signed by: Mamadou Burger 9/18/2024 9:51 AM Dictation workstation:   [id number]            Assessment/Plan   ASSESSMENT & PLAN:    #Non pressure ulceration down to and including  bone, left foot  #OM, left foot  #Gangrene, left toes 3&4  #Acquired absence of RLE  #Acquired absence of left digits 1, 2, 5   #PAD  #Diabetes mellitus    - Patient was seen and evaluated; all findings were discussed and all questions were answered to patient's satisfaction.  - Charts, labs, vitals and imaging all reviewed.   - Imaging: foot XR--Erosive cortical changes and irregular internal margins of the 3rd  digit distal phalanx consistent with osteomyelitis. Soft tissue edema about the 3rd digit  - Labs: WBC 9.6 (downtrend from 14.0), HGB 11.9, HbA1c 11.4%  - CTA w/ BLE runoff pending read, per vascular team  - Wound culture: 9/18 grew 1+ skin microbes, had 3+ PMLs on gram stain  - Blood cultures: NG at 4 days and final     Plan:  - Wound assessed and redressed. Left 3rd toe nail fell off during dressing change. Patient reported no pain.  - Recommend Heel-offloading boot be applied to LLE. Please order.  - Vascular OR planned for tomorrow 9/25.  - Discussed with patient she would need to undergo TMA at minimum pending vascular intervention and distal lower extremity flow status.   - Left TMA tentatively scheduled for Friday 9/27.   - Abx: Unasyn, per primary/ID  - Pain Regimen: per primary  - Dressings: betadine soaked gauze, dry gauze, kerlix, tape.   - Nursing staff is able to change/reinforce dressing if & as necessary until next dressing change. Thank you.  - Podiatry will continue to follow while in house.  - Discussed with Dr. Israel    DVT ppx: per primary  Weightbearing: NWB at baseline  Discharge: Pt to follow up with her established Podiatrist in [place], [place] after discharge.    Case to be discussed with attending, A&P above reflects a tentative plan. Please await for the final signature from the attending physician on service.    This patient will be followed by the Podiatry service. Please Epic Chat the corresponding residents below with questions or concerns.      Meaghan Lombardo DPM PGY-1  Podiatric  Medicine and Surgery   Epic Secure Chat Preferred  [phone]    Albert Taylor DPM PGY-3  Podiatric Medicine and Surgery           SIGNATURE: Albert Taylor DPM PATIENT NAME: Shirin Slater   DATE: September 24, 2024 MRN: [id number]   TIME: 11:26 AM CONTACT: Haiku Message

## 2024-09-25 ENCOUNTER — APPOINTMENT (OUTPATIENT)
Dept: RADIOLOGY | Facility: HOSPITAL | Age: 54
End: 2024-09-25
Payer: COMMERCIAL

## 2024-09-25 LAB
ACT BLD: 161 SEC (ref 83–199)
ACT BLD: 219 SEC (ref 83–199)
ACT BLD: 221 SEC (ref 83–199)
ACT BLD: 227 SEC (ref 83–199)
ACT BLD: 229 SEC (ref 83–199)
ACT BLD: 229 SEC (ref 83–199)
ACT BLD: 240 SEC (ref 83–199)
ACT BLD: 266 SEC (ref 83–199)
ACT BLD: 269 SEC (ref 83–199)
ACT BLD: 271 SEC (ref 83–199)
ACT BLD: 298 SEC (ref 83–199)
ACT BLD: 298 SEC (ref 83–199)
ALBUMIN SERPL BCP-MCNC: 2.9 G/DL (ref 3.4–5)
ANION GAP BLDA CALCULATED.4IONS-SCNC: 11 MMO/L (ref 10–25)
ANION GAP SERPL CALC-SCNC: 12 MMOL/L (ref 10–20)
APTT PPP: 37 SECONDS (ref 27–38)
BASE EXCESS BLDA CALC-SCNC: 1.3 MMOL/L (ref -2–3)
BASOPHILS # BLD MANUAL: 0 X10*3/UL (ref 0–0.1)
BASOPHILS NFR BLD MANUAL: 0 %
BODY TEMPERATURE: ABNORMAL
BUN SERPL-MCNC: 6 MG/DL (ref 6–23)
CA-I BLDA-SCNC: 1.15 MMOL/L (ref 1.1–1.33)
CALCIUM SERPL-MCNC: 8.3 MG/DL (ref 8.6–10.6)
CHLORIDE BLDA-SCNC: 107 MMOL/L (ref 98–107)
CHLORIDE SERPL-SCNC: 107 MMOL/L (ref 98–107)
CO2 SERPL-SCNC: 27 MMOL/L (ref 21–32)
CREAT SERPL-MCNC: 0.51 MG/DL (ref 0.5–1.05)
EGFRCR SERPLBLD CKD-EPI 2021: >90 ML/MIN/1.73M*2
EOSINOPHIL # BLD MANUAL: 0 X10*3/UL (ref 0–0.7)
EOSINOPHIL NFR BLD MANUAL: 0 %
ERYTHROCYTE [DISTWIDTH] IN BLOOD BY AUTOMATED COUNT: 23 % (ref 11.5–14.5)
GLUCOSE BLD MANUAL STRIP-MCNC: 111 MG/DL (ref 74–99)
GLUCOSE BLD MANUAL STRIP-MCNC: 146 MG/DL (ref 74–99)
GLUCOSE BLDA-MCNC: 158 MG/DL (ref 74–99)
GLUCOSE SERPL-MCNC: 159 MG/DL (ref 74–99)
HCO3 BLDA-SCNC: 24.7 MMOL/L (ref 22–26)
HCT VFR BLD AUTO: 33.1 % (ref 36–46)
HCT VFR BLD EST: 33 % (ref 36–46)
HGB BLD-MCNC: 9.7 G/DL (ref 12–16)
HGB BLDA-MCNC: 10.9 G/DL (ref 12–16)
HYPOCHROMIA BLD QL SMEAR: ABNORMAL
IMM GRANULOCYTES # BLD AUTO: 0.1 X10*3/UL (ref 0–0.7)
IMM GRANULOCYTES NFR BLD AUTO: 0.6 % (ref 0–0.9)
INHALED O2 CONCENTRATION: 66 %
INR PPP: 1.2 (ref 0.9–1.1)
LACTATE BLDA-SCNC: 1.8 MMOL/L (ref 0.4–2)
LYMPHOCYTES # BLD MANUAL: 2.23 X10*3/UL (ref 1.2–4.8)
LYMPHOCYTES NFR BLD MANUAL: 13.7 %
MCH RBC QN AUTO: 21.7 PG (ref 26–34)
MCHC RBC AUTO-ENTMCNC: 29.3 G/DL (ref 32–36)
MCV RBC AUTO: 74 FL (ref 80–100)
MONOCYTES # BLD MANUAL: 0.7 X10*3/UL (ref 0.1–1)
MONOCYTES NFR BLD MANUAL: 4.3 %
NEUTS SEG # BLD MANUAL: 13.09 X10*3/UL (ref 1.2–7)
NEUTS SEG NFR BLD MANUAL: 80.3 %
NRBC BLD-RTO: 0 /100 WBCS (ref 0–0)
OXYHGB MFR BLDA: 97.7 % (ref 94–98)
PCO2 BLDA: 34 MM HG (ref 38–42)
PH BLDA: 7.47 PH (ref 7.38–7.42)
PHOSPHATE SERPL-MCNC: 4.9 MG/DL (ref 2.5–4.9)
PLATELET # BLD AUTO: 331 X10*3/UL (ref 150–450)
PO2 BLDA: 148 MM HG (ref 85–95)
POTASSIUM BLDA-SCNC: 4 MMOL/L (ref 3.5–5.3)
POTASSIUM SERPL-SCNC: 4.2 MMOL/L (ref 3.5–5.3)
PROTHROMBIN TIME: 13.2 SECONDS (ref 9.8–12.8)
RBC # BLD AUTO: 4.46 X10*6/UL (ref 4–5.2)
RBC MORPH BLD: ABNORMAL
SAO2 % BLDA: 99 % (ref 94–100)
SODIUM BLDA-SCNC: 139 MMOL/L (ref 136–145)
SODIUM SERPL-SCNC: 142 MMOL/L (ref 136–145)
TOTAL CELLS COUNTED BLD: 117
VARIANT LYMPHS # BLD MANUAL: 0.28 X10*3/UL (ref 0–0.5)
VARIANT LYMPHS NFR BLD: 1.7 %
WBC # BLD AUTO: 16.3 X10*3/UL (ref 4.4–11.3)

## 2024-09-25 PROCEDURE — 2500000005 HC RX 250 GENERAL PHARMACY W/O HCPCS: Performed by: STUDENT IN AN ORGANIZED HEALTH CARE EDUCATION/TRAINING PROGRAM

## 2024-09-25 PROCEDURE — B41GZZZ FLUOROSCOPY OF LEFT LOWER EXTREMITY ARTERIES: ICD-10-PCS | Performed by: STUDENT IN AN ORGANIZED HEALTH CARE EDUCATION/TRAINING PROGRAM

## 2024-09-25 PROCEDURE — 71045 X-RAY EXAM CHEST 1 VIEW: CPT

## 2024-09-25 PROCEDURE — 04UL0KZ SUPPLEMENT LEFT FEMORAL ARTERY WITH NONAUTOLOGOUS TISSUE SUBSTITUTE, OPEN APPROACH: ICD-10-PCS | Performed by: STUDENT IN AN ORGANIZED HEALTH CARE EDUCATION/TRAINING PROGRAM

## 2024-09-25 PROCEDURE — 3700000002 HC GENERAL ANESTHESIA TIME - EACH INCREMENTAL 1 MINUTE: Performed by: STUDENT IN AN ORGANIZED HEALTH CARE EDUCATION/TRAINING PROGRAM

## 2024-09-25 PROCEDURE — 82435 ASSAY OF BLOOD CHLORIDE: CPT | Performed by: STUDENT IN AN ORGANIZED HEALTH CARE EDUCATION/TRAINING PROGRAM

## 2024-09-25 PROCEDURE — C1725 CATH, TRANSLUMIN NON-LASER: HCPCS | Performed by: STUDENT IN AN ORGANIZED HEALTH CARE EDUCATION/TRAINING PROGRAM

## 2024-09-25 PROCEDURE — 37799 UNLISTED PX VASCULAR SURGERY: CPT | Performed by: STUDENT IN AN ORGANIZED HEALTH CARE EDUCATION/TRAINING PROGRAM

## 2024-09-25 PROCEDURE — C1768 GRAFT, VASCULAR: HCPCS | Performed by: STUDENT IN AN ORGANIZED HEALTH CARE EDUCATION/TRAINING PROGRAM

## 2024-09-25 PROCEDURE — 2720000007 HC OR 272 NO HCPCS: Performed by: STUDENT IN AN ORGANIZED HEALTH CARE EDUCATION/TRAINING PROGRAM

## 2024-09-25 PROCEDURE — P9047 ALBUMIN (HUMAN), 25%, 50ML: HCPCS | Mod: JZ | Performed by: STUDENT IN AN ORGANIZED HEALTH CARE EDUCATION/TRAINING PROGRAM

## 2024-09-25 PROCEDURE — C1762 CONN TISS, HUMAN(INC FASCIA): HCPCS | Performed by: STUDENT IN AN ORGANIZED HEALTH CARE EDUCATION/TRAINING PROGRAM

## 2024-09-25 PROCEDURE — 2550000001 HC RX 255 CONTRASTS: Performed by: STUDENT IN AN ORGANIZED HEALTH CARE EDUCATION/TRAINING PROGRAM

## 2024-09-25 PROCEDURE — 3600000004 HC OR TIME - INITIAL BASE CHARGE - PROCEDURE LEVEL FOUR: Performed by: STUDENT IN AN ORGANIZED HEALTH CARE EDUCATION/TRAINING PROGRAM

## 2024-09-25 PROCEDURE — 2500000004 HC RX 250 GENERAL PHARMACY W/ HCPCS (ALT 636 FOR OP/ED): Performed by: STUDENT IN AN ORGANIZED HEALTH CARE EDUCATION/TRAINING PROGRAM

## 2024-09-25 PROCEDURE — 35666 BPG FEM-ANT TIB PST TIB/PRNL: CPT | Performed by: STUDENT IN AN ORGANIZED HEALTH CARE EDUCATION/TRAINING PROGRAM

## 2024-09-25 PROCEDURE — 041L0JQ BYPASS LEFT FEMORAL ARTERY TO LOWER EXTREMITY ARTERY WITH SYNTHETIC SUBSTITUTE, OPEN APPROACH: ICD-10-PCS | Performed by: STUDENT IN AN ORGANIZED HEALTH CARE EDUCATION/TRAINING PROGRAM

## 2024-09-25 PROCEDURE — C1757 CATH, THROMBECTOMY/EMBOLECT: HCPCS | Performed by: STUDENT IN AN ORGANIZED HEALTH CARE EDUCATION/TRAINING PROGRAM

## 2024-09-25 PROCEDURE — 2500000002 HC RX 250 W HCPCS SELF ADMINISTERED DRUGS (ALT 637 FOR MEDICARE OP, ALT 636 FOR OP/ED): Performed by: STUDENT IN AN ORGANIZED HEALTH CARE EDUCATION/TRAINING PROGRAM

## 2024-09-25 PROCEDURE — 2500000004 HC RX 250 GENERAL PHARMACY W/ HCPCS (ALT 636 FOR OP/ED)

## 2024-09-25 PROCEDURE — 1200000002 HC GENERAL ROOM WITH TELEMETRY DAILY

## 2024-09-25 PROCEDURE — 82947 ASSAY GLUCOSE BLOOD QUANT: CPT

## 2024-09-25 PROCEDURE — 2500000001 HC RX 250 WO HCPCS SELF ADMINISTERED DRUGS (ALT 637 FOR MEDICARE OP)

## 2024-09-25 PROCEDURE — 84132 ASSAY OF SERUM POTASSIUM: CPT | Performed by: STUDENT IN AN ORGANIZED HEALTH CARE EDUCATION/TRAINING PROGRAM

## 2024-09-25 PROCEDURE — 71045 X-RAY EXAM CHEST 1 VIEW: CPT | Performed by: RADIOLOGY

## 2024-09-25 PROCEDURE — 7100000002 HC RECOVERY ROOM TIME - EACH INCREMENTAL 1 MINUTE: Performed by: STUDENT IN AN ORGANIZED HEALTH CARE EDUCATION/TRAINING PROGRAM

## 2024-09-25 PROCEDURE — C1894 INTRO/SHEATH, NON-LASER: HCPCS | Performed by: STUDENT IN AN ORGANIZED HEALTH CARE EDUCATION/TRAINING PROGRAM

## 2024-09-25 PROCEDURE — 2500000004 HC RX 250 GENERAL PHARMACY W/ HCPCS (ALT 636 FOR OP/ED): Performed by: ANESTHESIOLOGIST ASSISTANT

## 2024-09-25 PROCEDURE — 2500000005 HC RX 250 GENERAL PHARMACY W/O HCPCS

## 2024-09-25 PROCEDURE — 82947 ASSAY GLUCOSE BLOOD QUANT: CPT | Performed by: STUDENT IN AN ORGANIZED HEALTH CARE EDUCATION/TRAINING PROGRAM

## 2024-09-25 PROCEDURE — 04CL0ZZ EXTIRPATION OF MATTER FROM LEFT FEMORAL ARTERY, OPEN APPROACH: ICD-10-PCS | Performed by: STUDENT IN AN ORGANIZED HEALTH CARE EDUCATION/TRAINING PROGRAM

## 2024-09-25 PROCEDURE — 85347 COAGULATION TIME ACTIVATED: CPT

## 2024-09-25 PROCEDURE — 82810 BLOOD GASES O2 SAT ONLY: CPT | Performed by: STUDENT IN AN ORGANIZED HEALTH CARE EDUCATION/TRAINING PROGRAM

## 2024-09-25 PROCEDURE — 2500000004 HC RX 250 GENERAL PHARMACY W/ HCPCS (ALT 636 FOR OP/ED): Mod: JZ

## 2024-09-25 PROCEDURE — 7100000001 HC RECOVERY ROOM TIME - INITIAL BASE CHARGE: Performed by: STUDENT IN AN ORGANIZED HEALTH CARE EDUCATION/TRAINING PROGRAM

## 2024-09-25 PROCEDURE — 3600000009 HC OR TIME - EACH INCREMENTAL 1 MINUTE - PROCEDURE LEVEL FOUR: Performed by: STUDENT IN AN ORGANIZED HEALTH CARE EDUCATION/TRAINING PROGRAM

## 2024-09-25 PROCEDURE — 2780000003 HC OR 278 NO HCPCS: Performed by: STUDENT IN AN ORGANIZED HEALTH CARE EDUCATION/TRAINING PROGRAM

## 2024-09-25 PROCEDURE — 85730 THROMBOPLASTIN TIME PARTIAL: CPT | Performed by: STUDENT IN AN ORGANIZED HEALTH CARE EDUCATION/TRAINING PROGRAM

## 2024-09-25 PROCEDURE — 75710 ARTERY X-RAYS ARM/LEG: CPT | Mod: LT | Performed by: STUDENT IN AN ORGANIZED HEALTH CARE EDUCATION/TRAINING PROGRAM

## 2024-09-25 PROCEDURE — 06BQ0ZZ EXCISION OF LEFT SAPHENOUS VEIN, OPEN APPROACH: ICD-10-PCS | Performed by: STUDENT IN AN ORGANIZED HEALTH CARE EDUCATION/TRAINING PROGRAM

## 2024-09-25 PROCEDURE — A35372 PR THROMBOENDARTECTMY FEMORAL DEEP: Performed by: ANESTHESIOLOGY

## 2024-09-25 PROCEDURE — 85027 COMPLETE CBC AUTOMATED: CPT | Performed by: STUDENT IN AN ORGANIZED HEALTH CARE EDUCATION/TRAINING PROGRAM

## 2024-09-25 PROCEDURE — 85007 BL SMEAR W/DIFF WBC COUNT: CPT | Performed by: STUDENT IN AN ORGANIZED HEALTH CARE EDUCATION/TRAINING PROGRAM

## 2024-09-25 PROCEDURE — 3700000001 HC GENERAL ANESTHESIA TIME - INITIAL BASE CHARGE: Performed by: STUDENT IN AN ORGANIZED HEALTH CARE EDUCATION/TRAINING PROGRAM

## 2024-09-25 DEVICE — PROPATEN VASCULAR GRAFT TW RR 6MMX80CM 60CM RINGS HEPARIN
Type: IMPLANTABLE DEVICE | Site: LEG | Status: FUNCTIONAL
Brand: GORE PROPATEN VASCULAR GRAFT

## 2024-09-25 DEVICE — XENOSURE BIOLOGIC PATCH, 0.8CM X 8CM, EIFU
Type: IMPLANTABLE DEVICE | Site: LEG | Status: FUNCTIONAL
Brand: XENOSURE BIOLOGIC PATCH

## 2024-09-25 RX ORDER — ALBUMIN HUMAN 250 G/1000ML
SOLUTION INTRAVENOUS CONTINUOUS PRN
Status: DISCONTINUED | OUTPATIENT
Start: 2024-09-25 | End: 2024-09-25

## 2024-09-25 RX ORDER — ESMOLOL HYDROCHLORIDE 10 MG/ML
INJECTION INTRAVENOUS AS NEEDED
Status: DISCONTINUED | OUTPATIENT
Start: 2024-09-25 | End: 2024-09-25

## 2024-09-25 RX ORDER — SODIUM CHLORIDE, SODIUM LACTATE, POTASSIUM CHLORIDE, CALCIUM CHLORIDE 600; 310; 30; 20 MG/100ML; MG/100ML; MG/100ML; MG/100ML
100 INJECTION, SOLUTION INTRAVENOUS CONTINUOUS
Status: DISCONTINUED | OUTPATIENT
Start: 2024-09-25 | End: 2024-09-26

## 2024-09-25 RX ORDER — DROPERIDOL 2.5 MG/ML
0.62 INJECTION, SOLUTION INTRAMUSCULAR; INTRAVENOUS ONCE AS NEEDED
Status: DISCONTINUED | OUTPATIENT
Start: 2024-09-25 | End: 2024-09-25

## 2024-09-25 RX ORDER — NITROGLYCERIN 40 MG/100ML
INJECTION INTRAVENOUS AS NEEDED
Status: DISCONTINUED | OUTPATIENT
Start: 2024-09-25 | End: 2024-09-25

## 2024-09-25 RX ORDER — SODIUM CHLORIDE 0.9 G/100ML
IRRIGANT IRRIGATION AS NEEDED
Status: DISCONTINUED | OUTPATIENT
Start: 2024-09-25 | End: 2024-09-25 | Stop reason: HOSPADM

## 2024-09-25 RX ORDER — PAPAVERINE HYDROCHLORIDE 30 MG/ML
INJECTION INTRAMUSCULAR; INTRAVENOUS AS NEEDED
Status: DISCONTINUED | OUTPATIENT
Start: 2024-09-25 | End: 2024-09-25 | Stop reason: HOSPADM

## 2024-09-25 RX ORDER — PHENYLEPHRINE 10 MG/250 ML(40 MCG/ML)IN 0.9 % SOD.CHLORIDE INTRAVENOUS
CONTINUOUS PRN
Status: DISCONTINUED | OUTPATIENT
Start: 2024-09-25 | End: 2024-09-25

## 2024-09-25 RX ORDER — ALBUTEROL SULFATE 0.83 MG/ML
SOLUTION RESPIRATORY (INHALATION) AS NEEDED
Status: DISCONTINUED | OUTPATIENT
Start: 2024-09-25 | End: 2024-09-25

## 2024-09-25 RX ORDER — METOPROLOL TARTRATE 1 MG/ML
INJECTION, SOLUTION INTRAVENOUS AS NEEDED
Status: DISCONTINUED | OUTPATIENT
Start: 2024-09-25 | End: 2024-09-25

## 2024-09-25 RX ORDER — HYDROMORPHONE HYDROCHLORIDE 1 MG/ML
0.5 INJECTION, SOLUTION INTRAMUSCULAR; INTRAVENOUS; SUBCUTANEOUS EVERY 5 MIN PRN
Status: DISCONTINUED | OUTPATIENT
Start: 2024-09-25 | End: 2024-09-25

## 2024-09-25 RX ORDER — ACETAMINOPHEN 10 MG/ML
1000 INJECTION, SOLUTION INTRAVENOUS ONCE
Status: COMPLETED | OUTPATIENT
Start: 2024-09-25 | End: 2024-09-25

## 2024-09-25 RX ORDER — PHENYLEPHRINE HYDROCHLORIDE 10 MG/ML
INJECTION INTRAVENOUS AS NEEDED
Status: DISCONTINUED | OUTPATIENT
Start: 2024-09-25 | End: 2024-09-25

## 2024-09-25 RX ORDER — HYDROMORPHONE HYDROCHLORIDE 1 MG/ML
0.1 INJECTION, SOLUTION INTRAMUSCULAR; INTRAVENOUS; SUBCUTANEOUS EVERY 5 MIN PRN
Status: DISCONTINUED | OUTPATIENT
Start: 2024-09-25 | End: 2024-09-25

## 2024-09-25 RX ORDER — SODIUM NITROPRUSSIDE 25 MG/ML
INJECTION INTRAVENOUS AS NEEDED
Status: DISCONTINUED | OUTPATIENT
Start: 2024-09-25 | End: 2024-09-25

## 2024-09-25 RX ORDER — HEPARIN SODIUM 1000 [USP'U]/ML
INJECTION, SOLUTION INTRAVENOUS; SUBCUTANEOUS AS NEEDED
Status: DISCONTINUED | OUTPATIENT
Start: 2024-09-25 | End: 2024-09-25

## 2024-09-25 RX ORDER — ONDANSETRON HYDROCHLORIDE 2 MG/ML
INJECTION, SOLUTION INTRAVENOUS AS NEEDED
Status: DISCONTINUED | OUTPATIENT
Start: 2024-09-25 | End: 2024-09-25

## 2024-09-25 RX ORDER — METOCLOPRAMIDE HYDROCHLORIDE 5 MG/ML
10 INJECTION INTRAMUSCULAR; INTRAVENOUS ONCE AS NEEDED
Status: DISCONTINUED | OUTPATIENT
Start: 2024-09-25 | End: 2024-09-25

## 2024-09-25 RX ORDER — HYDROMORPHONE HYDROCHLORIDE 1 MG/ML
INJECTION, SOLUTION INTRAMUSCULAR; INTRAVENOUS; SUBCUTANEOUS AS NEEDED
Status: DISCONTINUED | OUTPATIENT
Start: 2024-09-25 | End: 2024-09-25

## 2024-09-25 RX ORDER — HYDROMORPHONE HYDROCHLORIDE 1 MG/ML
0.2 INJECTION, SOLUTION INTRAMUSCULAR; INTRAVENOUS; SUBCUTANEOUS EVERY 5 MIN PRN
Status: DISCONTINUED | OUTPATIENT
Start: 2024-09-25 | End: 2024-09-25

## 2024-09-25 RX ORDER — IODIXANOL 320 MG/ML
INJECTION, SOLUTION INTRAVASCULAR AS NEEDED
Status: DISCONTINUED | OUTPATIENT
Start: 2024-09-25 | End: 2024-09-25 | Stop reason: HOSPADM

## 2024-09-25 RX ORDER — LIDOCAINE HYDROCHLORIDE 10 MG/ML
INJECTION, SOLUTION INFILTRATION; PERINEURAL AS NEEDED
Status: DISCONTINUED | OUTPATIENT
Start: 2024-09-25 | End: 2024-09-25

## 2024-09-25 RX ORDER — ROCURONIUM BROMIDE 10 MG/ML
INJECTION, SOLUTION INTRAVENOUS AS NEEDED
Status: DISCONTINUED | OUTPATIENT
Start: 2024-09-25 | End: 2024-09-25

## 2024-09-25 RX ORDER — LIDOCAINE HYDROCHLORIDE 10 MG/ML
0.1 INJECTION, SOLUTION INFILTRATION; PERINEURAL ONCE
Status: DISCONTINUED | OUTPATIENT
Start: 2024-09-25 | End: 2024-09-25

## 2024-09-25 RX ADMIN — HEPARIN SODIUM 1100 UNITS/HR: 10000 INJECTION, SOLUTION INTRAVENOUS at 22:55

## 2024-09-25 RX ADMIN — AMPICILLIN SODIUM AND SULBACTAM SODIUM 3 G: 2; 1 INJECTION, POWDER, FOR SOLUTION INTRAMUSCULAR; INTRAVENOUS at 02:25

## 2024-09-25 RX ADMIN — SODIUM CHLORIDE, POTASSIUM CHLORIDE, SODIUM LACTATE AND CALCIUM CHLORIDE 1000 ML: 600; 310; 30; 20 INJECTION, SOLUTION INTRAVENOUS at 23:47

## 2024-09-25 RX ADMIN — ACETAMINOPHEN 650 MG: 325 TABLET, FILM COATED ORAL at 02:24

## 2024-09-25 RX ADMIN — OXYCODONE HYDROCHLORIDE 10 MG: 5 TABLET ORAL at 00:49

## 2024-09-25 RX ADMIN — SODIUM CHLORIDE, POTASSIUM CHLORIDE, SODIUM LACTATE AND CALCIUM CHLORIDE 100 ML/HR: 600; 310; 30; 20 INJECTION, SOLUTION INTRAVENOUS at 21:56

## 2024-09-25 RX ADMIN — Medication 6 L/MIN: at 21:56

## 2024-09-25 ASSESSMENT — PAIN - FUNCTIONAL ASSESSMENT
PAIN_FUNCTIONAL_ASSESSMENT: 0-10

## 2024-09-25 ASSESSMENT — COGNITIVE AND FUNCTIONAL STATUS - GENERAL
STANDING UP FROM CHAIR USING ARMS: TOTAL
DRESSING REGULAR LOWER BODY CLOTHING: A LOT
DRESSING REGULAR UPPER BODY CLOTHING: A LITTLE
MOBILITY SCORE: 12
TURNING FROM BACK TO SIDE WHILE IN FLAT BAD: A LITTLE
HELP NEEDED FOR BATHING: A LITTLE
MOVING TO AND FROM BED TO CHAIR: A LOT
TOILETING: A LOT
CLIMB 3 TO 5 STEPS WITH RAILING: TOTAL
PERSONAL GROOMING: A LOT
WALKING IN HOSPITAL ROOM: TOTAL
DAILY ACTIVITIY SCORE: 16

## 2024-09-25 ASSESSMENT — PAIN SCALES - GENERAL
PAINLEVEL_OUTOF10: 0 - NO PAIN
PAINLEVEL_OUTOF10: 8
PAIN_LEVEL: 3
PAINLEVEL_OUTOF10: 0 - NO PAIN
PAINLEVEL_OUTOF10: 5 - MODERATE PAIN

## 2024-09-25 NOTE — ANESTHESIA PROCEDURE NOTES
Airway  Date/Time: 9/25/2024 8:35 AM  Urgency: elective      Staffing  Performed: resident   Authorized by: Raquel Cabrera MD    Performed by: Devendra Vega MD  Patient location during procedure: OR    Indications and Patient Condition  Indications for airway management: anesthesia  Spontaneous ventilation: present  Sedation level: deep  Preoxygenated: yes  Patient position: sniffing  Mask difficulty assessment: 1 - vent by mask  Planned trial extubation    Final Airway Details  Final airway type: endotracheal airway      Successful airway: ETT  Cuffed: yes   Successful intubation technique: direct laryngoscopy  Facilitating devices/methods: intubating stylet  Blade: Ryder  Blade size: #3  ETT size (mm): 7.0  Cormack-Lehane Classification: grade I - full view of glottis  Placement verified by: capnometry   Measured from: teeth  ETT to teeth (cm): 21  Number of attempts at approach: 1

## 2024-09-25 NOTE — CARE PLAN
The patient's goals for the shift include      The clinical goals for the shift include patient will remain free from falls throughout shift

## 2024-09-25 NOTE — ANESTHESIA PROCEDURE NOTES
Central Venous Line:    Date/Time: 9/25/2024 9:00 AM    A central venous line was placed in the OR for the following indication(s): central venous access.  Staffing  Performed: resident   Authorized by: Raquel Cabrera MD    Performed by: Devendra Vega MD    Sterility preparation included the following: provider hand hygiene performed prior to central venous catheter insertion, all 5 sterile barriers used (gloves, gown, cap, mask, large sterile drape) during central venous catheter insertion, antiseptic used during central venous catheter insertion and skin prep agent completely dried prior to procedure.  Medical reason for not performing maximal sterile barrier technique: yes  The patient was placed in Trendelenburg position.    Left internal jugular vein was prepped.    The site was prepped with Chlorhexidine.  Catheter size: 8 [place].   Length: 15  Number of Lumens: double lumen    This catheter was not an oximetric catheter.    During the procedure, the following specific steps were taken: target vein identified, needle advanced into vein and blood aspirated and guidewire advanced into vein.  Seldinger technique used.  Procedure performed using ultrasound guidance.  Sterile gel and probe cover used in ultrasound-guided central venous catheter insertion.    Intravenous verification was obtained by ultrasound, venous blood return and manometry.      Post insertion care included: all ports aspirated, all ports flushed easily, guidewire removed intact, Biopatch applied, line sutured in place and dressing applied.    During the procedure the patient experienced: patient tolerated procedure well with no complications.           images stored in chart

## 2024-09-25 NOTE — ANESTHESIA PROCEDURE NOTES
Arterial Line:    Date/Time: 9/25/2024 9:13 AM    Staffing  Performed: attending   Authorized by: Raquel Cabrera MD    Performed by: Devendra Vega MD    An arterial line was placed. Procedure performed using ultrasound guidance.in the OR for the following indication(s): continuous blood pressure monitoring.    A 4 [place] of [place] (size), 8 cm (length), Micropuncture kit (type) catheter was placed into the Left brachial artery, secured by tape,   Seldinger technique used.  Events:  patient tolerated procedure well with no complications and Unable to place L radial a-line, vessel too small.

## 2024-09-26 ENCOUNTER — ANESTHESIA EVENT (OUTPATIENT)
Dept: OPERATING ROOM | Facility: HOSPITAL | Age: 54
End: 2024-09-26
Payer: COMMERCIAL

## 2024-09-26 LAB
ANION GAP SERPL CALC-SCNC: 13 MMOL/L (ref 10–20)
ANION GAP SERPL CALC-SCNC: 8 MMOL/L (ref 10–20)
BUN SERPL-MCNC: 8 MG/DL (ref 6–23)
BUN SERPL-MCNC: 9 MG/DL (ref 6–23)
CALCIUM SERPL-MCNC: 8.1 MG/DL (ref 8.6–10.6)
CALCIUM SERPL-MCNC: 8.2 MG/DL (ref 8.6–10.6)
CHLORIDE SERPL-SCNC: 100 MMOL/L (ref 98–107)
CHLORIDE SERPL-SCNC: 110 MMOL/L (ref 98–107)
CO2 SERPL-SCNC: 27 MMOL/L (ref 21–32)
CO2 SERPL-SCNC: 27 MMOL/L (ref 21–32)
CREAT SERPL-MCNC: 0.55 MG/DL (ref 0.5–1.05)
CREAT SERPL-MCNC: 0.66 MG/DL (ref 0.5–1.05)
EGFRCR SERPLBLD CKD-EPI 2021: >90 ML/MIN/1.73M*2
EGFRCR SERPLBLD CKD-EPI 2021: >90 ML/MIN/1.73M*2
ERYTHROCYTE [DISTWIDTH] IN BLOOD BY AUTOMATED COUNT: 23.4 % (ref 11.5–14.5)
ERYTHROCYTE [DISTWIDTH] IN BLOOD BY AUTOMATED COUNT: 23.5 % (ref 11.5–14.5)
GLUCOSE BLD MANUAL STRIP-MCNC: 146 MG/DL (ref 74–99)
GLUCOSE BLD MANUAL STRIP-MCNC: 151 MG/DL (ref 74–99)
GLUCOSE BLD MANUAL STRIP-MCNC: 200 MG/DL (ref 74–99)
GLUCOSE SERPL-MCNC: 166 MG/DL (ref 74–99)
GLUCOSE SERPL-MCNC: 181 MG/DL (ref 74–99)
HCT VFR BLD AUTO: 30.4 % (ref 36–46)
HCT VFR BLD AUTO: 33.6 % (ref 36–46)
HGB BLD-MCNC: 10.1 G/DL (ref 12–16)
HGB BLD-MCNC: 9.1 G/DL (ref 12–16)
MAGNESIUM SERPL-MCNC: 1.4 MG/DL (ref 1.6–2.4)
MAGNESIUM SERPL-MCNC: 1.76 MG/DL (ref 1.6–2.4)
MCH RBC QN AUTO: 22.9 PG (ref 26–34)
MCH RBC QN AUTO: 23 PG (ref 26–34)
MCHC RBC AUTO-ENTMCNC: 29.9 G/DL (ref 32–36)
MCHC RBC AUTO-ENTMCNC: 30.1 G/DL (ref 32–36)
MCV RBC AUTO: 77 FL (ref 80–100)
MCV RBC AUTO: 77 FL (ref 80–100)
NRBC BLD-RTO: 0 /100 WBCS (ref 0–0)
NRBC BLD-RTO: 0 /100 WBCS (ref 0–0)
PHOSPHATE SERPL-MCNC: 2.7 MG/DL (ref 2.5–4.9)
PHOSPHATE SERPL-MCNC: 4.4 MG/DL (ref 2.5–4.9)
PLATELET # BLD AUTO: 248 X10*3/UL (ref 150–450)
PLATELET # BLD AUTO: 280 X10*3/UL (ref 150–450)
POTASSIUM SERPL-SCNC: 3.5 MMOL/L (ref 3.5–5.3)
POTASSIUM SERPL-SCNC: 3.9 MMOL/L (ref 3.5–5.3)
RBC # BLD AUTO: 3.97 X10*6/UL (ref 4–5.2)
RBC # BLD AUTO: 4.39 X10*6/UL (ref 4–5.2)
SODIUM SERPL-SCNC: 136 MMOL/L (ref 136–145)
SODIUM SERPL-SCNC: 141 MMOL/L (ref 136–145)
UFH PPP CHRO-ACNC: 0.3 IU/ML
UFH PPP CHRO-ACNC: 0.3 IU/ML
UFH PPP CHRO-ACNC: 0.7 IU/ML
UFH PPP CHRO-ACNC: <0.1 IU/ML
WBC # BLD AUTO: 13.1 X10*3/UL (ref 4.4–11.3)
WBC # BLD AUTO: 15.7 X10*3/UL (ref 4.4–11.3)

## 2024-09-26 PROCEDURE — 2500000004 HC RX 250 GENERAL PHARMACY W/ HCPCS (ALT 636 FOR OP/ED): Performed by: STUDENT IN AN ORGANIZED HEALTH CARE EDUCATION/TRAINING PROGRAM

## 2024-09-26 PROCEDURE — 84100 ASSAY OF PHOSPHORUS: CPT | Performed by: STUDENT IN AN ORGANIZED HEALTH CARE EDUCATION/TRAINING PROGRAM

## 2024-09-26 PROCEDURE — 85027 COMPLETE CBC AUTOMATED: CPT | Performed by: STUDENT IN AN ORGANIZED HEALTH CARE EDUCATION/TRAINING PROGRAM

## 2024-09-26 PROCEDURE — 2500000002 HC RX 250 W HCPCS SELF ADMINISTERED DRUGS (ALT 637 FOR MEDICARE OP, ALT 636 FOR OP/ED): Performed by: STUDENT IN AN ORGANIZED HEALTH CARE EDUCATION/TRAINING PROGRAM

## 2024-09-26 PROCEDURE — 80048 BASIC METABOLIC PNL TOTAL CA: CPT | Performed by: STUDENT IN AN ORGANIZED HEALTH CARE EDUCATION/TRAINING PROGRAM

## 2024-09-26 PROCEDURE — 85520 HEPARIN ASSAY: CPT | Performed by: STUDENT IN AN ORGANIZED HEALTH CARE EDUCATION/TRAINING PROGRAM

## 2024-09-26 PROCEDURE — 85520 HEPARIN ASSAY: CPT

## 2024-09-26 PROCEDURE — P9016 RBC LEUKOCYTES REDUCED: HCPCS

## 2024-09-26 PROCEDURE — 82947 ASSAY GLUCOSE BLOOD QUANT: CPT

## 2024-09-26 PROCEDURE — 2060000001 HC INTERMEDIATE ICU ROOM DAILY

## 2024-09-26 PROCEDURE — 2500000004 HC RX 250 GENERAL PHARMACY W/ HCPCS (ALT 636 FOR OP/ED)

## 2024-09-26 PROCEDURE — 2500000001 HC RX 250 WO HCPCS SELF ADMINISTERED DRUGS (ALT 637 FOR MEDICARE OP): Performed by: STUDENT IN AN ORGANIZED HEALTH CARE EDUCATION/TRAINING PROGRAM

## 2024-09-26 PROCEDURE — 83735 ASSAY OF MAGNESIUM: CPT | Performed by: STUDENT IN AN ORGANIZED HEALTH CARE EDUCATION/TRAINING PROGRAM

## 2024-09-26 PROCEDURE — 2500000004 HC RX 250 GENERAL PHARMACY W/ HCPCS (ALT 636 FOR OP/ED): Mod: JZ

## 2024-09-26 PROCEDURE — 36430 TRANSFUSION BLD/BLD COMPNT: CPT

## 2024-09-26 PROCEDURE — 99232 SBSQ HOSP IP/OBS MODERATE 35: CPT | Performed by: STUDENT IN AN ORGANIZED HEALTH CARE EDUCATION/TRAINING PROGRAM

## 2024-09-26 RX ORDER — MAGNESIUM SULFATE HEPTAHYDRATE 40 MG/ML
2 INJECTION, SOLUTION INTRAVENOUS ONCE
Status: COMPLETED | OUTPATIENT
Start: 2024-09-26 | End: 2024-09-26

## 2024-09-26 RX ORDER — HYDROMORPHONE HYDROCHLORIDE 1 MG/ML
0.2 INJECTION, SOLUTION INTRAMUSCULAR; INTRAVENOUS; SUBCUTANEOUS ONCE
Status: COMPLETED | OUTPATIENT
Start: 2024-09-26 | End: 2024-09-26

## 2024-09-26 RX ORDER — CALCIUM GLUCONATE 20 MG/ML
2 INJECTION, SOLUTION INTRAVENOUS ONCE
Status: COMPLETED | OUTPATIENT
Start: 2024-09-26 | End: 2024-09-26

## 2024-09-26 RX ORDER — HEPARIN SODIUM 10000 [USP'U]/100ML
0-4500 INJECTION, SOLUTION INTRAVENOUS CONTINUOUS
Status: DISPENSED | OUTPATIENT
Start: 2024-09-26 | End: 2024-10-01

## 2024-09-26 RX ORDER — SODIUM CHLORIDE, SODIUM LACTATE, POTASSIUM CHLORIDE, CALCIUM CHLORIDE 600; 310; 30; 20 MG/100ML; MG/100ML; MG/100ML; MG/100ML
100 INJECTION, SOLUTION INTRAVENOUS CONTINUOUS
Status: DISCONTINUED | OUTPATIENT
Start: 2024-09-27 | End: 2024-09-28

## 2024-09-26 RX ADMIN — BUSPIRONE HYDROCHLORIDE 10 MG: 10 TABLET ORAL at 08:12

## 2024-09-26 RX ADMIN — ACETAMINOPHEN 650 MG: 325 TABLET, FILM COATED ORAL at 20:22

## 2024-09-26 RX ADMIN — AMPICILLIN SODIUM AND SULBACTAM SODIUM 3 G: 2; 1 INJECTION, POWDER, FOR SOLUTION INTRAMUSCULAR; INTRAVENOUS at 04:57

## 2024-09-26 RX ADMIN — LEVETIRACETAM 500 MG: 500 TABLET, FILM COATED ORAL at 08:12

## 2024-09-26 RX ADMIN — ACETAMINOPHEN 650 MG: 325 TABLET, FILM COATED ORAL at 14:31

## 2024-09-26 RX ADMIN — HEPARIN SODIUM 1200 UNITS/HR: 10000 INJECTION, SOLUTION INTRAVENOUS at 13:07

## 2024-09-26 RX ADMIN — OXYCODONE HYDROCHLORIDE 10 MG: 5 TABLET ORAL at 14:31

## 2024-09-26 RX ADMIN — LEVETIRACETAM 500 MG: 500 TABLET, FILM COATED ORAL at 20:22

## 2024-09-26 RX ADMIN — OXYCODONE HYDROCHLORIDE 10 MG: 5 TABLET ORAL at 03:34

## 2024-09-26 RX ADMIN — DIVALPROEX SODIUM 500 MG: 500 TABLET, DELAYED RELEASE ORAL at 20:22

## 2024-09-26 RX ADMIN — INSULIN LISPRO 2 UNITS: 100 INJECTION, SOLUTION INTRAVENOUS; SUBCUTANEOUS at 17:37

## 2024-09-26 RX ADMIN — ACETAMINOPHEN 650 MG: 325 TABLET, FILM COATED ORAL at 03:34

## 2024-09-26 RX ADMIN — AMPICILLIN SODIUM AND SULBACTAM SODIUM 3 G: 2; 1 INJECTION, POWDER, FOR SOLUTION INTRAMUSCULAR; INTRAVENOUS at 17:37

## 2024-09-26 RX ADMIN — ACETAMINOPHEN 650 MG: 325 TABLET, FILM COATED ORAL at 08:13

## 2024-09-26 RX ADMIN — ATORVASTATIN CALCIUM 80 MG: 80 TABLET, FILM COATED ORAL at 20:22

## 2024-09-26 RX ADMIN — PANTOPRAZOLE SODIUM 40 MG: 40 TABLET, DELAYED RELEASE ORAL at 06:59

## 2024-09-26 RX ADMIN — LEVOTHYROXINE SODIUM 75 MCG: 0.07 TABLET ORAL at 06:59

## 2024-09-26 RX ADMIN — OXYCODONE HYDROCHLORIDE 10 MG: 5 TABLET ORAL at 18:25

## 2024-09-26 RX ADMIN — DIVALPROEX SODIUM 500 MG: 500 TABLET, DELAYED RELEASE ORAL at 14:31

## 2024-09-26 RX ADMIN — MAGNESIUM SULFATE HEPTAHYDRATE 2 G: 40 INJECTION, SOLUTION INTRAVENOUS at 09:50

## 2024-09-26 RX ADMIN — ASPIRIN 81 MG: 81 TABLET, COATED ORAL at 08:12

## 2024-09-26 RX ADMIN — DIVALPROEX SODIUM 500 MG: 500 TABLET, DELAYED RELEASE ORAL at 06:59

## 2024-09-26 RX ADMIN — OXYCODONE HYDROCHLORIDE 10 MG: 5 TABLET ORAL at 22:40

## 2024-09-26 RX ADMIN — AMPICILLIN SODIUM AND SULBACTAM SODIUM 3 G: 2; 1 INJECTION, POWDER, FOR SOLUTION INTRAMUSCULAR; INTRAVENOUS at 11:52

## 2024-09-26 RX ADMIN — CALCIUM GLUCONATE 2 G: 20 INJECTION, SOLUTION INTRAVENOUS at 11:53

## 2024-09-26 RX ADMIN — AMPICILLIN SODIUM AND SULBACTAM SODIUM 3 G: 2; 1 INJECTION, POWDER, FOR SOLUTION INTRAMUSCULAR; INTRAVENOUS at 20:21

## 2024-09-26 RX ADMIN — HYDROMORPHONE HYDROCHLORIDE 0.2 MG: 1 INJECTION, SOLUTION INTRAMUSCULAR; INTRAVENOUS; SUBCUTANEOUS at 20:33

## 2024-09-26 RX ADMIN — METOPROLOL TARTRATE 12.5 MG: 25 TABLET, FILM COATED ORAL at 08:12

## 2024-09-26 RX ADMIN — OXYCODONE HYDROCHLORIDE 10 MG: 5 TABLET ORAL at 08:17

## 2024-09-26 RX ADMIN — METOPROLOL TARTRATE 12.5 MG: 25 TABLET, FILM COATED ORAL at 20:22

## 2024-09-26 RX ADMIN — DULOXETINE HYDROCHLORIDE 60 MG: 60 CAPSULE, DELAYED RELEASE ORAL at 20:22

## 2024-09-26 SDOH — ECONOMIC STABILITY: HOUSING INSECURITY: AT ANY TIME IN THE PAST 12 MONTHS, WERE YOU HOMELESS OR LIVING IN A SHELTER (INCLUDING NOW)?: NO

## 2024-09-26 SDOH — SOCIAL STABILITY: SOCIAL INSECURITY
WITHIN THE LAST YEAR, HAVE YOU BEEN KICKED, HIT, SLAPPED, OR OTHERWISE PHYSICALLY HURT BY YOUR PARTNER OR EX-PARTNER?: NO

## 2024-09-26 SDOH — SOCIAL STABILITY: SOCIAL INSECURITY
WITHIN THE LAST YEAR, HAVE TO BEEN RAPED OR FORCED TO HAVE ANY KIND OF SEXUAL ACTIVITY BY YOUR PARTNER OR EX-PARTNER?: NO

## 2024-09-26 SDOH — ECONOMIC STABILITY: INCOME INSECURITY: HOW HARD IS IT FOR YOU TO PAY FOR THE VERY BASICS LIKE FOOD, HOUSING, MEDICAL CARE, AND HEATING?: NOT VERY HARD

## 2024-09-26 SDOH — ECONOMIC STABILITY: HOUSING INSECURITY: IN THE PAST 12 MONTHS, HOW MANY TIMES HAVE YOU MOVED WHERE YOU WERE LIVING?: 1

## 2024-09-26 SDOH — SOCIAL STABILITY: SOCIAL INSECURITY: WITHIN THE LAST YEAR, HAVE YOU BEEN HUMILIATED OR EMOTIONALLY ABUSED IN OTHER WAYS BY YOUR PARTNER OR EX-PARTNER?: NO

## 2024-09-26 SDOH — ECONOMIC STABILITY: FOOD INSECURITY: WITHIN THE PAST 12 MONTHS, YOU WORRIED THAT YOUR FOOD WOULD RUN OUT BEFORE YOU GOT MONEY TO BUY MORE.: NEVER TRUE

## 2024-09-26 SDOH — SOCIAL STABILITY: SOCIAL INSECURITY: WITHIN THE LAST YEAR, HAVE YOU BEEN AFRAID OF YOUR PARTNER OR EX-PARTNER?: NO

## 2024-09-26 SDOH — ECONOMIC STABILITY: FOOD INSECURITY: HOW HARD IS IT FOR YOU TO PAY FOR THE VERY BASICS LIKE FOOD, HOUSING, MEDICAL CARE, AND HEATING?: NOT VERY HARD

## 2024-09-26 SDOH — ECONOMIC STABILITY: HOUSING INSECURITY: IN THE LAST 12 MONTHS, WAS THERE A TIME WHEN YOU WERE NOT ABLE TO PAY THE MORTGAGE OR RENT ON TIME?: NO

## 2024-09-26 SDOH — ECONOMIC STABILITY: TRANSPORTATION INSECURITY: IN THE PAST 12 MONTHS, HAS LACK OF TRANSPORTATION KEPT YOU FROM MEDICAL APPOINTMENTS OR FROM GETTING MEDICATIONS?: YES

## 2024-09-26 SDOH — ECONOMIC STABILITY: FOOD INSECURITY: WITHIN THE PAST 12 MONTHS, YOU WORRIED THAT YOUR FOOD WOULD RUN OUT BEFORE YOU GOT THE MONEY TO BUY MORE.: NEVER TRUE

## 2024-09-26 SDOH — ECONOMIC STABILITY: INCOME INSECURITY: IN THE PAST 12 MONTHS HAS THE ELECTRIC, GAS, OIL, OR WATER COMPANY THREATENED TO SHUT OFF SERVICES IN YOUR HOME?: NO

## 2024-09-26 SDOH — ECONOMIC STABILITY: INCOME INSECURITY: IN THE LAST 12 MONTHS, WAS THERE A TIME WHEN YOU WERE NOT ABLE TO PAY THE MORTGAGE OR RENT ON TIME?: NO

## 2024-09-26 SDOH — ECONOMIC STABILITY: FOOD INSECURITY: WITHIN THE PAST 12 MONTHS, THE FOOD YOU BOUGHT JUST DIDN'T LAST AND YOU DIDN'T HAVE MONEY TO GET MORE.: NEVER TRUE

## 2024-09-26 SDOH — ECONOMIC STABILITY: INCOME INSECURITY: IN THE PAST 12 MONTHS, HAS THE ELECTRIC, GAS, OIL, OR WATER COMPANY THREATENED TO SHUT OFF SERVICE IN YOUR HOME?: NO

## 2024-09-26 SDOH — SOCIAL STABILITY: SOCIAL INSECURITY
WITHIN THE LAST YEAR, HAVE YOU BEEN RAPED OR FORCED TO HAVE ANY KIND OF SEXUAL ACTIVITY BY YOUR PARTNER OR EX-PARTNER?: NO

## 2024-09-26 ASSESSMENT — PAIN - FUNCTIONAL ASSESSMENT
PAIN_FUNCTIONAL_ASSESSMENT: 0-10

## 2024-09-26 ASSESSMENT — PAIN SCALES - GENERAL
PAINLEVEL_OUTOF10: 0 - NO PAIN
PAINLEVEL_OUTOF10: 3
PAINLEVEL_OUTOF10: 10 - WORST POSSIBLE PAIN
PAINLEVEL_OUTOF10: 0 - NO PAIN
PAINLEVEL_OUTOF10: 10 - WORST POSSIBLE PAIN
PAINLEVEL_OUTOF10: 0 - NO PAIN
PAINLEVEL_OUTOF10: 0 - NO PAIN
PAINLEVEL_OUTOF10: 10 - WORST POSSIBLE PAIN

## 2024-09-26 ASSESSMENT — COGNITIVE AND FUNCTIONAL STATUS - GENERAL
CLIMB 3 TO 5 STEPS WITH RAILING: TOTAL
WALKING IN HOSPITAL ROOM: A LOT
CLIMB 3 TO 5 STEPS WITH RAILING: TOTAL
MOBILITY SCORE: 15
DAILY ACTIVITIY SCORE: 18
MOVING TO AND FROM BED TO CHAIR: A LITTLE
TOILETING: A LITTLE
DRESSING REGULAR LOWER BODY CLOTHING: A LOT
STANDING UP FROM CHAIR USING ARMS: A LOT
WALKING IN HOSPITAL ROOM: TOTAL
TURNING FROM BACK TO SIDE WHILE IN FLAT BAD: A LITTLE
DAILY ACTIVITIY SCORE: 24
MOBILITY SCORE: 15
MOVING TO AND FROM BED TO CHAIR: A LITTLE
HELP NEEDED FOR BATHING: A LOT
DRESSING REGULAR UPPER BODY CLOTHING: A LITTLE
STANDING UP FROM CHAIR USING ARMS: A LOT

## 2024-09-26 ASSESSMENT — PAIN DESCRIPTION - DESCRIPTORS
DESCRIPTORS: BURNING;SHARP
DESCRIPTORS: SHARP;BURNING

## 2024-09-26 ASSESSMENT — ACTIVITIES OF DAILY LIVING (ADL): LACK_OF_TRANSPORTATION: YES

## 2024-09-26 NOTE — PROGRESS NOTES
PODIATRY SERVICE CONSULT PROGRESS NOTE    SERVICE DATE: 9/26/2024   SERVICE TIME:  1324    Subjective   INTERVAL HPI:   Patient was seen at bedside.  Reports post-op pain in LLE.  Patient denies any constitutional symptoms.   No other pedal complaints.   Agreeable to OR tomorrow for Left Transmetatarsal Amputation.    Medications:  Scheduled Meds: acetaminophen, 650 mg, oral, q6h  ampicillin-sulbactam, 3 g, intravenous, q6h  aspirin, 81 mg, oral, Daily  atorvastatin, 80 mg, oral, Nightly  busPIRone, 10 mg, oral, q AM  [Held by provider] clopidogrel, 75 mg, oral, Daily  divalproex, 500 mg, oral, q8h NINO  DULoxetine, 60 mg, oral, q PM  insulin lispro, 0-10 Units, subcutaneous, TID  levETIRAcetam, 500 mg, oral, BID  levothyroxine, 75 mcg, oral, q AM  metoprolol tartrate, 12.5 mg, oral, BID  pantoprazole, 40 mg, oral, Daily before breakfast  phytonadione, 5 mg, intravenous, Once  psyllium, 1 packet, oral, TID  sulfur hexafluoride microsphr, 2 mL, intravenous, Once in imaging      Continuous Infusions: heparin, 0-4,500 Units/hr, Last Rate: 1,200 Units/hr (09/26/24 1307)  [START ON 9/27/2024] lactated Ringer's, 100 mL/hr      PRN Meds: PRN medications: dextrose, dextrose, glucagon, glucagon, heparin, loperamide, melatonin, oxyCODONE, oxyCODONE         Objective   PHYSICAL EXAM:  Physical Exam Performed:  Vitals:    09/26/24 1700   BP:    Pulse:    Resp:    Temp: 37 °C (98.6 °F)   SpO2:      Body mass index is 27.1 kg/m².    Patient is AOx3 and in no acute distress. Patient is alert and cooperative. Sitting in bed with dressing intact and some strikethrough noted at lateral leg portion of vascular dressing.     Left focused exam.   Vascular: Lightly-palpable DP pulse. Non-palpable PT pulse. Mild pitting edema noted. Hair growth absent B/L. Temperature is cool to cool from tibial tuberosity to foot with focal decrease in temperature to digits 2 and 3. Necrosis noted to entire 3rd digit and some discoloration noted to 4rd  digit. No lymphatic streaking noted      Musculoskeletal: Gross active and passive ROM intact to age and activity level. Moves all extremities spontaneously. 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      Neurological: Intact light touch sensation. Pain stimuli intact. Denies any numbness, burning or tingling.     Dermatologic: Further necrosis of distal lateral 3rd digit. Skin appears diffusely xerotic. No rashes or nodules noted B/L. No hyperkeratotic tissue noted B/L.      Wound: left foot 1st ray amputation site  See images in media tab  Measurements: 0.5cm x 1.4cm x 1.0cm  Mixed wound base of granular and necrotic tissue tissue.   Minimal serosanguinous drainage.  No vinny-wound maceration.   Minimal vinny-wound erythema.   No evidence of ascending cellulitis or lymphangitis.  No palpable fluctuance. No malodor. No increased warmth.   Positive probe to bone within the wound base.   No tunneling or tracking.   Mild undermining. Skin edges irregular but intact.      LABS:   Results for orders placed or performed during the hospital encounter of 09/18/24 (from the past 24 hour(s))   ACTIVATED CLOTTING TIME LOW   Result Value Ref Range    POCT Activated Clotting Time Low Range 227 (H) 83 - 199 sec   ACTIVATED CLOTTING TIME LOW   Result Value Ref Range    POCT Activated Clotting Time Low Range 269 (H) 83 - 199 sec   ACTIVATED CLOTTING TIME LOW   Result Value Ref Range    POCT Activated Clotting Time Low Range 221 (H) 83 - 199 sec   ACTIVATED CLOTTING TIME LOW   Result Value Ref Range    POCT Activated Clotting Time Low Range 229 (H) 83 - 199 sec   Blood Gas Arterial Full Panel   Result Value Ref Range    POCT pH, Arterial 7.47 (H) 7.38 - 7.42 pH    POCT pCO2, Arterial 34 (L) 38 - 42 mm Hg    POCT pO2, Arterial 148 (H) 85 - 95 mm Hg    POCT SO2, Arterial 99 94 - 100 %    POCT Oxy Hemoglobin, Arterial 97.7 94.0 - 98.0 %    POCT Hematocrit Calculated, Arterial 33.0 (L) 36.0 - 46.0 %    POCT Sodium,  Arterial 139 136 - 145 mmol/L    POCT Potassium, Arterial 4.0 3.5 - 5.3 mmol/L    POCT Chloride, Arterial 107 98 - 107 mmol/L    POCT Ionized Calcium, Arterial 1.15 1.10 - 1.33 mmol/L    POCT Glucose, Arterial 158 (H) 74 - 99 mg/dL    POCT Lactate, Arterial 1.8 0.4 - 2.0 mmol/L    POCT Base Excess, Arterial 1.3 -2.0 - 3.0 mmol/L    POCT HCO3 Calculated, Arterial 24.7 22.0 - 26.0 mmol/L    POCT Hemoglobin, Arterial 10.9 (L) 12.0 - 16.0 g/dL    POCT Anion Gap, Arterial 11 10 - 25 mmo/L    Patient Temperature      FiO2 66 %   ACTIVATED CLOTTING TIME LOW   Result Value Ref Range    POCT Activated Clotting Time Low Range 271 (H) 83 - 199 sec   ACTIVATED CLOTTING TIME LOW   Result Value Ref Range    POCT Activated Clotting Time Low Range 240 (H) 83 - 199 sec   ACTIVATED CLOTTING TIME LOW   Result Value Ref Range    POCT Activated Clotting Time Low Range 219 (H) 83 - 199 sec   CBC and Auto Differential   Result Value Ref Range    WBC 16.3 (H) 4.4 - 11.3 x10*3/uL    nRBC 0.0 0.0 - 0.0 /100 WBCs    RBC 4.46 4.00 - 5.20 x10*6/uL    Hemoglobin 9.7 (L) 12.0 - 16.0 g/dL    Hematocrit 33.1 (L) 36.0 - 46.0 %    MCV 74 (L) 80 - 100 fL    MCH 21.7 (L) 26.0 - 34.0 pg    MCHC 29.3 (L) 32.0 - 36.0 g/dL    RDW 23.0 (H) 11.5 - 14.5 %    Platelets 331 150 - 450 x10*3/uL    Immature Granulocytes %, Automated 0.6 0.0 - 0.9 %    Immature Granulocytes Absolute, Automated 0.10 0.00 - 0.70 x10*3/uL   Renal Function Panel   Result Value Ref Range    Glucose 159 (H) 74 - 99 mg/dL    Sodium 142 136 - 145 mmol/L    Potassium 4.2 3.5 - 5.3 mmol/L    Chloride 107 98 - 107 mmol/L    Bicarbonate 27 21 - 32 mmol/L    Anion Gap 12 10 - 20 mmol/L    Urea Nitrogen 6 6 - 23 mg/dL    Creatinine 0.51 0.50 - 1.05 mg/dL    eGFR >90 >60 mL/min/1.73m*2    Calcium 8.3 (L) 8.6 - 10.6 mg/dL    Phosphorus 4.9 2.5 - 4.9 mg/dL    Albumin 2.9 (L) 3.4 - 5.0 g/dL   Coagulation Screen   Result Value Ref Range    Protime 13.2 (H) 9.8 - 12.8 seconds    INR 1.2 (H) 0.9 - 1.1     aPTT 37 27 - 38 seconds   Manual Differential   Result Value Ref Range    Neutrophils %, Manual 80.3 40.0 - 80.0 %    Lymphocytes %, Manual 13.7 13.0 - 44.0 %    Monocytes %, Manual 4.3 2.0 - 10.0 %    Eosinophils %, Manual 0.0 0.0 - 6.0 %    Basophils %, Manual 0.0 0.0 - 2.0 %    Atypical Lymphocytes %, Manual 1.7 0.0 - 2.0 %    Seg Neutrophils Absolute, Manual 13.09 (H) 1.20 - 7.00 x10*3/uL    Lymphocytes Absolute, Manual 2.23 1.20 - 4.80 x10*3/uL    Monocytes Absolute, Manual 0.70 0.10 - 1.00 x10*3/uL    Eosinophils Absolute, Manual 0.00 0.00 - 0.70 x10*3/uL    Basophils Absolute, Manual 0.00 0.00 - 0.10 x10*3/uL    Atypical Lymphs Absolute, Manual 0.28 0.00 - 0.50 x10*3/uL    Total Cells Counted 117     RBC Morphology See Below     Hypochromia Mild    POCT GLUCOSE   Result Value Ref Range    POCT Glucose 146 (H) 74 - 99 mg/dL   Prepare RBC: 1 Units   Result Value Ref Range    PRODUCT CODE N1089B78     Unit Number [id number]     Unit ABO A     Unit RH POS     XM INTEP COMP     Dispense Status IS     Blood Expiration Date 11/1/2024 11:59:00 PM EDT     PRODUCT BLOOD TYPE 6200     UNIT VOLUME 350    Phosphorus   Result Value Ref Range    Phosphorus 4.4 2.5 - 4.9 mg/dL   Magnesium   Result Value Ref Range    Magnesium 1.40 (L) 1.60 - 2.40 mg/dL   CBC   Result Value Ref Range    WBC 15.7 (H) 4.4 - 11.3 x10*3/uL    nRBC 0.0 0.0 - 0.0 /100 WBCs    RBC 4.39 4.00 - 5.20 x10*6/uL    Hemoglobin 10.1 (L) 12.0 - 16.0 g/dL    Hematocrit 33.6 (L) 36.0 - 46.0 %    MCV 77 (L) 80 - 100 fL    MCH 23.0 (L) 26.0 - 34.0 pg    MCHC 30.1 (L) 32.0 - 36.0 g/dL    RDW 23.5 (H) 11.5 - 14.5 %    Platelets 280 150 - 450 x10*3/uL   Basic Metabolic Panel   Result Value Ref Range    Glucose 166 (H) 74 - 99 mg/dL    Sodium 141 136 - 145 mmol/L    Potassium 3.9 3.5 - 5.3 mmol/L    Chloride 110 (H) 98 - 107 mmol/L    Bicarbonate 27 21 - 32 mmol/L    Anion Gap 8 (L) 10 - 20 mmol/L    Urea Nitrogen 8 6 - 23 mg/dL    Creatinine 0.55 0.50 -  1.05 mg/dL    eGFR >90 >60 mL/min/1.73m*2    Calcium 8.1 (L) 8.6 - 10.6 mg/dL   Heparin Assay, UFH   Result Value Ref Range    Heparin Unfractionated <0.1 See Comment Below for Therapeutic Ranges IU/mL   Heparin Assay, UFH   Result Value Ref Range    Heparin Unfractionated 0.7 See Comment Below for Therapeutic Ranges IU/mL   POCT GLUCOSE   Result Value Ref Range    POCT Glucose 151 (H) 74 - 99 mg/dL   Heparin Assay, UFH   Result Value Ref Range    Heparin Unfractionated 0.3 See Comment Below for Therapeutic Ranges IU/mL   POCT GLUCOSE   Result Value Ref Range    POCT Glucose 200 (H) 74 - 99 mg/dL      Lab Results   Component Value Date    HGBA1C 11.4 (H) 09/18/2024      Lab Results   Component Value Date    CRP 17.25 (H) 12/06/2023      Lab Results   Component Value Date    SEDRATE 37 (H) 09/17/2021        Results from last 7 days   Lab Units 09/26/24  0344   WBC AUTO x10*3/uL 15.7*   RBC AUTO x10*6/uL 4.39   HEMOGLOBIN g/dL 10.1*   HEMATOCRIT % 33.6*     Results from last 7 days   Lab Units 09/26/24  0344   SODIUM mmol/L 141   POTASSIUM mmol/L 3.9   CHLORIDE mmol/L 110*   CO2 mmol/L 27   BUN mg/dL 8   CREATININE mg/dL 0.55   CALCIUM mg/dL 8.1*   PHOSPHORUS mg/dL 4.4   MAGNESIUM mg/dL 1.40*           IMAGING REVIEW:  IR angiogram lower extremity left    Result Date: 9/26/2024  These images are not reportable by radiology and will not be interpreted by  Radiologists.    CT angio aorta and bilateral iliofemoral runoff w and or wo IV contrast    Result Date: 9/24/2024  Interpreted By:  Lopez Cameron, STUDY: CT ANGIO AORTA AND BILATERAL ILIOFEMORAL RUNOFF W AND OR WO IV CONTRAST;  9/18/2024 1:33 pm   INDICATION: Signs/Symptoms:critical limb ischemia   COMPARISON: None.   ACCESSION NUMBER(S): [id number]   ORDERING CLINICIAN: TABBY QUIROZ   TECHNIQUE: Thin-section axial images of the abdomen and pelvis in the arterial phase after intravenous administration of 95 mL Omnipaque 350 . Sagittal and coronal  reconstructions. 3D reconstructions were obtained at a separate workstation.   FINDINGS: Vascular:  No aortic aneurysm or dissection.  Aorta demonstrates generalized atherosclerotic disease with diffuse moderate stenosis. The celiac trunk, superior mesenteric artery, renal arteries and inferior mesenteric artery are patent.  Independent origin of the right hepatic artery noted.   Right lower extremity: Stent grafts of the right common and external iliac arteries are occluded. Patient is status post disarticulation right lower extremity amputation.   Left lower extremity: Common iliac artery is patent. Severe stenosis/occlusion of the internal iliac artery with distal filling of the internal iliac branches. External iliac demonstrates multiple stent/stent grafts which appear patent. Common femoral artery demonstrates postsurgical changes of endarterectomy with marked intraluminal thrombus formation resulting in severe stenosis/near occlusion. Profundus vessels patent. Superficial femoral artery is occluded. A popliteal stent is occluded. Distal reconstitution of the anterior and posterior tibial arteries.   LOWER CHEST: No acute abnormality of the lung bases. BONES: No acute osseous abnormality. Multiple amputation defects about the left foot. Right lower extremity complaining amputation. ABDOMINAL WALL: Within normal limits.   ABDOMEN:   LIVER: Heterogeneous enhancement of the left lobe. The left hepatic artery is patent. Left portal vein incompletely evaluated due to phase of contrast opacification at the time of the study. BILE DUCTS: Normal caliber. GALLBLADDER: Surgically absent. PANCREAS: Within normal limits. SPLEEN: Within normal limits. ADRENALS: Within normal limits. KIDNEYS and URETERS: Symmetric renal enhancement. Cortical scarring bilaterally. No urinary tract calculi.   RETROPERITONEUM: No pathologically enlarged retroperitoneal lymph nodes.   PELVIS:   REPRODUCTIVE ORGANS: No pelvic masses. BLADDER:  Within normal limits.   BOWEL: No dilated bowel.  Normal appendix. PERITONEUM: No ascites or free air, no fluid collection.       1. Near occlusion of the left common femoral artery. 2. Near occlusion of the left common femoral artery. The superficial femoral artery and popliteal arteries are occluded. There is distal reconstitution of the anterior and posterior tibial arteries via profundus collaterals. 3. Heterogeneous enhancement of the left hepatic lobe. Left arterial vasculature unremarkable. Portal venous structures not well evaluated due to early arterial phase of the study. Portal venous phase may be obtained for evaluation of the left portal vein. Differential includes portal venous compromise or asymmetric fatty infiltration.   MACRO: none   Signed by: Lopez Cameron 9/24/2024 12:49 PM Dictation workstation:   [id number]    Cardiac Catheterization Procedure    Result Date: 9/24/2024   [facility], Cath Lab, [address] [place] [address] Cardiovascular Catheterization Report Patient Name:      NALDO WONG      Performing Physician: [id number] Cheyenne Eckert MD Study Date:        9/20/2024            Verifying Physician:  [id number] Cheyenne Eckert MD MRN/PID:           [id number]             Primary Physician:    [id number] Juany Sanchez MD Accession#:        [id number]         Ordering Provider:    [id number] DONALD FRANKLIN Date of Birth/Age: 1970 / 54 years Cardiologist:         [id number] Carlyle Belcher MD Gender:            F                    Fellow:               [id number] Cheyenne                                                                Kamran RAMESH Admit Date:        9/18/2024            Fellow:               [id number] Chino Cardozo MD Encounter#:        [id number]           Surgeon:              [id number] Kaitlyn Marie Dunphy MD  Study:            Left Heart Cath Additional Study: Peripheral Angiogram  Indications: NALDO WONG is a 54 year old female with dyslipidemia, hypertension, diabetes, prior peripheral intervention, S/p Rt LE amputation- Rt hip disarticulation. Preoperative evaluation before Lt LE revascularization surgery. Medical History: Stress test performed: No. CTA performed: No. [place] accessed: No. LVEF Assessed: Yes. LVEF = 65-70%. Cardiac arrest: No. Cardiac surgical consult: No. Cardiovascular instability: No. Frailty status of patient entering lab: 6 = Moderately frail.  Coronary Angiography: The coronary circulation is right dominant.  Coronary Angiography Comments: LCA Angio LMCA: Large vessel with minor irregularities. Bifurcated. LAD: Moderate sized type 3 vessel, curving around the apex with minor nonobstructive irregularities. LCx: Moderate sized nondominant vessel with minor nonobstructive irregularities. RCA: Moderate sized dominant vessel with minor nonobstructive irregularities. Abdominal Aortography with distal run-off (DSA): Aortography at bifurcation showed total occlusion of RCIA ostioproximally with a network of collaterals, but no major vessel reformation visualized on the Rt. (S/p Rt Hip disarticulation). LCIA bifurcated with significant proximal stenosis of LIIA. LINDA has a patent stent extending to LCFA with mild instent restenosis, mainly distally, bifurcating into profunda and SFA, the latter being totally occluded proximally, with distal reformation of a diseased vessel in adductor canal around mid, through collaterals from  profunda, itself having tight eccentric stenosis proximally. Rt SFA is again occluded distally in the adductor canal within another stent, occlusion extending beyond popliteal artery and knee joint. Below knee trifurcation is not visualized, but anterior tibial artery reforms in good caliber around mild leg, running off into the foot as dorsalis pedis. Poserior tibial artery also seen filling through collaerals in upper third, though smaller caliber and running off to the foot, contributing to plantar arch.  Hemo Personnel: +----------------+---------+ Name            Duty      +----------------+---------+ Darleen Eckert MD 1 +----------------+---------+  Hemodynamic Pressures:  +----+---------------+----------+-------------+--------------+-------+---------+ Site   Date Time   Phase NameSystolic mmHgDiastolic mmHgED mmHgMean mmHg +----+---------------+----------+-------------+--------------+-------+---------+   AO      9/20/2024      Rest          104            53              79         12:01:23 PM                                                      +----+---------------+----------+-------------+--------------+-------+---------+   LV      9/20/2024      Rest          126             1     12                  12:09:12 PM                                                      +----+---------------+----------+-------------+--------------+-------+---------+  AOp      9/20/2024      Rest          122            71              91         12:10:04 PM                                                      +----+---------------+----------+-------------+--------------+-------+---------+  Cardiac Cath Post Procedure Notes: Post Procedure Diagnosis: PAD. Blood Loss:               Estimated blood loss during the procedure was 10 mls. Specimens Removed:        Number of specimen(s) removed: none.  Recommendations: Patient is cleared from a cardiac perspective for  vascular surgery for Lt LE revascularization. Continue strict modification of atherosclerotic risk factors - follow recommendations per cardiology consult team/Dr Belcher. Follow recommendations per Vascular surgery team/Dr Dunphy. ____________________________________________________________________________________ CONCLUSIONS:  1. Severe PAD. S/p Rt hip disarticulation surgery.  2. Preoperative cardiac evaluation before Rt LE revascularization surgery.  3. Preserved LV systolic function (by echo). Normal LVEDP.  4. Angiographically: Rt dominant system with minor nonobstructive coronary atherosclerosis.  5. Abdominal aortography at bifurcation with distal run-off showed total occlusion of ostioproximal LCIA and severe PAD involving LLE as described, with distal 2 vessel run-off into Lt foot. ICD 10 Codes: Atherosclerosis of autologous vein bypass graft(s) of the extremities with intermittent claudication, bilateral legs-I70.413; Atherosclerosis of native arteries of extremities with intermittent claudication, bilateral legs-I70.213  CPT Codes: Left Heart Cath (visualization of coronaries) and LV-72316; Angiography, Extremity,uni,S&I (PER)-87855; Aortography, abd, S&I (PER)-17898  [id number] Cheyenne Eckert MD Performing Physician Electronically signed by [id number] Cheyenne Eckert MD on 9/24/2024 at 3:25:56 AM  ** Final (Updated) **     Transthoracic Echo (TTE) Limited    Result Date: 9/19/2024   [facility], [address] [place] [address]                Tel [phone] and Fax [phone] TRANSTHORACIC ECHOCARDIOGRAM REPORT  Patient Name:      NALDO Carias Physician:    [id number] Nader Rincon MD Study Date:        9/19/2024            Ordering Provider:    [id number] TABBY QUIROZ MRN/PID:           [id number]             Fellow: Accession#:         [id number]         Nurse: Date of Birth/Age: 1970 / 54 years Sonographer:          GREYSON Clay RDCS Gender:            F                    Additional Staff: Height:            182.88 cm            Admit Date:           9/17/2024 Weight:            90.27 kg             Admission Status:     Inpatient -                                                               Routine BSA / BMI:         2.13 m2 / 26.99      Encounter#:           [id number]                    kg/m2 Blood Pressure:    105/50 mmHg          Department Location:  [facility] Non                                                               Invasive Study Type:    TRANSTHORACIC ECHO (TTE) LIMITED Diagnosis/ICD: Encounter for other preprocedural examination-Z01.818 Indication:    Pre-Op CPT Code:      Echo Limited-87648; Doppler Limited-01768; Color Doppler-83481 Patient History: Diabetes:          Yes Pertinent History: HTN. [place]IR[place], PAD. Study Detail: The following Echo studies were performed: 2D, M-Mode, Doppler and               color flow. Image quality for this study is suboptimal.  PHYSICIAN INTERPRETATION: Left Ventricle: Left ventricular ejection fraction is normal, by visual estimate at 65-70%. There are no regional left ventricular wall motion abnormalities. The left ventricular cavity size is normal. The left ventricular septal wall thickness is normal. There is normal left ventricular posterior wall thickness. Spectral Doppler shows a normal pattern of left ventricular diastolic filling. Left Atrium: The left atrium is normal in size. Right Ventricle: The right ventricle is normal in size. There is normal right ventricular global systolic function. Right Atrium: The right atrium is normal in size. Aortic Valve: The aortic valve is trileaflet. There is minimal aortic valve cusp calcification. There is no evidence of aortic valve regurgitation. Gradients not  assessed. Mitral Valve: The mitral valve is normal in structure. There is no evidence of mitral valve regurgitation. Tricuspid Valve: The tricuspid valve is structurally normal. Tricuspid regurgitation was not assessed. Pulmonic Valve: The pulmonic valve is structurally normal. There is physiologic pulmonic valve regurgitation. Pericardium: There is no pericardial effusion noted. Aorta: The aortic root is normal. Systemic Veins: The inferior vena cava was not well visualized. In comparison to the previous echocardiogram(s): Compared with study dated 12/8/2023, no significant change.  CONCLUSIONS:  1. Poorly visualized anatomical structures due to suboptimal image quality.  2. Left ventricular ejection fraction is normal, by visual estimate at 65-70%.  3. There is normal right ventricular global systolic function. QUANTITATIVE DATA SUMMARY:  2D MEASUREMENTS:          Normal Ranges: Ao Root d:       3.50 cm  (2.0-3.7cm) LAs:             3.20 cm  (2.7-4.0cm) IVSd:            0.90 cm  (0.6-1.1cm) LVPWd:           0.90 cm  (0.6-1.1cm) LVIDd:           4.80 cm  (3.9-5.9cm) LVIDs:           3.20 cm LV Mass Index:   70 g/m2 LVEDV Index:     39 ml/m2 LV % FS          33.3 %  LA VOLUME:                    Normal Ranges: LA Vol A4C:        53.3 ml    (22+/-6mL/m2) LA Vol A2C:        49.5 ml LA Vol BP:         51.6 ml LA Vol Index A4C:  25.1ml/m2 LA Vol Index A2C:  23.3 ml/m2 LA Vol Index BP:   24.3 ml/m2 LA Area A4C:       18.7 cm2 LA Area A2C:       18.1 cm2 LA Major Axis A4C: 5.6 cm LA Major Axis A2C: 5.6 cm LA Volume Index:   24.3 ml/m2  RA VOLUME BY A/L METHOD:          Normal Ranges: RA Area A4C:             17.0 cm2  LV SYSTOLIC FUNCTION BY 2D PLANIMETRY (MOD):                      Normal Ranges: EF-A4C View:    73 % (>=55%) EF-A2C View:    63 % EF-Biplane:     69 % EF-Visual:      68 % LV EF Reported: 68 %  LV DIASTOLIC FUNCTION:             Normal Ranges: MV Peak E:             0.98 m/s    (0.7-1.2 m/s) MV Peak A:              0.72 m/s    (0.42-0.7 m/s) E/A Ratio:             1.35        (1.0-2.2) MV e'                  0.077 m/s   (>8.0) MV lateral e'          0.08 m/s MV medial e'           0.07 m/s MV A Dur:              108.00 msec E/e' Ratio:            12.69       (<8.0) MV DT:                 205 msec    (150-240 msec) PulmV Sys Keyur:         49.30 cm/s PulmV Palacios Keyur:        42.40 cm/s PulmV S/D Keyur:         1.20 PulmV A Revs Keyur:      27.00 cm/s PulmV A Revs Dur:      119.00 msec  MITRAL VALVE:          Normal Ranges: MV DT:        205 msec (150-240msec)  AORTIC VALVE:          Normal Ranges: LVOT Diameter: 2.30 cm (1.8-2.4cm)  RIGHT VENTRICLE: RV Basal 3.60 cm RV Mid   3.00 cm RV Major 7.3 cm TAPSE:   19.1 mm RV s'    0.16 m/s  TRICUSPID VALVE/RVSP:         Normal Ranges: IVC Diam:             1.39 cm  Pulmonary Veins: PulmV A Revs Dur: 119.00 msec PulmV A Revs Keyur: 27.00 cm/s PulmV [place] Keyur:   42.40 cm/s PulmV S/D Keyur:    1.20 PulmV Sys Keyur:    49.30 cm/s  [id number] Nader Rincon MD Electronically signed on 9/19/2024 at 3:43:53 PM  ** Final **     Vascular US Lower Extremity Vein Mapping Left    Result Date: 9/18/2024            [place] [address]   Tel [phone] and Fax [phone]  Vascular Lab Report VASC US LOWER EXTREMITY VEIN MAPPING LEFT  Patient Name:     NALDO Carias Physician: [id number] Haleigh Velasquez MD Study Date:       9/18/2024           Ordering           [id number] KAITLYN M DUNPHY                                       Physician: MRN/PID:          [id number]            Technologist:      Rama Jimenez RVT Accession#:       [id number]        Technologist 2: Date of           1970 / 54      Encounter#:        [id number] Birth/Age:        years Gender:           F Admission Status: Inpatient           Location           [facility]                                        Performed:  Diagnosis/ICD: Peripheral vascular disease, unspecified-I73.9 Indication:    Conduit for bypass CPT Codes:     01219 Vein mapping Limited  **Report Amended** Date and Time: 9/18/2024 at 10:43:10 PM  CONCLUSIONS: Left Lower Venous: Additional Findings; Lymph nodes. Left Lower Vein Mapping: The left great saphenous vein and small saphenous vein appear widely patent with no evidence of thrombosis or fibrosis except for distal SSV. The left saphenofemoral junction was not visualized.  Imaging & Doppler Findings:  Left           Compress Thrombus  Diam Prox Thigh GSV   Yes      None   2.5 mm Mid Thigh GSV    Yes      None   2.5 mm Knee GSV         Yes      None   2.8 mm Prox Calf GSV    Yes      None   4.8 mm Mid Calf GSV     Yes      None   2.5 mm Dist Calf GSV    Yes      None   3.6 mm SSV Prox         Yes      None   4.3 mm SSV Mid          Yes      None   3.9 mm SSV Distal       Yes    Fibrotic 3.7 mm  [id number] Haleigh Velasquez MD Electronically signed by [id number] Haleigh Velasquez MD on 9/18/2024 at 10:40:55 PM  ** Final (Updated) **     Vascular US lower extremity arterial duplex left with HEMA    Result Date: 9/18/2024            [place] [address]   Tel [phone] and Fax [phone]  Vascular Lab Report [facility] US LOWER EXTREMITY ARTERIAL DUPLEX LEFT WITH HEMA  Patient Name:      NALDO Carias Physician: [id number] Travis Villegas MD Study Date:        9/18/2024           Ordering           [id number] TABBY ECHEVARRIA                                        Physician:         RICHIE MRN/PID:           [id number]            Technologist:      Brooks BORRERO Accession#:        [id number]        Technologist 2: Date of Birth/Age: 1970 / 54      Encounter#:        [id number]                    years Gender:            F Admission Status:  Inpatient           Location           [facility]                                        Performed:  Diagnosis/ICD:  Peripheral vascular disease, unspecified-I73.9 Indication:    Atherosclerosis of native arteries-extremities w/rest pain CPT Codes:     46493 Peripheral artery Lower arterial Duplex limited  Patient History Right leg is amputated from groin down.                 Left foot only has two digit and they have gangrene                 Stents in left EIA and left popliteal artery                 History of multiple surgery on legt leg.  **CRITICAL RESULT** Critical Result: > 50% stenosis in distal left femoral artery. Notification called to Christiane Hooker MD on 9/18/2024 at 11:50:26 AM by Brooks BORRERO.  CONCLUSIONS: Left Lower Arterial: There is >50% stenosis documented at the superficial femoral artery distal. Decreased velocities noted in the arteries throughout the left lower extremity with monophasic flow. Left peroneal artery not visualized. The left external iliac artery stent appears patent. The left poplitael artery appear patent with decreased velocity noted. Stent: Left EIA Stent Proximal - 68.2 cm/s Mid - 144.4 cm/s Distal 124.7 cm/s. Left popliteal Stent Prox - 10.8 cm/s Mid - 10.1 cm/s Distal - 18.7 cm/s.  Comparison: Compared with study from 6/28/2022, Today's exam > 50% stenosis in left distal femoral artery.  Imaging & Doppler Findings:  Right                     Left  PSV                      PSV              EIA        144 cm/s              CFA        150 cm/s       Profunda Proximal 135 cm/s         SFA Proximal    11 cm/s            SFA Mid      12 cm/s          SFA Distal     261 cm/s           Popliteal     19 cm/s         SERAFIN Proximal    16 cm/s            SERAFIN Mid       5 cm/s          SERAFIN Distal     18 cm/s         PTA Proximal    15 cm/s            PTA Mid      27 cm/s          PTA Distal     26 cm/s  [id number] Travis Villegas MD Electronically signed by [id number] Travis Villegas MD on 9/18/2024 at 3:42:14 PM  ** Final **     Vascular US Ankle Brachial Index (HEMA) Without Exercise    Result Date:  9/18/2024            [place] [address]   Tel [phone] and Fax [phone]  Vascular Lab Report [facility] US ANKLE BRACHIAL INDEX (HEMA) WITHOUT EXERCISE  Patient Name:      NALDO CHRIS JUDITH Carias Physician: [id number] Travis Villegas MD Study Date:        9/18/2024           Ordering           [id number] ALEXYS SONI                                        Physician:         DUNPHY MRN/PID:           [id number]            Technologist:      Brooks Portillo [facility] Accession#:        [id number]        Technologist 2: Date of Birth/Age: 1970 / 54      Encounter#:        [id number]                    years Gender:            F Admission Status:  Inpatient           Location           [facility]                                        Performed:  Diagnosis/ICD: Other specified peripheral vascular diseases-I73.89 Indication:    Gangrene CPT Codes:     87084 Peripheral artery HEMA Only  Patient History Right leg is amputated from groin down.                 Left foot only has two digit and they have gangrene                 Stents in left EIA and left popliteal artery                 History of multiple surgery on legt leg.  CONCLUSIONS: Right Lower PVR: Evidence of moderate arterial occlusive disease in the right lower extremity at rest. Left Lower PVR: Monophasic flow is noted in the left posterior tibial artery and left dorsalis pedis artery. Multiphasic flow is noted in the left common femoral artery. Unable to obtain pressure or doppler signal of left grat toe due to amputation.  Comparison: Compared with study from 12/8/2023, no significant change.  Imaging & Doppler Findings:  LEFT Lower PVR                Pressures Ratios Left Posterior Tibial (Ankle) 59 mmHg   0.60 Left Dorsalis Pedis (Ankle)   66 mmHg   0.67                     Right Brachial Pressure 98 mmHg   [id number] Travis Villegas MD Electronically signed by [id number] Villegas MD on 9/18/2024 at 3:39:13 PM  ** Final **      XR foot left 3+ views    Result Date: 9/18/2024  Interpreted By:  Mamadou Burger,  and Tello Peña STUDY: XR FOOT LEFT 3+ VIEWS; ;  9/18/2024 4:59 am   INDICATION: Signs/Symptoms:L toe gangrene, concern for osteomyelitis.     COMPARISON: None.   ACCESSION NUMBER(S): [id number]   ORDERING CLINICIAN: KAITLYN DUNPHY   FINDINGS: Three views of the left foot are provided.   There has been amputation of the 1st and 5th digits at the mid metatarsal shaft. Amputation of the 2nd digit at the tarsometatarsal joint.   Within the distal phalanx of the 3rd digit, there is a extensive cortical erosion with internal irregular margins, concerning for osteomyelitis. Soft tissue edema overlying the distal 3rd digit. There is also lucency over the medial and plantar aspect of the 2nd metatarsal head.       Erosive cortical changes and irregular internal margins of the 3rd digit distal phalanx consistent with osteomyelitis. MRI can be performed to evaluate extent of disease Soft tissue edema about the 3rd digit   I personally reviewed the image(s)/study and resident interpretation. I agree with the findings as stated by resident Casey Javed. Data analyzed and images interpreted at [facility], [place], [place].   MACRO: None   Signed by: Mamadou Burger 9/18/2024 9:51 AM Dictation workstation:   [id number]            Assessment/Plan   ASSESSMENT & PLAN:    #Non pressure ulceration down to and including bone, left foot  #OM, left foot  #Gangrene, left toes 3&4  #Acquired absence of RLE  #Acquired absence of left digits 1, 2, 5   #PAD  #Diabetes mellitus  # S/P Left femoral endarterectomy, profundaplasty, common tbcsxin-dx-ombhweth tibial bypass using 6 mm ringed PTFE, left lower extremity angiogram (DOS: 9/25/24)     - Patient was seen and evaluated; all findings were discussed and all questions were answered to patient's satisfaction.  - Charts, labs, vitals and imaging all reviewed.    - Imaging: foot XR--Erosive cortical changes and irregular internal margins of the 3rd  digit distal phalanx consistent with osteomyelitis. Soft tissue edema about the 3rd digit.  - Labs: WBC 15.7 (reactive from sx) HGB 10.1, HbA1c 11.4%  - Wound culture: 9/18 grew 1+ skin microbes, had 3+ PMLs on gram stain  - Blood cultures: NG at 4 days and final     Plan:  - Left forefoot wound assessed and redressed. Recovering well s/p vascular bypass yesterday. Discussed procedure planned for tomorrow morning. Patient in agreement.  - Recommend Heel-offloading boot be applied to LLE. Please order.  - Surgery scheduled for tomorrow, Friday 9/27 at 07:15. NPO after midnight. Consent signed in chart.  - Please optimize patient for upcoming procedure:  - Recommend updated Type and Screen.  - Heparin to be held when on call for pre-op   - Resume heparin 2 hours post-op   - Abx: Unasyn, per primary/ID  - Pain Regimen: per primary  - Dressings: betadine soaked gauze, dry gauze, kerlix, tape.   - Nursing staff is able to change/reinforce dressing if & as necessary until next dressing change. Thank you.  - Podiatry will continue to follow while in house.  - Seen and discussed with attending Dr. Israel.     DVT ppx: per primary  Weightbearing: NWB at baseline, surgical shoe to be worn when out of bed.  Discharge: Patient to follow up with her established Podiatrist 1 week or less after discharge:  Kris Bee Jr., DPM  [id number] [place] [id number]  ([phone])        This patient will be followed by the Podiatry service. Please Epic Chat the corresponding residents below with questions or concerns.      Meaghan Lombardo DPM PGY-1  Podiatric Medicine and Surgery   Epic Secure Chat Preferred  [id number]     Albert Taylor DPM PGY-3  Podiatric Medicine and Surgery           SIGNATURE: Albert Taylor DPM PATIENT NAME: Shirin Slater   DATE: September 26, 2024 MRN: [id number]   TIME: 5:22 PM CONTACT: Haiku Luc

## 2024-09-26 NOTE — SIGNIFICANT EVENT
Vascular Surgery Postoperative Check Note    Subjective  Shirin Slater is a 54 y.o. female who is now POD0 s/p Left femoral endarterectomy, profundaplasty, common mpcxsxf-uk-ygxlehms tibial bypass using 6 mm ringed PTFE, left lower extremity angiogram . Postoperatively, patient feels tired, and denies fevers/chills, n/v, chest pain, shortness of breath. Feels her pain is well-controlled and appropriate for this time. Has no other concerns.    Objective  Vitals:  Visit Vitals  /66   Pulse 97   Temp 36.7 °C (98.1 °F) (Temporal)   Resp 12       Physical Exam:  GEN: No acute distress. Alert, awake and conversive.  HEENT: Sclera anicteric. Moist mucous membranes.  RESP: Breathing non-labored, equal chest rise. On RA.  CV: Regular rate, hypotensive in PACU to 85/54, 1u pRBC given, BP improved to 104/57, most recent /66.  GI: Abdomen soft, nondistended, nontender.   : Deferred.  MSK: No gross deformities. Wound vac in place over left lateral calf incision.   NEURO: Alert and oriented x3. No focal deficits.  PSYCH: Appropriate mood and affect.  SKIN: Wound VAC in place over LLE wound.       Pre-op exam: Left foot monophasic DP and PT  Post-op exam: Left foot palpable AT, multiphasic DP, monophasic PT  POC Exam 4:30 AM: L foot palpable AT, Multiphasic DP, Monophasic PT    Most recent labs:            10.1     15.7>-----<280              33.6     141  110  8                  ----------------<166     3.9  27  0.55            Mg 1.40         Assessment  Shirin Slater is a 54 y.o. female who is now POD0 s/p Left femoral endarterectomy, profundaplasty, common kimywqh-qg-irekisqn tibial bypass using 6 mm ringed PTFE, left lower extremity angiogram. .  Patient is in fair condition, appropriate for postoperative course. The plan is as follows:    - q2hr vitals and vasc checks on SDU  - Oxy 5/10 PRN and scheduled acetaminophen for pain control  - Maintain -160 mmHg  - Maintenance IVF until tolerating  liquids  - Maintain arterial line   - Maintain vasques  - CBC, RFP, coags on arrival PACU and then daily AM labs  - Continue home BP meds  - Continue ASA  - Continue ISS  - Heparin drip low intensity to restart in PACU, no initial bolus    Lindsey Bermudez MD  General Surgery PGY-1  Wilian: [id number]  Transplant Surgery: [id number]  Vascular Surgery: [id number]    I am the night resident, I can only be contacted via Epic Secure Chat or the above pagers 5:30 pm to 6:00 am.

## 2024-09-26 NOTE — OP NOTE
Creation Bypass Graft Tibial Artery (L) Operative Note     Date: 2024  OR Location: [facility] OR    Name: Shirin Slater, : 1970, Age: 54 y.o., MRN: [id number], Sex: female    Diagnosis  Pre-op Diagnosis      * Critical limb ischemia of left lower extremity (Multi) [I70.222] Post-op Diagnosis     * Critical limb ischemia of left lower extremity (Multi) [I70.222]     Procedures  Creation Bypass Graft Tibial Artery  [id number] - [place] BYP FEM-ANT TIBL PST TIBL PRONEAL ART/OTH DSTL      Surgeons      * Kaitlyn M Dunphy - Primary    Resident/Fellow/Other Assistant:  Surgeons and Role:     * Anup John MD - Assisting     * Sanju Rain MD - Fellow    Procedure Summary  Anesthesia: General  ASA: III  Anesthesia Staff: Anesthesiologist: Tip Hummel MD; Maciej Medina MD; Raquel Cabrera MD; Palmira Chavez MD  C-AA: SOLEDAD Fajardo  Anesthesia Resident: Devendra Vega MD; Moni Diop MD  Frontline Breaker: SOLEDAD Cardoza  Estimated Blood Loss: 250 mL  Intra-op Medications:   Administrations occurring from 0815 to 1215 on 24:   Medication Name Total Dose   thrombin (recombinant) (Recothrom) topical solution 10,000 Units   gelatin absorbable (Gelfoam) 100 sponge 1 each   sodium chloride 0.9 % irrigation solution 1,000 mL   heparin (porcine) 5,000 Units in sodium chloride 0.9% 500 mL irrigation 5,000 Units   acetaminophen (Tylenol) tablet 650 mg Cannot be calculated   ampicillin-sulbactam (Unasyn) in sodium chloride 0.9 % 100 mL 3 g 100 mL   aspirin EC tablet 81 mg Cannot be calculated   busPIRone (Buspar) tablet 10 mg Cannot be calculated   clopidogrel (Plavix) tablet 75 mg Cannot be calculated   insulin lispro (HumaLOG) injection 0-10 Units Cannot be calculated   levETIRAcetam (Keppra) tablet 500 mg Cannot be calculated   metoprolol tartrate (Lopressor) tablet 12.5 mg Cannot be calculated   psyllium (Metamucil) 1 packet Cannot be calculated               Anesthesia Record               Intraprocedure I/O Totals          Intake    Phenylephrine Drip 0.00 mL    The total shown is the total volume documented since Anesthesia Start was filed.    albumin human 25 % 50.00 mL    Total Intake 50 mL       Output    Urine 3640 mL    Est. Blood Loss 600 mL    Total Output 4240 mL       Net    Net Volume -4190 mL          Specimen: No specimens collected     Staff:   Circulator: Argenis  Scrub Person: Ximena  Circulator: Nate  Circulator: Ana Paula  Relief Scrub: Carissa  Relief Circulator: Moni  Scrub Person: Jennifer  Relief Scrub: Zandra  Circulator: [facility]         Drains and/or Catheters:   Urethral Catheter Latex 14 Fr. (Active)   Site Assessment Clean;Skin intact 09/25/24 2156   Collection Container Urometer 09/25/24 2156   Securement Method Securing device (Describe) 09/25/24 2156   Output (mL) 30 mL 09/25/24 2356       [REMOVED] External Urinary Catheter Female (Removed)       Tourniquet Times:     Total Tourniquet Time Documented:  Leg (Left) - 2 minutes  Leg (Left) - 72 minutes  Total: Leg (Left) - 74 minutes      Implants:  Implants       Type Name Action Serial No.      Implant GRAFT, PROPATEN, 6 MM X 50 CM, THIN WALL, RING 40CM - [id number] - [id number] Implanted [id number]     Implant GRAFT, XENOSURE, BIOLOGIC PATCH, 0.8CM X 8CM - [id number] Implanted               Findings: significant scarring in groin, palpable AT pulse at end of case    Indications: Shirin Slater is an 54 y.o. female who is having surgery for Critical limb ischemia of left lower extremity (Multi) [I70.222].     The patient was seen in the preoperative area. The risks, benefits, complications, treatment options, non-operative alternatives, expected recovery and outcomes were discussed with the patient. The possibilities of reaction to medication, pulmonary aspiration, injury to surrounding structures, bleeding, recurrent infection, the need for additional procedures, failure to diagnose a  condition, and creating a complication requiring transfusion or operation were discussed with the patient. The patient concurred with the proposed plan, giving informed consent.  The site of surgery was properly noted/marked if necessary per policy. The patient has been actively warmed in preoperative area. Preoperative antibiotics have been ordered and given within 1 hours of incision. Venous thrombosis prophylaxis are not indicated.    Patient is a complex 54-year-old female with a history of APLS and multiple instances of acute limb ischemia.  She is status post right hip disarticulation due to acute limb ischemic episode.  She also has had multiple left lower extremity angiograms and has multiple stents in her iliacs.  She presented with a left lower extremity wound requiring TMA.  However she does not have inline flow therefore she required a bypass.  She was unable to be treated endovascularly due to the extensive length of occlusion.  She also has an occluded popliteal stent.  I explained to the patient as she is very high risk given her poor functional status, uncontrolled diabetes, tobacco use, clotting disorder.  explained the bypass to the distal tibial vessel is likely her only option for potential limb salvage.  Given that the patient lives independently and is able to transfer using a wheelchair at home alone I felt that aggressive limb salvage was indicated.  Patient was explained the risk and benefit the procedure she was explained that there is a risk that the bypass do not work given that she has a clotting history.  She understands that if the bypass does not work she will required above-knee amputation.  She also understands the risks are including but not limited to: Death, MI, stroke, limb loss.  Patient is agreeable to proceed.    Procedure Details:     Patient was taken to the operating room in supine position.  General anesthesia was induced without issue.  A brachial A-line and a left IJ  CVC were placed by the anesthesia team.  Hale catheter was placed patient was then prepped and draped in the standard fashion.    We began with a 10 cm groin incision over her previous scar.  Patient has had 4 previous left groin surgeries therefore there was a second significant amount of scar present.  This was an extraordinarily difficult dissection given her previous scarring and multiple surgeries.  This took about 2-3 times longer than normal.  Additionally because of the scarring, we had to take great caution in preventing a significant venous injury.  We then identified the common    Femoral artery and were able to place a loop around this.  We specifically placed a loop below the level of the stented and placed in the common femoral artery.  We are unsure if this stent is self expanding or balloon expandable.  We then proceeded to dissect out the SFA which was occluded.  We then worked on our profunda dissection which was challenging given her previous surgeries.  There were a lot of overlying veins.  We were able to identify both branches.    We then identified the greater saphenous vein on the medial thigh.  She does not have adequate saphenous vein to use for conduit however we were able to obtain a small segment of saphenous vein to use for distal vein patch.  An incision was made over the area of the greater saphenous vein that greater saphenous vein was identified and a piece was put in assessment.  We ligated the proximal distal ends of the GSV.    We then  proceeded with our distal target artery dissection.  A 10 severe incision was made in the distal one third of the calf over the anterior compartment.  Her tibial vessels were very inflamed despite not having prior surgery.  We were able to identify the anterior tibial artery.     We then used a curved Kylie-Wick tunneler to tunnel through the syndesmosis through the popliteal fossa and up to the groin area.  We used a 6 mm ringed PTFE  graft.    We then heparinized the patient with a weight-based bolus of heparin.  She was redosed every 30 minutes as indicated.  ACT's were drawn determining the redose.    Given the prior common femoral artery stent and the fact that we are unclear of what type of stent it was we felt that proximal balloon control is the most prudent.  We made a puncture in the common femoral artery with a micropuncture kit and we placed a Bentson wire in the abdominal aorta we then placed a 7 mm balloon in the distal common femoral artery stent we use this as our proximal control.  We then made an arteriotomy with an 11 blade and extended with Ordoñez scissors this was through the old endarterectomy patch or previous surgery.  There was a significant amount of old mural thrombus.  There is no humble acute thrombus.  We did have to perform a limited endarterectomy of the vessel and 2 tacking sutures were placed.  We also removed chronic thrombus from the orifice of the profunda which was seen on CT.  We then placed the heparinized saline syringe to help tip into the profunda and noted that it easily passed and there was excellent backbleeding.  We then backbled the common femoral and both profunda arteries and noted there was excellent backbleeding at this time we proceeded with a patch angioplasty.  A 0.8 x 8 cm bovine pericardial patch was brought to the field.  The anastomosis was completed with a 5-0 Prolene.  The necessary to be hemostatic.  We then proceeded to perform our proximal anastomosis on top of the patch.  An arteriotomy was made and extended with Ordoñez scissors, the anastomosis completed with a 5-0 Prolene.  All 3 vessels were flushed prior to completion of the anastomosis.  Once unclamped it was noted that the patch and proximal bypass vessels were hemostatic.  We then proceeded to work on our distal anastomosis.    An Esmarch was placed from the foot to the knee and then a tourniquet was inflated to 250 for 60  minutes.    The piece of GSV was placed in the back table it was cut in half to use as a patch.  This was anastomosed to the vessel with 7-0 Prolene.  We then made an arteriotomy within the vein patch and proceeded to perform our distal anastomosis using a 7-0 Prolene.  This required two repair sutures upon release of the tourniquet.    Patient was noted to have palpable AT pulse we then proceeded with a completion angiogram.  A micropuncture was inserted in the proximal tellez of the proximal anastomosis.  An angiogram was performed which noted continued thrombus in the proximal profunda as well as vasospasm distal to the distal anastomosis.    Given this finding and the profunda we proceeded with a profundoplasty and patch angioplasty.  Dr. John performed this portion of the procedure as there was no qualified resident able to assist.  He will dictate this portion of the procedure separately.    After the profundoplasty was performed an angiogram was repeated which noted improved inflow into the profunda.  It was still noted that patient had poor outflow distal anastomosis however she continued to have a palpable AT pulse.  We then injected papaverine into the bypass graft and we repeated a angiogram of the foot which noted excellent outflow.  She continued to have a palpable AT pulse..    We then proceeded with closure.  The groin was irrigated with 1 L of Irrisept.  The groin was closed in multiple layers with 2-0 Vicryl suture in a figure-of-eight fashion.  The groin wound was closed with staples and a Prevena VAC.  The GSV harvest site was closed with multiple layers of 2-0 and 3-0 Vicryl and was closed with staples and a sterile dressing.  The distal incision was irrigated with Irrisept.  The distal incision was closed in multiple layers with 3-0 Vicryl to provide muscle coverage over the prostatic bypass.  The skin was closed with multiple vertical mattress sutures using nylon.  The wound was closed with a  Prevena.  A clean dressing is applied to the foot.  Patient was taken to PACU in stable condition.  The family was notified.          Complications:  None; patient tolerated the procedure well.    Disposition: PACU - hemodynamically stable.  Condition: stable         Additional Details:     Attending Attestation: I was present and scrubbed for the entire procedure.    Kaitlyn Dunphy, MD  [id number]

## 2024-09-26 NOTE — PROGRESS NOTES
Shirin Slater is a 54 y.o. female on day 8 of admission presenting with Gangrene of toe of left foot (Multi).    Subjective   Interval History: Does have pain in the leg. She was asking for water this am.         Review of Systems    Review of systems otherwise negative    Objective   Range of Vitals (last 24 hours)  Heart Rate:  []   Temp:  [36 °C (96.8 °F)-36.8 °C (98.2 °F)]   Resp:  [10-25]   BP: ()/(50-76)   SpO2:  [89 %-96 %]   Daily Weight  09/18/24 : 90.6 kg (199 lb 12.8 oz)    Body mass index is 27.1 kg/m².    Physical Exam  General: in no acute distress, able to answer questions  HEENT: no conjunctival injection. anicteric.  Ext: left leg in wrap  Neuro: Answers questions appropriately.  Integumentary: no obvious lesions     Antibiotics  ampicillin-sulbactam - 3 gram/100 mL  doxycycline - 100 mg    Relevant Results  Labs  Results from last 72 hours   Lab Units 09/26/24 0344 09/25/24 2211 09/24/24  0939   WBC AUTO x10*3/uL 15.7* 16.3* 8.2   HEMOGLOBIN g/dL 10.1* 9.7* 12.2   HEMATOCRIT % 33.6* 33.1* 43.7   PLATELETS AUTO x10*3/uL 280 331 333   LYMPHO PCT MAN %  --  13.7  --    MONO PCT MAN %  --  4.3  --    EOSINO PCT MAN %  --  0.0  --      Results from last 72 hours   Lab Units 09/26/24 0344 09/25/24 2211 09/24/24  0939   SODIUM mmol/L 141 142 141   POTASSIUM mmol/L 3.9 4.2 3.8   CHLORIDE mmol/L 110* 107 104   CO2 mmol/L 27 27 29   BUN mg/dL 8 6 5*   CREATININE mg/dL 0.55 0.51 0.57   GLUCOSE mg/dL 166* 159* 141*   CALCIUM mg/dL 8.1* 8.3* 8.5*   ANION GAP mmol/L 8* 12 12   EGFR mL/min/1.73m*2 >90 >90 >90   PHOSPHORUS mg/dL 4.4 4.9 2.2*     Results from last 72 hours   Lab Units 09/25/24  2211   ALBUMIN g/dL 2.9*     Estimated Creatinine Clearance: 125 mL/min (by C-G formula based on SCr of 0.55 mg/dL).  C-Reactive Protein   Date Value Ref Range Status   12/06/2023 17.25 (H) <1.00 mg/dL Final     CRP   Date Value Ref Range Status   06/07/2022 0.96 mg/dL Final     Comment:     REF VALUE  <  1.00     09/17/2021 14.05 (A) mg/dL Final     Comment:     REF VALUE  < 1.00       Microbiology  Susceptibility data from last 14 days.  Collected Specimen Info Organism   09/18/24 Tissue/Biopsy from Diabetic Foot Ulcer Mixed Skin Microorganisms      Imaging    Foot xray  IMPRESSION:  Erosive cortical changes and irregular internal margins of the 3rd  digit distal phalanx consistent with osteomyelitis. MRI can be  performed to evaluate extent of disease Soft tissue edema about the  3rd digit          Assessment/Plan   Shirin Slater is a 54F with past medical history of severe PAD s/p R hip disarticulation s/p L CFA and EIA embolectomy 12/23 s/p multiple L toe amputations, T2DM complicated by diabetic neuropathy, HTN, HLD, renal and splenic thromboses on aspirin/plavix/coumadin, and current tobacco use who presents as a transfer from [facility] for management of L 3rd toe gangrene with concern of osteomyelitis. ID consulted to assist in management of toe infection.     #L 2nd toe gangrene  #Suspected osteomyelitis of 3rd phalanx  Pt with severe PAD + DM experienced trauma to toe 2 weeks prior to admission, has been experiencing worsening of pain, redness, and swelling, as well as purulence, indicating infection of this wound. Has undergone left femoral endarterectomy and common femoral to anterior tibial bypass    -Continue unasyn 3 q 6 hours as no MRSA or pseudomonas seen on tissue cx collected on 9/18  -Awaiting TMA tomorrow     ID will continue to follow. If any questions regarding this patient please chito Acuna  Infectious Diseases  Please haiku chat questions        Tavo Acuna MD

## 2024-09-26 NOTE — ANESTHESIA PREPROCEDURE EVALUATION
Patient: Shirin Slater    Procedure Information       Date/Time: 09/27/24 0700    Procedure: Foot Transmetatarsal Amputation (Left) - Earliest available start, please!    Location: [facility] OR  / [facility] [place] OR    Surgeons: Meaghan Israel DPM        Shirin Slater is 54 y.o. female with history of severe PAD s/p R hip disarticulation s/p L CFA and EIA embolectomy 12/23 s/p multiple L toe amputations, T2DM complicated by diabetic neuropathy, HTN, HLD, renal and splenic thromboses on aspirin/plavix/coumadin, and current tobacco use who presents as a transfer from [facility] for management of L 2nd toe gangrene with concern of osteomyelitis.  Now s/p Left femoral endarterectomy, profundaplasty, common badnpnm-fb-auinfkdn tibial bypass using 6 mm ringed PTFE, left lower extremity angiogram. Patient planned for TMA with podiatry 9/27.     9/25 : Pre-op exam: Left foot monophasic DP and PT  Post-op exam: Left foot palpable AT, multiphasic DP, monophasic PT    Relevant Problems   Anesthesia   (+) PONV (postoperative nausea and vomiting)      Cardiac   (+) Atherosclerosis of native arteries of left leg with ulceration of other part of foot (Multi)   (+) Critical limb ischemia of left lower extremity (Multi)   (+) HTN (hypertension)   (+) PAD (peripheral artery disease) (CMS-[facility])      Pulmonary   (+) Chronic obstructive pulmonary disease (Multi)   (+) ELMIRA (obstructive sleep apnea)      Neuro   (+) PTSD (post-traumatic stress disorder)   (+) Peripheral neuropathy   (+) Seizures (Multi)      Endocrine   (+) Diabetes mellitus, type 2 (Multi)   (+) Hypothyroidism      Hematology   (+) Anticoagulant long-term use       Clinical information reviewed:    Allergies  Meds             Vitals:    09/26/24 1400   BP: 109/64   Pulse: 100   Resp: 13   Temp:    SpO2: 92%       Past Surgical History:   Procedure Laterality Date    AMPUTATION FOOT / TOE Left 2022    AMPUTATION FOOT / TOE Left     ANTERIOR CRUCIATE  LIGAMENT REPAIR Left 2009    CARDIAC CATHETERIZATION N/A 2024    Procedure: Left Heart Cath;  Surgeon: Cheyenne Eckert MD;  Location: [facility] 352 Cardiac Cath Lab;  Service: Cardiovascular;  Laterality: N/A;     SECTION, CLASSIC  1989    COLON SURGERY  2019    Ressection    CT AORTA AND BILATERAL ILIOFEMORAL RUNOFF ANGIOGRAM W AND/OR WO IV CONTRAST  2021    CT AORTA AND BILATERAL ILIOFEMORAL RUNOFF ANGIOGRAM W AND/OR WO IV CONTRAST 2021 [facility] INPATIENT LEGACY    CT AORTA AND BILATERAL ILIOFEMORAL RUNOFF ANGIOGRAM W AND/OR WO IV CONTRAST  2022    CT AORTA AND BILATERAL ILIOFEMORAL RUNOFF ANGIOGRAM W AND/OR WO IV CONTRAST 2022 [facility] CLINICAL LEGACY    HYSTERECTOMY  2012    INVASIVE VASCULAR PROCEDURE N/A 2024    Procedure: Lower Extremity Angiogram;  Surgeon: Cheyenne Eckert MD;  Location: [facility] 352 Cardiac Cath Lab;  Service: Cardiovascular;  Laterality: N/A;    IR ANGIOGRAM AORTA ABDOMEN  2021    IR ANGIOGRAM AORTA ABDOMEN 2021 [facility] INPATIENT LEGACY    LEG AMPUTATION Right     hip    OTHER SURGICAL HISTORY  2021    Arterial angioplasty of lower extremity    TONSILECTOMY, ADENOIDECTOMY, BILATERAL MYRINGOTOMY AND TUBES  1971    VASCULAR SURGERY Right 2019    Feet w/ ICU stay    VASCULAR SURGERY Left     CFA Embolectomy    VASCULAR SURGERY Right 2020    Knee     Past Medical History:   Diagnosis Date    Anxiety     Arthritis     Cancer (Multi)     Cellulitis     Diabetes mellitus (Multi)     Disease of thyroid gland     Diverticulitis     Epilepsy, unspecified, not intractable, without status epilepticus (Multi)     Epilepsy    HLD (hyperlipidemia)     Hx of blood clots     Hypertension     PAD (peripheral artery disease) (CMS-HCC)     Peripheral vascular disease, unspecified (CMS-HCC) 2022    PAD (peripheral artery disease)    PTSD (post-traumatic stress disorder)     Uterine cancer (Multi)        Current Facility-Administered Medications:      acetaminophen (Tylenol) tablet 650 mg, 650 mg, oral, q6h, Sanju Rain MD, 650 mg at 09/26/24 1431    ampicillin-sulbactam (Unasyn) in sodium chloride 0.9 % 100 mL 3 g, 3 g, intravenous, q6h, Sanju Rain MD, Stopped at 09/26/24 1209    aspirin EC tablet 81 mg, 81 mg, oral, Daily, Sanju Rain MD, 81 mg at 09/26/24 0812    atorvastatin (Lipitor) tablet 80 mg, 80 mg, oral, Nightly, Sanju Rain MD, 80 mg at 09/24/24 2036    busPIRone (Buspar) tablet 10 mg, 10 mg, oral, q AM, Sanju Rain MD, 10 mg at 09/26/24 0812    [Held by provider] clopidogrel (Plavix) tablet 75 mg, 75 mg, oral, Daily, Sanju Rain MD, 75 mg at 09/23/24 0923    dextrose 50 % injection 12.5 g, 12.5 g, intravenous, q15 min PRN, Sanju Rain MD    dextrose 50 % injection 25 g, 25 g, intravenous, q15 min PRN, Sanju Rain MD    divalproex (Depakote) delayed release tablet 500 mg, 500 mg, oral, q8h NINO, Sanju Rain MD, 500 mg at 09/26/24 1431    DULoxetine (Cymbalta) DR capsule 60 mg, 60 mg, oral, q PM, Sanju Rian MD, 60 mg at 09/24/24 2036    glucagon (Glucagen) injection 1 mg, 1 mg, intramuscular, q15 min PRN, Sanju Rain MD    glucagon (Glucagen) injection 1 mg, 1 mg, intramuscular, q15 min PRN, Sanju Rain MD    heparin 25,000 Units in dextrose 5% 250 mL (100 Units/mL) infusion (premix), 0-4,500 Units/hr, intravenous, Continuous, Lexus Bryant MD, Last Rate: 12 mL/hr at 09/26/24 1307, 1,200 Units/hr at 09/26/24 1307    heparin bolus from bag 3,000-6,000 Units, 3,000-6,000 Units, intravenous, q4h PRN, Lexus Bryant MD    insulin lispro (HumaLOG) injection 0-10 Units, 0-10 Units, subcutaneous, TID, Sanju Rain MD, 2 Units at 09/24/24 1221    [START ON 9/27/2024] lactated Ringer's infusion, 100 mL/hr, intravenous, Continuous, Lexus Bryant MD    levETIRAcetam (Keppra) tablet 500 mg, 500 mg, oral, BID, Sanju Rain MD, 500 mg at 09/26/24 0812    levothyroxine (Synthroid, Levoxyl) tablet 75  mcg, 75 mcg, oral, q AM, Sanju Rain MD, 75 mcg at 09/26/24 0659    loperamide (Imodium) capsule 2 mg, 2 mg, oral, 4x daily PRN, Sanju Rain MD, 2 mg at 09/24/24 0909    melatonin tablet 10 mg, 10 mg, oral, Nightly PRN, Sanju Rain MD    metoprolol tartrate (Lopressor) tablet 12.5 mg, 12.5 mg, oral, BID, Sanju Rain MD, 12.5 mg at 09/26/24 0812    oxyCODONE (Roxicodone) immediate release tablet 10 mg, 10 mg, oral, q4h PRN, Sanju Rain MD, 10 mg at 09/26/24 1431    oxyCODONE (Roxicodone) immediate release tablet 5 mg, 5 mg, oral, q4h PRN, Sanju Rain MD, 5 mg at 09/23/24 0936    pantoprazole (ProtoNix) EC tablet 40 mg, 40 mg, oral, Daily before breakfast, 40 mg at 09/26/24 0659 **OR** [DISCONTINUED] esomeprazole (NexIUM) suspension 40 mg, 40 mg, nasoduodenal tube, Daily before breakfast **OR** [DISCONTINUED] pantoprazole (ProtoNix) injection 40 mg, 40 mg, intravenous, Daily before breakfast, Christiane Hooker MD    phytonadione (Vitamin K) 5 mg in dextrose 5% 50 mL IV, 5 mg, intravenous, Once, Sanju Rain MD    psyllium (Metamucil) 1 packet, 1 packet, oral, TID, Sanju Rain MD    sulfur hexafluoride microsphr (Lumason) injection 24.28 mg, 2 mL, intravenous, Once in imaging, Sanju Rain MD  Prior to Admission medications    Medication Sig Start Date End Date Taking? Authorizing Provider   acetaminophen (Tylenol) 325 mg tablet Take 3 tablets (975 mg) by mouth every 6 hours. 12/14/23  Yes Octavia Fam MD   busPIRone (Buspar) 10 mg tablet Take 1 tablet (10 mg) by mouth. In the morning   Yes Historical Provider, MD   divalproex (Depakote ER) 500 mg 24 hr tablet Take 1 tablet (500 mg) by mouth. In the morning, and 2 tablets in the evening 8/20/10  Yes Historical Provider, MD   empagliflozin (Jardiance) 10 mg Take 1 tablet (10 mg) by mouth once daily. 9/26/23  Yes Historical Provider, MD   levothyroxine (Tirosint) 75 mcg capsule Take 1 capsule (75 mcg) by mouth once daily in  the morning. Take before meals. On empty stomach 3/7/22  Yes Historical Provider, MD   loperamide (Imodium) 2 mg capsule Take 2 capsules (4 mg) by mouth. As needed, Don't exceed 4 tablets per day 9/26/23 9/26/24 Yes Historical Provider, MD   melatonin 10 mg tablet Take 1 tablet (10 mg) by mouth as needed at bedtime (insomnia). 7/8/22  Yes Historical Provider, MD   omeprazole OTC (PriLOSEC OTC) 20 mg EC tablet Take 1 tablet (20 mg) by mouth once daily in the morning. Take before meals.   Yes Historical Provider, MD   warfarin (Coumadin) 5 mg tablet Take as directed per After Visit Summary. 12/15/23 12/14/24 Yes Octavia Fam MD   albuterol 90 mcg/actuation inhaler Inhale 2 puffs every 6 hours if needed. 5/10/22 6/20/24  Historical Provider, MD   aspirin 81 mg EC tablet Take 1 tablet (81 mg) by mouth once daily.    Historical Provider, MD   atorvastatin (Lipitor) 80 mg tablet Take 1 tablet (80 mg) by mouth once daily at bedtime. 7/1/19 6/20/24  Historical Provider, MD   clopidogrel (Plavix) 75 mg tablet Take 1 tablet (75 mg) by mouth once daily.    Historical Provider, MD   doxycycline (Adoxa) 100 mg tablet Take 1 tablet (100 mg) by mouth 2 times a day. Take with a full glass of water and do not lie down for at least 30 minutes after 9/5/24 10/4/24  Historical Provider, MD   DULoxetine (Cymbalta) 60 mg DR capsule Take 1 capsule (60 mg) by mouth once daily. 3/7/22 6/20/24  Historical Provider, MD   enoxaparin (Lovenox) 100 mg/mL syringe Inject 1 mL (100 mg) under the skin 2 times a day. Continue Lovenox as bridge to Warfarin until INR as at goal for 2 days in a row (INR 2-3) then may stop Lovenox therapy; INR 3.4 on 12/15  Patient not taking: Reported on 9/18/2024 12/15/23   Octavia Fam MD   gabapentin (Neurontin) 400 mg capsule Take 1 capsule (400 mg) by mouth 3 times a day. 8/20/10 6/20/24  Historical Provider, MD   levETIRAcetam (Keppra) 500 mg tablet Take 1 tablet (500 mg) by mouth 2 times a day. 7/1/19  6/20/24  Historical Provider, MD   lidocaine 4 % patch Place 1 patch on the skin once daily as needed for mild pain (1 - 3). Remove & discard patch within 12 hours or as directed by MD.    Historical Provider, MD   methocarbamol (Robaxin) 500 mg tablet Take 1 tablet (500 mg) by mouth 3 times a day. 3/7/22 6/20/24  Historical Provider, MD   metoprolol tartrate (Lopressor) 25 mg tablet Take 0.5 tablets (12.5 mg) by mouth 2 times a day. 6/20/23 6/20/24  Historical Provider, MD   multivitamin tablet Take 1 tablet by mouth once daily.    Historical Provider, MD   sennosides (Senokot) 8.6 mg tablet Take 2 tablets (17.2 mg) by mouth 2 times a day.  Patient not taking: Reported on 9/18/2024 12/14/23   Octavia Fam MD   warfarin (Coumadin) 2.5 mg tablet Take 1 tablet (2.5 mg) by mouth 1 time for 1 dose. Take as directed per After Visit Summary.  Patient not taking: Reported on 9/18/2024 12/15/23 12/15/23  Octavia Fam MD     Allergies   Allergen Reactions    Aspartame Seizure    Nsaids (Non-Steroidal Anti-Inflammatory Drug) Other     Significant kidney injury (per patient report)     Social History     Tobacco Use    Smoking status: Every Day     Current packs/day: 1.00     Average packs/day: 1 pack/day for 43.7 years (43.7 ttl pk-yrs)     Types: Cigarettes     Start date: 1981    Smokeless tobacco: Never    Tobacco comments:     Currest smoker at around 0.5PPD. Peak at 1.5-2PPD    Substance Use Topics    Alcohol use: Yes     Comment: Rare         Chemistry    Lab Results   Component Value Date/Time     09/26/2024 0344    K 3.9 09/26/2024 0344     (H) 09/26/2024 0344    CO2 27 09/26/2024 0344    BUN 8 09/26/2024 0344    CREATININE 0.55 09/26/2024 0344    Lab Results   Component Value Date/Time    CALCIUM 8.1 (L) 09/26/2024 0344    ALKPHOS 88 09/18/2024 0555    AST 12 09/18/2024 0555    ALT 9 09/18/2024 0555    BILITOT 0.4 09/18/2024 0555          Lab Results   Component Value Date/Time    WBC 15.7 (H)  09/26/2024 0344    HGB 10.1 (L) 09/26/2024 0344    HCT 33.6 (L) 09/26/2024 0344     09/26/2024 0344     Lab Results   Component Value Date/Time    PROTIME 13.2 (H) 09/25/2024 2211    INR 1.2 (H) 09/25/2024 2211     No results found for this or any previous visit (from the past 4464 hour(s)).  No results found for this or any previous visit from the past 1095 days.    NPO Detail:  NPO/Void Status  Date of Last Liquid: 09/24/24  Date of Last Solid: 09/24/24  Last Intake Type: Clear fluids         Physical Exam    Airway  Mallampati: III     Cardiovascular   Rhythm: regular  Rate: normal     Dental        Pulmonary - normal exam     Abdominal      Other findings: Hx grade 1 Mac 3           Anesthesia Plan    History of general anesthesia?: yes  History of complications of general anesthesia?: no    ASA 3     The patient is a current smoker.  Patient was previously instructed to abstain from smoking on day of procedure.  Patient did not smoke on day of procedure.    Anesthetic plan and risks discussed with patient.    Plan discussed with attending.

## 2024-09-26 NOTE — BRIEF OP NOTE
Date: 2024 - 2024  OR Location: [facility] OR    Name: Shirin Slater, : 1970, Age: 54 y.o., MRN: [id number], Sex: female    Diagnosis  Pre-op Diagnosis      * Critical limb ischemia of left lower extremity (Multi) [I70.222] Post-op Diagnosis     * Critical limb ischemia of left lower extremity (Multi) [I70.222]     Procedures  Left femoral endarterectomy, profundaplasty, common huqkhmh-uk-sccynbgq tibial bypass using 6 mm ringed PTFE, left lower extremity angiogram    Surgeons      * Kaitlyn M Dunphy - Primary    Resident/Fellow/Other Assistant:  Surgeons and Role:     * Anup John MD - Assisting     * Sanju Rain MD - Fellow    Procedure Summary  Anesthesia: General  ASA: III  Anesthesia Staff: Anesthesiologist: Tip Hummel MD; Maciej Medina MD; Raquel Cabrera MD; Palmira Chavez MD  C-AA: SOLEDAD Fajardo  Anesthesia Resident: Devendra Vega MD; Moni Diop MD  Frontline Breaker: SOLEDAD Cardoza  Estimated Blood Loss: 500 mL  Intra-op Medications:   Administrations occurring from 0815 to 1215 on 24:   Medication Name Total Dose   thrombin (recombinant) (Recothrom) topical solution 10,000 Units   gelatin absorbable (Gelfoam) 100 sponge 1 each   sodium chloride 0.9 % irrigation solution 1,000 mL   heparin (porcine) 5,000 Units in sodium chloride 0.9% 500 mL irrigation 5,000 Units   acetaminophen (Tylenol) tablet 650 mg Cannot be calculated   ampicillin-sulbactam (Unasyn) in sodium chloride 0.9 % 100 mL 3 g 100 mL   aspirin EC tablet 81 mg Cannot be calculated   busPIRone (Buspar) tablet 10 mg Cannot be calculated   clopidogrel (Plavix) tablet 75 mg Cannot be calculated   insulin lispro (HumaLOG) injection 0-10 Units Cannot be calculated   levETIRAcetam (Keppra) tablet 500 mg Cannot be calculated   metoprolol tartrate (Lopressor) tablet 12.5 mg Cannot be calculated   psyllium (Metamucil) 1 packet Cannot be calculated              Anesthesia  Record               Intraprocedure I/O Totals          Intake    Phenylephrine Drip 0.00 mL    The total shown is the total volume documented since Anesthesia Start was filed.    albumin human 25 % 50.00 mL    Total Intake 50 mL       Output    Urine 3640 mL    Est. Blood Loss 600 mL    Total Output 4240 mL       Net    Net Volume -4190 mL          Specimen: No specimens collected     Staff:   Circulator: Argenis  Scrub Person: Ximena  Circulator: Nate  Circulator: Ana Paula Montes De Oca Scrub: Carissa Montes De Oca Circulator: Moni  Scrub Person: Jennifer Montes De Oca Scrub: Zandra  Circulator: Clay    Findings: chronic thrombus and disease in the CFA    Complications:  None; patient tolerated the procedure well.     Disposition: PACU - hemodynamically stable.  Condition: stable  Specimens Collected: No specimens collected  Attending Attestation:     Kaitlyn M Dunphy  Phone Number: [phone]

## 2024-09-26 NOTE — CONSULTS
Nutrition Follow Up Assessment:   Nutrition Assessment    Reason for Assessment: Dietitian discretion (nutrition follow up)    Patient is a 54 y.o. female presenting for management of L 2nd toe gangrene with concern of osteomyelitis.    s/p Left femoral endarterectomy, profundaplasty, common dgawvfe-mr-smhumtld tibial bypass, left lower extremity angiogram.   Patient planned for TMA with podiatry 9/27      Nutrition History:  Food and Nutrient History: Pt states that she was eating pretty good prior to surgery yesterday.  Now she doesn't feel like eating. No intake records available to assess.     Anthropometrics:  Height: 182.9 cm (6')   Weight: 90.6 kg (199 lb 12.8 oz)   BMI (Calculated): 31.29  IBW/kg (Dietitian Calculated): 53 kg  Percent of IBW: 171 %       Weight History:   Weight         9/18/2024 0258 9/18/2024 0259          Weight: 90.6 kg (199 lb 12.8 oz) 90.6 kg (199 lb 12.8 oz)            No new weight since admission.     Nutrition Focused Physical Exam Findings:  defer: completed during initial assessment  Physical Findings:  Skin: Positive (foot wounds)    Nutrition Significant Labs:  CBC Trend:   Results from last 7 days   Lab Units 09/26/24  0344 09/25/24 2211 09/24/24  0939 09/23/24  1023   WBC AUTO x10*3/uL 15.7* 16.3* 8.2 8.8   RBC AUTO x10*6/uL 4.39 4.46 5.69* 5.71*   HEMOGLOBIN g/dL 10.1* 9.7* 12.2 12.4   HEMATOCRIT % 33.6* 33.1* 43.7 43.5   MCV fL 77* 74* 77* 76*   PLATELETS AUTO x10*3/uL 280 331 333 329    , BMP Trend:   Results from last 7 days   Lab Units 09/26/24  0344 09/25/24 2211 09/24/24  0939 09/23/24  1023   GLUCOSE mg/dL 166* 159* 141* 141*   CALCIUM mg/dL 8.1* 8.3* 8.5* 8.5*   SODIUM mmol/L 141 142 141 140   POTASSIUM mmol/L 3.9 4.2 3.8 4.2   CO2 mmol/L 27 27 29 28   CHLORIDE mmol/L 110* 107 104 103   BUN mg/dL 8 6 5* 7   CREATININE mg/dL 0.55 0.51 0.57 0.59    , A1C:  Lab Results   Component Value Date    HGBA1C 11.4 (H) 09/18/2024   , BG POCT trend:   Results from last 7 days    Lab Units 09/25/24  2221 09/25/24  0611 09/24/24  2028 09/24/24  1736 09/24/24  1209   POCT GLUCOSE mg/dL 146* 111* 138* 141* 182*        Nutrition Specific Medications:  SSI, Synthroid, metamucil    I/O:   Last BM Date: 09/25/24; Stool Appearance: Soft (09/25/24 2356)    Dietary Orders (From admission, onward)       Start     Ordered    09/27/24 0001  NPO Diet; Effective midnight  Diet effective midnight         09/26/24 0927 09/26/24 0924  Adult diet Regular  Diet effective now        Question:  Diet type  Answer:  Regular    09/26/24 0927                     Estimated Needs:   Total Energy Estimated Needs (kCal): 2000 kCal  Method for Estimating Needs: REE= 1540 x1.2-1.3  Total Protein Estimated Needs (g): 90 g  Method for Estimating Needs: 1.7gm/kg IBW  Total Fluid Estimated Needs (mL):  (per team)        Nutrition Diagnosis   Malnutrition Diagnosis  Patient has Malnutrition Diagnosis: No    Nutrition Diagnosis  Patient has Nutrition Diagnosis: Yes  Diagnosis Status (1): Ongoing  Nutrition Diagnosis 1: Increased nutrient needs  Related to (1): wound healing  As Evidenced by (1): foot osteomyelitis       Nutrition Interventions/Recommendations         Nutrition Prescription:  Individualized Nutrition Prescription Provided for : Consider changing diet to  CCD 60. Will monitor intake for need for supplements. Obtain updated weight.        Nutrition Interventions:   Interventions: Meals and snacks  Goal: Consume >75% of meals.      Nutrition Education:   None at this time.        Nutrition Monitoring and Evaluation   Food/Nutrient Related History Monitoring  Monitoring and Evaluation Plan: Energy intake, Amount of food  Criteria: PO intake will be adequate to meet estimated needs    Body Composition/Growth/Weight History  Monitoring and Evaluation Plan: Weight  Criteria: maintain stable weight    Biochemical Data, Medical Tests and Procedures  Monitoring and Evaluation Plan: Electrolyte/renal panel,  Glucose/endocrine profile  Criteria: Labs WNL    Nutrition Focused Physical Findings  Monitoring and Evaluation Plan: Skin  Other: Wound healing         Time Spent (min): 45 minutes

## 2024-09-26 NOTE — SIGNIFICANT EVENT
Rapid Response RN Note    RADAR score 7 due to the following VS: T 36.7 °C; HR 99; RR 13; BP 98/65; SPO2 93%.     Reviewed above VS with bedside RN via phone.  Vital signs within patient's current trends.  No acute change in condition.  No interventions by rapid response team indicated at this time.    Staff to page rapid response for any concerns or acute change in condition/VS.

## 2024-09-26 NOTE — ANESTHESIA POSTPROCEDURE EVALUATION
Patient: Shirin Slater    Procedure Summary       Date: 09/25/24 Room / Location: [facility] OR 27 / [facility]    Anesthesia Start: 0817 Anesthesia Stop: 2206    Procedure: Creation Bypass Graft Tibial Artery (Left) Diagnosis:       Critical limb ischemia of left lower extremity (Multi)      (Critical limb ischemia of left lower extremity (Multi) [I70.222])    Surgeons: Kaitlyn M Dunphy, MD Responsible Provider: Palmira Chavez MD    Anesthesia Type: general ASA Status: 3            Anesthesia Type: general    Vitals Value Taken Time   BP 98/61 09/25/24 2200   Temp 36.8 °C (98.2 °F) 09/25/24 2156   Pulse 110 09/25/24 2204   Resp 10 09/25/24 2204   SpO2 95 % 09/25/24 2204   Vitals shown include unfiled device data.    Anesthesia Post Evaluation    Patient location during evaluation: PACU  Patient participation: complete - patient participated  Level of consciousness: sleepy but conscious  Pain score: 3  Pain management: adequate  Airway patency: patent  Cardiovascular status: hypotensive (patient received 1L fluid bolus with minimal response. discussed with [facility] surgery team given volume of blood loss patient would benefit from 1 unit prbc. If patient remains hypotensive following resuscitation efforts will start low dose leora and upgradeICU)  Respiratory status: acceptable  Hydration status: hypovolemic  Postoperative Nausea and Vomiting: none        There were no known notable events for this encounter.

## 2024-09-26 NOTE — SIGNIFICANT EVENT
Vascular Surgery Post-Op Plan of Care:    54 y.o. female with CLTI LLE, now s/p Left femoral endarterectomy, profundaplasty, common rvghdmq-el-xjyftbac tibial bypass using 6 mm ringed PTFE, left lower extremity angiogram.     Pre-op exam: Left foot monophasic DP and PT  Post-op exam: Left foot palpable AT, multiphasic DP, monophasic PT    - 2 hour stay in PACU then up to LT7 SDU: vasc checks q15 mins x1 hr, then q30 mins x1 hr, then q1 hr x2 hrs  - q2hr vitals and vasc checks on SDU  - Oxy 5/10 PRN and scheduled acetaminophen for pain control  - Maintain -160 mmHg  - NPO tonight  - Maintenance IVF until tolerating liquids  - Maintain arterial line   - Maintain vasques  - CBC, RFP, coags on arrival PACU and then daily AM labs  - Continue home BP meds  - Continue ASA  - Continue ISS  - Heparin drip low intensity to restart in PACU, no initial bolus    Sanju Rain MD  Vascular Surgery Fellow  Service Pager: [phone]

## 2024-09-26 NOTE — INTERVAL H&P NOTE
H&P reviewed. Since this H&P, patient has undergone Left femoral endarterectomy, profundaplasty, common xqefzgv-vc-fuvvneoc tibial bypass using 6 mm ringed PTFE, left lower extremity angiogram on 9/25/24. She has new incisions on her LLE from this procedure. Left toe gangrene has progressed as well, further necessitating amputation.  The patient was examined and there are other no changes to the H&P.

## 2024-09-27 ENCOUNTER — ANESTHESIA (OUTPATIENT)
Dept: OPERATING ROOM | Facility: HOSPITAL | Age: 54
End: 2024-09-27
Payer: COMMERCIAL

## 2024-09-27 PROBLEM — R11.2 PONV (POSTOPERATIVE NAUSEA AND VOMITING): Status: ACTIVE | Noted: 2024-09-27

## 2024-09-27 PROBLEM — Z98.890 PONV (POSTOPERATIVE NAUSEA AND VOMITING): Status: ACTIVE | Noted: 2024-09-27

## 2024-09-27 LAB
ANION GAP SERPL CALC-SCNC: 13 MMOL/L (ref 10–20)
BLOOD EXPIRATION DATE: NORMAL
BUN SERPL-MCNC: 8 MG/DL (ref 6–23)
CALCIUM SERPL-MCNC: 7.7 MG/DL (ref 8.6–10.6)
CHLORIDE SERPL-SCNC: 101 MMOL/L (ref 98–107)
CO2 SERPL-SCNC: 27 MMOL/L (ref 21–32)
CREAT SERPL-MCNC: 0.64 MG/DL (ref 0.5–1.05)
DISPENSE STATUS: NORMAL
EGFRCR SERPLBLD CKD-EPI 2021: >90 ML/MIN/1.73M*2
ERYTHROCYTE [DISTWIDTH] IN BLOOD BY AUTOMATED COUNT: 23.3 % (ref 11.5–14.5)
ERYTHROCYTE [DISTWIDTH] IN BLOOD BY AUTOMATED COUNT: 23.6 % (ref 11.5–14.5)
GLUCOSE BLD MANUAL STRIP-MCNC: 157 MG/DL (ref 74–99)
GLUCOSE BLD MANUAL STRIP-MCNC: 169 MG/DL (ref 74–99)
GLUCOSE BLD MANUAL STRIP-MCNC: 201 MG/DL (ref 74–99)
GLUCOSE BLD MANUAL STRIP-MCNC: 202 MG/DL (ref 74–99)
GLUCOSE BLD MANUAL STRIP-MCNC: 217 MG/DL (ref 74–99)
GLUCOSE SERPL-MCNC: 152 MG/DL (ref 74–99)
HCT VFR BLD AUTO: 23.2 % (ref 36–46)
HCT VFR BLD AUTO: 23.7 % (ref 36–46)
HCT VFR BLD AUTO: 27.2 % (ref 36–46)
HGB BLD-MCNC: 7 G/DL (ref 12–16)
HGB BLD-MCNC: 7.4 G/DL (ref 12–16)
HGB BLD-MCNC: 8.1 G/DL (ref 12–16)
MAGNESIUM SERPL-MCNC: 1.66 MG/DL (ref 1.6–2.4)
MCH RBC QN AUTO: 23.1 PG (ref 26–34)
MCH RBC QN AUTO: 23.3 PG (ref 26–34)
MCHC RBC AUTO-ENTMCNC: 29.8 G/DL (ref 32–36)
MCHC RBC AUTO-ENTMCNC: 30.2 G/DL (ref 32–36)
MCV RBC AUTO: 77 FL (ref 80–100)
MCV RBC AUTO: 78 FL (ref 80–100)
NRBC BLD-RTO: 0 /100 WBCS (ref 0–0)
NRBC BLD-RTO: 0 /100 WBCS (ref 0–0)
PHOSPHATE SERPL-MCNC: 2.5 MG/DL (ref 2.5–4.9)
PLATELET # BLD AUTO: 228 X10*3/UL (ref 150–450)
PLATELET # BLD AUTO: 241 X10*3/UL (ref 150–450)
POTASSIUM SERPL-SCNC: 3.5 MMOL/L (ref 3.5–5.3)
PRODUCT BLOOD TYPE: 6200
PRODUCT CODE: NORMAL
RBC # BLD AUTO: 3.01 X10*6/UL (ref 4–5.2)
RBC # BLD AUTO: 3.5 X10*6/UL (ref 4–5.2)
SODIUM SERPL-SCNC: 137 MMOL/L (ref 136–145)
UFH PPP CHRO-ACNC: 0.2 IU/ML
UFH PPP CHRO-ACNC: 0.3 IU/ML
UFH PPP CHRO-ACNC: 0.3 IU/ML
UNIT ABO: NORMAL
UNIT NUMBER: NORMAL
UNIT RH: NORMAL
UNIT VOLUME: 350
WBC # BLD AUTO: 14.7 X10*3/UL (ref 4.4–11.3)
WBC # BLD AUTO: 15.1 X10*3/UL (ref 4.4–11.3)
XM INTEP: NORMAL

## 2024-09-27 PROCEDURE — 3600000003 HC OR TIME - INITIAL BASE CHARGE - PROCEDURE LEVEL THREE: Performed by: STUDENT IN AN ORGANIZED HEALTH CARE EDUCATION/TRAINING PROGRAM

## 2024-09-27 PROCEDURE — 2500000004 HC RX 250 GENERAL PHARMACY W/ HCPCS (ALT 636 FOR OP/ED): Performed by: STUDENT IN AN ORGANIZED HEALTH CARE EDUCATION/TRAINING PROGRAM

## 2024-09-27 PROCEDURE — 2500000001 HC RX 250 WO HCPCS SELF ADMINISTERED DRUGS (ALT 637 FOR MEDICARE OP): Performed by: NURSE PRACTITIONER

## 2024-09-27 PROCEDURE — 2500000001 HC RX 250 WO HCPCS SELF ADMINISTERED DRUGS (ALT 637 FOR MEDICARE OP): Performed by: STUDENT IN AN ORGANIZED HEALTH CARE EDUCATION/TRAINING PROGRAM

## 2024-09-27 PROCEDURE — 83735 ASSAY OF MAGNESIUM: CPT | Performed by: STUDENT IN AN ORGANIZED HEALTH CARE EDUCATION/TRAINING PROGRAM

## 2024-09-27 PROCEDURE — 2500000004 HC RX 250 GENERAL PHARMACY W/ HCPCS (ALT 636 FOR OP/ED)

## 2024-09-27 PROCEDURE — 88307 TISSUE EXAM BY PATHOLOGIST: CPT | Mod: TC,SUR | Performed by: STUDENT IN AN ORGANIZED HEALTH CARE EDUCATION/TRAINING PROGRAM

## 2024-09-27 PROCEDURE — 3600000008 HC OR TIME - EACH INCREMENTAL 1 MINUTE - PROCEDURE LEVEL THREE: Performed by: STUDENT IN AN ORGANIZED HEALTH CARE EDUCATION/TRAINING PROGRAM

## 2024-09-27 PROCEDURE — 2500000002 HC RX 250 W HCPCS SELF ADMINISTERED DRUGS (ALT 637 FOR MEDICARE OP, ALT 636 FOR OP/ED): Performed by: NURSE PRACTITIONER

## 2024-09-27 PROCEDURE — 2500000005 HC RX 250 GENERAL PHARMACY W/O HCPCS: Performed by: STUDENT IN AN ORGANIZED HEALTH CARE EDUCATION/TRAINING PROGRAM

## 2024-09-27 PROCEDURE — 88307 TISSUE EXAM BY PATHOLOGIST: CPT | Performed by: PATHOLOGY

## 2024-09-27 PROCEDURE — 85520 HEPARIN ASSAY: CPT | Performed by: NURSE PRACTITIONER

## 2024-09-27 PROCEDURE — 2720000007 HC OR 272 NO HCPCS: Performed by: STUDENT IN AN ORGANIZED HEALTH CARE EDUCATION/TRAINING PROGRAM

## 2024-09-27 PROCEDURE — 84100 ASSAY OF PHOSPHORUS: CPT | Performed by: STUDENT IN AN ORGANIZED HEALTH CARE EDUCATION/TRAINING PROGRAM

## 2024-09-27 PROCEDURE — 2500000004 HC RX 250 GENERAL PHARMACY W/ HCPCS (ALT 636 FOR OP/ED): Performed by: NURSE PRACTITIONER

## 2024-09-27 PROCEDURE — 85027 COMPLETE CBC AUTOMATED: CPT | Performed by: STUDENT IN AN ORGANIZED HEALTH CARE EDUCATION/TRAINING PROGRAM

## 2024-09-27 PROCEDURE — 0Y6N0ZF DETACHMENT AT LEFT FOOT, PARTIAL 5TH RAY, OPEN APPROACH: ICD-10-PCS | Performed by: STUDENT IN AN ORGANIZED HEALTH CARE EDUCATION/TRAINING PROGRAM

## 2024-09-27 PROCEDURE — 87075 CULTR BACTERIA EXCEPT BLOOD: CPT | Performed by: STUDENT IN AN ORGANIZED HEALTH CARE EDUCATION/TRAINING PROGRAM

## 2024-09-27 PROCEDURE — 7100000002 HC RECOVERY ROOM TIME - EACH INCREMENTAL 1 MINUTE: Performed by: STUDENT IN AN ORGANIZED HEALTH CARE EDUCATION/TRAINING PROGRAM

## 2024-09-27 PROCEDURE — 1200000002 HC GENERAL ROOM WITH TELEMETRY DAILY

## 2024-09-27 PROCEDURE — 85520 HEPARIN ASSAY: CPT

## 2024-09-27 PROCEDURE — 3700000002 HC GENERAL ANESTHESIA TIME - EACH INCREMENTAL 1 MINUTE: Performed by: STUDENT IN AN ORGANIZED HEALTH CARE EDUCATION/TRAINING PROGRAM

## 2024-09-27 PROCEDURE — 85018 HEMOGLOBIN: CPT

## 2024-09-27 PROCEDURE — 85027 COMPLETE CBC AUTOMATED: CPT | Performed by: NURSE PRACTITIONER

## 2024-09-27 PROCEDURE — 82947 ASSAY GLUCOSE BLOOD QUANT: CPT

## 2024-09-27 PROCEDURE — 7100000001 HC RECOVERY ROOM TIME - INITIAL BASE CHARGE: Performed by: STUDENT IN AN ORGANIZED HEALTH CARE EDUCATION/TRAINING PROGRAM

## 2024-09-27 PROCEDURE — 3700000001 HC GENERAL ANESTHESIA TIME - INITIAL BASE CHARGE: Performed by: STUDENT IN AN ORGANIZED HEALTH CARE EDUCATION/TRAINING PROGRAM

## 2024-09-27 PROCEDURE — 2500000005 HC RX 250 GENERAL PHARMACY W/O HCPCS

## 2024-09-27 PROCEDURE — 80048 BASIC METABOLIC PNL TOTAL CA: CPT | Performed by: STUDENT IN AN ORGANIZED HEALTH CARE EDUCATION/TRAINING PROGRAM

## 2024-09-27 PROCEDURE — P9045 ALBUMIN (HUMAN), 5%, 250 ML: HCPCS | Mod: JZ

## 2024-09-27 RX ORDER — LIDOCAINE HYDROCHLORIDE 10 MG/ML
0.1 INJECTION, SOLUTION INFILTRATION; PERINEURAL ONCE
Status: DISCONTINUED | OUTPATIENT
Start: 2024-09-27 | End: 2024-09-27 | Stop reason: HOSPADM

## 2024-09-27 RX ORDER — HYDROMORPHONE HYDROCHLORIDE 1 MG/ML
0.5 INJECTION, SOLUTION INTRAMUSCULAR; INTRAVENOUS; SUBCUTANEOUS EVERY 5 MIN PRN
Status: DISCONTINUED | OUTPATIENT
Start: 2024-09-27 | End: 2024-09-27 | Stop reason: HOSPADM

## 2024-09-27 RX ORDER — AMPICILLIN AND SULBACTAM 2; 1 G/1; G/1
INJECTION, POWDER, FOR SOLUTION INTRAMUSCULAR; INTRAVENOUS AS NEEDED
Status: DISCONTINUED | OUTPATIENT
Start: 2024-09-27 | End: 2024-09-27

## 2024-09-27 RX ORDER — ESMOLOL HYDROCHLORIDE 10 MG/ML
INJECTION, SOLUTION INTRAVENOUS CONTINUOUS PRN
Status: DISCONTINUED | OUTPATIENT
Start: 2024-09-27 | End: 2024-09-27

## 2024-09-27 RX ORDER — METOCLOPRAMIDE HYDROCHLORIDE 5 MG/ML
10 INJECTION INTRAMUSCULAR; INTRAVENOUS ONCE AS NEEDED
Status: DISCONTINUED | OUTPATIENT
Start: 2024-09-27 | End: 2024-09-27 | Stop reason: HOSPADM

## 2024-09-27 RX ORDER — ONDANSETRON HYDROCHLORIDE 2 MG/ML
INJECTION, SOLUTION INTRAVENOUS AS NEEDED
Status: DISCONTINUED | OUTPATIENT
Start: 2024-09-27 | End: 2024-09-27

## 2024-09-27 RX ORDER — ALBUTEROL SULFATE 0.83 MG/ML
2.5 SOLUTION RESPIRATORY (INHALATION) ONCE AS NEEDED
Status: DISCONTINUED | OUTPATIENT
Start: 2024-09-27 | End: 2024-09-27 | Stop reason: HOSPADM

## 2024-09-27 RX ORDER — SODIUM CHLORIDE, SODIUM LACTATE, POTASSIUM CHLORIDE, CALCIUM CHLORIDE 600; 310; 30; 20 MG/100ML; MG/100ML; MG/100ML; MG/100ML
100 INJECTION, SOLUTION INTRAVENOUS CONTINUOUS
Status: DISCONTINUED | OUTPATIENT
Start: 2024-09-27 | End: 2024-09-27 | Stop reason: HOSPADM

## 2024-09-27 RX ORDER — FENTANYL CITRATE 50 UG/ML
INJECTION, SOLUTION INTRAMUSCULAR; INTRAVENOUS AS NEEDED
Status: DISCONTINUED | OUTPATIENT
Start: 2024-09-27 | End: 2024-09-27

## 2024-09-27 RX ORDER — PHENYLEPHRINE HYDROCHLORIDE 10 MG/ML
INJECTION INTRAVENOUS AS NEEDED
Status: DISCONTINUED | OUTPATIENT
Start: 2024-09-27 | End: 2024-09-27

## 2024-09-27 RX ORDER — ONDANSETRON HYDROCHLORIDE 2 MG/ML
4 INJECTION, SOLUTION INTRAVENOUS ONCE AS NEEDED
Status: DISCONTINUED | OUTPATIENT
Start: 2024-09-27 | End: 2024-09-27 | Stop reason: HOSPADM

## 2024-09-27 RX ORDER — MIDAZOLAM HYDROCHLORIDE 1 MG/ML
INJECTION INTRAMUSCULAR; INTRAVENOUS CONTINUOUS PRN
Status: DISCONTINUED | OUTPATIENT
Start: 2024-09-27 | End: 2024-09-27

## 2024-09-27 RX ORDER — ESMOLOL HYDROCHLORIDE 10 MG/ML
INJECTION INTRAVENOUS AS NEEDED
Status: DISCONTINUED | OUTPATIENT
Start: 2024-09-27 | End: 2024-09-27

## 2024-09-27 RX ORDER — OXYCODONE HYDROCHLORIDE 5 MG/1
5 TABLET ORAL EVERY 4 HOURS PRN
Status: DISCONTINUED | OUTPATIENT
Start: 2024-09-27 | End: 2024-09-27 | Stop reason: HOSPADM

## 2024-09-27 RX ORDER — HYDRALAZINE HYDROCHLORIDE 20 MG/ML
5 INJECTION INTRAMUSCULAR; INTRAVENOUS EVERY 30 MIN PRN
Status: DISCONTINUED | OUTPATIENT
Start: 2024-09-27 | End: 2024-09-27 | Stop reason: HOSPADM

## 2024-09-27 RX ORDER — PROPOFOL 10 MG/ML
INJECTION, EMULSION INTRAVENOUS AS NEEDED
Status: DISCONTINUED | OUTPATIENT
Start: 2024-09-27 | End: 2024-09-27

## 2024-09-27 RX ORDER — BUPIVACAINE HYDROCHLORIDE 5 MG/ML
INJECTION, SOLUTION PERINEURAL AS NEEDED
Status: DISCONTINUED | OUTPATIENT
Start: 2024-09-27 | End: 2024-09-27 | Stop reason: HOSPADM

## 2024-09-27 RX ORDER — ROCURONIUM BROMIDE 10 MG/ML
INJECTION, SOLUTION INTRAVENOUS AS NEEDED
Status: DISCONTINUED | OUTPATIENT
Start: 2024-09-27 | End: 2024-09-27

## 2024-09-27 RX ORDER — LIDOCAINE HYDROCHLORIDE 20 MG/ML
INJECTION, SOLUTION INFILTRATION; PERINEURAL AS NEEDED
Status: DISCONTINUED | OUTPATIENT
Start: 2024-09-27 | End: 2024-09-27 | Stop reason: HOSPADM

## 2024-09-27 RX ORDER — METHOCARBAMOL 100 MG/ML
1000 INJECTION, SOLUTION INTRAMUSCULAR; INTRAVENOUS ONCE
Status: DISCONTINUED | OUTPATIENT
Start: 2024-09-27 | End: 2024-09-27 | Stop reason: HOSPADM

## 2024-09-27 RX ORDER — OXYCODONE HYDROCHLORIDE 5 MG/1
10 TABLET ORAL EVERY 4 HOURS PRN
Status: DISCONTINUED | OUTPATIENT
Start: 2024-09-27 | End: 2024-09-27 | Stop reason: HOSPADM

## 2024-09-27 RX ORDER — HYDROMORPHONE HYDROCHLORIDE 1 MG/ML
0.2 INJECTION, SOLUTION INTRAMUSCULAR; INTRAVENOUS; SUBCUTANEOUS EVERY 5 MIN PRN
Status: DISCONTINUED | OUTPATIENT
Start: 2024-09-27 | End: 2024-09-27 | Stop reason: HOSPADM

## 2024-09-27 RX ORDER — SODIUM CHLORIDE 0.9 G/100ML
IRRIGANT IRRIGATION AS NEEDED
Status: DISCONTINUED | OUTPATIENT
Start: 2024-09-27 | End: 2024-09-27 | Stop reason: HOSPADM

## 2024-09-27 RX ORDER — SCOLOPAMINE TRANSDERMAL SYSTEM 1 MG/1
PATCH, EXTENDED RELEASE TRANSDERMAL AS NEEDED
Status: DISCONTINUED | OUTPATIENT
Start: 2024-09-27 | End: 2024-09-27

## 2024-09-27 RX ORDER — PHENYLEPHRINE 10 MG/250 ML(40 MCG/ML)IN 0.9 % SOD.CHLORIDE INTRAVENOUS
CONTINUOUS PRN
Status: DISCONTINUED | OUTPATIENT
Start: 2024-09-27 | End: 2024-09-27

## 2024-09-27 RX ORDER — SODIUM CHLORIDE, SODIUM LACTATE, POTASSIUM CHLORIDE, CALCIUM CHLORIDE 600; 310; 30; 20 MG/100ML; MG/100ML; MG/100ML; MG/100ML
INJECTION, SOLUTION INTRAVENOUS CONTINUOUS PRN
Status: DISCONTINUED | OUTPATIENT
Start: 2024-09-27 | End: 2024-09-27

## 2024-09-27 RX ORDER — HYDROGEN PEROXIDE 3 %
SOLUTION, NON-ORAL MISCELLANEOUS AS NEEDED
Status: DISCONTINUED | OUTPATIENT
Start: 2024-09-27 | End: 2024-09-27 | Stop reason: HOSPADM

## 2024-09-27 RX ORDER — ALBUMIN HUMAN 50 G/1000ML
SOLUTION INTRAVENOUS AS NEEDED
Status: DISCONTINUED | OUTPATIENT
Start: 2024-09-27 | End: 2024-09-27

## 2024-09-27 RX ORDER — HYDROMORPHONE HYDROCHLORIDE 1 MG/ML
INJECTION, SOLUTION INTRAMUSCULAR; INTRAVENOUS; SUBCUTANEOUS AS NEEDED
Status: DISCONTINUED | OUTPATIENT
Start: 2024-09-27 | End: 2024-09-27

## 2024-09-27 RX ORDER — LABETALOL HYDROCHLORIDE 5 MG/ML
5 INJECTION, SOLUTION INTRAVENOUS ONCE AS NEEDED
Status: DISCONTINUED | OUTPATIENT
Start: 2024-09-27 | End: 2024-09-27 | Stop reason: HOSPADM

## 2024-09-27 RX ADMIN — DIVALPROEX SODIUM 500 MG: 500 TABLET, DELAYED RELEASE ORAL at 20:41

## 2024-09-27 RX ADMIN — HYDROMORPHONE HYDROCHLORIDE 0.2 MG: 1 INJECTION, SOLUTION INTRAMUSCULAR; INTRAVENOUS; SUBCUTANEOUS at 09:59

## 2024-09-27 RX ADMIN — ATORVASTATIN CALCIUM 80 MG: 80 TABLET, FILM COATED ORAL at 20:41

## 2024-09-27 RX ADMIN — ACETAMINOPHEN 650 MG: 325 TABLET, FILM COATED ORAL at 02:31

## 2024-09-27 RX ADMIN — LEVETIRACETAM 500 MG: 500 TABLET, FILM COATED ORAL at 12:11

## 2024-09-27 RX ADMIN — OXYCODONE HYDROCHLORIDE 10 MG: 5 TABLET ORAL at 20:41

## 2024-09-27 RX ADMIN — DIVALPROEX SODIUM 500 MG: 500 TABLET, DELAYED RELEASE ORAL at 05:40

## 2024-09-27 RX ADMIN — ACETAMINOPHEN 650 MG: 325 TABLET, FILM COATED ORAL at 13:55

## 2024-09-27 RX ADMIN — ACETAMINOPHEN 650 MG: 325 TABLET, FILM COATED ORAL at 20:41

## 2024-09-27 RX ADMIN — LEVOTHYROXINE SODIUM 75 MCG: 0.07 TABLET ORAL at 05:40

## 2024-09-27 RX ADMIN — DULOXETINE HYDROCHLORIDE 60 MG: 60 CAPSULE, DELAYED RELEASE ORAL at 20:41

## 2024-09-27 RX ADMIN — OXYCODONE HYDROCHLORIDE 10 MG: 5 TABLET ORAL at 14:23

## 2024-09-27 RX ADMIN — AMPICILLIN SODIUM AND SULBACTAM SODIUM 3 G: 2; 1 INJECTION, POWDER, FOR SOLUTION INTRAMUSCULAR; INTRAVENOUS at 13:56

## 2024-09-27 RX ADMIN — PANTOPRAZOLE SODIUM 40 MG: 40 TABLET, DELAYED RELEASE ORAL at 05:40

## 2024-09-27 RX ADMIN — ASPIRIN 81 MG: 81 TABLET, COATED ORAL at 12:10

## 2024-09-27 RX ADMIN — LEVETIRACETAM 500 MG: 500 TABLET, FILM COATED ORAL at 20:41

## 2024-09-27 RX ADMIN — DIVALPROEX SODIUM 500 MG: 500 TABLET, DELAYED RELEASE ORAL at 13:56

## 2024-09-27 RX ADMIN — BUSPIRONE HYDROCHLORIDE 10 MG: 10 TABLET ORAL at 12:10

## 2024-09-27 RX ADMIN — AMPICILLIN SODIUM AND SULBACTAM SODIUM 3 G: 2; 1 INJECTION, POWDER, FOR SOLUTION INTRAMUSCULAR; INTRAVENOUS at 02:31

## 2024-09-27 RX ADMIN — INSULIN LISPRO 4 UNITS: 100 INJECTION, SOLUTION INTRAVENOUS; SUBCUTANEOUS at 16:40

## 2024-09-27 RX ADMIN — METOPROLOL TARTRATE 12.5 MG: 25 TABLET, FILM COATED ORAL at 20:41

## 2024-09-27 RX ADMIN — SODIUM CHLORIDE, POTASSIUM CHLORIDE, SODIUM LACTATE AND CALCIUM CHLORIDE 100 ML/HR: 600; 310; 30; 20 INJECTION, SOLUTION INTRAVENOUS at 00:07

## 2024-09-27 RX ADMIN — AMPICILLIN SODIUM AND SULBACTAM SODIUM 3 G: 2; 1 INJECTION, POWDER, FOR SOLUTION INTRAMUSCULAR; INTRAVENOUS at 20:44

## 2024-09-27 SDOH — HEALTH STABILITY: MENTAL HEALTH: CURRENT SMOKER: 1

## 2024-09-27 ASSESSMENT — PAIN SCALES - GENERAL
PAINLEVEL_OUTOF10: 6
PAINLEVEL_OUTOF10: 10 - WORST POSSIBLE PAIN
PAINLEVEL_OUTOF10: 10 - WORST POSSIBLE PAIN
PAINLEVEL_OUTOF10: 4
PAINLEVEL_OUTOF10: 9
PAINLEVEL_OUTOF10: 5 - MODERATE PAIN
PAIN_LEVEL: 5
PAINLEVEL_OUTOF10: 6
PAINLEVEL_OUTOF10: 6

## 2024-09-27 ASSESSMENT — PAIN DESCRIPTION - LOCATION: LOCATION: FOOT

## 2024-09-27 ASSESSMENT — COGNITIVE AND FUNCTIONAL STATUS - GENERAL
STANDING UP FROM CHAIR USING ARMS: A LOT
TURNING FROM BACK TO SIDE WHILE IN FLAT BAD: A LITTLE
STANDING UP FROM CHAIR USING ARMS: A LOT
CLIMB 3 TO 5 STEPS WITH RAILING: TOTAL
DRESSING REGULAR LOWER BODY CLOTHING: A LOT
DRESSING REGULAR LOWER BODY CLOTHING: A LOT
WALKING IN HOSPITAL ROOM: A LOT
CLIMB 3 TO 5 STEPS WITH RAILING: TOTAL
DRESSING REGULAR UPPER BODY CLOTHING: A LITTLE
TOILETING: A LITTLE
TOILETING: A LITTLE
DAILY ACTIVITIY SCORE: 17
PERSONAL GROOMING: A LITTLE
DAILY ACTIVITIY SCORE: 17
WALKING IN HOSPITAL ROOM: A LOT
TURNING FROM BACK TO SIDE WHILE IN FLAT BAD: A LITTLE
PERSONAL GROOMING: A LITTLE
HELP NEEDED FOR BATHING: A LOT
MOBILITY SCORE: 15
DRESSING REGULAR UPPER BODY CLOTHING: A LITTLE
MOBILITY SCORE: 15
MOVING TO AND FROM BED TO CHAIR: A LITTLE
MOVING TO AND FROM BED TO CHAIR: A LITTLE
HELP NEEDED FOR BATHING: A LOT

## 2024-09-27 ASSESSMENT — COLUMBIA-SUICIDE SEVERITY RATING SCALE - C-SSRS
6. HAVE YOU EVER DONE ANYTHING, STARTED TO DO ANYTHING, OR PREPARED TO DO ANYTHING TO END YOUR LIFE?: NO
2. HAVE YOU ACTUALLY HAD ANY THOUGHTS OF KILLING YOURSELF?: NO
1. IN THE PAST MONTH, HAVE YOU WISHED YOU WERE DEAD OR WISHED YOU COULD GO TO SLEEP AND NOT WAKE UP?: NO

## 2024-09-27 ASSESSMENT — PAIN DESCRIPTION - DESCRIPTORS
DESCRIPTORS: ACHING;THROBBING
DESCRIPTORS: ACHING;SHOOTING

## 2024-09-27 ASSESSMENT — PAIN - FUNCTIONAL ASSESSMENT
PAIN_FUNCTIONAL_ASSESSMENT: 0-10
PAIN_FUNCTIONAL_ASSESSMENT: UNABLE TO SELF-REPORT
PAIN_FUNCTIONAL_ASSESSMENT: 0-10
PAIN_FUNCTIONAL_ASSESSMENT: 0-10

## 2024-09-27 ASSESSMENT — PAIN DESCRIPTION - ORIENTATION: ORIENTATION: LEFT

## 2024-09-27 ASSESSMENT — ACTIVITIES OF DAILY LIVING (ADL): LACK_OF_TRANSPORTATION: YES

## 2024-09-27 NOTE — CARE PLAN
The patient's goals for the shift include      The clinical goals for the shift include Pt vital signs will remain WNL throughout the shift      Problem: Skin  Goal: Decreased wound size/increased tissue granulation at next dressing change  Outcome: Progressing  Goal: Participates in plan/prevention/treatment measures  Outcome: Progressing  Goal: Prevent/manage excess moisture  Outcome: Progressing  Goal: Prevent/minimize sheer/friction injuries  Outcome: Progressing  Goal: Promote/optimize nutrition  Outcome: Progressing  Goal: Promote skin healing  Outcome: Progressing     Problem: Pain  Goal: Takes deep breaths with improved pain control throughout the shift  Outcome: Progressing  Goal: Turns in bed with improved pain control throughout the shift  Outcome: Progressing  Goal: Walks with improved pain control throughout the shift  Outcome: Progressing  Goal: Performs ADL's with improved pain control throughout shift  Outcome: Progressing  Goal: Participates in PT with improved pain control throughout the shift  Outcome: Progressing  Goal: Free from opioid side effects throughout the shift  Outcome: Progressing  Goal: Free from acute confusion related to pain meds throughout the shift  Outcome: Progressing

## 2024-09-27 NOTE — CARE PLAN
S/P left foot TMA  Restart all medications and diet. Restart heparin drip in 2 hours.   Ordered stat H&H.  Podiatry to change dressing within the next 24-48 hours.   Nursing may reinforce dressing for bleeding. If bleeding through, take down ACE, pad with ABDs, apply ACE lightly. Do not use krelix.  Dressing orders: betadine soaked adapatic, 4x4, webril, ACE wrapped lightly, not too tight.  WB status: NWB  Dispensed post op shoe for L foot. Order placed  Elevate left LE.  Pain and other medical management per primary team.       Meaghan Lombardo DPM PGY1  Podiatric Medicine and Surgery

## 2024-09-27 NOTE — ANESTHESIA POSTPROCEDURE EVALUATION
Patient: Shirin Slater    Procedure Summary       Date: 09/27/24 Room / Location: [facility] OR 07 / [facility] OR    Anesthesia Start: 0721 Anesthesia Stop: 0918    Procedure: Foot Transmetatarsal Amputation (Left) Diagnosis:       Gangrene of toe of left foot (Multi)      Atherosclerosis of native arteries of left leg with ulceration of other part of foot (Multi)      (Gangrene of toe of left foot (Multi) [I96])      (Atherosclerosis of native arteries of left leg with ulceration of other part of foot (Multi) [I70.245])    Surgeons: Meaghan Israel DPM Responsible Provider: Osmel Botello MD    Anesthesia Type: general ASA Status: 3            Anesthesia Type: general    Vitals Value Taken Time   BP 98/57 09/27/24 0915   Temp 36.6 09/27/24 0919   Pulse 112 09/27/24 0917   Resp 7 09/27/24 0917   SpO2 98 % 09/27/24 0917   Vitals shown include unfiled device data.    Anesthesia Post Evaluation    Patient location during evaluation: bedside  Patient participation: complete - patient participated  Level of consciousness: awake  Pain score: 5  Pain management: adequate  Airway patency: patent  Cardiovascular status: tachycardic and acceptable (sinus tachycardia)  Respiratory status: acceptable  Hydration status: acceptable  Postoperative Nausea and Vomiting: none        No notable events documented.

## 2024-09-27 NOTE — PROGRESS NOTES
09/27/24 1700   Discharge Planning   Living Arrangements Alone;Other (Comment)  (pt has HHC and an aid)   Support Systems Friends/neighbors;Family members   Assistance Needed None   Type of Residence Private residence   Number of Stairs to Enter Residence 0   Number of Stairs Within Residence 0   Do you have animals or pets at home? No   Who is requesting discharge planning? Provider   Home or Post Acute Services In home services   Type of Home Care Services Home health aide   Expected Discharge Disposition Home   Does the patient need discharge transport arranged? Yes   RoundTrip coordination needed? Yes   Financial Resource Strain   How hard is it for you to pay for the very basics like food, housing, medical care, and heating? Not very   Housing Stability   In the last 12 months, was there a time when you were not able to pay the mortgage or rent on time? N   In the past 12 months, how many times have you moved where you were living? 1   At any time in the past 12 months, were you homeless or living in a shelter (including now)? N   Transportation Needs   In the past 12 months, has lack of transportation kept you from medical appointments or from getting medications? yes   In the past 12 months, has lack of transportation kept you from meetings, work, or from getting things needed for daily living? Yes     Assessment Note:  Met with patient and Introduced myself as care coordinator and member of the Care Transitions team for discharge planning. Patient lives alone, but has non-medical aid to help her through [facility].  Transportation: Patient will need ride home at time of discharge.  Pharmacy: [facility] mail order or will use [facility] from [facility] when at hospital.  DME: Has Wheelchair, walker and cane at home.  Previous home care: Yes Non-Medical Home [facility] Aid.  Falls: Yes  PCP:Juany Sanchez    Confirmed patients address, phone number and emergency contact. All  questions and concerns were answered. Will continue to follow patient for discharge needs.    Transitional Care Coordination Progress Note:  Patient discussed during interdisciplinary rounds.   Team members present: MD and TCC  Plan per Medical/Surgical team: Patient having TMA with Podiatry today, then PT/OT to evaluate.  Payor: Carla Dual  Discharge disposition: Waiting for Pt/OT evaluation. Patient open to rehab if Pt/OT says she needs it.  Potential Barriers: None  ADOD: 09/30/24

## 2024-09-27 NOTE — OP NOTE
Foot Transmetatarsal Amputation (L) Operative Note     Date: 2024  OR Location: [facility] OR    Name: Shirin Slater, : 1970, Age: 54 y.o., MRN: [id number], Sex: female    Diagnosis  Pre-op Diagnosis      * Gangrene of toe of left foot (Multi) [I96]     * Atherosclerosis of native arteries of left leg with ulceration of other part of foot (Multi) [I70.245] Post-op Diagnosis     * Gangrene of toe of left foot (Multi) [I96]     * Atherosclerosis of native arteries of left leg with ulceration of other part of foot (Multi) [I70.245]     Procedures  Foot Transmetatarsal Amputation  42527 - CO AMPUTATION FOOT TRANSMETARSAL      Surgeons      * Meaghan Israel - Primary    Resident/Fellow/Other Assistant:  Surgeons and Role:     * Meaghan Lombardo DPM - Resident - Assisting    Procedure Summary  Anesthesia: General  ASA: ASA status not filed in the log.  Anesthesia Staff: Anesthesiologist: Osmel Botello MD  Anesthesia Resident: Loree Linares MD  Estimated Blood Loss: 150 mL  Intra-op Medications:   Administrations occurring from 0700 to 0915 on 24:   Medication Name Total Dose   lidocaine (Xylocaine) 20 mg/mL (2 %) injection 5 mL   BUPivacaine HCl (Marcaine) 0.5 % (5 mg/mL) injection 5 mL   sodium chloride 0.9 % irrigation solution 2,000 mL   hydrogen peroxide 3 % external solution 1 Application   acetaminophen (Tylenol) tablet 650 mg Cannot be calculated   ampicillin-sulbactam (Unasyn) in sodium chloride 0.9 % 100 mL 3 g Cannot be calculated   aspirin EC tablet 81 mg Cannot be calculated   busPIRone (Buspar) tablet 10 mg Cannot be calculated   clopidogrel (Plavix) tablet 75 mg Cannot be calculated   heparin 25,000 Units in dextrose 5% 250 mL (100 Units/mL) infusion (premix) Cannot be calculated   insulin lispro (HumaLOG) injection 0-10 Units Cannot be calculated   levETIRAcetam (Keppra) tablet 500 mg Cannot be calculated   metoprolol tartrate (Lopressor) tablet 12.5 mg Cannot be calculated    psyllium (Metamucil) 1 packet Cannot be calculated              Anesthesia Record               Intraprocedure I/O Totals          Intake    Heparin Drip 0.00 mL    The total shown is the total volume documented since Anesthesia Start was filed.    Esmolol Drip 0.00 mL    The total shown is the total volume documented since Anesthesia Start was filed.    Phenylephrine Drip 0.00 mL    The total shown is the total volume documented since Anesthesia Start was filed.    lactated Ringer's 600.00 mL    Total Intake 600 mL       Output    Urine 200 mL    Est. Blood Loss 150 mL    Total Output 350 mL       Net    Net Volume 250 mL          Specimen:   ID Type Source Tests Collected by Time   1 : Left foot Tissue FOOT AMPUTATION LEFT SURGICAL PATHOLOGY EXAM Meaghan Israel DPM 9/27/2024 0825   A : Left foot soft tissue Swab SOFT TISSUE MASS BIOPSY TISSUE/WOUND CULTURE/SMEAR Meaghan Israel DPM 9/27/2024 0826        Staff:   Circulator: Octavia Negro Person: Roberta         Drains and/or Catheters:   Urethral Catheter Latex 14 Fr. (Active)   Site Assessment Clean;Skin intact 09/27/24 0605   Collection Container Urometer 09/27/24 0605   Securement Method Securing device (Describe) 09/27/24 0605   Reason for Continuing Urinary Catheterization acute urinary retention 09/26/24 0823   Output (mL) 500 mL 09/27/24 0500       [REMOVED] External Urinary Catheter Female (Removed)       Tourniquet Times: NA        Implants: NA    Findings: consistent with diangosis    Indications: Shirin Slater is an 54 y.o. female who is having surgery for Gangrene of toe of left foot (Multi) [I96]  Atherosclerosis of native arteries of left leg with ulceration of other part of foot (Multi) [I70.245].       Pre-procedure information:  In pre-op holding area, the operative extremity was clearly marked and the patient verified correct laterality of the marking.  The patient was brought to the operating room and placed on the operating table in  the supine position.  A timeout was performed in which identification of the correct patient, procedure, location, and materials was done.  Following IV sedation, local anesthesia was obtained utilizing 10 ccs of 1:1 mixture of 2% lidocaine plain and 0.5% bupivacaine plain. The operative extremity was then scrubbed, prepped and draped in the usual aseptic manner.     Procedure: left transmetatarsal amputation  Attention was directed to the operative forefoot where an incision was made at a level of the metatarsals heads. This incision was deepened to the level of the bone.  The distal aspect of the metatarsals were exposed using sharp and blunt dissection. Care was taken throughout dissection to avoid damage to the neurovascular and structures. Hemostasis was achieved via electrocautery. A sagittal saw was used to resect the distal aspect of the metatarsals in a manner that allowed for the maintenance of a parabola-shape of the metatarsals. Sharp excisional debridement was performed with blade and other instrumentation, devitalized soft tissue was removed from the area. The amputated bone was sent for culture and pathology. Misonix ultrasonic debridement was used to further debride and thoroughly irrigate the wound. The incision was closed with 3-0 prolene and staples.     A dry, sterile dressing was applied consisting of Betadine-soaked Adaptic, 4x4 gauze, abdominal padding, and Webril, and an ACE wrap. The patient tolerated the procedure and anesthesia well and without complication.    The patient tolerated the above noted procedure and anesthesia well and was transferred to the PACU with vital signs stable, and vascular status intact to the distal aspect of the operative extremity.         Attending Attestation:       Meaghan Israel  Phone Number: [phone]

## 2024-09-27 NOTE — ANESTHESIA PROCEDURE NOTES
Airway  Date/Time: 9/27/2024 7:28 AM  Urgency: elective    Airway not difficult    Staffing  Performed: resident   Authorized by: Loree Linares MD    Performed by: Loree Linares MD  Patient location during procedure: OR    Indications and Patient Condition  Indications for airway management: anesthesia  Spontaneous Ventilation: absent  Sedation level: deep  Preoxygenated: yes  Patient position: sniffing  Mask difficulty assessment: 1 - vent by mask  Planned trial extubation    Final Airway Details  Final airway type: endotracheal airway      Successful airway: ETT  Cuffed: yes   Successful intubation technique: direct laryngoscopy  Facilitating devices/methods: intubating stylet  Endotracheal tube insertion site: oral  Blade: Ryder  Blade size: #3  ETT size (mm): 7.0  Cormack-Lehane Classification: grade I - full view of glottis  Placement verified by: chest auscultation and capnometry   Inital cuff pressure (cm H2O): 5  Measured from: lips  ETT to lips (cm): 22  Number of attempts at approach: 1

## 2024-09-27 NOTE — SIGNIFICANT EVENT
Rapid Response RN Note    RADAR score 6 due to the following VS: T 36 °C; ; RR 14; BP 97/44; SPO2 89%.     Reviewed above VS with bedside RN via phone.  Vital signs within patient's current trends.  Pt in pain and clenching hand tightly. No acute change in condition.  No interventions by rapid response team indicated at this time.    Staff to page rapid response for any concerns or acute change in condition/VS. Cruz Alejandre RN.

## 2024-09-27 NOTE — PROGRESS NOTES
"  VASCULAR SURGERY   PROGRESS NOTE      Assessment/Plan   Shirin Slater is 54 y.o. female with history of severe PAD s/p R hip disarticulation s/p L CFA and EIA embolectomy 12/23 s/p multiple L toe amputations, T2DM complicated by diabetic neuropathy, HTN, HLD, renal and splenic thromboses on aspirin/plavix/coumadin, and current tobacco use who presents as a transfer from [facility] for management of L 2nd toe gangrene with concern of osteomyelitis.  Now s/p Left femoral endarterectomy, profundaplasty, common agbnxic-sf-fythhueq tibial bypass using 6 mm ringed PTFE, left lower extremity angiogram. Patient s/p TMA with podiatry 9/27.    9/25 : Pre-op exam: Left foot monophasic DP and PT  Post-op exam: Left foot palpable AT, multiphasic DP, monophasic PT  9/27 L TMA with podiatry    Plan:  Pain control: Oxy 5/10 PRN and scheduled acetaminophen  Maintain -160 mmHg   Sliding scale insulin  Omeprazole PO daily    ASA,high intensity heparin gtt, holding plavix  Continue Q2 neurovascular checks  Home immodium PRN  Regular diet now  Continue unasyn  OR 9/27 with podiatry for TMA -- heparin restarted 2 hours post op, no tourinquet was used, post-op CBC pending  Maintain left internal jugular CVC  Maintain vasques    Dispo: continue care in LT 7 Floor    D/w attending Dr. Dunphy.    Lexus Bryant MD  PGY1 Vascular Surgery  [id number]  Available via secure chat    -----------------------------------------------------------------------------------------  Subjective   No acute events overnight.    Heparin drip continued until OR and was restarted when patient back on the floor 1145  Underwent TMA with podiatry 9/27 AM  Palpable AT on left foot since OR, mutli DP, mono PT  No chest pain or SOB. No other complaints  Hunger and would like to eat lunch    Objective   Vitals:  Heart Rate:  []   Temp:  [36 °C (96.8 °F)-37 °C (98.6 °F)]   Resp:  [10-17]   BP: ()/(44-65)   Height:  [170.2 cm (5' 7\")]   Weight:  " [90.6 kg (199 lb 11.8 oz)]   SpO2:  [89 %-100 %]     Exam:  Constitutional: No acute distress  Neuro: AOx3, grossly motor intact. Limited sensation of distal L foot.  ENMT: moist mucous membranes  Head/neck: atraumatic  CV: no tachycardia  Pulm: non-labored on room air  GI: soft, non-tender, non-distended  : vasques with clear yellow urine  Skin: warm and dry. Blanching erythema of L forefoot with tenderness to palpation. wet gangrene of left 2nd digit, extreme pain in LLE and tenderness to light touch. TMA post-op dressing in place with scant strikethrough  Musculoskeletal: moving all extremities. S/p R hip disarticulation  Vascular: L foot - Palpable AT on left foot since OR, cheo DP, mono PT    Labs:  Results from last 7 days   Lab Units 09/27/24  0939 09/27/24  0542 09/26/24 1747 09/26/24  0344   WBC AUTO x10*3/uL  --  14.7* 13.1* 15.7*   HEMOGLOBIN g/dL 7.4* 8.1* 9.1* 10.1*   PLATELETS AUTO x10*3/uL  --  241 248 280      Results from last 7 days   Lab Units 09/27/24  0542 09/26/24 1747 09/26/24  0344   SODIUM mmol/L 137 136 141   POTASSIUM mmol/L 3.5 3.5 3.9   CHLORIDE mmol/L 101 100 110*   CO2 mmol/L 27 27 27   BUN mg/dL 8 9 8   CREATININE mg/dL 0.64 0.66 0.55   GLUCOSE mg/dL 152* 181* 166*   MAGNESIUM mg/dL 1.66 1.76 1.40*   PHOSPHORUS mg/dL 2.5 2.7 4.4      Results from last 7 days   Lab Units 09/25/24  2211 09/20/24  1551   INR  1.2* 1.2*   PROTIME seconds 13.2* 13.5*   APTT seconds 37 33      Results from last 7 days   Lab Units 09/27/24  0542 09/26/24 1747 09/26/24  1216   ANTI XA UNFRACTIONATED IU/mL 0.2 0.3 0.3

## 2024-09-27 NOTE — BRIEF OP NOTE
Date: 2024  OR Location: [facility] OR    Name: Shirin Slater, : 1970, Age: 54 y.o., MRN: [id number], Sex: female    Diagnosis  Pre-op Diagnosis      * Gangrene of toe of left foot (Multi) [I96]     * Atherosclerosis of native arteries of left leg with ulceration of other part of foot (Multi) [I70.245] Post-op Diagnosis     * Gangrene of toe of left foot (Multi) [I96]     * Atherosclerosis of native arteries of left leg with ulceration of other part of foot (Multi) [I70.245]     Procedures  Foot Transmetatarsal Amputation  91091 - OR AMPUTATION FOOT TRANSMETARSAL      Surgeons      * Meaghan Israel - Primary    Resident/Fellow/Other Assistant:  Surgeons and Role:     * Meaghan Lombardo DPM - Resident - Assisting    Procedure Summary  Anesthesia: Anesthesia type not filed in the log.  ASA: ASA status not filed in the log.  Anesthesia Staff: Anesthesiologist: Osmel Botello MD  Anesthesia Resident: Loree Linares MD  Estimated Blood Loss: 150 mL  Intra-op Medications:   Administrations occurring from 0700 to 0915 on 24:   Medication Name Total Dose   lidocaine (Xylocaine) 20 mg/mL (2 %) injection 5 mL   BUPivacaine HCl (Marcaine) 0.5 % (5 mg/mL) injection 5 mL   sodium chloride 0.9 % irrigation solution 2,000 mL   hydrogen peroxide 3 % external solution 1 Application   acetaminophen (Tylenol) tablet 650 mg Cannot be calculated   ampicillin-sulbactam (Unasyn) in sodium chloride 0.9 % 100 mL 3 g Cannot be calculated   aspirin EC tablet 81 mg Cannot be calculated   busPIRone (Buspar) tablet 10 mg Cannot be calculated   clopidogrel (Plavix) tablet 75 mg Cannot be calculated   heparin 25,000 Units in dextrose 5% 250 mL (100 Units/mL) infusion (premix) Cannot be calculated   insulin lispro (HumaLOG) injection 0-10 Units Cannot be calculated   levETIRAcetam (Keppra) tablet 500 mg Cannot be calculated   metoprolol tartrate (Lopressor) tablet 12.5 mg Cannot be calculated   psyllium (Metamucil) 1 packet  Cannot be calculated              Anesthesia Record               Intraprocedure I/O Totals          Intake    Heparin Drip 0.00 mL    The total shown is the total volume documented since Anesthesia Start was filed.    Esmolol Drip 0.00 mL    The total shown is the total volume documented since Anesthesia Start was filed.    Phenylephrine Drip 0.00 mL    The total shown is the total volume documented since Anesthesia Start was filed.    lactated Ringer's 300.00 mL    Total Intake 300 mL          Specimen:   ID Type Source Tests Collected by Time   1 : Left foot Tissue FOOT AMPUTATION LEFT SURGICAL PATHOLOGY EXAM Meaghan Israel, CANDE 9/27/2024 0825   A : Left foot soft tissue Swab SOFT TISSUE MASS BIOPSY TISSUE/WOUND CULTURE/SMEAR Meaghan Israel, CONRAD 9/27/2024 0826        Staff:   Elliottulator: Octavia Negro Person: Roberta          Findings: Consistent with diagnosis    Complications:  None; patient tolerated the procedure well.     Disposition: PACU - hemodynamically stable.  Condition: stable  Specimens Collected:   ID Type Source Tests Collected by Time   1 : Left foot Tissue FOOT AMPUTATION LEFT SURGICAL PATHOLOGY EXAM Meaghan Israel DPM 9/27/2024 0825   A : Left foot soft tissue Swab SOFT TISSUE MASS BIOPSY TISSUE/WOUND CULTURE/SMEAR CONRAD Daugherty 9/27/2024 0826     Attending Attestation:     Meaghan Israel  Phone Number: [phone]

## 2024-09-28 LAB
ABO GROUP (TYPE) IN BLOOD: NORMAL
ANION GAP SERPL CALC-SCNC: 12 MMOL/L (ref 10–20)
ANTIBODY SCREEN: NORMAL
BLOOD EXPIRATION DATE: NORMAL
BLOOD EXPIRATION DATE: NORMAL
BUN SERPL-MCNC: 9 MG/DL (ref 6–23)
CALCIUM SERPL-MCNC: 7.6 MG/DL (ref 8.6–10.6)
CHLORIDE SERPL-SCNC: 103 MMOL/L (ref 98–107)
CO2 SERPL-SCNC: 29 MMOL/L (ref 21–32)
CREAT SERPL-MCNC: 0.55 MG/DL (ref 0.5–1.05)
DISPENSE STATUS: NORMAL
DISPENSE STATUS: NORMAL
EGFRCR SERPLBLD CKD-EPI 2021: >90 ML/MIN/1.73M*2
ERYTHROCYTE [DISTWIDTH] IN BLOOD BY AUTOMATED COUNT: 23.6 % (ref 11.5–14.5)
GLUCOSE BLD MANUAL STRIP-MCNC: 130 MG/DL (ref 74–99)
GLUCOSE BLD MANUAL STRIP-MCNC: 139 MG/DL (ref 74–99)
GLUCOSE BLD MANUAL STRIP-MCNC: 163 MG/DL (ref 74–99)
GLUCOSE BLD MANUAL STRIP-MCNC: 219 MG/DL (ref 74–99)
GLUCOSE SERPL-MCNC: 160 MG/DL (ref 74–99)
HCT VFR BLD AUTO: 20.6 % (ref 36–46)
HGB BLD-MCNC: 6 G/DL (ref 12–16)
MAGNESIUM SERPL-MCNC: 1.7 MG/DL (ref 1.6–2.4)
MCH RBC QN AUTO: 22.9 PG (ref 26–34)
MCHC RBC AUTO-ENTMCNC: 29.1 G/DL (ref 32–36)
MCV RBC AUTO: 79 FL (ref 80–100)
NRBC BLD-RTO: 0 /100 WBCS (ref 0–0)
PHOSPHATE SERPL-MCNC: 2.4 MG/DL (ref 2.5–4.9)
PLATELET # BLD AUTO: 199 X10*3/UL (ref 150–450)
POTASSIUM SERPL-SCNC: 3.1 MMOL/L (ref 3.5–5.3)
PRODUCT BLOOD TYPE: 6200
PRODUCT BLOOD TYPE: 6200
PRODUCT CODE: NORMAL
PRODUCT CODE: NORMAL
RBC # BLD AUTO: 2.62 X10*6/UL (ref 4–5.2)
RH FACTOR (ANTIGEN D): NORMAL
SODIUM SERPL-SCNC: 141 MMOL/L (ref 136–145)
UFH PPP CHRO-ACNC: 0.3 IU/ML
UNIT ABO: NORMAL
UNIT ABO: NORMAL
UNIT NUMBER: NORMAL
UNIT NUMBER: NORMAL
UNIT RH: NORMAL
UNIT RH: NORMAL
UNIT VOLUME: 350
UNIT VOLUME: 350
WBC # BLD AUTO: 12.8 X10*3/UL (ref 4.4–11.3)
XM INTEP: NORMAL
XM INTEP: NORMAL

## 2024-09-28 PROCEDURE — 83735 ASSAY OF MAGNESIUM: CPT | Performed by: NURSE PRACTITIONER

## 2024-09-28 PROCEDURE — 86850 RBC ANTIBODY SCREEN: CPT | Performed by: STUDENT IN AN ORGANIZED HEALTH CARE EDUCATION/TRAINING PROGRAM

## 2024-09-28 PROCEDURE — 99232 SBSQ HOSP IP/OBS MODERATE 35: CPT | Performed by: INTERNAL MEDICINE

## 2024-09-28 PROCEDURE — 2500000004 HC RX 250 GENERAL PHARMACY W/ HCPCS (ALT 636 FOR OP/ED): Performed by: NURSE PRACTITIONER

## 2024-09-28 PROCEDURE — 2500000002 HC RX 250 W HCPCS SELF ADMINISTERED DRUGS (ALT 637 FOR MEDICARE OP, ALT 636 FOR OP/ED): Performed by: NURSE PRACTITIONER

## 2024-09-28 PROCEDURE — P9016 RBC LEUKOCYTES REDUCED: HCPCS

## 2024-09-28 PROCEDURE — 2500000002 HC RX 250 W HCPCS SELF ADMINISTERED DRUGS (ALT 637 FOR MEDICARE OP, ALT 636 FOR OP/ED): Performed by: STUDENT IN AN ORGANIZED HEALTH CARE EDUCATION/TRAINING PROGRAM

## 2024-09-28 PROCEDURE — 86923 COMPATIBILITY TEST ELECTRIC: CPT

## 2024-09-28 PROCEDURE — 2500000001 HC RX 250 WO HCPCS SELF ADMINISTERED DRUGS (ALT 637 FOR MEDICARE OP): Performed by: NURSE PRACTITIONER

## 2024-09-28 PROCEDURE — 80048 BASIC METABOLIC PNL TOTAL CA: CPT | Performed by: NURSE PRACTITIONER

## 2024-09-28 PROCEDURE — 84100 ASSAY OF PHOSPHORUS: CPT | Performed by: NURSE PRACTITIONER

## 2024-09-28 PROCEDURE — 86901 BLOOD TYPING SEROLOGIC RH(D): CPT | Performed by: STUDENT IN AN ORGANIZED HEALTH CARE EDUCATION/TRAINING PROGRAM

## 2024-09-28 PROCEDURE — 36430 TRANSFUSION BLD/BLD COMPNT: CPT

## 2024-09-28 PROCEDURE — 85520 HEPARIN ASSAY: CPT | Performed by: NURSE PRACTITIONER

## 2024-09-28 PROCEDURE — 2500000004 HC RX 250 GENERAL PHARMACY W/ HCPCS (ALT 636 FOR OP/ED): Performed by: STUDENT IN AN ORGANIZED HEALTH CARE EDUCATION/TRAINING PROGRAM

## 2024-09-28 PROCEDURE — 30233N1 TRANSFUSION OF NONAUTOLOGOUS RED BLOOD CELLS INTO PERIPHERAL VEIN, PERCUTANEOUS APPROACH: ICD-10-PCS | Performed by: STUDENT IN AN ORGANIZED HEALTH CARE EDUCATION/TRAINING PROGRAM

## 2024-09-28 PROCEDURE — 1200000002 HC GENERAL ROOM WITH TELEMETRY DAILY

## 2024-09-28 PROCEDURE — 82947 ASSAY GLUCOSE BLOOD QUANT: CPT

## 2024-09-28 PROCEDURE — 85027 COMPLETE CBC AUTOMATED: CPT | Performed by: NURSE PRACTITIONER

## 2024-09-28 PROCEDURE — 2500000004 HC RX 250 GENERAL PHARMACY W/ HCPCS (ALT 636 FOR OP/ED)

## 2024-09-28 RX ORDER — POTASSIUM CHLORIDE 20 MEQ/1
40 TABLET, EXTENDED RELEASE ORAL ONCE
Status: COMPLETED | OUTPATIENT
Start: 2024-09-28 | End: 2024-09-28

## 2024-09-28 RX ORDER — MAGNESIUM SULFATE HEPTAHYDRATE 40 MG/ML
2 INJECTION, SOLUTION INTRAVENOUS ONCE
Status: COMPLETED | OUTPATIENT
Start: 2024-09-28 | End: 2024-09-28

## 2024-09-28 RX ORDER — HYDROMORPHONE HYDROCHLORIDE 1 MG/ML
0.2 INJECTION, SOLUTION INTRAMUSCULAR; INTRAVENOUS; SUBCUTANEOUS ONCE
Status: COMPLETED | OUTPATIENT
Start: 2024-09-28 | End: 2024-09-28

## 2024-09-28 RX ORDER — HYDROMORPHONE HYDROCHLORIDE 1 MG/ML
0.2 INJECTION, SOLUTION INTRAMUSCULAR; INTRAVENOUS; SUBCUTANEOUS EVERY 4 HOURS PRN
Status: DISCONTINUED | OUTPATIENT
Start: 2024-09-28 | End: 2024-10-04 | Stop reason: HOSPADM

## 2024-09-28 RX ADMIN — OXYCODONE HYDROCHLORIDE 10 MG: 5 TABLET ORAL at 20:38

## 2024-09-28 RX ADMIN — MAGNESIUM SULFATE HEPTAHYDRATE 2 G: 40 INJECTION, SOLUTION INTRAVENOUS at 07:02

## 2024-09-28 RX ADMIN — HEPARIN SODIUM 1400 UNITS/HR: 10000 INJECTION, SOLUTION INTRAVENOUS at 21:47

## 2024-09-28 RX ADMIN — ACETAMINOPHEN 650 MG: 325 TABLET, FILM COATED ORAL at 08:23

## 2024-09-28 RX ADMIN — AMPICILLIN SODIUM AND SULBACTAM SODIUM 3 G: 2; 1 INJECTION, POWDER, FOR SOLUTION INTRAMUSCULAR; INTRAVENOUS at 20:39

## 2024-09-28 RX ADMIN — SODIUM CHLORIDE, POTASSIUM CHLORIDE, SODIUM LACTATE AND CALCIUM CHLORIDE 100 ML/HR: 600; 310; 30; 20 INJECTION, SOLUTION INTRAVENOUS at 02:49

## 2024-09-28 RX ADMIN — AMPICILLIN SODIUM AND SULBACTAM SODIUM 3 G: 2; 1 INJECTION, POWDER, FOR SOLUTION INTRAMUSCULAR; INTRAVENOUS at 09:37

## 2024-09-28 RX ADMIN — HYDROMORPHONE HYDROCHLORIDE 0.2 MG: 1 INJECTION, SOLUTION INTRAMUSCULAR; INTRAVENOUS; SUBCUTANEOUS at 18:50

## 2024-09-28 RX ADMIN — INSULIN LISPRO 2 UNITS: 100 INJECTION, SOLUTION INTRAVENOUS; SUBCUTANEOUS at 14:42

## 2024-09-28 RX ADMIN — METOPROLOL TARTRATE 12.5 MG: 25 TABLET, FILM COATED ORAL at 08:23

## 2024-09-28 RX ADMIN — POTASSIUM CHLORIDE 40 MEQ: 1500 TABLET, EXTENDED RELEASE ORAL at 07:02

## 2024-09-28 RX ADMIN — OXYCODONE HYDROCHLORIDE 10 MG: 5 TABLET ORAL at 11:39

## 2024-09-28 RX ADMIN — HEPARIN SODIUM 1400 UNITS/HR: 10000 INJECTION, SOLUTION INTRAVENOUS at 02:49

## 2024-09-28 RX ADMIN — BUSPIRONE HYDROCHLORIDE 10 MG: 10 TABLET ORAL at 08:23

## 2024-09-28 RX ADMIN — ACETAMINOPHEN 650 MG: 325 TABLET, FILM COATED ORAL at 02:09

## 2024-09-28 RX ADMIN — ATORVASTATIN CALCIUM 80 MG: 80 TABLET, FILM COATED ORAL at 20:39

## 2024-09-28 RX ADMIN — HYDROMORPHONE HYDROCHLORIDE 0.2 MG: 1 INJECTION, SOLUTION INTRAMUSCULAR; INTRAVENOUS; SUBCUTANEOUS at 22:57

## 2024-09-28 RX ADMIN — LEVETIRACETAM 500 MG: 500 TABLET, FILM COATED ORAL at 08:23

## 2024-09-28 RX ADMIN — METOPROLOL TARTRATE 12.5 MG: 25 TABLET, FILM COATED ORAL at 20:39

## 2024-09-28 RX ADMIN — LEVETIRACETAM 500 MG: 500 TABLET, FILM COATED ORAL at 20:38

## 2024-09-28 RX ADMIN — INSULIN LISPRO 4 UNITS: 100 INJECTION, SOLUTION INTRAVENOUS; SUBCUTANEOUS at 17:16

## 2024-09-28 RX ADMIN — OXYCODONE HYDROCHLORIDE 10 MG: 5 TABLET ORAL at 07:12

## 2024-09-28 RX ADMIN — ACETAMINOPHEN 650 MG: 325 TABLET, FILM COATED ORAL at 20:38

## 2024-09-28 RX ADMIN — DULOXETINE HYDROCHLORIDE 60 MG: 60 CAPSULE, DELAYED RELEASE ORAL at 20:38

## 2024-09-28 RX ADMIN — DIVALPROEX SODIUM 500 MG: 500 TABLET, DELAYED RELEASE ORAL at 21:45

## 2024-09-28 RX ADMIN — OXYCODONE HYDROCHLORIDE 10 MG: 5 TABLET ORAL at 16:39

## 2024-09-28 RX ADMIN — AMPICILLIN SODIUM AND SULBACTAM SODIUM 3 G: 2; 1 INJECTION, POWDER, FOR SOLUTION INTRAMUSCULAR; INTRAVENOUS at 02:09

## 2024-09-28 RX ADMIN — PSYLLIUM HUSK 1 PACKET: 3.4 POWDER ORAL at 14:45

## 2024-09-28 RX ADMIN — ACETAMINOPHEN 650 MG: 325 TABLET, FILM COATED ORAL at 14:41

## 2024-09-28 RX ADMIN — ASPIRIN 81 MG: 81 TABLET, COATED ORAL at 08:23

## 2024-09-28 RX ADMIN — DIVALPROEX SODIUM 500 MG: 500 TABLET, DELAYED RELEASE ORAL at 14:41

## 2024-09-28 RX ADMIN — LEVOTHYROXINE SODIUM 75 MCG: 0.07 TABLET ORAL at 06:06

## 2024-09-28 RX ADMIN — DIVALPROEX SODIUM 500 MG: 500 TABLET, DELAYED RELEASE ORAL at 06:05

## 2024-09-28 RX ADMIN — PANTOPRAZOLE SODIUM 40 MG: 40 TABLET, DELAYED RELEASE ORAL at 06:06

## 2024-09-28 RX ADMIN — AMPICILLIN SODIUM AND SULBACTAM SODIUM 3 G: 2; 1 INJECTION, POWDER, FOR SOLUTION INTRAMUSCULAR; INTRAVENOUS at 14:57

## 2024-09-28 ASSESSMENT — PAIN SCALES - GENERAL
PAINLEVEL_OUTOF10: 8
PAINLEVEL_OUTOF10: 9
PAINLEVEL_OUTOF10: 8
PAINLEVEL_OUTOF10: 9
PAINLEVEL_OUTOF10: 4
PAINLEVEL_OUTOF10: 9
PAINLEVEL_OUTOF10: 10 - WORST POSSIBLE PAIN
PAINLEVEL_OUTOF10: 10 - WORST POSSIBLE PAIN
PAINLEVEL_OUTOF10: 5 - MODERATE PAIN

## 2024-09-28 ASSESSMENT — COGNITIVE AND FUNCTIONAL STATUS - GENERAL
DAILY ACTIVITIY SCORE: 20
DRESSING REGULAR UPPER BODY CLOTHING: A LITTLE
CLIMB 3 TO 5 STEPS WITH RAILING: TOTAL
MOVING TO AND FROM BED TO CHAIR: A LITTLE
MOBILITY SCORE: 15
TOILETING: A LITTLE
TURNING FROM BACK TO SIDE WHILE IN FLAT BAD: A LITTLE
WALKING IN HOSPITAL ROOM: A LOT
DRESSING REGULAR LOWER BODY CLOTHING: A LITTLE
HELP NEEDED FOR BATHING: A LITTLE
STANDING UP FROM CHAIR USING ARMS: A LOT

## 2024-09-28 ASSESSMENT — PAIN - FUNCTIONAL ASSESSMENT
PAIN_FUNCTIONAL_ASSESSMENT: 0-10

## 2024-09-28 ASSESSMENT — PAIN DESCRIPTION - LOCATION
LOCATION: LEG
LOCATION: LEG

## 2024-09-28 ASSESSMENT — PAIN DESCRIPTION - ORIENTATION
ORIENTATION: LEFT
ORIENTATION: LEFT

## 2024-09-28 NOTE — PROGRESS NOTES
Shirin Slater is a 54 y.o. female on day 10 of admission presenting with Gangrene of toe of left foot (Multi).    Subjective   Interval History: Patient underwent left TMA with primary closure by podiatry yesterday.  This morning patient complains of left foot pain.      Objective   Range of Vitals (last 24 hours)  Heart Rate:  []   Temp:  [36.3 °C (97.3 °F)-36.6 °C (97.9 °F)]   Resp:  [11-19]   BP: ()/(37-81)   SpO2:  [91 %-97 %]   Daily Weight  09/27/24 : 90.6 kg (199 lb 11.8 oz)    Body mass index is 31.28 kg/m².    Physical Exam  GENERAL APPEARANCE: Awake and alert and not in any distress  HEENT: Atraumatic and normocephalic  CARDIAC: Regular   LUNGS: Clear  ABDOMEN: Soft and nontender  EXTREMITIES: History of right lower extremity hip disarticulation.  Left foot wrapped in dressing.  SKIN: Left IJ triple-lumen catheter noted      Antibiotics  ampicillin-sulbactam - 3 gram/100 mL  doxycycline - 100 mg    Relevant Results  Labs  Results from last 72 hours   Lab Units 09/28/24  0558 09/27/24  1435 09/27/24  0939 09/27/24  0542 09/26/24  0344 09/25/24  2211   WBC AUTO x10*3/uL 12.8* 15.1*  --  14.7*   < > 16.3*   HEMOGLOBIN g/dL 6.0* 7.0* 7.4* 8.1*   < > 9.7*   HEMATOCRIT % 20.6* 23.2* 23.7* 27.2*   < > 33.1*   PLATELETS AUTO x10*3/uL 199 228  --  241   < > 331   LYMPHO PCT MAN %  --   --   --   --   --  13.7   MONO PCT MAN %  --   --   --   --   --  4.3   EOSINO PCT MAN %  --   --   --   --   --  0.0    < > = values in this interval not displayed.     Results from last 72 hours   Lab Units 09/28/24  0558 09/27/24  0542 09/26/24  1747   SODIUM mmol/L 141 137 136   POTASSIUM mmol/L 3.1* 3.5 3.5   CHLORIDE mmol/L 103 101 100   CO2 mmol/L 29 27 27   BUN mg/dL 9 8 9   CREATININE mg/dL 0.55 0.64 0.66   GLUCOSE mg/dL 160* 152* 181*   CALCIUM mg/dL 7.6* 7.7* 8.2*   ANION GAP mmol/L 12 13 13   EGFR mL/min/1.73m*2 >90 >90 >90   PHOSPHORUS mg/dL 2.4* 2.5 2.7     Results from last 72 hours   Lab Units  09/25/24  2211   ALBUMIN g/dL 2.9*     Estimated Creatinine Clearance: 125 mL/min (by C-G formula based on SCr of 0.55 mg/dL).  C-Reactive Protein   Date Value Ref Range Status   12/06/2023 17.25 (H) <1.00 mg/dL Final     CRP   Date Value Ref Range Status   06/07/2022 0.96 mg/dL Final     Comment:     REF VALUE  < 1.00     09/17/2021 14.05 (A) mg/dL Final     Comment:     REF VALUE  < 1.00       Microbiology  Susceptibility data from last 14 days.  Collected Specimen Info Organism   09/18/24 Tissue/Biopsy from Diabetic Foot Ulcer Mixed Skin Microorganisms        Imaging              Assessment/Plan   Shirin Slater is a 54F with past medical history of severe PAD s/p R hip disarticulation s/p L CFA and EIA embolectomy 12/23 s/p multiple L toe amputations, T2DM complicated by diabetic neuropathy, HTN, HLD, renal and splenic thromboses on aspirin/plavix/coumadin, and current tobacco use who presents as a transfer from [facility] for management of L 3rd toe gangrene with concern of osteomyelitis. ID consulted to assist in management of toe infection.      #L 2nd toe gangrene s/p TMA  #Suspected osteomyelitis of 3rd phalanx    Plan  Continue Unasyn 3 g IV every 6 hours while patient is in the hospital and at discharge recommend Augmentin 875/125 mg p.o. every 12 hours until Oct 10.  ID will sign off.      Dominik York MD

## 2024-09-28 NOTE — CARE PLAN
The patient's goals for the shift include      The clinical goals for the shift include patient will remain HDS throughout the shift      Problem: Skin  Goal: Decreased wound size/increased tissue granulation at next dressing change  Outcome: Progressing  Goal: Participates in plan/prevention/treatment measures  Outcome: Progressing  Goal: Prevent/manage excess moisture  Outcome: Progressing  Goal: Prevent/minimize sheer/friction injuries  Outcome: Progressing  Goal: Promote/optimize nutrition  Outcome: Progressing  Goal: Promote skin healing  Outcome: Progressing     Problem: Pain  Goal: Takes deep breaths with improved pain control throughout the shift  Outcome: Progressing  Goal: Turns in bed with improved pain control throughout the shift  Outcome: Progressing  Goal: Walks with improved pain control throughout the shift  Outcome: Progressing  Goal: Performs ADL's with improved pain control throughout shift  Outcome: Progressing  Goal: Participates in PT with improved pain control throughout the shift  Outcome: Progressing  Goal: Free from opioid side effects throughout the shift  Outcome: Progressing  Goal: Free from acute confusion related to pain meds throughout the shift  Outcome: Progressing

## 2024-09-28 NOTE — SIGNIFICANT EVENT
..Rapid Response Nurse Note: [] Rapid Response [x] RADAR alert: Score 6  Pager time: 1227  Arrival time: 1230  Event end time: 1245  Location: T7  [] Phone triage     Rapid response initiated by:  [] Rapid Response RN [] Family [] Nursing Supervisor [] Physician   [x] RADAR auto-page [] Sepsis auto-page [] RN [] RT   [] NP/PA [] Other:     Primary reason for call:   [] BAT [] New CPAP/BiPAP [] Bleeding [] Change in mental status   [] Chest pain [] Code blue [] FiO2 >/= 50% [] HR </= 40 bpm   [] HR >/= 130 bpm [] Hyperglycemia [] Hypoglycemia [x] RADAR    [] RR </= 8 bpm [] RR >/= 30 bpm [] SBP </= 90 mmHg [] SpO2 < 90%   [] Seizure [] Sepsis [] Staff concern:     Initial VS and/or RADAR VS: T  °C; HR ; RR ; BP ; SPO2 %.  Providers present at bedside:   Objective/Subjective data relevant to event:   Interventions:  [x] None [] ABG [] Assist w/ICU transfer [] BAT paged    [] Bag mask [] Blood [] Cardioversion [] Code Blue   [] Code blue for intubation [] Code status changed [] Chest x-ray [] EKG   [] IV fluid/bolus [] KUB x-ray [] Labs/cultures [] Medication   [] Nebulizer treatment [] NIPPV (CPAP/BiPAP) [] Oxygen [] Oral airway   [] Peripheral IV [] Palliative care consult [] CT/MRI [] Sepsis protocol    [] Suctioned [] Other:     Name of ICU Provider contacted (if applicable):   Outcome:  [] Coded and  [] Code blue for intubation [] Coded and transferred to ICU []  on division   [] Remained on division (no change) [] Remained on division + additional monitoring [] Remained in ED [] Transferred to ED   [] Transferred to ICU [] Transferred to inpatient status [] Transferred for interventions (procedure) [] Transferred to ICU stepdown    [] Transferred to surgery [] Transferred to telemetry [] Sepsis protocol [] STEMI protocol   [] Stroke protocol [] Bedside nurse instructed to page rapid response for any concerns or acute change in condition/VS     Additional Comments: Bedside nurse notified of RADAR  page; no concerns at this time.

## 2024-09-28 NOTE — PROGRESS NOTES
Physical Therapy                 Therapy Communication Note    Patient Name: Shirin Slater  MRN: [id number]  Department: [place] [address]  Room: 7029/7029  Today's Date: 9/28/2024     Discipline: Physical Therapy    Missed Visit Reason: Missed Visit Reason: Patient placed on medical hold (hgb of 6.0, about to get blood products; per RN who was at bedside, SBP in 80s at this time. PT will hold.)    Missed Time: Attempt    Comment:

## 2024-09-28 NOTE — PROGRESS NOTES
VASCULAR SURGERY PROGRESS NOTE  Assessment/Plan   Shirin Slater is 54 y.o. female with history of severe PAD s/p R hip disarticulation s/p L CFA and EIA embolectomy 12/23 s/p multiple L toe amputations, T2DM complicated by diabetic neuropathy, HTN, HLD, renal and splenic thromboses on aspirin/plavix/coumadin, and current tobacco use who presents as a transfer from [facility] for management of L 2nd toe gangrene with concern of osteomyelitis.  Now s/p Left femoral endarterectomy, profundaplasty, common usomrgn-rb-uztcowvk tibial bypass using 6 mm ringed PTFE, left lower extremity angiogram. Patient s/p TMA with podiatry 9/27.     9/25 : Pre-op exam: Left foot monophasic DP and PT  Post-op exam: Left foot palpable AT, multiphasic DP, monophasic PT  9/27 L TMA with podiatry    Plan:  Pain control: oxy 5/10 PRN and scheduled tylenol  Maintain -160 mmHg  Sliding scale insulin  Omeprazole PO daily  ASA, high intensity heparin gtt, holding plavix  To discuss with podiatry when okay to resume home coumadin  Continue q2h neurovascular checks  Regular diet  Continue Unasyn  Hgb 6 from 7 this morning, will give 2u PRBCs - no post-transfusion CBC needed  Maintain L internal jugular CVC  Maintain vasques    Dispo: continue care on LT7    D/w attending, Dr. Dunphy Alexander Kreger, MD  PGY-3 General Surgery  Vascular Surgery [id number]      Subjective   No acute events overnight  Reports pain in R leg   Denies chest pain or shortness of breath  Denies abdominal pain, nausea/vomiting  Tolerating PO intake  Hgb 6 this morning, transfusing 2u PRBCs    Objective   Vitals:  Heart Rate:  []   Temp:  [36 °C (96.8 °F)-36.6 °C (97.9 °F)]   Resp:  [10-19]   BP: ()/(37-81)   SpO2:  [91 %-100 %]     Exam:  Constitutional: No acute distress, resting comfortably  Neuro:  AOx3, grossly intact  ENMT: moist mucous membranes  CV: no tachycardia  Pulm: non-labored on room air  GI: soft, non-tender, non-distended  Skin:  warm and dry. TMA post-op dressing in place without strikethrough  Musculoskeletal: moving all extremities. S/p R hip disarticulation  Vascular: L foot - Palpable AT on left foot since OR, cheo DP, mono PT    Labs:  Results from last 7 days   Lab Units 09/28/24  0558 09/27/24  1435 09/27/24  0939 09/27/24  0542   WBC AUTO x10*3/uL 12.8* 15.1*  --  14.7*   HEMOGLOBIN g/dL 6.0* 7.0*   < > 8.1*   PLATELETS AUTO x10*3/uL 199 228  --  241    < > = values in this interval not displayed.      Results from last 7 days   Lab Units 09/28/24  0558 09/27/24  0542 09/26/24  1747   SODIUM mmol/L 141 137 136   POTASSIUM mmol/L 3.1* 3.5 3.5   CHLORIDE mmol/L 103 101 100   CO2 mmol/L 29 27 27   BUN mg/dL 9 8 9   CREATININE mg/dL 0.55 0.64 0.66   GLUCOSE mg/dL 160* 152* 181*   MAGNESIUM mg/dL 1.70 1.66 1.76   PHOSPHORUS mg/dL 2.4* 2.5 2.7      Results from last 7 days   Lab Units 09/25/24  2211   INR  1.2*   PROTIME seconds 13.2*   APTT seconds 37      Results from last 7 days   Lab Units 09/28/24  0558 09/27/24  2049 09/27/24  1646   ANTI XA UNFRACTIONATED IU/mL 0.3 0.3 0.3

## 2024-09-28 NOTE — PROGRESS NOTES
PROGRESS NOTE - PODIATRIC MEDICINE & SURGERY    SERVICE DATE: 9/28/2024  SERVICE TIME: 10:00    HPI    Patient is a 54 y.o. female with PMHx severe PAD s/p R hip disarticulation s/p L CFA and EIA embolectomy 12/23 s/p multiple L toe amputations, T2DM complicated by diabetic neuropathy, HTN, HLD, renal and splenic thromboses on aspirin/plavix/coumadin, and current tobacco use.  Now s/p Left femoral endarterectomy, profundaplasty, common aoiapyu-th-ajdaqhyi tibial bypass using 6 mm ringed PTFE, left lower extremity angiogram.     Patient being followed by podiatry for gangrenous 4th toe of R foot. Patient s/p left TMA. Patient resting comfortably in bed. Patient states her pain is about the same.       ROS: Negative except as above.      Past Medical History  Past Medical History:   Diagnosis Date    Anxiety     Arthritis     Cancer (Multi)     Cellulitis     COPD (chronic obstructive pulmonary disease) (Multi)     Delayed emergence from general anesthesia     Diabetes mellitus (Multi)     Disease of thyroid gland     Diverticulitis     Epilepsy, unspecified, not intractable, without status epilepticus (Multi)     Epilepsy    HLD (hyperlipidemia)     Hx of blood clots     Hypertension     PAD (peripheral artery disease) (CMS-HCC)     Peripheral vascular disease, unspecified (CMS-HCC) 09/14/2022    PAD (peripheral artery disease)    PONV (postoperative nausea and vomiting)     PTSD (post-traumatic stress disorder)     Sleep apnea     Uterine cancer (Multi)          Problem List  Problem List Items Addressed This Visit       Diabetes mellitus, type 2 (Multi)    * (Principal) Gangrene of toe of left foot (Multi)    Relevant Orders    Vascular US lower extremity arterial duplex left with HEMA (Completed)    Vascular US Lower Extremity Vein Mapping Left (Completed)    Case Request Operating Room: Foot Transmetatarsal Amputation (Completed)    Surgical Pathology Exam    Tissue/Wound Culture/Smear (Completed)    PAD  (peripheral artery disease) (CMS-HCC)    Relevant Orders    Vascular US lower extremity arterial duplex left with HEMA (Completed)    Vascular US Ankle Brachial Index (HEMA) Without Exercise (Completed)    Vascular US Lower Extremity Vein Mapping Left (Completed)    Transthoracic Echo (TTE) Limited (Completed)    Atherosclerosis of native arteries of left leg with ulceration of other part of foot (Multi) - Primary    Relevant Orders    Vascular US Lower Extremity Vein Mapping Left (Completed)    Case Request Cath Lab: Left Heart Cath (Completed)    Cardiac Catheterization Procedure (Completed)    Invasive vascular procedure (Completed)    Case Request Operating Room: Foot Transmetatarsal Amputation (Completed)    Surgical Pathology Exam    Tissue/Wound Culture/Smear (Completed)    Preoperative cardiovascular examination    Relevant Orders    Case Request Cath Lab: Left Heart Cath (Completed)    Cardiac Catheterization Procedure (Completed)    Invasive vascular procedure (Completed)    Critical limb ischemia of left lower extremity (Multi)    Relevant Orders    Transthoracic Echo (TTE) Limited (Completed)    Case Request Operating Room: Creation Bypass Graft Tibial Artery (Completed)     Other Visit Diagnoses       Other specified peripheral vascular diseases (CMS-[facility])        Critical limb ischemia of both lower extremities with nonbiological bypass graft with gangrene (Multi)        Relevant Orders    Transthoracic Echo (TTE) Limited (Completed)    Critical limb ischemia with history of revascularization of same extremity (Multi)        Relevant Orders    Transthoracic Echo (TTE) Limited (Completed)    Encounter for other preprocedural examination        Atherosclerosis of autologous vein bypass graft(s) of the extremities with intermittent claudication, bilateral legs (CMS-HCC)        Atherosclerosis of native arteries of extremities with intermittent claudication, bilateral legs (CMS-[facility])                   Medications and Allergies reviewed.      PHYSICAL EXAM    General: A&O. NAD. Cooperative. Pleasant. Dressing clean, dry, intact.     Physical exam deferred due to post-op dressing. Dressing will be changed by podiatry tomorrow due to concerns with hemostasis.       RESULTS    CBC  Lab Results   Component Value Date    WBC 12.8 (H) 09/28/2024    HGB 6.0 (LL) 09/28/2024    HCT 20.6 (L) 09/28/2024    MCV 79 (L) 09/28/2024     09/28/2024       ESR  Lab Results   Component Value Date    SEDRATE 37 (H) 09/17/2021       CRP  Lab Results   Component Value Date    CRP 17.25 (H) 12/06/2023       HgbA1c  Lab Results   Component Value Date    HGBA1C 11.4 (H) 09/18/2024       Cultures  Susceptibility data from last 90 days.  Collected Specimen Info Organism   09/18/24 Tissue/Biopsy from Diabetic Foot Ulcer Mixed Skin Microorganisms            ASSESSMENT  #Non pressure ulceration down to and including bone, left foot  #OM, left foot  #Gangrene, left toes 3&4  #Acquired absence of RLE  #Acquired absence of left digits 1, 2, 5   #PAD  #Diabetes mellitus  # S/P Left femoral endarterectomy, profundaplasty, common szsueue-gi-oevgfhon tibial bypass using 6 mm ringed PTFE, left lower extremity angiogram (DOS: 9/25/24)  #S/P TMA left      PLAN  - Interval charts, labs, findings reviewed. All findings discussed with patient. All questions answered to patient's satisfaction.  - Per ID, continue IV Abx.  - Dressing will be changed by podiatry on 9/29 due to concerns for hemostasis.  - Dressing: betadine soaked adaptic, 4x4, ABD, webril, Ace. Nursing may reinforce with ABDs.   - Patient is to be NWB.  - Podiatry will continue following.      This patient was discussed and evaluated with Dr. Soraya Hernandez DPM.      This is a preliminary note with attending attestation to follow.       Meaghan Lombardo DPM PGY1  Podiatric Medicine and Surgery

## 2024-09-29 VITALS
RESPIRATION RATE: 13 BRPM | HEART RATE: 93 BPM | WEIGHT: 199.74 LBS | SYSTOLIC BLOOD PRESSURE: 102 MMHG | OXYGEN SATURATION: 96 % | HEIGHT: 67 IN | DIASTOLIC BLOOD PRESSURE: 69 MMHG | TEMPERATURE: 98.2 F | BODY MASS INDEX: 31.35 KG/M2

## 2024-09-29 LAB
ANION GAP SERPL CALC-SCNC: 10 MMOL/L (ref 10–20)
BACTERIA SPEC CULT: NORMAL
BUN SERPL-MCNC: 9 MG/DL (ref 6–23)
CALCIUM SERPL-MCNC: 7.9 MG/DL (ref 8.6–10.6)
CHLORIDE SERPL-SCNC: 104 MMOL/L (ref 98–107)
CO2 SERPL-SCNC: 30 MMOL/L (ref 21–32)
CREAT SERPL-MCNC: 0.56 MG/DL (ref 0.5–1.05)
EGFRCR SERPLBLD CKD-EPI 2021: >90 ML/MIN/1.73M*2
ERYTHROCYTE [DISTWIDTH] IN BLOOD BY AUTOMATED COUNT: 22 % (ref 11.5–14.5)
GLUCOSE BLD MANUAL STRIP-MCNC: 114 MG/DL (ref 74–99)
GLUCOSE BLD MANUAL STRIP-MCNC: 153 MG/DL (ref 74–99)
GLUCOSE BLD MANUAL STRIP-MCNC: 157 MG/DL (ref 74–99)
GLUCOSE BLD MANUAL STRIP-MCNC: 189 MG/DL (ref 74–99)
GLUCOSE SERPL-MCNC: 113 MG/DL (ref 74–99)
GRAM STN SPEC: NORMAL
GRAM STN SPEC: NORMAL
HCT VFR BLD AUTO: 26.7 % (ref 36–46)
HGB BLD-MCNC: 8.2 G/DL (ref 12–16)
MAGNESIUM SERPL-MCNC: 1.8 MG/DL (ref 1.6–2.4)
MCH RBC QN AUTO: 24.8 PG (ref 26–34)
MCHC RBC AUTO-ENTMCNC: 30.7 G/DL (ref 32–36)
MCV RBC AUTO: 81 FL (ref 80–100)
NRBC BLD-RTO: 0 /100 WBCS (ref 0–0)
PHOSPHATE SERPL-MCNC: 2.7 MG/DL (ref 2.5–4.9)
PLATELET # BLD AUTO: 235 X10*3/UL (ref 150–450)
POTASSIUM SERPL-SCNC: 3.4 MMOL/L (ref 3.5–5.3)
RBC # BLD AUTO: 3.31 X10*6/UL (ref 4–5.2)
SODIUM SERPL-SCNC: 141 MMOL/L (ref 136–145)
UFH PPP CHRO-ACNC: 0.2 IU/ML
UFH PPP CHRO-ACNC: 0.4 IU/ML
UFH PPP CHRO-ACNC: 0.5 IU/ML
WBC # BLD AUTO: 12.1 X10*3/UL (ref 4.4–11.3)

## 2024-09-29 PROCEDURE — 2500000001 HC RX 250 WO HCPCS SELF ADMINISTERED DRUGS (ALT 637 FOR MEDICARE OP): Performed by: NURSE PRACTITIONER

## 2024-09-29 PROCEDURE — 2500000001 HC RX 250 WO HCPCS SELF ADMINISTERED DRUGS (ALT 637 FOR MEDICARE OP)

## 2024-09-29 PROCEDURE — 2500000002 HC RX 250 W HCPCS SELF ADMINISTERED DRUGS (ALT 637 FOR MEDICARE OP, ALT 636 FOR OP/ED): Performed by: NURSE PRACTITIONER

## 2024-09-29 PROCEDURE — 82947 ASSAY GLUCOSE BLOOD QUANT: CPT

## 2024-09-29 PROCEDURE — 1200000002 HC GENERAL ROOM WITH TELEMETRY DAILY

## 2024-09-29 PROCEDURE — 2500000004 HC RX 250 GENERAL PHARMACY W/ HCPCS (ALT 636 FOR OP/ED)

## 2024-09-29 PROCEDURE — 84100 ASSAY OF PHOSPHORUS: CPT | Performed by: NURSE PRACTITIONER

## 2024-09-29 PROCEDURE — 85027 COMPLETE CBC AUTOMATED: CPT | Performed by: NURSE PRACTITIONER

## 2024-09-29 PROCEDURE — 83735 ASSAY OF MAGNESIUM: CPT | Performed by: NURSE PRACTITIONER

## 2024-09-29 PROCEDURE — 97162 PT EVAL MOD COMPLEX 30 MIN: CPT | Mod: GP

## 2024-09-29 PROCEDURE — 85520 HEPARIN ASSAY: CPT | Performed by: NURSE PRACTITIONER

## 2024-09-29 PROCEDURE — 2500000004 HC RX 250 GENERAL PHARMACY W/ HCPCS (ALT 636 FOR OP/ED): Performed by: NURSE PRACTITIONER

## 2024-09-29 PROCEDURE — 80048 BASIC METABOLIC PNL TOTAL CA: CPT | Performed by: NURSE PRACTITIONER

## 2024-09-29 PROCEDURE — 2500000004 HC RX 250 GENERAL PHARMACY W/ HCPCS (ALT 636 FOR OP/ED): Mod: JZ

## 2024-09-29 RX ORDER — POTASSIUM CHLORIDE 14.9 MG/ML
20 INJECTION INTRAVENOUS
Status: COMPLETED | OUTPATIENT
Start: 2024-09-29 | End: 2024-09-29

## 2024-09-29 RX ORDER — SODIUM,POTASSIUM PHOSPHATES 280-250MG
1 POWDER IN PACKET (EA) ORAL 4 TIMES DAILY
Status: COMPLETED | OUTPATIENT
Start: 2024-09-29 | End: 2024-09-29

## 2024-09-29 RX ORDER — CALCIUM GLUCONATE 20 MG/ML
2 INJECTION, SOLUTION INTRAVENOUS ONCE
Status: COMPLETED | OUTPATIENT
Start: 2024-09-29 | End: 2024-09-29

## 2024-09-29 RX ORDER — MAGNESIUM CHLORIDE 64 MG
128 TABLET, DELAYED RELEASE (ENTERIC COATED) ORAL DAILY
Status: COMPLETED | OUTPATIENT
Start: 2024-09-29 | End: 2024-09-29

## 2024-09-29 RX ADMIN — PANTOPRAZOLE SODIUM 40 MG: 40 TABLET, DELAYED RELEASE ORAL at 06:00

## 2024-09-29 RX ADMIN — OXYCODONE HYDROCHLORIDE 10 MG: 5 TABLET ORAL at 06:04

## 2024-09-29 RX ADMIN — AMPICILLIN SODIUM AND SULBACTAM SODIUM 3 G: 2; 1 INJECTION, POWDER, FOR SOLUTION INTRAMUSCULAR; INTRAVENOUS at 03:07

## 2024-09-29 RX ADMIN — POTASSIUM & SODIUM PHOSPHATES POWDER PACK 280-160-250 MG 1 PACKET: 280-160-250 PACK at 08:56

## 2024-09-29 RX ADMIN — AMPICILLIN SODIUM AND SULBACTAM SODIUM 3 G: 2; 1 INJECTION, POWDER, FOR SOLUTION INTRAMUSCULAR; INTRAVENOUS at 16:58

## 2024-09-29 RX ADMIN — METOPROLOL TARTRATE 12.5 MG: 25 TABLET, FILM COATED ORAL at 21:09

## 2024-09-29 RX ADMIN — CALCIUM GLUCONATE 2 G: 20 INJECTION, SOLUTION INTRAVENOUS at 09:00

## 2024-09-29 RX ADMIN — POTASSIUM & SODIUM PHOSPHATES POWDER PACK 280-160-250 MG 1 PACKET: 280-160-250 PACK at 12:50

## 2024-09-29 RX ADMIN — DULOXETINE HYDROCHLORIDE 60 MG: 60 CAPSULE, DELAYED RELEASE ORAL at 21:08

## 2024-09-29 RX ADMIN — POTASSIUM CHLORIDE 20 MEQ: 14.9 INJECTION, SOLUTION INTRAVENOUS at 13:59

## 2024-09-29 RX ADMIN — DIVALPROEX SODIUM 500 MG: 500 TABLET, DELAYED RELEASE ORAL at 13:57

## 2024-09-29 RX ADMIN — OXYCODONE HYDROCHLORIDE 10 MG: 5 TABLET ORAL at 13:57

## 2024-09-29 RX ADMIN — HYDROMORPHONE HYDROCHLORIDE 0.2 MG: 1 INJECTION, SOLUTION INTRAMUSCULAR; INTRAVENOUS; SUBCUTANEOUS at 23:16

## 2024-09-29 RX ADMIN — ACETAMINOPHEN 650 MG: 325 TABLET, FILM COATED ORAL at 02:30

## 2024-09-29 RX ADMIN — ATORVASTATIN CALCIUM 80 MG: 80 TABLET, FILM COATED ORAL at 21:07

## 2024-09-29 RX ADMIN — HEPARIN SODIUM 1600 UNITS/HR: 10000 INJECTION, SOLUTION INTRAVENOUS at 12:31

## 2024-09-29 RX ADMIN — OXYCODONE HYDROCHLORIDE 10 MG: 5 TABLET ORAL at 10:17

## 2024-09-29 RX ADMIN — INSULIN LISPRO 2 UNITS: 100 INJECTION, SOLUTION INTRAVENOUS; SUBCUTANEOUS at 12:36

## 2024-09-29 RX ADMIN — ASPIRIN 81 MG: 81 TABLET, COATED ORAL at 08:54

## 2024-09-29 RX ADMIN — LEVOTHYROXINE SODIUM 75 MCG: 0.07 TABLET ORAL at 06:00

## 2024-09-29 RX ADMIN — POTASSIUM CHLORIDE 20 MEQ: 14.9 INJECTION, SOLUTION INTRAVENOUS at 11:02

## 2024-09-29 RX ADMIN — BUSPIRONE HYDROCHLORIDE 10 MG: 10 TABLET ORAL at 09:04

## 2024-09-29 RX ADMIN — AMPICILLIN SODIUM AND SULBACTAM SODIUM 3 G: 2; 1 INJECTION, POWDER, FOR SOLUTION INTRAMUSCULAR; INTRAVENOUS at 08:58

## 2024-09-29 RX ADMIN — ACETAMINOPHEN 650 MG: 325 TABLET, FILM COATED ORAL at 13:57

## 2024-09-29 RX ADMIN — ACETAMINOPHEN 650 MG: 325 TABLET, FILM COATED ORAL at 21:07

## 2024-09-29 RX ADMIN — AMPICILLIN SODIUM AND SULBACTAM SODIUM 3 G: 2; 1 INJECTION, POWDER, FOR SOLUTION INTRAMUSCULAR; INTRAVENOUS at 21:00

## 2024-09-29 RX ADMIN — Medication 128 MG: at 08:55

## 2024-09-29 RX ADMIN — HYDROMORPHONE HYDROCHLORIDE 0.2 MG: 1 INJECTION, SOLUTION INTRAMUSCULAR; INTRAVENOUS; SUBCUTANEOUS at 12:33

## 2024-09-29 RX ADMIN — DIVALPROEX SODIUM 500 MG: 500 TABLET, DELAYED RELEASE ORAL at 06:00

## 2024-09-29 RX ADMIN — LEVETIRACETAM 500 MG: 500 TABLET, FILM COATED ORAL at 21:07

## 2024-09-29 RX ADMIN — OXYCODONE HYDROCHLORIDE 10 MG: 5 TABLET ORAL at 21:07

## 2024-09-29 RX ADMIN — DIVALPROEX SODIUM 500 MG: 500 TABLET, DELAYED RELEASE ORAL at 21:07

## 2024-09-29 RX ADMIN — LEVETIRACETAM 500 MG: 500 TABLET, FILM COATED ORAL at 08:54

## 2024-09-29 RX ADMIN — ACETAMINOPHEN 650 MG: 325 TABLET, FILM COATED ORAL at 08:52

## 2024-09-29 RX ADMIN — OXYCODONE HYDROCHLORIDE 10 MG: 5 TABLET ORAL at 01:19

## 2024-09-29 ASSESSMENT — COGNITIVE AND FUNCTIONAL STATUS - GENERAL
MOBILITY SCORE: 15
DRESSING REGULAR LOWER BODY CLOTHING: A LITTLE
TURNING FROM BACK TO SIDE WHILE IN FLAT BAD: A LITTLE
CLIMB 3 TO 5 STEPS WITH RAILING: TOTAL
MOBILITY SCORE: 14
STANDING UP FROM CHAIR USING ARMS: A LOT
MOVING TO AND FROM BED TO CHAIR: A LITTLE
HELP NEEDED FOR BATHING: A LOT
CLIMB 3 TO 5 STEPS WITH RAILING: TOTAL
MOBILITY SCORE: 14
DRESSING REGULAR UPPER BODY CLOTHING: A LOT
WALKING IN HOSPITAL ROOM: TOTAL
DAILY ACTIVITIY SCORE: 14
STANDING UP FROM CHAIR USING ARMS: TOTAL
PERSONAL GROOMING: A LOT
DRESSING REGULAR UPPER BODY CLOTHING: A LOT
MOBILITY SCORE: 10
TURNING FROM BACK TO SIDE WHILE IN FLAT BAD: A LITTLE
PERSONAL GROOMING: A LOT
HELP NEEDED FOR BATHING: A LITTLE
TOILETING: A LITTLE
MOVING TO AND FROM BED TO CHAIR: A LITTLE
MOVING TO AND FROM BED TO CHAIR: TOTAL
MOVING FROM LYING ON BACK TO SITTING ON SIDE OF FLAT BED WITH BEDRAILS: A LITTLE
DRESSING REGULAR UPPER BODY CLOTHING: A LITTLE
MOVING TO AND FROM BED TO CHAIR: A LITTLE
TOILETING: A LOT
DRESSING REGULAR LOWER BODY CLOTHING: A LOT
TURNING FROM BACK TO SIDE WHILE IN FLAT BAD: A LITTLE
DAILY ACTIVITIY SCORE: 14
STANDING UP FROM CHAIR USING ARMS: A LOT
STANDING UP FROM CHAIR USING ARMS: A LOT
WALKING IN HOSPITAL ROOM: TOTAL
CLIMB 3 TO 5 STEPS WITH RAILING: TOTAL
TURNING FROM BACK TO SIDE WHILE IN FLAT BAD: A LITTLE
CLIMB 3 TO 5 STEPS WITH RAILING: TOTAL
WALKING IN HOSPITAL ROOM: TOTAL
WALKING IN HOSPITAL ROOM: A LOT
DAILY ACTIVITIY SCORE: 20
HELP NEEDED FOR BATHING: A LOT
DRESSING REGULAR LOWER BODY CLOTHING: A LOT
TOILETING: A LOT

## 2024-09-29 ASSESSMENT — PAIN DESCRIPTION - ORIENTATION
ORIENTATION: LEFT

## 2024-09-29 ASSESSMENT — PAIN SCALES - GENERAL
PAINLEVEL_OUTOF10: 3
PAINLEVEL_OUTOF10: 8
PAINLEVEL_OUTOF10: 8
PAINLEVEL_OUTOF10: 2
PAINLEVEL_OUTOF10: 9
PAINLEVEL_OUTOF10: 4
PAINLEVEL_OUTOF10: 4
PAINLEVEL_OUTOF10: 8
PAINLEVEL_OUTOF10: 9

## 2024-09-29 ASSESSMENT — PAIN DESCRIPTION - LOCATION
LOCATION: FOOT

## 2024-09-29 ASSESSMENT — PAIN - FUNCTIONAL ASSESSMENT
PAIN_FUNCTIONAL_ASSESSMENT: 0-10
PAIN_FUNCTIONAL_ASSESSMENT: 0-10

## 2024-09-29 NOTE — PROGRESS NOTES
VASCULAR SURGERY PROGRESS NOTE  Assessment/Plan   Shirin Slater is 54 y.o. female with history of severe PAD s/p R hip disarticulation s/p L CFA and EIA embolectomy 12/23 s/p multiple L toe amputations, T2DM complicated by diabetic neuropathy, HTN, HLD, renal and splenic thromboses on aspirin/plavix/coumadin, and current tobacco use who presents as a transfer from [facility] for management of L 2nd toe gangrene with concern of osteomyelitis.  Now s/p Left femoral endarterectomy, profundaplasty, common gsblfwx-sd-itxswgju tibial bypass using 6 mm ringed PTFE, left lower extremity angiogram. Patient s/p TMA with podiatry 9/27.     9/25 : Pre-op exam: Left foot monophasic DP and PT  Post-op exam: Left foot palpable AT, multiphasic DP, monophasic PT  9/27 L TMA with podiatry    Plan:  Pain control: oxy 5/10 PRN and scheduled tylenol  Maintain -160 mmHg  Sliding scale insulin  Omeprazole PO daily  ASA, high intensity heparin gtt, holding plavix  To discuss with podiatry when okay to resume home coumadin  Continue q4h neurovascular checks  Regular diet  Continue Unasyn  ID recommendations 9/28: Continue Unasyn 3 g IV every 6 hours while patient is in the hospital and at discharge recommend Augmentin 875/125 mg p.o. every 12 hours until Oct 10.   Maintain L internal jugular CVC  DC vasques    Dispo: continue care on LT7, NWB on LLE -- will likely need SNF on DC    D/w attending, Dr. Dunphy Ananya Tawde MD  PGY1 Vascular Surgery  [id number]  Available via secure chat    ---------------------------------------------------------------------    Subjective   No acute events overnight  Reports pain in R leg but improved in comparison to before surgery  Denies chest pain or shortness of breath  Denies abdominal pain, nausea/vomiting  Tolerating PO intake  Hgb incremented to 8 after 2 units pRBC yesterday    ---------------------------------------------------------------------    Objective   Vitals:  Heart Rate:   [75-98]   Temp:  [36.2 °C (97.2 °F)-36.6 °C (97.9 °F)]   Resp:  [8-22]   BP: ()/(45-75)   SpO2:  [89 %-97 %]     Exam:  Constitutional: No acute distress, resting comfortably  Neuro:  AOx3, grossly intact  ENMT: moist mucous membranes  CV: no tachycardia  Pulm: non-labored on room air  GI: soft, non-tender, non-distended  Skin: warm and dry. TMA post-op dressing in place without strikethrough  Musculoskeletal: moving all extremities. S/p R hip disarticulation  Vascular: L foot - Palpable AT on left foot since OR, mutli DP, mono PT    Labs:  Results from last 7 days   Lab Units 09/29/24  0612 09/28/24  0558 09/27/24  1435   WBC AUTO x10*3/uL 12.1* 12.8* 15.1*   HEMOGLOBIN g/dL 8.2* 6.0* 7.0*   PLATELETS AUTO x10*3/uL 235 199 228      Results from last 7 days   Lab Units 09/29/24  0612 09/28/24  0558 09/27/24  0542   SODIUM mmol/L 141 141 137   POTASSIUM mmol/L 3.4* 3.1* 3.5   CHLORIDE mmol/L 104 103 101   CO2 mmol/L 30 29 27   BUN mg/dL 9 9 8   CREATININE mg/dL 0.56 0.55 0.64   GLUCOSE mg/dL 113* 160* 152*   MAGNESIUM mg/dL 1.80 1.70 1.66   PHOSPHORUS mg/dL 2.7 2.4* 2.5      Results from last 7 days   Lab Units 09/25/24  2211   INR  1.2*   PROTIME seconds 13.2*   APTT seconds 37      Results from last 7 days   Lab Units 09/29/24  1148 09/29/24  0612 09/28/24  0558   ANTI XA UNFRACTIONATED IU/mL 0.5 0.2 0.3

## 2024-09-29 NOTE — PROGRESS NOTES
Physical Therapy    Physical Therapy Evaluation    Patient Name: Shirin Slater  MRN: [id number]  Today's Date: 9/29/2024   Room: [address] [place], [place] [address]  Time Calculation  Start Time: 0838  Stop Time: 0906  Time Calculation (min): 28 min    Assessment/Plan   PT Assessment  PT Assessment Results: Decreased strength, Decreased endurance, Impaired balance, Decreased mobility, Pain  Rehab Prognosis: Fair  Strengths: Attitude of self  Barriers to Participation: Comorbidities  End of Session Communication: Bedside nurse  Assessment Comment: Pt tolerated PT eval fairly well. At baseline reports she can transfer indep to wheelchair but at times needs assist. States she is alone at home occasionally but does have HHA to assist. Pt unable to progress to scooting or transfer d/t inc pain. Will continue to follow. Would benefit from moderate intensity therapy upon d/c.  End of Session Patient Position: Bed, 3 rail up, Alarm on  IP OR SWING BED PT PLAN  Inpatient or Swing Bed: Inpatient  PT Plan  Treatment/Interventions: Bed mobility, Transfer training, Balance training, Strengthening, Range of motion, Therapeutic exercise, Therapeutic activity, Home exercise program, Positioning  PT Plan: Ongoing PT  PT Frequency: 3 times per week  PT Discharge Recommendations: Moderate intensity level of continued care  PT - OK to Discharge: Yes (eval complete, d/c recommendation)    Subjective   General Visit Information:  Reason for Referral: s/p L TMA  Past Medical History Relevant to Rehab: T2DM c/b diabetic neuropathy, HTN, HLD, renal and splenic thrombosis 2015,  hx of RLE arterial thromboembolism and proximal DVT c/b R hip disarticulation  Prior to Session Communication: Bedside nurse  Patient Position Received: Bed, 3 rail up, Alarm on  Family/Caregiver Present: No  General Comment: Supine in bed, cooperative   Home Living:  Home Living  Type of Home: Apartment  Lives With: Alone (has HHa)  Home Adaptive Equipment: Walker rolling or standard,  Wheelchair-manual, Cane, Hospital bed  Home Layout: One level  Home Access: No concerns  Bathroom Shower/Tub: Tub/shower unit  Bathroom Equipment: Tub transfer bench, Bedside commode  Home Living Comments: pt reports she has support most days 9-2:30 and 1-8:30,  Prior Level of Function:  Prior Function Per Pt/Caregiver Report  Level of [place]: Needs assistance with homemaking, Needs assistance with ADLs  ADL Assistance:  (reports she occasionally needs assist for bathing)  Homemaking Assistance: Needs assistance  Ambulatory Assistance:  (reports she can transfer to wheelchair indep but at times needs assist from HHA)  Precautions:  Precautions  LE Weight Bearing Status: Left Non-Weight Bearing  Medical Precautions: Fall precautions    Objective     Pain:  Pain Assessment  Pain Assessment: 0-10  0-10 (Numeric) Pain Score: 4  Pain Location: Foot  Pain Orientation: Left  Cognition:  Cognition  Overall Cognitive Status: Within Functional Limits  Orientation Level: Oriented X4      Extremity/Trunk Assessments:  Strength:              LLE   LLE :  (knee and hip WFL, ankle NT)    General Assessments:                  Static Sitting Balance  Static Sitting-Balance Support: Bilateral upper extremity supported  Static Sitting-Level of Assistance: Close supervision       Functional Assessments:  Bed Mobility  Bed Mobility: Yes  Bed Mobility 1  Bed Mobility 1: Supine to sitting  Level of Assistance 1: Close supervision  Bed Mobility 2  Bed Mobility  2: Sitting to supine  Level of Assistance 2: Contact guard  Transfers  Transfer: No (pt did not want to progress beyond sitting EOB d/t pain)             Outcome Measures:  [facility] Basic Mobility  Turning from your back to your side while in a flat bed without using bedrails: A little  Moving from lying on your back to sitting on the side of a flat bed without using bedrails: A little  Moving to and from bed to chair (including a wheelchair): Total  Standing up from a chair  using your arms (e.g. wheelchair or bedside chair): Total  To walk in hospital room: Total  Climbing 3-5 steps with railing: Total  Basic Mobility - Total Score: 10                               Encounter Problems       Encounter Problems (Active)       Balance       Pt will complete dynamic reaching in sitting x10 reps without LOB (Progressing)       Start:  09/29/24    Expected End:  10/13/24               PT Transfers       Pt will perform supine<>sit indep (Progressing)       Start:  09/29/24    Expected End:  10/13/24            Pt will perform bed<>chair transfer via slide board with SBA (Progressing)       Start:  09/29/24    Expected End:  10/13/24                   Education Documentation  Precautions, taught by Vanessa Clinton PT at 9/29/2024 12:41 PM.  Learner: Patient  Readiness: Acceptance  Method: Explanation  Response: Verbalizes Understanding  Comment: progression of mobility    Mobility Training, taught by Vanessa Clinton PT at 9/29/2024 12:41 PM.  Learner: Patient  Readiness: Acceptance  Method: Explanation  Response: Verbalizes Understanding  Comment: progression of mobility    Education Comments  No comments found.            09/29/24 at 12:53 PM   Vanessa Clinton PT   Rehab Office: [phone]

## 2024-09-29 NOTE — CARE PLAN
The patient's goals for the shift include      The clinical goals for the shift include pt will remain HDS and sleep at least four hours by end of shift      Problem: Skin  Goal: Decreased wound size/increased tissue granulation at next dressing change  Outcome: Progressing  Goal: Participates in plan/prevention/treatment measures  Outcome: Progressing  Goal: Prevent/manage excess moisture  Outcome: Progressing  Goal: Prevent/minimize sheer/friction injuries  Outcome: Progressing  Flowsheets (Taken 9/29/2024 1592)  Prevent/minimize sheer/friction injuries:   Use pull sheet   HOB 30 degrees or less   Turn/reposition every 2 hours/use positioning/transfer devices  Goal: Promote/optimize nutrition  Outcome: Progressing  Goal: Promote skin healing  Outcome: Progressing     Problem: Pain  Goal: Takes deep breaths with improved pain control throughout the shift  Outcome: Progressing  Goal: Turns in bed with improved pain control throughout the shift  Outcome: Progressing  Goal: Walks with improved pain control throughout the shift  Outcome: Progressing  Goal: Performs ADL's with improved pain control throughout shift  Outcome: Progressing  Goal: Participates in PT with improved pain control throughout the shift  Outcome: Progressing  Goal: Free from opioid side effects throughout the shift  Outcome: Progressing  Goal: Free from acute confusion related to pain meds throughout the shift  Outcome: Progressing

## 2024-09-29 NOTE — NURSING NOTE
"Pt refused to do all that PT was asking because she stated, \" I need to heal and get my strength back before PT.\"  "

## 2024-09-29 NOTE — PROGRESS NOTES
PROGRESS NOTE - PODIATRIC MEDICINE & SURGERY    SERVICE DATE: 9/29/2024  SERVICE TIME: 11:00    HPI    Patient is a 54 y.o. female POD 2 S/P left TMA. Patient is resting in bed. States her pain is about the same. Patient became emotional during the visit when talking about her discharge plans. Patient's mood improved when told her surgery went well and her incision is healing well. Denies N/V/F/C/S.      ROS: Negative except as above.      Past Medical History  Past Medical History:   Diagnosis Date    Anxiety     Arthritis     Cancer (Multi)     Cellulitis     COPD (chronic obstructive pulmonary disease) (Multi)     Delayed emergence from general anesthesia     Diabetes mellitus (Multi)     Disease of thyroid gland     Diverticulitis     Epilepsy, unspecified, not intractable, without status epilepticus (Multi)     Epilepsy    HLD (hyperlipidemia)     Hx of blood clots     Hypertension     PAD (peripheral artery disease) (CMS-HCC)     Peripheral vascular disease, unspecified (CMS-HCC) 09/14/2022    PAD (peripheral artery disease)    PONV (postoperative nausea and vomiting)     PTSD (post-traumatic stress disorder)     Sleep apnea     Uterine cancer (Multi)          Problem List  Problem List Items Addressed This Visit       Diabetes mellitus, type 2 (Multi)    * (Principal) Gangrene of toe of left foot (Multi)    Relevant Orders    Vascular US lower extremity arterial duplex left with HEMA (Completed)    Vascular US Lower Extremity Vein Mapping Left (Completed)    Case Request Operating Room: Foot Transmetatarsal Amputation (Completed)    Surgical Pathology Exam    Tissue/Wound Culture/Smear (Completed)    PAD (peripheral artery disease) (CMS-HCC)    Relevant Orders    Vascular US lower extremity arterial duplex left with HEMA (Completed)    Vascular US Ankle Brachial Index (HEMA) Without Exercise (Completed)    Vascular US Lower Extremity Vein Mapping Left (Completed)    Transthoracic Echo (TTE) Limited (Completed)     Atherosclerosis of native arteries of left leg with ulceration of other part of foot (Multi) - Primary    Relevant Orders    Vascular US Lower Extremity Vein Mapping Left (Completed)    Case Request Cath Lab: Left Heart Cath (Completed)    Cardiac Catheterization Procedure (Completed)    Invasive vascular procedure (Completed)    Case Request Operating Room: Foot Transmetatarsal Amputation (Completed)    Surgical Pathology Exam    Tissue/Wound Culture/Smear (Completed)    Preoperative cardiovascular examination    Relevant Orders    Case Request Cath Lab: Left Heart Cath (Completed)    Cardiac Catheterization Procedure (Completed)    Invasive vascular procedure (Completed)    Critical limb ischemia of left lower extremity (Multi)    Relevant Orders    Transthoracic Echo (TTE) Limited (Completed)    Case Request Operating Room: Creation Bypass Graft Tibial Artery (Completed)     Other Visit Diagnoses       Other specified peripheral vascular diseases (CMS-HCC)        Critical limb ischemia of both lower extremities with nonbiological bypass graft with gangrene (Multi)        Relevant Orders    Transthoracic Echo (TTE) Limited (Completed)    Critical limb ischemia with history of revascularization of same extremity (Multi)        Relevant Orders    Transthoracic Echo (TTE) Limited (Completed)    Encounter for other preprocedural examination        Atherosclerosis of autologous vein bypass graft(s) of the extremities with intermittent claudication, bilateral legs (CMS-HCC)        Atherosclerosis of native arteries of extremities with intermittent claudication, bilateral legs (CMS-HCC)                  Medications and Allergies reviewed.      PHYSICAL EXAM    General: AOx3. NAD. Cooperative. Pleasant. Dressing clean, dry, intact.     Left focused exam.   Vascular: Lightly-palpable DP pulse. Non-palpable PT pulse. Mild pitting edema noted. Hair growth absent. Temperature is cool to cool from tibial tuberosity to foot.  No lymphatic streaking noted. Left foot diffusely edematous.      Musculoskeletal: Gross active and passive ROM intact to age and activity level. Moves all extremities spontaneously. Prior R hip disarticulation. Left TMA.     Neurological: Intact light touch sensation. Pain stimuli intact. Denies any numbness, burning or tingling.     Dermatologic: Skin appears diffusely xerotic. No rashes or nodules noted.No hyperkeratotic tissue noted.     Wound: left TMA incision  See images in media tab  Incision site with slight erythema and edema. Sutures and staples in place. Skin is well coapted.   Minimal serosanguinous drainage.  No vinny-wound maceration.   No evidence of ascending cellulitis or lymphangitis.  No palpable fluctuance. No malodor. No increased warmth.   No tunneling or tracking.          RESULTS    CBC  Lab Results   Component Value Date    WBC 12.1 (H) 09/29/2024    HGB 8.2 (L) 09/29/2024    HCT 26.7 (L) 09/29/2024    MCV 81 09/29/2024     09/29/2024       ESR  Lab Results   Component Value Date    SEDRATE 37 (H) 09/17/2021       CRP  Lab Results   Component Value Date    CRP 17.25 (H) 12/06/2023       HgbA1c  Lab Results   Component Value Date    HGBA1C 11.4 (H) 09/18/2024       Cultures  Susceptibility data from last 90 days.  Collected Specimen Info Organism   09/18/24 Tissue/Biopsy from Diabetic Foot Ulcer Mixed Skin Microorganisms            ASSESSMENT  #Non pressure ulceration down to and including bone, left foot  #OM, left foot  #Gangrene, left toes 3&4  #Acquired absence of RLE  #Acquired absence of left digits 1, 2, 5   #PAD  #Diabetes mellitus  # S/P Left femoral endarterectomy, profundaplasty, common [id number] tibial bypass using 6 mm ringed PTFE, left lower extremity angiogram (DOS: 9/25/24)  #S/P TMA left      PLAN  - Interval charts, labs, findings reviewed. All findings discussed with patient. All questions answered to patient's satisfaction.  - Per ID, continue IV Abx.  -  Dressing: betadine soaked adaptic, 4x4, ABD, webril, Ace. Nursing may reinforce with ABDs.   - Patient is to be NWB.  - Podiatry will continue following.      This patient was discussed and evaluated with Dr. Soraya Hernandez DPM.      This is a preliminary note with attending attestation to follow.       Meaghan Lombardo DPM PGY1  Podiatric Medicine and Surgery

## 2024-09-30 LAB
ANION GAP SERPL CALC-SCNC: 12 MMOL/L (ref 10–20)
BUN SERPL-MCNC: 6 MG/DL (ref 6–23)
CALCIUM SERPL-MCNC: 8.2 MG/DL (ref 8.6–10.6)
CHLORIDE SERPL-SCNC: 103 MMOL/L (ref 98–107)
CO2 SERPL-SCNC: 32 MMOL/L (ref 21–32)
CREAT SERPL-MCNC: 0.5 MG/DL (ref 0.5–1.05)
EGFRCR SERPLBLD CKD-EPI 2021: >90 ML/MIN/1.73M*2
ERYTHROCYTE [DISTWIDTH] IN BLOOD BY AUTOMATED COUNT: 23 % (ref 11.5–14.5)
GLUCOSE BLD MANUAL STRIP-MCNC: 118 MG/DL (ref 74–99)
GLUCOSE BLD MANUAL STRIP-MCNC: 144 MG/DL (ref 74–99)
GLUCOSE BLD MANUAL STRIP-MCNC: 149 MG/DL (ref 74–99)
GLUCOSE SERPL-MCNC: 122 MG/DL (ref 74–99)
HCT VFR BLD AUTO: 28.1 % (ref 36–46)
HGB BLD-MCNC: 8.4 G/DL (ref 12–16)
MAGNESIUM SERPL-MCNC: 1.65 MG/DL (ref 1.6–2.4)
MCH RBC QN AUTO: 24.4 PG (ref 26–34)
MCHC RBC AUTO-ENTMCNC: 29.9 G/DL (ref 32–36)
MCV RBC AUTO: 82 FL (ref 80–100)
NRBC BLD-RTO: 0 /100 WBCS (ref 0–0)
PHOSPHATE SERPL-MCNC: 3 MG/DL (ref 2.5–4.9)
PLATELET # BLD AUTO: 296 X10*3/UL (ref 150–450)
POTASSIUM SERPL-SCNC: 4.1 MMOL/L (ref 3.5–5.3)
RBC # BLD AUTO: 3.44 X10*6/UL (ref 4–5.2)
SODIUM SERPL-SCNC: 143 MMOL/L (ref 136–145)
UFH PPP CHRO-ACNC: 0.3 IU/ML
WBC # BLD AUTO: 11.3 X10*3/UL (ref 4.4–11.3)

## 2024-09-30 PROCEDURE — 84100 ASSAY OF PHOSPHORUS: CPT | Performed by: NURSE PRACTITIONER

## 2024-09-30 PROCEDURE — 97165 OT EVAL LOW COMPLEX 30 MIN: CPT | Mod: GO

## 2024-09-30 PROCEDURE — 2500000001 HC RX 250 WO HCPCS SELF ADMINISTERED DRUGS (ALT 637 FOR MEDICARE OP): Performed by: NURSE PRACTITIONER

## 2024-09-30 PROCEDURE — 82947 ASSAY GLUCOSE BLOOD QUANT: CPT

## 2024-09-30 PROCEDURE — 2500000004 HC RX 250 GENERAL PHARMACY W/ HCPCS (ALT 636 FOR OP/ED): Performed by: NURSE PRACTITIONER

## 2024-09-30 PROCEDURE — 1200000002 HC GENERAL ROOM WITH TELEMETRY DAILY

## 2024-09-30 PROCEDURE — 83735 ASSAY OF MAGNESIUM: CPT | Performed by: NURSE PRACTITIONER

## 2024-09-30 PROCEDURE — 80048 BASIC METABOLIC PNL TOTAL CA: CPT | Performed by: NURSE PRACTITIONER

## 2024-09-30 PROCEDURE — 85027 COMPLETE CBC AUTOMATED: CPT | Performed by: NURSE PRACTITIONER

## 2024-09-30 PROCEDURE — 85520 HEPARIN ASSAY: CPT | Performed by: NURSE PRACTITIONER

## 2024-09-30 PROCEDURE — 97535 SELF CARE MNGMENT TRAINING: CPT | Mod: GO

## 2024-09-30 PROCEDURE — 2500000004 HC RX 250 GENERAL PHARMACY W/ HCPCS (ALT 636 FOR OP/ED)

## 2024-09-30 PROCEDURE — 2500000002 HC RX 250 W HCPCS SELF ADMINISTERED DRUGS (ALT 637 FOR MEDICARE OP, ALT 636 FOR OP/ED): Performed by: NURSE PRACTITIONER

## 2024-09-30 PROCEDURE — 2500000002 HC RX 250 W HCPCS SELF ADMINISTERED DRUGS (ALT 637 FOR MEDICARE OP, ALT 636 FOR OP/ED)

## 2024-09-30 RX ORDER — WARFARIN SODIUM 5 MG/1
5 TABLET ORAL ONCE
Status: COMPLETED | OUTPATIENT
Start: 2024-09-30 | End: 2024-09-30

## 2024-09-30 RX ORDER — MAGNESIUM SULFATE HEPTAHYDRATE 40 MG/ML
2 INJECTION, SOLUTION INTRAVENOUS ONCE
Status: COMPLETED | OUTPATIENT
Start: 2024-09-30 | End: 2024-09-30

## 2024-09-30 RX ADMIN — BUSPIRONE HYDROCHLORIDE 10 MG: 10 TABLET ORAL at 09:16

## 2024-09-30 RX ADMIN — AMPICILLIN SODIUM AND SULBACTAM SODIUM 3 G: 2; 1 INJECTION, POWDER, FOR SOLUTION INTRAMUSCULAR; INTRAVENOUS at 09:18

## 2024-09-30 RX ADMIN — ACETAMINOPHEN 650 MG: 325 TABLET, FILM COATED ORAL at 09:15

## 2024-09-30 RX ADMIN — AMPICILLIN SODIUM AND SULBACTAM SODIUM 3 G: 2; 1 INJECTION, POWDER, FOR SOLUTION INTRAMUSCULAR; INTRAVENOUS at 14:40

## 2024-09-30 RX ADMIN — LEVETIRACETAM 500 MG: 500 TABLET, FILM COATED ORAL at 09:16

## 2024-09-30 RX ADMIN — LEVETIRACETAM 500 MG: 500 TABLET, FILM COATED ORAL at 20:35

## 2024-09-30 RX ADMIN — DIVALPROEX SODIUM 500 MG: 500 TABLET, DELAYED RELEASE ORAL at 06:08

## 2024-09-30 RX ADMIN — DULOXETINE HYDROCHLORIDE 60 MG: 60 CAPSULE, DELAYED RELEASE ORAL at 20:35

## 2024-09-30 RX ADMIN — OXYCODONE HYDROCHLORIDE 10 MG: 5 TABLET ORAL at 06:08

## 2024-09-30 RX ADMIN — ACETAMINOPHEN 650 MG: 325 TABLET, FILM COATED ORAL at 03:06

## 2024-09-30 RX ADMIN — MAGNESIUM SULFATE HEPTAHYDRATE 2 G: 40 INJECTION, SOLUTION INTRAVENOUS at 12:02

## 2024-09-30 RX ADMIN — LEVOTHYROXINE SODIUM 75 MCG: 0.07 TABLET ORAL at 06:08

## 2024-09-30 RX ADMIN — PANTOPRAZOLE SODIUM 40 MG: 40 TABLET, DELAYED RELEASE ORAL at 06:08

## 2024-09-30 RX ADMIN — HYDROMORPHONE HYDROCHLORIDE 0.2 MG: 1 INJECTION, SOLUTION INTRAMUSCULAR; INTRAVENOUS; SUBCUTANEOUS at 20:34

## 2024-09-30 RX ADMIN — METOPROLOL TARTRATE 12.5 MG: 25 TABLET, FILM COATED ORAL at 20:35

## 2024-09-30 RX ADMIN — OXYCODONE HYDROCHLORIDE 10 MG: 5 TABLET ORAL at 18:25

## 2024-09-30 RX ADMIN — ASPIRIN 81 MG: 81 TABLET, COATED ORAL at 09:16

## 2024-09-30 RX ADMIN — AMPICILLIN SODIUM AND SULBACTAM SODIUM 3 G: 2; 1 INJECTION, POWDER, FOR SOLUTION INTRAMUSCULAR; INTRAVENOUS at 03:06

## 2024-09-30 RX ADMIN — ACETAMINOPHEN 650 MG: 325 TABLET, FILM COATED ORAL at 14:40

## 2024-09-30 RX ADMIN — ATORVASTATIN CALCIUM 80 MG: 80 TABLET, FILM COATED ORAL at 20:35

## 2024-09-30 RX ADMIN — HEPARIN SODIUM 1600 UNITS/HR: 10000 INJECTION, SOLUTION INTRAVENOUS at 18:26

## 2024-09-30 RX ADMIN — WARFARIN SODIUM 5 MG: 5 TABLET ORAL at 18:24

## 2024-09-30 RX ADMIN — AMPICILLIN SODIUM AND SULBACTAM SODIUM 3 G: 2; 1 INJECTION, POWDER, FOR SOLUTION INTRAMUSCULAR; INTRAVENOUS at 20:34

## 2024-09-30 RX ADMIN — ACETAMINOPHEN 650 MG: 325 TABLET, FILM COATED ORAL at 20:35

## 2024-09-30 RX ADMIN — HYDROMORPHONE HYDROCHLORIDE 0.2 MG: 1 INJECTION, SOLUTION INTRAMUSCULAR; INTRAVENOUS; SUBCUTANEOUS at 12:01

## 2024-09-30 RX ADMIN — DIVALPROEX SODIUM 500 MG: 500 TABLET, DELAYED RELEASE ORAL at 14:40

## 2024-09-30 RX ADMIN — DIVALPROEX SODIUM 500 MG: 500 TABLET, DELAYED RELEASE ORAL at 21:09

## 2024-09-30 RX ADMIN — HEPARIN SODIUM 1600 UNITS/HR: 10000 INJECTION, SOLUTION INTRAVENOUS at 01:13

## 2024-09-30 ASSESSMENT — COGNITIVE AND FUNCTIONAL STATUS - GENERAL
TOILETING: A LOT
MOVING TO AND FROM BED TO CHAIR: A LOT
DAILY ACTIVITIY SCORE: 16
DAILY ACTIVITIY SCORE: 16
DRESSING REGULAR LOWER BODY CLOTHING: A LOT
DRESSING REGULAR UPPER BODY CLOTHING: A LITTLE
PERSONAL GROOMING: A LITTLE
MOBILITY SCORE: 14
STANDING UP FROM CHAIR USING ARMS: A LOT
CLIMB 3 TO 5 STEPS WITH RAILING: TOTAL
HELP NEEDED FOR BATHING: A LOT
WALKING IN HOSPITAL ROOM: TOTAL
HELP NEEDED FOR BATHING: A LOT
TOILETING: A LOT
PERSONAL GROOMING: A LITTLE
DRESSING REGULAR UPPER BODY CLOTHING: A LITTLE
DRESSING REGULAR LOWER BODY CLOTHING: A LOT

## 2024-09-30 ASSESSMENT — PAIN SCALES - GENERAL
PAINLEVEL_OUTOF10: 8
PAINLEVEL_OUTOF10: 3
PAINLEVEL_OUTOF10: 9
PAINLEVEL_OUTOF10: 8
PAINLEVEL_OUTOF10: 9
PAINLEVEL_OUTOF10: 9

## 2024-09-30 ASSESSMENT — ACTIVITIES OF DAILY LIVING (ADL)
BATHING_WHERE_ASSESSED: EDGE OF BED
BATHING_LEVEL_OF_ASSISTANCE: MODERATE ASSISTANCE
BATHING_ASSISTANCE: MODERATE
ADL_ASSISTANCE: NEEDS ASSISTANCE
HOME_MANAGEMENT_TIME_ENTRY: 25

## 2024-09-30 ASSESSMENT — PAIN DESCRIPTION - ORIENTATION
ORIENTATION: LEFT

## 2024-09-30 ASSESSMENT — PAIN - FUNCTIONAL ASSESSMENT
PAIN_FUNCTIONAL_ASSESSMENT: 0-10
PAIN_FUNCTIONAL_ASSESSMENT: 0-10

## 2024-09-30 ASSESSMENT — PAIN DESCRIPTION - LOCATION
LOCATION: FOOT

## 2024-09-30 NOTE — PROGRESS NOTES
PROGRESS NOTE - PODIATRIC MEDICINE & SURGERY    SERVICE DATE: 9/30/2024  SERVICE TIME: 11:15    HPI    Patient is a 54 y.o. female POD 2 S/P left TMA. Patient is resting in bed. States her pain is about the same. Patient became emotional during the visit when talking about her last incident with wound vac on her other leg and the pain being so bad. Patient asks that her dressing only be changed once a day as it is painful and states she tired of everyone taking the dressing down to look at it. Patient's mood improved when told her surgery went well and her incision is healing well. Denies N/V/F/C/S.      ROS: Negative except as above.      Past Medical History  Past Medical History:   Diagnosis Date    Anxiety     Arthritis     Cancer (Multi)     Cellulitis     COPD (chronic obstructive pulmonary disease) (Multi)     Delayed emergence from general anesthesia     Diabetes mellitus (Multi)     Disease of thyroid gland     Diverticulitis     Epilepsy, unspecified, not intractable, without status epilepticus (Multi)     Epilepsy    HLD (hyperlipidemia)     Hx of blood clots     Hypertension     PAD (peripheral artery disease) (CMS-HCC)     Peripheral vascular disease, unspecified (CMS-HCC) 09/14/2022    PAD (peripheral artery disease)    PONV (postoperative nausea and vomiting)     PTSD (post-traumatic stress disorder)     Sleep apnea     Uterine cancer (Multi)          Problem List  Problem List Items Addressed This Visit          Cardiac and Vasculature    PAD (peripheral artery disease) (CMS-HCC)    Relevant Orders    Vascular US lower extremity arterial duplex left with HEMA (Completed)    Vascular US Ankle Brachial Index (HEMA) Without Exercise (Completed)    Vascular US Lower Extremity Vein Mapping Left (Completed)    Transthoracic Echo (TTE) Limited (Completed)    Atherosclerosis of native arteries of left leg with ulceration of other part of foot (Multi) - Primary    Relevant Orders    Vascular US Lower Extremity  Vein Mapping Left (Completed)    Case Request Cath Lab: Left Heart Cath (Completed)    Cardiac Catheterization Procedure (Completed)    Invasive vascular procedure (Completed)    Case Request Operating Room: Foot Transmetatarsal Amputation (Completed)    Surgical Pathology Exam    Tissue/Wound Culture/Smear (Completed)    Preoperative cardiovascular examination    Relevant Orders    Case Request Cath Lab: Left Heart Cath (Completed)    Cardiac Catheterization Procedure (Completed)    Invasive vascular procedure (Completed)    Critical limb ischemia of left lower extremity (Multi)    Relevant Orders    Transthoracic Echo (TTE) Limited (Completed)    Case Request Operating Room: Creation Bypass Graft Tibial Artery (Completed)       Endocrine/Metabolic    Diabetes mellitus, type 2 (Multi)       Musculoskeletal and Injuries    * (Principal) Gangrene of toe of left foot (Multi)    Relevant Orders    Vascular US lower extremity arterial duplex left with HEMA (Completed)    Vascular US Lower Extremity Vein Mapping Left (Completed)    Case Request Operating Room: Foot Transmetatarsal Amputation (Completed)    Surgical Pathology Exam    Tissue/Wound Culture/Smear (Completed)     Other Visit Diagnoses       Other specified peripheral vascular diseases (CMS-HCC)        Critical limb ischemia of both lower extremities with nonbiological bypass graft with gangrene (Multi)        Relevant Orders    Transthoracic Echo (TTE) Limited (Completed)    Critical limb ischemia with history of revascularization of same extremity (Multi)        Relevant Orders    Transthoracic Echo (TTE) Limited (Completed)    Encounter for other preprocedural examination        Atherosclerosis of autologous vein bypass graft(s) of the extremities with intermittent claudication, bilateral legs (CMS-HCC)        Atherosclerosis of native arteries of extremities with intermittent claudication, bilateral legs (CMS-HCC)                  Medications and Allergies  reviewed.      PHYSICAL EXAM    General: AOx3. NAD. Cooperative. Pleasant. Dressing clean, dry, intact.     Left focused exam.   Vascular: Lightly-palpable DP pulse. Non-palpable PT pulse. Mild pitting edema noted. Hair growth absent. Temperature is cool to cool from tibial tuberosity to foot. No lymphatic streaking noted. Left foot diffusely edematous.      Musculoskeletal: Gross active and passive ROM intact to age and activity level. Moves all extremities spontaneously. Prior R hip disarticulation. Left TMA.     Neurological: Intact light touch sensation. Pain stimuli intact. Denies any numbness, burning or tingling.     Dermatologic: Skin appears diffusely xerotic. No rashes or nodules noted.No hyperkeratotic tissue noted.     Wound: left TMA incision  See images in media tab  Incision site with slight erythema and edema. Sutures and staples in place. Skin is well coapted.   Minimal serosanguinous drainage.  No vinny-wound maceration.   No evidence of ascending cellulitis or lymphangitis.  No palpable fluctuance. No malodor. No increased warmth.   No tunneling or tracking.          RESULTS    CBC  Lab Results   Component Value Date    WBC 11.3 09/30/2024    HGB 8.4 (L) 09/30/2024    HCT 28.1 (L) 09/30/2024    MCV 82 09/30/2024     09/30/2024       ESR  Lab Results   Component Value Date    SEDRATE 37 (H) 09/17/2021       CRP  Lab Results   Component Value Date    CRP 17.25 (H) 12/06/2023       HgbA1c  Lab Results   Component Value Date    HGBA1C 11.4 (H) 09/18/2024       Cultures  Susceptibility data from last 90 days.  Collected Specimen Info Organism   09/18/24 Tissue/Biopsy from Diabetic Foot Ulcer Mixed Skin Microorganisms            ASSESSMENT  #Non pressure ulceration down to and including bone, left foot  #OM, left foot  #Gangrene, left toes 3&4  #Acquired absence of RLE  #Acquired absence of left digits 1, 2, 5   #PAD  #Diabetes mellitus  # S/P Left femoral endarterectomy, profundaplasty, common  umfybdx-vq-vxzpqltg tibial bypass using 6 mm ringed PTFE, left lower extremity angiogram (DOS: 9/25/24)  #S/P TMA left      PLAN  - Interval charts, labs, findings reviewed. All findings discussed with patient. All questions answered to patient's satisfaction.  - Per ID, continue IV Abx.  - Dressing: betadine soaked adaptic, 4x4, ABD, webril, Ace. Nursing may reinforce with ABDs.   - Patient is to be WBAT in surgical shoe left foot.  - Podiatry will continue following.      This patient was discussed and evaluated with Dr. Meaghan Israel DPM.      This is a preliminary note with attending attestation to follow.       Jaspreet Ramirez DPM PGY1  Podiatric Medicine and Surgery

## 2024-09-30 NOTE — PROGRESS NOTES
VASCULAR SURGERY PROGRESS NOTE  Assessment/Plan   Shirin Slater is 54 y.o. female with history of severe PAD s/p R hip disarticulation s/p L CFA and EIA embolectomy 12/23 s/p multiple L toe amputations, T2DM complicated by diabetic neuropathy, HTN, HLD, renal and splenic thromboses on aspirin/plavix/coumadin, and current tobacco use who presents as a transfer from [facility] for management of L 2nd toe gangrene with concern of osteomyelitis.  Now s/p Left femoral endarterectomy, profundaplasty, common nztwrmq-nv-qkltwpau tibial bypass using 6 mm ringed PTFE, left lower extremity angiogram. Patient s/p TMA with podiatry 9/27.     9/25 : Pre-op exam: Left foot monophasic DP and PT  Post-op exam: Left foot palpable AT, multiphasic DP, monophasic PT  9/27 L TMA with podiatry    Plan:  Pain control: oxy 5/10 PRN and scheduled tylenol  Maintain -160 mmHg  Sliding scale insulin  Omeprazole PO daily  ASA, high intensity heparin gtt, holding plavix  No additional intervention per podiatry so will intiate coumadin  Plan to start coumadin 9/30  Continue q4h neurovascular checks  Regular diet  Continue Unasyn  ID recommendations 9/28: Continue Unasyn 3 g IV every 6 hours while patient is in the hospital and at discharge recommend Augmentin 875/125 mg p.o. every 12 hours until Oct 10.   Maintain L internal jugular CVC      Dispo: continue care on LT7, LWB on LLE in a surgical shoe-- will likely need SNF on DC    D/w attending, Dr. Dunphy Ananya Tawde MD  PGY1 Vascular Surgery  [id number]  Available via secure chat    ---------------------------------------------------------------------    Subjective   No acute events overnight  Denies chest pain or shortness of breath  Denies abdominal pain, nausea/vomiting  Tolerating PO intake  Feeling well this AM  Voiding independently without issue since vasques removed    ---------------------------------------------------------------------    Objective   Vitals:  Heart  Rate:  [78-93]   Temp:  [36.2 °C (97.2 °F)-36.8 °C (98.2 °F)]   Resp:  [6-16]   BP: ()/(46-72)   SpO2:  [95 %-97 %]     Exam:  Constitutional: No acute distress, resting comfortably  Neuro:  AOx3, grossly intact  ENMT: moist mucous membranes  CV: no tachycardia  Pulm: non-labored on room air  GI: soft, non-tender, non-distended  Skin: warm and dry. TMA post-op dressing in place without strikethrough  Musculoskeletal: moving all extremities. S/p R hip disarticulation  Vascular: L foot - Palpable AT on left foot since OR    Labs:  Results from last 7 days   Lab Units 09/30/24  0615 09/29/24  0612 09/28/24  0558   WBC AUTO x10*3/uL 11.3 12.1* 12.8*   HEMOGLOBIN g/dL 8.4* 8.2* 6.0*   PLATELETS AUTO x10*3/uL 296 235 199      Results from last 7 days   Lab Units 09/30/24  0615 09/29/24  0612 09/28/24  0558   SODIUM mmol/L 143 141 141   POTASSIUM mmol/L 4.1 3.4* 3.1*   CHLORIDE mmol/L 103 104 103   CO2 mmol/L 32 30 29   BUN mg/dL 6 9 9   CREATININE mg/dL 0.50 0.56 0.55   GLUCOSE mg/dL 122* 113* 160*   MAGNESIUM mg/dL 1.65 1.80 1.70   PHOSPHORUS mg/dL 3.0 2.7 2.4*      Results from last 7 days   Lab Units 09/25/24  2211   INR  1.2*   PROTIME seconds 13.2*   APTT seconds 37      Results from last 7 days   Lab Units 09/30/24  0615 09/29/24  1708 09/29/24  1148   ANTI XA UNFRACTIONATED IU/mL 0.3 0.4 0.5

## 2024-09-30 NOTE — PROGRESS NOTES
Occupational Therapy    Evaluation and Treatment    Patient Name: Shirin Slater  MRN: [id number]  Today's Date: 9/30/2024  Room: [address] [place], [place] [address]  Time Calculation  Start Time: 0855  Stop Time: 0935  Time Calculation (min): 40 min    Assessment  IP OT Assessment  OT Assessment: Pt's abilty to complete ADLs currently limited by NWB LLE and deficits in functional strength, activity tolerance, and dynamic balance. The pt demos the ability to engage in ADL tasks with Max - SBA. Pt will benefit from continued OT while admitted to increase IND and safety prior to d/c.  Prognosis: Good  Barriers to Discharge: None  Evaluation/Treatment Tolerance: Patient tolerated treatment well  Medical Staff Made Aware: Yes  End of Session Communication: Bedside nurse  End of Session Patient Position: Bed, 3 rail up, Alarm on  Plan:  Inpatient Plan  Treatment Interventions: ADL retraining, Functional transfer training, Endurance training, Patient/family training, Equipment evaluation/education, Compensatory technique education  OT Frequency: 3 times per week  OT Discharge Recommendations: Moderate intensity level of continued care  OT Recommended Transfer Status: Assist of 2  OT - OK to Discharge: Yes  OT Assessment  OT Assessment Results: Decreased ADL status, Decreased safe judgment during ADL, Decreased functional mobility, Decreased IADLs  Prognosis: Good  Barriers to Discharge: None  Evaluation/Treatment Tolerance: Patient tolerated treatment well  Medical Staff Made Aware: Yes  Strengths: Attitude of self, Support of Caregivers  Barriers to Participation: Comorbidities, Premorbid level of function    Subjective   Current Problem:  1. Atherosclerosis of native arteries of left leg with ulceration of other part of foot (Multi)  Vascular US Lower Extremity Vein Mapping Left    Vascular US Lower Extremity Vein Mapping Left    Case Request Cath Lab: Left Heart Cath    Case Request Cath Lab: Left Heart Cath    Cardiac Catheterization  Procedure    Cardiac Catheterization Procedure    Invasive vascular procedure    Invasive vascular procedure    Case Request Operating Room: Foot Transmetatarsal Amputation    Case Request Operating Room: Foot Transmetatarsal Amputation    Surgical Pathology Exam    Surgical Pathology Exam    Tissue/Wound Culture/Smear    Tissue/Wound Culture/Smear    CANCELED: Lower extremity vein mapping bilateral    CANCELED: Lower extremity vein mapping bilateral      2. Type 2 diabetes mellitus with other neurologic complication, unspecified whether long term insulin use (Multi)  CANCELED: Vascular US lower extremity arterial duplex right    CANCELED: Vascular US lower extremity arterial duplex right    CANCELED: Vascular US lower extremity vein mapping left    CANCELED: Vascular US lower extremity vein mapping left      3. Gangrene of toe of left foot (Multi)  Vascular US lower extremity arterial duplex left with HEMA    Vascular US lower extremity arterial duplex left with HEMA    Vascular US Lower Extremity Vein Mapping Left    Vascular US Lower Extremity Vein Mapping Left    Case Request Operating Room: Foot Transmetatarsal Amputation    Case Request Operating Room: Foot Transmetatarsal Amputation    Surgical Pathology Exam    Surgical Pathology Exam    Tissue/Wound Culture/Smear    Tissue/Wound Culture/Smear    CANCELED: Lower extremity vein mapping bilateral    CANCELED: Lower extremity vein mapping bilateral      4. PAD (peripheral artery disease) (CMS-HCC)  Vascular US lower extremity arterial duplex left with HEMA    Vascular US lower extremity arterial duplex left with HEMA    Vascular US Ankle Brachial Index (HEMA) Without Exercise    Vascular US Ankle Brachial Index (HEMA) Without Exercise    Vascular US Lower Extremity Vein Mapping Left    Vascular US Lower Extremity Vein Mapping Left    Transthoracic Echo (TTE) Limited    Transthoracic Echo (TTE) Limited    CANCELED: Vascular US PVR without exercise    CANCELED:  Vascular US PVR without exercise    CANCELED: Vascular US lower extremity arterial duplex right    CANCELED: Vascular US lower extremity arterial duplex right    CANCELED: Lower extremity vein mapping bilateral    CANCELED: Lower extremity vein mapping bilateral    CANCELED: Case Request Operating Room: Angiogram Lower Extremity    CANCELED: Case Request Operating Room: Angiogram Lower Extremity    CANCELED: Vascular US lower extremity vein mapping left    CANCELED: Vascular US lower extremity vein mapping left      5. Other specified peripheral vascular diseases (CMS-HCC)  Vascular US Ankle Brachial Index (HEMA) Without Exercise      6. Critical limb ischemia of left lower extremity (Multi)  Transthoracic Echo (TTE) Limited    Transthoracic Echo (TTE) Limited    Case Request Operating Room: Creation Bypass Graft Tibial Artery    Case Request Operating Room: Creation Bypass Graft Tibial Artery    CANCELED: Vascular US lower extremity vein mapping left    CANCELED: Vascular US lower extremity vein mapping left      7. Critical limb ischemia of both lower extremities with nonbiological bypass graft with gangrene (Multi)  Transthoracic Echo (TTE) Limited    Transthoracic Echo (TTE) Limited    CANCELED: Vascular US lower extremity vein mapping left    CANCELED: Vascular US lower extremity vein mapping left      8. Critical limb ischemia with history of revascularization of same extremity (Multi)  Transthoracic Echo (TTE) Limited    Transthoracic Echo (TTE) Limited      9. Encounter for other preprocedural examination  Transthoracic Echo (TTE) Limited      10. Preoperative cardiovascular examination  Case Request Cath Lab: Left Heart Cath    Case Request Cath Lab: Left Heart Cath    Cardiac Catheterization Procedure    Cardiac Catheterization Procedure    Invasive vascular procedure    Invasive vascular procedure      11. 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  Prior to Session Communication: Bedside nurse  Patient Position Received: Bed, 3 rail up, Alarm off, not on at start of session  Family/Caregiver Present: No  General Comment: Pt agreeable to OT evaluation, reports fustration with level of care and with having to maintani NWB to L LE   Precautions:  LE Weight Bearing Status: Left Non-Weight Bearing  Medical Precautions: Fall precautions  Precautions Comment: DOT RUSSELL  Vital Signs:  Vital Signs (Past 2hrs)        Date/Time Vitals Session Patient Position Pulse Resp SpO2 BP MAP (mmHg)    09/30/24 0850 --  --  --  --  --  92/48  --     09/30/24 0855 During OT  Lying  --  --  97 %  97/56  --     09/30/24 1000 --  --  87  9  --  101/52  66                   Pain:  Pain Assessment  Pain Assessment: 0-10  0-10 (Numeric) Pain Score: 9  Pain Type: Surgical pain  Pain Location: Foot  Pain Orientation: Left  Pain Interventions: Repositioned  Response to Interventions: Informed RN - no change  Lines/Tubes/Drains:  CVC 09/25/24 Double lumen Left Internal jugular (Active)   Number of days: 5       External Urinary Catheter Female (Active)   Number of days: 0         Objective   Cognition:  Overall Cognitive Status: Within Functional Limits  Orientation Level: Oriented X4           Home Living:  Type of Home: Apartment  Lives With: Alone (has 2x HHA)  Home Adaptive Equipment: Walker rolling or standard, Wheelchair-manual, Cane, Hospital bed  Home Layout: One level  Home Access: No concerns  Bathroom Shower/Tub: Tub/shower unit  Bathroom  Equipment: Tub transfer bench, Bedside commode  Home Living Comments: HHA 7 days/week   Prior Function:  Level of [place]: Needs assistance with ADLs, Needs assistance with homemaking  Receives Help From: Home health  ADL Assistance: Needs assistance (Reports setup assist for dressing from bed level, [place] with bathing [place] [facility] [place]  Homemaking Assistance: Needs assistance  Homemaking Assistance Comments: Likes to cook, assist with all IADLs  Ambulatory Assistance: Independent (Reports she IND transfers to w/c and to toiletBSC as needed at baseline)  Leisure: Cooking, jurgen  Prior Function Comments: - falls  IADL History:  Homemaking Responsibilities: No  Current License: No  Occupation: Retired  Leisure and Hobbies: Mersimo, jurgen  ADL:  Eating Assistance: Independent (Anticipated)  Grooming Assistance: Stand by (Anticipated)  Bathing Assistance: Moderate  UE Dressing Assistance: Moderate  LE Dressing Assistance: Maximal (Anticipated)  Toileting Assistance with Device: Moderate  Activity Tolerance:  Endurance: Tolerates 10 - 20 min exercise with multiple rests  Balance:  Static Sitting Balance  Static Sitting-Balance Support: No upper extremity supported, Feet supported  Static Sitting-Level of Assistance: Distant supervision  Static Sitting-Comment/Number of Minutes: EOB  Bed Mobility/Transfers: Bed Mobility/Transfers: Bed Mobility  Bed Mobility: Yes  Bed Mobility 1  Bed Mobility 1: Supine to sitting, Sitting to supine  Level of Assistance 1: Close supervision  Bed Mobility Comments 1: SBA, cues for improved safety  Bed Mobility 2  Bed Mobility  2: Rolling right, Rolling left  Level of Assistance 2: Close supervision  Bed Mobility Comments 2: Use of grab bars, cues for safety   and Transfers  Transfer: No (Pt unwilling to attempt at this time)  IADL's:   Homemaking Responsibilities: No  Current License: No  Occupation: Retired  Leisure and Hobbies: Mersimo, jurgen  Vision: Vision - Basic  Assessment  Current Vision: Wears glasses all the time   and    Sensation:  Light Touch: No apparent deficits  Strength:  Strength Comments: BRENDEN UBrandi WFL  Perception:  Inattention/Neglect: Appears intact  Coordination:  Movements are Fluid and Coordinated: Yes   Hand Function:  Hand Function  Gross Grasp: Functional  Coordination: Functional  Extremities:   RUE   RUE : Within Functional Limits, LUE   LUE: Within Functional Limits,  , and        Outcome Measures: [facility] Daily Activity  Putting on and taking off regular lower body clothing: A lot  Bathing (including washing, rinsing, drying): A lot  Putting on and taking off regular upper body clothing: A little  Toileting, which includes using toilet, bedpan or urinal: A lot  Taking care of personal grooming such as brushing teeth: A little  Eating Meals: None  Daily Activity - Total Score: 16         ,     OT Adult Other Outcome Measures  4AT: 4 AT -    Education Documentation  Body Mechanics, taught by Yon Vanegas OT at 9/30/2024 10:36 AM.  Learner: Patient  Readiness: Acceptance  Method: Explanation, Demonstration  Response: Verbalizes Understanding, Needs Reinforcement    Precautions, taught by Yon Vanegas OT at 9/30/2024 10:36 AM.  Learner: Patient  Readiness: Acceptance  Method: Explanation, Demonstration  Response: Verbalizes Understanding, Needs Reinforcement    ADL Training, taught by Yon Vanegas OT at 9/30/2024 10:36 AM.  Learner: Patient  Readiness: Acceptance  Method: Explanation, Demonstration  Response: Verbalizes Understanding, Needs Reinforcement    Education Comments  No comments found.        Goals:   Encounter Problems       Encounter Problems (Active)       ADLs       Patient with complete lower body dressing with supervision level of assistance donning and doffing all LE clothes  with PRN adaptive equipment while supported sitting (Progressing)       Start:  09/30/24    Expected End:  10/21/24            Patient will complete daily grooming  tasks brushing teeth and washing face/hair with independent level of assistance and PRN adaptive equipment while edge of bed . (Progressing)       Start:  09/30/24    Expected End:  10/21/24            Patient will complete toileting including hygiene clothing management/hygiene with minimal assist  level of assistance and bedside commode. (Progressing)       Start:  09/30/24    Expected End:  10/21/24               BALANCE       Pt will maintain dynamic sitting balance during ADL task with independent level of assistance in order to demonstrate decreased risk of falling and improved postural control. (Progressing)       Start:  09/30/24    Expected End:  10/21/24               TRANSFERS       Patient will complete functional transfers with least restrictive device with minimal assist  level of assistance. (Progressing)       Start:  09/30/24    Expected End:  10/21/24                   Treatment Completed on Evaluation  Activities of Daily Living:       UE Bathing  UE Bathing Level of Assistance: Minimum assistance  UE Bathing Where Assessed: Bed level  UE Bathing Comments: Pt completed UE sponge bath from bed level with Min A for clothing management and thoroghness of completion, cues for increased IND.  LE Bathing  LE Bathing Level of Assistance: Moderate assistance  LE Bathing Where Assessed: Edge of bed  LE Bathing Comments: Pt able to partially clean proximal L LE with needed assist for distal LE if foot not wrapped, cues and education on increasing IND  UE Dressing  UE Dressing Level of Assistance: Minimum assistance  UE Dressing Where Assessed: Bed level  UE Dressing Comments: Min A for managing gown and pt doffed and donned at bed level     Toileting  Toileting Level of Assistance: Moderate assistance  Where Assessed: Bed level  Toileting Comments: Mod A for clothing management and hygiene following urination in bed    09/30/24 at 10:37 AM   Yon Vanegas OT   Rehab Office: [phone]

## 2024-09-30 NOTE — PROGRESS NOTES
Shirin Slater is a 54 y.o. female on day 12 of admission presenting with Gangrene of toe of left foot (Multi).    Transitional Care Coordination Progress Note:  Patient discussed during interdisciplinary rounds.   Team members present: MD and TCC  Plan per Medical/Surgical team: Patient transitioning to oral anticoagulation.  Payor: Carla Dual  Discharge disposition: PT/OT recommended SNF, waiting on patients decision of SNF or home care.  Potential Barriers: None  ADOD: 10/02/24    JOSE TAYLOR RN

## 2024-10-01 LAB
ANION GAP SERPL CALC-SCNC: 12 MMOL/L (ref 10–20)
BUN SERPL-MCNC: 6 MG/DL (ref 6–23)
CALCIUM SERPL-MCNC: 8.3 MG/DL (ref 8.6–10.6)
CHLORIDE SERPL-SCNC: 103 MMOL/L (ref 98–107)
CO2 SERPL-SCNC: 32 MMOL/L (ref 21–32)
CREAT SERPL-MCNC: 0.55 MG/DL (ref 0.5–1.05)
EGFRCR SERPLBLD CKD-EPI 2021: >90 ML/MIN/1.73M*2
ERYTHROCYTE [DISTWIDTH] IN BLOOD BY AUTOMATED COUNT: 23.4 % (ref 11.5–14.5)
GLUCOSE BLD MANUAL STRIP-MCNC: 126 MG/DL (ref 74–99)
GLUCOSE BLD MANUAL STRIP-MCNC: 131 MG/DL (ref 74–99)
GLUCOSE BLD MANUAL STRIP-MCNC: 153 MG/DL (ref 74–99)
GLUCOSE BLD MANUAL STRIP-MCNC: 168 MG/DL (ref 74–99)
GLUCOSE SERPL-MCNC: 132 MG/DL (ref 74–99)
HCT VFR BLD AUTO: 29.2 % (ref 36–46)
HGB BLD-MCNC: 8.8 G/DL (ref 12–16)
INR PPP: 1 (ref 0.9–1.1)
MAGNESIUM SERPL-MCNC: 2.03 MG/DL (ref 1.6–2.4)
MCH RBC QN AUTO: 25.1 PG (ref 26–34)
MCHC RBC AUTO-ENTMCNC: 30.1 G/DL (ref 32–36)
MCV RBC AUTO: 83 FL (ref 80–100)
NRBC BLD-RTO: 0 /100 WBCS (ref 0–0)
PHOSPHATE SERPL-MCNC: 3.6 MG/DL (ref 2.5–4.9)
PLATELET # BLD AUTO: 342 X10*3/UL (ref 150–450)
POTASSIUM SERPL-SCNC: 4.1 MMOL/L (ref 3.5–5.3)
PROTHROMBIN TIME: 11.7 SECONDS (ref 9.8–12.8)
RBC # BLD AUTO: 3.51 X10*6/UL (ref 4–5.2)
SODIUM SERPL-SCNC: 143 MMOL/L (ref 136–145)
UFH PPP CHRO-ACNC: 0.5 IU/ML
WBC # BLD AUTO: 10.8 X10*3/UL (ref 4.4–11.3)

## 2024-10-01 PROCEDURE — 1200000002 HC GENERAL ROOM WITH TELEMETRY DAILY

## 2024-10-01 PROCEDURE — 85610 PROTHROMBIN TIME: CPT

## 2024-10-01 PROCEDURE — 82947 ASSAY GLUCOSE BLOOD QUANT: CPT

## 2024-10-01 PROCEDURE — 85520 HEPARIN ASSAY: CPT | Performed by: NURSE PRACTITIONER

## 2024-10-01 PROCEDURE — 2500000001 HC RX 250 WO HCPCS SELF ADMINISTERED DRUGS (ALT 637 FOR MEDICARE OP): Performed by: NURSE PRACTITIONER

## 2024-10-01 PROCEDURE — 85027 COMPLETE CBC AUTOMATED: CPT | Performed by: NURSE PRACTITIONER

## 2024-10-01 PROCEDURE — 80048 BASIC METABOLIC PNL TOTAL CA: CPT | Performed by: NURSE PRACTITIONER

## 2024-10-01 PROCEDURE — 2500000004 HC RX 250 GENERAL PHARMACY W/ HCPCS (ALT 636 FOR OP/ED): Performed by: NURSE PRACTITIONER

## 2024-10-01 PROCEDURE — 2500000002 HC RX 250 W HCPCS SELF ADMINISTERED DRUGS (ALT 637 FOR MEDICARE OP, ALT 636 FOR OP/ED): Performed by: NURSE PRACTITIONER

## 2024-10-01 PROCEDURE — 2500000004 HC RX 250 GENERAL PHARMACY W/ HCPCS (ALT 636 FOR OP/ED)

## 2024-10-01 PROCEDURE — 84100 ASSAY OF PHOSPHORUS: CPT | Performed by: NURSE PRACTITIONER

## 2024-10-01 PROCEDURE — 99222 1ST HOSP IP/OBS MODERATE 55: CPT | Performed by: INTERNAL MEDICINE

## 2024-10-01 PROCEDURE — 83735 ASSAY OF MAGNESIUM: CPT | Performed by: NURSE PRACTITIONER

## 2024-10-01 PROCEDURE — 2500000002 HC RX 250 W HCPCS SELF ADMINISTERED DRUGS (ALT 637 FOR MEDICARE OP, ALT 636 FOR OP/ED)

## 2024-10-01 RX ORDER — WARFARIN SODIUM 5 MG/1
5 TABLET ORAL ONCE
Status: COMPLETED | OUTPATIENT
Start: 2024-10-01 | End: 2024-10-01

## 2024-10-01 RX ORDER — IPRATROPIUM BROMIDE AND ALBUTEROL SULFATE 2.5; .5 MG/3ML; MG/3ML
3 SOLUTION RESPIRATORY (INHALATION) EVERY 6 HOURS PRN
Status: DISCONTINUED | OUTPATIENT
Start: 2024-10-01 | End: 2024-10-04 | Stop reason: HOSPADM

## 2024-10-01 RX ORDER — ENOXAPARIN SODIUM 100 MG/ML
1 INJECTION SUBCUTANEOUS 2 TIMES DAILY
Status: DISCONTINUED | OUTPATIENT
Start: 2024-10-01 | End: 2024-10-04 | Stop reason: HOSPADM

## 2024-10-01 RX ADMIN — HYDROMORPHONE HYDROCHLORIDE 0.2 MG: 1 INJECTION, SOLUTION INTRAMUSCULAR; INTRAVENOUS; SUBCUTANEOUS at 16:59

## 2024-10-01 RX ADMIN — INSULIN LISPRO 2 UNITS: 100 INJECTION, SOLUTION INTRAVENOUS; SUBCUTANEOUS at 13:04

## 2024-10-01 RX ADMIN — DIVALPROEX SODIUM 500 MG: 500 TABLET, DELAYED RELEASE ORAL at 06:07

## 2024-10-01 RX ADMIN — LEVETIRACETAM 500 MG: 500 TABLET, FILM COATED ORAL at 20:35

## 2024-10-01 RX ADMIN — OXYCODONE HYDROCHLORIDE 10 MG: 5 TABLET ORAL at 13:04

## 2024-10-01 RX ADMIN — WARFARIN SODIUM 5 MG: 5 TABLET ORAL at 17:59

## 2024-10-01 RX ADMIN — ACETAMINOPHEN 650 MG: 325 TABLET, FILM COATED ORAL at 02:12

## 2024-10-01 RX ADMIN — AMPICILLIN SODIUM AND SULBACTAM SODIUM 3 G: 2; 1 INJECTION, POWDER, FOR SOLUTION INTRAMUSCULAR; INTRAVENOUS at 14:44

## 2024-10-01 RX ADMIN — DIVALPROEX SODIUM 500 MG: 500 TABLET, DELAYED RELEASE ORAL at 14:43

## 2024-10-01 RX ADMIN — AMPICILLIN SODIUM AND SULBACTAM SODIUM 3 G: 2; 1 INJECTION, POWDER, FOR SOLUTION INTRAMUSCULAR; INTRAVENOUS at 09:05

## 2024-10-01 RX ADMIN — HYDROMORPHONE HYDROCHLORIDE 0.2 MG: 1 INJECTION, SOLUTION INTRAMUSCULAR; INTRAVENOUS; SUBCUTANEOUS at 21:08

## 2024-10-01 RX ADMIN — ASPIRIN 81 MG: 81 TABLET, COATED ORAL at 09:05

## 2024-10-01 RX ADMIN — METOPROLOL TARTRATE 12.5 MG: 25 TABLET, FILM COATED ORAL at 09:06

## 2024-10-01 RX ADMIN — AMPICILLIN SODIUM AND SULBACTAM SODIUM 3 G: 2; 1 INJECTION, POWDER, FOR SOLUTION INTRAMUSCULAR; INTRAVENOUS at 02:13

## 2024-10-01 RX ADMIN — OXYCODONE HYDROCHLORIDE 10 MG: 5 TABLET ORAL at 06:07

## 2024-10-01 RX ADMIN — LEVETIRACETAM 500 MG: 500 TABLET, FILM COATED ORAL at 09:06

## 2024-10-01 RX ADMIN — BUSPIRONE HYDROCHLORIDE 10 MG: 10 TABLET ORAL at 09:11

## 2024-10-01 RX ADMIN — HEPARIN SODIUM 1600 UNITS/HR: 10000 INJECTION, SOLUTION INTRAVENOUS at 10:46

## 2024-10-01 RX ADMIN — ACETAMINOPHEN 650 MG: 325 TABLET, FILM COATED ORAL at 09:07

## 2024-10-01 RX ADMIN — AMPICILLIN SODIUM AND SULBACTAM SODIUM 3 G: 2; 1 INJECTION, POWDER, FOR SOLUTION INTRAMUSCULAR; INTRAVENOUS at 20:36

## 2024-10-01 RX ADMIN — METOPROLOL TARTRATE 12.5 MG: 25 TABLET, FILM COATED ORAL at 20:35

## 2024-10-01 RX ADMIN — ACETAMINOPHEN 650 MG: 325 TABLET, FILM COATED ORAL at 14:43

## 2024-10-01 RX ADMIN — DULOXETINE HYDROCHLORIDE 60 MG: 60 CAPSULE, DELAYED RELEASE ORAL at 20:35

## 2024-10-01 RX ADMIN — PANTOPRAZOLE SODIUM 40 MG: 40 TABLET, DELAYED RELEASE ORAL at 06:07

## 2024-10-01 RX ADMIN — ATORVASTATIN CALCIUM 80 MG: 80 TABLET, FILM COATED ORAL at 20:35

## 2024-10-01 RX ADMIN — DIVALPROEX SODIUM 500 MG: 500 TABLET, DELAYED RELEASE ORAL at 21:14

## 2024-10-01 RX ADMIN — ACETAMINOPHEN 650 MG: 325 TABLET, FILM COATED ORAL at 20:35

## 2024-10-01 RX ADMIN — LOPERAMIDE HYDROCHLORIDE 2 MG: 2 CAPSULE ORAL at 14:43

## 2024-10-01 RX ADMIN — LEVOTHYROXINE SODIUM 75 MCG: 0.07 TABLET ORAL at 06:07

## 2024-10-01 ASSESSMENT — COGNITIVE AND FUNCTIONAL STATUS - GENERAL
MOVING TO AND FROM BED TO CHAIR: A LOT
DRESSING REGULAR LOWER BODY CLOTHING: A LOT
WALKING IN HOSPITAL ROOM: TOTAL
STANDING UP FROM CHAIR USING ARMS: A LOT
DAILY ACTIVITIY SCORE: 16
TOILETING: A LOT
MOBILITY SCORE: 14
PERSONAL GROOMING: A LITTLE
HELP NEEDED FOR BATHING: A LOT
DRESSING REGULAR UPPER BODY CLOTHING: A LITTLE
CLIMB 3 TO 5 STEPS WITH RAILING: TOTAL

## 2024-10-01 ASSESSMENT — PAIN SCALES - GENERAL
PAINLEVEL_OUTOF10: 9
PAINLEVEL_OUTOF10: 8
PAINLEVEL_OUTOF10: 0 - NO PAIN
PAINLEVEL_OUTOF10: 4
PAINLEVEL_OUTOF10: 9
PAINLEVEL_OUTOF10: 8
PAINLEVEL_OUTOF10: 3

## 2024-10-01 ASSESSMENT — PAIN - FUNCTIONAL ASSESSMENT
PAIN_FUNCTIONAL_ASSESSMENT: 0-10

## 2024-10-01 ASSESSMENT — PAIN DESCRIPTION - LOCATION: LOCATION: FOOT

## 2024-10-01 ASSESSMENT — PAIN DESCRIPTION - ORIENTATION: ORIENTATION: LEFT

## 2024-10-01 NOTE — CARE PLAN
The patient's goals for the shift include  free from injury    The clinical goals for the shift include remain hds        Problem: Skin  Goal: Decreased wound size/increased tissue granulation at next dressing change  Outcome: Progressing  Goal: Participates in plan/prevention/treatment measures  Outcome: Progressing  Goal: Prevent/manage excess moisture  Outcome: Progressing  Goal: Prevent/minimize sheer/friction injuries  Outcome: Progressing  Goal: Promote/optimize nutrition  Outcome: Progressing  Goal: Promote skin healing  Outcome: Progressing     Problem: Pain  Goal: Takes deep breaths with improved pain control throughout the shift  Outcome: Progressing  Goal: Turns in bed with improved pain control throughout the shift  Outcome: Progressing  Goal: Walks with improved pain control throughout the shift  Outcome: Progressing  Goal: Performs ADL's with improved pain control throughout shift  Outcome: Progressing  Goal: Participates in PT with improved pain control throughout the shift  Outcome: Progressing  Goal: Free from opioid side effects throughout the shift  Outcome: Progressing  Goal: Free from acute confusion related to pain meds throughout the shift  Outcome: Progressing

## 2024-10-01 NOTE — CONSULTS
Inpatient consult to Vascular Medicine  Consult performed by: Robyn Toscano MD  Consult ordered by: Kaitlyn M Dunphy, MD          Reason For Consult  Resumption of anticoagulation     History Of Present Illness  Shirin Slater is a 54 y.o. female with PMHx of uncontrolled T2DM (A1c 11.4% 9/2024), tobacco use disorder, renal and splenic artery thromboses (2015), DVT (2016), HTN, HLD, hypothyroidism and PAD s/p R hip disarticulation s/p L CFA and EIA embolectomy and multiple L toe amputations (first, second, and fifth) who presented as a OSH transfer from [facility] for management of L 2nd toe gangrene c/f osteomyelitis. Patient underwent left femoral endarterectomy, profundaplasty, common [id number] tibial bypass and LLE angiogram on 9/25. Left toe gangrene progressed during hospitalization, underwent TMA with podiatry on 9/27. Patient on heparin gtt with plan to bridge to home coumadin (started 9/30).    Per chart review, reported splenic and renal artery thormbosis in 2015 for which she was on warfarin at the time. 12/2020, admitted for CLI of RLE and underwent stenting of right SFA, popliteal, peroneal and TPT w/ Dr. Weaver and was discharged on Eliquis 5 BID + DAPT. Admitted again 7/2021, with RLE CLI w/ failed revascularization and under R hip disarticulation at which time there was c/f Eliquis non adherence. Decision at that time made to start patient on warfarin. Previously seen by vascular medicine during prior hospitalizations. Initial thrombophilia workup unremarkable, DAVID negative. ECHO w/ bubble study negative for vegetation or intracardiac shunting.     Patient endorses adherence with warfarin. Previously, INR monitored monthly by PCP. Current smoker, attempting tobacco cessation, 1/2 pack day for past 3 months. Prior 1-1.5 pack/day. 43 pack/year history. Endorses marijuana use. Denies other IV or recreational drug use.      Past Medical History  She has a past medical history of  Anxiety, Arthritis, Cancer (Multi), Cellulitis, COPD (chronic obstructive pulmonary disease) (Multi), Delayed emergence from general anesthesia, Diabetes mellitus (Multi), Disease of thyroid gland, Diverticulitis, Epilepsy, unspecified, not intractable, without status epilepticus (Multi), HLD (hyperlipidemia), blood clots, Hypertension, PAD (peripheral artery disease) (CMS-HCC), Peripheral vascular disease, unspecified (CMS-[facility]) (2022), PONV (postoperative nausea and vomiting), PTSD (post-traumatic stress disorder), Sleep apnea, and Uterine cancer (Multi).  POORLY CONTROLLED DM  PAD S/O MULTIPLE AMPUTATIONS AND REVISIONS WITH WOUND COMPLICATIONS STARTING IN   RIGHT TOTAL HIP DISARTICULATION  DLD   HTN  EPILEPSY  UTERINE CANCER S/O HYSTERECTOMY  HYPOTHYROIDISM  H/O RENAL AND SPLENIC INFARCTS   C/S  CHOLECYSTECTOMY    Surgical History  She has a past surgical history that includes Other surgical history (2021); CT angio aorta and bilateral iliofemoral runoff w and or wo IV contrast (2021); CT angio aorta and bilateral iliofemoral runoff w and or wo IV contrast (2022); IR angiogram aorta abdomen (2021); Tonsilectomy, adenoidectomy, bilateral myringotomy and tubes ();  section, classic (); Anterior cruciate ligament repair (Left, ); Hysterectomy (); Colon surgery (); Vascular surgery (Right, 2019); Vascular surgery (Left); Vascular surgery (Right, ); Leg amputation (Right, ); Amputation foot / toe (Left, ); Amputation foot / toe (Left); Cardiac catheterization (N/A, 2024); and Invasive Vascular Procedure (N/A, 2024).     Social History  She reports that she has been smoking cigarettes. She started smoking about 43 years ago. She has a 24.2 pack-year smoking history. She has been exposed to tobacco smoke. She has never used smokeless tobacco. She reports current alcohol use. She reports current drug use. Drug: Marijuana.    Family  History  Family History   Problem Relation Name Age of Onset    Diverticulitis Daughter      Heart failure Other          Several family members without direct blood connection        Allergies  Aspartame and Nsaids (non-steroidal anti-inflammatory drug)    Home Medications:  No current facility-administered medications on file prior to encounter.     Current Outpatient Medications on File Prior to Encounter   Medication Sig Dispense Refill    acetaminophen (Tylenol) 325 mg tablet Take 3 tablets (975 mg) by mouth every 6 hours.      busPIRone (Buspar) 10 mg tablet Take 1 tablet (10 mg) by mouth. In the morning      divalproex (Depakote ER) 500 mg 24 hr tablet Take 1 tablet (500 mg) by mouth. In the morning, and 2 tablets in the evening      empagliflozin (Jardiance) 10 mg Take 1 tablet (10 mg) by mouth once daily.      levothyroxine (Tirosint) 75 mcg capsule Take 1 capsule (75 mcg) by mouth once daily in the morning. Take before meals. On empty stomach      [] loperamide (Imodium) 2 mg capsule Take 2 capsules (4 mg) by mouth. As needed, Don't exceed 4 tablets per day      melatonin 10 mg tablet Take 1 tablet (10 mg) by mouth as needed at bedtime (insomnia).      omeprazole OTC (PriLOSEC OTC) 20 mg EC tablet Take 1 tablet (20 mg) by mouth once daily in the morning. Take before meals.      warfarin (Coumadin) 5 mg tablet Take as directed per After Visit Summary.      albuterol 90 mcg/actuation inhaler Inhale 2 puffs every 6 hours if needed.      aspirin 81 mg EC tablet Take 1 tablet (81 mg) by mouth once daily.      atorvastatin (Lipitor) 80 mg tablet Take 1 tablet (80 mg) by mouth once daily at bedtime.      clopidogrel (Plavix) 75 mg tablet Take 1 tablet (75 mg) by mouth once daily.      doxycycline (Adoxa) 100 mg tablet Take 1 tablet (100 mg) by mouth 2 times a day. Take with a full glass of water and do not lie down for at least 30 minutes after      DULoxetine (Cymbalta) 60 mg DR capsule Take 1 capsule (60  mg) by mouth once daily.      enoxaparin (Lovenox) 100 mg/mL syringe Inject 1 mL (100 mg) under the skin 2 times a day. Continue Lovenox as bridge to Warfarin until INR as at goal for 2 days in a row (INR 2-3) then may stop Lovenox therapy; INR 3.4 on 12/15 (Patient not taking: Reported on 9/18/2024)      gabapentin (Neurontin) 400 mg capsule Take 1 capsule (400 mg) by mouth 3 times a day.      levETIRAcetam (Keppra) 500 mg tablet Take 1 tablet (500 mg) by mouth 2 times a day.      lidocaine 4 % patch Place 1 patch on the skin once daily as needed for mild pain (1 - 3). Remove & discard patch within 12 hours or as directed by MD.      methocarbamol (Robaxin) 500 mg tablet Take 1 tablet (500 mg) by mouth 3 times a day.      metoprolol tartrate (Lopressor) 25 mg tablet Take 0.5 tablets (12.5 mg) by mouth 2 times a day.      multivitamin tablet Take 1 tablet by mouth once daily.      sennosides (Senokot) 8.6 mg tablet Take 2 tablets (17.2 mg) by mouth 2 times a day. (Patient not taking: Reported on 9/18/2024)      warfarin (Coumadin) 2.5 mg tablet Take 1 tablet (2.5 mg) by mouth 1 time for 1 dose. Take as directed per After Visit Summary. (Patient not taking: Reported on 9/18/2024)          Review of Systems  No weight changes, fatigue, fevers, chills, night sweats   No itching, rashes, lesions  No headaches,  dizziness, vision changes, hearing changes, bleeding gums  No palpitations, chest pain, leg swelling  No cough, SOB, wheezing  No abdominal pain, nausea, vomiting, appetite changes  No urinary urgency, dysuria, urinary frequency  No melena, hematuria  No bleeding, bruising      Physical Exam  Gen: Alert, in NAD AGREE SOME WHAT AGITATED  Head/Neck: normocephalic, atraumatic, neck w/ FROM, no lymphadenopathy AGREE  Eyes: anicteric sclerae, noninjected conjunctivae  Nose: No congestion or rhinorrhea  Mouth:  MMM, oropharynx without erythema or lesions  Heart: RRR, no murmurs, rubs, or gallops AGREE  Lungs: No  increased work of breathing, lungs clear bilaterally AGREE  Abdomen: soft, NT, ND, no hepatosplenomegaly, no palpable masses, good bowel sounds AGREE  Musculoskeletal: s/p R hip disarticulation, tenderness to palpation of LLE AGREE LLE DRESSED IN ACE TOE TO PROX  Neurologic: Alert, symmetrical facies, phonates clearly, moves all extremities equally, responsive to touch AGREE    Labs:    Results for orders placed or performed during the hospital encounter of 09/18/24 (from the past 24 hour(s))   POCT GLUCOSE   Result Value Ref Range    POCT Glucose 144 (H) 74 - 99 mg/dL   Phosphorus   Result Value Ref Range    Phosphorus 3.6 2.5 - 4.9 mg/dL   Magnesium   Result Value Ref Range    Magnesium 2.03 1.60 - 2.40 mg/dL   Basic Metabolic Panel   Result Value Ref Range    Glucose 132 (H) 74 - 99 mg/dL    Sodium 143 136 - 145 mmol/L    Potassium 4.1 3.5 - 5.3 mmol/L    Chloride 103 98 - 107 mmol/L    Bicarbonate 32 21 - 32 mmol/L    Anion Gap 12 10 - 20 mmol/L    Urea Nitrogen 6 6 - 23 mg/dL    Creatinine 0.55 0.50 - 1.05 mg/dL    eGFR >90 >60 mL/min/1.73m*2    Calcium 8.3 (L) 8.6 - 10.6 mg/dL   CBC   Result Value Ref Range    WBC 10.8 4.4 - 11.3 x10*3/uL    nRBC 0.0 0.0 - 0.0 /100 WBCs    RBC 3.51 (L) 4.00 - 5.20 x10*6/uL    Hemoglobin 8.8 (L) 12.0 - 16.0 g/dL    Hematocrit 29.2 (L) 36.0 - 46.0 %    MCV 83 80 - 100 fL    MCH 25.1 (L) 26.0 - 34.0 pg    MCHC 30.1 (L) 32.0 - 36.0 g/dL    RDW 23.4 (H) 11.5 - 14.5 %    Platelets 342 150 - 450 x10*3/uL   Protime-INR   Result Value Ref Range    Protime 11.7 9.8 - 12.8 seconds    INR 1.0 0.9 - 1.1   Heparin Assay, UFH   Result Value Ref Range    Heparin Unfractionated 0.5 See Comment Below for Therapeutic Ranges IU/mL   POCT GLUCOSE   Result Value Ref Range    POCT Glucose 126 (H) 74 - 99 mg/dL   POCT GLUCOSE   Result Value Ref Range    POCT Glucose 168 (H) 74 - 99 mg/dL         Last Recorded Vitals  /63 (BP Location: Left arm, Patient Position: Lying)   Pulse 81   Temp 36.6  °C (97.9 °F) (Temporal)   Resp 13   Wt 90.6 kg (199 lb 11.8 oz)   SpO2 93%     Relevant Results  CTA 9/18/2024:  IMPRESSION:  1. Near occlusion of the left common femoral artery.  2. Near occlusion of the left common femoral artery. The superficial  femoral artery and popliteal arteries are occluded. There is distal  reconstitution of the anterior and posterior tibial arteries via  profundus collaterals.  3. Heterogeneous enhancement of the left hepatic lobe. Left arterial  vasculature unremarkable. Portal venous structures not well evaluated  due to early arterial phase of the study. Portal venous phase may be  obtained for evaluation of the left portal vein. Differential  includes portal venous compromise or asymmetric fatty infiltration.    Vascular US Left w/ HEMA:  CONCLUSIONS:  Left Lower Arterial: There is >50% stenosis documented at the superficial femoral artery distal. Decreased velocities noted in the arteries throughout the left lower extremity with monophasic flow. Left peroneal artery not visualized. The left external iliac artery stent appears patent. The left poplitael artery appear patent with decreased velocity noted.  Stent: Left EIA Stent  Proximal - 68.2 cm/s  Mid - 144.4 cm/s  Distal 124.7 cm/s. Left popliteal Stent  Prox - 10.8 cm/s  Mid - 10.1 cm/s  Distal - 18.7 cm/s.     Comparison:  Compared with study from 6/28/2022, Today's exam > 50% stenosis in left distal femoral artery.     Assessment/Plan   Patient with known history of PAD, DVT now presenting with L 2nd toe gangrene c/f osteomyelitis. Underwent left femoral endarterectomy, profundaplasty, common [id number] tibial bypass and LLE angiogram on 9/25. Underwent TMA with podiatry on 9/27. Patient started on heparin gtt with bridge to home warfarin 5 mg daily started 9/30.     Recommendations:  - Additional workup with: homocysteine, tox screen (cocaine use), JAK2 - RBC on admit of 6.07 Hgb 12.7   - Possible that underlying  disease progression may be thromboangiitis obliterans (Buerger Disease), counseled patient on tobacco and marijuana cessation. Consider additional lab workup with C3/C4/CH50  - Ok to continue home warfarin. Patient may benefit in the future from reduced dose rivaroxaban 2.5 mg + ASA in setting of PAD (COMPASS Trial)   - Monitor INR daily  - Patient will plan to follow with Dr. Jauny Sanchez (PCP - General Family Medicine) phone [phone], who previously managed INR and Warfarin dosing as outpatient.       Patient seen and discussed with attending physician, Dr. Toscano.     Valeria Melendez DO  Internal Medicine, PGY-2    Octavia Fam MD   PGY2, Family Medicine     I saw and evaluated the patient. I personally obtained the key and critical portions of the history and physical exam or was physically present for key and critical portions performed by the resident/fellow. I reviewed the resident/fellow's documentation and discussed the patient with the resident/fellow. I agree with the resident/fellow's medical decision making as documented in the note with the exception/addition of the following:    Robyn Toscano MD    HER COURSE WITH ARTERIAL AND VENOUS THROMBOEMBOLIC DISEASE IS FAIRLY MALIGNANT AND NOT C/W ASO ALONE    SHE DOES HAVE UNDERLYING POORLY CONTROLLED DM - THIS NEEDS ADDRESSED LOCALLY.    DISCUSSED WITH THE PATIENT THE C/F SOFIA (BUERGER DISEASE) RELATED TO HER SMOKING +/- CANNABIS USE. ADVISED TO AVOID ALL FORMS OF TOBACCO AND CANNABIS INCLUDING GUM, PATCHES, GUMMIES, EDIBLES ETC.    AGREE WITH CHECKING FOR VASCULITIS LABS C3, C4, AND CH50. CAN SEND FELA AND ANCA BUT THIS IS PROB LOWER YIELD.     UNFORTUNATELY SOFIA IS A DX OF EXCLUSION UNLESS THERE IS PATH AVAILABLE AND CAN BE EVALUATED FOR THROMBO-INFLAMMATORY CHANGES IN THE VESSELS WITHOUT APPARENT VASCULITIS.    AGREE KEZIA SHOULD BE SENT - ELEVATE RBC MASS NOTED ON ADMISSION. CAN ALSO SEND EPO LEVEL.     PREV SEEN BY VM AND Lp(a), ALAINA AND  B2GP-1 WERE NORMAL 8/2021.    AGREE WITH TOX SCREEN ALTHOUGH ADMITTED 9/18 - SO THIS MAY NOT BE HELPFUL.    VM following  Please page [phone] for any concerns    Robyn Toscano MD

## 2024-10-01 NOTE — SIGNIFICANT EVENT
Rapid Response Nurse Note: [] Rapid Response [x] RADAR alert: Score 7    Pager time:   Arrival time: 1445  Event end time: 144  Location: [place] [id number]  [x] Phone triage     Rapid response initiated by:  [] Rapid Response RN [] Family [] Nursing Supervisor [] Physician   [x] RADAR auto-page [] Sepsis auto-page [] RN [] RT   [] NP/PA [] Other:     Primary reason for call:   [] BAT [] New CPAP/BiPAP [] Bleeding [] Change in mental status   [] Chest pain [] Code blue [] FiO2 >/= 50% [] HR </= 40 bpm   [] HR >/= 130 bpm [] Hyperglycemia [] Hypoglycemia [x] RADAR    [] RR </= 8 bpm [] RR >/= 30 bpm [] SBP </= 90 mmHg [] SpO2 < 90%   [] Seizure [] Sepsis [] Staff concern:     Initial VS and/or RADAR VS: T 35.6 °C; HR 75; RR 8; /66; SPO2 93%.    Interventions:  [x] None [] ABG [] Assist w/ICU transfer [] BAT paged    [] Bag mask [] Blood [] Cardioversion [] Code Blue   [] Code blue for intubation [] Code status changed [] Chest x-ray [] EKG   [] IV fluid/bolus [] KUB x-ray [] Labs/cultures [] Medication   [] Nebulizer treatment [] NIPPV (CPAP/BiPAP) [] Oxygen [] Oral airway   [] Peripheral IV [] Palliative care consult [] CT/MRI [] Sepsis protocol    [] Suctioned [] Other:     Outcome:  [] Coded and  [] Code blue for intubation [] Coded and transferred to ICU []  on division   [x] Remained on division (no change) [] Remained on division + additional monitoring [] Remained in ED [] Transferred to ED   [] Transferred to ICU [] Transferred to inpatient status [] Transferred for interventions (procedure) [] Transferred to ICU stepdown    [] Transferred to surgery [] Transferred to telemetry [] Sepsis protocol [] STEMI protocol   [] Stroke protocol [x] Bedside nurse instructed to page rapid response for any concerns or acute change in condition/VS     Additional Comments: RADAR auto page received for above vitals. Per bedside RN, RR is currently 16. No acute concerns or changes at this time per bedside RN.

## 2024-10-01 NOTE — PROGRESS NOTES
"Spiritual Care Visit    Clinical Encounter Type  Visited With: Patient  Routine Visit: Introduction  Continue Visiting: Yes  Surgical Visit: Post-op  Referral From: Nurse  Referral To:     Zoroastrianism Encounters  Zoroastrianism Needs: Prayer    Values/Beliefs  Cultural Requests During Hospitalization: none noted  Spiritual Requests During Hospitalization: prayer, support, listening         Patient Spiritual Care Encounters  Suffering Severity: Moderate  Fear Level: Moderate  Feelings of Loneliness: Moderate  Feelings of Hopelessness: Moderate  Coping: Often demonstrated              PC-7 Assessment (Level of Unmet Needs)  Existential Struggle: Some  Spiritual/Zoroastrianism Struggle: Some  Legacy: Some  Relationships: Substantial  Fear of Death/Dying: Some  Values/Medical Decision Making: Some  Ritual/Other: Further assess  PC-7 Score: 7    SDAT (Spiritual Distress Assessment Tool)  Need for Life Balance: Some evidence of unmet spiritual need  Need for Connection: Substancial evidence of unmet spiritual need  Need for Values Acknowledgement: Some evidence of unmet spiritual need  Need to Maintain Control: Some evidence of unmet spiritual need  Need to Maintain Identity: Substancial evidence of unmet spiritual need  SDAT Score: 7  SDAT Average Score: 1.4    Taxonomy  Intended Effects: Convey a calming presence, Demonstrate caring and concern, Lessen anxiety, Lessen someone's feelings of isolation  Methods: Offer emotional support, Offer spiritual/Sabianist support, Offer support  Interventions: Active listening, Assist with identifying strengths, Discuss concerns, Facilitate life review, Prayer for healing  Initial spiritual care visit with patient. Patient welcomed  and shared \"I could have used you yesterday when I was having PTSD\". Patient shared that she does have PTSD and that her Aunt is the \"only one\" who can \"calm me down\". Patient was able to call her aunt who did calm her, patient said. Patient did not " "go in to details regarding her PTSD. Patient engaged  in some life review and story telling. She has four children and eight grandchildren. Patient said she loves her grandchildren, but her children and she have \"issues.\" Patient does not feel supported by her children, but said she has friends and neighbors. Patient also said that she has a counselor who she speaks with weekly as patient struggles with Bipolar and Depression, she said.  spent time with patient providing a non anxious, supportive and comforting presence.  listened as patient engaged in life review.  asked patient if prayer is important to her, and she said \"yes.\"  provided prayer and support and will continue to follow.    "

## 2024-10-01 NOTE — PROGRESS NOTES
PROGRESS NOTE - PODIATRIC MEDICINE & SURGERY    SERVICE DATE: 10/1/2024  SERVICE TIME: 1455    HPI    Patient is a 54 y.o. female POD 4 S/P left TMA. Patient is resting in bed. Reports improved pain. Positive attitude about recovery and potential discharge. Pleased with surgical outcomes. Denies N/V/F/C/S.      ROS: Negative except as above.      Past Medical History  Past Medical History:   Diagnosis Date    Anxiety     Arthritis     Cancer (Multi)     Cellulitis     COPD (chronic obstructive pulmonary disease) (Multi)     Delayed emergence from general anesthesia     Diabetes mellitus (Multi)     Disease of thyroid gland     Diverticulitis     Epilepsy, unspecified, not intractable, without status epilepticus (Multi)     Epilepsy    HLD (hyperlipidemia)     Hx of blood clots     Hypertension     PAD (peripheral artery disease) (CMS-HCC)     Peripheral vascular disease, unspecified (CMS-HCC) 09/14/2022    PAD (peripheral artery disease)    PONV (postoperative nausea and vomiting)     PTSD (post-traumatic stress disorder)     Sleep apnea     Uterine cancer (Multi)          Problem List  Problem List Items Addressed This Visit          Cardiac and Vasculature    PAD (peripheral artery disease) (CMS-HCC)    Relevant Orders    Vascular US lower extremity arterial duplex left with HEMA (Completed)    Vascular US Ankle Brachial Index (HEMA) Without Exercise (Completed)    Vascular US Lower Extremity Vein Mapping Left (Completed)    Transthoracic Echo (TTE) Limited (Completed)    Atherosclerosis of native arteries of left leg with ulceration of other part of foot (Multi) - Primary    Relevant Orders    Vascular US Lower Extremity Vein Mapping Left (Completed)    Case Request Cath Lab: Left Heart Cath (Completed)    Cardiac Catheterization Procedure (Completed)    Invasive vascular procedure (Completed)    Case Request Operating Room: Foot Transmetatarsal Amputation (Completed)    Surgical Pathology Exam    Tissue/Wound  Culture/Smear (Completed)    Preoperative cardiovascular examination    Relevant Orders    Case Request Cath Lab: Left Heart Cath (Completed)    Cardiac Catheterization Procedure (Completed)    Invasive vascular procedure (Completed)    Critical limb ischemia of left lower extremity (Multi)    Relevant Orders    Transthoracic Echo (TTE) Limited (Completed)    Case Request Operating Room: Creation Bypass Graft Tibial Artery (Completed)       Endocrine/Metabolic    Diabetes mellitus, type 2 (Multi)       Musculoskeletal and Injuries    * (Principal) Gangrene of toe of left foot (Multi)    Relevant Orders    Vascular US lower extremity arterial duplex left with HEMA (Completed)    Vascular US Lower Extremity Vein Mapping Left (Completed)    Case Request Operating Room: Foot Transmetatarsal Amputation (Completed)    Surgical Pathology Exam    Tissue/Wound Culture/Smear (Completed)     Other Visit Diagnoses       Other specified peripheral vascular diseases (CMS-HCC)        Critical limb ischemia of both lower extremities with nonbiological bypass graft with gangrene (Multi)        Relevant Orders    Transthoracic Echo (TTE) Limited (Completed)    Critical limb ischemia with history of revascularization of same extremity (Multi)        Relevant Orders    Transthoracic Echo (TTE) Limited (Completed)    Encounter for other preprocedural examination        Atherosclerosis of autologous vein bypass graft(s) of the extremities with intermittent claudication, bilateral legs (CMS-HCC)        Atherosclerosis of native arteries of extremities with intermittent claudication, bilateral legs (CMS-HCC)                  Medications and Allergies reviewed.      PHYSICAL EXAM    General: AOx3. NAD. Cooperative. Pleasant. Dressing clean, dry, intact.     Left focused exam.   Vascular: Lightly-palpable DP pulse. Non-palpable PT pulse. Mild pitting edema noted. Hair growth absent. Temperature is cool to cool from tibial tuberosity to foot.  No lymphatic streaking noted. Left foot diffusely edematous.      Musculoskeletal: Gross active and passive ROM intact to age and activity level. Moves all extremities spontaneously. Prior R hip disarticulation. Left TMA.     Neurological: Intact light touch sensation. Pain stimuli intact. Denies any numbness, burning or tingling.     Dermatologic: Skin appears diffusely xerotic. No rashes or nodules noted.No hyperkeratotic tissue noted.     Wound: left TMA incision  See images in media tab  Incision site with slight erythema and edema. Sutures and staples in place. Skin is well coapted.   Minimal serosanguinous drainage.  No vinny-wound maceration.   No evidence of ascending cellulitis or lymphangitis.  No palpable fluctuance. No malodor. No increased warmth.   No tunneling or tracking.          RESULTS    CBC  Lab Results   Component Value Date    WBC 10.8 10/01/2024    HGB 8.8 (L) 10/01/2024    HCT 29.2 (L) 10/01/2024    MCV 83 10/01/2024     10/01/2024       ESR  Lab Results   Component Value Date    SEDRATE 37 (H) 09/17/2021       CRP  Lab Results   Component Value Date    CRP 17.25 (H) 12/06/2023       HgbA1c  Lab Results   Component Value Date    HGBA1C 11.4 (H) 09/18/2024       Cultures  Susceptibility data from last 90 days.  Collected Specimen Info Organism   09/18/24 Tissue/Biopsy from Diabetic Foot Ulcer Mixed Skin Microorganisms            ASSESSMENT    #S/P Left TMA (DOS: 9/27/24 with Dr. 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 All findings discussed with patient. All questions answered to patient's satisfaction.  - Per ID, continue IV Abx.  -  Dressing: betadine soaked adaptic, 4x4, ABD, webril, Ace. Nursing may reinforce with ABDs.   - Patient is to be WBAT in surgical shoe left foot.  - Podiatry will continue following.      This patient was discussed and evaluated with Dr. Meaghan Israel DPM.    Case to be discussed with attending, A&P above reflects a tentative plan. Please await for the final signature from the attending physician on service.    This patient will be followed by the Podiatry service. Please Epic Chat the corresponding residents below with questions or concerns.      Albert Taylor DPM PGY-3  Podiatric Medicine and Surgery   Epic Secure Chat Preferred  [phone]

## 2024-10-01 NOTE — PROGRESS NOTES
VASCULAR SURGERY PROGRESS NOTE  Assessment/Plan   Shirin Slater is 54 y.o. female with history of severe PAD s/p R hip disarticulation s/p L CFA and EIA embolectomy 12/23 s/p multiple L toe amputations, T2DM complicated by diabetic neuropathy, HTN, HLD, renal and splenic thromboses on aspirin/plavix/coumadin, and current tobacco use who presents as a transfer from [facility] for management of L 2nd toe gangrene with concern of osteomyelitis.  Now s/p Left femoral endarterectomy, profundaplasty, common kpmsatm-cj-fmygmcyq tibial bypass using 6 mm ringed PTFE, left lower extremity angiogram on 9/25. Patient s/p TMA with podiatry 9/27.    Neuro: acute postoperative pain, Hx PTSD, seizures  - continue tylenol/oxy PRN for pain control  - dilaudid for breakthrough pain  - home Depakote, Cymbalta, and Keppra  - melatonin     CV: Hx CLI, HTN, PAD, long term AC with warfarin  - maintain blood pressure control - metoprolol  - continue statin/ASA  - high intensity heparin infusion, bridge for warfarin started 9/30/24  - vascular med consulted     Pulm: Hx ELMIRA, COPD  - duonebs PRN  - continue to encourage IS hourly while awake  - OOB to chair and increase ambulation as tolerated     FENGI: Hx chronic diarrhea  - tolerating regular diet  - PRN Imodium, scheduled metamucil  - continue PPI  - monitor and replete electrolytes     Endo: DM, hypothyroidism  - home levothyroxine  - SSI  - holding home Jardiance     Heme:   - no s/sx of bleeding  - monitor H/H, transfuse for Hgb <7     ID:  gangrenous second/ third toe S/P TMA, tissue cultures NG. Blood cultures NG  - monitor s/s of infection  - chronic diarrhea- C diff negative  - continue Unasyn until discharge then begin Augmentin oral through 10/10/24  - trend WBC    DVT prophylaxis:  - heparin infusion     Dispo-  - WBAT with podiatric boot  -_PT/OT rec SNF     Subjective   Denies dyspnea, CP, N/V.     Objective   Vitals:  Heart Rate:  [78-98]   Temp:  [36.4 °C (97.5  °F)-36.6 °C (97.9 °F)]   Resp:  [9-16]   BP: ()/(54-66)   SpO2:  [91 %-98 %]     Exam:  Constitutional: No acute distress, resting comfortably  Neuro:  AOx3, grossly intact  ENMT: moist mucous membranes  CV: no tachycardia  Pulm: non-labored on RA  GI: soft, non-tender, non-distended  Skin: warm and dry  Musculoskeletal: moving all extremities  Extremities: LLE with surgical foot dressing, palp PT, Left thigh GSV harvest wound dry and well approximated with sutures, Prevena drsg in groin intact, left calf vac dressing intact      Labs:  Results from last 7 days   Lab Units 10/01/24  0603 09/30/24  0615 09/29/24  0612   WBC AUTO x10*3/uL 10.8 11.3 12.1*   HEMOGLOBIN g/dL 8.8* 8.4* 8.2*   PLATELETS AUTO x10*3/uL 342 296 235      Results from last 7 days   Lab Units 10/01/24  0603 09/30/24  0615 09/29/24  0612   SODIUM mmol/L 143 143 141   POTASSIUM mmol/L 4.1 4.1 3.4*   CHLORIDE mmol/L 103 103 104   CO2 mmol/L 32 32 30   BUN mg/dL 6 6 9   CREATININE mg/dL 0.55 0.50 0.56   GLUCOSE mg/dL 132* 122* 113*   MAGNESIUM mg/dL 2.03 1.65 1.80   PHOSPHORUS mg/dL 3.6 3.0 2.7      Results from last 7 days   Lab Units 10/01/24  0603 09/25/24  2211   INR  1.0 1.2*   PROTIME seconds 11.7 13.2*   APTT seconds  --  37      Results from last 7 days   Lab Units 10/01/24  0603 09/30/24  0615 09/29/24  1708   ANTI XA UNFRACTIONATED IU/mL 0.5 0.3 0.4

## 2024-10-02 PROBLEM — R11.2 PONV (POSTOPERATIVE NAUSEA AND VOMITING): Status: RESOLVED | Noted: 2024-09-27 | Resolved: 2024-10-02

## 2024-10-02 PROBLEM — Z01.810 PREOPERATIVE CARDIOVASCULAR EXAMINATION: Status: RESOLVED | Noted: 2024-09-17 | Resolved: 2024-10-02

## 2024-10-02 PROBLEM — Z98.890 PONV (POSTOPERATIVE NAUSEA AND VOMITING): Status: RESOLVED | Noted: 2024-09-27 | Resolved: 2024-10-02

## 2024-10-02 LAB
AMPHETAMINES UR QL SCN: ABNORMAL
ANION GAP SERPL CALC-SCNC: 11 MMOL/L (ref 10–20)
APAP SERPL-MCNC: <10 UG/ML
BARBITURATES UR QL SCN: ABNORMAL
BENZODIAZ UR QL SCN: ABNORMAL
BUN SERPL-MCNC: 9 MG/DL (ref 6–23)
BZE UR QL SCN: ABNORMAL
C3 SERPL-MCNC: 151 MG/DL (ref 87–200)
C4 SERPL-MCNC: 36 MG/DL (ref 10–50)
CALCIUM SERPL-MCNC: 8.2 MG/DL (ref 8.6–10.6)
CANNABINOIDS UR QL SCN: ABNORMAL
CHLORIDE SERPL-SCNC: 104 MMOL/L (ref 98–107)
CO2 SERPL-SCNC: 30 MMOL/L (ref 21–32)
CREAT SERPL-MCNC: 0.54 MG/DL (ref 0.5–1.05)
EGFRCR SERPLBLD CKD-EPI 2021: >90 ML/MIN/1.73M*2
ERYTHROCYTE [DISTWIDTH] IN BLOOD BY AUTOMATED COUNT: 24.3 % (ref 11.5–14.5)
ETHANOL SERPL-MCNC: <10 MG/DL
FENTANYL+NORFENTANYL UR QL SCN: ABNORMAL
GLUCOSE BLD MANUAL STRIP-MCNC: 131 MG/DL (ref 74–99)
GLUCOSE BLD MANUAL STRIP-MCNC: 143 MG/DL (ref 74–99)
GLUCOSE BLD MANUAL STRIP-MCNC: 150 MG/DL (ref 74–99)
GLUCOSE BLD MANUAL STRIP-MCNC: 163 MG/DL (ref 74–99)
GLUCOSE SERPL-MCNC: 153 MG/DL (ref 74–99)
HCT VFR BLD AUTO: 29.1 % (ref 36–46)
HCYS SERPL-SCNC: 9.15 UMOL/L (ref 5–13.9)
HGB BLD-MCNC: 8.6 G/DL (ref 12–16)
INR PPP: 1.3 (ref 0.9–1.1)
MAGNESIUM SERPL-MCNC: 1.78 MG/DL (ref 1.6–2.4)
MCH RBC QN AUTO: 24.8 PG (ref 26–34)
MCHC RBC AUTO-ENTMCNC: 29.6 G/DL (ref 32–36)
MCV RBC AUTO: 84 FL (ref 80–100)
METHADONE UR QL SCN: ABNORMAL
NRBC BLD-RTO: 0 /100 WBCS (ref 0–0)
OPIATES UR QL SCN: ABNORMAL
OXYCODONE+OXYMORPHONE UR QL SCN: ABNORMAL
PCP UR QL SCN: ABNORMAL
PHOSPHATE SERPL-MCNC: 3.7 MG/DL (ref 2.5–4.9)
PLATELET # BLD AUTO: 376 X10*3/UL (ref 150–450)
POTASSIUM SERPL-SCNC: 4 MMOL/L (ref 3.5–5.3)
PROTHROMBIN TIME: 15.1 SECONDS (ref 9.8–12.8)
RBC # BLD AUTO: 3.47 X10*6/UL (ref 4–5.2)
SALICYLATES SERPL-MCNC: <3 MG/DL
SODIUM SERPL-SCNC: 141 MMOL/L (ref 136–145)
WBC # BLD AUTO: 12.4 X10*3/UL (ref 4.4–11.3)

## 2024-10-02 PROCEDURE — 80048 BASIC METABOLIC PNL TOTAL CA: CPT | Performed by: NURSE PRACTITIONER

## 2024-10-02 PROCEDURE — 83090 ASSAY OF HOMOCYSTEINE: CPT | Performed by: NURSE PRACTITIONER

## 2024-10-02 PROCEDURE — 86160 COMPLEMENT ANTIGEN: CPT | Performed by: NURSE PRACTITIONER

## 2024-10-02 PROCEDURE — 85610 PROTHROMBIN TIME: CPT

## 2024-10-02 PROCEDURE — 84100 ASSAY OF PHOSPHORUS: CPT | Performed by: NURSE PRACTITIONER

## 2024-10-02 PROCEDURE — 80320 DRUG SCREEN QUANTALCOHOLS: CPT | Performed by: NURSE PRACTITIONER

## 2024-10-02 PROCEDURE — 80365 DRUG SCREENING OXYCODONE: CPT

## 2024-10-02 PROCEDURE — 85027 COMPLETE CBC AUTOMATED: CPT | Performed by: NURSE PRACTITIONER

## 2024-10-02 PROCEDURE — 2500000004 HC RX 250 GENERAL PHARMACY W/ HCPCS (ALT 636 FOR OP/ED): Performed by: NURSE PRACTITIONER

## 2024-10-02 PROCEDURE — 97530 THERAPEUTIC ACTIVITIES: CPT | Mod: GP,CQ

## 2024-10-02 PROCEDURE — 2500000001 HC RX 250 WO HCPCS SELF ADMINISTERED DRUGS (ALT 637 FOR MEDICARE OP): Performed by: NURSE PRACTITIONER

## 2024-10-02 PROCEDURE — 2500000004 HC RX 250 GENERAL PHARMACY W/ HCPCS (ALT 636 FOR OP/ED)

## 2024-10-02 PROCEDURE — 80361 OPIATES 1 OR MORE: CPT

## 2024-10-02 PROCEDURE — 1200000002 HC GENERAL ROOM WITH TELEMETRY DAILY

## 2024-10-02 PROCEDURE — 2500000002 HC RX 250 W HCPCS SELF ADMINISTERED DRUGS (ALT 637 FOR MEDICARE OP, ALT 636 FOR OP/ED): Performed by: NURSE PRACTITIONER

## 2024-10-02 PROCEDURE — 83735 ASSAY OF MAGNESIUM: CPT | Performed by: NURSE PRACTITIONER

## 2024-10-02 PROCEDURE — 86162 COMPLEMENT TOTAL (CH50): CPT | Performed by: NURSE PRACTITIONER

## 2024-10-02 PROCEDURE — 82947 ASSAY GLUCOSE BLOOD QUANT: CPT

## 2024-10-02 PROCEDURE — 80307 DRUG TEST PRSMV CHEM ANLYZR: CPT

## 2024-10-02 RX ORDER — PANTOPRAZOLE SODIUM 40 MG/1
40 TABLET, DELAYED RELEASE ORAL
Qty: 90 TABLET | Refills: 3 | Status: SHIPPED | OUTPATIENT
Start: 2024-10-03

## 2024-10-02 RX ORDER — WARFARIN SODIUM 5 MG/1
5 TABLET ORAL ONCE
Status: COMPLETED | OUTPATIENT
Start: 2024-10-02 | End: 2024-10-02

## 2024-10-02 RX ORDER — CLOPIDOGREL BISULFATE 75 MG/1
75 TABLET ORAL DAILY
Qty: 90 TABLET | Refills: 3 | Status: SHIPPED | OUTPATIENT
Start: 2024-10-03 | End: 2024-10-02 | Stop reason: HOSPADM

## 2024-10-02 RX ORDER — OXYCODONE HYDROCHLORIDE 5 MG/1
5 TABLET ORAL EVERY 6 HOURS PRN
Qty: 28 TABLET | Refills: 0 | Status: SHIPPED | OUTPATIENT
Start: 2024-10-02 | End: 2024-10-09

## 2024-10-02 RX ORDER — WARFARIN SODIUM 5 MG/1
TABLET ORAL
Qty: 30 TABLET | Refills: 1 | Status: SHIPPED | OUTPATIENT
Start: 2024-10-02 | End: 2024-10-02

## 2024-10-02 RX ORDER — WARFARIN SODIUM 5 MG/1
TABLET ORAL
Qty: 30 TABLET | Refills: 1 | Status: SHIPPED | OUTPATIENT
Start: 2024-10-02 | End: 2024-10-03 | Stop reason: HOSPADM

## 2024-10-02 RX ORDER — ENOXAPARIN SODIUM 100 MG/ML
1 INJECTION SUBCUTANEOUS 2 TIMES DAILY
Qty: 28 ML | Refills: 1 | Status: SHIPPED | OUTPATIENT
Start: 2024-10-02

## 2024-10-02 RX ORDER — ACETAMINOPHEN 325 MG/1
650 TABLET ORAL EVERY 6 HOURS PRN
Start: 2024-10-02

## 2024-10-02 RX ORDER — AMOXICILLIN AND CLAVULANATE POTASSIUM 875; 125 MG/1; MG/1
1 TABLET, FILM COATED ORAL 2 TIMES DAILY
Qty: 16 TABLET | Refills: 0 | Status: SHIPPED | OUTPATIENT
Start: 2024-10-03 | End: 2024-10-11

## 2024-10-02 RX ORDER — HYDROMORPHONE HYDROCHLORIDE 1 MG/ML
0.2 INJECTION, SOLUTION INTRAMUSCULAR; INTRAVENOUS; SUBCUTANEOUS ONCE
Status: COMPLETED | OUTPATIENT
Start: 2024-10-02 | End: 2024-10-02

## 2024-10-02 RX ORDER — LOPERAMIDE HYDROCHLORIDE 2 MG/1
2 CAPSULE ORAL 4 TIMES DAILY PRN
Qty: 30 CAPSULE | Refills: 0 | Status: SHIPPED | OUTPATIENT
Start: 2024-10-02

## 2024-10-02 RX ADMIN — DULOXETINE HYDROCHLORIDE 60 MG: 60 CAPSULE, DELAYED RELEASE ORAL at 20:40

## 2024-10-02 RX ADMIN — ACETAMINOPHEN 650 MG: 325 TABLET, FILM COATED ORAL at 15:25

## 2024-10-02 RX ADMIN — DIVALPROEX SODIUM 500 MG: 500 TABLET, DELAYED RELEASE ORAL at 20:45

## 2024-10-02 RX ADMIN — PANTOPRAZOLE SODIUM 40 MG: 40 TABLET, DELAYED RELEASE ORAL at 06:05

## 2024-10-02 RX ADMIN — ACETAMINOPHEN 650 MG: 325 TABLET, FILM COATED ORAL at 20:40

## 2024-10-02 RX ADMIN — AMPICILLIN SODIUM AND SULBACTAM SODIUM 3 G: 2; 1 INJECTION, POWDER, FOR SOLUTION INTRAMUSCULAR; INTRAVENOUS at 08:44

## 2024-10-02 RX ADMIN — BUSPIRONE HYDROCHLORIDE 10 MG: 10 TABLET ORAL at 08:44

## 2024-10-02 RX ADMIN — AMPICILLIN SODIUM AND SULBACTAM SODIUM 3 G: 2; 1 INJECTION, POWDER, FOR SOLUTION INTRAMUSCULAR; INTRAVENOUS at 20:39

## 2024-10-02 RX ADMIN — DIVALPROEX SODIUM 500 MG: 500 TABLET, DELAYED RELEASE ORAL at 06:05

## 2024-10-02 RX ADMIN — OXYCODONE HYDROCHLORIDE 10 MG: 5 TABLET ORAL at 08:44

## 2024-10-02 RX ADMIN — WARFARIN SODIUM 5 MG: 5 TABLET ORAL at 17:44

## 2024-10-02 RX ADMIN — LEVOTHYROXINE SODIUM 75 MCG: 0.07 TABLET ORAL at 06:05

## 2024-10-02 RX ADMIN — ASPIRIN 81 MG: 81 TABLET, COATED ORAL at 08:44

## 2024-10-02 RX ADMIN — OXYCODONE HYDROCHLORIDE 10 MG: 5 TABLET ORAL at 22:08

## 2024-10-02 RX ADMIN — LEVETIRACETAM 500 MG: 500 TABLET, FILM COATED ORAL at 08:44

## 2024-10-02 RX ADMIN — LEVETIRACETAM 500 MG: 500 TABLET, FILM COATED ORAL at 20:40

## 2024-10-02 RX ADMIN — ACETAMINOPHEN 650 MG: 325 TABLET, FILM COATED ORAL at 08:44

## 2024-10-02 RX ADMIN — METOPROLOL TARTRATE 12.5 MG: 25 TABLET, FILM COATED ORAL at 08:44

## 2024-10-02 RX ADMIN — ACETAMINOPHEN 650 MG: 325 TABLET, FILM COATED ORAL at 03:02

## 2024-10-02 RX ADMIN — HYDROMORPHONE HYDROCHLORIDE 0.2 MG: 1 INJECTION, SOLUTION INTRAMUSCULAR; INTRAVENOUS; SUBCUTANEOUS at 18:12

## 2024-10-02 RX ADMIN — AMPICILLIN SODIUM AND SULBACTAM SODIUM 3 G: 2; 1 INJECTION, POWDER, FOR SOLUTION INTRAMUSCULAR; INTRAVENOUS at 03:02

## 2024-10-02 RX ADMIN — METOPROLOL TARTRATE 12.5 MG: 25 TABLET, FILM COATED ORAL at 20:40

## 2024-10-02 RX ADMIN — DIVALPROEX SODIUM 500 MG: 500 TABLET, DELAYED RELEASE ORAL at 15:26

## 2024-10-02 RX ADMIN — AMPICILLIN SODIUM AND SULBACTAM SODIUM 3 G: 2; 1 INJECTION, POWDER, FOR SOLUTION INTRAMUSCULAR; INTRAVENOUS at 15:25

## 2024-10-02 RX ADMIN — ENOXAPARIN SODIUM 90 MG: 100 INJECTION SUBCUTANEOUS at 20:41

## 2024-10-02 RX ADMIN — ENOXAPARIN SODIUM 90 MG: 100 INJECTION SUBCUTANEOUS at 08:44

## 2024-10-02 RX ADMIN — ATORVASTATIN CALCIUM 80 MG: 80 TABLET, FILM COATED ORAL at 20:41

## 2024-10-02 RX ADMIN — OXYCODONE HYDROCHLORIDE 10 MG: 5 TABLET ORAL at 15:25

## 2024-10-02 ASSESSMENT — COGNITIVE AND FUNCTIONAL STATUS - GENERAL
MOVING TO AND FROM BED TO CHAIR: A LOT
STANDING UP FROM CHAIR USING ARMS: A LOT
DRESSING REGULAR UPPER BODY CLOTHING: A LITTLE
PERSONAL GROOMING: A LITTLE
WALKING IN HOSPITAL ROOM: TOTAL
TOILETING: A LOT
DRESSING REGULAR LOWER BODY CLOTHING: A LOT
TOILETING: A LOT
MOBILITY SCORE: 14
DAILY ACTIVITIY SCORE: 16
STANDING UP FROM CHAIR USING ARMS: TOTAL
TURNING FROM BACK TO SIDE WHILE IN FLAT BAD: A LITTLE
MOVING TO AND FROM BED TO CHAIR: A LOT
HELP NEEDED FOR BATHING: A LOT
CLIMB 3 TO 5 STEPS WITH RAILING: TOTAL
CLIMB 3 TO 5 STEPS WITH RAILING: TOTAL
HELP NEEDED FOR BATHING: A LOT
PERSONAL GROOMING: A LITTLE
CLIMB 3 TO 5 STEPS WITH RAILING: TOTAL
DAILY ACTIVITIY SCORE: 16
DRESSING REGULAR UPPER BODY CLOTHING: A LITTLE
MOBILITY SCORE: 14
MOBILITY SCORE: 13
MOVING TO AND FROM BED TO CHAIR: A LITTLE
WALKING IN HOSPITAL ROOM: TOTAL
WALKING IN HOSPITAL ROOM: TOTAL
DRESSING REGULAR LOWER BODY CLOTHING: A LOT
STANDING UP FROM CHAIR USING ARMS: A LOT

## 2024-10-02 ASSESSMENT — PAIN - FUNCTIONAL ASSESSMENT
PAIN_FUNCTIONAL_ASSESSMENT: 0-10
PAIN_FUNCTIONAL_ASSESSMENT: 0-10

## 2024-10-02 ASSESSMENT — PAIN SCALES - GENERAL
PAINLEVEL_OUTOF10: 0 - NO PAIN
PAINLEVEL_OUTOF10: 10 - WORST POSSIBLE PAIN
PAINLEVEL_OUTOF10: 0 - NO PAIN
PAINLEVEL_OUTOF10: 9
PAINLEVEL_OUTOF10: 0 - NO PAIN
PAINLEVEL_OUTOF10: 9
PAINLEVEL_OUTOF10: 10 - WORST POSSIBLE PAIN

## 2024-10-02 NOTE — PROGRESS NOTES
VASCULAR SURGERY PROGRESS NOTE  Assessment/Plan   Shirin Slater is 54 y.o. female with history of severe PAD s/p R hip disarticulation s/p L CFA and EIA embolectomy 12/23 s/p multiple L toe amputations, T2DM complicated by diabetic neuropathy, HTN, HLD, renal and splenic thromboses on aspirin/plavix/coumadin, and current tobacco use who presents as a transfer from [facility] for management of L 2nd toe gangrene with concern of osteomyelitis.  Now s/p Left femoral endarterectomy, profundaplasty, common trvseqk-yu-vnqdrsuf tibial bypass using 6 mm ringed PTFE, left lower extremity angiogram on 9/25. Patient s/p TMA with podiatry 9/27.     Neuro: acute postoperative pain, Hx PTSD, seizures  - continue tylenol/oxy PRN for pain control  - dilaudid for breakthrough pain  - home Depakote, Cymbalta, and Keppra  - melatonin     CV: Hx CLI, HTN, PAD, long term AC with warfarin  - maintain blood pressure control - metoprolol  - continue statin/ASA  - therapeutic Lovenox bridge for warfarin (started 9/30/24) INR today 1.3, giving 5mg tonight  - vascular med consulted, appreciate recs- JAK2 and homocysteine ordered      Pulm: Hx ELMIRA, COPD  - duonebs PRN  - continue to encourage IS hourly while awake  - OOB to chair and increase ambulation as tolerated     FENGI: Hx chronic diarrhea  - tolerating regular diet  - PRN Imodium, scheduled metamucil  - continue PPI  - monitor and replete electrolytes     Endo: DM, hypothyroidism  - home levothyroxine  - SSI  - holding home Jardiance     Heme:   - no s/sx of bleeding  - monitor H/H, transfuse for Hgb <7     ID:  gangrenous second/ third toe S/P TMA, tissue cultures NG. Blood cultures NG  - monitor s/s of infection  - chronic diarrhea- C diff negative  - continue Unasyn until discharge then begin Augmentin oral through 10/10/24  - trend WBC     DVT prophylaxis:  - heparin infusion     Dispo-  - WBAT with podiatric boot  -_PT/OT rec SNF, await precert     Subjective   Feels  ok, no complaints    Objective   Vitals:  Heart Rate:  [75-89]   Temp:  [35.6 °C (96.1 °F)-36.5 °C (97.7 °F)]   Resp:  [12-18]   BP: ()/(57-66)   SpO2:  [93 %-96 %]     Exam:  Constitutional: No acute distress, resting comfortably  Neuro:  AOx3, grossly intact  ENMT: moist mucous membranes  CV: no tachycardia  Pulm: non-labored on RA, lungs clear  GI: soft, non-tender, non-distended, + BS  Skin: warm and dry  Musculoskeletal: moving all extremities  Extremities: Left foot dressing intact, exposed foot warm. Left thigh dressing and bilat groin prevena dressings intact.       Labs:  Results from last 7 days   Lab Units 10/02/24  0604 10/01/24  0603 09/30/24  0615   WBC AUTO x10*3/uL 12.4* 10.8 11.3   HEMOGLOBIN g/dL 8.6* 8.8* 8.4*   PLATELETS AUTO x10*3/uL 376 342 296      Results from last 7 days   Lab Units 10/02/24  0604 10/01/24  0603 09/30/24  0615   SODIUM mmol/L 141 143 143   POTASSIUM mmol/L 4.0 4.1 4.1   CHLORIDE mmol/L 104 103 103   CO2 mmol/L 30 32 32   BUN mg/dL 9 6 6   CREATININE mg/dL 0.54 0.55 0.50   GLUCOSE mg/dL 153* 132* 122*   MAGNESIUM mg/dL 1.78 2.03 1.65   PHOSPHORUS mg/dL 3.7 3.6 3.0      Results from last 7 days   Lab Units 10/02/24  0604 10/01/24  0603 09/25/24  2211   INR  1.3* 1.0 1.2*   PROTIME seconds 15.1* 11.7 13.2*   APTT seconds  --   --  37      Results from last 7 days   Lab Units 10/01/24  0603 09/30/24  0615 09/29/24  1708   ANTI XA UNFRACTIONATED IU/mL 0.5 0.3 0.4

## 2024-10-02 NOTE — NURSING NOTE
Ms. Slater is resting in bed and anticipates discharge to rehab tomorrow.  She is comfortable with home regimen of insulin and will be able to manage this at home.  Will sign off at this time as she is to be discharged this week.  Time spent:  15 mins.

## 2024-10-02 NOTE — CARE PLAN
The patient's goals for the shift include      The clinical goals for the shift include PT. will remain free from injury this shift      Problem: Skin  Goal: Decreased wound size/increased tissue granulation at next dressing change  10/1/2024 2245 by Rachel Auguste RN  Outcome: Progressing  10/1/2024 2243 by Rachel Auguste RN  Flowsheets (Taken 10/1/2024 2243)  Decreased wound size/increased tissue granulation at next dressing change: Protective dressings over bony prominences  Goal: Participates in plan/prevention/treatment measures  10/1/2024 2245 by Rachel Auguste RN  Outcome: Progressing  10/1/2024 2243 by Rachel Auguste RN  Flowsheets (Taken 10/1/2024 2243)  Participates in plan/prevention/treatment measures: Elevate heels  Goal: Prevent/manage excess moisture  10/1/2024 2245 by Rachel Auguste RN  Outcome: Progressing  10/1/2024 2243 by Rachel Auguste RN  Flowsheets (Taken 10/1/2024 2243)  Prevent/manage excess moisture:   Moisturize dry skin   Follow provider orders for dressing changes  Goal: Prevent/minimize sheer/friction injuries  10/1/2024 2245 by Rachel Auguste RN  Outcome: Progressing  10/1/2024 2243 by Rachel Auguste RN  Flowsheets (Taken 10/1/2024 2243)  Prevent/minimize sheer/friction injuries:   Use pull sheet   HOB 30 degrees or less  Goal: Promote/optimize nutrition  10/1/2024 2245 by Rachel Auguste RN  Outcome: Progressing  10/1/2024 2243 by Rachel Auguste RN  Flowsheets (Taken 10/1/2024 2243)  Promote/optimize nutrition:   Consume > 50% meals/supplements   Monitor/record intake including meals  Goal: Promote skin healing  10/1/2024 2245 by Rachel Auguste RN  Outcome: Progressing  10/1/2024 2243 by Rachel Auguste RN  Flowsheets (Taken 10/1/2024 2243)  Promote skin healing:   Assess skin/pad under line(s)/device(s)   Protective dressings over bony prominences     Problem: Pain  Goal: Takes deep breaths with improved pain control throughout the  shift  Outcome: Progressing  Goal: Turns in bed with improved pain control throughout the shift  Outcome: Progressing  Goal: Walks with improved pain control throughout the shift  Outcome: Progressing  Goal: Performs ADL's with improved pain control throughout shift  Outcome: Progressing  Goal: Participates in PT with improved pain control throughout the shift  Outcome: Progressing  Goal: Free from opioid side effects throughout the shift  Outcome: Progressing  Goal: Free from acute confusion related to pain meds throughout the shift  Outcome: Progressing

## 2024-10-02 NOTE — PROGRESS NOTES
Shirin Slater is a 54 y.o. female on day 14 of admission presenting with Gangrene of toe of left foot (Multi).  Transitional Care Coordination Progress Note:  Patient discussed during interdisciplinary rounds.   Team members present: MD and TCC  Plan per Medical/Surgical team: Patient being treated for Gangrene of toe on left foot and pain control. Patient with discharge with Prevena VAC dressing. All wounds are closed.  Payor: [employer]  Discharge disposition: [facility]. Pre-cert started 10/2. 7000 and [facility] sent over.  Potential Barriers: None  ADOD: 10/03/24    JOSE TAYLOR RN

## 2024-10-02 NOTE — CARE PLAN
The patient's goals for the shift include      The clinical goals for the shift include Pt will remain free from injury this shift      Problem: Skin  Goal: Decreased wound size/increased tissue granulation at next dressing change  Flowsheets (Taken 10/2/2024 1934)  Decreased wound size/increased tissue granulation at next dressing change: Protective dressings over bony prominences  Goal: Participates in plan/prevention/treatment measures  Flowsheets (Taken 10/2/2024 1934)  Participates in plan/prevention/treatment measures: Elevate heels  Goal: Prevent/manage excess moisture  Flowsheets (Taken 10/2/2024 1934)  Prevent/manage excess moisture:   Moisturize dry skin   Follow provider orders for dressing changes   Cleanse incontinence/protect with barrier cream   Monitor for/manage infection if present  Goal: Prevent/minimize sheer/friction injuries  Flowsheets (Taken 10/2/2024 1934)  Prevent/minimize sheer/friction injuries:   Use pull sheet   Complete micro-shifts as needed if patient unable. Adjust patient position to relieve pressure points, not a full turn  Goal: Promote/optimize nutrition  Flowsheets (Taken 10/2/2024 1934)  Promote/optimize nutrition:   Consume > 50% meals/supplements   Monitor/record intake including meals  Goal: Promote skin healing  Flowsheets (Taken 10/2/2024 1934)  Promote skin healing:   Assess skin/pad under line(s)/device(s)   Protective dressings over bony prominences

## 2024-10-02 NOTE — PROGRESS NOTES
Physical Therapy Treatment    Patient Name: Shirin Slater  MRN: [id number]  Today's Date: 10/2/2024  Room: [address] [place], [place] [address]  Time Calculation  Start Time: 1434  Stop Time: 1527  Time Calculation (min): 53 min       Assessment/Plan   PT Assessment  PT Assessment Results: Decreased strength, Decreased endurance, Impaired balance, Decreased mobility, Pain  Rehab Prognosis: Fair  Evaluation/Treatment Tolerance: Patient limited by pain  Medical Staff Made Aware: Yes  Strengths: Attitude of self  Barriers to Participation: Comorbidities  End of Session Communication: Bedside nurse  End of Session Patient Position: Bed, 3 rail up, Alarm off, not on at start of session     PT Plan  Treatment/Interventions: Bed mobility, Transfer training, Balance training, Strengthening, Range of motion, Therapeutic exercise, Therapeutic activity, Home exercise program, Positioning  PT Plan: Ongoing PT  PT Frequency: 3 times per week  PT Discharge Recommendations: Moderate intensity level of continued care  PT - OK to Discharge: Yes (eval complete, d/c recommendation)  Assessment: Patient is progressing Well with therapy this date. Pt limited by pain, nursing notified at end of session. Able to transfer bed<>wc @ [facility] using slideboard back to bed. Would continue to benefit from continued skilled PT to address all mobility deficits; Patient remains appropriate for MOD intensity therapy when medically appropriate for discharge from acute stay.  Will continue to follow.     General Visit Information:   PT  Visit  PT Received On: 10/02/24  Prior to Session Communication: Bedside nurse  Patient Position Received: Bed, 3 rail up, Alarm off, not on at start of session     Subjective   Subjective: Pt motivated to go to rehab   Precautions:  Precautions  LE Weight Bearing Status: Weight Bearing as Tolerated (with post op shoe per podiatry note 10/1)  Precautions Comment: DOT RUSSELL  Vital Signs:  Vital Signs  Vitals Session: Pre PT  Heart Rate: 81  Heart  Rate Source: Monitor  Resp: 20  SpO2: 96 %    Objective   Pain:  Pain Assessment  Pain Assessment: 0-10  0-10 (Numeric) Pain Score: 10 - Worst possible pain (at end of session)  Pain Type: Surgical pain  Pain Location: Foot  Pain Orientation: Left  Cognition:  Cognition  Overall Cognitive Status:  (pt easily distracted and irritable at times. perseverates on topics not related to therapy)  Orientation Level: Oriented X4  Insight: Mild  Impulsive: Mildly     Static Sitting balance:  Static Sitting Balance  Static Sitting-Balance Support: Bilateral upper extremity supported  Static Sitting-Level of Assistance: Close supervision  Static Sitting-Comment/Number of Minutes: 10m    Lines/Tubes/Drains:  CVC 09/25/24 Double lumen Left Internal jugular (Active)   Number of days: 7       External Urinary Catheter Female (Active)   Number of days: 2     PT Treatments:     Therapeutic Activity  Therapeutic Activity Performed: Yes  Therapeutic Activity 1: Pt adamant about not using slideboard however was able to successfully transfer back to bed with use of board going to R side. Pt often perseverates on PTSD hospital experience throughout tx, easily irritable.     Bed Mobility 1  Bed Mobility 1: Rolling right, Rolling left  Level of Assistance 1: Modified independent  Bed Mobility Comments 1: use of bed rails  Bed Mobility 2  Bed Mobility  2: Supine to sitting  Level of Assistance 2: Close supervision  Bed Mobility Comments 2: pt can be impulsive at times, use of bed rails     Transfers  Transfer: Yes  Transfer 1  Transfer From 1: Bed to  Transfer to 1: Wheelchair  Technique 1: Squat pivot  Transfer Level of Assistance 1: Contact guard, Minimal verbal cues  Trials/Comments 1: pt able to pivot into chair, takes extended time d/t fear and pain. needs assist for set up and safety  Transfers 2  Transfer From 2:  (wheelchair)  Transfer to 2: Bed  Technique 2: Lateral with transfer board  Transfer Device 2: Slide board  Transfer Level  of Assistance 2: Contact guard, Moderate verbal cues  Trials/Comments 2: attempted squat pivot back however pt unable to transfer to R side. Pt able to use slideboard to scoot to bed. Pt then immediately rolled onto side upon completion of transfer. Requires safety cues             Activity tolerance:  Activity Tolerance  Endurance: Tolerates 10 - 20 min exercise with multiple rests    Outcome Measures:  [facility] Basic Mobility  Turning from your back to your side while in a flat bed without using bedrails: None  Moving from lying on your back to sitting on the side of a flat bed without using bedrails: A little  Moving to and from bed to chair (including a wheelchair): A little  Standing up from a chair using your arms (e.g. wheelchair or bedside chair): Total  To walk in hospital room: Total  Climbing 3-5 steps with railing: Total  Basic Mobility - Total Score: 13       Education Documentation  Body Mechanics, taught by Danya Garcia PTA at 10/2/2024  3:41 PM.  Learner: Patient  Readiness: Acceptance  Method: Explanation  Response: Verbalizes Understanding    Precautions, taught by Danya Garcia PTA at 10/2/2024  3:41 PM.  Learner: Patient  Readiness: Acceptance  Method: Explanation  Response: Verbalizes Understanding    Home Exercise Program, taught by Danya Garcia PTA at 10/2/2024  3:41 PM.  Learner: Patient  Readiness: Acceptance  Method: Explanation  Response: Verbalizes Understanding    ADL Training, taught by Danya Garcia PTA at 10/2/2024  3:41 PM.  Learner: Patient  Readiness: Acceptance  Method: Explanation  Response: Verbalizes Understanding    Precautions, taught by Danya Garcia PTA at 10/2/2024  3:41 PM.  Learner: Patient  Readiness: Acceptance  Method: Explanation  Response: Verbalizes Understanding    Body Mechanics, taught by Danya Garcia PTA at 10/2/2024  3:41 PM.  Learner: Patient  Readiness: Acceptance  Method: Explanation  Response: Verbalizes Understanding    Mobility Training, taught by Danya Garcia  PTA at 10/2/2024  3:41 PM.  Learner: Patient  Readiness: Acceptance  Method: Explanation  Response: Verbalizes Understanding    Education Comments  No comments found.          OP EDUCATION:       Encounter Problems       Encounter Problems (Active)       Balance       Pt will complete dynamic reaching in sitting x10 reps without LOB (Progressing)       Start:  09/29/24    Expected End:  10/13/24               PT Transfers       Pt will perform supine<>sit indep (Progressing)       Start:  09/29/24    Expected End:  10/13/24            Pt will perform bed<>chair transfer via slide board with SBA (Progressing)       Start:  09/29/24    Expected End:  10/13/24

## 2024-10-02 NOTE — CARE PLAN
The patient's goals for the shift include  free from injury    The clinical goals for the shift include PT. will remain free from injury this shift      Problem: Skin  Goal: Decreased wound size/increased tissue granulation at next dressing change  Outcome: Progressing  Goal: Participates in plan/prevention/treatment measures  Outcome: Progressing  Goal: Prevent/manage excess moisture  Outcome: Progressing  Goal: Prevent/minimize sheer/friction injuries  Outcome: Progressing  Goal: Promote/optimize nutrition  Outcome: Progressing  Goal: Promote skin healing  Outcome: Progressing     Problem: Pain  Goal: Takes deep breaths with improved pain control throughout the shift  Outcome: Progressing  Goal: Turns in bed with improved pain control throughout the shift  Outcome: Progressing  Goal: Walks with improved pain control throughout the shift  Outcome: Progressing  Goal: Performs ADL's with improved pain control throughout shift  Outcome: Progressing  Goal: Participates in PT with improved pain control throughout the shift  Outcome: Progressing  Goal: Free from opioid side effects throughout the shift  Outcome: Progressing  Goal: Free from acute confusion related to pain meds throughout the shift  Outcome: Progressing

## 2024-10-03 LAB
ANION GAP SERPL CALC-SCNC: 10 MMOL/L (ref 10–20)
BUN SERPL-MCNC: 9 MG/DL (ref 6–23)
CALCIUM SERPL-MCNC: 8.1 MG/DL (ref 8.6–10.6)
CHLORIDE SERPL-SCNC: 103 MMOL/L (ref 98–107)
CO2 SERPL-SCNC: 32 MMOL/L (ref 21–32)
CREAT SERPL-MCNC: 0.46 MG/DL (ref 0.5–1.05)
EGFRCR SERPLBLD CKD-EPI 2021: >90 ML/MIN/1.73M*2
ERYTHROCYTE [DISTWIDTH] IN BLOOD BY AUTOMATED COUNT: 24.3 % (ref 11.5–14.5)
GLUCOSE BLD MANUAL STRIP-MCNC: 115 MG/DL (ref 74–99)
GLUCOSE BLD MANUAL STRIP-MCNC: 117 MG/DL (ref 74–99)
GLUCOSE BLD MANUAL STRIP-MCNC: 157 MG/DL (ref 74–99)
GLUCOSE BLD MANUAL STRIP-MCNC: 184 MG/DL (ref 74–99)
GLUCOSE SERPL-MCNC: 173 MG/DL (ref 74–99)
HCT VFR BLD AUTO: 29.5 % (ref 36–46)
HGB BLD-MCNC: 8.7 G/DL (ref 12–16)
INR PPP: 2.3 (ref 0.9–1.1)
MAGNESIUM SERPL-MCNC: 1.62 MG/DL (ref 1.6–2.4)
MCH RBC QN AUTO: 24.9 PG (ref 26–34)
MCHC RBC AUTO-ENTMCNC: 29.5 G/DL (ref 32–36)
MCV RBC AUTO: 84 FL (ref 80–100)
NRBC BLD-RTO: 0.2 /100 WBCS (ref 0–0)
PHOSPHATE SERPL-MCNC: 3.6 MG/DL (ref 2.5–4.9)
PLATELET # BLD AUTO: 410 X10*3/UL (ref 150–450)
POTASSIUM SERPL-SCNC: 3.8 MMOL/L (ref 3.5–5.3)
PROTHROMBIN TIME: 26.5 SECONDS (ref 9.8–12.8)
RBC # BLD AUTO: 3.5 X10*6/UL (ref 4–5.2)
SODIUM SERPL-SCNC: 141 MMOL/L (ref 136–145)
WBC # BLD AUTO: 12.3 X10*3/UL (ref 4.4–11.3)

## 2024-10-03 PROCEDURE — 81270 JAK2 GENE: CPT | Performed by: NURSE PRACTITIONER

## 2024-10-03 PROCEDURE — 2500000004 HC RX 250 GENERAL PHARMACY W/ HCPCS (ALT 636 FOR OP/ED): Performed by: NURSE PRACTITIONER

## 2024-10-03 PROCEDURE — 82947 ASSAY GLUCOSE BLOOD QUANT: CPT

## 2024-10-03 PROCEDURE — 2500000001 HC RX 250 WO HCPCS SELF ADMINISTERED DRUGS (ALT 637 FOR MEDICARE OP): Performed by: NURSE PRACTITIONER

## 2024-10-03 PROCEDURE — 2500000002 HC RX 250 W HCPCS SELF ADMINISTERED DRUGS (ALT 637 FOR MEDICARE OP, ALT 636 FOR OP/ED): Performed by: NURSE PRACTITIONER

## 2024-10-03 PROCEDURE — 97535 SELF CARE MNGMENT TRAINING: CPT | Mod: GO

## 2024-10-03 PROCEDURE — 2500000004 HC RX 250 GENERAL PHARMACY W/ HCPCS (ALT 636 FOR OP/ED)

## 2024-10-03 PROCEDURE — 80048 BASIC METABOLIC PNL TOTAL CA: CPT | Performed by: NURSE PRACTITIONER

## 2024-10-03 PROCEDURE — 84100 ASSAY OF PHOSPHORUS: CPT | Performed by: NURSE PRACTITIONER

## 2024-10-03 PROCEDURE — 97530 THERAPEUTIC ACTIVITIES: CPT | Mod: GP | Performed by: PHYSICAL THERAPIST

## 2024-10-03 PROCEDURE — G0452 MOLECULAR PATHOLOGY INTERPR: HCPCS | Performed by: PATHOLOGY

## 2024-10-03 PROCEDURE — 97530 THERAPEUTIC ACTIVITIES: CPT | Mod: GP,CQ

## 2024-10-03 PROCEDURE — 2500000002 HC RX 250 W HCPCS SELF ADMINISTERED DRUGS (ALT 637 FOR MEDICARE OP, ALT 636 FOR OP/ED)

## 2024-10-03 PROCEDURE — 83735 ASSAY OF MAGNESIUM: CPT | Performed by: NURSE PRACTITIONER

## 2024-10-03 PROCEDURE — 1200000002 HC GENERAL ROOM WITH TELEMETRY DAILY

## 2024-10-03 PROCEDURE — 85027 COMPLETE CBC AUTOMATED: CPT | Performed by: NURSE PRACTITIONER

## 2024-10-03 PROCEDURE — 85610 PROTHROMBIN TIME: CPT

## 2024-10-03 PROCEDURE — 99233 SBSQ HOSP IP/OBS HIGH 50: CPT | Performed by: INTERNAL MEDICINE

## 2024-10-03 PROCEDURE — 97530 THERAPEUTIC ACTIVITIES: CPT | Mod: GO

## 2024-10-03 RX ORDER — HYDROMORPHONE HYDROCHLORIDE 1 MG/ML
0.2 INJECTION, SOLUTION INTRAMUSCULAR; INTRAVENOUS; SUBCUTANEOUS ONCE
Status: COMPLETED | OUTPATIENT
Start: 2024-10-03 | End: 2024-10-03

## 2024-10-03 RX ORDER — MAGNESIUM SULFATE HEPTAHYDRATE 40 MG/ML
2 INJECTION, SOLUTION INTRAVENOUS ONCE
Status: COMPLETED | OUTPATIENT
Start: 2024-10-03 | End: 2024-10-03

## 2024-10-03 RX ORDER — WARFARIN SODIUM 5 MG/1
2.5 TABLET ORAL ONCE
Status: COMPLETED | OUTPATIENT
Start: 2024-10-03 | End: 2024-10-03

## 2024-10-03 RX ORDER — POTASSIUM CHLORIDE 20 MEQ/1
20 TABLET, EXTENDED RELEASE ORAL ONCE
Status: COMPLETED | OUTPATIENT
Start: 2024-10-03 | End: 2024-10-03

## 2024-10-03 RX ORDER — WARFARIN 2.5 MG/1
TABLET ORAL
Start: 2024-10-03 | End: 2025-10-03

## 2024-10-03 RX ADMIN — ATORVASTATIN CALCIUM 80 MG: 80 TABLET, FILM COATED ORAL at 20:06

## 2024-10-03 RX ADMIN — MAGNESIUM SULFATE HEPTAHYDRATE 2 G: 40 INJECTION, SOLUTION INTRAVENOUS at 12:50

## 2024-10-03 RX ADMIN — POTASSIUM CHLORIDE 20 MEQ: 1500 TABLET, EXTENDED RELEASE ORAL at 12:09

## 2024-10-03 RX ADMIN — DULOXETINE HYDROCHLORIDE 60 MG: 60 CAPSULE, DELAYED RELEASE ORAL at 20:06

## 2024-10-03 RX ADMIN — LEVETIRACETAM 500 MG: 500 TABLET, FILM COATED ORAL at 08:53

## 2024-10-03 RX ADMIN — ENOXAPARIN SODIUM 90 MG: 100 INJECTION SUBCUTANEOUS at 08:53

## 2024-10-03 RX ADMIN — ACETAMINOPHEN 650 MG: 325 TABLET, FILM COATED ORAL at 20:06

## 2024-10-03 RX ADMIN — INSULIN LISPRO 2 UNITS: 100 INJECTION, SOLUTION INTRAVENOUS; SUBCUTANEOUS at 17:25

## 2024-10-03 RX ADMIN — ENOXAPARIN SODIUM 90 MG: 100 INJECTION SUBCUTANEOUS at 20:05

## 2024-10-03 RX ADMIN — AMPICILLIN SODIUM AND SULBACTAM SODIUM 3 G: 2; 1 INJECTION, POWDER, FOR SOLUTION INTRAMUSCULAR; INTRAVENOUS at 14:27

## 2024-10-03 RX ADMIN — HYDROMORPHONE HYDROCHLORIDE 0.2 MG: 1 INJECTION, SOLUTION INTRAMUSCULAR; INTRAVENOUS; SUBCUTANEOUS at 12:09

## 2024-10-03 RX ADMIN — INSULIN LISPRO 2 UNITS: 100 INJECTION, SOLUTION INTRAVENOUS; SUBCUTANEOUS at 08:54

## 2024-10-03 RX ADMIN — OXYCODONE HYDROCHLORIDE 10 MG: 5 TABLET ORAL at 08:47

## 2024-10-03 RX ADMIN — ACETAMINOPHEN 650 MG: 325 TABLET, FILM COATED ORAL at 14:27

## 2024-10-03 RX ADMIN — LEVOTHYROXINE SODIUM 75 MCG: 0.07 TABLET ORAL at 06:30

## 2024-10-03 RX ADMIN — METOPROLOL TARTRATE 12.5 MG: 25 TABLET, FILM COATED ORAL at 20:06

## 2024-10-03 RX ADMIN — WARFARIN SODIUM 2.5 MG: 5 TABLET ORAL at 18:56

## 2024-10-03 RX ADMIN — DIVALPROEX SODIUM 500 MG: 500 TABLET, DELAYED RELEASE ORAL at 14:27

## 2024-10-03 RX ADMIN — BUSPIRONE HYDROCHLORIDE 10 MG: 10 TABLET ORAL at 08:53

## 2024-10-03 RX ADMIN — ASPIRIN 81 MG: 81 TABLET, COATED ORAL at 08:53

## 2024-10-03 RX ADMIN — METOPROLOL TARTRATE 12.5 MG: 25 TABLET, FILM COATED ORAL at 08:53

## 2024-10-03 RX ADMIN — DIVALPROEX SODIUM 500 MG: 500 TABLET, DELAYED RELEASE ORAL at 06:30

## 2024-10-03 RX ADMIN — ACETAMINOPHEN 650 MG: 325 TABLET, FILM COATED ORAL at 03:48

## 2024-10-03 RX ADMIN — HYDROMORPHONE HYDROCHLORIDE 0.2 MG: 1 INJECTION, SOLUTION INTRAMUSCULAR; INTRAVENOUS; SUBCUTANEOUS at 14:27

## 2024-10-03 RX ADMIN — LEVETIRACETAM 500 MG: 500 TABLET, FILM COATED ORAL at 20:06

## 2024-10-03 RX ADMIN — ACETAMINOPHEN 650 MG: 325 TABLET, FILM COATED ORAL at 08:53

## 2024-10-03 RX ADMIN — AMPICILLIN SODIUM AND SULBACTAM SODIUM 3 G: 2; 1 INJECTION, POWDER, FOR SOLUTION INTRAMUSCULAR; INTRAVENOUS at 03:48

## 2024-10-03 RX ADMIN — OXYCODONE HYDROCHLORIDE 10 MG: 5 TABLET ORAL at 20:12

## 2024-10-03 RX ADMIN — PANTOPRAZOLE SODIUM 40 MG: 40 TABLET, DELAYED RELEASE ORAL at 06:30

## 2024-10-03 RX ADMIN — AMPICILLIN SODIUM AND SULBACTAM SODIUM 3 G: 2; 1 INJECTION, POWDER, FOR SOLUTION INTRAMUSCULAR; INTRAVENOUS at 08:54

## 2024-10-03 ASSESSMENT — COGNITIVE AND FUNCTIONAL STATUS - GENERAL
DRESSING REGULAR UPPER BODY CLOTHING: A LITTLE
HELP NEEDED FOR BATHING: A LOT
TURNING FROM BACK TO SIDE WHILE IN FLAT BAD: A LITTLE
STANDING UP FROM CHAIR USING ARMS: A LOT
PERSONAL GROOMING: A LITTLE
WALKING IN HOSPITAL ROOM: A LOT
TOILETING: A LITTLE
MOBILITY SCORE: 15
MOVING TO AND FROM BED TO CHAIR: A LOT
PERSONAL GROOMING: A LITTLE
DRESSING REGULAR LOWER BODY CLOTHING: A LOT
TOILETING: A LOT
CLIMB 3 TO 5 STEPS WITH RAILING: TOTAL
MOBILITY SCORE: 14
STANDING UP FROM CHAIR USING ARMS: A LOT
DAILY ACTIVITIY SCORE: 18
CLIMB 3 TO 5 STEPS WITH RAILING: TOTAL
DAILY ACTIVITIY SCORE: 16
HELP NEEDED FOR BATHING: A LOT
DRESSING REGULAR LOWER BODY CLOTHING: A LOT
WALKING IN HOSPITAL ROOM: TOTAL
MOVING TO AND FROM BED TO CHAIR: A LITTLE

## 2024-10-03 ASSESSMENT — PAIN SCALES - GENERAL
PAINLEVEL_OUTOF10: 9

## 2024-10-03 ASSESSMENT — PAIN SCALES - WONG BAKER: WONGBAKER_NUMERICALRESPONSE: HURTS LITTLE BIT

## 2024-10-03 ASSESSMENT — PAIN DESCRIPTION - LOCATION: LOCATION: LEG

## 2024-10-03 ASSESSMENT — PAIN - FUNCTIONAL ASSESSMENT
PAIN_FUNCTIONAL_ASSESSMENT: 0-10

## 2024-10-03 ASSESSMENT — PAIN DESCRIPTION - ORIENTATION: ORIENTATION: LEFT

## 2024-10-03 ASSESSMENT — ACTIVITIES OF DAILY LIVING (ADL): HOME_MANAGEMENT_TIME_ENTRY: 10

## 2024-10-03 NOTE — DISCHARGE SUMMARY
Discharge Diagnosis  Gangrene of toe of left foot (Multi)    Issues Requiring Follow-Up  SNF to continue lovenox to warfarin bridge  FU w/ vascular medicine   FU with vascular surgery    Test Results Pending At Discharge  Pending Labs       Order Current Status    Complement, total In process    JAK2 [id number] Mutation by PCR, Qual In process    Opiate Confirmation, Urine In process    Surgical Pathology Exam In process            Hospital Course  Shirin Slater is 54 y.o. female with history of severe PAD s/p R hip disarticulation s/p L CFA and EIA embolectomy 12/23 s/p multiple L toe amputations, T2DM complicated by diabetic neuropathy, HTN, HLD, renal and splenic thromboses on aspirin/plavix/coumadin, and current tobacco use who presented to [facility] on 9/18 as a transfer from [facility] for management of L 2nd toe gangrene with concern of osteomyelitis.  She underwent a left femoral endarterectomy, profundaplasty, common juzimzf-mb-nnceguzi tibial bypass using 6 mm ringed PTFE, left lower extremity angiogram on 9/25 with Dr. Dunphy. She subsequently received a TMA with podiatry on 9/27.  She was continued on aspirin and high intensity heparin protocol, as well as Unasyn on the recommendation of ID.   On 9/30 she was resumed on coumadin with a therapeutic lovenox bridge following confirmation from podiatry that there would be no additional interventions required. She was made WBAT on her LLE and given a surgical boot.   Following appropriate placement into a skilled nursing facility, she was discharged on 10/3 with no issues.   Prevena over left lateral incision changed on 10/3 ( will be DC 10/10)  Left groin incision is painted with betadine and covered with dry gauze.  Left medial thigh incision covered with dry guaze.    Pertinent Physical Exam At Time of Discharge      10/3/2024    12:30 AM 10/3/2024     4:20 AM 10/3/2024     7:17 AM 10/3/2024    11:16 AM 10/3/2024    12:01 PM 10/3/2024     3:47 PM  10/3/2024     7:38 PM   Vitals   Systolic 119 100 105 110  111 111   Diastolic 61 56 61 71  55 61   Heart Rate 80 79 75 89 101 79 76   Temp 36.3 °C (97.3 °F) 36.6 °C (97.9 °F) 36.3 °C (97.3 °F) 36.4 °C (97.5 °F)  36.3 °C (97.3 °F) 36.5 °C (97.7 °F)   Resp  14 18 18  18 18       Exam:  Constitutional: No acute distress, resting comfortably  Neuro:  AOx3, grossly intact  ENMT: moist mucous membranes  CV: no tachycardia  Pulm: non-labored on RA, lungs clear  GI: soft, non-tender, non-distended  Skin: warm and dry  Musculoskeletal: moving all extremities, prior right hip disarticulation/amputation, well healed.  Extremities: Left foot dressing intact. left thigh dressing without strikethrough and is c/d/I. Left groin with betadine paint and dry gauze, still with notable erythema. Picture in chart. Prevena over LLE lateral wound is holding appropriate suction ( incision is without necrosis, closed with interruptted vertical mattress sutures and staples)      Home Medications     Medication List      START taking these medications     amoxicillin-pot clavulanate 875-125 mg tablet; Commonly known as:   Augmentin; Take 1 tablet by mouth 2 times a day for 8 days. Do not start   before October 3, 2024.   oxyCODONE 5 mg immediate release tablet; Commonly known as: Roxicodone;   Take 1 tablet (5 mg) by mouth every 6 hours if needed for severe pain (7 -   10) for up to 7 days.   pantoprazole 40 mg EC tablet; Commonly known as: ProtoNix; Take 1 tablet   (40 mg) by mouth once daily in the morning. Take before meals. Do not   crush, chew, or split.   psyllium 3.4 gram packet; Commonly known as: Metamucil; Take 1 packet by   mouth 3 times a day. Mix and drink with at least 8 ounces of water or   juice.     CHANGE how you take these medications     acetaminophen 325 mg tablet; Commonly known as: Tylenol; Take 2 tablets   (650 mg) by mouth every 6 hours if needed for mild pain (1 - 3).; What   changed: how much to take, when to take  this, reasons to take this   enoxaparin 100 mg/mL syringe; Commonly known as: Lovenox; REMOVE 0.1ML   AND Inject 0.9 mL (90 mg) under the skin 2 times a day.; What changed: how   much to take, additional instructions   loperamide 2 mg capsule; Commonly known as: Imodium; Take 1 capsule (2   mg) by mouth 4 times a day as needed for diarrhea.; What changed: how much   to take, when to take this, reasons to take this, additional instructions   warfarin 2.5 mg tablet; Commonly known as: Coumadin; Take as directed   per After Visit Summary.; What changed: medication strength, Another   medication with the same name was removed. Continue taking this   medication, and follow the directions you see here.     CONTINUE taking these medications     albuterol 90 mcg/actuation inhaler   aspirin 81 mg EC tablet   atorvastatin 80 mg tablet; Commonly known as: Lipitor   busPIRone 10 mg tablet; Commonly known as: Buspar   divalproex 500 mg 24 hr tablet; Commonly known as: Depakote ER   DULoxetine 60 mg DR capsule; Commonly known as: Cymbalta   empagliflozin 10 mg; Commonly known as: Jardiance   levETIRAcetam 500 mg tablet; Commonly known as: Keppra   levothyroxine 75 mcg capsule; Commonly known as: Tirosint   melatonin 10 mg tablet   metoprolol tartrate 25 mg tablet; Commonly known as: Lopressor   multivitamin tablet     STOP taking these medications     clopidogrel 75 mg tablet; Commonly known as: Plavix   doxycycline 100 mg tablet; Commonly known as: Adoxa   gabapentin 400 mg capsule; Commonly known as: Neurontin   lidocaine 4 % patch   methocarbamol 500 mg tablet; Commonly known as: Robaxin   omeprazole OTC 20 mg EC tablet; Commonly known as: PriLOSEC OTC   sennosides 8.6 mg tablet; Commonly known as: Senokot       Outpatient Follow-Up  No future appointments.    Lexus Bryant MD

## 2024-10-03 NOTE — PROGRESS NOTES
Physical Therapy    Physical Therapy Treatment    Patient Name: Shirin Slater  MRN: [id number]  Today's Date: 10/3/2024  Time Calculation  Start Time: 1200  Stop Time: 1227  Time Calculation (min): 27 min     7029/7029-A    Assessment/Plan   PT Assessment  PT Assessment Results: Decreased strength, Decreased endurance, Impaired balance, Decreased mobility, Pain  Rehab Prognosis: Fair  Barriers to Discharge: none  Evaluation/Treatment Tolerance: Patient tolerated treatment well  Medical Staff Made Aware: Yes  Strengths: Attitude of self  Barriers to Participation: Comorbidities  End of Session Communication: Bedside nurse  Assessment Comment: Pt. tolerated treatment well. Presents with poor safety awarness/ insight into deficits and high risk for falls. Remains approrpaite for moderate intensity therapy to ensure safety and maximize independence.  End of Session Patient Position: Bed, 3 rail up, Alarm on  PT Plan  Inpatient/Swing Bed or Outpatient: Inpatient  PT Plan  Treatment/Interventions: Bed mobility, Transfer training, Balance training, Strengthening, Range of motion, Therapeutic exercise, Therapeutic activity, Home exercise program, Positioning  PT Plan: Ongoing PT  PT Frequency: 3 times per week  PT Discharge Recommendations: Moderate intensity level of continued care  PT - OK to Discharge: Yes (eval complete, d/c recommendation)      General Visit Information:   PT  Visit  PT Received On: 10/03/24  General  Reason for Referral: s/p L TMA  Past Medical History Relevant to Rehab: T2DM c/b diabetic neuropathy, HTN, HLD, renal and splenic thrombosis 2015,  hx 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 requested PT be timed with administration of pain medication and for return at noon.)  Family/Caregiver Present: No  Co-Treatment: OT  Co-Treatment Reason: to maxmize safety and functional mobility while focusing on discipline specific  "goals  Prior to Session Communication: Bedside nurse  Patient Position Received: Bed, 3 rail up, Alarm on  General Comment: Pt. agreeable to session s/p recieving dilaudid. Somewhat impulsive with poor safety awarness. Refusing guarding and cues for safety/sequencing throughout session.  Subjective     Precautions:  Precautions  LE Weight Bearing Status: Weight Bearing as Tolerated (LLE in surgical shoe)  Medical Precautions: Fall precautions  Precautions Comment: R BKA    Vital Signs:     Objective     Pain:  Pain Assessment  Pain Assessment: 0-10  0-10 (Numeric) Pain Score: 9  Pain Type: Surgical pain  Pain Location: Foot  Pain Orientation: Left  Response to Interventions: RN provided medication, immediately 4/10, increased to 7/10 following session    Cognition:  Cognition  Overall Cognitive Status: Within Functional Limits  Orientation Level: Oriented X4  Safety/Judgement:  (somewhat impulsive, dismissive to cues for safety/sequencing throughout)  Insight: Mild  Impulsive: Mildly               Activity Tolerance:  Activity Tolerance  Endurance: Tolerates 10 - 20 min exercise with multiple rests    Treatments:  Therapeutic Exercise  Therapeutic Exercise Performed: No (pt.r efusing bed level therex, stating \"I move all day long I know what I need to do\")  Therapeutic Activity  Therapeutic Activity Performed: Yes  Therapeutic Activity 1: transfers, self propell WC     Bed Mobility  Bed Mobility: Yes  Bed Mobility 1  Bed Mobility 1: Supine to sitting, Sitting to supine  Level of Assistance 1: Distant supervision  Bed Mobility Comments 1: use of bed rails, cues for line management. Pt. able to scoot and reposition in bed independently with cues.  Ambulation/Gait Training  Ambulation/Gait Training Performed: No (Reinforced WBAT status, pt. reports poor tolerance to bear weight with surgical shoe. Refusing standing/gait trial with RW.)  Transfers  Transfer: Yes  Transfer 1  Transfer From 1: Bed to  Transfer to 1: " Wheelchair  Technique 1: Squat pivot  Transfer Level of Assistance 1: Contact guard  Trials/Comments 1: +impulsive with poor safety awareness, not receptive to VCs for safety/sequencing. Presents as high risk for falls.  Transfers 2  Transfer From 2: Wheelchair to  Transfer to 2: Bed  Technique 2: Lateral with transfer board  Transfer Device 2: Slide board  Transfer Level of Assistance 2: Contact guard  Trials/Comments 2: assist with set up, placement of transfer board. Improved safety with this technique          Outcome Measures:     [facility] Basic Mobility  Turning from your back to your side while in a flat bed without using bedrails: None  Moving from lying on your back to sitting on the side of a flat bed without using bedrails: A little  Moving to and from bed to chair (including a wheelchair): A little  Standing up from a chair using your arms (e.g. wheelchair or bedside chair): A lot  To walk in hospital room: A lot  Climbing 3-5 steps with railing: Total  Basic Mobility - Total Score: 15                                      Education Documentation  Precautions, taught by Sophie Baeza PT at 10/3/2024  2:52 PM.  Learner: Patient  Readiness: Acceptance  Method: Explanation  Response: Verbalizes Understanding    Body Mechanics, taught by Sophie Baeza PT at 10/3/2024  2:52 PM.  Learner: Patient  Readiness: Acceptance  Method: Explanation  Response: Verbalizes Understanding    Mobility Training, taught by Sophie Baeza PT at 10/3/2024  2:52 PM.  Learner: Patient  Readiness: Acceptance  Method: Explanation  Response: Verbalizes Understanding    Education Comments  No comments found.           EDUCATION:     Encounter Problems       Encounter Problems (Active)       Balance       Pt will complete dynamic reaching in sitting x10 reps without LOB (Progressing)       Start:  09/29/24    Expected End:  10/13/24               PT Transfers       Pt will perform supine<>sit indep (Progressing)       Start:   09/29/24    Expected End:  10/13/24            Pt will perform bed<>chair transfer via slide board with SBA (Progressing)       Start:  09/29/24    Expected End:  10/13/24

## 2024-10-03 NOTE — PROGRESS NOTES
PROGRESS NOTE - PODIATRIC MEDICINE & SURGERY    SERVICE DATE: 10/3/2024  SERVICE TIME: 1110      ASSESSMENT:    # S/P Left TMA (DOS: 9/27/24 with Dr. 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 Foot dressing removed, incision looks wonderful. New dressing applied as below. Interval charts, labs, findings reviewed. All findings discussed with patient. All questions answered to patient's satisfaction.  - Per ID, continue Unasyn until discharge, then start Augmentin BID until 10/10/24.  - Dressing: Betadine-soaked Adaptic, 4x4s, webril, and light ACE. To remain clean, dry, and intact until changed by home Podiatrist at outpatient follow-up.  - Patient is to be WBAT in surgical shoe left foot. Shoe applied over dressing.  - Clear for discharge from Podiatry's standpoint. Patient to follow-up with her home Podiatrist, notes she has been transported to his office from the SNF in the past.  - Discussed with Dr. Israel.    Case to be discussed with attending, A&P above reflects a tentative plan. Please await for the final signature from the attending physician on service.    This patient will be followed by the Podiatry service. Please Epic Chat the corresponding residents below with questions or concerns.      Albert Taylor, CONRADM PGY-3  Podiatric Medicine and Surgery   Epic Secure Chat Preferred  [phone]            Interval HPI    Patient is a 54 y.o. female POD 6 S/P left TMA. Patient is resting in bed. Positive attitude about recovery and planned discharge today. Pleased with surgical outcomes. Denies N/V/F/C/S.    ROS: Negative except as above.      Past Medical History  Past Medical  History:   Diagnosis Date    Anxiety     Arthritis     Cancer (Multi)     Cellulitis     COPD (chronic obstructive pulmonary disease) (Multi)     Delayed emergence from general anesthesia     Diabetes mellitus (Multi)     Disease of thyroid gland     Diverticulitis     Epilepsy, unspecified, not intractable, without status epilepticus (Multi)     Epilepsy    HLD (hyperlipidemia)     Hx of blood clots     Hypertension     PAD (peripheral artery disease) (CMS-HCC)     Peripheral vascular disease, unspecified (CMS-HCC) 09/14/2022    PAD (peripheral artery disease)    PONV (postoperative nausea and vomiting)     PTSD (post-traumatic stress disorder)     Sleep apnea     Uterine cancer (Multi)          Problem List  Problem List Items Addressed This Visit          Cardiac and Vasculature    PAD (peripheral artery disease) (CMS-HCC)    Relevant Orders    Vascular US lower extremity arterial duplex left with HEMA (Completed)    Vascular US Ankle Brachial Index (HEMA) Without Exercise (Completed)    Vascular US Lower Extremity Vein Mapping Left (Completed)    Transthoracic Echo (TTE) Limited (Completed)    Atherosclerosis of native arteries of left leg with ulceration of other part of foot (Multi) - Primary    Relevant Orders    Vascular US Lower Extremity Vein Mapping Left (Completed)    Case Request Cath Lab: Left Heart Cath (Completed)    Cardiac Catheterization Procedure (Completed)    Invasive vascular procedure (Completed)    Case Request Operating Room: Foot Transmetatarsal Amputation (Completed)    Surgical Pathology Exam    Tissue/Wound Culture/Smear (Completed)    Critical limb ischemia of left lower extremity (Multi)    Relevant Medications    amoxicillin-pot clavulanate (Augmentin) 875-125 mg tablet    Other Relevant Orders    Transthoracic Echo (TTE) Limited (Completed)    Case Request Operating Room: Creation Bypass Graft Tibial Artery (Completed)    RESOLVED: Preoperative cardiovascular examination    Relevant  Orders    Case Request Cath Lab: Left Heart Cath (Completed)    Cardiac Catheterization Procedure (Completed)    Invasive vascular procedure (Completed)       Coag and Thromboembolic    Anticoagulant long-term use    Relevant Medications    enoxaparin (Lovenox) 100 mg/mL syringe    warfarin (Coumadin) 5 mg tablet    Other Relevant Orders    Protime-INR       Endocrine/Metabolic    Diabetes mellitus, type 2 (Multi)       Musculoskeletal and Injuries    * (Principal) Gangrene of toe of left foot (Multi)    Relevant Orders    Vascular US lower extremity arterial duplex left with HEMA (Completed)    Vascular US Lower Extremity Vein Mapping Left (Completed)    Case Request Operating Room: Foot Transmetatarsal Amputation (Completed)    Surgical Pathology Exam    Tissue/Wound Culture/Smear (Completed)     Other Visit Diagnoses       Other specified peripheral vascular diseases (CMS-HCC)        Critical limb ischemia of both lower extremities with nonbiological bypass graft with gangrene (Multi)        Relevant Orders    Transthoracic Echo (TTE) Limited (Completed)    Critical limb ischemia with history of revascularization of same extremity (Multi)        Relevant Medications    acetaminophen (Tylenol) 325 mg tablet    oxyCODONE (Roxicodone) 5 mg immediate release tablet    enoxaparin (Lovenox) 100 mg/mL syringe    pantoprazole (ProtoNix) 40 mg EC tablet    warfarin (Coumadin) 5 mg tablet    Other Relevant Orders    Transthoracic Echo (TTE) Limited (Completed)    Protime-INR    Encounter for other preprocedural examination        Atherosclerosis of autologous vein bypass graft(s) of the extremities with intermittent claudication, bilateral legs (CMS-HCC)        Atherosclerosis of native arteries of extremities with intermittent claudication, bilateral legs (CMS-HCC)        Irritable bowel syndrome with diarrhea        Relevant Medications    loperamide (Imodium) 2 mg capsule    psyllium (Metamucil) 3.4 gram packet     Subacute osteomyelitis of left foot (Multi)        Relevant Medications    amoxicillin-pot clavulanate (Augmentin) 875-125 mg tablet              Medications and Allergies reviewed.      PHYSICAL EXAM    General: AOx3. NAD. Cooperative. Pleasant. Dressing clean, dry, intact.     Left focused exam.   Vascular: Lightly-palpable DP pulse. Non-palpable PT pulse. Mild pitting edema noted. Hair growth absent. Temperature is cool to cool from tibial tuberosity to foot. No lymphatic streaking noted. Left foot diffusely edematous.      Musculoskeletal: Gross active and passive ROM intact to age and activity level. Moves all extremities spontaneously. Prior R hip disarticulation. Left TMA.     Neurological: Intact light touch sensation. Pain stimuli intact. Denies any numbness, burning or tingling.     Dermatologic: Skin appears diffusely xerotic. No rashes or nodules noted.No hyperkeratotic tissue noted.     Wound: Left TMA incision  See images in media tab  Incision site with more edema after PT yesterday. Minimal dorsal erythema. Sutures and staples in place. Skin is well coapted.   No active drainage.   No vinny-wound maceration.   No evidence of ascending cellulitis or lymphangitis.  No palpable fluctuance. No malodor. No increased warmth.   No tunneling or tracking.          RESULTS    CBC  Lab Results   Component Value Date    WBC 12.3 (H) 10/03/2024    HGB 8.7 (L) 10/03/2024    HCT 29.5 (L) 10/03/2024    MCV 84 10/03/2024     10/03/2024       ESR  Lab Results   Component Value Date    SEDRATE 37 (H) 09/17/2021       CRP  Lab Results   Component Value Date    CRP 17.25 (H) 12/06/2023       HgbA1c  Lab Results   Component Value Date    HGBA1C 11.4 (H) 09/18/2024       Cultures  Susceptibility data from last 90 days.  Collected Specimen Info Organism   09/18/24 Tissue/Biopsy from Diabetic Foot Ulcer Mixed Skin Microorganisms             SIGNATURE: Albert Taylor DPM PATIENT NAME: Shirin Slater   DATE: October  3, 2024 MRN: [id number]   TIME: 12:19 PM CONTACT: Haiku Message

## 2024-10-03 NOTE — PROGRESS NOTES
Shirin Slater is a 54 y.o. female on day 15 of admission presenting with Gangrene of toe of left foot (Multi).  Transitional Care Coordination Progress Note:  Patient discussed during interdisciplinary rounds.   Team members present: MD and TCC  Plan per Medical/Surgical team: Patient medically ready for discharge.  Payor: Carla Dual-IMM signed 10/03 at 1:00 Pm.  Discharge disposition: Patient discharging to [facility].  Potential Barriers: None  ADOD: 10/03/24    Patient being transported by [facility] at 6:00 Pm to [facility] via stretcher. Nurse report # [phone]. Nurse, patient, doctor, , and facility aware. Blue slip given.    JOSE TAYLOR RN       n/a

## 2024-10-03 NOTE — PROGRESS NOTES
PROGRESS NOTE - PODIATRIC MEDICINE & SURGERY    SERVICE DATE: 10/2/2024  SERVICE TIME: 1518    HPI    Patient is a 54 y.o. female POD 5 S/P left TMA. Patient is resting in bed. Reports increased pain after just returning to bed after working with physical therapy. Positive attitude about recovery and planned discharge tomorrow. Pleased with surgical outcomes. Denies N/V/F/C/S.      ROS: Negative except as above.      Past Medical History  Past Medical History:   Diagnosis Date    Anxiety     Arthritis     Cancer (Multi)     Cellulitis     COPD (chronic obstructive pulmonary disease) (Multi)     Delayed emergence from general anesthesia     Diabetes mellitus (Multi)     Disease of thyroid gland     Diverticulitis     Epilepsy, unspecified, not intractable, without status epilepticus (Multi)     Epilepsy    HLD (hyperlipidemia)     Hx of blood clots     Hypertension     PAD (peripheral artery disease) (CMS-HCC)     Peripheral vascular disease, unspecified (CMS-HCC) 09/14/2022    PAD (peripheral artery disease)    PONV (postoperative nausea and vomiting)     PTSD (post-traumatic stress disorder)     Sleep apnea     Uterine cancer (Multi)          Problem List  Problem List Items Addressed This Visit          Cardiac and Vasculature    PAD (peripheral artery disease) (CMS-HCC)    Relevant Orders    Vascular US lower extremity arterial duplex left with HEMA (Completed)    Vascular US Ankle Brachial Index (HEMA) Without Exercise (Completed)    Vascular US Lower Extremity Vein Mapping Left (Completed)    Transthoracic Echo (TTE) Limited (Completed)    Atherosclerosis of native arteries of left leg with ulceration of other part of foot (Multi) - Primary    Relevant Orders    Vascular US Lower Extremity Vein Mapping Left (Completed)    Case Request Cath Lab: Left Heart Cath (Completed)    Cardiac Catheterization Procedure (Completed)    Invasive vascular procedure (Completed)    Case Request Operating Room: Foot  Transmetatarsal Amputation (Completed)    Surgical Pathology Exam    Tissue/Wound Culture/Smear (Completed)    Critical limb ischemia of left lower extremity (Multi)    Relevant Medications    amoxicillin-pot clavulanate (Augmentin) 875-125 mg tablet (Start on 10/3/2024)    Other Relevant Orders    Transthoracic Echo (TTE) Limited (Completed)    Case Request Operating Room: Creation Bypass Graft Tibial Artery (Completed)    RESOLVED: Preoperative cardiovascular examination    Relevant Orders    Case Request Cath Lab: Left Heart Cath (Completed)    Cardiac Catheterization Procedure (Completed)    Invasive vascular procedure (Completed)       Coag and Thromboembolic    Anticoagulant long-term use    Relevant Medications    enoxaparin (Lovenox) 100 mg/mL syringe    warfarin (Coumadin) 5 mg tablet    Other Relevant Orders    Protime-INR       Endocrine/Metabolic    Diabetes mellitus, type 2 (Multi)       Musculoskeletal and Injuries    * (Principal) Gangrene of toe of left foot (Multi)    Relevant Orders    Vascular US lower extremity arterial duplex left with HEMA (Completed)    Vascular US Lower Extremity Vein Mapping Left (Completed)    Case Request Operating Room: Foot Transmetatarsal Amputation (Completed)    Surgical Pathology Exam    Tissue/Wound Culture/Smear (Completed)     Other Visit Diagnoses       Other specified peripheral vascular diseases (CMS-HCC)        Critical limb ischemia of both lower extremities with nonbiological bypass graft with gangrene (Multi)        Relevant Orders    Transthoracic Echo (TTE) Limited (Completed)    Critical limb ischemia with history of revascularization of same extremity (Multi)        Relevant Medications    acetaminophen (Tylenol) 325 mg tablet    oxyCODONE (Roxicodone) 5 mg immediate release tablet    enoxaparin (Lovenox) 100 mg/mL syringe    pantoprazole (ProtoNix) 40 mg EC tablet (Start on 10/3/2024)    warfarin (Coumadin) 5 mg tablet    Other Relevant Orders     Transthoracic Echo (TTE) Limited (Completed)    Protime-INR    Encounter for other preprocedural examination        Atherosclerosis of autologous vein bypass graft(s) of the extremities with intermittent claudication, bilateral legs (CMS-HCC)        Atherosclerosis of native arteries of extremities with intermittent claudication, bilateral legs (CMS-HCC)        Irritable bowel syndrome with diarrhea        Relevant Medications    loperamide (Imodium) 2 mg capsule    psyllium (Metamucil) 3.4 gram packet    Subacute osteomyelitis of left foot (Multi)        Relevant Medications    amoxicillin-pot clavulanate (Augmentin) 875-125 mg tablet (Start on 10/3/2024)              Medications and Allergies reviewed.      PHYSICAL EXAM    General: AOx3. NAD. Cooperative. Pleasant. Dressing clean, dry, intact.     Left focused exam.   Vascular: Lightly-palpable DP pulse. Non-palpable PT pulse. Mild pitting edema noted. Hair growth absent. Temperature is cool to cool from tibial tuberosity to foot. No lymphatic streaking noted. Left foot diffusely edematous.      Musculoskeletal: Gross active and passive ROM intact to age and activity level. Moves all extremities spontaneously. Prior R hip disarticulation. Left TMA.     Neurological: Intact light touch sensation. Pain stimuli intact. Denies any numbness, burning or tingling.     Dermatologic: Skin appears diffusely xerotic. No rashes or nodules noted.No hyperkeratotic tissue noted.     Wound: left TMA incision  See images in media tab  Incision site with slight erythema and edema. Sutures and staples in place. Skin is well coapted.   Minimal serosanguinous drainage.  No vinny-wound maceration.   No evidence of ascending cellulitis or lymphangitis.  No palpable fluctuance. No malodor. No increased warmth.   No tunneling or tracking.          RESULTS    CBC  Lab Results   Component Value Date    WBC 12.4 (H) 10/02/2024    HGB 8.6 (L) 10/02/2024    HCT 29.1 (L) 10/02/2024    MCV 84  10/02/2024     10/02/2024       ESR  Lab Results   Component Value Date    SEDRATE 37 (H) 09/17/2021       CRP  Lab Results   Component Value Date    CRP 17.25 (H) 12/06/2023       HgbA1c  Lab Results   Component Value Date    HGBA1C 11.4 (H) 09/18/2024       Cultures  Susceptibility data from last 90 days.  Collected Specimen Info Organism   09/18/24 Tissue/Biopsy from Diabetic Foot Ulcer Mixed Skin Microorganisms            ASSESSMENT    #S/P Left TMA (DOS: 9/27/24 with Dr. 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 All findings discussed with patient. All questions answered to patient's satisfaction.  - Per ID, continue IV Abx.  - Dressing: Clean dry dressing left intact today. Nursing may reinforce with ABDs if needed. Plan for dressing change tomorrow prior to discharge.  - Patient is to be WBAT in surgical shoe left foot.  - Podiatry will continue following.  - Clear for discharge from Podiatry's standpoint.  - Discussed with Dr. Israel.    Case to be discussed with attending, A&P above reflects a tentative plan. Please await for the final signature from the attending physician on service.    This patient will be followed by the Podiatry service. Please Epic Chat the corresponding residents below with questions or concerns.      Albert Taylor DPM PGY-3  Podiatric Medicine and Surgery   Epic Secure Chat Preferred  [phone]

## 2024-10-03 NOTE — PROGRESS NOTES
"Subjective:  No specific complaints. Denies bleeding on the AC    Objective:     Physical Exam  /71   Pulse 101   Temp 36.4 °C (97.5 °F)   Resp 18   Ht 1.702 m (5' 7\")   Wt 90.6 kg (199 lb 11.8 oz)   BMI 31.28 kg/m²      OE:  Stable sitting in bed  CV reg,  Lungs CTA  EXT LLE in ACE and dressing CDI    Medications   Scheduled medications  acetaminophen, 650 mg, oral, q6h  ampicillin-sulbactam, 3 g, intravenous, q6h  aspirin, 81 mg, oral, Daily  atorvastatin, 80 mg, oral, Nightly  busPIRone, 10 mg, oral, q AM  divalproex, 500 mg, oral, q8h NINO  DULoxetine, 60 mg, oral, q PM  enoxaparin, 1 mg/kg, subcutaneous, BID  insulin lispro, 0-10 Units, subcutaneous, TID  levETIRAcetam, 500 mg, oral, BID  levothyroxine, 75 mcg, oral, q AM  metoprolol tartrate, 12.5 mg, oral, BID  pantoprazole, 40 mg, oral, Daily before breakfast  phytonadione, 5 mg, intravenous, Once  psyllium, 1 packet, oral, TID  sulfur hexafluoride microsphr, 2 mL, intravenous, Once in imaging      Continuous medications     PRN medications  PRN medications: dextrose, dextrose, glucagon, glucagon, HYDROmorphone, ipratropium-albuteroL, loperamide, melatonin, oxyCODONE, oxyCODONE     Lab Review   Results from last 7 days   Lab Units 10/03/24  0444 10/02/24  0604 10/01/24  0603   WBC AUTO x10*3/uL 12.3* 12.4* 10.8   HEMOGLOBIN g/dL 8.7* 8.6* 8.8*   HEMATOCRIT % 29.5* 29.1* 29.2*   PLATELETS AUTO x10*3/uL 410 376 342       Results from last 7 days   Lab Units 10/03/24  0444 10/02/24  0604 10/01/24  0603   SODIUM mmol/L 141 141 143   POTASSIUM mmol/L 3.8 4.0 4.1   CHLORIDE mmol/L 103 104 103   CO2 mmol/L 32 30 32   BUN mg/dL 9 9 6   CREATININE mg/dL 0.46* 0.54 0.55   GLUCOSE mg/dL 173* 153* 132*   CALCIUM mg/dL 8.1* 8.2* 8.3*       Results from last 7 days   Lab Units 10/03/24  0444 10/02/24  0604 10/01/24  0603   INR  2.3* 1.3* 1.0       Results from last 7 days   Lab Units 10/01/24  0603 09/30/24  0615 09/29/24  1708   ANTI XA UNFRACTIONATED IU/mL " 0.5 0.3 0.4       PTT - 9/25/2024: 10:11 PM  2.3   26.5 37     A/P:  Patient with known history of PAD, DVT   HER COURSE WITH ARTERIAL AND VENOUS THROMBOEMBOLIC DISEASE IS FAIRLY MALIGNANT AND NOT C/W ASO ALONE     now presenting with L 2nd toe gangrene c/f osteomyelitis. Underwent left femoral endarterectomy, profundaplasty, common dwgcswb-oe-qlalxtrd tibial bypass and LLE angiogram on 9/25. Underwent TMA with podiatry on 9/27. Patient started on heparin gtt with bridge to home warfarin 5 mg daily started 9/30 per the primary service.  Currently on lovenox  Has rec'd 5 mg x 2 with escalation of the INR to 2.3 today  By report her home dose of warfarin is 2.5 mg - unclear why she rec'd 5 mg while admitted.   Ok to continue home warfarin.  Needs daily inr.  Target inr 2-3. Needs a MINIMUM of 5 days overlap with lovenox and INR > 2 on 2 consecutive days to complete a safe transition.  Monitor INR daily  - Patient will plan to follow with Dr. Juany Sanchez (PCP - General Family Medicine) phone [phone], who previously managed INR and Warfarin dosing as outpatient.   SHE DOES HAVE UNDERLYING POORLY CONTROLLED DM - THIS NEEDS ADDRESSED LOCALLY.     DISCUSSED WITH THE PATIENT THE C/F SOFIA (BUERGER DISEASE) RELATED TO HER SMOKING +/- CANNABIS USE. ADVISED TO AVOID ALL FORMS OF TOBACCO AND CANNABIS INCLUDING GUM, PATCHES, GUMMIES, EDIBLES ETC.  UNFORTUNATELY SOFIA IS A DX OF EXCLUSION UNLESS THERE IS PATH AVAILABLE AND CAN BE EVALUATED FOR THROMBO-INFLAMMATORY CHANGES IN THE VESSELS WITHOUT APPARENT VASCULITIS.     She can follow up with vasc med in the [facility] for further eval of her underlying thrombosis and atherosclerosis.    VM following  Please page [phone] for any concerns    Robyn Toscano MD

## 2024-10-03 NOTE — PROGRESS NOTES
Physical Therapy Attempt                 Therapy Communication Note    Patient Name: Shirin Slater  MRN: [id number]  Department: [place] [address]  Room: 7029/7029-A  Today's Date: 10/3/2024     Discipline: Physical Therapy    Missed Visit Reason: Missed Visit Reason:  (Pt. requested PT be timed with administration of pain medication and for return at noon.)    Missed Time: Attempt

## 2024-10-03 NOTE — PROGRESS NOTES
Occupational Therapy    Occupational Therapy Treatment    Name: Shirin Slater  MRN: [id number]  : 1970  Date: 10/03/24  Room: [address] [place] [address]      Time Calculation  Start Time: 1201  Stop Time: 1228  Time Calculation (min): 27 min    Assessment:  Evaluation/Treatment Tolerance: Patient tolerated treatment well  Medical Staff Made Aware: Yes  End of Session Communication: Bedside nurse  End of Session Patient Position: Bed, 3 rail up, Alarm on  Plan:  Treatment Interventions: ADL retraining, Functional transfer training, Endurance training, Patient/family training, Equipment evaluation/education, Compensatory technique education  OT Frequency: 2 times per week  OT Discharge Recommendations: Moderate intensity level of continued care  OT Recommended Transfer Status: Minimal assist, Assist of 1  OT - OK to Discharge: Yes    Subjective   General:  OT Last Visit  OT Received On: 10/03/24  Co-Treatment: PT  Co-Treatment Reason: To maximize pt's safety and benefit from provided therapy services  Prior to Session Communication: Bedside nurse  Patient Position Received: Bed, 3 rail up, Alarm on  Family/Caregiver Present: No  General Comment: Pt agreeable to OT session, reports pain in foot that improves with medication, pt resistive to therapist's recommended techniques   Precautions:  LE Weight Bearing Status: Weight Bearing as Tolerated (LLE in surgical shoe)  Medical Precautions: Fall precautions  Precautions Comment: DOT RUSSELL  Vitals:  Vital Signs (Past 2hrs)        Date/Time Vitals Session Patient Position Pulse Resp SpO2 BP MAP (mmHg)    10/03/24 11:16:23 --  --  89  18  95 %  110/71  84     10/03/24 1201 Post OT  --  101  --  --  --  --                   Lines/Tubes/Drains:  CVC 24 Double lumen Left Internal jugular (Active)   Number of days: 8       External Urinary Catheter Female (Active)   Number of days: 3       Cognition:  Orientation Level: Oriented X4    Pain Assessment:  Pain Assessment  Pain  Assessment: 0-10  0-10 (Numeric) Pain Score: 9  Pain Type: Surgical pain  Pain Location: Foot  Pain Orientation: Left  Pain Interventions: Repositioned  Response to Interventions: RN provided medication, immediately 4/10, increased to 7/10 following session     Objective   Activities of Daily Living:    LE Dressing  LE Dressing: Yes  Shoe Level of Assistance: Close supervision  LE Dressing Where Assessed: Bed level  LE Dressing Comments: SBA and cues for positioning as pt doffed surgical shoe while long sitting in bed, she reports difficulty 2/2 pain in foot.    Toileting  Toileting Comments: Education on technique and safety for use of BSC in room while admitted. Pt verbalizes understanding but denies need for BSC at this time b/c she plans to d/c today.  Bed Mobility/Transfers:   Bed Mobility  Bed Mobility: Yes  Bed Mobility 1  Bed Mobility 1: Supine to sitting, Sitting to supine  Level of Assistance 1: Distant supervision  Bed Mobility Comments 1: Use of bed rails, cues for line management and safety    Transfers  Transfer: Yes  Transfer 1  Transfer From 1: Bed to  Transfer to 1: Wheelchair  Technique 1: Squat pivot  Transfer Level of Assistance 1: Contact guard  Trials/Comments 1: Cues for improved w/c positioning with pt denying to modify technique, CGA during low squat pivot to chair to L.  Transfers 2  Transfer From 2: Wheelchair to  Transfer to 2: Bed  Technique 2: Lateral with transfer board  Transfer Device 2: Slide board  Transfer Level of Assistance 2: Contact guard  Trials/Comments 2: Assist with placing slide board prior to transfer, cues for safety, CGA    Therapy/Activity:      Therapeutic Activity  Therapeutic Activity Performed: Yes  Therapeutic Activity 1: Functional transfer bed to and from w/c. Cues throughout for increased safety with poor follow through. CGA without slide board transfering to w/c and use of slide board for return transfer.  Therapeutic Activity 2: Education on improved  positioning in bed and w/c to promote further IND in seated ADLs  Therapeutic Activity 3: W/c mobility within room and hallway to increase pt's safety awaress and endurance. She requires Cues throughout to avoid obstacles and multiple seated rest breaks.   Outcome Measures:  [facility] Daily Activity  Putting on and taking off regular lower body clothing: A lot  Bathing (including washing, rinsing, drying): A lot  Putting on and taking off regular upper body clothing: None  Toileting, which includes using toilet, bedpan or urinal: A little  Taking care of personal grooming such as brushing teeth: A little  Eating Meals: None  Daily Activity - Total Score: 18     Education Documentation  Body Mechanics, taught by Yon Vanegas OT at 10/3/2024 12:54 PM.  Learner: Patient  Readiness: Acceptance  Method: Explanation, Demonstration  Response: Verbalizes Understanding, Needs Reinforcement    Precautions, taught by Yon Vanegas OT at 10/3/2024 12:54 PM.  Learner: Patient  Readiness: Acceptance  Method: Explanation, Demonstration  Response: Verbalizes Understanding, Needs Reinforcement    ADL Training, taught by Yon Vanegas OT at 10/3/2024 12:54 PM.  Learner: Patient  Readiness: Acceptance  Method: Explanation, Demonstration  Response: Verbalizes Understanding, Needs Reinforcement    Education Comments  No comments found.        Goals:  Encounter Problems       Encounter Problems (Active)       ADLs       Patient with complete lower body dressing with supervision level of assistance donning and doffing all LE clothes  with PRN adaptive equipment while supported sitting (Progressing)       Start:  09/30/24    Expected End:  10/21/24            Patient will complete daily grooming tasks brushing teeth and washing face/hair with independent level of assistance and PRN adaptive equipment while edge of bed . (Progressing)       Start:  09/30/24    Expected End:  10/21/24            Patient will complete toileting including hygiene  clothing management/hygiene with minimal assist  level of assistance and bedside commode. (Progressing)       Start:  09/30/24    Expected End:  10/21/24               BALANCE       Pt will maintain dynamic sitting balance during ADL task with independent level of assistance in order to demonstrate decreased risk of falling and improved postural control. (Progressing)       Start:  09/30/24    Expected End:  10/21/24               TRANSFERS       Patient will complete functional transfers with least restrictive device with minimal assist  level of assistance. (Met)       Start:  09/30/24    Expected End:  10/21/24    Resolved:  10/03/24    Updated to: Patient will complete functional transfers with least restrictive device with SUP assist  level of assistance.    Update reason: Met         Patient will complete functional transfers with least restrictive device with SUP assist  level of assistance. (Progressing)       Start:  10/03/24    Expected End:  10/21/24                     10/03/24 at 12:55 PM   Yon Vanegas, OT   [phone]

## 2024-10-03 NOTE — PROGRESS NOTES
VASCULAR SURGERY   PROGRESS NOTE  Assessment/Plan   Shirin Slater is 54 y.o. female with history of severe PAD s/p R hip disarticulation s/p L CFA and EIA embolectomy 12/23 s/p multiple L toe amputations, T2DM complicated by diabetic neuropathy, HTN, HLD, renal and splenic thromboses on aspirin/plavix/coumadin, and current tobacco use who presents as a transfer from [facility] for management of L 2nd toe gangrene with concern of osteomyelitis.  Now s/p Left femoral endarterectomy, profundaplasty, common ixdtwdp-bg-mjtwvmry tibial bypass using 6 mm ringed PTFE, left lower extremity angiogram on 9/25. Patient s/p TMA with podiatry 9/27.    Plan:  - pain control with tylenol/oxy PRN , dilaudid for breakthrough pain  - continue home Depakote, Cymbalta, and Keppra  - nightly melatonin  -continue  metoprolol 12.5 BID,  statin/ASA  - therapeutic Lovenox bridge for warfarin (started 9/30/24) INR today 2.3.    DC home plavix   Will DC on ASA and coumadin  - vascular med consulted, appreciate recs- JAK2 and homocysteine ordered. 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   - will defer replacement of prevena at this time. Supplies in room for reapplication when appropriate  - duonebs PRN, encourage IS hourly while awake, OOB to chair and increase ambulation as tolerated   - can be WBAT on LLE with PODs surgical shoe on  -continue regular diet, PRN Imodium, scheduled metamucil, continue PPI  - home levothyroxine, SSI,  holding home Jardiance  - continue Unasyn until discharge then begin Augmentin oral through 10/10/24     Dispo-  - WBAT with podiatric boot  -_PT/OT rec SNF, await precert     Lexus Bryant MD  PGY1 Vascular Surgery  [id number]  Available via secure chat    --------------------------------------------------------------------  Subjective   NAEO  When removing prevena from L groin 10/2, noted incision itself c/d/I, but notable for  erythema surrounding incision. Given abundant skin folds in this region and small pathc of white adherent changes, concern for candida. Incision left open to area  with betadine pain and dry guaze  Tolerating a regular diet  Voiding with purewick in place  Passing BM and flatus    --------------------------------------------------------------------  Objective   Vitals:  Heart Rate:  [75-90]   Temp:  [36.3 °C (97.3 °F)-36.6 °C (97.9 °F)]   Resp:  [14-20]   BP: ()/(55-74)   SpO2:  [92 %-99 %]     Exam:  Constitutional: No acute distress, resting comfortably  Neuro:  AOx3, grossly intact  ENMT: moist mucous membranes  CV: no tachycardia  Pulm: non-labored on RA, lungs clear  GI: soft, non-tender, non-distended, + BS  Skin: warm and dry  Musculoskeletal: moving all extremities  Extremities: Left foot dressing intact. left thigh dressing without strikethrough and is c/d/I. Left groin with betadine paint and dry gauze, still with notable erythema. Picture in chart      Labs:  Results from last 7 days   Lab Units 10/03/24  0444 10/02/24  0604 10/01/24  0603   WBC AUTO x10*3/uL 12.3* 12.4* 10.8   HEMOGLOBIN g/dL 8.7* 8.6* 8.8*   PLATELETS AUTO x10*3/uL 410 376 342      Results from last 7 days   Lab Units 10/03/24  0444 10/02/24  0604 10/01/24  0603   SODIUM mmol/L 141 141 143   POTASSIUM mmol/L 3.8 4.0 4.1   CHLORIDE mmol/L 103 104 103   CO2 mmol/L 32 30 32   BUN mg/dL 9 9 6   CREATININE mg/dL 0.46* 0.54 0.55   GLUCOSE mg/dL 173* 153* 132*   MAGNESIUM mg/dL 1.62 1.78 2.03   PHOSPHORUS mg/dL 3.6 3.7 3.6      Results from last 7 days   Lab Units 10/03/24  0444 10/02/24  0604 10/01/24  0603   INR  2.3* 1.3* 1.0   PROTIME seconds 26.5* 15.1* 11.7      Results from last 7 days   Lab Units 10/01/24  0603 09/30/24  0615 09/29/24  1708   ANTI XA UNFRACTIONATED IU/mL 0.5 0.3 0.4

## 2024-10-03 NOTE — HOSPITAL COURSE
Shirin Slater is 54 y.o. female with history of severe PAD s/p R hip disarticulation s/p L CFA and EIA embolectomy 12/23 s/p multiple L toe amputations, T2DM complicated by diabetic neuropathy, HTN, HLD, renal and splenic thromboses on aspirin/plavix/coumadin, and current tobacco use who presented to [facility] on 9/18 as a transfer from [facility] for management of L 2nd toe gangrene with concern of osteomyelitis.  She underwent a left femoral endarterectomy, profundaplasty, common brmvmha-hb-pbiwyolq tibial bypass using 6 mm ringed PTFE, left lower extremity angiogram on 9/25 with Dr. Dunphy. She subsequently received a TMA with podiatry on 9/27.  She was continued on aspirin and high intensity heparin protocol, as well as Unasyn on the recommendation of ID.   On 9/30 she was resumed on coumadin with a therapeutic lovenox bridge following confirmation from podiatry that there would be no additional interventions required. She was made WBAT on her LLE and given a surgical boot.   Following appropriate placement into a skilled nursing facility, she was discharged on 10/3 with no issues.

## 2024-10-04 VITALS
HEIGHT: 67 IN | HEART RATE: 76 BPM | OXYGEN SATURATION: 97 % | TEMPERATURE: 97.7 F | WEIGHT: 199.74 LBS | RESPIRATION RATE: 18 BRPM | DIASTOLIC BLOOD PRESSURE: 61 MMHG | SYSTOLIC BLOOD PRESSURE: 111 MMHG | BODY MASS INDEX: 31.35 KG/M2

## 2024-10-04 LAB — CH50 SERPL-ACNC: >95 U/ML (ref 38.7–89.9)

## 2024-10-07 LAB
6MAM UR CFM-MCNC: <25 NG/ML
CODEINE UR CFM-MCNC: <50 NG/ML
ELECTRONICALLY SIGNED BY: NORMAL
HYDROCODONE CTO UR CFM-MCNC: <25 NG/ML
HYDROMORPHONE UR CFM-MCNC: 30 NG/ML
JAK2 V617F INTERPRETATION: NORMAL
JAK2 V617F RESULT: NOT DETECTED
MORPHINE UR CFM-MCNC: <50 NG/ML
NORHYDROCODONE UR CFM-MCNC: <25 NG/ML
NOROXYCODONE UR CFM-MCNC: >1000 NG/ML
OXYCODONE UR CFM-MCNC: 1016 NG/ML
OXYMORPHONE UR CFM-MCNC: 336 NG/ML

## 2024-10-11 LAB
LABORATORY COMMENT REPORT: NORMAL
PATH REPORT.FINAL DX SPEC: NORMAL
PATH REPORT.GROSS SPEC: NORMAL
PATH REPORT.RELEVANT HX SPEC: NORMAL
PATH REPORT.TOTAL CANCER: NORMAL

## 2024-10-15 ENCOUNTER — APPOINTMENT (OUTPATIENT)
Dept: VASCULAR SURGERY | Facility: HOSPITAL | Age: 54
End: 2024-10-15
Payer: COMMERCIAL

## 2024-10-25 ENCOUNTER — TELEPHONE (OUTPATIENT)
Dept: FAMILY MEDICINE CLINIC | Age: 54
End: 2024-10-25

## 2024-10-25 DIAGNOSIS — J68.3 MODERATE PERSISTENT REACTIVE AIRWAYS DYSFUNCTION SYNDROME WITHOUT COMPLICATION (HCC): ICD-10-CM

## 2024-10-25 DIAGNOSIS — E78.2 MIXED HYPERLIPIDEMIA: ICD-10-CM

## 2024-10-25 DIAGNOSIS — I73.9 PVD (PERIPHERAL VASCULAR DISEASE) (HCC): ICD-10-CM

## 2024-10-25 DIAGNOSIS — M48.061 NEURAL FORAMINAL STENOSIS OF LUMBAR SPINE: ICD-10-CM

## 2024-10-25 DIAGNOSIS — Z76.0 ENCOUNTER FOR MEDICATION REFILL: ICD-10-CM

## 2024-10-25 DIAGNOSIS — G40.909 SEIZURE DISORDER (HCC): ICD-10-CM

## 2024-10-25 DIAGNOSIS — M51.26 DISC DISPLACEMENT, LUMBAR: ICD-10-CM

## 2024-10-25 DIAGNOSIS — I73.9 PERIPHERAL VASCULAR DISEASE (HCC): ICD-10-CM

## 2024-10-25 DIAGNOSIS — F39 MOOD DISORDER (HCC): ICD-10-CM

## 2024-10-25 DIAGNOSIS — K52.9 CHRONIC DIARRHEA: ICD-10-CM

## 2024-10-25 DIAGNOSIS — I10 ESSENTIAL HYPERTENSION: ICD-10-CM

## 2024-10-25 DIAGNOSIS — D68.9 BLOOD CLOTTING DISORDER (HCC): ICD-10-CM

## 2024-10-25 DIAGNOSIS — K21.9 GASTROESOPHAGEAL REFLUX DISEASE WITHOUT ESOPHAGITIS: ICD-10-CM

## 2024-10-25 DIAGNOSIS — E03.8 SUBCLINICAL HYPOTHYROIDISM: ICD-10-CM

## 2024-10-25 DIAGNOSIS — E11.51 TYPE 2 DIABETES MELLITUS WITH DIABETIC PERIPHERAL ANGIOPATHY WITHOUT GANGRENE, WITHOUT LONG-TERM CURRENT USE OF INSULIN (HCC): ICD-10-CM

## 2024-10-25 RX ORDER — LOPERAMIDE HYDROCHLORIDE 2 MG/1
2 CAPSULE ORAL 4 TIMES DAILY PRN
Qty: 360 CAPSULE | Refills: 1 | Status: CANCELLED | OUTPATIENT
Start: 2024-10-25 | End: 2025-10-25

## 2024-10-25 RX ORDER — DIVALPROEX SODIUM 500 MG/1
TABLET, FILM COATED, EXTENDED RELEASE ORAL
Qty: 270 TABLET | Refills: 3 | Status: CANCELLED | OUTPATIENT
Start: 2024-10-25 | End: 2025-10-26

## 2024-10-25 RX ORDER — BUSPIRONE HYDROCHLORIDE 10 MG/1
10 TABLET ORAL DAILY
Qty: 90 TABLET | Refills: 3 | Status: CANCELLED | OUTPATIENT
Start: 2024-10-25 | End: 2025-10-26

## 2024-10-25 RX ORDER — METOPROLOL TARTRATE 25 MG/1
TABLET, FILM COATED ORAL
Qty: 90 TABLET | Refills: 3 | Status: CANCELLED | OUTPATIENT
Start: 2024-10-25 | End: 2025-10-26

## 2024-10-25 RX ORDER — ATORVASTATIN CALCIUM 80 MG/1
80 TABLET, FILM COATED ORAL NIGHTLY
Qty: 90 TABLET | Refills: 3 | Status: CANCELLED | OUTPATIENT
Start: 2024-10-25 | End: 2025-10-26

## 2024-10-25 RX ORDER — GABAPENTIN 400 MG/1
400 CAPSULE ORAL 3 TIMES DAILY
Qty: 270 CAPSULE | Refills: 3 | Status: CANCELLED | OUTPATIENT
Start: 2024-10-25 | End: 2025-10-26

## 2024-10-25 RX ORDER — DULOXETIN HYDROCHLORIDE 60 MG/1
CAPSULE, DELAYED RELEASE ORAL
Qty: 90 CAPSULE | Refills: 3 | Status: CANCELLED | OUTPATIENT
Start: 2024-10-25 | End: 2025-10-26

## 2024-10-25 RX ORDER — ALBUTEROL SULFATE 90 UG/1
2 INHALANT RESPIRATORY (INHALATION) EVERY 6 HOURS PRN
Qty: 3 EACH | Refills: 3 | Status: CANCELLED | OUTPATIENT
Start: 2024-10-25 | End: 2025-10-26

## 2024-10-25 RX ORDER — WARFARIN SODIUM 5 MG/1
TABLET ORAL
Qty: 90 TABLET | Refills: 3 | Status: CANCELLED | OUTPATIENT
Start: 2024-10-25 | End: 2025-10-26

## 2024-10-25 RX ORDER — LEVETIRACETAM 500 MG/1
TABLET ORAL
Qty: 180 TABLET | Refills: 3 | Status: CANCELLED | OUTPATIENT
Start: 2024-10-25 | End: 2025-10-26

## 2024-10-25 RX ORDER — CLOPIDOGREL BISULFATE 75 MG/1
75 TABLET ORAL DAILY
Qty: 90 TABLET | Refills: 3 | Status: CANCELLED | OUTPATIENT
Start: 2024-10-25 | End: 2025-10-26

## 2024-10-25 RX ORDER — LEVOTHYROXINE SODIUM 75 UG/1
TABLET ORAL
Qty: 90 TABLET | Refills: 3 | Status: CANCELLED | OUTPATIENT
Start: 2024-10-25 | End: 2025-10-26

## 2024-11-01 NOTE — TELEPHONE ENCOUNTER
Please advise  
Spoke to the patient and she has enough medication until her apt. Pt informed it will discussed at her visit on 11.20.24  
Yes    Last Appointment:  8/14/2024    Future appts:  Future Appointments   Date Time Provider Department Center   11/20/2024  1:00 PM Vianca Smith MD [facility] DEP   8/19/2025  1:00 PM Vianca Smith MD [facility] DEP        (If no appt send self scheduling link. .REFILLAPPT)  Scheduling request sent?     [] Yes  [x] No    Does patient need updated?  [] Yes  [x] No      LUCIANO/Anupam

## 2024-11-20 ENCOUNTER — OFFICE VISIT (OUTPATIENT)
Dept: FAMILY MEDICINE CLINIC | Age: 54
End: 2024-11-20

## 2024-11-20 VITALS
OXYGEN SATURATION: 96 % | DIASTOLIC BLOOD PRESSURE: 78 MMHG | HEIGHT: 67 IN | SYSTOLIC BLOOD PRESSURE: 120 MMHG | BODY MASS INDEX: 30.61 KG/M2 | HEART RATE: 123 BPM | TEMPERATURE: 98.1 F | RESPIRATION RATE: 16 BRPM | WEIGHT: 195 LBS

## 2024-11-20 DIAGNOSIS — K52.9 CHRONIC DIARRHEA: ICD-10-CM

## 2024-11-20 DIAGNOSIS — E03.8 SUBCLINICAL HYPOTHYROIDISM: ICD-10-CM

## 2024-11-20 DIAGNOSIS — J44.1 CHRONIC OBSTRUCTIVE PULMONARY DISEASE WITH ACUTE EXACERBATION (HCC): ICD-10-CM

## 2024-11-20 DIAGNOSIS — K21.9 GASTROESOPHAGEAL REFLUX DISEASE WITHOUT ESOPHAGITIS: ICD-10-CM

## 2024-11-20 DIAGNOSIS — E55.9 VITAMIN D INSUFFICIENCY: ICD-10-CM

## 2024-11-20 DIAGNOSIS — I10 ESSENTIAL HYPERTENSION: ICD-10-CM

## 2024-11-20 DIAGNOSIS — J68.3 MODERATE PERSISTENT REACTIVE AIRWAYS DYSFUNCTION SYNDROME WITHOUT COMPLICATION (HCC): ICD-10-CM

## 2024-11-20 DIAGNOSIS — F39 MOOD DISORDER (HCC): ICD-10-CM

## 2024-11-20 DIAGNOSIS — I73.9 PVD (PERIPHERAL VASCULAR DISEASE) (HCC): ICD-10-CM

## 2024-11-20 DIAGNOSIS — G40.909 SEIZURE DISORDER (HCC): ICD-10-CM

## 2024-11-20 DIAGNOSIS — E78.2 MIXED HYPERLIPIDEMIA: ICD-10-CM

## 2024-11-20 DIAGNOSIS — D68.9 BLOOD CLOTTING DISORDER (HCC): ICD-10-CM

## 2024-11-20 DIAGNOSIS — E11.51 TYPE 2 DIABETES MELLITUS WITH DIABETIC PERIPHERAL ANGIOPATHY WITHOUT GANGRENE, WITHOUT LONG-TERM CURRENT USE OF INSULIN (HCC): ICD-10-CM

## 2024-11-20 DIAGNOSIS — Z76.0 ENCOUNTER FOR MEDICATION REFILL: Primary | ICD-10-CM

## 2024-11-20 LAB
BASOPHILS ABSOLUTE: 0.15 K/UL (ref 0–0.2)
BASOPHILS RELATIVE PERCENT: 1 % (ref 0–2)
EOSINOPHILS ABSOLUTE: 0.15 K/UL (ref 0.05–0.5)
EOSINOPHILS RELATIVE PERCENT: 1 % (ref 0–6)
HBA1C MFR BLD: 8.4 % (ref 4–5.6)
HCT VFR BLD CALC: 44.8 % (ref 34–48)
HEMOGLOBIN: 12.5 G/DL (ref 11.5–15.5)
LYMPHOCYTES ABSOLUTE: 2.77 K/UL (ref 1.5–4)
LYMPHOCYTES RELATIVE PERCENT: 16 % (ref 20–42)
MCH RBC QN AUTO: 22.2 PG (ref 26–35)
MCHC RBC AUTO-ENTMCNC: 27.9 G/DL (ref 32–34.5)
MCV RBC AUTO: 79.6 FL (ref 80–99.9)
MONOCYTES ABSOLUTE: 0.77 K/UL (ref 0.1–0.95)
MONOCYTES RELATIVE PERCENT: 4 % (ref 2–12)
NEUTROPHILS ABSOLUTE: 13.85 K/UL (ref 1.8–7.3)
NEUTROPHILS RELATIVE PERCENT: 78 % (ref 43–80)
PDW BLD-RTO: 21.4 % (ref 11.5–15)
PLATELET # BLD: 375 K/UL (ref 130–450)
PMV BLD AUTO: 10.7 FL (ref 7–12)
RBC # BLD: 5.63 M/UL (ref 3.5–5.5)
RBC # BLD: ABNORMAL 10*6/UL
WBC # BLD: 17.7 K/UL (ref 4.5–11.5)

## 2024-11-20 RX ORDER — LEVETIRACETAM 500 MG/1
TABLET ORAL
Qty: 182 TABLET | Refills: 3 | Status: SHIPPED | OUTPATIENT
Start: 2024-11-20 | End: 2025-11-20

## 2024-11-20 RX ORDER — SENNOSIDES 8.6 MG
CAPSULE ORAL
Qty: 545 TABLET | Refills: 3 | Status: SHIPPED | OUTPATIENT
Start: 2024-11-20 | End: 2025-11-20

## 2024-11-20 RX ORDER — WARFARIN SODIUM 5 MG/1
TABLET ORAL
Qty: 91 TABLET | Refills: 3 | Status: SHIPPED | OUTPATIENT
Start: 2024-11-20 | End: 2025-11-20

## 2024-11-20 RX ORDER — METOPROLOL TARTRATE 25 MG/1
TABLET, FILM COATED ORAL
Qty: 91 TABLET | Refills: 3 | Status: SHIPPED | OUTPATIENT
Start: 2024-11-20 | End: 2025-11-20

## 2024-11-20 RX ORDER — DOXYCYCLINE HYCLATE 100 MG
100 TABLET ORAL 2 TIMES DAILY
Qty: 20 TABLET | Refills: 0 | Status: SHIPPED | OUTPATIENT
Start: 2024-11-20 | End: 2024-11-30

## 2024-11-20 RX ORDER — LOPERAMIDE HYDROCHLORIDE 2 MG/1
2 CAPSULE ORAL 4 TIMES DAILY PRN
Qty: 364 CAPSULE | Refills: 1 | Status: SHIPPED | OUTPATIENT
Start: 2024-11-20 | End: 2025-11-20

## 2024-11-20 RX ORDER — CLOPIDOGREL BISULFATE 75 MG/1
75 TABLET ORAL DAILY
Qty: 91 TABLET | Refills: 3 | Status: SHIPPED | OUTPATIENT
Start: 2024-11-20 | End: 2025-11-20

## 2024-11-20 RX ORDER — GABAPENTIN 400 MG/1
400 CAPSULE ORAL 3 TIMES DAILY
Qty: 273 CAPSULE | Refills: 3 | Status: SHIPPED | OUTPATIENT
Start: 2024-11-20 | End: 2025-11-20

## 2024-11-20 RX ORDER — ATORVASTATIN CALCIUM 80 MG/1
80 TABLET, FILM COATED ORAL NIGHTLY
Qty: 91 TABLET | Refills: 3 | Status: SHIPPED | OUTPATIENT
Start: 2024-11-20 | End: 2025-11-20

## 2024-11-20 RX ORDER — ALBUTEROL SULFATE 90 UG/1
2 INHALANT RESPIRATORY (INHALATION) EVERY 6 HOURS PRN
Qty: 3 EACH | Refills: 3 | Status: SHIPPED | OUTPATIENT
Start: 2024-11-20 | End: 2025-11-20

## 2024-11-20 RX ORDER — LIDOCAINE 50 MG/G
1 PATCH TOPICAL DAILY
Qty: 91 PATCH | Refills: 3 | Status: SHIPPED | OUTPATIENT
Start: 2024-11-20 | End: 2025-11-20

## 2024-11-20 RX ORDER — DIVALPROEX SODIUM 500 MG/1
TABLET, FILM COATED, EXTENDED RELEASE ORAL
Qty: 273 TABLET | Refills: 3 | Status: SHIPPED | OUTPATIENT
Start: 2024-11-20 | End: 2025-11-20

## 2024-11-20 RX ORDER — DEXTROMETHORPHAN HYDROBROMIDE AND PROMETHAZINE HYDROCHLORIDE 15; 6.25 MG/5ML; MG/5ML
5 SYRUP ORAL 4 TIMES DAILY PRN
Qty: 140 ML | Refills: 0 | Status: SHIPPED | OUTPATIENT
Start: 2024-11-20 | End: 2024-11-27

## 2024-11-20 RX ORDER — BUSPIRONE HYDROCHLORIDE 10 MG/1
10 TABLET ORAL DAILY
Qty: 91 TABLET | Refills: 3 | Status: SHIPPED | OUTPATIENT
Start: 2024-11-20 | End: 2025-11-20

## 2024-11-20 RX ORDER — DULOXETIN HYDROCHLORIDE 60 MG/1
CAPSULE, DELAYED RELEASE ORAL
Qty: 91 CAPSULE | Refills: 3 | Status: SHIPPED | OUTPATIENT
Start: 2024-11-20 | End: 2025-11-20

## 2024-11-20 RX ORDER — LEVOTHYROXINE SODIUM 75 UG/1
TABLET ORAL
Qty: 91 TABLET | Refills: 3 | Status: SHIPPED | OUTPATIENT
Start: 2024-11-20 | End: 2025-11-20

## 2024-11-20 ASSESSMENT — ENCOUNTER SYMPTOMS
DIARRHEA: 0
NAUSEA: 0
VOMITING: 0
COUGH: 0
SORE THROAT: 0
WHEEZING: 0
ABDOMINAL PAIN: 0
BACK PAIN: 0
SHORTNESS OF BREATH: 0
CONSTIPATION: 0
BLOOD IN STOOL: 0

## 2024-11-20 NOTE — PROGRESS NOTES
Ludy Rivera is a 54 y.o. female who presents today for     Chief Complaint   Patient presents with    Check-Up     Will get labs done after appointment, had flu vaccine in nursing home    Medication Refill     Changing [facility] to [facility]        Here for check up, medication refills (with change of pharmacy from [facility] in [place]).  She complains of congestion, cough and mild shortness of breath that started this week.    PMFSH:  Patient's past medical, surgical, social and/or family history reviewed, updated in chart, and are non-contributory (unless otherwise stated).  Medications and allergies also reviewed and updated in chart.    Review of Systems  Review of Systems   HENT:  Negative for congestion, ear pain and sore throat.    Respiratory:  Negative for cough, shortness of breath and wheezing.    Cardiovascular:  Negative for chest pain, palpitations and leg swelling.   Gastrointestinal:  Negative for abdominal pain, blood in stool, constipation, diarrhea, nausea and vomiting.   Genitourinary:  Negative for dysuria, frequency, hematuria and urgency.        Urinary incontinence that is associated with phantom pain of right LE   Musculoskeletal:  Negative for back pain, myalgias and neck pain.   Skin:  Negative for rash.   Neurological:  Negative for dizziness, weakness and headaches.        Phantom pain of right LE   Psychiatric/Behavioral:  The patient is not nervous/anxious.      Physical Exam:    VS:  /78 (Site: Left Upper Arm, Position: Sitting, Cuff Size: Large Adult)   Pulse (!) 123   Temp 98.1 °F (36.7 °C) (Infrared)   Resp 16   Ht 1.702 m (5' 7\")   Wt 88.5 kg (195 lb) Comment: approximately  SpO2 96%   BMI 30.54 kg/m²     LAST WEIGHT:  Wt Readings from Last 3 Encounters:   11/20/24 88.5 kg (195 lb)   01/03/24 91.7 kg (202 lb 1.6 oz)   09/16/21 80.2 kg (176 lb 14.4 oz)       BMI Readings from Last 3 Encounters:   11/20/24 30.54 kg/m²   01/03/24 31.65 
Chronic pain, diabetic neuropathy. Currently she only takes tylenol and gabapentin, has weaned herself off oxycodone. At baseline, lives alone. Can \"hop\" around short distances holding onto to stable objects (couch, counter) and uses hand propelled wheelchair for longer distances. Has home health aid. Neuro intact, MAEx3 to commands. Complaints of neuropathic pain to L foot improved on current pain regimen. Groin area sore where prevena is located.  - continue scheduled acetaminophen and gabapentin. PRN oxycodone with breakthrough iv hydromorphone  - ongoing neuro/neurovasc/pain assessments  - PT/OT consulted  - continue home depakote, duloxetine, busparone, keppra  - SW consulted for ongoing home resource management, discharge planning, financial concerns    CV: History of HTN, HLD, PVD s/p L and R iliac and L popliteal stents, R hip disarticulation, L 1/2/5 digit amputations, renal and splenic thrombosis (on Coumadin, aspirin and clopidogrel). Questionable compliance with meds. Baseline cardiac function EF 60-65% (2022). Acute LLE ischemia s/p L common femoral and external iliac embolectomy, patch angioplasty common femoral artery on 12/6. Weaned off phenylephrine 12/7, CK and lactate WNL  - goal sbp 120-160 range per vascular surgery  - continuous ekg/hourly and prn NIBP monitoring  - remove art line  - volume as indicated -> decreased uop, will give 500ml LR  - no need to continue to trend lactate and CK  - hold home metoprolol 12.5 BID  - continue home plavix, aspirin, atorvastatin  - continue low intensity heparin infusion     PULM: 40 pack year smoker. Arrived to SICU Intubated. Extubated, currently on 2L NC  - goal SpO2 >94%  - Q1h IS while awake  - additional bronchial hygiene as indicated  - cxr prn    GI: On PPI at home  - continue PPI  - bowel regimen  - regular diet    : Baseline cr 0.57  - Check renal function panel daily and prn  - Volume resuscitation as needed as per above   - Goal U/O

## 2024-11-21 ENCOUNTER — TELEPHONE (OUTPATIENT)
Dept: FAMILY MEDICINE CLINIC | Age: 54
End: 2024-11-21

## 2024-11-21 DIAGNOSIS — E55.9 VITAMIN D DEFICIENCY: Primary | ICD-10-CM

## 2024-11-21 LAB
ALBUMIN: 3.9 G/DL (ref 3.5–5.2)
ALP BLD-CCNC: 115 U/L (ref 35–104)
ALT SERPL-CCNC: 10 U/L (ref 0–32)
ANION GAP SERPL CALCULATED.3IONS-SCNC: 21 MMOL/L (ref 7–16)
AST SERPL-CCNC: 14 U/L (ref 0–31)
BILIRUB SERPL-MCNC: 0.4 MG/DL (ref 0–1.2)
BUN BLDV-MCNC: 12 MG/DL (ref 6–20)
CALCIUM SERPL-MCNC: 9.5 MG/DL (ref 8.6–10.2)
CHLORIDE BLD-SCNC: 100 MMOL/L (ref 98–107)
CHOLESTEROL, TOTAL: 178 MG/DL
CO2: 16 MMOL/L (ref 22–29)
CREAT SERPL-MCNC: 0.5 MG/DL (ref 0.5–1)
GFR, ESTIMATED: >90 ML/MIN/1.73M2
GLUCOSE BLD-MCNC: 203 MG/DL (ref 74–99)
HDLC SERPL-MCNC: 39 MG/DL
LDL CHOLESTEROL: 105 MG/DL
POTASSIUM SERPL-SCNC: 4.3 MMOL/L (ref 3.5–5)
SODIUM BLD-SCNC: 137 MMOL/L (ref 132–146)
THYROXINE (T4): 9.6 UG/DL (ref 4.5–11.7)
TOTAL PROTEIN: 7.7 G/DL (ref 6.4–8.3)
TRIGL SERPL-MCNC: 170 MG/DL
TSH SERPL DL<=0.05 MIU/L-ACNC: 1.19 UIU/ML (ref 0.27–4.2)
VITAMIN D 25-HYDROXY: 10.9 NG/ML (ref 30–100)
VLDLC SERPL CALC-MCNC: 34 MG/DL

## 2024-11-21 RX ORDER — ERGOCALCIFEROL 1.25 MG/1
CAPSULE, LIQUID FILLED ORAL
Qty: 15 CAPSULE | Refills: 3 | Status: SHIPPED | OUTPATIENT
Start: 2024-11-21 | End: 2025-11-21

## 2024-11-21 NOTE — TELEPHONE ENCOUNTER
Patient returned call to office and left voicemail.    I returned call to patient and notified.  Pt stated she did not get medication from [facility] because she could not afford it.  Patient states she can go to [employer] store to buy it cheaper.      Please advise.

## 2024-11-21 NOTE — TELEPHONE ENCOUNTER
Which medication?    If it is the Vitamin D, she is going to have to take, like 50 capsules at once to reach the dosing required to improve her Vitamin D levels

## 2024-11-21 NOTE — TELEPHONE ENCOUNTER
It is imperative that she get 50,000 units in twice a week for three (3) weeks, then 50,000 units weekly until she gets her blood drawn again in 3 months     Again, if she is getting 1000 unit capsules at the [employer] store, she will have to take 50 capsules twice a week for three weeks, then once a week until she gets her blood drawn.    She may end up spending as much on the over the counter medication as she would just getting the prescription...

## 2024-11-21 NOTE — TELEPHONE ENCOUNTER
Left message for patient regarding message from Dr Love regarding Vitamin D results to contact office a earliest convenience.       ----- Message from Dr. Vianca Smith MD sent at 11/21/2024  9:53 AM EST -----  Labs reviewed: very Vitamin D deficient.    Meds reviewed; no vitamin D currently prescribed    Will prescribe Vitamin D 88150 IU 2X/week x 3 weeks then 1 capsule weekly.    Repeat vitamin d in 3 months

## 2024-11-21 NOTE — RESULT ENCOUNTER NOTE
Labs reviewed: very Vitamin D deficient.    Meds reviewed; no vitamin D currently prescribed    Will prescribe Vitamin D 06252 IU 2X/week x 3 weeks then 1 capsule weekly.    Repeat vitamin d in 3 months

## 2024-11-22 NOTE — TELEPHONE ENCOUNTER
Patient returned call to office and was notified.  Patient states she will buy OTC Vitamin D.  Patient states she has an OTC card that she can use to purchase Vitamin D.  I explained again how she needs to take Vitamin D.        Patient also asked if a PT/INR was drawn when she had blood work done the other day.  I informed her that a PT/INR was not ordered.  Pt is wanting to know why PCP did not order it.  Pt also stated that she is bed ridden and can not get up and out of bed to come in for blood work.  Pt stated she does have an at-home INR machine.  Pt stated that a INR machine was ordered before but was sent to wrong company so her insurance would not cover.

## 2024-11-22 NOTE — TELEPHONE ENCOUNTER
Attempted to contact patient regarding the message, left message for patient to return call at [phone].

## 2024-11-25 ENCOUNTER — TELEPHONE (OUTPATIENT)
Dept: FAMILY MEDICINE CLINIC | Age: 54
End: 2024-11-25

## 2024-11-25 DIAGNOSIS — I73.9 PVD (PERIPHERAL VASCULAR DISEASE) (HCC): Primary | ICD-10-CM

## 2024-11-25 DIAGNOSIS — D68.9 BLOOD CLOTTING DISORDER (HCC): ICD-10-CM

## 2024-11-25 DIAGNOSIS — I74.3: ICD-10-CM

## 2024-11-25 DIAGNOSIS — Z79.01 ANTICOAGULANT LONG-TERM USE: ICD-10-CM

## 2024-11-25 DIAGNOSIS — M51.26 DISC DISPLACEMENT, LUMBAR: Primary | Chronic | ICD-10-CM

## 2024-11-25 NOTE — TELEPHONE ENCOUNTER
Patient returned call for clarification about PT/INR.  Patient states that Dr Love was one managing PT/INR but patient had spent more time in and out of hospital/rehabs since 2021 and was managed through hospital/rehab.  Patient states that has spoke with Dr Love about having a PT/INR home test since patient has a hard time with transportation getting to lab/office to get it done.    States that is on the phone with her insurance company to get information for what INR home test insurance will pay for to make it easier for patient to get it done.

## 2024-11-25 NOTE — TELEPHONE ENCOUNTER
Last Appointment   11/20/2024  Next Appointment  Visit date not found      Refill needed on zanaflex.  Was left out when all the refills were done at appointment.

## 2024-11-25 NOTE — TELEPHONE ENCOUNTER
Left message for patient to contact office for clarification of who the doctor is who has been managing patient pt/inr and coumadin?

## 2024-11-25 NOTE — TELEPHONE ENCOUNTER
Oxana with [facility] would like to order a PT/INR machine for home testing for patient. Oxana said this is now covered with pt insurance.The order can be faxed to [facility]  [phone]    Last seen 11/20/2024  Next appt Visit date not found

## 2024-11-25 NOTE — TELEPHONE ENCOUNTER
A home PT/INR meter is just about the only option she has, since she cannot come to the office monthly for blood draw    Unless Home Health stays in just for PT/INR monitoring monthly

## 2024-12-04 ENCOUNTER — TELEPHONE (OUTPATIENT)
Dept: FAMILY MEDICINE CLINIC | Age: 54
End: 2024-12-04

## 2024-12-04 NOTE — TELEPHONE ENCOUNTER
Jesi, from [facility] stopped by office regarding the PT/INR referral that [facility] does not accept patient caresource insurance. That accept all other insurance but not caresource.    Jesi said that she is going to reach out to her boss to see what other resources there are for PT/INR home testing for those who has caresource insurance.     Patient informed of this and that will contact her when office gets any other information or patient could contact her insurance again regarding this situation.

## 2024-12-17 ENCOUNTER — ANESTHESIA EVENT (OUTPATIENT)
Dept: OPERATING ROOM | Facility: HOSPITAL | Age: 54
End: 2024-12-17
Payer: COMMERCIAL

## 2024-12-17 ENCOUNTER — APPOINTMENT (OUTPATIENT)
Dept: RADIOLOGY | Facility: HOSPITAL | Age: 54
DRG: 240 | End: 2024-12-17
Payer: MEDICARE

## 2024-12-17 ENCOUNTER — CLINICAL SUPPORT (OUTPATIENT)
Dept: EMERGENCY MEDICINE | Facility: HOSPITAL | Age: 54
DRG: 240 | End: 2024-12-17
Payer: MEDICARE

## 2024-12-17 ENCOUNTER — ANESTHESIA (OUTPATIENT)
Dept: OPERATING ROOM | Facility: HOSPITAL | Age: 54
End: 2024-12-17
Payer: COMMERCIAL

## 2024-12-17 ENCOUNTER — HOSPITAL ENCOUNTER (INPATIENT)
Facility: HOSPITAL | Age: 54
DRG: 240 | End: 2024-12-17
Attending: STUDENT IN AN ORGANIZED HEALTH CARE EDUCATION/TRAINING PROGRAM | Admitting: INTERNAL MEDICINE
Payer: MEDICARE

## 2024-12-17 DIAGNOSIS — I70.222 CRITICAL LIMB ISCHEMIA OF LEFT LOWER EXTREMITY (MULTI): ICD-10-CM

## 2024-12-17 DIAGNOSIS — I96 GANGRENE OF TOE OF LEFT FOOT (MULTI): ICD-10-CM

## 2024-12-17 DIAGNOSIS — Z79.01 ANTICOAGULANT LONG-TERM USE: ICD-10-CM

## 2024-12-17 DIAGNOSIS — G89.18 ACUTE POSTOPERATIVE PAIN: ICD-10-CM

## 2024-12-17 DIAGNOSIS — I99.8 ACUTE LOWER LIMB ISCHEMIA: Primary | ICD-10-CM

## 2024-12-17 LAB
ABO GROUP (TYPE) IN BLOOD: NORMAL
ALBUMIN SERPL BCP-MCNC: 2.4 G/DL (ref 3.4–5)
ALP SERPL-CCNC: 108 U/L (ref 33–110)
ALT SERPL W P-5'-P-CCNC: 18 U/L (ref 7–45)
ANION GAP BLDV CALCULATED.4IONS-SCNC: 12 MMOL/L (ref 10–25)
ANION GAP SERPL CALC-SCNC: 13 MMOL/L (ref 10–20)
ANTIBODY SCREEN: NORMAL
APTT PPP: 21 SECONDS (ref 27–38)
APTT PPP: 24 SECONDS (ref 27–38)
AST SERPL W P-5'-P-CCNC: 22 U/L (ref 9–39)
BASE EXCESS BLDV CALC-SCNC: 1.3 MMOL/L (ref -2–3)
BASOPHILS # BLD AUTO: 0.06 X10*3/UL (ref 0–0.1)
BASOPHILS NFR BLD AUTO: 0.3 %
BILIRUB SERPL-MCNC: 0.4 MG/DL (ref 0–1.2)
BODY TEMPERATURE: 37 DEGREES CELSIUS
BUN SERPL-MCNC: 6 MG/DL (ref 6–23)
CA-I BLDV-SCNC: 1.2 MMOL/L (ref 1.1–1.33)
CALCIUM SERPL-MCNC: 8.3 MG/DL (ref 8.6–10.6)
CHLORIDE BLDV-SCNC: 102 MMOL/L (ref 98–107)
CHLORIDE SERPL-SCNC: 101 MMOL/L (ref 98–107)
CO2 SERPL-SCNC: 25 MMOL/L (ref 21–32)
CREAT SERPL-MCNC: 0.41 MG/DL (ref 0.5–1.05)
EGFRCR SERPLBLD CKD-EPI 2021: >90 ML/MIN/1.73M*2
EOSINOPHIL # BLD AUTO: 0.02 X10*3/UL (ref 0–0.7)
EOSINOPHIL NFR BLD AUTO: 0.1 %
ERYTHROCYTE [DISTWIDTH] IN BLOOD BY AUTOMATED COUNT: 21.6 % (ref 11.5–14.5)
GLUCOSE BLD MANUAL STRIP-MCNC: 202 MG/DL (ref 74–99)
GLUCOSE BLD MANUAL STRIP-MCNC: 211 MG/DL (ref 74–99)
GLUCOSE BLD MANUAL STRIP-MCNC: 212 MG/DL (ref 74–99)
GLUCOSE BLDV-MCNC: 239 MG/DL (ref 74–99)
GLUCOSE SERPL-MCNC: 226 MG/DL (ref 74–99)
HCO3 BLDV-SCNC: 25.9 MMOL/L (ref 22–26)
HCT VFR BLD AUTO: 29.6 % (ref 36–46)
HCT VFR BLD EST: 29 % (ref 36–46)
HGB BLD-MCNC: 8.9 G/DL (ref 12–16)
HGB BLDV-MCNC: 9.5 G/DL (ref 12–16)
HYPOCHROMIA BLD QL SMEAR: NORMAL
IMM GRANULOCYTES # BLD AUTO: 0.2 X10*3/UL (ref 0–0.7)
IMM GRANULOCYTES NFR BLD AUTO: 0.8 % (ref 0–0.9)
INHALED O2 CONCENTRATION: 28 %
INR PPP: 1.4 (ref 0.9–1.1)
LACTATE BLDV-SCNC: 1.9 MMOL/L (ref 0.4–2)
LACTATE SERPL-SCNC: 2.1 MMOL/L (ref 0.4–2)
LACTATE SERPL-SCNC: 2.3 MMOL/L (ref 0.4–2)
LYMPHOCYTES # BLD AUTO: 2.15 X10*3/UL (ref 1.2–4.8)
LYMPHOCYTES NFR BLD AUTO: 9.1 %
MCH RBC QN AUTO: 21.5 PG (ref 26–34)
MCHC RBC AUTO-ENTMCNC: 30.1 G/DL (ref 32–36)
MCV RBC AUTO: 72 FL (ref 80–100)
MONOCYTES # BLD AUTO: 1.32 X10*3/UL (ref 0.1–1)
MONOCYTES NFR BLD AUTO: 5.6 %
NEUTROPHILS # BLD AUTO: 19.93 X10*3/UL (ref 1.2–7.7)
NEUTROPHILS NFR BLD AUTO: 84.1 %
NRBC BLD-RTO: 0 /100 WBCS (ref 0–0)
OXYHGB MFR BLDV: 85.7 % (ref 45–75)
PCO2 BLDV: 40 MM HG (ref 41–51)
PH BLDV: 7.42 PH (ref 7.33–7.43)
PLATELET # BLD AUTO: 609 X10*3/UL (ref 150–450)
PO2 BLDV: 63 MM HG (ref 35–45)
POTASSIUM BLDV-SCNC: 3.5 MMOL/L (ref 3.5–5.3)
POTASSIUM SERPL-SCNC: 3.3 MMOL/L (ref 3.5–5.3)
PROT SERPL-MCNC: 7.4 G/DL (ref 6.4–8.2)
PROTHROMBIN TIME: 16.1 SECONDS (ref 9.8–12.8)
RBC # BLD AUTO: 4.13 X10*6/UL (ref 4–5.2)
RBC MORPH BLD: NORMAL
RH FACTOR (ANTIGEN D): NORMAL
SAO2 % BLDV: 89 % (ref 45–75)
SODIUM BLDV-SCNC: 136 MMOL/L (ref 136–145)
SODIUM SERPL-SCNC: 136 MMOL/L (ref 136–145)
WBC # BLD AUTO: 23.7 X10*3/UL (ref 4.4–11.3)

## 2024-12-17 PROCEDURE — P9045 ALBUMIN (HUMAN), 5%, 250 ML: HCPCS | Mod: JZ,JG | Performed by: STUDENT IN AN ORGANIZED HEALTH CARE EDUCATION/TRAINING PROGRAM

## 2024-12-17 PROCEDURE — 85610 PROTHROMBIN TIME: CPT

## 2024-12-17 PROCEDURE — 71045 X-RAY EXAM CHEST 1 VIEW: CPT | Performed by: RADIOLOGY

## 2024-12-17 PROCEDURE — 99222 1ST HOSP IP/OBS MODERATE 55: CPT | Performed by: STUDENT IN AN ORGANIZED HEALTH CARE EDUCATION/TRAINING PROGRAM

## 2024-12-17 PROCEDURE — 3700000001 HC GENERAL ANESTHESIA TIME - INITIAL BASE CHARGE: Performed by: STUDENT IN AN ORGANIZED HEALTH CARE EDUCATION/TRAINING PROGRAM

## 2024-12-17 PROCEDURE — 3600000003 HC OR TIME - INITIAL BASE CHARGE - PROCEDURE LEVEL THREE: Performed by: STUDENT IN AN ORGANIZED HEALTH CARE EDUCATION/TRAINING PROGRAM

## 2024-12-17 PROCEDURE — 7100000002 HC RECOVERY ROOM TIME - EACH INCREMENTAL 1 MINUTE: Performed by: STUDENT IN AN ORGANIZED HEALTH CARE EDUCATION/TRAINING PROGRAM

## 2024-12-17 PROCEDURE — 96365 THER/PROPH/DIAG IV INF INIT: CPT

## 2024-12-17 PROCEDURE — 2500000004 HC RX 250 GENERAL PHARMACY W/ HCPCS (ALT 636 FOR OP/ED): Performed by: STUDENT IN AN ORGANIZED HEALTH CARE EDUCATION/TRAINING PROGRAM

## 2024-12-17 PROCEDURE — 2500000004 HC RX 250 GENERAL PHARMACY W/ HCPCS (ALT 636 FOR OP/ED)

## 2024-12-17 PROCEDURE — 84132 ASSAY OF SERUM POTASSIUM: CPT

## 2024-12-17 PROCEDURE — 93005 ELECTROCARDIOGRAM TRACING: CPT

## 2024-12-17 PROCEDURE — 88305 TISSUE EXAM BY PATHOLOGIST: CPT | Mod: TC,SUR | Performed by: STUDENT IN AN ORGANIZED HEALTH CARE EDUCATION/TRAINING PROGRAM

## 2024-12-17 PROCEDURE — 83605 ASSAY OF LACTIC ACID: CPT

## 2024-12-17 PROCEDURE — 85025 COMPLETE CBC W/AUTO DIFF WBC: CPT

## 2024-12-17 PROCEDURE — 2060000001 HC INTERMEDIATE ICU ROOM DAILY

## 2024-12-17 PROCEDURE — 2500000004 HC RX 250 GENERAL PHARMACY W/ HCPCS (ALT 636 FOR OP/ED): Mod: JZ,JG

## 2024-12-17 PROCEDURE — 3600000008 HC OR TIME - EACH INCREMENTAL 1 MINUTE - PROCEDURE LEVEL THREE: Performed by: STUDENT IN AN ORGANIZED HEALTH CARE EDUCATION/TRAINING PROGRAM

## 2024-12-17 PROCEDURE — 87206 SMEAR FLUORESCENT/ACID STAI: CPT | Performed by: STUDENT IN AN ORGANIZED HEALTH CARE EDUCATION/TRAINING PROGRAM

## 2024-12-17 PROCEDURE — 3700000002 HC GENERAL ANESTHESIA TIME - EACH INCREMENTAL 1 MINUTE: Performed by: STUDENT IN AN ORGANIZED HEALTH CARE EDUCATION/TRAINING PROGRAM

## 2024-12-17 PROCEDURE — 99285 EMERGENCY DEPT VISIT HI MDM: CPT | Performed by: STUDENT IN AN ORGANIZED HEALTH CARE EDUCATION/TRAINING PROGRAM

## 2024-12-17 PROCEDURE — 2500000004 HC RX 250 GENERAL PHARMACY W/ HCPCS (ALT 636 FOR OP/ED): Mod: JZ,JG | Performed by: STUDENT IN AN ORGANIZED HEALTH CARE EDUCATION/TRAINING PROGRAM

## 2024-12-17 PROCEDURE — 88311 DECALCIFY TISSUE: CPT | Mod: TC,SUR | Performed by: STUDENT IN AN ORGANIZED HEALTH CARE EDUCATION/TRAINING PROGRAM

## 2024-12-17 PROCEDURE — 36415 COLL VENOUS BLD VENIPUNCTURE: CPT

## 2024-12-17 PROCEDURE — 87075 CULTR BACTERIA EXCEPT BLOOD: CPT

## 2024-12-17 PROCEDURE — 88305 TISSUE EXAM BY PATHOLOGIST: CPT | Performed by: STUDENT IN AN ORGANIZED HEALTH CARE EDUCATION/TRAINING PROGRAM

## 2024-12-17 PROCEDURE — 2720000007 HC OR 272 NO HCPCS: Performed by: STUDENT IN AN ORGANIZED HEALTH CARE EDUCATION/TRAINING PROGRAM

## 2024-12-17 PROCEDURE — 86901 BLOOD TYPING SEROLOGIC RH(D): CPT

## 2024-12-17 PROCEDURE — 0Y6D0Z3 DETACHMENT AT LEFT UPPER LEG, LOW, OPEN APPROACH: ICD-10-PCS | Performed by: STUDENT IN AN ORGANIZED HEALTH CARE EDUCATION/TRAINING PROGRAM

## 2024-12-17 PROCEDURE — A27590 PR AMPUTATE THIGH,THRU FEMUR: Performed by: STUDENT IN AN ORGANIZED HEALTH CARE EDUCATION/TRAINING PROGRAM

## 2024-12-17 PROCEDURE — 99285 EMERGENCY DEPT VISIT HI MDM: CPT | Mod: 25 | Performed by: STUDENT IN AN ORGANIZED HEALTH CARE EDUCATION/TRAINING PROGRAM

## 2024-12-17 PROCEDURE — 87102 FUNGUS ISOLATION CULTURE: CPT | Performed by: STUDENT IN AN ORGANIZED HEALTH CARE EDUCATION/TRAINING PROGRAM

## 2024-12-17 PROCEDURE — 71045 X-RAY EXAM CHEST 1 VIEW: CPT

## 2024-12-17 PROCEDURE — 85730 THROMBOPLASTIN TIME PARTIAL: CPT | Performed by: STUDENT IN AN ORGANIZED HEALTH CARE EDUCATION/TRAINING PROGRAM

## 2024-12-17 PROCEDURE — 93010 ELECTROCARDIOGRAM REPORT: CPT | Performed by: STUDENT IN AN ORGANIZED HEALTH CARE EDUCATION/TRAINING PROGRAM

## 2024-12-17 PROCEDURE — 96374 THER/PROPH/DIAG INJ IV PUSH: CPT | Mod: 59

## 2024-12-17 PROCEDURE — 7100000001 HC RECOVERY ROOM TIME - INITIAL BASE CHARGE: Performed by: STUDENT IN AN ORGANIZED HEALTH CARE EDUCATION/TRAINING PROGRAM

## 2024-12-17 PROCEDURE — 96375 TX/PRO/DX INJ NEW DRUG ADDON: CPT

## 2024-12-17 PROCEDURE — 88311 DECALCIFY TISSUE: CPT | Performed by: STUDENT IN AN ORGANIZED HEALTH CARE EDUCATION/TRAINING PROGRAM

## 2024-12-17 PROCEDURE — 82947 ASSAY GLUCOSE BLOOD QUANT: CPT

## 2024-12-17 PROCEDURE — 87070 CULTURE OTHR SPECIMN AEROBIC: CPT | Performed by: STUDENT IN AN ORGANIZED HEALTH CARE EDUCATION/TRAINING PROGRAM

## 2024-12-17 PROCEDURE — 87040 BLOOD CULTURE FOR BACTERIA: CPT

## 2024-12-17 PROCEDURE — 27592 AMPUTATE LEG AT THIGH: CPT | Performed by: STUDENT IN AN ORGANIZED HEALTH CARE EDUCATION/TRAINING PROGRAM

## 2024-12-17 PROCEDURE — 86923 COMPATIBILITY TEST ELECTRIC: CPT

## 2024-12-17 RX ORDER — HEPARIN SODIUM 10000 [USP'U]/100ML
0-4500 INJECTION, SOLUTION INTRAVENOUS CONTINUOUS
Status: DISCONTINUED | OUTPATIENT
Start: 2024-12-17 | End: 2024-12-18

## 2024-12-17 RX ORDER — HYDROMORPHONE HYDROCHLORIDE 1 MG/ML
0.5 INJECTION, SOLUTION INTRAMUSCULAR; INTRAVENOUS; SUBCUTANEOUS ONCE
Status: COMPLETED | OUTPATIENT
Start: 2024-12-17 | End: 2024-12-17

## 2024-12-17 RX ORDER — DIPHENHYDRAMINE HYDROCHLORIDE 50 MG/ML
12.5 INJECTION INTRAMUSCULAR; INTRAVENOUS ONCE AS NEEDED
Status: DISCONTINUED | OUTPATIENT
Start: 2024-12-17 | End: 2024-12-17 | Stop reason: HOSPADM

## 2024-12-17 RX ORDER — INSULIN LISPRO 100 [IU]/ML
0-15 INJECTION, SOLUTION INTRAVENOUS; SUBCUTANEOUS EVERY 4 HOURS
Status: DISCONTINUED | OUTPATIENT
Start: 2024-12-18 | End: 2024-12-17 | Stop reason: HOSPADM

## 2024-12-17 RX ORDER — MIDAZOLAM HYDROCHLORIDE 1 MG/ML
INJECTION INTRAMUSCULAR; INTRAVENOUS AS NEEDED
Status: DISCONTINUED | OUTPATIENT
Start: 2024-12-17 | End: 2024-12-17

## 2024-12-17 RX ORDER — MEPERIDINE HYDROCHLORIDE 25 MG/ML
12.5 INJECTION INTRAMUSCULAR; INTRAVENOUS; SUBCUTANEOUS EVERY 10 MIN PRN
Status: DISCONTINUED | OUTPATIENT
Start: 2024-12-17 | End: 2024-12-17 | Stop reason: HOSPADM

## 2024-12-17 RX ORDER — LABETALOL HYDROCHLORIDE 5 MG/ML
5 INJECTION, SOLUTION INTRAVENOUS ONCE AS NEEDED
Status: DISCONTINUED | OUTPATIENT
Start: 2024-12-17 | End: 2024-12-17 | Stop reason: HOSPADM

## 2024-12-17 RX ORDER — DEXTROSE 50 % IN WATER (D50W) INTRAVENOUS SYRINGE
12.5
Status: DISCONTINUED | OUTPATIENT
Start: 2024-12-17 | End: 2024-12-17 | Stop reason: HOSPADM

## 2024-12-17 RX ORDER — ROCURONIUM BROMIDE 10 MG/ML
INJECTION, SOLUTION INTRAVENOUS AS NEEDED
Status: DISCONTINUED | OUTPATIENT
Start: 2024-12-17 | End: 2024-12-17

## 2024-12-17 RX ORDER — FENTANYL CITRATE 50 UG/ML
INJECTION, SOLUTION INTRAMUSCULAR; INTRAVENOUS AS NEEDED
Status: DISCONTINUED | OUTPATIENT
Start: 2024-12-17 | End: 2024-12-17

## 2024-12-17 RX ORDER — CLINDAMYCIN PHOSPHATE 900 MG/50ML
INJECTION, SOLUTION INTRAVENOUS
Status: COMPLETED
Start: 2024-12-17 | End: 2024-12-17

## 2024-12-17 RX ORDER — HYDROMORPHONE HYDROCHLORIDE 0.2 MG/ML
0.2 INJECTION INTRAMUSCULAR; INTRAVENOUS; SUBCUTANEOUS EVERY 5 MIN PRN
Status: DISCONTINUED | OUTPATIENT
Start: 2024-12-17 | End: 2024-12-17 | Stop reason: HOSPADM

## 2024-12-17 RX ORDER — CEFAZOLIN 1 G/1
INJECTION, POWDER, FOR SOLUTION INTRAVENOUS AS NEEDED
Status: DISCONTINUED | OUTPATIENT
Start: 2024-12-17 | End: 2024-12-17

## 2024-12-17 RX ORDER — LIDOCAINE HYDROCHLORIDE 10 MG/ML
0.1 INJECTION, SOLUTION INFILTRATION; PERINEURAL ONCE
Status: DISCONTINUED | OUTPATIENT
Start: 2024-12-17 | End: 2024-12-17 | Stop reason: HOSPADM

## 2024-12-17 RX ORDER — ONDANSETRON HYDROCHLORIDE 2 MG/ML
INJECTION, SOLUTION INTRAVENOUS AS NEEDED
Status: DISCONTINUED | OUTPATIENT
Start: 2024-12-17 | End: 2024-12-17

## 2024-12-17 RX ORDER — PHENYLEPHRINE HYDROCHLORIDE 10 MG/ML
INJECTION INTRAVENOUS AS NEEDED
Status: DISCONTINUED | OUTPATIENT
Start: 2024-12-17 | End: 2024-12-17

## 2024-12-17 RX ORDER — ALBUTEROL SULFATE 0.83 MG/ML
2.5 SOLUTION RESPIRATORY (INHALATION) ONCE AS NEEDED
Status: DISCONTINUED | OUTPATIENT
Start: 2024-12-17 | End: 2024-12-17 | Stop reason: HOSPADM

## 2024-12-17 RX ORDER — DROPERIDOL 2.5 MG/ML
0.62 INJECTION, SOLUTION INTRAMUSCULAR; INTRAVENOUS ONCE AS NEEDED
Status: DISCONTINUED | OUTPATIENT
Start: 2024-12-17 | End: 2024-12-17 | Stop reason: HOSPADM

## 2024-12-17 RX ORDER — LIDOCAINE HYDROCHLORIDE 10 MG/ML
INJECTION, SOLUTION INFILTRATION; PERINEURAL AS NEEDED
Status: DISCONTINUED | OUTPATIENT
Start: 2024-12-17 | End: 2024-12-17

## 2024-12-17 RX ORDER — ACETAMINOPHEN 10 MG/ML
INJECTION, SOLUTION INTRAVENOUS AS NEEDED
Status: DISCONTINUED | OUTPATIENT
Start: 2024-12-17 | End: 2024-12-17

## 2024-12-17 RX ORDER — CLINDAMYCIN PHOSPHATE 900 MG/50ML
900 INJECTION, SOLUTION INTRAVENOUS ONCE
Status: COMPLETED | OUTPATIENT
Start: 2024-12-17 | End: 2024-12-17

## 2024-12-17 RX ORDER — HYDROMORPHONE HYDROCHLORIDE 1 MG/ML
INJECTION, SOLUTION INTRAMUSCULAR; INTRAVENOUS; SUBCUTANEOUS AS NEEDED
Status: DISCONTINUED | OUTPATIENT
Start: 2024-12-17 | End: 2024-12-17

## 2024-12-17 RX ORDER — POTASSIUM CHLORIDE 14.9 MG/ML
20 INJECTION INTRAVENOUS
Status: DISCONTINUED | OUTPATIENT
Start: 2024-12-17 | End: 2024-12-18

## 2024-12-17 RX ORDER — PROPOFOL 10 MG/ML
INJECTION, EMULSION INTRAVENOUS AS NEEDED
Status: DISCONTINUED | OUTPATIENT
Start: 2024-12-17 | End: 2024-12-17

## 2024-12-17 RX ORDER — LEVETIRACETAM 500 MG/5ML
INJECTION, SOLUTION, CONCENTRATE INTRAVENOUS AS NEEDED
Status: DISCONTINUED | OUTPATIENT
Start: 2024-12-17 | End: 2024-12-17

## 2024-12-17 RX ORDER — ALBUMIN HUMAN 50 G/1000ML
12.5 SOLUTION INTRAVENOUS ONCE
Status: COMPLETED | OUTPATIENT
Start: 2024-12-17 | End: 2024-12-17

## 2024-12-17 RX ORDER — DEXTROSE 50 % IN WATER (D50W) INTRAVENOUS SYRINGE
25
Status: DISCONTINUED | OUTPATIENT
Start: 2024-12-17 | End: 2024-12-17 | Stop reason: HOSPADM

## 2024-12-17 RX ADMIN — CLINDAMYCIN PHOSPHATE 900 MG: 900 INJECTION, SOLUTION INTRAVENOUS at 19:02

## 2024-12-17 RX ADMIN — HYDROMORPHONE HYDROCHLORIDE 0.5 MG: 1 INJECTION, SOLUTION INTRAMUSCULAR; INTRAVENOUS; SUBCUTANEOUS at 18:53

## 2024-12-17 RX ADMIN — ALBUMIN HUMAN 12.5 G: 0.05 INJECTION, SOLUTION INTRAVENOUS at 22:30

## 2024-12-17 RX ADMIN — POTASSIUM CHLORIDE 20 MEQ: 14.9 INJECTION, SOLUTION INTRAVENOUS at 20:41

## 2024-12-17 RX ADMIN — PIPERACILLIN SODIUM AND TAZOBACTAM SODIUM 3.38 G: 3; .375 INJECTION, SOLUTION INTRAVENOUS at 20:18

## 2024-12-17 SDOH — HEALTH STABILITY: MENTAL HEALTH: CURRENT SMOKER: 1

## 2024-12-17 ASSESSMENT — LIFESTYLE VARIABLES
EVER FELT BAD OR GUILTY ABOUT YOUR DRINKING: NO
HAVE YOU EVER FELT YOU SHOULD CUT DOWN ON YOUR DRINKING: NO
EVER HAD A DRINK FIRST THING IN THE MORNING TO STEADY YOUR NERVES TO GET RID OF A HANGOVER: NO
TOTAL SCORE: 0
HAVE PEOPLE ANNOYED YOU BY CRITICIZING YOUR DRINKING: NO

## 2024-12-17 ASSESSMENT — PAIN - FUNCTIONAL ASSESSMENT
PAIN_FUNCTIONAL_ASSESSMENT: 0-10

## 2024-12-17 ASSESSMENT — PAIN SCALES - GENERAL
PAINLEVEL_OUTOF10: 0 - NO PAIN
PAIN_LEVEL: 0
PAINLEVEL_OUTOF10: 0 - NO PAIN
PAINLEVEL_OUTOF10: 0 - NO PAIN
PAINLEVEL_OUTOF10: 8

## 2024-12-17 ASSESSMENT — COLUMBIA-SUICIDE SEVERITY RATING SCALE - C-SSRS
2. HAVE YOU ACTUALLY HAD ANY THOUGHTS OF KILLING YOURSELF?: NO
6. HAVE YOU EVER DONE ANYTHING, STARTED TO DO ANYTHING, OR PREPARED TO DO ANYTHING TO END YOUR LIFE?: NO
1. IN THE PAST MONTH, HAVE YOU WISHED YOU WERE DEAD OR WISHED YOU COULD GO TO SLEEP AND NOT WAKE UP?: NO

## 2024-12-17 ASSESSMENT — PAIN DESCRIPTION - FREQUENCY: FREQUENCY: CONSTANT/CONTINUOUS

## 2024-12-17 ASSESSMENT — PAIN DESCRIPTION - LOCATION: LOCATION: LEG

## 2024-12-17 ASSESSMENT — PAIN DESCRIPTION - ORIENTATION: ORIENTATION: LEFT

## 2024-12-17 ASSESSMENT — PAIN DESCRIPTION - PAIN TYPE: TYPE: ACUTE PAIN

## 2024-12-17 NOTE — ED PROVIDER NOTES
History of Present Illness     History provided by: Patient and EMS  Limitations to History: Confusion  External Records Reviewed with Brief Summary: Reviewed discharge summary from 10/3/2024 in which patient was discharged after management of gangrene of left foot    HPI:  Shirin Slater is a 54-year-old female history of severe PAD s/p right hip disarticulation s/p left CFA and EIA embolectomy 12/23 s/p multiple left toe amputations, T2DM C/B diabetic neuropathy, hypertension, HLD, renal and splenic thrombosis on aspirin Plavix Coumadin and tobacco use presenting as a transfer from [facility] in the setting of acute limb ischemia concern.    Labs at OSH notable for WBC 22.1, hemoglobin 10, platelets 673, sodium 133, potassium 3.1, BUN/creatinine 92/24, glucose 438, anion gap 17.  Lactate 5.7.  Patient was treated with 1 L IV fluid bolus, vanco, Zosyn and insulin.  Reportedly got CT angio with runoff prior to transfer that should be uploaded in PACS.  Patient states she has had worsening leg pain for the last few weeks, notes that the discoloration present has been there but started having severe pain today.    Physical Exam   Triage vitals:  T 37 °C (98.6 °F)  HR (!) 116  /66  RR 20  O2 96 % None (Room air)    General: Awake, alert, ill-appearing  Eyes: Gaze conjugate.  No scleral icterus or injection  HENT: Normo-cephalic, atraumatic.   CV: Tachycardic rate, regular rhythm. No MRG.   Resp: Breathing non-labored, clear to auscultation bilaterally, no accessory muscle use, no grunting, nasal flaring, retractions, or tugging.  GI: Soft, non-distended, non-tender. No rebound or guarding.  MSK/Extremities: S/p right above-the-knee amputation, s/p partial foot amputation left lower extremity, left lower extremity with mottling, blackened eschar and crepitus up to the knee, some erythema and purulent drainage from prior surgical groin site, extremity cool to touch, no palpable pulse, no Doppler  pulse.  Neuro: Awake and Alert. Face symmetric. Appropriate tone. Moving all extremities equally.    Medical Decision Making & ED Course   Medical Decision Makin y.o. female history of severe PAD with recent vascular intervention presenting to the ED as transfer from OSH in the setting of concern of acute limb ischemia, additionally concern for sepsis believed secondary to soft tissue infection, received vancomycin and Zosyn at outside hospital, IV fluids and was transferred here for vascular surgery evaluation.  Patient has history of warfarin, unable to see INR obtained at outside hospital, will obtain repeat value here with consideration of heparin if subtherapeutic. Vascular surgery at bedside on patient arrival, patient has significant mottling and discoloration to the left lower extremity with crepitus, will add additional antibiotic coverage with clindamycin, will also obtain repeat blood cultures.  Patient given an additional liter IV fluids, patient also had borderline DKA notable at outside hospital so will get baseline labs upon arrival here for consideration of insulin drip needs.  Patient does not appear to be in DKA at this time, has notable leukocytosis, ordered scheduled continued antibiotics, imaging from outside hospital is consistent with limb ischemia due to occlusion of patient's left CVA/AT bypass graft, INR subtherapeutic, vascular recommending initiation of heparin.  2 units RBCs ordered and held for OR, patient to be taken emergently to OR for limb salvage attempt. Admitted to vascular surgery service following OR.  ----       Social Determinants of Health which Significantly Impact Care: Difficulty obtaining outpatient follow-up     EKG Independent Interpretation: See ED course for my independent interpretation if ECG was obtained.    Independent Result Review and Interpretation: Please see MDM and ED course for my independent interpretation of the results    Chronic conditions  affecting the patient's care: Please see H&P and MDM    The patient was discussed with the following consultants/services:  Vascular surgery    Care Considerations: As document above in MDM    ED Course:  ED Course as of 12/17/24 2012   Tue Dec 17, 2024   1900 Last Shaun finished @ 1511/vanc @ 1559 [KR]   1901 Patient has a highly concerning ischemic limb left lower extremity, we have concern for the possibility of significant infectious process superimposed, will give clindamycin for 50S baceteriostatic activity in case of toxin involvement [JH]   1909 POCT Lactate, Venous: 1.9  Reassuring [JH]   1955 WBC(!): 23.7 [KR]   1955 INR(!): 1.4  Vascular recommending initiation of heparin, are planning to take patient to OR for limb salvage [KR]   1959 HEMOGLOBIN(!): 8.9  Ordered 2 units RBCs on hold for OR [KR]   2010 POTASSIUM(!): 3.3  Repletion ordered [KR]   2011 ECG 12 lead  Sinus tachycardia rate 126, normal axis, no acute ST segment changes.  , QRS 78, QTc 466. [KR]   2012 XR chest 1 view  Chest x-ray showing no acute cardiopulmonary process [KR]      ED Course User Index  [JH] Hero Shannon MD  [KR] Madhavi Borrego DO         Diagnoses as of 12/17/24 2012   Acute lower limb ischemia     Disposition   As a result of their workup, the patient will require admission to the hospital.  The patient was informed of her diagnosis.  The patient was given the opportunity to ask questions and I answered them. The patient agreed to be admitted to the hospital.    Procedures   Procedures    Patient seen and discussed with attending physician    Madhavi Borrego DO  Emergency Medicine     Madhavi Borrego DO  Resident  12/17/24 2013       Hero Shannon MD  12/18/24 3129

## 2024-12-17 NOTE — ED TRIAGE NOTES
Pt is at transfer from [facility]. Pt had toe amputation sx L foot in September. A couple weeks ago, pt has had increased swelling, inflammation. L leg is necrotic, cool, purple/red with no pulses. Pt BS per EMS were in the 500s. At the outside hospitals pt BS was in the 300s. Pt received fentanyl and versed by  flight.

## 2024-12-18 LAB
ANION GAP SERPL CALC-SCNC: 12 MMOL/L (ref 10–20)
APTT PPP: 24 SECONDS (ref 27–38)
BUN SERPL-MCNC: 10 MG/DL (ref 6–23)
CALCIUM SERPL-MCNC: 8.6 MG/DL (ref 8.6–10.6)
CHLORIDE SERPL-SCNC: 103 MMOL/L (ref 98–107)
CK SERPL-CCNC: 124 U/L (ref 0–215)
CK SERPL-CCNC: 146 U/L (ref 0–215)
CK SERPL-CCNC: 216 U/L (ref 0–215)
CO2 SERPL-SCNC: 27 MMOL/L (ref 21–32)
CREAT SERPL-MCNC: 0.43 MG/DL (ref 0.5–1.05)
EGFRCR SERPLBLD CKD-EPI 2021: >90 ML/MIN/1.73M*2
ERYTHROCYTE [DISTWIDTH] IN BLOOD BY AUTOMATED COUNT: 21.9 % (ref 11.5–14.5)
GLUCOSE BLD MANUAL STRIP-MCNC: 241 MG/DL (ref 74–99)
GLUCOSE BLD MANUAL STRIP-MCNC: 243 MG/DL (ref 74–99)
GLUCOSE BLD MANUAL STRIP-MCNC: 261 MG/DL (ref 74–99)
GLUCOSE BLD MANUAL STRIP-MCNC: 307 MG/DL (ref 74–99)
GLUCOSE BLD MANUAL STRIP-MCNC: 334 MG/DL (ref 74–99)
GLUCOSE SERPL-MCNC: 339 MG/DL (ref 74–99)
HCT VFR BLD AUTO: 28.5 % (ref 36–46)
HGB BLD-MCNC: 8 G/DL (ref 12–16)
INR PPP: 1.5 (ref 0.9–1.1)
MCH RBC QN AUTO: 21.4 PG (ref 26–34)
MCHC RBC AUTO-ENTMCNC: 28.1 G/DL (ref 32–36)
MCV RBC AUTO: 76 FL (ref 80–100)
NRBC BLD-RTO: 0 /100 WBCS (ref 0–0)
PLATELET # BLD AUTO: 505 X10*3/UL (ref 150–450)
POTASSIUM SERPL-SCNC: 3.9 MMOL/L (ref 3.5–5.3)
PROTHROMBIN TIME: 17 SECONDS (ref 9.8–12.8)
RBC # BLD AUTO: 3.74 X10*6/UL (ref 4–5.2)
SODIUM SERPL-SCNC: 138 MMOL/L (ref 136–145)
UFH PPP CHRO-ACNC: 0.1 IU/ML
UFH PPP CHRO-ACNC: 0.3 IU/ML
UFH PPP CHRO-ACNC: <0.1 IU/ML
WBC # BLD AUTO: 19.5 X10*3/UL (ref 4.4–11.3)

## 2024-12-18 PROCEDURE — 85520 HEPARIN ASSAY: CPT

## 2024-12-18 PROCEDURE — 36415 COLL VENOUS BLD VENIPUNCTURE: CPT | Performed by: STUDENT IN AN ORGANIZED HEALTH CARE EDUCATION/TRAINING PROGRAM

## 2024-12-18 PROCEDURE — 85027 COMPLETE CBC AUTOMATED: CPT | Performed by: STUDENT IN AN ORGANIZED HEALTH CARE EDUCATION/TRAINING PROGRAM

## 2024-12-18 PROCEDURE — 2500000001 HC RX 250 WO HCPCS SELF ADMINISTERED DRUGS (ALT 637 FOR MEDICARE OP)

## 2024-12-18 PROCEDURE — 1200000002 HC GENERAL ROOM WITH TELEMETRY DAILY

## 2024-12-18 PROCEDURE — 2500000001 HC RX 250 WO HCPCS SELF ADMINISTERED DRUGS (ALT 637 FOR MEDICARE OP): Performed by: NURSE PRACTITIONER

## 2024-12-18 PROCEDURE — 2500000002 HC RX 250 W HCPCS SELF ADMINISTERED DRUGS (ALT 637 FOR MEDICARE OP, ALT 636 FOR OP/ED): Performed by: NURSE PRACTITIONER

## 2024-12-18 PROCEDURE — 2500000001 HC RX 250 WO HCPCS SELF ADMINISTERED DRUGS (ALT 637 FOR MEDICARE OP): Performed by: STUDENT IN AN ORGANIZED HEALTH CARE EDUCATION/TRAINING PROGRAM

## 2024-12-18 PROCEDURE — 82947 ASSAY GLUCOSE BLOOD QUANT: CPT

## 2024-12-18 PROCEDURE — 2500000004 HC RX 250 GENERAL PHARMACY W/ HCPCS (ALT 636 FOR OP/ED)

## 2024-12-18 PROCEDURE — 2500000002 HC RX 250 W HCPCS SELF ADMINISTERED DRUGS (ALT 637 FOR MEDICARE OP, ALT 636 FOR OP/ED): Performed by: STUDENT IN AN ORGANIZED HEALTH CARE EDUCATION/TRAINING PROGRAM

## 2024-12-18 PROCEDURE — 97161 PT EVAL LOW COMPLEX 20 MIN: CPT | Mod: GP

## 2024-12-18 PROCEDURE — 2500000004 HC RX 250 GENERAL PHARMACY W/ HCPCS (ALT 636 FOR OP/ED): Performed by: STUDENT IN AN ORGANIZED HEALTH CARE EDUCATION/TRAINING PROGRAM

## 2024-12-18 PROCEDURE — 97530 THERAPEUTIC ACTIVITIES: CPT | Mod: GP

## 2024-12-18 PROCEDURE — 82550 ASSAY OF CK (CPK): CPT | Performed by: STUDENT IN AN ORGANIZED HEALTH CARE EDUCATION/TRAINING PROGRAM

## 2024-12-18 PROCEDURE — 85610 PROTHROMBIN TIME: CPT | Performed by: STUDENT IN AN ORGANIZED HEALTH CARE EDUCATION/TRAINING PROGRAM

## 2024-12-18 PROCEDURE — 80048 BASIC METABOLIC PNL TOTAL CA: CPT | Performed by: STUDENT IN AN ORGANIZED HEALTH CARE EDUCATION/TRAINING PROGRAM

## 2024-12-18 RX ORDER — HEPARIN SODIUM 10000 [USP'U]/100ML
0-4000 INJECTION, SOLUTION INTRAVENOUS CONTINUOUS
Status: DISCONTINUED | OUTPATIENT
Start: 2024-12-18 | End: 2025-01-02

## 2024-12-18 RX ORDER — ONDANSETRON HYDROCHLORIDE 2 MG/ML
4 INJECTION, SOLUTION INTRAVENOUS EVERY 8 HOURS PRN
Status: DISCONTINUED | OUTPATIENT
Start: 2024-12-18 | End: 2025-01-07 | Stop reason: HOSPADM

## 2024-12-18 RX ORDER — OXYCODONE HYDROCHLORIDE 5 MG/1
5 TABLET ORAL EVERY 6 HOURS PRN
Status: DISCONTINUED | OUTPATIENT
Start: 2024-12-18 | End: 2024-12-21

## 2024-12-18 RX ORDER — ACETAMINOPHEN 500 MG
10 TABLET ORAL NIGHTLY PRN
Status: DISCONTINUED | OUTPATIENT
Start: 2024-12-18 | End: 2025-01-07 | Stop reason: HOSPADM

## 2024-12-18 RX ORDER — INSULIN LISPRO 100 [IU]/ML
0-125 INJECTION, SOLUTION INTRAVENOUS; SUBCUTANEOUS AS NEEDED
Status: DISCONTINUED | OUTPATIENT
Start: 2024-12-18 | End: 2024-12-27

## 2024-12-18 RX ORDER — DIVALPROEX SODIUM 500 MG/1
1000 TABLET, FILM COATED, EXTENDED RELEASE ORAL NIGHTLY
Status: DISCONTINUED | OUTPATIENT
Start: 2024-12-19 | End: 2025-01-07 | Stop reason: HOSPADM

## 2024-12-18 RX ORDER — OXYCODONE HYDROCHLORIDE 5 MG/1
10 TABLET ORAL EVERY 4 HOURS PRN
Status: DISCONTINUED | OUTPATIENT
Start: 2024-12-18 | End: 2024-12-21

## 2024-12-18 RX ORDER — HYDROMORPHONE HYDROCHLORIDE 1 MG/ML
0.4 INJECTION, SOLUTION INTRAMUSCULAR; INTRAVENOUS; SUBCUTANEOUS ONCE
Status: COMPLETED | OUTPATIENT
Start: 2024-12-18 | End: 2024-12-18

## 2024-12-18 RX ORDER — GABAPENTIN 400 MG/1
400 CAPSULE ORAL EVERY 8 HOURS SCHEDULED
Status: DISCONTINUED | OUTPATIENT
Start: 2024-12-18 | End: 2024-12-29

## 2024-12-18 RX ORDER — LEVETIRACETAM 500 MG/1
500 TABLET ORAL 2 TIMES DAILY
Status: DISCONTINUED | OUTPATIENT
Start: 2024-12-18 | End: 2025-01-07 | Stop reason: HOSPADM

## 2024-12-18 RX ORDER — LIDOCAINE HYDROCHLORIDE 10 MG/ML
5 INJECTION, SOLUTION EPIDURAL; INFILTRATION; INTRACAUDAL; PERINEURAL ONCE
Status: DISCONTINUED | OUTPATIENT
Start: 2024-12-18 | End: 2024-12-21

## 2024-12-18 RX ORDER — DULOXETIN HYDROCHLORIDE 60 MG/1
60 CAPSULE, DELAYED RELEASE ORAL DAILY
Status: DISCONTINUED | OUTPATIENT
Start: 2024-12-18 | End: 2025-01-07 | Stop reason: HOSPADM

## 2024-12-18 RX ORDER — METOPROLOL TARTRATE 25 MG/1
12.5 TABLET, FILM COATED ORAL 2 TIMES DAILY
Status: DISCONTINUED | OUTPATIENT
Start: 2024-12-18 | End: 2025-01-07 | Stop reason: HOSPADM

## 2024-12-18 RX ORDER — ATORVASTATIN CALCIUM 80 MG/1
80 TABLET, FILM COATED ORAL NIGHTLY
Status: DISCONTINUED | OUTPATIENT
Start: 2024-12-18 | End: 2025-01-07 | Stop reason: HOSPADM

## 2024-12-18 RX ORDER — ERGOCALCIFEROL 1.25 MG/1
CAPSULE ORAL
COMMUNITY

## 2024-12-18 RX ORDER — PANTOPRAZOLE SODIUM 40 MG/1
40 TABLET, DELAYED RELEASE ORAL
Status: DISCONTINUED | OUTPATIENT
Start: 2024-12-18 | End: 2025-01-07 | Stop reason: HOSPADM

## 2024-12-18 RX ORDER — ALBUTEROL SULFATE 90 UG/1
2 INHALANT RESPIRATORY (INHALATION) EVERY 6 HOURS PRN
Status: DISCONTINUED | OUTPATIENT
Start: 2024-12-18 | End: 2025-01-07 | Stop reason: HOSPADM

## 2024-12-18 RX ORDER — DEXTROMETHORPHAN HYDROBROMIDE, GUAIFENESIN 5; 100 MG/5ML; MG/5ML
1300 LIQUID ORAL EVERY 8 HOURS
COMMUNITY
Start: 2024-11-25 | End: 2025-01-07 | Stop reason: HOSPADM

## 2024-12-18 RX ORDER — HEPARIN SODIUM 10000 [USP'U]/100ML
500 INJECTION, SOLUTION INTRAVENOUS CONTINUOUS
Status: DISCONTINUED | OUTPATIENT
Start: 2024-12-18 | End: 2024-12-18

## 2024-12-18 RX ORDER — OMEPRAZOLE 20 MG/1
1 CAPSULE, DELAYED RELEASE ORAL
COMMUNITY
Start: 2024-11-20 | End: 2025-11-20

## 2024-12-18 RX ORDER — DIVALPROEX SODIUM 500 MG/1
500 TABLET, FILM COATED, EXTENDED RELEASE ORAL
Status: DISCONTINUED | OUTPATIENT
Start: 2024-12-19 | End: 2025-01-07 | Stop reason: HOSPADM

## 2024-12-18 RX ORDER — ONDANSETRON 4 MG/1
4 TABLET, ORALLY DISINTEGRATING ORAL EVERY 8 HOURS PRN
Status: DISCONTINUED | OUTPATIENT
Start: 2024-12-18 | End: 2025-01-07 | Stop reason: HOSPADM

## 2024-12-18 RX ORDER — LEVOTHYROXINE SODIUM 75 UG/1
75 TABLET ORAL
Status: DISCONTINUED | OUTPATIENT
Start: 2024-12-18 | End: 2025-01-07 | Stop reason: HOSPADM

## 2024-12-18 RX ORDER — TIZANIDINE 4 MG/1
1 TABLET ORAL 3 TIMES DAILY
COMMUNITY
Start: 2024-11-25 | End: 2025-11-24

## 2024-12-18 RX ORDER — ACETAMINOPHEN 325 MG/1
650 TABLET ORAL EVERY 6 HOURS SCHEDULED
Status: DISCONTINUED | OUTPATIENT
Start: 2024-12-18 | End: 2024-12-27

## 2024-12-18 RX ORDER — NALOXONE HYDROCHLORIDE 0.4 MG/ML
0.2 INJECTION, SOLUTION INTRAMUSCULAR; INTRAVENOUS; SUBCUTANEOUS EVERY 5 MIN PRN
Status: DISCONTINUED | OUTPATIENT
Start: 2024-12-18 | End: 2025-01-07 | Stop reason: HOSPADM

## 2024-12-18 RX ORDER — LIDOCAINE 50 MG/G
1 PATCH TOPICAL DAILY
COMMUNITY
Start: 2024-11-20 | End: 2025-11-20

## 2024-12-18 RX ORDER — GABAPENTIN 400 MG/1
1 CAPSULE ORAL 3 TIMES DAILY
COMMUNITY
Start: 2024-11-20 | End: 2025-01-07 | Stop reason: HOSPADM

## 2024-12-18 RX ORDER — GLUCAGON 1 MG/ML
1 KIT INJECTION
Status: DISCONTINUED | OUTPATIENT
Start: 2024-12-18 | End: 2025-01-07 | Stop reason: HOSPADM

## 2024-12-18 RX ORDER — HYDROMORPHONE HYDROCHLORIDE 1 MG/ML
0.2 INJECTION, SOLUTION INTRAMUSCULAR; INTRAVENOUS; SUBCUTANEOUS
Status: DISCONTINUED | OUTPATIENT
Start: 2024-12-18 | End: 2024-12-24

## 2024-12-18 RX ORDER — ASPIRIN 81 MG/1
81 TABLET ORAL DAILY
Status: DISCONTINUED | OUTPATIENT
Start: 2024-12-18 | End: 2025-01-07 | Stop reason: HOSPADM

## 2024-12-18 RX ORDER — DEXTROSE 50 % IN WATER (D50W) INTRAVENOUS SYRINGE
10-50
Status: DISCONTINUED | OUTPATIENT
Start: 2024-12-18 | End: 2025-01-07 | Stop reason: HOSPADM

## 2024-12-18 RX ORDER — VANCOMYCIN HYDROCHLORIDE 1 G/20ML
INJECTION, POWDER, LYOPHILIZED, FOR SOLUTION INTRAVENOUS DAILY PRN
Status: DISCONTINUED | OUTPATIENT
Start: 2024-12-18 | End: 2024-12-24

## 2024-12-18 RX ORDER — CLOPIDOGREL BISULFATE 75 MG/1
1 TABLET ORAL DAILY
COMMUNITY
Start: 2024-11-20 | End: 2025-01-07 | Stop reason: HOSPADM

## 2024-12-18 RX ORDER — DIVALPROEX SODIUM 500 MG/1
500 TABLET, FILM COATED, EXTENDED RELEASE ORAL DAILY
Status: DISCONTINUED | OUTPATIENT
Start: 2024-12-18 | End: 2024-12-18

## 2024-12-18 RX ORDER — INSULIN LISPRO 100 [IU]/ML
0-125 INJECTION, SOLUTION INTRAVENOUS; SUBCUTANEOUS
Status: DISCONTINUED | OUTPATIENT
Start: 2024-12-18 | End: 2024-12-27

## 2024-12-18 RX ORDER — INSULIN GLARGINE 100 [IU]/ML
1-125 INJECTION, SOLUTION SUBCUTANEOUS NIGHTLY
Status: DISCONTINUED | OUTPATIENT
Start: 2024-12-18 | End: 2024-12-27

## 2024-12-18 RX ADMIN — PANTOPRAZOLE SODIUM 40 MG: 40 TABLET, DELAYED RELEASE ORAL at 06:00

## 2024-12-18 RX ADMIN — LEVOTHYROXINE SODIUM 75 MCG: 0.07 TABLET ORAL at 06:00

## 2024-12-18 RX ADMIN — LEVETIRACETAM 500 MG: 500 TABLET, FILM COATED ORAL at 08:22

## 2024-12-18 RX ADMIN — DULOXETINE HYDROCHLORIDE 60 MG: 60 CAPSULE, DELAYED RELEASE ORAL at 16:52

## 2024-12-18 RX ADMIN — ASPIRIN 81 MG: 81 TABLET, COATED ORAL at 08:22

## 2024-12-18 RX ADMIN — INSULIN LISPRO 9 UNITS: 100 INJECTION, SOLUTION INTRAVENOUS; SUBCUTANEOUS at 09:36

## 2024-12-18 RX ADMIN — VANCOMYCIN HYDROCHLORIDE 1.5 G: 1.5 INJECTION, POWDER, LYOPHILIZED, FOR SOLUTION INTRAVENOUS at 02:23

## 2024-12-18 RX ADMIN — HEPARIN SODIUM 1500 UNITS/HR: 10000 INJECTION, SOLUTION INTRAVENOUS at 17:59

## 2024-12-18 RX ADMIN — ACETAMINOPHEN 650 MG: 325 TABLET, FILM COATED ORAL at 23:04

## 2024-12-18 RX ADMIN — ATORVASTATIN CALCIUM 80 MG: 80 TABLET, FILM COATED ORAL at 21:00

## 2024-12-18 RX ADMIN — METOPROLOL TARTRATE 12.5 MG: 25 TABLET, FILM COATED ORAL at 08:22

## 2024-12-18 RX ADMIN — ACETAMINOPHEN 650 MG: 325 TABLET, FILM COATED ORAL at 11:06

## 2024-12-18 RX ADMIN — OXYCODONE HYDROCHLORIDE 10 MG: 5 TABLET ORAL at 22:06

## 2024-12-18 RX ADMIN — PIPERACILLIN SODIUM AND TAZOBACTAM SODIUM 3.38 G: 3; .375 INJECTION, SOLUTION INTRAVENOUS at 01:29

## 2024-12-18 RX ADMIN — HYDROMORPHONE HYDROCHLORIDE 0.4 MG: 1 INJECTION, SOLUTION INTRAMUSCULAR; INTRAVENOUS; SUBCUTANEOUS at 11:02

## 2024-12-18 RX ADMIN — ACETAMINOPHEN 650 MG: 325 TABLET, FILM COATED ORAL at 17:14

## 2024-12-18 RX ADMIN — HEPARIN SODIUM 1500 UNITS/HR: 10000 INJECTION, SOLUTION INTRAVENOUS at 21:23

## 2024-12-18 RX ADMIN — HEPARIN SODIUM 1300 UNITS/HR: 10000 INJECTION, SOLUTION INTRAVENOUS at 10:07

## 2024-12-18 RX ADMIN — INSULIN GLARGINE 22 UNITS: 100 INJECTION, SOLUTION SUBCUTANEOUS at 20:58

## 2024-12-18 RX ADMIN — HEPARIN SODIUM 1000 UNITS/HR: 10000 INJECTION, SOLUTION INTRAVENOUS at 01:29

## 2024-12-18 RX ADMIN — Medication 10 MG: at 20:59

## 2024-12-18 RX ADMIN — PIPERACILLIN SODIUM AND TAZOBACTAM SODIUM 3.38 G: 3; .375 INJECTION, SOLUTION INTRAVENOUS at 23:03

## 2024-12-18 RX ADMIN — METOPROLOL TARTRATE 12.5 MG: 25 TABLET, FILM COATED ORAL at 20:59

## 2024-12-18 RX ADMIN — HYDROMORPHONE HYDROCHLORIDE 0.2 MG: 1 INJECTION, SOLUTION INTRAMUSCULAR; INTRAVENOUS; SUBCUTANEOUS at 20:59

## 2024-12-18 RX ADMIN — PIPERACILLIN SODIUM AND TAZOBACTAM SODIUM 3.38 G: 3; .375 INJECTION, SOLUTION INTRAVENOUS at 08:22

## 2024-12-18 RX ADMIN — PIPERACILLIN SODIUM AND TAZOBACTAM SODIUM 3.38 G: 3; .375 INJECTION, SOLUTION INTRAVENOUS at 16:52

## 2024-12-18 RX ADMIN — LEVETIRACETAM 500 MG: 500 TABLET, FILM COATED ORAL at 21:00

## 2024-12-18 RX ADMIN — INSULIN GLARGINE 22 UNITS: 100 INJECTION, SOLUTION SUBCUTANEOUS at 01:29

## 2024-12-18 RX ADMIN — GABAPENTIN 400 MG: 400 CAPSULE ORAL at 16:52

## 2024-12-18 RX ADMIN — OXYCODONE HYDROCHLORIDE 10 MG: 5 TABLET ORAL at 18:00

## 2024-12-18 RX ADMIN — INSULIN LISPRO 7 UNITS: 100 INJECTION, SOLUTION INTRAVENOUS; SUBCUTANEOUS at 15:16

## 2024-12-18 RX ADMIN — GABAPENTIN 400 MG: 400 CAPSULE ORAL at 21:00

## 2024-12-18 RX ADMIN — VANCOMYCIN HYDROCHLORIDE 1500 MG: 1.5 INJECTION, POWDER, LYOPHILIZED, FOR SOLUTION INTRAVENOUS at 21:14

## 2024-12-18 RX ADMIN — INSULIN LISPRO 4 UNITS: 100 INJECTION, SOLUTION INTRAVENOUS; SUBCUTANEOUS at 01:29

## 2024-12-18 RX ADMIN — INSULIN LISPRO 9 UNITS: 100 INJECTION, SOLUTION INTRAVENOUS; SUBCUTANEOUS at 18:57

## 2024-12-18 SDOH — ECONOMIC STABILITY: INCOME INSECURITY: IN THE PAST 12 MONTHS HAS THE ELECTRIC, GAS, OIL, OR WATER COMPANY THREATENED TO SHUT OFF SERVICES IN YOUR HOME?: NO

## 2024-12-18 SDOH — ECONOMIC STABILITY: HOUSING INSECURITY: AT ANY TIME IN THE PAST 12 MONTHS, WERE YOU HOMELESS OR LIVING IN A SHELTER (INCLUDING NOW)?: NO

## 2024-12-18 SDOH — ECONOMIC STABILITY: FOOD INSECURITY: WITHIN THE PAST 12 MONTHS, THE FOOD YOU BOUGHT JUST DIDN'T LAST AND YOU DIDN'T HAVE MONEY TO GET MORE.: NEVER TRUE

## 2024-12-18 SDOH — HEALTH STABILITY: MENTAL HEALTH: HOW OFTEN DO YOU HAVE A DRINK CONTAINING ALCOHOL?: MONTHLY OR LESS

## 2024-12-18 SDOH — SOCIAL STABILITY: SOCIAL INSECURITY: HAVE YOU HAD ANY THOUGHTS OF HARMING ANYONE ELSE?: NO

## 2024-12-18 SDOH — ECONOMIC STABILITY: FOOD INSECURITY: WITHIN THE PAST 12 MONTHS, YOU WORRIED THAT YOUR FOOD WOULD RUN OUT BEFORE YOU GOT THE MONEY TO BUY MORE.: NEVER TRUE

## 2024-12-18 SDOH — SOCIAL STABILITY: SOCIAL INSECURITY: ABUSE: ADULT

## 2024-12-18 SDOH — HEALTH STABILITY: MENTAL HEALTH: HOW OFTEN DO YOU HAVE SIX OR MORE DRINKS ON ONE OCCASION?: LESS THAN MONTHLY

## 2024-12-18 SDOH — HEALTH STABILITY: MENTAL HEALTH: HOW MANY DRINKS CONTAINING ALCOHOL DO YOU HAVE ON A TYPICAL DAY WHEN YOU ARE DRINKING?: 1 OR 2

## 2024-12-18 SDOH — SOCIAL STABILITY: SOCIAL INSECURITY: WITHIN THE LAST YEAR, HAVE YOU BEEN AFRAID OF YOUR PARTNER OR EX-PARTNER?: NO

## 2024-12-18 SDOH — SOCIAL STABILITY: SOCIAL INSECURITY: DO YOU FEEL ANYONE HAS EXPLOITED OR TAKEN ADVANTAGE OF YOU FINANCIALLY OR OF YOUR PERSONAL PROPERTY?: NO

## 2024-12-18 SDOH — SOCIAL STABILITY: SOCIAL INSECURITY: WITHIN THE LAST YEAR, HAVE YOU BEEN HUMILIATED OR EMOTIONALLY ABUSED IN OTHER WAYS BY YOUR PARTNER OR EX-PARTNER?: NO

## 2024-12-18 SDOH — ECONOMIC STABILITY: TRANSPORTATION INSECURITY: IN THE PAST 12 MONTHS, HAS LACK OF TRANSPORTATION KEPT YOU FROM MEDICAL APPOINTMENTS OR FROM GETTING MEDICATIONS?: NO

## 2024-12-18 SDOH — SOCIAL STABILITY: SOCIAL INSECURITY: WERE YOU ABLE TO COMPLETE ALL THE BEHAVIORAL HEALTH SCREENINGS?: NO

## 2024-12-18 SDOH — ECONOMIC STABILITY: HOUSING INSECURITY: IN THE LAST 12 MONTHS, WAS THERE A TIME WHEN YOU WERE NOT ABLE TO PAY THE MORTGAGE OR RENT ON TIME?: NO

## 2024-12-18 SDOH — ECONOMIC STABILITY: FOOD INSECURITY: HOW HARD IS IT FOR YOU TO PAY FOR THE VERY BASICS LIKE FOOD, HOUSING, MEDICAL CARE, AND HEATING?: NOT VERY HARD

## 2024-12-18 SDOH — SOCIAL STABILITY: SOCIAL INSECURITY: HAS ANYONE EVER THREATENED TO HURT YOUR FAMILY OR YOUR PETS?: NO

## 2024-12-18 SDOH — SOCIAL STABILITY: SOCIAL INSECURITY: ARE YOU OR HAVE YOU BEEN THREATENED OR ABUSED PHYSICALLY, EMOTIONALLY, OR SEXUALLY BY ANYONE?: NO

## 2024-12-18 SDOH — SOCIAL STABILITY: SOCIAL INSECURITY: ARE THERE ANY APPARENT SIGNS OF INJURIES/BEHAVIORS THAT COULD BE RELATED TO ABUSE/NEGLECT?: NO

## 2024-12-18 SDOH — SOCIAL STABILITY: SOCIAL INSECURITY: DO YOU FEEL UNSAFE GOING BACK TO THE PLACE WHERE YOU ARE LIVING?: NO

## 2024-12-18 SDOH — SOCIAL STABILITY: SOCIAL INSECURITY: DOES ANYONE TRY TO KEEP YOU FROM HAVING/CONTACTING OTHER FRIENDS OR DOING THINGS OUTSIDE YOUR HOME?: NO

## 2024-12-18 ASSESSMENT — ACTIVITIES OF DAILY LIVING (ADL)
DRESSING YOURSELF: INDEPENDENT
PATIENT'S MEMORY ADEQUATE TO SAFELY COMPLETE DAILY ACTIVITIES?: YES
ASSISTIVE_DEVICE: EYEGLASSES
LACK_OF_TRANSPORTATION: NO
ADL_ASSISTANCE: NEEDS ASSISTANCE
JUDGMENT_ADEQUATE_SAFELY_COMPLETE_DAILY_ACTIVITIES: YES
WALKS IN HOME: NEEDS ASSISTANCE
LACK_OF_TRANSPORTATION: NO
HEARING - RIGHT EAR: FUNCTIONAL
TOILETING: NEEDS ASSISTANCE
ADEQUATE_TO_COMPLETE_ADL: YES
FEEDING YOURSELF: INDEPENDENT
GROOMING: INDEPENDENT
BATHING: INDEPENDENT
HEARING - LEFT EAR: FUNCTIONAL

## 2024-12-18 ASSESSMENT — PAIN DESCRIPTION - DESCRIPTORS
DESCRIPTORS: SHARP;SHOOTING;SORE

## 2024-12-18 ASSESSMENT — PAIN SCALES - GENERAL
PAINLEVEL_OUTOF10: 9
PAINLEVEL_OUTOF10: 9
PAINLEVEL_OUTOF10: 0 - NO PAIN
PAINLEVEL_OUTOF10: 8
PAINLEVEL_OUTOF10: 9
PAINLEVEL_OUTOF10: 8
PAINLEVEL_OUTOF10: 8
PAINLEVEL_OUTOF10: 3

## 2024-12-18 ASSESSMENT — COGNITIVE AND FUNCTIONAL STATUS - GENERAL
CLIMB 3 TO 5 STEPS WITH RAILING: TOTAL
MOVING TO AND FROM BED TO CHAIR: TOTAL
DRESSING REGULAR UPPER BODY CLOTHING: A LOT
MOBILITY SCORE: 9
TOILETING: A LOT
WALKING IN HOSPITAL ROOM: TOTAL
DAILY ACTIVITIY SCORE: 14
DRESSING REGULAR LOWER BODY CLOTHING: A LOT
MOBILITY SCORE: 11
STANDING UP FROM CHAIR USING ARMS: TOTAL
PATIENT BASELINE BEDBOUND: NO
CLIMB 3 TO 5 STEPS WITH RAILING: TOTAL
TURNING FROM BACK TO SIDE WHILE IN FLAT BAD: A LITTLE
PERSONAL GROOMING: A LITTLE
PERSONAL GROOMING: A LOT
STANDING UP FROM CHAIR USING ARMS: TOTAL
HELP NEEDED FOR BATHING: A LOT
MOVING TO AND FROM BED TO CHAIR: TOTAL
WALKING IN HOSPITAL ROOM: TOTAL
MOBILITY SCORE: 9
DRESSING REGULAR UPPER BODY CLOTHING: A LOT
CLIMB 3 TO 5 STEPS WITH RAILING: TOTAL
DRESSING REGULAR UPPER BODY CLOTHING: A LOT
PERSONAL GROOMING: A LITTLE
TURNING FROM BACK TO SIDE WHILE IN FLAT BAD: TOTAL
TURNING FROM BACK TO SIDE WHILE IN FLAT BAD: A LOT
STANDING UP FROM CHAIR USING ARMS: TOTAL
MOVING TO AND FROM BED TO CHAIR: TOTAL
TOILETING: A LOT
TURNING FROM BACK TO SIDE WHILE IN FLAT BAD: TOTAL
WALKING IN HOSPITAL ROOM: TOTAL
MOBILITY SCORE: 9
HELP NEEDED FOR BATHING: A LOT
STANDING UP FROM CHAIR USING ARMS: TOTAL
CLIMB 3 TO 5 STEPS WITH RAILING: TOTAL
MOVING FROM LYING ON BACK TO SITTING ON SIDE OF FLAT BED WITH BEDRAILS: A LOT
WALKING IN HOSPITAL ROOM: TOTAL
DRESSING REGULAR LOWER BODY CLOTHING: A LOT
MOVING TO AND FROM BED TO CHAIR: A LOT
DRESSING REGULAR LOWER BODY CLOTHING: A LOT
HELP NEEDED FOR BATHING: A LOT
TOILETING: A LOT
DAILY ACTIVITIY SCORE: 15
DAILY ACTIVITIY SCORE: 15

## 2024-12-18 ASSESSMENT — PAIN - FUNCTIONAL ASSESSMENT
PAIN_FUNCTIONAL_ASSESSMENT: 0-10

## 2024-12-18 ASSESSMENT — LIFESTYLE VARIABLES
SKIP TO QUESTIONS 9-10: 0
AUDIT-C TOTAL SCORE: 2

## 2024-12-18 NOTE — PROGRESS NOTES
Pharmacy Medication History Review    Shirin Slater is a 54 y.o. female admitted for Acute lower limb ischemia. Pharmacy reviewed the patient's ovurn-cq-gcbwfwdfl medications and allergies for accuracy.    Medications ADDED:  Clopidogrel 75 mg  Gabapentin 400 mg  Vitamin D-2 1.25mg  Lidocaine 5% patch   Omeprazole 20 mg  Tizanidine 4 mg  Medications CHANGED:  Acetaminophen changed to  mg  Albuterol HFA inhaler added as needed for shortness of breath  Buspirone 10 mg changed to once daily in the morning   Medications REMOVED:   Aspirin  Multivitamin      The list below reflects the updated PTA list.   Prior to Admission Medications   Prescriptions Last Dose Informant   DULoxetine (Cymbalta) 60 mg DR capsule  Self   Sig: Take 1 capsule (60 mg) by mouth once daily in the evening.   acetaminophen (Tylenol 8 HOUR) 650 mg ER tablet  Self   Sig: Take 2 tablets (1,300 mg) by mouth every 8 hours.   acetaminophen (Tylenol) 325 mg tablet Not Taking Self   Sig: Take 2 tablets (650 mg) by mouth every 6 hours if needed for mild pain (1 - 3).   Patient not taking: Reported on 12/18/2024   albuterol 90 mcg/actuation inhaler  Self   Sig: Inhale 2 puffs every 6 hours if needed for shortness of breath.   atorvastatin (Lipitor) 80 mg tablet  Self   Sig: Take 1 tablet (80 mg) by mouth once daily at bedtime.   busPIRone (Buspar) 10 mg tablet  Self   Sig: Take 1 tablet (10 mg) by mouth once daily in the morning.   clopidogrel (Plavix) 75 mg tablet  Self   Sig: Take 1 tablet (75 mg) by mouth once daily.   divalproex (Depakote ER) 500 mg 24 hr tablet  Self   Sig: Take 1 tablet (500 mg) by mouth. In the morning, and 2 tablets in the evening   empagliflozin (Jardiance) 10 mg  Self   Sig: Take 1 tablet (10 mg) by mouth once daily.   enoxaparin (Lovenox) 100 mg/mL syringe Not Taking Self   Sig: REMOVE 0.1ML AND Inject 0.9 mL (90 mg) under the skin 2 times a day.   Patient not taking: Reported on 12/18/2024   ergocalciferol (Vitamin D-2)  1.25 MG (35426 UT) capsule  Self   Sig: TAKE 1 CAPSULE BY MOUTH TWICE A WEEK FOR 3 WEEKS  THEN ONCE CAPSULE WEEKLY   gabapentin (Neurontin) 400 mg capsule  Self   Sig: Take 1 capsule (400 mg) by mouth 3 times a day.   levETIRAcetam (Keppra) 500 mg tablet  Self   Sig: Take 1 tablet (500 mg) by mouth 2 times a day.   levothyroxine (Synthroid, Levoxyl) 75 mcg tablet  Self   Sig: Take 1 tablet (75 mcg) by mouth once daily in the morning. Take before meals. On empty stomach   lidocaine (Lidoderm) 5 % patch  Self   Sig: Place 1 patch on the skin once daily.   loperamide (Imodium) 2 mg capsule  Self   Sig: Take 1 capsule (2 mg) by mouth 4 times a day as needed for diarrhea.   melatonin 10 mg tablet  Self   Sig: Take 1 tablet (10 mg) by mouth as needed at bedtime (insomnia).   metoprolol tartrate (Lopressor) 25 mg tablet  Self   Sig: Take 0.5 tablets (12.5 mg) by mouth 2 times a day.   omeprazole (PriLOSEC) 20 mg DR capsule  Self   Sig: Take 1 capsule (20 mg) by mouth once daily in the morning. Take before meals.   pantoprazole (ProtoNix) 40 mg EC tablet Not Taking Self   Sig: Take 1 tablet (40 mg) by mouth once daily in the morning. Take before meals. Do not crush, chew, or split.   Patient not taking: Reported on 12/18/2024   psyllium (Metamucil) 3.4 gram packet Not Taking Self   Sig: Take 1 packet by mouth 3 times a day. Mix and drink with at least 8 ounces of water or juice.   Patient not taking: Reported on 12/18/2024   tiZANidine (Zanaflex) 4 mg tablet  Self   Sig: Take 1 tablet (4 mg) by mouth 3 times a day.   warfarin (Coumadin) 2.5 mg tablet  Self   Sig: Take as directed per After Visit Summary.   Patient taking differently: Take 2 tablets (5 mg) by mouth once daily in the evening. Take as directed per After Visit Summary.      Facility-Administered Medications: None       The list below reflects the updated allergy list. Please review each documented allergy for additional clarification and  justification.  Allergies  Reviewed by Meaghan Marion, RN on 12/17/2024        Severity Reactions Comments    Aspartame Not Specified Seizure     Nsaids (non-steroidal Anti-inflammatory Drug) Not Specified Other Significant kidney injury (per patient report)            Patient accepts M2B at discharge. Pharmacy has been updated to [facility].    Sources  [facility]  Pharmacy dispense history  Patient interview Good historian   Detailed medication list on phone    Additional Comments  Patient reports allergy to artificial sweeteners- requesting standard diet  Vitamin D-2 1.25 mg is a new prescription for the patient- has NOT started taking out patient yet  Patient reported taking acetaminophen  mg- 3 tablets at bedtime as needed for pain  Reviewed max daily acetaminophen with patient     Scooter Staples PharmD  Transitions of Care Pharmacist  [facility]  Please reach out via Secure Chat for questions, or if no response call [facility] or [facility]

## 2024-12-18 NOTE — PROGRESS NOTES
Physical Therapy    Physical Therapy Evaluation & Treatment    Patient Name: Shirin Slater  MRN: [id number]  Department: [place] [address]  Room: [facility]  Today's Date: 12/18/2024   Time Calculation  Start Time: 1011  Stop Time: 1044  Time Calculation (min): 33 min    Assessment/Plan   PT Assessment  PT Assessment Results: Decreased strength, Decreased range of motion, Decreased endurance, Impaired balance, Decreased mobility, Decreased safety awareness, Pain  Rehab Prognosis: Good  Barriers to Discharge Home: Caregiver assistance, Physical needs  Evaluation/Treatment Tolerance: Patient limited by pain  Medical Staff Made Aware: Yes  Strengths: Attitude of self  Barriers to Participation: Comorbidities  End of Session Communication: Bedside nurse  Assessment Comment: pt POD #1 for L guillotine AKA. pt has x2 HHA at home during day to assist. reports being bed bound for the past few weeks. maxA to trial EOB sitting. pt benefits from continued PT at moderate intensity to address deficits in strength, mobility and balance  End of Session Patient Position: Bed, 3 rail up, Alarm off, not on at start of session   IP OR SWING BED PT PLAN  Inpatient or Swing Bed: Inpatient  PT Plan  Treatment/Interventions: Bed mobility, Transfer training, Gait training, Stair training, Balance training, Strengthening, Endurance training, Range of motion, Therapeutic exercise, Therapeutic activity, Home exercise program, Positioning  PT Plan: Ongoing PT  PT Frequency: 3 times per week  PT Discharge Recommendations: Moderate intensity level of continued care  PT Recommended Transfer Status:  (x1-2)  PT - OK to Discharge: Yes (DC rec made)      Subjective     General Visit Information:  General  Reason for Referral: POD #1  left bentley above knee amputation.  Past Medical History Relevant to Rehab: severe PAD s/p R hip disarticulation s/p L CFA and EIA embolectomy 12/2023 s/p multiple L toe amputations, s/p L femoral endarterectomy,  profundaplasty, common qrnpwmu-vo-fzqkzkvi tibial bypass using 6 mm ringed PTFE, left lower extremity angiogram on 9/25 T2DM complicated by diabetic neuropathy, HTN, HLD, renal and splenic thromboses  Family/Caregiver Present: No  Prior to Session Communication: Bedside nurse  Patient Position Received: Bed, 3 rail up, Alarm off, not on at start of session  General Comment: pt supine alert and agreeable for PT. very emotional and tangiential during session. trialed sitting EOB, limited 2/2 pain  Home Living:  Home Living  Type of Home: Apartment  Lives With: Alone (has x32 HHA that come morning through evening until ~8:30pm)  Home Adaptive Equipment: Walker rolling or standard, Wheelchair-manual, Hospital bed (tub bench; slideboard)  Home Layout: One level  Home Access: Level entry, No concerns  Prior Level of Function:  Prior Function Per Pt/Caregiver Report  Level of [place]: Needs assistance with ADLs, Needs assistance with homemaking  Receives Help From: Home health  ADL Assistance: Needs assistance (Reports setup assist for dressing from bed level, assist with bathing and SUP for toileting at [facility])  Homemaking Assistance: Needs assistance  Ambulatory Assistance:  (reports bed bound for a few weeks per MDs request)  Prior Function Comments: - falls  Precautions:  Precautions  LE Weight Bearing Status: Left Non-Weight Bearing  Medical Precautions: Fall precautions    Vitals:   BP: 99/47; SpO2 94%; HR 84bpm     Objective   Pain:  Pain Assessment  Pain Assessment: 0-10  0-10 (Numeric) Pain Score: 9  Pain Location: Leg  Pain Orientation: Left  Cognition:  Cognition  Overall Cognitive Status: Within Functional Limits    General Assessments:                  Activity Tolerance  Endurance: Tolerates 10 - 20 min exercise with multiple rests    Sensation  Light Touch:  (reported phantom limb pain BLE)            Perception  Inattention/Neglect: Appears intact  Initiation: Appears intact  Motor Planning: Appears  intact  Perseveration: Not present      Coordination  Movements are Fluid and Coordinated: Yes    Postural Control  Postural Control: Impaired  Trunk Control: unable to fully sit upright    Static Sitting Balance  Static Sitting-Balance Support: Bilateral upper extremity supported  Static Sitting-Level of Assistance: Maximum assistance    Static Standing Balance  Static Standing-Balance Support:  (unable)  Functional Assessments:  Bed Mobility  Bed Mobility: Yes  Bed Mobility 1  Bed Mobility 1: Supine to sitting, Sitting to supine  Level of Assistance 1: Maximum assistance  Bed Mobility Comments 1: assist at trunk; limited 2/2 pain    Transfers  Transfer: No    Ambulation/Gait Training  Ambulation/Gait Training Performed: No    Stairs  Stairs: No  Extremity/Trunk Assessments:  RLE   RLE :  (R hemipelvectomy)  LLE   LLE :  (WFL at hip, pain with movement)  Treatments:   Extended time educating pt importance of attempting EOB sitting and POC while IP. Pt completed 1x10 L hip flexion, ADD, ABD with intermittent breaks. Limited by pain in further bed mobility.   Outcome Measures:  [facility] Basic Mobility  Turning from your back to your side while in a flat bed without using bedrails: A lot  Moving from lying on your back to sitting on the side of a flat bed without using bedrails: A lot  Moving to and from bed to chair (including a wheelchair): A lot  Standing up from a chair using your arms (e.g. wheelchair or bedside chair): Total  To walk in hospital room: Total  Climbing 3-5 steps with railing: Total  Basic Mobility - Total Score: 9    Encounter Problems       Encounter Problems (Active)       Balance       STG - Maintains static sitting balance with upper extremity support SBA >/= 10 min  (Progressing)       Start:  12/18/24    Expected End:  01/01/25               Mobility       pt will be independent in UE/LE HEP to promote strengthening  (Progressing)       Start:  12/18/24    Expected End:  01/01/25                PT Transfers       STG - Transfer from bed to chair/WC modA with slideboard  (Progressing)       Start:  12/18/24    Expected End:  01/01/25            STG - Patient will perform bed mobility modA (Progressing)       Start:  12/18/24    Expected End:  01/01/25               Pain - Adult              Education Documentation  Precautions, taught by Hiral Tineo PT at 12/18/2024 12:13 PM.  Learner: Patient  Readiness: Acceptance  Method: Explanation  Response: Verbalizes Understanding    Body Mechanics, taught by Hiral Tineo PT at 12/18/2024 12:13 PM.  Learner: Patient  Readiness: Acceptance  Method: Explanation  Response: Verbalizes Understanding    Mobility Training, taught by Hiral Tineo PT at 12/18/2024 12:13 PM.  Learner: Patient  Readiness: Acceptance  Method: Explanation  Response: Verbalizes Understanding    Education Comments  No comments found.      12/18/24 at 12:14 PM - Hiral Tineo PT

## 2024-12-18 NOTE — SIGNIFICANT EVENT
Postoperative Check Note    Subjective  Shirin Slater is a 54 y.o. female who is now POD0 s/p left guillotine above knee amputation. Postoperatively, patient feels well, and denies fevers/chills, n/v, chest pain, shortness of breath. Feels her pain is well-controlled and appropriate for this time. Has no other concerns. Is sweating but reports that it has always been like that for her.     Objective  Vitals:  Visit Vitals  BP 93/54   Pulse 89   Temp 37 °C (98.6 °F) (Temporal)   Resp 16       Physical Exam:  GEN: No acute distress. Alert, awake and conversive.  HEENT: Sclera anicteric. Moist mucous membranes.  RESP: Breathing non-labored, equal chest rise. On 2L NC.  CV: Regular rate, normotensive  GI: Abdomen soft, nondistended, nontender.   : Vasques in place with clear yellow urine.  MSK: RLE prior above knee amputation. Left AKA. Wrapped in ace bandage, minimal strikethrough.    NEURO: Alert and oriented x3. No focal deficits.  PSYCH: Appropriate mood and affect.  SKIN: No rashes or lesions.    Most recent labs:            8.9     23.7>-----<609              29.6     136  101  6                  ----------------<226     3.3  25  0.41            Mg 1.62         Assessment  Shirin Slater is a 54 y.o. female who is now POD0 s/p left guillotine above knee amputation. Patient is in stable condition, appropriate for postoperative course. The plan is as follows:    - Will continue to optimize pain management, current pain regimen Tyl, prn oxy   - Regular diet   - PO fluids   - Low intensity Heparin drip   - Vanc and zosyn   - cont vasques   - no changes otherwise as outlined in the plan by the fellow    Prince Ramsey MD  PGY-1 General Surgery  Vascular Surgery

## 2024-12-18 NOTE — ANESTHESIA PROCEDURE NOTES
Airway  Date/Time: 12/17/2024 9:22 PM  Urgency: elective    Airway not difficult    Staffing  Performed: resident   Authorized by: Yuridia Goodwin MD    Performed by: Jose Dean MD  Patient location during procedure: OR    Indications and Patient Condition  Indications for airway management: anesthesia  Spontaneous Ventilation: absent  Sedation level: deep  Preoxygenated: yes  Patient position: sniffing  Mask difficulty assessment: 0 - not attempted    Final Airway Details  Final airway type: endotracheal airway      Successful airway: ETT     Successful intubation technique: video laryngoscopy  Blade type: Dunn.  Blade size: #3  ETT size (mm): 7.0  Cormack-Lehane Classification: grade I - full view of glottis  Placement verified by: capnometry   Measured from: lips  Number of attempts at approach: 1    Additional Comments  RSI

## 2024-12-18 NOTE — CARE PLAN
Problem: Pain - Adult  Goal: Verbalizes/displays adequate comfort level or baseline comfort level  Outcome: Progressing     Problem: Safety - Adult  Goal: Free from fall injury  Outcome: Progressing     Problem: Discharge Planning  Goal: Discharge to home or other facility with appropriate resources  Outcome: Progressing     Problem: Chronic Conditions and Co-morbidities  Goal: Patient's chronic conditions and co-morbidity symptoms are monitored and maintained or improved  Outcome: Progressing   The patient's goals for the shift include      The clinical goals for the shift include remain hds and no falls

## 2024-12-18 NOTE — ANESTHESIA POSTPROCEDURE EVALUATION
Patient: Shirin Slater    Procedure Summary       Date: 12/17/24 Room / Location: [facility] OR 16 / [facility] OR    Anesthesia Start: 2110 Anesthesia Stop: 2230    Procedure: Amputation Above Knee (Left) Diagnosis:       Acute lower limb ischemia      Critical limb ischemia of left lower extremity (Multi)      (Acute lower limb ischemia [I99.8])      (Critical limb ischemia of left lower extremity (Multi) [I70.222])    Surgeons: Adonay Miller MD Responsible Provider: Yuridia Goodwin MD    Anesthesia Type: general ASA Status: 3            Anesthesia Type: general    Vitals Value Taken Time   BP 99/52 12/17/24 2243   Temp 36.5 12/17/24 2243   Pulse 100 12/17/24 2243   Resp 18 12/17/24 2243   SpO2 95 12/17/24 2243       Anesthesia Post Evaluation    Patient location during evaluation: PACU  Patient participation: complete - patient participated  Level of consciousness: responsive to light touch  Pain score: 0  Pain management: adequate  Airway patency: patent  Cardiovascular status: acceptable  Respiratory status: nasal cannula  Hydration status: acceptable  Postoperative Nausea and Vomiting: none  Comments: Patient MAP in 60s upon arrival to PACU, 250 5% albumin given. Improved to MAP >70s        No notable events documented.    
abdominal pain

## 2024-12-18 NOTE — PROGRESS NOTES
Pharmacy Admission Order Reconciliation Review    Shirin Slater is a 54 y.o. female admitted for Acute lower limb ischemia. Pharmacy reviewed the patient's unreconciled admission medications.    Prior to admission medications that were reviewed and acted on by the pharmacist include:  Omeprazole   Acetaminophen   These medications have been reconciled.     Any other unreconcilied medications have been addressed and will be ordered or held by the patient's medical team. Medications addressed by the pharmacist may be added or changed by the patient's medical team at any time.    Scooter Staples, PharmD  Transitions of Care Pharmacist  [facility]  Please reach out via Secure Chat for questions

## 2024-12-18 NOTE — ANESTHESIA PREPROCEDURE EVALUATION
Patient: Shirin Slater    Procedure Information       Anesthesia Start Date/Time: 12/17/24 2110    Procedure: Amputation Above Knee (Left) - through knee amputation, possible above knee amputation    Location: [facility] OR 16 / Virtual [facility]    Surgeons: Adonay Miller MD            Relevant Problems   Cardiac   (+) Acute lower limb ischemia   (+) Atherosclerosis of native arteries of left leg with ulceration of other part of foot (Multi)   (+) Critical limb ischemia of left lower extremity (Multi)   (+) HTN (hypertension)   (+) PAD (peripheral artery disease) (CMS-HCC)      Pulmonary   (+) Chronic obstructive pulmonary disease (Multi)   (+) ELMIRA (obstructive sleep apnea)      Neuro   (+) PTSD (post-traumatic stress disorder)   (+) Peripheral neuropathy   (+) Seizures (Multi)      Endocrine   (+) Diabetes mellitus, type 2 (Multi)   (+) Hypothyroidism      Hematology   (+) Anticoagulant long-term use       Clinical information reviewed:    Allergies                NPO Detail:  No data recorded     Physical Exam    Airway  Mallampati: II  TM distance: >3 FB  Neck ROM: full     Cardiovascular    Dental    Pulmonary    Abdominal        Anesthesia Plan    History of general anesthesia?: yes  History of complications of general anesthesia?: yes    ASA 3     general     The patient is a current smoker.    intravenous induction   Postoperative administration of opioids is intended.  Trial extubation is planned.  Anesthetic plan and risks discussed with patient.  Use of blood products discussed with patient who.    Plan discussed with resident.

## 2024-12-18 NOTE — PROGRESS NOTES
SW met with pt at bedside to offer support and information. Pt is alert and fully oriented.   Pt reported she has been [facility] before, and doesn't want to go back there, and any other nursing facility. Pt prefers to have home health care, and had one before which pt was satisfied with, requesting to have them back when pt is ready to be discharged. SW discussed with TCC with this information, and TCC will assist with dc plan.   Also, pt likes to have information of HCPOA, since she plans to change a HCPOA from her daughter to her aunt, Loly. Pt stated her daughter is not creditable, and her son would allow the daughter to make a decision for pt. SW offered a blank copy of HCPOA, and agreed to come back tomorrow and complete HCPOA. Pt denied any further issues or questions on social work at the moment and SW will continue to follow and assist.     Kristy Duke, GONZALEZA, LSW

## 2024-12-18 NOTE — PROGRESS NOTES
12/18/24 1000   Discharge Planning   Living Arrangements Alone   Support Systems Family members   Assistance Needed yes   Does the patient need discharge transport arranged? Yes   RoundTrip coordination needed? Yes   Financial Resource Strain   How hard is it for you to pay for the very basics like food, housing, medical care, and heating? Not very   Housing Stability   In the last 12 months, was there a time when you were not able to pay the mortgage or rent on time? N   At any time in the past 12 months, were you homeless or living in a shelter (including now)? N   Transportation Needs   In the past 12 months, has lack of transportation kept you from medical appointments or from getting medications? no   In the past 12 months, has lack of transportation kept you from meetings, work, or from getting things needed for daily living? No     Transitional Care Coordination Progress Note:  Patient discussed during interdisciplinary rounds.   Team members present:   Plan per Medical/Surgical team: Shirin Slater is a 54 y.o. female who is now POD0 s/p left guillotine above knee amputation   Payor: mathieu dual  Discharge disposition:  TBD, PT/OT pend  Had waiver services in place.  47 hrs/wk  [facility]  Awaiting further plans from Medical team  Potential Barriers:  n/a  ADOD:  TBD  Previous Home Care:   DME:  wheelchair  Pharmacy: [facility]  Falls:  denies  PCP:  Juany Sanchez MD    Dialysis:  n/a

## 2024-12-18 NOTE — H&P
Vascular Surgery History & Physical  Assessment/Plan   Shirin Slater is 54 y.o. female with history of severe PAD s/p R hip disarticulation s/p L CFA and EIA embolectomy 12/2023 s/p multiple L toe amputations, s/p L femoral endarterectomy, profundaplasty, common qdvbqai-ge-kxdaanns tibial bypass using 6 mm ringed PTFE, left lower extremity angiogram on 9/25 T2DM complicated by diabetic neuropathy, HTN, HLD, renal and splenic thromboses who who presents with severe left leg pain and inability to move or feel or left leg. Imaging consistent with occlusion of her L CFA-AT bypass graft. On exam, patient with [place] 3 limb ischemia. Given leukocytosis, tachycardia, limb ischemia and concern for infection will plan to proceed to OR.    Plan:  High intensity heparin gtt   Broad spectrum antibiotics  Blood cultures  Plan for OR for through knee amputation  Plan to admit to vascular surgery service    D/w attending, Dr. Paul Mejia MD  PGY 2 General Surgery  Vascular Surgery     Subjective   HPI:  Shirin Slater is 54 y.o. female with history of severe PAD s/p R hip disarticulation s/p L CFA and EIA embolectomy 12/2023 s/p multiple L toe amputations, sT2DM complicated by diabetic neuropathy, HTN, HLD, renal and splenic thromboses. Recently hospitalized 9/18 - 10/3 for L toe gangrene, underwent L femoral endarterectomy, profundaplasty, common letahlj-ig-gulvldfd tibial bypass using 6 mm ringed PTFE, left lower extremity angiogram on 9/25 with Dr. Dunphy and a L TMA with podiatry on 9/27. She was discharged to SNF on 10/3 WBAT in the LLE.     She checked herself out of her SNF 10/7 for home where she had an aide to assist her. Was able to transfer to her wheelchair independently at that time. States that she saw her vascular surgeon several weeks ago who recommended that she cannot not bear weight in her LLE. Feels that she has been having decreased movement and sensation in the LLE since being told not  to use it and assumed it was atrophy secondary to disuse. States that her left leg is always cold to the touch, but feels it has worsened in the several weeks. Her pain worsened acutely 12/16 when her left leg slid off her bed. She takes warfarin daily, has not missed any doses. Has not has a recent INR checked. She continued to smoke 1/2 PPD since her discharge from [facility]. She states that the redness of her leg has been present for several weeks. She has been doing dressing changes and wound care herself at home.  Denies any fever/chills, malaise, pain of the calf or thigh, chest pain, shortness of breath, lightheadedness, recent seizures, nausea/vomiting.    PMH:   .  Past Medical History:   Diagnosis Date    Anxiety     Arthritis     Cancer (Multi)     Cellulitis     COPD (chronic obstructive pulmonary disease) (Multi)     Delayed emergence from general anesthesia     Diabetes mellitus (Multi)     Disease of thyroid gland     Diverticulitis     Epilepsy, unspecified, not intractable, without status epilepticus (Multi)     Epilepsy    HLD (hyperlipidemia)     Hx of blood clots     Hypertension     PAD (peripheral artery disease) (CMS-HCC)     Peripheral vascular disease, unspecified (CMS-HCC) 09/14/2022    PAD (peripheral artery disease)    PONV (postoperative nausea and vomiting)     PTSD (post-traumatic stress disorder)     Sleep apnea     Uterine cancer (Multi)         PSH:   9/27/24 - L TMA  9/26/24 - L fem-AT PTFE Lalit patch  12/6/23 - LCFAe with bovine patch  6/29/22 - LINDA, LCFA everflex 7x80, supera 6.5x60mm, Lpop si[era 5x80mm  1/24/2022 - redo fem exploration, endarterectomy, vein patch; DCB left pop; PTA AT  1/18/22 - right brachial cutdown, diagnostic angio  11/16/21 - right brachial acces for dx angio    Relevant Home Meds:   No current facility-administered medications on file prior to encounter.     Current Outpatient Medications on File Prior to Encounter   Medication Sig Dispense Refill     acetaminophen (Tylenol) 325 mg tablet Take 2 tablets (650 mg) by mouth every 6 hours if needed for mild pain (1 - 3).      albuterol 90 mcg/actuation inhaler Inhale 2 puffs every 6 hours if needed.      aspirin 81 mg EC tablet Take 1 tablet (81 mg) by mouth once daily.      atorvastatin (Lipitor) 80 mg tablet Take 1 tablet (80 mg) by mouth once daily at bedtime.      busPIRone (Buspar) 10 mg tablet Take 1 tablet (10 mg) by mouth. In the morning      divalproex (Depakote ER) 500 mg 24 hr tablet Take 1 tablet (500 mg) by mouth. In the morning, and 2 tablets in the evening      DULoxetine (Cymbalta) 60 mg DR capsule Take 1 capsule (60 mg) by mouth once daily.      empagliflozin (Jardiance) 10 mg Take 1 tablet (10 mg) by mouth once daily.      enoxaparin (Lovenox) 100 mg/mL syringe REMOVE 0.1ML AND Inject 0.9 mL (90 mg) under the skin 2 times a day. 28 mL 1    levETIRAcetam (Keppra) 500 mg tablet Take 1 tablet (500 mg) by mouth 2 times a day.      levothyroxine (Tirosint) 75 mcg capsule Take 1 capsule (75 mcg) by mouth once daily in the morning. Take before meals. On empty stomach      loperamide (Imodium) 2 mg capsule Take 1 capsule (2 mg) by mouth 4 times a day as needed for diarrhea. 30 capsule 0    melatonin 10 mg tablet Take 1 tablet (10 mg) by mouth as needed at bedtime (insomnia).      metoprolol tartrate (Lopressor) 25 mg tablet Take 0.5 tablets (12.5 mg) by mouth 2 times a day.      multivitamin tablet Take 1 tablet by mouth once daily.      pantoprazole (ProtoNix) 40 mg EC tablet Take 1 tablet (40 mg) by mouth once daily in the morning. Take before meals. Do not crush, chew, or split. 90 tablet 3    psyllium (Metamucil) 3.4 gram packet Take 1 packet by mouth 3 times a day. Mix and drink with at least 8 ounces of water or juice.      warfarin (Coumadin) 2.5 mg tablet Take as directed per After Visit Summary.           Allergies: per chart review     SH/FH:   - 1/2 PPD smoker    ROS: 12 system negative except  "HPI    Objective   Vitals:  Heart Rate:  [116-140]   Temperature:  [37 °C (98.6 °F)-37.2 °C (98.9 °F)]   Respirations:  [20-22]   BP: (116-151)/()   Height:  [170.2 cm (5' 7\")]   Weight:  [88 kg (194 lb)-88 kg (194 lb 0.1 oz)]   Pulse Ox:  [93 %-96 %]     Exam:  Constitutional: Mild distress, anxious  Neuro:  AOx3, grossly intact  ENMT: moist mucous membranes  Head/neck: atraumatic  CV: no tachycardia  Pulm: non-labored on room air  GI: soft, non-tender, non-distended  Skin: warm and dry, beefy purple discoloration of the anterior lower limb and foot to the knee. Extends up the medial aspect of the left thigh. Black eschar of the left lateral shin and toes  Musculoskeletal: moving all extremities  Extremities: LLE tender to the touch to the hip. Unable to flex or extend the foot. No sensation to light touch.   Vasc: monophasic doppler signal of left popliteal and left femoral artery    Labs:       Results from last 7 days   Lab Units 12/17/24  1905   SODIUM mmol/L 136   POTASSIUM mmol/L 3.3*   CHLORIDE mmol/L 101   CO2 mmol/L 25   BUN mg/dL 6   CREATININE mg/dL 0.41*   GLUCOSE mg/dL 226*            "

## 2024-12-18 NOTE — PROGRESS NOTES
VASCULAR SURGERY PROGRESS NOTE  Assessment/Plan   Shirin Slater is 54 y.o. female with history of severe PAD s/p R hip disarticulation s/p L CFA and EIA embolectomy 12/2023 s/p multiple L toe amputations, s/p L femoral endarterectomy, profundaplasty, common wygsykj-cx-eycauaun tibial bypass using 6 mm ringed PTFE, left lower extremity angiogram on 9/25 T2DM complicated by diabetic neuropathy, HTN, HLD, renal and splenic thromboses who who presents with severe left leg pain and inability to move or feel or left leg. Imaging consistent with occlusion of her L CFA-AT bypass graft. On exam, patient with [place] 3 limb ischemia. Given leukocytosis, tachycardia, limb ischemia and concern for infection she was taken to OR urgently and had findings of purulence along the anteromedial aspect of the left knee and distal thigh. Muscle within the posterolateral thigh with healthy contraction on stimulation. 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     Neuro: acute postoperative pain, Hx seizures  - continue tylenol/oxy PRN for pain control  - continue lidoderm patch   - home Keppra, Depakote, Cymbalta, gabapentin     CV: Hx severe PAD with multiple surgical interventions, HTN  - maintain blood pressure control,   - continue statin/ASA  - heparin infusion, low intensity     Pulm:   - continue to encourage IS hourly while awake  - OOB to chair and increase ambulation as tolerated  - albuterol inhaler      FENGI:   - tolerating regular diet  - continue bowel regimen to prevent OIC   - continue PPI  - monitor and replete electrolytes     Endo: DM2, hypothyroidism  - holding home jardiance  - glucomander protocol  - levothyroxine  - liberalized carbs to 75gm/ meal- discussed avoiding sugar and processed foods.     Heme: anemia of chronic disease  - no s/sx of bleeding  - monitor H/H, transfuse for Hgb <7     ID:  humble infection  "required amputation for source control  - tissue and blood cultures sent- pending  - zosyn and Vanco  - requested midline as patient is difficult IV access  - monitor s/s of infection  - WBC    DVT prophylaxis:  - heparin infusion     Dispo-  - floor status    Subjective   Patient reports adequate pain control. Unhappy about diabetic diet, wants increased carbs.     Objective   Vitals:  Heart Rate:  []   Temp:  [36.5 °C (97.7 °F)-37.2 °C (98.9 °F)]   Resp:  [11-26]   BP: ()/()   Height:  [170.2 cm (5' 7\")]   Weight:  [88 kg (194 lb)-88 kg (194 lb 0.1 oz)]   SpO2:  [92 %-99 %]     Exam:  Constitutional: No acute distress, resting comfortably  Neuro:  AOx3, grossly intact  ENMT: moist mucous membranes  CV: no tachycardia  Pulm: non-labored on RA  GI: soft, non-tender, non-distended  Skin: warm and dry  Musculoskeletal: moving all extremities  Extremities: left aka dressing C/D/I      Labs:  Results from last 7 days   Lab Units 12/18/24  0849 12/17/24  1905   WBC AUTO x10*3/uL 19.5* 23.7*   HEMOGLOBIN g/dL 8.0* 8.9*   PLATELETS AUTO x10*3/uL 505* 609*      Results from last 7 days   Lab Units 12/18/24  0849 12/17/24  1905   SODIUM mmol/L 138 136   POTASSIUM mmol/L 3.9 3.3*   CHLORIDE mmol/L 103 101   CO2 mmol/L 27 25   BUN mg/dL 10 6   CREATININE mg/dL 0.43* 0.41*   GLUCOSE mg/dL 339* 226*      Results from last 7 days   Lab Units 12/18/24  0849 12/17/24 2010 12/17/24  1905   INR  1.5*  --  1.4*   PROTIME seconds 17.0*  --  16.1*   APTT seconds 24*   < > 24*    < > = values in this interval not displayed.      Results from last 7 days   Lab Units 12/18/24  0849   ANTI XA UNFRACTIONATED IU/mL <0.1         "

## 2024-12-18 NOTE — SIGNIFICANT EVENT
Rapid Response Nurse Note: RADAR alert: 7    Pager time: 13  Arrival time: 14  Event end time: 23  Location: T7-32  [x] Triage by phone or secure messaging    Rapid response initiated by:  [] Rapid response RN [] Family [] Nursing Supervisor [] Physician   [] RADAR auto page [] Sepsis auto-page [] RN [] RT   [] NP/PA [] Other:     Primary reason for call:   [] BAT [] New CPAP/BiPAP [] Bleeding [] Change in mental status   [] Chest pain [] Code blue [] FiO2 >/= 50% [] HR </= 40 bpm   [] HR >/= 130 bpm [] Hyperglycemia [] Hypoglycemia [x] RADAR    [] RR </= 8 bpm [] RR >/= 30 bpm [] SBP </= 90 mmHg [] SpO2 < 90%   [] Seizure [] Sepsis [] Shortness of breath  [] Staff concern: see comments     Initial VS and/or RADAR VS: T 37 °C; HR 96; RR 16; BP 90/55; SPO2 95%.    Providers present at bedside (if applicable):     Name of ICU Provider contacted (if applicable):     Interventions:  [x] None [] ABG/VBG [] Assist w/ICU transfer [] BAT paged    [] Bag mask [] Blood [] Cardioversion [] Code Blue   [] Code blue for intubation [] Code status changed [] Chest x-ray [] EKG   [] IV fluid/bolus [] KUB x-ray [] Labs/cultures [] Medication   [] Nebulizer treatment [] NIPPV (CPAP/BiPAP) [] Oxygen [] Oral airway   [] Peripheral IV [] Palliative care consult [] CT/MRI [] Sepsis protocol    [] Suctioned [] Other:     Outcome:  [] Coded and  [] Code blue for intubation [] Coded and transferred to ICU []  on division   [x] Remained on division (no change) [] Remained on division + additional monitoring [] Remained in ED [] Transferred to ED   [] Transferred to ICU [] Transferred to inpatient status [] Transferred for interventions (procedure) [] Transferred to ICU stepdown    [] Transferred to surgery [] Transferred to telemetry [] Sepsis protocol [] STEMI protocol   [] Stroke protocol [x] Bedside nurse instructed to page rapid response for any concerns or acute change in condition/VS     Additional Comments:  Reviewed above VS with bedside RN.  VS within patient's current trends.  Patient denied pain, shortness of breath, dizziness or lightheadedness.  No interventions by rapid response team indicated at this time.  Staff to page rapid response for any concerns or acute change in condition/VS.

## 2024-12-18 NOTE — SIGNIFICANT EVENT
Vascular Surgery Post-Operative Plan    S/p left guillotine above knee amputation    Plan:  2 hours PACU stay then stepdown LT 7  Telemetry  Heparin gtt 500 units/hr then transition to low intensity at midnight  pain control  Continue vasques  Continue broad spectrum antibiotics  PT tomorrow morning  SQH for DVT Ppx  Wound: left AKA guillotine wound packed with dakins and wrapped with kerlex and ACE    Jay Jay Harry MD  Vascular Surgery Fellow  Team Pager [phone]  12/17/24  10:20 PM

## 2024-12-18 NOTE — OP NOTE
Amputation Above Knee (L) Operative Note     Date: 2024  OR Location: [facility] OR    Name: Shirin Slater, : 1970, Age: 54 y.o., MRN: [id number], Sex: female    Diagnosis  Pre-op Diagnosis      * Acute lower limb ischemia [I99.8]     * Critical limb ischemia of left lower extremity (Multi) [I70.222] Post-op Diagnosis     * Acute lower limb ischemia [I99.8]     * Critical limb ischemia of left lower extremity (Multi) [I70.222]     Procedures  Amputation Above Knee  34527 - DE AMPUTATION THIGH THROUGH FEMUR ANY LEVEL      Surgeons      * Mrinalini Miller - Primary    Resident/Fellow/Other Assistant:  Surgeons and Role:     * Jay Jay Harry MD - Assisting    Staff:   Elliottulator: Emy Negro Person: Carissa    Anesthesia Staff: Anesthesiologist: Yuridia Goodwin MD  Anesthesia Resident: Jose Dean MD    Procedure Summary  Anesthesia: General  ASA: III  Estimated Blood Loss: 10 mL  Intra-op Medications:   Administrations occurring from 24 to 24:   Medication Name Total Dose   acetaminophen (Ofirmev) injection 1,000 mg   ceFAZolin (Ancef) 1 g 2 g   dexAMETHasone (Decadron) injection 4 mg/mL 4 mg   fentaNYL (Sublimaze) injection 50 mcg/mL 100 mcg   heparin bolus from bag 3,000-6,000 Units Cannot be calculated   heparin bolus from bag 7,040 Units Cannot be calculated   HYDROmorphone (Dilaudid) injection 1 mg/mL 0.4 mg   insulin regular (HumuLIN R, NovoLIN R) bolus from bag 5 Units   LR bolus Cannot be calculated   levETIRAcetam (Keppra) 500 mg/5mL 500 mg   lidocaine (Xylocaine) injection 1 % 5 mL   phenylephrine (Baljit-Synephrine) injection 240 mcg   potassium chloride 20 mEq in sterile water for injection 100 mL Cannot be calculated   propofol (Diprivan) injection 10 mg/mL 120 mg   rocuronium (ZeMuron) 50 mg/5 mL injection 100 mg              Anesthesia Record               Intraprocedure I/O Totals          Intake    LR bolus 2000.00 mL    Insulin Drip 0.00 mL    The  total shown is the total volume documented since Anesthesia Start was filed.    Total Intake 2000 mL       Output    Urine 275 mL    Total Output 275 mL       Net    Net Volume 1725 mL          Specimen:   ID Type Source Tests Collected by Time   1 : left leg AKA Tissue LEG AMPUTATION ABOVE THE KNEE LEFT SURGICAL PATHOLOGY EXAM Adonay Miller MD 2024   A : left anterior knee abscess Swab ABSCESS FUNGAL CULTURE/SMEAR, TISSUE/WOUND CULTURE/SMEAR Adonay Miller MD 2024                 Drains and/or Catheters:   Urethral Catheter Double-lumen 14 Fr. (Active)       Tourniquet Times:   * Missing tourniquet times found for documented tourniquets in lo *     Implants:  N/A    Findings: Purulence along the anteromedial aspect of the left knee and distal thigh. Muscle within the posterolateral thigh with healthy contraction on stimulation. Occluded fem-AT bypass and native popliteal artery.    Indications: Shirin Slater is an 54 year old female with hx of APLS and multiple episodes of ALI requiring an eventual R hip disarticulation. She recently had a left fem-AT bypass with PTFE for tissue loss in 2024 and now presents with [place] 3 ALI of the LLE. CTA imaging shows an occluded left ZULEYMA stent and fem-AT bypass. On exam, patient was insensate to the knee with paralysis of the foot. Furthermore, she had significant superficial bullae present on the calf with areas of patchy necrosis. Additionally, patient had leukocytosis and lactic acidosis with ongoing tachycardia concerning for sepsis.  As a result, a left above-knee guillotine amputation was recommended for source control with plans for potential revascularization for inflow to aid with healing of an eventual AKA.  The risks and benefits of this procedure discussed with the patient in detail who elected to proceed.    The patient was seen in the preoperative area. The risks, benefits, complications, treatment options,  non-operative alternatives, expected recovery and outcomes were discussed with the patient. The possibilities of reaction to medication, pulmonary aspiration, injury to surrounding structures, bleeding, recurrent infection, the need for additional procedures, failure to diagnose a condition, and creating a complication requiring transfusion or operation were discussed with the patient. The patient concurred with the proposed plan, giving informed consent.  The site of surgery was properly noted/marked if necessary per policy. The patient has been actively warmed in preoperative area. Preoperative antibiotics have been ordered and given within 1 hours of incision. Venous thrombosis prophylaxis are not indicated.    Procedure Details:     Patient was taken to the operating room and placed in supine position.  General anesthesia was then induced.  Patient was then prepped and draped in the usual sterile fashion.  A timeout was then performed to verify the patient, procedure, site prior to beginning.    A circumferential incision was made just above the knee.  This was sharply taken down to the level of the bone.  Of note, there was minimal bleeding anteromedially and it was noted that all of the superficial veins were thrombosed. Anteromedially, there was significant purulence encountered eventually.  As a result, cultures were obtained.  We continued to circumferentially divide the soft tissues sharply around the femur.  The occluded fem-AT PTFE bypass was identified, divided, and suture-ligated with a 2-0 silk suture.  Similarly, the popliteal artery and vein were identified and suture-ligated.  The popliteal artery had a bare-metal stent which was explanted to aid with the suture ligation.  A Gigli saw was then used to divide the femur just above the level of the condyles.  Hemostasis was then achieved.  The anterior and medial compartment was explored and no further necrosis or purulence was encountered.  At this  time, the wound bed was copiously irrigated.  One 2-0 nylon suture was placed anteriorly and posteriorly to prevent any significant skin retraction.  The wound was then packed with quarter percent Dakin's soaked Kerlix roll.  The stump was then wrapped with a dry Kerlix roll and Ace wrap.  The left groin wound was then packed with Betadine soaked gauze.  The patient tolerated the procedure well and was taken to PACU in stable condition.  I was present for the entirety of the case.    Complications:  None; patient tolerated the procedure well.    Disposition: PACU - hemodynamically stable.  Condition: stable     Attending Attestation: I was present and scrubbed for the entire procedure.    Adonay Miller  Phone Number: [phone]

## 2024-12-18 NOTE — ED PROCEDURE NOTE
Procedure  Critical Care    Performed by: Hero Shannon MD  Authorized by: Hero Shannon MD    Critical care provider statement:     Critical care time (minutes):  10    Critical care time was exclusive of:  Separately billable procedures and treating other patients and teaching time    Critical care was necessary to treat or prevent imminent or life-threatening deterioration of the following conditions:  Circulatory failure    Critical care was time spent personally by me on the following activities:  Ordering and performing treatments and interventions, development of treatment plan with patient or surrogate, discussions with consultants, ordering and review of radiographic studies, pulse oximetry, evaluation of patient's response to treatment, re-evaluation of patient's condition, examination of patient and review of old charts    Care discussed with: admitting provider                 Hero Shannon MD  12/18/24 0259

## 2024-12-18 NOTE — TELEPHONE ENCOUNTER
[facility] called to inform he just started patient with home care. Lino Martino stated   PT 18.7  INR 1.6  Current dosage is Coumadin 2 mg, Tuesday, Thursday, Saturday and Sunday. Orders were written for recheck every other week.   Please contact Get with any changes,  [phone]    Last seen 11/22/2021  Next appt Visit date not found Patient presents to ED with c/o head pain, upper left chest pain. PT states she was sent by her primary physician for follow up for pneumonia and an ear infection. PT states she still has one more day for her antibiotics.

## 2024-12-18 NOTE — CONSULTS
Vancomycin Dosing by Pharmacy- INITIAL    Shirin Slater is a 54 y.o. year old female who Pharmacy has been consulted for vancomycin dosing for cellulitis, skin and soft tissue. Based on the patient's indication and renal status this patient will be dosed based on a goal AUC of 400-600.     Renal function is currently stable.    Visit Vitals  BP (!) 99/42   Pulse 84   Temp 36.6 °C (97.9 °F)   Resp 15        Lab Results   Component Value Date    CREATININE 0.43 (L) 2024    CREATININE 0.41 (L) 2024    CREATININE 0.46 (L) 10/03/2024    CREATININE 0.54 10/02/2024        Patient weight is as follows:   Vitals:    24 1847   Weight: 88 kg (194 lb)       Cultures:  No results found for the encounter in last 14 days.        I/O last 3 completed shifts:  In: 2000 (22.7 mL/kg) [IV Piggyback:2000]  Out: 395 (4.5 mL/kg) [Urine:395 (0.1 mL/kg/hr)]  Weight: 88 kg   I/O during current shift:  I/O this shift:  In: 711 [P.O.:600; I.V.:111]  Out: 1700 [Urine:1700]    Temp (24hrs), Av.8 °C (98.3 °F), Min:36.5 °C (97.7 °F), Max:37.2 °C (98.9 °F)         Assessment/Plan     Patient will not be given a loading dose.  Will initiate vancomycin maintenance,  1500 mg every 12 hours. Patient received 1500mg x1 at 0223 on .    This dosing regimen is predicted by [employer] to result in the following pharmacokinetic parameters:    Loading dose: N/A  Regimen: 1500 mg IV every 12 hours.  Start time: 15:50 on 2024  Exposure target: AUC24 (range)400-600 mg/L.hr   VFR67-85: 505 mg/L.hr  AUC24,ss: 553 mg/L.hr  Probability of AUC24 > 400: 80 %  Ctrough,ss: 16.3 mg/L  Probability of Ctrough,ss > 20: 35 %    Follow-up level will be ordered on  at 1000 unless clinically indicated sooner.  Will continue to monitor renal function daily while on vancomycin and order serum creatinine at least every 48 hours if not already ordered.  Follow for continued vancomycin needs, clinical response, and signs/symptoms of toxicity.        Jeanette Lou, PharmD

## 2024-12-19 LAB
ALBUMIN SERPL BCP-MCNC: 2.1 G/DL (ref 3.4–5)
ANION GAP SERPL CALC-SCNC: 8 MMOL/L (ref 10–20)
APTT PPP: 53 SECONDS (ref 27–38)
ATRIAL RATE: 126 BPM
BACTERIA SPEC CULT: NORMAL
BUN SERPL-MCNC: 12 MG/DL (ref 6–23)
CALCIUM SERPL-MCNC: 7.5 MG/DL (ref 8.6–10.6)
CHLORIDE SERPL-SCNC: 104 MMOL/L (ref 98–107)
CO2 SERPL-SCNC: 30 MMOL/L (ref 21–32)
CREAT SERPL-MCNC: 0.6 MG/DL (ref 0.5–1.05)
EGFRCR SERPLBLD CKD-EPI 2021: >90 ML/MIN/1.73M*2
ERYTHROCYTE [DISTWIDTH] IN BLOOD BY AUTOMATED COUNT: 22.1 % (ref 11.5–14.5)
FUNGUS SPEC CULT: NORMAL
FUNGUS SPEC FUNGUS STN: NORMAL
GLUCOSE BLD MANUAL STRIP-MCNC: 155 MG/DL (ref 74–99)
GLUCOSE BLD MANUAL STRIP-MCNC: 178 MG/DL (ref 74–99)
GLUCOSE BLD MANUAL STRIP-MCNC: 191 MG/DL (ref 74–99)
GLUCOSE BLD MANUAL STRIP-MCNC: 246 MG/DL (ref 74–99)
GLUCOSE SERPL-MCNC: 240 MG/DL (ref 74–99)
GRAM STN SPEC: NORMAL
GRAM STN SPEC: NORMAL
HCT VFR BLD AUTO: 27.2 % (ref 36–46)
HGB BLD-MCNC: 7.4 G/DL (ref 12–16)
INR PPP: 1.5 (ref 0.9–1.1)
MAGNESIUM SERPL-MCNC: 1.6 MG/DL (ref 1.6–2.4)
MCH RBC QN AUTO: 21 PG (ref 26–34)
MCHC RBC AUTO-ENTMCNC: 27.2 G/DL (ref 32–36)
MCV RBC AUTO: 77 FL (ref 80–100)
NRBC BLD-RTO: 0 /100 WBCS (ref 0–0)
P AXIS: 46 DEGREES
P OFFSET: 217 MS
P ONSET: 166 MS
PHOSPHATE SERPL-MCNC: 3.1 MG/DL (ref 2.5–4.9)
PLATELET # BLD AUTO: 518 X10*3/UL (ref 150–450)
POTASSIUM SERPL-SCNC: 3.8 MMOL/L (ref 3.5–5.3)
PR INTERVAL: 114 MS
PROTHROMBIN TIME: 16.9 SECONDS (ref 9.8–12.8)
Q ONSET: 223 MS
QRS COUNT: 20 BEATS
QRS DURATION: 78 MS
QT INTERVAL: 322 MS
QTC CALCULATION(BAZETT): 466 MS
QTC FREDERICIA: 412 MS
R AXIS: 40 DEGREES
RBC # BLD AUTO: 3.52 X10*6/UL (ref 4–5.2)
SODIUM SERPL-SCNC: 138 MMOL/L (ref 136–145)
T AXIS: 60 DEGREES
T OFFSET: 384 MS
UFH PPP CHRO-ACNC: 0.2 IU/ML
UFH PPP CHRO-ACNC: 0.3 IU/ML
UFH PPP CHRO-ACNC: 0.3 IU/ML
VANCOMYCIN SERPL-MCNC: 18.6 UG/ML (ref 5–20)
VENTRICULAR RATE: 126 BPM
WBC # BLD AUTO: 13.2 X10*3/UL (ref 4.4–11.3)

## 2024-12-19 PROCEDURE — 2500000004 HC RX 250 GENERAL PHARMACY W/ HCPCS (ALT 636 FOR OP/ED)

## 2024-12-19 PROCEDURE — 2500000001 HC RX 250 WO HCPCS SELF ADMINISTERED DRUGS (ALT 637 FOR MEDICARE OP): Performed by: NURSE PRACTITIONER

## 2024-12-19 PROCEDURE — 80069 RENAL FUNCTION PANEL: CPT | Performed by: NURSE PRACTITIONER

## 2024-12-19 PROCEDURE — 1200000002 HC GENERAL ROOM WITH TELEMETRY DAILY

## 2024-12-19 PROCEDURE — 97530 THERAPEUTIC ACTIVITIES: CPT | Mod: GP

## 2024-12-19 PROCEDURE — 2500000002 HC RX 250 W HCPCS SELF ADMINISTERED DRUGS (ALT 637 FOR MEDICARE OP, ALT 636 FOR OP/ED): Performed by: NURSE PRACTITIONER

## 2024-12-19 PROCEDURE — 85520 HEPARIN ASSAY: CPT | Mod: PARLAB

## 2024-12-19 PROCEDURE — 2500000004 HC RX 250 GENERAL PHARMACY W/ HCPCS (ALT 636 FOR OP/ED): Performed by: STUDENT IN AN ORGANIZED HEALTH CARE EDUCATION/TRAINING PROGRAM

## 2024-12-19 PROCEDURE — 2500000004 HC RX 250 GENERAL PHARMACY W/ HCPCS (ALT 636 FOR OP/ED): Mod: JZ | Performed by: STUDENT IN AN ORGANIZED HEALTH CARE EDUCATION/TRAINING PROGRAM

## 2024-12-19 PROCEDURE — 97535 SELF CARE MNGMENT TRAINING: CPT | Mod: GO

## 2024-12-19 PROCEDURE — 97165 OT EVAL LOW COMPLEX 30 MIN: CPT | Mod: GO

## 2024-12-19 PROCEDURE — 85027 COMPLETE CBC AUTOMATED: CPT | Performed by: STUDENT IN AN ORGANIZED HEALTH CARE EDUCATION/TRAINING PROGRAM

## 2024-12-19 PROCEDURE — 2500000002 HC RX 250 W HCPCS SELF ADMINISTERED DRUGS (ALT 637 FOR MEDICARE OP, ALT 636 FOR OP/ED): Performed by: STUDENT IN AN ORGANIZED HEALTH CARE EDUCATION/TRAINING PROGRAM

## 2024-12-19 PROCEDURE — 80202 ASSAY OF VANCOMYCIN: CPT | Performed by: STUDENT IN AN ORGANIZED HEALTH CARE EDUCATION/TRAINING PROGRAM

## 2024-12-19 PROCEDURE — 36415 COLL VENOUS BLD VENIPUNCTURE: CPT | Performed by: STUDENT IN AN ORGANIZED HEALTH CARE EDUCATION/TRAINING PROGRAM

## 2024-12-19 PROCEDURE — 2500000001 HC RX 250 WO HCPCS SELF ADMINISTERED DRUGS (ALT 637 FOR MEDICARE OP)

## 2024-12-19 PROCEDURE — 82947 ASSAY GLUCOSE BLOOD QUANT: CPT

## 2024-12-19 PROCEDURE — 36415 COLL VENOUS BLD VENIPUNCTURE: CPT | Mod: PARLAB

## 2024-12-19 PROCEDURE — 83735 ASSAY OF MAGNESIUM: CPT | Performed by: NURSE PRACTITIONER

## 2024-12-19 PROCEDURE — 85610 PROTHROMBIN TIME: CPT | Performed by: STUDENT IN AN ORGANIZED HEALTH CARE EDUCATION/TRAINING PROGRAM

## 2024-12-19 PROCEDURE — 2500000001 HC RX 250 WO HCPCS SELF ADMINISTERED DRUGS (ALT 637 FOR MEDICARE OP): Performed by: STUDENT IN AN ORGANIZED HEALTH CARE EDUCATION/TRAINING PROGRAM

## 2024-12-19 RX ORDER — MAGNESIUM SULFATE HEPTAHYDRATE 40 MG/ML
2 INJECTION, SOLUTION INTRAVENOUS ONCE
Status: COMPLETED | OUTPATIENT
Start: 2024-12-19 | End: 2024-12-19

## 2024-12-19 RX ADMIN — DIVALPROEX SODIUM 500 MG: 500 TABLET, FILM COATED, EXTENDED RELEASE ORAL at 08:28

## 2024-12-19 RX ADMIN — INSULIN LISPRO 7 UNITS: 100 INJECTION, SOLUTION INTRAVENOUS; SUBCUTANEOUS at 13:29

## 2024-12-19 RX ADMIN — ACETAMINOPHEN 650 MG: 325 TABLET, FILM COATED ORAL at 06:19

## 2024-12-19 RX ADMIN — DULOXETINE HYDROCHLORIDE 60 MG: 60 CAPSULE, DELAYED RELEASE ORAL at 08:28

## 2024-12-19 RX ADMIN — VANCOMYCIN HYDROCHLORIDE 1250 MG: 1.25 INJECTION, POWDER, LYOPHILIZED, FOR SOLUTION INTRAVENOUS at 17:04

## 2024-12-19 RX ADMIN — PIPERACILLIN SODIUM AND TAZOBACTAM SODIUM 3.38 G: 3; .375 INJECTION, SOLUTION INTRAVENOUS at 16:25

## 2024-12-19 RX ADMIN — PIPERACILLIN SODIUM AND TAZOBACTAM SODIUM 3.38 G: 3; .375 INJECTION, SOLUTION INTRAVENOUS at 21:30

## 2024-12-19 RX ADMIN — INSULIN LISPRO 12 UNITS: 100 INJECTION, SOLUTION INTRAVENOUS; SUBCUTANEOUS at 18:33

## 2024-12-19 RX ADMIN — HEPARIN SODIUM 1700 UNITS/HR: 10000 INJECTION, SOLUTION INTRAVENOUS at 12:56

## 2024-12-19 RX ADMIN — LEVOTHYROXINE SODIUM 75 MCG: 0.07 TABLET ORAL at 06:19

## 2024-12-19 RX ADMIN — VANCOMYCIN HYDROCHLORIDE 1500 MG: 1.5 INJECTION, POWDER, LYOPHILIZED, FOR SOLUTION INTRAVENOUS at 06:19

## 2024-12-19 RX ADMIN — METOPROLOL TARTRATE 12.5 MG: 25 TABLET, FILM COATED ORAL at 20:16

## 2024-12-19 RX ADMIN — LEVETIRACETAM 500 MG: 500 TABLET, FILM COATED ORAL at 20:16

## 2024-12-19 RX ADMIN — PIPERACILLIN SODIUM AND TAZOBACTAM SODIUM 3.38 G: 3; .375 INJECTION, SOLUTION INTRAVENOUS at 04:32

## 2024-12-19 RX ADMIN — INSULIN LISPRO 3 UNITS: 100 INJECTION, SOLUTION INTRAVENOUS; SUBCUTANEOUS at 09:23

## 2024-12-19 RX ADMIN — LEVETIRACETAM 500 MG: 500 TABLET, FILM COATED ORAL at 08:28

## 2024-12-19 RX ADMIN — ASPIRIN 81 MG: 81 TABLET, COATED ORAL at 08:28

## 2024-12-19 RX ADMIN — ACETAMINOPHEN 650 MG: 325 TABLET, FILM COATED ORAL at 13:29

## 2024-12-19 RX ADMIN — PIPERACILLIN SODIUM AND TAZOBACTAM SODIUM 3.38 G: 3; .375 INJECTION, SOLUTION INTRAVENOUS at 10:18

## 2024-12-19 RX ADMIN — METOPROLOL TARTRATE 12.5 MG: 25 TABLET, FILM COATED ORAL at 08:28

## 2024-12-19 RX ADMIN — HYDROMORPHONE HYDROCHLORIDE 0.2 MG: 1 INJECTION, SOLUTION INTRAMUSCULAR; INTRAVENOUS; SUBCUTANEOUS at 13:37

## 2024-12-19 RX ADMIN — OXYCODONE HYDROCHLORIDE 10 MG: 5 TABLET ORAL at 14:56

## 2024-12-19 RX ADMIN — GABAPENTIN 400 MG: 400 CAPSULE ORAL at 21:30

## 2024-12-19 RX ADMIN — PANTOPRAZOLE SODIUM 40 MG: 40 TABLET, DELAYED RELEASE ORAL at 06:19

## 2024-12-19 RX ADMIN — GABAPENTIN 400 MG: 400 CAPSULE ORAL at 06:19

## 2024-12-19 RX ADMIN — DIVALPROEX SODIUM 1000 MG: 500 TABLET, FILM COATED, EXTENDED RELEASE ORAL at 20:16

## 2024-12-19 RX ADMIN — MAGNESIUM SULFATE HEPTAHYDRATE 2 G: 40 INJECTION, SOLUTION INTRAVENOUS at 16:25

## 2024-12-19 RX ADMIN — ATORVASTATIN CALCIUM 80 MG: 80 TABLET, FILM COATED ORAL at 20:16

## 2024-12-19 RX ADMIN — GABAPENTIN 400 MG: 400 CAPSULE ORAL at 13:29

## 2024-12-19 RX ADMIN — OXYCODONE HYDROCHLORIDE 10 MG: 5 TABLET ORAL at 20:16

## 2024-12-19 ASSESSMENT — PAIN SCALES - GENERAL
PAINLEVEL_OUTOF10: 0 - NO PAIN
PAINLEVEL_OUTOF10: 3
PAINLEVEL_OUTOF10: 9
PAINLEVEL_OUTOF10: 8
PAINLEVEL_OUTOF10: 9

## 2024-12-19 ASSESSMENT — PAIN DESCRIPTION - DESCRIPTORS: DESCRIPTORS: STABBING;SHARP

## 2024-12-19 ASSESSMENT — COGNITIVE AND FUNCTIONAL STATUS - GENERAL
MOVING TO AND FROM BED TO CHAIR: TOTAL
DRESSING REGULAR UPPER BODY CLOTHING: A LITTLE
MOBILITY SCORE: 11
TOILETING: A LOT
HELP NEEDED FOR BATHING: A LOT
STANDING UP FROM CHAIR USING ARMS: TOTAL
MOVING TO AND FROM BED TO CHAIR: TOTAL
HELP NEEDED FOR BATHING: A LOT
DAILY ACTIVITIY SCORE: 15
TOILETING: A LOT
TURNING FROM BACK TO SIDE WHILE IN FLAT BAD: A LOT
CLIMB 3 TO 5 STEPS WITH RAILING: TOTAL
DRESSING REGULAR LOWER BODY CLOTHING: A LOT
MOVING FROM LYING ON BACK TO SITTING ON SIDE OF FLAT BED WITH BEDRAILS: A LOT
WALKING IN HOSPITAL ROOM: TOTAL
PERSONAL GROOMING: A LITTLE
DRESSING REGULAR UPPER BODY CLOTHING: A LOT
STANDING UP FROM CHAIR USING ARMS: TOTAL
DRESSING REGULAR LOWER BODY CLOTHING: A LOT
DAILY ACTIVITIY SCORE: 16
CLIMB 3 TO 5 STEPS WITH RAILING: TOTAL
PERSONAL GROOMING: A LITTLE
TURNING FROM BACK TO SIDE WHILE IN FLAT BAD: A LITTLE
MOBILITY SCORE: 8
WALKING IN HOSPITAL ROOM: TOTAL

## 2024-12-19 ASSESSMENT — PAIN - FUNCTIONAL ASSESSMENT
PAIN_FUNCTIONAL_ASSESSMENT: 0-10

## 2024-12-19 ASSESSMENT — ACTIVITIES OF DAILY LIVING (ADL)
HOME_MANAGEMENT_TIME_ENTRY: 15
BATHING_ASSISTANCE: MODERATE
ADL_ASSISTANCE: NEEDS ASSISTANCE

## 2024-12-19 NOTE — PROGRESS NOTES
Vancomycin Dosing by Pharmacy- FOLLOW UP    Shirin Slater is a 54 y.o. year old female who Pharmacy has been consulted for vancomycin dosing for cellulitis, skin and soft tissue. Based on the patient's indication and renal status this patient is being dosed based on a goal AUC of 400-600.     Renal function is currently stable.    Current vancomycin dose: 1500 mg given every 12 hours    Estimated vancomycin AUC on current dose: 669 mg/L.hr     Visit Vitals  BP 98/58   Pulse 75   Temp 35.9 °C (96.6 °F)   Resp 17        Lab Results   Component Value Date    CREATININE 0.60 2024    CREATININE 0.43 (L) 2024    CREATININE 0.41 (L) 2024    CREATININE 0.46 (L) 10/03/2024        Patient weight is as follows:   Vitals:    24 1847   Weight: 88 kg (194 lb)       Cultures:  No results found for the encounter in last 14 days.       I/O last 3 completed shifts:  In: 3311 (37.6 mL/kg) [P.O.:1200; I.V.:111 (1.3 mL/kg); IV Piggyback:2000]  Out: 3945 (44.8 mL/kg) [Urine:3945 (1.2 mL/kg/hr)]  Weight: 88 kg   I/O during current shift:  I/O this shift:  In: 240 [P.O.:240]  Out: -     Temp (24hrs), Av.3 °C (97.3 °F), Min:35.9 °C (96.6 °F), Max:36.4 °C (97.5 °F)      Assessment/Plan    Above goal AUC. Orders placed for new vancomcyin regimen of 1250 mg every 12 hours to begin at 1700.    This dosing regimen is predicted by [employer] to result in the following pharmacokinetic parameters:  Regimen: 1250 mg IV every 12 hours.  Start time: 18:19 on 2024  Exposure target: AUC24 (range)400-600 mg/L.hr   VVE20-79: 571 mg/L.hr  AUC24,ss: 560 mg/L.hr  Probability of AUC24 > 400: 96 %  Ctrough,ss: 16.5 mg/L  Probability of Ctrough,ss > 20: 26 %    The next level will be obtained on 24 at 1000. May be obtained sooner if clinically indicated.   Will continue to monitor renal function daily while on vancomycin and order serum creatinine at least every 48 hours if not already ordered.  Follow for continued  vancomycin needs, clinical response, and signs/symptoms of toxicity.       Isaias Delgado, PharmD

## 2024-12-19 NOTE — PROGRESS NOTES
Occupational Therapy    Evaluation and Treatment    Patient Name: Shirin Slater  MRN: [id number]  Today's Date: 12/19/2024  Room: [facility]  Time Calculation  Start Time: 1017  Stop Time: 1047  Time Calculation (min): 30 min    Assessment  IP OT Assessment  OT Assessment: Pt demos deficits in I/ADLs and functional mobility/transfers indicating the need for continued skilled OT services at Mod intensity to allow for a safe and functional d/c.  Prognosis: Good  Barriers to Discharge Home: No anticipated barriers  Evaluation/Treatment Tolerance: Patient tolerated treatment well  End of Session Communication: Bedside nurse  End of Session Patient Position: Bed, 3 rail up, Alarm off, not on at start of session  Plan:  Inpatient Plan  Treatment Interventions: ADL retraining, Functional transfer training, Endurance training, Patient/family training, Equipment evaluation/education, Compensatory technique education  OT Frequency: 3 times per week  OT Discharge Recommendations: Moderate intensity level of continued care  OT Recommended Transfer Status: Assist of 2  OT - OK to Discharge: Yes  OT Assessment  OT Assessment Results: Decreased ADL status, Decreased endurance, Decreased functional mobility, Decreased IADLs  Prognosis: Good  Barriers to Discharge: None  Evaluation/Treatment Tolerance: Patient tolerated treatment well  Strengths: Ability to acquire knowledge, Attitude of self, Premorbid level of function, Support of Caregivers  Barriers to Participation: Comorbidities    Subjective   Current Problem:  1. Acute lower limb ischemia  Case Request Operating Room: Amputation Above Knee    Case Request Operating Room: Amputation Above Knee    Surgical Pathology Exam    Surgical Pathology Exam    Fungal Culture/Smear    Fungal Culture/Smear    Tissue/Wound Culture/Smear    Tissue/Wound Culture/Smear      2. Critical limb ischemia of left lower extremity (Multi)  Case Request Operating Room: Amputation Above Knee    Case  Request Operating Room: Amputation Above Knee    Surgical Pathology Exam    Surgical Pathology Exam    Fungal Culture/Smear    Fungal Culture/Smear    Tissue/Wound Culture/Smear    Tissue/Wound Culture/Smear        General:  Reason for Referral: This 52 y/o female w/ recent hx of L toe amputation in Sept, presented to [facility] from OSH 12/17 w/ c/f acute limb ischemia and sepsis. 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 Miller  Past Medical History Relevant to Rehab: Severe PAD s/p R hip disarticulation s/p L CFA and EIA embolectomy s/p multiple L toe amputations, T2DM c/b diabetic neuropathy, HTN, HLD, renal and splenic thrombosis  Co-Treatment: PT  Co-Treatment Reason: to maximize safe pt mobility; AMPAC <10  Prior to Session Communication: Bedside nurse  Patient Position Received: Bed, 3 rail up, Alarm off, not on at start of session  General Comment: Pt sup on approach. Pleasant and agreeable to OT assessement   Precautions:  LE Weight Bearing Status: Left Non-Weight Bearing  Medical Precautions: Fall precautions  Pain:  Pain Assessment  0-10 (Numeric) Pain Score:  (Intermittent c/o LLE pain during session.)  Lines/Tubes/Drains:  Urethral Catheter Double-lumen 14 Fr. (Active)   Number of days: 1     Objective   Cognition:  Overall Cognitive Status: Within Functional Limits  Orientation Level: Oriented X4  Insight: Within function limits  Impulsive: Within functional limits  Home Living:  Type of Home: Apartment  Lives With: Alone (HHA 47 hr/wk 7d/wk)  Home Adaptive Equipment: Walker rolling or standard, Wheelchair-manual, Hospital bed (slide board)  Home Layout: One level  Home Access: Level entry, No concerns  Bathroom Shower/Tub: Tub/shower unit  Bathroom Toilet: Adaptive toilet seating  Bathroom Equipment: Grab bars in shower, Tub transfer bench, Bedside commode   Prior Function:  Level of Thicket: Needs assistance with ADLs, Needs assistance with homemaking  Receives Help From: Home health  ADL Assistance: Needs  assistance (Assist for s/u prior to decline leading to hospitalization)  Homemaking Assistance: Needs assistance  Ambulatory Assistance:  (Pt reports being bedbound for weeks per MD request. Prior pt was transfering IND w/ slideboard)  ADL:  Eating Assistance: Independent  Grooming Assistance: Stand by  Bathing Assistance: Moderate  UE Dressing Assistance: Stand by  LE Dressing Assistance: Maximal  Toileting Assistance with Device: Maximal  Activity Tolerance:  Endurance: Tolerates 30 min exercise with multiple rests  Bed Mobility/Transfers: Bed Mobility/Transfers: Bed Mobility  Bed Mobility: Yes  Bed Mobility 1  Bed Mobility 1: Supine to sitting  Level of Assistance 1: Moderate assistance  Bed Mobility Comments 1: HOB elevated  Bed Mobility 2  Bed Mobility  2: Scooting  Level of Assistance 2: Maximum assistance, +2  Bed Mobility Comments 2: Use of pads, lateral scoot along EOB  Bed Mobility 3  Bed Mobility 3: Sitting to supine  Level of Assistance 3: Contact guard  Vision: Vision - Basic Assessment  Current Vision: Wears glasses all the time  Sensation:  Sensation Comment: Pt reports intermittent phantom pain  Coordination:  Movements are Fluid and Coordinated: Yes   Hand Function:  Hand Function  Gross Grasp: Functional  Coordination: Functional  Extremities:   RUE   RUE : Within Functional Limits, LUE   LUE: Within Functional Limits  Outcome Measures: [facility] Daily Activity  Putting on and taking off regular lower body clothing: A lot  Bathing (including washing, rinsing, drying): A lot  Putting on and taking off regular upper body clothing: A little  Toileting, which includes using toilet, bedpan or urinal: A lot  Taking care of personal grooming such as brushing teeth: A little  Eating Meals: None  Daily Activity - Total Score: 16         ,     OT Adult Other Outcome Measures  4AT: 0    Education Documentation  Body Mechanics, taught by Mony Jackson OT at 12/19/2024 12:39 PM.  Learner: Patient  Readiness:  Acceptance  Method: Explanation  Response: Verbalizes Understanding    Precautions, taught by Mony Kemp OT at 12/19/2024 12:39 PM.  Learner: Patient  Readiness: Acceptance  Method: Explanation  Response: Verbalizes Understanding    ADL Training, taught by Mony Kemp OT at 12/19/2024 12:39 PM.  Learner: Patient  Readiness: Acceptance  Method: Explanation  Response: Verbalizes Understanding    Education Comments  No comments found.        Goals:   Encounter Problems       Encounter Problems (Active)       ADLs       Patient will complete toileting including hygiene clothing management/hygiene with modified independent level of assistance and bedside commode.       Start:  12/19/24    Expected End:  01/02/25               BALANCE       Patient will tolerate sitting for 30 minutes to modified independent level of assistance in order to improve functional activity tolerance for ADL tasks.       Start:  12/19/24    Expected End:  01/02/25               TRANSFERS       Patient will perform bed mobility modified independent level of assistance and bed rails in order to improve safety and independence with mobility       Start:  12/19/24    Expected End:  01/02/25            Patient will complete functional transfer to chair/BSC with least restrictive device with contact guard assist level of assistance.       Start:  12/19/24    Expected End:  01/02/25                   Treatment Completed on Evaluation    Activities of Daily Living:    Grooming  Grooming Level of Assistance: Setup  Grooming Where Assessed: Edge of bed  Grooming Comments: Shaving, washing face, brushing teeth, and applying deoderant    12/19/24 at 12:40 PM   MONY KEMP OT   Rehab Office: [phone]

## 2024-12-19 NOTE — PROGRESS NOTES
Physical Therapy    Physical Therapy Treatment    Patient Name: Shirin Slater  MRN: [id number]  Today's Date: 12/19/2024  Time Calculation  Start Time: 1018  Stop Time: 1048  Time Calculation (min): 30 min       Assessment/Plan   PT Assessment  End of Session Communication: Bedside nurse  End of Session Patient Position: Bed, 3 rail up, Alarm off, not on at start of session     PT Plan  Treatment/Interventions: Bed mobility, Transfer training, Gait training, Stair training, Balance training, Strengthening, Endurance training, Range of motion, Therapeutic exercise, Therapeutic activity, Home exercise program, Positioning  PT Plan: Ongoing PT  PT Frequency: 3 times per week  PT Discharge Recommendations: Moderate intensity level of continued care  PT Recommended Transfer Status:  (x1-2)  PT - OK to Discharge: Yes (DC rec made)      General Visit Information:   PT  Visit  PT Received On: 12/19/24  Co-Treatment: OT  Co-Treatment Reason: to maximize safe pt mobility; AMPAC <10  Prior to Session Communication: Bedside nurse  Patient Position Received: Bed, 3 rail up, Alarm off, not on at start of session  Family/Caregiver Present: No  General Comment: pt supine alert and motivated for therapy. able to sit EOB ~20 min SBA-Km 2/2 x1 LOB     Subjective   Precautions:  Precautions  LE Weight Bearing Status: Left Non-Weight Bearing  Medical Precautions: Fall precautions    Objective   Pain:  Pain Assessment  Pain Assessment: 0-10  0-10 (Numeric) Pain Score:  (c/o intermittent pain at LLE)  Cognition:  Cognition  Overall Cognitive Status: Within Functional Limits  Orientation Level: Oriented X4  Lines/Tubes/Drains:  Urethral Catheter Double-lumen 14 Fr. (Active)   Number of days: 1       PT Treatments:     Therapeutic Activity  Therapeutic Activity Performed: Yes  Therapeutic Activity 1: bed mobility  Therapeutic Activity 2: pt sat EOB ~20 min with SBA-Km. x1 LOB when trialing lateral scoots along EOB. able to complete  static and dynamic sitting activities. encouraged transitioning from 2 UE support to 0-1. able to with SBA-CGA  Therapeutic Activity 3: lateral scooting EOB     Bed Mobility  Bed Mobility: Yes  Bed Mobility 1  Bed Mobility 1: Supine to sitting  Level of Assistance 1: Moderate assistance, Minimal verbal cues  Bed Mobility Comments 1: HOB elevated  Bed Mobility 2  Bed Mobility  2: Scooting  Level of Assistance 2: Maximum assistance, +2  Bed Mobility Comments 2: use of pads; lateral scooting along EOB  Bed Mobility 3  Bed Mobility 3: Sitting to supine  Level of Assistance 3: Contact guard, Minimal verbal cues  Ambulation/Gait Training  Ambulation/Gait Training Performed: No  Transfers  Transfer: No  Stairs  Stairs: No          Outcome Measures:  [facility] Basic Mobility  Turning from your back to your side while in a flat bed without using bedrails: A lot  Moving from lying on your back to sitting on the side of a flat bed without using bedrails: A lot  Moving to and from bed to chair (including a wheelchair): Total  Standing up from a chair using your arms (e.g. wheelchair or bedside chair): Total  To walk in hospital room: Total  Climbing 3-5 steps with railing: Total  Basic Mobility - Total Score: 8                            Education Documentation  Precautions, taught by Hiral Tineo PT at 12/19/2024 11:46 AM.  Learner: Patient  Readiness: Acceptance  Method: Explanation  Response: Verbalizes Understanding    Body Mechanics, taught by Hiral Tineo PT at 12/19/2024 11:46 AM.  Learner: Patient  Readiness: Acceptance  Method: Explanation  Response: Verbalizes Understanding    Mobility Training, taught by Hiral Tineo PT at 12/19/2024 11:46 AM.  Learner: Patient  Readiness: Acceptance  Method: Explanation  Response: Verbalizes Understanding    Education Comments  No comments found.          OP EDUCATION:       Encounter Problems       Encounter Problems (Active)       Balance       STG - Maintains  static sitting balance with upper extremity support SBA >/= 10 min  (Progressing)       Start:  12/18/24    Expected End:  01/01/25               Mobility       pt will be independent in UE/LE HEP to promote strengthening  (Progressing)       Start:  12/18/24    Expected End:  01/01/25               PT Transfers       STG - Transfer from bed to chair/WC modA with slideboard  (Progressing)       Start:  12/18/24    Expected End:  01/01/25            STG - Patient will perform bed mobility modA (Progressing)       Start:  12/18/24    Expected End:  01/01/25               Pain - Adult                12/19/24 at 11:46 AM   Hiral Tineo, PT   Rehab Office: [phone]

## 2024-12-19 NOTE — PROGRESS NOTES
VASCULAR SURGERY PROGRESS NOTE  Assessment/Plan   Shirin Slater is 54 y.o. female with history of severe PAD s/p R hip disarticulation s/p L CFA and EIA embolectomy 12/2023 s/p multiple L toe amputations, s/p L femoral endarterectomy, profundaplasty, common [id number] tibial bypass using 6 mm ringed PTFE, left lower extremity angiogram on 9/25 T2DM complicated by diabetic neuropathy, HTN, HLD, renal and splenic thromboses who who presents with severe left leg pain and inability to move or feel or left leg. Imaging consistent with occlusion of her L CFA-AT bypass graft. On exam, patient with Martha 3 limb ischemia. Given leukocytosis, tachycardia, limb ischemia and concern for infection she was taken to OR urgently and had findings of purulence along the anteromedial aspect of the left knee and distal thigh. Muscle within the posterolateral thigh with healthy contraction on stimulation. 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     Patient does not want to consider hip disarticulation, discussed with patient the high risk of non-healing of AKA given limiting perfusion.     Neuro: acute postoperative pain, Hx seizures  - continue tylenol/oxy PRN for pain control  - continue lidoderm patch   - home Keppra, Depakote, Cymbalta, gabapentin     CV: Hx severe PAD with multiple surgical interventions, HTN  - maintain blood pressure control,   - continue statin/ASA  - heparin infusion, low intensity     Pulm:   - continue to encourage IS hourly while awake  - OOB to chair and increase ambulation as tolerated  - albuterol inhaler      FENGI:   - tolerating regular diet  - continue bowel regimen to prevent OIC   - continue PPI  - monitor and replete electrolytes     Endo: DM2, hypothyroidism  - holding home jardiance  - glucomander protocol  - levothyroxine  - liberalized carbs to 75gm/ meal- discussed avoiding sugar and  processed foods.     Heme: anemia of chronic disease  - no s/sx of bleeding  - monitor H/H, transfuse for Hgb <7     ID:  humble infection required amputation for source control  - tissue and blood cultures sent- pending  - zosyn and Vanco  - requested midline as patient is difficult IV access  - monitor s/s of infection  - WBC    DVT prophylaxis:  - heparin infusion     Dispo-  - floor status    Subjective   No acute overnight events. Patient does not want to consider hip disarticulation, discussed with patient the high risk of non-healing of AKA given limiting perfusion.     Objective   Vitals:  Heart Rate:  []   Temp:  [35.9 °C (96.6 °F)-36.4 °C (97.5 °F)]   Resp:  [17-18]   BP: ()/(51-68)   SpO2:  [94 %-98 %]     Exam:  Constitutional: No acute distress, resting comfortably  Neuro:  AOx3, grossly intact  ENMT: moist mucous membranes  CV: no tachycardia  Pulm: non-labored on RA  GI: soft, non-tender, non-distended  Skin: warm and dry  Musculoskeletal: moving all extremities  Extremities: left aka dressing C/D/I            Labs:  Results from last 7 days   Lab Units 12/19/24 0319 12/18/24  0849 12/17/24  1905   WBC AUTO x10*3/uL 13.2* 19.5* 23.7*   HEMOGLOBIN g/dL 7.4* 8.0* 8.9*   PLATELETS AUTO x10*3/uL 518* 505* 609*      Results from last 7 days   Lab Units 12/19/24 0319 12/18/24  0849 12/17/24  1905   SODIUM mmol/L 138 138 136   POTASSIUM mmol/L 3.8 3.9 3.3*   CHLORIDE mmol/L 104 103 101   CO2 mmol/L 30 27 25   BUN mg/dL 12 10 6   CREATININE mg/dL 0.60 0.43* 0.41*   GLUCOSE mg/dL 240* 339* 226*   MAGNESIUM mg/dL 1.60  --   --    PHOSPHORUS mg/dL 3.1  --   --       Results from last 7 days   Lab Units 12/19/24 0319 12/18/24  0849 12/17/24 2010 12/17/24  1905   INR  1.5* 1.5*  --  1.4*   PROTIME seconds 16.9* 17.0*  --  16.1*   APTT seconds 53* 24*   < > 24*    < > = values in this interval not displayed.      Results from last 7 days   Lab Units 12/19/24  1141 12/19/24  0719 12/19/24  0319   ANTI  XA UNFRACTIONATED IU/mL 0.2 0.3 0.2

## 2024-12-20 LAB
ALBUMIN SERPL BCP-MCNC: 2.3 G/DL (ref 3.4–5)
ANION GAP SERPL CALC-SCNC: 11 MMOL/L (ref 10–20)
APTT PPP: 56 SECONDS (ref 27–38)
BUN SERPL-MCNC: 8 MG/DL (ref 6–23)
CALCIUM SERPL-MCNC: 8.1 MG/DL (ref 8.6–10.6)
CHLORIDE SERPL-SCNC: 101 MMOL/L (ref 98–107)
CO2 SERPL-SCNC: 30 MMOL/L (ref 21–32)
CREAT SERPL-MCNC: 0.45 MG/DL (ref 0.5–1.05)
EGFRCR SERPLBLD CKD-EPI 2021: >90 ML/MIN/1.73M*2
ERYTHROCYTE [DISTWIDTH] IN BLOOD BY AUTOMATED COUNT: 21.9 % (ref 11.5–14.5)
ERYTHROCYTE [DISTWIDTH] IN BLOOD BY AUTOMATED COUNT: 21.9 % (ref 11.5–14.5)
GLUCOSE BLD MANUAL STRIP-MCNC: 138 MG/DL (ref 74–99)
GLUCOSE BLD MANUAL STRIP-MCNC: 140 MG/DL (ref 74–99)
GLUCOSE BLD MANUAL STRIP-MCNC: 164 MG/DL (ref 74–99)
GLUCOSE BLD MANUAL STRIP-MCNC: 212 MG/DL (ref 74–99)
GLUCOSE BLD MANUAL STRIP-MCNC: 227 MG/DL (ref 74–99)
GLUCOSE SERPL-MCNC: 165 MG/DL (ref 74–99)
HCT VFR BLD AUTO: 26.7 % (ref 36–46)
HCT VFR BLD AUTO: 27.2 % (ref 36–46)
HGB BLD-MCNC: 7.5 G/DL (ref 12–16)
HGB BLD-MCNC: 7.9 G/DL (ref 12–16)
INR PPP: 1.4 (ref 0.9–1.1)
MAGNESIUM SERPL-MCNC: 2.07 MG/DL (ref 1.6–2.4)
MCH RBC QN AUTO: 21.2 PG (ref 26–34)
MCH RBC QN AUTO: 21.4 PG (ref 26–34)
MCHC RBC AUTO-ENTMCNC: 27.6 G/DL (ref 32–36)
MCHC RBC AUTO-ENTMCNC: 29.6 G/DL (ref 32–36)
MCV RBC AUTO: 72 FL (ref 80–100)
MCV RBC AUTO: 77 FL (ref 80–100)
NRBC BLD-RTO: 0 /100 WBCS (ref 0–0)
NRBC BLD-RTO: 0 /100 WBCS (ref 0–0)
PHOSPHATE SERPL-MCNC: 3 MG/DL (ref 2.5–4.9)
PLATELET # BLD AUTO: 590 X10*3/UL (ref 150–450)
PLATELET # BLD AUTO: 633 X10*3/UL (ref 150–450)
POTASSIUM SERPL-SCNC: 3.4 MMOL/L (ref 3.5–5.3)
PROTHROMBIN TIME: 15.9 SECONDS (ref 9.8–12.8)
RBC # BLD AUTO: 3.54 X10*6/UL (ref 4–5.2)
RBC # BLD AUTO: 3.69 X10*6/UL (ref 4–5.2)
SODIUM SERPL-SCNC: 139 MMOL/L (ref 136–145)
UFH PPP CHRO-ACNC: 0.3 IU/ML
WBC # BLD AUTO: 13.5 X10*3/UL (ref 4.4–11.3)
WBC # BLD AUTO: 14.2 X10*3/UL (ref 4.4–11.3)

## 2024-12-20 PROCEDURE — 2500000002 HC RX 250 W HCPCS SELF ADMINISTERED DRUGS (ALT 637 FOR MEDICARE OP, ALT 636 FOR OP/ED): Performed by: NURSE PRACTITIONER

## 2024-12-20 PROCEDURE — 82947 ASSAY GLUCOSE BLOOD QUANT: CPT

## 2024-12-20 PROCEDURE — 85027 COMPLETE CBC AUTOMATED: CPT

## 2024-12-20 PROCEDURE — 36415 COLL VENOUS BLD VENIPUNCTURE: CPT

## 2024-12-20 PROCEDURE — 80069 RENAL FUNCTION PANEL: CPT | Performed by: NURSE PRACTITIONER

## 2024-12-20 PROCEDURE — 85027 COMPLETE CBC AUTOMATED: CPT | Performed by: STUDENT IN AN ORGANIZED HEALTH CARE EDUCATION/TRAINING PROGRAM

## 2024-12-20 PROCEDURE — 2500000001 HC RX 250 WO HCPCS SELF ADMINISTERED DRUGS (ALT 637 FOR MEDICARE OP)

## 2024-12-20 PROCEDURE — 1200000002 HC GENERAL ROOM WITH TELEMETRY DAILY

## 2024-12-20 PROCEDURE — 2500000004 HC RX 250 GENERAL PHARMACY W/ HCPCS (ALT 636 FOR OP/ED): Performed by: STUDENT IN AN ORGANIZED HEALTH CARE EDUCATION/TRAINING PROGRAM

## 2024-12-20 PROCEDURE — 85610 PROTHROMBIN TIME: CPT | Performed by: STUDENT IN AN ORGANIZED HEALTH CARE EDUCATION/TRAINING PROGRAM

## 2024-12-20 PROCEDURE — 2500000001 HC RX 250 WO HCPCS SELF ADMINISTERED DRUGS (ALT 637 FOR MEDICARE OP): Performed by: STUDENT IN AN ORGANIZED HEALTH CARE EDUCATION/TRAINING PROGRAM

## 2024-12-20 PROCEDURE — 2500000001 HC RX 250 WO HCPCS SELF ADMINISTERED DRUGS (ALT 637 FOR MEDICARE OP): Performed by: NURSE PRACTITIONER

## 2024-12-20 PROCEDURE — 83735 ASSAY OF MAGNESIUM: CPT | Performed by: NURSE PRACTITIONER

## 2024-12-20 PROCEDURE — 2500000004 HC RX 250 GENERAL PHARMACY W/ HCPCS (ALT 636 FOR OP/ED)

## 2024-12-20 PROCEDURE — 2500000002 HC RX 250 W HCPCS SELF ADMINISTERED DRUGS (ALT 637 FOR MEDICARE OP, ALT 636 FOR OP/ED): Performed by: STUDENT IN AN ORGANIZED HEALTH CARE EDUCATION/TRAINING PROGRAM

## 2024-12-20 PROCEDURE — 85520 HEPARIN ASSAY: CPT

## 2024-12-20 RX ADMIN — HYDROMORPHONE HYDROCHLORIDE 0.2 MG: 1 INJECTION, SOLUTION INTRAMUSCULAR; INTRAVENOUS; SUBCUTANEOUS at 11:37

## 2024-12-20 RX ADMIN — METOPROLOL TARTRATE 12.5 MG: 25 TABLET, FILM COATED ORAL at 21:38

## 2024-12-20 RX ADMIN — GABAPENTIN 400 MG: 400 CAPSULE ORAL at 06:13

## 2024-12-20 RX ADMIN — HEPARIN SODIUM 2100 UNITS/HR: 10000 INJECTION, SOLUTION INTRAVENOUS at 02:38

## 2024-12-20 RX ADMIN — OXYCODONE HYDROCHLORIDE 10 MG: 5 TABLET ORAL at 07:50

## 2024-12-20 RX ADMIN — ATORVASTATIN CALCIUM 80 MG: 80 TABLET, FILM COATED ORAL at 21:38

## 2024-12-20 RX ADMIN — ACETAMINOPHEN 650 MG: 325 TABLET, FILM COATED ORAL at 18:56

## 2024-12-20 RX ADMIN — PIPERACILLIN SODIUM AND TAZOBACTAM SODIUM 3.38 G: 3; .375 INJECTION, SOLUTION INTRAVENOUS at 23:42

## 2024-12-20 RX ADMIN — DIVALPROEX SODIUM 500 MG: 500 TABLET, FILM COATED, EXTENDED RELEASE ORAL at 08:01

## 2024-12-20 RX ADMIN — OXYCODONE HYDROCHLORIDE 10 MG: 5 TABLET ORAL at 18:59

## 2024-12-20 RX ADMIN — PIPERACILLIN SODIUM AND TAZOBACTAM SODIUM 3.38 G: 3; .375 INJECTION, SOLUTION INTRAVENOUS at 10:18

## 2024-12-20 RX ADMIN — DULOXETINE HYDROCHLORIDE 60 MG: 60 CAPSULE, DELAYED RELEASE ORAL at 08:00

## 2024-12-20 RX ADMIN — INSULIN LISPRO 11 UNITS: 100 INJECTION, SOLUTION INTRAVENOUS; SUBCUTANEOUS at 18:50

## 2024-12-20 RX ADMIN — LEVOTHYROXINE SODIUM 75 MCG: 0.07 TABLET ORAL at 06:13

## 2024-12-20 RX ADMIN — VANCOMYCIN HYDROCHLORIDE 1250 MG: 1.25 INJECTION, POWDER, LYOPHILIZED, FOR SOLUTION INTRAVENOUS at 06:00

## 2024-12-20 RX ADMIN — VANCOMYCIN HYDROCHLORIDE 1250 MG: 1.25 INJECTION, POWDER, LYOPHILIZED, FOR SOLUTION INTRAVENOUS at 17:34

## 2024-12-20 RX ADMIN — PIPERACILLIN SODIUM AND TAZOBACTAM SODIUM 3.38 G: 3; .375 INJECTION, SOLUTION INTRAVENOUS at 02:39

## 2024-12-20 RX ADMIN — PANTOPRAZOLE SODIUM 40 MG: 40 TABLET, DELAYED RELEASE ORAL at 06:12

## 2024-12-20 RX ADMIN — LEVETIRACETAM 500 MG: 500 TABLET, FILM COATED ORAL at 08:00

## 2024-12-20 RX ADMIN — HEPARIN SODIUM 2100 UNITS/HR: 10000 INJECTION, SOLUTION INTRAVENOUS at 17:39

## 2024-12-20 RX ADMIN — INSULIN GLARGINE 22 UNITS: 100 INJECTION, SOLUTION SUBCUTANEOUS at 21:51

## 2024-12-20 RX ADMIN — OXYCODONE HYDROCHLORIDE 10 MG: 5 TABLET ORAL at 14:15

## 2024-12-20 RX ADMIN — LEVETIRACETAM 500 MG: 500 TABLET, FILM COATED ORAL at 21:38

## 2024-12-20 RX ADMIN — ACETAMINOPHEN 650 MG: 325 TABLET, FILM COATED ORAL at 13:29

## 2024-12-20 RX ADMIN — ASPIRIN 81 MG: 81 TABLET, COATED ORAL at 08:00

## 2024-12-20 RX ADMIN — INSULIN LISPRO 7 UNITS: 100 INJECTION, SOLUTION INTRAVENOUS; SUBCUTANEOUS at 07:56

## 2024-12-20 RX ADMIN — OXYCODONE HYDROCHLORIDE 10 MG: 5 TABLET ORAL at 03:44

## 2024-12-20 RX ADMIN — PIPERACILLIN SODIUM AND TAZOBACTAM SODIUM 3.38 G: 3; .375 INJECTION, SOLUTION INTRAVENOUS at 17:34

## 2024-12-20 RX ADMIN — GABAPENTIN 400 MG: 400 CAPSULE ORAL at 21:38

## 2024-12-20 RX ADMIN — GABAPENTIN 400 MG: 400 CAPSULE ORAL at 13:30

## 2024-12-20 RX ADMIN — INSULIN LISPRO 6 UNITS: 100 INJECTION, SOLUTION INTRAVENOUS; SUBCUTANEOUS at 13:27

## 2024-12-20 RX ADMIN — METOPROLOL TARTRATE 12.5 MG: 25 TABLET, FILM COATED ORAL at 08:00

## 2024-12-20 ASSESSMENT — PAIN SCALES - GENERAL
PAINLEVEL_OUTOF10: 9
PAINLEVEL_OUTOF10: 9
PAINLEVEL_OUTOF10: 4
PAINLEVEL_OUTOF10: 7
PAINLEVEL_OUTOF10: 10 - WORST POSSIBLE PAIN

## 2024-12-20 ASSESSMENT — COGNITIVE AND FUNCTIONAL STATUS - GENERAL
TURNING FROM BACK TO SIDE WHILE IN FLAT BAD: A LITTLE
MOVING TO AND FROM BED TO CHAIR: TOTAL
HELP NEEDED FOR BATHING: A LOT
DRESSING REGULAR UPPER BODY CLOTHING: A LOT
CLIMB 3 TO 5 STEPS WITH RAILING: TOTAL
DRESSING REGULAR LOWER BODY CLOTHING: A LOT
PERSONAL GROOMING: A LITTLE
MOBILITY SCORE: 11
DAILY ACTIVITIY SCORE: 15
STANDING UP FROM CHAIR USING ARMS: TOTAL
TOILETING: A LOT
WALKING IN HOSPITAL ROOM: TOTAL

## 2024-12-20 ASSESSMENT — PAIN - FUNCTIONAL ASSESSMENT: PAIN_FUNCTIONAL_ASSESSMENT: 0-10

## 2024-12-20 NOTE — PROGRESS NOTES
Physical Therapy                 Therapy Communication Note    Patient Name: Shirin Slater  MRN: [id number]  Department: [place] [address]  Room: 7087/7087-A  Today's Date: 12/20/2024     Discipline: Physical Therapy    PT Missed Visit: Yes     Missed Visit Reason: Missed Visit Reason: Patient refused (Pt refused endorsing 10/10 burning L LE pain. Will re attempt as available.)    Missed Time: Attempt    Comment:

## 2024-12-20 NOTE — PROGRESS NOTES
VASCULAR SURGERY PROGRESS NOTE  Assessment/Plan   Shirin Slater is 54 y.o. female with history of severe PAD s/p R hip disarticulation s/p L CFA and EIA embolectomy 12/2023 s/p multiple L toe amputations, s/p L femoral endarterectomy, profundaplasty, common kyngvuw-cj-fmgvxiaf tibial bypass using 6 mm ringed PTFE, left lower extremity angiogram on 9/25 T2DM complicated by diabetic neuropathy, HTN, HLD, renal and splenic thromboses who who presents with severe left leg pain and inability to move or feel or left leg. Imaging consistent with occlusion of her L CFA-AT bypass graft. On exam, patient with [place] 3 limb ischemia. Given leukocytosis, tachycardia, limb ischemia and concern for infection she was taken to OR urgently and had findings of purulence along the anteromedial aspect of the left knee and distal thigh. Muscle within the posterolateral thigh with healthy contraction on stimulation. 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     Patient does not want to consider hip disarticulation, discussed with patient the high risk of non-healing of AKA given limiting perfusion. Will plan for OR on Friday for AKA formalization     Neuro: acute postoperative pain, Hx seizures  - continue tylenol/oxy PRN for pain control  - continue lidoderm patch   - home Keppra, Depakote, Cymbalta, gabapentin     CV: Hx severe PAD with multiple surgical interventions, HTN  - maintain blood pressure control,   - continue statin/ASA  - heparin infusion, low intensity     Pulm:   - continue to encourage IS hourly while awake  - OOB to chair and increase ambulation as tolerated  - albuterol inhaler      FENGI:   - tolerating regular diet  - continue bowel regimen to prevent OIC   - continue PPI  - monitor and replete electrolytes     Endo: DM2, hypothyroidism  - holding home jardiance  - glucomander protocol  - levothyroxine  - liberalized  carbs to 75gm/ meal- discussed avoiding sugar and processed foods.     Heme: anemia of chronic disease  - no s/sx of bleeding  - monitor H/H, transfuse for Hgb <7     ID:  humble infection required amputation for source control  - tissue and blood cultures sent- pending  - zosyn and Vanco  - requested midline as patient is difficult IV access  - monitor s/s of infection  - WBC    DVT prophylaxis:  - heparin infusion     Dispo-  - floor status    Subjective   No acute overnight events. Patient does not want to consider hip disarticulation, discussed with patient the high risk of non-healing of AKA given limiting perfusion.     Objective   Vitals:  Heart Rate:  [90-96]   Temp:  [35.9 °C (96.6 °F)-36.6 °C (97.9 °F)]   Resp:  [16-20]   BP: (103-110)/(57-70)   SpO2:  [92 %-100 %]     Exam:  Constitutional: No acute distress, resting comfortably  Neuro:  AOx3, grossly intact  ENMT: moist mucous membranes  CV: no tachycardia  Pulm: non-labored on RA  GI: soft, non-tender, non-distended  Skin: warm and dry  Musculoskeletal: moving all extremities  Extremities: left aka dressing C/D/I    Labs:  Results from last 7 days   Lab Units 12/20/24  0802 12/20/24  0350 12/19/24  0319   WBC AUTO x10*3/uL 14.2* 13.5* 13.2*   HEMOGLOBIN g/dL 7.9* 7.5* 7.4*   PLATELETS AUTO x10*3/uL 633* 590* 518*      Results from last 7 days   Lab Units 12/20/24  0350 12/19/24  0319 12/18/24  0849 12/18/24  0849   SODIUM mmol/L 139 138  --  138   POTASSIUM mmol/L 3.4* 3.8  --  3.9   CHLORIDE mmol/L 101 104  --  103   CO2 mmol/L 30 30  --  27   BUN mg/dL 8 12  --  10   CREATININE mg/dL 0.45* 0.60  --  0.43*   GLUCOSE mg/dL 165* 240*  --  339*   MAGNESIUM mg/dL 2.07 1.60   < >  --    PHOSPHORUS mg/dL 3.0 3.1   < >  --     < > = values in this interval not displayed.      Results from last 7 days   Lab Units 12/20/24  0350 12/19/24  0319 12/18/24  0849   INR  1.4* 1.5* 1.5*   PROTIME seconds 15.9* 16.9* 17.0*   APTT seconds 56* 53* 24*      Results from  last 7 days   Lab Units 12/20/24  0350 12/19/24  2137 12/19/24  1716   ANTI XA UNFRACTIONATED IU/mL 0.3 0.2 0.3

## 2024-12-21 LAB
ALBUMIN SERPL BCP-MCNC: 2.4 G/DL (ref 3.4–5)
ANION GAP SERPL CALC-SCNC: 13 MMOL/L (ref 10–20)
BACTERIA BLD CULT: NORMAL
BACTERIA BLD CULT: NORMAL
BLOOD EXPIRATION DATE: NORMAL
BLOOD EXPIRATION DATE: NORMAL
BUN SERPL-MCNC: 9 MG/DL (ref 6–23)
CALCIUM SERPL-MCNC: 8.3 MG/DL (ref 8.6–10.6)
CHLORIDE SERPL-SCNC: 101 MMOL/L (ref 98–107)
CO2 SERPL-SCNC: 30 MMOL/L (ref 21–32)
CREAT SERPL-MCNC: 0.36 MG/DL (ref 0.5–1.05)
DISPENSE STATUS: NORMAL
DISPENSE STATUS: NORMAL
EGFRCR SERPLBLD CKD-EPI 2021: >90 ML/MIN/1.73M*2
ERYTHROCYTE [DISTWIDTH] IN BLOOD BY AUTOMATED COUNT: 22.6 % (ref 11.5–14.5)
GLUCOSE BLD MANUAL STRIP-MCNC: 134 MG/DL (ref 74–99)
GLUCOSE BLD MANUAL STRIP-MCNC: 169 MG/DL (ref 74–99)
GLUCOSE BLD MANUAL STRIP-MCNC: 174 MG/DL (ref 74–99)
GLUCOSE SERPL-MCNC: 146 MG/DL (ref 74–99)
HCT VFR BLD AUTO: 27.3 % (ref 36–46)
HGB BLD-MCNC: 7.5 G/DL (ref 12–16)
MAGNESIUM SERPL-MCNC: 2 MG/DL (ref 1.6–2.4)
MCH RBC QN AUTO: 21.2 PG (ref 26–34)
MCHC RBC AUTO-ENTMCNC: 27.5 G/DL (ref 32–36)
MCV RBC AUTO: 77 FL (ref 80–100)
NRBC BLD-RTO: 0 /100 WBCS (ref 0–0)
PHOSPHATE SERPL-MCNC: 3.9 MG/DL (ref 2.5–4.9)
PLATELET # BLD AUTO: 576 X10*3/UL (ref 150–450)
POTASSIUM SERPL-SCNC: 3.3 MMOL/L (ref 3.5–5.3)
PRODUCT BLOOD TYPE: 6200
PRODUCT BLOOD TYPE: 6200
PRODUCT CODE: NORMAL
PRODUCT CODE: NORMAL
RBC # BLD AUTO: 3.53 X10*6/UL (ref 4–5.2)
SODIUM SERPL-SCNC: 141 MMOL/L (ref 136–145)
UFH PPP CHRO-ACNC: 0.4 IU/ML
UNIT ABO: NORMAL
UNIT ABO: NORMAL
UNIT NUMBER: NORMAL
UNIT NUMBER: NORMAL
UNIT RH: NORMAL
UNIT RH: NORMAL
UNIT VOLUME: 350
UNIT VOLUME: 350
VANCOMYCIN SERPL-MCNC: 22.6 UG/ML (ref 5–20)
WBC # BLD AUTO: 15.5 X10*3/UL (ref 4.4–11.3)
XM INTEP: NORMAL
XM INTEP: NORMAL

## 2024-12-21 PROCEDURE — 2500000002 HC RX 250 W HCPCS SELF ADMINISTERED DRUGS (ALT 637 FOR MEDICARE OP, ALT 636 FOR OP/ED): Performed by: STUDENT IN AN ORGANIZED HEALTH CARE EDUCATION/TRAINING PROGRAM

## 2024-12-21 PROCEDURE — 1100000001 HC PRIVATE ROOM DAILY

## 2024-12-21 PROCEDURE — 2500000001 HC RX 250 WO HCPCS SELF ADMINISTERED DRUGS (ALT 637 FOR MEDICARE OP)

## 2024-12-21 PROCEDURE — 2500000004 HC RX 250 GENERAL PHARMACY W/ HCPCS (ALT 636 FOR OP/ED): Performed by: STUDENT IN AN ORGANIZED HEALTH CARE EDUCATION/TRAINING PROGRAM

## 2024-12-21 PROCEDURE — 80202 ASSAY OF VANCOMYCIN: CPT | Performed by: STUDENT IN AN ORGANIZED HEALTH CARE EDUCATION/TRAINING PROGRAM

## 2024-12-21 PROCEDURE — 36415 COLL VENOUS BLD VENIPUNCTURE: CPT

## 2024-12-21 PROCEDURE — 2500000002 HC RX 250 W HCPCS SELF ADMINISTERED DRUGS (ALT 637 FOR MEDICARE OP, ALT 636 FOR OP/ED)

## 2024-12-21 PROCEDURE — 85027 COMPLETE CBC AUTOMATED: CPT | Performed by: STUDENT IN AN ORGANIZED HEALTH CARE EDUCATION/TRAINING PROGRAM

## 2024-12-21 PROCEDURE — 83735 ASSAY OF MAGNESIUM: CPT | Performed by: NURSE PRACTITIONER

## 2024-12-21 PROCEDURE — 2500000004 HC RX 250 GENERAL PHARMACY W/ HCPCS (ALT 636 FOR OP/ED)

## 2024-12-21 PROCEDURE — 80069 RENAL FUNCTION PANEL: CPT | Performed by: NURSE PRACTITIONER

## 2024-12-21 PROCEDURE — 2500000002 HC RX 250 W HCPCS SELF ADMINISTERED DRUGS (ALT 637 FOR MEDICARE OP, ALT 636 FOR OP/ED): Performed by: NURSE PRACTITIONER

## 2024-12-21 PROCEDURE — 2500000001 HC RX 250 WO HCPCS SELF ADMINISTERED DRUGS (ALT 637 FOR MEDICARE OP): Performed by: NURSE PRACTITIONER

## 2024-12-21 PROCEDURE — 2500000001 HC RX 250 WO HCPCS SELF ADMINISTERED DRUGS (ALT 637 FOR MEDICARE OP): Performed by: STUDENT IN AN ORGANIZED HEALTH CARE EDUCATION/TRAINING PROGRAM

## 2024-12-21 PROCEDURE — 82947 ASSAY GLUCOSE BLOOD QUANT: CPT

## 2024-12-21 PROCEDURE — 85520 HEPARIN ASSAY: CPT

## 2024-12-21 PROCEDURE — 36415 COLL VENOUS BLD VENIPUNCTURE: CPT | Performed by: STUDENT IN AN ORGANIZED HEALTH CARE EDUCATION/TRAINING PROGRAM

## 2024-12-21 RX ORDER — OXYCODONE HYDROCHLORIDE 5 MG/1
5 TABLET ORAL EVERY 4 HOURS PRN
Status: DISCONTINUED | OUTPATIENT
Start: 2024-12-21 | End: 2024-12-22

## 2024-12-21 RX ORDER — OXYCODONE HYDROCHLORIDE 5 MG/1
5 TABLET ORAL EVERY 6 HOURS PRN
Status: DISCONTINUED | OUTPATIENT
Start: 2024-12-21 | End: 2024-12-21

## 2024-12-21 RX ORDER — POTASSIUM CHLORIDE 20 MEQ/1
40 TABLET, EXTENDED RELEASE ORAL 2 TIMES DAILY
Status: COMPLETED | OUTPATIENT
Start: 2024-12-21 | End: 2024-12-21

## 2024-12-21 RX ORDER — OXYCODONE HYDROCHLORIDE 5 MG/1
2.5 TABLET ORAL ONCE
Status: COMPLETED | OUTPATIENT
Start: 2024-12-21 | End: 2024-12-21

## 2024-12-21 RX ADMIN — GABAPENTIN 400 MG: 400 CAPSULE ORAL at 06:34

## 2024-12-21 RX ADMIN — METOPROLOL TARTRATE 12.5 MG: 25 TABLET, FILM COATED ORAL at 08:41

## 2024-12-21 RX ADMIN — METOPROLOL TARTRATE 12.5 MG: 25 TABLET, FILM COATED ORAL at 20:13

## 2024-12-21 RX ADMIN — POTASSIUM CHLORIDE 40 MEQ: 1500 TABLET, EXTENDED RELEASE ORAL at 10:38

## 2024-12-21 RX ADMIN — ACETAMINOPHEN 650 MG: 325 TABLET, FILM COATED ORAL at 06:33

## 2024-12-21 RX ADMIN — SODIUM CHLORIDE 1.25 G: 9 INJECTION, SOLUTION INTRAVENOUS at 05:27

## 2024-12-21 RX ADMIN — INSULIN LISPRO 9 UNITS: 100 INJECTION, SOLUTION INTRAVENOUS; SUBCUTANEOUS at 15:15

## 2024-12-21 RX ADMIN — HYDROMORPHONE HYDROCHLORIDE 0.2 MG: 1 INJECTION, SOLUTION INTRAMUSCULAR; INTRAVENOUS; SUBCUTANEOUS at 13:38

## 2024-12-21 RX ADMIN — LEVETIRACETAM 500 MG: 500 TABLET, FILM COATED ORAL at 08:41

## 2024-12-21 RX ADMIN — DULOXETINE HYDROCHLORIDE 60 MG: 60 CAPSULE, DELAYED RELEASE ORAL at 08:41

## 2024-12-21 RX ADMIN — OXYCODONE HYDROCHLORIDE 5 MG: 5 TABLET ORAL at 23:00

## 2024-12-21 RX ADMIN — OXYCODONE HYDROCHLORIDE 5 MG: 5 TABLET ORAL at 17:45

## 2024-12-21 RX ADMIN — OXYCODONE HYDROCHLORIDE 5 MG: 5 TABLET ORAL at 17:44

## 2024-12-21 RX ADMIN — PANTOPRAZOLE SODIUM 40 MG: 40 TABLET, DELAYED RELEASE ORAL at 06:34

## 2024-12-21 RX ADMIN — PIPERACILLIN SODIUM AND TAZOBACTAM SODIUM 3.38 G: 3; .375 INJECTION, SOLUTION INTRAVENOUS at 20:17

## 2024-12-21 RX ADMIN — GABAPENTIN 400 MG: 400 CAPSULE ORAL at 15:13

## 2024-12-21 RX ADMIN — DIVALPROEX SODIUM 1000 MG: 500 TABLET, FILM COATED, EXTENDED RELEASE ORAL at 20:14

## 2024-12-21 RX ADMIN — PIPERACILLIN SODIUM AND TAZOBACTAM SODIUM 3.38 G: 3; .375 INJECTION, SOLUTION INTRAVENOUS at 08:50

## 2024-12-21 RX ADMIN — LEVETIRACETAM 500 MG: 500 TABLET, FILM COATED ORAL at 20:14

## 2024-12-21 RX ADMIN — ACETAMINOPHEN 650 MG: 325 TABLET, FILM COATED ORAL at 00:19

## 2024-12-21 RX ADMIN — ATORVASTATIN CALCIUM 80 MG: 80 TABLET, FILM COATED ORAL at 20:14

## 2024-12-21 RX ADMIN — SODIUM CHLORIDE 1.25 G: 9 INJECTION, SOLUTION INTRAVENOUS at 17:45

## 2024-12-21 RX ADMIN — GABAPENTIN 400 MG: 400 CAPSULE ORAL at 21:30

## 2024-12-21 RX ADMIN — POTASSIUM CHLORIDE 40 MEQ: 1500 TABLET, EXTENDED RELEASE ORAL at 20:14

## 2024-12-21 RX ADMIN — HYDROMORPHONE HYDROCHLORIDE 0.2 MG: 1 INJECTION, SOLUTION INTRAMUSCULAR; INTRAVENOUS; SUBCUTANEOUS at 20:14

## 2024-12-21 RX ADMIN — INSULIN GLARGINE 22 UNITS: 100 INJECTION, SOLUTION SUBCUTANEOUS at 20:56

## 2024-12-21 RX ADMIN — PIPERACILLIN SODIUM AND TAZOBACTAM SODIUM 3.38 G: 3; .375 INJECTION, SOLUTION INTRAVENOUS at 15:13

## 2024-12-21 RX ADMIN — ASPIRIN 81 MG: 81 TABLET, COATED ORAL at 08:41

## 2024-12-21 RX ADMIN — OXYCODONE HYDROCHLORIDE 5 MG: 5 TABLET ORAL at 11:35

## 2024-12-21 RX ADMIN — OXYCODONE HYDROCHLORIDE 10 MG: 5 TABLET ORAL at 00:00

## 2024-12-21 RX ADMIN — ACETAMINOPHEN 650 MG: 325 TABLET, FILM COATED ORAL at 23:00

## 2024-12-21 RX ADMIN — ACETAMINOPHEN 650 MG: 325 TABLET, FILM COATED ORAL at 11:35

## 2024-12-21 RX ADMIN — LEVOTHYROXINE SODIUM 75 MCG: 0.07 TABLET ORAL at 06:33

## 2024-12-21 RX ADMIN — OXYCODONE HYDROCHLORIDE 2.5 MG: 5 TABLET ORAL at 21:29

## 2024-12-21 RX ADMIN — DIVALPROEX SODIUM 1000 MG: 500 TABLET, FILM COATED, EXTENDED RELEASE ORAL at 00:17

## 2024-12-21 RX ADMIN — ACETAMINOPHEN 650 MG: 325 TABLET, FILM COATED ORAL at 17:44

## 2024-12-21 RX ADMIN — HEPARIN SODIUM 2100 UNITS/HR: 10000 INJECTION, SOLUTION INTRAVENOUS at 18:35

## 2024-12-21 RX ADMIN — INSULIN LISPRO 10 UNITS: 100 INJECTION, SOLUTION INTRAVENOUS; SUBCUTANEOUS at 11:26

## 2024-12-21 RX ADMIN — HEPARIN SODIUM 2100 UNITS/HR: 10000 INJECTION, SOLUTION INTRAVENOUS at 05:26

## 2024-12-21 RX ADMIN — DIVALPROEX SODIUM 500 MG: 500 TABLET, FILM COATED, EXTENDED RELEASE ORAL at 08:41

## 2024-12-21 ASSESSMENT — COGNITIVE AND FUNCTIONAL STATUS - GENERAL
CLIMB 3 TO 5 STEPS WITH RAILING: TOTAL
TURNING FROM BACK TO SIDE WHILE IN FLAT BAD: A LITTLE
DRESSING REGULAR UPPER BODY CLOTHING: A LITTLE
MOBILITY SCORE: 11
STANDING UP FROM CHAIR USING ARMS: TOTAL
WALKING IN HOSPITAL ROOM: TOTAL
DAILY ACTIVITIY SCORE: 16
TURNING FROM BACK TO SIDE WHILE IN FLAT BAD: A LITTLE
CLIMB 3 TO 5 STEPS WITH RAILING: TOTAL
TOILETING: A LOT
MOBILITY SCORE: 17
DRESSING REGULAR LOWER BODY CLOTHING: A LOT
PERSONAL GROOMING: A LITTLE
HELP NEEDED FOR BATHING: A LOT
MOVING TO AND FROM BED TO CHAIR: TOTAL
WALKING IN HOSPITAL ROOM: TOTAL

## 2024-12-21 ASSESSMENT — PAIN SCALES - GENERAL
PAINLEVEL_OUTOF10: 0 - NO PAIN
PAINLEVEL_OUTOF10: 6
PAINLEVEL_OUTOF10: 9
PAINLEVEL_OUTOF10: 10 - WORST POSSIBLE PAIN
PAINLEVEL_OUTOF10: 8

## 2024-12-21 ASSESSMENT — PAIN - FUNCTIONAL ASSESSMENT
PAIN_FUNCTIONAL_ASSESSMENT: 0-10
PAIN_FUNCTIONAL_ASSESSMENT: 0-10

## 2024-12-21 ASSESSMENT — PAIN DESCRIPTION - DESCRIPTORS: DESCRIPTORS: SHARP;STABBING

## 2024-12-21 NOTE — PROGRESS NOTES
VASCULAR SURGERY PROGRESS NOTE  Assessment/Plan   Shirin Slater is 54 y.o. female with history of severe PAD s/p R hip disarticulation s/p L CFA and EIA embolectomy 12/2023 s/p multiple L toe amputations, s/p L femoral endarterectomy, profundaplasty, common qixqatl-nd-niskpagd tibial bypass using 6 mm ringed PTFE, left lower extremity angiogram on 9/25 T2DM complicated by diabetic neuropathy, HTN, HLD, renal and splenic thromboses who who presents with severe left leg pain and inability to move or feel or left leg. Imaging consistent with occlusion of her L CFA-AT bypass graft. On exam, patient with [place] 3 limb ischemia. Given leukocytosis, tachycardia, limb ischemia and concern for infection she was taken to OR urgently and had findings of purulence along the anteromedial aspect of the left knee and distal thigh. Muscle within the posterolateral thigh with healthy contraction on stimulation. 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     Patient does not want to consider hip disarticulation, discussed with patient the high risk of non-healing of AKA given limiting perfusion. OR Monday for high thigh formalization with Dr. Miller. Continue broad spectrum antibiotics until OR.     Neuro: acute postoperative pain, Hx seizures  - continue tylenol/oxy 5mg q4h PRN  - continue lidoderm patch   - home Keppra, Depakote, Cymbalta, gabapentin 400mg TID     CV: Hx severe PAD with multiple surgical interventions, HTN  - maintain blood pressure control  - continue statin  - daily ASA (holding home plavix)  - heparin infusion, low intensity (holding home coumadin)     Pulm:   - continue to encourage IS hourly while awake  - OOB to chair and increase ambulation as tolerated  - albuterol inhaler PRN     FENGI:   - tolerating regular diet  - NPO Sunday night for OR  - continue bowel regimen to prevent OIC   - continue PPI  - monitor  and replete electrolytes     Endo: DM2, hypothyroidism  - holding home jardiance  - glucommander protocol -increase with BG in low 200s  - levothyroxine  - liberalized carbs to 75gm/ meal- discussed avoiding sugar and processed foods.     Heme: anemia of chronic disease  - no s/sx of bleeding  - monitor H/H, transfuse for Hgb <7     ID:  humble infection required amputation for source control  - tissue and blood cultures sent- final result without growth  - zosyn and Vanco  - requested midline as patient is difficult IV access  - monitor s/s of infection  - WBC pending this morning    DVT prophylaxis:  - heparin infusion     Dispo-  - floor status. Pending OR on Monday.     Subjective   Agreeable to OR on Monday. Pain could be better controlled at stump. Tolerating food, voiding.     Objective   Vitals:  Heart Rate:  [83-97]   Temp:  [36.1 °C (97 °F)-36.6 °C (97.9 °F)]   Resp:  [16-19]   BP: (101-123)/(53-68)   SpO2:  [92 %-95 %]     Exam:  Constitutional: No acute distress, resting comfortably  Neuro:  AOx3, grossly intact  ENMT: moist mucous membranes  CV: no tachycardia  Pulm: non-labored on RA  GI: soft, non-tender, non-distended  Skin: warm and dry  Musculoskeletal: moving all extremities  Extremities: left aka dressing clean, dry intact.     Labs:  Results from last 7 days   Lab Units 12/20/24  0802 12/20/24  0350 12/19/24  0319   WBC AUTO x10*3/uL 14.2* 13.5* 13.2*   HEMOGLOBIN g/dL 7.9* 7.5* 7.4*   PLATELETS AUTO x10*3/uL 633* 590* 518*      Results from last 7 days   Lab Units 12/20/24  0350 12/19/24  0319 12/18/24  0849 12/18/24  0849   SODIUM mmol/L 139 138  --  138   POTASSIUM mmol/L 3.4* 3.8  --  3.9   CHLORIDE mmol/L 101 104  --  103   CO2 mmol/L 30 30  --  27   BUN mg/dL 8 12  --  10   CREATININE mg/dL 0.45* 0.60  --  0.43*   GLUCOSE mg/dL 165* 240*  --  339*   MAGNESIUM mg/dL 2.07 1.60   < >  --    PHOSPHORUS mg/dL 3.0 3.1   < >  --     < > = values in this interval not displayed.      Results from  last 7 days   Lab Units 12/20/24  0350 12/19/24  0319 12/18/24  0849   INR  1.4* 1.5* 1.5*   PROTIME seconds 15.9* 16.9* 17.0*   APTT seconds 56* 53* 24*      Results from last 7 days   Lab Units 12/20/24  0350 12/19/24  2137 12/19/24  1716   ANTI XA UNFRACTIONATED IU/mL 0.3 0.2 0.3

## 2024-12-21 NOTE — PROGRESS NOTES
Vancomycin Dosing by Pharmacy- FOLLOW UP    Shirin Slater is a 54 y.o. year old female who Pharmacy has been consulted for vancomycin dosing for cellulitis, skin and soft tissue. Based on the patient's indication and renal status this patient is being dosed based on a goal AUC of 400-600.     Renal function is currently stable.    Current vancomycin dose: 1250 mg given every 12 hours    Most recent random level: 22.6 mcg/mL    Visit Vitals  /51   Pulse 93   Temp 36.6 °C (97.9 °F)   Resp 18        Lab Results   Component Value Date    CREATININE 0.36 (L) 2024    CREATININE 0.45 (L) 2024    CREATININE 0.60 2024    CREATININE 0.43 (L) 2024        Patient weight is as follows:   Vitals:    24 1847   Weight: 88 kg (194 lb)       Cultures:  No results found for the encounter in last 14 days.       I/O last 3 completed shifts:  In: 1320 (15 mL/kg) [P.O.:1320]  Out: 5375 (61.1 mL/kg) [Urine:5375 (1.7 mL/kg/hr)]  Weight: 88 kg   I/O during current shift:  No intake/output data recorded.    Temp (24hrs), Av.4 °C (97.6 °F), Min:36.3 °C (97.3 °F), Max:36.6 °C (97.9 °F)      Assessment/Plan    Within goal AUC range. Continue current vancomycin regimen.    This dosing regimen is predicted by [employer] to result in the following pharmacokinetic parameters:  Loading dose: N/A  Regimen: 1250 mg IV every 12 hours.  Start time: 17:27 on 2024  Exposure target: AUC24 (range)400-600 mg/L.hr   [id number]: 552 mg/L.hr  AUC24,ss: 552 mg/L.hr  Probability of AUC24 > 400: 100 %    The next level will be obtained on  at 1000. May be obtained sooner if clinically indicated.   Will continue to monitor renal function daily while on vancomycin and order serum creatinine at least every 48 hours if not already ordered.  Follow for continued vancomycin needs, clinical response, and signs/symptoms of toxicity.       CHARLIE CRUZ, PharmD

## 2024-12-22 VITALS
TEMPERATURE: 97.2 F | BODY MASS INDEX: 30.45 KG/M2 | HEART RATE: 87 BPM | SYSTOLIC BLOOD PRESSURE: 101 MMHG | HEIGHT: 67 IN | WEIGHT: 194 LBS | OXYGEN SATURATION: 92 % | DIASTOLIC BLOOD PRESSURE: 60 MMHG | RESPIRATION RATE: 18 BRPM

## 2024-12-22 LAB
ALBUMIN SERPL BCP-MCNC: 2.3 G/DL (ref 3.4–5)
ANION GAP SERPL CALC-SCNC: 12 MMOL/L (ref 10–20)
BUN SERPL-MCNC: 10 MG/DL (ref 6–23)
CALCIUM SERPL-MCNC: 8.4 MG/DL (ref 8.6–10.6)
CHLORIDE SERPL-SCNC: 103 MMOL/L (ref 98–107)
CO2 SERPL-SCNC: 31 MMOL/L (ref 21–32)
CREAT SERPL-MCNC: 0.42 MG/DL (ref 0.5–1.05)
EGFRCR SERPLBLD CKD-EPI 2021: >90 ML/MIN/1.73M*2
ERYTHROCYTE [DISTWIDTH] IN BLOOD BY AUTOMATED COUNT: 23.1 % (ref 11.5–14.5)
GLUCOSE BLD MANUAL STRIP-MCNC: 109 MG/DL (ref 74–99)
GLUCOSE BLD MANUAL STRIP-MCNC: 117 MG/DL (ref 74–99)
GLUCOSE BLD MANUAL STRIP-MCNC: 127 MG/DL (ref 74–99)
GLUCOSE BLD MANUAL STRIP-MCNC: 144 MG/DL (ref 74–99)
GLUCOSE BLD MANUAL STRIP-MCNC: 98 MG/DL (ref 74–99)
GLUCOSE SERPL-MCNC: 120 MG/DL (ref 74–99)
HCT VFR BLD AUTO: 27.5 % (ref 36–46)
HGB BLD-MCNC: 7.6 G/DL (ref 12–16)
MAGNESIUM SERPL-MCNC: 1.96 MG/DL (ref 1.6–2.4)
MCH RBC QN AUTO: 21 PG (ref 26–34)
MCHC RBC AUTO-ENTMCNC: 27.6 G/DL (ref 32–36)
MCV RBC AUTO: 76 FL (ref 80–100)
NRBC BLD-RTO: 0 /100 WBCS (ref 0–0)
PHOSPHATE SERPL-MCNC: 4 MG/DL (ref 2.5–4.9)
PLATELET # BLD AUTO: 674 X10*3/UL (ref 150–450)
POTASSIUM SERPL-SCNC: 4 MMOL/L (ref 3.5–5.3)
RBC # BLD AUTO: 3.62 X10*6/UL (ref 4–5.2)
SODIUM SERPL-SCNC: 142 MMOL/L (ref 136–145)
UFH PPP CHRO-ACNC: 0.4 IU/ML
WBC # BLD AUTO: 15.4 X10*3/UL (ref 4.4–11.3)

## 2024-12-22 PROCEDURE — 83735 ASSAY OF MAGNESIUM: CPT | Performed by: NURSE PRACTITIONER

## 2024-12-22 PROCEDURE — 85520 HEPARIN ASSAY: CPT

## 2024-12-22 PROCEDURE — 36415 COLL VENOUS BLD VENIPUNCTURE: CPT

## 2024-12-22 PROCEDURE — 2500000002 HC RX 250 W HCPCS SELF ADMINISTERED DRUGS (ALT 637 FOR MEDICARE OP, ALT 636 FOR OP/ED): Performed by: STUDENT IN AN ORGANIZED HEALTH CARE EDUCATION/TRAINING PROGRAM

## 2024-12-22 PROCEDURE — 82947 ASSAY GLUCOSE BLOOD QUANT: CPT

## 2024-12-22 PROCEDURE — 2500000004 HC RX 250 GENERAL PHARMACY W/ HCPCS (ALT 636 FOR OP/ED): Performed by: STUDENT IN AN ORGANIZED HEALTH CARE EDUCATION/TRAINING PROGRAM

## 2024-12-22 PROCEDURE — 80069 RENAL FUNCTION PANEL: CPT | Performed by: NURSE PRACTITIONER

## 2024-12-22 PROCEDURE — 2500000001 HC RX 250 WO HCPCS SELF ADMINISTERED DRUGS (ALT 637 FOR MEDICARE OP): Performed by: STUDENT IN AN ORGANIZED HEALTH CARE EDUCATION/TRAINING PROGRAM

## 2024-12-22 PROCEDURE — 2500000002 HC RX 250 W HCPCS SELF ADMINISTERED DRUGS (ALT 637 FOR MEDICARE OP, ALT 636 FOR OP/ED): Performed by: NURSE PRACTITIONER

## 2024-12-22 PROCEDURE — 2500000004 HC RX 250 GENERAL PHARMACY W/ HCPCS (ALT 636 FOR OP/ED)

## 2024-12-22 PROCEDURE — 2500000004 HC RX 250 GENERAL PHARMACY W/ HCPCS (ALT 636 FOR OP/ED): Mod: JZ

## 2024-12-22 PROCEDURE — 2500000001 HC RX 250 WO HCPCS SELF ADMINISTERED DRUGS (ALT 637 FOR MEDICARE OP)

## 2024-12-22 PROCEDURE — 2500000001 HC RX 250 WO HCPCS SELF ADMINISTERED DRUGS (ALT 637 FOR MEDICARE OP): Performed by: NURSE PRACTITIONER

## 2024-12-22 PROCEDURE — 85027 COMPLETE CBC AUTOMATED: CPT | Performed by: STUDENT IN AN ORGANIZED HEALTH CARE EDUCATION/TRAINING PROGRAM

## 2024-12-22 PROCEDURE — 1100000001 HC PRIVATE ROOM DAILY

## 2024-12-22 RX ORDER — HYDROMORPHONE HYDROCHLORIDE 1 MG/ML
0.5 INJECTION, SOLUTION INTRAMUSCULAR; INTRAVENOUS; SUBCUTANEOUS ONCE
Status: DISCONTINUED | OUTPATIENT
Start: 2024-12-22 | End: 2024-12-24

## 2024-12-22 RX ORDER — HYDROMORPHONE HYDROCHLORIDE 1 MG/ML
0.4 INJECTION, SOLUTION INTRAMUSCULAR; INTRAVENOUS; SUBCUTANEOUS ONCE
Status: COMPLETED | OUTPATIENT
Start: 2024-12-22 | End: 2024-12-22

## 2024-12-22 RX ORDER — HYDROMORPHONE HYDROCHLORIDE 1 MG/ML
0.2 INJECTION, SOLUTION INTRAMUSCULAR; INTRAVENOUS; SUBCUTANEOUS ONCE
Status: COMPLETED | OUTPATIENT
Start: 2024-12-22 | End: 2024-12-22

## 2024-12-22 RX ORDER — OXYCODONE HYDROCHLORIDE 5 MG/1
10 TABLET ORAL EVERY 6 HOURS PRN
Status: DISCONTINUED | OUTPATIENT
Start: 2024-12-22 | End: 2024-12-24

## 2024-12-22 RX ORDER — OXYCODONE HYDROCHLORIDE 5 MG/1
5 TABLET ORAL EVERY 4 HOURS PRN
Status: DISCONTINUED | OUTPATIENT
Start: 2024-12-22 | End: 2024-12-24

## 2024-12-22 RX ADMIN — OXYCODONE HYDROCHLORIDE 5 MG: 5 TABLET ORAL at 09:02

## 2024-12-22 RX ADMIN — ATORVASTATIN CALCIUM 80 MG: 80 TABLET, FILM COATED ORAL at 20:27

## 2024-12-22 RX ADMIN — HEPARIN SODIUM 2100 UNITS/HR: 10000 INJECTION, SOLUTION INTRAVENOUS at 22:15

## 2024-12-22 RX ADMIN — INSULIN LISPRO 0 UNITS: 100 INJECTION, SOLUTION INTRAVENOUS; SUBCUTANEOUS at 14:57

## 2024-12-22 RX ADMIN — INSULIN LISPRO 0 UNITS: 100 INJECTION, SOLUTION INTRAVENOUS; SUBCUTANEOUS at 18:44

## 2024-12-22 RX ADMIN — HYDROMORPHONE HYDROCHLORIDE 0.2 MG: 1 INJECTION, SOLUTION INTRAMUSCULAR; INTRAVENOUS; SUBCUTANEOUS at 05:01

## 2024-12-22 RX ADMIN — METOPROLOL TARTRATE 12.5 MG: 25 TABLET, FILM COATED ORAL at 20:27

## 2024-12-22 RX ADMIN — ASPIRIN 81 MG: 81 TABLET, COATED ORAL at 09:55

## 2024-12-22 RX ADMIN — DULOXETINE HYDROCHLORIDE 60 MG: 60 CAPSULE, DELAYED RELEASE ORAL at 09:53

## 2024-12-22 RX ADMIN — INSULIN GLARGINE 20 UNITS: 100 INJECTION, SOLUTION SUBCUTANEOUS at 21:13

## 2024-12-22 RX ADMIN — PANTOPRAZOLE SODIUM 40 MG: 40 TABLET, DELAYED RELEASE ORAL at 06:32

## 2024-12-22 RX ADMIN — GABAPENTIN 400 MG: 400 CAPSULE ORAL at 15:29

## 2024-12-22 RX ADMIN — HYDROMORPHONE HYDROCHLORIDE 0.2 MG: 1 INJECTION, SOLUTION INTRAMUSCULAR; INTRAVENOUS; SUBCUTANEOUS at 09:38

## 2024-12-22 RX ADMIN — OXYCODONE HYDROCHLORIDE 10 MG: 5 TABLET ORAL at 22:58

## 2024-12-22 RX ADMIN — OXYCODONE HYDROCHLORIDE 10 MG: 5 TABLET ORAL at 14:43

## 2024-12-22 RX ADMIN — ACETAMINOPHEN 650 MG: 325 TABLET, FILM COATED ORAL at 05:01

## 2024-12-22 RX ADMIN — METOPROLOL TARTRATE 12.5 MG: 25 TABLET, FILM COATED ORAL at 09:56

## 2024-12-22 RX ADMIN — GABAPENTIN 400 MG: 400 CAPSULE ORAL at 05:01

## 2024-12-22 RX ADMIN — PIPERACILLIN SODIUM AND TAZOBACTAM SODIUM 3.38 G: 3; .375 INJECTION, SOLUTION INTRAVENOUS at 11:48

## 2024-12-22 RX ADMIN — HEPARIN SODIUM 2100 UNITS/HR: 10000 INJECTION, SOLUTION INTRAVENOUS at 09:52

## 2024-12-22 RX ADMIN — GABAPENTIN 400 MG: 400 CAPSULE ORAL at 21:16

## 2024-12-22 RX ADMIN — ONDANSETRON 4 MG: 4 TABLET, ORALLY DISINTEGRATING ORAL at 21:18

## 2024-12-22 RX ADMIN — LEVOTHYROXINE SODIUM 75 MCG: 0.07 TABLET ORAL at 06:32

## 2024-12-22 RX ADMIN — DIVALPROEX SODIUM 1000 MG: 500 TABLET, FILM COATED, EXTENDED RELEASE ORAL at 20:27

## 2024-12-22 RX ADMIN — HYDROMORPHONE HYDROCHLORIDE 0.2 MG: 1 INJECTION, SOLUTION INTRAMUSCULAR; INTRAVENOUS; SUBCUTANEOUS at 11:41

## 2024-12-22 RX ADMIN — PIPERACILLIN SODIUM AND TAZOBACTAM SODIUM 3.38 G: 3; .375 INJECTION, SOLUTION INTRAVENOUS at 03:38

## 2024-12-22 RX ADMIN — HYDROMORPHONE HYDROCHLORIDE 0.4 MG: 1 INJECTION, SOLUTION INTRAMUSCULAR; INTRAVENOUS; SUBCUTANEOUS at 08:17

## 2024-12-22 RX ADMIN — INSULIN LISPRO 2 UNITS: 100 INJECTION, SOLUTION INTRAVENOUS; SUBCUTANEOUS at 09:51

## 2024-12-22 RX ADMIN — SODIUM CHLORIDE 1.25 G: 9 INJECTION, SOLUTION INTRAVENOUS at 05:01

## 2024-12-22 RX ADMIN — LEVETIRACETAM 500 MG: 500 TABLET, FILM COATED ORAL at 20:27

## 2024-12-22 RX ADMIN — OXYCODONE HYDROCHLORIDE 5 MG: 5 TABLET ORAL at 03:38

## 2024-12-22 RX ADMIN — SODIUM CHLORIDE 1.25 G: 9 INJECTION, SOLUTION INTRAVENOUS at 17:44

## 2024-12-22 RX ADMIN — DIVALPROEX SODIUM 500 MG: 500 TABLET, FILM COATED, EXTENDED RELEASE ORAL at 09:55

## 2024-12-22 RX ADMIN — PIPERACILLIN SODIUM AND TAZOBACTAM SODIUM 3.38 G: 3; .375 INJECTION, SOLUTION INTRAVENOUS at 17:45

## 2024-12-22 RX ADMIN — LEVETIRACETAM 500 MG: 500 TABLET, FILM COATED ORAL at 09:55

## 2024-12-22 ASSESSMENT — COGNITIVE AND FUNCTIONAL STATUS - GENERAL
HELP NEEDED FOR BATHING: A LOT
MOVING TO AND FROM BED TO CHAIR: TOTAL
CLIMB 3 TO 5 STEPS WITH RAILING: TOTAL
MOBILITY SCORE: 11
DAILY ACTIVITIY SCORE: 16
WALKING IN HOSPITAL ROOM: TOTAL
DRESSING REGULAR LOWER BODY CLOTHING: A LOT
TURNING FROM BACK TO SIDE WHILE IN FLAT BAD: A LITTLE
TOILETING: A LOT
STANDING UP FROM CHAIR USING ARMS: TOTAL
DRESSING REGULAR UPPER BODY CLOTHING: A LITTLE
PERSONAL GROOMING: A LITTLE

## 2024-12-22 ASSESSMENT — PAIN SCALES - GENERAL
PAINLEVEL_OUTOF10: 10 - WORST POSSIBLE PAIN
PAINLEVEL_OUTOF10: 9
PAINLEVEL_OUTOF10: 0 - NO PAIN
PAINLEVEL_OUTOF10: 8

## 2024-12-22 NOTE — PROGRESS NOTES
VASCULAR SURGERY PROGRESS NOTE  Assessment/Plan   Shirin Slater is 54 y.o. female with history of severe PAD s/p R hip disarticulation s/p L CFA and EIA embolectomy 12/2023 s/p multiple L toe amputations, s/p L femoral endarterectomy, profundaplasty, common lzluxzm-uz-yrdvhsui tibial bypass using 6 mm ringed PTFE, left lower extremity angiogram on 9/25 T2DM complicated by diabetic neuropathy, HTN, HLD, renal and splenic thromboses who who presents with severe left leg pain and inability to move or feel or left leg. Imaging consistent with occlusion of her L CFA-AT bypass graft. On exam, patient with [place] 3 limb ischemia. Given leukocytosis, tachycardia, limb ischemia and concern for infection she was taken to OR urgently and had findings of purulence along the anteromedial aspect of the left knee and distal thigh. Muscle within the posterolateral thigh with healthy contraction on stimulation. 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     Patient does not want to consider hip disarticulation, discussed with patient the high risk of non-healing of AKA given limiting perfusion. OR Monday for high thigh formalization with Dr. Miller. Continue broad spectrum antibiotics until OR.     Neuro: acute postoperative pain, Hx seizures  - continue tylenol/oxy 5mg q4h PRN  - continue lidoderm patch   - home Keppra, Depakote, Cymbalta, gabapentin 400mg TID     CV: Hx severe PAD with multiple surgical interventions, HTN  - maintain blood pressure control  - continue statin  - daily ASA (holding home plavix)  - heparin infusion, low intensity (holding home coumadin)     Pulm:   - continue to encourage IS hourly while awake  - OOB to chair and increase ambulation as tolerated  - albuterol inhaler PRN     FENGI:   - tolerating regular diet  - NPO Sunday night for OR  - continue bowel regimen to prevent OIC   - continue PPI  - monitor  and replete electrolytes     Endo: DM2, hypothyroidism  - holding home jardiance  - glucommander protocol -increase with BG in low 200s  - levothyroxine  - liberalized carbs to 75gm/ meal- discussed avoiding sugar and processed foods.     Heme: anemia of chronic disease  - no s/sx of bleeding  - monitor H/H, transfuse for Hgb <7     ID:  humble infection required amputation for source control  - tissue and blood cultures sent- final result without growth  - zosyn and Vanco  - requested midline as patient is difficult IV access  - monitor s/s of infection  - WBC pending this morning, 15.5 (yesterday)    DVT prophylaxis:  - heparin infusion     Dispo-  - floor status. Pending OR on Monday.     Subjective   Asked for pain medication. Tolerating food, voiding.     Objective   Vitals:  Heart Rate:  [87-94]   Temp:  [36 °C (96.8 °F)-36.6 °C (97.9 °F)]   Resp:  [18-19]   BP: ()/(51-72)   SpO2:  [93 %-97 %]     Exam:  Constitutional: No acute distress, resting comfortably  Neuro:  AOx3, grossly intact  ENMT: moist mucous membranes  CV: no tachycardia  Pulm: non-labored on RA  GI: soft, non-tender, non-distended  Skin: warm and dry  Musculoskeletal: moving all extremities  Extremities: left aka dressing clean, dry intact. Changed during round    Labs:  Results from last 7 days   Lab Units 12/21/24  0629 12/20/24  0802 12/20/24  0350   WBC AUTO x10*3/uL 15.5* 14.2* 13.5*   HEMOGLOBIN g/dL 7.5* 7.9* 7.5*   PLATELETS AUTO x10*3/uL 576* 633* 590*      Results from last 7 days   Lab Units 12/21/24  0629 12/20/24  0350 12/19/24  0319   SODIUM mmol/L 141 139 138   POTASSIUM mmol/L 3.3* 3.4* 3.8   CHLORIDE mmol/L 101 101 104   CO2 mmol/L 30 30 30   BUN mg/dL 9 8 12   CREATININE mg/dL 0.36* 0.45* 0.60   GLUCOSE mg/dL 146* 165* 240*   MAGNESIUM mg/dL 2.00 2.07 1.60   PHOSPHORUS mg/dL 3.9 3.0 3.1      Results from last 7 days   Lab Units 12/20/24  0350 12/19/24  0319 12/18/24  0849   INR  1.4* 1.5* 1.5*   PROTIME seconds 15.9*  16.9* 17.0*   APTT seconds 56* 53* 24*      Results from last 7 days   Lab Units 12/22/24  0752 12/21/24  0629 12/20/24  0350   ANTI XA UNFRACTIONATED IU/mL 0.4 0.4 0.3

## 2024-12-22 NOTE — CARE PLAN
The clinical goals for the shift include patient will have adequately managed pain throughout shift

## 2024-12-22 NOTE — CARE PLAN
The patient's goals for the shift include  to have better managed pain.    The clinical goals for the shift include patient will have adequately managed pain throughout shift

## 2024-12-22 NOTE — CARE PLAN
The clinical goals for the shift include Pt will have pain managed adequately throughout shift

## 2024-12-23 ENCOUNTER — ANESTHESIA EVENT (OUTPATIENT)
Dept: OPERATING ROOM | Facility: HOSPITAL | Age: 54
End: 2024-12-23
Payer: COMMERCIAL

## 2024-12-23 ENCOUNTER — APPOINTMENT (OUTPATIENT)
Dept: RADIOLOGY | Facility: HOSPITAL | Age: 54
DRG: 240 | End: 2024-12-23
Payer: MEDICARE

## 2024-12-23 ENCOUNTER — ANESTHESIA (OUTPATIENT)
Dept: OPERATING ROOM | Facility: HOSPITAL | Age: 54
End: 2024-12-23
Payer: COMMERCIAL

## 2024-12-23 LAB
ABO GROUP (TYPE) IN BLOOD: NORMAL
ALBUMIN SERPL BCP-MCNC: 2.4 G/DL (ref 3.4–5)
ANION GAP SERPL CALC-SCNC: 14 MMOL/L (ref 10–20)
ANTIBODY SCREEN: NORMAL
BUN SERPL-MCNC: 11 MG/DL (ref 6–23)
CALCIUM SERPL-MCNC: 8.6 MG/DL (ref 8.6–10.6)
CHLORIDE SERPL-SCNC: 100 MMOL/L (ref 98–107)
CO2 SERPL-SCNC: 29 MMOL/L (ref 21–32)
CREAT SERPL-MCNC: 0.5 MG/DL (ref 0.5–1.05)
EGFRCR SERPLBLD CKD-EPI 2021: >90 ML/MIN/1.73M*2
ERYTHROCYTE [DISTWIDTH] IN BLOOD BY AUTOMATED COUNT: 23.9 % (ref 11.5–14.5)
GLUCOSE BLD MANUAL STRIP-MCNC: 102 MG/DL (ref 74–99)
GLUCOSE BLD MANUAL STRIP-MCNC: 128 MG/DL (ref 74–99)
GLUCOSE BLD MANUAL STRIP-MCNC: 95 MG/DL (ref 74–99)
GLUCOSE SERPL-MCNC: 80 MG/DL (ref 74–99)
HCT VFR BLD AUTO: 28.5 % (ref 36–46)
HGB BLD-MCNC: 7.7 G/DL (ref 12–16)
MAGNESIUM SERPL-MCNC: 2.13 MG/DL (ref 1.6–2.4)
MCH RBC QN AUTO: 21.2 PG (ref 26–34)
MCHC RBC AUTO-ENTMCNC: 27 G/DL (ref 32–36)
MCV RBC AUTO: 79 FL (ref 80–100)
NRBC BLD-RTO: 0 /100 WBCS (ref 0–0)
PHOSPHATE SERPL-MCNC: 5.4 MG/DL (ref 2.5–4.9)
PLATELET # BLD AUTO: 640 X10*3/UL (ref 150–450)
POTASSIUM SERPL-SCNC: 3.9 MMOL/L (ref 3.5–5.3)
RBC # BLD AUTO: 3.63 X10*6/UL (ref 4–5.2)
RH FACTOR (ANTIGEN D): NORMAL
SODIUM SERPL-SCNC: 139 MMOL/L (ref 136–145)
UFH PPP CHRO-ACNC: 0.5 IU/ML
VANCOMYCIN SERPL-MCNC: 33.3 UG/ML (ref 5–20)
WBC # BLD AUTO: 13.6 X10*3/UL (ref 4.4–11.3)

## 2024-12-23 PROCEDURE — 80202 ASSAY OF VANCOMYCIN: CPT | Performed by: NURSE PRACTITIONER

## 2024-12-23 PROCEDURE — 1100000001 HC PRIVATE ROOM DAILY

## 2024-12-23 PROCEDURE — 36415 COLL VENOUS BLD VENIPUNCTURE: CPT

## 2024-12-23 PROCEDURE — 2500000001 HC RX 250 WO HCPCS SELF ADMINISTERED DRUGS (ALT 637 FOR MEDICARE OP): Performed by: NURSE PRACTITIONER

## 2024-12-23 PROCEDURE — 73552 X-RAY EXAM OF FEMUR 2/>: CPT | Mod: LEFT SIDE | Performed by: STUDENT IN AN ORGANIZED HEALTH CARE EDUCATION/TRAINING PROGRAM

## 2024-12-23 PROCEDURE — 2500000002 HC RX 250 W HCPCS SELF ADMINISTERED DRUGS (ALT 637 FOR MEDICARE OP, ALT 636 FOR OP/ED): Performed by: NURSE PRACTITIONER

## 2024-12-23 PROCEDURE — 82947 ASSAY GLUCOSE BLOOD QUANT: CPT

## 2024-12-23 PROCEDURE — 85027 COMPLETE CBC AUTOMATED: CPT | Performed by: STUDENT IN AN ORGANIZED HEALTH CARE EDUCATION/TRAINING PROGRAM

## 2024-12-23 PROCEDURE — 2500000001 HC RX 250 WO HCPCS SELF ADMINISTERED DRUGS (ALT 637 FOR MEDICARE OP)

## 2024-12-23 PROCEDURE — 2500000001 HC RX 250 WO HCPCS SELF ADMINISTERED DRUGS (ALT 637 FOR MEDICARE OP): Performed by: STUDENT IN AN ORGANIZED HEALTH CARE EDUCATION/TRAINING PROGRAM

## 2024-12-23 PROCEDURE — 2500000004 HC RX 250 GENERAL PHARMACY W/ HCPCS (ALT 636 FOR OP/ED): Performed by: STUDENT IN AN ORGANIZED HEALTH CARE EDUCATION/TRAINING PROGRAM

## 2024-12-23 PROCEDURE — 2500000002 HC RX 250 W HCPCS SELF ADMINISTERED DRUGS (ALT 637 FOR MEDICARE OP, ALT 636 FOR OP/ED): Performed by: STUDENT IN AN ORGANIZED HEALTH CARE EDUCATION/TRAINING PROGRAM

## 2024-12-23 PROCEDURE — 2500000004 HC RX 250 GENERAL PHARMACY W/ HCPCS (ALT 636 FOR OP/ED)

## 2024-12-23 PROCEDURE — 83735 ASSAY OF MAGNESIUM: CPT | Performed by: NURSE PRACTITIONER

## 2024-12-23 PROCEDURE — 99222 1ST HOSP IP/OBS MODERATE 55: CPT

## 2024-12-23 PROCEDURE — 73502 X-RAY EXAM HIP UNI 2-3 VIEWS: CPT | Mod: LT

## 2024-12-23 PROCEDURE — 85520 HEPARIN ASSAY: CPT

## 2024-12-23 PROCEDURE — 73502 X-RAY EXAM HIP UNI 2-3 VIEWS: CPT | Mod: LEFT SIDE | Performed by: STUDENT IN AN ORGANIZED HEALTH CARE EDUCATION/TRAINING PROGRAM

## 2024-12-23 PROCEDURE — 84100 ASSAY OF PHOSPHORUS: CPT | Performed by: NURSE PRACTITIONER

## 2024-12-23 PROCEDURE — 86901 BLOOD TYPING SEROLOGIC RH(D): CPT | Performed by: STUDENT IN AN ORGANIZED HEALTH CARE EDUCATION/TRAINING PROGRAM

## 2024-12-23 PROCEDURE — 73552 X-RAY EXAM OF FEMUR 2/>: CPT | Mod: LT

## 2024-12-23 RX ORDER — HYDROMORPHONE HYDROCHLORIDE 1 MG/ML
0.2 INJECTION, SOLUTION INTRAMUSCULAR; INTRAVENOUS; SUBCUTANEOUS ONCE
Status: COMPLETED | OUTPATIENT
Start: 2024-12-23 | End: 2024-12-23

## 2024-12-23 RX ORDER — VANCOMYCIN HYDROCHLORIDE 1 G/200ML
1000 INJECTION, SOLUTION INTRAVENOUS EVERY 12 HOURS
Status: DISCONTINUED | OUTPATIENT
Start: 2024-12-23 | End: 2024-12-24

## 2024-12-23 RX ORDER — HYDROMORPHONE HYDROCHLORIDE 1 MG/ML
0.4 INJECTION, SOLUTION INTRAMUSCULAR; INTRAVENOUS; SUBCUTANEOUS ONCE
Status: COMPLETED | OUTPATIENT
Start: 2024-12-23 | End: 2024-12-23

## 2024-12-23 RX ADMIN — LEVETIRACETAM 500 MG: 500 TABLET, FILM COATED ORAL at 09:43

## 2024-12-23 RX ADMIN — GABAPENTIN 400 MG: 400 CAPSULE ORAL at 14:38

## 2024-12-23 RX ADMIN — DULOXETINE HYDROCHLORIDE 60 MG: 60 CAPSULE, DELAYED RELEASE ORAL at 09:44

## 2024-12-23 RX ADMIN — ATORVASTATIN CALCIUM 80 MG: 80 TABLET, FILM COATED ORAL at 20:30

## 2024-12-23 RX ADMIN — HYDROMORPHONE HYDROCHLORIDE 0.2 MG: 1 INJECTION, SOLUTION INTRAMUSCULAR; INTRAVENOUS; SUBCUTANEOUS at 13:27

## 2024-12-23 RX ADMIN — PIPERACILLIN SODIUM AND TAZOBACTAM SODIUM 3.38 G: 3; .375 INJECTION, SOLUTION INTRAVENOUS at 20:33

## 2024-12-23 RX ADMIN — SODIUM CHLORIDE 1.25 G: 9 INJECTION, SOLUTION INTRAVENOUS at 05:27

## 2024-12-23 RX ADMIN — METOPROLOL TARTRATE 12.5 MG: 25 TABLET, FILM COATED ORAL at 20:30

## 2024-12-23 RX ADMIN — OXYCODONE HYDROCHLORIDE 10 MG: 5 TABLET ORAL at 15:38

## 2024-12-23 RX ADMIN — GABAPENTIN 400 MG: 400 CAPSULE ORAL at 21:42

## 2024-12-23 RX ADMIN — ACETAMINOPHEN 650 MG: 325 TABLET, FILM COATED ORAL at 20:29

## 2024-12-23 RX ADMIN — METOPROLOL TARTRATE 12.5 MG: 25 TABLET, FILM COATED ORAL at 09:44

## 2024-12-23 RX ADMIN — LEVETIRACETAM 500 MG: 500 TABLET, FILM COATED ORAL at 20:30

## 2024-12-23 RX ADMIN — GABAPENTIN 400 MG: 400 CAPSULE ORAL at 05:28

## 2024-12-23 RX ADMIN — HYDROMORPHONE HYDROCHLORIDE 0.2 MG: 1 INJECTION, SOLUTION INTRAMUSCULAR; INTRAVENOUS; SUBCUTANEOUS at 16:55

## 2024-12-23 RX ADMIN — PIPERACILLIN SODIUM AND TAZOBACTAM SODIUM 3.38 G: 3; .375 INJECTION, SOLUTION INTRAVENOUS at 09:43

## 2024-12-23 RX ADMIN — ACETAMINOPHEN 650 MG: 325 TABLET, FILM COATED ORAL at 13:11

## 2024-12-23 RX ADMIN — ACETAMINOPHEN 650 MG: 325 TABLET, FILM COATED ORAL at 00:02

## 2024-12-23 RX ADMIN — HYDROMORPHONE HYDROCHLORIDE 0.4 MG: 1 INJECTION, SOLUTION INTRAMUSCULAR; INTRAVENOUS; SUBCUTANEOUS at 08:54

## 2024-12-23 RX ADMIN — ASPIRIN 81 MG: 81 TABLET, COATED ORAL at 09:44

## 2024-12-23 RX ADMIN — PIPERACILLIN SODIUM AND TAZOBACTAM SODIUM 3.38 G: 3; .375 INJECTION, SOLUTION INTRAVENOUS at 14:38

## 2024-12-23 RX ADMIN — OXYCODONE HYDROCHLORIDE 10 MG: 5 TABLET ORAL at 09:36

## 2024-12-23 RX ADMIN — ACETAMINOPHEN 650 MG: 325 TABLET, FILM COATED ORAL at 05:27

## 2024-12-23 RX ADMIN — VANCOMYCIN HYDROCHLORIDE 1000 MG: 1 INJECTION, SOLUTION INTRAVENOUS at 17:12

## 2024-12-23 RX ADMIN — HYDROMORPHONE HYDROCHLORIDE 0.2 MG: 1 INJECTION, SOLUTION INTRAMUSCULAR; INTRAVENOUS; SUBCUTANEOUS at 00:51

## 2024-12-23 RX ADMIN — INSULIN LISPRO 0 UNITS: 100 INJECTION, SOLUTION INTRAVENOUS; SUBCUTANEOUS at 19:16

## 2024-12-23 RX ADMIN — HEPARIN SODIUM 2100 UNITS/HR: 10000 INJECTION, SOLUTION INTRAVENOUS at 11:25

## 2024-12-23 RX ADMIN — INSULIN GLARGINE 16 UNITS: 100 INJECTION, SOLUTION SUBCUTANEOUS at 21:39

## 2024-12-23 RX ADMIN — PANTOPRAZOLE SODIUM 40 MG: 40 TABLET, DELAYED RELEASE ORAL at 06:03

## 2024-12-23 RX ADMIN — HYDROMORPHONE HYDROCHLORIDE 0.2 MG: 1 INJECTION, SOLUTION INTRAMUSCULAR; INTRAVENOUS; SUBCUTANEOUS at 11:24

## 2024-12-23 RX ADMIN — LEVOTHYROXINE SODIUM 75 MCG: 0.07 TABLET ORAL at 06:03

## 2024-12-23 RX ADMIN — DIVALPROEX SODIUM 500 MG: 500 TABLET, FILM COATED, EXTENDED RELEASE ORAL at 09:45

## 2024-12-23 RX ADMIN — PIPERACILLIN SODIUM AND TAZOBACTAM SODIUM 3.38 G: 3; .375 INJECTION, SOLUTION INTRAVENOUS at 00:02

## 2024-12-23 RX ADMIN — INSULIN LISPRO 9 UNITS: 100 INJECTION, SOLUTION INTRAVENOUS; SUBCUTANEOUS at 11:05

## 2024-12-23 RX ADMIN — DIVALPROEX SODIUM 1000 MG: 500 TABLET, FILM COATED, EXTENDED RELEASE ORAL at 20:30

## 2024-12-23 RX ADMIN — OXYCODONE HYDROCHLORIDE 10 MG: 5 TABLET ORAL at 20:57

## 2024-12-23 ASSESSMENT — PAIN SCALES - GENERAL
PAINLEVEL_OUTOF10: 9
PAINLEVEL_OUTOF10: 0 - NO PAIN
PAINLEVEL_OUTOF10: 0 - NO PAIN

## 2024-12-23 ASSESSMENT — PAIN - FUNCTIONAL ASSESSMENT
PAIN_FUNCTIONAL_ASSESSMENT: 0-10
PAIN_FUNCTIONAL_ASSESSMENT: 0-10

## 2024-12-23 NOTE — PROGRESS NOTES
VASCULAR SURGERY PROGRESS NOTE  Assessment/Plan   Shirin Slater is 54 y.o. female with history of severe PAD s/p R hip disarticulation s/p L CFA and EIA embolectomy 12/2023 s/p multiple L toe amputations, s/p L femoral endarterectomy, profundaplasty, common webdfrq-hp-izulxdbl tibial bypass using 6 mm ringed PTFE, left lower extremity angiogram on 9/25 T2DM complicated by diabetic neuropathy, HTN, HLD, renal and splenic thromboses who who presents with severe left leg pain and inability to move or feel or left leg. Imaging consistent with occlusion of her L CFA-AT bypass graft. On exam, patient with [place] 3 limb ischemia. Given leukocytosis, tachycardia, limb ischemia and concern for infection she was taken to OR urgently and had findings of purulence along the anteromedial aspect of the left knee and distal thigh. Muscle within the posterolateral thigh with healthy contraction on stimulation. 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     Ischemic skin demarcation at mid-thigh, unable to salvageable AKA. Likely having ischemic left AKA stump pain.  Discussed with patient the options for 1) hip disarticulation vs 2) palliative wound care    Neuro: acute postoperative pain, Hx seizures  - continue tylenol/oxy 5mg q4h PRN  - continue lidoderm patch   - home Keppra, Depakote, Cymbalta, gabapentin 400mg TID     CV: Hx severe PAD with multiple surgical interventions, HTN  - maintain blood pressure control  - continue statin  - daily ASA (holding home plavix)  - heparin infusion, low intensity (holding home coumadin)     Pulm:   - continue to encourage IS hourly while awake  - OOB to chair and increase ambulation as tolerated  - albuterol inhaler PRN     FENGI:   - tolerating regular diet  - continue bowel regimen to prevent OIC   - continue PPI  - monitor and replete electrolytes     Endo: DM2, hypothyroidism  - holding  home jardiance  - glucommander protocol -increase with BG in low 200s  - levothyroxine  - liberalized carbs to 75gm/ meal- discussed avoiding sugar and processed foods.     Heme: anemia of chronic disease  - no s/sx of bleeding  - monitor H/H, transfuse for Hgb <7     ID:  humble infection required amputation for source control  - tissue and blood cultures sent- final result without growth  - zosyn and Vanco  - requested midline as patient is difficult IV access  - monitor s/s of infection  - WBC pending this morning, 15.5 (yesterday)    DVT prophylaxis:  - heparin infusion     Dispo-  - regular nursing floor    Omayra Webb MD  PGY-5 Vascular Surgery Resident    Subjective   Asked for pain medication. Tolerating food, voiding.     Objective   Vitals:  Heart Rate:  []   Temp:  [35.9 °C (96.6 °F)-36.3 °C (97.3 °F)]   Resp:  [17-18]   BP: ()/(52-63)   SpO2:  [89 %-99 %]     Exam:  Constitutional: No acute distress, resting comfortably  Neuro:  AOx3, grossly intact  ENMT: moist mucous membranes  CV: no tachycardia  Pulm: non-labored on RA  GI: soft, non-tender, non-distended  Skin: warm and dry  Musculoskeletal: moving all extremities  Extremities: left aka dressing clean, dry intact. Changed during round    Labs:  Results from last 7 days   Lab Units 12/22/24  0752 12/21/24  0629 12/20/24  0802   WBC AUTO x10*3/uL 15.4* 15.5* 14.2*   HEMOGLOBIN g/dL 7.6* 7.5* 7.9*   PLATELETS AUTO x10*3/uL 674* 576* 633*      Results from last 7 days   Lab Units 12/22/24  0752 12/21/24  0629 12/20/24  0350   SODIUM mmol/L 142 141 139   POTASSIUM mmol/L 4.0 3.3* 3.4*   CHLORIDE mmol/L 103 101 101   CO2 mmol/L 31 30 30   BUN mg/dL 10 9 8   CREATININE mg/dL 0.42* 0.36* 0.45*   GLUCOSE mg/dL 120* 146* 165*   MAGNESIUM mg/dL 1.96 2.00 2.07   PHOSPHORUS mg/dL 4.0 3.9 3.0      Results from last 7 days   Lab Units 12/20/24  0350 12/19/24  0319 12/18/24  0849   INR  1.4* 1.5* 1.5*   PROTIME seconds 15.9* 16.9* 17.0*   APTT seconds  56* 53* 24*      Results from last 7 days   Lab Units 12/23/24  0824 12/22/24  0752 12/21/24  0629   ANTI XA UNFRACTIONATED IU/mL 0.5 0.4 0.4

## 2024-12-23 NOTE — PROGRESS NOTES
Vancomycin Dosing by Pharmacy- FOLLOW UP    Shirin Slater is a 54 y.o. year old female who Pharmacy has been consulted for vancomycin dosing for cellulitis, skin and soft tissue. Based on the patient's indication and renal status this patient is being dosed based on a goal AUC of 400-600.     Renal function is currently stable.    Current vancomycin dose: 1250 mg given every 12 hours    Estimated vancomycin AUC on current dose: 575 mg/L.hr     Visit Vitals  BP 93/54   Pulse 77   Temp 36.3 °C (97.3 °F)   Resp 18        Lab Results   Component Value Date    CREATININE 0.50 2024    CREATININE 0.42 (L) 2024    CREATININE 0.36 (L) 2024    CREATININE 0.45 (L) 2024        Patient weight is as follows:   Vitals:    24 1847   Weight: 88 kg (194 lb)       Cultures:  No results found for the encounter in last 14 days.       I/O last 3 completed shifts:  In: 3516.3 (40 mL/kg) [P.O.:1870; I.V.:1646.3 (18.7 mL/kg)]  Out: 7800 (88.6 mL/kg) [Urine:7800 (2.5 mL/kg/hr)]  Weight: 88 kg   I/O during current shift:  No intake/output data recorded.    Temp (24hrs), Av.2 °C (97.1 °F), Min:35.9 °C (96.6 °F), Max:36.3 °C (97.3 °F)      Assessment/Plan    Within goal AUC range.  Will decrease as on upper end of target AUC not necessary for skin and soft tissue infection.     This dosing regimen is predicted by [employer] to result in the following pharmacokinetic parameters:  Loading dose: N/A  Regimen: 1000 mg IV every 12 hours.  Start time: 17:27 on 2024  Exposure target: AUC24 (range)400-600 mg/L.hr   WBX76-01: 479 mg/L.hr  AUC24,ss: 462 mg/L.hr  Probability of AUC24 > 400: 92 %  Ctrough,ss: 13.4 mg/L  Probability of Ctrough,ss > 20: 2 %    The next level will be obtained on  at mid am draw. May be obtained sooner if clinically indicated.   Will continue to monitor renal function daily while on vancomycin and order serum creatinine at least every 48 hours if not already ordered.  Follow for  continued vancomycin needs, clinical response, and signs/symptoms of toxicity.       Tanisha Barnard, PharmD

## 2024-12-23 NOTE — PROGRESS NOTES
Met with pt today to introduce palliative care as a supportive care service for pt's who have critical care needs or chronic illnesses, and educated pt that palliative care takes into account the whole person, managing symptoms, emotions, and providing support. Pt is aware that the vascular team is offering to do a hip disarticulation because her wound is not healing, and she is very aware of the severity of the surgery, as she has had it done on the other side.  She is very adamant that she will not want to go to a nursing home after the surgery.  She has 47 hours of HHA assistance at home, and she feels that these home health aides are like her family, and she feels this would be enough.  SW explained that the medical team is very worried that pt would still need another layer of support, and knowing that her risk of infection would be very high, and risk of complications with healing, that pt may benefit from having home hospice as well at home to manage symptoms and to have 24 hour on call assistance from nursing if she needs it.  She was not opposed to this, but really wants to talk everything over with her aunt Loly Blackburn whom she just designated as her HCPOA.  Copies and originals provided to pt, and copy scanned into EPIC.  Shirin shared her very complicated family history.  She has 4 children.  Her daughter Jeanette Blalard lives in [place] with her wife and 3 kids.  Her daughter Araceli lives with her  and child in [place], [place] along with pt's 3rd son Garrick and his wife and 4 kids.  Her youngest son Pepe lives in [place], [place].  She named Pepe as her first alternate decision maker, and her cousin Juanis as her second alternate.  Discussed above with vascular team, and will follow up with pt's aunt later today/tomorrow to determine what pt and family has decided.          DON Mittal

## 2024-12-23 NOTE — CONSULTS
Orthopaedic Surgery Consult H&P    HPI:   Orthopaedic Problems/Injuries: LLE ischemia    54 y.o. female PMH (severe PAD s/p R hip disartic, s/p L AKA on 12/17 for occluded fem-AT bypass and native popliteal artery after multiple failed revascularizations, renal and splenic thromboses on aspirin, Plavix, Coumadin, smoker) presents after  sustaining above.    PMH: per above/EMR  PSH: per above/EMR  SocHx: Non-contributory to this patient's acute orthopaedic problem.   FamHx:  Non-contributory to this patient's acute orthopaedic problem.   Allergies: Reviewed in EMR  Meds: Reviewed in EMR    ROS      - 14 point ROS negative except as above    Physical Exam:  Gen: AOx3, NAD  HEENT: normocephalic atraumatic  Psych: appropriate mood and affect  Resp: nonlabored breathing  Cardiac: Extremities WWP, RRR to peripheral palpation  Neuro: CN 2-12 grossly intact  Skin: no rashes    Left lower extremity:  - L open wound encompassing entire L distal residual limb with areas of granulation tissue and necrosis.  - Re-wrapped LLE w Kerix dressing after exam.  - Tender to palpation over L residual limb  - Motor intact to L hip flexor  - Sensation intact to femoral n distributions  - 2+ DP pulse, < 2 seconds capillary refill.    A full secondary exam was performed and all relevant findings discussed and noted above.    Imaging:  AP and lateral radiographs of the R hip/pelvis display L AKA and R hip disarticulation. No acute fx or osseous abnormality to LLE.     Assessment:  Orthopaedic Problems/Injuries: LLE ischemia    54 y.o. female PMH (severe PAD s/p R hip disartic, s/p L AKA on 12/17 for occluded fem-AT bypass and native popliteal artery after multiple failed revascularizations, renal and splenic thromboses on aspirin, Plavix, Coumadin, smoker) p/w above.    Plan:  - No acute orthopaedic surgical intervention.  - Will allow L AKA to declare itself. If clinical picture worsens, can tentatively plan to go to OR for LLE  debridement.  - Possible L hip disarticulation to be discussed with Dr. Chavez pending patient's clinical course and discussion of patient's wishes with hospice and family members.   - Abx: Per primary  - Diet: per primary  - DVT: Per primary  - Hale: Per Primary    - Dispo: Will follow peripherally while patient is in house. To be discussed with Ortho Oncology.     Gisele Decker DO  PGY-1 Orthopaedic Surgery  On-call Resident    This patient was seen and staffed within 30 minutes of initial consult.  _________________________________________________________    This patient will be followed by the Ortho Trauma Team while inpatient. See team members and contacts below:     Gisele Decker DO  Orthopaedic Surgery PGY-1  [facility]     While admitted, this patient will be followed by the Ortho Trauma Team. Please contact below residents with any questions (available via Epic Chat).      First call: Bigg Perry, PGY-1  Second call: Harvinder Galindo, PGY-2  Third call: Doug Caba, PGY-3    From 6pm-7am, weekends, holidays, and if no answer and there is an emergent issue, please page orthopaedic consult pager, [phone].

## 2024-12-23 NOTE — H&P
VASCULAR SURGERY HISTORY AND PHYSICAL    Assessment/Plan   Shirin Slater is 54 y.o. female with history of severe PAD s/p R hip disarticulation s/p L CFA and EIA embolectomy 12/2023 s/p multiple L toe amputations, s/p L femoral endarterectomy, profundaplasty, common [id number] tibial bypass using 6 mm ringed PTFE, left lower extremity angiogram on 9/25 T2DM complicated by diabetic neuropathy, HTN, HLD, renal and splenic thromboses     On 12/17, patient presented with severe left leg pain and inability to move or feel or left leg on. Imaging was consistent with occlusion of her L CFA-AT bypass graft.       As a result, a left above-knee guillotine amputation was done with Dr. Miller for source control with plans for formalization of an AKA.       Plan:  To the OR today for an AKA formalization   Patient consented.  Patient is NPO since MN    D/w attending, Dr. Paul Hooker MD  PGY-1 Vascular surgery     Subjective   HPI:  Shirin Slater is an 54 year old female with hx of APLS and multiple episodes of ALI requiring an eventual R hip disarticulation. She recently had a left fem-AT bypass with PTFE for tissue loss in 9/2024 and now presents with Adair 3 ALI of the LLE. CTA imaging shows an occluded left ZULEYMA stent and fem-AT bypass.     Vascular History:  See above    PMH:   Past Medical History:   Diagnosis Date    Anxiety     Arthritis     Cancer (Multi)     Cellulitis     COPD (chronic obstructive pulmonary disease) (Multi)     Delayed emergence from general anesthesia     Diabetes mellitus (Multi)     Disease of thyroid gland     Diverticulitis     Epilepsy, unspecified, not intractable, without status epilepticus (Multi)     Epilepsy    HLD (hyperlipidemia)     Hx of blood clots     Hypertension     PAD (peripheral artery disease) (CMS-HCC)     Peripheral vascular disease, unspecified (CMS-[facility]) 09/14/2022    PAD (peripheral artery disease)    PONV (postoperative nausea and vomiting)      PTSD (post-traumatic stress disorder)     Sleep apnea     Uterine cancer (Multi)          PSH:   Past Surgical History:   Procedure Laterality Date    AMPUTATION FOOT / TOE Left     AMPUTATION FOOT / TOE Left     ANTERIOR CRUCIATE LIGAMENT REPAIR Left     CARDIAC CATHETERIZATION N/A 2024    Procedure: Left Heart Cath;  Surgeon: Cheyenne Eckert MD;  Location: [facility] Cardiac Cath Lab;  Service: Cardiovascular;  Laterality: N/A;     SECTION, CLASSIC  1989    COLON SURGERY  2019    Ressection    CT ANGIO AORTA AND BILATERAL ILIOFEMORAL RUNOFF W AND OR WO IV CONTRAST  2021    CT AORTA AND BILATERAL ILIOFEMORAL RUNOFF ANGIOGRAM W AND/OR WO [place] CONTRAST [place] [facility]    CT ANGIO AORTA AND BILATERAL ILIOFEMORAL RUNOFF W AND OR WO IV CONTRAST  2022    CT AORTA AND BILATERAL ILIOFEMORAL RUNOFF ANGIOGRAM W AND/OR WO IV CONTRAST 2022 [facility] CL[facility]ICAL LEGAC[facility]    HYSTERECTOMY  2012    INVASIVE VASCULAR PROCEDURE N/A 2024    Procedure: Lower Extremity Angiogram;  Surgeon: Cheyenne Eckert MD;  Location: [place] [address] Cardiac Cath Lab;  Service: Cardiovascular;  Laterality: N/A;    IR ANGIOGRAM AORTA ABDOMEN  2021    IR ANGIOGRAM [place]ORTA ABDOMEN [place] [facility]    LEG AMPUTATION Right     hip    OTHER SURGICAL HISTORY  2021    Arterial angioplasty of lower extremity    TONSILECTOMY, ADENOIDECTOMY, BILATERAL MYRINGOTOMY AND TUBES  1971    VASCULAR SURGERY Right 2019    Feet w/ ICU stay    VASCULAR SURGERY Left     CFA Embolectomy    VASCULAR SURGERY Right     Knee         Home Meds:  No current facility-administered medications on file prior to encounter.     Current Outpatient Medications on File Prior to Encounter   Medication Sig Dispense Refill    acetaminophen (Tylenol 8 HOUR) 650 mg ER tablet Take 2 tablets (1,300 mg) by mouth every 8 hours.      clopidogrel (Plavix) 75 mg tablet Take 1 tablet (75 mg) by mouth once daily.       gabapentin (Neurontin) 400 mg capsule Take 1 capsule (400 mg) by mouth 3 times a day.      lidocaine (Lidoderm) 5 % patch Place 1 patch on the skin once daily.      omeprazole (PriLOSEC) 20 mg DR capsule Take 1 capsule (20 mg) by mouth once daily in the morning. Take before meals.      tiZANidine (Zanaflex) 4 mg tablet Take 1 tablet (4 mg) by mouth 3 times a day.      acetaminophen (Tylenol) 325 mg tablet Take 2 tablets (650 mg) by mouth every 6 hours if needed for mild pain (1 - 3). (Patient not taking: Reported on 12/18/2024)      albuterol 90 mcg/actuation inhaler Inhale 2 puffs every 6 hours if needed for shortness of breath.      atorvastatin (Lipitor) 80 mg tablet Take 1 tablet (80 mg) by mouth once daily at bedtime.      busPIRone (Buspar) 10 mg tablet Take 1 tablet (10 mg) by mouth once daily in the morning.      divalproex (Depakote ER) 500 mg 24 hr tablet Take 1 tablet (500 mg) by mouth. In the morning, and 2 tablets in the evening      DULoxetine (Cymbalta) 60 mg DR capsule Take 1 capsule (60 mg) by mouth once daily in the evening.      empagliflozin (Jardiance) 10 mg Take 1 tablet (10 mg) by mouth once daily.      enoxaparin (Lovenox) 100 mg/mL syringe REMOVE 0.1ML AND Inject 0.9 mL (90 mg) under the skin 2 times a day. (Patient not taking: Reported on 12/18/2024) 28 mL 1    ergocalciferol (Vitamin D-2) 1.25 MG (67974 UT) capsule TAKE 1 CAPSULE BY MOUTH TWICE A WEEK FOR 3 WEEKS  THEN ONCE CAPSULE WEEKLY      levETIRAcetam (Keppra) 500 mg tablet Take 1 tablet (500 mg) by mouth 2 times a day.      levothyroxine (Synthroid, Levoxyl) 75 mcg tablet Take 1 tablet (75 mcg) by mouth once daily in the morning. Take before meals. On empty stomach      loperamide (Imodium) 2 mg capsule Take 1 capsule (2 mg) by mouth 4 times a day as needed for diarrhea. 30 capsule 0    melatonin 10 mg tablet Take 1 tablet (10 mg) by mouth as needed at bedtime (insomnia).      metoprolol tartrate (Lopressor) 25 mg tablet Take 0.5  tablets (12.5 mg) by mouth 2 times a day.      pantoprazole (ProtoNix) 40 mg EC tablet Take 1 tablet (40 mg) by mouth once daily in the morning. Take before meals. Do not crush, chew, or split. (Patient not taking: Reported on 12/18/2024) 90 tablet 3    psyllium (Metamucil) 3.4 gram packet Take 1 packet by mouth 3 times a day. Mix and drink with at least 8 ounces of water or juice. (Patient not taking: Reported on 12/18/2024)      warfarin (Coumadin) 2.5 mg tablet Take as directed per After Visit Summary. (Patient taking differently: Take 2 tablets (5 mg) by mouth once daily in the evening. Take as directed per After Visit Summary.)      [DISCONTINUED] aspirin 81 mg EC tablet Take 1 tablet (81 mg) by mouth once daily.      [DISCONTINUED] multivitamin tablet Take 1 tablet by mouth once daily.          Allergies:  Allergies   Allergen Reactions    Aspartame Seizure    Nsaids (Non-Steroidal Anti-Inflammatory Drug) Other     Significant kidney injury (per patient report)       ROS: 12 system negative except HPI    Objective   Vitals:  Heart Rate:  []   Temp:  [35.9 °C (96.6 °F)-36.3 °C (97.3 °F)]   Resp:  [17-18]   BP: ()/(52-64)   SpO2:  [92 %-99 %]     Exam:  Constitutional: No acute distress, resting comfortably  Neuro:  AOx3, grossly intact  ENMT: moist mucous membranes  CV: no tachycardia  Pulm: non-labored on RA  GI: soft, non-tender, non-distended  Skin: warm and dry  Musculoskeletal: moving all extremities  Extremities: left aka dressing clean, dry intact. Changed during round    Labs:  Results from last 7 days   Lab Units 12/22/24  0752 12/21/24  0629 12/20/24  0802   WBC AUTO x10*3/uL 15.4* 15.5* 14.2*   HEMOGLOBIN g/dL 7.6* 7.5* 7.9*   PLATELETS AUTO x10*3/uL 674* 576* 633*      Results from last 7 days   Lab Units 12/22/24  0752 12/21/24  0629 12/20/24  0350   SODIUM mmol/L 142 141 139   POTASSIUM mmol/L 4.0 3.3* 3.4*   CHLORIDE mmol/L 103 101 101   CO2 mmol/L 31 30 30   BUN mg/dL 10 9 8    CREATININE mg/dL 0.42* 0.36* 0.45*   GLUCOSE mg/dL 120* 146* 165*   MAGNESIUM mg/dL 1.96 2.00 2.07   PHOSPHORUS mg/dL 4.0 3.9 3.0      Results from last 7 days   Lab Units 12/20/24  0350 12/19/24  0319 12/18/24  0849   INR  1.4* 1.5* 1.5*   PROTIME seconds 15.9* 16.9* 17.0*   APTT seconds 56* 53* 24*     Results from last 7 days   Lab Units 12/22/24  0752 12/21/24  0629 12/20/24  0350   ANTI XA UNFRACTIONATED IU/mL 0.4 0.4 0.3       Imaging:  Reviewed independently by vascular team

## 2024-12-24 LAB
ALBUMIN SERPL BCP-MCNC: 2.3 G/DL (ref 3.4–5)
ANION GAP SERPL CALC-SCNC: 12 MMOL/L (ref 10–20)
BUN SERPL-MCNC: 10 MG/DL (ref 6–23)
CALCIUM SERPL-MCNC: 8.6 MG/DL (ref 8.6–10.6)
CHLORIDE SERPL-SCNC: 101 MMOL/L (ref 98–107)
CO2 SERPL-SCNC: 31 MMOL/L (ref 21–32)
CREAT SERPL-MCNC: 0.52 MG/DL (ref 0.5–1.05)
EGFRCR SERPLBLD CKD-EPI 2021: >90 ML/MIN/1.73M*2
ERYTHROCYTE [DISTWIDTH] IN BLOOD BY AUTOMATED COUNT: 23.8 % (ref 11.5–14.5)
FUNGUS SPEC CULT: NORMAL
FUNGUS SPEC FUNGUS STN: NORMAL
GLUCOSE BLD MANUAL STRIP-MCNC: 110 MG/DL (ref 74–99)
GLUCOSE BLD MANUAL STRIP-MCNC: 114 MG/DL (ref 74–99)
GLUCOSE BLD MANUAL STRIP-MCNC: 176 MG/DL (ref 74–99)
GLUCOSE BLD MANUAL STRIP-MCNC: 189 MG/DL (ref 74–99)
GLUCOSE BLD MANUAL STRIP-MCNC: 203 MG/DL (ref 74–99)
GLUCOSE SERPL-MCNC: 104 MG/DL (ref 74–99)
HCT VFR BLD AUTO: 28.2 % (ref 36–46)
HGB BLD-MCNC: 7.9 G/DL (ref 12–16)
MAGNESIUM SERPL-MCNC: 2 MG/DL (ref 1.6–2.4)
MCH RBC QN AUTO: 21.6 PG (ref 26–34)
MCHC RBC AUTO-ENTMCNC: 28 G/DL (ref 32–36)
MCV RBC AUTO: 77 FL (ref 80–100)
NRBC BLD-RTO: 0 /100 WBCS (ref 0–0)
PHOSPHATE SERPL-MCNC: 3.9 MG/DL (ref 2.5–4.9)
PLATELET # BLD AUTO: 667 X10*3/UL (ref 150–450)
POTASSIUM SERPL-SCNC: 3.8 MMOL/L (ref 3.5–5.3)
RBC # BLD AUTO: 3.65 X10*6/UL (ref 4–5.2)
SODIUM SERPL-SCNC: 140 MMOL/L (ref 136–145)
UFH PPP CHRO-ACNC: 0.6 IU/ML
WBC # BLD AUTO: 12.8 X10*3/UL (ref 4.4–11.3)

## 2024-12-24 PROCEDURE — 2500000001 HC RX 250 WO HCPCS SELF ADMINISTERED DRUGS (ALT 637 FOR MEDICARE OP)

## 2024-12-24 PROCEDURE — 2500000004 HC RX 250 GENERAL PHARMACY W/ HCPCS (ALT 636 FOR OP/ED): Performed by: STUDENT IN AN ORGANIZED HEALTH CARE EDUCATION/TRAINING PROGRAM

## 2024-12-24 PROCEDURE — 2500000002 HC RX 250 W HCPCS SELF ADMINISTERED DRUGS (ALT 637 FOR MEDICARE OP, ALT 636 FOR OP/ED): Performed by: NURSE PRACTITIONER

## 2024-12-24 PROCEDURE — 85027 COMPLETE CBC AUTOMATED: CPT

## 2024-12-24 PROCEDURE — 2500000004 HC RX 250 GENERAL PHARMACY W/ HCPCS (ALT 636 FOR OP/ED)

## 2024-12-24 PROCEDURE — 82947 ASSAY GLUCOSE BLOOD QUANT: CPT

## 2024-12-24 PROCEDURE — 2500000001 HC RX 250 WO HCPCS SELF ADMINISTERED DRUGS (ALT 637 FOR MEDICARE OP): Performed by: STUDENT IN AN ORGANIZED HEALTH CARE EDUCATION/TRAINING PROGRAM

## 2024-12-24 PROCEDURE — 99223 1ST HOSP IP/OBS HIGH 75: CPT | Performed by: INTERNAL MEDICINE

## 2024-12-24 PROCEDURE — 36415 COLL VENOUS BLD VENIPUNCTURE: CPT | Performed by: NURSE PRACTITIONER

## 2024-12-24 PROCEDURE — 99497 ADVNCD CARE PLAN 30 MIN: CPT | Performed by: INTERNAL MEDICINE

## 2024-12-24 PROCEDURE — 85520 HEPARIN ASSAY: CPT

## 2024-12-24 PROCEDURE — 80069 RENAL FUNCTION PANEL: CPT

## 2024-12-24 PROCEDURE — 1100000001 HC PRIVATE ROOM DAILY

## 2024-12-24 PROCEDURE — 2500000001 HC RX 250 WO HCPCS SELF ADMINISTERED DRUGS (ALT 637 FOR MEDICARE OP): Performed by: NURSE PRACTITIONER

## 2024-12-24 PROCEDURE — 83735 ASSAY OF MAGNESIUM: CPT | Performed by: NURSE PRACTITIONER

## 2024-12-24 PROCEDURE — 2500000002 HC RX 250 W HCPCS SELF ADMINISTERED DRUGS (ALT 637 FOR MEDICARE OP, ALT 636 FOR OP/ED): Performed by: STUDENT IN AN ORGANIZED HEALTH CARE EDUCATION/TRAINING PROGRAM

## 2024-12-24 RX ORDER — OXYCODONE HYDROCHLORIDE 5 MG/1
5 TABLET ORAL EVERY 6 HOURS PRN
Status: DISCONTINUED | OUTPATIENT
Start: 2024-12-24 | End: 2024-12-29

## 2024-12-24 RX ORDER — AMOXICILLIN 250 MG
1 CAPSULE ORAL 2 TIMES DAILY
Status: DISCONTINUED | OUTPATIENT
Start: 2024-12-24 | End: 2025-01-07 | Stop reason: HOSPADM

## 2024-12-24 RX ORDER — HYDROMORPHONE HYDROCHLORIDE 1 MG/ML
0.2 INJECTION, SOLUTION INTRAMUSCULAR; INTRAVENOUS; SUBCUTANEOUS 2 TIMES DAILY PRN
Status: DISCONTINUED | OUTPATIENT
Start: 2024-12-24 | End: 2024-12-27

## 2024-12-24 RX ORDER — OXYCODONE HYDROCHLORIDE 5 MG/1
2.5 TABLET ORAL EVERY 4 HOURS PRN
Status: DISCONTINUED | OUTPATIENT
Start: 2024-12-24 | End: 2024-12-29

## 2024-12-24 RX ADMIN — LEVETIRACETAM 500 MG: 500 TABLET, FILM COATED ORAL at 08:43

## 2024-12-24 RX ADMIN — GABAPENTIN 400 MG: 400 CAPSULE ORAL at 22:21

## 2024-12-24 RX ADMIN — PIPERACILLIN SODIUM AND TAZOBACTAM SODIUM 3.38 G: 3; .375 INJECTION, SOLUTION INTRAVENOUS at 15:24

## 2024-12-24 RX ADMIN — PIPERACILLIN SODIUM AND TAZOBACTAM SODIUM 3.38 G: 3; .375 INJECTION, SOLUTION INTRAVENOUS at 08:43

## 2024-12-24 RX ADMIN — ATORVASTATIN CALCIUM 80 MG: 80 TABLET, FILM COATED ORAL at 20:35

## 2024-12-24 RX ADMIN — PANTOPRAZOLE SODIUM 40 MG: 40 TABLET, DELAYED RELEASE ORAL at 06:04

## 2024-12-24 RX ADMIN — ONDANSETRON 4 MG: 2 INJECTION INTRAMUSCULAR; INTRAVENOUS at 09:11

## 2024-12-24 RX ADMIN — HYDROMORPHONE HYDROCHLORIDE 0.2 MG: 1 INJECTION, SOLUTION INTRAMUSCULAR; INTRAVENOUS; SUBCUTANEOUS at 10:39

## 2024-12-24 RX ADMIN — ASPIRIN 81 MG: 81 TABLET, COATED ORAL at 08:44

## 2024-12-24 RX ADMIN — ACETAMINOPHEN 650 MG: 325 TABLET, FILM COATED ORAL at 08:43

## 2024-12-24 RX ADMIN — HEPARIN SODIUM 2100 UNITS/HR: 10000 INJECTION, SOLUTION INTRAVENOUS at 00:40

## 2024-12-24 RX ADMIN — PIPERACILLIN SODIUM AND TAZOBACTAM SODIUM 3.38 G: 3; .375 INJECTION, SOLUTION INTRAVENOUS at 03:15

## 2024-12-24 RX ADMIN — INSULIN GLARGINE 14 UNITS: 100 INJECTION, SOLUTION SUBCUTANEOUS at 21:31

## 2024-12-24 RX ADMIN — VANCOMYCIN HYDROCHLORIDE 1000 MG: 1 INJECTION, SOLUTION INTRAVENOUS at 05:57

## 2024-12-24 RX ADMIN — ACETAMINOPHEN 650 MG: 325 TABLET, FILM COATED ORAL at 03:15

## 2024-12-24 RX ADMIN — ACETAMINOPHEN 650 MG: 325 TABLET, FILM COATED ORAL at 18:02

## 2024-12-24 RX ADMIN — AMPICILLIN SODIUM AND SULBACTAM SODIUM 3 G: 2; 1 INJECTION, POWDER, FOR SOLUTION INTRAMUSCULAR; INTRAVENOUS at 23:18

## 2024-12-24 RX ADMIN — SODIUM CHLORIDE, POTASSIUM CHLORIDE, SODIUM LACTATE AND CALCIUM CHLORIDE 500 ML: 600; 310; 30; 20 INJECTION, SOLUTION INTRAVENOUS at 08:44

## 2024-12-24 RX ADMIN — METOPROLOL TARTRATE 12.5 MG: 25 TABLET, FILM COATED ORAL at 20:35

## 2024-12-24 RX ADMIN — AMPICILLIN SODIUM AND SULBACTAM SODIUM 3 G: 2; 1 INJECTION, POWDER, FOR SOLUTION INTRAMUSCULAR; INTRAVENOUS at 17:54

## 2024-12-24 RX ADMIN — GABAPENTIN 400 MG: 400 CAPSULE ORAL at 05:57

## 2024-12-24 RX ADMIN — INSULIN LISPRO 3 UNITS: 100 INJECTION, SOLUTION INTRAVENOUS; SUBCUTANEOUS at 15:41

## 2024-12-24 RX ADMIN — LEVOTHYROXINE SODIUM 75 MCG: 0.07 TABLET ORAL at 06:04

## 2024-12-24 RX ADMIN — LEVETIRACETAM 500 MG: 500 TABLET, FILM COATED ORAL at 20:35

## 2024-12-24 RX ADMIN — DULOXETINE HYDROCHLORIDE 60 MG: 60 CAPSULE, DELAYED RELEASE ORAL at 08:43

## 2024-12-24 RX ADMIN — GABAPENTIN 400 MG: 400 CAPSULE ORAL at 15:24

## 2024-12-24 RX ADMIN — ACETAMINOPHEN 650 MG: 325 TABLET, FILM COATED ORAL at 23:19

## 2024-12-24 RX ADMIN — DIVALPROEX SODIUM 1000 MG: 500 TABLET, FILM COATED, EXTENDED RELEASE ORAL at 20:35

## 2024-12-24 RX ADMIN — DIVALPROEX SODIUM 500 MG: 500 TABLET, FILM COATED, EXTENDED RELEASE ORAL at 08:44

## 2024-12-24 RX ADMIN — OXYCODONE HYDROCHLORIDE 10 MG: 5 TABLET ORAL at 12:08

## 2024-12-24 RX ADMIN — ACETAMINOPHEN 650 MG: 325 TABLET, FILM COATED ORAL at 12:53

## 2024-12-24 RX ADMIN — HEPARIN SODIUM 2100 UNITS/HR: 10000 INJECTION, SOLUTION INTRAVENOUS at 12:11

## 2024-12-24 ASSESSMENT — COGNITIVE AND FUNCTIONAL STATUS - GENERAL
MOVING FROM LYING ON BACK TO SITTING ON SIDE OF FLAT BED WITH BEDRAILS: A LITTLE
STANDING UP FROM CHAIR USING ARMS: TOTAL
DRESSING REGULAR UPPER BODY CLOTHING: A LITTLE
PERSONAL GROOMING: A LITTLE
HELP NEEDED FOR BATHING: A LITTLE
TOILETING: A LOT
CLIMB 3 TO 5 STEPS WITH RAILING: TOTAL
MOBILITY SCORE: 8
TURNING FROM BACK TO SIDE WHILE IN FLAT BAD: TOTAL
MOVING TO AND FROM BED TO CHAIR: TOTAL
DRESSING REGULAR LOWER BODY CLOTHING: A LOT
DAILY ACTIVITIY SCORE: 17
WALKING IN HOSPITAL ROOM: TOTAL

## 2024-12-24 ASSESSMENT — PAIN SCALES - GENERAL
PAINLEVEL_OUTOF10: 10 - WORST POSSIBLE PAIN
PAINLEVEL_OUTOF10: 0 - NO PAIN
PAINLEVEL_OUTOF10: 0 - NO PAIN

## 2024-12-24 NOTE — CONSULTS
"Inpatient consult to Palliative Care  Consult performed by: Kelly Guerrero MD  Consult ordered by: Adonay Miller MD  Reason for consult: symptom management, goals of care, hospice eligibility        Palliative Medicine Consult  Complex medical decision making, symptom management, patient/family support    History obtained from chart review including ED note, H&P, patient's daily progress notes, review of lab/test results, and discussion with primary team and bedside RN.    Chief complaint: leg pain, inability to move or feel left leg    Subjective    History of Present Illness  54 y.o. F with PMH of severe PAD s/p R hip disarticulation s/p L CFA and EIA embolectomy 12/2023 s/p multiple L toe amputations, s/p L femoral endarterectomy, profundaplasty, common ezhrqyx-cl-azpzakxp tibial bypass using 6 mm ringed PTFE, left lower extremity angiogram on 9/25 T2DM complicated by diabetic neuropathy, HTN, HLD, renal and splenic thromboses presented with severe left leg pain and inability to move or feel or left leg. Imaging consistent with occlusion of her L CFA-AT bypass graft.   She was taken to OR urgently and found to have purulence along the anteromedial aspect of the left knee and distal thigh. Occluded fem-AT bypass and native popliteal artery; as a result, a left above-knee amputation was done for source control 12/17.  Hip disarticulation was discussed with the patient given high risk of non healing of AKA in the light of limited perfusion.  The patient is currently considering a left hip disarticulation, though she has not yet made a final decision. Palliative care on board to assist with symptom management and goals of care discussion.    Upon evaluation, patient reports L AKA stump pain and currently reports 7/10 pain. She reports the pain is typically \"almost 10/10\" and she requests the nurse to close the door because she does not want other patients to see her screaming due to pain. She also reports pain on " the right hip site after her disarticulation in 2021. She also reports she has intense anxiety associated with dressing change and she has had prior experiences where her pain was not adequately managed and she was diagnosed with PTSD as a result of that.        Introduction to Palliative Medicine  Met with Ms. Slater at bedside.   Patient alert, although the team noticed she was drowsy occasionally during the encounter and had difficulty concentrating in regards to answering some of our questions.    Staff present: Dr. Dykes(palliative care attending), Dr. Guerrero(fellow)  noble Méndez was also present in the first half of the encounter.    Palliative Medicine was introduced as a specialty service for patients with serious illness to help with symptom management, improve quality of life, assist with goals of care conversations, navigate complex decision making, and provide support to patients and families. Support and empathy was provided throughout the encounter. Provided reflective listening and presence.     Symptoms  Pain: yes   Dyspnea: no  Fatigue: yes  Insomnia: yes  Drowsiness: yes  Constipation: no  Nausea: no  Appetite: good  Anxiety: yes related to dressing change  Depression: no    Palliative Medicine Social History:  The patient is single. She has 4 children(daughter Jeanette lives in [place]. Daughter, Araceli and son Garrick live in [place]. Her youngest son, Pepe lives in [place], [place]). It is notable that when first asked about her children, the patient initially stated that she had none, but later mentioned her daughters and son. The patient experienced some difficulty concentrating during parts of the encounter.  The patient’s mother is alive, but she reports not having a close relationship with her.  She identifies her aunt, oLly as her HPOA. Her first alternate is her son, Pepe Guerrier and her cousin(Juanis Blackburn) is her second alternate health care agent.  The patient lives alone  "and has 2 HHA assistance alternating in the week and she believes she is well taken care of at home. She livesin an apartment, and has a bedside commode and lift chair.  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  Patient is wheelchair bound after her R hip disarticulation in 2021.  The patient spends most of her day at home and enjoys cooking pasta and Italian wedding soup, as well as doing [place] jurgen. She likes listening to all genre of music.  Pt sees their PCP and other specialists regularly.   Denies any safety concerns.    Objective    Last Recorded Vitals  /62   Pulse 89   Temp 36.4 °C (97.5 °F)   Resp 20   Ht 1.702 m (5' 7\")   Wt 88 kg (194 lb)   SpO2 93%   BMI 30.38 kg/m²      Physical Exam  Vitals reviewed.   Constitutional:       Appearance: She is obese.   HENT:      Head: Normocephalic and atraumatic.   Cardiovascular:      Rate and Rhythm: Normal rate and regular rhythm.      Heart sounds: No murmur heard.     No friction rub. No gallop.   Pulmonary:      Effort: Pulmonary effort is normal.      Breath sounds: Normal breath sounds. No wheezing, rhonchi or rales.   Abdominal:      General: Bowel sounds are normal.      Palpations: Abdomen is soft.      Tenderness: There is no abdominal tenderness. There is no guarding.   Musculoskeletal:      Comments: S/p L AKA dressing in place  S/p R hip disarticulation   Neurological:      Comments: AAOX2  (Self, location-hospital/; thought it was year 2025 vs 2021)        Relevant Results  Results for orders placed or performed during the hospital encounter of 12/17/24 (from the past 24 hours)   POCT GLUCOSE   Result Value Ref Range    POCT Glucose 95 74 - 99 mg/dL   POCT GLUCOSE   Result Value Ref Range    POCT Glucose 102 (H) 74 - 99 mg/dL   POCT GLUCOSE   Result Value Ref Range    POCT Glucose 128 (H) 74 - 99 mg/dL   POCT GLUCOSE   Result Value Ref Range    POCT Glucose 114 (H) 74 - 99 mg/dL      XR hip left with pelvis when " performed 2 or 3 views, XR femur left 2+ views  Narrative: Interpreted By:  Jasiel Power,   STUDY:  XR FEMUR LEFT 2+ VIEWS; XR HIP LEFT WITH PELVIS WHEN PERFORMED 2 OR 3  VIEWS; ;  12/23/2024 1:24 pm; 12/23/2024 1:25 pm      INDICATION:  Signs/Symptoms:hip disartic consult; Signs/Symptoms:L hip disartic  consult.          COMPARISON:  None.      ACCESSION NUMBER(S):  [id number]; [id number]      ORDERING CLINICIAN:  RADHANALLARISA BLOOD      FINDINGS:  One view of the left femur, AP view of the pelvis and frontal view of  the left hip      Postsurgical changes of above knee amputation of the distal femur.  Soft tissue changes of the distal left lower extremity. Multiple  stent grafts are noted over the pelvis and left lower extremity,  partially visualized. Surgical clips over the left hip. Left hip  joint is intact. Status post right hip disarticulation with  postsurgical changes over the right hip soft tissues. No definitive  acute fracture.      Impression: 1. Postsurgical changes of left above knee amputation and right hip  disarticulation as above.  2. No definitive acute fracture.              MACRO:  None      Signed by: Jasiel Power 12/23/2024 1:36 PM  Dictation workstation:   [id number]     Encounter Date: 12/17/24   ECG 12 lead   Result Value    Ventricular Rate 126    Atrial Rate 126    IN Interval 114    QRS Duration 78    QT Interval 322    QTC Calculation(Bazett) 466    P Axis 46    R Axis 40    T Axis 60    QRS Count 20    Q Onset 223    P Onset 166    P Offset 217    T Offset 384    QTC Fredericia 412    Narrative    Sinus tachycardia  Otherwise normal ECG  When compared with ECG of 01-JUL-2022 21:02,  Premature atrial complexes are no longer Present  T wave inversion no longer evident in Anterior leads    See ED provider note for full interpretation and clinical correlation  Confirmed by Crystal Kwan ([id number]) on 12/19/2024 1:43:15 AM        Allergies  Aspartame and Nsaids (non-steroidal  anti-inflammatory drug)    Scheduled medications  acetaminophen, 650 mg, oral, q6h NINO  aspirin, 81 mg, oral, Daily  atorvastatin, 80 mg, oral, Nightly  divalproex, 1,000 mg, oral, Nightly at 0300  divalproex, 500 mg, oral, Daily with breakfast  DULoxetine, 60 mg, oral, Daily  gabapentin, 400 mg, oral, q8h NINO  Glucommander - insulin glargine, 1-125 Units, subcutaneous, Nightly   And  Glucommander - insulin lispro, 0-125 Units, subcutaneous, With meals & nightly  HYDROmorphone, 0.5 mg, intravenous, Once  lactated Ringer's, 500 mL, intravenous, Once  levETIRAcetam, 500 mg, oral, BID  levothyroxine, 75 mcg, oral, Daily before breakfast  metoprolol tartrate, 12.5 mg, oral, BID  pantoprazole, 40 mg, oral, Daily before breakfast  piperacillin-tazobactam, 3.375 g, intravenous, q6h  vancomycin, 1,000 mg, intravenous, q12h      Continuous medications  heparin, 0-4,000 Units/hr, Last Rate: 2,100 Units/hr (12/24/24 0040)      PRN medications  PRN medications: albuterol, dextrose, glucagon HCL, Glucommander - insulin glargine **AND** Glucommander - insulin lispro **AND** Glucommander - insulin lispro **AND** [COMPLETED] POCT Glucose, HYDROmorphone, melatonin, naloxone, ondansetron ODT **OR** ondansetron, oxyCODONE, oxyCODONE, oxygen, vancomycin     Assessment/Plan    54 y.o. F with PMH of severe PAD s/p R hip disarticulation s/p L CFA and EIA embolectomy 12/2023 s/p multiple L toe amputations, s/p L femoral endarterectomy, profundaplasty, common ucbvujb-de-kpuyoodp tibial bypass using 6 mm ringed PTFE, left lower extremity angiogram on 9/25 T2DM complicated by diabetic neuropathy, HTN, HLD, renal and splenic thromboses presented with severe left leg pain and inability to move or feel or left leg. Imaging consistent with occlusion of her L CFA-AT bypass graft.  S/p L AKA 12/17. Palliative care consulted to assist with goals of care, symptom management in the context of her recent surgery and severe PAD.    Palliative  "Performance Scale (PPS):     ----------------------------------------------------------------------------------------------------------------------------------------------------------------------------------------------------------------------------------------------------------------------------------------------------------------------------------------------------------------------  Advanced Care Planning  Patient and/or family consented to a voluntary Advanced Care Planning meeting.   Serious Illness Assessment and Counseling:  Life Limiting Disease: Severe PAD s/p R hip disarticulation with L CFA-AT bypass graft occlusion, s/p L AKA 12/17 now with poor wound healing due to poor perfusion.     Disease Specific Information Provided/Prognosis Discussed: Patient's current clinical condition, including diagnosis, prognosis, and management plan were discussed.   Counseling provided on Severe PAD s/p R hip disarticulation with L CFA-AT bypass graft occlusion, s/p L AKA 12/17 now with poor wound healing due to poor perfusion.   Counseling provided on guarded prognosis and what to expect with disease progression of severe PAD, wound healing   Counseling provided on the irreversible and progressive nature of patient's diseases:  as vascular surgery has indicated that she may not be a suitable candidate for disarticulation at a later time(if we wait too long) due to ongoing vascular concerns.    Understanding/Overall Impression: Patient expressing fair understanding of overall health status and severity of illness. When asked about her awareness of her condition, she expressed concern by stating, \"I'll be losing my other leg too.\" Notably, she was tearful during the encounter.    Goals/Hopes: Discussion ensued about patient's goals for their medical care going forward. Allowed patient time to talk about her current quality of life, disease course/progression, and symptom and treatment burden. The patient expresses hope " "that, should she proceed with the surgery, she will be able to \"adjust\" to the change. She shared that, having successfully adapted to life and \"survive\" with one leg, she believes she can continue to do so without the other. She remains optimistic about maintaining her quality of life and hopes to resume activities she enjoyed previously, such as cooking and crocheting. She is also hopeful she can still function with her hands.     Fears/Worries/Concerns:The patient expressed concerns that, if she proceeds with the surgery, she might experience poorly controlled pain.  She is also worried that she might have to return to the hospital and \"beg\" for pain medications.  She also worries about the possibility of developing phantom pain on the left side too, similar to the phantom pain she experienced following her right hip disarticulation in 2021. Additionally, she is  anxious about the pain associated with dressing changes currently and reports experiencing anticipatory pain before each dressing change. She mentions that while she tries to manage and tolerate the discomfort, the pain often becomes unbearable.  The patient also expressed frustration, mentioning that one of her daughters is concerned she may become an \"addict\" due to the opioids she is currently taking, which adds to her distress.     Patient's Perception of Functional Status: The patient acknowledges and accepts that she is currently reliant on a wheelchair for mobility and understands that this will continue for the foreseeable future. During discussions with vascular surgery, the patient expressed hope of eventually receiving a hip prosthesis; however, vascular surgery indicated that this is unlikely to be a feasible option.     Minimal Acceptable Quality of Life/Maximal Moro Tolerable for the Possibility of More Time: Counseling provided on the concept of MAO/Maximal Moro. Patient expressing that she would never want to be in a health state " where she is intubated or placed on a ventilator, have a permanent feeding tube if they could not eat, have a tracheostomy placed). Patient deems that this would not be an acceptable quality of life for the patient.  Patient also stated that she believes a feeding tube also does not align with her quality of life and she does not wish to have one.      Resuscitation Assessment: Counseling provided on the benefit versus burden of CPR in the setting of the patient's overall health status. Patient  indicated DNR.     Advanced Directives:  Counseling provided on the importance of not crisis planning as disease burden progresses but to establish treatment limitations now so in the future medical team will be clear on what patient feels is an acceptable quality of life for the patient and what treatment limitations' patient would like set into place based on that.         HPOA: Yes-Aunt(Loly) first alternate: son(Pepe Guerrier) second alternate(Marisela Blackburn)    Code Status: Decision to change code status to DNR/DNI per patient's wishes.   The decision was made to change the patient's code status to DNR/DNI in accordance with her wishes. Although the patient experienced some difficulty concentrating and appeared more confused towards the end of the encounter, she was clear and firm about her preference for DNR/DNI when we discussed it. She also referenced her grandmother, noting how her grandmother’s wishes for DNR were respected during a cardiac arrest. We can revisit the discussion of code status to ensure continued clarity and consistency in her preferences.      #Palliative Care Encounter.  Support and empathy was provided throughout the encounter. Provided reflective listening and presence.     #Complex Medical Decision Making  #Goals of Care  #Advanced Care Planning  - Code status: DNR/DNI  - Goals are comfort and quality of life based  - Advanced Directives on file       #Acute Pain   L AKA pain/post-op  ischemic pain  R phantom limb pain vs neuroma?  Would not escalate her current analgesic regimen given her mental status(drowsiness, difficulty concentrating and slight confusion). Per primary team, at baseline, she has difficutly concentrating, although the confusion and drowsiness is new.   Patient leaning towards L hip disarticulation but has not made a final decision. If she were to undergo surgery, she would likely need short term SNF on discharge.  The possibility of a neuroma causing the pain on the right was discussed with vascular surgery. According to vascular surgery, it is unlikely to be pursued in the inpatient setting. Additionally, even if a neuroma is present, she would be less likely to be a candidate for a resection or ablation due to her ongoing L AKA and poor wound healing.    #PTSD-patient reports being diagnosed with PTSD per her outpatient psychiatrist in the context of her R hip disarticulation and sub optimal pain control  Would readdress once mental status improves    #Sleep disturbance/Insomnia  Possibly pain related, although other factors(environment, anxiety) also contributory  Would address the pain as above  Offered melatonin and patient declined stating it is ineffective    #Psychosocial Support  - Music Therapy   - Spiritual Care Support  - Art Therapy  - Pet Therapy     Plan of Care discussed with: Updated MD Webb and bedside RN on goals of care decision, medication adjustments, and code status         Thank you for allowing us to participate in the care of this patient. Palliative will continue to follow as needed. Palliative medicine is available Monday-Friday, 8a-6p. Please contact team with any questions or concerns.  Team pager [phone] (weekdays)    Kelly Guerrero MD  Discussed with palliative care attending,Dr. Dykes

## 2024-12-24 NOTE — PROGRESS NOTES
Spiritual Care Visit  Spiritual Care Request    Reason for Visit:  Routine Visit: Introduction  Continue Visiting: Yes     Focus of Care:  Visited With: Patient not available (Pt with a provider per patient's RN)

## 2024-12-24 NOTE — PROGRESS NOTES
Physical Therapy                 Therapy Communication Note    Patient Name: Shirin Slater  MRN: [id number]  Department: [place] [address]  Room: 70[facility]-  Today's Date: 12/24/2024     Discipline: Physical Therapy    PT Missed Visit: Yes     Missed Visit Reason: Missed Visit Reason:  (pt declined 2/2 pain. will re-attempt as schedule permits)    Missed Time: Attempt    Comment:

## 2024-12-24 NOTE — CONSULTS
Vancomycin Dosing by Pharmacy- Cessation of Therapy    Consult to pharmacy for vancomycin dosing has been discontinued by the prescriber, pharmacy will sign off at this time.    Please call pharmacy if there are further questions or re-enter a consult if vancomycin is resumed.     Jeanette Lou, PharmD

## 2024-12-24 NOTE — PROGRESS NOTES
VASCULAR SURGERY PROGRESS NOTE  Assessment/Plan   Shirin Slater is 54 y.o. female with history of severe PAD s/p R hip disarticulation s/p L CFA and EIA embolectomy 12/2023 s/p multiple L toe amputations, s/p L femoral endarterectomy, profundaplasty, common weqharc-rv-hbwmvlms tibial bypass using 6 mm ringed PTFE, left lower extremity angiogram on 9/25 T2DM complicated by diabetic neuropathy, HTN, HLD, renal and splenic thromboses who who presents with severe left leg pain and inability to move or feel or left leg. Imaging consistent with occlusion of her L CFA-AT bypass graft. On exam, patient with Martha 3 limb ischemia. Given leukocytosis, tachycardia, limb ischemia and concern for infection she was taken to OR urgently and had findings of purulence along the anteromedial aspect of the left knee and distal thigh. Muscle within the posterolateral thigh with healthy contraction on stimulation. 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     Ischemic skin demarcation at mid-thigh, unable to salvageable AKA. Likely having ischemic left AKA stump pain.  Discussed with patient the options for 1) hip disarticulation vs 2) palliative wound care    Patient not ready to make decision yet.     Neuro: acute postoperative pain, Hx seizures  - continue tylenol/oxy 5mg q4h PRN  - continue lidoderm patch   - home Keppra, Depakote, Cymbalta, gabapentin 400mg TID     CV: Hx severe PAD with multiple surgical interventions, HTN  - maintain blood pressure control  - continue statin  - daily ASA (holding home plavix)  - heparin infusion, low intensity (holding home coumadin)     Pulm:   - continue to encourage IS hourly while awake  - OOB to chair and increase ambulation as tolerated  - albuterol inhaler PRN     FENGI:   - tolerating regular diet  - continue bowel regimen to prevent OIC   - continue PPI  - monitor and replete  electrolytes     Endo: DM2, hypothyroidism  - holding home jardiance  - glucommander protocol -increase with BG in low 200s  - levothyroxine  - liberalized carbs to 75gm/ meal- discussed avoiding sugar and processed foods.     Heme: anemia of chronic disease  - no s/sx of bleeding  - monitor H/H, transfuse for Hgb <7     ID:  humble infection required amputation for source control  - tissue and blood cultures sent- final result without growth  - vanc/zosyn (end date 12/24/24)  - transition to Unasyn   - requested midline as patient is difficult IV access  - monitor s/s of infection  - WBC pending this morning, 15.5 (yesterday)    DVT prophylaxis:  - heparin infusion     Dispo-  - regular nursing floor    Omayra Webb MD  PGY-5 Vascular Surgery Resident    Subjective   Asked for pain medication. Tolerating food, voiding.     Objective   Vitals:  Heart Rate:  [75-89]   Temp:  [36.1 °C (97 °F)-36.4 °C (97.5 °F)]   Resp:  [16-20]   BP: ()/(41-62)   SpO2:  [92 %-98 %]     Exam:  Constitutional: No acute distress, resting comfortably  Neuro:  AOx3, grossly intact  ENMT: moist mucous membranes  CV: no tachycardia  Pulm: non-labored on RA  GI: soft, non-tender, non-distended  Skin: warm and dry  Musculoskeletal: moving all extremities  Extremities: left aka dressing clean, dry intact.    Labs:  Results from last 7 days   Lab Units 12/24/24  1157 12/23/24  0824 12/22/24  0752   WBC AUTO x10*3/uL 12.8* 13.6* 15.4*   HEMOGLOBIN g/dL 7.9* 7.7* 7.6*   PLATELETS AUTO x10*3/uL 667* 640* 674*      Results from last 7 days   Lab Units 12/24/24  1157 12/23/24  0824 12/22/24  0752   SODIUM mmol/L 140 139 142   POTASSIUM mmol/L 3.8 3.9 4.0   CHLORIDE mmol/L 101 100 103   CO2 mmol/L 31 29 31   BUN mg/dL 10 11 10   CREATININE mg/dL 0.52 0.50 0.42*   GLUCOSE mg/dL 104* 80 120*   MAGNESIUM mg/dL 2.00 2.13 1.96   PHOSPHORUS mg/dL 3.9 5.4* 4.0      Results from last 7 days   Lab Units 12/20/24  0350 12/19/24  0319 12/18/24  0849   INR   1.4* 1.5* 1.5*   PROTIME seconds 15.9* 16.9* 17.0*   APTT seconds 56* 53* 24*      Results from last 7 days   Lab Units 12/24/24  1157 12/23/24  0824 12/22/24  0752   ANTI XA UNFRACTIONATED IU/mL 0.6 0.5 0.4

## 2024-12-24 NOTE — PROGRESS NOTES
"Spiritual Care Visit  Spiritual Care Request    Reason for Visit:  Routine Visit: Follow-up  Continue Visiting: Yes     Focus of Care:  Visited With: Patient       Met with patient along with Palliative Attending and Learner to discuss patient's condition, potential medical trajectories and medical experiences affecting patient's quality of life. Patient reports she loves to cook Italian wedding soup, stuffed shells and pasta along with crocheting afghans. Patient spoke at length about her pain and how debilitating it is. Patient also reported she has \"PTSD\" from a previous medical experience that was also pain related. Provided non-anxious, supportive presence, reflective listening, naming of emotions, affirmation of experience, inquiry into systems of support. Will continue to follow.  "

## 2024-12-25 LAB
ALBUMIN SERPL BCP-MCNC: 2.1 G/DL (ref 3.4–5)
ANION GAP SERPL CALC-SCNC: 13 MMOL/L (ref 10–20)
BUN SERPL-MCNC: 7 MG/DL (ref 6–23)
CALCIUM SERPL-MCNC: 8.1 MG/DL (ref 8.6–10.6)
CHLORIDE SERPL-SCNC: 98 MMOL/L (ref 98–107)
CO2 SERPL-SCNC: 31 MMOL/L (ref 21–32)
CREAT SERPL-MCNC: 0.48 MG/DL (ref 0.5–1.05)
EGFRCR SERPLBLD CKD-EPI 2021: >90 ML/MIN/1.73M*2
ERYTHROCYTE [DISTWIDTH] IN BLOOD BY AUTOMATED COUNT: 24 % (ref 11.5–14.5)
GLUCOSE BLD MANUAL STRIP-MCNC: 101 MG/DL (ref 74–99)
GLUCOSE BLD MANUAL STRIP-MCNC: 153 MG/DL (ref 74–99)
GLUCOSE SERPL-MCNC: 286 MG/DL (ref 74–99)
HCT VFR BLD AUTO: 27.7 % (ref 36–46)
HGB BLD-MCNC: 7.7 G/DL (ref 12–16)
MAGNESIUM SERPL-MCNC: 1.93 MG/DL (ref 1.6–2.4)
MCH RBC QN AUTO: 21.4 PG (ref 26–34)
MCHC RBC AUTO-ENTMCNC: 27.8 G/DL (ref 32–36)
MCV RBC AUTO: 77 FL (ref 80–100)
NRBC BLD-RTO: 0 /100 WBCS (ref 0–0)
PHOSPHATE SERPL-MCNC: 3 MG/DL (ref 2.5–4.9)
PLATELET # BLD AUTO: 608 X10*3/UL (ref 150–450)
POTASSIUM SERPL-SCNC: 3.3 MMOL/L (ref 3.5–5.3)
RBC # BLD AUTO: 3.6 X10*6/UL (ref 4–5.2)
SODIUM SERPL-SCNC: 139 MMOL/L (ref 136–145)
UFH PPP CHRO-ACNC: 0.6 IU/ML
UFH PPP CHRO-ACNC: 0.7 IU/ML
UFH PPP CHRO-ACNC: >2 IU/ML
VANCOMYCIN SERPL-MCNC: 5.4 UG/ML (ref 5–20)
WBC # BLD AUTO: 9.9 X10*3/UL (ref 4.4–11.3)

## 2024-12-25 PROCEDURE — 2500000004 HC RX 250 GENERAL PHARMACY W/ HCPCS (ALT 636 FOR OP/ED)

## 2024-12-25 PROCEDURE — 2500000001 HC RX 250 WO HCPCS SELF ADMINISTERED DRUGS (ALT 637 FOR MEDICARE OP)

## 2024-12-25 PROCEDURE — 83735 ASSAY OF MAGNESIUM: CPT | Performed by: NURSE PRACTITIONER

## 2024-12-25 PROCEDURE — 2500000001 HC RX 250 WO HCPCS SELF ADMINISTERED DRUGS (ALT 637 FOR MEDICARE OP): Performed by: NURSE PRACTITIONER

## 2024-12-25 PROCEDURE — 80202 ASSAY OF VANCOMYCIN: CPT | Performed by: STUDENT IN AN ORGANIZED HEALTH CARE EDUCATION/TRAINING PROGRAM

## 2024-12-25 PROCEDURE — 82947 ASSAY GLUCOSE BLOOD QUANT: CPT

## 2024-12-25 PROCEDURE — 2500000001 HC RX 250 WO HCPCS SELF ADMINISTERED DRUGS (ALT 637 FOR MEDICARE OP): Performed by: STUDENT IN AN ORGANIZED HEALTH CARE EDUCATION/TRAINING PROGRAM

## 2024-12-25 PROCEDURE — 2500000002 HC RX 250 W HCPCS SELF ADMINISTERED DRUGS (ALT 637 FOR MEDICARE OP, ALT 636 FOR OP/ED): Performed by: NURSE PRACTITIONER

## 2024-12-25 PROCEDURE — 2500000004 HC RX 250 GENERAL PHARMACY W/ HCPCS (ALT 636 FOR OP/ED): Performed by: STUDENT IN AN ORGANIZED HEALTH CARE EDUCATION/TRAINING PROGRAM

## 2024-12-25 PROCEDURE — 2500000002 HC RX 250 W HCPCS SELF ADMINISTERED DRUGS (ALT 637 FOR MEDICARE OP, ALT 636 FOR OP/ED): Performed by: STUDENT IN AN ORGANIZED HEALTH CARE EDUCATION/TRAINING PROGRAM

## 2024-12-25 PROCEDURE — 1100000001 HC PRIVATE ROOM DAILY

## 2024-12-25 PROCEDURE — 36415 COLL VENOUS BLD VENIPUNCTURE: CPT

## 2024-12-25 PROCEDURE — 85520 HEPARIN ASSAY: CPT

## 2024-12-25 PROCEDURE — 80069 RENAL FUNCTION PANEL: CPT | Performed by: NURSE PRACTITIONER

## 2024-12-25 PROCEDURE — 85027 COMPLETE CBC AUTOMATED: CPT | Performed by: STUDENT IN AN ORGANIZED HEALTH CARE EDUCATION/TRAINING PROGRAM

## 2024-12-25 RX ORDER — POTASSIUM CHLORIDE 1.5 G/1.58G
40 POWDER, FOR SOLUTION ORAL ONCE
Status: COMPLETED | OUTPATIENT
Start: 2024-12-25 | End: 2024-12-25

## 2024-12-25 RX ADMIN — LEVETIRACETAM 500 MG: 500 TABLET, FILM COATED ORAL at 20:21

## 2024-12-25 RX ADMIN — GABAPENTIN 400 MG: 400 CAPSULE ORAL at 06:03

## 2024-12-25 RX ADMIN — INSULIN GLARGINE 14 UNITS: 100 INJECTION, SOLUTION SUBCUTANEOUS at 21:31

## 2024-12-25 RX ADMIN — AMPICILLIN SODIUM AND SULBACTAM SODIUM 3 G: 2; 1 INJECTION, POWDER, FOR SOLUTION INTRAMUSCULAR; INTRAVENOUS at 10:04

## 2024-12-25 RX ADMIN — GABAPENTIN 400 MG: 400 CAPSULE ORAL at 21:41

## 2024-12-25 RX ADMIN — ATORVASTATIN CALCIUM 80 MG: 80 TABLET, FILM COATED ORAL at 20:21

## 2024-12-25 RX ADMIN — DIVALPROEX SODIUM 1000 MG: 500 TABLET, FILM COATED, EXTENDED RELEASE ORAL at 20:22

## 2024-12-25 RX ADMIN — ACETAMINOPHEN 650 MG: 325 TABLET, FILM COATED ORAL at 12:56

## 2024-12-25 RX ADMIN — PANTOPRAZOLE SODIUM 40 MG: 40 TABLET, DELAYED RELEASE ORAL at 06:03

## 2024-12-25 RX ADMIN — AMPICILLIN SODIUM AND SULBACTAM SODIUM 3 G: 2; 1 INJECTION, POWDER, FOR SOLUTION INTRAMUSCULAR; INTRAVENOUS at 21:41

## 2024-12-25 RX ADMIN — ACETAMINOPHEN 650 MG: 325 TABLET, FILM COATED ORAL at 06:03

## 2024-12-25 RX ADMIN — ASPIRIN 81 MG: 81 TABLET, COATED ORAL at 08:49

## 2024-12-25 RX ADMIN — METOPROLOL TARTRATE 12.5 MG: 25 TABLET, FILM COATED ORAL at 20:21

## 2024-12-25 RX ADMIN — LEVETIRACETAM 500 MG: 500 TABLET, FILM COATED ORAL at 08:49

## 2024-12-25 RX ADMIN — AMPICILLIN SODIUM AND SULBACTAM SODIUM 3 G: 2; 1 INJECTION, POWDER, FOR SOLUTION INTRAMUSCULAR; INTRAVENOUS at 16:34

## 2024-12-25 RX ADMIN — HEPARIN SODIUM 2100 UNITS/HR: 10000 INJECTION, SOLUTION INTRAVENOUS at 02:02

## 2024-12-25 RX ADMIN — ACETAMINOPHEN 650 MG: 325 TABLET, FILM COATED ORAL at 18:46

## 2024-12-25 RX ADMIN — LEVOTHYROXINE SODIUM 75 MCG: 0.07 TABLET ORAL at 06:03

## 2024-12-25 RX ADMIN — POTASSIUM CHLORIDE 40 MEQ: 1.5 POWDER, FOR SOLUTION ORAL at 12:56

## 2024-12-25 RX ADMIN — GABAPENTIN 400 MG: 400 CAPSULE ORAL at 13:01

## 2024-12-25 RX ADMIN — HEPARIN SODIUM 2100 UNITS/HR: 10000 INJECTION, SOLUTION INTRAVENOUS at 14:31

## 2024-12-25 RX ADMIN — DIVALPROEX SODIUM 500 MG: 500 TABLET, FILM COATED, EXTENDED RELEASE ORAL at 08:49

## 2024-12-25 RX ADMIN — SENNOSIDES AND DOCUSATE SODIUM 1 TABLET: 50; 8.6 TABLET ORAL at 20:21

## 2024-12-25 RX ADMIN — HYDROMORPHONE HYDROCHLORIDE 0.2 MG: 1 INJECTION, SOLUTION INTRAMUSCULAR; INTRAVENOUS; SUBCUTANEOUS at 09:36

## 2024-12-25 RX ADMIN — OXYCODONE HYDROCHLORIDE 5 MG: 5 TABLET ORAL at 16:32

## 2024-12-25 RX ADMIN — DULOXETINE HYDROCHLORIDE 60 MG: 60 CAPSULE, DELAYED RELEASE ORAL at 08:49

## 2024-12-25 ASSESSMENT — COGNITIVE AND FUNCTIONAL STATUS - GENERAL
MOBILITY SCORE: 8
WALKING IN HOSPITAL ROOM: TOTAL
HELP NEEDED FOR BATHING: A LITTLE
TOILETING: A LOT
PERSONAL GROOMING: A LITTLE
CLIMB 3 TO 5 STEPS WITH RAILING: TOTAL
MOVING TO AND FROM BED TO CHAIR: TOTAL
DRESSING REGULAR UPPER BODY CLOTHING: A LITTLE
DAILY ACTIVITIY SCORE: 17
DRESSING REGULAR LOWER BODY CLOTHING: A LOT
TURNING FROM BACK TO SIDE WHILE IN FLAT BAD: TOTAL
MOVING FROM LYING ON BACK TO SITTING ON SIDE OF FLAT BED WITH BEDRAILS: A LITTLE
STANDING UP FROM CHAIR USING ARMS: TOTAL

## 2024-12-25 NOTE — PROGRESS NOTES
VASCULAR SURGERY PROGRESS NOTE  Assessment/Plan   Shirin Slater is 54 y.o. female with history of severe PAD s/p R hip disarticulation s/p L CFA and EIA embolectomy 12/2023 s/p multiple L toe amputations, s/p L femoral endarterectomy, profundaplasty, common spmwnch-gr-bozlvkle tibial bypass using 6 mm ringed PTFE, left lower extremity angiogram on 9/25 T2DM complicated by diabetic neuropathy, HTN, HLD, renal and splenic thromboses who who presents with severe left leg pain and inability to move or feel or left leg. Imaging consistent with occlusion of her L CFA-AT bypass graft. On exam, patient with Martha 3 limb ischemia. Given leukocytosis, tachycardia, limb ischemia and concern for infection she was taken to OR urgently and had findings of purulence along the anteromedial aspect of the left knee and distal thigh. Muscle within the posterolateral thigh with healthy contraction on stimulation. 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     Ischemic skin demarcation at mid-thigh, unable to salvageable AKA. Likely having ischemic left AKA stump pain.  Discussed with patient the options for 1) hip disarticulation vs 2) palliative wound care    Patient not ready to make decision yet.     Neuro: acute postoperative pain, Hx seizures  - continue tylenol/oxy 5mg q4h PRN  - continue lidoderm patch   - home Keppra, Depakote, Cymbalta, gabapentin 400mg TID     CV: Hx severe PAD with multiple surgical interventions, HTN  - maintain blood pressure control  - continue statin  - daily ASA (holding home plavix)  - heparin infusion, low intensity (holding home coumadin)     Pulm:   - continue to encourage IS hourly while awake  - OOB to chair and increase ambulation as tolerated  - albuterol inhaler PRN     FENGI:   - tolerating regular diet  - continue bowel regimen to prevent OIC   - continue PPI  - monitor and replete  electrolytes     Endo: DM2, hypothyroidism  - holding home jardiance  - glucommander protocol -increase with BG in low 200s  - levothyroxine  - liberalized carbs to 75gm/ meal- discussed avoiding sugar and processed foods.     Heme: anemia of chronic disease  - no s/sx of bleeding  - monitor H/H, transfuse for Hgb <7     ID:  humble infection required amputation for source control  - tissue and blood cultures sent- final result without growth  - vanc/zosyn (end date 12/24/24)  - transition to Unasyn   - requested midline as patient is difficult IV access  - monitor s/s of infection  - WBC pending this morning, 15.5 (yesterday)    DVT prophylaxis:  - heparin infusion     Dispo-  - regular nursing floor    Omayra Webb MD  PGY-5 Vascular Surgery Resident    Subjective   Asked for pain medication. Tolerating food, voiding.     Objective   Vitals:  Heart Rate:  [78-88]   Temp:  [36.1 °C (97 °F)-36.6 °C (97.9 °F)]   Resp:  [16-18]   BP: ()/(50-65)   SpO2:  [91 %-94 %]     Exam:  Constitutional: No acute distress, resting comfortably  Neuro:  AOx3, grossly intact  ENMT: moist mucous membranes  CV: no tachycardia  Pulm: non-labored on RA  GI: soft, non-tender, non-distended  Skin: warm and dry  Musculoskeletal: moving all extremities  Extremities: left aka dressing clean, dry intact.    Labs:  Results from last 7 days   Lab Units 12/25/24  0748 12/24/24  1157 12/23/24  0824   WBC AUTO x10*3/uL 9.9 12.8* 13.6*   HEMOGLOBIN g/dL 7.7* 7.9* 7.7*   PLATELETS AUTO x10*3/uL 608* 667* 640*      Results from last 7 days   Lab Units 12/25/24  0748 12/24/24  1157 12/23/24  0824   SODIUM mmol/L 139 140 139   POTASSIUM mmol/L 3.3* 3.8 3.9   CHLORIDE mmol/L 98 101 100   CO2 mmol/L 31 31 29   BUN mg/dL 7 10 11   CREATININE mg/dL 0.48* 0.52 0.50   GLUCOSE mg/dL 286* 104* 80   MAGNESIUM mg/dL 1.93 2.00 2.13   PHOSPHORUS mg/dL 3.0 3.9 5.4*      Results from last 7 days   Lab Units 12/20/24  0350 12/19/24  0319   INR  1.4* 1.5*    PROTIME seconds 15.9* 16.9*   APTT seconds 56* 53*      Results from last 7 days   Lab Units 12/25/24  1024 12/25/24  0748 12/24/24  1157   ANTI XA UNFRACTIONATED IU/mL 0.6 >2.0* 0.6

## 2024-12-25 NOTE — PROGRESS NOTES
Social Work Note   follow up pall care SW sign out: Vascular Surgery met with the patient to discuss left hip disarticulation vs Palliative  wound care because the patients hip is not healing.   Outcome of meeting: Patient is not ready to make a decision.   Discharge planning: Pending medical outcome and final rehab recommendation  GERARD Paredes

## 2024-12-26 LAB
ALBUMIN SERPL BCP-MCNC: 2.3 G/DL (ref 3.4–5)
ANION GAP SERPL CALC-SCNC: 11 MMOL/L (ref 10–20)
BUN SERPL-MCNC: 7 MG/DL (ref 6–23)
CALCIUM SERPL-MCNC: 8.5 MG/DL (ref 8.6–10.6)
CHLORIDE SERPL-SCNC: 103 MMOL/L (ref 98–107)
CO2 SERPL-SCNC: 30 MMOL/L (ref 21–32)
CREAT SERPL-MCNC: 0.42 MG/DL (ref 0.5–1.05)
EGFRCR SERPLBLD CKD-EPI 2021: >90 ML/MIN/1.73M*2
ERYTHROCYTE [DISTWIDTH] IN BLOOD BY AUTOMATED COUNT: 24.4 % (ref 11.5–14.5)
GLUCOSE BLD MANUAL STRIP-MCNC: 126 MG/DL (ref 74–99)
GLUCOSE BLD MANUAL STRIP-MCNC: 127 MG/DL (ref 74–99)
GLUCOSE BLD MANUAL STRIP-MCNC: 211 MG/DL (ref 74–99)
GLUCOSE BLD MANUAL STRIP-MCNC: 91 MG/DL (ref 74–99)
GLUCOSE SERPL-MCNC: 117 MG/DL (ref 74–99)
HCT VFR BLD AUTO: 29.2 % (ref 36–46)
HGB BLD-MCNC: 8 G/DL (ref 12–16)
MAGNESIUM SERPL-MCNC: 1.86 MG/DL (ref 1.6–2.4)
MCH RBC QN AUTO: 21.4 PG (ref 26–34)
MCHC RBC AUTO-ENTMCNC: 27.4 G/DL (ref 32–36)
MCV RBC AUTO: 78 FL (ref 80–100)
NRBC BLD-RTO: 0 /100 WBCS (ref 0–0)
PHOSPHATE SERPL-MCNC: 3.1 MG/DL (ref 2.5–4.9)
PLATELET # BLD AUTO: 645 X10*3/UL (ref 150–450)
POTASSIUM SERPL-SCNC: 3.4 MMOL/L (ref 3.5–5.3)
RBC # BLD AUTO: 3.73 X10*6/UL (ref 4–5.2)
SODIUM SERPL-SCNC: 141 MMOL/L (ref 136–145)
UFH PPP CHRO-ACNC: 0.5 IU/ML
UFH PPP CHRO-ACNC: 0.5 IU/ML
UFH PPP CHRO-ACNC: 0.7 IU/ML
WBC # BLD AUTO: 10 X10*3/UL (ref 4.4–11.3)

## 2024-12-26 PROCEDURE — 82947 ASSAY GLUCOSE BLOOD QUANT: CPT

## 2024-12-26 PROCEDURE — 83735 ASSAY OF MAGNESIUM: CPT | Performed by: NURSE PRACTITIONER

## 2024-12-26 PROCEDURE — 85027 COMPLETE CBC AUTOMATED: CPT | Performed by: STUDENT IN AN ORGANIZED HEALTH CARE EDUCATION/TRAINING PROGRAM

## 2024-12-26 PROCEDURE — 36415 COLL VENOUS BLD VENIPUNCTURE: CPT

## 2024-12-26 PROCEDURE — 2500000004 HC RX 250 GENERAL PHARMACY W/ HCPCS (ALT 636 FOR OP/ED): Performed by: STUDENT IN AN ORGANIZED HEALTH CARE EDUCATION/TRAINING PROGRAM

## 2024-12-26 PROCEDURE — 84100 ASSAY OF PHOSPHORUS: CPT | Performed by: NURSE PRACTITIONER

## 2024-12-26 PROCEDURE — 2500000001 HC RX 250 WO HCPCS SELF ADMINISTERED DRUGS (ALT 637 FOR MEDICARE OP): Performed by: NURSE PRACTITIONER

## 2024-12-26 PROCEDURE — 2500000001 HC RX 250 WO HCPCS SELF ADMINISTERED DRUGS (ALT 637 FOR MEDICARE OP): Performed by: STUDENT IN AN ORGANIZED HEALTH CARE EDUCATION/TRAINING PROGRAM

## 2024-12-26 PROCEDURE — 99233 SBSQ HOSP IP/OBS HIGH 50: CPT | Performed by: INTERNAL MEDICINE

## 2024-12-26 PROCEDURE — 1100000001 HC PRIVATE ROOM DAILY

## 2024-12-26 PROCEDURE — 85520 HEPARIN ASSAY: CPT

## 2024-12-26 PROCEDURE — 80069 RENAL FUNCTION PANEL: CPT | Performed by: NURSE PRACTITIONER

## 2024-12-26 PROCEDURE — 2500000002 HC RX 250 W HCPCS SELF ADMINISTERED DRUGS (ALT 637 FOR MEDICARE OP, ALT 636 FOR OP/ED): Performed by: STUDENT IN AN ORGANIZED HEALTH CARE EDUCATION/TRAINING PROGRAM

## 2024-12-26 PROCEDURE — 2500000002 HC RX 250 W HCPCS SELF ADMINISTERED DRUGS (ALT 637 FOR MEDICARE OP, ALT 636 FOR OP/ED): Performed by: NURSE PRACTITIONER

## 2024-12-26 PROCEDURE — 2500000004 HC RX 250 GENERAL PHARMACY W/ HCPCS (ALT 636 FOR OP/ED)

## 2024-12-26 PROCEDURE — 2500000001 HC RX 250 WO HCPCS SELF ADMINISTERED DRUGS (ALT 637 FOR MEDICARE OP)

## 2024-12-26 RX ORDER — POTASSIUM CHLORIDE 20 MEQ/1
40 TABLET, EXTENDED RELEASE ORAL ONCE
Status: COMPLETED | OUTPATIENT
Start: 2024-12-26 | End: 2024-12-26

## 2024-12-26 RX ADMIN — INSULIN LISPRO 1 UNITS: 100 INJECTION, SOLUTION INTRAVENOUS; SUBCUTANEOUS at 23:12

## 2024-12-26 RX ADMIN — HYDROMORPHONE HYDROCHLORIDE 0.2 MG: 1 INJECTION, SOLUTION INTRAMUSCULAR; INTRAVENOUS; SUBCUTANEOUS at 16:07

## 2024-12-26 RX ADMIN — METOPROLOL TARTRATE 12.5 MG: 25 TABLET, FILM COATED ORAL at 20:37

## 2024-12-26 RX ADMIN — HEPARIN SODIUM 1900 UNITS/HR: 10000 INJECTION, SOLUTION INTRAVENOUS at 16:49

## 2024-12-26 RX ADMIN — HEPARIN SODIUM 2100 UNITS/HR: 10000 INJECTION, SOLUTION INTRAVENOUS at 04:23

## 2024-12-26 RX ADMIN — LEVETIRACETAM 500 MG: 500 TABLET, FILM COATED ORAL at 08:43

## 2024-12-26 RX ADMIN — GABAPENTIN 400 MG: 400 CAPSULE ORAL at 20:37

## 2024-12-26 RX ADMIN — OXYCODONE HYDROCHLORIDE 5 MG: 5 TABLET ORAL at 23:10

## 2024-12-26 RX ADMIN — ATORVASTATIN CALCIUM 80 MG: 80 TABLET, FILM COATED ORAL at 20:37

## 2024-12-26 RX ADMIN — INSULIN GLARGINE 13 UNITS: 100 INJECTION, SOLUTION SUBCUTANEOUS at 20:36

## 2024-12-26 RX ADMIN — ACETAMINOPHEN 650 MG: 325 TABLET, FILM COATED ORAL at 06:09

## 2024-12-26 RX ADMIN — DULOXETINE HYDROCHLORIDE 60 MG: 60 CAPSULE, DELAYED RELEASE ORAL at 08:43

## 2024-12-26 RX ADMIN — ACETAMINOPHEN 650 MG: 325 TABLET, FILM COATED ORAL at 18:34

## 2024-12-26 RX ADMIN — INSULIN LISPRO 6 UNITS: 100 INJECTION, SOLUTION INTRAVENOUS; SUBCUTANEOUS at 14:39

## 2024-12-26 RX ADMIN — ACETAMINOPHEN 650 MG: 325 TABLET, FILM COATED ORAL at 12:46

## 2024-12-26 RX ADMIN — INSULIN LISPRO 4 UNITS: 100 INJECTION, SOLUTION INTRAVENOUS; SUBCUTANEOUS at 10:22

## 2024-12-26 RX ADMIN — ACETAMINOPHEN 650 MG: 325 TABLET, FILM COATED ORAL at 23:10

## 2024-12-26 RX ADMIN — LEVETIRACETAM 500 MG: 500 TABLET, FILM COATED ORAL at 20:37

## 2024-12-26 RX ADMIN — OXYCODONE HYDROCHLORIDE 5 MG: 5 TABLET ORAL at 08:44

## 2024-12-26 RX ADMIN — METOPROLOL TARTRATE 12.5 MG: 25 TABLET, FILM COATED ORAL at 08:43

## 2024-12-26 RX ADMIN — LEVOTHYROXINE SODIUM 75 MCG: 0.07 TABLET ORAL at 06:11

## 2024-12-26 RX ADMIN — AMPICILLIN SODIUM AND SULBACTAM SODIUM 3 G: 2; 1 INJECTION, POWDER, FOR SOLUTION INTRAMUSCULAR; INTRAVENOUS at 23:11

## 2024-12-26 RX ADMIN — OXYCODONE HYDROCHLORIDE 5 MG: 5 TABLET ORAL at 16:15

## 2024-12-26 RX ADMIN — GABAPENTIN 400 MG: 400 CAPSULE ORAL at 06:12

## 2024-12-26 RX ADMIN — AMPICILLIN SODIUM AND SULBACTAM SODIUM 3 G: 2; 1 INJECTION, POWDER, FOR SOLUTION INTRAMUSCULAR; INTRAVENOUS at 04:53

## 2024-12-26 RX ADMIN — ASPIRIN 81 MG: 81 TABLET, COATED ORAL at 08:43

## 2024-12-26 RX ADMIN — PANTOPRAZOLE SODIUM 40 MG: 40 TABLET, DELAYED RELEASE ORAL at 06:10

## 2024-12-26 RX ADMIN — AMPICILLIN SODIUM AND SULBACTAM SODIUM 3 G: 2; 1 INJECTION, POWDER, FOR SOLUTION INTRAMUSCULAR; INTRAVENOUS at 16:48

## 2024-12-26 RX ADMIN — DIVALPROEX SODIUM 500 MG: 500 TABLET, FILM COATED, EXTENDED RELEASE ORAL at 08:47

## 2024-12-26 RX ADMIN — HYDROMORPHONE HYDROCHLORIDE 0.2 MG: 1 INJECTION, SOLUTION INTRAMUSCULAR; INTRAVENOUS; SUBCUTANEOUS at 20:10

## 2024-12-26 RX ADMIN — POTASSIUM CHLORIDE 40 MEQ: 1500 TABLET, EXTENDED RELEASE ORAL at 16:49

## 2024-12-26 RX ADMIN — DIVALPROEX SODIUM 1000 MG: 500 TABLET, FILM COATED, EXTENDED RELEASE ORAL at 20:38

## 2024-12-26 RX ADMIN — GABAPENTIN 400 MG: 400 CAPSULE ORAL at 14:41

## 2024-12-26 RX ADMIN — AMPICILLIN SODIUM AND SULBACTAM SODIUM 3 G: 2; 1 INJECTION, POWDER, FOR SOLUTION INTRAMUSCULAR; INTRAVENOUS at 10:22

## 2024-12-26 ASSESSMENT — COGNITIVE AND FUNCTIONAL STATUS - GENERAL
CLIMB 3 TO 5 STEPS WITH RAILING: TOTAL
WALKING IN HOSPITAL ROOM: TOTAL
PERSONAL GROOMING: A LITTLE
CLIMB 3 TO 5 STEPS WITH RAILING: TOTAL
TURNING FROM BACK TO SIDE WHILE IN FLAT BAD: TOTAL
TURNING FROM BACK TO SIDE WHILE IN FLAT BAD: TOTAL
HELP NEEDED FOR BATHING: A LITTLE
STANDING UP FROM CHAIR USING ARMS: TOTAL
DRESSING REGULAR UPPER BODY CLOTHING: A LITTLE
MOVING FROM LYING ON BACK TO SITTING ON SIDE OF FLAT BED WITH BEDRAILS: A LITTLE
TOILETING: A LOT
MOBILITY SCORE: 8
DAILY ACTIVITIY SCORE: 17
DRESSING REGULAR LOWER BODY CLOTHING: A LOT
MOVING TO AND FROM BED TO CHAIR: TOTAL
MOVING FROM LYING ON BACK TO SITTING ON SIDE OF FLAT BED WITH BEDRAILS: A LITTLE
STANDING UP FROM CHAIR USING ARMS: TOTAL
DRESSING REGULAR UPPER BODY CLOTHING: A LITTLE
DRESSING REGULAR LOWER BODY CLOTHING: A LOT
PERSONAL GROOMING: A LITTLE
TOILETING: A LOT
MOVING TO AND FROM BED TO CHAIR: TOTAL
DAILY ACTIVITIY SCORE: 17
HELP NEEDED FOR BATHING: A LITTLE
MOBILITY SCORE: 8
WALKING IN HOSPITAL ROOM: TOTAL

## 2024-12-26 ASSESSMENT — PAIN - FUNCTIONAL ASSESSMENT
PAIN_FUNCTIONAL_ASSESSMENT: 0-10

## 2024-12-26 ASSESSMENT — PAIN DESCRIPTION - ORIENTATION
ORIENTATION: LEFT
ORIENTATION: LEFT

## 2024-12-26 ASSESSMENT — PAIN DESCRIPTION - LOCATION
LOCATION: LEG
LOCATION: LEG

## 2024-12-26 ASSESSMENT — PAIN DESCRIPTION - DESCRIPTORS: DESCRIPTORS: THROBBING

## 2024-12-26 ASSESSMENT — PAIN SCALES - GENERAL
PAINLEVEL_OUTOF10: 10 - WORST POSSIBLE PAIN
PAINLEVEL_OUTOF10: 9
PAINLEVEL_OUTOF10: 9

## 2024-12-26 NOTE — PROGRESS NOTES
VASCULAR SURGERY PROGRESS NOTE  Subjective   States she is leaning toward amputation. Complaining of left foot pain.     Objective   Vitals:    12/26/24 1054   BP: 96/55   Pulse: 82   Resp: 18   Temp: 36.5 °C (97.7 °F)   SpO2: 94%      Exam:  Constitutional: No acute distress, resting comfortably  Neuro:  AOx3, grossly intact  ENMT: moist mucous membranes  CV: no tachycardia  Pulm: non-labored on RA  GI: soft, non-tender, non-distended  Skin: warm and dry  Musculoskeletal: moving all extremities  Extremities: left aka dressing clean, dry intact.    Relevant Results  Medications:  Scheduled Meds:  acetaminophen, 650 mg, oral, q6h NINO  ampicillin-sulbactam, 3 g, intravenous, q6h  aspirin, 81 mg, oral, Daily  atorvastatin, 80 mg, oral, Nightly  divalproex, 1,000 mg, oral, Nightly at 0300  divalproex, 500 mg, oral, Daily with breakfast  DULoxetine, 60 mg, oral, Daily  gabapentin, 400 mg, oral, q8h NINO  Glucommander - insulin glargine, 1-125 Units, subcutaneous, Nightly   And  Glucommander - insulin lispro, 0-125 Units, subcutaneous, With meals & nightly  levETIRAcetam, 500 mg, oral, BID  levothyroxine, 75 mcg, oral, Daily before breakfast  metoprolol tartrate, 12.5 mg, oral, BID  pantoprazole, 40 mg, oral, Daily before breakfast  sennosides-docusate sodium, 1 tablet, oral, BID      Continuous Infusions:  heparin, 0-4,000 Units/hr, Last Rate: 1,900 Units/hr (12/26/24 1056)      PRN Meds:.  PRN medications: albuterol, dextrose, glucagon HCL, Glucommander - insulin glargine **AND** Glucommander - insulin lispro **AND** Glucommander - insulin lispro **AND** [COMPLETED] POCT Glucose, HYDROmorphone, melatonin, naloxone, ondansetron ODT **OR** ondansetron, oxyCODONE, oxyCODONE, oxygen    Labs:  Results from last 7 days   Lab Units 12/26/24  0802 12/25/24  0748 12/24/24  1157   WBC AUTO x10*3/uL 10.0 9.9 12.8*   HEMOGLOBIN g/dL 8.0* 7.7* 7.9*   PLATELETS AUTO x10*3/uL 645* 608* 667*      Results from last 7 days   Lab Units  12/26/24  0802 12/25/24  0748 12/24/24  1157   SODIUM mmol/L 141 139 140   POTASSIUM mmol/L 3.4* 3.3* 3.8   CHLORIDE mmol/L 103 98 101   CO2 mmol/L 30 31 31   BUN mg/dL 7 7 10   CREATININE mg/dL 0.42* 0.48* 0.52   GLUCOSE mg/dL 117* 286* 104*   MAGNESIUM mg/dL 1.86 1.93 2.00   PHOSPHORUS mg/dL 3.1 3.0 3.9      Results from last 7 days   Lab Units 12/20/24  0350   INR  1.4*   PROTIME seconds 15.9*   APTT seconds 56*     Assessment/Plan   Shirin Slater is 54 y.o. female with history of severe PAD s/p R hip disarticulation s/p L CFA and EIA embolectomy 12/2023 s/p multiple L toe amputations, s/p L femoral endarterectomy, profundaplasty, common lycmigo-kp-sxrtavcq tibial bypass using 6 mm ringed PTFE, left lower extremity angiogram on 9/25 T2DM complicated by diabetic neuropathy, HTN, HLD, renal and splenic thromboses who who presents with severe left leg pain and inability to move or feel or left leg. Imaging consistent with occlusion of her L CFA-AT bypass graft. On exam, patient with Martha 3 limb ischemia. Given leukocytosis, tachycardia, limb ischemia and concern for infection she was taken to OR urgently and had findings of purulence along the anteromedial aspect of the left knee and distal thigh. Muscle within the posterolateral thigh with healthy contraction on stimulation. 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      Ischemic skin demarcation at mid-thigh, unable to salvageable AKA. Likely having ischemic left AKA stump pain.    Discussed with patient the options for 1) hip disarticulation vs 2) palliative wound care. Patient still contemplating decision.     Plan:  Neuro: acute postoperative pain, Hx seizures  - continue tylenol/oxy 2.5/5mg q4h PRN  - continue lidoderm patch   - home Keppra, Depakote, Cymbalta, gabapentin 400mg TID  - appreciate palliative care recs     CV: Hx severe PAD with multiple surgical  interventions, HTN  - maintain blood pressure control  - continue statin  - daily ASA (holding home plavix)  - heparin infusion, low intensity (holding home coumadin)     Pulm:   - continue to encourage IS hourly while awake  - OOB to chair and increase ambulation as tolerated  - albuterol inhaler PRN     FENGI: hypokalemia, hypoalbuminemia   - regular diet  - continue bowel regimen to prevent OIC   - continue PPI  - monitor and replete electrolytes     Endo: DM2, hypothyroidism  - holding home jardiance  - glucommander protocol -increase with BG in low 200s  - levothyroxine     Heme: anemia of chronic disease  - no s/sx of bleeding  - monitor H/H, transfuse for Hgb <7     ID:  humble infection required amputation for source control; leukocytosis (resolved)   - tissue and blood cultures sent- final result without growth  - vanc/zosyn (stopped 12/24/24)  - transition to Unasyn   - monitor s/s of infection     Dispo-  - regular nursing floor    Sarah Seay APRN-CNP

## 2024-12-26 NOTE — SIGNIFICANT EVENT
Rapid Response Nurse Note: BAT    Pager time: 1202  Arrival time: 1205  Event end time: 1210  Location: T7  [] Triage by phone or secure messaging    Rapid response initiated by:  [] Rapid response RN [] Family [] Nursing Supervisor [] Physician   [] RADAR auto page [] Sepsis auto-page [x] RN [] RT   [x] NP/PA [] Other:     Primary reason for call:   [x] BAT [] New CPAP/BiPAP [] Bleeding [x] Change in mental status   [] Chest pain [] Code blue [] FiO2 >/= 50% [] HR </= 40 bpm   [] HR >/= 130 bpm [] Hyperglycemia [] Hypoglycemia [] RADAR    [] RR </= 8 bpm [] RR >/= 30 bpm [] SBP </= 90 mmHg [] SpO2 < 90%   [] Seizure [] Sepsis [] Shortness of breath  [] Staff concern: see comments     Initial VS and/or RADAR VS:     Providers present at bedside (if applicable): Primary team-vascular NP  Neurology Dr Joe    Name of ICU Provider contacted (if applicable):     Interventions:  [x] None [] ABG/VBG [] Assist w/ICU transfer [] BAT paged    [] Bag mask [] Blood [] Cardioversion [] Code Blue   [] Code blue for intubation [] Code status changed [] Chest x-ray [] EKG   [] IV fluid/bolus [] KUB x-ray [] Labs/cultures [] Medication   [] Nebulizer treatment [] NIPPV (CPAP/BiPAP) [] Oxygen [] Oral airway   [] Peripheral IV [] Palliative care consult [] CT/MRI [] Sepsis protocol    [] Suctioned [] Other:     Outcome:  [] Coded and  [] Code blue for intubation [] Coded and transferred to ICU []  on division   [] Remained on division (no change) [] Remained on division + additional monitoring [] Remained in ED [] Transferred to ED   [] Transferred to ICU [] Transferred to inpatient status [] Transferred for interventions (procedure) [] Transferred to ICU stepdown    [] Transferred to surgery [] Transferred to telemetry [] Sepsis protocol [] STEMI protocol   [] Stroke protocol [x] Bedside nurse instructed to page rapid response for any concerns or acute change in condition/VS     Additional Comments: BAT called by  primary team for concern with change in mental status- pt was not answering questions or following commands. Upon arrival Neurology/stroke Dr Joe at the bedside performing exam. Pt responding and answering questions/commands. Not a candidate for acute intervention. Possible delirium due to prolonged hospital course- BAT cancelled per Dr Joe.

## 2024-12-26 NOTE — SIGNIFICANT EVENT
"Cancelled BAT Note    55yo F with hx severe PAD s/p R hip disarticulation s/p L CFA and EIA embolectomy 12/2023 s/p multiple L toe amputations, s/p L femoral endarterectomy, profundaplasty, common sbcpmxr-pp-qzfezmlx tibial bypass using 6 mm ringed PTFE, left lower extremity angiogram on 9/25 T2DM complicated by diabetic neuropathy, HTN, HLD, renal and splenic thromboses. She is admitted with leg pain, occlusion of L CFA-AT bypass graft. She is currently deciding on another hip disarticulation vs palliative wound care.     BAT was called at 1202 for moment of not responding to conversation, c/f aphasia. LKN minutes prior to BAT    On arrival, /59, POCG 126, NIHSS 0. Lower extremity exam slightly limited by L AKA and R hip   disarticulation, however patient able to move stump against gravity (limited by pain) and sensation intact  Patient with intact language (expression, repetition, naming, comprehension intact), also stating she is very frustrated and she feels upset with current medical situation. She states she feels \"normal\" and \"just annoyed\" and bedside nurse agrees she is acting baseline.    - Not a candidate for tPA/MT because she is on heparin gtt currently and low suspicion for stroke  - Case reviewed with stroke fellow, Dr Beaulieu  - Discussed with vascular surgery NP at bedside who agrees with cancellation of BAT    Ximena Joe MD   PGY-3 Neurology  Stroke [phone]    "

## 2024-12-27 LAB
ALBUMIN SERPL BCP-MCNC: 2.6 G/DL (ref 3.4–5)
ANION GAP SERPL CALC-SCNC: 15 MMOL/L (ref 10–20)
BUN SERPL-MCNC: 6 MG/DL (ref 6–23)
CALCIUM SERPL-MCNC: 9.2 MG/DL (ref 8.6–10.6)
CHLORIDE SERPL-SCNC: 102 MMOL/L (ref 98–107)
CO2 SERPL-SCNC: 31 MMOL/L (ref 21–32)
CREAT SERPL-MCNC: 0.48 MG/DL (ref 0.5–1.05)
EGFRCR SERPLBLD CKD-EPI 2021: >90 ML/MIN/1.73M*2
ERYTHROCYTE [DISTWIDTH] IN BLOOD BY AUTOMATED COUNT: 24.6 % (ref 11.5–14.5)
GLUCOSE BLD MANUAL STRIP-MCNC: 120 MG/DL (ref 74–99)
GLUCOSE BLD MANUAL STRIP-MCNC: 127 MG/DL (ref 74–99)
GLUCOSE BLD MANUAL STRIP-MCNC: 167 MG/DL (ref 74–99)
GLUCOSE BLD MANUAL STRIP-MCNC: 89 MG/DL (ref 74–99)
GLUCOSE SERPL-MCNC: 84 MG/DL (ref 74–99)
HCT VFR BLD AUTO: 31.9 % (ref 36–46)
HGB BLD-MCNC: 8.6 G/DL (ref 12–16)
MAGNESIUM SERPL-MCNC: 1.92 MG/DL (ref 1.6–2.4)
MCH RBC QN AUTO: 21.1 PG (ref 26–34)
MCHC RBC AUTO-ENTMCNC: 27 G/DL (ref 32–36)
MCV RBC AUTO: 78 FL (ref 80–100)
NRBC BLD-RTO: 0 /100 WBCS (ref 0–0)
PHOSPHATE SERPL-MCNC: 3.7 MG/DL (ref 2.5–4.9)
PLATELET # BLD AUTO: 650 X10*3/UL (ref 150–450)
POTASSIUM SERPL-SCNC: 3.9 MMOL/L (ref 3.5–5.3)
RBC # BLD AUTO: 4.08 X10*6/UL (ref 4–5.2)
SODIUM SERPL-SCNC: 144 MMOL/L (ref 136–145)
UFH PPP CHRO-ACNC: 0.6 IU/ML
WBC # BLD AUTO: 8.9 X10*3/UL (ref 4.4–11.3)

## 2024-12-27 PROCEDURE — 2500000004 HC RX 250 GENERAL PHARMACY W/ HCPCS (ALT 636 FOR OP/ED): Performed by: STUDENT IN AN ORGANIZED HEALTH CARE EDUCATION/TRAINING PROGRAM

## 2024-12-27 PROCEDURE — 82947 ASSAY GLUCOSE BLOOD QUANT: CPT

## 2024-12-27 PROCEDURE — 2500000001 HC RX 250 WO HCPCS SELF ADMINISTERED DRUGS (ALT 637 FOR MEDICARE OP): Performed by: STUDENT IN AN ORGANIZED HEALTH CARE EDUCATION/TRAINING PROGRAM

## 2024-12-27 PROCEDURE — 80069 RENAL FUNCTION PANEL: CPT | Performed by: NURSE PRACTITIONER

## 2024-12-27 PROCEDURE — 97530 THERAPEUTIC ACTIVITIES: CPT | Mod: GP | Performed by: PHYSICAL THERAPIST

## 2024-12-27 PROCEDURE — 83735 ASSAY OF MAGNESIUM: CPT | Performed by: NURSE PRACTITIONER

## 2024-12-27 PROCEDURE — 85520 HEPARIN ASSAY: CPT

## 2024-12-27 PROCEDURE — 2500000002 HC RX 250 W HCPCS SELF ADMINISTERED DRUGS (ALT 637 FOR MEDICARE OP, ALT 636 FOR OP/ED): Performed by: NURSE PRACTITIONER

## 2024-12-27 PROCEDURE — 2500000001 HC RX 250 WO HCPCS SELF ADMINISTERED DRUGS (ALT 637 FOR MEDICARE OP)

## 2024-12-27 PROCEDURE — 2500000002 HC RX 250 W HCPCS SELF ADMINISTERED DRUGS (ALT 637 FOR MEDICARE OP, ALT 636 FOR OP/ED): Performed by: STUDENT IN AN ORGANIZED HEALTH CARE EDUCATION/TRAINING PROGRAM

## 2024-12-27 PROCEDURE — 1100000001 HC PRIVATE ROOM DAILY

## 2024-12-27 PROCEDURE — 2500000001 HC RX 250 WO HCPCS SELF ADMINISTERED DRUGS (ALT 637 FOR MEDICARE OP): Performed by: NURSE PRACTITIONER

## 2024-12-27 PROCEDURE — 2500000004 HC RX 250 GENERAL PHARMACY W/ HCPCS (ALT 636 FOR OP/ED): Performed by: NURSE PRACTITIONER

## 2024-12-27 PROCEDURE — 2500000004 HC RX 250 GENERAL PHARMACY W/ HCPCS (ALT 636 FOR OP/ED)

## 2024-12-27 PROCEDURE — 85027 COMPLETE CBC AUTOMATED: CPT | Performed by: STUDENT IN AN ORGANIZED HEALTH CARE EDUCATION/TRAINING PROGRAM

## 2024-12-27 PROCEDURE — 36415 COLL VENOUS BLD VENIPUNCTURE: CPT

## 2024-12-27 RX ORDER — INSULIN GLARGINE 100 [IU]/ML
10 INJECTION, SOLUTION SUBCUTANEOUS NIGHTLY
Status: DISCONTINUED | OUTPATIENT
Start: 2024-12-27 | End: 2025-01-07 | Stop reason: HOSPADM

## 2024-12-27 RX ORDER — INSULIN LISPRO 100 [IU]/ML
4 INJECTION, SOLUTION INTRAVENOUS; SUBCUTANEOUS
Status: DISCONTINUED | OUTPATIENT
Start: 2024-12-27 | End: 2025-01-07 | Stop reason: HOSPADM

## 2024-12-27 RX ORDER — INSULIN LISPRO 100 [IU]/ML
0-10 INJECTION, SOLUTION INTRAVENOUS; SUBCUTANEOUS
Status: DISCONTINUED | OUTPATIENT
Start: 2024-12-27 | End: 2025-01-07 | Stop reason: HOSPADM

## 2024-12-27 RX ORDER — ACETAMINOPHEN 325 MG/1
975 TABLET ORAL 3 TIMES DAILY
Status: DISCONTINUED | OUTPATIENT
Start: 2024-12-27 | End: 2025-01-07 | Stop reason: HOSPADM

## 2024-12-27 RX ORDER — HYDROMORPHONE HYDROCHLORIDE 1 MG/ML
0.4 INJECTION, SOLUTION INTRAMUSCULAR; INTRAVENOUS; SUBCUTANEOUS 2 TIMES DAILY PRN
Status: DISCONTINUED | OUTPATIENT
Start: 2024-12-27 | End: 2024-12-29

## 2024-12-27 RX ADMIN — INSULIN LISPRO 4 UNITS: 100 INJECTION, SOLUTION INTRAVENOUS; SUBCUTANEOUS at 17:45

## 2024-12-27 RX ADMIN — OXYCODONE HYDROCHLORIDE 5 MG: 5 TABLET ORAL at 08:31

## 2024-12-27 RX ADMIN — ASPIRIN 81 MG: 81 TABLET, COATED ORAL at 08:34

## 2024-12-27 RX ADMIN — HYDROMORPHONE HYDROCHLORIDE 0.4 MG: 1 INJECTION, SOLUTION INTRAMUSCULAR; INTRAVENOUS; SUBCUTANEOUS at 16:36

## 2024-12-27 RX ADMIN — AMPICILLIN SODIUM AND SULBACTAM SODIUM 3 G: 2; 1 INJECTION, POWDER, FOR SOLUTION INTRAMUSCULAR; INTRAVENOUS at 16:36

## 2024-12-27 RX ADMIN — DIVALPROEX SODIUM 1000 MG: 500 TABLET, FILM COATED, EXTENDED RELEASE ORAL at 21:17

## 2024-12-27 RX ADMIN — METOPROLOL TARTRATE 12.5 MG: 25 TABLET, FILM COATED ORAL at 21:17

## 2024-12-27 RX ADMIN — INSULIN LISPRO 4 UNITS: 100 INJECTION, SOLUTION INTRAVENOUS; SUBCUTANEOUS at 14:19

## 2024-12-27 RX ADMIN — GABAPENTIN 400 MG: 400 CAPSULE ORAL at 21:17

## 2024-12-27 RX ADMIN — PANTOPRAZOLE SODIUM 40 MG: 40 TABLET, DELAYED RELEASE ORAL at 06:06

## 2024-12-27 RX ADMIN — ACETAMINOPHEN 975 MG: 325 TABLET ORAL at 21:17

## 2024-12-27 RX ADMIN — ATORVASTATIN CALCIUM 80 MG: 80 TABLET, FILM COATED ORAL at 21:17

## 2024-12-27 RX ADMIN — HEPARIN SODIUM 1900 UNITS/HR: 10000 INJECTION, SOLUTION INTRAVENOUS at 20:34

## 2024-12-27 RX ADMIN — DIVALPROEX SODIUM 500 MG: 500 TABLET, FILM COATED, EXTENDED RELEASE ORAL at 08:32

## 2024-12-27 RX ADMIN — AMPICILLIN SODIUM AND SULBACTAM SODIUM 3 G: 2; 1 INJECTION, POWDER, FOR SOLUTION INTRAMUSCULAR; INTRAVENOUS at 04:15

## 2024-12-27 RX ADMIN — LEVETIRACETAM 500 MG: 500 TABLET, FILM COATED ORAL at 21:17

## 2024-12-27 RX ADMIN — GABAPENTIN 400 MG: 400 CAPSULE ORAL at 14:19

## 2024-12-27 RX ADMIN — LEVETIRACETAM 500 MG: 500 TABLET, FILM COATED ORAL at 08:32

## 2024-12-27 RX ADMIN — ACETAMINOPHEN 975 MG: 325 TABLET ORAL at 14:19

## 2024-12-27 RX ADMIN — HEPARIN SODIUM 1900 UNITS/HR: 10000 INJECTION, SOLUTION INTRAVENOUS at 04:15

## 2024-12-27 RX ADMIN — DULOXETINE HYDROCHLORIDE 60 MG: 60 CAPSULE, DELAYED RELEASE ORAL at 08:32

## 2024-12-27 RX ADMIN — ACETAMINOPHEN 650 MG: 325 TABLET, FILM COATED ORAL at 06:06

## 2024-12-27 RX ADMIN — GABAPENTIN 400 MG: 400 CAPSULE ORAL at 06:06

## 2024-12-27 RX ADMIN — AMPICILLIN SODIUM AND SULBACTAM SODIUM 3 G: 2; 1 INJECTION, POWDER, FOR SOLUTION INTRAMUSCULAR; INTRAVENOUS at 10:27

## 2024-12-27 RX ADMIN — LEVOTHYROXINE SODIUM 75 MCG: 0.07 TABLET ORAL at 06:06

## 2024-12-27 RX ADMIN — METOPROLOL TARTRATE 12.5 MG: 25 TABLET, FILM COATED ORAL at 08:32

## 2024-12-27 RX ADMIN — INSULIN LISPRO 4 UNITS: 100 INJECTION, SOLUTION INTRAVENOUS; SUBCUTANEOUS at 10:26

## 2024-12-27 RX ADMIN — INSULIN GLARGINE 10 UNITS: 100 INJECTION, SOLUTION SUBCUTANEOUS at 21:17

## 2024-12-27 RX ADMIN — AMPICILLIN SODIUM AND SULBACTAM SODIUM 3 G: 2; 1 INJECTION, POWDER, FOR SOLUTION INTRAMUSCULAR; INTRAVENOUS at 21:30

## 2024-12-27 RX ADMIN — OXYCODONE HYDROCHLORIDE 5 MG: 5 TABLET ORAL at 21:23

## 2024-12-27 ASSESSMENT — COGNITIVE AND FUNCTIONAL STATUS - GENERAL
CLIMB 3 TO 5 STEPS WITH RAILING: TOTAL
TURNING FROM BACK TO SIDE WHILE IN FLAT BAD: TOTAL
MOBILITY SCORE: 8
HELP NEEDED FOR BATHING: A LITTLE
STANDING UP FROM CHAIR USING ARMS: TOTAL
TURNING FROM BACK TO SIDE WHILE IN FLAT BAD: A LOT
DAILY ACTIVITIY SCORE: 17
STANDING UP FROM CHAIR USING ARMS: TOTAL
MOBILITY SCORE: 8
WALKING IN HOSPITAL ROOM: TOTAL
DAILY ACTIVITIY SCORE: 16
STANDING UP FROM CHAIR USING ARMS: TOTAL
DRESSING REGULAR UPPER BODY CLOTHING: A LOT
MOVING FROM LYING ON BACK TO SITTING ON SIDE OF FLAT BED WITH BEDRAILS: A LITTLE
DRESSING REGULAR LOWER BODY CLOTHING: A LOT
MOVING FROM LYING ON BACK TO SITTING ON SIDE OF FLAT BED WITH BEDRAILS: A LOT
MOBILITY SCORE: 8
MOVING FROM LYING ON BACK TO SITTING ON SIDE OF FLAT BED WITH BEDRAILS: A LITTLE
MOVING TO AND FROM BED TO CHAIR: TOTAL
DRESSING REGULAR UPPER BODY CLOTHING: A LITTLE
PERSONAL GROOMING: A LITTLE
TOILETING: A LOT
TOILETING: A LOT
WALKING IN HOSPITAL ROOM: TOTAL
HELP NEEDED FOR BATHING: A LITTLE
DRESSING REGULAR LOWER BODY CLOTHING: A LOT
MOVING TO AND FROM BED TO CHAIR: TOTAL
TURNING FROM BACK TO SIDE WHILE IN FLAT BAD: TOTAL
CLIMB 3 TO 5 STEPS WITH RAILING: TOTAL
WALKING IN HOSPITAL ROOM: TOTAL
CLIMB 3 TO 5 STEPS WITH RAILING: TOTAL
MOVING TO AND FROM BED TO CHAIR: TOTAL
PERSONAL GROOMING: A LITTLE

## 2024-12-27 ASSESSMENT — PAIN - FUNCTIONAL ASSESSMENT
PAIN_FUNCTIONAL_ASSESSMENT: 0-10

## 2024-12-27 ASSESSMENT — PAIN SCALES - GENERAL
PAINLEVEL_OUTOF10: 0 - NO PAIN
PAINLEVEL_OUTOF10: 9
PAINLEVEL_OUTOF10: 9

## 2024-12-27 ASSESSMENT — PAIN DESCRIPTION - ORIENTATION: ORIENTATION: LEFT

## 2024-12-27 ASSESSMENT — PAIN DESCRIPTION - LOCATION: LOCATION: LEG

## 2024-12-27 ASSESSMENT — PAIN DESCRIPTION - DESCRIPTORS: DESCRIPTORS: THROBBING

## 2024-12-27 NOTE — PROGRESS NOTES
12/27/2024 Care Coordination  Per [facility] surg:  Dispo--  - orthopedic surgery consulted for hip disarticulation, likely to occur next week.  TCC will cont to follow.

## 2024-12-27 NOTE — CONSULTS
Wound Care Consult     Visit Date: 12/27/2024      Patient Name: Shirin Slater         MRN: [id number]           YOB: 1970     Reason for Consult: Right buttock, perirectal IAD and left groin         Wound History: 4 y.o. female with history of severe PAD s/p R hip disarticulation s/p L CFA and EIA embolectomy 12/2023 s/p multiple L toe amputations, s/p L femoral endarterectomy, profundaplasty, common rgmwgio-yt-absogfjq tibial bypass using 6 mm ringed PTFE, left lower extremity angiogram on 9/25 T2DM complicated by diabetic neuropathy, HTN, HLD, renal and splenic thromboses      Pertinent Labs:   Albumin   Date Value Ref Range Status   12/27/2024 2.6 (L) 3.4 - 5.0 g/dL Final       Wound Assessment:  Wound 12/24/24 Other (comment) Sacrum (Active)   Wound Image   12/27/24 1035   Site Assessment Blanchable erythema;Pink;Purple 12/27/24 1035   Betty-Wound Assessment Clean;Dry 12/27/24 1035   Non-staged Wound Description Partial thickness 12/27/24 1035   Shape irregular 12/27/24 1035   Wound Length (cm) 4 cm 12/27/24 1035   Wound Width (cm) 1 cm 12/27/24 1035   Wound Surface Area (cm^2) 4 cm^2 12/27/24 1035   Wound Depth (cm) 0.1 cm 12/27/24 1035   Wound Volume (cm^3) 0.4 cm^3 12/27/24 1035   State of Healing Non-healing 12/27/24 1035   Margins Poorly defined 12/27/24 1035   Drainage Description None 12/27/24 1035   Drainage Amount None 12/27/24 1035   Dressing Hydrophilic 12/27/24 1035   Dressing Changed Changed 12/27/24 1035   Dressing Status Clean;Dry 12/27/24 1035       Wound 12/27/24 Incision Leg Left;Proximal;Upper;Anterior (Active)   Wound Image   12/27/24 1032   Site Assessment Clean;Red 12/27/24 1032   Betty-Wound Assessment Clean;Dry 12/27/24 1032   Shape Linear 12/27/24 1032   Wound Length (cm) 3 cm 12/27/24 1032   Wound Width (cm) 0.5 cm 12/27/24 1032   Wound Surface Area (cm^2) 1.5 cm^2 12/27/24 1032   Wound Depth (cm) 0.1 cm 12/27/24 1032   Wound Volume (cm^3) 0.15 cm^3 12/27/24 1032   State  of Healing Healing ridge 12/27/24 1032   Margins Well-defined edges 12/27/24 1032   Closure Approximated 12/27/24 1032   Treatments Cleansed;Site care 12/27/24 1032   Drainage Description Serosanguineous 12/27/24 1032   Drainage Amount Scant 12/27/24 1032   Dressing Silicone border dressing 12/27/24 1032   Dressing Changed New 12/27/24 1032   Dressing Status Clean;Dry 12/27/24 1032       Wound 12/27/24 Moisture Associated Skin Damage Perineum (Active)   Wound Image   12/27/24 1043   Site Assessment Red;Maceration 12/27/24 1043   Non-staged Wound Description Partial thickness 12/27/24 1043   Shape Irregular 12/27/24 1043   State of Healing Non-healing 12/27/24 1043   Margins Poorly defined 12/27/24 1043   Drainage Description None 12/27/24 1043   Drainage Amount None 12/27/24 1043   Dressing Hydrophilic;Moisture barrier 12/27/24 1043   Dressing Changed Changed 12/27/24 1043   Dressing Status Clean;Dry 12/27/24 1043       Wound Team Summary Assessment: The wound care team came to bedside to assess the patient's Left groin, right buttock and perirectal wounds.  The patient has a clean, red and moist wound on the left groin.  The wound was cleansed with vashe wound cleanser and dressed with margie and mepilex border dressing.  The patient also has moisture on the perirectal area and friction damage on the right buttock. The right buttock and perirectal regions were cleansed with vashe wound cleanser and dressed with triad cream.      Wound Team Plan:   Recommendations: Daily    Left groin wounds: Cleanse with vashe wound cleanser and gently pat dry  Cover with a mepilex border dressing.    Right buttock and perineum: Cleanse with vashe wound cleanser and gently pat dry   Triad can be applied directly from the tube or by using a gloved finger. Gently spread Triad evenly over the area of application to the thickness of a dime. To remove, Use pH-balanced wound cleanser to soften Triad. Gently wipe to remove without  scrubbing. Triad can stay in place for 5-7 days, with higher exudate levels requiring more frequent re-applications. In the perineal area, reapply Triad after each episode of incontinence.     Anup Heaton RN CWON  12/27/2024  1:16 PM

## 2024-12-27 NOTE — PROGRESS NOTES
Physical Therapy    Physical Therapy Treatment    Patient Name: Shirin Slater  MRN: [id number]  Department: [place] [address]  Room: [facility]A  Today's Date: 12/27/2024  Time Calculation  Start Time: 1052  Stop Time: 1116  Time Calculation (min): 24 min         Assessment/Plan   PT Assessment  PT Assessment Results: Decreased strength, Decreased range of motion, Decreased endurance, Impaired balance, Decreased mobility, Decreased safety awareness, Pain  Rehab Prognosis: Good  Barriers to Discharge Home: Caregiver assistance, Physical needs  Caregiver Assistance: Patient lives alone and/or does not have reliable caregiver assistance (pt states she has HHA)  Physical Needs: 24hr ADL assistance needed  Strengths: Ability to acquire knowledge  Barriers to Participation: Comorbidities  End of Session Communication: Bedside nurse  Assessment Comment: pt continues to progress. Pt states she is going home. If going home will need 24 hour assist  End of Session Patient Position: Bed, 3 rail up, Alarm off, not on at start of session  PT Plan  Inpatient/Swing Bed or Outpatient: Inpatient  PT Plan  Treatment/Interventions: Bed mobility, Transfer training, Gait training, Stair training, Balance training, Strengthening, Endurance training, Range of motion, Therapeutic exercise, Therapeutic activity, Home exercise program  PT Plan: Ongoing PT  PT Frequency: 3 times per week  PT Discharge Recommendations: Moderate intensity level of continued care  PT Recommended Transfer Status:  (x1-2)  PT - OK to Discharge: Yes      General Visit Information:   PT  Visit  PT Received On: 12/27/24  General  Reason for Referral: This 50 y/o female w/ recent hx of L toe amputation in Sept, presented to [facility] from OSH 12/17 w/ c/f acute limb ischemia and sepsis. 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 Miller  Past Medical History Relevant to Rehab: Severe PAD s/p R hip disarticulation s/p L CFA and EIA embolectomy s/p multiple L toe amputations, T2DM c/b  diabetic neuropathy, HTN, HLD, renal and splenic thrombosis  Prior to Session Communication: Bedside nurse  Patient Position Received: Bed, 3 rail up, Alarm off, not on at start of session  General Comment: pt motivated and able to participate. pt tolerated sitting EOB for 12 mins working on hair brushing and other dynamic activities.    Subjective   Precautions:  Precautions  LE Weight Bearing Status: Left Non-Weight Bearing  Medical Precautions: Fall precautions    Vital Signs (Past 2hrs)                 Objective   Pain:  Pain Assessment  Pain Assessment: 0-10  0-10 (Numeric) Pain Score: 0 - No pain  Cognition:  Cognition  Orientation Level: Oriented X4  Coordination:  Movements are Fluid and Coordinated: Yes  Postural Control:  Postural Control  Postural Control: Within Functional Limits  Static Sitting Balance  Static Sitting-Balance Support: Bilateral upper extremity supported  Static Sitting-Level of Assistance: Close supervision  Dynamic Sitting Balance  Dynamic Sitting-Balance Support: Bilateral upper extremity supported  Dynamic Sitting-Level of Assistance: Contact guard  Static Standing Balance  Static Standing-Comment/Number of Minutes: unable    Activity Tolerance:  Activity Tolerance  Endurance: Tolerates 10 - 20 min exercise with multiple rests  Treatments:  Therapeutic Exercise  Therapeutic Exercise Performed: Yes  Therapeutic Exercise Activity 1: supine: HS, hip abd/add, SLR 1 x 10 on L  LE    Therapeutic Activity  Therapeutic Activity Performed: Yes  Therapeutic Activity 1: sitting EOB for 12 mins with steadying assist during dynamic activities.    Bed Mobility  Bed Mobility: Yes  Bed Mobility 1  Bed Mobility 1: Supine to sitting, Sitting to supine  Level of Assistance 1: Minimum assistance  Bed Mobility Comments 1: HOB elevated to full upright posture and pushing off or leaning on bed  Bed Mobility 2  Bed Mobility  2: Scooting  Level of Assistance 2: Close supervision  Bed Mobility Comments 2:  used HR to pull self up while in trendelenberg    Ambulation/Gait Training  Ambulation/Gait Training Performed: No  Transfers  Transfer: No    Stairs  Stairs: No         Outcome Measures:  [facility] Basic Mobility  Turning from your back to your side while in a flat bed without using bedrails: A lot  Moving from lying on your back to sitting on the side of a flat bed without using bedrails: A lot  Moving to and from bed to chair (including a wheelchair): Total  Standing up from a chair using your arms (e.g. wheelchair or bedside chair): Total  To walk in hospital room: Total  Climbing 3-5 steps with railing: Total  Basic Mobility - Total Score: 8    Education Documentation  Precautions, taught by Jessika Ye PT at 12/27/2024 12:04 PM.  Learner: Patient  Readiness: Acceptance  Method: Explanation  Response: Verbalizes Understanding    Body Mechanics, taught by Jessika eY PT at 12/27/2024 12:04 PM.  Learner: Patient  Readiness: Acceptance  Method: Explanation  Response: Verbalizes Understanding    Mobility Training, taught by Jessika Ye PT at 12/27/2024 12:04 PM.  Learner: Patient  Readiness: Acceptance  Method: Explanation  Response: Verbalizes Understanding    Education Comments  No comments found.        OP EDUCATION:       Encounter Problems       Encounter Problems (Active)       Balance       STG - Maintains static sitting balance with upper extremity support SBA >/= 10 min  (Progressing)       Start:  12/18/24    Expected End:  01/01/25               Mobility       pt will be independent in UE/LE HEP to promote strengthening  (Progressing)       Start:  12/18/24    Expected End:  01/01/25               PT Transfers       STG - Transfer from bed to chair/WC modA with slideboard  (Progressing)       Start:  12/18/24    Expected End:  01/01/25            STG - Patient will perform bed mobility modA (Progressing)       Start:  12/18/24    Expected End:  01/01/25               Pain -  Adult

## 2024-12-27 NOTE — CARE PLAN
The patient's goals for the shift include  to have adequately managed pain    The clinical goals for the shift include patient will have adequately managed pain throughout shift

## 2024-12-27 NOTE — PROGRESS NOTES
VASCULAR SURGERY PROGRESS NOTE  Assessment/Plan   Shirin Slater is 54 y.o. female with history of severe PAD s/p R hip disarticulation s/p L CFA and EIA embolectomy 12/2023 s/p multiple L toe amputations, s/p L femoral endarterectomy, profundaplasty, common xhbyzwi-ly-vbvlxafr tibial bypass using 6 mm ringed PTFE, left lower extremity angiogram on 9/25 T2DM complicated by diabetic neuropathy, HTN, HLD, renal and splenic thromboses who who presents with severe left leg pain and inability to move or feel or left leg. Imaging consistent with occlusion of her L CFA-AT bypass graft. On exam, patient with Martha 3 limb ischemia. Given leukocytosis, tachycardia, limb ischemia and concern for infection she was taken to OR urgently and had findings of purulence along the anteromedial aspect of the left knee and distal thigh. Muscle within the posterolateral thigh with healthy contraction on stimulation. 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   Ischemic skin demarcation at mid-thigh, unable to salvageable AKA. Likely having ischemic left AKA stump pain.    Discussed with patient the options for 1) hip disarticulation vs 2) palliative wound care. Patient has decided on hip disarticulation.      Plan:  Neuro: acute postoperative pain, Hx seizures  - continue tylenol/oxy 2.5/5mg q4h PRN  - continue lidoderm patch   - home Keppra, Depakote, Cymbalta, gabapentin 400mg TID  - gabapentin and Cymbalta  - IV dilaudid prior to dressing changes  - appreciate palliative care recs     CV: Hx severe PAD with multiple surgical interventions, HTN  - maintain blood pressure control  - continue statin  - daily ASA (holding home plavix)  - heparin infusion, low intensity (holding home coumadin)     Pulm:   - continue to encourage IS hourly while awake  - OOB to chair and increase ambulation as tolerated  - albuterol inhaler PRN     FENGI:  hypokalemia, hypoalbuminemia   - regular diet  - continue bowel regimen to prevent OIC   - continue PPI  - monitor and replete electrolytes     Endo: DM2, hypothyroidism  - holding home jardiance  - glucommander protocol -increase with BG in low 200s  - levothyroxine     Heme: anemia of chronic disease  - no s/sx of bleeding  - monitor H/H, transfuse for Hgb <7     ID:  humble infection required amputation for source control; leukocytosis (resolved)   - tissue and blood cultures sent- final result without growth  - vanc/zosyn (stopped 12/24/24)  - transition to Unasyn   - monitor s/s of infection     Dispo-  - regular nursing floor  - orthopedic surgery consulted for hip disarticulation, likely to occur next week.     Subjective   Feels ok, still having limb pain.    Objective   Vitals:  Heart Rate:  [77-90]   Temp:  [35.9 °C (96.6 °F)-36.6 °C (97.9 °F)]   Resp:  [16-19]   BP: ()/(54-69)   SpO2:  [92 %-96 %]     Exam:  Constitutional: No acute distress, resting comfortably  Neuro:  AOx3, grossly intact  ENMT: moist mucous membranes  CV: no tachycardia  Pulm: non-labored on RA  GI: soft, non-tender, non-distended  Skin: warm and dry  Musculoskeletal: moving all extremities  Extremities: Left AKA dressing C/D/I, no ascending edema      Labs:  Results from last 7 days   Lab Units 12/26/24  0802 12/25/24  0748 12/24/24  1157   WBC AUTO x10*3/uL 10.0 9.9 12.8*   HEMOGLOBIN g/dL 8.0* 7.7* 7.9*   PLATELETS AUTO x10*3/uL 645* 608* 667*      Results from last 7 days   Lab Units 12/26/24  0802 12/25/24  0748 12/24/24  1157   SODIUM mmol/L 141 139 140   POTASSIUM mmol/L 3.4* 3.3* 3.8   CHLORIDE mmol/L 103 98 101   CO2 mmol/L 30 31 31   BUN mg/dL 7 7 10   CREATININE mg/dL 0.42* 0.48* 0.52   GLUCOSE mg/dL 117* 286* 104*   MAGNESIUM mg/dL 1.86 1.93 2.00   PHOSPHORUS mg/dL 3.1 3.0 3.9           Results from last 7 days   Lab Units 12/27/24  0804 12/26/24  2051 12/26/24  1514   ANTI XA UNFRACTIONATED IU/mL 0.6 0.5 0.5

## 2024-12-27 NOTE — PROGRESS NOTES
"Palliative Medicine following for:  Complex medical decision making, symptom management, patient/family support    History obtained from chart review including ED note, H&P, patient's daily progress notes, review of lab/test results, and discussion with primary team and bedside RN.    Chief Complaint: left leg pain    Subjective    History of Present Illness    54 y.o. F with PMH of severe PAD s/p R hip disarticulation s/p L CFA and EIA embolectomy 12/2023 s/p multiple L toe amputations, s/p L femoral endarterectomy, profundaplasty, common tbzqlca-qw-pafhzxdu tibial bypass using 6 mm ringed PTFE, left lower extremity angiogram on 9/25 T2DM complicated by diabetic neuropathy, HTN, HLD, renal and splenic thromboses presented with severe left leg pain and inability to move or feel or left leg. Imaging consistent with occlusion of her L CFA-AT bypass graft.  S/p L AKA 12/17. Palliative care consulted to assist with goals of care, symptom management in the context of her recent surgery and severe PAD.     Her pain is better controlled.  She does not feel drowsy today.  She has been sleeping better.      Symptoms  Pain: moderate improves with meds  Dyspnea: none  Fatigue: yes  Insomnia: improving  Drowsiness: not today  Constipation: none  Nausea: none  Appetite: okay  Anxiety: anticipatory during dressing changes  Depression: none    Objective    Last Recorded Vitals  /61   Pulse 78   Temp 36.3 °C (97.3 °F)   Resp 19   Ht 1.702 m (5' 7\")   Wt 88 kg (194 lb)   SpO2 96%   BMI 30.38 kg/m²      Physical Exam   Constitutional:       Appearance: She is obese.   HENT:      Head: Normocephalic and atraumatic.   Cardiovascular:      Rate and Rhythm: Normal rate and regular rhythm.      Heart sounds: No murmur heard.     No friction rub. No gallop.   Pulmonary:      Effort: Pulmonary effort is normal.      Breath sounds: Normal breath sounds. No wheezing, rhonchi or rales.   Abdominal:      General: Bowel sounds are " normal.      Palpations: Abdomen is soft.      Tenderness: There is no abdominal tenderness. There is no guarding.   Musculoskeletal:      Comments: S/p L AKA dressing in place  S/p R hip disarticulation   Neurological:      Comments: AAOX2  (Self, location-hospital/; thought it was year 2025 vs 2021)     Relevant Results   Results for orders placed or performed during the hospital encounter of 12/17/24 (from the past 24 hours)   Renal Function Panel   Result Value Ref Range    Glucose 117 (H) 74 - 99 mg/dL    Sodium 141 136 - 145 mmol/L    Potassium 3.4 (L) 3.5 - 5.3 mmol/L    Chloride 103 98 - 107 mmol/L    Bicarbonate 30 21 - 32 mmol/L    Anion Gap 11 10 - 20 mmol/L    Urea Nitrogen 7 6 - 23 mg/dL    Creatinine 0.42 (L) 0.50 - 1.05 mg/dL    eGFR >90 >60 mL/min/1.73m*2    Calcium 8.5 (L) 8.6 - 10.6 mg/dL    Phosphorus 3.1 2.5 - 4.9 mg/dL    Albumin 2.3 (L) 3.4 - 5.0 g/dL   Magnesium   Result Value Ref Range    Magnesium 1.86 1.60 - 2.40 mg/dL   CBC   Result Value Ref Range    WBC 10.0 4.4 - 11.3 x10*3/uL    nRBC 0.0 0.0 - 0.0 /100 WBCs    RBC 3.73 (L) 4.00 - 5.20 x10*6/uL    Hemoglobin 8.0 (L) 12.0 - 16.0 g/dL    Hematocrit 29.2 (L) 36.0 - 46.0 %    MCV 78 (L) 80 - 100 fL    MCH 21.4 (L) 26.0 - 34.0 pg    MCHC 27.4 (L) 32.0 - 36.0 g/dL    RDW 24.4 (H) 11.5 - 14.5 %    Platelets 645 (H) 150 - 450 x10*3/uL   Heparin Assay, UFH   Result Value Ref Range    Heparin Unfractionated 0.7 See Comment Below for Therapeutic Ranges IU/mL   POCT GLUCOSE   Result Value Ref Range    POCT Glucose 127 (H) 74 - 99 mg/dL   POCT GLUCOSE   Result Value Ref Range    POCT Glucose 126 (H) 74 - 99 mg/dL   Heparin Assay, UFH   Result Value Ref Range    Heparin Unfractionated 0.5 See Comment Below for Therapeutic Ranges IU/mL   POCT GLUCOSE   Result Value Ref Range    POCT Glucose 91 74 - 99 mg/dL   Heparin Assay, UFH   Result Value Ref Range    Heparin Unfractionated 0.5 See Comment Below for Therapeutic Ranges IU/mL   POCT GLUCOSE    Result Value Ref Range    POCT Glucose 211 (H) 74 - 99 mg/dL        Allergies  Aspartame and Nsaids (non-steroidal anti-inflammatory drug)  Medications  Scheduled medications  acetaminophen, 650 mg, oral, q6h NINO  ampicillin-sulbactam, 3 g, intravenous, q6h  aspirin, 81 mg, oral, Daily  atorvastatin, 80 mg, oral, Nightly  divalproex, 1,000 mg, oral, Nightly at 0300  divalproex, 500 mg, oral, Daily with breakfast  DULoxetine, 60 mg, oral, Daily  gabapentin, 400 mg, oral, q8h NINO  Glucommander - insulin glargine, 1-125 Units, subcutaneous, Nightly   And  Glucommander - insulin lispro, 0-125 Units, subcutaneous, With meals & nightly  levETIRAcetam, 500 mg, oral, BID  levothyroxine, 75 mcg, oral, Daily before breakfast  metoprolol tartrate, 12.5 mg, oral, BID  pantoprazole, 40 mg, oral, Daily before breakfast  sennosides-docusate sodium, 1 tablet, oral, BID      Continuous medications  heparin, 0-4,000 Units/hr, Last Rate: 1,900 Units/hr (12/27/24 0005)      PRN medications  PRN medications: albuterol, dextrose, glucagon HCL, Glucommander - insulin glargine **AND** Glucommander - insulin lispro **AND** Glucommander - insulin lispro **AND** [COMPLETED] POCT Glucose, HYDROmorphone, melatonin, naloxone, ondansetron ODT **OR** ondansetron, oxyCODONE, oxyCODONE, oxygen     Assessment/Plan    54 y.o. F with PMH of severe PAD s/p R hip disarticulation s/p L CFA and EIA embolectomy 12/2023 s/p multiple L toe amputations, s/p L femoral endarterectomy, profundaplasty, common elxpyyj-wy-elvszdgj tibial bypass using 6 mm ringed PTFE, left lower extremity angiogram on 9/25 T2DM complicated by diabetic neuropathy, HTN, HLD, renal and splenic thromboses presented with severe left leg pain and inability to move or feel or left leg. Imaging consistent with occlusion of her L CFA-AT bypass graft.  S/p L AKA 12/17. Palliative care consulted to assist with goals of care, symptom management in the context of her recent surgery and severe  PAD.     Palliative Performance Scale (PPS): 40      #Palliative Care Encounter.  Support and empathy was provided throughout the encounter. Provided reflective listening and presence.       #Complex Medical Decision Making   #Goals of Care  #Advance Care Planning   - Code status: DNR  - HCPOA: Loly Blackburn (aunt) [phone]  1st Alternate: Pepe Henao (son) [phone]  2nd Alternate: Juanis Blackburn (cousin) [phone]  - Goals are survival and time based   - State DNR form completed and placed in patient's chart   - Advanced Directives on file     #Acute Pain  Oxycodone 5 mg PO Q 6 hours PRN for severe pain.  Oxycodone 2.5 mg PO Q 6 hours PRN for moderate pain  Acetaminophen to 1000 mg TID.  Hydromorphone 0.4 mg IV, 15-20 minutes before dressing changes of the left AKA stump only.  Monitor for opioid adverse effects such as over sedation and respiratory depression. Naloxone PRN for reversal.  Will re assess for breakthrough medication needs.     Continue with Gabapentin 400 mg Q 8 hours and Duloxetine 60 mg daily for neuropathic pain.     Referral to pain management for evaluation of persistent right hip disarticulation stump pain, rule out neuroma.     Bowel regimen: She's been having 1-2 BM's daily. Senna 1 tab BID as needed for constipation.     #Suspect PTSD associated to previous surgery with severe post op pain.  Consider trauma focused psychotherapy.  Pain medication administration prior to dressing changes.      #Psychosocial Support  - Music Therapy    Medical Decision Making    - critical limb ischemia posing threat to life and function   - Reviewed external notes from vascular surgery 12/26 and 12/25  - Reviews results from BMP and CBC  - Parenteral controlled substances: hydromorphone    Thank you for allowing us to care for this patient. Palliative Team will continue to follow as needed. Please contact team with any questions or concerns.   Team pager [phone] (weekdays)    Alireza  MD Donis  Palliative Care Physician  Message: [employer] Secure chat  Team pager [phone]

## 2024-12-28 LAB
ALBUMIN SERPL BCP-MCNC: 1.8 G/DL (ref 3.4–5)
ANION GAP SERPL CALC-SCNC: 26 MMOL/L
BUN SERPL-MCNC: 4 MG/DL (ref 6–23)
CALCIUM SERPL-MCNC: 6.6 MG/DL (ref 8.6–10.6)
CHLORIDE SERPL-SCNC: 107 MMOL/L (ref 98–107)
CO2 SERPL-SCNC: 26 MMOL/L (ref 21–32)
CREAT SERPL-MCNC: 0.52 MG/DL (ref 0.5–1.05)
EGFRCR SERPLBLD CKD-EPI 2021: >90 ML/MIN/1.73M*2
ERYTHROCYTE [DISTWIDTH] IN BLOOD BY AUTOMATED COUNT: 24.2 % (ref 11.5–14.5)
GLUCOSE BLD MANUAL STRIP-MCNC: 117 MG/DL (ref 74–99)
GLUCOSE BLD MANUAL STRIP-MCNC: 129 MG/DL (ref 74–99)
GLUCOSE BLD MANUAL STRIP-MCNC: 132 MG/DL (ref 74–99)
GLUCOSE BLD MANUAL STRIP-MCNC: 192 MG/DL (ref 74–99)
GLUCOSE SERPL-MCNC: 93 MG/DL (ref 74–99)
HCT VFR BLD AUTO: 25.4 % (ref 36–46)
HGB BLD-MCNC: 7.3 G/DL (ref 12–16)
MAGNESIUM SERPL-MCNC: 1.37 MG/DL (ref 1.6–2.4)
MCH RBC QN AUTO: 22.5 PG (ref 26–34)
MCHC RBC AUTO-ENTMCNC: 28.7 G/DL (ref 32–36)
MCV RBC AUTO: 78 FL (ref 80–100)
NRBC BLD-RTO: 0 /100 WBCS (ref 0–0)
PHOSPHATE SERPL-MCNC: 2.8 MG/DL (ref 2.5–4.9)
PLATELET # BLD AUTO: 462 X10*3/UL (ref 150–450)
POTASSIUM SERPL-SCNC: 2.8 MMOL/L (ref 3.5–5.3)
RBC # BLD AUTO: 3.25 X10*6/UL (ref 4–5.2)
SODIUM SERPL-SCNC: 156 MMOL/L (ref 136–145)
UFH PPP CHRO-ACNC: 0.5 IU/ML
WBC # BLD AUTO: 7.1 X10*3/UL (ref 4.4–11.3)

## 2024-12-28 PROCEDURE — 36415 COLL VENOUS BLD VENIPUNCTURE: CPT

## 2024-12-28 PROCEDURE — 2500000001 HC RX 250 WO HCPCS SELF ADMINISTERED DRUGS (ALT 637 FOR MEDICARE OP): Performed by: NURSE PRACTITIONER

## 2024-12-28 PROCEDURE — 80069 RENAL FUNCTION PANEL: CPT | Performed by: NURSE PRACTITIONER

## 2024-12-28 PROCEDURE — 83735 ASSAY OF MAGNESIUM: CPT | Performed by: NURSE PRACTITIONER

## 2024-12-28 PROCEDURE — 2500000002 HC RX 250 W HCPCS SELF ADMINISTERED DRUGS (ALT 637 FOR MEDICARE OP, ALT 636 FOR OP/ED): Performed by: NURSE PRACTITIONER

## 2024-12-28 PROCEDURE — 85520 HEPARIN ASSAY: CPT

## 2024-12-28 PROCEDURE — 2500000001 HC RX 250 WO HCPCS SELF ADMINISTERED DRUGS (ALT 637 FOR MEDICARE OP): Performed by: STUDENT IN AN ORGANIZED HEALTH CARE EDUCATION/TRAINING PROGRAM

## 2024-12-28 PROCEDURE — 2500000004 HC RX 250 GENERAL PHARMACY W/ HCPCS (ALT 636 FOR OP/ED): Performed by: STUDENT IN AN ORGANIZED HEALTH CARE EDUCATION/TRAINING PROGRAM

## 2024-12-28 PROCEDURE — 2500000001 HC RX 250 WO HCPCS SELF ADMINISTERED DRUGS (ALT 637 FOR MEDICARE OP)

## 2024-12-28 PROCEDURE — 85027 COMPLETE CBC AUTOMATED: CPT | Performed by: STUDENT IN AN ORGANIZED HEALTH CARE EDUCATION/TRAINING PROGRAM

## 2024-12-28 PROCEDURE — 36415 COLL VENOUS BLD VENIPUNCTURE: CPT | Performed by: NURSE PRACTITIONER

## 2024-12-28 PROCEDURE — 2500000002 HC RX 250 W HCPCS SELF ADMINISTERED DRUGS (ALT 637 FOR MEDICARE OP, ALT 636 FOR OP/ED): Performed by: STUDENT IN AN ORGANIZED HEALTH CARE EDUCATION/TRAINING PROGRAM

## 2024-12-28 PROCEDURE — 2500000004 HC RX 250 GENERAL PHARMACY W/ HCPCS (ALT 636 FOR OP/ED): Performed by: NURSE PRACTITIONER

## 2024-12-28 PROCEDURE — 82947 ASSAY GLUCOSE BLOOD QUANT: CPT

## 2024-12-28 PROCEDURE — 2500000004 HC RX 250 GENERAL PHARMACY W/ HCPCS (ALT 636 FOR OP/ED)

## 2024-12-28 PROCEDURE — 1100000001 HC PRIVATE ROOM DAILY

## 2024-12-28 RX ORDER — LOPERAMIDE HYDROCHLORIDE 2 MG/1
2 CAPSULE ORAL 4 TIMES DAILY PRN
Status: DISCONTINUED | OUTPATIENT
Start: 2024-12-28 | End: 2025-01-07 | Stop reason: HOSPADM

## 2024-12-28 RX ORDER — POTASSIUM CHLORIDE 20 MEQ/1
40 TABLET, EXTENDED RELEASE ORAL EVERY 4 HOURS
Status: COMPLETED | OUTPATIENT
Start: 2024-12-28 | End: 2024-12-28

## 2024-12-28 RX ORDER — MAGNESIUM SULFATE HEPTAHYDRATE 40 MG/ML
2 INJECTION, SOLUTION INTRAVENOUS ONCE
Status: COMPLETED | OUTPATIENT
Start: 2024-12-28 | End: 2024-12-28

## 2024-12-28 RX ADMIN — POTASSIUM CHLORIDE 40 MEQ: 1500 TABLET, EXTENDED RELEASE ORAL at 14:19

## 2024-12-28 RX ADMIN — ACETAMINOPHEN 975 MG: 325 TABLET ORAL at 20:57

## 2024-12-28 RX ADMIN — DIVALPROEX SODIUM 1000 MG: 500 TABLET, FILM COATED, EXTENDED RELEASE ORAL at 20:57

## 2024-12-28 RX ADMIN — INSULIN LISPRO 4 UNITS: 100 INJECTION, SOLUTION INTRAVENOUS; SUBCUTANEOUS at 13:00

## 2024-12-28 RX ADMIN — MAGNESIUM SULFATE HEPTAHYDRATE 2 G: 40 INJECTION, SOLUTION INTRAVENOUS at 12:01

## 2024-12-28 RX ADMIN — INSULIN GLARGINE 10 UNITS: 100 INJECTION, SOLUTION SUBCUTANEOUS at 20:57

## 2024-12-28 RX ADMIN — LEVOTHYROXINE SODIUM 75 MCG: 0.07 TABLET ORAL at 05:19

## 2024-12-28 RX ADMIN — POTASSIUM CHLORIDE 40 MEQ: 1500 TABLET, EXTENDED RELEASE ORAL at 10:59

## 2024-12-28 RX ADMIN — ACETAMINOPHEN 975 MG: 325 TABLET ORAL at 14:19

## 2024-12-28 RX ADMIN — OXYCODONE HYDROCHLORIDE 5 MG: 5 TABLET ORAL at 09:42

## 2024-12-28 RX ADMIN — GABAPENTIN 400 MG: 400 CAPSULE ORAL at 14:19

## 2024-12-28 RX ADMIN — OXYCODONE HYDROCHLORIDE 5 MG: 5 TABLET ORAL at 21:07

## 2024-12-28 RX ADMIN — HYDROMORPHONE HYDROCHLORIDE 0.4 MG: 1 INJECTION, SOLUTION INTRAMUSCULAR; INTRAVENOUS; SUBCUTANEOUS at 05:18

## 2024-12-28 RX ADMIN — AMPICILLIN SODIUM AND SULBACTAM SODIUM 3 G: 2; 1 INJECTION, POWDER, FOR SOLUTION INTRAMUSCULAR; INTRAVENOUS at 22:16

## 2024-12-28 RX ADMIN — DIVALPROEX SODIUM 500 MG: 500 TABLET, FILM COATED, EXTENDED RELEASE ORAL at 11:00

## 2024-12-28 RX ADMIN — ASPIRIN 81 MG: 81 TABLET, COATED ORAL at 08:55

## 2024-12-28 RX ADMIN — METOPROLOL TARTRATE 12.5 MG: 25 TABLET, FILM COATED ORAL at 08:55

## 2024-12-28 RX ADMIN — ACETAMINOPHEN 975 MG: 325 TABLET ORAL at 08:55

## 2024-12-28 RX ADMIN — HYDROMORPHONE HYDROCHLORIDE 0.4 MG: 1 INJECTION, SOLUTION INTRAMUSCULAR; INTRAVENOUS; SUBCUTANEOUS at 16:03

## 2024-12-28 RX ADMIN — LEVETIRACETAM 500 MG: 500 TABLET, FILM COATED ORAL at 20:58

## 2024-12-28 RX ADMIN — HEPARIN SODIUM 1900 UNITS/HR: 10000 INJECTION, SOLUTION INTRAVENOUS at 10:58

## 2024-12-28 RX ADMIN — LEVETIRACETAM 500 MG: 500 TABLET, FILM COATED ORAL at 08:55

## 2024-12-28 RX ADMIN — DULOXETINE HYDROCHLORIDE 60 MG: 60 CAPSULE, DELAYED RELEASE ORAL at 08:55

## 2024-12-28 RX ADMIN — AMPICILLIN SODIUM AND SULBACTAM SODIUM 3 G: 2; 1 INJECTION, POWDER, FOR SOLUTION INTRAMUSCULAR; INTRAVENOUS at 17:28

## 2024-12-28 RX ADMIN — METOPROLOL TARTRATE 12.5 MG: 25 TABLET, FILM COATED ORAL at 20:58

## 2024-12-28 RX ADMIN — ATORVASTATIN CALCIUM 80 MG: 80 TABLET, FILM COATED ORAL at 20:58

## 2024-12-28 RX ADMIN — AMPICILLIN SODIUM AND SULBACTAM SODIUM 3 G: 2; 1 INJECTION, POWDER, FOR SOLUTION INTRAMUSCULAR; INTRAVENOUS at 11:00

## 2024-12-28 RX ADMIN — GABAPENTIN 400 MG: 400 CAPSULE ORAL at 05:19

## 2024-12-28 RX ADMIN — POTASSIUM CHLORIDE 40 MEQ: 1500 TABLET, EXTENDED RELEASE ORAL at 18:00

## 2024-12-28 RX ADMIN — GABAPENTIN 400 MG: 400 CAPSULE ORAL at 21:02

## 2024-12-28 RX ADMIN — PANTOPRAZOLE SODIUM 40 MG: 40 TABLET, DELAYED RELEASE ORAL at 05:19

## 2024-12-28 RX ADMIN — AMPICILLIN SODIUM AND SULBACTAM SODIUM 3 G: 2; 1 INJECTION, POWDER, FOR SOLUTION INTRAMUSCULAR; INTRAVENOUS at 05:19

## 2024-12-28 ASSESSMENT — COGNITIVE AND FUNCTIONAL STATUS - GENERAL
TOILETING: A LOT
PERSONAL GROOMING: A LITTLE
MOBILITY SCORE: 8
STANDING UP FROM CHAIR USING ARMS: TOTAL
CLIMB 3 TO 5 STEPS WITH RAILING: TOTAL
DRESSING REGULAR LOWER BODY CLOTHING: A LOT
DRESSING REGULAR UPPER BODY CLOTHING: A LITTLE
HELP NEEDED FOR BATHING: A LITTLE
MOVING FROM LYING ON BACK TO SITTING ON SIDE OF FLAT BED WITH BEDRAILS: A LITTLE
WALKING IN HOSPITAL ROOM: TOTAL
MOVING TO AND FROM BED TO CHAIR: TOTAL
DAILY ACTIVITIY SCORE: 17
TURNING FROM BACK TO SIDE WHILE IN FLAT BAD: TOTAL

## 2024-12-28 ASSESSMENT — PAIN - FUNCTIONAL ASSESSMENT: PAIN_FUNCTIONAL_ASSESSMENT: 0-10

## 2024-12-28 ASSESSMENT — PAIN SCALES - GENERAL
PAINLEVEL_OUTOF10: 10 - WORST POSSIBLE PAIN
PAINLEVEL_OUTOF10: 10 - WORST POSSIBLE PAIN

## 2024-12-28 NOTE — PROGRESS NOTES
VASCULAR SURGERY PROGRESS NOTE  Assessment/Plan   Shirin Slater is 54 y.o. female with history of severe PAD s/p R hip disarticulation s/p L CFA and EIA embolectomy 12/2023 s/p multiple L toe amputations, s/p L femoral endarterectomy, profundaplasty, common zwhlgjj-vx-ehtlxvou tibial bypass using 6 mm ringed PTFE, left lower extremity angiogram on 9/25 T2DM complicated by diabetic neuropathy, HTN, HLD, renal and splenic thromboses who who presents with severe left leg pain and inability to move or feel or left leg. Imaging consistent with occlusion of her L CFA-AT bypass graft. On exam, patient with Martha 3 limb ischemia. Given leukocytosis, tachycardia, limb ischemia and concern for infection she was taken to OR urgently and had findings of purulence along the anteromedial aspect of the left knee and distal thigh. Muscle within the posterolateral thigh with healthy contraction on stimulation. 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   Ischemic skin demarcation at mid-thigh, unable to salvageable AKA. Likely having ischemic left AKA stump pain.    Discussed with patient the options for 1) hip disarticulation vs 2) palliative wound care. Patient has decided on hip disarticulation.     Awaiting final timing per orthopedic surgery.      Plan:  Neuro: acute postoperative pain, Hx seizures  - continue tylenol/oxy 2.5/5mg q4h PRN  - continue lidoderm patch   - home Keppra, Depakote, Cymbalta, gabapentin 400mg TID  - gabapentin and Cymbalta  - IV dilaudid prior to dressing changes  - appreciate palliative care recs     CV: Hx severe PAD with multiple surgical interventions, HTN  - maintain blood pressure control  - continue statin  - daily ASA (holding home plavix)  - heparin infusion, low intensity (holding home coumadin)     Pulm:   - continue to encourage IS hourly while awake  - OOB to chair and increase ambulation as  tolerated  - albuterol inhaler PRN     FENGI: hypokalemia, hypoalbuminemia   - regular diet  - continue bowel regimen to prevent OIC   - continue PPI  - monitor and replete electrolytes     Endo: DM2, hypothyroidism  - holding home jardiance  - glucommander protocol   - levothyroxine     Heme: anemia of chronic disease  - no s/sx of bleeding  - monitor H/H, transfuse for Hgb <7     ID:  humble infection required amputation for source control; leukocytosis (resolved)   - tissue and blood cultures sent- final result without growth  - vanc/zosyn (stopped 12/24/24)  - on Unasyn   - monitor s/s of infection     Dispo-  - regular nursing floor  - orthopedic surgery consulted for hip disarticulation, likely to occur next week.     Subjective   Feels ok, still having limb pain. She denies any change in her symptoms overnight.     Objective   Vitals:  Heart Rate:  [78-93]   Temp:  [36.3 °C (97.3 °F)-36.6 °C (97.9 °F)]   Resp:  [16-18]   BP: (111-142)/(62-76)   SpO2:  [90 %-96 %]     Exam:  Constitutional: No acute distress, resting comfortably  Neuro:  AOx3, grossly intact  ENMT: moist mucous membranes  CV: no tachycardia  Pulm: non-labored on RA  GI: soft, non-tender, non-distended  Skin: warm and dry  Musculoskeletal: moving all extremities  Extremities: Left AKA dressing C/D/I, no ascending edema      Labs:  Results from last 7 days   Lab Units 12/28/24  0704 12/27/24  0804 12/26/24  0802   WBC AUTO x10*3/uL 7.1 8.9 10.0   HEMOGLOBIN g/dL 7.3* 8.6* 8.0*   PLATELETS AUTO x10*3/uL 462* 650* 645*      Results from last 7 days   Lab Units 12/28/24  0704 12/27/24  0804 12/26/24  0802   SODIUM mmol/L 156* 144 141   POTASSIUM mmol/L 2.8* 3.9 3.4*   CHLORIDE mmol/L 107 102 103   CO2 mmol/L 26 31 30   BUN mg/dL 4* 6 7   CREATININE mg/dL 0.52 0.48* 0.42*   GLUCOSE mg/dL 93 84 117*   MAGNESIUM mg/dL 1.37* 1.92 1.86   PHOSPHORUS mg/dL 2.8 3.7 3.1           Results from last 7 days   Lab Units 12/27/24  0804 12/26/24 2054  12/26/24  1514   ANTI XA UNFRACTIONATED IU/mL 0.6 0.5 0.5        .

## 2024-12-29 LAB
ALBUMIN SERPL BCP-MCNC: 2.7 G/DL (ref 3.4–5)
ANION GAP SERPL CALC-SCNC: 15 MMOL/L (ref 10–20)
BUN SERPL-MCNC: 6 MG/DL (ref 6–23)
C DIF TOX TCDA+TCDB STL QL NAA+PROBE: NOT DETECTED
CALCIUM SERPL-MCNC: 9.2 MG/DL (ref 8.6–10.6)
CHLORIDE SERPL-SCNC: 99 MMOL/L (ref 98–107)
CO2 SERPL-SCNC: 28 MMOL/L (ref 21–32)
CREAT SERPL-MCNC: 0.43 MG/DL (ref 0.5–1.05)
EGFRCR SERPLBLD CKD-EPI 2021: >90 ML/MIN/1.73M*2
ERYTHROCYTE [DISTWIDTH] IN BLOOD BY AUTOMATED COUNT: 24.8 % (ref 11.5–14.5)
GLUCOSE BLD MANUAL STRIP-MCNC: 116 MG/DL (ref 74–99)
GLUCOSE BLD MANUAL STRIP-MCNC: 125 MG/DL (ref 74–99)
GLUCOSE BLD MANUAL STRIP-MCNC: 134 MG/DL (ref 74–99)
GLUCOSE BLD MANUAL STRIP-MCNC: 98 MG/DL (ref 74–99)
GLUCOSE SERPL-MCNC: 124 MG/DL (ref 74–99)
HCT VFR BLD AUTO: 32.8 % (ref 36–46)
HGB BLD-MCNC: 9.3 G/DL (ref 12–16)
MCH RBC QN AUTO: 21.8 PG (ref 26–34)
MCHC RBC AUTO-ENTMCNC: 28.4 G/DL (ref 32–36)
MCV RBC AUTO: 77 FL (ref 80–100)
NRBC BLD-RTO: 0 /100 WBCS (ref 0–0)
PHOSPHATE SERPL-MCNC: 3.5 MG/DL (ref 2.5–4.9)
PLATELET # BLD AUTO: 631 X10*3/UL (ref 150–450)
POTASSIUM SERPL-SCNC: 4.5 MMOL/L (ref 3.5–5.3)
RBC # BLD AUTO: 4.27 X10*6/UL (ref 4–5.2)
SODIUM SERPL-SCNC: 137 MMOL/L (ref 136–145)
UFH PPP CHRO-ACNC: 0.5 IU/ML
WBC # BLD AUTO: 11.6 X10*3/UL (ref 4.4–11.3)

## 2024-12-29 PROCEDURE — 87493 C DIFF AMPLIFIED PROBE: CPT

## 2024-12-29 PROCEDURE — 2500000001 HC RX 250 WO HCPCS SELF ADMINISTERED DRUGS (ALT 637 FOR MEDICARE OP): Performed by: NURSE PRACTITIONER

## 2024-12-29 PROCEDURE — 2500000001 HC RX 250 WO HCPCS SELF ADMINISTERED DRUGS (ALT 637 FOR MEDICARE OP)

## 2024-12-29 PROCEDURE — 85520 HEPARIN ASSAY: CPT

## 2024-12-29 PROCEDURE — 85027 COMPLETE CBC AUTOMATED: CPT | Performed by: STUDENT IN AN ORGANIZED HEALTH CARE EDUCATION/TRAINING PROGRAM

## 2024-12-29 PROCEDURE — 2500000005 HC RX 250 GENERAL PHARMACY W/O HCPCS

## 2024-12-29 PROCEDURE — 36415 COLL VENOUS BLD VENIPUNCTURE: CPT

## 2024-12-29 PROCEDURE — 82947 ASSAY GLUCOSE BLOOD QUANT: CPT

## 2024-12-29 PROCEDURE — 1100000001 HC PRIVATE ROOM DAILY

## 2024-12-29 PROCEDURE — 2500000002 HC RX 250 W HCPCS SELF ADMINISTERED DRUGS (ALT 637 FOR MEDICARE OP, ALT 636 FOR OP/ED): Performed by: STUDENT IN AN ORGANIZED HEALTH CARE EDUCATION/TRAINING PROGRAM

## 2024-12-29 PROCEDURE — 2500000001 HC RX 250 WO HCPCS SELF ADMINISTERED DRUGS (ALT 637 FOR MEDICARE OP): Performed by: STUDENT IN AN ORGANIZED HEALTH CARE EDUCATION/TRAINING PROGRAM

## 2024-12-29 PROCEDURE — 2500000004 HC RX 250 GENERAL PHARMACY W/ HCPCS (ALT 636 FOR OP/ED)

## 2024-12-29 PROCEDURE — 80069 RENAL FUNCTION PANEL: CPT | Performed by: NURSE PRACTITIONER

## 2024-12-29 PROCEDURE — 2500000004 HC RX 250 GENERAL PHARMACY W/ HCPCS (ALT 636 FOR OP/ED): Performed by: STUDENT IN AN ORGANIZED HEALTH CARE EDUCATION/TRAINING PROGRAM

## 2024-12-29 PROCEDURE — 2500000002 HC RX 250 W HCPCS SELF ADMINISTERED DRUGS (ALT 637 FOR MEDICARE OP, ALT 636 FOR OP/ED): Performed by: NURSE PRACTITIONER

## 2024-12-29 PROCEDURE — 2500000004 HC RX 250 GENERAL PHARMACY W/ HCPCS (ALT 636 FOR OP/ED): Performed by: NURSE PRACTITIONER

## 2024-12-29 RX ORDER — KETOROLAC TROMETHAMINE 15 MG/ML
15 INJECTION, SOLUTION INTRAMUSCULAR; INTRAVENOUS EVERY 6 HOURS SCHEDULED
Status: COMPLETED | OUTPATIENT
Start: 2024-12-29 | End: 2024-12-30

## 2024-12-29 RX ORDER — GABAPENTIN 300 MG/1
300 CAPSULE ORAL NIGHTLY
Status: DISCONTINUED | OUTPATIENT
Start: 2024-12-29 | End: 2024-12-30

## 2024-12-29 RX ORDER — LIDOCAINE 560 MG/1
1 PATCH PERCUTANEOUS; TOPICAL; TRANSDERMAL DAILY
Status: DISCONTINUED | OUTPATIENT
Start: 2024-12-29 | End: 2025-01-07 | Stop reason: HOSPADM

## 2024-12-29 RX ORDER — HYDROMORPHONE HYDROCHLORIDE 1 MG/ML
0.4 INJECTION, SOLUTION INTRAMUSCULAR; INTRAVENOUS; SUBCUTANEOUS EVERY 2 HOUR PRN
Status: DISCONTINUED | OUTPATIENT
Start: 2024-12-29 | End: 2025-01-01

## 2024-12-29 RX ORDER — OXYCODONE HYDROCHLORIDE 5 MG/1
10 TABLET ORAL EVERY 6 HOURS PRN
Status: DISCONTINUED | OUTPATIENT
Start: 2024-12-29 | End: 2025-01-07 | Stop reason: HOSPADM

## 2024-12-29 RX ORDER — METHOCARBAMOL 100 MG/ML
1000 INJECTION, SOLUTION INTRAMUSCULAR; INTRAVENOUS ONCE
Status: COMPLETED | OUTPATIENT
Start: 2024-12-29 | End: 2024-12-29

## 2024-12-29 RX ORDER — OXYCODONE HYDROCHLORIDE 5 MG/1
5 TABLET ORAL EVERY 4 HOURS PRN
Status: DISCONTINUED | OUTPATIENT
Start: 2024-12-29 | End: 2025-01-07 | Stop reason: HOSPADM

## 2024-12-29 RX ADMIN — KETOROLAC TROMETHAMINE 15 MG: 15 INJECTION, SOLUTION INTRAMUSCULAR; INTRAVENOUS at 18:12

## 2024-12-29 RX ADMIN — ACETAMINOPHEN 975 MG: 325 TABLET ORAL at 14:08

## 2024-12-29 RX ADMIN — METOPROLOL TARTRATE 12.5 MG: 25 TABLET, FILM COATED ORAL at 09:19

## 2024-12-29 RX ADMIN — HYDROMORPHONE HYDROCHLORIDE 0.4 MG: 1 INJECTION, SOLUTION INTRAMUSCULAR; INTRAVENOUS; SUBCUTANEOUS at 08:26

## 2024-12-29 RX ADMIN — ASPIRIN 81 MG: 81 TABLET, COATED ORAL at 09:19

## 2024-12-29 RX ADMIN — DIVALPROEX SODIUM 500 MG: 500 TABLET, FILM COATED, EXTENDED RELEASE ORAL at 09:19

## 2024-12-29 RX ADMIN — HEPARIN SODIUM 1900 UNITS/HR: 10000 INJECTION, SOLUTION INTRAVENOUS at 00:24

## 2024-12-29 RX ADMIN — AMPICILLIN SODIUM AND SULBACTAM SODIUM 3 G: 2; 1 INJECTION, POWDER, FOR SOLUTION INTRAMUSCULAR; INTRAVENOUS at 10:27

## 2024-12-29 RX ADMIN — PANTOPRAZOLE SODIUM 40 MG: 40 TABLET, DELAYED RELEASE ORAL at 06:11

## 2024-12-29 RX ADMIN — HEPARIN SODIUM 1900 UNITS/HR: 10000 INJECTION, SOLUTION INTRAVENOUS at 15:31

## 2024-12-29 RX ADMIN — LEVETIRACETAM 500 MG: 500 TABLET, FILM COATED ORAL at 09:19

## 2024-12-29 RX ADMIN — ACETAMINOPHEN 975 MG: 325 TABLET ORAL at 20:37

## 2024-12-29 RX ADMIN — AMPICILLIN SODIUM AND SULBACTAM SODIUM 3 G: 2; 1 INJECTION, POWDER, FOR SOLUTION INTRAMUSCULAR; INTRAVENOUS at 22:25

## 2024-12-29 RX ADMIN — GABAPENTIN 300 MG: 300 CAPSULE ORAL at 20:37

## 2024-12-29 RX ADMIN — LEVOTHYROXINE SODIUM 75 MCG: 0.07 TABLET ORAL at 06:11

## 2024-12-29 RX ADMIN — DIVALPROEX SODIUM 1000 MG: 500 TABLET, FILM COATED, EXTENDED RELEASE ORAL at 22:25

## 2024-12-29 RX ADMIN — METOPROLOL TARTRATE 12.5 MG: 25 TABLET, FILM COATED ORAL at 20:37

## 2024-12-29 RX ADMIN — GABAPENTIN 400 MG: 400 CAPSULE ORAL at 14:08

## 2024-12-29 RX ADMIN — METHOCARBAMOL 1000 MG: 1000 INJECTION, SOLUTION INTRAMUSCULAR; INTRAVENOUS at 17:22

## 2024-12-29 RX ADMIN — KETOROLAC TROMETHAMINE 15 MG: 15 INJECTION, SOLUTION INTRAMUSCULAR; INTRAVENOUS at 23:58

## 2024-12-29 RX ADMIN — GABAPENTIN 400 MG: 400 CAPSULE ORAL at 06:11

## 2024-12-29 RX ADMIN — DULOXETINE HYDROCHLORIDE 60 MG: 60 CAPSULE, DELAYED RELEASE ORAL at 09:19

## 2024-12-29 RX ADMIN — ATORVASTATIN CALCIUM 80 MG: 80 TABLET, FILM COATED ORAL at 20:37

## 2024-12-29 RX ADMIN — AMPICILLIN SODIUM AND SULBACTAM SODIUM 3 G: 2; 1 INJECTION, POWDER, FOR SOLUTION INTRAMUSCULAR; INTRAVENOUS at 17:22

## 2024-12-29 RX ADMIN — HYDROMORPHONE HYDROCHLORIDE 0.4 MG: 1 INJECTION, SOLUTION INTRAMUSCULAR; INTRAVENOUS; SUBCUTANEOUS at 00:38

## 2024-12-29 RX ADMIN — LIDOCAINE 4% 1 PATCH: 40 PATCH TOPICAL at 17:22

## 2024-12-29 RX ADMIN — ACETAMINOPHEN 975 MG: 325 TABLET ORAL at 09:19

## 2024-12-29 RX ADMIN — LEVETIRACETAM 500 MG: 500 TABLET, FILM COATED ORAL at 20:37

## 2024-12-29 RX ADMIN — AMPICILLIN SODIUM AND SULBACTAM SODIUM 3 G: 2; 1 INJECTION, POWDER, FOR SOLUTION INTRAMUSCULAR; INTRAVENOUS at 04:04

## 2024-12-29 RX ADMIN — INSULIN GLARGINE 10 UNITS: 100 INJECTION, SOLUTION SUBCUTANEOUS at 20:50

## 2024-12-29 ASSESSMENT — COGNITIVE AND FUNCTIONAL STATUS - GENERAL
DAILY ACTIVITIY SCORE: 16
STANDING UP FROM CHAIR USING ARMS: TOTAL
TURNING FROM BACK TO SIDE WHILE IN FLAT BAD: TOTAL
DRESSING REGULAR LOWER BODY CLOTHING: A LOT
MOVING FROM LYING ON BACK TO SITTING ON SIDE OF FLAT BED WITH BEDRAILS: A LITTLE
DRESSING REGULAR UPPER BODY CLOTHING: A LOT
MOBILITY SCORE: 8
HELP NEEDED FOR BATHING: A LITTLE
CLIMB 3 TO 5 STEPS WITH RAILING: TOTAL
WALKING IN HOSPITAL ROOM: TOTAL
TOILETING: A LOT
MOVING TO AND FROM BED TO CHAIR: TOTAL
PERSONAL GROOMING: A LITTLE

## 2024-12-29 ASSESSMENT — PAIN SCALES - GENERAL
PAINLEVEL_OUTOF10: 0 - NO PAIN
PAINLEVEL_OUTOF10: 9

## 2024-12-29 NOTE — PROGRESS NOTES
VASCULAR SURGERY PROGRESS NOTE  Assessment/Plan   Shirin Slater is 54 y.o. female with history of severe PAD s/p R hip disarticulation s/p L CFA and EIA embolectomy 12/2023 s/p multiple L toe amputations, s/p L femoral endarterectomy, profundaplasty, common fqqkodi-kq-vkonbnba tibial bypass using 6 mm ringed PTFE, left lower extremity angiogram on 9/25 T2DM complicated by diabetic neuropathy, HTN, HLD, renal and splenic thromboses who who presents with severe left leg pain and inability to move or feel or left leg. Imaging consistent with occlusion of her L CFA-AT bypass graft. On exam, patient with Martha 3 limb ischemia. Given leukocytosis, tachycardia, limb ischemia and concern for infection she was taken to OR urgently and had findings of purulence along the anteromedial aspect of the left knee and distal thigh. Muscle within the posterolateral thigh with healthy contraction on stimulation. 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   Ischemic skin demarcation at mid-thigh, unable to salvageable AKA. Likely having ischemic left AKA stump pain.    Discussed with patient the options for 1) hip disarticulation vs 2) palliative wound care. Patient has decided on hip disarticulation.     Awaiting final timing per orthopedic surgery.      Plan:  Neuro: acute postoperative pain, Hx seizures  - home Keppra, Depakote, Cymbalta, gabapentin 400mg TID  - gabapentin and Cymbalta  - appreciate palliative care recs  -acute pain consulted 12/29 to assist in management of pain regimen, will formally staff 12/30 to determine if eligible for a blockade, current recommendations:  Oxy 5mg/10mg Q4H PRN for moderate/severe pain respectively   Dilaudid 0.4mg Q2H PRN breakthrough pain   Robaxin 1g IV   Gabapentin 300 QHS   Lidocaine 4% patch daily   Toradol 15 mg Q6H for 3 doses, will re-evaluate in AM     CV: Hx severe PAD with multiple  surgical interventions, HTN  - maintain blood pressure control  - continue statin  - daily ASA (holding home plavix)  - heparin infusion, low intensity (holding home coumadin)     Pulm:   - continue to encourage IS hourly while awake  - OOB to chair and increase ambulation as tolerated  - albuterol inhaler PRN     FENGI: hypokalemia, hypoalbuminemia   - regular diet  - continue bowel regimen to prevent OIC   - continue PPI  - monitor and replete electrolytes     Endo: DM2, hypothyroidism  - holding home jardiance  - glucommander protocol   - levothyroxine     Heme: anemia of chronic disease  - no s/sx of bleeding  - monitor H/H, transfuse for Hgb <7     ID:  humble infection required amputation for source control; leukocytosis (resolved)   - tissue and blood cultures sent- final result without growth  - vanc/zosyn (stopped 12/24/24)  - on Unasyn   - monitor s/s of infection     Dispo-  - regular nursing floor  - orthopedic surgery consulted for hip disarticulation, likely to occur next week.     Subjective   Feels ok, endorses significant pain around L AKA, exquisitely tender to palpation.     Objective   Vitals:  Heart Rate:  [83-92]   Temp:  [36.1 °C (97 °F)-36.4 °C (97.5 °F)]   Resp:  [17-19]   BP: (100-148)/(56-75)   Weight:  [72.4 kg (159 lb 9.8 oz)]   SpO2:  [92 %-95 %]     Exam:  Constitutional: No acute distress, resting comfortably  Neuro:  AOx3, grossly intact  ENMT: moist mucous membranes  CV: no tachycardia  Pulm: non-labored on RA  GI: soft, non-tender, non-distended  Skin: warm and dry  Musculoskeletal: moving all extremities  Extremities: Left AKA dressing C/D/I, dressing changed this PM, see image below        Labs:  Results from last 7 days   Lab Units 12/29/24  0852 12/28/24  0704 12/27/24  0804   WBC AUTO x10*3/uL 11.6* 7.1 8.9   HEMOGLOBIN g/dL 9.3* 7.3* 8.6*   PLATELETS AUTO x10*3/uL 631* 462* 650*      Results from last 7 days   Lab Units 12/29/24  0852 12/28/24  0704 12/27/24  0804   SODIUM  mmol/L 137 156* 144   POTASSIUM mmol/L 4.5 2.8* 3.9   CHLORIDE mmol/L 99 107 102   CO2 mmol/L 28 26 31   BUN mg/dL 6 4* 6   CREATININE mg/dL 0.43* 0.52 0.48*   GLUCOSE mg/dL 124* 93 84   MAGNESIUM mg/dL  --  1.37* 1.92   PHOSPHORUS mg/dL 3.5 2.8 3.7           Results from last 7 days   Lab Units 12/29/24  0852 12/28/24  0951 12/27/24  0804   ANTI XA UNFRACTIONATED IU/mL 0.5 0.5 0.6

## 2024-12-30 LAB
ALBUMIN SERPL BCP-MCNC: 2.7 G/DL (ref 3.4–5)
ANION GAP SERPL CALC-SCNC: 16 MMOL/L (ref 10–20)
BUN SERPL-MCNC: 12 MG/DL (ref 6–23)
CALCIUM SERPL-MCNC: 9.4 MG/DL (ref 8.6–10.6)
CHLORIDE SERPL-SCNC: 99 MMOL/L (ref 98–107)
CO2 SERPL-SCNC: 27 MMOL/L (ref 21–32)
CREAT SERPL-MCNC: 0.62 MG/DL (ref 0.5–1.05)
EGFRCR SERPLBLD CKD-EPI 2021: >90 ML/MIN/1.73M*2
ERYTHROCYTE [DISTWIDTH] IN BLOOD BY AUTOMATED COUNT: 25.2 % (ref 11.5–14.5)
FUNGUS SPEC CULT: NORMAL
FUNGUS SPEC FUNGUS STN: NORMAL
GLUCOSE BLD MANUAL STRIP-MCNC: 107 MG/DL (ref 74–99)
GLUCOSE BLD MANUAL STRIP-MCNC: 117 MG/DL (ref 74–99)
GLUCOSE BLD MANUAL STRIP-MCNC: 127 MG/DL (ref 74–99)
GLUCOSE BLD MANUAL STRIP-MCNC: 156 MG/DL (ref 74–99)
GLUCOSE SERPL-MCNC: 112 MG/DL (ref 74–99)
HCT VFR BLD AUTO: 34.2 % (ref 36–46)
HGB BLD-MCNC: 9.6 G/DL (ref 12–16)
MCH RBC QN AUTO: 21.5 PG (ref 26–34)
MCHC RBC AUTO-ENTMCNC: 28.1 G/DL (ref 32–36)
MCV RBC AUTO: 77 FL (ref 80–100)
NRBC BLD-RTO: 0 /100 WBCS (ref 0–0)
PHOSPHATE SERPL-MCNC: 5.5 MG/DL (ref 2.5–4.9)
PLATELET # BLD AUTO: 583 X10*3/UL (ref 150–450)
POTASSIUM SERPL-SCNC: 4.2 MMOL/L (ref 3.5–5.3)
RBC # BLD AUTO: 4.47 X10*6/UL (ref 4–5.2)
SODIUM SERPL-SCNC: 138 MMOL/L (ref 136–145)
UFH PPP CHRO-ACNC: 0.4 IU/ML
UFH PPP CHRO-ACNC: 0.5 IU/ML
UFH PPP CHRO-ACNC: 0.8 IU/ML
WBC # BLD AUTO: 10.1 X10*3/UL (ref 4.4–11.3)

## 2024-12-30 PROCEDURE — 82947 ASSAY GLUCOSE BLOOD QUANT: CPT

## 2024-12-30 PROCEDURE — 2500000002 HC RX 250 W HCPCS SELF ADMINISTERED DRUGS (ALT 637 FOR MEDICARE OP, ALT 636 FOR OP/ED): Performed by: NURSE PRACTITIONER

## 2024-12-30 PROCEDURE — 2500000001 HC RX 250 WO HCPCS SELF ADMINISTERED DRUGS (ALT 637 FOR MEDICARE OP): Performed by: NURSE PRACTITIONER

## 2024-12-30 PROCEDURE — 85520 HEPARIN ASSAY: CPT

## 2024-12-30 PROCEDURE — 99231 SBSQ HOSP IP/OBS SF/LOW 25: CPT | Performed by: STUDENT IN AN ORGANIZED HEALTH CARE EDUCATION/TRAINING PROGRAM

## 2024-12-30 PROCEDURE — 2500000001 HC RX 250 WO HCPCS SELF ADMINISTERED DRUGS (ALT 637 FOR MEDICARE OP)

## 2024-12-30 PROCEDURE — 80069 RENAL FUNCTION PANEL: CPT | Performed by: NURSE PRACTITIONER

## 2024-12-30 PROCEDURE — 85027 COMPLETE CBC AUTOMATED: CPT | Performed by: STUDENT IN AN ORGANIZED HEALTH CARE EDUCATION/TRAINING PROGRAM

## 2024-12-30 PROCEDURE — 2500000001 HC RX 250 WO HCPCS SELF ADMINISTERED DRUGS (ALT 637 FOR MEDICARE OP): Performed by: STUDENT IN AN ORGANIZED HEALTH CARE EDUCATION/TRAINING PROGRAM

## 2024-12-30 PROCEDURE — 2500000004 HC RX 250 GENERAL PHARMACY W/ HCPCS (ALT 636 FOR OP/ED)

## 2024-12-30 PROCEDURE — 1100000001 HC PRIVATE ROOM DAILY

## 2024-12-30 PROCEDURE — 36415 COLL VENOUS BLD VENIPUNCTURE: CPT

## 2024-12-30 PROCEDURE — 2500000002 HC RX 250 W HCPCS SELF ADMINISTERED DRUGS (ALT 637 FOR MEDICARE OP, ALT 636 FOR OP/ED): Performed by: STUDENT IN AN ORGANIZED HEALTH CARE EDUCATION/TRAINING PROGRAM

## 2024-12-30 PROCEDURE — 2500000004 HC RX 250 GENERAL PHARMACY W/ HCPCS (ALT 636 FOR OP/ED): Performed by: STUDENT IN AN ORGANIZED HEALTH CARE EDUCATION/TRAINING PROGRAM

## 2024-12-30 PROCEDURE — 36415 COLL VENOUS BLD VENIPUNCTURE: CPT | Performed by: STUDENT IN AN ORGANIZED HEALTH CARE EDUCATION/TRAINING PROGRAM

## 2024-12-30 RX ORDER — GABAPENTIN 300 MG/1
300 CAPSULE ORAL EVERY 8 HOURS SCHEDULED
Status: DISCONTINUED | OUTPATIENT
Start: 2024-12-30 | End: 2025-01-01

## 2024-12-30 RX ADMIN — METOPROLOL TARTRATE 12.5 MG: 25 TABLET, FILM COATED ORAL at 09:55

## 2024-12-30 RX ADMIN — METOPROLOL TARTRATE 12.5 MG: 25 TABLET, FILM COATED ORAL at 22:00

## 2024-12-30 RX ADMIN — LEVETIRACETAM 500 MG: 500 TABLET, FILM COATED ORAL at 09:54

## 2024-12-30 RX ADMIN — AMPICILLIN SODIUM AND SULBACTAM SODIUM 3 G: 2; 1 INJECTION, POWDER, FOR SOLUTION INTRAMUSCULAR; INTRAVENOUS at 22:01

## 2024-12-30 RX ADMIN — GABAPENTIN 300 MG: 300 CAPSULE ORAL at 14:22

## 2024-12-30 RX ADMIN — ATORVASTATIN CALCIUM 80 MG: 80 TABLET, FILM COATED ORAL at 22:00

## 2024-12-30 RX ADMIN — ACETAMINOPHEN 975 MG: 325 TABLET ORAL at 22:00

## 2024-12-30 RX ADMIN — AMPICILLIN SODIUM AND SULBACTAM SODIUM 3 G: 2; 1 INJECTION, POWDER, FOR SOLUTION INTRAMUSCULAR; INTRAVENOUS at 16:08

## 2024-12-30 RX ADMIN — INSULIN LISPRO 4 UNITS: 100 INJECTION, SOLUTION INTRAVENOUS; SUBCUTANEOUS at 14:18

## 2024-12-30 RX ADMIN — GABAPENTIN 300 MG: 300 CAPSULE ORAL at 22:01

## 2024-12-30 RX ADMIN — DIVALPROEX SODIUM 1000 MG: 500 TABLET, FILM COATED, EXTENDED RELEASE ORAL at 22:00

## 2024-12-30 RX ADMIN — LEVOTHYROXINE SODIUM 75 MCG: 0.07 TABLET ORAL at 06:03

## 2024-12-30 RX ADMIN — PANTOPRAZOLE SODIUM 40 MG: 40 TABLET, DELAYED RELEASE ORAL at 06:03

## 2024-12-30 RX ADMIN — HYDROMORPHONE HYDROCHLORIDE 0.4 MG: 1 INJECTION, SOLUTION INTRAMUSCULAR; INTRAVENOUS; SUBCUTANEOUS at 22:25

## 2024-12-30 RX ADMIN — HYDROMORPHONE HYDROCHLORIDE 0.4 MG: 1 INJECTION, SOLUTION INTRAMUSCULAR; INTRAVENOUS; SUBCUTANEOUS at 09:50

## 2024-12-30 RX ADMIN — AMPICILLIN SODIUM AND SULBACTAM SODIUM 3 G: 2; 1 INJECTION, POWDER, FOR SOLUTION INTRAMUSCULAR; INTRAVENOUS at 09:52

## 2024-12-30 RX ADMIN — INSULIN GLARGINE 10 UNITS: 100 INJECTION, SOLUTION SUBCUTANEOUS at 22:00

## 2024-12-30 RX ADMIN — HYDROMORPHONE HYDROCHLORIDE 0.4 MG: 1 INJECTION, SOLUTION INTRAMUSCULAR; INTRAVENOUS; SUBCUTANEOUS at 16:07

## 2024-12-30 RX ADMIN — ACETAMINOPHEN 975 MG: 325 TABLET ORAL at 09:51

## 2024-12-30 RX ADMIN — HEPARIN SODIUM 1700 UNITS/HR: 10000 INJECTION, SOLUTION INTRAVENOUS at 11:29

## 2024-12-30 RX ADMIN — HEPARIN SODIUM 1900 UNITS/HR: 10000 INJECTION, SOLUTION INTRAVENOUS at 04:14

## 2024-12-30 RX ADMIN — HEPARIN SODIUM 1700 UNITS/HR: 10000 INJECTION, SOLUTION INTRAVENOUS at 20:05

## 2024-12-30 RX ADMIN — INSULIN LISPRO 4 UNITS: 100 INJECTION, SOLUTION INTRAVENOUS; SUBCUTANEOUS at 09:53

## 2024-12-30 RX ADMIN — OXYCODONE HYDROCHLORIDE 10 MG: 5 TABLET ORAL at 06:56

## 2024-12-30 RX ADMIN — DIVALPROEX SODIUM 500 MG: 500 TABLET, FILM COATED, EXTENDED RELEASE ORAL at 09:51

## 2024-12-30 RX ADMIN — LEVETIRACETAM 500 MG: 500 TABLET, FILM COATED ORAL at 22:00

## 2024-12-30 RX ADMIN — ACETAMINOPHEN 975 MG: 325 TABLET ORAL at 14:22

## 2024-12-30 RX ADMIN — AMPICILLIN SODIUM AND SULBACTAM SODIUM 3 G: 2; 1 INJECTION, POWDER, FOR SOLUTION INTRAMUSCULAR; INTRAVENOUS at 04:14

## 2024-12-30 RX ADMIN — OXYCODONE HYDROCHLORIDE 10 MG: 5 TABLET ORAL at 18:44

## 2024-12-30 RX ADMIN — DULOXETINE HYDROCHLORIDE 60 MG: 60 CAPSULE, DELAYED RELEASE ORAL at 09:51

## 2024-12-30 RX ADMIN — KETOROLAC TROMETHAMINE 15 MG: 15 INJECTION, SOLUTION INTRAMUSCULAR; INTRAVENOUS at 06:03

## 2024-12-30 RX ADMIN — ASPIRIN 81 MG: 81 TABLET, COATED ORAL at 09:51

## 2024-12-30 ASSESSMENT — COGNITIVE AND FUNCTIONAL STATUS - GENERAL
MOVING TO AND FROM BED TO CHAIR: TOTAL
STANDING UP FROM CHAIR USING ARMS: TOTAL
TOILETING: A LOT
MOBILITY SCORE: 8
CLIMB 3 TO 5 STEPS WITH RAILING: TOTAL
HELP NEEDED FOR BATHING: A LITTLE
DRESSING REGULAR LOWER BODY CLOTHING: A LOT
MOVING TO AND FROM BED TO CHAIR: TOTAL
WALKING IN HOSPITAL ROOM: TOTAL
DRESSING REGULAR UPPER BODY CLOTHING: A LITTLE
DRESSING REGULAR LOWER BODY CLOTHING: A LOT
TOILETING: A LOT
MOBILITY SCORE: 8
TURNING FROM BACK TO SIDE WHILE IN FLAT BAD: TOTAL
PERSONAL GROOMING: A LITTLE
TURNING FROM BACK TO SIDE WHILE IN FLAT BAD: TOTAL
HELP NEEDED FOR BATHING: A LITTLE
PERSONAL GROOMING: A LITTLE
WALKING IN HOSPITAL ROOM: TOTAL
MOVING FROM LYING ON BACK TO SITTING ON SIDE OF FLAT BED WITH BEDRAILS: A LITTLE
CLIMB 3 TO 5 STEPS WITH RAILING: TOTAL
DAILY ACTIVITIY SCORE: 17
DRESSING REGULAR UPPER BODY CLOTHING: A LITTLE
MOVING FROM LYING ON BACK TO SITTING ON SIDE OF FLAT BED WITH BEDRAILS: A LITTLE
DAILY ACTIVITIY SCORE: 17
STANDING UP FROM CHAIR USING ARMS: TOTAL

## 2024-12-30 ASSESSMENT — PAIN - FUNCTIONAL ASSESSMENT
PAIN_FUNCTIONAL_ASSESSMENT: 0-10

## 2024-12-30 ASSESSMENT — PAIN SCALES - GENERAL
PAINLEVEL_OUTOF10: 2
PAINLEVEL_OUTOF10: 10 - WORST POSSIBLE PAIN
PAINLEVEL_OUTOF10: 7
PAINLEVEL_OUTOF10: 7
PAINLEVEL_OUTOF10: 0 - NO PAIN
PAINLEVEL_OUTOF10: 4
PAINLEVEL_OUTOF10: 9
PAINLEVEL_OUTOF10: 8

## 2024-12-30 NOTE — PROGRESS NOTES
VASCULAR SURGERY PROGRESS NOTE  Subjective   Upset about being on a diabetic diet.   Pain is well controlled today.     Objective   Vitals:    12/30/24 0748   BP: 130/78   Pulse: 93   Resp: 16   Temp: 36.5 °C (97.7 °F)   SpO2: 97%      Exam:  Constitutional: No acute distress, resting comfortably  Neuro:  AOx3, grossly intact  ENMT: moist mucous membranes  CV: no tachycardia  Pulm: non-labored on RA  GI: soft, non-tender, non-distended  Skin: warm and dry  Musculoskeletal: moving all extremities  Extremities: Left AKA dressing C/D/I    Relevant Results  Medications:  Scheduled Meds:  acetaminophen, 975 mg, oral, TID  ampicillin-sulbactam, 3 g, intravenous, q6h  aspirin, 81 mg, oral, Daily  atorvastatin, 80 mg, oral, Nightly  divalproex, 1,000 mg, oral, Nightly at 0300  divalproex, 500 mg, oral, Daily with breakfast  DULoxetine, 60 mg, oral, Daily  gabapentin, 300 mg, oral, Nightly  insulin glargine, 10 Units, subcutaneous, Nightly  insulin lispro, 0-10 Units, subcutaneous, TID AC  insulin lispro, 4 Units, subcutaneous, TID AC  levETIRAcetam, 500 mg, oral, BID  levothyroxine, 75 mcg, oral, Daily before breakfast  lidocaine, 1 patch, transdermal, Daily  metoprolol tartrate, 12.5 mg, oral, BID  pantoprazole, 40 mg, oral, Daily before breakfast  sennosides-docusate sodium, 1 tablet, oral, BID      Continuous Infusions:  heparin, 0-4,000 Units/hr, Last Rate: 1,900 Units/hr (12/30/24 0414)      PRN Meds:.  PRN medications: albuterol, dextrose, glucagon, glucagon, glucagon HCL, HYDROmorphone, [Held by provider] loperamide, melatonin, naloxone, ondansetron ODT **OR** ondansetron, oxyCODONE, oxyCODONE, oxygen    Labs:  Results from last 7 days   Lab Units 12/30/24  0831 12/29/24  0852 12/28/24  0704   WBC AUTO x10*3/uL 10.1 11.6* 7.1   HEMOGLOBIN g/dL 9.6* 9.3* 7.3*   PLATELETS AUTO x10*3/uL 583* 631* 462*      Results from last 7 days   Lab Units 12/30/24  0831 12/29/24  0852 12/28/24  0704   SODIUM mmol/L 138 137 156*    POTASSIUM mmol/L 4.2 4.5 2.8*   CHLORIDE mmol/L 99 99 107   CO2 mmol/L 27 28 26   BUN mg/dL 12 6 4*   CREATININE mg/dL 0.62 0.43* 0.52   GLUCOSE mg/dL 112* 124* 93   MAGNESIUM mg/dL  --   --  1.37*   PHOSPHORUS mg/dL 5.5* 3.5 2.8          Assessment/Plan   Shirin Slater is 54 y.o. female with history of severe PAD s/p R hip disarticulation s/p L CFA and EIA embolectomy 12/2023 s/p multiple L toe amputations, s/p L femoral endarterectomy, profundaplasty, common [id number] tibial bypass using 6 mm ringed PTFE, left lower extremity angiogram on 9/25 T2DM complicated by diabetic neuropathy, HTN, HLD, renal and splenic thromboses who who presents with severe left leg pain and inability to move or feel or left leg. Imaging consistent with occlusion of her L CFA-AT bypass graft. On exam, patient with [place] 3 limb ischemia. Given leukocytosis, tachycardia, limb ischemia and concern for infection she was taken to OR urgently and had findings of purulence along the anteromedial aspect of the left knee and distal thigh. Muscle within the posterolateral thigh with healthy contraction on stimulation. 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   Ischemic skin demarcation at mid-thigh, unable to salvageable AKA. Likely having ischemic left AKA stump pain.     Discussed with patient the options for 1) hip disarticulation vs 2) palliative wound care. Patient has decided on hip disarticulation.      Awaiting final timing per orthopedic surgery.      Plan:  Neuro: acute postoperative pain, Hx seizures  - home Keppra, Depakote, Cymbalta  - appreciate palliative care recs  - acute pain recs:  Oxy 5mg/10mg Q4H PRN for moderate/severe pain respectively   Dilaudid 0.4mg Q2H PRN breakthrough pain   Robaxin 500g PO Q8H   Gabapentin 300mg TID  Lidocaine 4% patch daily   No peripheral block at this time      CV: Hx severe PAD with  multiple surgical interventions, HTN  - maintain blood pressure control with metoprolol   - HI statin  - daily ASA (holding home plavix)  - heparin infusion, low intensity (holding home coumadin)     Pulm:   - continue to encourage IS hourly while awake  - OOB to chair and increase ambulation as tolerated  - albuterol inhaler PRN     FENGI: hypokalemia, hypoalbuminemia   - DM diet with oral supplements to promote wound healing potential   - continue bowel regimen to prevent OIC   - continue PPI  - monitor and replete electrolytes     Endo: DM2, hypothyroidism  - holding home jardiance  - glucommander protocol   - levothyroxine     Heme: anemia of chronic disease  - no s/sx of bleeding  - monitor H/H, transfuse for Hgb <7     ID:  humble left leg infection required amputation for source control; leukocytosis (resolved)   - blood cultures (x2) 12/17: no growth   - vanc/zosyn (stopped 12/24/24)  - continue Unasyn   - c diff negative 12/29  - trend temp q4h      Dispo-  - regular nursing floor  - await hip disarticulation with orthopedic surgery     DIAN Sanford-CNP

## 2024-12-30 NOTE — PROGRESS NOTES
Shirin Slater is a 54 y.o. female on day 13 of admission presenting with Acute lower limb ischemia.    Postop Pain HPI -   Palliative: relieved with IV analgesics and regional local anesthetics  Provocative: movement  Quality:  burning and aching  Radiation:  none  Severity:  5/10  Timing: constant    24-HOUR OPIOID CONSUMPTION:  Dilaudid 0.4mg   Oxy  10mg    Scheduled medications  acetaminophen, 975 mg, oral, TID  ampicillin-sulbactam, 3 g, intravenous, q6h  aspirin, 81 mg, oral, Daily  atorvastatin, 80 mg, oral, Nightly  divalproex, 1,000 mg, oral, Nightly at 0300  divalproex, 500 mg, oral, Daily with breakfast  DULoxetine, 60 mg, oral, Daily  gabapentin, 300 mg, oral, Nightly  insulin glargine, 10 Units, subcutaneous, Nightly  insulin lispro, 0-10 Units, subcutaneous, TID AC  insulin lispro, 4 Units, subcutaneous, TID AC  levETIRAcetam, 500 mg, oral, BID  levothyroxine, 75 mcg, oral, Daily before breakfast  lidocaine, 1 patch, transdermal, Daily  metoprolol tartrate, 12.5 mg, oral, BID  pantoprazole, 40 mg, oral, Daily before breakfast  sennosides-docusate sodium, 1 tablet, oral, BID      Continuous medications  heparin, 0-4,000 Units/hr, Last Rate: 1,900 Units/hr (12/30/24 0414)      PRN medications  PRN medications: albuterol, dextrose, glucagon, glucagon, glucagon HCL, HYDROmorphone, [Held by provider] loperamide, melatonin, naloxone, ondansetron ODT **OR** ondansetron, oxyCODONE, oxyCODONE, oxygen     Physical Exam:  Constitutional:  no distress, alert and cooperative  Eyes: clear sclera  Head/Neck: No apparent injury, trachea midline  Respiratory/Thorax: Patent airways, thorax symmetric, breathing comfortably  Cardiovascular: no pitting edema  Gastrointestinal: Nondistended  Musculoskeletal: ROM intact  Extremities: no clubbing  Neurological: alert, pacheco x4  Psychological: Appropriate affect    Results for orders placed or performed during the hospital encounter of 12/17/24 (from the past 24 hours)   POCT  GLUCOSE   Result Value Ref Range    POCT Glucose 116 (H) 74 - 99 mg/dL   POCT GLUCOSE   Result Value Ref Range    POCT Glucose 125 (H) 74 - 99 mg/dL   POCT GLUCOSE   Result Value Ref Range    POCT Glucose 98 74 - 99 mg/dL   POCT GLUCOSE   Result Value Ref Range    POCT Glucose 117 (H) 74 - 99 mg/dL       PLAN  - Oxycodone 5mg/10mg Q4H PRN as needed for moderate/severe pain respectively  - Dilaudid 0.4mg Q2H PRN for breakthrough pain. Wean as tolerated.   - Robaxin 500g PO Q8H  - Gabapentin 300mg at bedtime. Titrate as tolerated to TID.  - Lidocaine 4% patch every day.   - Toradol 15mg Q6H  - Acute pain will sign off.   - Pain well controlled with medical management. No peripheral block at this time.   -Rest of pain medications per primary service    Acute Pain Service Resident  pg [phone] ph [phone]     Alicia Melo MD

## 2024-12-30 NOTE — CARE PLAN
Problem: Pain - Adult  Goal: Verbalizes/displays adequate comfort level or baseline comfort level  Outcome: Progressing     Problem: Safety - Adult  Goal: Free from fall injury  Outcome: Progressing     Problem: Discharge Planning  Goal: Discharge to home or other facility with appropriate resources  Outcome: Progressing     Problem: Chronic Conditions and Co-morbidities  Goal: Patient's chronic conditions and co-morbidity symptoms are monitored and maintained or improved  Outcome: Progressing     Problem: Skin  Goal: Prevent/manage excess moisture  Outcome: Progressing  Flowsheets (Taken 12/30/2024 0011)  Prevent/manage excess moisture:   Cleanse incontinence/protect with barrier cream   Moisturize dry skin     Problem: Skin  Goal: Prevent/minimize sheer/friction injuries  Outcome: Progressing     Problem: Skin  Goal: Promote/optimize nutrition  Outcome: Progressing     The clinical goals for the shift include pt will remain HDS throughout end of shift

## 2024-12-30 NOTE — CARE PLAN
The patient's goals for the shift include    Free from injury   The clinical goals for the shift include pt will remain HDS throughout end of shift      Problem: Pain - Adult  Goal: Verbalizes/displays adequate comfort level or baseline comfort level  Outcome: Progressing

## 2024-12-30 NOTE — PROGRESS NOTES
12/30/2024 Care Coordination   Vasc surg Discussed with patient the options for 1) hip disarticulation vs 2) palliative wound care. Patient has decided on hip disarticulation.    Awaiting final timing per orthopedic surgery.   TCC will cont to follow.

## 2024-12-31 LAB
ALBUMIN SERPL BCP-MCNC: 2.5 G/DL (ref 3.4–5)
ANION GAP SERPL CALC-SCNC: 13 MMOL/L (ref 10–20)
BUN SERPL-MCNC: 13 MG/DL (ref 6–23)
CALCIUM SERPL-MCNC: 8.6 MG/DL (ref 8.6–10.6)
CHLORIDE SERPL-SCNC: 100 MMOL/L (ref 98–107)
CO2 SERPL-SCNC: 29 MMOL/L (ref 21–32)
CREAT SERPL-MCNC: 0.44 MG/DL (ref 0.5–1.05)
EGFRCR SERPLBLD CKD-EPI 2021: >90 ML/MIN/1.73M*2
ERYTHROCYTE [DISTWIDTH] IN BLOOD BY AUTOMATED COUNT: 25.1 % (ref 11.5–14.5)
GLUCOSE BLD MANUAL STRIP-MCNC: 109 MG/DL (ref 74–99)
GLUCOSE BLD MANUAL STRIP-MCNC: 115 MG/DL (ref 74–99)
GLUCOSE BLD MANUAL STRIP-MCNC: 130 MG/DL (ref 74–99)
GLUCOSE BLD MANUAL STRIP-MCNC: 157 MG/DL (ref 74–99)
GLUCOSE SERPL-MCNC: 106 MG/DL (ref 74–99)
HCT VFR BLD AUTO: 31.2 % (ref 36–46)
HGB BLD-MCNC: 8.5 G/DL (ref 12–16)
MCH RBC QN AUTO: 21.3 PG (ref 26–34)
MCHC RBC AUTO-ENTMCNC: 27.2 G/DL (ref 32–36)
MCV RBC AUTO: 78 FL (ref 80–100)
NRBC BLD-RTO: 0 /100 WBCS (ref 0–0)
PHOSPHATE SERPL-MCNC: 3.9 MG/DL (ref 2.5–4.9)
PLATELET # BLD AUTO: 565 X10*3/UL (ref 150–450)
POTASSIUM SERPL-SCNC: 3.6 MMOL/L (ref 3.5–5.3)
RBC # BLD AUTO: 3.99 X10*6/UL (ref 4–5.2)
SODIUM SERPL-SCNC: 138 MMOL/L (ref 136–145)
UFH PPP CHRO-ACNC: 0.3 IU/ML
WBC # BLD AUTO: 10.4 X10*3/UL (ref 4.4–11.3)

## 2024-12-31 PROCEDURE — 2500000001 HC RX 250 WO HCPCS SELF ADMINISTERED DRUGS (ALT 637 FOR MEDICARE OP)

## 2024-12-31 PROCEDURE — 2500000004 HC RX 250 GENERAL PHARMACY W/ HCPCS (ALT 636 FOR OP/ED): Performed by: STUDENT IN AN ORGANIZED HEALTH CARE EDUCATION/TRAINING PROGRAM

## 2024-12-31 PROCEDURE — 2500000005 HC RX 250 GENERAL PHARMACY W/O HCPCS

## 2024-12-31 PROCEDURE — 2500000004 HC RX 250 GENERAL PHARMACY W/ HCPCS (ALT 636 FOR OP/ED)

## 2024-12-31 PROCEDURE — 2500000001 HC RX 250 WO HCPCS SELF ADMINISTERED DRUGS (ALT 637 FOR MEDICARE OP): Performed by: NURSE PRACTITIONER

## 2024-12-31 PROCEDURE — 2500000001 HC RX 250 WO HCPCS SELF ADMINISTERED DRUGS (ALT 637 FOR MEDICARE OP): Performed by: STUDENT IN AN ORGANIZED HEALTH CARE EDUCATION/TRAINING PROGRAM

## 2024-12-31 PROCEDURE — 80069 RENAL FUNCTION PANEL: CPT | Performed by: NURSE PRACTITIONER

## 2024-12-31 PROCEDURE — 2500000002 HC RX 250 W HCPCS SELF ADMINISTERED DRUGS (ALT 637 FOR MEDICARE OP, ALT 636 FOR OP/ED): Performed by: NURSE PRACTITIONER

## 2024-12-31 PROCEDURE — 85520 HEPARIN ASSAY: CPT

## 2024-12-31 PROCEDURE — 85027 COMPLETE CBC AUTOMATED: CPT | Performed by: STUDENT IN AN ORGANIZED HEALTH CARE EDUCATION/TRAINING PROGRAM

## 2024-12-31 PROCEDURE — 2500000002 HC RX 250 W HCPCS SELF ADMINISTERED DRUGS (ALT 637 FOR MEDICARE OP, ALT 636 FOR OP/ED): Performed by: STUDENT IN AN ORGANIZED HEALTH CARE EDUCATION/TRAINING PROGRAM

## 2024-12-31 PROCEDURE — 1100000001 HC PRIVATE ROOM DAILY

## 2024-12-31 PROCEDURE — 82947 ASSAY GLUCOSE BLOOD QUANT: CPT

## 2024-12-31 PROCEDURE — 36415 COLL VENOUS BLD VENIPUNCTURE: CPT

## 2024-12-31 RX ORDER — POTASSIUM CHLORIDE 1.5 G/1.58G
20 POWDER, FOR SOLUTION ORAL ONCE
Status: COMPLETED | OUTPATIENT
Start: 2024-12-31 | End: 2024-12-31

## 2024-12-31 RX ADMIN — OXYCODONE HYDROCHLORIDE 10 MG: 5 TABLET ORAL at 01:56

## 2024-12-31 RX ADMIN — LEVETIRACETAM 500 MG: 500 TABLET, FILM COATED ORAL at 20:36

## 2024-12-31 RX ADMIN — LEVETIRACETAM 500 MG: 500 TABLET, FILM COATED ORAL at 09:28

## 2024-12-31 RX ADMIN — ACETAMINOPHEN 975 MG: 325 TABLET ORAL at 15:58

## 2024-12-31 RX ADMIN — ACETAMINOPHEN 975 MG: 325 TABLET ORAL at 20:36

## 2024-12-31 RX ADMIN — INSULIN LISPRO 2 UNITS: 100 INJECTION, SOLUTION INTRAVENOUS; SUBCUTANEOUS at 17:33

## 2024-12-31 RX ADMIN — GABAPENTIN 300 MG: 300 CAPSULE ORAL at 13:13

## 2024-12-31 RX ADMIN — ASPIRIN 81 MG: 81 TABLET, COATED ORAL at 09:29

## 2024-12-31 RX ADMIN — METOPROLOL TARTRATE 12.5 MG: 25 TABLET, FILM COATED ORAL at 20:36

## 2024-12-31 RX ADMIN — DIVALPROEX SODIUM 1000 MG: 500 TABLET, FILM COATED, EXTENDED RELEASE ORAL at 20:36

## 2024-12-31 RX ADMIN — INSULIN LISPRO 4 UNITS: 100 INJECTION, SOLUTION INTRAVENOUS; SUBCUTANEOUS at 09:29

## 2024-12-31 RX ADMIN — INSULIN LISPRO 4 UNITS: 100 INJECTION, SOLUTION INTRAVENOUS; SUBCUTANEOUS at 13:14

## 2024-12-31 RX ADMIN — HYDROMORPHONE HYDROCHLORIDE 0.4 MG: 1 INJECTION, SOLUTION INTRAMUSCULAR; INTRAVENOUS; SUBCUTANEOUS at 20:36

## 2024-12-31 RX ADMIN — OXYCODONE HYDROCHLORIDE 10 MG: 5 TABLET ORAL at 09:28

## 2024-12-31 RX ADMIN — AMPICILLIN SODIUM AND SULBACTAM SODIUM 3 G: 2; 1 INJECTION, POWDER, FOR SOLUTION INTRAMUSCULAR; INTRAVENOUS at 16:00

## 2024-12-31 RX ADMIN — AMPICILLIN SODIUM AND SULBACTAM SODIUM 3 G: 2; 1 INJECTION, POWDER, FOR SOLUTION INTRAMUSCULAR; INTRAVENOUS at 04:25

## 2024-12-31 RX ADMIN — PANTOPRAZOLE SODIUM 40 MG: 40 TABLET, DELAYED RELEASE ORAL at 06:26

## 2024-12-31 RX ADMIN — INSULIN LISPRO 4 UNITS: 100 INJECTION, SOLUTION INTRAVENOUS; SUBCUTANEOUS at 17:33

## 2024-12-31 RX ADMIN — DIVALPROEX SODIUM 500 MG: 500 TABLET, FILM COATED, EXTENDED RELEASE ORAL at 09:29

## 2024-12-31 RX ADMIN — AMPICILLIN SODIUM AND SULBACTAM SODIUM 3 G: 2; 1 INJECTION, POWDER, FOR SOLUTION INTRAMUSCULAR; INTRAVENOUS at 22:06

## 2024-12-31 RX ADMIN — HYDROMORPHONE HYDROCHLORIDE 0.4 MG: 1 INJECTION, SOLUTION INTRAMUSCULAR; INTRAVENOUS; SUBCUTANEOUS at 04:25

## 2024-12-31 RX ADMIN — POTASSIUM CHLORIDE 20 MEQ: 1.5 POWDER, FOR SOLUTION ORAL at 12:05

## 2024-12-31 RX ADMIN — ACETAMINOPHEN 975 MG: 325 TABLET ORAL at 09:28

## 2024-12-31 RX ADMIN — OXYCODONE HYDROCHLORIDE 10 MG: 5 TABLET ORAL at 17:35

## 2024-12-31 RX ADMIN — INSULIN GLARGINE 10 UNITS: 100 INJECTION, SOLUTION SUBCUTANEOUS at 22:06

## 2024-12-31 RX ADMIN — DULOXETINE HYDROCHLORIDE 60 MG: 60 CAPSULE, DELAYED RELEASE ORAL at 09:29

## 2024-12-31 RX ADMIN — GABAPENTIN 300 MG: 300 CAPSULE ORAL at 06:26

## 2024-12-31 RX ADMIN — GABAPENTIN 300 MG: 300 CAPSULE ORAL at 22:06

## 2024-12-31 RX ADMIN — AMPICILLIN SODIUM AND SULBACTAM SODIUM 3 G: 2; 1 INJECTION, POWDER, FOR SOLUTION INTRAMUSCULAR; INTRAVENOUS at 09:47

## 2024-12-31 RX ADMIN — HEPARIN SODIUM 1700 UNITS/HR: 10000 INJECTION, SOLUTION INTRAVENOUS at 12:06

## 2024-12-31 RX ADMIN — LIDOCAINE 4% 1 PATCH: 40 PATCH TOPICAL at 09:28

## 2024-12-31 RX ADMIN — ATORVASTATIN CALCIUM 80 MG: 80 TABLET, FILM COATED ORAL at 20:36

## 2024-12-31 RX ADMIN — METOPROLOL TARTRATE 12.5 MG: 25 TABLET, FILM COATED ORAL at 09:28

## 2024-12-31 RX ADMIN — LEVOTHYROXINE SODIUM 75 MCG: 0.07 TABLET ORAL at 06:26

## 2024-12-31 RX ADMIN — HYDROMORPHONE HYDROCHLORIDE 0.4 MG: 1 INJECTION, SOLUTION INTRAMUSCULAR; INTRAVENOUS; SUBCUTANEOUS at 07:40

## 2024-12-31 ASSESSMENT — COGNITIVE AND FUNCTIONAL STATUS - GENERAL
CLIMB 3 TO 5 STEPS WITH RAILING: TOTAL
DRESSING REGULAR UPPER BODY CLOTHING: A LITTLE
HELP NEEDED FOR BATHING: A LITTLE
HELP NEEDED FOR BATHING: A LITTLE
STANDING UP FROM CHAIR USING ARMS: TOTAL
TOILETING: A LITTLE
TURNING FROM BACK TO SIDE WHILE IN FLAT BAD: TOTAL
CLIMB 3 TO 5 STEPS WITH RAILING: TOTAL
MOBILITY SCORE: 12
WALKING IN HOSPITAL ROOM: TOTAL
TOILETING: A LITTLE
DRESSING REGULAR LOWER BODY CLOTHING: A LOT
HELP NEEDED FOR BATHING: A LITTLE
MOVING FROM LYING ON BACK TO SITTING ON SIDE OF FLAT BED WITH BEDRAILS: A LITTLE
MOVING TO AND FROM BED TO CHAIR: TOTAL
DAILY ACTIVITIY SCORE: 18
MOVING TO AND FROM BED TO CHAIR: TOTAL
DAILY ACTIVITIY SCORE: 18
CLIMB 3 TO 5 STEPS WITH RAILING: TOTAL
WALKING IN HOSPITAL ROOM: TOTAL
DRESSING REGULAR UPPER BODY CLOTHING: A LITTLE
TURNING FROM BACK TO SIDE WHILE IN FLAT BAD: A LOT
MOVING FROM LYING ON BACK TO SITTING ON SIDE OF FLAT BED WITH BEDRAILS: A LITTLE
DRESSING REGULAR UPPER BODY CLOTHING: A LITTLE
STANDING UP FROM CHAIR USING ARMS: TOTAL
PERSONAL GROOMING: A LITTLE
PERSONAL GROOMING: A LITTLE
STANDING UP FROM CHAIR USING ARMS: TOTAL
MOBILITY SCORE: 8
DRESSING REGULAR LOWER BODY CLOTHING: A LOT
PERSONAL GROOMING: A LITTLE
TOILETING: A LITTLE
MOVING TO AND FROM BED TO CHAIR: TOTAL
DAILY ACTIVITIY SCORE: 18
WALKING IN HOSPITAL ROOM: TOTAL
MOBILITY SCORE: 9
DRESSING REGULAR LOWER BODY CLOTHING: A LOT

## 2024-12-31 ASSESSMENT — PAIN SCALES - GENERAL
PAINLEVEL_OUTOF10: 3
PAINLEVEL_OUTOF10: 10 - WORST POSSIBLE PAIN

## 2024-12-31 ASSESSMENT — PAIN - FUNCTIONAL ASSESSMENT
PAIN_FUNCTIONAL_ASSESSMENT: 0-10

## 2024-12-31 ASSESSMENT — PAIN DESCRIPTION - LOCATION
LOCATION: LEG
LOCATION: LEG

## 2024-12-31 ASSESSMENT — PAIN DESCRIPTION - ORIENTATION
ORIENTATION: LEFT
ORIENTATION: LEFT

## 2024-12-31 NOTE — PROGRESS NOTES
VASCULAR SURGERY PROGRESS NOTE  Subjective   Anxious about upcoming operation, pain is unchanged from days prior. No issues overnight.     Objective   Vitals:    12/31/24 0813   BP: 134/76   Pulse: 92   Resp: 18   Temp: 36.4 °C (97.5 °F)   SpO2: 92%      Exam:  Constitutional: No acute distress, resting comfortably  Neuro:  AOx3, grossly intact  ENMT: moist mucous membranes  CV: no tachycardia  Pulm: non-labored on RA  GI: soft, non-tender, non-distended  Skin: warm and dry  Musculoskeletal: moving all extremities  Extremities: Left AKA dressing C/D/I, dressing to be changed today 12/31, image in chart    Relevant Results  Medications:  Scheduled Meds:  acetaminophen, 975 mg, oral, TID  ampicillin-sulbactam, 3 g, intravenous, q6h  aspirin, 81 mg, oral, Daily  atorvastatin, 80 mg, oral, Nightly  divalproex, 1,000 mg, oral, Nightly at 0300  divalproex, 500 mg, oral, Daily with breakfast  DULoxetine, 60 mg, oral, Daily  gabapentin, 300 mg, oral, q8h NINO  insulin glargine, 10 Units, subcutaneous, Nightly  insulin lispro, 0-10 Units, subcutaneous, TID AC  insulin lispro, 4 Units, subcutaneous, TID AC  levETIRAcetam, 500 mg, oral, BID  levothyroxine, 75 mcg, oral, Daily before breakfast  lidocaine, 1 patch, transdermal, Daily  metoprolol tartrate, 12.5 mg, oral, BID  pantoprazole, 40 mg, oral, Daily before breakfast  sennosides-docusate sodium, 1 tablet, oral, BID      Continuous Infusions:  heparin, 0-4,000 Units/hr, Last Rate: 1,700 Units/hr (12/30/24 2005)      PRN Meds:.  PRN medications: albuterol, dextrose, glucagon, glucagon, glucagon HCL, HYDROmorphone, [Held by provider] loperamide, melatonin, naloxone, ondansetron ODT **OR** ondansetron, oxyCODONE, oxyCODONE, oxygen    Labs:  Results from last 7 days   Lab Units 12/30/24  0831 12/29/24  0852 12/28/24  0704   WBC AUTO x10*3/uL 10.1 11.6* 7.1   HEMOGLOBIN g/dL 9.6* 9.3* 7.3*   PLATELETS AUTO x10*3/uL 583* 631* 462*      Results from last 7 days   Lab Units  12/30/24  0831 12/29/24  0852 12/28/24  0704   SODIUM mmol/L 138 137 156*   POTASSIUM mmol/L 4.2 4.5 2.8*   CHLORIDE mmol/L 99 99 107   CO2 mmol/L 27 28 26   BUN mg/dL 12 6 4*   CREATININE mg/dL 0.62 0.43* 0.52   GLUCOSE mg/dL 112* 124* 93   MAGNESIUM mg/dL  --   --  1.37*   PHOSPHORUS mg/dL 5.5* 3.5 2.8          Assessment/Plan   Shirin Slater is 54 y.o. female with history of severe PAD s/p R hip disarticulation s/p L CFA and EIA embolectomy 12/2023 s/p multiple L toe amputations, s/p L femoral endarterectomy, profundaplasty, common [id number]-[id number] tibial bypass using 6 mm ringed PTFE, left lower extremity angiogram on 9/25 T2DM complicated by diabetic neuropathy, HTN, HLD, renal and splenic thromboses who who presents with severe left leg pain and inability to move or feel or left leg. Imaging consistent with occlusion of her L CFA-AT bypass graft. On exam, patient with [place] 3 limb ischemia. Given leukocytosis, tachycardia, limb ischemia and concern for infection she was taken to OR urgently and had findings of purulence along the anteromedial aspect of the left knee and distal thigh. Muscle within the posterolateral thigh with healthy contraction on stimulation. 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   Ischemic skin demarcation at mid-thigh, unable to salvageable AKA. Likely having ischemic left AKA stump pain.     Discussed with patient the options for 1) hip disarticulation vs 2) palliative wound care. Patient has decided on hip disarticulation.      Awaiting final timing per orthopedic surgery.      Plan:  Neuro: acute postoperative pain, Hx seizures  - home Keppra, Depakote, Cymbalta  - [facility] palliative care recs  - acute pain recs:  Oxy 5mg/10mg Q4H PRN for moderate/severe pain respectively   Dilaudid 0.4mg Q2H PRN breakthrough pain   Robaxin 500g PO Q8H   Gabapentin 300mg TID  Lidocaine 4% patch  daily   No peripheral block at this time      CV: Hx severe PAD with multiple surgical interventions, HTN  - maintain blood pressure control with metoprolol   - HI statin  - daily ASA (holding home plavix)  - heparin infusion, low intensity (holding home coumadin)     Pulm:   - continue to encourage IS hourly while awake  - OOB to chair and increase ambulation as tolerated  - albuterol inhaler PRN     FENGI: hypokalemia, hypoalbuminemia   - DM diet with oral supplements to promote wound healing potential   - continue bowel regimen to prevent OIC   - continue PPI  - monitor and replete electrolytes     Endo: DM2, hypothyroidism  - holding home jardiance  - glucommander protocol   - levothyroxine     Heme: anemia of chronic disease  - no s/sx of bleeding  - monitor H/H, transfuse for Hgb <7     ID:  humble left leg infection required amputation for source control; leukocytosis (resolved)   - blood cultures (x2) 12/17: no growth   - vanc/zosyn (stopped 12/24/24)  - continue Unasyn   - c diff negative 12/29  - trend temp q4h      Dispo-  - regular nursing floor  - await hip disarticulation with orthopedic surgery following their evaluation of patient today 12/31    Discussed with Dr. Paul Bermudez MD  Vascular Surgery [id number] or [facility] Chat

## 2024-12-31 NOTE — CARE PLAN
The patient's goals for the shift include      The clinical goals for the shift include patient will have adequately managed pain throughout shift

## 2024-12-31 NOTE — CARE PLAN
The patient's goals for the shift include  to have adequately managed pain.     The clinical goals for the shift include patient will have adequately managed pain throughout shift

## 2025-01-01 DIAGNOSIS — D68.9 BLOOD CLOTTING DISORDER: ICD-10-CM

## 2025-01-01 DIAGNOSIS — Z76.0 ENCOUNTER FOR MEDICATION REFILL: ICD-10-CM

## 2025-01-01 DIAGNOSIS — G40.909 SEIZURE DISORDER (HCC): ICD-10-CM

## 2025-01-01 LAB
ALBUMIN SERPL BCP-MCNC: 2.6 G/DL (ref 3.4–5)
ANION GAP SERPL CALC-SCNC: 13 MMOL/L (ref 10–20)
BUN SERPL-MCNC: 11 MG/DL (ref 6–23)
CALCIUM SERPL-MCNC: 9 MG/DL (ref 8.6–10.6)
CHLORIDE SERPL-SCNC: 101 MMOL/L (ref 98–107)
CO2 SERPL-SCNC: 29 MMOL/L (ref 21–32)
CREAT SERPL-MCNC: 0.42 MG/DL (ref 0.5–1.05)
EGFRCR SERPLBLD CKD-EPI 2021: >90 ML/MIN/1.73M*2
ERYTHROCYTE [DISTWIDTH] IN BLOOD BY AUTOMATED COUNT: 25.5 % (ref 11.5–14.5)
GLUCOSE BLD MANUAL STRIP-MCNC: 111 MG/DL (ref 74–99)
GLUCOSE BLD MANUAL STRIP-MCNC: 130 MG/DL (ref 74–99)
GLUCOSE BLD MANUAL STRIP-MCNC: 160 MG/DL (ref 74–99)
GLUCOSE BLD MANUAL STRIP-MCNC: 99 MG/DL (ref 74–99)
GLUCOSE SERPL-MCNC: 109 MG/DL (ref 74–99)
HCT VFR BLD AUTO: 32.9 % (ref 36–46)
HGB BLD-MCNC: 9.1 G/DL (ref 12–16)
MCH RBC QN AUTO: 21.6 PG (ref 26–34)
MCHC RBC AUTO-ENTMCNC: 27.7 G/DL (ref 32–36)
MCV RBC AUTO: 78 FL (ref 80–100)
NRBC BLD-RTO: 0 /100 WBCS (ref 0–0)
PHOSPHATE SERPL-MCNC: 3.6 MG/DL (ref 2.5–4.9)
PLATELET # BLD AUTO: 562 X10*3/UL (ref 150–450)
POTASSIUM SERPL-SCNC: 4 MMOL/L (ref 3.5–5.3)
RBC # BLD AUTO: 4.22 X10*6/UL (ref 4–5.2)
SODIUM SERPL-SCNC: 139 MMOL/L (ref 136–145)
UFH PPP CHRO-ACNC: 0.3 IU/ML
WBC # BLD AUTO: 9.7 X10*3/UL (ref 4.4–11.3)

## 2025-01-01 PROCEDURE — 84100 ASSAY OF PHOSPHORUS: CPT

## 2025-01-01 PROCEDURE — 2500000001 HC RX 250 WO HCPCS SELF ADMINISTERED DRUGS (ALT 637 FOR MEDICARE OP): Performed by: NURSE PRACTITIONER

## 2025-01-01 PROCEDURE — 82947 ASSAY GLUCOSE BLOOD QUANT: CPT

## 2025-01-01 PROCEDURE — 2500000001 HC RX 250 WO HCPCS SELF ADMINISTERED DRUGS (ALT 637 FOR MEDICARE OP): Performed by: STUDENT IN AN ORGANIZED HEALTH CARE EDUCATION/TRAINING PROGRAM

## 2025-01-01 PROCEDURE — 2500000002 HC RX 250 W HCPCS SELF ADMINISTERED DRUGS (ALT 637 FOR MEDICARE OP, ALT 636 FOR OP/ED): Performed by: STUDENT IN AN ORGANIZED HEALTH CARE EDUCATION/TRAINING PROGRAM

## 2025-01-01 PROCEDURE — 80069 RENAL FUNCTION PANEL: CPT

## 2025-01-01 PROCEDURE — 2500000002 HC RX 250 W HCPCS SELF ADMINISTERED DRUGS (ALT 637 FOR MEDICARE OP, ALT 636 FOR OP/ED): Performed by: NURSE PRACTITIONER

## 2025-01-01 PROCEDURE — 2500000005 HC RX 250 GENERAL PHARMACY W/O HCPCS

## 2025-01-01 PROCEDURE — 36415 COLL VENOUS BLD VENIPUNCTURE: CPT

## 2025-01-01 PROCEDURE — 85027 COMPLETE CBC AUTOMATED: CPT | Performed by: STUDENT IN AN ORGANIZED HEALTH CARE EDUCATION/TRAINING PROGRAM

## 2025-01-01 PROCEDURE — 1100000001 HC PRIVATE ROOM DAILY

## 2025-01-01 PROCEDURE — 85520 HEPARIN ASSAY: CPT

## 2025-01-01 PROCEDURE — 2500000004 HC RX 250 GENERAL PHARMACY W/ HCPCS (ALT 636 FOR OP/ED): Performed by: STUDENT IN AN ORGANIZED HEALTH CARE EDUCATION/TRAINING PROGRAM

## 2025-01-01 PROCEDURE — 2500000001 HC RX 250 WO HCPCS SELF ADMINISTERED DRUGS (ALT 637 FOR MEDICARE OP)

## 2025-01-01 PROCEDURE — 2500000004 HC RX 250 GENERAL PHARMACY W/ HCPCS (ALT 636 FOR OP/ED)

## 2025-01-01 RX ORDER — SODIUM CHLORIDE, SODIUM LACTATE, POTASSIUM CHLORIDE, CALCIUM CHLORIDE 600; 310; 30; 20 MG/100ML; MG/100ML; MG/100ML; MG/100ML
100 INJECTION, SOLUTION INTRAVENOUS CONTINUOUS
Status: DISCONTINUED | OUTPATIENT
Start: 2025-01-02 | End: 2025-01-02

## 2025-01-01 RX ORDER — GABAPENTIN 300 MG/1
600 CAPSULE ORAL EVERY 8 HOURS SCHEDULED
Status: DISCONTINUED | OUTPATIENT
Start: 2025-01-01 | End: 2025-01-07 | Stop reason: HOSPADM

## 2025-01-01 RX ORDER — HYDROMORPHONE HYDROCHLORIDE 1 MG/ML
0.5 INJECTION, SOLUTION INTRAMUSCULAR; INTRAVENOUS; SUBCUTANEOUS EVERY 2 HOUR PRN
Status: DISCONTINUED | OUTPATIENT
Start: 2025-01-01 | End: 2025-01-06

## 2025-01-01 RX ORDER — KETOROLAC TROMETHAMINE 15 MG/ML
15 INJECTION, SOLUTION INTRAMUSCULAR; INTRAVENOUS EVERY 6 HOURS
Status: DISCONTINUED | OUTPATIENT
Start: 2025-01-01 | End: 2025-01-03

## 2025-01-01 RX ADMIN — PANTOPRAZOLE SODIUM 40 MG: 40 TABLET, DELAYED RELEASE ORAL at 06:12

## 2025-01-01 RX ADMIN — LEVETIRACETAM 500 MG: 500 TABLET, FILM COATED ORAL at 09:45

## 2025-01-01 RX ADMIN — HEPARIN SODIUM 1700 UNITS/HR: 10000 INJECTION, SOLUTION INTRAVENOUS at 19:42

## 2025-01-01 RX ADMIN — ATORVASTATIN CALCIUM 80 MG: 80 TABLET, FILM COATED ORAL at 22:00

## 2025-01-01 RX ADMIN — SODIUM CHLORIDE, POTASSIUM CHLORIDE, SODIUM LACTATE AND CALCIUM CHLORIDE 100 ML/HR: 600; 310; 30; 20 INJECTION, SOLUTION INTRAVENOUS at 23:56

## 2025-01-01 RX ADMIN — ASPIRIN 81 MG: 81 TABLET, COATED ORAL at 09:45

## 2025-01-01 RX ADMIN — DIVALPROEX SODIUM 500 MG: 500 TABLET, FILM COATED, EXTENDED RELEASE ORAL at 09:45

## 2025-01-01 RX ADMIN — DIVALPROEX SODIUM 1000 MG: 500 TABLET, FILM COATED, EXTENDED RELEASE ORAL at 22:00

## 2025-01-01 RX ADMIN — INSULIN LISPRO 4 UNITS: 100 INJECTION, SOLUTION INTRAVENOUS; SUBCUTANEOUS at 14:16

## 2025-01-01 RX ADMIN — INSULIN LISPRO 4 UNITS: 100 INJECTION, SOLUTION INTRAVENOUS; SUBCUTANEOUS at 09:46

## 2025-01-01 RX ADMIN — KETOROLAC TROMETHAMINE 15 MG: 15 INJECTION, SOLUTION INTRAMUSCULAR; INTRAVENOUS at 14:15

## 2025-01-01 RX ADMIN — HYDROMORPHONE HYDROCHLORIDE 0.5 MG: 1 INJECTION, SOLUTION INTRAMUSCULAR; INTRAVENOUS; SUBCUTANEOUS at 09:46

## 2025-01-01 RX ADMIN — ACETAMINOPHEN 975 MG: 325 TABLET ORAL at 09:46

## 2025-01-01 RX ADMIN — METOPROLOL TARTRATE 12.5 MG: 25 TABLET, FILM COATED ORAL at 09:46

## 2025-01-01 RX ADMIN — GABAPENTIN 600 MG: 300 CAPSULE ORAL at 14:16

## 2025-01-01 RX ADMIN — METOPROLOL TARTRATE 12.5 MG: 25 TABLET, FILM COATED ORAL at 22:00

## 2025-01-01 RX ADMIN — AMPICILLIN SODIUM AND SULBACTAM SODIUM 3 G: 2; 1 INJECTION, POWDER, FOR SOLUTION INTRAMUSCULAR; INTRAVENOUS at 09:47

## 2025-01-01 RX ADMIN — LEVETIRACETAM 500 MG: 500 TABLET, FILM COATED ORAL at 22:00

## 2025-01-01 RX ADMIN — HEPARIN SODIUM 1700 UNITS/HR: 10000 INJECTION, SOLUTION INTRAVENOUS at 04:08

## 2025-01-01 RX ADMIN — AMPICILLIN SODIUM AND SULBACTAM SODIUM 3 G: 2; 1 INJECTION, POWDER, FOR SOLUTION INTRAMUSCULAR; INTRAVENOUS at 17:29

## 2025-01-01 RX ADMIN — KETOROLAC TROMETHAMINE 15 MG: 15 INJECTION, SOLUTION INTRAMUSCULAR; INTRAVENOUS at 22:02

## 2025-01-01 RX ADMIN — OXYCODONE HYDROCHLORIDE 10 MG: 5 TABLET ORAL at 04:25

## 2025-01-01 RX ADMIN — AMPICILLIN SODIUM AND SULBACTAM SODIUM 3 G: 2; 1 INJECTION, POWDER, FOR SOLUTION INTRAMUSCULAR; INTRAVENOUS at 04:25

## 2025-01-01 RX ADMIN — KETOROLAC TROMETHAMINE 15 MG: 15 INJECTION, SOLUTION INTRAMUSCULAR; INTRAVENOUS at 09:46

## 2025-01-01 RX ADMIN — GABAPENTIN 300 MG: 300 CAPSULE ORAL at 06:12

## 2025-01-01 RX ADMIN — LEVOTHYROXINE SODIUM 75 MCG: 0.07 TABLET ORAL at 06:12

## 2025-01-01 RX ADMIN — DULOXETINE HYDROCHLORIDE 60 MG: 60 CAPSULE, DELAYED RELEASE ORAL at 09:46

## 2025-01-01 RX ADMIN — LIDOCAINE 4% 1 PATCH: 40 PATCH TOPICAL at 09:45

## 2025-01-01 RX ADMIN — ACETAMINOPHEN 975 MG: 325 TABLET ORAL at 22:00

## 2025-01-01 RX ADMIN — GABAPENTIN 600 MG: 300 CAPSULE ORAL at 22:02

## 2025-01-01 RX ADMIN — ACETAMINOPHEN 975 MG: 325 TABLET ORAL at 14:16

## 2025-01-01 ASSESSMENT — PAIN SCALES - GENERAL
PAINLEVEL_OUTOF10: 10 - WORST POSSIBLE PAIN
PAINLEVEL_OUTOF10: 8
PAINLEVEL_OUTOF10: 8
PAINLEVEL_OUTOF10: 10 - WORST POSSIBLE PAIN

## 2025-01-01 ASSESSMENT — COGNITIVE AND FUNCTIONAL STATUS - GENERAL
TOILETING: A LITTLE
TOILETING: A LITTLE
DRESSING REGULAR LOWER BODY CLOTHING: A LOT
MOVING TO AND FROM BED TO CHAIR: TOTAL
WALKING IN HOSPITAL ROOM: TOTAL
DRESSING REGULAR UPPER BODY CLOTHING: A LITTLE
MOBILITY SCORE: 12
STANDING UP FROM CHAIR USING ARMS: TOTAL
MOVING TO AND FROM BED TO CHAIR: TOTAL
CLIMB 3 TO 5 STEPS WITH RAILING: TOTAL
PERSONAL GROOMING: A LITTLE
CLIMB 3 TO 5 STEPS WITH RAILING: TOTAL
DRESSING REGULAR LOWER BODY CLOTHING: A LOT
HELP NEEDED FOR BATHING: A LITTLE
MOBILITY SCORE: 12
DRESSING REGULAR UPPER BODY CLOTHING: A LITTLE
WALKING IN HOSPITAL ROOM: TOTAL
PERSONAL GROOMING: A LITTLE
STANDING UP FROM CHAIR USING ARMS: TOTAL
DAILY ACTIVITIY SCORE: 18
HELP NEEDED FOR BATHING: A LITTLE
DAILY ACTIVITIY SCORE: 18

## 2025-01-01 ASSESSMENT — PAIN DESCRIPTION - LOCATION: LOCATION: LEG

## 2025-01-01 ASSESSMENT — PAIN - FUNCTIONAL ASSESSMENT
PAIN_FUNCTIONAL_ASSESSMENT: 0-10

## 2025-01-01 ASSESSMENT — PAIN DESCRIPTION - ORIENTATION: ORIENTATION: LEFT

## 2025-01-01 ASSESSMENT — PAIN DESCRIPTION - DESCRIPTORS: DESCRIPTORS: BURNING;THROBBING

## 2025-01-01 NOTE — PROGRESS NOTES
VASCULAR SURGERY PROGRESS NOTE  Subjective   Complaining of pain this morning.     Objective   Vitals:    01/01/25 0423   BP: 120/71   Pulse: 80   Resp: 19   Temp: 36.1 °C (97 °F)   SpO2: 95%      Exam:  Constitutional: No acute distress, resting comfortably  Neuro:  AOx3, grossly intact  ENMT: moist mucous membranes  CV: no tachycardia  Pulm: non-labored on RA  GI: soft, non-tender, non-distended  Skin: warm and dry  Musculoskeletal: moving all extremities  Extremities: Left AKA dressing C/D/I    Relevant Results  Medications:  Scheduled Meds:  acetaminophen, 975 mg, oral, TID  ampicillin-sulbactam, 3 g, intravenous, q6h  aspirin, 81 mg, oral, Daily  atorvastatin, 80 mg, oral, Nightly  divalproex, 1,000 mg, oral, Nightly at 0300  divalproex, 500 mg, oral, Daily with breakfast  DULoxetine, 60 mg, oral, Daily  gabapentin, 600 mg, oral, q8h NINO  insulin glargine, 10 Units, subcutaneous, Nightly  insulin lispro, 0-10 Units, subcutaneous, TID AC  insulin lispro, 4 Units, subcutaneous, TID AC  ketorolac, 15 mg, intravenous, q6h  levETIRAcetam, 500 mg, oral, BID  levothyroxine, 75 mcg, oral, Daily before breakfast  lidocaine, 1 patch, transdermal, Daily  metoprolol tartrate, 12.5 mg, oral, BID  pantoprazole, 40 mg, oral, Daily before breakfast  sennosides-docusate sodium, 1 tablet, oral, BID      Continuous Infusions:  heparin, 0-4,000 Units/hr, Last Rate: 1,700 Units/hr (01/01/25 0408)      PRN Meds:.  PRN medications: albuterol, dextrose, glucagon, glucagon, glucagon HCL, HYDROmorphone, [Held by provider] loperamide, melatonin, naloxone, ondansetron ODT **OR** ondansetron, oxyCODONE, oxyCODONE, oxygen    Labs:  Results from last 7 days   Lab Units 12/31/24  0803 12/30/24  0831 12/29/24  0852   WBC AUTO x10*3/uL 10.4 10.1 11.6*   HEMOGLOBIN g/dL 8.5* 9.6* 9.3*   PLATELETS AUTO x10*3/uL 565* 583* 631*      Results from last 7 days   Lab Units 12/31/24  0803 12/30/24  0831 12/29/24  0852 12/28/24  0704   SODIUM mmol/L 138  138 137 156*   POTASSIUM mmol/L 3.6 4.2 4.5 2.8*   CHLORIDE mmol/L 100 99 99 107   CO2 mmol/L 29 27 28 26   BUN mg/dL 13 12 6 4*   CREATININE mg/dL 0.44* 0.62 0.43* 0.52   GLUCOSE mg/dL 106* 112* 124* 93   MAGNESIUM mg/dL  --   --   --  1.37*   PHOSPHORUS mg/dL 3.9 5.5* 3.5 2.8          Assessment/Plan   Shirin Slater is 54 y.o. female with history of severe PAD s/p R hip disarticulation s/p L CFA and EIA embolectomy 12/2023 s/p multiple L toe amputations, s/p L femoral endarterectomy, profundaplasty, common [id number] tibial bypass using 6 mm ringed PTFE, left lower extremity angiogram on 9/25 T2DM complicated by diabetic neuropathy, HTN, HLD, renal and splenic thromboses who who presents with severe left leg pain and inability to move or feel or left leg. Imaging consistent with occlusion of her L CFA-AT bypass graft. On exam, patient with [place] 3 limb ischemia. Given leukocytosis, tachycardia, limb ischemia and concern for infection she was taken to OR urgently and had findings of purulence along the anteromedial aspect of the left knee and distal thigh. Muscle within the posterolateral thigh with healthy contraction on stimulation. 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   Ischemic skin demarcation at mid-thigh, unable to salvageable AKA. Likely having ischemic left AKA stump pain.     Discussed with patient the options for 1) hip disarticulation vs 2) palliative wound care. Patient has decided on hip disarticulation.      Awaiting final timing per orthopedic surgery.      Plan:  Neuro: acute postoperative pain, Hx seizures  - home Kepeter DepakoteLeeta  - appreciate palliative care recs  - acute pain recs:  Oxy 5mg/10mg Q4H PRN for moderate/severe pain respectively   Dilaudid 0.4mg Q2H PRN breakthrough pain -> increased to 0.5 mg q2h  Robaxin 500g PO Q8H   Gabapentin 300mg TID -> increased to 600  mg TID  Starting toradol 15 mg q6h x10 doses  Lidocaine 4% patch daily   No peripheral block at this time      CV: Hx severe PAD with multiple surgical interventions, HTN  - maintain blood pressure control with metoprolol   - HI statin  - daily ASA (holding home plavix)  - heparin infusion, low intensity (holding home coumadin)     Pulm:   - continue to encourage IS hourly while awake  - OOB to chair and increase ambulation as tolerated  - albuterol inhaler PRN     FENGI: hypokalemia, hypoalbuminemia   - DM diet with oral supplements to promote wound healing potential   - continue bowel regimen to prevent OIC   - continue PPI  - monitor and replete electrolytes     Endo: DM2, hypothyroidism  - holding home jardiance  - glucommander protocol   - levothyroxine     Heme: anemia of chronic disease  - no s/sx of bleeding  - monitor H/H, transfuse for Hgb <7     ID:  humble left leg infection required amputation for source control; leukocytosis (resolved)   - blood cultures (x2) 12/17: no growth   - vanc/zosyn (stopped 12/24/24)  - continue Unasyn   - c diff negative 12/29  - trend temp q4h      Dispo-  - regular nursing floor  - await hip disarticulation with orthopedic surgery following their evaluation of patient     Discussed with Dr. Dunphy Grant B Hubbard, MD  Vascular Surgery Fellow  Service Pager: [phone]

## 2025-01-02 ENCOUNTER — ANESTHESIA (OUTPATIENT)
Dept: OPERATING ROOM | Facility: HOSPITAL | Age: 55
End: 2025-01-02
Payer: MEDICARE

## 2025-01-02 ENCOUNTER — ANESTHESIA EVENT (OUTPATIENT)
Dept: OPERATING ROOM | Facility: HOSPITAL | Age: 55
End: 2025-01-02
Payer: MEDICARE

## 2025-01-02 PROBLEM — K21.9 GASTROESOPHAGEAL REFLUX DISEASE: Status: ACTIVE | Noted: 2025-01-02

## 2025-01-02 LAB
ABO GROUP (TYPE) IN BLOOD: NORMAL
ALBUMIN SERPL BCP-MCNC: 2.2 G/DL (ref 3.4–5)
ANION GAP SERPL CALC-SCNC: 13 MMOL/L (ref 10–20)
ANTIBODY SCREEN: NORMAL
BUN SERPL-MCNC: 13 MG/DL (ref 6–23)
CALCIUM SERPL-MCNC: 8.6 MG/DL (ref 8.6–10.6)
CHLORIDE SERPL-SCNC: 103 MMOL/L (ref 98–107)
CO2 SERPL-SCNC: 30 MMOL/L (ref 21–32)
CREAT SERPL-MCNC: 0.35 MG/DL (ref 0.5–1.05)
EGFRCR SERPLBLD CKD-EPI 2021: >90 ML/MIN/1.73M*2
ERYTHROCYTE [DISTWIDTH] IN BLOOD BY AUTOMATED COUNT: 25.7 % (ref 11.5–14.5)
GLUCOSE BLD MANUAL STRIP-MCNC: 147 MG/DL (ref 74–99)
GLUCOSE BLD MANUAL STRIP-MCNC: 171 MG/DL (ref 74–99)
GLUCOSE BLD MANUAL STRIP-MCNC: 220 MG/DL (ref 74–99)
GLUCOSE BLD MANUAL STRIP-MCNC: 225 MG/DL (ref 74–99)
GLUCOSE BLD MANUAL STRIP-MCNC: 97 MG/DL (ref 74–99)
GLUCOSE BLD MANUAL STRIP-MCNC: 97 MG/DL (ref 74–99)
GLUCOSE SERPL-MCNC: 104 MG/DL (ref 74–99)
HCT VFR BLD AUTO: 29.9 % (ref 36–46)
HGB BLD-MCNC: 8.4 G/DL (ref 12–16)
LABORATORY COMMENT REPORT: NORMAL
MAGNESIUM SERPL-MCNC: 1.75 MG/DL (ref 1.6–2.4)
MCH RBC QN AUTO: 22 PG (ref 26–34)
MCHC RBC AUTO-ENTMCNC: 28.1 G/DL (ref 32–36)
MCV RBC AUTO: 79 FL (ref 80–100)
NRBC BLD-RTO: 0 /100 WBCS (ref 0–0)
PATH REPORT.FINAL DX SPEC: NORMAL
PATH REPORT.GROSS SPEC: NORMAL
PATH REPORT.RELEVANT HX SPEC: NORMAL
PATH REPORT.TOTAL CANCER: NORMAL
PHOSPHATE SERPL-MCNC: 4.1 MG/DL (ref 2.5–4.9)
PLATELET # BLD AUTO: 544 X10*3/UL (ref 150–450)
POTASSIUM SERPL-SCNC: 3.5 MMOL/L (ref 3.5–5.3)
RBC # BLD AUTO: 3.81 X10*6/UL (ref 4–5.2)
RH FACTOR (ANTIGEN D): NORMAL
SODIUM SERPL-SCNC: 142 MMOL/L (ref 136–145)
WBC # BLD AUTO: 9 X10*3/UL (ref 4.4–11.3)

## 2025-01-02 PROCEDURE — 82947 ASSAY GLUCOSE BLOOD QUANT: CPT

## 2025-01-02 PROCEDURE — 87116 MYCOBACTERIA CULTURE: CPT | Performed by: STUDENT IN AN ORGANIZED HEALTH CARE EDUCATION/TRAINING PROGRAM

## 2025-01-02 PROCEDURE — 2500000004 HC RX 250 GENERAL PHARMACY W/ HCPCS (ALT 636 FOR OP/ED): Performed by: ANESTHESIOLOGY

## 2025-01-02 PROCEDURE — 87102 FUNGUS ISOLATION CULTURE: CPT | Performed by: STUDENT IN AN ORGANIZED HEALTH CARE EDUCATION/TRAINING PROGRAM

## 2025-01-02 PROCEDURE — 7100000002 HC RECOVERY ROOM TIME - EACH INCREMENTAL 1 MINUTE: Performed by: STUDENT IN AN ORGANIZED HEALTH CARE EDUCATION/TRAINING PROGRAM

## 2025-01-02 PROCEDURE — 36415 COLL VENOUS BLD VENIPUNCTURE: CPT | Performed by: STUDENT IN AN ORGANIZED HEALTH CARE EDUCATION/TRAINING PROGRAM

## 2025-01-02 PROCEDURE — 2720000007 HC OR 272 NO HCPCS: Performed by: STUDENT IN AN ORGANIZED HEALTH CARE EDUCATION/TRAINING PROGRAM

## 2025-01-02 PROCEDURE — 83735 ASSAY OF MAGNESIUM: CPT

## 2025-01-02 PROCEDURE — 2500000001 HC RX 250 WO HCPCS SELF ADMINISTERED DRUGS (ALT 637 FOR MEDICARE OP): Performed by: STUDENT IN AN ORGANIZED HEALTH CARE EDUCATION/TRAINING PROGRAM

## 2025-01-02 PROCEDURE — 85027 COMPLETE CBC AUTOMATED: CPT | Performed by: STUDENT IN AN ORGANIZED HEALTH CARE EDUCATION/TRAINING PROGRAM

## 2025-01-02 PROCEDURE — 88307 TISSUE EXAM BY PATHOLOGIST: CPT | Mod: TC,SUR | Performed by: STUDENT IN AN ORGANIZED HEALTH CARE EDUCATION/TRAINING PROGRAM

## 2025-01-02 PROCEDURE — 87070 CULTURE OTHR SPECIMN AEROBIC: CPT | Performed by: STUDENT IN AN ORGANIZED HEALTH CARE EDUCATION/TRAINING PROGRAM

## 2025-01-02 PROCEDURE — 2500000002 HC RX 250 W HCPCS SELF ADMINISTERED DRUGS (ALT 637 FOR MEDICARE OP, ALT 636 FOR OP/ED): Performed by: STUDENT IN AN ORGANIZED HEALTH CARE EDUCATION/TRAINING PROGRAM

## 2025-01-02 PROCEDURE — 1100000001 HC PRIVATE ROOM DAILY

## 2025-01-02 PROCEDURE — A27596 PR AMPUTATE THIGH,RE-AMPUTATN: Performed by: ANESTHESIOLOGY

## 2025-01-02 PROCEDURE — 2500000004 HC RX 250 GENERAL PHARMACY W/ HCPCS (ALT 636 FOR OP/ED): Performed by: STUDENT IN AN ORGANIZED HEALTH CARE EDUCATION/TRAINING PROGRAM

## 2025-01-02 PROCEDURE — P9045 ALBUMIN (HUMAN), 5%, 250 ML: HCPCS | Mod: JZ,TB

## 2025-01-02 PROCEDURE — 3700000002 HC GENERAL ANESTHESIA TIME - EACH INCREMENTAL 1 MINUTE: Performed by: STUDENT IN AN ORGANIZED HEALTH CARE EDUCATION/TRAINING PROGRAM

## 2025-01-02 PROCEDURE — 88307 TISSUE EXAM BY PATHOLOGIST: CPT | Performed by: PATHOLOGY

## 2025-01-02 PROCEDURE — 3600000008 HC OR TIME - EACH INCREMENTAL 1 MINUTE - PROCEDURE LEVEL THREE: Performed by: STUDENT IN AN ORGANIZED HEALTH CARE EDUCATION/TRAINING PROGRAM

## 2025-01-02 PROCEDURE — A9999 DME SUPPLY OR ACCESSORY, NOS: HCPCS | Performed by: STUDENT IN AN ORGANIZED HEALTH CARE EDUCATION/TRAINING PROGRAM

## 2025-01-02 PROCEDURE — 3700000001 HC GENERAL ANESTHESIA TIME - INITIAL BASE CHARGE: Performed by: STUDENT IN AN ORGANIZED HEALTH CARE EDUCATION/TRAINING PROGRAM

## 2025-01-02 PROCEDURE — 80069 RENAL FUNCTION PANEL: CPT

## 2025-01-02 PROCEDURE — 2500000004 HC RX 250 GENERAL PHARMACY W/ HCPCS (ALT 636 FOR OP/ED)

## 2025-01-02 PROCEDURE — 7100000001 HC RECOVERY ROOM TIME - INITIAL BASE CHARGE: Performed by: STUDENT IN AN ORGANIZED HEALTH CARE EDUCATION/TRAINING PROGRAM

## 2025-01-02 PROCEDURE — 87205 SMEAR GRAM STAIN: CPT | Performed by: STUDENT IN AN ORGANIZED HEALTH CARE EDUCATION/TRAINING PROGRAM

## 2025-01-02 PROCEDURE — 3600000003 HC OR TIME - INITIAL BASE CHARGE - PROCEDURE LEVEL THREE: Performed by: STUDENT IN AN ORGANIZED HEALTH CARE EDUCATION/TRAINING PROGRAM

## 2025-01-02 PROCEDURE — 27596 AMPUTATION FOLLOW-UP SURGERY: CPT | Performed by: STUDENT IN AN ORGANIZED HEALTH CARE EDUCATION/TRAINING PROGRAM

## 2025-01-02 PROCEDURE — 2500000005 HC RX 250 GENERAL PHARMACY W/O HCPCS: Performed by: STUDENT IN AN ORGANIZED HEALTH CARE EDUCATION/TRAINING PROGRAM

## 2025-01-02 PROCEDURE — 88311 DECALCIFY TISSUE: CPT | Performed by: PATHOLOGY

## 2025-01-02 PROCEDURE — 99233 SBSQ HOSP IP/OBS HIGH 50: CPT | Performed by: INTERNAL MEDICINE

## 2025-01-02 PROCEDURE — 86850 RBC ANTIBODY SCREEN: CPT | Performed by: STUDENT IN AN ORGANIZED HEALTH CARE EDUCATION/TRAINING PROGRAM

## 2025-01-02 PROCEDURE — 86901 BLOOD TYPING SEROLOGIC RH(D): CPT | Performed by: STUDENT IN AN ORGANIZED HEALTH CARE EDUCATION/TRAINING PROGRAM

## 2025-01-02 PROCEDURE — 36415 COLL VENOUS BLD VENIPUNCTURE: CPT

## 2025-01-02 RX ORDER — LABETALOL HYDROCHLORIDE 5 MG/ML
5 INJECTION, SOLUTION INTRAVENOUS ONCE AS NEEDED
Status: DISCONTINUED | OUTPATIENT
Start: 2025-01-02 | End: 2025-01-02 | Stop reason: HOSPADM

## 2025-01-02 RX ORDER — HEPARIN SODIUM 10000 [USP'U]/100ML
0-4000 INJECTION, SOLUTION INTRAVENOUS CONTINUOUS
Status: DISCONTINUED | OUTPATIENT
Start: 2025-01-02 | End: 2025-01-06

## 2025-01-02 RX ORDER — PROPOFOL 10 MG/ML
INJECTION, EMULSION INTRAVENOUS AS NEEDED
Status: DISCONTINUED | OUTPATIENT
Start: 2025-01-02 | End: 2025-01-02

## 2025-01-02 RX ORDER — ROCURONIUM BROMIDE 10 MG/ML
INJECTION, SOLUTION INTRAVENOUS AS NEEDED
Status: DISCONTINUED | OUTPATIENT
Start: 2025-01-02 | End: 2025-01-02

## 2025-01-02 RX ORDER — OXYCODONE AND ACETAMINOPHEN 5; 325 MG/1; MG/1
1 TABLET ORAL EVERY 4 HOURS PRN
Status: DISCONTINUED | OUTPATIENT
Start: 2025-01-02 | End: 2025-01-02 | Stop reason: HOSPADM

## 2025-01-02 RX ORDER — ONDANSETRON HYDROCHLORIDE 2 MG/ML
INJECTION, SOLUTION INTRAVENOUS AS NEEDED
Status: DISCONTINUED | OUTPATIENT
Start: 2025-01-02 | End: 2025-01-02

## 2025-01-02 RX ORDER — LIDOCAINE HYDROCHLORIDE 20 MG/ML
INJECTION, SOLUTION INFILTRATION; PERINEURAL AS NEEDED
Status: DISCONTINUED | OUTPATIENT
Start: 2025-01-02 | End: 2025-01-02

## 2025-01-02 RX ORDER — HYDROMORPHONE HYDROCHLORIDE 1 MG/ML
INJECTION, SOLUTION INTRAMUSCULAR; INTRAVENOUS; SUBCUTANEOUS CONTINUOUS PRN
Status: DISCONTINUED | OUTPATIENT
Start: 2025-01-02 | End: 2025-01-02

## 2025-01-02 RX ORDER — SODIUM CHLORIDE 0.9 G/100ML
IRRIGANT IRRIGATION AS NEEDED
Status: DISCONTINUED | OUTPATIENT
Start: 2025-01-02 | End: 2025-01-02 | Stop reason: HOSPADM

## 2025-01-02 RX ORDER — SODIUM CHLORIDE, SODIUM LACTATE, POTASSIUM CHLORIDE, CALCIUM CHLORIDE 600; 310; 30; 20 MG/100ML; MG/100ML; MG/100ML; MG/100ML
75 INJECTION, SOLUTION INTRAVENOUS CONTINUOUS
Status: DISCONTINUED | OUTPATIENT
Start: 2025-01-02 | End: 2025-01-02 | Stop reason: HOSPADM

## 2025-01-02 RX ORDER — ONDANSETRON HYDROCHLORIDE 2 MG/ML
4 INJECTION, SOLUTION INTRAVENOUS ONCE AS NEEDED
Status: DISCONTINUED | OUTPATIENT
Start: 2025-01-02 | End: 2025-01-02 | Stop reason: HOSPADM

## 2025-01-02 RX ORDER — PHENYLEPHRINE HCL IN 0.9% NACL 0.4MG/10ML
SYRINGE (ML) INTRAVENOUS AS NEEDED
Status: DISCONTINUED | OUTPATIENT
Start: 2025-01-02 | End: 2025-01-02

## 2025-01-02 RX ORDER — METOCLOPRAMIDE HYDROCHLORIDE 5 MG/ML
10 INJECTION INTRAMUSCULAR; INTRAVENOUS ONCE AS NEEDED
Status: DISCONTINUED | OUTPATIENT
Start: 2025-01-02 | End: 2025-01-02 | Stop reason: HOSPADM

## 2025-01-02 RX ORDER — FENTANYL CITRATE 50 UG/ML
50 INJECTION, SOLUTION INTRAMUSCULAR; INTRAVENOUS EVERY 5 MIN PRN
Status: DISCONTINUED | OUTPATIENT
Start: 2025-01-02 | End: 2025-01-02 | Stop reason: HOSPADM

## 2025-01-02 RX ORDER — LIDOCAINE HYDROCHLORIDE 10 MG/ML
0.1 INJECTION, SOLUTION INFILTRATION; PERINEURAL ONCE
Status: DISCONTINUED | OUTPATIENT
Start: 2025-01-02 | End: 2025-01-02 | Stop reason: HOSPADM

## 2025-01-02 RX ORDER — MEPERIDINE HYDROCHLORIDE 25 MG/ML
12.5 INJECTION INTRAMUSCULAR; INTRAVENOUS; SUBCUTANEOUS EVERY 10 MIN PRN
Status: DISCONTINUED | OUTPATIENT
Start: 2025-01-02 | End: 2025-01-02 | Stop reason: HOSPADM

## 2025-01-02 RX ORDER — HYDROMORPHONE HYDROCHLORIDE 0.2 MG/ML
0.2 INJECTION INTRAMUSCULAR; INTRAVENOUS; SUBCUTANEOUS EVERY 5 MIN PRN
Status: DISCONTINUED | OUTPATIENT
Start: 2025-01-02 | End: 2025-01-02 | Stop reason: HOSPADM

## 2025-01-02 RX ORDER — OXYCODONE HYDROCHLORIDE 5 MG/1
5 TABLET ORAL EVERY 4 HOURS PRN
Status: DISCONTINUED | OUTPATIENT
Start: 2025-01-02 | End: 2025-01-02 | Stop reason: HOSPADM

## 2025-01-02 RX ORDER — WARFARIN SODIUM 5 MG/1
TABLET ORAL
Qty: 90 TABLET | Refills: 3 | OUTPATIENT
Start: 2025-01-02

## 2025-01-02 RX ORDER — CEFAZOLIN 1 G/1
INJECTION, POWDER, FOR SOLUTION INTRAVENOUS AS NEEDED
Status: DISCONTINUED | OUTPATIENT
Start: 2025-01-02 | End: 2025-01-02

## 2025-01-02 RX ORDER — PHENYLEPHRINE HYDROCHLORIDE 10 MG/ML
INJECTION INTRAVENOUS AS NEEDED
Status: DISCONTINUED | OUTPATIENT
Start: 2025-01-02 | End: 2025-01-02

## 2025-01-02 RX ORDER — FENTANYL CITRATE 50 UG/ML
INJECTION, SOLUTION INTRAMUSCULAR; INTRAVENOUS AS NEEDED
Status: DISCONTINUED | OUTPATIENT
Start: 2025-01-02 | End: 2025-01-02

## 2025-01-02 RX ORDER — LEVETIRACETAM 500 MG/5ML
INJECTION, SOLUTION, CONCENTRATE INTRAVENOUS AS NEEDED
Status: DISCONTINUED | OUTPATIENT
Start: 2025-01-02 | End: 2025-01-02

## 2025-01-02 RX ORDER — VANCOMYCIN HYDROCHLORIDE 1 G/20ML
INJECTION, POWDER, LYOPHILIZED, FOR SOLUTION INTRAVENOUS AS NEEDED
Status: DISCONTINUED | OUTPATIENT
Start: 2025-01-02 | End: 2025-01-02 | Stop reason: HOSPADM

## 2025-01-02 RX ORDER — LEVETIRACETAM 500 MG/1
TABLET ORAL
Qty: 180 TABLET | Refills: 3 | OUTPATIENT
Start: 2025-01-02

## 2025-01-02 RX ORDER — ALBUMIN HUMAN 50 G/1000ML
SOLUTION INTRAVENOUS AS NEEDED
Status: DISCONTINUED | OUTPATIENT
Start: 2025-01-02 | End: 2025-01-02

## 2025-01-02 RX ORDER — MIDAZOLAM HYDROCHLORIDE 1 MG/ML
INJECTION INTRAMUSCULAR; INTRAVENOUS CONTINUOUS PRN
Status: DISCONTINUED | OUTPATIENT
Start: 2025-01-02 | End: 2025-01-02

## 2025-01-02 RX ADMIN — PROPOFOL 25 MCG/KG/MIN: 10 INJECTION, EMULSION INTRAVENOUS at 08:44

## 2025-01-02 RX ADMIN — Medication 80 MCG: at 09:19

## 2025-01-02 RX ADMIN — AMPICILLIN SODIUM AND SULBACTAM SODIUM 3 G: 2; 1 INJECTION, POWDER, FOR SOLUTION INTRAMUSCULAR; INTRAVENOUS at 01:52

## 2025-01-02 RX ADMIN — LEVETIRACETAM 500 MG: 500 TABLET, FILM COATED ORAL at 20:58

## 2025-01-02 RX ADMIN — SUGAMMADEX 200 MG: 100 INJECTION, SOLUTION INTRAVENOUS at 09:47

## 2025-01-02 RX ADMIN — Medication 80 MCG: at 08:06

## 2025-01-02 RX ADMIN — LEVETIRACETAM 500 MG: 500 INJECTION, SOLUTION, CONCENTRATE INTRAVENOUS at 09:17

## 2025-01-02 RX ADMIN — Medication 80 MCG: at 08:48

## 2025-01-02 RX ADMIN — GABAPENTIN 600 MG: 300 CAPSULE ORAL at 13:01

## 2025-01-02 RX ADMIN — ONDANSETRON 4 MG: 2 INJECTION, SOLUTION INTRAMUSCULAR; INTRAVENOUS at 09:29

## 2025-01-02 RX ADMIN — ACETAMINOPHEN 975 MG: 325 TABLET ORAL at 15:09

## 2025-01-02 RX ADMIN — DEXAMETHASONE SODIUM PHOSPHATE 4 MG: 4 INJECTION INTRA-ARTICULAR; INTRALESIONAL; INTRAMUSCULAR; INTRAVENOUS; SOFT TISSUE at 08:37

## 2025-01-02 RX ADMIN — FENTANYL CITRATE 100 MCG: 50 INJECTION, SOLUTION INTRAMUSCULAR; INTRAVENOUS at 07:50

## 2025-01-02 RX ADMIN — INSULIN GLARGINE 10 UNITS: 100 INJECTION, SOLUTION SUBCUTANEOUS at 21:27

## 2025-01-02 RX ADMIN — AMPICILLIN SODIUM AND SULBACTAM SODIUM 3 G: 2; 1 INJECTION, POWDER, FOR SOLUTION INTRAMUSCULAR; INTRAVENOUS at 12:56

## 2025-01-02 RX ADMIN — SODIUM CHLORIDE, POTASSIUM CHLORIDE, SODIUM LACTATE AND CALCIUM CHLORIDE: 600; 310; 30; 20 INJECTION, SOLUTION INTRAVENOUS at 07:31

## 2025-01-02 RX ADMIN — OXYCODONE HYDROCHLORIDE 10 MG: 5 TABLET ORAL at 12:55

## 2025-01-02 RX ADMIN — INSULIN LISPRO 4 UNITS: 100 INJECTION, SOLUTION INTRAVENOUS; SUBCUTANEOUS at 16:40

## 2025-01-02 RX ADMIN — Medication 80 MCG: at 08:13

## 2025-01-02 RX ADMIN — LEVOTHYROXINE SODIUM 75 MCG: 0.07 TABLET ORAL at 06:05

## 2025-01-02 RX ADMIN — FENTANYL CITRATE 50 MCG: 50 INJECTION, SOLUTION INTRAMUSCULAR; INTRAVENOUS at 10:45

## 2025-01-02 RX ADMIN — ALBUMIN HUMAN 250 ML: 0.05 INJECTION, SOLUTION INTRAVENOUS at 09:03

## 2025-01-02 RX ADMIN — KETOROLAC TROMETHAMINE 15 MG: 15 INJECTION, SOLUTION INTRAMUSCULAR; INTRAVENOUS at 03:45

## 2025-01-02 RX ADMIN — GABAPENTIN 600 MG: 300 CAPSULE ORAL at 21:37

## 2025-01-02 RX ADMIN — METOPROLOL TARTRATE 12.5 MG: 25 TABLET, FILM COATED ORAL at 20:58

## 2025-01-02 RX ADMIN — Medication 80 MCG: at 09:42

## 2025-01-02 RX ADMIN — AMPICILLIN SODIUM AND SULBACTAM SODIUM 3 G: 2; 1 INJECTION, POWDER, FOR SOLUTION INTRAMUSCULAR; INTRAVENOUS at 21:26

## 2025-01-02 RX ADMIN — KETOROLAC TROMETHAMINE 15 MG: 15 INJECTION, SOLUTION INTRAMUSCULAR; INTRAVENOUS at 15:09

## 2025-01-02 RX ADMIN — ACETAMINOPHEN 975 MG: 325 TABLET ORAL at 20:58

## 2025-01-02 RX ADMIN — Medication 2 L/MIN: at 10:00

## 2025-01-02 RX ADMIN — DIVALPROEX SODIUM 1000 MG: 500 TABLET, FILM COATED, EXTENDED RELEASE ORAL at 21:27

## 2025-01-02 RX ADMIN — CEFAZOLIN 2 G: 1 INJECTION, POWDER, FOR SOLUTION INTRAMUSCULAR; INTRAVENOUS at 08:03

## 2025-01-02 RX ADMIN — OXYCODONE HYDROCHLORIDE 10 MG: 5 TABLET ORAL at 20:58

## 2025-01-02 RX ADMIN — ROCURONIUM 50 MG: 50 INJECTION, SOLUTION INTRAVENOUS at 07:50

## 2025-01-02 RX ADMIN — Medication 120 MCG: at 09:03

## 2025-01-02 RX ADMIN — Medication 80 MCG: at 09:00

## 2025-01-02 RX ADMIN — PROPOFOL 150 MG: 10 INJECTION, EMULSION INTRAVENOUS at 07:50

## 2025-01-02 RX ADMIN — ATORVASTATIN CALCIUM 80 MG: 80 TABLET, FILM COATED ORAL at 20:58

## 2025-01-02 RX ADMIN — Medication 80 MCG: at 09:30

## 2025-01-02 RX ADMIN — LIDOCAINE HYDROCHLORIDE 80 MG: 20 INJECTION, SOLUTION INFILTRATION; PERINEURAL at 07:50

## 2025-01-02 RX ADMIN — PANTOPRAZOLE SODIUM 40 MG: 40 TABLET, DELAYED RELEASE ORAL at 06:05

## 2025-01-02 RX ADMIN — Medication 80 MCG: at 08:23

## 2025-01-02 RX ADMIN — HYDROMORPHONE HYDROCHLORIDE 0.5 MG: 1 INJECTION, SOLUTION INTRAMUSCULAR; INTRAVENOUS; SUBCUTANEOUS at 17:46

## 2025-01-02 RX ADMIN — FENTANYL CITRATE 50 MCG: 50 INJECTION, SOLUTION INTRAMUSCULAR; INTRAVENOUS at 10:30

## 2025-01-02 ASSESSMENT — COGNITIVE AND FUNCTIONAL STATUS - GENERAL
DRESSING REGULAR LOWER BODY CLOTHING: A LOT
DRESSING REGULAR LOWER BODY CLOTHING: A LOT
DRESSING REGULAR UPPER BODY CLOTHING: A LITTLE
PERSONAL GROOMING: A LOT
WALKING IN HOSPITAL ROOM: TOTAL
HELP NEEDED FOR BATHING: A LITTLE
DRESSING REGULAR LOWER BODY CLOTHING: A LOT
DAILY ACTIVITIY SCORE: 14
MOVING TO AND FROM BED TO CHAIR: TOTAL
MOVING FROM LYING ON BACK TO SITTING ON SIDE OF FLAT BED WITH BEDRAILS: A LOT
MOVING TO AND FROM BED TO CHAIR: TOTAL
CLIMB 3 TO 5 STEPS WITH RAILING: TOTAL
TOILETING: A LOT
STANDING UP FROM CHAIR USING ARMS: TOTAL
MOBILITY SCORE: 8
STANDING UP FROM CHAIR USING ARMS: TOTAL
WALKING IN HOSPITAL ROOM: TOTAL
PERSONAL GROOMING: A LITTLE
HELP NEEDED FOR BATHING: A LOT
TOILETING: A LOT
DRESSING REGULAR UPPER BODY CLOTHING: A LOT
MOBILITY SCORE: 8
HELP NEEDED FOR BATHING: A LOT
MOVING FROM LYING ON BACK TO SITTING ON SIDE OF FLAT BED WITH BEDRAILS: A LOT
MOBILITY SCORE: 12
STANDING UP FROM CHAIR USING ARMS: TOTAL
DAILY ACTIVITIY SCORE: 14
MOVING TO AND FROM BED TO CHAIR: TOTAL
TURNING FROM BACK TO SIDE WHILE IN FLAT BAD: A LOT
PERSONAL GROOMING: A LOT
DRESSING REGULAR UPPER BODY CLOTHING: A LOT
TURNING FROM BACK TO SIDE WHILE IN FLAT BAD: A LOT
DAILY ACTIVITIY SCORE: 18
TOILETING: A LITTLE
WALKING IN HOSPITAL ROOM: TOTAL

## 2025-01-02 ASSESSMENT — PAIN SCALES - GENERAL
PAINLEVEL_OUTOF10: 10 - WORST POSSIBLE PAIN
PAIN_LEVEL: 0
PAINLEVEL_OUTOF10: 10 - WORST POSSIBLE PAIN
PAINLEVEL_OUTOF10: 0 - NO PAIN
PAINLEVEL_OUTOF10: 10 - WORST POSSIBLE PAIN
PAINLEVEL_OUTOF10: 0 - NO PAIN
PAINLEVEL_OUTOF10: 5 - MODERATE PAIN
PAINLEVEL_OUTOF10: 10 - WORST POSSIBLE PAIN
PAINLEVEL_OUTOF10: 10 - WORST POSSIBLE PAIN
PAINLEVEL_OUTOF10: 0 - NO PAIN
PAINLEVEL_OUTOF10: 10 - WORST POSSIBLE PAIN

## 2025-01-02 ASSESSMENT — PAIN - FUNCTIONAL ASSESSMENT
PAIN_FUNCTIONAL_ASSESSMENT: 0-10

## 2025-01-02 NOTE — ANESTHESIA PREPROCEDURE EVALUATION
Patient: Shirin Slater    Procedure Information       Date/Time: 01/02/25 0715    Procedure: AMPUTATION, ABOVE KNEE (Left)    Location: [facility] OR 16 / [facility] OR    Surgeons: Adonay Miller MD            Relevant Problems   Cardiac   (+) Acute lower limb ischemia   (+) Atherosclerosis of native arteries of left leg with ulceration of other part of foot (Multi)   (+) Critical limb ischemia of left lower extremity (Multi)   (+) HTN (hypertension)   (+) PAD (peripheral artery disease) (CMS-[facility])      Pulmonary   (+) Chronic obstructive pulmonary disease (Multi)   (+) ELMIRA (obstructive sleep apnea)      Neuro   (+) PTSD (post-traumatic stress disorder)   (+) Peripheral neuropathy   (+) Seizures (Multi)      GI   (+) Gastroesophageal reflux disease      Endocrine   (+) Diabetes mellitus, type 2 (Multi)   (+) Hypothyroidism      Hematology   (+) Anticoagulant long-term use       Clinical information reviewed:   Tobacco  Allergies  Meds   Med Hx  Surg Hx  OB Status  Fam Hx  Soc   Hx        NPO Detail:  NPO/Void Status  Carbohydrate Drink Given Prior to Surgery? : N  Date of Last Liquid: 01/01/25  Time of Last Liquid: 2000  Date of Last Solid: 01/01/25  Time of Last Solid: 2000  Last Intake Type: Clear fluids; Light meal  Time of Last Void: 0500         Physical Exam    Airway  Mallampati: II     Cardiovascular - normal exam  Rhythm: regular  Rate: normal     Dental - normal exam     Pulmonary - normal exam  Breath sounds clear to auscultation     Abdominal - normal exam           Anesthesia Plan    History of general anesthesia?: yes  History of complications of general anesthesia?: no    ASA 3     general     intravenous induction   Use of blood products discussed with patient who consented to blood products.    Plan discussed with CAA and attending.

## 2025-01-02 NOTE — SIGNIFICANT EVENT
Postoperative Check Note    Subjective  Shirin Slater is a 54 y.o. female who is now POD0 s/p LLE AKA. Postoperatively, patient feels well, and denies fevers/chills, n/v, chest pain, shortness of breath. Feels her pain is well-controlled and appropriate for this time. Has no other concerns.    Objective  Vitals:  Visit Vitals  /80   Pulse 91   Temp 36.2 °C (97.2 °F) (Temporal)   Resp 15       Physical Exam:  GEN: No acute distress. Alert, awake and conversive.  HEENT: Sclera anicteric. Moist mucous membranes.  RESP: Breathing non-labored, equal chest rise. On RA.  CV: Regular rate, normotensive  GI: Abdomen soft, nondistended, nontender.   : Voiding spontaneously.  MSK: No gross deformities. Moves all extremities spontaneously.  NEURO: Alert and oriented x3. No focal deficits.  PSYCH: Appropriate mood and affect.  SKIN: Wound VAC in place over LLE AKA wound.    Most recent labs:            8.4     9.0>-----<544              29.9     142  103  13                  ----------------<104     3.5  30  0.35            Mg 1.75         Ventilator Settings:       Assessment  Shirin Slater is a 54 y.o. female who is now POD0 s/p LLE High AKA.  Patient is in fair condition, appropriate for postoperative course. The plan is as follows:    - Will continue to optimize pain management  - Regular diet , CCD  - PO fluids     Lindsey Bermudez MD  General Surgery PGY-1  Vascular Surgery [phone]

## 2025-01-02 NOTE — CARE PLAN
The patient's goals for the shift include      The clinical goals for the shift include patient will have adequately managed pain

## 2025-01-02 NOTE — INTERVAL H&P NOTE
H&P reviewed. The patient was examined and there are no changes to the H&P.    Plan for high thigh AKA on left. Patient consented. Site marked.     Yaz Disla MD  PGY-2 Vascular Surgery Resident  [id number]     Follow up with PMD in 1-2 days.    Novel Coronavirus (COVID-19)  The Facts  What is a coronavirus?  Coronaviruses are a large family of viruses that cause illnesses ranging from the common cold  to more severe diseases such as Middle East Respiratory Syndrome (MERS) and Severe Acute  Respiratory Syndrome (SARS).  What is Novel Coronavirus (COVID-19)?  COVID-19 is a new strain of Coronavirus that has not been previously identified in humans. COVID-19  was identified in [place], [place], [place] in December 2019 (COVID-19). COVID-19 has  since been identified outside of [place], in a growing number of countries internationally, including  the [place].  Where can I find the most recent information about COVID-19?  The Centers for Disease Control and Prevention (CDC) is closely monitoring the outbreak caused by the  COVID-19. For the latest information about COVID- 19, visit the CDC website at  https://www.cdc.gov/coronavirus/index.html  How are coronaviruses spread?  Coronaviruses can be transmitted from person-to- person, usually after close contact with an infected person,  for example, in a household, workplace, or healthcare setting via droplets that become airborne after a cough  or sneeze by an affected person. These droplets can then infect a nearby person. It is likely transmission also  occurs by touching recently contaminated surfaces.  What are the symptoms of coronavirus infection?  It depends on the virus, but common signs include fever and/or respiratory symptoms such as  cough and shortness of breath. In more severe cases, infection can cause pneumonia, severe acute  respiratory syndrome, kidney failure and even death. Fortunately, most cases of COVID-19 have an  illness no different than the influenza “flu”. With a majority of these patients having mild symptoms  and overall mortality which appears to be not much different than the flu.  Is there a treatment for a COVID-19?  There is no specific treatment for disease caused by COVID-19. However, many of the symptoms can  be treated based on the patient’s clinical condition. Supportive care for infected persons can be highly  effective.  What can I do to protect myself?  Washing your hands, covering your cough, and disinfecting surfaces are the best precautionary  measures. It is also advisable to avoid close contact with anyone showing symptoms of respiratory  illness such as coughing and sneezing. Those with symptoms should wear a surgical mask when  around others.  What can I do to protect those around me?  If you have been identified as someone who may be infected with COVID-19, we recommend you  follow the self-isolation procedures outlined below to protect those around you and limit the spread  of this virus.   March 3, 2020  Recommendations for Patients Advised to Self-Isolate  for Possible COVID-19 Exposure  We recommend the below precautionary steps from now until 14 days from when you  returned from your travel or date of your last known possible contact:  - Do not go to work, school, or public areas. Avoid using public transportation, ride-sharing, or  taxis.  - As much as possible, separate yourself from other people in your home. If you can, you should  stay in a room and away from other people in your home. Also, you should use a separate  bathroom, if available.  - Wear the supplied mask whenever you are around other people.  - If you have a non-urgent medical appointment, please reschedule for a later date. If the  appointment is urgent, please call the healthcare provider and tell them that you are on selfisolation for possible COVID-19. This will help the healthcare provider’s office take steps to keep  other people from getting infected or exposed. If you can reschedule routine appointments, do  so.  - Wash your hands often with soap and water for at least 15 to 20 seconds or clean your hands  with an alcohol-based hand  that contains 60 to 95% alcohol, covering all surfaces of  your hands and rubbing them together until they feel dry. Soap and water should be used  preferentially if hands are visibly dirty.  - Cover your mouth and nose with a tissue when you cough or sneeze. Throw used tissues in a  lined trash can; immediately wash your hands.  - Avoid touching your eyes, nose, and mouth with your hands.  - Avoid sharing personal household items. You should not share dishes, drinking glasses, cups,  eating utensils, towels, or bedding with other people or pets in your home. After using these  items, they should be washed thoroughly with soap and water.  - Clean and disinfect all “high-touch” surfaces every day. High touch surfaces include counters,  tabletops, doorknobs, light switches, remote controls, bathroom fixtures, toilets, phones,  keyboards, tablets, and bedside tables. Also, clean any surfaces that may have blood, stool, or  body fluids on them.       If you develop worsening symptoms:  - If you develop worsening symptoms, such as severe shortness of breath, please call [phone] option [phone]. They will assist you in determining your next steps.  During your time on self-isolation do the following:  - Work from home if you are able to so.  - Limit social isolation by talking with friends and family on the phone or with face-time  - Talk with friends and relatives who don’t live with you about supporting each other if one  household has to be quarantined. For example, agree to drop groceries or other supplies at the  front door.  - Exercise and spend time outdoors away from others if able to do so.    Why didn’t I get tested for novel coronavirus (COVID-19)?  The number of available tests is very limited so strict rules exist for who is allowed to be tested.  [facility] has been authorized to perform testing and is currently working hard to be  able to start providing the test. Such testing is currently reserved for patients who have had  contact with someone infected with the virus, or those who are very sick a plus those who have  traveled to areas identified by the Centers for Disease Control and Prevention (CD) and will  require hospitalization.  What should I do now?  If you are well enough to be discharged home and are not in a high risk group to have  contracted the COVID-10, you should care for yourself at home exactly like you would if you  have Influenza “flu”. Follow all the standard guidelines about washing your hands, covering  your cough, etc.  You should return to the Emergency Department if you develop worse symptoms, trouble  breathing, chest pain, and/or a fever that doesn’t improve with over the counter  acetaminophen or ibuprofen.

## 2025-01-02 NOTE — OP NOTE
AMPUTATION, ABOVE KNEE (L) Operative Note     Date: 2025  OR Location: [facility] OR    Name: Shirin Slater, : 1970, Age: 54 y.o., MRN: [id number], Sex: female    Diagnosis  Pre-op Diagnosis      * Acute lower limb ischemia [I99.8] Post-op Diagnosis     * Acute lower limb ischemia [I99.8]     Procedures  Left above knee amputation formalization (89613)  Prevena wound vac placement    Surgeons      * Adonay Miller - Primary    Resident/Fellow/Other Assistant:  Surgeons and Role:     * Sanju Rain MD - Assisting    Staff:   Surgical Assistant:   Circulator: Nate  Circulator: Magalie Rangelub Person: Brooke Negro Person: Praveena    Anesthesia Staff: Anesthesiologist: Claudy Payan MD  C-AA: SOLEDAD Little; SOLEDAD Quinn    Procedure Summary  Anesthesia: General  ASA: III  Estimated Blood Loss: 30 mL  Intra-op Medications:   Administrations occurring from 0639 to 0958 on 25:   Medication Name Total Dose   vancomycin (Vancocin) vial for injection 4 g   sodium chloride 0.9 % irrigation solution 3,000 mL   vancomycin (Vancocin) 1,000 mg in sodium chloride 0.9 % 1,000 mL irrigation 1,000 mL   acetaminophen (Tylenol) tablet 975 mg Cannot be calculated   albuterol 90 mcg/actuation inhaler 2 puff Cannot be calculated   ampicillin-sulbactam (Unasyn) 3 g in sodium chloride 0.9%  mL Cannot be calculated   aspirin EC tablet 81 mg Cannot be calculated   atorvastatin (Lipitor) tablet 80 mg Cannot be calculated   dextrose 50 % injection 10-50 mL Cannot be calculated   divalproex (Depakote ER) 24 hr tablet 1,000 mg Cannot be calculated   divalproex (Depakote ER) 24 hr tablet 500 mg Cannot be calculated   DULoxetine (Cymbalta) DR capsule 60 mg Cannot be calculated   gabapentin (Neurontin) capsule 600 mg Cannot be calculated   glucagon (Glucagen) injection 1 mg Cannot be calculated   glucagon (Glucagen) injection 1 mg Cannot be calculated   glucagon HCL 1 mg Cannot be calculated   HYDROmorphone (Dilaudid)  injection 0.5 mg Cannot be calculated   insulin glargine (Lantus) injection 10 Units Cannot be calculated   insulin lispro injection 0-10 Units Cannot be calculated   insulin lispro injection 4 Units Cannot be calculated   ketorolac (Toradol) injection 15 mg Cannot be calculated   lactated Ringer's infusion Cannot be calculated   levETIRAcetam (Keppra) tablet 500 mg Cannot be calculated   levothyroxine (Synthroid, Levoxyl) tablet 75 mcg Cannot be calculated   lidocaine 4 % patch 1 patch Cannot be calculated   melatonin tablet 10 mg Cannot be calculated   metoprolol tartrate (Lopressor) tablet 12.5 mg Cannot be calculated   naloxone (Narcan) injection 0.2 mg Cannot be calculated   oxyCODONE (Roxicodone) immediate release tablet 10 mg Cannot be calculated   oxyCODONE (Roxicodone) immediate release tablet 5 mg Cannot be calculated   pantoprazole (ProtoNix) EC tablet 40 mg Cannot be calculated   sennosides-docusate sodium (Betty-Colace) 8.6-50 mg per tablet 1 tablet Cannot be calculated   albumin human 5 % 250 mL   ceFAZolin (Ancef) vial 1 g 2 g   dexAMETHasone (Decadron) injection 4 mg/mL 4 mg   fentaNYL (Sublimaze) injection 50 mcg/mL 100 mcg   levETIRAcetam (Keppra) 500 mg/5mL 500 mg   lidocaine (Xylocaine) injection 2 % 80 mg   ondansetron (Zofran) 2 mg/mL injection 4 mg   phenylephrine 40 mcg/mL syringe 10 mL 760 mcg   propofol (Diprivan) injection 10 mg/mL 227.51 mg   rocuronium (ZeMuron) 50 mg/5 mL injection 50 mg   sugammadex (Bridion) 200 mg/2 mL injection 200 mg              Anesthesia Record               Intraprocedure I/O Totals          Intake    albumin human 5 % 250.00 mL    Total Intake 250 mL          Specimen:   ID Type Source Tests Collected by Time   1 : left AKA Tissue LEG AMPUTATION ABOVE THE KNEE LEFT SURGICAL PATHOLOGY EXAM Adonay Miller MD 1/2/2025 0901   A : left thigh fluid Fluid ABSCESS AFB CULTURE/SMEAR, FUNGAL CULTURE/SMEAR, STERILE FLUID CULTURE/SMEAR Adonay Miller MD 1/2/2025 0826    B : left thigh bypass fluid Fluid ABSCESS AFB CULTURE/SMEAR, FUNGAL CULTURE/SMEAR, STERILE FLUID CULTURE/SMEAR Adonay Miller MD 2025 0832                 Drains and/or Catheters:   Open Drain 1 Left;Medial Thigh (Active)       External Urinary Catheter Female (Active)   External Catheter Status Changed 24   Securement Method Adhesive device 24   Output (mL) 550 mL 25 0600       Tourniquet Times:   * Missing tourniquet times found for documented tourniquets in lo *     Implants: N/A    Findings: Slight bleeding from tissues and capillaries. Muscle contractile. There was a small pocket of purulence within the distal thigh which was inadvertently encountered. This was irrigated copiously after cultures were obtained. The known occluded fem-AT PTFE graft was encountered and noted to be well incorporated into the soft tissues. This was divided and it was noted to have thick murky thrombus. Cultures were obtained of this as well. The graft was suture ligated as proximal as possible and the tract was irrigated and closed. Penrose drain left in at the base of the wound and Prevena placed over the incision.     Indications: Shirin Slater is an 54 y.o. 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 She recently had a left fem-AT bypass with PTFE for tissue loss in 2024 and presented with Wolfe 3 ALI of the LLE with a now occluded fem-AT bypass and prior L EIA stent.  She underwent a through femur guillotine amputation for source control with plans to return to the OR for a high AKA vs hip disarticulation given concern for inadequate perfusion to the thigh given occluded profunda and bypass. Discussed with orthopedic surgery at great length, given her contralateral hip disarticulation, the left hip disarticulation will likely lead to significant wounds long term. As a result, an attempt at a high AKA was recommended.  The  risks including poor wound healing, stump necrosis, infection, need for additional amputation or procedures were discussed with the patient in detail who elected to proceed.     The patient was seen in the preoperative area. The risks, benefits, complications, treatment options, non-operative alternatives, expected recovery and outcomes were discussed with the patient. The possibilities of reaction to medication, pulmonary aspiration, injury to surrounding structures, bleeding, recurrent infection, the need for additional procedures, failure to diagnose a condition, and creating a complication requiring transfusion or operation were discussed with the patient. The patient concurred with the proposed plan, giving informed consent.  The site of surgery was properly noted/marked if necessary per policy. The patient has been actively warmed in preoperative area. Preoperative antibiotics have been ordered and given within 1 hours of incision. Venous thrombosis prophylaxis are not indicated.    Procedure Details:     The patient was taken to the operating room and placed in supine position.  General anesthesia was induced.  The patient was then prepped and draped in the usual sterile fashion.  Great care was taken to isolate the open wound on the distal thigh and the left groin wound during the prep.  This was done with Betadine soaked gauze and Ioban.  A time out was then performed to verify the patient, site, procedure prior to beginning.     A fishmouth incision was made 2 to 3 cm above the demarcated area of the skin with a scalpel.  This was deepened through the subcutaneous tissue.  On the anteromedial aspect, a small pocket of purulence was encountered coming from the distal thigh.  Cultures were obtained.  The tract was closed with silk suture to prevent any further contamination.  And the wound was copiously irrigated with saline infused with vancomycin.  There was no concern for necrotic tissue within the wound  bed or any devitalized tissue.  The incision was continued to be deepened down to the level of the femur.  The previously known occluded Garrido AT bypass was encountered and noted to be well incorporated within the musculature.  This was clamped and divided and initially some murky thrombus was encountered.  This was also cultured.  We were already fairly high in the thigh at this time.  Complete graft removal would have involved reopening the groin incision which already had a wound.  Furthermore there was not enough distance between R AKA incision and the previous groin wound to allow for us to sterilely isolate the graft more proximally in an effort to explant as much of the graft as possible in the event of a possible infection.  Given this and the fact that the proximal graft was along the foot greater than the soft tissues decision was made to explant as much of the graft from R AKA incision as possible and suture-ligate the remaining stump.  The tunnel from which the graft was explanted was copiously irrigated with vancomycin infused saline.  Vancomycin powder was then left in this tract which was closed using 2-0 Vicryl sutures.  At this time, the superficial femoral artery and vein was identified and suture-ligated with 2-0 silk sutures.  A tunnel was created posterior to the femur which was divided several centimeters above the level of our incision using an oscillating bone saw.  At this time, the rest of the posterior flap was completed sharply.  There was some minimal bleeding within the muscle and soft tissue which was controlled with cautery.  The wound bed was then copiously irrigated with 5 L of saline with vancomycin.  This was followed by the wound being irrigated with Betadine.  At this time, a half-inch Penrose drain was placed at the base of the wound and the fascia was closed in an interrupted fashion using 2-0 Vicryl suture.  A few deep dermal sutures were placed to better reapproximate the  incision using 3-0 Vicryl suture after which staples were used to reapproximate the skin.  A few horizontal mattress nylon sutures were placed along the incision.  A Prevena wound VAC was then applied over the incision and the ends of the Penrose drain.  The patient tolerated the procedure well and was extubated without any difficulty. She was taken to the PACU in stable condition. I was present for the entirety of the case.    Complications:  None; patient tolerated the procedure well.    Disposition: PACU - hemodynamically stable.  Condition: stable     Additional Details: I updated the patient's aunt, Loly, after the end of the case.     Attending Attestation: I was present and scrubbed for the entire procedure.    Adonay Miller  Phone Number: [phone]

## 2025-01-02 NOTE — BRIEF OP NOTE
Date: 2025  OR Location: [facility] OR    Name: Shirin Slater, : 1970, Age: 54 y.o., MRN: [id number], Sex: female    Diagnosis  Pre-op Diagnosis      * Acute lower limb ischemia [I99.8] Post-op Diagnosis     * Acute lower limb ischemia [I99.8]     Procedures  Left above knee amputation    Surgeons      * Mrinalini Miller - Primary    Resident/Fellow/Other Assistant:  Surgeons and Role:     * Sanju Rain MD - Assisting    Staff:   Surgical Assistant:   Elliottulator: Nate  Circulator: Magalie  Scrub Person: Brooke  Scrub Person: Praveena    Anesthesia Staff: Anesthesiologist: Claudy Payan MD  C-AA: SOLEDAD Little; SOLEDAD Quinn    Procedure Summary  Anesthesia: General  ASA: III  Estimated Blood Loss: 50 mL  Intra-op Medications:   Administrations occurring from 0715 to 1010 on 25:   Medication Name Total Dose   vancomycin (Vancocin) vial for injection 4 g   sodium chloride 0.9 % irrigation solution 3,000 mL   vancomycin (Vancocin) 1,000 mg in sodium chloride 0.9 % 1,000 mL irrigation 1,000 mL   acetaminophen (Tylenol) tablet 975 mg Cannot be calculated   albumin human 5 % 250 mL   ampicillin-sulbactam (Unasyn) 3 g in sodium chloride 0.9%  mL Cannot be calculated   aspirin EC tablet 81 mg Cannot be calculated   ceFAZolin (Ancef) vial 1 g 2 g   dexAMETHasone (Decadron) injection 4 mg/mL 4 mg   divalproex (Depakote ER) 24 hr tablet 500 mg Cannot be calculated   DULoxetine (Cymbalta) DR capsule 60 mg Cannot be calculated   fentaNYL (Sublimaze) injection 50 mcg/mL 100 mcg   ketorolac (Toradol) injection 15 mg Cannot be calculated   lactated Ringer's infusion Cannot be calculated   levETIRAcetam (Keppra) 500 mg/5mL 500 mg   levETIRAcetam (Keppra) tablet 500 mg Cannot be calculated   lidocaine (Xylocaine) injection 2 % 80 mg   lidocaine 4 % patch 1 patch Cannot be calculated   metoprolol tartrate (Lopressor) tablet 12.5 mg Cannot be calculated   ondansetron (Zofran) 2 mg/mL injection 4 mg    phenylephrine 40 mcg/mL syringe 10 mL 760 mcg   propofol (Diprivan) injection 10 mg/mL 227.51 mg   rocuronium (ZeMuron) 50 mg/5 mL injection 50 mg   sennosides-docusate sodium (Betty-Colace) 8.6-50 mg per tablet 1 tablet Cannot be calculated   sugammadex (Bridion) 200 mg/2 mL injection 200 mg              Anesthesia Record               Intraprocedure I/O Totals       None           Specimen:   ID Type Source Tests Collected by Time   1 : left AKA Tissue LEG AMPUTATION ABOVE THE KNEE LEFT SURGICAL PATHOLOGY EXAM Adonay Miller MD 1/2/2025 0901   A : left thigh fluid Fluid ABSCESS AFB CULTURE/SMEAR, FUNGAL CULTURE/SMEAR, STERILE FLUID CULTURE/SMEAR Adonay Miller MD 1/2/2025 0875   B : left thigh bypass fluid Fluid ABSCESS AFB CULTURE/SMEAR, FUNGAL CULTURE/SMEAR, STERILE FLUID CULTURE/SMEAR Adonay Miller MD 1/2/2025 0885      Findings: pocket of purulence in superficial distal guillotine stump, bypass graft occluded     Complications:  None; patient tolerated the procedure well.     Disposition: PACU - hemodynamically stable.  Condition: stable  Specimens Collected:   ID Type Source Tests Collected by Time   1 : left AKA Tissue LEG AMPUTATION ABOVE THE KNEE LEFT SURGICAL PATHOLOGY EXAM Adonay Miller MD 1/2/2025 0901   A : left thigh fluid Fluid ABSCESS AFB CULTURE/SMEAR, FUNGAL CULTURE/SMEAR, STERILE FLUID CULTURE/SMEAR Adonay Miller MD 1/2/2025 0823   B : left thigh bypass fluid Fluid ABSCESS AFB CULTURE/SMEAR, FUNGAL CULTURE/SMEAR, STERILE FLUID CULTURE/SMEAR Adonay Miller MD 1/2/2025 0832     Attending Attestation:     Adonay Miller  Phone Number: [phone]

## 2025-01-02 NOTE — NURSING NOTE
Wound care not preformed today as patient went for surgery on her LLE this morning and  wound vac was placed.

## 2025-01-02 NOTE — CARE PLAN
Problem: Pain - Adult  Goal: Verbalizes/displays adequate comfort level or baseline comfort level  Outcome: Progressing     Problem: Safety - Adult  Goal: Free from fall injury  Outcome: Progressing     Problem: Discharge Planning  Goal: Discharge to home or other facility with appropriate resources  Outcome: Progressing     Problem: Chronic Conditions and Co-morbidities  Goal: Patient's chronic conditions and co-morbidity symptoms are monitored and maintained or improved  Outcome: Progressing     Problem: Skin  Goal: Decreased wound size/increased tissue granulation at next dressing change  Outcome: Progressing  Flowsheets (Taken 12/29/2024 1343 by Radha Isidro RN)  Decreased wound size/increased tissue granulation at next dressing change: Protective dressings over bony prominences  Goal: Participates in plan/prevention/treatment measures  Outcome: Progressing  Flowsheets (Taken 1/2/2025 1653)  Participates in plan/prevention/treatment measures:   Discuss with provider PT/OT consult   Elevate heels  Goal: Prevent/manage excess moisture  Outcome: Progressing  Flowsheets (Taken 1/2/2025 1653)  Prevent/manage excess moisture:   Cleanse incontinence/protect with barrier cream   Follow provider orders for dressing changes   Monitor for/manage infection if present  Goal: Prevent/minimize sheer/friction injuries  Outcome: Progressing  Flowsheets (Taken 1/2/2025 1653)  Prevent/minimize sheer/friction injuries:   Use pull sheet   HOB 30 degrees or less  Goal: Promote/optimize nutrition  Outcome: Progressing  Flowsheets (Taken 1/2/2025 1653)  Promote/optimize nutrition: Monitor/record intake including meals  Goal: Promote skin healing  Outcome: Progressing  Flowsheets (Taken 1/2/2025 1653)  Promote skin healing:   Assess skin/pad under line(s)/device(s)   Ensure correct size (line/device) and apply per  instructions   Protective dressings over bony prominences   Rotate device position/do not position patient  on device       The clinical goals for the shift include pt will have adequate pain management throughout the shift    Over the shift, the patient did make progress toward the following goals. Pt received pain medication ( oxy) x1 dose during the shift and did not ask for another dose during the shift. Pt did receive all scheduled medications including Toradol.

## 2025-01-02 NOTE — ANESTHESIA POSTPROCEDURE EVALUATION
Patient: Shirin Slater    Procedure Summary       Date: 01/02/25 Room / Location: [facility] OR 16 / [facility]    Anesthesia Start: 0734 Anesthesia Stop: 1011    Procedure: AMPUTATION, ABOVE KNEE (Left: Thigh - Leg Upper) Diagnosis:       Acute lower limb ischemia      (Acute lower limb ischemia [I99.8])    Surgeons: Adonay Miller MD Responsible Provider: Claudy Payan MD    Anesthesia Type: general ASA Status: 3            Anesthesia Type: general    Vitals Value Taken Time   /52 01/02/25 1012   Temp 36 01/02/25 1012   Pulse 89 01/02/25 1008   Resp 12 01/02/25 1008   SpO2 100 % 01/02/25 1008   Vitals shown include unfiled device data.    Anesthesia Post Evaluation    Patient location during evaluation: PACU  Patient participation: complete - patient participated  Level of consciousness: awake and alert  Pain score: 0  Pain management: adequate  Airway patency: patent  Cardiovascular status: acceptable  Respiratory status: acceptable  Hydration status: acceptable  Postoperative Nausea and Vomiting: none        There were no known notable events for this encounter.

## 2025-01-02 NOTE — ANESTHESIA PROCEDURE NOTES
Airway  Date/Time: 1/2/2025 7:53 AM  Urgency: elective    Airway not difficult    Staffing  Performed: SOLEDAD   Authorized by: Claudy Payan MD    Performed by: SOLEDAD Quinn  Patient location during procedure: OR    Indications and Patient Condition  Indications for airway management: anesthesia  Spontaneous Ventilation: absent  Sedation level: deep  Preoxygenated: yes  Patient position: sniffing  MILS maintained throughout  Mask difficulty assessment: 1 - vent by mask    Final Airway Details  Final airway type: endotracheal airway      Successful airway: ETT  Cuffed: yes   Successful intubation technique: direct laryngoscopy  Facilitating devices/methods: intubating stylet  Endotracheal tube insertion site: oral  Blade: Ryder  Blade size: #3  ETT size (mm): 7.0  Cormack-Lehane Classification: grade IIa - partial view of glottis  Placement verified by: chest auscultation and capnometry   Measured from: lips  ETT to lips (cm): 22  Number of attempts at approach: 1

## 2025-01-02 NOTE — PROGRESS NOTES
"Palliative Medicine following for:  Complex medical decision making, symptom management, patient/family support    History obtained from chart review including ED note, H&P, patient's daily progress notes, review of lab/test results, and discussion with primary team and bedside RN.    Chief Complaint: left leg pain    Subjective    History of Present Illness    54 y.o. F with PMH of severe PAD s/p R hip disarticulation s/p L CFA and EIA embolectomy 12/2023 s/p multiple L toe amputations, s/p L femoral endarterectomy, profundaplasty, common yrqjave-uq-kyhbfakr tibial bypass using 6 mm ringed PTFE, left lower extremity angiogram on 9/25 T2DM complicated by diabetic neuropathy, HTN, HLD, renal and splenic thromboses presented with severe left leg pain and inability to move or feel or left leg. Imaging consistent with occlusion of her L CFA-AT bypass graft.  S/p L AKA 12/17.       The patient underwent left above-the-knee amputation formalization and Prevena wound VAC placement earlier today.  She developed anxiety postoperatively which improved by talking with her aunt over the phone.  She also has persistent postoperative pain located on the surgical site, non radiating, 8-10/10 sharp pain.      Symptoms  Pain: As noted above   Dyspnea: none  Fatigue: yes  Insomnia: improving  Drowsiness: not today  Constipation: none  Nausea: none  Appetite: okay  Anxiety: anticipatory during dressing changes  Depression: none    Objective    Last Recorded Vitals  /76   Pulse 82   Temp 36.2 °C (97.2 °F)   Resp 17   Ht 1.702 m (5' 7\")   Wt 72.1 kg (158 lb 15.2 oz)   SpO2 99%   BMI 24.90 kg/m²      Physical Exam   Constitutional:       Appearance: She is obese.   HENT:      Head: Normocephalic and atraumatic.   Cardiovascular:      Rate and Rhythm: Normal rate and regular rhythm.      Heart sounds: No murmur heard.     No friction rub. No gallop.   Pulmonary:      Effort: Pulmonary effort is normal.      Breath sounds: " Normal breath sounds. No wheezing, rhonchi or rales.   Abdominal:      General: Bowel sounds are normal.      Palpations: Abdomen is soft.      Tenderness: There is no abdominal tenderness. There is no guarding.   Musculoskeletal:      Comments: Surgical left LE   S/p R hip disarticulation   Neurological:      Comments: AAOX3      Relevant Results   Results for orders placed or performed during the hospital encounter of 12/17/24 (from the past 24 hours)   POCT GLUCOSE   Result Value Ref Range    POCT Glucose 160 (H) 74 - 99 mg/dL   CBC   Result Value Ref Range    WBC 9.0 4.4 - 11.3 x10*3/uL    nRBC 0.0 0.0 - 0.0 /100 WBCs    RBC 3.81 (L) 4.00 - 5.20 x10*6/uL    Hemoglobin 8.4 (L) 12.0 - 16.0 g/dL    Hematocrit 29.9 (L) 36.0 - 46.0 %    MCV 79 (L) 80 - 100 fL    MCH 22.0 (L) 26.0 - 34.0 pg    MCHC 28.1 (L) 32.0 - 36.0 g/dL    RDW 25.7 (H) 11.5 - 14.5 %    Platelets 544 (H) 150 - 450 x10*3/uL   Renal Function Panel   Result Value Ref Range    Glucose 104 (H) 74 - 99 mg/dL    Sodium 142 136 - 145 mmol/L    Potassium 3.5 3.5 - 5.3 mmol/L    Chloride 103 98 - 107 mmol/L    Bicarbonate 30 21 - 32 mmol/L    Anion Gap 13 10 - 20 mmol/L    Urea Nitrogen 13 6 - 23 mg/dL    Creatinine 0.35 (L) 0.50 - 1.05 mg/dL    eGFR >90 >60 mL/min/1.73m*2    Calcium 8.6 8.6 - 10.6 mg/dL    Phosphorus 4.1 2.5 - 4.9 mg/dL    Albumin 2.2 (L) 3.4 - 5.0 g/dL   Magnesium   Result Value Ref Range    Magnesium 1.75 1.60 - 2.40 mg/dL   POCT GLUCOSE   Result Value Ref Range    POCT Glucose 97 74 - 99 mg/dL   POCT GLUCOSE   Result Value Ref Range    POCT Glucose 97 74 - 99 mg/dL   Type And Screen   Result Value Ref Range    ABO TYPE A     Rh TYPE POS     ANTIBODY SCREEN NEG    Sterile Fluid Culture/Smear    Specimen: ABSCESS; Fluid   Result Value Ref Range    Gram Stain No polymorphonuclear leukocytes seen     Gram Stain No organisms seen    POCT GLUCOSE   Result Value Ref Range    POCT Glucose 147 (H) 74 - 99 mg/dL   POCT GLUCOSE   Result Value Ref  Range    POCT Glucose 171 (H) 74 - 99 mg/dL   POCT GLUCOSE   Result Value Ref Range    POCT Glucose 225 (H) 74 - 99 mg/dL        Allergies  Aspartame and Nsaids (non-steroidal anti-inflammatory drug)  Medications  Scheduled medications  acetaminophen, 975 mg, oral, TID  ampicillin-sulbactam, 3 g, intravenous, q6h  aspirin, 81 mg, oral, Daily  atorvastatin, 80 mg, oral, Nightly  divalproex, 1,000 mg, oral, Nightly at 0300  divalproex, 500 mg, oral, Daily with breakfast  DULoxetine, 60 mg, oral, Daily  gabapentin, 600 mg, oral, q8h NINO  insulin glargine, 10 Units, subcutaneous, Nightly  insulin lispro, 0-10 Units, subcutaneous, TID AC  insulin lispro, 4 Units, subcutaneous, TID AC  ketorolac, 15 mg, intravenous, q6h  levETIRAcetam, 500 mg, oral, BID  levothyroxine, 75 mcg, oral, Daily before breakfast  lidocaine, 1 patch, transdermal, Daily  metoprolol tartrate, 12.5 mg, oral, BID  pantoprazole, 40 mg, oral, Daily before breakfast  sennosides-docusate sodium, 1 tablet, oral, BID      Continuous medications  heparin, 0-4,000 Units/hr, Last Rate: Stopped (01/02/25 1250)      PRN medications  PRN medications: albuterol, dextrose, glucagon, glucagon, glucagon HCL, HYDROmorphone, [Held by provider] loperamide, melatonin, naloxone, ondansetron ODT **OR** ondansetron, oxyCODONE, oxyCODONE, oxygen     Assessment/Plan    54 y.o. F with PMH of severe PAD s/p R hip disarticulation s/p L CFA and EIA embolectomy 12/2023 s/p multiple L toe amputations, s/p L femoral endarterectomy, profundaplasty, common [id number] tibial bypass using 6 mm ringed PTFE, left lower extremity angiogram on 9/25 T2DM complicated by diabetic neuropathy, HTN, HLD, renal and splenic thromboses presented with severe left leg pain and inability to move or feel or left leg. Imaging consistent with occlusion of her L CFA-AT bypass graft.  S/p L AKA 12/17. Palliative care consulted to assist with goals of care, symptom management in the context of her  recent surgery and severe PAD.   She has occlusion of fem-AT bypass and prior left EIA stent.    S/p high above the knee amputation today    Palliative Performance Scale (PPS): 40      #Palliative Care Encounter.  Support and empathy was provided throughout the encounter. Provided reflective listening and presence.       #Complex Medical Decision Making   #Goals of Care  #Advance Care Planning   - Code status: DNR  - HCPOA: Loly Blackburn (aunt) [phone]  1st Alternate: Pepe Henao (son) [phone]  2nd Alternate: Juanis Blackburn (cousin) [phone]  - Goals are survival and time based   - State DNR form completed and placed in patient's chart   - Advanced Directives on file     #Acute Pain  Oxycodone 10 mg PO Q 6 hours PRN for severe pain.  Oxycodone 5 mg PO Q 6 hours PRN for moderate pain  Ketoroloac 15 mg IV Q 6 hours x 3 days. She is tolerating this.  Acetaminophen to 1000 mg TID.  Hydromorphone 0.5 mg IV Q 2 hours PRN for breakthrough pain.  Monitor for opioid adverse effects such as over sedation and respiratory depression. Naloxone PRN for reversal.  Will re assess for breakthrough medication needs.  Naloxone 0.2 mg IV PRN for respiratory depression.    Continue with Gabapentin increase to 600 mg Q 8 hours and Duloxetine 60 mg daily for neuropathic pain.     Referral to pain management for evaluation of persistent right hip disarticulation stump pain, rule out neuroma.     Bowel regimen: She's been having 1-2 BM's daily. Senna 1 tab BID as needed for constipation.     #Suspect PTSD associated to previous surgery with severe post op pain.  Consider trauma focused psychotherapy.  Pain medication administration prior to dressing changes.    #Psychosocial Support  - Music Therapy    Medical Decision Making    - critical limb ischemia posing threat to life and function   - Reviewed external notes from vascular surgery 1/1/2025 and 12/31/2024, operative note 1/2/2025  - Reviews results from [facility] and  CBC  - Parenteral controlled substances: hydromorphone    Thank you for allowing us to care for this patient. Palliative Team will continue to follow as needed. Please contact team with any questions or concerns.   Team pager [phone] (weekdays)    Alireza Dykes MD  Palliative Care Physician  Message: Epic Secure chat  Team pager [phone]

## 2025-01-03 LAB
ALBUMIN SERPL BCP-MCNC: 2.7 G/DL (ref 3.4–5)
ANION GAP SERPL CALC-SCNC: 15 MMOL/L (ref 10–20)
BUN SERPL-MCNC: 13 MG/DL (ref 6–23)
CALCIUM SERPL-MCNC: 8.9 MG/DL (ref 8.6–10.6)
CHLORIDE SERPL-SCNC: 102 MMOL/L (ref 98–107)
CO2 SERPL-SCNC: 27 MMOL/L (ref 21–32)
CREAT SERPL-MCNC: 0.5 MG/DL (ref 0.5–1.05)
EGFRCR SERPLBLD CKD-EPI 2021: >90 ML/MIN/1.73M*2
ERYTHROCYTE [DISTWIDTH] IN BLOOD BY AUTOMATED COUNT: 25.6 % (ref 11.5–14.5)
FUNGUS SPEC CULT: NORMAL
FUNGUS SPEC CULT: NORMAL
FUNGUS SPEC FUNGUS STN: NORMAL
FUNGUS SPEC FUNGUS STN: NORMAL
GLUCOSE BLD MANUAL STRIP-MCNC: 157 MG/DL (ref 74–99)
GLUCOSE BLD MANUAL STRIP-MCNC: 175 MG/DL (ref 74–99)
GLUCOSE BLD MANUAL STRIP-MCNC: 180 MG/DL (ref 74–99)
GLUCOSE BLD MANUAL STRIP-MCNC: 184 MG/DL (ref 74–99)
GLUCOSE SERPL-MCNC: 155 MG/DL (ref 74–99)
HCT VFR BLD AUTO: 27.2 % (ref 36–46)
HGB BLD-MCNC: 7.7 G/DL (ref 12–16)
MAGNESIUM SERPL-MCNC: 1.73 MG/DL (ref 1.6–2.4)
MCH RBC QN AUTO: 22.2 PG (ref 26–34)
MCHC RBC AUTO-ENTMCNC: 28.3 G/DL (ref 32–36)
MCV RBC AUTO: 78 FL (ref 80–100)
NRBC BLD-RTO: 0 /100 WBCS (ref 0–0)
PHOSPHATE SERPL-MCNC: 2.8 MG/DL (ref 2.5–4.9)
PLATELET # BLD AUTO: 620 X10*3/UL (ref 150–450)
POTASSIUM SERPL-SCNC: 4 MMOL/L (ref 3.5–5.3)
RBC # BLD AUTO: 3.47 X10*6/UL (ref 4–5.2)
SODIUM SERPL-SCNC: 140 MMOL/L (ref 136–145)
WBC # BLD AUTO: 11.3 X10*3/UL (ref 4.4–11.3)

## 2025-01-03 PROCEDURE — 97530 THERAPEUTIC ACTIVITIES: CPT | Mod: GP

## 2025-01-03 PROCEDURE — 2500000001 HC RX 250 WO HCPCS SELF ADMINISTERED DRUGS (ALT 637 FOR MEDICARE OP): Performed by: STUDENT IN AN ORGANIZED HEALTH CARE EDUCATION/TRAINING PROGRAM

## 2025-01-03 PROCEDURE — 85027 COMPLETE CBC AUTOMATED: CPT | Performed by: STUDENT IN AN ORGANIZED HEALTH CARE EDUCATION/TRAINING PROGRAM

## 2025-01-03 PROCEDURE — 2500000002 HC RX 250 W HCPCS SELF ADMINISTERED DRUGS (ALT 637 FOR MEDICARE OP, ALT 636 FOR OP/ED): Performed by: STUDENT IN AN ORGANIZED HEALTH CARE EDUCATION/TRAINING PROGRAM

## 2025-01-03 PROCEDURE — 1100000001 HC PRIVATE ROOM DAILY

## 2025-01-03 PROCEDURE — 99223 1ST HOSP IP/OBS HIGH 75: CPT

## 2025-01-03 PROCEDURE — 82947 ASSAY GLUCOSE BLOOD QUANT: CPT

## 2025-01-03 PROCEDURE — 2500000005 HC RX 250 GENERAL PHARMACY W/O HCPCS: Performed by: STUDENT IN AN ORGANIZED HEALTH CARE EDUCATION/TRAINING PROGRAM

## 2025-01-03 PROCEDURE — 2500000004 HC RX 250 GENERAL PHARMACY W/ HCPCS (ALT 636 FOR OP/ED): Performed by: STUDENT IN AN ORGANIZED HEALTH CARE EDUCATION/TRAINING PROGRAM

## 2025-01-03 PROCEDURE — 84100 ASSAY OF PHOSPHORUS: CPT | Performed by: STUDENT IN AN ORGANIZED HEALTH CARE EDUCATION/TRAINING PROGRAM

## 2025-01-03 PROCEDURE — 36415 COLL VENOUS BLD VENIPUNCTURE: CPT | Performed by: STUDENT IN AN ORGANIZED HEALTH CARE EDUCATION/TRAINING PROGRAM

## 2025-01-03 PROCEDURE — 83735 ASSAY OF MAGNESIUM: CPT | Performed by: STUDENT IN AN ORGANIZED HEALTH CARE EDUCATION/TRAINING PROGRAM

## 2025-01-03 PROCEDURE — 99233 SBSQ HOSP IP/OBS HIGH 50: CPT | Performed by: INTERNAL MEDICINE

## 2025-01-03 PROCEDURE — 80069 RENAL FUNCTION PANEL: CPT | Performed by: STUDENT IN AN ORGANIZED HEALTH CARE EDUCATION/TRAINING PROGRAM

## 2025-01-03 RX ADMIN — INSULIN LISPRO 4 UNITS: 100 INJECTION, SOLUTION INTRAVENOUS; SUBCUTANEOUS at 09:20

## 2025-01-03 RX ADMIN — ATORVASTATIN CALCIUM 80 MG: 80 TABLET, FILM COATED ORAL at 21:05

## 2025-01-03 RX ADMIN — GABAPENTIN 600 MG: 300 CAPSULE ORAL at 05:46

## 2025-01-03 RX ADMIN — ASPIRIN 81 MG: 81 TABLET, COATED ORAL at 09:09

## 2025-01-03 RX ADMIN — GABAPENTIN 600 MG: 300 CAPSULE ORAL at 13:57

## 2025-01-03 RX ADMIN — GABAPENTIN 600 MG: 300 CAPSULE ORAL at 21:05

## 2025-01-03 RX ADMIN — DULOXETINE HYDROCHLORIDE 60 MG: 60 CAPSULE, DELAYED RELEASE ORAL at 09:09

## 2025-01-03 RX ADMIN — METOPROLOL TARTRATE 12.5 MG: 25 TABLET, FILM COATED ORAL at 21:05

## 2025-01-03 RX ADMIN — AMPICILLIN SODIUM AND SULBACTAM SODIUM 3 G: 2; 1 INJECTION, POWDER, FOR SOLUTION INTRAMUSCULAR; INTRAVENOUS at 20:47

## 2025-01-03 RX ADMIN — ACETAMINOPHEN 975 MG: 325 TABLET ORAL at 21:05

## 2025-01-03 RX ADMIN — LEVOTHYROXINE SODIUM 75 MCG: 0.07 TABLET ORAL at 09:09

## 2025-01-03 RX ADMIN — DIVALPROEX SODIUM 1000 MG: 500 TABLET, FILM COATED, EXTENDED RELEASE ORAL at 21:05

## 2025-01-03 RX ADMIN — METOPROLOL TARTRATE 12.5 MG: 25 TABLET, FILM COATED ORAL at 09:09

## 2025-01-03 RX ADMIN — INSULIN LISPRO 2 UNITS: 100 INJECTION, SOLUTION INTRAVENOUS; SUBCUTANEOUS at 12:21

## 2025-01-03 RX ADMIN — HYDROMORPHONE HYDROCHLORIDE 0.5 MG: 1 INJECTION, SOLUTION INTRAMUSCULAR; INTRAVENOUS; SUBCUTANEOUS at 21:04

## 2025-01-03 RX ADMIN — AMPICILLIN SODIUM AND SULBACTAM SODIUM 3 G: 2; 1 INJECTION, POWDER, FOR SOLUTION INTRAMUSCULAR; INTRAVENOUS at 13:58

## 2025-01-03 RX ADMIN — INSULIN LISPRO 2 UNITS: 100 INJECTION, SOLUTION INTRAVENOUS; SUBCUTANEOUS at 09:22

## 2025-01-03 RX ADMIN — OXYCODONE HYDROCHLORIDE 10 MG: 5 TABLET ORAL at 12:20

## 2025-01-03 RX ADMIN — OXYCODONE HYDROCHLORIDE 10 MG: 5 TABLET ORAL at 17:45

## 2025-01-03 RX ADMIN — HYDROMORPHONE HYDROCHLORIDE 0.5 MG: 1 INJECTION, SOLUTION INTRAMUSCULAR; INTRAVENOUS; SUBCUTANEOUS at 09:28

## 2025-01-03 RX ADMIN — HYDROMORPHONE HYDROCHLORIDE 0.5 MG: 1 INJECTION, SOLUTION INTRAMUSCULAR; INTRAVENOUS; SUBCUTANEOUS at 14:32

## 2025-01-03 RX ADMIN — INSULIN GLARGINE 10 UNITS: 100 INJECTION, SOLUTION SUBCUTANEOUS at 21:04

## 2025-01-03 RX ADMIN — DIVALPROEX SODIUM 500 MG: 500 TABLET, FILM COATED, EXTENDED RELEASE ORAL at 09:09

## 2025-01-03 RX ADMIN — INSULIN LISPRO 4 UNITS: 100 INJECTION, SOLUTION INTRAVENOUS; SUBCUTANEOUS at 12:21

## 2025-01-03 RX ADMIN — ACETAMINOPHEN 975 MG: 325 TABLET ORAL at 09:09

## 2025-01-03 RX ADMIN — LIDOCAINE 4% 1 PATCH: 40 PATCH TOPICAL at 09:09

## 2025-01-03 RX ADMIN — HYDROMORPHONE HYDROCHLORIDE 0.5 MG: 1 INJECTION, SOLUTION INTRAMUSCULAR; INTRAVENOUS; SUBCUTANEOUS at 12:20

## 2025-01-03 RX ADMIN — AMPICILLIN SODIUM AND SULBACTAM SODIUM 3 G: 2; 1 INJECTION, POWDER, FOR SOLUTION INTRAMUSCULAR; INTRAVENOUS at 01:03

## 2025-01-03 RX ADMIN — OXYCODONE HYDROCHLORIDE 10 MG: 5 TABLET ORAL at 05:48

## 2025-01-03 RX ADMIN — AMPICILLIN SODIUM AND SULBACTAM SODIUM 3 G: 2; 1 INJECTION, POWDER, FOR SOLUTION INTRAMUSCULAR; INTRAVENOUS at 09:10

## 2025-01-03 RX ADMIN — LEVETIRACETAM 500 MG: 500 TABLET, FILM COATED ORAL at 21:05

## 2025-01-03 RX ADMIN — HYDROMORPHONE HYDROCHLORIDE 0.5 MG: 1 INJECTION, SOLUTION INTRAMUSCULAR; INTRAVENOUS; SUBCUTANEOUS at 06:22

## 2025-01-03 RX ADMIN — INSULIN LISPRO 4 UNITS: 100 INJECTION, SOLUTION INTRAVENOUS; SUBCUTANEOUS at 17:48

## 2025-01-03 RX ADMIN — LEVETIRACETAM 500 MG: 500 TABLET, FILM COATED ORAL at 09:09

## 2025-01-03 RX ADMIN — PANTOPRAZOLE SODIUM 40 MG: 40 TABLET, DELAYED RELEASE ORAL at 09:08

## 2025-01-03 RX ADMIN — ACETAMINOPHEN 975 MG: 325 TABLET ORAL at 13:58

## 2025-01-03 RX ADMIN — INSULIN LISPRO 2 UNITS: 100 INJECTION, SOLUTION INTRAVENOUS; SUBCUTANEOUS at 17:46

## 2025-01-03 ASSESSMENT — COGNITIVE AND FUNCTIONAL STATUS - GENERAL
TURNING FROM BACK TO SIDE WHILE IN FLAT BAD: A LOT
CLIMB 3 TO 5 STEPS WITH RAILING: TOTAL
STANDING UP FROM CHAIR USING ARMS: TOTAL
MOVING FROM LYING ON BACK TO SITTING ON SIDE OF FLAT BED WITH BEDRAILS: A LOT
MOVING TO AND FROM BED TO CHAIR: TOTAL
HELP NEEDED FOR BATHING: A LOT
WALKING IN HOSPITAL ROOM: TOTAL
MOVING TO AND FROM BED TO CHAIR: TOTAL
DRESSING REGULAR UPPER BODY CLOTHING: A LOT
MOBILITY SCORE: 8
DAILY ACTIVITIY SCORE: 13
WALKING IN HOSPITAL ROOM: TOTAL
TOILETING: TOTAL
MOBILITY SCORE: 8
STANDING UP FROM CHAIR USING ARMS: TOTAL
CLIMB 3 TO 5 STEPS WITH RAILING: TOTAL
MOVING FROM LYING ON BACK TO SITTING ON SIDE OF FLAT BED WITH BEDRAILS: A LOT
TURNING FROM BACK TO SIDE WHILE IN FLAT BAD: A LOT
PERSONAL GROOMING: A LOT
DRESSING REGULAR LOWER BODY CLOTHING: A LOT

## 2025-01-03 ASSESSMENT — PAIN DESCRIPTION - LOCATION
LOCATION: LEG
LOCATION: LEG

## 2025-01-03 ASSESSMENT — PAIN SCALES - GENERAL
PAINLEVEL_OUTOF10: 10 - WORST POSSIBLE PAIN
PAINLEVEL_OUTOF10: 0 - NO PAIN
PAINLEVEL_OUTOF10: 5 - MODERATE PAIN
PAINLEVEL_OUTOF10: 10 - WORST POSSIBLE PAIN

## 2025-01-03 ASSESSMENT — PAIN - FUNCTIONAL ASSESSMENT
PAIN_FUNCTIONAL_ASSESSMENT: 0-10
PAIN_FUNCTIONAL_ASSESSMENT: 0-10

## 2025-01-03 NOTE — PROGRESS NOTES
Physical Therapy    Physical Therapy Treatment    Patient Name: Shirin Slater  MRN: [id number]  Today's Date: 1/3/2025  Time Calculation  Start Time: 1306  Stop Time: 1326  Time Calculation (min): 20 min       Assessment/Plan   PT Assessment  End of Session Communication: Bedside nurse  End of Session Patient Position: Bed, 3 rail up, Alarm off, not on at start of session  PT Plan  Inpatient/Swing Bed or Outpatient: Inpatient  PT Plan  Treatment/Interventions: Bed mobility, Transfer training, Gait training, Stair training, Balance training, Strengthening, Endurance training, Range of motion, Therapeutic exercise, Therapeutic activity, Home exercise program  PT Plan: Ongoing PT  PT Frequency: 3 times per week  PT Discharge Recommendations: Moderate intensity level of continued care  PT Recommended Transfer Status:  (x1-2)  PT - OK to Discharge: Yes      General Visit Information:   PT  Visit  PT Received On: 01/03/25  Prior to Session Communication: Bedside nurse  Patient Position Received: Bed, 3 rail up, Alarm off, not on at start of session  Caregiver Feedback: no  General Comment: pt POD #1  formalization of L AKA. pt alert and motivated for PT.     Subjective   Precautions:  Precautions  LE Weight Bearing Status: Left Non-Weight Bearing  Medical Precautions: Fall precautions  Precautions Comment: wound vac to LLE  Vital Signs:       Objective   Pain:  Pain Assessment  Pain Assessment: 0-10  0-10 (Numeric) Pain Score: 0 - No pain  Cognition:  Cognition  Orientation Level: Oriented X4  Lines/Tubes/Drains:  Open Drain 1 Left;Medial Thigh (Active)   Number of days: 1       External Urinary Catheter Female (Active)   Number of days: 7       PT Treatments:     Therapeutic Activity  Therapeutic Activity Performed: Yes  Therapeutic Activity 1: re-ed pt on supine therex by laying HOB flat to stretch hip flexors; lay R SL to complete flex/ext of L hip  Therapeutic Activity 2: pt trialed EOB sitting into HOB max elevated  holding onto bed rail SBA     Bed Mobility  Bed Mobility: Yes  Bed Mobility 1  Bed Mobility 1: Supine to sitting, Sitting to supine  Level of Assistance 1: Moderate assistance, Moderate verbal cues  Bed Mobility Comments 1: assist with chux bringing hips towards EOB  Bed Mobility 2  Bed Mobility  2: Scooting  Level of Assistance 2: Close supervision  Bed Mobility Comments 2: HOB flat; pt able to boost self holding bed rails  Ambulation/Gait Training  Ambulation/Gait Training Performed: No  Transfers  Transfer: No  Stairs  Stairs: No          Outcome Measures:  [facility] Basic Mobility  Turning from your back to your side while in a flat bed without using bedrails: A lot  Moving from lying on your back to sitting on the side of a flat bed without using bedrails: A lot  Moving to and from bed to chair (including a wheelchair): Total  Standing up from a chair using your arms (e.g. wheelchair or bedside chair): Total  To walk in hospital room: Total  Climbing 3-5 steps with railing: Total  Basic Mobility - Total Score: 8                            Education Documentation  Precautions, taught by Hiral Tineo PT at 1/3/2025  2:18 PM.  Learner: Patient  Readiness: Acceptance  Method: Explanation  Response: Verbalizes Understanding    Body Mechanics, taught by Hiral Tineo PT at 1/3/2025  2:18 PM.  Learner: Patient  Readiness: Acceptance  Method: Explanation  Response: Verbalizes Understanding    Mobility Training, taught by Hiral Tineo PT at 1/3/2025  2:18 PM.  Learner: Patient  Readiness: Acceptance  Method: Explanation  Response: Verbalizes Understanding    Education Comments  No comments found.          OP EDUCATION:       Encounter Problems       Encounter Problems (Active)       Balance       STG - Maintains static sitting balance with upper extremity support SBA >/= 10 min  (Progressing)       Start:  12/18/24    Expected End:  01/17/25               Mobility       pt will be independent in UE/LE  HEP to promote strengthening  (Progressing)       Start:  12/18/24    Expected End:  01/17/25               PT Transfers       STG - Transfer from bed to chair/WC modA with slideboard  (Progressing)       Start:  12/18/24    Expected End:  01/17/25            STG - Patient will perform bed mobility modA (Progressing)       Start:  12/18/24    Expected End:  01/17/25               Pain - Adult                01/03/25 at 2:19 PM   Hiral Tineo, PT   Rehab Office: [phone]

## 2025-01-03 NOTE — CONSULTS
Shirin Slater is 54 y.o. female with history of severe PAD s/p R hip disarticulation s/p L CFA and EIA embolectomy 12/2023 s/p multiple L toe amputations, s/p L femoral endarterectomy, profundaplasty, common fnhccsk-in-mrnewrkp tibial bypass using 6 mm ringed PTFE, left lower extremity angiogram on 9/25 T2DM complicated by diabetic neuropathy, HTN, HLD, renal and splenic thromboses who who presents with severe left leg pain and inability to move or feel or left leg. Imaging consistent with occlusion of her L CFA-AT bypass graft. During hospitalization patient had a left above-knee guillotine amputation was done for source control.   Currently  s/p formalization of L AKA on 1/2.     Acute pain team was consulted for pain management recommendations. Patient reported 1/10 pain upon interview. Reports current break through meds help with pain control but feels that dressing changes lead to uncontrolled pain.     Past Medical History:   Diagnosis Date    Anxiety     Arthritis     Cancer (Multi)     Cellulitis     COPD (chronic obstructive pulmonary disease) (Multi)     Delayed emergence from general anesthesia     Diabetes mellitus (Multi)     Disease of thyroid gland     Diverticulitis     Epilepsy, unspecified, not intractable, without status epilepticus     Epilepsy    HLD (hyperlipidemia)     Hx of blood clots     Hypertension     PAD (peripheral artery disease) (CMS-[facility])     Peripheral vascular disease, unspecified (CMS-[facility]) 09/14/2022    PAD (peripheral artery disease)    PONV (postoperative nausea and vomiting)     PTSD (post-traumatic stress disorder)     Sleep apnea     Uterine cancer (Multi)         Past Surgical History:   Procedure Laterality Date    AMPUTATION FOOT / TOE Left 2022    AMPUTATION FOOT / TOE Left     ANTERIOR CRUCIATE LIGAMENT REPAIR Left 2009    CARDIAC CATHETERIZATION N/A 09/20/2024    Procedure: Left Heart Cath;  Surgeon: Cheyenne Eckert MD;  Location: [place] [address] Cardiac Cath Lab;   Service: Cardiovascular;  Laterality: N/A;     SECTION, CLASSIC      CHOLECYSTECTOMY      COLON SURGERY  2019    Ressection    CT ANGIO AORTA AND BILATERAL ILIOFEMORAL RUN OFF INCLUDING WITHOUT CONTRAST IF PERFORMED  2021    CT AORTA AND BILATERAL ILIOFEMORAL RUNOFF ANGIOGRAM W AND/OR WO IV CONTRAST 2021 [facility] INPATIENT LEGACY    CT ANGIO AORTA AND BILATERAL ILIOFEMORAL RUN OFF INCLUDING WITHOUT CONTRAST IF PERFORMED  2022    CT AORTA AND BILATERAL ILIOFEMORAL RUNOFF ANGIOGRAM W AND/OR WO IV CONTRAST 2022 [facility] CLINICAL LEGACY    HYSTERECTOMY  2012    INVASIVE VASCULAR PROCEDURE N/A 2024    Procedure: Lower Extremity Angiogram;  Surgeon: Cheyenne Eckert MD;  Location: [place] [address] Cardiac Cath Lab;  Service: Cardiovascular;  Laterality: N/A;    IR ANGIOGRAM AORTA ABDOMEN  2021    IR ANGIOGRAM AORTA ABDOMEN 2021 [facility] INPATIENT LEGACY    LEG AMPUTATION Right     hip    OTHER SURGICAL HISTORY  2021    Arterial angioplasty of lower extremity    TONSILECTOMY, ADENOIDECTOMY, BILATERAL MYRINGOTOMY AND TUBES  1971    VASCULAR SURGERY Right 2019    Feet w/ ICU stay    VASCULAR SURGERY Left     CFA Embolectomy    VASCULAR SURGERY Right 2020    Knee        Family History   Problem Relation Name Age of Onset    Diverticulitis Daughter      Heart failure Other          Several family members without direct blood connection        Social History     Socioeconomic History    Marital status: Single     Spouse name: Not on file    Number of children: Not on file    Years of education: Not on file    Highest education level: Not on file   Occupational History    Not on file   Tobacco Use    Smoking status: Every Day     Current packs/day: 0.50     Average packs/day: 0.6 packs/day for 44.0 years (24.4 ttl pk-yrs)     Types: Cigarettes     Start date:      Passive exposure: Current    Smokeless tobacco: Never    Tobacco comments:     Currest smoker at around 0.5PPD. Peak  at 1.5-2PPD    Vaping Use    Vaping status: Never Used   Substance and Sexual Activity    Alcohol use: Yes     Comment: Rare    Drug use: Yes     Types: Marijuana     Comment: flower and edibles once a day    Sexual activity: Defer   Other Topics Concern    Not on file   Social History Narrative    Not on file     Social Drivers of Health     Financial Resource Strain: Low Risk  (12/18/2024)    Overall Financial Resource Strain (CARDIA)     Difficulty of Paying Living Expenses: Not very hard   Food Insecurity: No Food Insecurity (12/18/2024)    Hunger Vital Sign     Worried About Running Out of Food in the Last Year: Never true     Ran Out of Food in the Last Year: Never true   Transportation Needs: No Transportation Needs (12/18/2024)    PRAPARE - Transportation     Lack of Transportation (Medical): No     Lack of Transportation (Non-Medical): No   Recent Concern: Transportation Needs - Unmet Transportation Needs (9/27/2024)    PRAPARE - Transportation     Lack of Transportation (Medical): Yes     Lack of Transportation (Non-Medical): Yes   Physical Activity: Unknown (8/14/2024)    Received from [facility]    Exercise Vital Sign     Days of Exercise per Week: Patient declined     Minutes of Exercise per Session: Not on file   Stress: Not on file   Social Connections: Not on file   Intimate Partner Violence: Not At Risk (12/18/2024)    Humiliation, Afraid, Rape, and Kick questionnaire     Fear of Current or Ex-Partner: No     Emotionally Abused: No     Physically Abused: No     Sexually Abused: No   Housing Stability: Low Risk  (12/18/2024)    Housing Stability Vital Sign     Unable to Pay for Housing in the Last Year: No     Number of Times Moved in the Last Year: 1     Homeless in the Last Year: No        Allergies   Allergen Reactions    Aspartame Seizure    Nsaids (Non-Steroidal Anti-Inflammatory Drug) Other     Significant kidney injury (per patient report)         Review of Systems  Gen:  No fatigue, anorexia, insomnia, fever.   Eyes: No vision loss, double vision, drainage, eye pain.   ENT: No pharyngitis, dry mouth, no hearing changes or ear discharge  Cardiac: No chest pain, palpitations, syncope, near syncope.   Pulmonary: No shortness of breath, cough, hemoptysis.   Heme/lymph: No swollen glands, fever, bleeding.   GI: No abdominal pain, change in bowel habits, melena, hematemesis, hematochezia, nausea, vomiting, diarrhea.   : No discharge, dysuria, frequency, urgency, hematuria.  Endo: No polyuria or weight loss.   Musculoskeletal: Negative for any pain or loss of ROM/weakness  Skin: No rashes or lesions  Neuro: Normal speech, no numbness or weakness. No gait difficulties  Review of systems is otherwise negative unless stated above or in history of present illness.    Physical Exam:  Constitutional:  no distress, alert and cooperative  Eyes: clear sclera  Head/Neck: No apparent injury, trachea midline  Respiratory/Thorax: Patent airways, thorax symmetric, breathing comfortably  Cardiovascular: no pitting edema  Gastrointestinal: Nondistended  Musculoskeletal: ROM intact  Extremities: no clubbing  Neurological: alert, pacheco x4  Psychological: Appropriate affect    Results for orders placed or performed during the hospital encounter of 12/17/24 (from the past 24 hours)   POCT GLUCOSE   Result Value Ref Range    POCT Glucose 225 (H) 74 - 99 mg/dL   POCT GLUCOSE   Result Value Ref Range    POCT Glucose 220 (H) 74 - 99 mg/dL   POCT GLUCOSE   Result Value Ref Range    POCT Glucose 184 (H) 74 - 99 mg/dL   CBC   Result Value Ref Range    WBC 11.3 4.4 - 11.3 x10*3/uL    nRBC 0.0 0.0 - 0.0 /100 WBCs    RBC 3.47 (L) 4.00 - 5.20 x10*6/uL    Hemoglobin 7.7 (L) 12.0 - 16.0 g/dL    Hematocrit 27.2 (L) 36.0 - 46.0 %    MCV 78 (L) 80 - 100 fL    MCH 22.2 (L) 26.0 - 34.0 pg    MCHC 28.3 (L) 32.0 - 36.0 g/dL    RDW 25.6 (H) 11.5 - 14.5 %    Platelets 620 (H) 150 - 450 x10*3/uL   Renal Function Panel   Result  Value Ref Range    Glucose 155 (H) 74 - 99 mg/dL    Sodium 140 136 - 145 mmol/L    Potassium 4.0 3.5 - 5.3 mmol/L    Chloride 102 98 - 107 mmol/L    Bicarbonate 27 21 - 32 mmol/L    Anion Gap 15 10 - 20 mmol/L    Urea Nitrogen 13 6 - 23 mg/dL    Creatinine 0.50 0.50 - 1.05 mg/dL    eGFR >90 >60 mL/min/1.73m*2    Calcium 8.9 8.6 - 10.6 mg/dL    Phosphorus 2.8 2.5 - 4.9 mg/dL    Albumin 2.7 (L) 3.4 - 5.0 g/dL   Magnesium   Result Value Ref Range    Magnesium 1.73 1.60 - 2.40 mg/dL   POCT GLUCOSE   Result Value Ref Range    POCT Glucose 157 (H) 74 - 99 mg/dL      Shirin Slater is 54 y.o. female with history of severe PAD s/p R hip disarticulation s/p L CFA and EIA embolectomy 12/2023 s/p multiple L toe amputations, s/p L femoral endarterectomy, profundaplasty, common bhvfkfc-ny-mzmbbbwi tibial bypass using 6 mm ringed PTFE, left lower extremity angiogram on 9/25 T2DM complicated by diabetic neuropathy, HTN, HLD, renal and splenic thromboses who who presents with severe left leg pain and inability to move or feel or left leg. Imaging consistent with occlusion of her L CFA-AT bypass graft. During hospitalization patient had a left above-knee guillotine amputation was done for source control.   Currently  s/p formalization of L AKA on 1/2.     Acute pain team was consulted for pain management recommendations. Patient reported 1/10 pain upon interview. Reports current break through meds help with pain control but feels that dressing changes lead to uncontrolled pain.     Plan:   - Continue scheduled tylenol   - Continue Hydromorphone 0.5 mg q2h PRN. Consider weaning down 48 hours post operatively   - Continue oxycodone 10mg q6h PRN for severe pain, oxycodone 5mg q4h PRN for moderate pain  - Dilaudid 0.2mg prior to dressing change  - Robaxin 500g PO Q8H for 3 days.  - Lidocaine patch for back pain   - Give 1mg Magnesium IV over 2 hours   - Continue gabapentin as currently prescribed  - Acute pain will sign off. Please  reach out for any questions or concerns.       Acute Pain Team  pg [phone] ph [phone].

## 2025-01-03 NOTE — PROGRESS NOTES
VASCULAR SURGERY PROGRESS NOTE  Subjective   Complaining of pain this morning.     Objective   Vitals:    01/03/25 0359   BP: (!) 163/93   Pulse: 84   Resp: 18   Temp: 36.4 °C (97.5 °F)   SpO2: 94%      Exam:  Constitutional: appears uncomfortable  Neuro:  AOx3, grossly intact  ENMT: moist mucous membranes  CV: no tachycardia  Pulm: non-labored on RA  GI: soft, non-tender, non-distended  Skin: warm and dry  Musculoskeletal: moving all extremities  Extremities: Left AKA prevena holding suction, mild output in cannister    Relevant Results  Medications:  Scheduled Meds:  acetaminophen, 975 mg, oral, TID  ampicillin-sulbactam, 3 g, intravenous, q6h  aspirin, 81 mg, oral, Daily  atorvastatin, 80 mg, oral, Nightly  divalproex, 1,000 mg, oral, Nightly at 0300  divalproex, 500 mg, oral, Daily with breakfast  DULoxetine, 60 mg, oral, Daily  gabapentin, 600 mg, oral, q8h NINO  insulin glargine, 10 Units, subcutaneous, Nightly  insulin lispro, 0-10 Units, subcutaneous, TID AC  insulin lispro, 4 Units, subcutaneous, TID AC  ketorolac, 15 mg, intravenous, q6h  levETIRAcetam, 500 mg, oral, BID  levothyroxine, 75 mcg, oral, Daily before breakfast  lidocaine, 1 patch, transdermal, Daily  metoprolol tartrate, 12.5 mg, oral, BID  pantoprazole, 40 mg, oral, Daily before breakfast  sennosides-docusate sodium, 1 tablet, oral, BID      Continuous Infusions:  heparin, 0-4,000 Units/hr, Last Rate: Stopped (01/02/25 1250)      PRN Meds:.  PRN medications: albuterol, dextrose, glucagon, glucagon, glucagon HCL, HYDROmorphone, [Held by provider] loperamide, melatonin, naloxone, ondansetron ODT **OR** ondansetron, oxyCODONE, oxyCODONE, oxygen    Labs:  Results from last 7 days   Lab Units 01/02/25  0559 01/01/25  0719 12/31/24  0803   WBC AUTO x10*3/uL 9.0 9.7 10.4   HEMOGLOBIN g/dL 8.4* 9.1* 8.5*   PLATELETS AUTO x10*3/uL 544* 562* 565*      Results from last 7 days   Lab Units 01/02/25  0559 01/01/25  0719 12/31/24  0803   SODIUM mmol/L 142  139 138   POTASSIUM mmol/L 3.5 4.0 3.6   CHLORIDE mmol/L 103 101 100   CO2 mmol/L 30 29 29   BUN mg/dL 13 11 13   CREATININE mg/dL 0.35* 0.42* 0.44*   GLUCOSE mg/dL 104* 109* 106*   MAGNESIUM mg/dL 1.75  --   --    PHOSPHORUS mg/dL 4.1 3.6 3.9          Assessment/Plan   Shirin Slater is 54 y.o. female with history of severe PAD s/p R hip disarticulation s/p L CFA and EIA embolectomy 12/2023 s/p multiple L toe amputations, s/p L femoral endarterectomy, profundaplasty, common lpkekkq-pg-stopldnh tibial bypass using 6 mm ringed PTFE, left lower extremity angiogram on 9/25 T2DM complicated by diabetic neuropathy, HTN, HLD, renal and splenic thromboses who who presents with severe left leg pain and inability to move or feel or left leg. Imaging consistent with occlusion of her L CFA-AT bypass graft. On exam, patient with Weakley 3 limb ischemia. Given leukocytosis, tachycardia, limb ischemia and concern for infection she was taken to OR urgently and had findings of purulence along the anteromedial aspect of the left knee and distal thigh. Occluded fem-AT bypass and native popliteal artery; left above-knee guillotine amputation was done for source control.   Now s/p formalization of L AKA on 1/2. Currently with significant pain.     Plan:  Neuro: acute postoperative pain, Hx seizures  - home Keppra, Depakote, Cymbalta  - karon palliative care recs  - acute pain recs:  Oxy 5mg/10mg Q4H PRN for moderate/severe pain respectively   Dilaudid 0.4mg Q2H PRN breakthrough pain -> increased to 0.5 mg q2h  Robaxin 500g PO Q8H   Gabapentin 300mg TID -> increased to 600 mg TID  Started toradol 15 mg q6h x10 doses  Lidocaine 4% patch daily   No peripheral block at this time   - May need to reengage acute pain service if PRN pain control is insufficient     CV: Hx severe PAD with multiple surgical interventions, HTN  - maintain blood pressure control with metoprolol   - HI statin  - daily ASA (holding home plavix)  - heparin  infusion, low intensity (holding home coumadin)     Pulm:   - continue to encourage IS hourly while awake  - OOB to chair and increase ambulation as tolerated  - albuterol inhaler PRN     FENGI: hypokalemia, hypoalbuminemia   - DM diet with oral supplements to promote wound healing potential   - continue bowel regimen to prevent OIC   - continue PPI  - monitor and replete electrolytes     Endo: DM2, hypothyroidism  - holding home jardiance  - glucommander protocol   - levothyroxine     Heme: anemia of chronic disease  - no s/sx of bleeding  - monitor H/H, transfuse for Hgb <7     ID:  humble left leg infection required amputation for source control; leukocytosis (resolved)   - blood cultures (x2) 12/17: no growth   - vanc/zosyn (stopped 12/24/24)  - continue Unasyn   - c diff negative 12/29  - trend temp q4h      Dispo-  - regular nursing floor    Discussed with Dr. Paul Rain MD  Vascular Surgery Fellow  Service Pager: [phone]

## 2025-01-03 NOTE — CARE PLAN
The patient's goals for the shift include      The clinical goals for the shift include pt will have adequate pain management throughout the shift

## 2025-01-04 LAB
ALBUMIN SERPL BCP-MCNC: 2.6 G/DL (ref 3.4–5)
ANION GAP SERPL CALC-SCNC: 14 MMOL/L (ref 10–20)
BUN SERPL-MCNC: 11 MG/DL (ref 6–23)
CALCIUM SERPL-MCNC: 8.7 MG/DL (ref 8.6–10.6)
CHLORIDE SERPL-SCNC: 103 MMOL/L (ref 98–107)
CO2 SERPL-SCNC: 30 MMOL/L (ref 21–32)
CREAT SERPL-MCNC: 0.54 MG/DL (ref 0.5–1.05)
EGFRCR SERPLBLD CKD-EPI 2021: >90 ML/MIN/1.73M*2
ERYTHROCYTE [DISTWIDTH] IN BLOOD BY AUTOMATED COUNT: 25.7 % (ref 11.5–14.5)
GLUCOSE BLD MANUAL STRIP-MCNC: 132 MG/DL (ref 74–99)
GLUCOSE BLD MANUAL STRIP-MCNC: 133 MG/DL (ref 74–99)
GLUCOSE BLD MANUAL STRIP-MCNC: 139 MG/DL (ref 74–99)
GLUCOSE BLD MANUAL STRIP-MCNC: 141 MG/DL (ref 74–99)
GLUCOSE SERPL-MCNC: 148 MG/DL (ref 74–99)
HCT VFR BLD AUTO: 29.9 % (ref 36–46)
HGB BLD-MCNC: 8.1 G/DL (ref 12–16)
MAGNESIUM SERPL-MCNC: 1.56 MG/DL (ref 1.6–2.4)
MCH RBC QN AUTO: 22.1 PG (ref 26–34)
MCHC RBC AUTO-ENTMCNC: 27.1 G/DL (ref 32–36)
MCV RBC AUTO: 82 FL (ref 80–100)
NRBC BLD-RTO: 0 /100 WBCS (ref 0–0)
PHOSPHATE SERPL-MCNC: 3.5 MG/DL (ref 2.5–4.9)
PLATELET # BLD AUTO: 580 X10*3/UL (ref 150–450)
POTASSIUM SERPL-SCNC: 3.8 MMOL/L (ref 3.5–5.3)
RBC # BLD AUTO: 3.67 X10*6/UL (ref 4–5.2)
SODIUM SERPL-SCNC: 143 MMOL/L (ref 136–145)
UFH PPP CHRO-ACNC: 0.4 IU/ML
UFH PPP CHRO-ACNC: 0.5 IU/ML
WBC # BLD AUTO: 9.3 X10*3/UL (ref 4.4–11.3)

## 2025-01-04 PROCEDURE — 82947 ASSAY GLUCOSE BLOOD QUANT: CPT

## 2025-01-04 PROCEDURE — 2500000004 HC RX 250 GENERAL PHARMACY W/ HCPCS (ALT 636 FOR OP/ED): Performed by: STUDENT IN AN ORGANIZED HEALTH CARE EDUCATION/TRAINING PROGRAM

## 2025-01-04 PROCEDURE — 2500000001 HC RX 250 WO HCPCS SELF ADMINISTERED DRUGS (ALT 637 FOR MEDICARE OP): Performed by: STUDENT IN AN ORGANIZED HEALTH CARE EDUCATION/TRAINING PROGRAM

## 2025-01-04 PROCEDURE — 2500000002 HC RX 250 W HCPCS SELF ADMINISTERED DRUGS (ALT 637 FOR MEDICARE OP, ALT 636 FOR OP/ED): Performed by: STUDENT IN AN ORGANIZED HEALTH CARE EDUCATION/TRAINING PROGRAM

## 2025-01-04 PROCEDURE — 85520 HEPARIN ASSAY: CPT | Performed by: STUDENT IN AN ORGANIZED HEALTH CARE EDUCATION/TRAINING PROGRAM

## 2025-01-04 PROCEDURE — 82374 ASSAY BLOOD CARBON DIOXIDE: CPT | Performed by: STUDENT IN AN ORGANIZED HEALTH CARE EDUCATION/TRAINING PROGRAM

## 2025-01-04 PROCEDURE — 36415 COLL VENOUS BLD VENIPUNCTURE: CPT | Performed by: STUDENT IN AN ORGANIZED HEALTH CARE EDUCATION/TRAINING PROGRAM

## 2025-01-04 PROCEDURE — 1100000001 HC PRIVATE ROOM DAILY

## 2025-01-04 PROCEDURE — 83735 ASSAY OF MAGNESIUM: CPT | Performed by: STUDENT IN AN ORGANIZED HEALTH CARE EDUCATION/TRAINING PROGRAM

## 2025-01-04 PROCEDURE — 85027 COMPLETE CBC AUTOMATED: CPT | Performed by: STUDENT IN AN ORGANIZED HEALTH CARE EDUCATION/TRAINING PROGRAM

## 2025-01-04 PROCEDURE — 2500000005 HC RX 250 GENERAL PHARMACY W/O HCPCS: Performed by: STUDENT IN AN ORGANIZED HEALTH CARE EDUCATION/TRAINING PROGRAM

## 2025-01-04 RX ORDER — METHOCARBAMOL 500 MG/1
500 TABLET, FILM COATED ORAL EVERY 8 HOURS SCHEDULED
Status: COMPLETED | OUTPATIENT
Start: 2025-01-04 | End: 2025-01-06

## 2025-01-04 RX ADMIN — GABAPENTIN 600 MG: 300 CAPSULE ORAL at 15:32

## 2025-01-04 RX ADMIN — AMPICILLIN SODIUM AND SULBACTAM SODIUM 3 G: 2; 1 INJECTION, POWDER, FOR SOLUTION INTRAMUSCULAR; INTRAVENOUS at 15:33

## 2025-01-04 RX ADMIN — OXYCODONE HYDROCHLORIDE 5 MG: 5 TABLET ORAL at 05:11

## 2025-01-04 RX ADMIN — LEVETIRACETAM 500 MG: 500 TABLET, FILM COATED ORAL at 20:55

## 2025-01-04 RX ADMIN — ASPIRIN 81 MG: 81 TABLET, COATED ORAL at 09:06

## 2025-01-04 RX ADMIN — DULOXETINE HYDROCHLORIDE 60 MG: 60 CAPSULE, DELAYED RELEASE ORAL at 09:00

## 2025-01-04 RX ADMIN — INSULIN LISPRO 4 UNITS: 100 INJECTION, SOLUTION INTRAVENOUS; SUBCUTANEOUS at 09:02

## 2025-01-04 RX ADMIN — METHOCARBAMOL TABLETS 500 MG: 500 TABLET, COATED ORAL at 09:04

## 2025-01-04 RX ADMIN — METOPROLOL TARTRATE 12.5 MG: 25 TABLET, FILM COATED ORAL at 09:00

## 2025-01-04 RX ADMIN — HYDROMORPHONE HYDROCHLORIDE 0.5 MG: 1 INJECTION, SOLUTION INTRAMUSCULAR; INTRAVENOUS; SUBCUTANEOUS at 17:48

## 2025-01-04 RX ADMIN — HEPARIN SODIUM 1800 UNITS/HR: 10000 INJECTION, SOLUTION INTRAVENOUS at 15:33

## 2025-01-04 RX ADMIN — DIVALPROEX SODIUM 1000 MG: 500 TABLET, FILM COATED, EXTENDED RELEASE ORAL at 20:55

## 2025-01-04 RX ADMIN — HYDROMORPHONE HYDROCHLORIDE 0.5 MG: 1 INJECTION, SOLUTION INTRAMUSCULAR; INTRAVENOUS; SUBCUTANEOUS at 21:37

## 2025-01-04 RX ADMIN — AMPICILLIN SODIUM AND SULBACTAM SODIUM 3 G: 2; 1 INJECTION, POWDER, FOR SOLUTION INTRAMUSCULAR; INTRAVENOUS at 20:56

## 2025-01-04 RX ADMIN — HYDROMORPHONE HYDROCHLORIDE 0.5 MG: 1 INJECTION, SOLUTION INTRAMUSCULAR; INTRAVENOUS; SUBCUTANEOUS at 06:27

## 2025-01-04 RX ADMIN — ONDANSETRON 4 MG: 2 INJECTION INTRAMUSCULAR; INTRAVENOUS at 00:30

## 2025-01-04 RX ADMIN — OXYCODONE HYDROCHLORIDE 10 MG: 5 TABLET ORAL at 08:59

## 2025-01-04 RX ADMIN — METHOCARBAMOL TABLETS 500 MG: 500 TABLET, COATED ORAL at 21:01

## 2025-01-04 RX ADMIN — AMPICILLIN SODIUM AND SULBACTAM SODIUM 3 G: 2; 1 INJECTION, POWDER, FOR SOLUTION INTRAMUSCULAR; INTRAVENOUS at 09:05

## 2025-01-04 RX ADMIN — SENNOSIDES AND DOCUSATE SODIUM 1 TABLET: 50; 8.6 TABLET ORAL at 20:55

## 2025-01-04 RX ADMIN — INSULIN LISPRO 4 UNITS: 100 INJECTION, SOLUTION INTRAVENOUS; SUBCUTANEOUS at 12:08

## 2025-01-04 RX ADMIN — METOPROLOL TARTRATE 12.5 MG: 25 TABLET, FILM COATED ORAL at 20:55

## 2025-01-04 RX ADMIN — PANTOPRAZOLE SODIUM 40 MG: 40 TABLET, DELAYED RELEASE ORAL at 06:31

## 2025-01-04 RX ADMIN — LEVETIRACETAM 500 MG: 500 TABLET, FILM COATED ORAL at 08:59

## 2025-01-04 RX ADMIN — GABAPENTIN 600 MG: 300 CAPSULE ORAL at 06:31

## 2025-01-04 RX ADMIN — METHOCARBAMOL TABLETS 500 MG: 500 TABLET, COATED ORAL at 16:38

## 2025-01-04 RX ADMIN — AMPICILLIN SODIUM AND SULBACTAM SODIUM 3 G: 2; 1 INJECTION, POWDER, FOR SOLUTION INTRAMUSCULAR; INTRAVENOUS at 02:08

## 2025-01-04 RX ADMIN — ACETAMINOPHEN 975 MG: 325 TABLET ORAL at 09:00

## 2025-01-04 RX ADMIN — ACETAMINOPHEN 975 MG: 325 TABLET ORAL at 15:32

## 2025-01-04 RX ADMIN — OXYCODONE HYDROCHLORIDE 10 MG: 5 TABLET ORAL at 00:29

## 2025-01-04 RX ADMIN — INSULIN GLARGINE 10 UNITS: 100 INJECTION, SOLUTION SUBCUTANEOUS at 20:56

## 2025-01-04 RX ADMIN — LIDOCAINE 4% 1 PATCH: 40 PATCH TOPICAL at 09:01

## 2025-01-04 RX ADMIN — ACETAMINOPHEN 975 MG: 325 TABLET ORAL at 20:55

## 2025-01-04 RX ADMIN — DIVALPROEX SODIUM 500 MG: 500 TABLET, FILM COATED, EXTENDED RELEASE ORAL at 08:59

## 2025-01-04 RX ADMIN — LEVOTHYROXINE SODIUM 75 MCG: 0.07 TABLET ORAL at 06:32

## 2025-01-04 RX ADMIN — HYDROMORPHONE HYDROCHLORIDE 0.5 MG: 1 INJECTION, SOLUTION INTRAMUSCULAR; INTRAVENOUS; SUBCUTANEOUS at 11:48

## 2025-01-04 RX ADMIN — GABAPENTIN 600 MG: 300 CAPSULE ORAL at 21:01

## 2025-01-04 RX ADMIN — OXYCODONE HYDROCHLORIDE 10 MG: 5 TABLET ORAL at 16:38

## 2025-01-04 RX ADMIN — ATORVASTATIN CALCIUM 80 MG: 80 TABLET, FILM COATED ORAL at 20:55

## 2025-01-04 ASSESSMENT — PAIN SCALES - GENERAL
PAINLEVEL_OUTOF10: 5 - MODERATE PAIN
PAINLEVEL_OUTOF10: 8
PAINLEVEL_OUTOF10: 9
PAINLEVEL_OUTOF10: 10 - WORST POSSIBLE PAIN
PAINLEVEL_OUTOF10: 0 - NO PAIN
PAINLEVEL_OUTOF10: 8
PAINLEVEL_OUTOF10: 9
PAINLEVEL_OUTOF10: 10 - WORST POSSIBLE PAIN
PAINLEVEL_OUTOF10: 8

## 2025-01-04 ASSESSMENT — PAIN DESCRIPTION - ORIENTATION
ORIENTATION: RIGHT
ORIENTATION: RIGHT
ORIENTATION: LEFT

## 2025-01-04 ASSESSMENT — PAIN - FUNCTIONAL ASSESSMENT
PAIN_FUNCTIONAL_ASSESSMENT: 0-10

## 2025-01-04 ASSESSMENT — PAIN DESCRIPTION - LOCATION
LOCATION: INCISION
LOCATION: INCISION
LOCATION: LEG

## 2025-01-04 ASSESSMENT — COGNITIVE AND FUNCTIONAL STATUS - GENERAL
PERSONAL GROOMING: A LOT
TOILETING: TOTAL
MOVING TO AND FROM BED TO CHAIR: TOTAL
DAILY ACTIVITIY SCORE: 12
MOVING TO AND FROM BED TO CHAIR: TOTAL
STANDING UP FROM CHAIR USING ARMS: TOTAL
TURNING FROM BACK TO SIDE WHILE IN FLAT BAD: A LOT
STANDING UP FROM CHAIR USING ARMS: TOTAL
CLIMB 3 TO 5 STEPS WITH RAILING: TOTAL
MOBILITY SCORE: 8
EATING MEALS: A LITTLE
WALKING IN HOSPITAL ROOM: TOTAL
TOILETING: TOTAL
HELP NEEDED FOR BATHING: A LOT
DAILY ACTIVITIY SCORE: 13
DRESSING REGULAR LOWER BODY CLOTHING: A LOT
CLIMB 3 TO 5 STEPS WITH RAILING: TOTAL
DRESSING REGULAR UPPER BODY CLOTHING: A LOT
HELP NEEDED FOR BATHING: A LOT
PERSONAL GROOMING: A LOT
DRESSING REGULAR UPPER BODY CLOTHING: A LOT
TURNING FROM BACK TO SIDE WHILE IN FLAT BAD: A LOT
MOVING FROM LYING ON BACK TO SITTING ON SIDE OF FLAT BED WITH BEDRAILS: A LITTLE
WALKING IN HOSPITAL ROOM: TOTAL
MOVING FROM LYING ON BACK TO SITTING ON SIDE OF FLAT BED WITH BEDRAILS: A LOT
MOBILITY SCORE: 9
DRESSING REGULAR LOWER BODY CLOTHING: A LOT

## 2025-01-04 ASSESSMENT — PAIN SCALES - PAIN ASSESSMENT IN ADVANCED DEMENTIA (PAINAD): TOTALSCORE: MEDICATION (SEE MAR)

## 2025-01-04 NOTE — PROGRESS NOTES
SW met with patient at bedside to review discharge planning.  Patient is currently refusing SNF placement.  She's been to [facility] in the past.  She does not wish to return there, and unwilling to consider other SNF options at this time.  She's requesting to discharge home with [facility] services through [facility].  She reports having waiver services through [facility].  Patient was unable to recall waiver 's name during visit.  AMEYA please follow-up on Sunday.     Vandana FONTAINEW

## 2025-01-04 NOTE — PROGRESS NOTES
"Palliative Medicine following for:  Complex medical decision making, symptom management, patient/family support    History obtained from chart review including ED note, H&P, patient's daily progress notes, review of lab/test results, and discussion with primary team and bedside RN.    Chief Complaint: left leg pain    Subjective    History of Present Illness    54 y.o. F with PMH of severe PAD s/p R hip disarticulation s/p L CFA and EIA embolectomy 12/2023 s/p multiple L toe amputations, s/p L femoral endarterectomy, profundaplasty, common [id number]-bc-wbwpbqwc tibial bypass using 6 mm ringed PTFE, left lower extremity angiogram on 9/25 T2DM complicated by diabetic neuropathy, HTN, HLD, renal and splenic thromboses presented with severe left leg pain and inability to move or feel or left leg. Imaging consistent with occlusion of her L CFA-AT bypass graft.  S/p L AKA 12/17.       The patient underwent left above-the-knee amputation formalization and Prevena wound VAC placement earlier yesterday.  She does not have anxiety today.  She has postoperative pain on the left above-the-knee amputation stump which is described as sharp and can be as severe as 9-10/10 and improves to 2-3/10 with pain medications.    She is eating well.  She has not had a bowel movement but has significant flatulence and feels she is about to have a bowel movement soon    Symptoms  Pain: As noted above   Dyspnea: none  Fatigue: yes  Insomnia: improving  Drowsiness: not today  Constipation: none  Nausea: none  Appetite: okay  Anxiety: anticipatory during dressing changes  Depression: none    Objective    Last Recorded Vitals  /72   Pulse 90   Temp 36 °C (96.8 °F)   Resp 18   Ht 1.702 m (5' 7\")   Wt 72.1 kg (158 lb 15.2 oz)   SpO2 97%   BMI 24.90 kg/m²      Physical Exam   Constitutional:       Appearance: She is obese.   HENT:      Head: Normocephalic and atraumatic.   Cardiovascular:      Rate and Rhythm: Normal rate and regular " rhythm.      Heart sounds: No murmur heard.     No friction rub. No gallop.   Pulmonary:      Effort: Pulmonary effort is normal.      Breath sounds: Normal breath sounds. No wheezing, rhonchi or rales.   Abdominal:      General: Bowel sounds are normal.      Palpations: Abdomen is soft.      Tenderness: There is no abdominal tenderness. There is no guarding.   Musculoskeletal:      Comments: Surgical left LE stump with with wound vac  S/p R hip disarticulation   Neurological:      Comments: AAOX3      Relevant Results   Results for orders placed or performed during the hospital encounter of 12/17/24 (from the past 24 hours)   POCT GLUCOSE   Result Value Ref Range    POCT Glucose 184 (H) 74 - 99 mg/dL   CBC   Result Value Ref Range    WBC 11.3 4.4 - 11.3 x10*3/uL    nRBC 0.0 0.0 - 0.0 /100 WBCs    RBC 3.47 (L) 4.00 - 5.20 x10*6/uL    Hemoglobin 7.7 (L) 12.0 - 16.0 g/dL    Hematocrit 27.2 (L) 36.0 - 46.0 %    MCV 78 (L) 80 - 100 fL    MCH 22.2 (L) 26.0 - 34.0 pg    MCHC 28.3 (L) 32.0 - 36.0 g/dL    RDW 25.6 (H) 11.5 - 14.5 %    Platelets 620 (H) 150 - 450 x10*3/uL   Renal Function Panel   Result Value Ref Range    Glucose 155 (H) 74 - 99 mg/dL    Sodium 140 136 - 145 mmol/L    Potassium 4.0 3.5 - 5.3 mmol/L    Chloride 102 98 - 107 mmol/L    Bicarbonate 27 21 - 32 mmol/L    Anion Gap 15 10 - 20 mmol/L    Urea Nitrogen 13 6 - 23 mg/dL    Creatinine 0.50 0.50 - 1.05 mg/dL    eGFR >90 >60 mL/min/1.73m*2    Calcium 8.9 8.6 - 10.6 mg/dL    Phosphorus 2.8 2.5 - 4.9 mg/dL    Albumin 2.7 (L) 3.4 - 5.0 g/dL   Magnesium   Result Value Ref Range    Magnesium 1.73 1.60 - 2.40 mg/dL   POCT GLUCOSE   Result Value Ref Range    POCT Glucose 157 (H) 74 - 99 mg/dL   POCT GLUCOSE   Result Value Ref Range    POCT Glucose 180 (H) 74 - 99 mg/dL   POCT GLUCOSE   Result Value Ref Range    POCT Glucose 175 (H) 74 - 99 mg/dL        Allergies  Aspartame and Nsaids (non-steroidal anti-inflammatory drug)  Medications  Scheduled  medications  acetaminophen, 975 mg, oral, TID  ampicillin-sulbactam, 3 g, intravenous, q6h  aspirin, 81 mg, oral, Daily  atorvastatin, 80 mg, oral, Nightly  divalproex, 1,000 mg, oral, Nightly at 0300  divalproex, 500 mg, oral, Daily with breakfast  DULoxetine, 60 mg, oral, Daily  gabapentin, 600 mg, oral, q8h NINO  insulin glargine, 10 Units, subcutaneous, Nightly  insulin lispro, 0-10 Units, subcutaneous, TID AC  insulin lispro, 4 Units, subcutaneous, TID AC  levETIRAcetam, 500 mg, oral, BID  levothyroxine, 75 mcg, oral, Daily before breakfast  lidocaine, 1 patch, transdermal, Daily  metoprolol tartrate, 12.5 mg, oral, BID  pantoprazole, 40 mg, oral, Daily before breakfast  sennosides-docusate sodium, 1 tablet, oral, BID      Continuous medications  heparin, 0-4,000 Units/hr, Last Rate: Stopped (01/02/25 1250)      PRN medications  PRN medications: albuterol, dextrose, glucagon, glucagon, glucagon HCL, HYDROmorphone, [Held by provider] loperamide, melatonin, naloxone, ondansetron ODT **OR** ondansetron, oxyCODONE, oxyCODONE, oxygen     Assessment/Plan    54 y.o. F with PMH of severe PAD s/p R hip disarticulation s/p L CFA and EIA embolectomy 12/2023 s/p multiple L toe amputations, s/p L femoral endarterectomy, profundaplasty, common zmsietw-nw-sjlynviy tibial bypass using 6 mm ringed PTFE, left lower extremity angiogram on 9/25 T2DM complicated by diabetic neuropathy, HTN, HLD, renal and splenic thromboses presented with severe left leg pain and inability to move or feel or left leg. Imaging consistent with occlusion of her L CFA-AT bypass graft.  S/p L AKA 12/17. Palliative care consulted to assist with goals of care, symptom management in the context of her recent surgery and severe PAD.   She has occlusion of fem-AT bypass and prior left EIA stent.    S/p high above the knee amputation today    Palliative Performance Scale (PPS): 40      #Palliative Care Encounter.  Support and empathy was provided throughout  the encounter. Provided reflective listening and presence.       #Complex Medical Decision Making   #Goals of Care  #Advance Care Planning   - Code status: DNR  - HCPOA: Loly Blackburn (aunt) [phone]  1st Alternate: Pepe Henao (son) [phone]  2nd Alternate: Juanis Blackburn (cousin) [phone]  - Goals are survival and time based   - State DNR form completed and placed in patient's chart   - Advanced Directives on file     #Acute Pain  Oxycodone 10 mg PO Q 6 hours PRN for severe pain.  Oxycodone 5 mg PO Q 6 hours PRN for moderate pain  Ketoroloac 15 mg IV Q 6 hours x 3 days. She is tolerating this.  Acetaminophen to 1000 mg TID.  Hydromorphone 0.5 mg IV Q 2 hours PRN for breakthrough pain.  Monitor for opioid adverse effects such as over sedation and respiratory depression. Naloxone PRN for reversal.  Will re assess for breakthrough medication needs.  Naloxone 0.2 mg IV PRN for respiratory depression.    Continue with Gabapentin 600 mg Q 8 hours and Duloxetine 60 mg daily for neuropathic pain.     Referral to pain management for evaluation of persistent right hip disarticulation stump pain, rule out neuroma.     Bowel regimen: She's been having 1-2 BM's daily. Senna 1 tab BID as needed for constipation.     #Suspect PTSD associated to previous surgery with severe post op pain.  Consider trauma focused psychotherapy.  Pain medication administration prior to dressing changes.    #Psychosocial Support  - Music Therapy    Medical Decision Making    - critical limb ischemia posing threat to life and function   - Reviewed external notes from vascular surgery 1/3/2025, anesthesiology note 1/3/2025  - Reviews results from BMP and CBC  - Parenteral controlled substances: hydromorphone    Thank you for allowing us to care for this patient. Palliative Team will continue to follow as needed. Please contact team with any questions or concerns.   Team pager [phone] (weekdays)    Alireza Dkyes MD  Palliative  Care Physician  Message: [employer] Secure chat  Team pager [phone]

## 2025-01-04 NOTE — PROGRESS NOTES
VASCULAR SURGERY PROGRESS NOTE  Assessment/Plan   Shirin Slater is 54 y.o. female with history of severe PAD s/p R hip disarticulation s/p L CFA and EIA embolectomy 12/2023 s/p multiple L toe amputations, s/p L femoral endarterectomy, profundaplasty, common mjzfezj-rn-njjvcoeq tibial bypass using 6 mm ringed PTFE, left lower extremity angiogram on 9/25 T2DM complicated by diabetic neuropathy, HTN, HLD, renal and splenic thromboses who presented with severe left leg pain, [place] 3 limb ischemia. Given leukocytosis, tachycardia, limb ischemia and concern for infection she was taken to OR urgently and had findings of purulence along the anteromedial aspect of the left knee and distal thigh, occluded fem-AT bypass and native popliteal artery. Left above-knee guillotine amputation was done for source control on 12/17/24.  Now s/p formalization of L AKA on 1/2. Currently reporting significant pain but also that her current pain medication regimen is working for her.     Plan:  Neuro: acute postoperative pain, Hx seizures  - home Keppra, Depakote, Cymbalta  - appreciate palliative care recs  - acute pain recs:  Continue schedule tylenol  Oxy 5mg q6HR and 10mg Q6H PRN for moderate/severe pain respectively   Dilaudid 0.5 mg q2h PRN for breakthrough pain  Robaxin 500g PO Q8H for 3 days (end 1/7)  Lidocaine patch for back pain   - Continue with Gabapentin 600 mg Q 8 hours and Duloxetine 60 mg daily for neuropathic pain      CV: Hx severe PAD with multiple surgical interventions, HTN  - maintain blood pressure control with metoprolol   - HI statin  - daily ASA (holding home plavix)  - heparin infusion, low intensity (holding home coumadin)     Pulm:   - continue to encourage IS hourly while awake  - OOB to chair and increase ambulation as tolerated  - albuterol inhaler PRN     FENGI: hypokalemia, hypoalbuminemia   - DM diet with oral supplements to promote wound healing potential   - continue bowel regimen to prevent  OIC   - continue PPI  - monitor and replete electrolytes     Endo: DM2, hypothyroidism  - holding home jardiance  - glucommander protocol   - levothyroxine     Heme: anemia of chronic disease  - no s/sx of bleeding  - monitor H/H, transfuse for Hgb <7     ID:  humble left leg infection required amputation for source control; leukocytosis (resolved)   - blood cultures (x2) 12/17: no growth   - operative cultures from 1/2: no growth  - continue Unasyn [vanco/zosyn stopped 12/24/24]  - c diff negative 12/29  - trend temp q4h      Dispo-  - regular nursing floor  - medically ready for discharge     Discussed with Dr. Reginaldo Powell MD  Vascular Surgery PGY6  Team Pager: [phone]      Subjective   Overnight patient reports there were issues with receiving her pain medications, but when she does receive them as ordered she is satisfied. Tolerating diet, having bowel function. No other complaints this morning.    Objective   Vitals:  Heart Rate:  [83-90]   Temp:  [34.9 °C (94.8 °F)-36.4 °C (97.5 °F)]   Resp:  [17-18]   BP: ()/(57-72)   Weight:  [72 kg (158 lb 11.7 oz)]   SpO2:  [92 %-97 %]     Exam:  Constitutional: sitting comfortably in bed watching TV  Neuro:  AOx3, grossly intact  ENMT: moist mucous membranes  CV: no tachycardia  Pulm: non-labored on RA  GI: soft, non-tender, non-distended  Skin: warm and dry  Musculoskeletal: moving all extremities  Extremities: Left AKA prevena holding suction, mild output in cannister    Labs:  Results from last 7 days   Lab Units 01/04/25  0552 01/03/25  0854 01/02/25  0559   WBC AUTO x10*3/uL 9.3 11.3 9.0   HEMOGLOBIN g/dL 8.1* 7.7* 8.4*   PLATELETS AUTO x10*3/uL 580* 620* 544*      Results from last 7 days   Lab Units 01/04/25  0552 01/03/25  0854 01/02/25  0559   SODIUM mmol/L 143 140 142   POTASSIUM mmol/L 3.8 4.0 3.5   CHLORIDE mmol/L 103 102 103   CO2 mmol/L 30 27 30   BUN mg/dL 11 13 13   CREATININE mg/dL 0.54 0.50 0.35*   GLUCOSE mg/dL 148* 155* 104*   MAGNESIUM  mg/dL 1.56* 1.73 1.75   PHOSPHORUS mg/dL 3.5 2.8 4.1           Results from last 7 days   Lab Units 01/01/25  0719 12/31/24  0803 12/30/24  8048   ANTI XA UNFRACTIONATED IU/mL 0.3 0.3 0.4

## 2025-01-05 VITALS
SYSTOLIC BLOOD PRESSURE: 112 MMHG | HEART RATE: 96 BPM | RESPIRATION RATE: 20 BRPM | HEIGHT: 67 IN | TEMPERATURE: 97.7 F | DIASTOLIC BLOOD PRESSURE: 66 MMHG | OXYGEN SATURATION: 96 % | BODY MASS INDEX: 27.37 KG/M2 | WEIGHT: 174.38 LBS

## 2025-01-05 LAB
ALBUMIN SERPL BCP-MCNC: 2.4 G/DL (ref 3.4–5)
ANION GAP SERPL CALC-SCNC: 10 MMOL/L (ref 10–20)
BACTERIA FLD CULT: NORMAL
BACTERIA FLD CULT: NORMAL
BUN SERPL-MCNC: 12 MG/DL (ref 6–23)
CALCIUM SERPL-MCNC: 8.4 MG/DL (ref 8.6–10.6)
CHLORIDE SERPL-SCNC: 104 MMOL/L (ref 98–107)
CO2 SERPL-SCNC: 32 MMOL/L (ref 21–32)
CREAT SERPL-MCNC: 0.41 MG/DL (ref 0.5–1.05)
EGFRCR SERPLBLD CKD-EPI 2021: >90 ML/MIN/1.73M*2
ERYTHROCYTE [DISTWIDTH] IN BLOOD BY AUTOMATED COUNT: 25.6 % (ref 11.5–14.5)
GLUCOSE BLD MANUAL STRIP-MCNC: 124 MG/DL (ref 74–99)
GLUCOSE BLD MANUAL STRIP-MCNC: 129 MG/DL (ref 74–99)
GLUCOSE BLD MANUAL STRIP-MCNC: 143 MG/DL (ref 74–99)
GLUCOSE SERPL-MCNC: 137 MG/DL (ref 74–99)
GRAM STN SPEC: NORMAL
HCT VFR BLD AUTO: 28.4 % (ref 36–46)
HGB BLD-MCNC: 7.6 G/DL (ref 12–16)
MAGNESIUM SERPL-MCNC: 1.58 MG/DL (ref 1.6–2.4)
MCH RBC QN AUTO: 22 PG (ref 26–34)
MCHC RBC AUTO-ENTMCNC: 26.8 G/DL (ref 32–36)
MCV RBC AUTO: 82 FL (ref 80–100)
NRBC BLD-RTO: 0 /100 WBCS (ref 0–0)
PHOSPHATE SERPL-MCNC: 3.6 MG/DL (ref 2.5–4.9)
PLATELET # BLD AUTO: 560 X10*3/UL (ref 150–450)
POTASSIUM SERPL-SCNC: 3.9 MMOL/L (ref 3.5–5.3)
RBC # BLD AUTO: 3.45 X10*6/UL (ref 4–5.2)
SODIUM SERPL-SCNC: 142 MMOL/L (ref 136–145)
UFH PPP CHRO-ACNC: 0.7 IU/ML
WBC # BLD AUTO: 9.5 X10*3/UL (ref 4.4–11.3)

## 2025-01-05 PROCEDURE — 83735 ASSAY OF MAGNESIUM: CPT | Performed by: STUDENT IN AN ORGANIZED HEALTH CARE EDUCATION/TRAINING PROGRAM

## 2025-01-05 PROCEDURE — 2500000001 HC RX 250 WO HCPCS SELF ADMINISTERED DRUGS (ALT 637 FOR MEDICARE OP): Performed by: STUDENT IN AN ORGANIZED HEALTH CARE EDUCATION/TRAINING PROGRAM

## 2025-01-05 PROCEDURE — 2500000004 HC RX 250 GENERAL PHARMACY W/ HCPCS (ALT 636 FOR OP/ED): Performed by: STUDENT IN AN ORGANIZED HEALTH CARE EDUCATION/TRAINING PROGRAM

## 2025-01-05 PROCEDURE — 85520 HEPARIN ASSAY: CPT | Performed by: STUDENT IN AN ORGANIZED HEALTH CARE EDUCATION/TRAINING PROGRAM

## 2025-01-05 PROCEDURE — 36415 COLL VENOUS BLD VENIPUNCTURE: CPT | Performed by: STUDENT IN AN ORGANIZED HEALTH CARE EDUCATION/TRAINING PROGRAM

## 2025-01-05 PROCEDURE — 2500000002 HC RX 250 W HCPCS SELF ADMINISTERED DRUGS (ALT 637 FOR MEDICARE OP, ALT 636 FOR OP/ED): Performed by: STUDENT IN AN ORGANIZED HEALTH CARE EDUCATION/TRAINING PROGRAM

## 2025-01-05 PROCEDURE — 80069 RENAL FUNCTION PANEL: CPT | Performed by: STUDENT IN AN ORGANIZED HEALTH CARE EDUCATION/TRAINING PROGRAM

## 2025-01-05 PROCEDURE — 82947 ASSAY GLUCOSE BLOOD QUANT: CPT

## 2025-01-05 PROCEDURE — 1100000001 HC PRIVATE ROOM DAILY

## 2025-01-05 PROCEDURE — 85027 COMPLETE CBC AUTOMATED: CPT | Performed by: STUDENT IN AN ORGANIZED HEALTH CARE EDUCATION/TRAINING PROGRAM

## 2025-01-05 RX ORDER — WARFARIN SODIUM 5 MG/1
5 TABLET ORAL DAILY
Status: DISCONTINUED | OUTPATIENT
Start: 2025-01-05 | End: 2025-01-07 | Stop reason: HOSPADM

## 2025-01-05 RX ADMIN — LEVOTHYROXINE SODIUM 75 MCG: 0.07 TABLET ORAL at 06:00

## 2025-01-05 RX ADMIN — OXYCODONE HYDROCHLORIDE 10 MG: 5 TABLET ORAL at 20:51

## 2025-01-05 RX ADMIN — METHOCARBAMOL TABLETS 500 MG: 500 TABLET, COATED ORAL at 15:00

## 2025-01-05 RX ADMIN — DIVALPROEX SODIUM 500 MG: 500 TABLET, FILM COATED, EXTENDED RELEASE ORAL at 09:22

## 2025-01-05 RX ADMIN — GABAPENTIN 600 MG: 300 CAPSULE ORAL at 15:00

## 2025-01-05 RX ADMIN — ACETAMINOPHEN 975 MG: 325 TABLET ORAL at 23:07

## 2025-01-05 RX ADMIN — GABAPENTIN 600 MG: 300 CAPSULE ORAL at 23:07

## 2025-01-05 RX ADMIN — INSULIN LISPRO 4 UNITS: 100 INJECTION, SOLUTION INTRAVENOUS; SUBCUTANEOUS at 09:23

## 2025-01-05 RX ADMIN — DULOXETINE HYDROCHLORIDE 60 MG: 60 CAPSULE, DELAYED RELEASE ORAL at 09:22

## 2025-01-05 RX ADMIN — METHOCARBAMOL TABLETS 500 MG: 500 TABLET, COATED ORAL at 05:58

## 2025-01-05 RX ADMIN — AMPICILLIN SODIUM AND SULBACTAM SODIUM 3 G: 2; 1 INJECTION, POWDER, FOR SOLUTION INTRAMUSCULAR; INTRAVENOUS at 02:05

## 2025-01-05 RX ADMIN — ASPIRIN 81 MG: 81 TABLET, COATED ORAL at 09:22

## 2025-01-05 RX ADMIN — HYDROMORPHONE HYDROCHLORIDE 0.5 MG: 1 INJECTION, SOLUTION INTRAMUSCULAR; INTRAVENOUS; SUBCUTANEOUS at 16:59

## 2025-01-05 RX ADMIN — PANTOPRAZOLE SODIUM 40 MG: 40 TABLET, DELAYED RELEASE ORAL at 06:00

## 2025-01-05 RX ADMIN — HYDROMORPHONE HYDROCHLORIDE 0.5 MG: 1 INJECTION, SOLUTION INTRAMUSCULAR; INTRAVENOUS; SUBCUTANEOUS at 14:01

## 2025-01-05 RX ADMIN — AMPICILLIN SODIUM AND SULBACTAM SODIUM 3 G: 2; 1 INJECTION, POWDER, FOR SOLUTION INTRAMUSCULAR; INTRAVENOUS at 20:47

## 2025-01-05 RX ADMIN — LEVETIRACETAM 500 MG: 500 TABLET, FILM COATED ORAL at 20:47

## 2025-01-05 RX ADMIN — GABAPENTIN 600 MG: 300 CAPSULE ORAL at 05:58

## 2025-01-05 RX ADMIN — METOPROLOL TARTRATE 12.5 MG: 25 TABLET, FILM COATED ORAL at 09:23

## 2025-01-05 RX ADMIN — ACETAMINOPHEN 975 MG: 325 TABLET ORAL at 15:00

## 2025-01-05 RX ADMIN — OXYCODONE HYDROCHLORIDE 10 MG: 5 TABLET ORAL at 13:23

## 2025-01-05 RX ADMIN — HEPARIN SODIUM 1800 UNITS/HR: 10000 INJECTION, SOLUTION INTRAVENOUS at 05:59

## 2025-01-05 RX ADMIN — ATORVASTATIN CALCIUM 80 MG: 80 TABLET, FILM COATED ORAL at 20:47

## 2025-01-05 RX ADMIN — HEPARIN SODIUM 1800 UNITS/HR: 10000 INJECTION, SOLUTION INTRAVENOUS at 20:58

## 2025-01-05 RX ADMIN — LEVETIRACETAM 500 MG: 500 TABLET, FILM COATED ORAL at 09:23

## 2025-01-05 RX ADMIN — Medication 10 MG: at 20:51

## 2025-01-05 RX ADMIN — DIVALPROEX SODIUM 1000 MG: 500 TABLET, FILM COATED, EXTENDED RELEASE ORAL at 20:47

## 2025-01-05 RX ADMIN — INSULIN GLARGINE 10 UNITS: 100 INJECTION, SOLUTION SUBCUTANEOUS at 21:12

## 2025-01-05 RX ADMIN — METHOCARBAMOL TABLETS 500 MG: 500 TABLET, COATED ORAL at 23:07

## 2025-01-05 RX ADMIN — HYDROMORPHONE HYDROCHLORIDE 0.5 MG: 1 INJECTION, SOLUTION INTRAMUSCULAR; INTRAVENOUS; SUBCUTANEOUS at 23:14

## 2025-01-05 RX ADMIN — INSULIN LISPRO 4 UNITS: 100 INJECTION, SOLUTION INTRAVENOUS; SUBCUTANEOUS at 16:59

## 2025-01-05 RX ADMIN — AMPICILLIN SODIUM AND SULBACTAM SODIUM 3 G: 2; 1 INJECTION, POWDER, FOR SOLUTION INTRAMUSCULAR; INTRAVENOUS at 15:00

## 2025-01-05 RX ADMIN — AMPICILLIN SODIUM AND SULBACTAM SODIUM 3 G: 2; 1 INJECTION, POWDER, FOR SOLUTION INTRAMUSCULAR; INTRAVENOUS at 09:23

## 2025-01-05 RX ADMIN — ACETAMINOPHEN 975 MG: 325 TABLET ORAL at 09:22

## 2025-01-05 RX ADMIN — WARFARIN SODIUM 5 MG: 5 TABLET ORAL at 23:10

## 2025-01-05 ASSESSMENT — PAIN SCALES - GENERAL
PAINLEVEL_OUTOF10: 9
PAINLEVEL_OUTOF10: 9
PAINLEVEL_OUTOF10: 0 - NO PAIN
PAINLEVEL_OUTOF10: 8
PAINLEVEL_OUTOF10: 10 - WORST POSSIBLE PAIN
PAINLEVEL_OUTOF10: 7
PAINLEVEL_OUTOF10: 10 - WORST POSSIBLE PAIN
PAINLEVEL_OUTOF10: 2
PAINLEVEL_OUTOF10: 10 - WORST POSSIBLE PAIN
PAINLEVEL_OUTOF10: 7

## 2025-01-05 ASSESSMENT — COGNITIVE AND FUNCTIONAL STATUS - GENERAL
EATING MEALS: A LITTLE
TURNING FROM BACK TO SIDE WHILE IN FLAT BAD: TOTAL
CLIMB 3 TO 5 STEPS WITH RAILING: TOTAL
WALKING IN HOSPITAL ROOM: TOTAL
DRESSING REGULAR LOWER BODY CLOTHING: A LOT
DRESSING REGULAR UPPER BODY CLOTHING: A LOT
DAILY ACTIVITIY SCORE: 12
MOBILITY SCORE: 8
WALKING IN HOSPITAL ROOM: TOTAL
MOVING TO AND FROM BED TO CHAIR: TOTAL
TURNING FROM BACK TO SIDE WHILE IN FLAT BAD: A LOT
HELP NEEDED FOR BATHING: A LOT
CLIMB 3 TO 5 STEPS WITH RAILING: TOTAL
DRESSING REGULAR LOWER BODY CLOTHING: A LOT
STANDING UP FROM CHAIR USING ARMS: TOTAL
PERSONAL GROOMING: A LITTLE
MOVING FROM LYING ON BACK TO SITTING ON SIDE OF FLAT BED WITH BEDRAILS: A LITTLE
MOVING TO AND FROM BED TO CHAIR: TOTAL
TOILETING: TOTAL
DAILY ACTIVITIY SCORE: 14
DRESSING REGULAR UPPER BODY CLOTHING: A LOT
PERSONAL GROOMING: A LOT
STANDING UP FROM CHAIR USING ARMS: TOTAL
MOBILITY SCORE: 9
HELP NEEDED FOR BATHING: A LOT
MOVING FROM LYING ON BACK TO SITTING ON SIDE OF FLAT BED WITH BEDRAILS: A LITTLE
TOILETING: TOTAL

## 2025-01-05 ASSESSMENT — PAIN SCALES - PAIN ASSESSMENT IN ADVANCED DEMENTIA (PAINAD)
CONSOLABILITY: NO NEED TO CONSOLE
BODYLANGUAGE: RELAXED
BREATHING: NORMAL
TOTALSCORE: 0
CONSOLABILITY: NO NEED TO CONSOLE
TOTALSCORE: MEDICATION (SEE MAR);REPOSITIONED
BREATHING: NORMAL
BREATHING: NORMAL
TOTALSCORE: 0
BODYLANGUAGE: RELAXED
FACIALEXPRESSION: SMILING OR INEXPRESSIVE
TOTALSCORE: MEDICATION (SEE MAR);REPOSITIONED
FACIALEXPRESSION: SMILING OR INEXPRESSIVE
TOTALSCORE: MEDICATION (SEE MAR)
BODYLANGUAGE: RELAXED
CONSOLABILITY: NO NEED TO CONSOLE
TOTALSCORE: 0
FACIALEXPRESSION: SMILING OR INEXPRESSIVE

## 2025-01-05 ASSESSMENT — PAIN DESCRIPTION - LOCATION
LOCATION: LEG

## 2025-01-05 ASSESSMENT — PAIN DESCRIPTION - ORIENTATION
ORIENTATION: RIGHT;LEFT
ORIENTATION: LEFT
ORIENTATION: RIGHT;LEFT

## 2025-01-05 ASSESSMENT — PAIN - FUNCTIONAL ASSESSMENT: PAIN_FUNCTIONAL_ASSESSMENT: 0-10

## 2025-01-05 ASSESSMENT — PAIN SCALES - WONG BAKER
WONGBAKER_NUMERICALRESPONSE: HURTS WHOLE LOT

## 2025-01-05 NOTE — PROGRESS NOTES
VASCULAR SURGERY PROGRESS NOTE  Assessment/Plan   Shirin Slater is 54 y.o. female with history of severe PAD s/p R hip disarticulation s/p L CFA and EIA embolectomy 12/2023 s/p multiple L toe amputations, s/p L femoral endarterectomy, profundaplasty, common hxmzhml-qn-mxaqvzxf tibial bypass using 6 mm ringed PTFE, left lower extremity angiogram on 9/25 T2DM complicated by diabetic neuropathy, HTN, HLD, renal and splenic thromboses who presented with severe left leg pain, [place] 3 limb ischemia. Given leukocytosis, tachycardia, limb ischemia and concern for infection she was taken to OR urgently and had findings of purulence along the anteromedial aspect of the left knee and distal thigh, occluded fem-AT bypass and native popliteal artery. Left above-knee guillotine amputation was done for source control on 12/17/24.  Now s/p formalization of L AKA on 1/2. Currently reporting significant pain but also that her current pain medication regimen is working for her.     Plan:  Neuro: acute postoperative pain, Hx seizures  - home Keppra, Depakote, Cymbalta  - appreciate palliative care recs  - acute pain recs:  Continue schedule tylenol  Oxy 5mg q6HR and 10mg Q6H PRN for moderate/severe pain respectively   Dilaudid 0.5 mg q2h PRN for breakthrough pain  Robaxin 500g PO Q8H for 3 days (end 1/7)  Lidocaine patch for back pain   - Continue with Gabapentin 600 mg Q 8 hours and Duloxetine 60 mg daily for neuropathic pain      CV: Hx severe PAD with multiple surgical interventions, HTN  - maintain blood pressure control with metoprolol   - HI statin  - daily ASA (holding home plavix)  - heparin infusion, low intensity (holding home coumadin)     Pulm:   - continue to encourage IS hourly while awake  - OOB to chair and increase ambulation as tolerated  - albuterol inhaler PRN     FENGI: hypokalemia, hypoalbuminemia   - DM diet with oral supplements to promote wound healing potential   - continue bowel regimen to prevent  OIC   - continue PPI  - monitor and replete electrolytes     Endo: DM2, hypothyroidism  - holding home jardiance  - glucommander protocol   - levothyroxine     Heme: anemia of chronic disease  - no s/sx of bleeding  - monitor H/H, transfuse for Hgb <7     ID:  humble left leg infection required amputation for source control; leukocytosis (resolved)   - blood cultures (x2) 12/17: no growth   - operative cultures from 1/2: no growth  - continue Unasyn [vanco/zosyn stopped 12/24/24]  - c diff negative 12/29  - trend temp q4h      Dispo-  - continue excellent nursing care  - medically ready for discharge     Discussed with Dr. Reginaldo Bermudez MD  Vascular Surgery [id number] or Epic Chat      Subjective   Patient sleeping soundly. HDS.    Objective   Vitals:  Heart Rate:  [78-90]   Temp:  [36 °C (96.8 °F)-36.5 °C (97.7 °F)]   Resp:  [14-19]   BP: ()/(53-68)   Weight:  [72 kg (158 lb 11.7 oz)]   SpO2:  [90 %-97 %]     Exam:  Constitutional: sitting comfortably in bed watching TV  Neuro:  AOx3, grossly intact  ENMT: moist mucous membranes  CV: no tachycardia  Pulm: non-labored on RA  GI: soft, non-tender, non-distended  Skin: warm and dry  Musculoskeletal: moving all extremities  Extremities: Left AKA prevena holding suction, mild output in cannister    Labs:  Results from last 7 days   Lab Units 01/04/25  0552 01/03/25  0854 01/02/25  0559   WBC AUTO x10*3/uL 9.3 11.3 9.0   HEMOGLOBIN g/dL 8.1* 7.7* 8.4*   PLATELETS AUTO x10*3/uL 580* 620* 544*      Results from last 7 days   Lab Units 01/04/25  0552 01/03/25  0854 01/02/25  0559   SODIUM mmol/L 143 140 142   POTASSIUM mmol/L 3.8 4.0 3.5   CHLORIDE mmol/L 103 102 103   CO2 mmol/L 30 27 30   BUN mg/dL 11 13 13   CREATININE mg/dL 0.54 0.50 0.35*   GLUCOSE mg/dL 148* 155* 104*   MAGNESIUM mg/dL 1.56* 1.73 1.75   PHOSPHORUS mg/dL 3.5 2.8 4.1           Results from last 7 days   Lab Units 01/04/25  1743 01/04/25  1259 01/01/25  0719   ANTI XA UNFRACTIONATED IU/mL 0.5  0.4 0.3

## 2025-01-05 NOTE — CARE PLAN
The patient's goals for the shift include      The clinical goals for the shift include pt will remain HDS throughout the shift

## 2025-01-05 NOTE — CARE PLAN
The patient's goals for the shift include      The clinical goals for the shift include pt will remain HDS throughout the shift  Over the shift, Pt slept comfortably most of the time. Pt had one time breakthrough dilaudid for her surgical site pain. Wound vac continuous at 125 with 100ml of sanguineous output.    Over the shift, the patient did make progress toward the following goals.     Problem: Pain - Adult  Goal: Verbalizes/displays adequate comfort level or baseline comfort level  Outcome: Progressing     Problem: Safety - Adult  Goal: Free from fall injury  Outcome: Progressing     Problem: Discharge Planning  Goal: Discharge to home or other facility with appropriate resources  Outcome: Progressing     Problem: Chronic Conditions and Co-morbidities  Goal: Patient's chronic conditions and co-morbidity symptoms are monitored and maintained or improved  Outcome: Progressing     Problem: Skin  Goal: Decreased wound size/increased tissue granulation at next dressing change  Outcome: Progressing  Goal: Participates in plan/prevention/treatment measures  Outcome: Progressing  Goal: Prevent/manage excess moisture  Outcome: Progressing  Goal: Prevent/minimize sheer/friction injuries  Outcome: Progressing  Goal: Promote/optimize nutrition  Outcome: Progressing  Goal: Promote skin healing  Outcome: Progressing     Problem: Fall/Injury  Goal: Not fall by end of shift  Outcome: Progressing  Goal: Be free from injury by end of the shift  Outcome: Progressing  Goal: Verbalize understanding of personal risk factors for fall in the hospital  Outcome: Progressing  Goal: Verbalize understanding of risk factor reduction measures to prevent injury from fall in the home  Outcome: Progressing  Goal: Use assistive devices by end of the shift  Outcome: Progressing  Goal: Pace activities to prevent fatigue by end of the shift  Outcome: Progressing     Problem: Diabetes  Goal: Achieve decreasing blood glucose levels by end of  shift  Outcome: Progressing  Goal: Increase stability of blood glucose readings by end of shift  Outcome: Progressing  Goal: Decrease in ketones present in urine by end of shift  Outcome: Progressing  Goal: Maintain electrolyte levels within acceptable range throughout shift  Outcome: Progressing  Goal: Maintain glucose levels >70mg/dl to <250mg/dl throughout shift  Outcome: Progressing  Goal: No changes in neurological exam by end of shift  Outcome: Progressing  Goal: Learn about and adhere to nutrition recommendations by end of shift  Outcome: Progressing  Goal: Vital signs within normal range for age by end of shift  Outcome: Progressing  Goal: Increase self care and/or family involovement by end of shift  Outcome: Progressing  Goal: Receive DSME education by end of shift  Outcome: Progressing     Problem: Pain  Goal: Takes deep breaths with improved pain control throughout the shift  Outcome: Progressing  Goal: Turns in bed with improved pain control throughout the shift  Outcome: Progressing  Goal: Walks with improved pain control throughout the shift  Outcome: Progressing  Goal: Performs ADL's with improved pain control throughout shift  Outcome: Progressing  Goal: Participates in PT with improved pain control throughout the shift  Outcome: Progressing  Goal: Free from opioid side effects throughout the shift  Outcome: Progressing  Goal: Free from acute confusion related to pain meds throughout the shift  Outcome: Progressing

## 2025-01-06 ENCOUNTER — TELEPHONE (OUTPATIENT)
Dept: FAMILY MEDICINE CLINIC | Age: 55
End: 2025-01-06

## 2025-01-06 LAB
ALBUMIN SERPL BCP-MCNC: 2.5 G/DL (ref 3.4–5)
ANION GAP SERPL CALC-SCNC: 11 MMOL/L (ref 10–20)
APTT PPP: 123 SECONDS (ref 27–38)
BACTERIA FLD CULT: NORMAL
BUN SERPL-MCNC: 10 MG/DL (ref 6–23)
CALCIUM SERPL-MCNC: 8.2 MG/DL (ref 8.6–10.6)
CHLORIDE SERPL-SCNC: 100 MMOL/L (ref 98–107)
CO2 SERPL-SCNC: 34 MMOL/L (ref 21–32)
CREAT SERPL-MCNC: 0.48 MG/DL (ref 0.5–1.05)
EGFRCR SERPLBLD CKD-EPI 2021: >90 ML/MIN/1.73M*2
ERYTHROCYTE [DISTWIDTH] IN BLOOD BY AUTOMATED COUNT: 25.2 % (ref 11.5–14.5)
FUNGUS SPEC CULT: NORMAL
FUNGUS SPEC FUNGUS STN: NORMAL
GLUCOSE BLD MANUAL STRIP-MCNC: 100 MG/DL (ref 74–99)
GLUCOSE BLD MANUAL STRIP-MCNC: 113 MG/DL (ref 74–99)
GLUCOSE BLD MANUAL STRIP-MCNC: 116 MG/DL (ref 74–99)
GLUCOSE BLD MANUAL STRIP-MCNC: 90 MG/DL (ref 74–99)
GLUCOSE SERPL-MCNC: 113 MG/DL (ref 74–99)
GRAM STN SPEC: NORMAL
GRAM STN SPEC: NORMAL
HCT VFR BLD AUTO: 29.2 % (ref 36–46)
HGB BLD-MCNC: 8.1 G/DL (ref 12–16)
INR PPP: 1.1 (ref 0.9–1.1)
MAGNESIUM SERPL-MCNC: 1.53 MG/DL (ref 1.6–2.4)
MCH RBC QN AUTO: 22.1 PG (ref 26–34)
MCHC RBC AUTO-ENTMCNC: 27.7 G/DL (ref 32–36)
MCV RBC AUTO: 80 FL (ref 80–100)
NRBC BLD-RTO: 0 /100 WBCS (ref 0–0)
PHOSPHATE SERPL-MCNC: 4.1 MG/DL (ref 2.5–4.9)
PLATELET # BLD AUTO: 538 X10*3/UL (ref 150–450)
POTASSIUM SERPL-SCNC: 3.8 MMOL/L (ref 3.5–5.3)
PROTHROMBIN TIME: 12.9 SECONDS (ref 9.8–12.8)
RBC # BLD AUTO: 3.67 X10*6/UL (ref 4–5.2)
SODIUM SERPL-SCNC: 141 MMOL/L (ref 136–145)
UFH PPP CHRO-ACNC: 0.8 IU/ML
WBC # BLD AUTO: 9.7 X10*3/UL (ref 4.4–11.3)

## 2025-01-06 PROCEDURE — 85730 THROMBOPLASTIN TIME PARTIAL: CPT | Performed by: STUDENT IN AN ORGANIZED HEALTH CARE EDUCATION/TRAINING PROGRAM

## 2025-01-06 PROCEDURE — 99233 SBSQ HOSP IP/OBS HIGH 50: CPT | Performed by: INTERNAL MEDICINE

## 2025-01-06 PROCEDURE — 2500000001 HC RX 250 WO HCPCS SELF ADMINISTERED DRUGS (ALT 637 FOR MEDICARE OP): Performed by: STUDENT IN AN ORGANIZED HEALTH CARE EDUCATION/TRAINING PROGRAM

## 2025-01-06 PROCEDURE — 2500000002 HC RX 250 W HCPCS SELF ADMINISTERED DRUGS (ALT 637 FOR MEDICARE OP, ALT 636 FOR OP/ED): Performed by: STUDENT IN AN ORGANIZED HEALTH CARE EDUCATION/TRAINING PROGRAM

## 2025-01-06 PROCEDURE — 85610 PROTHROMBIN TIME: CPT | Performed by: STUDENT IN AN ORGANIZED HEALTH CARE EDUCATION/TRAINING PROGRAM

## 2025-01-06 PROCEDURE — 2500000004 HC RX 250 GENERAL PHARMACY W/ HCPCS (ALT 636 FOR OP/ED): Performed by: STUDENT IN AN ORGANIZED HEALTH CARE EDUCATION/TRAINING PROGRAM

## 2025-01-06 PROCEDURE — 36415 COLL VENOUS BLD VENIPUNCTURE: CPT | Performed by: STUDENT IN AN ORGANIZED HEALTH CARE EDUCATION/TRAINING PROGRAM

## 2025-01-06 PROCEDURE — 85027 COMPLETE CBC AUTOMATED: CPT | Performed by: STUDENT IN AN ORGANIZED HEALTH CARE EDUCATION/TRAINING PROGRAM

## 2025-01-06 PROCEDURE — 82947 ASSAY GLUCOSE BLOOD QUANT: CPT

## 2025-01-06 PROCEDURE — 2500000004 HC RX 250 GENERAL PHARMACY W/ HCPCS (ALT 636 FOR OP/ED)

## 2025-01-06 PROCEDURE — RXMED WILLOW AMBULATORY MEDICATION CHARGE

## 2025-01-06 PROCEDURE — 97530 THERAPEUTIC ACTIVITIES: CPT | Mod: GP

## 2025-01-06 PROCEDURE — 1100000001 HC PRIVATE ROOM DAILY

## 2025-01-06 PROCEDURE — 80069 RENAL FUNCTION PANEL: CPT | Performed by: STUDENT IN AN ORGANIZED HEALTH CARE EDUCATION/TRAINING PROGRAM

## 2025-01-06 PROCEDURE — 83735 ASSAY OF MAGNESIUM: CPT | Performed by: STUDENT IN AN ORGANIZED HEALTH CARE EDUCATION/TRAINING PROGRAM

## 2025-01-06 PROCEDURE — 85520 HEPARIN ASSAY: CPT | Performed by: STUDENT IN AN ORGANIZED HEALTH CARE EDUCATION/TRAINING PROGRAM

## 2025-01-06 RX ORDER — ENOXAPARIN SODIUM 100 MG/ML
80 INJECTION SUBCUTANEOUS EVERY 12 HOURS SCHEDULED
Qty: 11.2 ML | Refills: 0 | Status: SHIPPED | OUTPATIENT
Start: 2025-01-06 | End: 2025-01-14

## 2025-01-06 RX ORDER — ENOXAPARIN SODIUM 100 MG/ML
1 INJECTION SUBCUTANEOUS EVERY 12 HOURS SCHEDULED
Status: DISCONTINUED | OUTPATIENT
Start: 2025-01-06 | End: 2025-01-07 | Stop reason: HOSPADM

## 2025-01-06 RX ORDER — WARFARIN SODIUM 5 MG/1
TABLET ORAL
Qty: 30 TABLET | Refills: 0 | Status: SHIPPED | OUTPATIENT
Start: 2025-01-06 | End: 2026-01-06

## 2025-01-06 RX ORDER — OXYCODONE HYDROCHLORIDE 5 MG/1
5 TABLET ORAL EVERY 6 HOURS PRN
Qty: 28 TABLET | Refills: 0 | Status: SHIPPED | OUTPATIENT
Start: 2025-01-06 | End: 2025-01-14

## 2025-01-06 RX ORDER — ASPIRIN 81 MG/1
81 TABLET ORAL DAILY
Qty: 30 TABLET | Refills: 0 | Status: SHIPPED | OUTPATIENT
Start: 2025-01-06 | End: 2025-02-06

## 2025-01-06 RX ORDER — HYDROMORPHONE HYDROCHLORIDE 1 MG/ML
0.4 INJECTION, SOLUTION INTRAMUSCULAR; INTRAVENOUS; SUBCUTANEOUS ONCE
Status: COMPLETED | OUTPATIENT
Start: 2025-01-06 | End: 2025-01-06

## 2025-01-06 RX ORDER — KETOROLAC TROMETHAMINE 30 MG/ML
30 INJECTION, SOLUTION INTRAMUSCULAR; INTRAVENOUS EVERY 8 HOURS PRN
Status: DISCONTINUED | OUTPATIENT
Start: 2025-01-06 | End: 2025-01-07 | Stop reason: HOSPADM

## 2025-01-06 RX ORDER — ENOXAPARIN SODIUM 100 MG/ML
1 INJECTION SUBCUTANEOUS DAILY
Status: DISCONTINUED | OUTPATIENT
Start: 2025-01-06 | End: 2025-01-06

## 2025-01-06 RX ORDER — ACETAMINOPHEN 325 MG/1
975 TABLET ORAL EVERY 8 HOURS PRN
Qty: 90 TABLET | Refills: 0 | Status: SHIPPED | OUTPATIENT
Start: 2025-01-06

## 2025-01-06 RX ORDER — AMOXICILLIN 250 MG
1 CAPSULE ORAL 2 TIMES DAILY
Qty: 28 TABLET | Refills: 0 | Status: SHIPPED | OUTPATIENT
Start: 2025-01-06 | End: 2025-01-21

## 2025-01-06 RX ORDER — GABAPENTIN 300 MG/1
600 CAPSULE ORAL EVERY 8 HOURS SCHEDULED
Qty: 84 CAPSULE | Refills: 0 | Status: SHIPPED | OUTPATIENT
Start: 2025-01-06 | End: 2025-01-21

## 2025-01-06 RX ORDER — NALOXONE HYDROCHLORIDE 4 MG/.1ML
4 SPRAY NASAL AS NEEDED
Qty: 2 EACH | Refills: 11 | Status: SHIPPED | OUTPATIENT
Start: 2025-01-06

## 2025-01-06 RX ADMIN — LEVOTHYROXINE SODIUM 75 MCG: 0.07 TABLET ORAL at 06:19

## 2025-01-06 RX ADMIN — ACETAMINOPHEN 975 MG: 325 TABLET ORAL at 15:19

## 2025-01-06 RX ADMIN — GABAPENTIN 600 MG: 300 CAPSULE ORAL at 06:19

## 2025-01-06 RX ADMIN — METOPROLOL TARTRATE 12.5 MG: 25 TABLET, FILM COATED ORAL at 08:57

## 2025-01-06 RX ADMIN — INSULIN LISPRO 4 UNITS: 100 INJECTION, SOLUTION INTRAVENOUS; SUBCUTANEOUS at 08:58

## 2025-01-06 RX ADMIN — DIVALPROEX SODIUM 1000 MG: 500 TABLET, FILM COATED, EXTENDED RELEASE ORAL at 20:19

## 2025-01-06 RX ADMIN — AMPICILLIN SODIUM AND SULBACTAM SODIUM 3 G: 2; 1 INJECTION, POWDER, FOR SOLUTION INTRAMUSCULAR; INTRAVENOUS at 15:20

## 2025-01-06 RX ADMIN — HYDROMORPHONE HYDROCHLORIDE 0.4 MG: 1 INJECTION, SOLUTION INTRAMUSCULAR; INTRAVENOUS; SUBCUTANEOUS at 11:55

## 2025-01-06 RX ADMIN — Medication 10 MG: at 20:19

## 2025-01-06 RX ADMIN — ENOXAPARIN SODIUM 80 MG: 80 INJECTION SUBCUTANEOUS at 20:18

## 2025-01-06 RX ADMIN — AMPICILLIN SODIUM AND SULBACTAM SODIUM 3 G: 2; 1 INJECTION, POWDER, FOR SOLUTION INTRAMUSCULAR; INTRAVENOUS at 20:19

## 2025-01-06 RX ADMIN — ACETAMINOPHEN 975 MG: 325 TABLET ORAL at 08:56

## 2025-01-06 RX ADMIN — LEVETIRACETAM 500 MG: 500 TABLET, FILM COATED ORAL at 08:57

## 2025-01-06 RX ADMIN — AMPICILLIN SODIUM AND SULBACTAM SODIUM 3 G: 2; 1 INJECTION, POWDER, FOR SOLUTION INTRAMUSCULAR; INTRAVENOUS at 08:58

## 2025-01-06 RX ADMIN — ENOXAPARIN SODIUM 80 MG: 80 INJECTION SUBCUTANEOUS at 10:00

## 2025-01-06 RX ADMIN — GABAPENTIN 600 MG: 300 CAPSULE ORAL at 15:19

## 2025-01-06 RX ADMIN — KETOROLAC TROMETHAMINE 30 MG: 30 INJECTION, SOLUTION INTRAMUSCULAR; INTRAVENOUS at 20:18

## 2025-01-06 RX ADMIN — ATORVASTATIN CALCIUM 80 MG: 80 TABLET, FILM COATED ORAL at 20:19

## 2025-01-06 RX ADMIN — ACETAMINOPHEN 975 MG: 325 TABLET ORAL at 20:19

## 2025-01-06 RX ADMIN — AMPICILLIN SODIUM AND SULBACTAM SODIUM 3 G: 2; 1 INJECTION, POWDER, FOR SOLUTION INTRAMUSCULAR; INTRAVENOUS at 01:44

## 2025-01-06 RX ADMIN — INSULIN GLARGINE 10 UNITS: 100 INJECTION, SOLUTION SUBCUTANEOUS at 21:14

## 2025-01-06 RX ADMIN — OXYCODONE HYDROCHLORIDE 10 MG: 5 TABLET ORAL at 08:56

## 2025-01-06 RX ADMIN — ASPIRIN 81 MG: 81 TABLET, COATED ORAL at 08:56

## 2025-01-06 RX ADMIN — PANTOPRAZOLE SODIUM 40 MG: 40 TABLET, DELAYED RELEASE ORAL at 06:19

## 2025-01-06 RX ADMIN — WARFARIN SODIUM 5 MG: 5 TABLET ORAL at 17:55

## 2025-01-06 RX ADMIN — METHOCARBAMOL TABLETS 500 MG: 500 TABLET, COATED ORAL at 15:19

## 2025-01-06 RX ADMIN — HYDROMORPHONE HYDROCHLORIDE 0.5 MG: 1 INJECTION, SOLUTION INTRAMUSCULAR; INTRAVENOUS; SUBCUTANEOUS at 04:15

## 2025-01-06 RX ADMIN — LEVETIRACETAM 500 MG: 500 TABLET, FILM COATED ORAL at 20:19

## 2025-01-06 RX ADMIN — METHOCARBAMOL TABLETS 500 MG: 500 TABLET, COATED ORAL at 06:19

## 2025-01-06 RX ADMIN — DIVALPROEX SODIUM 500 MG: 500 TABLET, FILM COATED, EXTENDED RELEASE ORAL at 08:56

## 2025-01-06 RX ADMIN — OXYCODONE HYDROCHLORIDE 10 MG: 5 TABLET ORAL at 01:44

## 2025-01-06 RX ADMIN — SODIUM CHLORIDE, POTASSIUM CHLORIDE, SODIUM LACTATE AND CALCIUM CHLORIDE 500 ML: 600; 310; 30; 20 INJECTION, SOLUTION INTRAVENOUS at 16:26

## 2025-01-06 RX ADMIN — METOPROLOL TARTRATE 12.5 MG: 25 TABLET, FILM COATED ORAL at 21:16

## 2025-01-06 RX ADMIN — METHOCARBAMOL TABLETS 500 MG: 500 TABLET, COATED ORAL at 22:28

## 2025-01-06 RX ADMIN — DULOXETINE HYDROCHLORIDE 60 MG: 60 CAPSULE, DELAYED RELEASE ORAL at 08:57

## 2025-01-06 RX ADMIN — GABAPENTIN 600 MG: 300 CAPSULE ORAL at 22:28

## 2025-01-06 ASSESSMENT — COGNITIVE AND FUNCTIONAL STATUS - GENERAL
TURNING FROM BACK TO SIDE WHILE IN FLAT BAD: A LOT
DRESSING REGULAR UPPER BODY CLOTHING: A LOT
TOILETING: TOTAL
PERSONAL GROOMING: A LITTLE
MOVING TO AND FROM BED TO CHAIR: TOTAL
MOVING TO AND FROM BED TO CHAIR: TOTAL
WALKING IN HOSPITAL ROOM: TOTAL
CLIMB 3 TO 5 STEPS WITH RAILING: TOTAL
HELP NEEDED FOR BATHING: A LOT
MOBILITY SCORE: 8
MOBILITY SCORE: 10
TURNING FROM BACK TO SIDE WHILE IN FLAT BAD: A LOT
DRESSING REGULAR LOWER BODY CLOTHING: A LOT
DAILY ACTIVITIY SCORE: 14
MOVING FROM LYING ON BACK TO SITTING ON SIDE OF FLAT BED WITH BEDRAILS: A LOT
DRESSING REGULAR UPPER BODY CLOTHING: A LOT
TURNING FROM BACK TO SIDE WHILE IN FLAT BAD: A LOT
WALKING IN HOSPITAL ROOM: TOTAL
CLIMB 3 TO 5 STEPS WITH RAILING: TOTAL
WALKING IN HOSPITAL ROOM: TOTAL
STANDING UP FROM CHAIR USING ARMS: TOTAL
STANDING UP FROM CHAIR USING ARMS: TOTAL
MOBILITY SCORE: 9
CLIMB 3 TO 5 STEPS WITH RAILING: TOTAL
DAILY ACTIVITIY SCORE: 16
MOVING TO AND FROM BED TO CHAIR: TOTAL
DRESSING REGULAR LOWER BODY CLOTHING: A LOT
TOILETING: A LOT
STANDING UP FROM CHAIR USING ARMS: TOTAL
MOVING FROM LYING ON BACK TO SITTING ON SIDE OF FLAT BED WITH BEDRAILS: A LITTLE
HELP NEEDED FOR BATHING: A LOT

## 2025-01-06 ASSESSMENT — PAIN DESCRIPTION - ORIENTATION
ORIENTATION: LEFT
ORIENTATION: RIGHT;LEFT
ORIENTATION: LEFT

## 2025-01-06 ASSESSMENT — PAIN SCALES - GENERAL
PAINLEVEL_OUTOF10: 10 - WORST POSSIBLE PAIN
PAINLEVEL_OUTOF10: 5 - MODERATE PAIN
PAINLEVEL_OUTOF10: 8
PAINLEVEL_OUTOF10: 0 - NO PAIN
PAINLEVEL_OUTOF10: 10 - WORST POSSIBLE PAIN

## 2025-01-06 ASSESSMENT — PAIN SCALES - WONG BAKER
WONGBAKER_NUMERICALRESPONSE: HURTS WORST
WONGBAKER_NUMERICALRESPONSE: HURTS WHOLE LOT
WONGBAKER_NUMERICALRESPONSE: HURTS WORST

## 2025-01-06 ASSESSMENT — PAIN SCALES - PAIN ASSESSMENT IN ADVANCED DEMENTIA (PAINAD)
BREATHING: NORMAL
FACIALEXPRESSION: SMILING OR INEXPRESSIVE
TOTALSCORE: MEDICATION (SEE MAR)
BREATHING: NORMAL
BODYLANGUAGE: TENSE, DISTRESSED PACING, FIDGETING
TOTALSCORE: MEDICATION (SEE MAR);REPOSITIONED
CONSOLABILITY: NO NEED TO CONSOLE
CONSOLABILITY: NO NEED TO CONSOLE
BODYLANGUAGE: RELAXED
CONSOLABILITY: NO NEED TO CONSOLE
TOTALSCORE: 1
BODYLANGUAGE: RELAXED
FACIALEXPRESSION: SMILING OR INEXPRESSIVE
TOTALSCORE: MEDICATION (SEE MAR)
TOTALSCORE: 0
TOTALSCORE: 0
BREATHING: NORMAL
TOTALSCORE: MEDICATION (SEE MAR);REPOSITIONED
FACIALEXPRESSION: SMILING OR INEXPRESSIVE

## 2025-01-06 ASSESSMENT — PAIN DESCRIPTION - LOCATION
LOCATION: HIP
LOCATION: LEG
LOCATION: HIP
LOCATION: LEG

## 2025-01-06 ASSESSMENT — PAIN - FUNCTIONAL ASSESSMENT
PAIN_FUNCTIONAL_ASSESSMENT: 0-10
PAIN_FUNCTIONAL_ASSESSMENT: 0-10

## 2025-01-06 NOTE — HOSPITAL COURSE
Shirin Slater is 54 y.o. female with history of severe PAD s/p R hip disarticulation s/p L CFA and EIA embolectomy 12/2023 s/p multiple L toe amputations, sT2DM complicated by diabetic neuropathy, HTN, HLD, renal and splenic thromboses. Recently hospitalized 9/18 - 10/3 for L toe gangrene, underwent L femoral endarterectomy, profundaplasty, common pdkbipg-mb-awmayenu tibial bypass using 6 mm ringed PTFE, left lower extremity angiogram on 9/25 with Dr. Dunphy and a L TMA with podiatry on 9/27. She was discharged to SNF on 10/3 WBAT in the LLE.    From 12/17/24 History and Physical:  She checked herself out of her SNF 10/7 for home where she had an aide to assist her. Was able to transfer to her wheelchair independently at that time. States that she saw her vascular surgeon several weeks ago who recommended that she cannot not bear weight in her LLE. Feels that she has been having decreased movement and sensation in the LLE since being told not to use it and assumed it was atrophy secondary to disuse. States that her left leg is always cold to the touch, but feels it has worsened in the several weeks. Her pain worsened acutely 12/16 when her left leg slid off her bed. She takes warfarin daily, has not missed any doses. Has not has a recent INR checked. She continued to smoke 1/2 PPD since her discharge from [facility]. She states that the redness of her leg has been present for several weeks. She has been doing dressing changes and wound care herself at home.   Since her admission 12/17, care has been directed at maintaining source control with frequent dressing changes and pain control. Due to her history of multiple revascularizations it was discussed with orthopedic surgery if she would be a candidate for a left hip disarticulation, however due to attending availability and conversations with the patient, the decision was made to proceed with a mid-thigh amputation, which the patient underwent on 1/2 and a prevena  was placed over the incision.   Following surgery, care remained dedicated at controlling the patient's pain with the acute pain team and palliative care team being engaged. Patient has since adamantly refused placement at any nursing facility and requested to go home with [facility].     Patient given a 7 day supply of oxycodone and increased dose of gabapentin at discharge. Her primary care's office was contacted and appt was made for patient to be evaluated and further pain medication scripts be written if needed.     At the time of discharge patient had a pain medication regimen that provided management of her pain, had a follow up appointment arranged, and all of her questions were answered. The risks of not going to a nursing facility were explained and the patient voiced her understanding.

## 2025-01-06 NOTE — PROGRESS NOTES
Physical Therapy    Physical Therapy Treatment    Patient Name: Shirin Slater  MRN: [id number]  Department: [place] [address]  Room: [facility]  Today's Date: 1/6/2025  Time Calculation  Start Time: 1209  Stop Time: 1250  Time Calculation (min): 41 min         Assessment/Plan   PT Assessment  Barriers to Discharge Home: Caregiver assistance, Physical needs  Caregiver Assistance: Patient lives alone and/or does not have reliable caregiver assistance  Physical Needs: 24hr ADL assistance needed, High falls risk due to function or environment  End of Session Communication: Bedside nurse  Assessment Comment: Pt tolerating mobility to EOB.  Dec sitting balance which will make OOB transfers difficult at home.  Pt adamant about returning home.  Will continue to progress as tolerates  End of Session Patient Position: Bed, 3 rail up, Alarm on  PT Plan  Inpatient/Swing Bed or Outpatient: Inpatient  PT Plan  Treatment/Interventions: Bed mobility, Transfer training, Balance training, Strengthening, Endurance training, Therapeutic exercise, Therapeutic activity, Positioning  PT Plan: Ongoing PT  PT Frequency: 3 times per week  PT Discharge Recommendations: Moderate intensity level of continued care  PT Recommended Transfer Status: Assist x1  PT - OK to Discharge: Yes    General Visit Information:   PT  Visit  PT Received On: 01/06/25  Response to Previous Treatment: Patient with no complaints from previous session.  General  PT Missed Visit: Yes  Missed Visit Reason: Patient refused (Pt states 10/10 pain; RN to speak to team about medication; will follow)  Family/Caregiver Present: No  Prior to Session Communication: Bedside nurse  Patient Position Received: Bed, 3 rail up, Alarm on  Preferred Learning Style: verbal, auditory  General Comment: Pt supine in bed upon arrival; since earlier attempt, pt was medicated & now willing to participate in therapy    Subjective   Precautions:  Precautions  LE Weight Bearing Status: Left Non-Weight  Bearing  Medical Precautions: Fall precautions  Precautions Comment: wound vac to LLE    Objective   Pain:  Pain Assessment  Pain Assessment: 0-10  0-10 (Numeric) Pain Score: 5 - Moderate pain  Pain Type: Acute pain, Surgical pain  Pain Location: Leg  Pain Orientation: Left  Pain Interventions: Repositioned  Response to Interventions: Resting quietly  Cognition:  Cognition  Orientation Level:  (not oriented to month - initially states December- when requestioned, states 2025 repeatedly, becoming agitated with question)  Insight: Moderate (poor insight into current deficits and ability to manage indep at home)  Coordination:  Movements are Fluid and Coordinated: Yes  Postural Control:  Postural Control  Postural Control: Impaired  Static Sitting Balance  Static Sitting-Balance Support: Bilateral upper extremity supported  Static Sitting-Level of Assistance: Contact guard  Dynamic Sitting Balance  Dynamic Sitting-Balance Support: Bilateral upper extremity supported  Dynamic Sitting-Level of Assistance: Minimum assistance    Activity Tolerance:  Activity Tolerance  Endurance: Tolerates 30 min exercise with multiple rests  Treatments:     Balance/Neuromuscular Re-Education  Balance/Neuromuscular Re-Education Activity Performed: Yes  Balance/Neuromuscular Re-Education Activity 1: sitting balance at EOB x ~12 min during session with cueing to bring trunk to midline - completes with Km and heavy UE support- frequently leans to R back onto bed    Bed Mobility  Bed Mobility: Yes  Bed Mobility 1  Bed Mobility 1: Supine to sitting, Sitting to supine  Level of Assistance 1: Moderate assistance, Moderate verbal cues  Bed Mobility Comments 1: use of rail, HOB elevated  Bed Mobility 2  Bed Mobility  2: Scooting  Level of Assistance 2: Moderate assistance  Bed Mobility Comments 2: at EOB with chux  Bed Mobility 3  Bed Mobility 3: Rolling right, Rolling left  Level of Assistance 3: Minimum assistance, Minimal verbal cues, Moderate  tactile cues  Bed Mobility Comments 3: use of rails    Ambulation/Gait Training  Ambulation/Gait Training Performed: No  Transfers  Transfer: No    Stairs  Stairs: No    Outcome Measures:  [facility] Basic Mobility  Turning from your back to your side while in a flat bed without using bedrails: A lot  Moving from lying on your back to sitting on the side of a flat bed without using bedrails: A lot  Moving to and from bed to chair (including a wheelchair): Total  Standing up from a chair using your arms (e.g. wheelchair or bedside chair): Total  To walk in hospital room: Total  Climbing 3-5 steps with railing: Total  Basic Mobility - Total Score: 8    Education Documentation  Precautions, taught by Mariela Denton, PT at 1/6/2025  3:24 PM.  Learner: Patient  Readiness: Acceptance  Method: Explanation  Response: Verbalizes Understanding  Comment: safety with mobility, need for SNF    Body Mechanics, taught by Mariela Denton PT at 1/6/2025  3:24 PM.  Learner: Patient  Readiness: Acceptance  Method: Explanation  Response: Verbalizes Understanding  Comment: safety with mobility, need for SNF    Mobility Training, taught by Mariela Denton PT at 1/6/2025  3:24 PM.  Learner: Patient  Readiness: Acceptance  Method: Explanation  Response: Verbalizes Understanding  Comment: safety with mobility, need for SNF    Education Comments  No comments found.        OP EDUCATION:       Encounter Problems       Encounter Problems (Active)       Balance       STG - Maintains static sitting balance with upper extremity support SBA >/= 10 min  (Progressing)       Start:  12/18/24    Expected End:  01/17/25               Mobility       pt will be independent in UE/LE HEP to promote strengthening  (Progressing)       Start:  12/18/24    Expected End:  01/17/25               PT Transfers       STG - Transfer from bed to chair/WC modA with slideboard  (Not Progressing)       Start:  12/18/24    Expected End:  01/17/25            STG -  Patient will perform bed mobility modA (Progressing)       Start:  12/18/24    Expected End:  01/17/25               Pain - Adult              01/06/25 at 3:25 PM - Mariela Denton PT

## 2025-01-06 NOTE — CARE PLAN
Problem: Pain - Adult  Goal: Verbalizes/displays adequate comfort level or baseline comfort level  Outcome: Progressing     Problem: Safety - Adult  Goal: Free from fall injury  Outcome: Progressing     Problem: Discharge Planning  Goal: Discharge to home or other facility with appropriate resources  Outcome: Progressing     Problem: Chronic Conditions and Co-morbidities  Goal: Patient's chronic conditions and co-morbidity symptoms are monitored and maintained or improved  Outcome: Progressing     Problem: Skin  Goal: Decreased wound size/increased tissue granulation at next dressing change  Outcome: Progressing  Goal: Participates in plan/prevention/treatment measures  Outcome: Progressing  Goal: Prevent/manage excess moisture  Outcome: Progressing  Goal: Prevent/minimize sheer/friction injuries  Outcome: Progressing  Goal: Promote/optimize nutrition  Outcome: Progressing  Goal: Promote skin healing  Outcome: Progressing     Problem: Fall/Injury  Goal: Not fall by end of shift  Outcome: Progressing  Goal: Be free from injury by end of the shift  Outcome: Progressing  Goal: Verbalize understanding of personal risk factors for fall in the hospital  Outcome: Progressing  Goal: Verbalize understanding of risk factor reduction measures to prevent injury from fall in the home  Outcome: Progressing  Goal: Use assistive devices by end of the shift  Outcome: Progressing  Goal: Pace activities to prevent fatigue by end of the shift  Outcome: Progressing     Problem: Diabetes  Goal: Achieve decreasing blood glucose levels by end of shift  Outcome: Progressing  Goal: Increase stability of blood glucose readings by end of shift  Outcome: Progressing  Goal: Decrease in ketones present in urine by end of shift  Outcome: Progressing  Goal: Maintain electrolyte levels within acceptable range throughout shift  Outcome: Progressing  Goal: Maintain glucose levels >70mg/dl to <250mg/dl throughout shift  Outcome: Progressing  Goal: No  changes in neurological exam by end of shift  Outcome: Progressing  Goal: Learn about and adhere to nutrition recommendations by end of shift  Outcome: Progressing  Goal: Vital signs within normal range for age by end of shift  Outcome: Progressing  Goal: Increase self care and/or family involovement by end of shift  Outcome: Progressing  Goal: Receive DSME education by end of shift  Outcome: Progressing     Problem: Pain  Goal: Takes deep breaths with improved pain control throughout the shift  Outcome: Progressing  Goal: Turns in bed with improved pain control throughout the shift  Outcome: Progressing  Goal: Walks with improved pain control throughout the shift  Outcome: Progressing  Goal: Performs ADL's with improved pain control throughout shift  Outcome: Progressing  Goal: Participates in PT with improved pain control throughout the shift  Outcome: Progressing  Goal: Free from opioid side effects throughout the shift  Outcome: Progressing  Goal: Free from acute confusion related to pain meds throughout the shift  Outcome: Progressing   The patient's goals for the shift include      The clinical goals for the shift include Patient will remain HDS throughout the night.    Over the shift, the patient did not make progress toward the following goals. Barriers to progression include patient had a recent surgical intervention on lower left extremity. Recommendations to address these barriers include frequent monitoring of patient wound vac and pain levels. Possible pain interventions when needed

## 2025-01-06 NOTE — SIGNIFICANT EVENT
Rapid Response Nurse Note: RADAR alert: 6    Pager time:   Arrival time:   Event end time:   Location: T7-87  [x] Triage by phone or secure messaging    Rapid response initiated by:  [] Rapid response RN [] Family [] Nursing Supervisor [] Physician   [x] RADAR auto page [] Sepsis auto-page [] RN [] RT   [] NP/PA [] Other:     Primary reason for call:   [] BAT [] New CPAP/BiPAP [] Bleeding [] Change in mental status   [] Chest pain [] Code blue [] FiO2 >/= 50% [] HR </= 40 bpm   [] HR >/= 130 bpm [] Hyperglycemia [] Hypoglycemia [x] RADAR    [] RR </= 8 bpm [] RR >/= 30 bpm [] SBP </= 90 mmHg [] SpO2 < 90%   [] Seizure [] Sepsis [] Shortness of breath  [] Staff concern: see comments     Initial VS and/or RADAR VS: T 36.5 °C; HR 92; RR 18; /71; SPO2 91%.    Providers present at bedside (if applicable):     Name of ICU Provider contacted (if applicable):     Interventions:  [x] None [] ABG/VBG [] Assist w/ICU transfer [] BAT paged    [] Bag mask [] Blood [] Cardioversion [] Code Blue   [] Code blue for intubation [] Code status changed [] Chest x-ray [] EKG   [] IV fluid/bolus [] KUB x-ray [] Labs/cultures [] Medication   [] Nebulizer treatment [] NIPPV (CPAP/BiPAP) [] Oxygen [] Oral airway   [] Peripheral IV [] Palliative care consult [] CT/MRI [] Sepsis protocol    [] Suctioned [] Other:     Outcome:  [] Coded and  [] Code blue for intubation [] Coded and transferred to ICU []  on division   [x] Remained on division (no change) [] Remained on division + additional monitoring [] Remained in ED [] Transferred to ED   [] Transferred to ICU [] Transferred to inpatient status [] Transferred for interventions (procedure) [] Transferred to ICU stepdown    [] Transferred to surgery [] Transferred to telemetry [] Sepsis protocol [] STEMI protocol   [] Stroke protocol [x] Bedside nurse instructed to page rapid response for any concerns or acute change in condition/VS     Additional Comments:  Reviewed above VS with bedside RN.  VS within patient's current trends.  Patient denied pain, shortness of breath, dizziness or lightheadedness.  No interventions by rapid response team indicated at this time.  Staff to page rapid response for any concerns or acute change in condition/VS.

## 2025-01-06 NOTE — PROGRESS NOTES
Physical Therapy                 Therapy Communication Note    Patient Name: Shirin Slater  MRN: [id number]  Department: [place] [address]  Room: 7087/7087-A  Today's Date: 1/6/2025     Discipline: Physical Therapy    PT Missed Visit: Yes     Missed Visit Reason: Missed Visit Reason: Patient refused (Pt states 10/10 pain; RN to speak to team about medication; will follow)    Missed Time: Attempt 1143      01/06/25 at 11:45 AM - Mariela Denton, PT

## 2025-01-06 NOTE — CARE PLAN
Problem: Pain - Adult  Goal: Verbalizes/displays adequate comfort level or baseline comfort level  1/5/2025 2207 by Nicki Seay RN  Outcome: Progressing  1/5/2025 2206 by Nicki Seay RN  Outcome: Progressing     Problem: Safety - Adult  Goal: Free from fall injury  1/5/2025 2207 by Nicki Seay RN  Outcome: Progressing  1/5/2025 2206 by Nicki Seay RN  Outcome: Progressing     Problem: Discharge Planning  Goal: Discharge to home or other facility with appropriate resources  1/5/2025 2207 by Nicki Seay RN  Outcome: Progressing  1/5/2025 2206 by Nicki Seay RN  Outcome: Progressing     Problem: Chronic Conditions and Co-morbidities  Goal: Patient's chronic conditions and co-morbidity symptoms are monitored and maintained or improved  1/5/2025 2207 by Nicki Seay RN  Outcome: Progressing  1/5/2025 2206 by Nicki Seay RN  Outcome: Progressing     Problem: Skin  Goal: Decreased wound size/increased tissue granulation at next dressing change  1/5/2025 2207 by Nicki Seay RN  Outcome: Progressing  1/5/2025 2206 by Nicki Seay RN  Outcome: Progressing  Goal: Participates in plan/prevention/treatment measures  1/5/2025 2207 by Nicki Seay RN  Outcome: Progressing  1/5/2025 2206 by Nicki Seay RN  Outcome: Progressing  Goal: Prevent/manage excess moisture  1/5/2025 2207 by Nicki Seay RN  Outcome: Progressing  1/5/2025 2206 by Nicki Seay RN  Outcome: Progressing  Goal: Prevent/minimize sheer/friction injuries  1/5/2025 2207 by Nicki Seay RN  Outcome: Progressing  1/5/2025 2206 by Nicki Seay RN  Outcome: Progressing  Goal: Promote/optimize nutrition  1/5/2025 2207 by Nicki Seay RN  Outcome: Progressing  1/5/2025 2206 by Nicki Seay RN  Outcome: Progressing  Goal: Promote skin healing  1/5/2025 2207 by Nicki Seay RN  Outcome: Progressing  1/5/2025 2206 by Nicki Seay RN  Outcome: Progressing     Problem: Fall/Injury  Goal: Not fall by end of shift  1/5/2025 2207 by Nicki Seay  RN  Outcome: Progressing  1/5/2025 2206 by Nicki Seay RN  Outcome: Progressing  Goal: Be free from injury by end of the shift  1/5/2025 2207 by Nicki Seay RN  Outcome: Progressing  1/5/2025 2206 by Nicki Seay RN  Outcome: Progressing  Goal: Verbalize understanding of personal risk factors for fall in the hospital  1/5/2025 2207 by Nicki Seay RN  Outcome: Progressing  1/5/2025 2206 by Nicki Seay RN  Outcome: Progressing  Goal: Verbalize understanding of risk factor reduction measures to prevent injury from fall in the home  1/5/2025 2207 by Nicki Seay RN  Outcome: Progressing  1/5/2025 2206 by Nicki Seay RN  Outcome: Progressing  Goal: Use assistive devices by end of the shift  1/5/2025 2207 by Nicki Seay RN  Outcome: Progressing  1/5/2025 2206 by Nicki Seay RN  Outcome: Progressing  Goal: Pace activities to prevent fatigue by end of the shift  1/5/2025 2207 by Nicki Seay RN  Outcome: Progressing  1/5/2025 2206 by Nicki Seay RN  Outcome: Progressing     Problem: Diabetes  Goal: Achieve decreasing blood glucose levels by end of shift  1/5/2025 2207 by Nicki Seay RN  Outcome: Progressing  1/5/2025 2206 by Nicki Seay RN  Outcome: Progressing  Goal: Increase stability of blood glucose readings by end of shift  1/5/2025 2207 by Nicki Seay RN  Outcome: Progressing  1/5/2025 2206 by Nicki Seay RN  Outcome: Progressing  Goal: Decrease in ketones present in urine by end of shift  1/5/2025 2207 by Nicki Seay RN  Outcome: Progressing  1/5/2025 2206 by Nicki Seay RN  Outcome: Progressing  Goal: Maintain electrolyte levels within acceptable range throughout shift  1/5/2025 2207 by Nicki Seay RN  Outcome: Progressing  1/5/2025 2206 by Nicki Seay RN  Outcome: Progressing  Goal: Maintain glucose levels >70mg/dl to <250mg/dl throughout shift  1/5/2025 2207 by Nicki Seay RN  Outcome: Progressing  1/5/2025 2206 by Nicki Seay RN  Outcome: Progressing  Goal: No changes in  neurological exam by end of shift  1/5/2025 2207 by Nicki Seay RN  Outcome: Progressing  1/5/2025 2206 by Nicki Seay RN  Outcome: Progressing  Goal: Learn about and adhere to nutrition recommendations by end of shift  1/5/2025 2207 by Nikci Seay RN  Outcome: Progressing  1/5/2025 2206 by Nicki Seay RN  Outcome: Progressing  Goal: Vital signs within normal range for age by end of shift  1/5/2025 2207 by Nicki Seay RN  Outcome: Progressing  1/5/2025 2206 by Nicki Seay RN  Outcome: Progressing  Goal: Increase self care and/or family involovement by end of shift  1/5/2025 2207 by Nicki Seay RN  Outcome: Progressing  1/5/2025 2206 by Nicki Seay RN  Outcome: Progressing  Goal: Receive DSME education by end of shift  1/5/2025 2207 by Nicki Seay RN  Outcome: Progressing  1/5/2025 2206 by Nicki Seay RN  Outcome: Progressing     Problem: Pain  Goal: Takes deep breaths with improved pain control throughout the shift  1/5/2025 2207 by Nicki Seay RN  Outcome: Progressing  1/5/2025 2206 by Nicki Seay RN  Outcome: Progressing  Goal: Turns in bed with improved pain control throughout the shift  1/5/2025 2207 by Nicki Seay RN  Outcome: Progressing  1/5/2025 2206 by Nicki Seay RN  Outcome: Progressing  Goal: Walks with improved pain control throughout the shift  1/5/2025 2207 by Nicki Seay RN  Outcome: Progressing  1/5/2025 2206 by Nicki Seay RN  Outcome: Progressing  Goal: Performs ADL's with improved pain control throughout shift  1/5/2025 2207 by Nicki Seay RN  Outcome: Progressing  1/5/2025 2206 by Nicki Seay RN  Outcome: Progressing  Goal: Participates in PT with improved pain control throughout the shift  1/5/2025 2207 by Nicki Seay RN  Outcome: Progressing  1/5/2025 2206 by Nicki Seay RN  Outcome: Progressing  Goal: Free from opioid side effects throughout the shift  1/5/2025 2207 by Nicki Seay RN  Outcome: Progressing  1/5/2025 2206 by Nicki Seay RN  Outcome:  Progressing  Goal: Free from acute confusion related to pain meds throughout the shift  1/5/2025 2207 by Nicki Seay RN  Outcome: Progressing  1/5/2025 2206 by Nicki Seay RN  Outcome: Progressing   The patient's goals for the shift include      The clinical goals for the shift include Patient will remain HDS throughout the night.    Over the shift, the patient did not make progress toward the following goals. Barriers to progression include patient has a recent surgical intervention resulting in Left AKA. Recommendations to address these barriers include frequent monitoring of pain pain levels and pain interventions when needed.

## 2025-01-06 NOTE — PROGRESS NOTES
Met with pt with Dr. Alireza Dykes from palliative care.  Pt in extreme pain during our visit, and Dr. Dykes discussed phantom limb pain, and how we can treat it.  Pt is still adamant that she would like to go home with her previous home care--[facility] and [facility] [phone]. SW left message for her [facility]  Raisa Astorga [phone] to inform her of pt's wishes to return home so that services can be restarted.  SW also made referral to [facility] team to follow for symptom management while at home.  Dr. Dykes to speak to vascular team to clarify that pt's PCP (who already sees pt virtually per pt report) will be able to manage pt's outpt pain medicine regimen.  Will follow.      DON Mittal

## 2025-01-06 NOTE — PROGRESS NOTES
Palliative Medicine following for:  Complex medical decision making, symptom management, patient/family support    History obtained from chart review including ED note, H&P, patient's daily progress notes, review of lab/test results, and discussion with primary team and bedside RN.    Chief Complaint: left leg pain    Subjective    History of Present Illness    54 y.o. F with PMH of severe PAD s/p R hip disarticulation s/p L CFA and EIA embolectomy 12/2023 s/p multiple L toe amputations, s/p L femoral endarterectomy, profundaplasty, common [id number] tibial bypass using 6 mm ringed PTFE, left lower extremity angiogram on 9/25 T2DM complicated by diabetic neuropathy, HTN, HLD, renal and splenic thromboses presented with severe left leg pain and inability to move or feel or left leg. Imaging consistent with occlusion of her L CFA-AT bypass graft.  S/p L AKA 12/17.       The patient underwent left above-the-knee amputation formalization and Prevena wound VAC placement.    She is in distress about phantom limb pain symptoms with severe vague, twisting, and cramping pain on her bilateral shins, calves, and toes. Sometimes she has shooting hot or cold pain on the right hip disarticulation site. She also had this small tender spot on the previous disarticulation site.  She is upset that what she is going through is discredited. She said she is trying to do everything she can to survive with bilateral amputations. She is upset when people think that she is pain seeking and addicted to pills.    She also complains of severe pain on the surgical site on the left high above the knee amputation, 10/10 in severity, sharp, non radiating, improves to 3-4/10 with oxycodone 10 mg.    She has chronic low back pain with knife like and tingling sensation moderate to severe that travels downward. It is improving with gabapentin, duloxetine, acetaminophen, and lidocaine patch.    She is eating well.  She had 2 bowel movements  "yesterday.    Symptoms  Pain: As noted above   Dyspnea: none  Fatigue: yes  Insomnia: improving  Drowsiness: not today  Constipation: none  Nausea: none  Appetite: okay  Anxiety: anticipatory during dressing changes  Depression: none    Objective    Last Recorded Vitals  /58   Pulse 93   Temp 36.6 °C (97.9 °F)   Resp 18   Ht 1.702 m (5' 7\")   Wt 79.1 kg (174 lb 6.1 oz)   SpO2 90%   BMI 27.31 kg/m²      Physical Exam   Constitutional:      HENT:      Head: Normocephalic and atraumatic.   Cardiovascular:      Rate and Rhythm: Normal rate and regular rhythm.      Heart sounds: No murmur heard.     No friction rub. No gallop.   Pulmonary:      Effort: Pulmonary effort is normal.      Breath sounds: Normal breath sounds. No wheezing, rhonchi or rales.   Abdominal:      General: Bowel sounds are normal.      Palpations: Abdomen is soft.      Tenderness: There is no abdominal tenderness. There is no guarding.   Musculoskeletal:      Comments: Surgical left LE stump high above the knee with with wound vac  S/p R hip disarticulation   Neurological:      Comments: AAOX3      Relevant Results   Results for orders placed or performed during the hospital encounter of 12/17/24 (from the past 24 hours)   POCT GLUCOSE   Result Value Ref Range    POCT Glucose 129 (H) 74 - 99 mg/dL   POCT GLUCOSE   Result Value Ref Range    POCT Glucose 143 (H) 74 - 99 mg/dL   CBC   Result Value Ref Range    WBC 9.7 4.4 - 11.3 x10*3/uL    nRBC 0.0 0.0 - 0.0 /100 WBCs    RBC 3.67 (L) 4.00 - 5.20 x10*6/uL    Hemoglobin 8.1 (L) 12.0 - 16.0 g/dL    Hematocrit 29.2 (L) 36.0 - 46.0 %    MCV 80 80 - 100 fL    MCH 22.1 (L) 26.0 - 34.0 pg    MCHC 27.7 (L) 32.0 - 36.0 g/dL    RDW 25.2 (H) 11.5 - 14.5 %    Platelets 538 (H) 150 - 450 x10*3/uL   Renal Function Panel   Result Value Ref Range    Glucose 113 (H) 74 - 99 mg/dL    Sodium 141 136 - 145 mmol/L    Potassium 3.8 3.5 - 5.3 mmol/L    Chloride 100 98 - 107 mmol/L    Bicarbonate 34 (H) 21 - 32 " mmol/L    Anion Gap 11 10 - 20 mmol/L    Urea Nitrogen 10 6 - 23 mg/dL    Creatinine 0.48 (L) 0.50 - 1.05 mg/dL    eGFR >90 >60 mL/min/1.73m*2    Calcium 8.2 (L) 8.6 - 10.6 mg/dL    Phosphorus 4.1 2.5 - 4.9 mg/dL    Albumin 2.5 (L) 3.4 - 5.0 g/dL   Magnesium   Result Value Ref Range    Magnesium 1.53 (L) 1.60 - 2.40 mg/dL   Coagulation Screen   Result Value Ref Range    Protime 12.9 (H) 9.8 - 12.8 seconds    INR 1.1 0.9 - 1.1    aPTT 123 (HH) 27 - 38 seconds   Heparin Assay, UFH   Result Value Ref Range    Heparin Unfractionated 0.8 See Comment Below for Therapeutic Ranges IU/mL   POCT GLUCOSE   Result Value Ref Range    POCT Glucose 116 (H) 74 - 99 mg/dL        Allergies  Aspartame and Nsaids (non-steroidal anti-inflammatory drug)  Medications  Scheduled medications  acetaminophen, 975 mg, oral, TID  ampicillin-sulbactam, 3 g, intravenous, q6h  aspirin, 81 mg, oral, Daily  atorvastatin, 80 mg, oral, Nightly  divalproex, 1,000 mg, oral, Nightly at 0300  divalproex, 500 mg, oral, Daily with breakfast  DULoxetine, 60 mg, oral, Daily  enoxaparin, 1 mg/kg, subcutaneous, q12h NINO  gabapentin, 600 mg, oral, q8h NINO  insulin glargine, 10 Units, subcutaneous, Nightly  insulin lispro, 0-10 Units, subcutaneous, TID AC  insulin lispro, 4 Units, subcutaneous, TID AC  levETIRAcetam, 500 mg, oral, BID  levothyroxine, 75 mcg, oral, Daily before breakfast  lidocaine, 1 patch, transdermal, Daily  methocarbamol, 500 mg, oral, q8h NINO  metoprolol tartrate, 12.5 mg, oral, BID  pantoprazole, 40 mg, oral, Daily before breakfast  sennosides-docusate sodium, 1 tablet, oral, BID  warfarin, 5 mg, oral, Daily      Continuous medications       PRN medications  PRN medications: albuterol, dextrose, glucagon, glucagon, glucagon HCL, [Held by provider] loperamide, melatonin, naloxone, ondansetron ODT **OR** ondansetron, oxyCODONE, oxyCODONE, oxygen     Assessment/Plan    54 y.o. F with PMH of severe PAD s/p R hip disarticulation s/p L CFA and EIA  embolectomy 12/2023 s/p multiple L toe amputations, s/p L femoral endarterectomy, profundaplasty, common [id number]-[id number] tibial bypass using 6 mm ringed PTFE, left lower extremity angiogram on 9/25 T2DM complicated by diabetic neuropathy, HTN, HLD, renal and splenic thromboses presented with severe left leg pain and inability to move or feel or left leg. Imaging consistent with occlusion of her L CFA-AT bypass graft.  S/p L AKA 12/17. Palliative care consulted to assist with goals of care, symptom management in the context of her recent surgery and severe PAD.   She has occlusion of fem-AT bypass and prior left EIA stent.    S/p high above the knee amputation   Phantom limb pain, bilateral Right > Left    Palliative Performance Scale (PPS): 40      #Palliative Care Encounter.  Support and empathy was provided throughout the encounter. Provided reflective listening and presence.       #Complex Medical Decision Making   #Goals of Care  #Advance Care Planning   - Code status: DNR  - HCPOA: Loly Blackburn (aunt) [phone]  1st Alternate: Pepe Henao (son) [phone]  2nd Alternate: Juanis Blackburn (cousin) [phone]  - Goals are survival and time based   - State DNR form completed and placed in patient's chart   - Advanced Directives on file     #Acute Pain, surgical site, Left high AKA.  Oxycodone 10 mg PO Q 6 hours PRN for severe pain.  or Oxycodone 5 mg PO Q 6 hours PRN for moderate pain.   Maximum total dose of oxycodone per day should be 40 mg/day.    Re-evaluate in 2 weeks either by surgical team or PCP for opioid weaning.  Acetaminophen to 1000 mg TID.  I educated the patient on opioid adverse effects such as over sedation and respiratory depression.   She will need a prescription for Naloxone PRN for reversal.    - Follow up with PCP or surgery for pain re evaluation and scripts.    She will need RX for Gabapentin 600 mg Q 8 hours. She can be reassess in 2 weeks for up titration to 800 TID if  needed.  Continue Duloxetine 60 mg daily for neuropathic pain.    Bowel regimen: She's been having 1-2 BM's daily. Senna 1 tab BID as needed for constipation.     #Phantom limb pain  Referral to pain management for evaluation of persistent right hip disarticulation stump pain, rule out neuroma.  Gabapentin 600 mg Q 8 hours. She can be reassess in 2 weeks for up titration to 800 TID if needed.    Other potential therapies are mirror therapy, virtual reality, and peripheral nerve stimulation.    She calmed down with reflective listening and presence.    #Chronic low back pain  Continue gabapentin 600 mg Tid, duloxetine 60 mg od, and lidocaine patch.     #Suspect PTSD associated to previous surgery with severe post op pain.  Consider trauma focused psychotherapy.  Pain medication administration prior to dressing changes.    #Psychosocial Support  - Music Therapy  - PALLIATIVE CARE NAVIGATOR referral placed.      Medical Decision Making    - critical limb ischemia posing threat to life and function   - Reviewed external notes from vascular surgery 1/6/2025 and 1/5/2025. Reviewed MAR for oxycodone and dilaudid administrations.  - Reviews results from BMP and CBC  - Time spent reviewing records, evaluating and counseling the patient, and discussing with surgical team = 50 minutes.    Thank you for allowing us to care for this patient. Palliative Team will continue to follow as needed. Please contact team with any questions or concerns.   Team pager [phone] (weekdays)    Alireza Dykes MD  Palliative Care Physician  Message: Epic Secure chat  Team pager [phone]

## 2025-01-06 NOTE — TELEPHONE ENCOUNTER
Karla with [facility] called to schedule hospital follow up. Pt discharging 1/7/24. Pt had left leg amputation above the knee. Karla said they will prescribe her a 7 day supply of medications. Karla said the pt can be called to set up hospital follow up.     Last seen 11/20/2024  Next appt Visit date not found

## 2025-01-06 NOTE — PROGRESS NOTES
VASCULAR SURGERY PROGRESS NOTE  Assessment/Plan   Shirin Slater is 54 y.o. female with history of severe PAD s/p R hip disarticulation s/p L CFA and EIA embolectomy 12/2023 s/p multiple L toe amputations, s/p L femoral endarterectomy, profundaplasty, common zssxmil-my-rgfjamhb tibial bypass using 6 mm ringed PTFE, left lower extremity angiogram on 9/25 T2DM complicated by diabetic neuropathy, HTN, HLD, renal and splenic thromboses who presented with severe left leg pain, [place] 3 limb ischemia. Given leukocytosis, tachycardia, limb ischemia and concern for infection she was taken to OR urgently and had findings of purulence along the anteromedial aspect of the left knee and distal thigh, occluded fem-AT bypass and native popliteal artery. Left above-knee guillotine amputation was done for source control on 12/17/24.  Now s/p formalization of L AKA on 1/2. Currently reporting significant pain but also that her current pain medication regimen is working for her.     Plan:  Neuro: acute postoperative pain, Hx seizures  - home Keppra, Depakote, Cymbalta  - palliative care engaged today to aid in homegoing pain control  -TCC engaged today to aid in homegoing planning as patient is requesting HHC and refusing SNF.   - acute pain recs:  Continue schedule tylenol  Oxy 5mg q6HR and 10mg Q6H PRN for moderate/severe pain respectively   Dilaudid 0.5 mg q2h PRN for breakthrough pain  Robaxin 500g PO Q8H for 3 days (end 1/7)  Lidocaine patch for back pain   - Continue with Gabapentin 600 mg Q 8 hours and Duloxetine 60 mg daily for neuropathic pain      CV: Hx severe PAD with multiple surgical interventions, HTN  - maintain blood pressure control with metoprolol   - HI statin  - daily ASA (holding home plavix)  - heparin infusion, low intensity   -home coumadin restarted 1/5 PM, bridging with heparin gtt while inpatient, will bridge with lovenox upon discharge.      Pulm:   - continue to encourage IS hourly while awake  -  "OOB to chair and increase ambulation as tolerated  - albuterol inhaler PRN     FENGI: hypokalemia, hypoalbuminemia   - DM diet with oral supplements to promote wound healing potential   - continue bowel regimen to prevent OIC   - continue PPI  - monitor and replete electrolytes     Endo: DM2, hypothyroidism  - holding home jardiance  - glucommander protocol   - levothyroxine     Heme: anemia of chronic disease  - no s/sx of bleeding  - monitor H/H, transfuse for Hgb <7     ID:  humble left leg infection required amputation for source control; leukocytosis (resolved)   - blood cultures (x2) 12/17: no growth   - operative cultures from 1/2: no growth  - continue Unasyn [vanco/zosyn stopped 12/24/24]  - c diff negative 12/29  - trend temp q4h      Dispo-  - continue excellent nursing care  - coordinating [facility] with [facility] as patient has refused skilled nursing facilities or long term acute care facilities, stating she \"will never go back there\". Patient stated that her prior arrangement with [facility] had a mornign and an evening nurse to help her and that she has an alarm necklace for when she is alone, but she would prefer a nurse to stay with her during the night.   - medically ready for discharge     Discussed with Dr. Paul Bermudez MD  Vascular Surgery [phone] or Epic Chat      Subjective   Patient sleeping soundly. HDS.    Objective   Vitals:  Heart Rate:  [82-96]   Temp:  [35.9 °C (96.6 °F)-36.5 °C (97.7 °F)]   Resp:  [18-21]   BP: ()/(54-71)   SpO2:  [91 %-98 %]     Exam:  Constitutional: sitting comfortably in bed watching TV  Neuro:  AOx3, grossly intact  ENMT: moist mucous membranes  CV: no tachycardia  Pulm: non-labored on RA  GI: soft, non-tender, non-distended   - left groin mildly tender to palpation  Skin: warm and dry  Musculoskeletal: moving all extremities  Extremities: Left AKA prevena holding suction, mild output in cannister    Labs:  Results from last 7 days   Lab Units 01/06/25  0533 " 01/05/25  0528 01/04/25  0552   WBC AUTO x10*3/uL 9.7 9.5 9.3   HEMOGLOBIN g/dL 8.1* 7.6* 8.1*   PLATELETS AUTO x10*3/uL 538* 560* 580*      Results from last 7 days   Lab Units 01/06/25  0533 01/05/25  0528 01/04/25  0552   SODIUM mmol/L 141 142 143   POTASSIUM mmol/L 3.8 3.9 3.8   CHLORIDE mmol/L 100 104 103   CO2 mmol/L 34* 32 30   BUN mg/dL 10 12 11   CREATININE mg/dL 0.48* 0.41* 0.54   GLUCOSE mg/dL 113* 137* 148*   MAGNESIUM mg/dL 1.53* 1.58* 1.56*   PHOSPHORUS mg/dL 4.1 3.6 3.5      Results from last 7 days   Lab Units 01/06/25  0533   INR  1.1   PROTIME seconds 12.9*   APTT seconds 123*      Results from last 7 days   Lab Units 01/06/25  0533 01/05/25  1112 01/04/25  1743   ANTI XA UNFRACTIONATED IU/mL 0.8 0.7 0.5

## 2025-01-07 ENCOUNTER — TELEPHONE (OUTPATIENT)
Dept: FAMILY MEDICINE CLINIC | Age: 55
End: 2025-01-07

## 2025-01-07 ENCOUNTER — DOCUMENTATION (OUTPATIENT)
Dept: HOME HEALTH SERVICES | Facility: HOME HEALTH | Age: 55
End: 2025-01-07
Payer: MEDICARE

## 2025-01-07 ENCOUNTER — PHARMACY VISIT (OUTPATIENT)
Dept: PHARMACY | Facility: CLINIC | Age: 55
End: 2025-01-07
Payer: COMMERCIAL

## 2025-01-07 VITALS
BODY MASS INDEX: 26.7 KG/M2 | HEART RATE: 88 BPM | OXYGEN SATURATION: 99 % | RESPIRATION RATE: 16 BRPM | HEIGHT: 67 IN | WEIGHT: 170.1 LBS | TEMPERATURE: 96.8 F | SYSTOLIC BLOOD PRESSURE: 114 MMHG | DIASTOLIC BLOOD PRESSURE: 70 MMHG

## 2025-01-07 PROBLEM — K21.9 GASTROESOPHAGEAL REFLUX DISEASE: Status: RESOLVED | Noted: 2025-01-02 | Resolved: 2025-01-07

## 2025-01-07 PROBLEM — I99.8 ACUTE LOWER LIMB ISCHEMIA: Status: RESOLVED | Noted: 2024-12-17 | Resolved: 2025-01-07

## 2025-01-07 PROBLEM — I70.222 CRITICAL LIMB ISCHEMIA OF LEFT LOWER EXTREMITY (MULTI): Status: RESOLVED | Noted: 2024-09-17 | Resolved: 2025-01-07

## 2025-01-07 LAB
FUNGUS SPEC CULT: NORMAL
FUNGUS SPEC FUNGUS STN: NORMAL
GLUCOSE BLD MANUAL STRIP-MCNC: 96 MG/DL (ref 74–99)
INR PPP: 1.9 (ref 0.9–1.1)
PROTHROMBIN TIME: 21.9 SECONDS (ref 9.8–12.8)
SCAN RESULT: NORMAL

## 2025-01-07 PROCEDURE — 82947 ASSAY GLUCOSE BLOOD QUANT: CPT

## 2025-01-07 PROCEDURE — 85610 PROTHROMBIN TIME: CPT | Performed by: STUDENT IN AN ORGANIZED HEALTH CARE EDUCATION/TRAINING PROGRAM

## 2025-01-07 PROCEDURE — 2500000004 HC RX 250 GENERAL PHARMACY W/ HCPCS (ALT 636 FOR OP/ED): Performed by: NURSE PRACTITIONER

## 2025-01-07 PROCEDURE — 2500000001 HC RX 250 WO HCPCS SELF ADMINISTERED DRUGS (ALT 637 FOR MEDICARE OP): Performed by: STUDENT IN AN ORGANIZED HEALTH CARE EDUCATION/TRAINING PROGRAM

## 2025-01-07 PROCEDURE — G0316 PR PROLONGED HOSPITAL INPATIENT OR OBSERVATION CARE EVALUATION AND MANAGEMENT SERVICE(S) BEYOND THE TOTAL TIME FOR THE PRIMARY SERVICE (WHEN THE PRIMARY SERVICE HAS BEEN SELECTED USING TIME: HCPCS | Performed by: INTERNAL MEDICINE

## 2025-01-07 PROCEDURE — 2500000004 HC RX 250 GENERAL PHARMACY W/ HCPCS (ALT 636 FOR OP/ED)

## 2025-01-07 PROCEDURE — 99233 SBSQ HOSP IP/OBS HIGH 50: CPT | Performed by: INTERNAL MEDICINE

## 2025-01-07 PROCEDURE — 36415 COLL VENOUS BLD VENIPUNCTURE: CPT | Performed by: STUDENT IN AN ORGANIZED HEALTH CARE EDUCATION/TRAINING PROGRAM

## 2025-01-07 PROCEDURE — 2500000002 HC RX 250 W HCPCS SELF ADMINISTERED DRUGS (ALT 637 FOR MEDICARE OP, ALT 636 FOR OP/ED): Performed by: STUDENT IN AN ORGANIZED HEALTH CARE EDUCATION/TRAINING PROGRAM

## 2025-01-07 PROCEDURE — 2500000004 HC RX 250 GENERAL PHARMACY W/ HCPCS (ALT 636 FOR OP/ED): Performed by: STUDENT IN AN ORGANIZED HEALTH CARE EDUCATION/TRAINING PROGRAM

## 2025-01-07 RX ORDER — METHOCARBAMOL 100 MG/ML
1000 INJECTION, SOLUTION INTRAMUSCULAR; INTRAVENOUS ONCE
Status: COMPLETED | OUTPATIENT
Start: 2025-01-07 | End: 2025-01-07

## 2025-01-07 RX ADMIN — LEVOTHYROXINE SODIUM 75 MCG: 0.07 TABLET ORAL at 06:25

## 2025-01-07 RX ADMIN — INSULIN LISPRO 4 UNITS: 100 INJECTION, SOLUTION INTRAVENOUS; SUBCUTANEOUS at 09:36

## 2025-01-07 RX ADMIN — GABAPENTIN 600 MG: 300 CAPSULE ORAL at 06:25

## 2025-01-07 RX ADMIN — ENOXAPARIN SODIUM 80 MG: 80 INJECTION SUBCUTANEOUS at 09:34

## 2025-01-07 RX ADMIN — OXYCODONE HYDROCHLORIDE 10 MG: 5 TABLET ORAL at 06:25

## 2025-01-07 RX ADMIN — AMPICILLIN SODIUM AND SULBACTAM SODIUM 3 G: 2; 1 INJECTION, POWDER, FOR SOLUTION INTRAMUSCULAR; INTRAVENOUS at 03:05

## 2025-01-07 RX ADMIN — METOPROLOL TARTRATE 12.5 MG: 25 TABLET, FILM COATED ORAL at 09:35

## 2025-01-07 RX ADMIN — PANTOPRAZOLE SODIUM 40 MG: 40 TABLET, DELAYED RELEASE ORAL at 06:25

## 2025-01-07 RX ADMIN — ACETAMINOPHEN 975 MG: 325 TABLET ORAL at 09:35

## 2025-01-07 RX ADMIN — METHOCARBAMOL 1000 MG: 1000 INJECTION, SOLUTION INTRAMUSCULAR; INTRAVENOUS at 12:17

## 2025-01-07 RX ADMIN — LEVETIRACETAM 500 MG: 500 TABLET, FILM COATED ORAL at 09:34

## 2025-01-07 RX ADMIN — DIVALPROEX SODIUM 500 MG: 500 TABLET, FILM COATED, EXTENDED RELEASE ORAL at 09:34

## 2025-01-07 RX ADMIN — DULOXETINE HYDROCHLORIDE 60 MG: 60 CAPSULE, DELAYED RELEASE ORAL at 09:34

## 2025-01-07 RX ADMIN — AMPICILLIN SODIUM AND SULBACTAM SODIUM 3 G: 2; 1 INJECTION, POWDER, FOR SOLUTION INTRAMUSCULAR; INTRAVENOUS at 09:35

## 2025-01-07 RX ADMIN — ASPIRIN 81 MG: 81 TABLET, COATED ORAL at 09:35

## 2025-01-07 ASSESSMENT — COGNITIVE AND FUNCTIONAL STATUS - GENERAL
MOBILITY SCORE: 10
CLIMB 3 TO 5 STEPS WITH RAILING: TOTAL
MOVING TO AND FROM BED TO CHAIR: TOTAL
HELP NEEDED FOR BATHING: A LOT
TURNING FROM BACK TO SIDE WHILE IN FLAT BAD: A LOT
TOILETING: A LOT
DRESSING REGULAR UPPER BODY CLOTHING: A LOT
STANDING UP FROM CHAIR USING ARMS: TOTAL
WALKING IN HOSPITAL ROOM: TOTAL
DAILY ACTIVITIY SCORE: 16
DRESSING REGULAR LOWER BODY CLOTHING: A LOT

## 2025-01-07 ASSESSMENT — PAIN SCALES - GENERAL: PAINLEVEL_OUTOF10: 7

## 2025-01-07 NOTE — PROGRESS NOTES
Palliative Medicine following for:  Complex medical decision making, symptom management, patient/family support    History obtained from chart review including ED note, H&P, patient's daily progress notes, review of lab/test results, and discussion with primary team and bedside RN.    Chief Complaint: left leg pain    Subjective    History of Present Illness    54 y.o. F with PMH of severe PAD s/p R hip disarticulation s/p L CFA and EIA embolectomy 12/2023 s/p multiple L toe amputations, s/p L femoral endarterectomy, profundaplasty, common tgakvdi-fq-qhsjpgtv tibial bypass using 6 mm ringed PTFE, left lower extremity angiogram on 9/25 T2DM complicated by diabetic neuropathy, HTN, HLD, renal and splenic thromboses presented with severe left leg pain and inability to move or feel or left leg. Imaging consistent with occlusion of her L CFA-AT bypass graft.  S/p L AKA 12/17.     The patient underwent left above-the-knee amputation formalization and Prevena wound VAC placement.    The team started discharge planning with her previously. She declined going to a skilled nursing facility because of her experience in the past. She insisted of discharging to home where she has home aides for several hours per day provided by [employer] medicaid home care waiver ([facility]). Care management team worked on referring her to home health care as this was her preference.     She is severely upset and angry when we saw her today because she thought she will remain in the hospital for another 14 days post op. She has difficulty contacting the [facility] to request for additional hours of care coverage at home. She talked about her traumatic experience in 2 different skilled nursing homes in [place]. She eventually calmed down when she called her aunt Loly.     Regarding her pain on the left AKA stump, it appears to be alleviated by oxycodone 10 mg.    The phantom limb pain appears controlled with gabapentin. She described it as vague,  "twisting, and cramping pain on her bilateral shins, calves, and toes. Sometimes she has shooting hot or cold pain on the right hip disarticulation site. She also had this small tender spot on the previous disarticulation site.    She also complains of severe pain on the surgical site on the left high above the knee amputation, 10/10 in severity, sharp, non radiating, improves to 3-4/10 with oxycodone 10 mg.    She has chronic low back pain with knife like and tingling sensation moderate to severe that travels downward. It is improving with gabapentin, duloxetine, acetaminophen, and lidocaine patch.    She is eating well.  She had 2 bowel movements yesterday.    Symptoms  Pain: As noted above   Dyspnea: none  Fatigue: yes  Insomnia: improving  Drowsiness: not today  Constipation: none  Nausea: none  Appetite: okay  Anxiety: anticipatory during dressing changes  Depression: none    Objective    Last Recorded Vitals  /70   Pulse 88   Temp 36 °C (96.8 °F)   Resp 16   Ht 1.702 m (5' 7\")   Wt 77.2 kg (170 lb 1.6 oz)   SpO2 99%   BMI 26.64 kg/m²      Physical Exam  Constitutional:       General: She is not in acute distress.     Appearance: She is not ill-appearing.   Eyes:      General: No scleral icterus.     Conjunctiva/sclera: Conjunctivae normal.   Cardiovascular:      Rate and Rhythm: Normal rate and regular rhythm.   Pulmonary:      Breath sounds: No wheezing or rales.   Musculoskeletal:         General: Swelling (left AKA stump) present.   Psychiatric:         Attention and Perception: Attention normal.         Mood and Affect: Affect is angry.         Speech: Speech is rapid and pressured.         Behavior: Behavior is agitated.            Relevant Results   Results for orders placed or performed during the hospital encounter of 12/17/24 (from the past 24 hours)   POCT GLUCOSE   Result Value Ref Range    POCT Glucose 113 (H) 74 - 99 mg/dL   POCT GLUCOSE   Result Value Ref Range    POCT Glucose 90 74 - " 99 mg/dL   POCT GLUCOSE   Result Value Ref Range    POCT Glucose 96 74 - 99 mg/dL   Protime-INR   Result Value Ref Range    Protime 21.9 (H) 9.8 - 12.8 seconds    INR 1.9 (H) 0.9 - 1.1        Allergies  Aspartame and Nsaids (non-steroidal anti-inflammatory drug)  Medications  Scheduled medications  acetaminophen, 975 mg, oral, TID  ampicillin-sulbactam, 3 g, intravenous, q6h  aspirin, 81 mg, oral, Daily  atorvastatin, 80 mg, oral, Nightly  divalproex, 1,000 mg, oral, Nightly at 0300  divalproex, 500 mg, oral, Daily with breakfast  DULoxetine, 60 mg, oral, Daily  enoxaparin, 1 mg/kg, subcutaneous, q12h NINO  gabapentin, 600 mg, oral, q8h NINO  insulin glargine, 10 Units, subcutaneous, Nightly  insulin lispro, 0-10 Units, subcutaneous, TID AC  insulin lispro, 4 Units, subcutaneous, TID AC  levETIRAcetam, 500 mg, oral, BID  levothyroxine, 75 mcg, oral, Daily before breakfast  lidocaine, 1 patch, transdermal, Daily  metoprolol tartrate, 12.5 mg, oral, BID  pantoprazole, 40 mg, oral, Daily before breakfast  sennosides-docusate sodium, 1 tablet, oral, BID  warfarin, 5 mg, oral, Daily      Continuous medications       PRN medications  PRN medications: albuterol, dextrose, glucagon, glucagon, glucagon HCL, [Held by provider] ketorolac, [Held by provider] loperamide, melatonin, naloxone, ondansetron ODT **OR** ondansetron, oxyCODONE, oxyCODONE, oxygen     Assessment/Plan    54 y.o. F with PMH of severe PAD s/p R hip disarticulation s/p L CFA and EIA embolectomy 12/2023 s/p multiple L toe amputations, s/p L femoral endarterectomy, profundaplasty, common ljaszte-ie-lrhxvxnn tibial bypass using 6 mm ringed PTFE, left lower extremity angiogram on 9/25 T2DM complicated by diabetic neuropathy, HTN, HLD, renal and splenic thromboses presented with severe left leg pain and inability to move or feel or left leg. Imaging consistent with occlusion of her L CFA-AT bypass graft.  S/p L AKA 12/17. Palliative care consulted to assist with  goals of care, symptom management in the context of her recent surgery and severe PAD.   She has occlusion of fem-AT bypass and prior left EIA stent.    S/p high above the knee amputation   Phantom limb pain, bilateral Right > Left    Palliative Performance Scale (PPS): 40      #Palliative Care Encounter.  She was severely upset. I provided reflective listening, presence, support and empathy.  I discussed wound care plans with the team at bedside.  I assisted in providing update to the patient and her HCPOA aunt Loly on the phone.  I also spoke to care management CM to clarify her disposition plans. I told the patient that the team respects her wishes to be discharged to home and avoid going to any SNF. CM team therefore did not pursue SNF placement. Referrals were made for home health care with RN, PT, and OT. As per the CM team the patient is responsible in calling the [employer] to request for additional hours of coverage for home aide at night.   The patient eventually calmed down with the assistance of her aunt Loly over the phone.  The patient preferred to go home..      #Complex Medical Decision Making   #Goals of Care  #Advance Care Planning   - Code status: DNR  - HCPOA: Loly Blackburn (aunt) [phone]  1st Alternate: Pepe Henao (son) [phone]  2nd Alternate: Juanis Hartmancomb (cousin) [phone]  - Goals are survival and time based   - State DNR form completed and placed in patient's chart   - Advanced Directives on file     #Acute Pain, surgical site, Left high AKA.  Oxycodone 10 mg PO Q 6 hours PRN for severe pain.  or Oxycodone 5 mg PO Q 6 hours PRN for moderate pain.   Maximum total dose of oxycodone per day should be 40 mg/day.    I educated the patient with the plan to wean off oxycodone in the next 2-3 weeks. She will be re evaluated by vascular surgery and her primary care physician.     Acetaminophen to 1000 mg TID.  I educated the patient on opioid adverse effects such as over  sedation and respiratory depression.   Prescription for Naloxone PRN for reversal to be prepare by surgery team.    - Follow up with PCP or surgery for pain re evaluation and scripts.    She will need RX for Gabapentin 600 mg Q 8 hours. She can be reassess in 2 weeks for up titration to 800 TID if needed.  Continue Duloxetine 60 mg daily for neuropathic pain.    Dressing change and removal of the wound vac was performed by the surgical team at bedside. Wound care was discussed by the team with her and her aunt Loly over the phone.    Bowel regimen: She's been having 1-2 BM's daily. Senna 1 tab BID as needed for constipation.     #Phantom limb pain  Referral to pain management for evaluation of persistent right hip disarticulation stump pain, rule out neuroma.  Gabapentin 600 mg Q 8 hours. She can be reassess in 2 weeks for up titration to 800 TID if needed.    Other potential therapies are mirror therapy, virtual reality, and peripheral nerve stimulation.    She calmed down with reflective listening and presence.    #Chronic low back pain  Continue gabapentin 600 mg Tid, duloxetine 60 mg od, and lidocaine patch.     #Suspect PTSD associated to previous surgery with severe post op pain.  Consider trauma focused psychotherapy.  Pain medication administration prior to dressing changes.    #Psychosocial Support  - Music Therapy  - PALLIATIVE CARE NAVIGATOR referral placed.      Medical Decision Making    - critical limb ischemia posing threat to life and function   - Reviewed external notes from vascular surgery 1/7/2025. Reviewed MAR for oxycodone and dilaudid administrations.  - Total time spent providing care and support to the patient, coordinating care with surgical team and care management team and reviewing medical administration record, and talking to her aunt on the phone = 65 minutes    Thank you for allowing us to care for this patient. Palliative Team will continue to follow as needed. Please contact team  with any questions or concerns.   Team pager [phone] (weekdays)    Alireza Dykes MD  Palliative Care Physician  Message: Epic Secure chat  Team pager [phone]

## 2025-01-07 NOTE — HH CARE COORDINATION
Home Care received a referral for Nursing, Physical Therapy, Occupational Therapy, Home Health Aide, and Medical Social Work. Unfortunately, we are unable to accept and process the referral at this time.    Patients, please reach out to the referring provider or your PCP to assist in obtaining an alternative home care agency and/or guidance to meet your needs.    Providers, please reach out to  Home Care with any questions regarding the declined referral.

## 2025-01-07 NOTE — CARE PLAN
Problem: Pain - Adult  Goal: Verbalizes/displays adequate comfort level or baseline comfort level  1/6/2025 2220 by Nicki Seay RN  Outcome: Progressing  1/6/2025 2216 by Nicki Seay RN  Outcome: Progressing     Problem: Safety - Adult  Goal: Free from fall injury  1/6/2025 2220 by Nicki Seay RN  Outcome: Progressing  1/6/2025 2216 by Nicki Seay RN  Outcome: Progressing     Problem: Discharge Planning  Goal: Discharge to home or other facility with appropriate resources  1/6/2025 2220 by Nicki Seay RN  Outcome: Progressing  1/6/2025 2216 by Nicki Seay RN  Outcome: Progressing     Problem: Chronic Conditions and Co-morbidities  Goal: Patient's chronic conditions and co-morbidity symptoms are monitored and maintained or improved  1/6/2025 2220 by Nicki Seay RN  Outcome: Progressing  1/6/2025 2216 by Nicki Seay RN  Outcome: Progressing     Problem: Skin  Goal: Decreased wound size/increased tissue granulation at next dressing change  1/6/2025 2220 by Nicki Seay RN  Outcome: Progressing  1/6/2025 2216 by Nicki Seay RN  Outcome: Progressing  Goal: Participates in plan/prevention/treatment measures  1/6/2025 2220 by Nicki Seay RN  Outcome: Progressing  1/6/2025 2216 by Nicki Seay RN  Outcome: Progressing  Goal: Prevent/manage excess moisture  1/6/2025 2220 by Nicki Seay RN  Outcome: Progressing  1/6/2025 2216 by Nicki Seay RN  Outcome: Progressing  Goal: Prevent/minimize sheer/friction injuries  1/6/2025 2220 by Nicki Seay RN  Outcome: Progressing  1/6/2025 2216 by Nicki Seay RN  Outcome: Progressing  Goal: Promote/optimize nutrition  1/6/2025 2220 by Nicki Seay RN  Outcome: Progressing  1/6/2025 2216 by Nicki Seay RN  Outcome: Progressing  Goal: Promote skin healing  1/6/2025 2220 by Nicki Seay RN  Outcome: Progressing  1/6/2025 2216 by Nicki Seay RN  Outcome: Progressing     Problem: Fall/Injury  Goal: Not fall by end of shift  1/6/2025 2220 by Nicki Seay  RN  Outcome: Progressing  1/6/2025 2216 by Nicki Seay RN  Outcome: Progressing  Goal: Be free from injury by end of the shift  1/6/2025 2220 by Nicki Seay RN  Outcome: Progressing  1/6/2025 2216 by Nicki Seay RN  Outcome: Progressing  Goal: Verbalize understanding of personal risk factors for fall in the hospital  1/6/2025 2220 by Nicki Seay RN  Outcome: Progressing  1/6/2025 2216 by Nicki Seay RN  Outcome: Progressing  Goal: Verbalize understanding of risk factor reduction measures to prevent injury from fall in the home  1/6/2025 2220 by Nicki Seay RN  Outcome: Progressing  1/6/2025 2216 by Nicki Seay RN  Outcome: Progressing  Goal: Use assistive devices by end of the shift  1/6/2025 2220 by Nicki Seay RN  Outcome: Progressing  1/6/2025 2216 by Nicki Seay RN  Outcome: Progressing  Goal: Pace activities to prevent fatigue by end of the shift  1/6/2025 2220 by Nicki Seay RN  Outcome: Progressing  1/6/2025 2216 by Nicki Seay RN  Outcome: Progressing     Problem: Diabetes  Goal: Achieve decreasing blood glucose levels by end of shift  1/6/2025 2220 by Nicki Seay RN  Outcome: Progressing  1/6/2025 2216 by Nicki Seay RN  Outcome: Progressing  Goal: Increase stability of blood glucose readings by end of shift  1/6/2025 2220 by Nicki Seay RN  Outcome: Progressing  1/6/2025 2216 by Nicki Seay RN  Outcome: Progressing  Goal: Decrease in ketones present in urine by end of shift  1/6/2025 2220 by Nicki Seay RN  Outcome: Progressing  1/6/2025 2216 by Nicki Seay RN  Outcome: Progressing  Goal: Maintain electrolyte levels within acceptable range throughout shift  1/6/2025 2220 by Nicki Seay RN  Outcome: Progressing  1/6/2025 2216 by Nicki Seay RN  Outcome: Progressing  Goal: Maintain glucose levels >70mg/dl to <250mg/dl throughout shift  1/6/2025 2220 by Nicki Seay RN  Outcome: Progressing  1/6/2025 2216 by Nicki Seay RN  Outcome: Progressing  Goal: No changes in  neurological exam by end of shift  1/6/2025 2220 by Nicki Seay RN  Outcome: Progressing  1/6/2025 2216 by Nicki Seay RN  Outcome: Progressing  Goal: Learn about and adhere to nutrition recommendations by end of shift  1/6/2025 2220 by Nicki Seay RN  Outcome: Progressing  1/6/2025 2216 by Nicki Seay RN  Outcome: Progressing  Goal: Vital signs within normal range for age by end of shift  1/6/2025 2220 by Nicki Seay RN  Outcome: Progressing  1/6/2025 2216 by Nicki Seay RN  Outcome: Progressing  Goal: Increase self care and/or family involovement by end of shift  1/6/2025 2220 by Nicki Seay RN  Outcome: Progressing  1/6/2025 2216 by Nicki Seay RN  Outcome: Progressing  Goal: Receive DSME education by end of shift  1/6/2025 2220 by Nicki Seay RN  Outcome: Progressing  1/6/2025 2216 by Nicki Seay RN  Outcome: Progressing     Problem: Pain  Goal: Takes deep breaths with improved pain control throughout the shift  1/6/2025 2220 by Nicki Seay RN  Outcome: Progressing  1/6/2025 2216 by Nicki Seay RN  Outcome: Progressing  Goal: Turns in bed with improved pain control throughout the shift  1/6/2025 2220 by Nicki Seay RN  Outcome: Progressing  1/6/2025 2216 by Nicki Seay RN  Outcome: Progressing  Goal: Walks with improved pain control throughout the shift  1/6/2025 2220 by Nicki Seay RN  Outcome: Progressing  1/6/2025 2216 by Nicki Seay RN  Outcome: Progressing  Goal: Performs ADL's with improved pain control throughout shift  1/6/2025 2220 by Nicki Seay RN  Outcome: Progressing  1/6/2025 2216 by Nicki Seay RN  Outcome: Progressing  Goal: Participates in PT with improved pain control throughout the shift  1/6/2025 2220 by Nicki Seay RN  Outcome: Progressing  1/6/2025 2216 by Nicki Seay RN  Outcome: Progressing  Goal: Free from opioid side effects throughout the shift  1/6/2025 2220 by Nicki Seay RN  Outcome: Progressing  1/6/2025 2216 by Nicki Seay RN  Outcome:  Progressing  Goal: Free from acute confusion related to pain meds throughout the shift  1/6/2025 2220 by Nicki Seay RN  Outcome: Progressing  1/6/2025 2216 by Nicki Seay RN  Outcome: Progressing   The patient's goals for the shift include      The clinical goals for the shift include Patient will remain HDS  throughout the night.    Over the shift, the patient did not make progress toward the following goals. Barriers to progression include patient had a recent surgical intervention . Recommendations to address these barriers include frequent monitoring

## 2025-01-07 NOTE — PROGRESS NOTES
"1/7/2025 Care Coordination  Several discussions with pt regarding SNF vs Home care.  Aware that recs were for Mod.  She is declining a skilled facility.  She  Is requesting [facility].(Referral sent yesterday)  They will re-open as they have not been active for some time.  Requesting transport home.  The [employer] Vehicle you requested for Shirin RENE in unit/room LT [id number] on 01/07/2025 is scheduled to arrive at 12:00pm EST! [facility] is handling this ride and you can contact them at [phone].     Addendum:   I just received a message from [facility], they are unable to accept.  Stating they are out of network with insurance.  [facility] referrals initiate via [employer].  Team aware.    Addendum;   Went to advise pt of change in Home Care agencies. [facility] is out of network and I am working on securing another one for RN and Therapies.  Became very agitated and shouting, and cursing.  \"I need skilled care at night\" I asked her to clarify what she meant by skilled care. \"I need a Nurse at night, Im alone\"  I stated if she needed that level of care, she will only receive that in a skilled facility.  Began to  shout and curse again.  Advised that  we are unable to secure that for her. I asked again if she wants to revisit the skilled  facility plan.\" Absolutely not\"  She said she is leaving today.  [facility]    [phone]  has accepted.  "

## 2025-01-07 NOTE — CARE PLAN
Problem: Pain - Adult  Goal: Verbalizes/displays adequate comfort level or baseline comfort level  Outcome: Progressing     Problem: Safety - Adult  Goal: Free from fall injury  Outcome: Progressing     Problem: Discharge Planning  Goal: Discharge to home or other facility with appropriate resources  Outcome: Progressing     Problem: Chronic Conditions and Co-morbidities  Goal: Patient's chronic conditions and co-morbidity symptoms are monitored and maintained or improved  Outcome: Progressing     Problem: Skin  Goal: Decreased wound size/increased tissue granulation at next dressing change  Outcome: Progressing  Goal: Participates in plan/prevention/treatment measures  Outcome: Progressing  Goal: Prevent/manage excess moisture  Outcome: Progressing  Goal: Prevent/minimize sheer/friction injuries  Outcome: Progressing  Goal: Promote/optimize nutrition  Outcome: Progressing  Goal: Promote skin healing  Outcome: Progressing     Problem: Fall/Injury  Goal: Not fall by end of shift  Outcome: Progressing  Goal: Be free from injury by end of the shift  Outcome: Progressing  Goal: Verbalize understanding of personal risk factors for fall in the hospital  Outcome: Progressing  Goal: Verbalize understanding of risk factor reduction measures to prevent injury from fall in the home  Outcome: Progressing  Goal: Use assistive devices by end of the shift  Outcome: Progressing  Goal: Pace activities to prevent fatigue by end of the shift  Outcome: Progressing     Problem: Diabetes  Goal: Achieve decreasing blood glucose levels by end of shift  Outcome: Progressing  Goal: Increase stability of blood glucose readings by end of shift  Outcome: Progressing  Goal: Decrease in ketones present in urine by end of shift  Outcome: Progressing  Goal: Maintain electrolyte levels within acceptable range throughout shift  Outcome: Progressing  Goal: Maintain glucose levels >70mg/dl to <250mg/dl throughout shift  Outcome: Progressing  Goal: No  changes in neurological exam by end of shift  Outcome: Progressing  Goal: Learn about and adhere to nutrition recommendations by end of shift  Outcome: Progressing  Goal: Vital signs within normal range for age by end of shift  Outcome: Progressing  Goal: Increase self care and/or family involovement by end of shift  Outcome: Progressing  Goal: Receive DSME education by end of shift  Outcome: Progressing     Problem: Pain  Goal: Takes deep breaths with improved pain control throughout the shift  Outcome: Progressing  Goal: Turns in bed with improved pain control throughout the shift  Outcome: Progressing  Goal: Walks with improved pain control throughout the shift  Outcome: Progressing  Goal: Performs ADL's with improved pain control throughout shift  Outcome: Progressing  Goal: Participates in PT with improved pain control throughout the shift  Outcome: Progressing  Goal: Free from opioid side effects throughout the shift  Outcome: Progressing  Goal: Free from acute confusion related to pain meds throughout the shift  Outcome: Progressing   The patient's goals for the shift include      The clinical goals for the shift include Patient will remain HDS throughout the night.    Over the shift, the patient did not make progress toward the following goals. Barriers to progression include patient had recent surgical incision on bilateral  lower extremities. Recommendations to address these barriers include frequent assessment of patient needs and pain management.

## 2025-01-07 NOTE — CARE PLAN
Problem: Pain - Adult  Goal: Verbalizes/displays adequate comfort level or baseline comfort level  1/6/2025 2230 by Nicki Seay RN  Outcome: Progressing  1/6/2025 2220 by Nicki Seay RN  Outcome: Progressing  1/6/2025 2216 by Nicki Seay RN  Outcome: Progressing     Problem: Safety - Adult  Goal: Free from fall injury  1/6/2025 2230 by Nicki Seay RN  Outcome: Progressing  1/6/2025 2220 by Nicki Seay RN  Outcome: Progressing  1/6/2025 2216 by Nicki Seay RN  Outcome: Progressing     Problem: Discharge Planning  Goal: Discharge to home or other facility with appropriate resources  1/6/2025 2230 by Nicki Seay RN  Outcome: Progressing  1/6/2025 2220 by Nicki Seay RN  Outcome: Progressing  1/6/2025 2216 by Nicki eSay RN  Outcome: Progressing     Problem: Chronic Conditions and Co-morbidities  Goal: Patient's chronic conditions and co-morbidity symptoms are monitored and maintained or improved  1/6/2025 2230 by Nicki Seay RN  Outcome: Progressing  1/6/2025 2220 by Nicki Seay RN  Outcome: Progressing  1/6/2025 2216 by Nicki Seay RN  Outcome: Progressing     Problem: Skin  Goal: Decreased wound size/increased tissue granulation at next dressing change  1/6/2025 2230 by Nicki Seay RN  Outcome: Progressing  1/6/2025 2220 by Nicki Seay RN  Outcome: Progressing  1/6/2025 2216 by Nicki Seay RN  Outcome: Progressing  Goal: Participates in plan/prevention/treatment measures  1/6/2025 2230 by Nicki Seay RN  Outcome: Progressing  1/6/2025 2220 by Nicki Seay RN  Outcome: Progressing  1/6/2025 2216 by Nicki Seay RN  Outcome: Progressing  Goal: Prevent/manage excess moisture  1/6/2025 2230 by Nicki Seay RN  Outcome: Progressing  1/6/2025 2220 by Nicki Seay RN  Outcome: Progressing  1/6/2025 2216 by Nicki Seay RN  Outcome: Progressing  Goal: Prevent/minimize sheer/friction injuries  1/6/2025 2230 by Nicki Seay RN  Outcome: Progressing  1/6/2025 2220 by Nicki Seay  RN  Outcome: Progressing  1/6/2025 2216 by Nicki Seay RN  Outcome: Progressing  Goal: Promote/optimize nutrition  1/6/2025 2230 by Nicki Seay RN  Outcome: Progressing  1/6/2025 2220 by Nicki Seay RN  Outcome: Progressing  1/6/2025 2216 by Nicki Seay RN  Outcome: Progressing  Goal: Promote skin healing  1/6/2025 2230 by Nicki Seay RN  Outcome: Progressing  1/6/2025 2220 by Nicki Seay RN  Outcome: Progressing  1/6/2025 2216 by Nicki Seay RN  Outcome: Progressing     Problem: Fall/Injury  Goal: Not fall by end of shift  1/6/2025 2230 by Nicki Seay RN  Outcome: Progressing  1/6/2025 2220 by Nicki Seay RN  Outcome: Progressing  1/6/2025 2216 by Nicki Seay RN  Outcome: Progressing  Goal: Be free from injury by end of the shift  1/6/2025 2230 by Nicki Seay RN  Outcome: Progressing  1/6/2025 2220 by Nicki Seay RN  Outcome: Progressing  1/6/2025 2216 by Nicki Seay RN  Outcome: Progressing  Goal: Verbalize understanding of personal risk factors for fall in the hospital  1/6/2025 2230 by Nicki Seay RN  Outcome: Progressing  1/6/2025 2220 by Nicki Seay RN  Outcome: Progressing  1/6/2025 2216 by Nicki Seay RN  Outcome: Progressing  Goal: Verbalize understanding of risk factor reduction measures to prevent injury from fall in the home  1/6/2025 2230 by Nicki Seay RN  Outcome: Progressing  1/6/2025 2220 by Nicki Seay RN  Outcome: Progressing  1/6/2025 2216 by Nicki Seay RN  Outcome: Progressing  Goal: Use assistive devices by end of the shift  1/6/2025 2230 by Nicki Seay RN  Outcome: Progressing  1/6/2025 2220 by Nicki Seay RN  Outcome: Progressing  1/6/2025 2216 by Nicki Seay RN  Outcome: Progressing  Goal: Pace activities to prevent fatigue by end of the shift  1/6/2025 2230 by Nicki Seay RN  Outcome: Progressing  1/6/2025 2220 by Nicki Seay RN  Outcome: Progressing  1/6/2025 2216 by Nicki Seay, RN  Outcome: Progressing     Problem: Diabetes  Goal: Achieve  decreasing blood glucose levels by end of shift  1/6/2025 2230 by Nicki Seay RN  Outcome: Progressing  1/6/2025 2220 by Nicki Seay RN  Outcome: Progressing  1/6/2025 2216 by Nicki Seay RN  Outcome: Progressing  Goal: Increase stability of blood glucose readings by end of shift  1/6/2025 2230 by Nicki Seay RN  Outcome: Progressing  1/6/2025 2220 by Nicki Seay RN  Outcome: Progressing  1/6/2025 2216 by Nicki Seay RN  Outcome: Progressing  Goal: Decrease in ketones present in urine by end of shift  1/6/2025 2230 by Nicki Seay RN  Outcome: Progressing  1/6/2025 2220 by Nicki Seay RN  Outcome: Progressing  1/6/2025 2216 by Nicki Seay RN  Outcome: Progressing  Goal: Maintain electrolyte levels within acceptable range throughout shift  1/6/2025 2230 by Nicki Seay RN  Outcome: Progressing  1/6/2025 2220 by iNcki Seay RN  Outcome: Progressing  1/6/2025 2216 by Nicki Seay RN  Outcome: Progressing  Goal: Maintain glucose levels >70mg/dl to <250mg/dl throughout shift  1/6/2025 2230 by Nicki Seay RN  Outcome: Progressing  1/6/2025 2220 by Nicki Seay RN  Outcome: Progressing  1/6/2025 2216 by Nicki Seay RN  Outcome: Progressing  Goal: No changes in neurological exam by end of shift  1/6/2025 2230 by Nicki Seay RN  Outcome: Progressing  1/6/2025 2220 by Nicki Seay RN  Outcome: Progressing  1/6/2025 2216 by Nicki Seay RN  Outcome: Progressing  Goal: Learn about and adhere to nutrition recommendations by end of shift  1/6/2025 2230 by Nicki Seay RN  Outcome: Progressing  1/6/2025 2220 by Nicki Seay RN  Outcome: Progressing  1/6/2025 2216 by Nicki Seay RN  Outcome: Progressing  Goal: Vital signs within normal range for age by end of shift  1/6/2025 2230 by Nicki Seay RN  Outcome: Progressing  1/6/2025 2220 by Nicki Seay RN  Outcome: Progressing  1/6/2025 2216 by Nicki Seay RN  Outcome: Progressing  Goal: Increase self care and/or family involovement by end of  shift  1/6/2025 2230 by Nicki Seay RN  Outcome: Progressing  1/6/2025 2220 by Nicki Seay RN  Outcome: Progressing  1/6/2025 2216 by Nicki Seay RN  Outcome: Progressing  Goal: Receive DSME education by end of shift  1/6/2025 2230 by Nicki Seay RN  Outcome: Progressing  1/6/2025 2220 by Nicki Seay RN  Outcome: Progressing  1/6/2025 2216 by Nicki Seay RN  Outcome: Progressing     Problem: Pain  Goal: Takes deep breaths with improved pain control throughout the shift  1/6/2025 2230 by Nicki Seay RN  Outcome: Progressing  1/6/2025 2220 by Nicki Seay RN  Outcome: Progressing  1/6/2025 2216 by Nicki Seay RN  Outcome: Progressing  Goal: Turns in bed with improved pain control throughout the shift  1/6/2025 2230 by Nicki Seay RN  Outcome: Progressing  1/6/2025 2220 by Nicki Seay RN  Outcome: Progressing  1/6/2025 2216 by Nicki Seay RN  Outcome: Progressing  Goal: Walks with improved pain control throughout the shift  1/6/2025 2230 by Nicki Seay RN  Outcome: Progressing  1/6/2025 2220 by Nicki Seay RN  Outcome: Progressing  1/6/2025 2216 by Nicki Seay RN  Outcome: Progressing  Goal: Performs ADL's with improved pain control throughout shift  1/6/2025 2230 by Nicki Seay RN  Outcome: Progressing  1/6/2025 2220 by Nicki Seay RN  Outcome: Progressing  1/6/2025 2216 by Nicki Seay RN  Outcome: Progressing  Goal: Participates in PT with improved pain control throughout the shift  1/6/2025 2230 by Nicki Seay RN  Outcome: Progressing  1/6/2025 2220 by Nicki Seay RN  Outcome: Progressing  1/6/2025 2216 by Nicki Seay RN  Outcome: Progressing  Goal: Free from opioid side effects throughout the shift  1/6/2025 2230 by Nicki Seay RN  Outcome: Progressing  1/6/2025 2220 by Nicki Seay RN  Outcome: Progressing  1/6/2025 2216 by Nicki Seay RN  Outcome: Progressing  Goal: Free from acute confusion related to pain meds throughout the shift  1/6/2025 6416 by Nicki Seay,  RN  Outcome: Progressing  1/6/2025 2220 by Nicki Seay RN  Outcome: Progressing  1/6/2025 2216 by Nicki Seay RN  Outcome: Progressing   The patient's goals for the shift include      The clinical goals for the shift include Patient will remain HDS  throughout the night.    Over the shift, the patient did not make progress toward the following goals. Barriers to progression include patient had multiple surgical intervention to BLE. Recommendations to address these barriers include frequent monitoring of pain and circulation. .

## 2025-01-07 NOTE — SIGNIFICANT EVENT
"Brief Progress Note 1/7/25    Visited Ms Slater to change Prevena dressing prior to discharge. Patient made aware day prior (1/6) that today (1/7) she would be discharged to home with resumption of prior home care. Upon arrival at bedside 1/7, patient stated she was surprised to hear she was going home today and that she had not notified her home aides, while in the room she notified them of her discharge and arranged for help entering the home.   Patient has repeatedly stated that she wants in addition to her home health aides, for someone to stay with her overnight to help, however, she has repeatedly refused going to a skilled nursing facility or a long term care facility, despite that being recommended for her level of disability and comorbidities.   Patient understands the risks of her amputation not healing, further amputations, and further risk of deterioration, however she states \"I will never go back to one of those places\".   Prior to her discharge, she has an appointment set up with Dr. Miguel Ángel Pryor in [place] for 1/15/25, as patient stated she does not want to follow up in [place].   Due to unavailability of prevena vacs, patient's dressing was changed to silver mepilex and she was given instructions to keep it dry and for it to be re-evaluated.      Lindsey Bermudez MD  Vascular Surgery [phone] or Epic Chat  "

## 2025-01-07 NOTE — DISCHARGE SUMMARY
Discharge Diagnosis  Acute lower limb ischemia, S/P left AKA    Issues Requiring Follow-Up  Wound check with Dr. Pryor in local home location, appointment made for one week. The patient has repeatedly refused going to a skilled nursing facility or a long term care facility, despite that being recommended for her level of disability and co morbidities. She is adamant on going home with home health aids and nursing care, understanding that this is not round the clock care.     Test Results Pending At Discharge  Pending Labs       Order Current Status    AFB CULTURE AND SMEAR; [facility]; [id number] - Miscellaneous Test In process    AFB Culture/Smear In process    AFB Culture/Smear In process    Surgical Pathology Exam In process    AFB Culture and Smear; [facility]; [id number] - Miscellaneous Test Preliminary result    Fungal Culture/Smear Preliminary result    Fungal Culture/Smear Preliminary result            Hospital Course  Shirin Slater is 54 y.o. female with history of severe PAD s/p R hip disarticulation s/p L CFA and EIA embolectomy 12/2023 s/p multiple L toe amputations, sT2DM complicated by diabetic neuropathy, HTN, HLD, renal and splenic thromboses. Recently hospitalized 9/18 - 10/3 for L toe gangrene, underwent L femoral endarterectomy, profundaplasty, common qvaznbl-sa-okubstib tibial bypass using 6 mm ringed PTFE, left lower extremity angiogram on 9/25 with Dr. Dunphy and a L TMA with podiatry on 9/27. She was discharged to SNF on 10/3 WBAT in the LLE.    From 12/17/24 History and Physical:  She checked herself out of her SNF 10/7 for home where she had an aide to assist her. Was able to transfer to her wheelchair independently at that time. States that she saw her vascular surgeon several weeks ago who recommended that she cannot not bear weight in her LLE. Feels that she has been having decreased movement and sensation in the LLE since being told not to use it and assumed it was atrophy secondary to disuse.  States that her left leg is always cold to the touch, but feels it has worsened in the several weeks. Her pain worsened acutely 12/16 when her left leg slid off her bed. She takes warfarin daily, has not missed any doses. Has not has a recent INR checked. She continued to smoke 1/2 PPD since her discharge from [facility]. She states that the redness of her leg has been present for several weeks. She has been doing dressing changes and wound care herself at home.   Since her admission 12/17, care has been directed at maintaining source control with frequent dressing changes and pain control. Due to her history of multiple revascularizations it was discussed with orthopedic surgery if she would be a candidate for a left hip disarticulation, however due to attending availability and conversations with the patient, the decision was made to proceed with a mid-thigh amputation, which the patient underwent on 1/2 and a prevena was placed over the incision.   Following surgery, care remained dedicated at controlling the patient's pain with the acute pain team and palliative care team being engaged. Patient has since adamantly refused placement at any nursing facility and requested to go home with [facility].     Patient given a 7 day supply of oxycodone and increased dose of gabapentin at discharge. Her primary care's office was contacted and appt was made for patient to be evaluated and further pain medication scripts be written if needed.     At the time of discharge patient had a pain medication regimen that provided management of her pain, had a follow up appointment arranged, and all of her questions were answered. The risks of not going to a nursing facility were explained and the patient voiced her understanding. Home health care arrangements made.     Pertinent Physical Exam At Time of Discharge    Constitutional: alert, laying in bed  Neuro:  AOx3, grossly intact  ENMT: moist mucous membranes  CV: no tachycardia  Pulm:  non-labored on RA  GI: soft, non-tender, non-distended  Skin: warm and dry  Musculoskeletal: moving all extremities  Extremities: Left AKA surgical wound dry with sutures and staples, penrose removed. No s/s of infection, no erythema or concerning drainage, no sign of dehiscence. New prevena placed holding suction    Home Medications     Medication List      START taking these medications     naloxone 4 mg/0.1 mL nasal spray; Commonly known as: Narcan; Administer   1 spray (4 mg) into affected nostril(s) if needed for opioid reversal or   respiratory depression. May repeat every 2-3 minutes if needed,   alternating nostrils, until medical assistance becomes available.   oxyCODONE 5 mg immediate release tablet; Commonly known as: Roxicodone;   Take 1 tablet (5 mg) by mouth every 6 hours if needed for severe pain (7 -   10) for up to 7 days.   sennosides-docusate sodium 8.6-50 mg tablet; Commonly known as:   Betty-Colace; Take 1 tablet by mouth 2 times a day for 14 days.     CHANGE how you take these medications     acetaminophen 325 mg tablet; Commonly known as: Tylenol; Take 3 tablets   (975 mg) by mouth every 8 hours if needed for mild pain (1 - 3).; What   changed: how much to take, when to take this, Another medication with the   same name was removed. Continue taking this medication, and follow the   directions you see here.   enoxaparin 80 mg/0.8 mL syringe; Commonly known as: Lovenox; Inject 0.8   mL (80 mg) under the skin every 12 hours for 7 days.; What changed:   medication strength, how much to take, when to take this   gabapentin 300 mg capsule; Commonly known as: Neurontin; Take 2 capsules   (600 mg) by mouth every 8 hours for 14 days.; What changed: medication   strength, how much to take, when to take this   warfarin 5 mg tablet; Commonly known as: Coumadin; Take one tablet (5mg)   by mouth once daily or as directed per After Visit Summary.; What changed:   medication strength, additional instructions      CONTINUE taking these medications     albuterol 90 mcg/actuation inhaler   aspirin 81 mg EC tablet; Take 1 tablet (81 mg) by mouth once daily.   atorvastatin 80 mg tablet; Commonly known as: Lipitor   busPIRone 10 mg tablet; Commonly known as: Buspar   divalproex 500 mg 24 hr tablet; Commonly known as: Depakote ER   DULoxetine 60 mg DR capsule; Commonly known as: Cymbalta   empagliflozin 10 mg; Commonly known as: Jardiance   ergocalciferol 1.25 MG (43010 UT) capsule; Commonly known as: Vitamin   D-2   levETIRAcetam 500 mg tablet; Commonly known as: Keppra   levothyroxine 75 mcg tablet; Commonly known as: Synthroid, Levoxyl   lidocaine 5 % patch; Commonly known as: Lidoderm   melatonin 10 mg tablet   metoprolol tartrate 25 mg tablet; Commonly known as: Lopressor   omeprazole 20 mg DR capsule; Commonly known as: PriLOSEC   tiZANidine 4 mg tablet; Commonly known as: Zanaflex     STOP taking these medications     clopidogrel 75 mg tablet; Commonly known as: Plavix   loperamide 2 mg capsule; Commonly known as: Imodium     ASK your doctor about these medications     pantoprazole 40 mg EC tablet; Commonly known as: ProtoNix; Take 1 tablet   (40 mg) by mouth once daily in the morning. Take before meals. Do not   crush, chew, or split.   psyllium 3.4 gram packet; Commonly known as: Metamucil; Take 1 packet by   mouth 3 times a day. Mix and drink with at least 8 ounces of water or   juice.       Outpatient Follow-Up  Future Appointments   Date Time Provider Department Center   1/10/2025  9:20 AM Robyn Toscano MD [id number]       Sagrario Moore APRN-CNP

## 2025-01-07 NOTE — TELEPHONE ENCOUNTER
Maria E with [facility] called to see if PCP will follow for skilled nursing, PT and OT.     Last seen 11/20/2024  Next appt Visit date not found

## 2025-01-07 NOTE — CARE PLAN
The patient's goals for the shift include      The clinical goals for the shift include Patient will remain HDS  throughout the night.

## 2025-01-07 NOTE — PROGRESS NOTES
Occupational Therapy                 Therapy Communication Note    Patient Name: Shirin Slater  MRN: [id number]  Department: [place] [id number]  Room: 7087/7087-A  Today's Date: 1/7/2025     Discipline: Occupational Therapy    OT Missed Visit: Yes     Missed Visit Reason: Missed Visit Reason:  (Pt scheduled for follow up OT treatment; Pt currently refusing SNF for rehab and demonstrates frustration with staff and requesting to go home. Pt with anticipated p/u this date within the hour.  No OT session today.)    Missed Time: Attempt

## 2025-01-07 NOTE — TELEPHONE ENCOUNTER
Per Dr. Sally Kinney       Called Maria E to advise PCP note. [place] in [place] will provide home health for pt.

## 2025-01-08 ENCOUNTER — TELEPHONE (OUTPATIENT)
Dept: FAMILY MEDICINE CLINIC | Age: 55
End: 2025-01-08

## 2025-01-08 LAB
FUNGUS SPEC CULT: NORMAL
FUNGUS SPEC FUNGUS STN: NORMAL
SCAN RESULT: NORMAL

## 2025-01-08 NOTE — TELEPHONE ENCOUNTER
Bree/[facility] called to inform that she would like to add Palliative Care services on for patient and asked if Dr. Smith would be agreeable.     [phone] Ext 1    Last seen 11/20/2024  Next appt 8/19/2025

## 2025-01-08 NOTE — TELEPHONE ENCOUNTER
I returned Bree's call and was informed that she was gone for the day.  I informed Archana that Dr. Smith is agreeable with adding palliative care.

## 2025-01-11 LAB
ACID FAST STN SPEC: NORMAL
ACID FAST STN SPEC: NORMAL
MYCOBACTERIUM SPEC CULT: NORMAL
MYCOBACTERIUM SPEC CULT: NORMAL

## 2025-01-13 LAB
FUNGUS SPEC CULT: NORMAL
FUNGUS SPEC CULT: NORMAL
FUNGUS SPEC FUNGUS STN: NORMAL
FUNGUS SPEC FUNGUS STN: NORMAL

## 2025-02-20 ENCOUNTER — TELEPHONE (OUTPATIENT)
Dept: FAMILY MEDICINE CLINIC | Age: 55
End: 2025-02-20

## 2025-02-20 DIAGNOSIS — E11.51 TYPE 2 DIABETES MELLITUS WITH DIABETIC PERIPHERAL ANGIOPATHY WITHOUT GANGRENE, WITHOUT LONG-TERM CURRENT USE OF INSULIN (HCC): ICD-10-CM

## 2025-02-20 DIAGNOSIS — R53.81 DEBILITY: Primary | ICD-10-CM

## 2025-02-20 DIAGNOSIS — T81.31XS POSTOPERATIVE DEHISCENCE OF SKIN WOUND, SEQUELA: ICD-10-CM

## 2025-02-20 NOTE — TELEPHONE ENCOUNTER
Patient called back informing me that the home health company was [place], who she was with before, went into the hospital, and once discharged they no longer had staff available to continue her care.  Patient would like orders to go to [place], if they have availability, or anywhere else that can take her.  She stated she needs nursing for the wound care (stump from amputation), PT/OT to assist her in getting up and about since she is now a double amputee.    Patient stated the bedside commode she is needing is for a large one, so that she can slide herself from her bed onto the commode.

## 2025-02-20 NOTE — TELEPHONE ENCOUNTER
I called [facility] ([phone]), spoke w/Rocio to ask for discharge records and explained that the home care ordered through their facility, has not been out and patient's Reed  is wanting Dr. Smith to put new orders in.  Rocio stated she will ask Rodolfo to call the ofc back with this info and for the discharge summary.    I called patient and left a detailed voicemail msg asking for a return call regarding her need for home health.    Marianna/Reed called to inform that patient was in [facility] from 1/14/25 - 1/22/25, discharged to [facility] and was at the facility until 2/16/25.  Marianna stated home healthcare was ordered through [facility], for therapy and skilled nursing, but they did not have staffing and no one has been out.  Marianna informed me that patient could not remember the name of the home healthcare company and she did not have any discharge info from [facility].  She did state that patient has a home health aide that comes in, which is through the Waiver Program, but needs nursing.  Marianna asked if Dr. Smith would place orders.  I asked which facilities are contracted w/her insurance and she stated   [facility], [facility]  [facility]    Marianna also informed me that patient is in need of a bedside commode since she is a double amputee.  She informed me that the order can be sent to   [facility] or  [facility]      Last seen 11/20/2024  Next appt 8/19/2025

## 2025-02-21 NOTE — TELEPHONE ENCOUNTER
Per Dr Serrano     Ordered that too         Left message on machine per Dr Serrano home health ordered and the bed side commode ordered also

## 2025-03-03 LAB — SCAN RESULT: NORMAL

## 2025-03-04 LAB
ACID FAST STN SPEC: NORMAL
MYCOBACTERIUM SPEC CULT: NORMAL
SCAN RESULT: NORMAL

## 2025-03-28 ENCOUNTER — HOSPITAL ENCOUNTER (EMERGENCY)
Age: 55
Discharge: ANOTHER HOSPITAL | End: 2025-03-28
Attending: EMERGENCY MEDICINE
Payer: MEDICARE

## 2025-03-28 ENCOUNTER — APPOINTMENT (OUTPATIENT)
Dept: GENERAL RADIOLOGY | Age: 55
End: 2025-03-28
Payer: MEDICARE

## 2025-03-28 ENCOUNTER — APPOINTMENT (OUTPATIENT)
Dept: CT IMAGING | Age: 55
End: 2025-03-28
Payer: MEDICARE

## 2025-03-28 VITALS
TEMPERATURE: 99.3 F | DIASTOLIC BLOOD PRESSURE: 59 MMHG | BODY MASS INDEX: 36.01 KG/M2 | HEIGHT: 55 IN | HEART RATE: 101 BPM | SYSTOLIC BLOOD PRESSURE: 99 MMHG | RESPIRATION RATE: 22 BRPM | WEIGHT: 155.6 LBS | OXYGEN SATURATION: 94 %

## 2025-03-28 DIAGNOSIS — L03.116 CELLULITIS OF LEFT LOWER EXTREMITY: Primary | ICD-10-CM

## 2025-03-28 LAB
ALBUMIN SERPL-MCNC: 3.3 G/DL (ref 3.5–5.2)
ALP SERPL-CCNC: 123 U/L (ref 35–104)
ALT SERPL-CCNC: <5 U/L (ref 0–32)
ANION GAP SERPL CALCULATED.3IONS-SCNC: 12 MMOL/L (ref 7–16)
AST SERPL-CCNC: 6 U/L (ref 0–31)
B-OH-BUTYR SERPL-MCNC: 0.33 MMOL/L (ref 0.02–0.27)
BASOPHILS # BLD: 0.04 K/UL (ref 0–0.2)
BASOPHILS NFR BLD: 0 % (ref 0–2)
BILIRUB SERPL-MCNC: 0.3 MG/DL (ref 0–1.2)
BUN SERPL-MCNC: 11 MG/DL (ref 6–20)
CALCIUM SERPL-MCNC: 9.4 MG/DL (ref 8.6–10.2)
CHLORIDE SERPL-SCNC: 102 MMOL/L (ref 98–107)
CO2 SERPL-SCNC: 22 MMOL/L (ref 22–29)
CREAT SERPL-MCNC: 0.6 MG/DL (ref 0.5–1)
EOSINOPHIL # BLD: 0.03 K/UL (ref 0.05–0.5)
EOSINOPHILS RELATIVE PERCENT: 0 % (ref 0–6)
ERYTHROCYTE [DISTWIDTH] IN BLOOD BY AUTOMATED COUNT: 18.6 % (ref 11.5–15)
GFR, ESTIMATED: >90 ML/MIN/1.73M2
GLUCOSE SERPL-MCNC: 107 MG/DL (ref 74–99)
HCT VFR BLD AUTO: 30.9 % (ref 34–48)
HGB BLD-MCNC: 9.1 G/DL (ref 11.5–15.5)
IMM GRANULOCYTES # BLD AUTO: 0.08 K/UL (ref 0–0.58)
IMM GRANULOCYTES NFR BLD: 1 % (ref 0–5)
LACTATE BLDV-SCNC: 0.5 MMOL/L (ref 0.5–1.9)
LACTATE BLDV-SCNC: 1.2 MMOL/L (ref 0.5–1.9)
LYMPHOCYTES NFR BLD: 2.81 K/UL (ref 1.5–4)
LYMPHOCYTES RELATIVE PERCENT: 20 % (ref 20–42)
MCH RBC QN AUTO: 20.4 PG (ref 26–35)
MCHC RBC AUTO-ENTMCNC: 29.4 G/DL (ref 32–34.5)
MCV RBC AUTO: 69.1 FL (ref 80–99.9)
MONOCYTES NFR BLD: 0.92 K/UL (ref 0.1–0.95)
MONOCYTES NFR BLD: 6 % (ref 2–12)
NEUTROPHILS NFR BLD: 73 % (ref 43–80)
NEUTS SEG NFR BLD: 10.56 K/UL (ref 1.8–7.3)
PH VENOUS: 7.55 (ref 7.35–7.45)
PLATELET # BLD AUTO: 539 K/UL (ref 130–450)
PMV BLD AUTO: 11 FL (ref 7–12)
POTASSIUM SERPL-SCNC: 4 MMOL/L (ref 3.5–5)
PROT SERPL-MCNC: 8 G/DL (ref 6.4–8.3)
RBC # BLD AUTO: 4.47 M/UL (ref 3.5–5.5)
SODIUM SERPL-SCNC: 136 MMOL/L (ref 132–146)
WBC OTHER # BLD: 14.4 K/UL (ref 4.5–11.5)

## 2025-03-28 PROCEDURE — 96375 TX/PRO/DX INJ NEW DRUG ADDON: CPT

## 2025-03-28 PROCEDURE — 85025 COMPLETE CBC W/AUTO DIFF WBC: CPT

## 2025-03-28 PROCEDURE — 87205 SMEAR GRAM STAIN: CPT

## 2025-03-28 PROCEDURE — 83605 ASSAY OF LACTIC ACID: CPT

## 2025-03-28 PROCEDURE — 71045 X-RAY EXAM CHEST 1 VIEW: CPT

## 2025-03-28 PROCEDURE — 82010 KETONE BODYS QUAN: CPT

## 2025-03-28 PROCEDURE — 93005 ELECTROCARDIOGRAM TRACING: CPT | Performed by: EMERGENCY MEDICINE

## 2025-03-28 PROCEDURE — 6360000002 HC RX W HCPCS: Performed by: EMERGENCY MEDICINE

## 2025-03-28 PROCEDURE — 73701 CT LOWER EXTREMITY W/DYE: CPT

## 2025-03-28 PROCEDURE — 87070 CULTURE OTHR SPECIMN AEROBIC: CPT

## 2025-03-28 PROCEDURE — 96367 TX/PROPH/DG ADDL SEQ IV INF: CPT

## 2025-03-28 PROCEDURE — 2580000003 HC RX 258: Performed by: EMERGENCY MEDICINE

## 2025-03-28 PROCEDURE — 73552 X-RAY EXAM OF FEMUR 2/>: CPT

## 2025-03-28 PROCEDURE — 82800 BLOOD PH: CPT

## 2025-03-28 PROCEDURE — 96365 THER/PROPH/DIAG IV INF INIT: CPT

## 2025-03-28 PROCEDURE — 80053 COMPREHEN METABOLIC PANEL: CPT

## 2025-03-28 PROCEDURE — 96366 THER/PROPH/DIAG IV INF ADDON: CPT

## 2025-03-28 PROCEDURE — 6360000004 HC RX CONTRAST MEDICATION: Performed by: RADIOLOGY

## 2025-03-28 PROCEDURE — 87077 CULTURE AEROBIC IDENTIFY: CPT

## 2025-03-28 PROCEDURE — 87040 BLOOD CULTURE FOR BACTERIA: CPT

## 2025-03-28 PROCEDURE — 99285 EMERGENCY DEPT VISIT HI MDM: CPT

## 2025-03-28 RX ORDER — 0.9 % SODIUM CHLORIDE 0.9 %
1000 INTRAVENOUS SOLUTION INTRAVENOUS ONCE
Status: COMPLETED | OUTPATIENT
Start: 2025-03-28 | End: 2025-03-28

## 2025-03-28 RX ORDER — IOPAMIDOL 755 MG/ML
75 INJECTION, SOLUTION INTRAVASCULAR
Status: COMPLETED | OUTPATIENT
Start: 2025-03-28 | End: 2025-03-28

## 2025-03-28 RX ORDER — FENTANYL CITRATE 50 UG/ML
25 INJECTION, SOLUTION INTRAMUSCULAR; INTRAVENOUS ONCE
Refills: 0 | Status: COMPLETED | OUTPATIENT
Start: 2025-03-28 | End: 2025-03-28

## 2025-03-28 RX ADMIN — VANCOMYCIN HYDROCHLORIDE 1500 MG: 500 INJECTION, POWDER, LYOPHILIZED, FOR SOLUTION INTRAVENOUS at 18:40

## 2025-03-28 RX ADMIN — IOPAMIDOL 75 ML: 755 INJECTION, SOLUTION INTRAVENOUS at 17:27

## 2025-03-28 RX ADMIN — SODIUM CHLORIDE 1000 ML: 0.9 INJECTION, SOLUTION INTRAVENOUS at 17:59

## 2025-03-28 RX ADMIN — FENTANYL CITRATE 25 MCG: 50 INJECTION, SOLUTION INTRAMUSCULAR; INTRAVENOUS at 16:50

## 2025-03-28 RX ADMIN — SODIUM CHLORIDE 1000 ML: 0.9 INJECTION, SOLUTION INTRAVENOUS at 21:51

## 2025-03-28 RX ADMIN — PIPERACILLIN AND TAZOBACTAM 4500 MG: 4; .5 INJECTION, POWDER, LYOPHILIZED, FOR SOLUTION INTRAVENOUS; PARENTERAL at 18:01

## 2025-03-28 ASSESSMENT — PAIN SCALES - GENERAL
PAINLEVEL_OUTOF10: 2
PAINLEVEL_OUTOF10: 10

## 2025-03-28 ASSESSMENT — PAIN - FUNCTIONAL ASSESSMENT: PAIN_FUNCTIONAL_ASSESSMENT: NONE - DENIES PAIN

## 2025-03-28 ASSESSMENT — ENCOUNTER SYMPTOMS
COLOR CHANGE: 1
SHORTNESS OF BREATH: 0
VOMITING: 0
ABDOMINAL PAIN: 0
NAUSEA: 0

## 2025-03-28 ASSESSMENT — LIFESTYLE VARIABLES
HOW MANY STANDARD DRINKS CONTAINING ALCOHOL DO YOU HAVE ON A TYPICAL DAY: PATIENT DOES NOT DRINK
HOW OFTEN DO YOU HAVE A DRINK CONTAINING ALCOHOL: NEVER

## 2025-03-28 NOTE — ED PROVIDER NOTES
Patient presents emergency department with concern for left stump wounds.  She states that she was recently in a nursing home in February and released to home.  She lives alone and takes care of herself.  She is wheelchair-bound.  She states that she has noticed significant increase in the size and odor from the wounds.  She is a diabetic but has not checked her blood sugar in the past few days.  She does not feel her sugars have been elevated.  She denies any fevers or chills.    The history is provided by the patient.       Review of Systems   Constitutional:  Negative for activity change, appetite change, chills, fatigue and fever.   HENT: Negative.     Respiratory:  Negative for shortness of breath.    Cardiovascular:  Negative for chest pain.   Gastrointestinal:  Negative for abdominal pain, nausea and vomiting.   Endocrine: Positive for polydipsia. Negative for polyuria.   Genitourinary: Negative.    Musculoskeletal: Negative.    Skin:  Positive for color change and wound.   Neurological: Negative.        Physical Exam  Vitals and nursing note reviewed.   Constitutional:       General: She is not in acute distress.     Appearance: She is well-developed. She is obese. She is not ill-appearing, toxic-appearing or diaphoretic.   HENT:      Head: Normocephalic and atraumatic.      Mouth/Throat:      Mouth: Mucous membranes are dry.      Pharynx: Oropharynx is clear.   Eyes:      Extraocular Movements: Extraocular movements intact.      Conjunctiva/sclera: Conjunctivae normal.      Pupils: Pupils are equal, round, and reactive to light.   Cardiovascular:      Rate and Rhythm: Normal rate and regular rhythm.      Heart sounds: Normal heart sounds. No murmur heard.  Pulmonary:      Effort: Pulmonary effort is normal. No respiratory distress.      Breath sounds: Normal breath sounds. No wheezing or rales.   Abdominal:      General: Bowel sounds are normal.      Palpations: Abdomen is soft.      Tenderness: There is

## 2025-03-28 NOTE — PROGRESS NOTES
Imaging and PMHx reviewed. CT scan today demonstrates a high left-sided AK with contralateral hip disarticulation. There is a vascular stent on the left side as well. No definite abscess or fluid collections. She has been treated at OSH until today, and my concern is that if she requires further surgery for her left AKA she would likely require a complete hip disarticulation, which is outside of our scope.    Recommend transfer to a tertiary center who is better equipped to manage the complexity of this case. Discussed with ED, agreeable to plan

## 2025-03-29 ENCOUNTER — CLINICAL SUPPORT (OUTPATIENT)
Dept: EMERGENCY MEDICINE | Facility: HOSPITAL | Age: 55
End: 2025-03-29
Payer: MEDICARE

## 2025-03-29 ENCOUNTER — APPOINTMENT (OUTPATIENT)
Dept: RADIOLOGY | Facility: HOSPITAL | Age: 55
End: 2025-03-29
Payer: MEDICARE

## 2025-03-29 ENCOUNTER — HOSPITAL ENCOUNTER (INPATIENT)
Facility: HOSPITAL | Age: 55
End: 2025-03-29
Attending: EMERGENCY MEDICINE | Admitting: STUDENT IN AN ORGANIZED HEALTH CARE EDUCATION/TRAINING PROGRAM
Payer: MEDICARE

## 2025-03-29 DIAGNOSIS — G89.18 ACUTE POSTOPERATIVE PAIN: ICD-10-CM

## 2025-03-29 DIAGNOSIS — J44.9 CHRONIC OBSTRUCTIVE PULMONARY DISEASE, UNSPECIFIED COPD TYPE (MULTI): ICD-10-CM

## 2025-03-29 DIAGNOSIS — I70.229 CRITICAL LIMB ISCHEMIA WITH HISTORY OF REVASCULARIZATION OF SAME EXTREMITY: ICD-10-CM

## 2025-03-29 DIAGNOSIS — I10 PRIMARY HYPERTENSION: ICD-10-CM

## 2025-03-29 DIAGNOSIS — I73.9 PAD (PERIPHERAL ARTERY DISEASE) (CMS-HCC): ICD-10-CM

## 2025-03-29 DIAGNOSIS — R56.9 SEIZURES (MULTI): ICD-10-CM

## 2025-03-29 DIAGNOSIS — Z98.890 CRITICAL LIMB ISCHEMIA WITH HISTORY OF REVASCULARIZATION OF SAME EXTREMITY: ICD-10-CM

## 2025-03-29 DIAGNOSIS — L03.116 CELLULITIS OF LEFT LOWER EXTREMITY: Primary | ICD-10-CM

## 2025-03-29 LAB
ABO GROUP (TYPE) IN BLOOD: NORMAL
ALBUMIN SERPL BCP-MCNC: 2.9 G/DL (ref 3.4–5)
ALP SERPL-CCNC: 90 U/L (ref 33–110)
ALT SERPL W P-5'-P-CCNC: <3 U/L (ref 7–45)
ANION GAP SERPL CALC-SCNC: 14 MMOL/L (ref 10–20)
ANTIBODY SCREEN: NORMAL
APTT PPP: 33 SECONDS (ref 26–36)
AST SERPL W P-5'-P-CCNC: 4 U/L (ref 9–39)
ATRIAL RATE: 89 BPM
BASOPHILS # BLD AUTO: 0.06 X10*3/UL (ref 0–0.1)
BASOPHILS NFR BLD AUTO: 0.5 %
BILIRUB SERPL-MCNC: 0.3 MG/DL (ref 0–1.2)
BUN SERPL-MCNC: 8 MG/DL (ref 6–23)
CALCIUM SERPL-MCNC: 8 MG/DL (ref 8.6–10.6)
CHLORIDE SERPL-SCNC: 107 MMOL/L (ref 98–107)
CO2 SERPL-SCNC: 20 MMOL/L (ref 21–32)
CREAT SERPL-MCNC: 0.45 MG/DL (ref 0.5–1.05)
CRP SERPL-MCNC: 13.17 MG/DL
EGFRCR SERPLBLD CKD-EPI 2021: >90 ML/MIN/1.73M*2
EKG ATRIAL RATE: 35 BPM
EKG Q-T INTERVAL: 296 MS
EKG QRS DURATION: 160 MS
EKG QTC CALCULATION (BAZETT): 402 MS
EKG R AXIS: -17 DEGREES
EKG T AXIS: -87 DEGREES
EKG VENTRICULAR RATE: 111 BPM
EOSINOPHIL # BLD AUTO: 0.07 X10*3/UL (ref 0–0.7)
EOSINOPHIL NFR BLD AUTO: 0.6 %
ERYTHROCYTE [DISTWIDTH] IN BLOOD BY AUTOMATED COUNT: 18.5 % (ref 11.5–14.5)
ERYTHROCYTE [SEDIMENTATION RATE] IN BLOOD BY WESTERGREN METHOD: 51 MM/H (ref 0–30)
EST. AVERAGE GLUCOSE BLD GHB EST-MCNC: 192 MG/DL
GLUCOSE BLD MANUAL STRIP-MCNC: 114 MG/DL (ref 74–99)
GLUCOSE BLD MANUAL STRIP-MCNC: 121 MG/DL (ref 74–99)
GLUCOSE BLD MANUAL STRIP-MCNC: 133 MG/DL (ref 74–99)
GLUCOSE BLD MANUAL STRIP-MCNC: 145 MG/DL (ref 74–99)
GLUCOSE BLD MANUAL STRIP-MCNC: 152 MG/DL (ref 74–99)
GLUCOSE SERPL-MCNC: 122 MG/DL (ref 74–99)
HBA1C MFR BLD: 8.3 %
HCT VFR BLD AUTO: 29.5 % (ref 36–46)
HGB BLD-MCNC: 8.8 G/DL (ref 12–16)
HOLD SPECIMEN: NORMAL
IMM GRANULOCYTES # BLD AUTO: 0.05 X10*3/UL (ref 0–0.7)
IMM GRANULOCYTES NFR BLD AUTO: 0.5 % (ref 0–0.9)
INR PPP: 3.6 (ref 0.9–1.1)
LACTATE SERPL-SCNC: 0.8 MMOL/L (ref 0.4–2)
LYMPHOCYTES # BLD AUTO: 2 X10*3/UL (ref 1.2–4.8)
LYMPHOCYTES NFR BLD AUTO: 18.2 %
MAGNESIUM SERPL-MCNC: 1.77 MG/DL (ref 1.6–2.4)
MCH RBC QN AUTO: 20.2 PG (ref 26–34)
MCHC RBC AUTO-ENTMCNC: 29.8 G/DL (ref 32–36)
MCV RBC AUTO: 68 FL (ref 80–100)
MONOCYTES # BLD AUTO: 0.76 X10*3/UL (ref 0.1–1)
MONOCYTES NFR BLD AUTO: 6.9 %
NEUTROPHILS # BLD AUTO: 8.06 X10*3/UL (ref 1.2–7.7)
NEUTROPHILS NFR BLD AUTO: 73.3 %
NRBC BLD-RTO: 0 /100 WBCS (ref 0–0)
P AXIS: 37 DEGREES
P OFFSET: 213 MS
P ONSET: 170 MS
PLATELET # BLD AUTO: 480 X10*3/UL (ref 150–450)
POTASSIUM SERPL-SCNC: 3.6 MMOL/L (ref 3.5–5.3)
PR INTERVAL: 110 MS
PROT SERPL-MCNC: 6.5 G/DL (ref 6.4–8.2)
PROTHROMBIN TIME: 40.4 SECONDS (ref 9.8–12.4)
Q ONSET: 225 MS
QRS COUNT: 14 BEATS
QRS DURATION: 80 MS
QT INTERVAL: 374 MS
QTC CALCULATION(BAZETT): 455 MS
QTC FREDERICIA: 426 MS
R AXIS: 62 DEGREES
RBC # BLD AUTO: 4.36 X10*6/UL (ref 4–5.2)
RH FACTOR (ANTIGEN D): NORMAL
SODIUM SERPL-SCNC: 137 MMOL/L (ref 136–145)
T AXIS: 63 DEGREES
T OFFSET: 412 MS
TSH SERPL-ACNC: 0.85 MIU/L (ref 0.44–3.98)
VANCOMYCIN TROUGH SERPL-MCNC: 14.9 UG/ML (ref 5–20)
VENTRICULAR RATE: 89 BPM
WBC # BLD AUTO: 11 X10*3/UL (ref 4.4–11.3)

## 2025-03-29 PROCEDURE — 36415 COLL VENOUS BLD VENIPUNCTURE: CPT | Performed by: EMERGENCY MEDICINE

## 2025-03-29 PROCEDURE — 96361 HYDRATE IV INFUSION ADD-ON: CPT

## 2025-03-29 PROCEDURE — 2500000005 HC RX 250 GENERAL PHARMACY W/O HCPCS

## 2025-03-29 PROCEDURE — 73552 X-RAY EXAM OF FEMUR 2/>: CPT | Mod: LT

## 2025-03-29 PROCEDURE — 73502 X-RAY EXAM HIP UNI 2-3 VIEWS: CPT | Mod: LT

## 2025-03-29 PROCEDURE — 2500000002 HC RX 250 W HCPCS SELF ADMINISTERED DRUGS (ALT 637 FOR MEDICARE OP, ALT 636 FOR OP/ED)

## 2025-03-29 PROCEDURE — 1100000001 HC PRIVATE ROOM DAILY

## 2025-03-29 PROCEDURE — 2500000001 HC RX 250 WO HCPCS SELF ADMINISTERED DRUGS (ALT 637 FOR MEDICARE OP)

## 2025-03-29 PROCEDURE — 2500000004 HC RX 250 GENERAL PHARMACY W/ HCPCS (ALT 636 FOR OP/ED): Performed by: EMERGENCY MEDICINE

## 2025-03-29 PROCEDURE — 2500000004 HC RX 250 GENERAL PHARMACY W/ HCPCS (ALT 636 FOR OP/ED)

## 2025-03-29 PROCEDURE — 71046 X-RAY EXAM CHEST 2 VIEWS: CPT | Performed by: RADIOLOGY

## 2025-03-29 PROCEDURE — 80053 COMPREHEN METABOLIC PANEL: CPT

## 2025-03-29 PROCEDURE — 2500000004 HC RX 250 GENERAL PHARMACY W/ HCPCS (ALT 636 FOR OP/ED): Performed by: STUDENT IN AN ORGANIZED HEALTH CARE EDUCATION/TRAINING PROGRAM

## 2025-03-29 PROCEDURE — 87040 BLOOD CULTURE FOR BACTERIA: CPT | Performed by: STUDENT IN AN ORGANIZED HEALTH CARE EDUCATION/TRAINING PROGRAM

## 2025-03-29 PROCEDURE — 86850 RBC ANTIBODY SCREEN: CPT

## 2025-03-29 PROCEDURE — 93010 ELECTROCARDIOGRAM REPORT: CPT | Performed by: INTERNAL MEDICINE

## 2025-03-29 PROCEDURE — 82947 ASSAY GLUCOSE BLOOD QUANT: CPT

## 2025-03-29 PROCEDURE — 83036 HEMOGLOBIN GLYCOSYLATED A1C: CPT

## 2025-03-29 PROCEDURE — 71046 X-RAY EXAM CHEST 2 VIEWS: CPT

## 2025-03-29 PROCEDURE — 93005 ELECTROCARDIOGRAM TRACING: CPT

## 2025-03-29 PROCEDURE — 96374 THER/PROPH/DIAG INJ IV PUSH: CPT | Mod: 59

## 2025-03-29 PROCEDURE — 85652 RBC SED RATE AUTOMATED: CPT

## 2025-03-29 PROCEDURE — 96365 THER/PROPH/DIAG IV INF INIT: CPT

## 2025-03-29 PROCEDURE — 36415 COLL VENOUS BLD VENIPUNCTURE: CPT

## 2025-03-29 PROCEDURE — 80202 ASSAY OF VANCOMYCIN: CPT

## 2025-03-29 PROCEDURE — 93010 ELECTROCARDIOGRAM REPORT: CPT | Performed by: EMERGENCY MEDICINE

## 2025-03-29 PROCEDURE — 2500000001 HC RX 250 WO HCPCS SELF ADMINISTERED DRUGS (ALT 637 FOR MEDICARE OP): Performed by: STUDENT IN AN ORGANIZED HEALTH CARE EDUCATION/TRAINING PROGRAM

## 2025-03-29 PROCEDURE — 99223 1ST HOSP IP/OBS HIGH 75: CPT

## 2025-03-29 PROCEDURE — 99285 EMERGENCY DEPT VISIT HI MDM: CPT | Mod: 25 | Performed by: EMERGENCY MEDICINE

## 2025-03-29 PROCEDURE — 86140 C-REACTIVE PROTEIN: CPT

## 2025-03-29 PROCEDURE — 73502 X-RAY EXAM HIP UNI 2-3 VIEWS: CPT | Mod: LEFT SIDE | Performed by: RADIOLOGY

## 2025-03-29 PROCEDURE — 85025 COMPLETE CBC W/AUTO DIFF WBC: CPT

## 2025-03-29 PROCEDURE — 83735 ASSAY OF MAGNESIUM: CPT

## 2025-03-29 PROCEDURE — 83605 ASSAY OF LACTIC ACID: CPT

## 2025-03-29 PROCEDURE — 84443 ASSAY THYROID STIM HORMONE: CPT

## 2025-03-29 PROCEDURE — 99285 EMERGENCY DEPT VISIT HI MDM: CPT | Performed by: EMERGENCY MEDICINE

## 2025-03-29 PROCEDURE — 87077 CULTURE AEROBIC IDENTIFY: CPT | Performed by: STUDENT IN AN ORGANIZED HEALTH CARE EDUCATION/TRAINING PROGRAM

## 2025-03-29 PROCEDURE — 2500000005 HC RX 250 GENERAL PHARMACY W/O HCPCS: Performed by: STUDENT IN AN ORGANIZED HEALTH CARE EDUCATION/TRAINING PROGRAM

## 2025-03-29 PROCEDURE — 73552 X-RAY EXAM OF FEMUR 2/>: CPT | Mod: LEFT SIDE | Performed by: RADIOLOGY

## 2025-03-29 PROCEDURE — 85730 THROMBOPLASTIN TIME PARTIAL: CPT

## 2025-03-29 RX ORDER — ASPIRIN 81 MG/1
81 TABLET ORAL DAILY
Status: ON HOLD | COMMUNITY
End: 2025-04-03

## 2025-03-29 RX ORDER — TIZANIDINE 4 MG/1
4 TABLET ORAL 3 TIMES DAILY
Status: DISCONTINUED | OUTPATIENT
Start: 2025-03-29 | End: 2025-04-03 | Stop reason: HOSPADM

## 2025-03-29 RX ORDER — LEVOTHYROXINE SODIUM 25 UG/1
25 TABLET ORAL
Status: DISCONTINUED | OUTPATIENT
Start: 2025-03-30 | End: 2025-04-03 | Stop reason: HOSPADM

## 2025-03-29 RX ORDER — NALOXONE HYDROCHLORIDE 4 MG/.1ML
4 SPRAY NASAL AS NEEDED
Status: DISCONTINUED | OUTPATIENT
Start: 2025-03-29 | End: 2025-04-03 | Stop reason: HOSPADM

## 2025-03-29 RX ORDER — ACETAMINOPHEN 325 MG/1
650 TABLET ORAL ONCE
Status: COMPLETED | OUTPATIENT
Start: 2025-03-29 | End: 2025-03-29

## 2025-03-29 RX ORDER — ACETAMINOPHEN 325 MG/1
975 TABLET ORAL EVERY 8 HOURS PRN
Status: DISCONTINUED | OUTPATIENT
Start: 2025-03-29 | End: 2025-03-29

## 2025-03-29 RX ORDER — METOPROLOL TARTRATE 25 MG/1
12.5 TABLET, FILM COATED ORAL 2 TIMES DAILY
Status: DISCONTINUED | OUTPATIENT
Start: 2025-03-29 | End: 2025-04-03 | Stop reason: HOSPADM

## 2025-03-29 RX ORDER — CLOPIDOGREL BISULFATE 75 MG/1
75 TABLET ORAL DAILY
COMMUNITY
End: 2025-04-03 | Stop reason: HOSPADM

## 2025-03-29 RX ORDER — VANCOMYCIN HYDROCHLORIDE 1 G/20ML
INJECTION, POWDER, LYOPHILIZED, FOR SOLUTION INTRAVENOUS DAILY PRN
Status: DISCONTINUED | OUTPATIENT
Start: 2025-03-29 | End: 2025-04-02

## 2025-03-29 RX ORDER — WARFARIN 3 MG/1
3 TABLET ORAL DAILY
COMMUNITY

## 2025-03-29 RX ORDER — WARFARIN SODIUM 5 MG/1
5 TABLET ORAL DAILY
Status: DISCONTINUED | OUTPATIENT
Start: 2025-03-29 | End: 2025-03-30

## 2025-03-29 RX ORDER — CLOPIDOGREL BISULFATE 75 MG/1
75 TABLET ORAL DAILY
Status: DISCONTINUED | OUTPATIENT
Start: 2025-03-30 | End: 2025-03-29

## 2025-03-29 RX ORDER — OXYCODONE HYDROCHLORIDE 5 MG/1
5 TABLET, COATED ORAL EVERY 6 HOURS PRN
COMMUNITY

## 2025-03-29 RX ORDER — LOPERAMIDE HYDROCHLORIDE 2 MG/1
2 CAPSULE ORAL 4 TIMES DAILY PRN
COMMUNITY

## 2025-03-29 RX ORDER — LEVETIRACETAM 500 MG/1
500 TABLET ORAL 2 TIMES DAILY
Status: DISCONTINUED | OUTPATIENT
Start: 2025-03-29 | End: 2025-04-03 | Stop reason: HOSPADM

## 2025-03-29 RX ORDER — ERGOCALCIFEROL 1.25 MG/1
1250 CAPSULE ORAL WEEKLY
Status: DISCONTINUED | OUTPATIENT
Start: 2025-03-29 | End: 2025-04-03 | Stop reason: HOSPADM

## 2025-03-29 RX ORDER — LOPERAMIDE HYDROCHLORIDE 2 MG/1
2 CAPSULE ORAL 4 TIMES DAILY PRN
Status: DISCONTINUED | OUTPATIENT
Start: 2025-03-29 | End: 2025-04-03 | Stop reason: HOSPADM

## 2025-03-29 RX ORDER — BISMUTH SUBSALICYLATE 262 MG
1 TABLET,CHEWABLE ORAL DAILY
COMMUNITY

## 2025-03-29 RX ORDER — GABAPENTIN 400 MG/1
400 CAPSULE ORAL 3 TIMES DAILY
COMMUNITY
End: 2025-04-03 | Stop reason: HOSPADM

## 2025-03-29 RX ORDER — INSULIN LISPRO 100 [IU]/ML
0-5 INJECTION, SOLUTION INTRAVENOUS; SUBCUTANEOUS EVERY 4 HOURS
Status: DISCONTINUED | OUTPATIENT
Start: 2025-03-29 | End: 2025-03-30

## 2025-03-29 RX ORDER — ALBUTEROL SULFATE 90 UG/1
2 INHALANT RESPIRATORY (INHALATION) EVERY 6 HOURS PRN
Status: DISCONTINUED | OUTPATIENT
Start: 2025-03-29 | End: 2025-04-03 | Stop reason: HOSPADM

## 2025-03-29 RX ORDER — DULOXETIN HYDROCHLORIDE 60 MG/1
60 CAPSULE, DELAYED RELEASE ORAL EVERY EVENING
Status: DISCONTINUED | OUTPATIENT
Start: 2025-03-29 | End: 2025-04-03 | Stop reason: HOSPADM

## 2025-03-29 RX ORDER — ACETAMINOPHEN 325 MG/1
975 TABLET ORAL EVERY 8 HOURS
Status: DISCONTINUED | OUTPATIENT
Start: 2025-03-29 | End: 2025-04-03 | Stop reason: HOSPADM

## 2025-03-29 RX ORDER — ATORVASTATIN CALCIUM 80 MG/1
80 TABLET, FILM COATED ORAL NIGHTLY
Status: DISCONTINUED | OUTPATIENT
Start: 2025-03-29 | End: 2025-04-03 | Stop reason: HOSPADM

## 2025-03-29 RX ORDER — DEXTROSE 50 % IN WATER (D50W) INTRAVENOUS SYRINGE
12.5
Status: DISCONTINUED | OUTPATIENT
Start: 2025-03-29 | End: 2025-04-03 | Stop reason: HOSPADM

## 2025-03-29 RX ORDER — BUSPIRONE HYDROCHLORIDE 5 MG/1
10 TABLET ORAL EVERY MORNING
Status: DISCONTINUED | OUTPATIENT
Start: 2025-03-29 | End: 2025-04-03 | Stop reason: HOSPADM

## 2025-03-29 RX ORDER — DIVALPROEX SODIUM 500 MG/1
1000 TABLET, FILM COATED, EXTENDED RELEASE ORAL NIGHTLY
Status: DISCONTINUED | OUTPATIENT
Start: 2025-03-29 | End: 2025-04-03 | Stop reason: HOSPADM

## 2025-03-29 RX ORDER — ACETAMINOPHEN 500 MG
10 TABLET ORAL NIGHTLY PRN
Status: DISCONTINUED | OUTPATIENT
Start: 2025-03-29 | End: 2025-04-03 | Stop reason: HOSPADM

## 2025-03-29 RX ORDER — ASPIRIN 81 MG/1
81 TABLET ORAL DAILY
Status: DISCONTINUED | OUTPATIENT
Start: 2025-03-30 | End: 2025-03-29

## 2025-03-29 RX ORDER — MORPHINE SULFATE 4 MG/ML
4 INJECTION INTRAVENOUS ONCE
Status: COMPLETED | OUTPATIENT
Start: 2025-03-29 | End: 2025-03-29

## 2025-03-29 RX ORDER — LIDOCAINE 560 MG/1
1 PATCH PERCUTANEOUS; TOPICAL; TRANSDERMAL DAILY
Status: DISCONTINUED | OUTPATIENT
Start: 2025-03-29 | End: 2025-04-03 | Stop reason: HOSPADM

## 2025-03-29 RX ORDER — IBUPROFEN 200 MG
1 TABLET ORAL DAILY
Status: DISCONTINUED | OUTPATIENT
Start: 2025-03-29 | End: 2025-04-03 | Stop reason: HOSPADM

## 2025-03-29 RX ORDER — DEXTROSE 50 % IN WATER (D50W) INTRAVENOUS SYRINGE
25
Status: DISCONTINUED | OUTPATIENT
Start: 2025-03-29 | End: 2025-04-03 | Stop reason: HOSPADM

## 2025-03-29 RX ORDER — GABAPENTIN 300 MG/1
600 CAPSULE ORAL EVERY 8 HOURS SCHEDULED
Status: DISCONTINUED | OUTPATIENT
Start: 2025-03-29 | End: 2025-04-03 | Stop reason: HOSPADM

## 2025-03-29 RX ORDER — VANCOMYCIN HYDROCHLORIDE
1250 EVERY 12 HOURS
Status: DISCONTINUED | OUTPATIENT
Start: 2025-03-29 | End: 2025-03-29 | Stop reason: RX

## 2025-03-29 RX ORDER — PANTOPRAZOLE SODIUM 40 MG/1
40 TABLET, DELAYED RELEASE ORAL
Status: DISCONTINUED | OUTPATIENT
Start: 2025-03-30 | End: 2025-04-03 | Stop reason: HOSPADM

## 2025-03-29 RX ORDER — DIVALPROEX SODIUM 500 MG/1
500 TABLET, FILM COATED, EXTENDED RELEASE ORAL DAILY
Status: DISCONTINUED | OUTPATIENT
Start: 2025-03-30 | End: 2025-04-03 | Stop reason: HOSPADM

## 2025-03-29 RX ORDER — OXYCODONE HYDROCHLORIDE 5 MG/1
5 TABLET ORAL EVERY 6 HOURS PRN
Status: DISCONTINUED | OUTPATIENT
Start: 2025-03-29 | End: 2025-03-31

## 2025-03-29 RX ADMIN — TIZANIDINE 4 MG: 4 TABLET ORAL at 20:23

## 2025-03-29 RX ADMIN — ERGOCALCIFEROL 1250 MCG: 1.25 CAPSULE ORAL at 18:17

## 2025-03-29 RX ADMIN — TIZANIDINE 4 MG: 4 TABLET ORAL at 15:55

## 2025-03-29 RX ADMIN — VANCOMYCIN HYDROCHLORIDE 1250 MG: 5 INJECTION, POWDER, LYOPHILIZED, FOR SOLUTION INTRAVENOUS at 18:17

## 2025-03-29 RX ADMIN — TIZANIDINE 4 MG: 4 TABLET ORAL at 10:27

## 2025-03-29 RX ADMIN — OXYCODONE HYDROCHLORIDE 5 MG: 5 TABLET ORAL at 06:19

## 2025-03-29 RX ADMIN — DULOXETINE HYDROCHLORIDE 60 MG: 60 CAPSULE, DELAYED RELEASE ORAL at 20:23

## 2025-03-29 RX ADMIN — OXYCODONE HYDROCHLORIDE 5 MG: 5 TABLET ORAL at 22:55

## 2025-03-29 RX ADMIN — PIPERACILLIN SODIUM AND TAZOBACTAM SODIUM 3.38 G: 3; .375 INJECTION, SOLUTION INTRAVENOUS at 20:23

## 2025-03-29 RX ADMIN — ACETAMINOPHEN 975 MG: 325 TABLET ORAL at 14:43

## 2025-03-29 RX ADMIN — MORPHINE SULFATE 4 MG: 4 INJECTION, SOLUTION INTRAMUSCULAR; INTRAVENOUS at 01:07

## 2025-03-29 RX ADMIN — LEVETIRACETAM 500 MG: 500 TABLET, FILM COATED ORAL at 10:27

## 2025-03-29 RX ADMIN — GABAPENTIN 600 MG: 300 CAPSULE ORAL at 06:19

## 2025-03-29 RX ADMIN — PIPERACILLIN SODIUM AND TAZOBACTAM SODIUM 3.38 G: 3; .375 INJECTION, SOLUTION INTRAVENOUS at 08:38

## 2025-03-29 RX ADMIN — SODIUM CHLORIDE 1000 ML: 9 INJECTION, SOLUTION INTRAVENOUS at 08:06

## 2025-03-29 RX ADMIN — GABAPENTIN 600 MG: 300 CAPSULE ORAL at 21:06

## 2025-03-29 RX ADMIN — ATORVASTATIN CALCIUM 80 MG: 80 TABLET, FILM COATED ORAL at 20:23

## 2025-03-29 RX ADMIN — ACETAMINOPHEN 975 MG: 325 TABLET ORAL at 06:19

## 2025-03-29 RX ADMIN — DIVALPROEX SODIUM 1000 MG: 500 TABLET, FILM COATED, EXTENDED RELEASE ORAL at 20:35

## 2025-03-29 RX ADMIN — GABAPENTIN 600 MG: 300 CAPSULE ORAL at 14:42

## 2025-03-29 RX ADMIN — SODIUM HYPOCHLORITE: 2.5 SOLUTION TOPICAL at 21:26

## 2025-03-29 RX ADMIN — SODIUM CHLORIDE, SODIUM LACTATE, POTASSIUM CHLORIDE, AND CALCIUM CHLORIDE 1000 ML: .6; .31; .03; .02 INJECTION, SOLUTION INTRAVENOUS at 03:30

## 2025-03-29 RX ADMIN — PIPERACILLIN SODIUM AND TAZOBACTAM SODIUM 3.38 G: 3; .375 INJECTION, SOLUTION INTRAVENOUS at 01:36

## 2025-03-29 RX ADMIN — LOPERAMIDE HYDROCHLORIDE 2 MG: 2 CAPSULE ORAL at 15:55

## 2025-03-29 RX ADMIN — LEVETIRACETAM 500 MG: 500 TABLET, FILM COATED ORAL at 02:06

## 2025-03-29 RX ADMIN — LEVETIRACETAM 500 MG: 500 TABLET, FILM COATED ORAL at 20:23

## 2025-03-29 RX ADMIN — ACETAMINOPHEN 975 MG: 325 TABLET ORAL at 21:06

## 2025-03-29 RX ADMIN — ACETAMINOPHEN 650 MG: 325 TABLET ORAL at 01:36

## 2025-03-29 RX ADMIN — PIPERACILLIN SODIUM AND TAZOBACTAM SODIUM 3.38 G: 3; .375 INJECTION, SOLUTION INTRAVENOUS at 14:43

## 2025-03-29 RX ADMIN — OXYCODONE HYDROCHLORIDE 5 MG: 5 TABLET ORAL at 14:42

## 2025-03-29 RX ADMIN — VANCOMYCIN HYDROCHLORIDE 1250 MG: 5 INJECTION, POWDER, LYOPHILIZED, FOR SOLUTION INTRAVENOUS at 06:53

## 2025-03-29 RX ADMIN — SODIUM CHLORIDE, SODIUM LACTATE, POTASSIUM CHLORIDE, AND CALCIUM CHLORIDE 1000 ML: .6; .31; .03; .02 INJECTION, SOLUTION INTRAVENOUS at 02:07

## 2025-03-29 SDOH — SOCIAL STABILITY: SOCIAL INSECURITY: HAVE YOU HAD THOUGHTS OF HARMING ANYONE ELSE?: NO

## 2025-03-29 SDOH — SOCIAL STABILITY: SOCIAL INSECURITY: DO YOU FEEL UNSAFE GOING BACK TO THE PLACE WHERE YOU ARE LIVING?: NO

## 2025-03-29 SDOH — HEALTH STABILITY: MENTAL HEALTH: HAVE YOU WISHED YOU WERE DEAD OR WISHED YOU COULD GO TO SLEEP AND NOT WAKE UP?: NO

## 2025-03-29 SDOH — SOCIAL STABILITY: SOCIAL INSECURITY: ARE THERE ANY APPARENT SIGNS OF INJURIES/BEHAVIORS THAT COULD BE RELATED TO ABUSE/NEGLECT?: NO

## 2025-03-29 SDOH — SOCIAL STABILITY: SOCIAL INSECURITY: WITHIN THE LAST YEAR, HAVE YOU BEEN HUMILIATED OR EMOTIONALLY ABUSED IN OTHER WAYS BY YOUR PARTNER OR EX-PARTNER?: NO

## 2025-03-29 SDOH — HEALTH STABILITY: MENTAL HEALTH: HAVE YOU ACTUALLY HAD ANY THOUGHTS OF KILLING YOURSELF?: NO

## 2025-03-29 SDOH — SOCIAL STABILITY: SOCIAL INSECURITY: HAVE YOU HAD ANY THOUGHTS OF HARMING ANYONE ELSE?: NO

## 2025-03-29 SDOH — ECONOMIC STABILITY: FOOD INSECURITY: WITHIN THE PAST 12 MONTHS, THE FOOD YOU BOUGHT JUST DIDN'T LAST AND YOU DIDN'T HAVE MONEY TO GET MORE.: NEVER TRUE

## 2025-03-29 SDOH — HEALTH STABILITY: MENTAL HEALTH: BEHAVIORAL HEALTH(WDL): WITHIN DEFINED LIMITS

## 2025-03-29 SDOH — HEALTH STABILITY: MENTAL HEALTH: SUICIDE ASSESSMENT: ADULT (C-SSRS)

## 2025-03-29 SDOH — SOCIAL STABILITY: SOCIAL INSECURITY: WITHIN THE LAST YEAR, HAVE YOU BEEN AFRAID OF YOUR PARTNER OR EX-PARTNER?: NO

## 2025-03-29 SDOH — ECONOMIC STABILITY: INCOME INSECURITY: IN THE PAST 12 MONTHS HAS THE ELECTRIC, GAS, OIL, OR WATER COMPANY THREATENED TO SHUT OFF SERVICES IN YOUR HOME?: NO

## 2025-03-29 SDOH — ECONOMIC STABILITY: FOOD INSECURITY: WITHIN THE PAST 12 MONTHS, YOU WORRIED THAT YOUR FOOD WOULD RUN OUT BEFORE YOU GOT THE MONEY TO BUY MORE.: NEVER TRUE

## 2025-03-29 SDOH — SOCIAL STABILITY: SOCIAL INSECURITY: HAS ANYONE EVER THREATENED TO HURT YOUR FAMILY OR YOUR PETS?: NO

## 2025-03-29 SDOH — SOCIAL STABILITY: SOCIAL INSECURITY: ARE YOU OR HAVE YOU BEEN THREATENED OR ABUSED PHYSICALLY, EMOTIONALLY, OR SEXUALLY BY ANYONE?: NO

## 2025-03-29 SDOH — SOCIAL STABILITY: SOCIAL INSECURITY: DO YOU FEEL ANYONE HAS EXPLOITED OR TAKEN ADVANTAGE OF YOU FINANCIALLY OR OF YOUR PERSONAL PROPERTY?: NO

## 2025-03-29 SDOH — SOCIAL STABILITY: SOCIAL INSECURITY: DOES ANYONE TRY TO KEEP YOU FROM HAVING/CONTACTING OTHER FRIENDS OR DOING THINGS OUTSIDE YOUR HOME?: NO

## 2025-03-29 SDOH — SOCIAL STABILITY: SOCIAL INSECURITY: ABUSE: ADULT

## 2025-03-29 SDOH — SOCIAL STABILITY: SOCIAL INSECURITY: WERE YOU ABLE TO COMPLETE ALL THE BEHAVIORAL HEALTH SCREENINGS?: YES

## 2025-03-29 SDOH — HEALTH STABILITY: MENTAL HEALTH: HAVE YOU EVER DONE ANYTHING, STARTED TO DO ANYTHING, OR PREPARED TO DO ANYTHING TO END YOUR LIFE?: NO

## 2025-03-29 ASSESSMENT — COGNITIVE AND FUNCTIONAL STATUS - GENERAL
MOVING TO AND FROM BED TO CHAIR: A LITTLE
TURNING FROM BACK TO SIDE WHILE IN FLAT BAD: A LITTLE
MOBILITY SCORE: 15
WALKING IN HOSPITAL ROOM: TOTAL
WALKING IN HOSPITAL ROOM: TOTAL
STANDING UP FROM CHAIR USING ARMS: A LITTLE
HELP NEEDED FOR BATHING: A LITTLE
HELP NEEDED FOR BATHING: A LITTLE
CLIMB 3 TO 5 STEPS WITH RAILING: TOTAL
PATIENT BASELINE BEDBOUND: NO
MOVING TO AND FROM BED TO CHAIR: A LITTLE
CLIMB 3 TO 5 STEPS WITH RAILING: TOTAL
STANDING UP FROM CHAIR USING ARMS: A LITTLE
TOILETING: A LITTLE
TOILETING: A LITTLE
TURNING FROM BACK TO SIDE WHILE IN FLAT BAD: A LITTLE
DAILY ACTIVITIY SCORE: 22
MOBILITY SCORE: 15
DAILY ACTIVITIY SCORE: 22

## 2025-03-29 ASSESSMENT — ENCOUNTER SYMPTOMS
ABDOMINAL PAIN: 0
WOUND: 1
PALPITATIONS: 0
CHILLS: 0
CHEST TIGHTNESS: 0
SEIZURES: 1
COUGH: 0
FEVER: 0
CHOKING: 0

## 2025-03-29 ASSESSMENT — ACTIVITIES OF DAILY LIVING (ADL)
BATHING: NEEDS ASSISTANCE
LACK_OF_TRANSPORTATION: NO
TOILETING: NEEDS ASSISTANCE
FEEDING YOURSELF: INDEPENDENT
HEARING - LEFT EAR: FUNCTIONAL
JUDGMENT_ADEQUATE_SAFELY_COMPLETE_DAILY_ACTIVITIES: YES
LACK_OF_TRANSPORTATION: NO
ADEQUATE_TO_COMPLETE_ADL: YES
WALKS IN HOME: DEPENDENT
HEARING - RIGHT EAR: FUNCTIONAL
GROOMING: NEEDS ASSISTANCE
PATIENT'S MEMORY ADEQUATE TO SAFELY COMPLETE DAILY ACTIVITIES?: YES
DRESSING YOURSELF: INDEPENDENT
ASSISTIVE_DEVICE: WHEELCHAIR

## 2025-03-29 ASSESSMENT — PAIN DESCRIPTION - ORIENTATION: ORIENTATION: LEFT

## 2025-03-29 ASSESSMENT — PATIENT HEALTH QUESTIONNAIRE - PHQ9
1. LITTLE INTEREST OR PLEASURE IN DOING THINGS: NOT AT ALL
SUM OF ALL RESPONSES TO PHQ9 QUESTIONS 1 & 2: 0
2. FEELING DOWN, DEPRESSED OR HOPELESS: NOT AT ALL

## 2025-03-29 ASSESSMENT — PAIN SCALES - GENERAL
PAINLEVEL_OUTOF10: 0 - NO PAIN
PAINLEVEL_OUTOF10: 7
PAINLEVEL_OUTOF10: 5 - MODERATE PAIN
PAINLEVEL_OUTOF10: 7
PAINLEVEL_OUTOF10: 9

## 2025-03-29 ASSESSMENT — LIFESTYLE VARIABLES
HOW OFTEN DO YOU HAVE 6 OR MORE DRINKS ON ONE OCCASION: NEVER
HOW MANY STANDARD DRINKS CONTAINING ALCOHOL DO YOU HAVE ON A TYPICAL DAY: PATIENT DOES NOT DRINK
HOW OFTEN DO YOU HAVE A DRINK CONTAINING ALCOHOL: NEVER
SKIP TO QUESTIONS 9-10: 1
AUDIT-C TOTAL SCORE: 0
AUDIT-C TOTAL SCORE: 0

## 2025-03-29 ASSESSMENT — PAIN - FUNCTIONAL ASSESSMENT
PAIN_FUNCTIONAL_ASSESSMENT: 0-10

## 2025-03-29 ASSESSMENT — PAIN DESCRIPTION - PAIN TYPE: TYPE: ACUTE PAIN

## 2025-03-29 ASSESSMENT — PAIN DESCRIPTION - LOCATION: LOCATION: HIP

## 2025-03-29 NOTE — CONSULTS
VASCULAR SURGERY CONSULT NOTE    Assessment/Plan   Shirin Slater is 54 y.o. female with Pmhx significant for T2DM, HTN, HLD, renal and splenic thromboses, and severe PAD s/p R hip disarticulation and L AKA. Patient presents today from OSH for concern of stump cellulitis and sepsis. Patient hemodynamically stable upon arrival to [facility] with OSH labs showing WBC of 14.4, lactate of 1.2, and creatinine of 0.6.     Recommendations:  Patient remains hemodynamically stable with no need for acute intervention at this time; patient will require multidisciplinary discussion with orthopedic surgery, medical team, and vascular surgery regarding viability of limb salvage vs hip disarticulation   Agree with medical admission for antibiotics and wound care   Recommend wound care consultation for wound care recommendations; in meantime can utilize betadine paint   Please send coags to evaluate INR given coumadin history   NPO with MIVF pending multidisciplinary discussion     D/w senior Dr. Mccracken who D/w attending, Dr. Alvaro Montilla MD  PGY-2 Gen Surg  Vasc Surg [id number]    Addendum:   Seen and examined independently this morning. Wounds of the high-AKA stump with superficial skin necrosis, underlying fat necrosis, and associated erythema consistent with secondary cellulitis. No drainable fluid collection palpated; no purulent material able to be expressed. Unlikely to benefit from simple debridement as cellulitis is the driving infectious process. Leukocytosis resolved, needs to continue antibiotics, defer choice and duration to primary team. The patient will not benefit from any further vascular surgery interventions - ultimately the thigh is not salvageable, and if operative intervention is to be pursued, she will require an orthopedic surgery consultation for left hip disarticulation.    Anup Mccracken MD, PhD  Vascular Surgery, PGY3  [id number]      Subjective   HPI:  Shirin Slater is 54 y.o. female with Pmhx  significant for T2DM, HTN, HLD, renal and splenic thromboses, and severe PAD s/p R hip disarticulation and L AKA. Patient presents today from OSH for concern of stump cellulitis and sepsis.     Patient with complicated prior vascular history. Was hospitalized 9/18-10/3/2024 for L toe gangrene and underwent L fem endart, profundaplasty, common fem-AT bypass followed by han and L TMA on 9/27. Patient with worsening wound care of extremity and multiple revasc attempts lead to patient being evaluated for possible left hip disarticulation. However, given multiple factors decision was made to proceed with a mid-thigh amputation which was later formalized into an AKA on 1/2/25.     Today patient states that she had been having increasing drainage from the left lower extremity stump over the past two weeks. Patient noted some purulent drainage prompting her to go to hospital for further evaluation. Patient was started on antibiotics while at OSH and transferred for further management.     Patient denies any fevers, chills, nausea, vomiting, chest pain, shortness of breath, dysuria, constipation or diarrhea.     Vascular History:  1/2/25 - L AKA formalization   12/17/24 - L through knee amputation   9/27/24 - L TMA  9/26/24 - L fem-AT PTFE [place] patch  12/6/23 - LCFAe with bovine patch  6/29/22 - LINDA, LCFA everflex 7x80, supera 6.5x60mm, Lpop si[era 5x80mm  1/24/2022 - redo fem exploration, endarterectomy, vein patch; DCB left pop; PTA AT  1/18/22 - right brachial cutdown, diagnostic angio  11/16/21 - right brachial acces for dx angio    PMH:   Past Medical History:   Diagnosis Date    Anxiety     Arthritis     Cancer (Multi)     Cellulitis     COPD (chronic obstructive pulmonary disease) (Multi)     Delayed emergence from general anesthesia     Diabetes mellitus (Multi)     Disease of thyroid gland     Diverticulitis     Epilepsy, unspecified, not intractable, without status epilepticus     Epilepsy    HLD  (hyperlipidemia)     Hx of blood clots     Hypertension     PAD (peripheral artery disease) (CMS-HCC)     Peripheral vascular disease, unspecified (CMS-HCC) 2022    PAD (peripheral artery disease)    PONV (postoperative nausea and vomiting)     PTSD (post-traumatic stress disorder)     Sleep apnea     Uterine cancer (Multi)          PSH:   Past Surgical History:   Procedure Laterality Date    AMPUTATION FOOT / TOE Left     AMPUTATION FOOT / TOE Left     ANTERIOR CRUCIATE LIGAMENT REPAIR Left     CARDIAC CATHETERIZATION N/A 2024    Procedure: Left Heart Cath;  Surgeon: Cheyenne Eckert MD;  Location: [place] [address] Cardiac Cath Lab;  Service: Cardiovascular;  Laterality: N/A;     SECTION, CLASSIC      CHOLECYSTECTOMY      COLON SURGERY  2019    Ressection    CT ANGIO AORTA AND BILATERAL ILIOFEMORAL RUN OFF INCLUDING WITHOUT CONTRAST IF PERFORMED  2021    CT AORTA AND BILATERAL ILIOFEMORAL RUNOFF ANGIOGRAM W AND/OR WO IV CONTRAST 2021 [facility]    CT ANGIO AORTA AND BILATERAL ILIOFEMORAL RUN OFF INCLUDING WITHOUT CONTRAST IF PERFORMED  2022    CT AORTA AND BILATERAL ILIOFEMORAL RUNOFF ANGIOGRAM W AND/OR WO IV CONTRAST 2022 [facility]    HYSTERECTOMY  2012    INVASIVE VASCULAR PROCEDURE N/A 2024    Procedure: Lower Extremity Angiogram;  Surgeon: Cheyenne Eckert MD;  Location: [place] [address] Cardiac Cath Lab;  Service: Cardiovascular;  Laterality: N/A;    IR ANGIOGRAM AORTA ABDOMEN  2021    IR ANGIOGRAM AORTA ABDOMEN 2021 [facility]    LEG AMPUTATION Right     hip    OTHER SURGICAL HISTORY  2021    Arterial angioplasty of lower extremity    TONSILECTOMY, ADENOIDECTOMY, BILATERAL MYRINGOTOMY AND TUBES  1971    VASCULAR SURGERY Right 2019    Feet w/ ICU stay    VASCULAR SURGERY Left     CFA Embolectomy    VASCULAR SURGERY Right 2020    Knee         Home Meds:  No current facility-administered medications on  file prior to encounter.     Current Outpatient Medications on File Prior to Encounter   Medication Sig Dispense Refill    acetaminophen (Tylenol) 325 mg tablet Take 3 tablets (975 mg) by mouth every 8 hours if needed for mild pain (1 - 3). 90 tablet 0    albuterol 90 mcg/actuation inhaler Inhale 2 puffs every 6 hours if needed for shortness of breath.      atorvastatin (Lipitor) 80 mg tablet Take 1 tablet (80 mg) by mouth once daily at bedtime.      busPIRone (Buspar) 10 mg tablet Take 1 tablet (10 mg) by mouth once daily in the morning.      divalproex (Depakote ER) 500 mg 24 hr tablet Take 1 tablet (500 mg) by mouth. In the morning, and 2 tablets in the evening      DULoxetine (Cymbalta) 60 mg DR capsule Take 1 capsule (60 mg) by mouth once daily in the evening.      empagliflozin (Jardiance) 10 mg Take 1 tablet (10 mg) by mouth once daily.      ergocalciferol (Vitamin D-2) 1.25 MG (04753 UT) capsule TAKE 1 CAPSULE BY MOUTH TWICE A WEEK FOR 3 WEEKS  THEN ONCE CAPSULE WEEKLY      gabapentin (Neurontin) 300 mg capsule Take 2 capsules (600 mg) by mouth every 8 hours for 14 days. 84 capsule 0    levETIRAcetam (Keppra) 500 mg tablet Take 1 tablet (500 mg) by mouth 2 times a day.      levothyroxine (Synthroid, Levoxyl) 75 mcg tablet Take 1 tablet (75 mcg) by mouth once daily in the morning. Take before meals. On empty stomach      lidocaine (Lidoderm) 5 % patch Place 1 patch on the skin once daily.      melatonin 10 mg tablet Take 1 tablet (10 mg) by mouth as needed at bedtime (insomnia).      metoprolol tartrate (Lopressor) 25 mg tablet Take 0.5 tablets (12.5 mg) by mouth 2 times a day.      naloxone (Narcan) 4 mg/0.1 mL nasal spray Administer 1 spray (4 mg) into affected nostril(s) if needed for opioid reversal or respiratory depression. May repeat every 2-3 minutes if needed, alternating nostrils, until medical assistance becomes available. 2 each 11    omeprazole (PriLOSEC) 20 mg DR capsule Take 1 capsule (20 mg)  by mouth once daily in the morning. Take before meals.      pantoprazole (ProtoNix) 40 mg EC tablet Take 1 tablet (40 mg) by mouth once daily in the morning. Take before meals. Do not crush, chew, or split. (Patient not taking: Reported on 12/18/2024) 90 tablet 3    psyllium (Metamucil) 3.4 gram packet Take 1 packet by mouth 3 times a day. Mix and drink with at least 8 ounces of water or juice. (Patient not taking: Reported on 12/18/2024)      tiZANidine (Zanaflex) 4 mg tablet Take 1 tablet (4 mg) by mouth 3 times a day.      warfarin (Coumadin) 5 mg tablet Take one tablet (5mg) by mouth once daily or as directed per After Visit Summary. 30 tablet 0        Allergies:  Allergies   Allergen Reactions    Aspartame Seizure    Nsaids (Non-Steroidal Anti-Inflammatory Drug) Other     Significant kidney injury (per patient report)       SH/FH:   Tobacco Use: High Risk (1/2/2025)    Patient History     Smoking Tobacco Use: Every Day     Smokeless Tobacco Use: Never     Passive Exposure: Current     Social History     Substance and Sexual Activity   Alcohol Use Yes    Comment: Rare     Social History     Substance and Sexual Activity   Drug Use Yes    Types: Marijuana    Comment: flower and edibles once a day         ROS: 12 system negative except HPI    Objective   Vitals:  Heart Rate:  []   Temperature:  [37.9 °C (100.3 °F)]   Respirations:  [18]   BP: ()/(52-69)   Pulse Ox:  [99 %]     Exam:  Constitutional: No acute distress, resting comfortably  Neuro:  AOx3, grossly intact  ENMT: moist mucous membranes  Head/neck: atraumatic  CV: no tachycardia  Pulm: non-labored on room air  GI: soft, non-tender, non-distended  Musculoskeletal: Warm, right hip disarticulation C/D/I. Left AKA with two large necrotic wounds with purulent drainage, erythema, and swelling significantly tender to palpation as pictured below  Pulse Exam: Monophasic L Fem               Labs:  Results from last 7 days   Lab Units 03/29/25  4557    WBC AUTO x10*3/uL 11.0   HEMOGLOBIN g/dL 8.8*   PLATELETS AUTO x10*3/uL 480*                     Imaging:  Reviewed independently by vascular team:  CT Femur at [facility] 3/28:   FINDINGS:   Bones: As seen on today's plain films, there has been amputation of the left   leg at the level of the mid-proximal femoral shaft.  There is heterotopic   ossification in the area of amputation, not suspicious for osteomyelitis.   The osteotomy site is crisply defined with no erosive changes.     Incidental note is made of several nutrient vessel channels in the femoral   shaft.     The left hip is intact with no sign of fracture.     The right femoral head and neck are absent consistent with previous hip   disarticulation.     There is no sign of fracture or destructive lesion involving the visualized   bony pelvis and inferior left sacroiliac joint.     Soft Tissue: There is scarring of the left inguinal region, around a left   femoral artery stent.  A left external iliac artery stent is also present   extending into the area of scarring.     There is moderate ulceration of the anteromedial left upper thigh with no   sign of any underlying soft tissue gas or radiopaque foreign body.  There is   no sign of any fluid collection in this region.  There is minimal soft tissue   stranding around the ulcer, representing minimal cellulitis.     There is ulceration of the anterior stump with no sign of soft tissue gas,   fluid collection, or radiopaque foreign body.  There is little inflammatory   stranding around the ulcer, representing minimal cellulitis.     Joint: No degenerative disease involving the left hip..  No osseous erosions.     XR hip left with pelvis when performed 2 or 3 views, XR femur left 2+ views  Narrative: Interpreted By:  Jasiel Power,   STUDY:  XR FEMUR LEFT 2+ VIEWS; XR HIP LEFT WITH PELVIS WHEN PERFORMED 2 OR 3  VIEWS; ;  12/23/2024 1:24 pm; 12/23/2024 1:25 pm      INDICATION:  Signs/Symptoms:hip disartic  consult; Signs/Symptoms:L hip disartic  consult.          COMPARISON:  None.      ACCESSION NUMBER(S):  [id number]; [id number]      ORDERING CLINICIAN:  BETH BLOOD      FINDINGS:  One view of the left femur, AP view of the pelvis and frontal view of  the left hip      Postsurgical changes of above knee amputation of the distal femur.  Soft tissue changes of the distal left lower extremity. Multiple  stent grafts are noted over the pelvis and left lower extremity,  partially visualized. Surgical clips over the left hip. Left hip  joint is intact. Status post right hip disarticulation with  postsurgical changes over the right hip soft tissues. No definitive  acute fracture.      Impression: 1. Postsurgical changes of left above knee amputation and right hip  disarticulation as above.  2. No definitive acute fracture.              MACRO:  None      Signed by: Jasiel Power 12/23/2024 1:36 PM  Dictation workstation:   [id number]

## 2025-03-29 NOTE — ED PROVIDER NOTES
History of Present Illness     History provided by: Patient  Limitations to History: None  External Records Reviewed with Brief Summary: None    HPI:  Shirin Slater is a 54 y.o. female with history of severe PAD s/p R hip disarticulation s/p L CFA and EIA embolectomy 12/2023 s/p multiple L toe amputations, sT2DM complicated by diabetic neuropathy, HTN, HLD, renal, splenic thromboses, left BKA on 1/2/2025 that presents to emergency for left lower extremity stump cellulitis and concern for sepsis.  Patient states that he has been having increased serosanguineous drainage and pain to the left lower extremity stump for the past 2 weeks however today he was having some purulent drainage.  Pt is a transfer for from [facility] and received vancomycin at the outside hospital.  He had CTs and x-rays of the left stump which showed concerning for cellulitis.  Vascular surgery and orthopedic surgery was consulted at the outside hospital and recommended transfer for vascular surgery consult and possible left hip disarticulation.    Physical Exam   Triage vitals:  T (!) 37.9 °C (100.3 °F)  HR (!) 104  BP 86/69  RR 18  O2 99 % None (Room air)    General: Awake, alert, in no acute distress  Eyes: Gaze conjugate.  No scleral icterus or injection  HENT: Normo-cephalic, atraumatic. No stridor  CV: Tachycardic rate, regular rhythm. Radial pulses 2+ bilaterally  Resp: Breathing non-labored, speaking in full sentences.  Clear to auscultation bilaterally  GI: Soft, non-distended, non-tender. No rebound or guarding.  : Deferred  MSK/Extremities: No gross bony deformities. Moving all extremities  Skin: Warm. Appropriate color, 2 large necrotic wounds, with some darkening around the wounds, some purulent drainage, erythema, swelling and tenderness palpation    Neuro: Alert. Oriented. Face symmetric. Speech is fluent.  Gross strength and sensation intact in b/l UE and LEs  Psych: Appropriate mood and affect    Medical Decision Making  & ED Course   Medical Decision Makin y.o. female with history of severe PAD s/p R hip disarticulation s/p L CFA and EIA embolectomy 2023 s/p multiple L toe amputations, sT2DM complicated by diabetic neuropathy, HTN, HLD, renal, splenic thromboses, left BKA on 2025 that presents to emergency for left lower extremity stump cellulitis and concern for sepsis.  Patient presents nontoxic-appearing, no acute distress however does have a mild temperature of 100.3, heart rate of 105.  Blood pressures slightly soft at 86/79 however maps above 75.  Patient mentating well.  On the left lower extremity there does appear to be a large necrotic wounds, with some purulent drainage, erythema, swelling, tenderness palpation.  The outside hospital got CTs which showed no evidence of any osteomyelitis however concerns for cellulitis.  Low concerns for necrotizing fasciitis.  Patient was given vancomycin at the outside hospital we will get a trough level to indicate when Idamycin should be ordered.  We ordered Zosyn every 6.  Repeat labs will be obtained CBC, CMP, ESR, lactate, point-of-care glucose.  Patient was given oral Tylenol for the fever along with a liter of fluid given that patient does appear to be dry.  Not complain of any chest pain, shortness of breath.  Vascular surgery will be consulted.  Orthopedic surgery may be need to be consulted for possible left hip disarticulation.  Obtain type and screen, coags, chest x-ray and EKG for preop purposes.    Updates can be seen in ED course.    ----  Scoring Tools Utilized: None     Social Determinants of Health which Significantly Impact Care: None identified The following actions were taken to address these social determinants: None    EKG Independent Interpretation: EKG interpreted by myself. Please see ED Course for full interpretation.    Independent Result Review and Interpretation: Relevant laboratory and radiographic results were reviewed and independently  interpreted by myself.  As necessary, they are commented on in the ED Course.    Chronic conditions affecting the patient's care: As documented above in [facility]    The patient was discussed with the following consultants/services:  Vascular surgery    Care Considerations: As documented above in [facility]    ED Course:  ED Course as of 03/29/25 0347   Sat Mar 29, 2025   0127 ATTENDING ATTESTATION  54-year-old female with peripheral arterial disease, history of a right hip disarticulation, multiple surgeries, left lower extremity BKA transferred from outside facility with concern for sepsis and necrotic wounds over the left BKA site.  At the sending facility the patient received vancomycin and Zosyn she arrives with an initial soft blood pressure she is actually 97 systolic, given the lack of lower extremities the patient notes chronic hypotension she does not appear in shock tachycardic and mildly febrile, wounds to the left lower extremity at the BKA site extensively necrotic with central purulent drainage exquisitely tender to touch no subcutaneous emphysema with a low suspicion for necrotizing infection.  There is a halo of erythema and induration concerning for local cellulitis.  Will add Zosyn, check Vanco level and continue vancomycin.  Vascular surgery has been consulted has seen and evaluated the patient.  She will certainly require admission.  There is a concern for possible need for a left hip disarticulation, may need to cross coordination with orthopedic surgery we will coordinate with vascular for ultimate disposition.  Dissipate need for admission  [facility] [RH]   0146 EKG shows normal sinus rhythm, no axis deviation, no interval abnormality with exception for a short MA interval no ST elevations, no ST depressions, no T wave inversions with exception for V1.  EKG appears similar to previous EKG was [YG]   0303 Chest xray shows no acute cardiopulmonary process.  C-reactive protein is elevated at 13.17.  magnesium,  lactate is within normal limits.  CMP shows no significant abnormalities.  CBC shows a microcytic anemia with a hemoglobin of 8.8 however stable.  Coags show a elevated INR which is subtherapeutic, elevated PT up from baseline. [YG]   0304 Consulted vascular surgery and recommended no surgical intervention at this time however admission to medicine for cellulitis.  We spoke to orthopedic surgery however they were not consulted given that vascular surgery did the previous surgical intervention and will be managed by vascular surgery. [YG]   0346 Patient will be admitted to general medicine for cellulitis.  Patient vitally stable prior to being admitted. [YG]      ED Course User Index  [RH] Al Jackson DO  [YG] Natividad Moore MD         Diagnoses as of 03/29/25 0347   Cellulitis of left lower extremity     Disposition   As a result of their workup, the patient will require admission to the hospital.  The patient was informed of her diagnosis.  The patient was given the opportunity to ask questions and I answered them. The patient agreed to be admitted to the hospital.    Procedures   Procedures    Patient seen and discussed with ED attending physician.    Natividad Moore MD  Emergency Medicine     Natividad Moore MD  Resident  03/29/25 0347

## 2025-03-29 NOTE — PROGRESS NOTES
Pharmacy Medication History Review    Shirin Slater is a 54 y.o. female admitted for Cellulitis of left lower extremity. Pharmacy reviewed the patient's [id number] medications and allergies for accuracy.    Medications ADDED:  Loperamide 2 mg cap  Multivitamin tablet  Aspirin 81 mg tab  Clopidogrel 75 mg tab  Oxycodone 5 mg tab  Medications CHANGED:  Gabapentin 300 mg capsule to 400 mg capsule  Synthroid 75 mcg to 25 mcg  Metoprolol 25 mg tablet take 0.5 tablets by mouth 2 times a day to take 1 tablet by mouth 2 times a day  Warfarin 5 mg to 3 mg   Medications REMOVED:   Vitamin D2 tab     The list below reflects the updated PTA list.   Prior to Admission Medications   Prescriptions Last Dose Informant   DULoxetine (Cymbalta) 60 mg DR capsule  Self   Sig: Take 1 capsule (60 mg) by mouth once daily in the evening.   acetaminophen (Tylenol) 325 mg tablet Not Taking Self   Sig: Take 3 tablets (975 mg) by mouth every 8 hours if needed for mild pain (1 - 3).   albuterol 90 mcg/actuation inhaler  Self   Sig: Inhale 2 puffs every 6 hours if needed for shortness of breath.   aspirin 81 mg EC tablet  Self   Sig: Take 1 tablet (81 mg) by mouth once daily.  Patient not taking: Reported on 3/29/2025   atorvastatin (Lipitor) 80 mg tablet  Self   Sig: Take 1 tablet (80 mg) by mouth once daily at bedtime.   busPIRone (Buspar) 10 mg tablet  Self   Sig: Take 1 tablet (10 mg) by mouth once daily in the morning.   clopidogrel (Plavix) 75 mg tablet     Sig: Take 1 tablet (75 mg) by mouth once daily.  Patient not taking: Reported on 3/29/2025   divalproex (Depakote ER) 500 mg 24 hr tablet  Self   Sig: Take 1 tablet (500 mg) by mouth. In the morning, and 2 tablets in the evening   empagliflozin (Jardiance) 10 mg  Self   Sig: Take 1 tablet (10 mg) by mouth once daily.   gabapentin (Neurontin) 300 mg capsule Not Taking    Sig: Take 2 capsules (600 mg) by mouth every 8 hours for 14 days.   Patient not taking: Reported on 3/29/2025    gabapentin (Neurontin) 400 mg capsule  Self   Sig: Take 1 capsule (400 mg) by mouth 3 times a day.   levETIRAcetam (Keppra) 500 mg tablet  Self   Sig: Take 1 tablet (500 mg) by mouth 2 times a day.   levothyroxine (Synthroid, Levoxyl) 25 mcg tablet  Self   Sig: Take 1 tablet (25 mcg) by mouth once daily in the morning. Take before meals. On empty stomach   lidocaine (Lidoderm) 5 % patch  Self   Sig: Place 1 patch on the skin once daily.   loperamide (Imodium) 2 mg capsule     Sig: Take 1 capsule (2 mg) by mouth 4 times a day as needed for diarrhea.   melatonin 10 mg tablet  Self   Sig: Take 1 tablet (10 mg) by mouth as needed at bedtime (insomnia).   metoprolol tartrate (Lopressor) 25 mg tablet  Self   Sig: Take 1 tablet (25 mg) by mouth 2 times a day.   multivitamin tablet  Self   Sig: Take 1 tablet by mouth once daily.   naloxone (Narcan) 4 mg/0.1 mL nasal spray  Self   Sig: Administer 1 spray (4 mg) into affected nostril(s) if needed for opioid reversal or respiratory depression. May repeat every 2-3 minutes if needed, alternating nostrils, until medical assistance becomes available.   omeprazole (PriLOSEC) 20 mg DR capsule  Self   Sig: Take 1 capsule (20 mg) by mouth once daily in the morning. Take before meals.   oxyCODONE (Oxaydo) 5 mg immediate release tablet     Sig: Take 1 tablet (5 mg) by mouth every 6 hours if needed for severe pain (7 - 10).   pantoprazole (ProtoNix) 40 mg EC tablet Not Taking Self   Sig: Take 1 tablet (40 mg) by mouth once daily in the morning. Take before meals. Do not crush, chew, or split.   Patient not taking: Reported on 3/29/2025   psyllium (Metamucil) 3.4 gram packet Not Taking Self   Sig: Take 1 packet by mouth 3 times a day. Mix and drink with at least 8 ounces of water or juice.   Patient not taking: Reported on 3/29/2025   tiZANidine (Zanaflex) 4 mg tablet  Self   Sig: Take 1 tablet (4 mg) by mouth every 8 hours if needed for muscle spasms.   warfarin (Coumadin) 3 mg tablet  "    Sig: Take 1 tablet (3 mg) by mouth once daily. Take as directed per After Visit Summary.   warfarin (Coumadin) 5 mg tablet Not Taking Self   Sig: Take one tablet (5mg) by mouth once daily or as directed per After Visit Summary.   Patient not taking: Reported on 3/29/2025      Facility-Administered Medications: None          The list below reflects the updated allergy list. Please review each documented allergy for additional clarification and justification.  Allergies  Reviewed by Dang Perry on 3/29/2025        Severity Reactions Comments    Aspartame Not Specified Seizure     Nsaids (non-steroidal Anti-inflammatory Drug) Not Specified Other Significant kidney injury (per patient report)            Patient accepts M2B at discharge.     Sources:   [facility]  Pharmacy dispense history  Patient Interview Good historian     Additional Comments:  Patient she had taken warfarin 3 mg tablet last night and has 1-2 tablets left.   She then reports taking tylenol frequently and oxycodone 5 mg tablet she uses sparingly   The patient was discharged from SNF on 2/16/25  [facility] in [place] with prepackaged medications that she hasn't taken in weeks, she also wanted me to note that if she had to be discharged to a SNF she wants it to be [facility].      DANG PERRY  Pharmacy Technician  03/29/25     Secure Chat preferred   If no response call [phone] or Emulis \"Med Rec\"   "

## 2025-03-29 NOTE — ED TRIAGE NOTES
Pt transferred via ems from [place] for chief complaint of a wound infection. Pt has a hx of severe PAD s/p R hip disarticulation s/p L CFA and EIA embolectomy 12/2023 s/p multiple L toe amputations, sT2DM complicated by diabetic neuropathy. Pt has two wounds to her LLE. Pt endorses pain in her wounds and denies any other current complaints.

## 2025-03-29 NOTE — CONSULTS
Vancomycin Dosing by Pharmacy- INITIAL    Shirin Slater is a 54 y.o. year old female who Pharmacy has been consulted for vancomycin dosing for cellulitis, skin and soft tissue. Based on the patient's indication and renal status this patient will be dosed based on a goal AUC of 400-600.     Renal function is currently stable.    Visit Vitals  /51   Pulse 73   Temp (!) 37.9 °C (100.3 °F)   Resp 18        Lab Results   Component Value Date    CREATININE 0.45 (L) 2025    CREATININE 0.48 (L) 2025    CREATININE 0.41 (L) 2025    CREATININE 0.54 2025        Patient weight is as follows: There were no vitals filed for this visit.    Cultures:  No results found for the encounter in last 14 days.        No intake/output data recorded.  I/O during current shift:  No intake/output data recorded.    Temp (24hrs), Av.9 °C (100.3 °F), Min:37.9 °C (100.3 °F), Max:37.9 °C (100.3 °F)         Assessment/Plan     Patient has already been given a loading dose of 1500 mg.  Will initiate vancomycin maintenance,  1250 mg every 12 hours.    This dosing regimen is predicted by [employer] to result in the following pharmacokinetic parameters:  Loading dose: N/A  Regimen: 1250 mg IV every 12 hours.  Start time: 06:40 on 2025  Exposure target: AUC24 (range)400-600 mg/L.hr   [id number]: 438 mg/L.hr  AUC24,ss: 463 mg/L.hr  Probability of AUC24 > 400: 65 %  Ctrough,ss: 12.7 mg/L  Probability of Ctrough,ss > 20: 20 %      Follow-up level will be ordered on 3/30 at 0500 unless clinically indicated sooner.  Will continue to monitor renal function daily while on vancomycin and order serum creatinine at least every 48 hours if not already ordered.  Follow for continued vancomycin needs, clinical response, and signs/symptoms of toxicity.       MARIA ELENA LENNON, PharmD

## 2025-03-29 NOTE — CONSULTS
ORTHOPAEDIC CONSULTATION     History Of Present Illness  Shirin Slater is a 54 y.o. female presenting with wounds to her left lower extremity.  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 Miller.    She is presenting with 3 to 4 weeks of worsening wounds on her left AKA site.  She reports this started where sutures were.  She is been treating them at home with local wound care.  She was discharged from SNF to home but her home care did not get set up appropriately.    She reports purulent drainage from the wounds.  They have been enlarging.  She is nonambulatory.  She sits in a wheelchair.  She has home health aides that visit and help her.  She smokes three quarters a pack of cigarettes per day.  She does not drink.    Review of Systems: 12 point ROS negative unless stated in HPI    Past Medical History  She has a past medical history of Anxiety, Arthritis, Cancer (Multi), Cellulitis, COPD (chronic obstructive pulmonary disease) (Multi), Delayed emergence from general anesthesia, Diabetes mellitus (Multi), Disease of thyroid gland, Diverticulitis, Epilepsy, unspecified, not intractable, without status epilepticus, HLD (hyperlipidemia), blood clots, Hypertension, PAD (peripheral artery disease) (CMS-[facility]), Peripheral vascular disease, unspecified (CMS-[facility]) (09/14/2022), PONV (postoperative nausea and vomiting), PTSD (post-traumatic stress disorder), Sleep apnea, and Uterine cancer (Multi).    Surgical History  She has a past surgical history that includes Other surgical history (03/17/2021); CT angio aorta and bilateral iliofemoral runoff including without contrast if performed (11/14/2021); CT angio aorta and bilateral iliofemoral runoff including without contrast if performed (06/30/2022); IR angiogram aorta abdomen  (2021); Tonsilectomy, adenoidectomy, bilateral myringotomy and tubes (1971);  section, classic (); Anterior cruciate ligament repair (Left, ); Hysterectomy (); Colon surgery (); Vascular surgery (Right, ); Vascular surgery (Left); Vascular surgery (Right, ); Leg amputation (Right, ); Amputation foot / toe (Left, ); Amputation foot / toe (Left); Cardiac catheterization (N/A, 2024); Invasive Vascular Procedure (N/A, 2024); and Cholecystectomy.     Social History  She reports that she has been smoking cigarettes. She started smoking about 44 years ago. She has a 24.5 pack-year smoking history. She has been exposed to tobacco smoke. She has never used smokeless tobacco. She reports current alcohol use. She reports current drug use. Drug: Marijuana.    Family History  Family History   Problem Relation Name Age of Onset    Diverticulitis Daughter      Heart failure Other          Several family members without direct blood connection        Allergies  Aspartame and Nsaids (non-steroidal anti-inflammatory drug)    Review of Systems  12 point ROS negative unless stated in HPI     Physical Exam  GEN - NAD, resting comfortably in hospital bed  HEENT - MMM, EOMI, NCAT  CV - RRR by peripheral palpation, limbs wwp  PULM - NWOB on RA  NEURO - ANDRADE spontaneously, CNs II - XII grossly intact  PSYCH - Appropriate mood and affect    LLE:   - Two 8 x 10 cm open wounds over medial and distal aspects of prior AKA incision.  Foul-smelling, purulent drainage, from wound.  Necrotic-appearing material at borders.  -Sensation intact to light touch at distal aspect of residual limb  -Unable to provide significant motion in residual limb      A full secondary survey of all four extremities was performed and the significant orthopedic findings are noted above.     Last Recorded Vitals  Blood pressure 93/56, pulse 79, temperature 36.3 °C (97.3 °F), temperature source Temporal, resp. rate  18, SpO2 97%.    Imaging:  AP and lateral radiographs of the left femur demonstrate a residual limb status post revision AKA.  No fracture or dislocation.     Assessment/Plan   54-year-old female who is approximately 3 months status post revision left AKA presenting with 1 month of worsening wounds over the residual limb.  She has had no significant treatment for these.    Plan:  - No acute orthopaedic surgical intervention  -Patient has had no real treatment for wounds to date; would recommend vascular debridement/revision amputation  - Bilateral hip disarticulation carries extremely high morbidity the patient will be unable to sit upright for the rest of her life.  This procedure should be considered only after attempts to salvage the limb  - DVT ppx per primary  -Antibiotics per primary    Consult seen and staffed within 30 minutes of notification.    Consult discussed with attending, Dr. Demetri Lara MD, PGY-2  Orthopaedic Surgery   Available via Epic Chat

## 2025-03-29 NOTE — CONSULTS
Wound Care Consult     Visit Date: 3/29/2025      Patient Name: Shirin Slater         MRN: [id number]           YOB: 1970     Reason for Consult: Left stump wound to previous incision line        Wound History: Shirin Slater is a 54 y.o. female with history of severe PAD s/p R hip disarticulation, left AKA on 1/2/2025 (prior to this had L CFA and EIA embolectomy 12/2023 and multiple L toe amputations), T2DM complicated by diabetic neuropathy, HTN, HLD, renal and splenic thromboses (on warfarin), who is here today as a transfer from [facility] for vascular surgery consult iso concern for LLE cellulitis at previous amputation site.      Patient reports that after discharge home from SNF after AKA in January 2025, home health was supposed to be set up so she could continue to receive appropriate wound care at home. There were logistical issues coordinating with the home health agency and patient's current aid being present, and so she has still not received wound care (first visit scheduled for 3/31). She has been doing her own wound care with sterile saline, betadine, MediHoney, and gauze. However, wound at her previous amputation site has continued to worsen. Used to produce only serosanguinous drainage, now patient reporting thick, white drainage along with increasing pain. Denies subjective fever, chills, but has been febrile since arriving here. Labs at [facility] indicated concerns for sepsis.   (HPI per Dr. Karina Anderson on 3/29/2025)     Pertinent Labs:   Albumin   Date Value Ref Range Status   03/29/2025 2.9 (L) 3.4 - 5.0 g/dL Final       Wound Assessment:  Wound 12/17/24 Incision Other (Comment) (Active)       Wound 12/18/24 Other (comment) Leg Distal;Left;Upper (Active)   Wound Image   03/29/25 1358   Site Assessment Sloughing;Yellow;Eschar 03/29/25 1358   Betty-Wound Assessment Erythematous 03/29/25 1358   Wound Length (cm) 7 cm 03/29/25 1358   Wound Width (cm) 7 cm 03/29/25 1358   Wound  Surface Area (cm^2) 49 cm^2 03/29/25 1358   Wound Depth (cm) 1 cm 03/29/25 1358   Wound Volume (cm^3) 49 cm^3 03/29/25 1358   State of Healing Non-healing 03/29/25 1358   Margins Well-defined edges 03/29/25 1358   Closure None 03/29/25 1358   Treatments Cleansed;Packings 03/29/25 1358   Drainage Description Purulent;Tan;Foul odor 03/29/25 1358   Drainage Amount Moderate 03/29/25 1358   Dressing Moist to dry;Kerlix/rolled gauze;Silicone border dressing 03/29/25 1358   Dressing Changed Changed 03/29/25 1358       Wound 12/24/24 Other (comment) Sacrum (Active)       Wound 12/26/24 Other (comment) Left (Active)       Wound 12/27/24 Moisture Associated Skin Damage Perineum (Active)       Wound 01/02/25 Other (comment) Leg Left;Medial (Active)   Wound Image   03/29/25 1358   Site Assessment Sloughing;Tan;Red;Painful 03/29/25 1358   Wound Length (cm) 3.5 cm 03/29/25 1358   Wound Width (cm) 6.5 cm 03/29/25 1358   Wound Surface Area (cm^2) 22.75 cm^2 03/29/25 1358   Wound Depth (cm) 2 cm 03/29/25 1358   Wound Volume (cm^3) 45.5 cm^3 03/29/25 1358   State of Healing Non-healing 03/29/25 1358   Margins Well-defined edges 03/29/25 1358   Treatments Cleansed;Packings 03/29/25 1358   Drainage Description Purulent;Foul odor;Tan 03/29/25 1358   Drainage Amount Moderate 03/29/25 1358   Dressing Moist to dry;Kerlix/rolled gauze;Silicone border dressing 03/29/25 1358   Dressing Changed Changed 03/29/25 1358       Wound Team Summary Assessment:   Pt admitted with open, likely infected, wounds to left stump on previous incision line. Upper wound more shallow but with purulent, foul drainage. Medial wound with more depth, able to probe to about 2 cm. Medial wound more painful, increased amount of tan, foul smelling exudate than upper wound. Betty wound skin to both areas erythematous and tender. Upper and medial wounds packed with Vashe soaked kerlix, covered with Mepilex bordered foam. Recommend 1/2 strength Dakin's solution BID wet to  dry dressings and surgery consult for potential debridement while inpatient. Wound cultures x2 collected, labeled and sent via Primary RN.      Wound Team Plan:   Nursing to continue wet to dry dressings. WOD to follow while admitted for progress of wound. Surgical or primary team, please re-consult for more advanced wound treatment, such as wound vac, if deemed appropriate for plan of care.       FAUSTO WHATLEY RN  3/29/2025  3:25 PM

## 2025-03-29 NOTE — PROGRESS NOTES
03/29/25 1229   Discharge Planning   Living Arrangements Alone   Support Systems Family members   Type of Residence Private residence   Number of Stairs to Enter Residence 0   Number of Stairs Within Residence 0   Do you have animals or pets at home? No   Who is requesting discharge planning? Provider   Home or Post Acute Services In home services   Type of Home Care Services Home health aide  ([facility])   Expected Discharge Disposition Home   Does the patient need discharge transport arranged? Yes   RoundTrip coordination needed? Yes   Has discharge transport been arranged? No   Financial Resource Strain   How hard is it for you to pay for the very basics like food, housing, medical care, and heating? Not very   Housing Stability   In the last 12 months, was there a time when you were not able to pay the mortgage or rent on time? N   At any time in the past 12 months, were you homeless or living in a shelter (including now)? N   Transportation Needs   In the past 12 months, has lack of transportation kept you from medical appointments or from getting medications? no   In the past 12 months, has lack of transportation kept you from meetings, work, or from getting things needed for daily living? No   Patient Choice   Patient / Family choosing to utilize agency / facility established prior to hospitalization No     Assessment Note:  Met with patient and Introduced myself as care coordinator and member of the Care Transitions team for discharge planning. Patient non-ambulatory lives alone in a apt have [facility] 48 hours a week. Patient demographics and contact information verified. Pt feels safe at home. Patient have no further questions or concerns at this time.    Transportation: Patient use insurance transportation will need ambulance ride home  Pharmacy: [facility]  DME: wheelchair, hospital bed, shower chair  Previous home care: [facility]  Falls: No recent falls  PCP: Anabel Sanchez MD  last seen in  Feb  Dialysis: Denies  Diabetic: Yes, have all supplies/Medication needed    [facility] will continue to follow and update the plan as warranted.    JESSICA CohnN-RN  Transitional Care Coordinator ([facility])  [phone] ext [phone]

## 2025-03-29 NOTE — SIGNIFICANT EVENT
Rapid Response Nurse Note:     Pager time: 08:56  Arrival time: 08:04  Event end time: 08:43  Location: ER 36      Rapid response initiated by:  [] Rapid response RN [] Family [] Nursing Supervisor [] Physician   [] RADAR auto page [] Sepsis auto-page [x] RN [] RT   [] NP/PA [] Other:     Primary reason for call:   [] BAT [] New CPAP/BiPAP [] Bleeding [] Change in mental status   [] Chest pain [] Code blue [] FiO2 >/= 50% [] HR </= 40 bpm   [] HR >/= 130 bpm [] Hyperglycemia [] Hypoglycemia [] RADAR    [] RR </= 8 bpm [] RR >/= 30 bpm [x] SBP </= 90 mmHg [] SpO2 < 90%   [] Seizure [] Sepsis [] Shortness of breath  [] Staff concern: see comments     Interventions:  [] None [] ABG/VBG [] Assist w/ICU transfer [] BAT paged    [] Bag mask [] Blood [] Cardioversion [] Code Blue   [] Code blue for intubation [] Code status changed [] Chest x-ray [] EKG   [x] IV fluid/bolus [] KUB x-ray [] Labs/cultures [] Medication   [] Nebulizer treatment [] NIPPV (CPAP/BiPAP) [] Oxygen [] Oral airway   [] Peripheral IV [] Palliative care consult [] CT/MRI [] Sepsis protocol    [] Suctioned [] Other:     Outcome:  [] Coded and  [] Code blue for intubation [] Coded and transferred to ICU []  on division   [x] Remained on division (no change) [] Remained on division + additional monitoring [] Remained in ED [] Transferred to ED   [] Transferred to ICU [] Transferred to inpatient status [] Transferred for interventions (procedure) [] Transferred to ICU stepdown    [] Transferred to surgery [] Transferred to telemetry [] Sepsis protocol [] STEMI protocol   [] Stroke protocol       Additional Comments:      Rapid Response called for hypotension 78/53; 1 liter fluid bolus given per Dr. Sher by bedside nurse.    Patient alert and states she wants to sleep and staff keep interrupting her with care.  Skin warm and dry.  She denies feeling lightheaded or dizzy.      Repeat /66. Plan to transfer to [place] [id number] and   Nikky will see her there.     No rapid response assistance needed at this time per nurse; encouraged to call as needed if patient status changes.

## 2025-03-29 NOTE — H&P
History Of Present Illness  Shirin Slater is a 54 y.o. female with history of severe PAD s/p R hip disarticulation, left AKA on 1/2/2025 (prior to this had L CFA and EIA embolectomy 12/2023 and multiple L toe amputations), T2DM complicated by diabetic neuropathy, HTN, HLD, renal and splenic thromboses (on warfarin), who is here today as a transfer from [facility] for vascular surgery consult iso concern for LLE cellulitis at previous amputation site.     Patient reports that after discharge home from SNF after AKA in January 2025, home health was supposed to be set up so she could continue to receive appropriate wound care at home. There were logistical issues coordinating with the home health agency and patient's current aid being present, and so she has still not received wound care (first visit scheduled for 3/31). She has been doing her own wound care with sterile saline, betadine, MediHoney, and gauze. However, wound at her previous amputation site has continued to worsen. Used to produce only serosanguinous drainage, now patient reporting thick, white drainage along with increasing pain. Denies subjective fever, chills, but has been febrile since arriving here. Labs at [facility] indicated concerns for sepsis.     Vascular surgery saw patient after arrival and are not recommending any urgent surgical intervention; planning on discussing case with multidisciplinary team (including ortho, vasc surg, and medicine) to determine whether limb salvage or hip disarticulation would be most appropriate. At this time, patient is admitted to medicine with plan for IV abx and continued monitoring.     Patient reports PTSD from multiple hospitalizations, infections, and pain, specifically 2/2 wound vac changes in the past.     Lastly, patient reports she has not been taking all of her medications for the past few weeks. The only meds she has been taking daily are her warfarin and pantoprazole.     ED course:   Vitals:    Vitals:    03/29/25 0413   BP: 96/66   Pulse: 81   Resp: 18   Temp:    SpO2: 97%    Temperature:  [37.9 °C (100.3 °F)] 37.9 °C (100.3 °F)  Heart Rate:  [] 81  Respirations:  [18] 18  BP: ()/(44-69) 96/66  Labs:           8.8     11.0>-----<480              29.5   137  107  8                  ----------------<122     3.6  20  0.45          Ca 8.0 Phos 4.1 Mg 1.77       ALT <3 AST 4 AlkPhos 90 tBili 0.3      Lactate 0.8  CRP 13.17  INR: 3.6 (goal 2-3)    Imaging: CXR without evidence of acute cardiopulmonary process. Outside hospital CT left femur read reviewed, no signs of OM, soft tissue gas, discrete fluid collection.    ED intervention:   - 1L LR bolus x 2  - keppra 500mg x 1  - tylenol 650mg x 1 (for fever)  - morphine 4mg x 1  - zosyn x 1  - of note, did receive vanc at OSH, pending trough prior to resuming    Past Medical History  Past Medical History:   Diagnosis Date    Anxiety     Arthritis     Cancer (Multi)     Cellulitis     COPD (chronic obstructive pulmonary disease) (Multi)     Delayed emergence from general anesthesia     Diabetes mellitus (Multi)     Disease of thyroid gland     Diverticulitis     Epilepsy, unspecified, not intractable, without status epilepticus     Epilepsy    HLD (hyperlipidemia)     Hx of blood clots     Hypertension     PAD (peripheral artery disease) (CMS-HCC)     Peripheral vascular disease, unspecified (CMS-HCC) 09/14/2022    PAD (peripheral artery disease)    PONV (postoperative nausea and vomiting)     PTSD (post-traumatic stress disorder)     Sleep apnea     Uterine cancer (Multi)        Surgical History  Past Surgical History:   Procedure Laterality Date    AMPUTATION FOOT / TOE Left 2022    AMPUTATION FOOT / TOE Left     ANTERIOR CRUCIATE LIGAMENT REPAIR Left 2009    CARDIAC CATHETERIZATION N/A 09/20/2024    Procedure: Left Heart Cath;  Surgeon: Cheyenne Eckert MD;  Location: [place] [address] Cardiac Cath Lab;  Service: Cardiovascular;  Laterality: N/A;      SECTION, CLASSIC      CHOLECYSTECTOMY      COLON SURGERY  2019    Ressection    CT ANGIO AORTA AND BILATERAL ILIOFEMORAL RUN OFF INCLUDING WITHOUT CONTRAST IF PERFORMED  2021    CT AORTA AND BILATERAL ILIOFEMORAL RUNOFF ANGIOGRAM W AND/OR WO IV CONTRAST [place] [facility] [place]    CT ANGIO AORTA AND BILATERAL ILIOFEMORAL RUN OFF INCLUDING WITHOUT CONTRAST IF PERFORMED  2022    CT AORTA AND BILATERAL ILIOFEMORAL RUNOFF ANGIOGRAM W AND/OR WO IV CONTRAST 2022 [facility] CLINICAL LEGACY    HYSTERECTOMY  2012    INVASIVE VASCULAR PROCEDURE N/A 2024    Procedure: Lower Extremity Angiogram;  Surgeon: Cheyenne Eckert MD;  Location: [place] [address] Cardiac Cath Lab;  Service: Cardiovascular;  Laterality: N/A;    IR ANGIOGRAM AORTA ABDOMEN  2021    IR ANGIOGRAM AORTA ABDOMEN 2021 [facility] [place] [facility]    LEG AMPUTATION Right     hip    OTHER SURGICAL HISTORY  2021    Arterial angioplasty of lower extremity    TONSILECTOMY, ADENOIDECTOMY, BILATERAL MYRINGOTOMY AND TUBES  1971    VASCULAR SURGERY Right 2019    Feet w/ ICU stay    VASCULAR SURGERY Left     CFA Embolectomy    VASCULAR SURGERY Right 2020    Knee        Social History  She reports that she has been smoking cigarettes. She started smoking about 44 years ago. She has a 24.5 pack-year smoking history. She has been exposed to tobacco smoke. She has never used smokeless tobacco. She reports current alcohol use. She reports current drug use. Drug: Marijuana.  Currently smoking 2/3 ppd, marijuana daily.    Family History  Family History   Problem Relation Name Age of Onset    Diverticulitis Daughter      Heart failure Other          Several family members without direct blood connection        Allergies  Aspartame and Nsaids (non-steroidal anti-inflammatory drug)    Review of Systems   Constitutional:  Negative for chills and fever.   Respiratory:  Negative for cough, choking and chest tightness.     Cardiovascular:  Negative for chest pain and palpitations.   Gastrointestinal:  Negative for abdominal pain.   Skin:  Positive for wound.   Neurological:  Positive for seizures (hx of epilepsy, on keppra).        Physical Exam  Constitutional:       General: She is not in acute distress.     Appearance: She is not ill-appearing or toxic-appearing.   HENT:      Head: Normocephalic and atraumatic.      Mouth/Throat:      Mouth: Mucous membranes are moist.      Pharynx: Oropharynx is clear.   Eyes:      General: No scleral icterus.     Extraocular Movements: Extraocular movements intact.      Pupils: Pupils are equal, round, and reactive to light.   Cardiovascular:      Rate and Rhythm: Normal rate and regular rhythm.      Heart sounds: Normal heart sounds.   Pulmonary:      Effort: Pulmonary effort is normal. No respiratory distress.   Abdominal:      General: Abdomen is flat. There is no distension.      Palpations: Abdomen is soft.      Tenderness: There is no abdominal tenderness.   Musculoskeletal:      Cervical back: Normal range of motion.      Comments: R hip disarticulation, well healed. Left AKA. Moving extremities without issue.   Skin:     General: Skin is warm and dry.      Comments: Wound of left lower extremity with necrotic and fibrinous tissue present and purulent drainage. Surrounding skin with erythema, tender to palpation.    Neurological:      Mental Status: She is alert and oriented to person, place, and time.         (Photo obtained from ED note, wound unchanged from above in my exam)    Last Recorded Vitals  Blood pressure 107/52, pulse 95, temperature (!) 37.9 °C (100.3 °F), resp. rate 18, SpO2 99%.    Relevant Results  Results for orders placed or performed during the hospital encounter of 03/29/25 (from the past 24 hours)   CBC and Auto Differential   Result Value Ref Range    WBC 11.0 4.4 - 11.3 x10*3/uL    nRBC 0.0 0.0 - 0.0 /100 WBCs    RBC 4.36 4.00 - 5.20 x10*6/uL    Hemoglobin 8.8 (L)  12.0 - 16.0 g/dL    Hematocrit 29.5 (L) 36.0 - 46.0 %    MCV 68 (L) 80 - 100 fL    MCH 20.2 (L) 26.0 - 34.0 pg    MCHC 29.8 (L) 32.0 - 36.0 g/dL    RDW 18.5 (H) 11.5 - 14.5 %    Platelets 480 (H) 150 - 450 x10*3/uL    Neutrophils % 73.3 40.0 - 80.0 %    Immature Granulocytes %, Automated 0.5 0.0 - 0.9 %    Lymphocytes % 18.2 13.0 - 44.0 %    Monocytes % 6.9 2.0 - 10.0 %    Eosinophils % 0.6 0.0 - 6.0 %    Basophils % 0.5 0.0 - 2.0 %    Neutrophils Absolute 8.06 (H) 1.20 - 7.70 x10*3/uL    Immature Granulocytes Absolute, Automated 0.05 0.00 - 0.70 x10*3/uL    Lymphocytes Absolute 2.00 1.20 - 4.80 x10*3/uL    Monocytes Absolute 0.76 0.10 - 1.00 x10*3/uL    Eosinophils Absolute 0.07 0.00 - 0.70 x10*3/uL    Basophils Absolute 0.06 0.00 - 0.10 x10*3/uL   Comprehensive metabolic panel   Result Value Ref Range    Glucose 122 (H) 74 - 99 mg/dL    Sodium 137 136 - 145 mmol/L    Potassium 3.6 3.5 - 5.3 mmol/L    Chloride 107 98 - 107 mmol/L    Bicarbonate 20 (L) 21 - 32 mmol/L    Anion Gap 14 10 - 20 mmol/L    Urea Nitrogen 8 6 - 23 mg/dL    Creatinine 0.45 (L) 0.50 - 1.05 mg/dL    eGFR >90 >60 mL/min/1.73m*2    Calcium 8.0 (L) 8.6 - 10.6 mg/dL    Albumin 2.9 (L) 3.4 - 5.0 g/dL    Alkaline Phosphatase 90 33 - 110 U/L    Total Protein 6.5 6.4 - 8.2 g/dL    AST 4 (L) 9 - 39 U/L    Bilirubin, Total 0.3 0.0 - 1.2 mg/dL    ALT <3 (L) 7 - 45 U/L   Magnesium   Result Value Ref Range    Magnesium 1.77 1.60 - 2.40 mg/dL   Lactate   Result Value Ref Range    Lactate 0.8 0.4 - 2.0 mmol/L   Vancomycin, Trough   Result Value Ref Range    Vancomycin, Trough 14.9 5.0 - 20.0 ug/mL   C-reactive protein   Result Value Ref Range    C-Reactive Protein 13.17 (H) <1.00 mg/dL   POCT GLUCOSE   Result Value Ref Range    POCT Glucose 121 (H) 74 - 99 mg/dL   Coagulation Screen   Result Value Ref Range    Protime 40.4 (H) 9.8 - 12.4 seconds    INR 3.6 (H) 0.9 - 1.1    aPTT 33 26 - 36 seconds   PST Top   Result Value Ref Range    Extra Tube Hold for  add-ons.        XR chest 2 views    Result Date: 3/29/2025  Interpreted By:  Josef Mccarthy and Ritchie Brandon STUDY: XR CHEST 2 VIEWS;  3/29/2025 2:34 am   INDICATION: Signs/Symptoms:pre-op.   COMPARISON: Chest radiograph 12/17/2024.   ACCESSION NUMBER(S): [id number]   ORDERING CLINICIAN: CARMINE CRUM   FINDINGS: PA and lateral radiographs of the chest were provided. Lordotic film.   CARDIOMEDIASTINAL SILHOUETTE: Cardiomediastinal silhouette is normal in size and configuration.   LUNGS: Lungs are clear.   ABDOMEN: No remarkable upper abdominal findings.   BONES: No acute osseous changes.       1.  No evidence of acute cardiopulmonary process.   I personally reviewed the images/study and I agree with the findings as stated by resident Chalino Garcia. This study was interpreted at [facility], [place].   MACRO: None   Signed by: Josef Mccarthy 3/29/2025 2:40 AM Dictation workstation:   [id number]    CT femur left w IV contrast    Result Date: 3/28/2025  EXAMINATION: CT OF THE LEFT FEMUR WITH CONTRAST 3/28/2025 4:17 pm TECHNIQUE: CT of the left femur was performed with the administration of intravenous contrast.  Multiplanar reformatted images are provided for review. Automated exposure control, iterative reconstruction, and/or weight based adjustment of the mA/kV was utilized to reduce the radiation dose to as low as reasonably achievable. COMPARISON: Plain films of the left femur from today. HISTORY ORDERING SYSTEM PROVIDED HISTORY: evaluate soft tissues and stump TECHNOLOGIST PROVIDED HISTORY: Reason for exam:->evaluate soft tissues and stump Additional Contrast?->1 FINDINGS: Bones: As seen on today's plain films, there has been amputation of the left leg at the level of the mid-proximal femoral shaft.  There is heterotopic ossification in the area of amputation, not suspicious for osteomyelitis. The osteotomy site is crisply defined with no erosive changes.  Incidental note is made of several nutrient vessel channels in the femoral shaft. The left hip is intact with no sign of fracture. The right femoral head and neck are absent consistent with previous hip disarticulation. There is no sign of fracture or destructive lesion involving the visualized bony pelvis and inferior left sacroiliac joint. Soft Tissue: There is scarring of the left inguinal region, around a left femoral artery stent.  A left external iliac artery stent is also present extending into the area of scarring. There is moderate ulceration of the anteromedial left upper thigh with no sign of any underlying soft tissue gas or radiopaque foreign body.  There is no sign of any fluid collection in this region.  There is minimal soft tissue stranding around the ulcer, representing minimal cellulitis. There is ulceration of the anterior stump with no sign of soft tissue gas, fluid collection, or radiopaque foreign body.  There is little inflammatory stranding around the ulcer, representing minimal cellulitis. Joint: No degenerative disease involving the left hip..  No osseous erosions.    1. Status post amputation of the left leg at the level of the mid-proximal femoral shaft. No sign of osteomyelitis. 2. Moderate ulceration of the anteromedial left upper thigh and of the anterior stump.  No sign of soft tissue gas, fluid collection, or radiopaque foreign body. There is little inflammatory stranding around the ulcers, representing minimal cellulitis.    XR femur left 2+ views    Result Date: 3/28/2025  EXAMINATION: 2  XRAY VIEWS OF THE LEFT FEMUR 3/28/2025 5:36 pm COMPARISON: None. HISTORY: ORDERING SYSTEM PROVIDED HISTORY: Evaluate for osteomyelitis TECHNOLOGIST PROVIDED HISTORY: Reason for exam:->Evaluate for osteomyelitis FINDINGS:  Mid femoral amputation.  Soft tissue ulcer is noted at the stump.  No deep soft tissue gas identified.  Heterotopic ossification about the bony amputation site but without  suspicious features appreciated.  No acute osseous abnormality otherwise appreciated.  Partially visualized vascular stent in the pelvis.    1. Mid femoral amputation with soft tissue ulcer at the stump. No deep soft tissue gas identified or acute osseous abnormality identified.    XR chest 1 view    Result Date: 3/28/2025  EXAMINATION: ONE XRAY VIEW OF THE CHEST 3/28/2025 2:56 pm COMPARISON: None. HISTORY: ORDERING SYSTEM PROVIDED HISTORY: Sepsis TECHNOLOGIST PROVIDED HISTORY: Reason for exam:->Sepsis FINDINGS: The lungs are without acute focal process.  There is no effusion or pneumothorax. The cardiomediastinal silhouette is without acute process. The osseous structures are without acute process.    No acute process.        Assessment/Plan   Assessment & Plan  Cellulitis of left lower extremity      Shirin Slater is a 54 y.o. female with history of severe PAD s/p R hip disarticulation, left AKA on 1/2/2025 (prior to this had L CFA and EIA embolectomy 12/2023 and multiple L toe amputations), T2DM complicated by diabetic neuropathy, HTN, HLD, renal and splenic thromboses (on warfarin), who is here today as a transfer from [facility] for vascular surgery consult iso concern for LLE cellulitis at previous amputation site.      Vascular surgery saw patient after arrival and are not recommending any urgent surgical intervention; planning on discussing case with multidisciplinary team (including ortho, vasc surg, and medicine) to determine whether limb salvage or hip disarticulation would be most appropriate. At this time, patient is admitted to hospital team A with plan for IV abx and continued monitoring. See detailed plan below:    #LLE cellulitis  #c/f sepsis  :: LLE wounds with necrotic tissue present and surrounding erythema. No sign of soft tissue gas or discrete abscess on OSH CT left femur  :: Tmax 100.3 F. Tachycardia resolved ( at 0102)  :: WBC 11, CRP 13.17  :: SBP ranging from 80s-100s, s/p 1L LR  bolus x 2 in ED with some improvement in pressure.   - goal to maintain MAP>65  - fu discussions with vascular surgery and ortho for plan re: limb salvage vs disarticulation  - pain regimen: tylenol 975mg q8hr, tizanidine 4mg q8h, gabapentin 600mg q8hr, oxy 5mg q6hr PRN for severe pain  [ ] wound care consult  [ ] continue vanc/zosyn  [ ] fu BCx     #hx of renal and splenic thromboses  :: INR at time of arrival 3.6 (goal 2-3)  - on warfarin 5mg daily at home  - will hold next dose of warfarin, repeat INR daily. Can resume daily dose once INR is in therapeutic range    #T2DM  :: last A1c 8.4 in November 2024  - pending recheck  - has been on jardiance in the past, held  - SSI #1 NPO ordered    #Chronic conditions:  - PTSD, anxiety - home cymbalta 60mg and buspar 10mg ordered  - epilepsy - home keppra 500mg BID ordered  - GERD - home pantoprazole 40mg ordered  - nicotine use disorder - nicotine patch ordered  - hypothyroid - home levothyroxine 75mcg ordered (TSH pending)  - has hx of HTN, however pressures have been low during admission. Hold home metop at this time, continue to monitor    F: as needed  E: replete as needed  N: NPO  DVT ppx: on warfarin  Code status: DNR, confirmed on admission    Plan preliminary until cosigned by attending physician.        Karina Anderson, DO  Family Medicine, PGY-2

## 2025-03-29 NOTE — CARE PLAN
Problem: Skin  Goal: Decreased wound size/increased tissue granulation at next dressing change  Outcome: Progressing  Goal: Participates in plan/prevention/treatment measures  Outcome: Progressing  Goal: Prevent/manage excess moisture  Outcome: Progressing  Goal: Prevent/minimize sheer/friction injuries  Outcome: Progressing  Goal: Promote/optimize nutrition  Outcome: Progressing  Goal: Promote skin healing  Outcome: Progressing     Problem: Fall/Injury  Goal: Not fall by end of shift  Outcome: Progressing  Goal: Be free from injury by end of the shift  Outcome: Progressing  Goal: Verbalize understanding of personal risk factors for fall in the hospital  Outcome: Progressing  Goal: Verbalize understanding of risk factor reduction measures to prevent injury from fall in the home  Outcome: Progressing  Goal: Use assistive devices by end of the shift  Outcome: Progressing  Goal: Pace activities to prevent fatigue by end of the shift  Outcome: Progressing     Problem: Diabetes  Goal: Achieve decreasing blood glucose levels by end of shift  Outcome: Progressing  Goal: Increase stability of blood glucose readings by end of shift  Outcome: Progressing  Goal: Decrease in ketones present in urine by end of shift  Outcome: Progressing  Goal: Maintain electrolyte levels within acceptable range throughout shift  Outcome: Progressing  Goal: Maintain glucose levels >70mg/dl to <250mg/dl throughout shift  Outcome: Progressing  Goal: No changes in neurological exam by end of shift  Outcome: Progressing  Goal: Learn about and adhere to nutrition recommendations by end of shift  Outcome: Progressing  Goal: Vital signs within normal range for age by end of shift  Outcome: Progressing  Goal: Increase self care and/or family involovement by end of shift  Outcome: Progressing  Goal: Receive DSME education by end of shift  Outcome: Progressing     Problem: Pain  Goal: Takes deep breaths with improved pain control throughout the  shift  Outcome: Progressing  Goal: Turns in bed with improved pain control throughout the shift  Outcome: Progressing  Goal: Walks with improved pain control throughout the shift  Outcome: Progressing  Goal: Performs ADL's with improved pain control throughout shift  Outcome: Progressing  Goal: Participates in PT with improved pain control throughout the shift  Outcome: Progressing  Goal: Free from opioid side effects throughout the shift  Outcome: Progressing  Goal: Free from acute confusion related to pain meds throughout the shift  Outcome: Progressing   The patient's goals for the shift include      The clinical goals for the shift include SBP >90

## 2025-03-30 VITALS
HEART RATE: 68 BPM | SYSTOLIC BLOOD PRESSURE: 103 MMHG | TEMPERATURE: 97.7 F | DIASTOLIC BLOOD PRESSURE: 56 MMHG | RESPIRATION RATE: 16 BRPM | OXYGEN SATURATION: 97 %

## 2025-03-30 LAB
ALBUMIN SERPL BCP-MCNC: 2.7 G/DL (ref 3.4–5)
ANION GAP SERPL CALC-SCNC: 14 MMOL/L (ref 10–20)
BACTERIA BLD CULT: NORMAL
BACTERIA BLD CULT: NORMAL
BACTERIA SPEC CULT: ABNORMAL
BACTERIA SPEC CULT: NORMAL
BASOPHILS # BLD AUTO: 0.04 X10*3/UL (ref 0–0.1)
BASOPHILS NFR BLD AUTO: 0.4 %
BUN SERPL-MCNC: 8 MG/DL (ref 6–23)
CALCIUM SERPL-MCNC: 8.4 MG/DL (ref 8.6–10.6)
CHLORIDE SERPL-SCNC: 108 MMOL/L (ref 98–107)
CO2 SERPL-SCNC: 22 MMOL/L (ref 21–32)
CREAT SERPL-MCNC: 0.52 MG/DL (ref 0.5–1.05)
EGFRCR SERPLBLD CKD-EPI 2021: >90 ML/MIN/1.73M*2
EOSINOPHIL # BLD AUTO: 0.14 X10*3/UL (ref 0–0.7)
EOSINOPHIL NFR BLD AUTO: 1.3 %
ERYTHROCYTE [DISTWIDTH] IN BLOOD BY AUTOMATED COUNT: 18.6 % (ref 11.5–14.5)
GLUCOSE BLD MANUAL STRIP-MCNC: 110 MG/DL (ref 74–99)
GLUCOSE BLD MANUAL STRIP-MCNC: 127 MG/DL (ref 74–99)
GLUCOSE BLD MANUAL STRIP-MCNC: 159 MG/DL (ref 74–99)
GLUCOSE BLD MANUAL STRIP-MCNC: 172 MG/DL (ref 74–99)
GLUCOSE SERPL-MCNC: 100 MG/DL (ref 74–99)
GRAM STN SPEC: ABNORMAL
GRAM STN SPEC: ABNORMAL
GRAM STN SPEC: NORMAL
GRAM STN SPEC: NORMAL
HCT VFR BLD AUTO: 30.5 % (ref 36–46)
HGB BLD-MCNC: 8.1 G/DL (ref 12–16)
IMM GRANULOCYTES # BLD AUTO: 0.05 X10*3/UL (ref 0–0.7)
IMM GRANULOCYTES NFR BLD AUTO: 0.5 % (ref 0–0.9)
INR PPP: 3.3 (ref 0.9–1.1)
LYMPHOCYTES # BLD AUTO: 1.92 X10*3/UL (ref 1.2–4.8)
LYMPHOCYTES NFR BLD AUTO: 18.1 %
MAGNESIUM SERPL-MCNC: 1.75 MG/DL (ref 1.6–2.4)
MCH RBC QN AUTO: 19.7 PG (ref 26–34)
MCHC RBC AUTO-ENTMCNC: 26.6 G/DL (ref 32–36)
MCV RBC AUTO: 74 FL (ref 80–100)
MICROORGANISM SPEC CULT: ABNORMAL
MICROORGANISM SPEC CULT: NORMAL
MICROORGANISM SPEC CULT: NORMAL
MICROORGANISM/AGENT SPEC: ABNORMAL
MONOCYTES # BLD AUTO: 0.61 X10*3/UL (ref 0.1–1)
MONOCYTES NFR BLD AUTO: 5.8 %
NEUTROPHILS # BLD AUTO: 7.83 X10*3/UL (ref 1.2–7.7)
NEUTROPHILS NFR BLD AUTO: 73.9 %
NRBC BLD-RTO: 0 /100 WBCS (ref 0–0)
PHOSPHATE SERPL-MCNC: 3.2 MG/DL (ref 2.5–4.9)
PLATELET # BLD AUTO: 458 X10*3/UL (ref 150–450)
POTASSIUM SERPL-SCNC: 3.3 MMOL/L (ref 3.5–5.3)
PROTHROMBIN TIME: 36.8 SECONDS (ref 9.8–12.4)
RBC # BLD AUTO: 4.12 X10*6/UL (ref 4–5.2)
SERVICE CMNT-IMP: NORMAL
SERVICE CMNT-IMP: NORMAL
SODIUM SERPL-SCNC: 141 MMOL/L (ref 136–145)
SPECIMEN DESCRIPTION: ABNORMAL
SPECIMEN DESCRIPTION: NORMAL
SPECIMEN DESCRIPTION: NORMAL
VANCOMYCIN SERPL-MCNC: 49.1 UG/ML (ref 5–20)
WBC # BLD AUTO: 10.6 X10*3/UL (ref 4.4–11.3)

## 2025-03-30 PROCEDURE — 85025 COMPLETE CBC W/AUTO DIFF WBC: CPT

## 2025-03-30 PROCEDURE — 82947 ASSAY GLUCOSE BLOOD QUANT: CPT

## 2025-03-30 PROCEDURE — 2500000002 HC RX 250 W HCPCS SELF ADMINISTERED DRUGS (ALT 637 FOR MEDICARE OP, ALT 636 FOR OP/ED): Performed by: STUDENT IN AN ORGANIZED HEALTH CARE EDUCATION/TRAINING PROGRAM

## 2025-03-30 PROCEDURE — 85610 PROTHROMBIN TIME: CPT | Performed by: STUDENT IN AN ORGANIZED HEALTH CARE EDUCATION/TRAINING PROGRAM

## 2025-03-30 PROCEDURE — 2500000002 HC RX 250 W HCPCS SELF ADMINISTERED DRUGS (ALT 637 FOR MEDICARE OP, ALT 636 FOR OP/ED)

## 2025-03-30 PROCEDURE — 83735 ASSAY OF MAGNESIUM: CPT

## 2025-03-30 PROCEDURE — 2500000001 HC RX 250 WO HCPCS SELF ADMINISTERED DRUGS (ALT 637 FOR MEDICARE OP)

## 2025-03-30 PROCEDURE — 2500000004 HC RX 250 GENERAL PHARMACY W/ HCPCS (ALT 636 FOR OP/ED)

## 2025-03-30 PROCEDURE — 2500000004 HC RX 250 GENERAL PHARMACY W/ HCPCS (ALT 636 FOR OP/ED): Performed by: STUDENT IN AN ORGANIZED HEALTH CARE EDUCATION/TRAINING PROGRAM

## 2025-03-30 PROCEDURE — 80202 ASSAY OF VANCOMYCIN: CPT

## 2025-03-30 PROCEDURE — 1100000001 HC PRIVATE ROOM DAILY

## 2025-03-30 PROCEDURE — 80069 RENAL FUNCTION PANEL: CPT

## 2025-03-30 PROCEDURE — 2500000005 HC RX 250 GENERAL PHARMACY W/O HCPCS

## 2025-03-30 PROCEDURE — 36415 COLL VENOUS BLD VENIPUNCTURE: CPT | Performed by: STUDENT IN AN ORGANIZED HEALTH CARE EDUCATION/TRAINING PROGRAM

## 2025-03-30 PROCEDURE — 99232 SBSQ HOSP IP/OBS MODERATE 35: CPT | Performed by: STUDENT IN AN ORGANIZED HEALTH CARE EDUCATION/TRAINING PROGRAM

## 2025-03-30 PROCEDURE — 2500000001 HC RX 250 WO HCPCS SELF ADMINISTERED DRUGS (ALT 637 FOR MEDICARE OP): Performed by: STUDENT IN AN ORGANIZED HEALTH CARE EDUCATION/TRAINING PROGRAM

## 2025-03-30 RX ORDER — NAPROXEN SODIUM 220 MG/1
81 TABLET, FILM COATED ORAL DAILY
Status: DISCONTINUED | OUTPATIENT
Start: 2025-03-31 | End: 2025-04-03 | Stop reason: HOSPADM

## 2025-03-30 RX ORDER — INSULIN LISPRO 100 [IU]/ML
0-5 INJECTION, SOLUTION INTRAVENOUS; SUBCUTANEOUS
Status: DISCONTINUED | OUTPATIENT
Start: 2025-03-30 | End: 2025-04-03 | Stop reason: HOSPADM

## 2025-03-30 RX ORDER — WARFARIN 4 MG/1
4 TABLET ORAL DAILY
Status: DISCONTINUED | OUTPATIENT
Start: 2025-03-31 | End: 2025-04-02

## 2025-03-30 RX ADMIN — ATORVASTATIN CALCIUM 80 MG: 80 TABLET, FILM COATED ORAL at 21:01

## 2025-03-30 RX ADMIN — TIZANIDINE 4 MG: 4 TABLET ORAL at 14:44

## 2025-03-30 RX ADMIN — OXYCODONE HYDROCHLORIDE 5 MG: 5 TABLET ORAL at 10:49

## 2025-03-30 RX ADMIN — DIVALPROEX SODIUM 1000 MG: 500 TABLET, FILM COATED, EXTENDED RELEASE ORAL at 21:04

## 2025-03-30 RX ADMIN — OXYCODONE HYDROCHLORIDE 5 MG: 5 TABLET ORAL at 21:00

## 2025-03-30 RX ADMIN — TIZANIDINE 4 MG: 4 TABLET ORAL at 08:41

## 2025-03-30 RX ADMIN — PIPERACILLIN SODIUM AND TAZOBACTAM SODIUM 3.38 G: 3; .375 INJECTION, SOLUTION INTRAVENOUS at 08:39

## 2025-03-30 RX ADMIN — LOPERAMIDE HYDROCHLORIDE 2 MG: 2 CAPSULE ORAL at 14:49

## 2025-03-30 RX ADMIN — GABAPENTIN 600 MG: 300 CAPSULE ORAL at 05:25

## 2025-03-30 RX ADMIN — VANCOMYCIN HYDROCHLORIDE 1250 MG: 5 INJECTION, POWDER, LYOPHILIZED, FOR SOLUTION INTRAVENOUS at 05:24

## 2025-03-30 RX ADMIN — SODIUM CHLORIDE 500 ML: 9 INJECTION, SOLUTION INTRAVENOUS at 17:23

## 2025-03-30 RX ADMIN — DULOXETINE HYDROCHLORIDE 60 MG: 60 CAPSULE, DELAYED RELEASE ORAL at 21:01

## 2025-03-30 RX ADMIN — ACETAMINOPHEN 975 MG: 325 TABLET ORAL at 14:44

## 2025-03-30 RX ADMIN — LEVETIRACETAM 500 MG: 500 TABLET, FILM COATED ORAL at 08:41

## 2025-03-30 RX ADMIN — PIPERACILLIN SODIUM AND TAZOBACTAM SODIUM 3.38 G: 3; .375 INJECTION, SOLUTION INTRAVENOUS at 14:44

## 2025-03-30 RX ADMIN — ACETAMINOPHEN 975 MG: 325 TABLET ORAL at 21:01

## 2025-03-30 RX ADMIN — DIVALPROEX SODIUM 500 MG: 500 TABLET, FILM COATED, EXTENDED RELEASE ORAL at 08:41

## 2025-03-30 RX ADMIN — PIPERACILLIN SODIUM AND TAZOBACTAM SODIUM 3.38 G: 3; .375 INJECTION, SOLUTION INTRAVENOUS at 02:08

## 2025-03-30 RX ADMIN — LEVOTHYROXINE SODIUM 25 MCG: 25 TABLET ORAL at 08:40

## 2025-03-30 RX ADMIN — PIPERACILLIN SODIUM AND TAZOBACTAM SODIUM 3.38 G: 3; .375 INJECTION, SOLUTION INTRAVENOUS at 22:22

## 2025-03-30 RX ADMIN — PANTOPRAZOLE SODIUM 40 MG: 40 TABLET, DELAYED RELEASE ORAL at 06:16

## 2025-03-30 RX ADMIN — INSULIN LISPRO 1 UNITS: 100 INJECTION, SOLUTION INTRAVENOUS; SUBCUTANEOUS at 16:20

## 2025-03-30 RX ADMIN — VANCOMYCIN HYDROCHLORIDE 1250 MG: 5 INJECTION, POWDER, LYOPHILIZED, FOR SOLUTION INTRAVENOUS at 18:30

## 2025-03-30 RX ADMIN — LEVETIRACETAM 500 MG: 500 TABLET, FILM COATED ORAL at 21:00

## 2025-03-30 RX ADMIN — GABAPENTIN 600 MG: 300 CAPSULE ORAL at 21:01

## 2025-03-30 RX ADMIN — SODIUM HYPOCHLORITE: 2.5 SOLUTION TOPICAL at 10:50

## 2025-03-30 RX ADMIN — LOPERAMIDE HYDROCHLORIDE 2 MG: 2 CAPSULE ORAL at 11:14

## 2025-03-30 RX ADMIN — TIZANIDINE 4 MG: 4 TABLET ORAL at 21:01

## 2025-03-30 RX ADMIN — SODIUM HYPOCHLORITE: 2.5 SOLUTION TOPICAL at 22:23

## 2025-03-30 RX ADMIN — BUSPIRONE HYDROCHLORIDE 10 MG: 5 TABLET ORAL at 08:41

## 2025-03-30 RX ADMIN — GABAPENTIN 600 MG: 300 CAPSULE ORAL at 14:44

## 2025-03-30 ASSESSMENT — PAIN - FUNCTIONAL ASSESSMENT
PAIN_FUNCTIONAL_ASSESSMENT: 0-10

## 2025-03-30 ASSESSMENT — PAIN SCALES - GENERAL
PAINLEVEL_OUTOF10: 3
PAINLEVEL_OUTOF10: 5 - MODERATE PAIN
PAINLEVEL_OUTOF10: 8
PAINLEVEL_OUTOF10: 5 - MODERATE PAIN
PAINLEVEL_OUTOF10: 3
PAINLEVEL_OUTOF10: 8

## 2025-03-30 ASSESSMENT — PAIN DESCRIPTION - LOCATION: LOCATION: OTHER (COMMENT)

## 2025-03-30 NOTE — PROGRESS NOTES
Assessment/Plan   Shirin Slater is a 54 y.o. 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 Vascular surgery and orthopedics involved. Wound care consulted to direct wound care. Started on broad spectrum abx pending cultures..    SSTI of wounds at L AKA site  Prior R hip disarticulation, L AKA in setting of peripheral vascular disease  - reviewed osh imaging, no evidence of osteo or gas forming infection  - febrile, leukocytosis, elevated inflammatory markers  - b/l hip disarticulation is not recommend by ortho at this time, pt is aware  - concern for poor healing, pending decision for wound debridgements. Conitnueing borad spectrum abx pending cutures, wll need ID input based on cultures.   - appreciate ortho and vascular surgery recommendations  - continue vanc and zosyn  - continue warfarin, hold for elevation INR. Daily INR. Down to 3.3. plan to start tonight at reduced dose. Per dc profile 1/2025, pt was on dapt which was discontinued when warfarin was started. Will discuss with vascular.     renal and splenic thrombosis  Supratherapeutic INR   - INR on admission of 3.6 (goal 2-3). Down to 3.3. - on warfarin 5mg daily at home. Plan to give 4 mg tonight   - daily inr     T2DM  - last A1c 8.4 in November 2024  - has been on jardiance in the past, held  - SSI #1        PTSD  anxiety   - home cymbalta and buspar     epilepsy   - home keppra 500mg BID     GERD   - home pantoprazole     nicotine use disorder  - nicotine patch ordered    Hypothyroid   - home levothyroxine     hx of HTN,  - home metop was held on admit, will eval when to resume     Pt agreeable to snf if eligible/rec. Notified tcc for dc planning.         Scheduled outpatient appointments in system:   No future appointments.  ---------------------------------------------------------------------------------------------------  Subjective   No events. Pt very frustrated about condition and recommendation. Told she needs more support for her wounds and will likely not be able to heal her wounds. She was emotional. She said she wanted to go home. We discussed her care needs, she would not want to go long term but after counselling she would go for a short period of skilled stay. Her pain is controlled. She is worried she does get home that she will not get the wound care to come when she needs it. No f/c, headaches, dizziness. Eating well, no n/v. She had periods of npo, no issues since resuming.      ---------------------------------------------------------------------------------------------------  Objective   Last Recorded Vitals  Blood pressure 107/63, pulse 97, temperature 36.5 °C (97.7 °F), temperature source Temporal, resp. rate 18, SpO2 99%.  Intake/Output last 3 Shifts:  I/O last 3 completed shifts:  In: 470 [P.O.:120; IV Piggyback:350]  Out: -     Physical Exam  Vitals and nursing note reviewed.   Constitutional:       General: She is not in acute distress.     Appearance: Normal appearance. She is obese. She is ill-appearing.   HENT:      Head: Normocephalic and atraumatic.      Mouth/Throat:      Mouth: Mucous membranes are moist.   Eyes:      General: No scleral icterus.     Extraocular Movements: Extraocular movements intact.      Conjunctiva/sclera: Conjunctivae normal.   Cardiovascular:      Rate and Rhythm: Normal rate and regular rhythm.      Heart sounds: S1 normal and S2 normal. No murmur heard.  Pulmonary:      Effort: Pulmonary effort is normal. No respiratory distress.      Breath sounds: No wheezing, rhonchi or rales.   Abdominal:      General: Bowel sounds are normal. There is no distension.      Palpations: Abdomen is soft.      Tenderness:  There is no abdominal tenderness. There is no guarding or rebound.   Musculoskeletal:         General: No swelling or deformity.      Cervical back: Neck supple.      Comments: R hip disarticulation, L AKA with 2 large wounds, foul smelling drainage, covered in dressing   Skin:     General: Skin is warm and dry.      Findings: No rash.   Neurological:      General: No focal deficit present.      Mental Status: She is alert and oriented to person, place, and time. Mental status is at baseline.   Psychiatric:         Judgment: Judgment normal.      Comments: Emotional during encounter, but more relaxed at end of encounter         Relevant Results  Lab Results   Component Value Date    WBC 10.6 03/30/2025    HGB 8.1 (L) 03/30/2025    HCT 30.5 (L) 03/30/2025    MCV 74 (L) 03/30/2025     (H) 03/30/2025      Lab Results   Component Value Date    GLUCOSE 100 (H) 03/30/2025    CALCIUM 8.4 (L) 03/30/2025     03/30/2025    K 3.3 (L) 03/30/2025    CO2 22 03/30/2025     (H) 03/30/2025    BUN 8 03/30/2025    CREATININE 0.52 03/30/2025     Scheduled medications  acetaminophen, 975 mg, oral, q8h  atorvastatin, 80 mg, oral, Nightly  busPIRone, 10 mg, oral, q AM  divalproex, 1,000 mg, oral, Nightly  divalproex, 500 mg, oral, Daily  DULoxetine, 60 mg, oral, q PM  [Held by provider] empagliflozin, 10 mg, oral, Daily  ergocalciferol, 1,250 mcg, oral, Weekly  gabapentin, 600 mg, oral, q8h NINO  insulin lispro, 0-5 Units, subcutaneous, TID AC  levETIRAcetam, 500 mg, oral, BID  levothyroxine, 25 mcg, oral, Daily before breakfast  lidocaine, 1 patch, transdermal, Daily  [Held by provider] metoprolol tartrate, 12.5 mg, oral, BID  nicotine, 1 patch, transdermal, Daily  pantoprazole, 40 mg, oral, Daily before breakfast  piperacillin-tazobactam, 3.375 g, intravenous, q6h  [Held by provider] psyllium, 1 packet, oral, TID  sodium hypochlorite, , irrigation, BID  tiZANidine, 4 mg, oral, TID  vancomycin, 1,250 mg, intravenous,  q12h  [Held by provider] warfarin, 5 mg, oral, Daily      Continuous medications     PRN medications  PRN medications: albuterol, dextrose, dextrose, glucagon, glucagon, loperamide, melatonin, naloxone, oxyCODONE, vancomycin    Marco Antonio Sher MD

## 2025-03-30 NOTE — HOSPITAL COURSE
Shirin Slater is a 54 y.o. 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 Vascular surgery and orthopedics involved. Wound care consulted to direct wound care. Started on broad spectrum abx pending cultures.

## 2025-03-30 NOTE — CARE PLAN
The patient's goals for the shift include      The clinical goals for the shift include patient will remain safe throughout this shift

## 2025-03-30 NOTE — CARE PLAN
The patient's goals for the shift include      The clinical goals for the shift include Pt will remain HDS throughout shift 3/30/25 0700.      Problem: Skin  Goal: Decreased wound size/increased tissue granulation at next dressing change  Outcome: Progressing  Goal: Participates in plan/prevention/treatment measures  Outcome: Progressing  Goal: Prevent/manage excess moisture  Outcome: Progressing  Goal: Prevent/minimize sheer/friction injuries  Outcome: Progressing  Goal: Promote/optimize nutrition  Outcome: Progressing  Goal: Promote skin healing  Outcome: Progressing     Problem: Fall/Injury  Goal: Not fall by end of shift  Outcome: Progressing  Goal: Be free from injury by end of the shift  Outcome: Progressing  Goal: Verbalize understanding of personal risk factors for fall in the hospital  Outcome: Progressing  Goal: Verbalize understanding of risk factor reduction measures to prevent injury from fall in the home  Outcome: Progressing  Goal: Use assistive devices by end of the shift  Outcome: Progressing  Goal: Pace activities to prevent fatigue by end of the shift  Outcome: Progressing

## 2025-03-30 NOTE — PROGRESS NOTES
VASCULAR SURGERY PROGRESS NOTE  Assessment/Plan   Shirin Slater is 54 y.o. female with DM2, HTN, HLD, renal and splenic thromboses, and severe PAD s/p R hip disarticulation and L AKA. Patient presents from OSH for concern of stump cellulitis and sepsis. Patient hemodynamically stable upon arrival to [facility] with OSH labs showing WBC of 14.4, lactate of 1.2, and creatinine of 0.6. Remains hemodynamically stable in this hospitalization. Leukocytosis resolved. Wound care following.     Wounds of the high-AKA stump show superficial skin necrosis, underlying fat necrosis, and associated erythema consistent with secondary cellulitis. There is no drainable fluid collection. She will not benefit from surgical debridement as the wounds will not heal given her poor perfusion and non-reconstructible vascular disease. She is not a candidate for an AKA revision given how high her amputation already is. As the thigh is not salvageable, her only possible surgical option would be a hip disarticulation, however she was evaluated by orthopedic surgery and deemed not a candidate for this.     - not a candidate for surgical debridement, amputation revision, or hip disarticulation as above; recommend conservative wound care and palliation   - appreciate wound care nursing evaluation and recommendations  - antibiotics per primary team  - should ultimately be discharged to nursing facility for wound and nursing care, as poor wound care at home has led to repeated hospitalizations and ultimately failed interventions; however, patient adamantly refuses this now  - please consult palliative care for goals of care discussion; her life is not immediately threatened but she has no available surgical options for chronic limb ischemia/wounds    D/w attending, Dr. Alvaro Mccracken MD, PhD  Vascular Surgery, PGY3  [id number]      Subjective   naeo    Objective   Vitals:  Heart Rate:  [73-85]   Temp:  [36.1 °C (97 °F)-36.5 °C (97.7 °F)]   Resp:   [18]   BP: ()/(50-63)   SpO2:  [93 %-98 %]     Exam:  Constitutional: No acute distress, resting comfortably  Neuro:  AOx3, grossly intact  ENMT: moist mucous membranes  Head/neck: atraumatic  CV: no tachycardia  Pulm: non-labored on room air  GI: soft, non-tender, non-distended  Musculoskeletal: Warm, right hip disarticulation C/D/I. Left AKA with two large necrotic wounds, surrounding erythema, and swelling, tender to palpation  Pulse Exam: Monophasic L Fem     Labs:  Results from last 7 days   Lab Units 03/30/25  0645 03/29/25  0127   WBC AUTO x10*3/uL 10.6 11.0   HEMOGLOBIN g/dL 8.1* 8.8*   PLATELETS AUTO x10*3/uL 458* 480*      Results from last 7 days   Lab Units 03/30/25  0645 03/29/25  0127   SODIUM mmol/L 141 137   POTASSIUM mmol/L 3.3* 3.6   CHLORIDE mmol/L 108* 107   CO2 mmol/L 22 20*   BUN mg/dL 8 8   CREATININE mg/dL 0.52 0.45*   GLUCOSE mg/dL 100* 122*   MAGNESIUM mg/dL 1.75 1.77   PHOSPHORUS mg/dL 3.2  --       Results from last 7 days   Lab Units 03/30/25  0645 03/29/25  0155   INR  3.3* 3.6*   PROTIME seconds 36.8* 40.4*   APTT seconds  --  33          \   Monthly or less

## 2025-03-31 LAB
ALBUMIN SERPL BCP-MCNC: 2.3 G/DL (ref 3.4–5)
ANION GAP SERPL CALC-SCNC: 15 MMOL/L (ref 10–20)
BASOPHILS # BLD AUTO: 0.04 X10*3/UL (ref 0–0.1)
BASOPHILS NFR BLD AUTO: 0.4 %
BUN SERPL-MCNC: 10 MG/DL (ref 6–23)
CALCIUM SERPL-MCNC: 7.4 MG/DL (ref 8.6–10.6)
CHLORIDE SERPL-SCNC: 109 MMOL/L (ref 98–107)
CO2 SERPL-SCNC: 22 MMOL/L (ref 21–32)
CREAT SERPL-MCNC: 0.48 MG/DL (ref 0.5–1.05)
EGFRCR SERPLBLD CKD-EPI 2021: >90 ML/MIN/1.73M*2
EOSINOPHIL # BLD AUTO: 0.22 X10*3/UL (ref 0–0.7)
EOSINOPHIL NFR BLD AUTO: 2.4 %
ERYTHROCYTE [DISTWIDTH] IN BLOOD BY AUTOMATED COUNT: 18.4 % (ref 11.5–14.5)
GLUCOSE BLD MANUAL STRIP-MCNC: 110 MG/DL (ref 74–99)
GLUCOSE BLD MANUAL STRIP-MCNC: 130 MG/DL (ref 74–99)
GLUCOSE BLD MANUAL STRIP-MCNC: 170 MG/DL (ref 74–99)
GLUCOSE BLD MANUAL STRIP-MCNC: 181 MG/DL (ref 74–99)
GLUCOSE SERPL-MCNC: 100 MG/DL (ref 74–99)
HCT VFR BLD AUTO: 27.5 % (ref 36–46)
HGB BLD-MCNC: 7.2 G/DL (ref 12–16)
IMM GRANULOCYTES # BLD AUTO: 0.04 X10*3/UL (ref 0–0.7)
IMM GRANULOCYTES NFR BLD AUTO: 0.4 % (ref 0–0.9)
INR PPP: 3.5 (ref 0.9–1.1)
LYMPHOCYTES # BLD AUTO: 2.31 X10*3/UL (ref 1.2–4.8)
LYMPHOCYTES NFR BLD AUTO: 24.9 %
MAGNESIUM SERPL-MCNC: 1.68 MG/DL (ref 1.6–2.4)
MCH RBC QN AUTO: 19.8 PG (ref 26–34)
MCHC RBC AUTO-ENTMCNC: 26.2 G/DL (ref 32–36)
MCV RBC AUTO: 76 FL (ref 80–100)
MONOCYTES # BLD AUTO: 0.53 X10*3/UL (ref 0.1–1)
MONOCYTES NFR BLD AUTO: 5.7 %
NEUTROPHILS # BLD AUTO: 6.13 X10*3/UL (ref 1.2–7.7)
NEUTROPHILS NFR BLD AUTO: 66.2 %
NRBC BLD-RTO: 0 /100 WBCS (ref 0–0)
PHOSPHATE SERPL-MCNC: 3.2 MG/DL (ref 2.5–4.9)
PLATELET # BLD AUTO: 481 X10*3/UL (ref 150–450)
POTASSIUM SERPL-SCNC: 3.5 MMOL/L (ref 3.5–5.3)
PROTHROMBIN TIME: 38.9 SECONDS (ref 9.8–12.4)
RBC # BLD AUTO: 3.64 X10*6/UL (ref 4–5.2)
SODIUM SERPL-SCNC: 142 MMOL/L (ref 136–145)
VANCOMYCIN SERPL-MCNC: 25.3 UG/ML (ref 5–20)
WBC # BLD AUTO: 9.3 X10*3/UL (ref 4.4–11.3)

## 2025-03-31 PROCEDURE — 85025 COMPLETE CBC W/AUTO DIFF WBC: CPT

## 2025-03-31 PROCEDURE — 83735 ASSAY OF MAGNESIUM: CPT

## 2025-03-31 PROCEDURE — 2500000004 HC RX 250 GENERAL PHARMACY W/ HCPCS (ALT 636 FOR OP/ED)

## 2025-03-31 PROCEDURE — 2500000002 HC RX 250 W HCPCS SELF ADMINISTERED DRUGS (ALT 637 FOR MEDICARE OP, ALT 636 FOR OP/ED)

## 2025-03-31 PROCEDURE — 99223 1ST HOSP IP/OBS HIGH 75: CPT | Performed by: INTERNAL MEDICINE

## 2025-03-31 PROCEDURE — 1100000001 HC PRIVATE ROOM DAILY

## 2025-03-31 PROCEDURE — 84100 ASSAY OF PHOSPHORUS: CPT

## 2025-03-31 PROCEDURE — 2500000004 HC RX 250 GENERAL PHARMACY W/ HCPCS (ALT 636 FOR OP/ED): Performed by: STUDENT IN AN ORGANIZED HEALTH CARE EDUCATION/TRAINING PROGRAM

## 2025-03-31 PROCEDURE — 82947 ASSAY GLUCOSE BLOOD QUANT: CPT

## 2025-03-31 PROCEDURE — 36415 COLL VENOUS BLD VENIPUNCTURE: CPT | Performed by: STUDENT IN AN ORGANIZED HEALTH CARE EDUCATION/TRAINING PROGRAM

## 2025-03-31 PROCEDURE — 2500000001 HC RX 250 WO HCPCS SELF ADMINISTERED DRUGS (ALT 637 FOR MEDICARE OP)

## 2025-03-31 PROCEDURE — 36415 COLL VENOUS BLD VENIPUNCTURE: CPT

## 2025-03-31 PROCEDURE — 2500000005 HC RX 250 GENERAL PHARMACY W/O HCPCS

## 2025-03-31 PROCEDURE — 85610 PROTHROMBIN TIME: CPT | Performed by: STUDENT IN AN ORGANIZED HEALTH CARE EDUCATION/TRAINING PROGRAM

## 2025-03-31 PROCEDURE — 2500000002 HC RX 250 W HCPCS SELF ADMINISTERED DRUGS (ALT 637 FOR MEDICARE OP, ALT 636 FOR OP/ED): Performed by: STUDENT IN AN ORGANIZED HEALTH CARE EDUCATION/TRAINING PROGRAM

## 2025-03-31 PROCEDURE — 80202 ASSAY OF VANCOMYCIN: CPT

## 2025-03-31 PROCEDURE — 99232 SBSQ HOSP IP/OBS MODERATE 35: CPT | Performed by: STUDENT IN AN ORGANIZED HEALTH CARE EDUCATION/TRAINING PROGRAM

## 2025-03-31 PROCEDURE — 2500000001 HC RX 250 WO HCPCS SELF ADMINISTERED DRUGS (ALT 637 FOR MEDICARE OP): Performed by: STUDENT IN AN ORGANIZED HEALTH CARE EDUCATION/TRAINING PROGRAM

## 2025-03-31 RX ORDER — VANCOMYCIN HYDROCHLORIDE 1 G/200ML
1000 INJECTION, SOLUTION INTRAVENOUS EVERY 12 HOURS
Status: DISCONTINUED | OUTPATIENT
Start: 2025-03-31 | End: 2025-04-02

## 2025-03-31 RX ORDER — OXYCODONE HYDROCHLORIDE 5 MG/1
5 TABLET ORAL EVERY 6 HOURS PRN
Status: DISCONTINUED | OUTPATIENT
Start: 2025-03-31 | End: 2025-04-03 | Stop reason: HOSPADM

## 2025-03-31 RX ADMIN — BUSPIRONE HYDROCHLORIDE 10 MG: 5 TABLET ORAL at 08:24

## 2025-03-31 RX ADMIN — LIDOCAINE PAIN RELIEF 1 PATCH: 560 PATCH TOPICAL at 08:31

## 2025-03-31 RX ADMIN — LEVOTHYROXINE SODIUM 25 MCG: 25 TABLET ORAL at 08:22

## 2025-03-31 RX ADMIN — GABAPENTIN 600 MG: 300 CAPSULE ORAL at 13:34

## 2025-03-31 RX ADMIN — PIPERACILLIN SODIUM AND TAZOBACTAM SODIUM 3.38 G: 3; .375 INJECTION, SOLUTION INTRAVENOUS at 03:11

## 2025-03-31 RX ADMIN — ACETAMINOPHEN 975 MG: 325 TABLET ORAL at 21:39

## 2025-03-31 RX ADMIN — TIZANIDINE 4 MG: 4 TABLET ORAL at 21:41

## 2025-03-31 RX ADMIN — LOPERAMIDE HYDROCHLORIDE 2 MG: 2 CAPSULE ORAL at 04:53

## 2025-03-31 RX ADMIN — GABAPENTIN 600 MG: 300 CAPSULE ORAL at 21:40

## 2025-03-31 RX ADMIN — OXYCODONE HYDROCHLORIDE 5 MG: 5 TABLET ORAL at 14:05

## 2025-03-31 RX ADMIN — Medication 10 MG: at 22:02

## 2025-03-31 RX ADMIN — LEVETIRACETAM 500 MG: 500 TABLET, FILM COATED ORAL at 08:20

## 2025-03-31 RX ADMIN — DIVALPROEX SODIUM 1000 MG: 500 TABLET, FILM COATED, EXTENDED RELEASE ORAL at 21:45

## 2025-03-31 RX ADMIN — TIZANIDINE 4 MG: 4 TABLET ORAL at 08:23

## 2025-03-31 RX ADMIN — LEVETIRACETAM 500 MG: 500 TABLET, FILM COATED ORAL at 21:40

## 2025-03-31 RX ADMIN — VANCOMYCIN HYDROCHLORIDE 1250 MG: 5 INJECTION, POWDER, LYOPHILIZED, FOR SOLUTION INTRAVENOUS at 06:25

## 2025-03-31 RX ADMIN — DULOXETINE HYDROCHLORIDE 60 MG: 60 CAPSULE, DELAYED RELEASE ORAL at 21:44

## 2025-03-31 RX ADMIN — LOPERAMIDE HYDROCHLORIDE 2 MG: 2 CAPSULE ORAL at 08:34

## 2025-03-31 RX ADMIN — SODIUM HYPOCHLORITE: 2.5 SOLUTION TOPICAL at 21:42

## 2025-03-31 RX ADMIN — SODIUM HYPOCHLORITE: 2.5 SOLUTION TOPICAL at 10:59

## 2025-03-31 RX ADMIN — ACETAMINOPHEN 975 MG: 325 TABLET ORAL at 13:34

## 2025-03-31 RX ADMIN — ASPIRIN 81 MG: 81 TABLET, CHEWABLE ORAL at 08:24

## 2025-03-31 RX ADMIN — INSULIN LISPRO 1 UNITS: 100 INJECTION, SOLUTION INTRAVENOUS; SUBCUTANEOUS at 17:51

## 2025-03-31 RX ADMIN — PIPERACILLIN SODIUM AND TAZOBACTAM SODIUM 3.38 G: 3; .375 INJECTION, SOLUTION INTRAVENOUS at 21:39

## 2025-03-31 RX ADMIN — PIPERACILLIN SODIUM AND TAZOBACTAM SODIUM 3.38 G: 3; .375 INJECTION, SOLUTION INTRAVENOUS at 13:36

## 2025-03-31 RX ADMIN — LOPERAMIDE HYDROCHLORIDE 2 MG: 2 CAPSULE ORAL at 22:05

## 2025-03-31 RX ADMIN — VANCOMYCIN HYDROCHLORIDE 1000 MG: 1 INJECTION, SOLUTION INTRAVENOUS at 17:42

## 2025-03-31 RX ADMIN — ACETAMINOPHEN 975 MG: 325 TABLET ORAL at 04:47

## 2025-03-31 RX ADMIN — OXYCODONE HYDROCHLORIDE 5 MG: 5 TABLET ORAL at 22:02

## 2025-03-31 RX ADMIN — PIPERACILLIN SODIUM AND TAZOBACTAM SODIUM 3.38 G: 3; .375 INJECTION, SOLUTION INTRAVENOUS at 08:32

## 2025-03-31 RX ADMIN — DIVALPROEX SODIUM 500 MG: 500 TABLET, FILM COATED, EXTENDED RELEASE ORAL at 08:30

## 2025-03-31 RX ADMIN — GABAPENTIN 600 MG: 300 CAPSULE ORAL at 04:47

## 2025-03-31 RX ADMIN — ATORVASTATIN CALCIUM 80 MG: 80 TABLET, FILM COATED ORAL at 21:41

## 2025-03-31 RX ADMIN — TIZANIDINE 4 MG: 4 TABLET ORAL at 15:00

## 2025-03-31 RX ADMIN — PANTOPRAZOLE SODIUM 40 MG: 40 TABLET, DELAYED RELEASE ORAL at 04:47

## 2025-03-31 ASSESSMENT — PAIN SCALES - GENERAL
PAINLEVEL_OUTOF10: 9
PAINLEVEL_OUTOF10: 2
PAINLEVEL_OUTOF10: 2
PAINLEVEL_OUTOF10: 8
PAINLEVEL_OUTOF10: 2
PAINLEVEL_OUTOF10: 10 - WORST POSSIBLE PAIN

## 2025-03-31 ASSESSMENT — PAIN DESCRIPTION - ORIENTATION
ORIENTATION: LEFT
ORIENTATION: LEFT

## 2025-03-31 ASSESSMENT — PAIN DESCRIPTION - LOCATION
LOCATION: OTHER (COMMENT)

## 2025-03-31 ASSESSMENT — PAIN - FUNCTIONAL ASSESSMENT: PAIN_FUNCTIONAL_ASSESSMENT: 0-10

## 2025-03-31 NOTE — PROGRESS NOTES
VASCULAR SURGERY PROGRESS NOTE  Assessment/Plan   Shirin Slater is 54 y.o. female with DM2, HTN, HLD, renal and splenic thromboses, and severe PAD s/p R hip disarticulation and L AKA. Patient presents from OSH for concern of stump cellulitis and sepsis. Patient hemodynamically stable upon arrival to [facility] with OSH labs showing WBC of 14.4, lactate of 1.2, and creatinine of 0.6. Remains hemodynamically stable in this hospitalization. Leukocytosis resolved. Wound care following.     Wounds of the high-AKA stump show superficial skin necrosis, underlying fat necrosis, and associated erythema consistent with secondary cellulitis. There is no drainable fluid collection. She will not benefit from surgical debridement as the wounds will not heal given her poor perfusion and non-reconstructible vascular disease. She is not a candidate for an AKA revision given how high her amputation already is. As the thigh is not salvageable, her only possible surgical option would be a hip disarticulation, however she was evaluated by orthopedic surgery and deemed not a candidate for this.     - not a candidate for surgical debridement, amputation revision, or hip disarticulation as above; recommend conservative wound care and palliation   - appreciate wound care nursing evaluation and recommendations  - antibiotics per primary team  - should ultimately be discharged to nursing facility for wound and nursing care, as poor wound care at home has led to repeated hospitalizations and ultimately failed interventions; however, patient adamantly refuses this now  - please consult palliative care for goals of care discussion; her life is not immediately threatened but she has no available surgical options for chronic limb ischemia/wounds    Can consider psychiatry consult in the future for assistance in managing her PTSD if patient requests one. At the time of my conversation with her, she was not currently interested and felt her symptoms  were controlled well-enough, despite several episodes of tearfulness in discussing her medical course.  Vascular surgery will sign off, please call back questions or concerns.    D/w attending, Dr. Alvaro Rain MD  Vascular Surgery Fellow  Service Pager: [phone]      Subjective   No issues, had a long discussion regarding her care.     Objective   Vitals:  Heart Rate:  [68-97]   Temp:  [35.9 °C (96.6 °F)-36.5 °C (97.7 °F)]   Resp:  [16-18]   BP: ()/(47-67)   SpO2:  [93 %-99 %]     Exam:  Constitutional: No acute distress, resting comfortably  Neuro:  AOx3, grossly intact  ENMT: moist mucous membranes  Head/neck: atraumatic  CV: no tachycardia  Pulm: non-labored on room air  GI: soft, non-tender, non-distended  Musculoskeletal: Warm, right hip disarticulation C/D/I. Left AKA with two large necrotic wounds, surrounding erythema, and swelling, tender to palpation  Pulse Exam: Monophasic L Fem     Labs:  Results from last 7 days   Lab Units 03/31/25  0548 03/30/25  0645 03/29/25  0127   WBC AUTO x10*3/uL 9.3 10.6 11.0   HEMOGLOBIN g/dL 7.2* 8.1* 8.8*   PLATELETS AUTO x10*3/uL 481* 458* 480*      Results from last 7 days   Lab Units 03/31/25  0548 03/30/25  0645 03/29/25  0127 03/29/25  0127   SODIUM mmol/L 142 141  --  137   POTASSIUM mmol/L 3.5 3.3*  --  3.6   CHLORIDE mmol/L 109* 108*  --  107   CO2 mmol/L 22 22  --  20*   BUN mg/dL 10 8  --  8   CREATININE mg/dL 0.48* 0.52  --  0.45*   GLUCOSE mg/dL 100* 100*  --  122*   MAGNESIUM mg/dL 1.68 1.75  --  1.77   PHOSPHORUS mg/dL 3.2 3.2   < >  --     < > = values in this interval not displayed.      Results from last 7 days   Lab Units 03/31/25  0548 03/30/25  0645 03/29/25  0155   INR  3.5* 3.3* 3.6*   PROTIME seconds 38.9* 36.8* 40.4*   APTT seconds  --   --  33          \

## 2025-03-31 NOTE — CARE PLAN
The patient's goals for the shift include      The clinical goals for the shift include Patient will be safe and HDS throughout the shift    Problem: Skin  Goal: Prevent/manage excess moisture  Outcome: Progressing     Problem: Skin  Goal: Prevent/minimize sheer/friction injuries  Outcome: Progressing     Problem: Skin  Goal: Promote/optimize nutrition  Outcome: Progressing     Problem: Fall/Injury  Goal: Not fall by end of shift  Outcome: Progressing     Problem: Diabetes  Goal: Achieve decreasing blood glucose levels by end of shift  Outcome: Progressing     Problem: Diabetes  Goal: Maintain glucose levels >70mg/dl to <250mg/dl throughout shift  Outcome: Progressing

## 2025-03-31 NOTE — CONSULTS
Inpatient consult to Palliative Care  Consult performed by: Alireza Dykes MD  Consult ordered by: Marco Antonio Sher MD  Reason for consult: vascular disease, wound with poor healing potential          Palliative Medicine Consult  Complex medical decision making, symptom management, patient/family support    History obtained from chart review including ED note, H&P, patient's daily progress notes, review of lab/test results, and discussion with primary team and bedside RN.    Chief complaint: non healing wound    Subjective    History of Present Illness    Shirin Slater is a 54 years old female.  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    She also has DM type 2 complicated by diabetic neuropathy, HTN, HLD, renal and splenic thromboses.    She preferred to be discharged to home at that time because of miserable experience in the past. She has a home health aid 47 hours per week provided by [employer] home care waiver.    She was re admitted due to high above the knee amputation stump with superficial skin necrosis, fat necrosis, and surrounding secondary cellulitis.     She has intermittent pain on the left AKA stump mostly moderate severity, occasionally 10/10, improves to 2/10 with oxycodone 5 mg. She receives up to 2 doses per day of oxycodone 5 mg.    She has regular bowel movement.  She has chronic insomnia for decades. She is usually awake at night and sleeps in the daytime.      Introduction to Palliative Medicine  Met with the patient at bedside.   Patient alert and oriented, has capacity to make their own medical decisions at this time.     Surrogate decision maker is her aunt Loly  Alexey.  Staff present: SANJUANITA Braga    Palliative Medicine was introduced as a specialty service for patients with serious illness to help with symptom management, improve quality of life, assist with goals of care conversations, navigate complex decision making, and provide support to patients and families. Support and empathy was provided throughout the encounter. Provided reflective listening and presence.     Symptoms  Pain: as noted above  Dyspnea: none  Fatigue: none  Insomnia: chronic  Drowsiness: sometimes when she lacks sleep  Constipation: none  Nausea: none  Appetite: good  Anxiety: none  Depression: none    Palliative Medicine Social History:  The patient is single. She has 4 children. Her daughter Jeanette lives in [place]. Her daughter, Araceli and son Garrick live in [place]. Her youngest son, Pepe lives in [place], [place].     The patient’s mother is alive, but she reports not having a close relationship with her.  She identifies her aunt, Loly as her HPOA. Her first alternate is her son, Pepe Guerrier and her cousin(Juanis Blackburn) is her second alternate health care agent.    The patient lives alone and has 2 home aide assistance, 47 hours per week from [facility]. She lives in an apartment, and has a bedside commode and lift chair.  She is able to transfer from bed to wheelchair. She uses disposable diapers. She can usually change herself in the bed with disposable wipes and chucks.    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  Patient is wheelchair bound after her R hip disarticulation in 2021.  The patient spends most of her day at home and enjoys cooking pasta and Italian wedding soup, as well as doing Moroccan jurgen. She likes listening to all genre of music.      Objective    Last Recorded Vitals  BP 99/63   Pulse 76   Temp 36.5 °C (97.7 °F) (Temporal)   Resp 18   SpO2 96%      Physical Exam  HENT:      Nose: Nose normal.      Mouth/Throat:      Mouth: Mucous membranes  are moist.   Eyes:      General: No scleral icterus.  Cardiovascular:      Rate and Rhythm: Normal rate and regular rhythm.   Pulmonary:      Effort: Pulmonary effort is normal. No respiratory distress.      Breath sounds: No wheezing.   Abdominal:      General: Bowel sounds are normal. There is no distension.      Palpations: Abdomen is soft.   Musculoskeletal:         General: Normal range of motion.      Comments: S/p right hip disarticulation. Left high above the knee amputation stump with wounds with skin and underlying fat necrosis and surrounding erythema.   Skin:     General: Skin is warm.      Capillary Refill: Capillary refill takes less than 2 seconds.   Neurological:      Mental Status: She is alert and oriented to person, place, and time.   Psychiatric:         Attention and Perception: Attention normal.         Mood and Affect: Mood normal.         Speech: Speech normal.         Behavior: Behavior normal.         Thought Content: Thought content normal.            Relevant Results  Results for orders placed or performed during the hospital encounter of 03/29/25 (from the past 24 hours)   POCT GLUCOSE   Result Value Ref Range    POCT Glucose 172 (H) 74 - 99 mg/dL   POCT GLUCOSE   Result Value Ref Range    POCT Glucose 159 (H) 74 - 99 mg/dL   CBC and Auto Differential   Result Value Ref Range    WBC 9.3 4.4 - 11.3 x10*3/uL    nRBC 0.0 0.0 - 0.0 /100 WBCs    RBC 3.64 (L) 4.00 - 5.20 x10*6/uL    Hemoglobin 7.2 (L) 12.0 - 16.0 g/dL    Hematocrit 27.5 (L) 36.0 - 46.0 %    MCV 76 (L) 80 - 100 fL    MCH 19.8 (L) 26.0 - 34.0 pg    MCHC 26.2 (L) 32.0 - 36.0 g/dL    RDW 18.4 (H) 11.5 - 14.5 %    Platelets 481 (H) 150 - 450 x10*3/uL    Neutrophils % 66.2 40.0 - 80.0 %    Immature Granulocytes %, Automated 0.4 0.0 - 0.9 %    Lymphocytes % 24.9 13.0 - 44.0 %    Monocytes % 5.7 2.0 - 10.0 %    Eosinophils % 2.4 0.0 - 6.0 %    Basophils % 0.4 0.0 - 2.0 %    Neutrophils Absolute 6.13 1.20 - 7.70 x10*3/uL    Immature  Granulocytes Absolute, Automated 0.04 0.00 - 0.70 x10*3/uL    Lymphocytes Absolute 2.31 1.20 - 4.80 x10*3/uL    Monocytes Absolute 0.53 0.10 - 1.00 x10*3/uL    Eosinophils Absolute 0.22 0.00 - 0.70 x10*3/uL    Basophils Absolute 0.04 0.00 - 0.10 x10*3/uL   Magnesium   Result Value Ref Range    Magnesium 1.68 1.60 - 2.40 mg/dL   Renal Function Panel   Result Value Ref Range    Glucose 100 (H) 74 - 99 mg/dL    Sodium 142 136 - 145 mmol/L    Potassium 3.5 3.5 - 5.3 mmol/L    Chloride 109 (H) 98 - 107 mmol/L    Bicarbonate 22 21 - 32 mmol/L    Anion Gap 15 10 - 20 mmol/L    Urea Nitrogen 10 6 - 23 mg/dL    Creatinine 0.48 (L) 0.50 - 1.05 mg/dL    eGFR >90 >60 mL/min/1.73m*2    Calcium 7.4 (L) 8.6 - 10.6 mg/dL    Phosphorus 3.2 2.5 - 4.9 mg/dL    Albumin 2.3 (L) 3.4 - 5.0 g/dL   Protime-INR   Result Value Ref Range    Protime 38.9 (H) 9.8 - 12.4 seconds    INR 3.5 (H) 0.9 - 1.1   POCT GLUCOSE   Result Value Ref Range    POCT Glucose 110 (H) 74 - 99 mg/dL   POCT GLUCOSE   Result Value Ref Range    POCT Glucose 181 (H) 74 - 99 mg/dL      ECG 12 Lead  Sinus rhythm with short CA  Otherwise normal ECG  When compared with ECG of 17-DEC-2024 19:52,  No significant change was found    See ED provider note for full interpretation and clinical correlation  Confirmed by Crystal Kwan ([id number]) on 3/29/2025 8:22:01 PM  XR hip left with pelvis when performed 2 or 3 views, XR femur left 2+ views  Narrative: Interpreted By:  Mamadou Burger and Kamau Nyokabi   STUDY:  XR FEMUR LEFT 2+ VIEWS; XR HIP LEFT WITH PELVIS WHEN PERFORMED 2 OR 3  VIEWS; ;  3/29/2025 2:39 pm; 3/29/2025 2:40 pm      INDICATION:  Signs/Symptoms:Aka.      COMPARISON:  Left femur radiographs 12/23/2024      ACCESSION NUMBER(S):  [id number]; [id number]      ORDERING CLINICIAN:  FLORENCIO HUMPHREY      FINDINGS:  Single AP view of the pelvis and two views of the left hip. Two views  of the proximal left femur.      Postoperative changes from above the knee  amputation. Small amount of  heterotopic ossification about the distal thigh and adjacent to the  femoral stump. No osseous destruction or erosions seen in the femoral  stump.. Right hip disarticulation.      Joint spaces are well preserved.      There is a large full-thickness soft tissue defect overlying the  distal stump. No subcutaneous emphysema. Scattered surgical clips  within bilateral hips. Bilateral endovascular grafts.      Impression: Status post above the knee amputation. There is large full-thickness  ulceration of the distal stump with soft tissue edema. Small amount  of heterotopic ossification in the soft tissues. No osseous  destruction or erosion seen in the distal femoral stump to suggest  presence of osteomyelitis radiographically.      I personally reviewed the images/study and I agree with radiology  resident Dr. Madyson Booth findings as stated. This study was  interpreted at [place], [place]      MACRO:  None      Signed by: Mamadou Burger 3/29/2025 5:56 PM  Dictation workstation:   [id number]  XR chest 2 views  Narrative: Interpreted By:  Josef Mccarthy and Ritchie Brandon   STUDY:  XR CHEST 2 VIEWS;  3/29/2025 2:34 am      INDICATION:  Signs/Symptoms:pre-op.      COMPARISON:  Chest radiograph 12/17/2024.      ACCESSION NUMBER(S):  [id number]      ORDERING CLINICIAN:  CARMINE CRUM      FINDINGS:  PA and lateral radiographs of the chest were provided. Lordotic film.      CARDIOMEDIASTINAL SILHOUETTE:  Cardiomediastinal silhouette is normal in size and configuration.      LUNGS:  Lungs are clear.      ABDOMEN:  No remarkable upper abdominal findings.      BONES:  No acute osseous changes.      Impression: 1.  No evidence of acute cardiopulmonary process.      I personally reviewed the images/study and I agree with the findings  as stated by resident Chalino Garcia. This study was interpreted  at [facility]  [place], [place].      MACRO:  None      Signed by: Josef Mccarthy 3/29/2025 2:40 AM  Dictation workstation:   [id number]     Encounter Date: 03/29/25   ECG 12 Lead   Result Value    Ventricular Rate 89    Atrial Rate 89    ID Interval 110    QRS Duration 80    QT Interval 374    QTC Calculation(Bazett) 455    P Axis 37    R Axis 62    T Axis 63    QRS Count 14    Q Onset 225    P Onset 170    P Offset 213    T Offset 412    QTC Fredericia 426    Narrative    Sinus rhythm with short ID  Otherwise normal ECG  When compared with ECG of 17-DEC-2024 19:52,  No significant change was found    See ED provider note for full interpretation and clinical correlation  Confirmed by Crystal Kwan ([id number]) on 3/29/2025 8:22:01 PM        Allergies  Aspartame and Nsaids (non-steroidal anti-inflammatory drug)    Scheduled medications  acetaminophen, 975 mg, oral, q8h  aspirin, 81 mg, oral, Daily  atorvastatin, 80 mg, oral, Nightly  busPIRone, 10 mg, oral, q AM  divalproex, 1,000 mg, oral, Nightly  divalproex, 500 mg, oral, Daily  DULoxetine, 60 mg, oral, q PM  [Held by provider] empagliflozin, 10 mg, oral, Daily  ergocalciferol, 1,250 mcg, oral, Weekly  gabapentin, 600 mg, oral, q8h NINO  insulin lispro, 0-5 Units, subcutaneous, TID AC  levETIRAcetam, 500 mg, oral, BID  levothyroxine, 25 mcg, oral, Daily before breakfast  lidocaine, 1 patch, transdermal, Daily  [Held by provider] metoprolol tartrate, 12.5 mg, oral, BID  nicotine, 1 patch, transdermal, Daily  pantoprazole, 40 mg, oral, Daily before breakfast  piperacillin-tazobactam, 3.375 g, intravenous, q6h  [Held by provider] psyllium, 1 packet, oral, TID  sodium hypochlorite, , irrigation, BID  tiZANidine, 4 mg, oral, TID  vancomycin, 1,250 mg, intravenous, q12h  [Held by provider] warfarin, 4 mg, oral, Daily      Continuous medications     PRN medications  PRN medications: albuterol, dextrose, dextrose, glucagon, glucagon, loperamide, melatonin, naloxone, oxyCODONE,  vancomycin     Assessment/Plan    Non healing wounds left above the knee stumps x 2  Severe PAD s/p R hip disarticulation s/p L CFA and EIA embolectomy 12/2023  s/p L femoral endarterectomy, profundaplasty, common [id number]-[id number] tibial bypass, s/p multiple left lower extremity amputations toe amputations, and eventually required 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    She also has DM type 2 complicated by diabetic neuropathy, HTN, HLD, renal and splenic thromboses.    Palliative Performance Scale (PPS):  40    ----------------------------------------------------------------------------------------------------------------------------------------------------------------------------------------------------------------------------------------------------------------------------------------------------------------------------------------------------------------------    #Palliative Care Encounter.  Support and empathy was provided throughout the encounter. Provided reflective listening and presence.     #Complex Medical Decision Making  #Goals of Care  - Goals are mix of survival and time and improved quality of life  #Advanced Care Planning  - Code status: DNR  - Surrogate decision maker:    Loly Blackburn (Relative)  [phone] (Home Phone)     1st Alternate: Pepe Henao (son) [phone]  2nd Alternate: Juanis Blackburn (cousin) [phone]    - State DNR form completed and placed in patient's chart   - Advanced Directives on file       #Non healing wounds with skin and subcutaneous fat necrosis, surrounding cellulitis, left AKA stump  Continue piperacillin tazobactam and vancomycin with trough monitoring.   Vascular surgery and orthopedics consultation.  Wound care.    #Acute Pain   Acetaminophen 650 mg PO Q 6 hours PRN for mild pain.  Oxycodone 5 mg PO Q 6 hours for moderate and severe pain..  Lidocaine patch.  Gabapentin 600 mg TID.  Duloxetine 60 mg Q  evening.    Bowel regimen: She said she has chronic diarrhea due to bowel resection.    #Insomnia  Non pharmacologic management of insomnia: improve sleep hygiene, relaxation therapies, stimulus control, and day time sleep restriction.  Melatonin 5 mg od hs.      #Psychosocial Support  - Music Therapy  - Spiritual Care Support  - Art Therapy      Medical Decision Making    - None healing wound, left AKA stump cellulitis, concern for sepsis posing threat to function   - Reviewed external notes from H&P, vascular surgery 3/29, orthopedics 3/29  - Reviews results from BMP, CBC, wound culture + proteus mirabilis  - Independent interpretation of test: CXR no infiltrates or effusions.  - Drug therapy requiring intensive monitoring for toxicity: vancomycin with trough monitoring and renal function monitoring    Thank you for allowing us to participate in the care of this patient. Palliative will continue to follow as needed. Palliative medicine is available Monday-Friday, 8a-6p. Please contact team with any questions or concerns.  Team pager [phone] (weekdays)    Alireza Dykes MD  Palliative Care Physician  Message: [employer] Secure chat  Team pager [phone]

## 2025-03-31 NOTE — CONSULTS
Nutrition Initial Assessment:   Nutrition Assessment    Reason for Assessment: Admission nursing screening  Malnutrition Screening Tool (MST)  Have you recently lost weight without trying?: No  Weight Loss Score: 0  Have you been eating poorly because of a decreased appetite?: No  Malnutrition Score: 0  Stage 3 or 4 Pressure Injury or Multiple Non-Healing WoundsYes (Comment)  Home Tube Feeding or Total Parenteral Nutrition (TPN)No  Dietitian Consult NeededNo     Patient is a 54 y.o. female transferred for evluation of worsening infected wounds at L AKA site.   History of severe PAD s/p R hip disarticulation, left AKA on 1/2/2025 (prior to this had L CFA and EIA embolectomy 12/2023 and multiple L toe amputations), T2DM complicated by diabetic neuropathy, HTN, HLD, renal and splenic thromboses (on warfarin)     Nutrition History:  Energy Intake: Good > 75 %  Food and Nutrient History: Patient reports good appetite.  Eats when hungry.  Patient does not follow any specific diet.  Too many fruits and vegetables can cause her diarrhea but still eats them in moderation.  Denies chewing or swallowing difficulty.  Avoids artificial sweeteners because they can trigger seizures.  Does not care for nutrition shakes for supplements.  Vitamin/Herbal Supplement Use: None  Food Allergy: Other (Comment) (Aspartame)       Anthropometrics:             IBW/kg (Dietitian Calculated): 48 kg (adjusted for amputations)  Percent of IBW: 161 % (based on last weight 1/2025)       Weight History:   Wt Readings from Last 101 Encounters:   01/07/25 77.2 kg (170 lb 1.6 oz)   12/17/24 88 kg (194 lb 0.1 oz)   09/27/24 90.6 kg (199 lb 11.8 oz)   12/15/23 94.2 kg (207 lb 10.8 oz)   12/28/20 108 kg (238 lb 1.6 oz)       Weight Change %:  Weight History / % Weight Change: Patient is unsure of usual weight    Nutrition Focused Physical Exam Findings:    Subcutaneous Fat Loss:   Orbital Fat Pads: Well nourished (slightly bulging fat pads)  Buccal Fat  Pads: Well nourished (full, rounded cheeks)  Triceps: Well nourished (ample fat tissue)  Ribs: Defer  Muscle Wasting:  Temporalis: Well nourished (well-defined muscle)  Pectoralis (Clavicular Region): Well nourished (clavicle not visible)  Deltoid/Trapezius: Well nourished (rounded appearance at arm, shoulder, neck)  Interosseous: Well nourished (muscle bulges)  Trapezius/Infraspinatus/Supraspinatus (Scapular Region): Well nourished (bones not prominent, muscle taut)  Edema:     Physical Findings:  Hair: Negative  Eyes: Negative  Nails: Negative  Skin: Positive (Distal L upper leg wound, sacral wound, MASD perineum, L medial leg wound)    Nutrition Significant Labs:  CBC Trend:   Results from last 7 days   Lab Units 03/31/25  0548 03/30/25  0645 03/29/25  0127   WBC AUTO x10*3/uL 9.3 10.6 11.0   RBC AUTO x10*6/uL 3.64* 4.12 4.36   HEMOGLOBIN g/dL 7.2* 8.1* 8.8*   HEMATOCRIT % 27.5* 30.5* 29.5*   MCV fL 76* 74* 68*   PLATELETS AUTO x10*3/uL 481* 458* 480*    , A1C:  Lab Results   Component Value Date    HGBA1C 8.3 (H) 03/29/2025   , Renal Lab Trend:   Results from last 7 days   Lab Units 03/31/25  0548 03/30/25  0645 03/29/25  0127 03/29/25  0127   POTASSIUM mmol/L 3.5 3.3*  --  3.6   PHOSPHORUS mg/dL 3.2 3.2   < >  --    SODIUM mmol/L 142 141  --  137   MAGNESIUM mg/dL 1.68 1.75  --  1.77   EGFR mL/min/1.73m*2 >90 >90  --  >90   BUN mg/dL 10 8  --  8   CREATININE mg/dL 0.48* 0.52  --  0.45*    < > = values in this interval not displayed.    , CRP:   Lab Results   Component Value Date    CRP 13.17 (H) 03/29/2025       Nutrition Specific Medications:  Scheduled medications  acetaminophen, 975 mg, oral, q8h  aspirin, 81 mg, oral, Daily  atorvastatin, 80 mg, oral, Nightly  busPIRone, 10 mg, oral, q AM  divalproex, 1,000 mg, oral, Nightly  divalproex, 500 mg, oral, Daily  DULoxetine, 60 mg, oral, q PM  [Held by provider] empagliflozin, 10 mg, oral, Daily  ergocalciferol, 1,250 mcg, oral, Weekly  gabapentin, 600 mg, oral,  q8h NINO  insulin lispro, 0-5 Units, subcutaneous, TID AC  levETIRAcetam, 500 mg, oral, BID  levothyroxine, 25 mcg, oral, Daily before breakfast  lidocaine, 1 patch, transdermal, Daily  [Held by provider] metoprolol tartrate, 12.5 mg, oral, BID  nicotine, 1 patch, transdermal, Daily  pantoprazole, 40 mg, oral, Daily before breakfast  piperacillin-tazobactam, 3.375 g, intravenous, q6h  [Held by provider] psyllium, 1 packet, oral, TID  sodium hypochlorite, , irrigation, BID  tiZANidine, 4 mg, oral, TID  vancomycin, 1,250 mg, intravenous, q12h  [Held by provider] warfarin, 4 mg, oral, Daily        I/O:   Last BM Date: 03/29/25; Stool Appearance: Loose (03/31/25 1145)    Dietary Orders (From admission, onward)       Start     Ordered    03/31/25 1332  Oral nutritional supplements  Until discontinued        Question Answer Comment   Deliver with Breakfast    Deliver with Dinner    Select supplement: Dann        03/31/25 1331    03/31/25 1332  Oral nutritional supplements  Until discontinued        Question Answer Comment   Deliver with Lunch    Select supplement: Gelatein Plus        03/31/25 1332    03/30/25 0817  Adult diet Regular  Diet effective now        Question:  Diet type  Answer:  Regular    03/30/25 0816    03/29/25 0923  May Participate in Room Service  ( ROOM SERVICE MAY PARTICIPATE)  Once        Question:  .  Answer:  Yes    03/29/25 0922                     Estimated Needs:   Total Energy Estimated Needs in 24 hours (kCal): 1700 kCal  Method for Estimating Needs: 35kcal/kg/IBW  Total Protein Estimated Needs in 24 Hours (g): 75 g  Method for Estimating 24 Hour Protein Needs: 1.5g/kg/IBW              Nutrition Diagnosis        Nutrition Diagnosis  Patient has Nutrition Diagnosis: Yes  Diagnosis Status (1): New  Nutrition Diagnosis 1: Increased nutrient needs  Related to (1): increased metabolic demand  As Evidenced by (1): need for wound healing       Nutrition Interventions/Recommendations   Nutrition  prescription for oral nutrition    Nutrition Recommendations:  Individualized Nutrition Prescription Provided for : 1) Continue Regular diet as tolerated.  2) Consider daily MVI with minerals.    Nutrition Interventions/Goals:   Interventions: Medical food supplement  Medical Food Supplement: Commercial beverage medical food supplement therapy  Goal: Dann BID (95kcal and 2.5g pro per each); Gelatein plus daily (160kcal, 20g protein)        Nutrition Monitoring and Evaluation   Food/Nutrient Related History Monitoring  Monitoring and Evaluation Plan: Estimated Energy Intake  Estimated Energy Intake: Energy intake greater or equal to 75% of estimated energy needs              Physical Exam Findings  Monitoring and Evaluation Plan: Skin  Skin Finding: Impaired wound healing - Improved wound healing, Impaired wound healing - Skin to heal    Goal Status: New goal(s) identified    Time Spent (min): 90 minutes

## 2025-03-31 NOTE — PROGRESS NOTES
Assessment/Plan   Shirin Slater is a 54 y.o. 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 Vascular surgery and orthopedics involved. Wound care consulted to direct wound care. Started on broad spectrum abx pending cultures.. Per vascular poor prognosis for wounds, recommending no intervention, continue abx and wound care. Cultures are pending. ID will be consulted when cultures result. She remains with low bp but asymptomatic. Palliative care consulted. Pt is looking into SNF pending pt/ot recs.     SSTI of wounds at L AKA site  Prior R hip disarticulation, L AKA in setting of peripheral vascular disease  - reviewed osh imaging, no evidence of osteo or gas forming infection  - febrile, leukocytosis, elevated inflammatory markers  - b/l hip disarticulation is not recommend by ortho at this time, pt is aware  - concern for poor healing, pending decision for wound debridgements. Conitnueing borad spectrum abx pending cutures, wll need ID input based on cultures.   - appreciate ortho and vascular surgery recommendations  - continue vanc and zosyn  - continue warfarin, hold for elevation INR. Daily INR. Down to 3.3. plan to start tonight at reduced dose. Per dc profile 1/2025, pt was on dapt which was discontinued when warfarin was started. Will discuss with vascular.     Hypotension  - has been fluid responsive  - s/p 1 liter 3/30 again. Last boluses in ED prior.   - remains asymptomatic during episodes    renal and splenic thrombosis  Supratherapeutic INR   - INR on admission of 3.6 (goal 2-3). Down to 3.3. - on warfarin 5mg daily at home. Plan to give 4 mg 3/30. INR was 3.5 on 3/31, will need to hold another day and repeat  tomorrow.   - daily inr.      T2DM  - last A1c 8.4 in November 2024  - has been on jardiance in the past, held  - SSI #1        PTSD  anxiety   - home cymbalta and buspar     epilepsy   - home keppra 500mg BID     GERD   - home pantoprazole     nicotine use disorder  - nicotine patch ordered    Hypothyroid   - home levothyroxine     hx of HTN,  - home metop was held on admit, will eval when to resume     Pt agreeable to snf if eligible/rec. Notified tcc for dc planning. Pt/ot consulted.        Scheduled outpatient appointments in system:   No future appointments.  ---------------------------------------------------------------------------------------------------  Subjective   No events. Pt states she was told this am there may be a debridement, per discussion with vascular yesterday this wasn't recommended. Discussed plans for abx, she would like to go to snf. She was scheduled for a home palliative care visit today, ip service consulted. She denies f/c. She has been having low bp, deneis sob, dizziness, fatigue, or other associated symptoms.      ---------------------------------------------------------------------------------------------------  Objective   Last Recorded Vitals  Blood pressure 100/67, pulse 78, temperature 35.9 °C (96.6 °F), temperature source Temporal, resp. rate 18, SpO2 99%.  Intake/Output last 3 Shifts:  I/O last 3 completed shifts:  In: 3460 [P.O.:1860; IV Piggyback:1600]  Out: -     Physical Exam  Vitals and nursing note reviewed.   Constitutional:       General: She is not in acute distress.     Appearance: Normal appearance. She is obese.   HENT:      Head: Normocephalic and atraumatic.      Mouth/Throat:      Mouth: Mucous membranes are moist.   Eyes:      General: No scleral icterus.     Extraocular Movements: Extraocular movements intact.      Conjunctiva/sclera: Conjunctivae normal.   Cardiovascular:      Rate and Rhythm: Normal rate and regular rhythm.      Heart sounds: S1 normal and  S2 normal. No murmur heard.  Pulmonary:      Effort: Pulmonary effort is normal. No respiratory distress.      Breath sounds: No wheezing, rhonchi or rales.   Abdominal:      General: Bowel sounds are normal. There is no distension.      Palpations: Abdomen is soft.      Tenderness: There is no abdominal tenderness. There is no guarding or rebound.   Musculoskeletal:         General: No swelling or deformity.      Cervical back: Neck supple.      Comments: R hip disarticulation, L AKA with 2 large wounds, foul smelling drainage, covered in dressing   Skin:     General: Skin is warm and dry.      Findings: No rash.   Neurological:      General: No focal deficit present.      Mental Status: She is alert and oriented to person, place, and time. Mental status is at baseline.   Psychiatric:         Mood and Affect: Mood normal.         Behavior: Behavior normal.         Judgment: Judgment normal.      Comments: Calm and cooperative         Relevant Results  Lab Results   Component Value Date    WBC 9.3 03/31/2025    HGB 7.2 (L) 03/31/2025    HCT 27.5 (L) 03/31/2025    MCV 76 (L) 03/31/2025     (H) 03/31/2025      Lab Results   Component Value Date    GLUCOSE 100 (H) 03/31/2025    CALCIUM 7.4 (L) 03/31/2025     03/31/2025    K 3.5 03/31/2025    CO2 22 03/31/2025     (H) 03/31/2025    BUN 10 03/31/2025    CREATININE 0.48 (L) 03/31/2025     Scheduled medications  acetaminophen, 975 mg, oral, q8h  aspirin, 81 mg, oral, Daily  atorvastatin, 80 mg, oral, Nightly  busPIRone, 10 mg, oral, q AM  divalproex, 1,000 mg, oral, Nightly  divalproex, 500 mg, oral, Daily  DULoxetine, 60 mg, oral, q PM  [Held by provider] empagliflozin, 10 mg, oral, Daily  ergocalciferol, 1,250 mcg, oral, Weekly  gabapentin, 600 mg, oral, q8h NINO  insulin lispro, 0-5 Units, subcutaneous, TID AC  levETIRAcetam, 500 mg, oral, BID  levothyroxine, 25 mcg, oral, Daily before breakfast  lidocaine, 1 patch, transdermal, Daily  [Held by  provider] metoprolol tartrate, 12.5 mg, oral, BID  nicotine, 1 patch, transdermal, Daily  pantoprazole, 40 mg, oral, Daily before breakfast  piperacillin-tazobactam, 3.375 g, intravenous, q6h  [Held by provider] psyllium, 1 packet, oral, TID  sodium hypochlorite, , irrigation, BID  tiZANidine, 4 mg, oral, TID  vancomycin, 1,250 mg, intravenous, q12h  warfarin, 4 mg, oral, Daily      Continuous medications     PRN medications  PRN medications: albuterol, dextrose, dextrose, glucagon, glucagon, loperamide, melatonin, naloxone, oxyCODONE, vancomycin    Marco Antonio Sher MD

## 2025-03-31 NOTE — PROGRESS NOTES
Vancomycin Dosing by Pharmacy- FOLLOW UP    Shirin Slater is a 54 y.o. year old female who Pharmacy has been consulted for vancomycin dosing for cellulitis, skin and soft tissue. Based on the patient's indication and renal status this patient is being dosed based on a goal AUC of 400-600.     Renal function is currently stable.    Current vancomycin dose: 1250 mg given every 12 hours    Estimated vancomycin AUC on current dose: 599 mg/L.hr     Visit Vitals  BP 99/63   Pulse 76   Temp 36.5 °C (97.7 °F) (Temporal)   Resp 18        Lab Results   Component Value Date    CREATININE 0.48 (L) 2025    CREATININE 0.52 2025    CREATININE 0.45 (L) 2025    CREATININE 0.48 (L) 2025        Patient weight is as follows: There were no vitals filed for this visit.    Cultures:  Susceptibility data for the encounter in last 14 days.  Collected Specimen Info Organism   25 Tissue/Biopsy from Wound/Tissue Proteus mirabilis     Mixed Skin Microorganisms    25 Tissue/Biopsy from Wound/Tissue Proteus mirabilis     Mixed Skin Microorganisms         I/O last 3 completed shifts:  In: 3460 [P.O.:1860; IV Piggyback:1600]  Out: -   I/O during current shift:  No intake/output data recorded.    Temp (24hrs), Av.3 °C (97.3 °F), Min:35.9 °C (96.6 °F), Max:36.5 °C (97.7 °F)      Assessment/Plan    Will slightly modify regimen to 1000 mg IV q12hr, given that predicted AUC on previous regimen at top of therapeutic range & want to avoid toxicity realted with possible accumulation.     This dosing regimen is predicted by [employer] to result in the following pharmacokinetic parameters:    Loading dose: N/A  Regimen: 1000 mg IV every 12 hours.  Start time: 18:25 on 2025  Exposure target: AUC24 (range)400-600 mg/L.hr   FXH84-61: 494 mg/L.hr  AUC24,ss: 480 mg/L.hr  Probability of AUC24 > 400: 93 %  Ctrough,ss: 13.7 mg/L  Probability of Ctrough,ss > 20: 2 %      The next level will be obtained on  at mid-AM  labs. May be obtained sooner if clinically indicated.   Will continue to monitor renal function daily while on vancomycin and order serum creatinine at least every 48 hours if not already ordered.  Follow for continued vancomycin needs, clinical response, and signs/symptoms of toxicity.       Ron Merrill, PharmD

## 2025-03-31 NOTE — CARE PLAN
Problem: Skin  Goal: Participates in plan/prevention/treatment measures  Outcome: Progressing  Flowsheets (Taken 3/31/2025 0511)  Participates in plan/prevention/treatment measures:   Discuss with provider PT/OT consult   Elevate heels   Increase activity/out of bed for meals  Goal: Prevent/minimize sheer/friction injuries  Outcome: Progressing  Goal: Promote/optimize nutrition  Outcome: Progressing  Flowsheets (Taken 3/31/2025 0511)  Promote/optimize nutrition:   Offer water/supplements/favorite foods   Monitor/record intake including meals  Goal: Promote skin healing  Outcome: Progressing

## 2025-03-31 NOTE — PROGRESS NOTES
03/31/25 1049   Discharge Planning   Home or Post Acute Services Post acute facilities (Rehab/SNF/etc)   Type of Post Acute Facility Services Skilled nursing   Expected Discharge Disposition SNF   Does the patient need discharge transport arranged? Yes   RoundTrip coordination needed? Yes   Has discharge transport been arranged? No     Transitional Care Coordination Progress Note:  Patient discussed during interdisciplinary rounds.   Team members present: MD & TCC  Plan per Medical/Surgical team: Patient admitted for cellulitis of left let patient will need iv antibiotics and wound care  Payor: [employer] dual adv  Discharge disposition: Patient agreeable to SNF for wound care TCC provided list for FOC from medicare,gov via careport awaiting choices.  Potential Barriers: none  ADOD: 4/2    TCC will continue to follow and update the plan as warranted.    JESSICA CohnN-RN  Transitional Care Coordinator (TCC)  [phone] ext [phone]

## 2025-03-31 NOTE — CONSULTS
Wound Care Consult     Visit Date: 3/31/2025      Patient Name: Shirin Slater         MRN: [id number]           YOB: 1970     Reason for Consult: Sacrum/ Left gluteal cleft       Wound location: Left upper buttock      Size: 0.5 x 1 cm      Image captured in EMR:     Undermining: no  Tracking: no  Wound type: friction injury   Wound bed: moist red tissue   Draining: scant serous  Periwound skin: Clean, dry, and intact     Recommendations: TWICE DAILY and as needed with clean-ups for Left buttock      Keep clean and dry.       Apply Triad wound dressing cream to damaged tissue       TO APPLY TRIAD: Triad wound dressing cream can be applied directly from the tube or by using a gloved finger. Gently spread Triad evenly over the area of application to the thickness of a dime.     Reapply Triad wound dressing cream with each clean up and as needed. ([facility] order number [id number]).      When soiled, wash top layer of soiled Triad wound dressing cream off with warm wipes as needed pat dry, then reapply Triad.        **EVERY THREE DAYS WASH DOWN TO SKIN.             TO REMOVE TRIAD: Use pH-balanced wound cleanser to soften Triad. Gently wipe to remove without scrubbing.             Then reapply if needed.     Apply a sacral Mepilex border foam to sacrum for protection. Change as needed and as soiled.        Wound Team Plan:  Primary provider please review the wound care consult note and pending wound care order. If you agree with orders please file in EMR.      While inpatient, Secure chat with questions or reconsult wound care if condition worsens or changes. For urgent communications please message the group through [employer] messaging at: [facility] Wound Care Team, Thank you.      Brooke Johnston RN, CWON  3/31/2025  4:15 PM

## 2025-03-31 NOTE — CARE PLAN
The patient's goals for the shift include      The clinical goals for the shift include Patient will be safe and HDS throughout the shift    Problem: Skin  Goal: Prevent/manage excess moisture  3/31/2025 1817 by Tania Sofia RN  Flowsheets (Taken 3/31/2025 1817)  Prevent/manage excess moisture: Follow provider orders for dressing changes  3/31/2025 1815 by Tania Sofia RN  Outcome: Progressing     Problem: Skin  Goal: Prevent/minimize sheer/friction injuries  3/31/2025 1817 by Tania Sofia RN  Flowsheets (Taken 3/31/2025 1817)  Prevent/minimize sheer/friction injuries:   HOB 30 degrees or less   Turn/reposition every 2 hours/use positioning/transfer devices   Use pull sheet  3/31/2025 1815 by Tania Sofia RN  Outcome: Progressing     Problem: Skin  Goal: Promote/optimize nutrition  3/31/2025 1817 by Tania Sofia RN  Flowsheets (Taken 3/31/2025 1817)  Promote/optimize nutrition:   Consume > 50% meals/supplements   Monitor/record intake including meals  3/31/2025 1815 by Tania Sofia RN  Outcome: Progressing

## 2025-03-31 NOTE — PROGRESS NOTES
Shirin Slater is a 54 y.o. female on day 2 of admission presenting with Cellulitis of left lower extremity.  SW met with pt along with Dr Dykes from Palliative care.  Pt was receiving wound care at the time of assessment.   She provided information on her weekly care routine.  She receives health care aids daily 47 hours a week. She states she is happy with the care and has not concerns.   She asked about going to SNF named  [facility] as her only choice at this time.  Pall care to follow for support and to discuss GOC further.   SANJUANITA LAZO

## 2025-04-01 ENCOUNTER — APPOINTMENT (OUTPATIENT)
Dept: RADIOLOGY | Facility: HOSPITAL | Age: 55
End: 2025-04-01
Payer: MEDICARE

## 2025-04-01 LAB
ALBUMIN SERPL BCP-MCNC: 2.6 G/DL (ref 3.4–5)
ANION GAP SERPL CALC-SCNC: 13 MMOL/L (ref 10–20)
BACTERIA SPEC CULT: ABNORMAL
BASOPHILS # BLD AUTO: 0.03 X10*3/UL (ref 0–0.1)
BASOPHILS NFR BLD AUTO: 0.3 %
BUN SERPL-MCNC: 8 MG/DL (ref 6–23)
CALCIUM SERPL-MCNC: 8.2 MG/DL (ref 8.6–10.6)
CHLORIDE SERPL-SCNC: 108 MMOL/L (ref 98–107)
CO2 SERPL-SCNC: 23 MMOL/L (ref 21–32)
CREAT SERPL-MCNC: 0.58 MG/DL (ref 0.5–1.05)
EGFRCR SERPLBLD CKD-EPI 2021: >90 ML/MIN/1.73M*2
EOSINOPHIL # BLD AUTO: 0.17 X10*3/UL (ref 0–0.7)
EOSINOPHIL NFR BLD AUTO: 1.5 %
ERYTHROCYTE [DISTWIDTH] IN BLOOD BY AUTOMATED COUNT: 18.4 % (ref 11.5–14.5)
GLUCOSE BLD MANUAL STRIP-MCNC: 109 MG/DL (ref 74–99)
GLUCOSE BLD MANUAL STRIP-MCNC: 134 MG/DL (ref 74–99)
GLUCOSE BLD MANUAL STRIP-MCNC: 142 MG/DL (ref 74–99)
GLUCOSE BLD MANUAL STRIP-MCNC: 211 MG/DL (ref 74–99)
GLUCOSE SERPL-MCNC: 95 MG/DL (ref 74–99)
GRAM STN SPEC: ABNORMAL
HCT VFR BLD AUTO: 30.2 % (ref 36–46)
HGB BLD-MCNC: 8 G/DL (ref 12–16)
IMM GRANULOCYTES # BLD AUTO: 0.12 X10*3/UL (ref 0–0.7)
IMM GRANULOCYTES NFR BLD AUTO: 1.1 % (ref 0–0.9)
INR PPP: 2.8 (ref 0.9–1.1)
LYMPHOCYTES # BLD AUTO: 1.53 X10*3/UL (ref 1.2–4.8)
LYMPHOCYTES NFR BLD AUTO: 13.8 %
MAGNESIUM SERPL-MCNC: 1.67 MG/DL (ref 1.6–2.4)
MCH RBC QN AUTO: 19.6 PG (ref 26–34)
MCHC RBC AUTO-ENTMCNC: 26.5 G/DL (ref 32–36)
MCV RBC AUTO: 74 FL (ref 80–100)
MONOCYTES # BLD AUTO: 0.54 X10*3/UL (ref 0.1–1)
MONOCYTES NFR BLD AUTO: 4.9 %
NEUTROPHILS # BLD AUTO: 8.69 X10*3/UL (ref 1.2–7.7)
NEUTROPHILS NFR BLD AUTO: 78.4 %
NRBC BLD-RTO: 0 /100 WBCS (ref 0–0)
PHOSPHATE SERPL-MCNC: 3.2 MG/DL (ref 2.5–4.9)
PLATELET # BLD AUTO: 556 X10*3/UL (ref 150–450)
POTASSIUM SERPL-SCNC: 3.3 MMOL/L (ref 3.5–5.3)
PROTHROMBIN TIME: 30.7 SECONDS (ref 9.8–12.4)
RBC # BLD AUTO: 4.08 X10*6/UL (ref 4–5.2)
SODIUM SERPL-SCNC: 141 MMOL/L (ref 136–145)
WBC # BLD AUTO: 11.1 X10*3/UL (ref 4.4–11.3)

## 2025-04-01 PROCEDURE — 99232 SBSQ HOSP IP/OBS MODERATE 35: CPT | Performed by: STUDENT IN AN ORGANIZED HEALTH CARE EDUCATION/TRAINING PROGRAM

## 2025-04-01 PROCEDURE — 2500000002 HC RX 250 W HCPCS SELF ADMINISTERED DRUGS (ALT 637 FOR MEDICARE OP, ALT 636 FOR OP/ED): Performed by: STUDENT IN AN ORGANIZED HEALTH CARE EDUCATION/TRAINING PROGRAM

## 2025-04-01 PROCEDURE — 97161 PT EVAL LOW COMPLEX 20 MIN: CPT | Mod: GP

## 2025-04-01 PROCEDURE — 80069 RENAL FUNCTION PANEL: CPT

## 2025-04-01 PROCEDURE — 2500000004 HC RX 250 GENERAL PHARMACY W/ HCPCS (ALT 636 FOR OP/ED)

## 2025-04-01 PROCEDURE — 85025 COMPLETE CBC W/AUTO DIFF WBC: CPT

## 2025-04-01 PROCEDURE — 1100000001 HC PRIVATE ROOM DAILY

## 2025-04-01 PROCEDURE — 85610 PROTHROMBIN TIME: CPT | Performed by: STUDENT IN AN ORGANIZED HEALTH CARE EDUCATION/TRAINING PROGRAM

## 2025-04-01 PROCEDURE — 2500000001 HC RX 250 WO HCPCS SELF ADMINISTERED DRUGS (ALT 637 FOR MEDICARE OP)

## 2025-04-01 PROCEDURE — 36415 COLL VENOUS BLD VENIPUNCTURE: CPT | Performed by: STUDENT IN AN ORGANIZED HEALTH CARE EDUCATION/TRAINING PROGRAM

## 2025-04-01 PROCEDURE — 74018 RADEX ABDOMEN 1 VIEW: CPT

## 2025-04-01 PROCEDURE — 97166 OT EVAL MOD COMPLEX 45 MIN: CPT | Mod: GO

## 2025-04-01 PROCEDURE — 2500000001 HC RX 250 WO HCPCS SELF ADMINISTERED DRUGS (ALT 637 FOR MEDICARE OP): Performed by: STUDENT IN AN ORGANIZED HEALTH CARE EDUCATION/TRAINING PROGRAM

## 2025-04-01 PROCEDURE — 83735 ASSAY OF MAGNESIUM: CPT

## 2025-04-01 PROCEDURE — 2500000002 HC RX 250 W HCPCS SELF ADMINISTERED DRUGS (ALT 637 FOR MEDICARE OP, ALT 636 FOR OP/ED)

## 2025-04-01 PROCEDURE — 2500000004 HC RX 250 GENERAL PHARMACY W/ HCPCS (ALT 636 FOR OP/ED): Performed by: STUDENT IN AN ORGANIZED HEALTH CARE EDUCATION/TRAINING PROGRAM

## 2025-04-01 PROCEDURE — 74018 RADEX ABDOMEN 1 VIEW: CPT | Performed by: RADIOLOGY

## 2025-04-01 PROCEDURE — 99222 1ST HOSP IP/OBS MODERATE 55: CPT | Performed by: STUDENT IN AN ORGANIZED HEALTH CARE EDUCATION/TRAINING PROGRAM

## 2025-04-01 PROCEDURE — 97535 SELF CARE MNGMENT TRAINING: CPT | Mod: GO

## 2025-04-01 PROCEDURE — 82947 ASSAY GLUCOSE BLOOD QUANT: CPT

## 2025-04-01 RX ORDER — POTASSIUM CHLORIDE 20 MEQ/1
40 TABLET, EXTENDED RELEASE ORAL ONCE
Status: COMPLETED | OUTPATIENT
Start: 2025-04-01 | End: 2025-04-01

## 2025-04-01 RX ADMIN — TIZANIDINE 4 MG: 4 TABLET ORAL at 08:05

## 2025-04-01 RX ADMIN — LEVETIRACETAM 500 MG: 500 TABLET, FILM COATED ORAL at 08:05

## 2025-04-01 RX ADMIN — SODIUM HYPOCHLORITE: 2.5 SOLUTION TOPICAL at 22:00

## 2025-04-01 RX ADMIN — LEVOTHYROXINE SODIUM 25 MCG: 25 TABLET ORAL at 08:05

## 2025-04-01 RX ADMIN — POTASSIUM CHLORIDE 40 MEQ: 1500 TABLET, EXTENDED RELEASE ORAL at 10:29

## 2025-04-01 RX ADMIN — GABAPENTIN 600 MG: 300 CAPSULE ORAL at 06:15

## 2025-04-01 RX ADMIN — DIVALPROEX SODIUM 500 MG: 500 TABLET, FILM COATED, EXTENDED RELEASE ORAL at 08:05

## 2025-04-01 RX ADMIN — PIPERACILLIN SODIUM AND TAZOBACTAM SODIUM 3.38 G: 3; .375 INJECTION, SOLUTION INTRAVENOUS at 20:17

## 2025-04-01 RX ADMIN — BUSPIRONE HYDROCHLORIDE 10 MG: 5 TABLET ORAL at 08:05

## 2025-04-01 RX ADMIN — DIVALPROEX SODIUM 1000 MG: 500 TABLET, FILM COATED, EXTENDED RELEASE ORAL at 20:17

## 2025-04-01 RX ADMIN — ACETAMINOPHEN 975 MG: 325 TABLET ORAL at 06:15

## 2025-04-01 RX ADMIN — TIZANIDINE 4 MG: 4 TABLET ORAL at 20:17

## 2025-04-01 RX ADMIN — ASPIRIN 81 MG: 81 TABLET, CHEWABLE ORAL at 08:05

## 2025-04-01 RX ADMIN — PIPERACILLIN SODIUM AND TAZOBACTAM SODIUM 3.38 G: 3; .375 INJECTION, SOLUTION INTRAVENOUS at 08:05

## 2025-04-01 RX ADMIN — ACETAMINOPHEN 975 MG: 325 TABLET ORAL at 13:02

## 2025-04-01 RX ADMIN — SODIUM HYPOCHLORITE: 2.5 SOLUTION TOPICAL at 08:07

## 2025-04-01 RX ADMIN — PIPERACILLIN SODIUM AND TAZOBACTAM SODIUM 3.38 G: 3; .375 INJECTION, SOLUTION INTRAVENOUS at 02:17

## 2025-04-01 RX ADMIN — OXYCODONE HYDROCHLORIDE 5 MG: 5 TABLET ORAL at 14:49

## 2025-04-01 RX ADMIN — PANTOPRAZOLE SODIUM 40 MG: 40 TABLET, DELAYED RELEASE ORAL at 06:15

## 2025-04-01 RX ADMIN — GABAPENTIN 600 MG: 300 CAPSULE ORAL at 13:02

## 2025-04-01 RX ADMIN — TIZANIDINE 4 MG: 4 TABLET ORAL at 14:14

## 2025-04-01 RX ADMIN — INSULIN LISPRO 2 UNITS: 100 INJECTION, SOLUTION INTRAVENOUS; SUBCUTANEOUS at 18:16

## 2025-04-01 RX ADMIN — OXYCODONE HYDROCHLORIDE 5 MG: 5 TABLET ORAL at 21:49

## 2025-04-01 RX ADMIN — VANCOMYCIN HYDROCHLORIDE 1000 MG: 1 INJECTION, SOLUTION INTRAVENOUS at 21:48

## 2025-04-01 RX ADMIN — LOPERAMIDE HYDROCHLORIDE 2 MG: 2 CAPSULE ORAL at 06:14

## 2025-04-01 RX ADMIN — GABAPENTIN 600 MG: 300 CAPSULE ORAL at 21:48

## 2025-04-01 RX ADMIN — VANCOMYCIN HYDROCHLORIDE 1000 MG: 1 INJECTION, SOLUTION INTRAVENOUS at 09:41

## 2025-04-01 RX ADMIN — LEVETIRACETAM 500 MG: 500 TABLET, FILM COATED ORAL at 20:17

## 2025-04-01 RX ADMIN — OXYCODONE HYDROCHLORIDE 5 MG: 5 TABLET ORAL at 08:10

## 2025-04-01 RX ADMIN — ACETAMINOPHEN 975 MG: 325 TABLET ORAL at 21:48

## 2025-04-01 RX ADMIN — DULOXETINE HYDROCHLORIDE 60 MG: 60 CAPSULE, DELAYED RELEASE ORAL at 20:17

## 2025-04-01 RX ADMIN — ATORVASTATIN CALCIUM 80 MG: 80 TABLET, FILM COATED ORAL at 20:17

## 2025-04-01 RX ADMIN — PIPERACILLIN SODIUM AND TAZOBACTAM SODIUM 3.38 G: 3; .375 INJECTION, SOLUTION INTRAVENOUS at 13:02

## 2025-04-01 ASSESSMENT — COGNITIVE AND FUNCTIONAL STATUS - GENERAL
MOVING FROM LYING ON BACK TO SITTING ON SIDE OF FLAT BED WITH BEDRAILS: A LITTLE
HELP NEEDED FOR BATHING: A LOT
DRESSING REGULAR LOWER BODY CLOTHING: A LOT
TURNING FROM BACK TO SIDE WHILE IN FLAT BAD: A LITTLE
DAILY ACTIVITIY SCORE: 15
CLIMB 3 TO 5 STEPS WITH RAILING: TOTAL
WALKING IN HOSPITAL ROOM: TOTAL
TOILETING: TOTAL
MOBILITY SCORE: 11
MOVING TO AND FROM BED TO CHAIR: A LOT
STANDING UP FROM CHAIR USING ARMS: TOTAL
PERSONAL GROOMING: A LITTLE
DRESSING REGULAR UPPER BODY CLOTHING: A LITTLE

## 2025-04-01 ASSESSMENT — PAIN SCALES - GENERAL
PAINLEVEL_OUTOF10: 7
PAINLEVEL_OUTOF10: 10 - WORST POSSIBLE PAIN
PAINLEVEL_OUTOF10: 8
PAINLEVEL_OUTOF10: 7
PAINLEVEL_OUTOF10: 7
PAINLEVEL_OUTOF10: 5 - MODERATE PAIN
PAINLEVEL_OUTOF10: 5 - MODERATE PAIN
PAINLEVEL_OUTOF10: 8

## 2025-04-01 ASSESSMENT — ENCOUNTER SYMPTOMS
FREQUENCY: 0
HEMATURIA: 0
FLANK PAIN: 0
SHORTNESS OF BREATH: 0
FEVER: 0
ABDOMINAL PAIN: 0
DIFFICULTY URINATING: 0

## 2025-04-01 ASSESSMENT — PAIN - FUNCTIONAL ASSESSMENT
PAIN_FUNCTIONAL_ASSESSMENT: 0-10

## 2025-04-01 ASSESSMENT — PAIN DESCRIPTION - LOCATION
LOCATION: ABDOMEN
LOCATION: LEG

## 2025-04-01 ASSESSMENT — ACTIVITIES OF DAILY LIVING (ADL)
ADL_ASSISTANCE: NEEDS ASSISTANCE
BATHING_ASSISTANCE: MODERATE
HOME_MANAGEMENT_TIME_ENTRY: 14

## 2025-04-01 ASSESSMENT — PAIN DESCRIPTION - ORIENTATION: ORIENTATION: LEFT

## 2025-04-01 NOTE — CARE PLAN
The patient's goals for the shift include      The clinical goals for the shift include Patient will remain safe throughout this shift    Patient remain safe throughout this shift

## 2025-04-01 NOTE — CONSULTS
Inpatient consult to Infectious Diseases  Consult performed by: Kim Sanz DO  Consult ordered by: Marco Antonio Sher MD          Reason For Consult  Cellulitis of LLE    History Of Present Illness  Shirin Slater is a 54 y.o. female with severe PAD s/p R hip disarticulation (2021), L AKA s/p revision (1/2/2025), T2DM, HTN, HLD, and renal/splenic thromboses, who presented (3/29) as transfer from OSH for vascular surgery consult given c/f LLE stump cellulitis and sepsis. Patient reported 2 weeks of increased pain and serosanguinous drainage, with purulent drainage over the past 1 day at the site of 2 now-necrotic ulcers on anterior aspect of LLE stump. Patient reports no fevers, chest pain, SOB, abdominal pain, or urinary symptoms. Pt notes that she was unable to set up home health and therefore had to perform her own wound care over the past few months. In addition, she has not been taking most of her outpatient meds, stating she is only still taking warfarin and pantoprazole.    Brief Hospital Course:  While at [facility] ED (3/29), she was noted to have BP 86/79 but MAPs consistently >75, T 100.3, , with neutrophil-predominant leukocytosis 14.4. Lactate 1.2, CT and XR with e/o cellulitis, negative for osteomyelitis or necrotizing fasciitis, and patient was transferred to [facility] where she was admitted to medicine (3/29). She was started on IV vancomycin at OSH, and IV Zosyn was added in [facility] ED. Vascular surgery and orthopedic surgery evaluated patient and deemed that patient is not a candidate for debridement, revision, or L hip disarticulation. Vascular surgery recommended conservative wound care for palliation and signed off. While hospitalized at [facility], she has been afebrile with no leukocytosis, but has had episodes of hypotension to the low 80s systolic (3/30). Blood cx x2 (3/29) with ngtd, but wound cx x2 (3/29) growing 3-4+ Proteus mirabilis and 3+ mixed gram positive and negative bacteria. Repeat  imaging including CT left femur (3/28), XR left femur (3/28, 3/29), and XR left hip (3/29) c/w cellulitis, with no e/o osteomyelitis or necrotizing fasciitis at this time.      Past Medical History  She has a past medical history of Anxiety, Arthritis, Cancer (Multi), Cellulitis, COPD (chronic obstructive pulmonary disease) (Multi), Delayed emergence from general anesthesia, Diabetes mellitus (Multi), Disease of thyroid gland, Diverticulitis, Epilepsy, unspecified, not intractable, without status epilepticus, HLD (hyperlipidemia), blood clots, Hypertension, PAD (peripheral artery disease) (CMS-HCC), Peripheral vascular disease, unspecified (CMS-HCC) (2022), PONV (postoperative nausea and vomiting), PTSD (post-traumatic stress disorder), Sleep apnea, and Uterine cancer (Multi).  - Epilepsy (in remission >15 years)  - She has not been taking most of her outpatient meds, stating she is only still taking warfarin and pantoprazole.    Surgical History  She has a past surgical history that includes Other surgical history (2021); CT angio aorta and bilateral iliofemoral runoff including without contrast if performed (2021); CT angio aorta and bilateral iliofemoral runoff including without contrast if performed (2022); IR angiogram aorta abdomen (2021); Tonsilectomy, adenoidectomy, bilateral myringotomy and tubes ();  section, classic (); Anterior cruciate ligament repair (Left, ); Hysterectomy (); Colon surgery (); Vascular surgery (Right, ); Vascular surgery (Left); Vascular surgery (Right, ); Leg amputation (Right, ); Amputation foot / toe (Left, ); Amputation foot / toe (Left); Cardiac catheterization (N/A, 2024); Invasive Vascular Procedure (N/A, 2024); and Cholecystectomy.     Social History  She reports that she has been smoking cigarettes. She started smoking about 44 years ago. She has a 24.5 pack-year smoking history. She has  been exposed to tobacco smoke. She has never used smokeless tobacco. She reports current alcohol use. She reports current drug use. Drug: Marijuana.  - Pt notes that she was unable to set up home health and therefore had to perform her own wound care over the past few months.         Family History  Family History   Problem Relation Name Age of Onset    Diverticulitis Daughter      Heart failure Other          Several family members without direct blood connection        Allergies  Aspartame and Nsaids (non-steroidal anti-inflammatory drug)  - No history of allergies to antibiotics    Review of Systems   Constitutional:  Negative for fever.   Respiratory:  Negative for shortness of breath.    Cardiovascular:  Negative for chest pain.   Gastrointestinal:  Negative for abdominal pain.   Genitourinary:  Negative for difficulty urinating, flank pain, frequency and hematuria.   Skin:         Positive for 2 x chronic necrotic wounds to anterior LLE stump.  Positive for poor wound healing.   All other systems reviewed and are negative.       Physical Exam  Vitals and nursing note reviewed.   Constitutional:       General: She is not in acute distress.  HENT:      Head: Normocephalic and atraumatic.      Mouth/Throat:      Mouth: Mucous membranes are moist.   Eyes:      Extraocular Movements: Extraocular movements intact.      Pupils: Pupils are equal, round, and reactive to light.   Cardiovascular:      Rate and Rhythm: Normal rate and regular rhythm.   Pulmonary:      Effort: Pulmonary effort is normal.      Breath sounds: Normal breath sounds.   Abdominal:      General: There is no distension.      Palpations: Abdomen is soft.      Tenderness: There is no abdominal tenderness.   Musculoskeletal:      Cervical back: Normal range of motion.      Comments: S/p R hip disarticulation  L AKA dressing CDI   Skin:     Capillary Refill: Capillary refill takes less than 2 seconds.      Comments: S/p R hip disarticulation  L AKA  dressing CDI   Neurological:      Mental Status: She is alert.      Comments: A&O x3          Last Recorded Vitals  Blood pressure 102/67, pulse 81, temperature 36 °C (96.8 °F), temperature source Temporal, resp. rate 17, SpO2 96%.    Relevant Results  WBC 11.1 (4/1)  Plt 556 (4/1)  Hgb 8.0 (4/1)  Cr 0.58 (4/1)     Microbiology:  3/29: Bcx x2 ngtd  Wound cx x2 growing:  3-4+ Proteus mirabilis, susceptible to Zosyn  3+ mixed Gram positive and Gram negative    On chart review, previous cultures have grown:  MRSA susceptible to vancomycin (wound cx 7/2022)  E. Coli (urine cx 1/2022)  Klebsiella susceptible to Zosyn (11/2021)    Antibiotics:  - IV Zosyn (3/28-p)  - IV vancomycin (3/28-p)     Assessment/Plan   Shirin Slater is a 54 y.o. female with severe PAD s/p R hip disarticulation (2021), L AKA s/p revision (1/2/2025), T2DM, HTN, HLD, and renal/splenic thromboses, who presented (3/29) as transfer from OSH for vascular surgery consult for LLE stump cellulitis and c/f sepsis. While at [facility] ED (3/29), she was noted to have BP 86/79 but MAPs consistently >75, T 100.3, , with neutrophil-predominant leukocytosis 14.4 but no lactic acidosis. Patient was transferred to [facility] and admitted to medicine (3/29) on IV vanc/Zosyn. Vascular & orthopedic surgery evaluated patient, deemed not a surgical candidate and recommended conservative wound care for palliation and signed off.     While hospitalized at [facility], she has been afebrile with no leukocytosis, but has had episode of hypotension to 77-80s systolic (3/30), none since. Clinically does not appear septic at this time. Exam c/w cellulitis. Blood cx x2 (3/29) with ngtd, but wound cx x2 (3/29) growing 3-4+ Proteus mirabilis and 3+ mixed gram positive and negative bacteria. Repeat imaging including CT left femur (3/28), XR left femur (3/28, 3/29), and XR left hip (3/29) c/w cellulitis, with no e/o osteomyelitis or necrotizing fasciitis at this time.     Patient is  agreeable with plan to discharge to SNF for consistent wound care.     Recommendations:  - can stop IV Zosyn, vancomycin   - start PO augmentin 875 mg q12h (14 day course, EOT 4/11/25 based on date of initiation of Zosyn)    Discussed with ID attending Dr. Kim Sanz, who agrees with plan of care.  Written by Geni Anthony, MS-3

## 2025-04-01 NOTE — PROGRESS NOTES
Physical Therapy    Physical Therapy Evaluation    Patient Name: Shirin Slater  MRN: [id number]  Department: [place] [address]  Room: [address] [place] [address]  Today's Date: 4/1/2025   Time Calculation  Start Time: 0846  Stop Time: 0930  Time Calculation (min): 44 min    Assessment/Plan   PT Assessment  PT Assessment Results: Decreased strength, Decreased mobility, Impaired balance  Rehab Prognosis: Good  Barriers to Discharge Home: Caregiver assistance, Physical needs  Caregiver Assistance: Caregiver assistance needed per identified barriers - however, level of patient's required assistance exceeds assistance available at home  Physical Needs: 24hr mobility assistance needed, 24hr ADL assistance needed, High falls risk due to function or environment  End of Session Communication: Bedside nurse  Assessment Comment: Pt demos impairements in scooting transfers and dynamic sitting balance, will benefit from skilled PT to progress towards (I) functional mobility.  End of Session Patient Position: Bed, 3 rail up (alarm could not be set)  IP OR SWING BED PT PLAN  Inpatient or Swing Bed: Inpatient  PT Plan  Treatment/Interventions: Bed mobility, Transfer training, Therapeutic exercise, Therapeutic activity  PT Plan: Ongoing PT  PT Frequency: 3 times per week  PT Discharge Recommendations: Moderate intensity level of continued care  PT Recommended Transfer Status: Assist x1  PT - OK to Discharge: Yes (meaning POC/goals/discharge rec intensity created)    Subjective   General Visit Information:  General  Reason for Referral: (L) LE cellulitis  Past Medical History Relevant to Rehab: (L) AKA, (R) hip disarticulation, DM, diabetic neuropathy, HTN, PTSD  Co-Treatment: OT  Co-Treatment Reason: for patient safety with mobility attempts as per chart review wheelchair bound  Prior to Session Communication: Bedside nurse  General Comment: Pt agreeable to participate, states she at times struggles with her daily care tasks at home but overall does well  with transfers.  Td difficulty with scooting tasks in hospital bed this date, increased effort overall to complete. Will continue to follow.  Home Living:  Home Living  Type of Home: Apartment  Lives With: Alone  Home Adaptive Equipment: Wheelchair-manual  Home Layout: One level  Prior Level of Function:  Prior Function Per Pt/Caregiver Report  Receives Help From: Home health (reports has daily assist though mostly alone in evenings/overnight)  ADL Assistance:  (reports she can complete hygiene though can take hours to clean herself after BM)  Ambulatory Assistance:  (completes scooting transfers to and from wheelchair (describes backing into wheelchair from bed and ? rolling forward out of chair into bed ), self propels wheelchair)  Precautions:  Precautions  Hearing/Visual Limitations: WFL  Medical Precautions: Fall precautions          Objective   Pain:  Pain Assessment  Pain Assessment: 0-10  0-10 (Numeric) Pain Score: 7  Pain Location: Abdomen  Cognition:  Cognition  Arousal/Alertness: Appropriate responses to stimuli  Orientation Level: Oriented X4  Following Commands: Follows one step commands consistently  Cognition Comments: particular, wants to complete tasks as much as she can on her own    General Assessments:     Activity Tolerance  Endurance: Tolerates 10 - 20 min exercise with multiple rests    Sensation  Sensation Comment: diabetic neuropathy        Perception  Inattention/Neglect: Appears intact  Initiation: Appears intact  Motor Planning: Appears intact  Perseveration: Not present    Coordination  Movements are Fluid and Coordinated: Yes    Postural Control  Posture Comment: heavy UE support at times, typically needing 1 UE support during dynamic task for ADLs    Static Sitting Balance  Static Sitting-Balance Support:  (1-2 UE support)  Static Sitting-Level of Assistance: Close supervision  Dynamic Sitting Balance  Dynamic Sitting-Balance Support: Bilateral upper extremity supported  Dynamic  Sitting-Level of Assistance: Close supervision     Functional Assessments:  Bed Mobility  Bed Mobility: Yes  Bed Mobility 1  Bed Mobility 1: Supine to sitting  Level of Assistance 1: Contact guard, Minimal verbal cues  Bed Mobility Comments 1: HOB raised fully up, heavy use of bed rail to complete  Bed Mobility 2  Bed Mobility  2: Scooting (posteriorly)  Level of Assistance 2: Minimum assistance (at draw sheet to hold in place)  Bed Mobility Comments 2: pt unable to clear hips to posteriorly scoot in bed in long sit position  Bed Mobility 3  Bed Mobility 3: Sitting to supine  Level of Assistance 3: Close supervision    Transfers  Transfer:  (deferred this date due to lightheadedness with sitting EOB and difficulty with scooting in bed, will attempt as wheelchair available and pt safe to compelte)  Extremity/Trunk Assessments:  RLE   RLE :  ((R) hip disarticulation)  LLE   LLE : Exceptions to WFL  Strength LLE  LLE Overall Strength:  (residual limb appears ~3-/5 hip flexion/adduction/adduction)       04/01/25 0846   Therapeutic Activity   Therapeutic Activity Performed Yes   Therapeutic Activity 1 sat to EOB for extended time with (S) for balance, frequent heavy 1 UE support to maintain upright midline posture in sitting during ADL tasks     Outcome Measures:  [facility] Basic Mobility  Turning from your back to your side while in a flat bed without using bedrails: A little  Moving from lying on your back to sitting on the side of a flat bed without using bedrails: A little  Moving to and from bed to chair (including a wheelchair): A lot  Standing up from a chair using your arms (e.g. wheelchair or bedside chair): Total  To walk in hospital room: Total  Climbing 3-5 steps with railing: Total  Basic Mobility - Total Score: 11    Encounter Problems       Encounter Problems (Active)       Balance       STG - Maintains dynamic sitting balance with upper extremity support distant (S).        Start:  04/01/25    Expected End:   04/15/25            STG - Maintains dynamic sitting balance without upper extremity support CGA-> close (S).       Start:  04/01/25    Expected End:  04/15/25               PT Transfers       Patient will transfer from one surface to another with min-> CGA.       Start:  04/01/25    Expected End:  04/15/25            STG - Patient will perform bed mobility (S).       Start:  04/01/25    Expected End:  04/15/25                   Education Documentation  Mobility Training, taught by Annette Ross PT at 4/1/2025 12:26 PM.  Learner: Patient  Readiness: Acceptance  Method: Explanation  Response: Verbalizes Understanding  Comment: PT POC, safe mobility      04/01/25 at 12:27 PM - Annette Ross PT

## 2025-04-01 NOTE — PROGRESS NOTES
Occupational Therapy    Evaluation/Treatment    Patient Name: Shirin Slater  MRN: [id number]  Department: [place] [address]  Room: [facility]-  Today's Date: 04/01/25  Time Calculation  Start Time: 0846  Stop Time: 0930  Time Calculation (min): 44 min       Assessment:  Prognosis: Good  Barriers to Discharge Home: Caregiver assistance, Physical needs  Caregiver Assistance: Caregiver assistance needed per identified barriers - however, level of patient's required assistance exceeds assistance available at home  Physical Needs: 24hr mobility assistance needed, 24hr ADL assistance needed  Evaluation/Treatment Tolerance: Patient tolerated treatment well  Medical Staff Made Aware: Yes  End of Session Communication: Bedside nurse  End of Session Patient Position: Bed, 4 rail up, Alarm off, not on at start of session (4 rails up per pt request)  OT Assessment Results: Decreased ADL status, Decreased endurance, Decreased functional mobility  Prognosis: Good  Barriers to Discharge: Decreased caregiver support  Evaluation/Treatment Tolerance: Patient tolerated treatment well  Medical Staff Made Aware: Yes  Strengths: Ability to acquire knowledge, Attitude of self  Barriers to Participation: Comorbidities  Plan:  Treatment Interventions: ADL retraining, Functional transfer training, Endurance training, Patient/family training, Equipment evaluation/education, Compensatory technique education  OT Frequency: 3 times per week  OT Discharge Recommendations: Moderate intensity level of continued care  Equipment Recommended upon Discharge:  (TBD)  OT Recommended Transfer Status: Assist of 1  OT - OK to Discharge:  (eval complete)  Treatment Interventions: ADL retraining, Functional transfer training, Endurance training, Patient/family training, Equipment evaluation/education, Compensatory technique education    Subjective   Current Problem:  1. Cellulitis of left lower extremity          General:   OT Received On: 04/01/25  General  Reason  "for Referral: (L) LE cellulitis  Past Medical History Relevant to Rehab: (L) AKA, (R) hip disarticulation, DM, diabetic neuropathy, HTN, PTSD  Family/Caregiver Present: No  Co-Treatment: PT  Co-Treatment Reason: Co-evaluation with PT for pt safety and to optimize pt's therapeutic potential  Prior to Session Communication: Bedside nurse  Patient Position Received: Bed, 4 rail up, Alarm off, caregiver present (4 rails up per pt request)  Preferred Learning Style: visual, verbal  General Comment: Pt agreeable to therapy   Precautions:  Medical Precautions: Fall precautions    Pain:  Pain Assessment  Pain Assessment: 0-10  0-10 (Numeric) Pain Score: 7  Pain Location: Abdomen  Pain Interventions: Repositioned (pt medicated shortly prior to session)    Objective   Cognition:  Overall Cognitive Status: Within Functional Limits  Arousal/Alertness: Appropriate responses to stimuli  Orientation Level: Oriented X4  Following Commands: Follows one step commands without difficulty  Insight: Mild           Home Living:  Type of Home: Apartment  Lives With: Alone  Home Adaptive Equipment: Wheelchair-manual, Hospital bed  Home Layout: One level  Home Access: Level entry  Bathroom Shower/Tub: Tub/shower unit (has been sponge bathing as of recent)  Bathroom Toilet: Handicapped height  Bathroom Equipment: Grab bars in shower, Tub transfer bench  Prior Function:  Level of [place]: Needs assistance with ADLs, Needs assistance with homemaking  Receives Help From: Home health (reports HHA daily, reports alone later in evening/overnight)  ADL Assistance: Needs assistance (reports she can typically complete ADLs with set-up, wears briefs, reports at baseline can clean herself up at bed level but reports it \"can take up to 2 hrs\" when completing alone as of recent)  Homemaking Assistance: Needs assistance (aides complete)  Ambulatory Assistance:  (non-ambulatory, does not own prosthesis. Reports will typically scoot herself posteriorly " into wheelchair)    ADL:  Eating Assistance: Independent  Grooming Assistance: Stand by  Grooming Deficit:  (to complete seated oral care, hair care and donning deodorant at EOB)  Bathing Assistance: Moderate  Bathing Deficit:  (anticipated)  UE Dressing Assistance: Stand by  LE Dressing Assistance: Maximal  LE Dressing Deficit:  (don/doff briefs at bed level)  Toileting Assistance with Device: Total  Toileting Deficit:  (incontinent, requires total A for bed level vinny care at this time)  ADL Comments: Pt very motivated to be as independent with tasks as possible. OT facilitated ADL completions with cueing for strategies to increase independence as well as complete tasks safely.    Activity Tolerance:  Endurance: Tolerates 10 - 20 min exercise with multiple rests    Bed Mobility/Transfers: Bed Mobility  Bed Mobility: Yes  Bed Mobility 1  Bed Mobility 1: Rolling right, Rolling left  Level of Assistance 1: Contact guard  Bed Mobility Comments 1: Verbal cueing for mechanics and use of bed rails to assist  Bed Mobility 2  Bed Mobility  2: Supine to sitting  Level of Assistance 2: Contact guard  Bed Mobility Comments 2: HOB fully elevated, use of bed rails to assist  Bed Mobility 3  Bed Mobility 3: Scooting  Bed Mobility Comments 3: attempted to laterally scoot, pt unable to successfully complete  Bed Mobility 4  Bed Mobility 4: Sitting to supine  Level of Assistance 4: Close supervision    Transfers  Transfer: No (pt reporting lightheadedness at EOB, unable to tolerate at this time)      Functional Mobility:  Functional Mobility  Functional Mobility Performed: No  Sitting Balance:  Static Sitting Balance  Static Sitting-Balance Support: No upper extremity supported  Static Sitting-Level of Assistance: Close supervision  Dynamic Sitting Balance  Dynamic Sitting-Balance Support: No upper extremity supported  Dynamic Sitting-Level of Assistance: Close supervision    Vision:Vision - Basic Assessment  Current Vision: Wears  glasses all the time  Sensation:  Light Touch:  (hx of diabetic neuropathy at baseline)  Strength:  Strength Comments: BUE strength functionally assessed, >=3+/5 bilaterally    Perception:  Inattention/Neglect: Appears intact  Initiation: Appears intact  Motor Planning: Appears intact  Perseveration: Not present  Coordination:  Movements are Fluid and Coordinated: Yes   Hand Function:  Hand Function  Gross Grasp: Functional  Coordination: Functional  Extremities: RUE   RUE : Within Functional Limits and LUE   LUE: Within Functional Limits    Outcome Measures: [facility] Daily Activity  Putting on and taking off regular lower body clothing: A lot  Bathing (including washing, rinsing, drying): A lot  Putting on and taking off regular upper body clothing: A little  Toileting, which includes using toilet, bedpan or urinal: Total  Taking care of personal grooming such as brushing teeth: A little  Eating Meals: None  Daily Activity - Total Score: 15    Brief Confusion Assessment Method (bCAM)  Feature 1: Altered Mental Status or Fluctuating Course: No  CAM Result: CAM -    Education Documentation  Body Mechanics, taught by Pratibha Beauchamp OT at 4/1/2025 12:45 PM.  Learner: Patient  Readiness: Acceptance  Method: Explanation, Demonstration  Response: Verbalizes Understanding, Demonstrated Understanding, Needs Reinforcement    ADL Training, taught by Pratibha Beauchamp OT at 4/1/2025 12:45 PM.  Learner: Patient  Readiness: Acceptance  Method: Explanation, Demonstration  Response: Verbalizes Understanding, Demonstrated Understanding, Needs Reinforcement    EDUCATION:  Education  Individual(s) Educated: Patient  Education Provided: Diagnosis & Precautions, Fall precautons, Risk and benefits of OT discussed with patient or other, POC discussed and agreed upon  Patient Response to Education: Patient/Caregiver Verbalized Understanding of Information    Goals:  Encounter Problems       Encounter Problems (Active)       ADLs        Patient with complete upper body dressing with set-up level of assistance donning and doffing all UE clothes with PRN adaptive equipment while edge of bed  (Progressing)       Start:  04/01/25    Expected End:  04/15/25            Patient with complete lower body dressing with SBA level of assistance donning and doffing all LE clothes  with PRN adaptive equipment (Progressing)       Start:  04/01/25    Expected End:  04/15/25            Patient will complete daily grooming tasks with set-up level of assistance and PRN adaptive equipment while edge of bed . (Progressing)       Start:  04/01/25    Expected End:  04/15/25            Patient will complete toileting including hygiene clothing management/hygiene with stand by assist level of assistance. (Progressing)       Start:  04/01/25    Expected End:  04/15/25               TRANSFERS       Patient will perform bed mobility modified independent level of assistance in order to improve safety and independence with mobility (Progressing)       Start:  04/01/25    Expected End:  04/15/25            Patient will complete functional transfer to wheelchair with stand by assist level of assistance via lateral transfer or sliding board. (Not Progressing)       Start:  04/01/25    Expected End:  04/15/25

## 2025-04-01 NOTE — PROGRESS NOTES
Assessment/Plan   Shirin Slater is a 54 y.o. 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 Vascular surgery and orthopedics involved. Wound care consulted to direct wound care. Started on broad spectrum abx pending cultures.. Per vascular poor prognosis for wounds, recommending no intervention, continue abx and wound care. Cultures are pending. ID will be consulted when cultures result. She remains with low bp but asymptomatic. Palliative care consulted. Pt is looking into SNF pending pt/ot recs.     SSTI of wounds at L AKA site  Prior R hip disarticulation, L AKA in setting of peripheral vascular disease  - reviewed osh imaging, no evidence of osteo or gas forming infection  - febrile, leukocytosis, elevated inflammatory markers  - b/l hip disarticulation is not recommend by ortho at this time, pt is aware  - concern for poor healing, pending decision for wound debridgements. Conitnueing borad spectrum abx pending cutures, wll need ID input based on cultures.   - appreciate ortho and vascular surgery recommendations  - continue vanc and zosyn  - continue warfarin, hold for elevation INR. Daily INR. Down to 3.3. plan to start tonight at reduced dose. Per dc profile 1/2025, pt was on dapt which was discontinued when warfarin was started. Will discuss with vascular.     Hypotension: Resolved    renal and splenic thrombosis  Supratherapeutic INR: resolved  - INR 2.8     T2DM  - last A1c 8.4 in November 2024  - has been on jardiance in the past, held  - SSI #1        PTSD  anxiety   - home cymbalta and buspar     epilepsy   - home keppra 500mg BID     GERD   - home pantoprazole     nicotine use disorder  - nicotine patch  ordered    Hypothyroid   - home levothyroxine     hx of HTN,  - home metop was held on admit, will eval when to resume     Pt agreeable to snf if eligible/rec. Notified tcc for dc planning. Pt/ot consulted.        Scheduled outpatient appointments in system:   No future appointments.  ---------------------------------------------------------------------------------------------------  Subjective   Seen and examined. Patient states to have abdominal pain today which comes in waves.     ---------------------------------------------------------------------------------------------------  Objective   Last Recorded Vitals  Blood pressure (!) 91/48, pulse 65, temperature 36.3 °C (97.3 °F), resp. rate 17, SpO2 98%.  Intake/Output last 3 Shifts:  I/O last 3 completed shifts:  In: 3265 [P.O.:2340; IV Piggyback:925]  Out: -     Physical Exam  Vitals and nursing note reviewed.   Constitutional:       General: She is not in acute distress.     Appearance: Normal appearance. She is obese.   HENT:      Head: Normocephalic and atraumatic.      Mouth/Throat:      Mouth: Mucous membranes are moist.   Eyes:      General: No scleral icterus.     Extraocular Movements: Extraocular movements intact.      Conjunctiva/sclera: Conjunctivae normal.   Cardiovascular:      Rate and Rhythm: Normal rate and regular rhythm.      Heart sounds: S1 normal and S2 normal. No murmur heard.  Pulmonary:      Effort: Pulmonary effort is normal. No respiratory distress.      Breath sounds: No wheezing, rhonchi or rales.   Abdominal:      General: Bowel sounds are normal. There is no distension.      Palpations: Abdomen is soft.      Tenderness: There is no abdominal tenderness. There is no guarding or rebound.   Musculoskeletal:         General: No swelling or deformity.      Cervical back: Neck supple.      Comments: R hip disarticulation, L AKA with 2 large wounds, foul smelling drainage, covered in dressing   Skin:     General: Skin is warm and dry.       Findings: No rash.   Neurological:      General: No focal deficit present.      Mental Status: She is alert and oriented to person, place, and time. Mental status is at baseline.   Psychiatric:         Mood and Affect: Mood normal.         Behavior: Behavior normal.         Judgment: Judgment normal.      Comments: Calm and cooperative         Relevant Results  Lab Results   Component Value Date    WBC 11.1 04/01/2025    HGB 8.0 (L) 04/01/2025    HCT 30.2 (L) 04/01/2025    MCV 74 (L) 04/01/2025     (H) 04/01/2025      Lab Results   Component Value Date    GLUCOSE 95 04/01/2025    CALCIUM 8.2 (L) 04/01/2025     04/01/2025    K 3.3 (L) 04/01/2025    CO2 23 04/01/2025     (H) 04/01/2025    BUN 8 04/01/2025    CREATININE 0.58 04/01/2025     Scheduled medications  acetaminophen, 975 mg, oral, q8h  aspirin, 81 mg, oral, Daily  atorvastatin, 80 mg, oral, Nightly  busPIRone, 10 mg, oral, q AM  divalproex, 1,000 mg, oral, Nightly  divalproex, 500 mg, oral, Daily  DULoxetine, 60 mg, oral, q PM  [Held by provider] empagliflozin, 10 mg, oral, Daily  ergocalciferol, 1,250 mcg, oral, Weekly  gabapentin, 600 mg, oral, q8h NINO  insulin lispro, 0-5 Units, subcutaneous, TID AC  levETIRAcetam, 500 mg, oral, BID  levothyroxine, 25 mcg, oral, Daily before breakfast  lidocaine, 1 patch, transdermal, Daily  [Held by provider] metoprolol tartrate, 12.5 mg, oral, BID  nicotine, 1 patch, transdermal, Daily  pantoprazole, 40 mg, oral, Daily before breakfast  piperacillin-tazobactam, 3.375 g, intravenous, q6h  [Held by provider] psyllium, 1 packet, oral, TID  sodium hypochlorite, , irrigation, BID  tiZANidine, 4 mg, oral, TID  vancomycin, 1,000 mg, intravenous, q12h  [Held by provider] warfarin, 4 mg, oral, Daily      Continuous medications     PRN medications  PRN medications: albuterol, dextrose, dextrose, glucagon, glucagon, loperamide, melatonin, naloxone, oxyCODONE, vancomycin    Bhavik Armstrong DO

## 2025-04-02 LAB
ALBUMIN SERPL BCP-MCNC: 2.6 G/DL (ref 3.4–5)
ANION GAP SERPL CALC-SCNC: 12 MMOL/L (ref 10–20)
BACTERIA BLD CULT: NORMAL
BACTERIA BLD CULT: NORMAL
BASOPHILS # BLD AUTO: 0.03 X10*3/UL (ref 0–0.1)
BASOPHILS NFR BLD AUTO: 0.3 %
BUN SERPL-MCNC: 6 MG/DL (ref 6–23)
CALCIUM SERPL-MCNC: 8.2 MG/DL (ref 8.6–10.6)
CHLORIDE SERPL-SCNC: 105 MMOL/L (ref 98–107)
CO2 SERPL-SCNC: 24 MMOL/L (ref 21–32)
CREAT SERPL-MCNC: 0.5 MG/DL (ref 0.5–1.05)
EGFRCR SERPLBLD CKD-EPI 2021: >90 ML/MIN/1.73M*2
EOSINOPHIL # BLD AUTO: 0.24 X10*3/UL (ref 0–0.7)
EOSINOPHIL NFR BLD AUTO: 2 %
ERYTHROCYTE [DISTWIDTH] IN BLOOD BY AUTOMATED COUNT: 18.6 % (ref 11.5–14.5)
GLUCOSE BLD MANUAL STRIP-MCNC: 109 MG/DL (ref 74–99)
GLUCOSE BLD MANUAL STRIP-MCNC: 160 MG/DL (ref 74–99)
GLUCOSE BLD MANUAL STRIP-MCNC: 83 MG/DL (ref 74–99)
GLUCOSE BLD MANUAL STRIP-MCNC: 91 MG/DL (ref 74–99)
GLUCOSE SERPL-MCNC: 81 MG/DL (ref 74–99)
HCT VFR BLD AUTO: 29.7 % (ref 36–46)
HGB BLD-MCNC: 7.9 G/DL (ref 12–16)
IMM GRANULOCYTES # BLD AUTO: 0.05 X10*3/UL (ref 0–0.7)
IMM GRANULOCYTES NFR BLD AUTO: 0.4 % (ref 0–0.9)
INR PPP: 2.9 (ref 0.9–1.1)
LYMPHOCYTES # BLD AUTO: 1.79 X10*3/UL (ref 1.2–4.8)
LYMPHOCYTES NFR BLD AUTO: 15 %
MAGNESIUM SERPL-MCNC: 1.68 MG/DL (ref 1.6–2.4)
MCH RBC QN AUTO: 19.8 PG (ref 26–34)
MCHC RBC AUTO-ENTMCNC: 26.6 G/DL (ref 32–36)
MCV RBC AUTO: 74 FL (ref 80–100)
MICROORGANISM SPEC CULT: ABNORMAL
MICROORGANISM SPEC CULT: NORMAL
MICROORGANISM SPEC CULT: NORMAL
MICROORGANISM/AGENT SPEC: ABNORMAL
MONOCYTES # BLD AUTO: 0.72 X10*3/UL (ref 0.1–1)
MONOCYTES NFR BLD AUTO: 6 %
NEUTROPHILS # BLD AUTO: 9.1 X10*3/UL (ref 1.2–7.7)
NEUTROPHILS NFR BLD AUTO: 76.3 %
NRBC BLD-RTO: 0 /100 WBCS (ref 0–0)
PHOSPHATE SERPL-MCNC: 3 MG/DL (ref 2.5–4.9)
PLATELET # BLD AUTO: 618 X10*3/UL (ref 150–450)
POTASSIUM SERPL-SCNC: 3.7 MMOL/L (ref 3.5–5.3)
PROTHROMBIN TIME: 31.8 SECONDS (ref 9.8–12.4)
RBC # BLD AUTO: 4 X10*6/UL (ref 4–5.2)
SERVICE CMNT-IMP: NORMAL
SERVICE CMNT-IMP: NORMAL
SODIUM SERPL-SCNC: 137 MMOL/L (ref 136–145)
SPECIMEN DESCRIPTION: ABNORMAL
SPECIMEN DESCRIPTION: NORMAL
SPECIMEN DESCRIPTION: NORMAL
VANCOMYCIN SERPL-MCNC: 24.1 UG/ML (ref 5–20)
WBC # BLD AUTO: 11.9 X10*3/UL (ref 4.4–11.3)

## 2025-04-02 PROCEDURE — 1100000001 HC PRIVATE ROOM DAILY

## 2025-04-02 PROCEDURE — 2500000001 HC RX 250 WO HCPCS SELF ADMINISTERED DRUGS (ALT 637 FOR MEDICARE OP)

## 2025-04-02 PROCEDURE — 2500000002 HC RX 250 W HCPCS SELF ADMINISTERED DRUGS (ALT 637 FOR MEDICARE OP, ALT 636 FOR OP/ED): Performed by: STUDENT IN AN ORGANIZED HEALTH CARE EDUCATION/TRAINING PROGRAM

## 2025-04-02 PROCEDURE — 80069 RENAL FUNCTION PANEL: CPT

## 2025-04-02 PROCEDURE — 82947 ASSAY GLUCOSE BLOOD QUANT: CPT

## 2025-04-02 PROCEDURE — 2500000002 HC RX 250 W HCPCS SELF ADMINISTERED DRUGS (ALT 637 FOR MEDICARE OP, ALT 636 FOR OP/ED)

## 2025-04-02 PROCEDURE — 83735 ASSAY OF MAGNESIUM: CPT

## 2025-04-02 PROCEDURE — 2500000004 HC RX 250 GENERAL PHARMACY W/ HCPCS (ALT 636 FOR OP/ED)

## 2025-04-02 PROCEDURE — 85025 COMPLETE CBC W/AUTO DIFF WBC: CPT

## 2025-04-02 PROCEDURE — 2500000001 HC RX 250 WO HCPCS SELF ADMINISTERED DRUGS (ALT 637 FOR MEDICARE OP): Performed by: STUDENT IN AN ORGANIZED HEALTH CARE EDUCATION/TRAINING PROGRAM

## 2025-04-02 PROCEDURE — 80202 ASSAY OF VANCOMYCIN: CPT | Performed by: STUDENT IN AN ORGANIZED HEALTH CARE EDUCATION/TRAINING PROGRAM

## 2025-04-02 PROCEDURE — 36415 COLL VENOUS BLD VENIPUNCTURE: CPT | Performed by: STUDENT IN AN ORGANIZED HEALTH CARE EDUCATION/TRAINING PROGRAM

## 2025-04-02 PROCEDURE — 99232 SBSQ HOSP IP/OBS MODERATE 35: CPT | Performed by: STUDENT IN AN ORGANIZED HEALTH CARE EDUCATION/TRAINING PROGRAM

## 2025-04-02 PROCEDURE — 85610 PROTHROMBIN TIME: CPT | Performed by: STUDENT IN AN ORGANIZED HEALTH CARE EDUCATION/TRAINING PROGRAM

## 2025-04-02 RX ORDER — AMOXICILLIN AND CLAVULANATE POTASSIUM 875; 125 MG/1; MG/1
1 TABLET, FILM COATED ORAL EVERY 12 HOURS SCHEDULED
Status: DISCONTINUED | OUTPATIENT
Start: 2025-04-02 | End: 2025-04-03 | Stop reason: HOSPADM

## 2025-04-02 RX ORDER — WARFARIN 3 MG/1
3 TABLET ORAL DAILY
Status: DISCONTINUED | OUTPATIENT
Start: 2025-04-02 | End: 2025-04-03 | Stop reason: HOSPADM

## 2025-04-02 RX ADMIN — LEVETIRACETAM 500 MG: 500 TABLET, FILM COATED ORAL at 20:25

## 2025-04-02 RX ADMIN — LOPERAMIDE HYDROCHLORIDE 2 MG: 2 CAPSULE ORAL at 11:04

## 2025-04-02 RX ADMIN — ACETAMINOPHEN 975 MG: 325 TABLET ORAL at 05:23

## 2025-04-02 RX ADMIN — PANTOPRAZOLE SODIUM 40 MG: 40 TABLET, DELAYED RELEASE ORAL at 05:23

## 2025-04-02 RX ADMIN — AMOXICILLIN AND CLAVULANATE POTASSIUM 1 TABLET: 875; 125 TABLET, FILM COATED ORAL at 20:25

## 2025-04-02 RX ADMIN — GABAPENTIN 600 MG: 300 CAPSULE ORAL at 05:23

## 2025-04-02 RX ADMIN — DIVALPROEX SODIUM 500 MG: 500 TABLET, FILM COATED, EXTENDED RELEASE ORAL at 09:44

## 2025-04-02 RX ADMIN — LEVOTHYROXINE SODIUM 25 MCG: 25 TABLET ORAL at 05:23

## 2025-04-02 RX ADMIN — ACETAMINOPHEN 975 MG: 325 TABLET ORAL at 15:35

## 2025-04-02 RX ADMIN — LEVETIRACETAM 500 MG: 500 TABLET, FILM COATED ORAL at 09:45

## 2025-04-02 RX ADMIN — PIPERACILLIN SODIUM AND TAZOBACTAM SODIUM 3.38 G: 3; .375 INJECTION, SOLUTION INTRAVENOUS at 02:22

## 2025-04-02 RX ADMIN — TIZANIDINE 4 MG: 4 TABLET ORAL at 09:45

## 2025-04-02 RX ADMIN — OXYCODONE HYDROCHLORIDE 5 MG: 5 TABLET ORAL at 05:23

## 2025-04-02 RX ADMIN — TIZANIDINE 4 MG: 4 TABLET ORAL at 15:35

## 2025-04-02 RX ADMIN — AMOXICILLIN AND CLAVULANATE POTASSIUM 1 TABLET: 875; 125 TABLET, FILM COATED ORAL at 11:04

## 2025-04-02 RX ADMIN — SODIUM HYPOCHLORITE: 2.5 SOLUTION TOPICAL at 09:46

## 2025-04-02 RX ADMIN — ASPIRIN 81 MG: 81 TABLET, CHEWABLE ORAL at 09:45

## 2025-04-02 RX ADMIN — SODIUM HYPOCHLORITE: 2.5 SOLUTION TOPICAL at 22:29

## 2025-04-02 RX ADMIN — DIVALPROEX SODIUM 1000 MG: 500 TABLET, FILM COATED, EXTENDED RELEASE ORAL at 20:25

## 2025-04-02 RX ADMIN — WARFARIN SODIUM 3 MG: 3 TABLET ORAL at 17:18

## 2025-04-02 RX ADMIN — GABAPENTIN 600 MG: 300 CAPSULE ORAL at 22:27

## 2025-04-02 RX ADMIN — DULOXETINE HYDROCHLORIDE 60 MG: 60 CAPSULE, DELAYED RELEASE ORAL at 20:25

## 2025-04-02 RX ADMIN — ATORVASTATIN CALCIUM 80 MG: 80 TABLET, FILM COATED ORAL at 20:25

## 2025-04-02 RX ADMIN — OXYCODONE HYDROCHLORIDE 5 MG: 5 TABLET ORAL at 22:28

## 2025-04-02 RX ADMIN — GABAPENTIN 600 MG: 300 CAPSULE ORAL at 15:35

## 2025-04-02 RX ADMIN — ACETAMINOPHEN 975 MG: 325 TABLET ORAL at 22:27

## 2025-04-02 RX ADMIN — TIZANIDINE 4 MG: 4 TABLET ORAL at 20:25

## 2025-04-02 RX ADMIN — BUSPIRONE HYDROCHLORIDE 10 MG: 5 TABLET ORAL at 09:45

## 2025-04-02 ASSESSMENT — PAIN SCALES - GENERAL
PAINLEVEL_OUTOF10: 8
PAINLEVEL_OUTOF10: 5 - MODERATE PAIN
PAINLEVEL_OUTOF10: 0 - NO PAIN
PAINLEVEL_OUTOF10: 5 - MODERATE PAIN

## 2025-04-02 ASSESSMENT — PAIN DESCRIPTION - ORIENTATION
ORIENTATION: LEFT
ORIENTATION: LEFT

## 2025-04-02 ASSESSMENT — PAIN DESCRIPTION - LOCATION: LOCATION: LEG

## 2025-04-02 ASSESSMENT — PAIN - FUNCTIONAL ASSESSMENT
PAIN_FUNCTIONAL_ASSESSMENT: 0-10
PAIN_FUNCTIONAL_ASSESSMENT: 0-10

## 2025-04-02 NOTE — CARE PLAN
The patient's goals for the shift include      The clinical goals for the shift include Pt will be safe from falls and injury throughout this shift      Problem: Skin  Goal: Decreased wound size/increased tissue granulation at next dressing change  Outcome: Progressing  Flowsheets (Taken 4/2/2025 0317)  Decreased wound size/increased tissue granulation at next dressing change:   Promote sleep for wound healing   Protective dressings over bony prominences  Goal: Participates in plan/prevention/treatment measures  Outcome: Progressing  Goal: Prevent/manage excess moisture  Outcome: Progressing  Goal: Prevent/minimize sheer/friction injuries  Outcome: Progressing  Goal: Promote/optimize nutrition  Outcome: Progressing  Goal: Promote skin healing  Outcome: Progressing     Problem: Fall/Injury  Goal: Not fall by end of shift  Outcome: Progressing  Goal: Be free from injury by end of the shift  Outcome: Progressing  Goal: Verbalize understanding of personal risk factors for fall in the hospital  Outcome: Progressing  Goal: Verbalize understanding of risk factor reduction measures to prevent injury from fall in the home  Outcome: Progressing  Goal: Use assistive devices by end of the shift  Outcome: Progressing  Goal: Pace activities to prevent fatigue by end of the shift  Outcome: Progressing     Problem: Diabetes  Goal: Achieve decreasing blood glucose levels by end of shift  Outcome: Progressing  Goal: Increase stability of blood glucose readings by end of shift  Outcome: Progressing  Goal: Decrease in ketones present in urine by end of shift  Outcome: Progressing  Goal: Maintain electrolyte levels within acceptable range throughout shift  Outcome: Progressing  Goal: Maintain glucose levels >70mg/dl to <250mg/dl throughout shift  Outcome: Progressing  Goal: No changes in neurological exam by end of shift  Outcome: Progressing  Goal: Learn about and adhere to nutrition recommendations by end of shift  Outcome:  Progressing  Goal: Vital signs within normal range for age by end of shift  Outcome: Progressing  Goal: Increase self care and/or family involovement by end of shift  Outcome: Progressing  Goal: Receive DSME education by end of shift  Outcome: Progressing     Problem: Pain  Goal: Takes deep breaths with improved pain control throughout the shift  Outcome: Progressing  Goal: Turns in bed with improved pain control throughout the shift  Outcome: Progressing  Goal: Walks with improved pain control throughout the shift  Outcome: Progressing  Goal: Performs ADL's with improved pain control throughout shift  Outcome: Progressing  Goal: Participates in PT with improved pain control throughout the shift  Outcome: Progressing  Goal: Free from opioid side effects throughout the shift  Outcome: Progressing  Goal: Free from acute confusion related to pain meds throughout the shift  Outcome: Progressing

## 2025-04-02 NOTE — PROGRESS NOTES
Assessment/Plan   Shirin Slater is a 54 y.o. 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 Vascular surgery and orthopedics involved. Wound care consulted to direct wound care. Started on broad spectrum abx pending cultures.. Per vascular poor prognosis for wounds, recommending no intervention, continue abx and wound care. Cultures are pending. ID will be consulted when cultures result. She remains with low bp but asymptomatic. Palliative care consulted. Pt is looking into SNF pending pt/ot recs.     SSTI of wounds at L AKA site  Prior R hip disarticulation, L AKA in setting of peripheral vascular disease  - reviewed osh imaging, no evidence of osteo or gas forming infection  - febrile, leukocytosis, elevated inflammatory markers  - b/l hip disarticulation is not recommend by ortho at this time, pt is aware  - concern for poor healing, pending decision for wound debridgements. Conitnueing borad spectrum abx pending cutures, wll need ID input based on cultures.   - appreciate ortho and vascular surgery recommendations  - Vanc/Zosyn stopped, transitioned to Augmentin BID     Hypotension: Resolved    renal and splenic thrombosis  Supratherapeutic INR: resolved  - INR 2.9     T2DM  - last A1c 8.4 in November 2024  - has been on jardiance in the past, held  - SSI #1      PTSD  anxiety   - home cymbalta and buspar     epilepsy   - home keppra 500mg BID     GERD   - home pantoprazole     nicotine use disorder  - nicotine patch ordered    Hypothyroid   - home levothyroxine     hx of HTN,  - home metop was held on admit, will eval when to resume     Pt agreeable to snf if eligible/rec. Notified tcc for dc planning. Pt/ot consulted.         Scheduled outpatient appointments in system:   No future appointments.  ---------------------------------------------------------------------------------------------------  Subjective   Seen and examined. Patient did not have questions or concerns today.      ---------------------------------------------------------------------------------------------------  Objective   Last Recorded Vitals  Blood pressure 95/60, pulse 78, temperature 36.4 °C (97.5 °F), temperature source Temporal, resp. rate 18, SpO2 94%.  Intake/Output last 3 Shifts:  I/O last 3 completed shifts:  In: 1200 [P.O.:600; IV Piggyback:600]  Out: -     Physical Exam  Vitals and nursing note reviewed.   Constitutional:       General: She is not in acute distress.     Appearance: Normal appearance. She is obese.   HENT:      Head: Normocephalic and atraumatic.      Mouth/Throat:      Mouth: Mucous membranes are moist.   Eyes:      General: No scleral icterus.     Extraocular Movements: Extraocular movements intact.      Conjunctiva/sclera: Conjunctivae normal.   Cardiovascular:      Rate and Rhythm: Normal rate and regular rhythm.      Heart sounds: S1 normal and S2 normal. No murmur heard.  Pulmonary:      Effort: Pulmonary effort is normal. No respiratory distress.      Breath sounds: No wheezing, rhonchi or rales.   Abdominal:      General: Bowel sounds are normal. There is no distension.      Palpations: Abdomen is soft.      Tenderness: There is no abdominal tenderness. There is no guarding or rebound.   Musculoskeletal:         General: No swelling or deformity.      Cervical back: Neck supple.      Comments: R hip disarticulation, L AKA with 2 large wounds, foul smelling drainage, covered in dressing   Skin:     General: Skin is warm and dry.      Findings: No rash.   Neurological:      General: No focal deficit present.      Mental Status: She is alert and oriented to person, place, and time. Mental status is at baseline.   Psychiatric:          Mood and Affect: Mood normal.         Behavior: Behavior normal.         Judgment: Judgment normal.      Comments: Calm and cooperative         Relevant Results  Lab Results   Component Value Date    WBC 11.9 (H) 04/02/2025    HGB 7.9 (L) 04/02/2025    HCT 29.7 (L) 04/02/2025    MCV 74 (L) 04/02/2025     (H) 04/02/2025      Lab Results   Component Value Date    GLUCOSE 81 04/02/2025    CALCIUM 8.2 (L) 04/02/2025     04/02/2025    K 3.7 04/02/2025    CO2 24 04/02/2025     04/02/2025    BUN 6 04/02/2025    CREATININE 0.50 04/02/2025     Scheduled medications  acetaminophen, 975 mg, oral, q8h  amoxicillin-pot clavulanate, 1 tablet, oral, q12h NINO  aspirin, 81 mg, oral, Daily  atorvastatin, 80 mg, oral, Nightly  busPIRone, 10 mg, oral, q AM  divalproex, 1,000 mg, oral, Nightly  divalproex, 500 mg, oral, Daily  DULoxetine, 60 mg, oral, q PM  [Held by provider] empagliflozin, 10 mg, oral, Daily  ergocalciferol, 1,250 mcg, oral, Weekly  gabapentin, 600 mg, oral, q8h NINO  insulin lispro, 0-5 Units, subcutaneous, TID AC  levETIRAcetam, 500 mg, oral, BID  levothyroxine, 25 mcg, oral, Daily before breakfast  lidocaine, 1 patch, transdermal, Daily  [Held by provider] metoprolol tartrate, 12.5 mg, oral, BID  nicotine, 1 patch, transdermal, Daily  pantoprazole, 40 mg, oral, Daily before breakfast  [Held by provider] psyllium, 1 packet, oral, TID  sodium hypochlorite, , irrigation, BID  tiZANidine, 4 mg, oral, TID  warfarin, 3 mg, oral, Daily      Continuous medications     PRN medications  PRN medications: albuterol, dextrose, dextrose, glucagon, glucagon, loperamide, melatonin, naloxone, oxyCODONE    Bhavik Armstrong DO

## 2025-04-02 NOTE — PROGRESS NOTES
Vancomycin Dosing by Pharmacy- Cessation of Therapy    Consult to pharmacy for vancomycin dosing has been discontinued by the prescriber, pharmacy will sign off at this time.    Please call pharmacy if there are further questions or re-enter a consult if vancomycin is resumed.     Ron Merrill, PharmD

## 2025-04-03 ENCOUNTER — PHARMACY VISIT (OUTPATIENT)
Dept: PHARMACY | Facility: CLINIC | Age: 55
End: 2025-04-03
Payer: COMMERCIAL

## 2025-04-03 VITALS
OXYGEN SATURATION: 96 % | RESPIRATION RATE: 17 BRPM | DIASTOLIC BLOOD PRESSURE: 61 MMHG | HEART RATE: 82 BPM | SYSTOLIC BLOOD PRESSURE: 90 MMHG | TEMPERATURE: 97.5 F

## 2025-04-03 LAB
ALBUMIN SERPL BCP-MCNC: 2.5 G/DL (ref 3.4–5)
ANION GAP SERPL CALC-SCNC: 13 MMOL/L (ref 10–20)
BASOPHILS # BLD AUTO: 0.04 X10*3/UL (ref 0–0.1)
BASOPHILS NFR BLD AUTO: 0.4 %
BUN SERPL-MCNC: 8 MG/DL (ref 6–23)
CALCIUM SERPL-MCNC: 7.9 MG/DL (ref 8.6–10.6)
CHLORIDE SERPL-SCNC: 107 MMOL/L (ref 98–107)
CO2 SERPL-SCNC: 25 MMOL/L (ref 21–32)
CREAT SERPL-MCNC: 0.44 MG/DL (ref 0.5–1.05)
EGFRCR SERPLBLD CKD-EPI 2021: >90 ML/MIN/1.73M*2
EOSINOPHIL # BLD AUTO: 0.2 X10*3/UL (ref 0–0.7)
EOSINOPHIL NFR BLD AUTO: 1.8 %
ERYTHROCYTE [DISTWIDTH] IN BLOOD BY AUTOMATED COUNT: 19.3 % (ref 11.5–14.5)
GLUCOSE BLD MANUAL STRIP-MCNC: 87 MG/DL (ref 74–99)
GLUCOSE BLD MANUAL STRIP-MCNC: 99 MG/DL (ref 74–99)
GLUCOSE SERPL-MCNC: 86 MG/DL (ref 74–99)
HCT VFR BLD AUTO: 31 % (ref 36–46)
HGB BLD-MCNC: 8.1 G/DL (ref 12–16)
IMM GRANULOCYTES # BLD AUTO: 0.05 X10*3/UL (ref 0–0.7)
IMM GRANULOCYTES NFR BLD AUTO: 0.4 % (ref 0–0.9)
INR PPP: 3.4 (ref 0.9–1.1)
LYMPHOCYTES # BLD AUTO: 1.72 X10*3/UL (ref 1.2–4.8)
LYMPHOCYTES NFR BLD AUTO: 15.1 %
MAGNESIUM SERPL-MCNC: 1.78 MG/DL (ref 1.6–2.4)
MCH RBC QN AUTO: 19.5 PG (ref 26–34)
MCHC RBC AUTO-ENTMCNC: 26.1 G/DL (ref 32–36)
MCV RBC AUTO: 75 FL (ref 80–100)
MONOCYTES # BLD AUTO: 0.7 X10*3/UL (ref 0.1–1)
MONOCYTES NFR BLD AUTO: 6.2 %
NEUTROPHILS # BLD AUTO: 8.65 X10*3/UL (ref 1.2–7.7)
NEUTROPHILS NFR BLD AUTO: 76.1 %
NRBC BLD-RTO: 0 /100 WBCS (ref 0–0)
PHOSPHATE SERPL-MCNC: 3.4 MG/DL (ref 2.5–4.9)
PLATELET # BLD AUTO: 631 X10*3/UL (ref 150–450)
POTASSIUM SERPL-SCNC: 3.7 MMOL/L (ref 3.5–5.3)
PROTHROMBIN TIME: 37.5 SECONDS (ref 9.8–12.4)
RBC # BLD AUTO: 4.16 X10*6/UL (ref 4–5.2)
SODIUM SERPL-SCNC: 141 MMOL/L (ref 136–145)
WBC # BLD AUTO: 11.4 X10*3/UL (ref 4.4–11.3)

## 2025-04-03 PROCEDURE — 2500000001 HC RX 250 WO HCPCS SELF ADMINISTERED DRUGS (ALT 637 FOR MEDICARE OP): Performed by: STUDENT IN AN ORGANIZED HEALTH CARE EDUCATION/TRAINING PROGRAM

## 2025-04-03 PROCEDURE — 85025 COMPLETE CBC W/AUTO DIFF WBC: CPT

## 2025-04-03 PROCEDURE — 2500000002 HC RX 250 W HCPCS SELF ADMINISTERED DRUGS (ALT 637 FOR MEDICARE OP, ALT 636 FOR OP/ED)

## 2025-04-03 PROCEDURE — 2500000001 HC RX 250 WO HCPCS SELF ADMINISTERED DRUGS (ALT 637 FOR MEDICARE OP)

## 2025-04-03 PROCEDURE — 83735 ASSAY OF MAGNESIUM: CPT

## 2025-04-03 PROCEDURE — 36415 COLL VENOUS BLD VENIPUNCTURE: CPT | Performed by: STUDENT IN AN ORGANIZED HEALTH CARE EDUCATION/TRAINING PROGRAM

## 2025-04-03 PROCEDURE — 2500000005 HC RX 250 GENERAL PHARMACY W/O HCPCS: Performed by: STUDENT IN AN ORGANIZED HEALTH CARE EDUCATION/TRAINING PROGRAM

## 2025-04-03 PROCEDURE — 80069 RENAL FUNCTION PANEL: CPT

## 2025-04-03 PROCEDURE — 82947 ASSAY GLUCOSE BLOOD QUANT: CPT

## 2025-04-03 PROCEDURE — 99497 ADVNCD CARE PLAN 30 MIN: CPT | Performed by: INTERNAL MEDICINE

## 2025-04-03 PROCEDURE — 99232 SBSQ HOSP IP/OBS MODERATE 35: CPT | Performed by: INTERNAL MEDICINE

## 2025-04-03 PROCEDURE — 85610 PROTHROMBIN TIME: CPT | Performed by: STUDENT IN AN ORGANIZED HEALTH CARE EDUCATION/TRAINING PROGRAM

## 2025-04-03 PROCEDURE — 99239 HOSP IP/OBS DSCHRG MGMT >30: CPT | Performed by: STUDENT IN AN ORGANIZED HEALTH CARE EDUCATION/TRAINING PROGRAM

## 2025-04-03 RX ORDER — LEVETIRACETAM 500 MG/1
500 TABLET ORAL 2 TIMES DAILY
Start: 2025-04-03 | End: 2025-06-02

## 2025-04-03 RX ORDER — ATORVASTATIN CALCIUM 80 MG/1
80 TABLET, FILM COATED ORAL NIGHTLY
Qty: 30 TABLET | Refills: 2 | Status: SHIPPED | OUTPATIENT
Start: 2025-04-03 | End: 2025-07-02

## 2025-04-03 RX ORDER — DULOXETIN HYDROCHLORIDE 60 MG/1
60 CAPSULE, DELAYED RELEASE ORAL EVERY EVENING
Qty: 30 CAPSULE | Refills: 1 | Status: SHIPPED | OUTPATIENT
Start: 2025-04-03 | End: 2025-06-02

## 2025-04-03 RX ORDER — GABAPENTIN 300 MG/1
600 CAPSULE ORAL EVERY 8 HOURS SCHEDULED
Start: 2025-04-03 | End: 2025-06-02

## 2025-04-03 RX ORDER — ASPIRIN 81 MG/1
81 TABLET ORAL DAILY
Qty: 30 TABLET | Refills: 3 | Status: SHIPPED | OUTPATIENT
Start: 2025-04-03 | End: 2025-08-01

## 2025-04-03 RX ORDER — ERGOCALCIFEROL 1.25 MG/1
1.25 CAPSULE ORAL WEEKLY
Start: 2025-04-03 | End: 2025-05-03

## 2025-04-03 RX ORDER — ALBUTEROL SULFATE 90 UG/1
2 INHALANT RESPIRATORY (INHALATION) EVERY 6 HOURS PRN
Qty: 18 G | Refills: 11 | Status: SHIPPED | OUTPATIENT
Start: 2025-04-03

## 2025-04-03 RX ORDER — PANTOPRAZOLE SODIUM 40 MG/1
40 TABLET, DELAYED RELEASE ORAL
Start: 2025-04-03

## 2025-04-03 RX ORDER — AMOXICILLIN AND CLAVULANATE POTASSIUM 875; 125 MG/1; MG/1
1 TABLET, FILM COATED ORAL EVERY 12 HOURS SCHEDULED
Qty: 17 TABLET | Refills: 0 | Status: SHIPPED | OUTPATIENT
Start: 2025-04-03 | End: 2025-04-12

## 2025-04-03 RX ORDER — METOPROLOL TARTRATE 25 MG/1
12.5 TABLET, FILM COATED ORAL 2 TIMES DAILY
Start: 2025-04-03 | End: 2025-06-02

## 2025-04-03 RX ADMIN — TIZANIDINE 4 MG: 4 TABLET ORAL at 08:28

## 2025-04-03 RX ADMIN — SODIUM HYPOCHLORITE: 2.5 SOLUTION TOPICAL at 08:29

## 2025-04-03 RX ADMIN — ACETAMINOPHEN 975 MG: 325 TABLET ORAL at 14:24

## 2025-04-03 RX ADMIN — GABAPENTIN 600 MG: 300 CAPSULE ORAL at 05:33

## 2025-04-03 RX ADMIN — DIVALPROEX SODIUM 500 MG: 500 TABLET, FILM COATED, EXTENDED RELEASE ORAL at 08:28

## 2025-04-03 RX ADMIN — BUSPIRONE HYDROCHLORIDE 10 MG: 5 TABLET ORAL at 08:27

## 2025-04-03 RX ADMIN — AMOXICILLIN AND CLAVULANATE POTASSIUM 1 TABLET: 875; 125 TABLET, FILM COATED ORAL at 08:28

## 2025-04-03 RX ADMIN — LEVOTHYROXINE SODIUM 25 MCG: 25 TABLET ORAL at 05:33

## 2025-04-03 RX ADMIN — ASPIRIN 81 MG: 81 TABLET, CHEWABLE ORAL at 08:28

## 2025-04-03 RX ADMIN — LEVETIRACETAM 500 MG: 500 TABLET, FILM COATED ORAL at 08:28

## 2025-04-03 RX ADMIN — GABAPENTIN 600 MG: 300 CAPSULE ORAL at 14:24

## 2025-04-03 RX ADMIN — PANTOPRAZOLE SODIUM 40 MG: 40 TABLET, DELAYED RELEASE ORAL at 05:33

## 2025-04-03 RX ADMIN — LOPERAMIDE HYDROCHLORIDE 2 MG: 2 CAPSULE ORAL at 08:27

## 2025-04-03 RX ADMIN — ACETAMINOPHEN 975 MG: 325 TABLET ORAL at 05:33

## 2025-04-03 RX ADMIN — OXYCODONE HYDROCHLORIDE 5 MG: 5 TABLET ORAL at 14:50

## 2025-04-03 RX ADMIN — TIZANIDINE 4 MG: 4 TABLET ORAL at 14:25

## 2025-04-03 ASSESSMENT — PAIN - FUNCTIONAL ASSESSMENT: PAIN_FUNCTIONAL_ASSESSMENT: 0-10

## 2025-04-03 ASSESSMENT — PAIN SCALES - GENERAL
PAINLEVEL_OUTOF10: 0 - NO PAIN
PAINLEVEL_OUTOF10: 8

## 2025-04-03 NOTE — PROGRESS NOTES
Palliative Medicine following for:  Complex medical decision making, symptom management, patient/family support    History obtained from chart review including ED note, H&P, patient's daily progress notes, review of lab/test results, and discussion with primary team and bedside RN.    Chief Complaint: non healing wounds    Subjective    History of Present Illness  Shirin is laying in bed.   She has non healing wounds on the left above the knee amputation stump. The pain is currently controlled.  She has intermittent pain on the left AKA stump mostly moderate severity, occasionally 10/10, improves to 2/10 with oxycodone 5 mg.     She feels sleepy because she normally sleeps early in the morning until midday. Her sleeping habits are long-term for decades. She has difficulty adjusting during hospitalizations because doctor visit and nursing care are provided during the daytime.    She has chronic diarrhea which she attributes to her partial colectomy since 2015 and takes 2 doses of lorazepam daily.    Symptoms  Pain: as noted above  Dyspnea: none  Fatigue: none  Insomnia: chronic and long-term  Drowsiness: in the morning  Constipation: none  Nausea: none  Appetite: good  Anxiety: none  Depression: none    Objective    Last Recorded Vitals  /62   Pulse 83   Temp 36.3 °C (97.3 °F) (Temporal)   Resp 17   SpO2 95%      Physical Exam  Constitutional:       Appearance: She is not ill-appearing or toxic-appearing.   Eyes:      General: No scleral icterus.  Cardiovascular:      Rate and Rhythm: Normal rate and regular rhythm.   Pulmonary:      Breath sounds: No wheezing or rales.   Abdominal:      General: Bowel sounds are normal. There is no distension.      Palpations: Abdomen is soft.   Musculoskeletal:         General: Swelling (AKA stump) present.      Comments: Dressing over left high AKA stump   Skin:     General: Skin is warm.   Neurological:      Mental Status: She is alert and oriented to person, place, and  time.      GCS: GCS eye subscore is 4. GCS verbal subscore is 5. GCS motor subscore is 6.   Psychiatric:         Attention and Perception: Attention normal.         Mood and Affect: Mood normal.         Speech: Speech normal.         Behavior: Behavior normal. Behavior is cooperative.         Thought Content: Thought content normal.         Cognition and Memory: Cognition normal.          Relevant Results   Results for orders placed or performed during the hospital encounter of 03/29/25 (from the past 24 hours)   POCT GLUCOSE   Result Value Ref Range    POCT Glucose 91 74 - 99 mg/dL   POCT GLUCOSE   Result Value Ref Range    POCT Glucose 160 (H) 74 - 99 mg/dL   POCT GLUCOSE   Result Value Ref Range    POCT Glucose 109 (H) 74 - 99 mg/dL   CBC and Auto Differential   Result Value Ref Range    WBC 11.4 (H) 4.4 - 11.3 x10*3/uL    nRBC 0.0 0.0 - 0.0 /100 WBCs    RBC 4.16 4.00 - 5.20 x10*6/uL    Hemoglobin 8.1 (L) 12.0 - 16.0 g/dL    Hematocrit 31.0 (L) 36.0 - 46.0 %    MCV 75 (L) 80 - 100 fL    MCH 19.5 (L) 26.0 - 34.0 pg    MCHC 26.1 (L) 32.0 - 36.0 g/dL    RDW 19.3 (H) 11.5 - 14.5 %    Platelets 631 (H) 150 - 450 x10*3/uL    Neutrophils % 76.1 40.0 - 80.0 %    Immature Granulocytes %, Automated 0.4 0.0 - 0.9 %    Lymphocytes % 15.1 13.0 - 44.0 %    Monocytes % 6.2 2.0 - 10.0 %    Eosinophils % 1.8 0.0 - 6.0 %    Basophils % 0.4 0.0 - 2.0 %    Neutrophils Absolute 8.65 (H) 1.20 - 7.70 x10*3/uL    Immature Granulocytes Absolute, Automated 0.05 0.00 - 0.70 x10*3/uL    Lymphocytes Absolute 1.72 1.20 - 4.80 x10*3/uL    Monocytes Absolute 0.70 0.10 - 1.00 x10*3/uL    Eosinophils Absolute 0.20 0.00 - 0.70 x10*3/uL    Basophils Absolute 0.04 0.00 - 0.10 x10*3/uL   Magnesium   Result Value Ref Range    Magnesium 1.78 1.60 - 2.40 mg/dL   Renal Function Panel   Result Value Ref Range    Glucose 86 74 - 99 mg/dL    Sodium 141 136 - 145 mmol/L    Potassium 3.7 3.5 - 5.3 mmol/L    Chloride 107 98 - 107 mmol/L    Bicarbonate 25 21 -  32 mmol/L    Anion Gap 13 10 - 20 mmol/L    Urea Nitrogen 8 6 - 23 mg/dL    Creatinine 0.44 (L) 0.50 - 1.05 mg/dL    eGFR >90 >60 mL/min/1.73m*2    Calcium 7.9 (L) 8.6 - 10.6 mg/dL    Phosphorus 3.4 2.5 - 4.9 mg/dL    Albumin 2.5 (L) 3.4 - 5.0 g/dL   Protime-INR   Result Value Ref Range    Protime 37.5 (H) 9.8 - 12.4 seconds    INR 3.4 (H) 0.9 - 1.1   POCT GLUCOSE   Result Value Ref Range    POCT Glucose 99 74 - 99 mg/dL        Allergies  Aspartame and Nsaids (non-steroidal anti-inflammatory drug)  Medications  Scheduled medications  acetaminophen, 975 mg, oral, q8h  amoxicillin-pot clavulanate, 1 tablet, oral, q12h NINO  aspirin, 81 mg, oral, Daily  atorvastatin, 80 mg, oral, Nightly  busPIRone, 10 mg, oral, q AM  divalproex, 1,000 mg, oral, Nightly  divalproex, 500 mg, oral, Daily  DULoxetine, 60 mg, oral, q PM  [Held by provider] empagliflozin, 10 mg, oral, Daily  ergocalciferol, 1,250 mcg, oral, Weekly  gabapentin, 600 mg, oral, q8h NINO  insulin lispro, 0-5 Units, subcutaneous, TID AC  levETIRAcetam, 500 mg, oral, BID  levothyroxine, 25 mcg, oral, Daily before breakfast  lidocaine, 1 patch, transdermal, Daily  [Held by provider] metoprolol tartrate, 12.5 mg, oral, BID  nicotine, 1 patch, transdermal, Daily  pantoprazole, 40 mg, oral, Daily before breakfast  [Held by provider] psyllium, 1 packet, oral, TID  sodium hypochlorite, , irrigation, BID  tiZANidine, 4 mg, oral, TID  warfarin, 3 mg, oral, Daily      Continuous medications     PRN medications  PRN medications: albuterol, dextrose, dextrose, glucagon, glucagon, loperamide, melatonin, naloxone, oxyCODONE     Assessment/Plan    Non healing wounds left above the knee stumps x 2  Severe PAD s/p R hip disarticulation s/p L CFA and EIA embolectomy 12/2023  s/p L femoral endarterectomy, profundaplasty, common [id number] tibial bypass, s/p multiple left lower extremity amputations toe amputations, and eventually required left high above the knee amputation  last 1/2/2025 complicated by phantom limb pain.     She also has DM type 2 complicated by diabetic neuropathy, HTN, HLD, renal and splenic thromboses.    Chronic diarrhea.    Palliative Performance Scale (PPS): 40      Advanced Care Planning  Patient and/or family consented to a voluntary Advanced Care Planning meeting.   Serious Illness Assessment and Counseling:  Life Limiting Disease: Severe PAD, non healing amputation stump wounds due to severe PAD posing threat to life.    Disease Specific Information Provided/Prognosis Discussed: Patient's current clinical condition, including diagnosis, prognosis, and management plan were discussed.   Counseling provided on guarded prognosis and what to expect with disease progression of Severe PAD, non healing amputation stump wounds due to severe .   Counseling provided on the irreversible and progressive nature of patient's diseases including PAD.    Understanding/Overall Impression: Patient expressing clear understanding of overall health status and severity of illness.     Goals/Hopes: Discussion ensued about patient's goals for their medical care going forward. Allowed patient time to talk about her current quality of life, disease course/progression, and symptom and treatment burden. Discussed care plan to continue with aggressive hospital care despite symptom and treatment burden versus choosing to transition to comfort based plan of care that focuses on symptom management and quality of life.    She is hoping that the wounds would eventually heal with wound care in a skilled nursing facility and antibiotics.   She is hoping to eventually be able to get back home and retain quality of life (such as talking to her aunt, be at the comfort of her own home, maintain ability to transfer,  around with her wheelchair, cooking, and crocheting).    Fears/Worries/Concerns:   She worries that the wounds will not heal and she would not know what to do when that  happens.    Patient's Perception of Functional Status:    Her functional status has been affected by her amputations but has been able to adapt and adjust. She said she's able to transfer to the wheelchair and move around. She's able to clean herself and change adult diaper in bed in between her nursing aid visits.    Minimal Acceptable Quality of Life/Maximal [place] Tolerable for the Possibility of More Time: Counseling provided on the concept of MAO/Maximal [place].     Patient expressing that she would never want to be in a health state where she is dependent on a mechanical ventilator intubated or with a tracheostomy, or have a permanent feeding tube if they could not eat. Patient deems that this would not be an acceptable quality of life for the patient.     Health Preferences and Priorities with Disease Progression:   Family (aunt Loly) is aware of wishes to not pursue aggressive interventions as disease progresses to a terminal state.     Resuscitation Assessment: Counseling provided on the benefit versus burden of CPR in the setting of the patient's overall health status. She expressed wishes to change her code status to DNR and DNI.     I spent 20 minutes in providing separately identifiable ACP services with the patient and/or surrogate decision maker in a voluntary conversation discussing the patient's wishes and goals as detailed in the above note.     #Palliative Care Encounter.  Support and empathy was provided throughout the encounter. Provided reflective listening and presence.   I provided her with emotional, informational and communication support.      #Complex Medical Decision Making   #Goals of Care  - Goals are mix of survival and time and improved quality of life    #Advance Care Planning   - Code status: Change to DNR and DNI  - Surrogate decision maker:     Loly Blackburn (Relative)  [phone] (Home Phone)    - State DNR form completed and placed in patient's chart   - Advanced Directives  on file   - Copies requested of Advanced Directives to be placed on file     #Acute/chronic Pain   Acetaminophen 650 mg PO Q 6 hours PRN for mild pain.  Oxycodone 5 mg PO Q 6 hours for moderate and severe pain.  Lidocaine patch.  Gabapentin 600 mg TID.  Duloxetine 60 mg Q evening.    #Chronic diarrhea  Loperamide 2 tabs daily and PRN 4 times a day.    #Insomnia  Non pharmacologic management of insomnia: improve sleep hygiene, relaxation therapies, stimulus control, and day time sleep restriction.  Melatonin 5 mg od hs.       #Psychosocial Support  - Music Therapy: Declined for now  - Spiritual Care Support: Declined for now  - Art Therapy: Declined for now      Plan of Care discussed with: Updated MD Dr. Armstrong and bedside RN on goals of care decision, medication adjustments, and code status     Medical Decision Making   - non healing amputation stump wound and infection in the setting of severe PAD posing threat to life and function   - Reviewed external notes from Internal medicine 4/1 and 4/2  - Reviews results from BMP, CBC, and wound culture  - Discussion of management with Dr. Armstrong  - Decision not to resuscitate or to de-escalate care because of poor prognosis: decision to change code status to DNR and DNI  - controlled substance medication: oxycodone  - She is on warfarin for hypercoagulability with history of renal and splenic thromboses requiring INR monitoring.    Thank you for allowing us to care for this patient. Palliative Team will continue to follow as needed. Please contact team with any questions or concerns.   Team pager [phone] (weekdays)    Alireza Dykes MD  Palliative Care Physician  Message: Epic Secure chat  Team pager [phone]

## 2025-04-03 NOTE — DISCHARGE SUMMARY
Discharge Diagnosis  Cellulitis of left lower extremity    Issues Requiring Follow-Up  PT/OT at [facility], wound care    Discharge Meds     Medication List      START taking these medications     amoxicillin-pot clavulanate 875-125 mg tablet; Commonly known as:   Augmentin; Take 1 tablet by mouth every 12 hours for 17 doses.   pantoprazole 40 mg EC tablet; Commonly known as: ProtoNix; Take 1 tablet   (40 mg) by mouth once daily in the morning. Take before meals. Do not   crush, chew, or split.     CHANGE how you take these medications     ergocalciferol 1250 mcg (50,000 units) capsule; Commonly known as:   Vitamin D-2; Take 1 capsule (1,250 mcg) by mouth 1 (one) time per week.;   What changed: See the new instructions.   gabapentin 300 mg capsule; Commonly known as: Neurontin; Take 2 capsules   (600 mg) by mouth every 8 hours.; What changed: Another medication with   the same name was removed. Continue taking this medication, and follow the   directions you see here.   metoprolol tartrate 25 mg tablet; Commonly known as: Lopressor; Take 0.5   tablets (12.5 mg) by mouth 2 times a day.; What changed: how much to take   warfarin 3 mg tablet; Commonly known as: Coumadin; What changed: Another   medication with the same name was removed. Continue taking this   medication, and follow the directions you see here.     CONTINUE taking these medications     acetaminophen 325 mg tablet; Commonly known as: Tylenol; Take 3 tablets   (975 mg) by mouth every 8 hours if needed for mild pain (1 - 3).   albuterol 90 mcg/actuation inhaler; Inhale 2 puffs every 6 hours if   needed for shortness of breath.   aspirin 81 mg EC tablet; Take 1 tablet (81 mg) by mouth once daily.   atorvastatin 80 mg tablet; Commonly known as: Lipitor; Take 1 tablet (80   mg) by mouth once daily at bedtime.   busPIRone 10 mg tablet; Commonly known as: Buspar   divalproex 500 mg 24 hr tablet; Commonly known as: Depakote ER   DULoxetine 60 mg DR capsule; Commonly  known as: Cymbalta; Take 1 capsule   (60 mg) by mouth once daily in the evening.   levETIRAcetam 500 mg tablet; Commonly known as: Keppra; Take 1 tablet   (500 mg) by mouth 2 times a day.   levothyroxine 25 mcg tablet; Commonly known as: Synthroid, Levoxyl   lidocaine 5 % patch; Commonly known as: Lidoderm   loperamide 2 mg capsule; Commonly known as: Imodium   melatonin 10 mg tablet   multivitamin tablet   naloxone 4 mg/0.1 mL nasal spray; Commonly known as: Narcan; Administer   1 spray (4 mg) into affected nostril(s) if needed for opioid reversal or   respiratory depression. May repeat every 2-3 minutes if needed,   alternating nostrils, until medical assistance becomes available.   omeprazole 20 mg DR capsule; Commonly known as: PriLOSEC   oxyCODONE 5 mg immediate release tablet; Commonly known as: Oxaydo   tiZANidine 4 mg tablet; Commonly known as: Zanaflex     STOP taking these medications     clopidogrel 75 mg tablet; Commonly known as: Plavix   empagliflozin 10 mg tablet; Commonly known as: Jardiance   psyllium 3.4 gram packet; Commonly known as: Metamucil       Test Results Pending At Discharge  Pending Labs       No current pending labs.            Hospital Course  Shirin Slater is a 54 y.o. 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 Vascular surgery and orthopedics involved. Wound care consulted to direct wound care.     Blood cx x2 (3/29) with ngtd, but wound cx x2 (3/29) growing 3-4+ Proteus mirabilis and 3+ mixed gram positive and negative bacteria. Repeat imaging including CT left femur (3/28), XR left femur (3/28, 3/29), and XR left hip (3/29) c/w cellulitis, with no e/o osteomyelitis or necrotizing  fasciitis at this time.     IV vancomycin and Zosyn stopped and was transitioned to Augmentin 875 mg BID for 14 days (end date 4/11/25). She will need continued wound care to left stump wound. Discharged to SNF on 4/3/2025.    > 30min in discharge coordination of care which includes: discharge planning, medication reconciliation, arranging appropriate follow up and discussion of discharge instructions with the patient.      Pertinent Physical Exam At Time of Discharge  Physical Exam  Vitals and nursing note reviewed.   Constitutional:       General: She is not in acute distress.  HENT:      Head: Normocephalic and atraumatic.      Mouth/Throat:      Mouth: Mucous membranes are moist.   Eyes:      Extraocular Movements: Extraocular movements intact.      Pupils: Pupils are equal, round, and reactive to light.   Cardiovascular:      Rate and Rhythm: Normal rate and regular rhythm.   Pulmonary:      Effort: Pulmonary effort is normal.      Breath sounds: Normal breath sounds.   Abdominal:      General: There is no distension.      Palpations: Abdomen is soft.      Tenderness: There is no abdominal tenderness.   Musculoskeletal:      Cervical back: Normal range of motion.      Comments: S/p R hip disarticulation  L AKA dressing CDI   Skin:     Capillary Refill: Capillary refill takes less than 2 seconds.      Comments: S/p R hip disarticulation  L AKA dressing CDI   Neurological:      Mental Status: She is alert.      Comments: A&O x3     Outpatient Follow-Up  No future appointments.      Bhavik Armstrong DO

## 2025-04-03 NOTE — CARE PLAN
The patient's goals for the shift include      The clinical goals for the shift include patient will tolerate po abx well throughout this shift    Patient tolerate po abx well throughout this shift

## 2025-04-03 NOTE — ACP (ADVANCE CARE PLANNING)
Confirming Previous Code Status:   Advance Care Planning Note     Discussion Date: 04/03/25   Discussion Participants: patient    The patient wishes to discuss Advance Care Planning today and the following is a brief summary of our discussion.     Patient has capacity to make their own medical decisions: Yes  Health Care Agent/Surrogate Decision Maker documented in chart: Yes    Documents on file and valid:  Advance Directive/Living Will: Yes   Health Care Power of : Yes    Advanced Care Planning  Patient and/or family consented to a voluntary Advanced Care Planning meeting.   Serious Illness Assessment and Counseling:  Life Limiting Disease: Severe PAD, non healing amputation stump wounds due to severe PAD posing threat to life.     Disease Specific Information Provided/Prognosis Discussed: Patient's current clinical condition, including diagnosis, prognosis, and management plan were discussed.   Counseling provided on guarded prognosis and what to expect with disease progression of Severe PAD, non healing amputation stump wounds due to severe .   Counseling provided on the irreversible and progressive nature of patient's diseases including PAD.     Understanding/Overall Impression: Patient expressing clear understanding of overall health status and severity of illness.      Goals/Hopes: Discussion ensued about patient's goals for their medical care going forward. Allowed patient time to talk about her current quality of life, disease course/progression, and symptom and treatment burden. Discussed care plan to continue with aggressive hospital care despite symptom and treatment burden versus choosing to transition to comfort based plan of care that focuses on symptom management and quality of life.               She is hoping that the wounds would eventually heal with wound care in a skilled nursing facility and antibiotics.              She is hoping to eventually be able to get back home and retain  quality of life (such as talking to her aunt, be at the comfort of her own home, maintain ability to transfer,  around with her wheelchair, cooking, and crocheting).     Fears/Worries/Concerns:              She worries that the wounds will not heal and she would not know what to do when that happens.     Patient's Perception of Functional Status:               Her functional status has been affected by her amputations but has been able to adapt and adjust. She said she's able to transfer to the wheelchair and move around. She's able to clean herself and change adult diaper in bed in between her nursing aid visits.     Minimal Acceptable Quality of Life/Maximal [place] Tolerable for the Possibility of More Time: Counseling provided on the concept of MAO/Maximal [place].      Patient expressing that she would never want to be in a health state where she is dependent on a mechanical ventilator intubated or with a tracheostomy, or have a permanent feeding tube if they could not eat. Patient deems that this would not be an acceptable quality of life for the patient.      Health Preferences and Priorities with Disease Progression:   Family (aunt Loly) is aware of wishes to not pursue aggressive interventions as disease progresses to a terminal state.      Resuscitation Assessment: Counseling provided on the benefit versus burden of CPR in the setting of the patient's overall health status. She expressed wishes to change her code status to DNR and DNI.      I spent 20 minutes in providing separately identifiable ACP services with the patient and/or surrogate decision maker in a voluntary conversation discussing the patient's wishes and goals as detailed in the above note.       Alierza Dykes MD  4/3/2025 11:55 AM

## 2025-04-03 NOTE — PROGRESS NOTES
04/03/25 1152   Discharge Planning   Home or Post Acute Services Post acute facilities (Rehab/SNF/etc)   Type of Post Acute Facility Services Skilled nursing   Expected Discharge Disposition SNF  ([facility])     Transitional Care Coordinator Discharge Planning Note:  Patient medically ready for discharge : 4/3  Patient received auth to Admit to: [facility]  RN report #:  [phone]  TCC request transport for: 4pm    The S Vehicle you requested for Shirin RENE in unit/room [place] [id number] on 04/03/2025 is scheduled to arrive at 4:45pm EDT! [facility] is handling this ride and you can contact them at [phone].    JESSICA CohnN-RN  Transitional Care Coordinator (TCC)  [phone] ext [phone]

## 2025-04-03 NOTE — CARE PLAN
Problem: Fall/Injury  Goal: Not fall by end of shift  Outcome: Progressing  Goal: Be free from injury by end of the shift  Outcome: Progressing  Goal: Verbalize understanding of personal risk factors for fall in the hospital  Outcome: Progressing  Goal: Verbalize understanding of risk factor reduction measures to prevent injury from fall in the home  Outcome: Progressing  Goal: Use assistive devices by end of the shift  Outcome: Progressing  Goal: Pace activities to prevent fatigue by end of the shift  Outcome: Progressing   The patient's goals for the shift include      The clinical goals for the shift include Patient will remain safe throughout this shift    Over the shift, the patient did not make progress toward the following goals.

## 2025-04-03 NOTE — PROGRESS NOTES
Am familiar with pt from previous admissions.  This SW had referred pt to [facility] Palliative care team who are still following.  Updated [facility] that pt will be transferred to [facility] once precert obtained.  They will follow up with pt there.      DON Mittal

## 2025-04-06 LAB
ACID FAST STN SPEC: NORMAL
MYCOBACTERIUM SPEC CULT: NORMAL

## 2025-05-20 ENCOUNTER — TELEPHONE (OUTPATIENT)
Dept: FAMILY MEDICINE CLINIC | Age: 55
End: 2025-05-20

## 2025-05-20 NOTE — TELEPHONE ENCOUNTER
Ean/[facility] called to ask if Dr. Smith will follow for home care once patient is discharged from [facility].      Last seen 11/20/2024  Next appt 8/19/2025

## 2025-06-30 DIAGNOSIS — Z76.0 ENCOUNTER FOR MEDICATION REFILL: ICD-10-CM

## 2025-06-30 DIAGNOSIS — E11.51 TYPE 2 DIABETES MELLITUS WITH DIABETIC PERIPHERAL ANGIOPATHY WITHOUT GANGRENE, WITHOUT LONG-TERM CURRENT USE OF INSULIN (HCC): ICD-10-CM

## 2025-06-30 RX ORDER — EMPAGLIFLOZIN 10 MG/1
10 TABLET, FILM COATED ORAL DAILY
Qty: 90 TABLET | Refills: 3 | OUTPATIENT
Start: 2025-06-30

## 2025-08-07 ENCOUNTER — TELEPHONE (OUTPATIENT)
Dept: FAMILY MEDICINE CLINIC | Age: 55
End: 2025-08-07

## 2025-08-18 DIAGNOSIS — Z79.01 ANTICOAGULANT LONG-TERM USE: ICD-10-CM

## 2025-08-18 DIAGNOSIS — I70.229 CRITICAL LIMB ISCHEMIA WITH HISTORY OF REVASCULARIZATION OF SAME EXTREMITY: ICD-10-CM

## 2025-08-18 DIAGNOSIS — Z98.890 CRITICAL LIMB ISCHEMIA WITH HISTORY OF REVASCULARIZATION OF SAME EXTREMITY: ICD-10-CM

## 2025-08-27 ENCOUNTER — TELEPHONE (OUTPATIENT)
Dept: FAMILY MEDICINE CLINIC | Age: 55
End: 2025-08-27

## 2025-08-28 ENCOUNTER — TELEPHONE (OUTPATIENT)
Dept: FAMILY MEDICINE CLINIC | Age: 55
End: 2025-08-28

## 2025-08-31 ASSESSMENT — PATIENT HEALTH QUESTIONNAIRE - PHQ9
10. IF YOU CHECKED OFF ANY PROBLEMS, HOW DIFFICULT HAVE THESE PROBLEMS MADE IT FOR YOU TO DO YOUR WORK, TAKE CARE OF THINGS AT HOME, OR GET ALONG WITH OTHER PEOPLE: VERY DIFFICULT
8. MOVING OR SPEAKING SO SLOWLY THAT OTHER PEOPLE COULD HAVE NOTICED. OR THE OPPOSITE, BEING SO FIGETY OR RESTLESS THAT YOU HAVE BEEN MOVING AROUND A LOT MORE THAN USUAL: SEVERAL DAYS
7. TROUBLE CONCENTRATING ON THINGS, SUCH AS READING THE NEWSPAPER OR WATCHING TELEVISION: NOT AT ALL
6. FEELING BAD ABOUT YOURSELF - OR THAT YOU ARE A FAILURE OR HAVE LET YOURSELF OR YOUR FAMILY DOWN: NOT AT ALL
6. FEELING BAD ABOUT YOURSELF - OR THAT YOU ARE A FAILURE OR HAVE LET YOURSELF OR YOUR FAMILY DOWN: NOT AT ALL
8. MOVING OR SPEAKING SO SLOWLY THAT OTHER PEOPLE COULD HAVE NOTICED. OR THE OPPOSITE - BEING SO FIDGETY OR RESTLESS THAT YOU HAVE BEEN MOVING AROUND A LOT MORE THAN USUAL: SEVERAL DAYS
9. THOUGHTS THAT YOU WOULD BE BETTER OFF DEAD, OR OF HURTING YOURSELF: NOT AT ALL
7. TROUBLE CONCENTRATING ON THINGS, SUCH AS READING THE NEWSPAPER OR WATCHING TELEVISION: NOT AT ALL
SUM OF ALL RESPONSES TO PHQ QUESTIONS 1-9: 3
2. FEELING DOWN, DEPRESSED OR HOPELESS: NOT AT ALL
1. LITTLE INTEREST OR PLEASURE IN DOING THINGS: MORE THAN HALF THE DAYS
5. POOR APPETITE OR OVEREATING: NOT AT ALL
10. IF YOU CHECKED OFF ANY PROBLEMS, HOW DIFFICULT HAVE THESE PROBLEMS MADE IT FOR YOU TO DO YOUR WORK, TAKE CARE OF THINGS AT HOME, OR GET ALONG WITH OTHER PEOPLE: VERY DIFFICULT
SUM OF ALL RESPONSES TO PHQ QUESTIONS 1-9: 3
5. POOR APPETITE OR OVEREATING: NOT AT ALL
9. THOUGHTS THAT YOU WOULD BE BETTER OFF DEAD, OR OF HURTING YOURSELF: NOT AT ALL
3. TROUBLE FALLING OR STAYING ASLEEP: NOT AT ALL
SUM OF ALL RESPONSES TO PHQ QUESTIONS 1-9: 3
SUM OF ALL RESPONSES TO PHQ QUESTIONS 1-9: 3
4. FEELING TIRED OR HAVING LITTLE ENERGY: NOT AT ALL
4. FEELING TIRED OR HAVING LITTLE ENERGY: NOT AT ALL
1. LITTLE INTEREST OR PLEASURE IN DOING THINGS: MORE THAN HALF THE DAYS
3. TROUBLE FALLING OR STAYING ASLEEP: NOT AT ALL
2. FEELING DOWN, DEPRESSED OR HOPELESS: NOT AT ALL
SUM OF ALL RESPONSES TO PHQ QUESTIONS 1-9: 3

## 2025-09-03 ENCOUNTER — OFFICE VISIT (OUTPATIENT)
Dept: FAMILY MEDICINE CLINIC | Age: 55
End: 2025-09-03
Payer: MEDICARE

## 2025-09-03 VITALS
OXYGEN SATURATION: 98 % | RESPIRATION RATE: 16 BRPM | WEIGHT: 163 LBS | SYSTOLIC BLOOD PRESSURE: 120 MMHG | DIASTOLIC BLOOD PRESSURE: 78 MMHG | BODY MASS INDEX: 37.72 KG/M2 | HEIGHT: 55 IN | TEMPERATURE: 98.2 F | HEART RATE: 130 BPM

## 2025-09-03 DIAGNOSIS — Z89.611 S/P AKA (ABOVE KNEE AMPUTATION) BILATERAL (HCC): Primary | ICD-10-CM

## 2025-09-03 DIAGNOSIS — S81.802S WOUND OF LEFT LOWER EXTREMITY, SEQUELA: ICD-10-CM

## 2025-09-03 DIAGNOSIS — R26.89 UNABLE TO MOBILIZE IN HOME: ICD-10-CM

## 2025-09-03 DIAGNOSIS — Z89.612 S/P AKA (ABOVE KNEE AMPUTATION) BILATERAL (HCC): Primary | ICD-10-CM

## 2025-09-03 DIAGNOSIS — Z79.01 ANTICOAGULANT LONG-TERM USE: ICD-10-CM

## 2025-09-03 PROBLEM — S81.802A WOUND OF LEFT LEG: Status: ACTIVE | Noted: 2025-09-03

## 2025-09-03 PROCEDURE — G2211 COMPLEX E/M VISIT ADD ON: HCPCS | Performed by: FAMILY MEDICINE

## 2025-09-03 PROCEDURE — G8427 DOCREV CUR MEDS BY ELIG CLIN: HCPCS | Performed by: FAMILY MEDICINE

## 2025-09-03 PROCEDURE — 3017F COLORECTAL CA SCREEN DOC REV: CPT | Performed by: FAMILY MEDICINE

## 2025-09-03 PROCEDURE — 3078F DIAST BP <80 MM HG: CPT | Performed by: FAMILY MEDICINE

## 2025-09-03 PROCEDURE — 99215 OFFICE O/P EST HI 40 MIN: CPT | Performed by: FAMILY MEDICINE

## 2025-09-03 PROCEDURE — 3074F SYST BP LT 130 MM HG: CPT | Performed by: FAMILY MEDICINE

## 2025-09-03 PROCEDURE — G8417 CALC BMI ABV UP PARAM F/U: HCPCS | Performed by: FAMILY MEDICINE

## 2025-09-03 PROCEDURE — 4004F PT TOBACCO SCREEN RCVD TLK: CPT | Performed by: FAMILY MEDICINE

## 2025-09-03 RX ORDER — OXYCODONE HYDROCHLORIDE 5 MG/1
TABLET ORAL
COMMUNITY
Start: 2025-08-07

## 2025-09-03 RX ORDER — WARFARIN SODIUM 3 MG/1
TABLET ORAL
COMMUNITY
Start: 2025-08-18

## 2025-09-03 RX ORDER — DOXYCYCLINE HYCLATE 100 MG
100 TABLET ORAL 2 TIMES DAILY
Qty: 14 TABLET | Refills: 0 | Status: SHIPPED | OUTPATIENT
Start: 2025-09-03 | End: 2025-09-10

## 2025-09-03 SDOH — ECONOMIC STABILITY: FOOD INSECURITY: WITHIN THE PAST 12 MONTHS, THE FOOD YOU BOUGHT JUST DIDN'T LAST AND YOU DIDN'T HAVE MONEY TO GET MORE.: NEVER TRUE

## 2025-09-03 SDOH — ECONOMIC STABILITY: FOOD INSECURITY: WITHIN THE PAST 12 MONTHS, YOU WORRIED THAT YOUR FOOD WOULD RUN OUT BEFORE YOU GOT MONEY TO BUY MORE.: NEVER TRUE

## 2025-09-03 ASSESSMENT — ENCOUNTER SYMPTOMS
VOMITING: 0
SHORTNESS OF BREATH: 0
SORE THROAT: 0
BLOOD IN STOOL: 0
ABDOMINAL PAIN: 0
COUGH: 0
WHEEZING: 0
DIARRHEA: 0
NAUSEA: 0
BACK PAIN: 0
CONSTIPATION: 0

## 2025-09-04 ENCOUNTER — TELEPHONE (OUTPATIENT)
Dept: FAMILY MEDICINE CLINIC | Age: 55
End: 2025-09-04

## 2025-09-05 ENCOUNTER — TELEPHONE (OUTPATIENT)
Dept: FAMILY MEDICINE CLINIC | Age: 55
End: 2025-09-05

## (undated) DEVICE — Device

## (undated) DEVICE — SPONGE GZ 4IN 4IN 4 PLY N WVN AVANT

## (undated) DEVICE — STRAP, CIRCUMFERENTIAL, 2 X 76""

## (undated) DEVICE — TIP, SUCTION, YANKAUER, FLEXIBLE

## (undated) DEVICE — MANIFOLD, 4 PORT NEPTUNE STANDARD

## (undated) DEVICE — TOWEL, SURGICAL, NEURO, O/R, 16 X 26, BLUE, STERILE

## (undated) DEVICE — TRAY PROCED CUSTOM GASTROINTESTINAL

## (undated) DEVICE — SUTURE, VICRYL PLUS 3-0, SH, 27IN

## (undated) DEVICE — GAUZE,SPONGE,4"X4",16PLY,STRL,LF,10/TRAY: Brand: MEDLINE

## (undated) DEVICE — BLADE, SURGICAL, POLYMER COATED P10, STERILE, DISP

## (undated) DEVICE — DRAIN, PENROSE, 1/2 IN X 36 IN, STERILE, LF

## (undated) DEVICE — BANDAGE, ELASTIC, SELF-CLOSE, 4 IN, HONEYCOMB, STERILE

## (undated) DEVICE — SEALANT, HEMOSTATIC, FLOSEAL, 10 ML

## (undated) DEVICE — NEEDLE INSUF L120MM DIA2MM DISP FOR PNEUMOPERI ENDOPATH

## (undated) DEVICE — GOWN,SIRUS,NONRNF,SETINSLV,XL,20/CS: Brand: MEDLINE

## (undated) DEVICE — MEDI-VAC NON-CONDUCTIVE SUCTION TUBING: Brand: CARDINAL HEALTH

## (undated) DEVICE — Z DISCONTINUED USE 2218995 STAPLER INT L26CM STPL 29MM OPN INTLUMN CRV CIR ADJ HT

## (undated) DEVICE — CATHETER, EMBOLECTOMY, 3F X 80CM, SYNTEL, SILICONE

## (undated) DEVICE — SHEATH, PINNACLE, 10 CM,  5FR INTRODUCER, 5FR DIA, 2.5 CM DIALATOR

## (undated) DEVICE — BLADE, SAW, SAGITTAL, 18.5 X 73 X 0.76 MM, STAINLESS STEEL, STERILE

## (undated) DEVICE — SET MAJOR INSTR HOUSE

## (undated) DEVICE — COVER,TABLE,44X90,STERILE: Brand: MEDLINE

## (undated) DEVICE — SYRINGE, 20 CC, LUER LOCK, MONOJECT, W/O CAP, LF

## (undated) DEVICE — STAPLER SKIN L440MM 32MM LNG 12 FIRING B FRM PWR + GRIPPING

## (undated) DEVICE — FORCEPS BX L240CM JAW DIA2.8MM L CAP W/ NDL MIC MESH TOOTH

## (undated) DEVICE — LAPAROSCOPIC SCISSORS: Brand: EPIX LAPAROSCOPIC SCISSORS

## (undated) DEVICE — SUTURE, SILK, 3-0, 30 IN, BR SH, BLACK

## (undated) DEVICE — DEVICE, INFLATION, ENCORE 26 (5/BOX)

## (undated) DEVICE — SHEARS LAP L45CM DIA5MM ULTRASONIC CRV TIP ADV HEMSTAS HARM

## (undated) DEVICE — DRAPE, SHEET, U, W/ADHESIVE STRIP, IMPERVIOUS, 60 X 70 IN, DISPOSABLE, LF, STERILE

## (undated) DEVICE — SUTURE, PROLENE, 3-0, 18 IN, PS2, BLUE

## (undated) DEVICE — LENS CORD 0-DEG 10 MM

## (undated) DEVICE — SUTURE, PROLENE, 5-0, 36 IN, C-1, CV-11, BLUE

## (undated) DEVICE — SUTURE, VICRYL, 3-0, 27 IN, SH

## (undated) DEVICE — TRAP SURG QUAD PARABOLA SLOT DSGN SFTY SCRN TRAPEASE

## (undated) DEVICE — SHUNT KIT, CAROTID, BY-PASS, 8-14 FR X 6 IN

## (undated) DEVICE — DEVICE, INFLATION, ENCORE 20

## (undated) DEVICE — DRAPE, SHEET, FAN FOLDED, HALF, 44 X 58 IN, DISPOSABLE, LF, STERILE

## (undated) DEVICE — APPLICATOR, CHLORAPREP, W/ORANGE TINT, 26ML

## (undated) DEVICE — NEEDLE, HYPODERMIC, 23 GA X 1.5 IN

## (undated) DEVICE — PROTECTOR, NERVE, ULNAR, PINK

## (undated) DEVICE — GENERATOR ELECSURG FORCETRAID

## (undated) DEVICE — WOUND VAC KIT, W/CANNISTER, 120 CC

## (undated) DEVICE — PADDING, WEBRIL, UNDERCAST, STERILE, 4 IN

## (undated) DEVICE — LUBRICANT SURG JELLY ST BACTER TUBE 4.25OZ

## (undated) DEVICE — GLOVE ORANGE PI 7 1/2   MSG9075

## (undated) DEVICE — IRON INTERN

## (undated) DEVICE — CONTAINER SPEC COLL 960ML POLYPR TRIANG GRAD INTAKE/OUTPUT

## (undated) DEVICE — DRAPE, IRRIGATION, W/POUCH, ADHESIVE STRIP, STERI DRAPE, 19 X 23 IN, DISPOSABLE, STERILE

## (undated) DEVICE — 40586 ADVANCED TRENDELENBURG POSITIONING KIT: Brand: 40586 ADVANCED TRENDELENBURG POSITIONING KIT

## (undated) DEVICE — DRAPE, INCISE, ANTIMICROBIAL, IOBAN 2, LARGE, 17 X 23 IN, DISPOSABLE, STERILE

## (undated) DEVICE — EXTENSION SET W/MALE LUER LOCK ADAPTER, VOLUME 2.4ML

## (undated) DEVICE — DRESSING, ABDOMINAL, WET PRUF, TENDERSORB, 5 X 9 IN, STERILE

## (undated) DEVICE — GLOVE, SURGICAL, PROTEXIS PI MICRO, 6.5, PF, LF

## (undated) DEVICE — PREP TRAY, SKIN, DRY, W/GLOVES

## (undated) DEVICE — DRESSING, ABDOMINAL PAD, CURITY, 8 X 10 IN

## (undated) DEVICE — HYDROPHILIC COATED RED RUBBER URETHRAL CATHETER, SMOOTH ROUNDED TIP, 20 FR (6.7 MM): Brand: DOVER

## (undated) DEVICE — INTRODUCER SET, MICROPUNCT, STIFF, 4FR 10CM,PLATINUM TIP,NITINOLWIRE

## (undated) DEVICE — LAPAROSCOPIC ACCESS SYSTEM: Brand: ALEXIS LAPAROSCOPIC SYSTEM WITH KII FIOS FIRST ENTRY

## (undated) DEVICE — CATHETER TRAY, SURESTEP, 16FR, URINE METER W/STATLOCK

## (undated) DEVICE — CATHETER, ANGIO, IMPULSE, PIG 155, 6 FR X 110 CM

## (undated) DEVICE — IRRIGATION SYSTEM, WOUND, SURGIPHOR, 450ML, STERILE

## (undated) DEVICE — TR BAND, RADIAL COMPRESSION, STANDARD, 24CM

## (undated) DEVICE — DRAPE, SHEET, EXTREMITY, W/ARM BOARD COVERS, 87 X 106 X 128 IN, DISPOSABLE, LF, STERILE

## (undated) DEVICE — KENDALL 450 SERIES MONITORING FOAM ELECTRODE - RECTANGULAR SHAPE ( 3/PK): Brand: KENDALL

## (undated) DEVICE — CONTAINER SPEC 480ML CLR POLYSTYR 10% NEUT BUFF FRMLN ZN

## (undated) DEVICE — Device: Brand: DEFENDO VALVE AND CONNECTOR KIT

## (undated) DEVICE — SUTURE, SILK, 3-0, 18 IN SH/CR, BLACK

## (undated) DEVICE — DRAPE, INSTRUMENT, W/POUCH, STERI DRAPE, 7 X 11 IN, DISPOSABLE, STERILE

## (undated) DEVICE — INTRODUCER SET, MICROPUNCTURE, REG, 4FR 10CM W/NITINOL GUIDEWIRE

## (undated) DEVICE — BOLSTER, HIP

## (undated) DEVICE — DRESSING, ISLAND, ADHESIVE, TELFA, 4 X 8 IN

## (undated) DEVICE — SUTURE, SILK, 2-0, 18 IN, BLACK

## (undated) DEVICE — TORQUE DEVICE, ACCOMODATES WIRES W/DIA .010 TO .038"."

## (undated) DEVICE — SYSTEM KIT, PREVENA PLUS

## (undated) DEVICE — CAMERA STRYKER 1488 HD GEN

## (undated) DEVICE — TROCAR ENDOSCP L100MM DIA5MM BLDELSS STBL SL OBT RADLUC

## (undated) DEVICE — INSERT, CLAMP, SURGICAL, SOFT/TRACTION, STEALTH, 1 MM

## (undated) DEVICE — KIT BEDSIDE REVITAL OX 500ML

## (undated) DEVICE — SUTURE, PERMA HAND 2-0, TAPER POINT, SH BLACK 8-18 INCH

## (undated) DEVICE — DRAPE, PAD, PREP, W/ 9 IN CUFF, 24 X 41, LF, NS

## (undated) DEVICE — AGENT HEMSTAT W2XL4IN OXIDIZED REGENERATED CELOS ABSRB

## (undated) DEVICE — SNARE ENDOSCP L240CM SHTH DIA2.4MM LOOP W30MM MIN WRK CHN

## (undated) DEVICE — MARKER,SKIN,WI/RULER AND LABELS: Brand: MEDLINE

## (undated) DEVICE — SHEATH, GLIDESHEATH, SLENDER, 6FR 10CM

## (undated) DEVICE — GARMENT,MEDLINE,DVT,INT,CALF,MED, GEN2: Brand: MEDLINE

## (undated) DEVICE — STANDARD HYPODERMIC NEEDLE,POLYPROPYLENE HUB: Brand: MONOJECT

## (undated) DEVICE — DRESSING, ADHESIVE, ISLAND, TELFA, 4 X 10 IN

## (undated) DEVICE — GOWN ISOLATN REG YEL M WT MULTIPLY SIDETIE LEV 2

## (undated) DEVICE — SPONGE, LAP, XRAY DECT, 18IN X 18IN, W/LOOP, STERILE

## (undated) DEVICE — CATHETER, EMBOLECTOMY, 4F X 80CM, SYNTEL, SILICONE

## (undated) DEVICE — COVER, CART, 45 X 27 X 48 IN, CLEAR

## (undated) DEVICE — ANTI-FOG SOLUTION WITH FOAM PAD: Brand: DEVON

## (undated) DEVICE — 6 X 9  1.75MIL 4-WALL LABGUARD: Brand: MINIGRIP COMMERCIAL LLC

## (undated) DEVICE — [HIGH FLOW INSUFFLATOR,  DO NOT USE IF PACKAGE IS DAMAGED,  KEEP DRY,  KEEP AWAY FROM SUNLIGHT,  PROTECT FROM HEAT AND RADIOACTIVE SOURCES.]: Brand: PNEUMOSURE

## (undated) DEVICE — TOWEL,OR,DSP,ST,BLUE,STD,6/PK,12PK/CS: Brand: MEDLINE

## (undated) DEVICE — SUTURE, PROLENE, 3-0, 18 IN, FS-1, BLUE

## (undated) DEVICE — SPEAR, EYE, SURGICAL, WECK-CEL, CELLULOSE

## (undated) DEVICE — GLOVE, SURGICAL, PROTEXIS PI BLUE W/NEUTHERA, 6.0, PF, LF

## (undated) DEVICE — ACCESS PLATFORM FOR MINIMALLY INVASIVE SURGERY: Brand: GELPOINT® ADVANCED ACCESS PLATFORM

## (undated) DEVICE — PAD, GROUNDING, ELECTROSURGICAL, W/9 FT CABLE, POLYHESIVE II, ADULT, LF

## (undated) DEVICE — SUTURE BAG: Brand: DEVON

## (undated) DEVICE — STAPLER, SKIN PROXIMATE, 35 WIDE

## (undated) DEVICE — DOUBLE BASIN SET: Brand: MEDLINE INDUSTRIES, INC.

## (undated) DEVICE — CONTROL SYRINGE LUER-LOCK TIP: Brand: MONOJECT

## (undated) DEVICE — STOPCOCK, 4 WAY, SMALL BODY, W/SWIVEL, ULTRA, LIPD RESISTANT, LUER LOCK, MALE, LF

## (undated) DEVICE — TUBING, HIGH PRESSURE, 72 IN (1200 PSI)

## (undated) DEVICE — BOWL, BASIN, 32 OZ, STERILE

## (undated) DEVICE — GUIDEWIRE, INQWIRE, 3MM J, .035 X 210CM, FIXED

## (undated) DEVICE — TAPE, UMBILICAL, 1/8 X 30 IN, MULTIPACK, COTTON, WHITE

## (undated) DEVICE — Z INACTIVE USE 2660664 SOLUTION IRRIG 3000ML 0.9% SOD CHL USP UROMATIC PLAS CONT

## (undated) DEVICE — GLOVE, SURGICAL, PROTEXIS PI MICRO, 7.5, PF, LF

## (undated) DEVICE — COVER PROBE, SOFT FLEX W/ GEL, 5 X 48 IN (13X122CM)

## (undated) DEVICE — GUIDEWIRE, STIFF SHAFT, ANGLE TIP, .035 DIA, 180 CM,  3 CM TIP"

## (undated) DEVICE — TOTAL TRAY, 16FR 10ML SIL FOLEY, URN: Brand: MEDLINE

## (undated) DEVICE — RELOAD STPL L60MM H1.5-3.6MM REG TISS BLU GRIPPING SURF B

## (undated) DEVICE — MASK,FACE,MAXFLUIDPROTECT,SHIELD/ERLPS: Brand: MEDLINE

## (undated) DEVICE — RELOAD STPL L60MM H1-2.6MM MESENTERY THN TISS WHT 6 ROW

## (undated) DEVICE — BASIC SINGLE BASIN 1-LF: Brand: MEDLINE INDUSTRIES, INC.

## (undated) DEVICE — PUMP SUC IRR TBNG L10FT W/ HNDPC ASSEMB STRYKEFLOW 2

## (undated) DEVICE — SUTURE, PROLENE, 6-0, 30 IN, BV-1 BV-1

## (undated) DEVICE — SUTURE, ETHILON, 2-0, FSLX 30, BLACK

## (undated) DEVICE — SPONGE, HEMOSTATIC, GELATIN, SURGIFOAM, 8 X 12.5 CM X 10 MM

## (undated) DEVICE — GLOVE, SURGICAL, PROTEXIS PI , 6.5, PF, LF

## (undated) DEVICE — GLOVE, SURGICAL, PROTEXIS PI MICRO, 6.0, PF, LF

## (undated) DEVICE — PATIENT RETURN ELECTRODE, SINGLE-USE, CONTACT QUALITY MONITORING, ADULT, WITH 9FT CORD, FOR PATIENTS WEIGING OVER 33LBS. (15KG): Brand: MEGADYNE

## (undated) DEVICE — BANDAGE, GAUZE, CONFORMING, KERLIX, 6 PLY, 4.5 IN X 4.1 YD

## (undated) DEVICE — GOWN, SURGICAL, SMARTGOWN, XLARGE, STERILE

## (undated) DEVICE — PACK SURG LAP CHOLE CUSTOM

## (undated) DEVICE — SUTURE, VICRYL, 2-0, 18 IN, CT-1, UNDYED

## (undated) DEVICE — PADDING, WEBRIL, UNDERCAST, STERILE, 6 IN

## (undated) DEVICE — SPONGE GAUZE, XRAY SC+RFID, 4X4 16 PLY, STERILE

## (undated) DEVICE — STAPLER EXT 65MM S STL AUTO DISP PURSTRING

## (undated) DEVICE — TUBING, SUCTION, CONNECTING, STERILE 0.25 X 120 IN., LF

## (undated) DEVICE — DRESSING, PREVENA, PEEL AND PLACE, 13CM

## (undated) DEVICE — TROCAR ENDOSCP L100MM DIA12MM BLDELSS OBT RADLUC STBL SL

## (undated) DEVICE — BLADE, OSCILLATING/SAGITTAL, 25MM X 9MM

## (undated) DEVICE — SYRINGE, LUER LOCK, 12ML

## (undated) DEVICE — BANDAGE, ELASTIC, SELF-CLOSE, 6 IN, HONEYCOMB, STERILE

## (undated) DEVICE — SYSTEM KIT, INCISION, PREVENA PEEL AND PLACE, 13CM

## (undated) DEVICE — CATHETER, BALLOON, EVERCROSS, 7MM X 40MM X 80CM, OTW PTA

## (undated) DEVICE — PMI PTFE COATED LAPAROSCOPIC WIRE L-HOOK 44 CM: Brand: PMI

## (undated) DEVICE — WOUND VAC KIT, W/CANISTER, 500ML (5/CS)

## (undated) DEVICE — SYRINGE, 1 CC, LUER LOCK

## (undated) DEVICE — DRESSING, GAUZE, 16 PLY, 4 X 4 IN, STERILE

## (undated) DEVICE — GEL, ULTRASOUND, AQUASONIC 100, 20 GM, STERILE

## (undated) DEVICE — CONTAINER, SPECIMEN, 120 ML, STERILE

## (undated) DEVICE — CONNECTOR, Y F/ WOUND VACUUM STRL

## (undated) DEVICE — DRESSING, NON-ADHERENT, OIL EMULSION, CURITY, 3 X 8 IN, STERILE (3/PK)

## (undated) DEVICE — DRAPE, C-ARM IMAGE

## (undated) DEVICE — TUBING, SUCTION, 1/4" X 10', STRAIGHT: Brand: MEDLINE

## (undated) DEVICE — CHLORAPREP 26ML ORANGE

## (undated) DEVICE — CATHETER, GUIDING, HEARTRAIL III, 6 FR IKARI LEFT 3.5

## (undated) DEVICE — BANDAGE, COFLEX, 4 X 5 YDS, TAN, STERILE, LF

## (undated) DEVICE — CATHETER, ANGIO, IMPULSE, PIGTAIL, 6 FR X 110 CM

## (undated) DEVICE — VALVE SUCTION AIR H2O HYDR H2O JET CONN STRL ORCA POD + DISP

## (undated) DEVICE — 3M™ IOBAN™ 2 ANTIMICROBIAL INCISE DRAPE 6640EZ: Brand: IOBAN™ 2

## (undated) DEVICE — NDL CNTR 40CT FM MAG: Brand: MEDLINE INDUSTRIES, INC.

## (undated) DEVICE — STOCKINETTE, DOUBLE PLY, 6 X 48 IN, STERILE

## (undated) DEVICE — SUTURE ABSRB L6IN L37MM 0 GS-21 GRN 1/2 CIR TAPR PNT NDL VLOCL0306

## (undated) DEVICE — GUIDEWIRE, .035 X 180 BENTSON STR

## (undated) DEVICE — CUFF, TOURNIQUET, 24 X 4, SNGL PORT/SNGL BLADDER, DISP, LF

## (undated) DEVICE — COUNTER, NEEDLE, FOAM BLOCK, POP-N-COUNT, W/BLADEGUARD, W/ADHESIVE 40 COUNT, RED

## (undated) DEVICE — DRESSING, NON-ADHERENT, 3 X 3 IN, STERILE

## (undated) DEVICE — SET ENDO INSTR RED YEL LAPAROSCOPIC

## (undated) DEVICE — YANKAUER,BULB TIP,W/O VENT,RIGID,STERILE: Brand: MEDLINE